# Patient Record
Sex: MALE | Race: WHITE | NOT HISPANIC OR LATINO | Employment: UNEMPLOYED | URBAN - METROPOLITAN AREA
[De-identification: names, ages, dates, MRNs, and addresses within clinical notes are randomized per-mention and may not be internally consistent; named-entity substitution may affect disease eponyms.]

---

## 2017-01-13 ENCOUNTER — RECORDS - HEALTHEAST (OUTPATIENT)
Dept: LAB | Facility: CLINIC | Age: 57
End: 2017-01-13

## 2017-01-13 LAB
CHOLEST SERPL-MCNC: 185 MG/DL
FASTING STATUS PATIENT QL REPORTED: NORMAL
HDLC SERPL-MCNC: 50 MG/DL
LDLC SERPL CALC-MCNC: 107 MG/DL
TRIGL SERPL-MCNC: 142 MG/DL

## 2018-01-09 ENCOUNTER — RECORDS - HEALTHEAST (OUTPATIENT)
Dept: ADMINISTRATIVE | Facility: OTHER | Age: 58
End: 2018-01-09

## 2018-01-11 ENCOUNTER — HOSPITAL ENCOUNTER (OUTPATIENT)
Dept: NUCLEAR MEDICINE | Facility: HOSPITAL | Age: 58
Discharge: HOME | End: 2018-01-11
Attending: INTERNAL MEDICINE

## 2018-01-11 DIAGNOSIS — R14.0 ABDOMINAL BLOATING: ICD-10-CM

## 2018-01-11 RX ORDER — LISINOPRIL 10 MG/1
10 TABLET ORAL DAILY
Status: SHIPPED | COMMUNITY
Start: 2018-01-11 | End: 2021-11-14

## 2018-01-11 RX ORDER — GLIMEPIRIDE 2 MG/1
2 TABLET ORAL
Status: SHIPPED | COMMUNITY
Start: 2018-01-11 | End: 2021-11-14

## 2018-01-11 RX ORDER — CHLORAL HYDRATE 500 MG
1 CAPSULE ORAL DAILY
Status: SHIPPED | COMMUNITY
Start: 2018-01-11

## 2018-01-11 RX ORDER — ASPIRIN 81 MG/1
81 TABLET, CHEWABLE ORAL DAILY
Status: SHIPPED | COMMUNITY
Start: 2018-01-11 | End: 2021-11-14

## 2018-01-11 RX ORDER — SIMVASTATIN 20 MG
20 TABLET ORAL AT BEDTIME
Status: SHIPPED | COMMUNITY
Start: 2018-01-11 | End: 2021-11-14

## 2018-01-11 ASSESSMENT — MIFFLIN-ST. JEOR: SCORE: 1940.7

## 2018-03-26 ENCOUNTER — RECORDS - HEALTHEAST (OUTPATIENT)
Dept: LAB | Facility: CLINIC | Age: 58
End: 2018-03-26

## 2018-03-26 LAB
ALBUMIN SERPL-MCNC: 3.4 G/DL (ref 3.5–5)
ALP SERPL-CCNC: 81 U/L (ref 45–120)
ALT SERPL W P-5'-P-CCNC: 19 U/L (ref 0–45)
ANION GAP SERPL CALCULATED.3IONS-SCNC: 8 MMOL/L (ref 5–18)
AST SERPL W P-5'-P-CCNC: 16 U/L (ref 0–40)
BILIRUB SERPL-MCNC: 0.5 MG/DL (ref 0–1)
BUN SERPL-MCNC: 22 MG/DL (ref 8–22)
CALCIUM SERPL-MCNC: 9.5 MG/DL (ref 8.5–10.5)
CHLORIDE BLD-SCNC: 102 MMOL/L (ref 98–107)
CHOLEST SERPL-MCNC: 162 MG/DL
CO2 SERPL-SCNC: 27 MMOL/L (ref 22–31)
CREAT SERPL-MCNC: 1.29 MG/DL (ref 0.7–1.3)
CREAT UR-MCNC: 20.8 MG/DL
FASTING STATUS PATIENT QL REPORTED: NORMAL
GFR SERPL CREATININE-BSD FRML MDRD: 57 ML/MIN/1.73M2
GLUCOSE BLD-MCNC: 210 MG/DL (ref 70–125)
HDLC SERPL-MCNC: 44 MG/DL
LDLC SERPL CALC-MCNC: 97 MG/DL
MICROALBUMIN UR-MCNC: 10.43 MG/DL (ref 0–1.99)
MICROALBUMIN/CREAT UR: 501.4 MG/G
POTASSIUM BLD-SCNC: 5.5 MMOL/L (ref 3.5–5)
PROT SERPL-MCNC: 6.8 G/DL (ref 6–8)
SODIUM SERPL-SCNC: 137 MMOL/L (ref 136–145)
TRIGL SERPL-MCNC: 107 MG/DL

## 2018-03-28 LAB — BACTERIA SPEC CULT: ABNORMAL

## 2018-10-29 ENCOUNTER — RECORDS - HEALTHEAST (OUTPATIENT)
Dept: LAB | Facility: CLINIC | Age: 58
End: 2018-10-29

## 2018-10-29 LAB
ALBUMIN SERPL-MCNC: 3.9 G/DL (ref 3.5–5)
ALP SERPL-CCNC: 74 U/L (ref 45–120)
ALT SERPL W P-5'-P-CCNC: 28 U/L (ref 0–45)
ANION GAP SERPL CALCULATED.3IONS-SCNC: 11 MMOL/L (ref 5–18)
AST SERPL W P-5'-P-CCNC: 20 U/L (ref 0–40)
BILIRUB SERPL-MCNC: 0.6 MG/DL (ref 0–1)
BUN SERPL-MCNC: 18 MG/DL (ref 8–22)
CALCIUM SERPL-MCNC: 10.7 MG/DL (ref 8.5–10.5)
CHLORIDE BLD-SCNC: 100 MMOL/L (ref 98–107)
CHOLEST SERPL-MCNC: 201 MG/DL
CO2 SERPL-SCNC: 27 MMOL/L (ref 22–31)
CREAT SERPL-MCNC: 1.4 MG/DL (ref 0.7–1.3)
FASTING STATUS PATIENT QL REPORTED: ABNORMAL
GFR SERPL CREATININE-BSD FRML MDRD: 52 ML/MIN/1.73M2
GLUCOSE BLD-MCNC: 112 MG/DL (ref 70–125)
HDLC SERPL-MCNC: 49 MG/DL
LDLC SERPL CALC-MCNC: 109 MG/DL
POTASSIUM BLD-SCNC: 4.2 MMOL/L (ref 3.5–5)
PROT SERPL-MCNC: 7.3 G/DL (ref 6–8)
SODIUM SERPL-SCNC: 138 MMOL/L (ref 136–145)
TRIGL SERPL-MCNC: 216 MG/DL

## 2019-02-28 ENCOUNTER — RECORDS - HEALTHEAST (OUTPATIENT)
Dept: LAB | Facility: CLINIC | Age: 59
End: 2019-02-28

## 2019-02-28 LAB
ALBUMIN SERPL-MCNC: 3.6 G/DL (ref 3.5–5)
ALP SERPL-CCNC: 81 U/L (ref 45–120)
ALT SERPL W P-5'-P-CCNC: 21 U/L (ref 0–45)
ANION GAP SERPL CALCULATED.3IONS-SCNC: 7 MMOL/L (ref 5–18)
AST SERPL W P-5'-P-CCNC: 18 U/L (ref 0–40)
BILIRUB SERPL-MCNC: 0.7 MG/DL (ref 0–1)
BUN SERPL-MCNC: 20 MG/DL (ref 8–22)
CALCIUM SERPL-MCNC: 9.8 MG/DL (ref 8.5–10.5)
CHLORIDE BLD-SCNC: 101 MMOL/L (ref 98–107)
CO2 SERPL-SCNC: 30 MMOL/L (ref 22–31)
CREAT SERPL-MCNC: 1.47 MG/DL (ref 0.7–1.3)
GFR SERPL CREATININE-BSD FRML MDRD: 49 ML/MIN/1.73M2
GLUCOSE BLD-MCNC: 192 MG/DL (ref 70–125)
POTASSIUM BLD-SCNC: 5.2 MMOL/L (ref 3.5–5)
PROT SERPL-MCNC: 6.9 G/DL (ref 6–8)
SODIUM SERPL-SCNC: 138 MMOL/L (ref 136–145)

## 2019-06-25 ENCOUNTER — RECORDS - HEALTHEAST (OUTPATIENT)
Dept: LAB | Facility: CLINIC | Age: 59
End: 2019-06-25

## 2019-06-25 LAB
ALBUMIN SERPL-MCNC: 4 G/DL (ref 3.5–5)
ALP SERPL-CCNC: 76 U/L (ref 45–120)
ALT SERPL W P-5'-P-CCNC: 24 U/L (ref 0–45)
ANION GAP SERPL CALCULATED.3IONS-SCNC: 12 MMOL/L (ref 5–18)
AST SERPL W P-5'-P-CCNC: 22 U/L (ref 0–40)
BILIRUB SERPL-MCNC: 0.6 MG/DL (ref 0–1)
BUN SERPL-MCNC: 22 MG/DL (ref 8–22)
CALCIUM SERPL-MCNC: 10.3 MG/DL (ref 8.5–10.5)
CHLORIDE BLD-SCNC: 101 MMOL/L (ref 98–107)
CO2 SERPL-SCNC: 24 MMOL/L (ref 22–31)
CREAT SERPL-MCNC: 1.4 MG/DL (ref 0.7–1.3)
GFR SERPL CREATININE-BSD FRML MDRD: 52 ML/MIN/1.73M2
GLUCOSE BLD-MCNC: 168 MG/DL (ref 70–125)
POTASSIUM BLD-SCNC: 4.9 MMOL/L (ref 3.5–5)
PROT SERPL-MCNC: 7 G/DL (ref 6–8)
SODIUM SERPL-SCNC: 137 MMOL/L (ref 136–145)

## 2019-10-31 ENCOUNTER — RECORDS - HEALTHEAST (OUTPATIENT)
Dept: LAB | Facility: CLINIC | Age: 59
End: 2019-10-31

## 2019-10-31 LAB
ALBUMIN SERPL-MCNC: 4.1 G/DL (ref 3.5–5)
ALP SERPL-CCNC: 87 U/L (ref 45–120)
ALT SERPL W P-5'-P-CCNC: 24 U/L (ref 0–45)
ANION GAP SERPL CALCULATED.3IONS-SCNC: 11 MMOL/L (ref 5–18)
AST SERPL W P-5'-P-CCNC: 20 U/L (ref 0–40)
BILIRUB SERPL-MCNC: 0.7 MG/DL (ref 0–1)
BUN SERPL-MCNC: 17 MG/DL (ref 8–22)
CALCIUM SERPL-MCNC: 10.7 MG/DL (ref 8.5–10.5)
CHLORIDE BLD-SCNC: 101 MMOL/L (ref 98–107)
CHOLEST SERPL-MCNC: 187 MG/DL
CO2 SERPL-SCNC: 29 MMOL/L (ref 22–31)
CREAT SERPL-MCNC: 1.45 MG/DL (ref 0.7–1.3)
FASTING STATUS PATIENT QL REPORTED: ABNORMAL
GFR SERPL CREATININE-BSD FRML MDRD: 50 ML/MIN/1.73M2
GLUCOSE BLD-MCNC: 113 MG/DL (ref 70–125)
HDLC SERPL-MCNC: 50 MG/DL
LDLC SERPL CALC-MCNC: 107 MG/DL
POTASSIUM BLD-SCNC: 4.5 MMOL/L (ref 3.5–5)
PROT SERPL-MCNC: 7.2 G/DL (ref 6–8)
SODIUM SERPL-SCNC: 141 MMOL/L (ref 136–145)
TRIGL SERPL-MCNC: 152 MG/DL

## 2020-03-19 ENCOUNTER — RECORDS - HEALTHEAST (OUTPATIENT)
Dept: LAB | Facility: CLINIC | Age: 60
End: 2020-03-19

## 2020-03-19 LAB
ALBUMIN SERPL-MCNC: 4.1 G/DL (ref 3.5–5)
ANION GAP SERPL CALCULATED.3IONS-SCNC: 10 MMOL/L (ref 5–18)
BUN SERPL-MCNC: 16 MG/DL (ref 8–22)
CALCIUM SERPL-MCNC: 10.7 MG/DL (ref 8.5–10.5)
CHLORIDE BLD-SCNC: 102 MMOL/L (ref 98–107)
CO2 SERPL-SCNC: 29 MMOL/L (ref 22–31)
CREAT SERPL-MCNC: 1.37 MG/DL (ref 0.7–1.3)
GFR SERPL CREATININE-BSD FRML MDRD: 53 ML/MIN/1.73M2
GLUCOSE BLD-MCNC: 107 MG/DL (ref 70–125)
PHOSPHATE SERPL-MCNC: 3.1 MG/DL (ref 2.5–4.5)
POTASSIUM BLD-SCNC: 4.6 MMOL/L (ref 3.5–5)
SODIUM SERPL-SCNC: 141 MMOL/L (ref 136–145)

## 2021-05-31 VITALS — WEIGHT: 235 LBS | BODY MASS INDEX: 30.16 KG/M2 | HEIGHT: 74 IN

## 2021-11-14 ENCOUNTER — HOSPITAL ENCOUNTER (INPATIENT)
Facility: HOSPITAL | Age: 61
LOS: 9 days | Discharge: HOME HEALTH | DRG: 616 | End: 2021-11-24
Attending: EMERGENCY MEDICINE | Admitting: STUDENT IN AN ORGANIZED HEALTH CARE EDUCATION/TRAINING PROGRAM
Payer: COMMERCIAL

## 2021-11-14 ENCOUNTER — APPOINTMENT (OUTPATIENT)
Dept: RADIOLOGY | Facility: HOSPITAL | Age: 61
DRG: 616 | End: 2021-11-14
Attending: EMERGENCY MEDICINE
Payer: COMMERCIAL

## 2021-11-14 DIAGNOSIS — E11.69 TYPE 2 DIABETES MELLITUS WITH OTHER SPECIFIED COMPLICATION, WITH LONG-TERM CURRENT USE OF INSULIN (H): ICD-10-CM

## 2021-11-14 DIAGNOSIS — L03.119 CELLULITIS AND ABSCESS OF FOOT, EXCEPT TOES: ICD-10-CM

## 2021-11-14 DIAGNOSIS — T14.8XXA WOUND INFECTION: ICD-10-CM

## 2021-11-14 DIAGNOSIS — I82.4Y1 ACUTE DEEP VEIN THROMBOSIS (DVT) OF PROXIMAL VEIN OF RIGHT LOWER EXTREMITY (H): Primary | ICD-10-CM

## 2021-11-14 DIAGNOSIS — L08.9 WOUND INFECTION: ICD-10-CM

## 2021-11-14 DIAGNOSIS — I21.4 NSTEMI (NON-ST ELEVATED MYOCARDIAL INFARCTION) (H): ICD-10-CM

## 2021-11-14 DIAGNOSIS — E83.51 HYPOCALCEMIA: ICD-10-CM

## 2021-11-14 DIAGNOSIS — N19 RENAL FAILURE, UNSPECIFIED CHRONICITY: ICD-10-CM

## 2021-11-14 DIAGNOSIS — Z79.4 TYPE 2 DIABETES MELLITUS WITH OTHER SPECIFIED COMPLICATION, WITH LONG-TERM CURRENT USE OF INSULIN (H): ICD-10-CM

## 2021-11-14 DIAGNOSIS — K59.00 CONSTIPATION, UNSPECIFIED CONSTIPATION TYPE: ICD-10-CM

## 2021-11-14 DIAGNOSIS — R73.9 HYPERGLYCEMIA: ICD-10-CM

## 2021-11-14 DIAGNOSIS — K21.00 GASTROESOPHAGEAL REFLUX DISEASE WITH ESOPHAGITIS WITHOUT HEMORRHAGE: ICD-10-CM

## 2021-11-14 DIAGNOSIS — L02.619 CELLULITIS AND ABSCESS OF FOOT, EXCEPT TOES: ICD-10-CM

## 2021-11-14 DIAGNOSIS — E11.10 DIABETIC KETOACIDOSIS WITHOUT COMA ASSOCIATED WITH TYPE 2 DIABETES MELLITUS (H): ICD-10-CM

## 2021-11-14 DIAGNOSIS — E83.42 HYPOMAGNESEMIA: ICD-10-CM

## 2021-11-14 DIAGNOSIS — I48.0 PAROXYSMAL ATRIAL FIBRILLATION (H): ICD-10-CM

## 2021-11-14 PROBLEM — R14.0 ABDOMINAL DISTENSION, GASEOUS: Status: ACTIVE | Noted: 2018-01-09

## 2021-11-14 PROBLEM — E11.39 DIABETIC OCULOPATHY ASSOCIATED WITH TYPE 2 DIABETES MELLITUS (H): Status: ACTIVE | Noted: 2021-11-14

## 2021-11-14 PROBLEM — E78.2 MIXED HYPERLIPIDEMIA: Status: ACTIVE | Noted: 2021-11-14

## 2021-11-14 PROBLEM — R80.9 MICROALBUMINURIA: Status: ACTIVE | Noted: 2021-11-14

## 2021-11-14 PROBLEM — D12.4 BENIGN NEOPLASM OF DESCENDING COLON: Status: ACTIVE | Noted: 2018-04-19

## 2021-11-14 PROBLEM — Z79.84 LONG TERM (CURRENT) USE OF ORAL HYPOGLYCEMIC DRUGS: Status: ACTIVE | Noted: 2021-11-14

## 2021-11-14 PROBLEM — K22.70 BARRETT'S ESOPHAGUS: Status: ACTIVE | Noted: 2018-04-19

## 2021-11-14 PROBLEM — R14.0 ABDOMINAL BLOATING: Status: ACTIVE | Noted: 2018-01-09

## 2021-11-14 PROBLEM — E83.52 HYPERCALCEMIA: Status: ACTIVE | Noted: 2021-11-14

## 2021-11-14 PROBLEM — K63.5 POLYP OF COLON: Status: ACTIVE | Noted: 2018-04-17

## 2021-11-14 LAB
ANION GAP SERPL CALCULATED.3IONS-SCNC: 24 MMOL/L (ref 5–18)
BASE EXCESS BLDV CALC-SCNC: -16.2 MMOL/L
BASOPHILS # BLD AUTO: 0 10E3/UL (ref 0–0.2)
BASOPHILS NFR BLD AUTO: 0 %
BUN SERPL-MCNC: 24 MG/DL (ref 8–22)
C REACTIVE PROTEIN LHE: 36.5 MG/DL (ref 0–0.8)
CALCIUM SERPL-MCNC: 10.5 MG/DL (ref 8.5–10.5)
CHLORIDE BLD-SCNC: 90 MMOL/L (ref 98–107)
CO2 SERPL-SCNC: 15 MMOL/L (ref 22–31)
CREAT SERPL-MCNC: 2.03 MG/DL (ref 0.7–1.3)
EOSINOPHIL # BLD AUTO: 0 10E3/UL (ref 0–0.7)
EOSINOPHIL NFR BLD AUTO: 0 %
ERYTHROCYTE [DISTWIDTH] IN BLOOD BY AUTOMATED COUNT: 14.1 % (ref 10–15)
ERYTHROCYTE [SEDIMENTATION RATE] IN BLOOD BY WESTERGREN METHOD: 90 MM/HR (ref 0–15)
GFR SERPL CREATININE-BSD FRML MDRD: 34 ML/MIN/1.73M2
GLUCOSE BLD-MCNC: 439 MG/DL (ref 70–125)
GLUCOSE BLDC GLUCOMTR-MCNC: 388 MG/DL (ref 70–99)
GLUCOSE BLDC GLUCOMTR-MCNC: 392 MG/DL (ref 70–99)
HCO3 BLDV-SCNC: 13 MMOL/L (ref 24–30)
HCT VFR BLD AUTO: 40.1 % (ref 40–53)
HGB BLD-MCNC: 12.9 G/DL (ref 13.3–17.7)
IMM GRANULOCYTES # BLD: 0.1 10E3/UL
IMM GRANULOCYTES NFR BLD: 1 %
KETONES BLD-SCNC: 9.3 MMOL/L
LACTATE SERPL-SCNC: 1.8 MMOL/L (ref 0.7–2)
LYMPHOCYTES # BLD AUTO: 0.8 10E3/UL (ref 0.8–5.3)
LYMPHOCYTES NFR BLD AUTO: 7 %
MAGNESIUM SERPL-MCNC: 1.6 MG/DL (ref 1.8–2.6)
MCH RBC QN AUTO: 28.8 PG (ref 26.5–33)
MCHC RBC AUTO-ENTMCNC: 32.2 G/DL (ref 31.5–36.5)
MCV RBC AUTO: 90 FL (ref 78–100)
MONOCYTES # BLD AUTO: 0.9 10E3/UL (ref 0–1.3)
MONOCYTES NFR BLD AUTO: 8 %
NEUTROPHILS # BLD AUTO: 9.5 10E3/UL (ref 1.6–8.3)
NEUTROPHILS NFR BLD AUTO: 84 %
NRBC # BLD AUTO: 0 10E3/UL
NRBC BLD AUTO-RTO: 0 /100
OXYHGB MFR BLDV: 64.4 % (ref 70–75)
PCO2 BLDV: 28 MM HG (ref 35–50)
PH BLDV: 7.22 [PH] (ref 7.35–7.45)
PLATELET # BLD AUTO: 262 10E3/UL (ref 150–450)
PO2 BLDV: 38 MM HG (ref 25–47)
POTASSIUM BLD-SCNC: 4.7 MMOL/L (ref 3.5–5)
RBC # BLD AUTO: 4.48 10E6/UL (ref 4.4–5.9)
SAO2 % BLDV: 65.5 % (ref 70–75)
SARS-COV-2 RNA RESP QL NAA+PROBE: NEGATIVE
SODIUM SERPL-SCNC: 129 MMOL/L (ref 136–145)
WBC # BLD AUTO: 11.2 10E3/UL (ref 4–11)

## 2021-11-14 PROCEDURE — 93005 ELECTROCARDIOGRAM TRACING: CPT | Performed by: EMERGENCY MEDICINE

## 2021-11-14 PROCEDURE — 86141 C-REACTIVE PROTEIN HS: CPT | Performed by: EMERGENCY MEDICINE

## 2021-11-14 PROCEDURE — 80048 BASIC METABOLIC PNL TOTAL CA: CPT | Performed by: EMERGENCY MEDICINE

## 2021-11-14 PROCEDURE — 250N000011 HC RX IP 250 OP 636: Performed by: EMERGENCY MEDICINE

## 2021-11-14 PROCEDURE — 87635 SARS-COV-2 COVID-19 AMP PRB: CPT | Performed by: EMERGENCY MEDICINE

## 2021-11-14 PROCEDURE — 82010 KETONE BODYS QUAN: CPT | Performed by: EMERGENCY MEDICINE

## 2021-11-14 PROCEDURE — 96372 THER/PROPH/DIAG INJ SC/IM: CPT | Performed by: EMERGENCY MEDICINE

## 2021-11-14 PROCEDURE — 84484 ASSAY OF TROPONIN QUANT: CPT | Performed by: EMERGENCY MEDICINE

## 2021-11-14 PROCEDURE — 258N000003 HC RX IP 258 OP 636: Performed by: EMERGENCY MEDICINE

## 2021-11-14 PROCEDURE — 250N000013 HC RX MED GY IP 250 OP 250 PS 637: Performed by: EMERGENCY MEDICINE

## 2021-11-14 PROCEDURE — 87149 DNA/RNA DIRECT PROBE: CPT | Performed by: EMERGENCY MEDICINE

## 2021-11-14 PROCEDURE — 82805 BLOOD GASES W/O2 SATURATION: CPT | Performed by: EMERGENCY MEDICINE

## 2021-11-14 PROCEDURE — 83735 ASSAY OF MAGNESIUM: CPT | Performed by: EMERGENCY MEDICINE

## 2021-11-14 PROCEDURE — 83605 ASSAY OF LACTIC ACID: CPT | Performed by: EMERGENCY MEDICINE

## 2021-11-14 PROCEDURE — 85652 RBC SED RATE AUTOMATED: CPT | Performed by: EMERGENCY MEDICINE

## 2021-11-14 PROCEDURE — 99285 EMERGENCY DEPT VISIT HI MDM: CPT | Mod: 25

## 2021-11-14 PROCEDURE — 87181 SC STD AGAR DILUTION PER AGT: CPT | Performed by: EMERGENCY MEDICINE

## 2021-11-14 PROCEDURE — 96368 THER/DIAG CONCURRENT INF: CPT

## 2021-11-14 PROCEDURE — 73630 X-RAY EXAM OF FOOT: CPT | Mod: LT

## 2021-11-14 PROCEDURE — 96366 THER/PROPH/DIAG IV INF ADDON: CPT

## 2021-11-14 PROCEDURE — C9803 HOPD COVID-19 SPEC COLLECT: HCPCS

## 2021-11-14 PROCEDURE — 85025 COMPLETE CBC W/AUTO DIFF WBC: CPT | Performed by: EMERGENCY MEDICINE

## 2021-11-14 PROCEDURE — 36415 COLL VENOUS BLD VENIPUNCTURE: CPT | Performed by: EMERGENCY MEDICINE

## 2021-11-14 PROCEDURE — 250N000012 HC RX MED GY IP 250 OP 636 PS 637: Performed by: EMERGENCY MEDICINE

## 2021-11-14 PROCEDURE — 96365 THER/PROPH/DIAG IV INF INIT: CPT | Mod: 59

## 2021-11-14 PROCEDURE — 96367 TX/PROPH/DG ADDL SEQ IV INF: CPT

## 2021-11-14 PROCEDURE — 83036 HEMOGLOBIN GLYCOSYLATED A1C: CPT | Performed by: EMERGENCY MEDICINE

## 2021-11-14 RX ORDER — CALCIUM GLUCONATE 94 MG/ML
1 INJECTION, SOLUTION INTRAVENOUS ONCE
Status: COMPLETED | OUTPATIENT
Start: 2021-11-14 | End: 2021-11-15

## 2021-11-14 RX ORDER — DILTIAZEM HYDROCHLORIDE 5 MG/ML
25 INJECTION INTRAVENOUS ONCE
Status: DISCONTINUED | OUTPATIENT
Start: 2021-11-14 | End: 2021-11-14

## 2021-11-14 RX ORDER — ONDANSETRON 4 MG/1
4 TABLET, FILM COATED ORAL ONCE
Status: COMPLETED | OUTPATIENT
Start: 2021-11-14 | End: 2021-11-14

## 2021-11-14 RX ORDER — PIPERACILLIN SODIUM, TAZOBACTAM SODIUM 3; .375 G/15ML; G/15ML
3.38 INJECTION, POWDER, LYOPHILIZED, FOR SOLUTION INTRAVENOUS ONCE
Status: COMPLETED | OUTPATIENT
Start: 2021-11-14 | End: 2021-11-14

## 2021-11-14 RX ORDER — CEFAZOLIN SODIUM 1 G/50ML
2000 SOLUTION INTRAVENOUS ONCE
Status: COMPLETED | OUTPATIENT
Start: 2021-11-14 | End: 2021-11-15

## 2021-11-14 RX ORDER — ACETAMINOPHEN 325 MG/1
650 TABLET ORAL ONCE
Status: COMPLETED | OUTPATIENT
Start: 2021-11-14 | End: 2021-11-14

## 2021-11-14 RX ADMIN — ACETAMINOPHEN 650 MG: 325 TABLET ORAL at 22:30

## 2021-11-14 RX ADMIN — ONDANSETRON HYDROCHLORIDE 4 MG: 4 TABLET, FILM COATED ORAL at 23:43

## 2021-11-14 RX ADMIN — HUMAN INSULIN 8 UNITS: 100 INJECTION, SOLUTION SUBCUTANEOUS at 22:30

## 2021-11-14 RX ADMIN — CALCIUM GLUCONATE 1 G: 98 INJECTION, SOLUTION INTRAVENOUS at 23:45

## 2021-11-14 RX ADMIN — VANCOMYCIN HYDROCHLORIDE 2000 MG: 5 INJECTION, POWDER, LYOPHILIZED, FOR SOLUTION INTRAVENOUS at 22:33

## 2021-11-14 RX ADMIN — SODIUM CHLORIDE 1000 ML: 9 INJECTION, SOLUTION INTRAVENOUS at 22:07

## 2021-11-14 RX ADMIN — SODIUM CHLORIDE 1000 ML: 9 INJECTION, SOLUTION INTRAVENOUS at 22:36

## 2021-11-14 RX ADMIN — PIPERACILLIN SODIUM AND TAZOBACTAM SODIUM 3.38 G: 3; .375 INJECTION, POWDER, LYOPHILIZED, FOR SOLUTION INTRAVENOUS at 22:07

## 2021-11-14 ASSESSMENT — MIFFLIN-ST. JEOR: SCORE: 1874.47

## 2021-11-15 ENCOUNTER — APPOINTMENT (OUTPATIENT)
Dept: MRI IMAGING | Facility: HOSPITAL | Age: 61
DRG: 616 | End: 2021-11-15
Attending: EMERGENCY MEDICINE
Payer: COMMERCIAL

## 2021-11-15 ENCOUNTER — APPOINTMENT (OUTPATIENT)
Dept: CARDIOLOGY | Facility: HOSPITAL | Age: 61
DRG: 616 | End: 2021-11-15
Attending: STUDENT IN AN ORGANIZED HEALTH CARE EDUCATION/TRAINING PROGRAM
Payer: COMMERCIAL

## 2021-11-15 PROBLEM — E11.10 DIABETIC KETOACIDOSIS WITHOUT COMA ASSOCIATED WITH TYPE 2 DIABETES MELLITUS (H): Status: ACTIVE | Noted: 2021-11-15

## 2021-11-15 PROBLEM — E83.51 HYPOCALCEMIA: Status: ACTIVE | Noted: 2021-11-15

## 2021-11-15 PROBLEM — R73.9 HYPERGLYCEMIA: Status: ACTIVE | Noted: 2021-11-15

## 2021-11-15 LAB
ANION GAP SERPL CALCULATED.3IONS-SCNC: 10 MMOL/L (ref 5–18)
ANION GAP SERPL CALCULATED.3IONS-SCNC: 15 MMOL/L (ref 5–18)
ANION GAP SERPL CALCULATED.3IONS-SCNC: 24 MMOL/L (ref 5–18)
ANION GAP SERPL CALCULATED.3IONS-SCNC: 5 MMOL/L (ref 5–18)
ANION GAP SERPL CALCULATED.3IONS-SCNC: 7 MMOL/L (ref 5–18)
ANION GAP SERPL CALCULATED.3IONS-SCNC: 7 MMOL/L (ref 5–18)
APTT PPP: 55 SECONDS (ref 22–38)
ATRIAL RATE - MUSE: 93 BPM
BASOPHILS # BLD AUTO: 0 10E3/UL (ref 0–0.2)
BASOPHILS NFR BLD AUTO: 0 %
BUN SERPL-MCNC: 21 MG/DL (ref 8–22)
BUN SERPL-MCNC: 23 MG/DL (ref 8–22)
BUN SERPL-MCNC: 24 MG/DL (ref 8–22)
BUN SERPL-MCNC: 24 MG/DL (ref 8–22)
CALCIUM SERPL-MCNC: 8.5 MG/DL (ref 8.5–10.5)
CALCIUM SERPL-MCNC: 9 MG/DL (ref 8.5–10.5)
CALCIUM SERPL-MCNC: 9.2 MG/DL (ref 8.5–10.5)
CALCIUM SERPL-MCNC: 9.4 MG/DL (ref 8.5–10.5)
CALCIUM SERPL-MCNC: 9.5 MG/DL (ref 8.5–10.5)
CALCIUM SERPL-MCNC: 9.9 MG/DL (ref 8.5–10.5)
CHLORIDE BLD-SCNC: 102 MMOL/L (ref 98–107)
CHLORIDE BLD-SCNC: 102 MMOL/L (ref 98–107)
CHLORIDE BLD-SCNC: 103 MMOL/L (ref 98–107)
CHLORIDE BLD-SCNC: 104 MMOL/L (ref 98–107)
CHLORIDE BLD-SCNC: 94 MMOL/L (ref 98–107)
CHLORIDE BLD-SCNC: 99 MMOL/L (ref 98–107)
CO2 SERPL-SCNC: 11 MMOL/L (ref 22–31)
CO2 SERPL-SCNC: 17 MMOL/L (ref 22–31)
CO2 SERPL-SCNC: 20 MMOL/L (ref 22–31)
CO2 SERPL-SCNC: 20 MMOL/L (ref 22–31)
CO2 SERPL-SCNC: 22 MMOL/L (ref 22–31)
CO2 SERPL-SCNC: 24 MMOL/L (ref 22–31)
CREAT SERPL-MCNC: 1.49 MG/DL (ref 0.7–1.3)
CREAT SERPL-MCNC: 1.49 MG/DL (ref 0.7–1.3)
CREAT SERPL-MCNC: 1.59 MG/DL (ref 0.7–1.3)
CREAT SERPL-MCNC: 1.65 MG/DL (ref 0.7–1.3)
CREAT SERPL-MCNC: 1.8 MG/DL (ref 0.7–1.3)
CREAT SERPL-MCNC: 1.94 MG/DL (ref 0.7–1.3)
DIASTOLIC BLOOD PRESSURE - MUSE: 66 MMHG
ENTEROCOCCUS FAECALIS: NOT DETECTED
ENTEROCOCCUS FAECIUM: NOT DETECTED
EOSINOPHIL # BLD AUTO: 0 10E3/UL (ref 0–0.7)
EOSINOPHIL NFR BLD AUTO: 0 %
ERYTHROCYTE [DISTWIDTH] IN BLOOD BY AUTOMATED COUNT: 14.1 % (ref 10–15)
GFR SERPL CREATININE-BSD FRML MDRD: 36 ML/MIN/1.73M2
GFR SERPL CREATININE-BSD FRML MDRD: 39 ML/MIN/1.73M2
GFR SERPL CREATININE-BSD FRML MDRD: 44 ML/MIN/1.73M2
GFR SERPL CREATININE-BSD FRML MDRD: 46 ML/MIN/1.73M2
GFR SERPL CREATININE-BSD FRML MDRD: 50 ML/MIN/1.73M2
GFR SERPL CREATININE-BSD FRML MDRD: 50 ML/MIN/1.73M2
GLUCOSE BLD-MCNC: 156 MG/DL (ref 70–125)
GLUCOSE BLD-MCNC: 172 MG/DL (ref 70–125)
GLUCOSE BLD-MCNC: 176 MG/DL (ref 70–125)
GLUCOSE BLD-MCNC: 196 MG/DL (ref 70–125)
GLUCOSE BLD-MCNC: 268 MG/DL (ref 70–125)
GLUCOSE BLD-MCNC: 396 MG/DL (ref 70–125)
GLUCOSE BLDC GLUCOMTR-MCNC: 121 MG/DL (ref 70–99)
GLUCOSE BLDC GLUCOMTR-MCNC: 145 MG/DL (ref 70–99)
GLUCOSE BLDC GLUCOMTR-MCNC: 146 MG/DL (ref 70–99)
GLUCOSE BLDC GLUCOMTR-MCNC: 149 MG/DL (ref 70–99)
GLUCOSE BLDC GLUCOMTR-MCNC: 152 MG/DL (ref 70–99)
GLUCOSE BLDC GLUCOMTR-MCNC: 152 MG/DL (ref 70–99)
GLUCOSE BLDC GLUCOMTR-MCNC: 170 MG/DL (ref 70–99)
GLUCOSE BLDC GLUCOMTR-MCNC: 180 MG/DL (ref 70–99)
GLUCOSE BLDC GLUCOMTR-MCNC: 184 MG/DL (ref 70–99)
GLUCOSE BLDC GLUCOMTR-MCNC: 186 MG/DL (ref 70–99)
GLUCOSE BLDC GLUCOMTR-MCNC: 188 MG/DL (ref 70–99)
GLUCOSE BLDC GLUCOMTR-MCNC: 210 MG/DL (ref 70–99)
GLUCOSE BLDC GLUCOMTR-MCNC: 251 MG/DL (ref 70–99)
GLUCOSE BLDC GLUCOMTR-MCNC: 287 MG/DL (ref 70–99)
GLUCOSE BLDC GLUCOMTR-MCNC: 341 MG/DL (ref 70–99)
GLUCOSE BLDC GLUCOMTR-MCNC: 364 MG/DL (ref 70–99)
HBA1C MFR BLD: >14 %
HCT VFR BLD AUTO: 34.9 % (ref 40–53)
HGB BLD-MCNC: 11.3 G/DL (ref 13.3–17.7)
HOLD SPECIMEN: NORMAL
IMM GRANULOCYTES # BLD: 0.1 10E3/UL
IMM GRANULOCYTES NFR BLD: 1 %
INR PPP: 1.35 (ref 0.85–1.15)
INTERPRETATION ECG - MUSE: NORMAL
KETONES BLD-SCNC: 1.02 MMOL/L
KETONES BLD-SCNC: 1.73 MMOL/L
KETONES BLD-SCNC: 2.37 MMOL/L
KETONES BLD-SCNC: 3.29 MMOL/L
KETONES BLD-SCNC: 5.41 MMOL/L
LISTERIA SPECIES (DETECTED/NOT DETECTED): NOT DETECTED
LVEF ECHO: NORMAL
LYMPHOCYTES # BLD AUTO: 0.9 10E3/UL (ref 0.8–5.3)
LYMPHOCYTES NFR BLD AUTO: 8 %
MAGNESIUM SERPL-MCNC: 1.7 MG/DL (ref 1.8–2.6)
MCH RBC QN AUTO: 28.9 PG (ref 26.5–33)
MCHC RBC AUTO-ENTMCNC: 32.4 G/DL (ref 31.5–36.5)
MCV RBC AUTO: 89 FL (ref 78–100)
MONOCYTES # BLD AUTO: 1 10E3/UL (ref 0–1.3)
MONOCYTES NFR BLD AUTO: 9 %
NEUTROPHILS # BLD AUTO: 9.8 10E3/UL (ref 1.6–8.3)
NEUTROPHILS NFR BLD AUTO: 82 %
NRBC # BLD AUTO: 0 10E3/UL
NRBC BLD AUTO-RTO: 0 /100
P AXIS - MUSE: 55 DEGREES
PHOSPHATE SERPL-MCNC: 3.1 MG/DL (ref 2.5–4.5)
PLATELET # BLD AUTO: 262 10E3/UL (ref 150–450)
POTASSIUM BLD-SCNC: 3.7 MMOL/L (ref 3.5–5)
POTASSIUM BLD-SCNC: 3.8 MMOL/L (ref 3.5–5)
POTASSIUM BLD-SCNC: 4 MMOL/L (ref 3.5–5)
POTASSIUM BLD-SCNC: 4.2 MMOL/L (ref 3.5–5)
POTASSIUM BLD-SCNC: 4.2 MMOL/L (ref 3.5–5)
POTASSIUM BLD-SCNC: 4.4 MMOL/L (ref 3.5–5)
PR INTERVAL - MUSE: 168 MS
QRS DURATION - MUSE: 90 MS
QT - MUSE: 344 MS
QTC - MUSE: 427 MS
R AXIS - MUSE: 34 DEGREES
RBC # BLD AUTO: 3.91 10E6/UL (ref 4.4–5.9)
SODIUM SERPL-SCNC: 129 MMOL/L (ref 136–145)
SODIUM SERPL-SCNC: 131 MMOL/L (ref 136–145)
SODIUM SERPL-SCNC: 132 MMOL/L (ref 136–145)
SODIUM SERPL-SCNC: 132 MMOL/L (ref 136–145)
STAPHYLOCOCCUS AUREUS: NOT DETECTED
STAPHYLOCOCCUS EPIDERMIDIS: NOT DETECTED
STAPHYLOCOCCUS LUGDUNENSIS: NOT DETECTED
STAPHYLOCOCCUS SPECIES: NOT DETECTED
STREPTOCOCCUS AGALACTIAE: DETECTED
STREPTOCOCCUS ANGINOSUS GROUP: NOT DETECTED
STREPTOCOCCUS PNEUMONIAE: NOT DETECTED
STREPTOCOCCUS PYOGENES: NOT DETECTED
SYSTOLIC BLOOD PRESSURE - MUSE: 122 MMHG
T AXIS - MUSE: 37 DEGREES
TROPONIN I SERPL-MCNC: 1.29 NG/ML (ref 0–0.29)
TROPONIN I SERPL-MCNC: 13.42 NG/ML (ref 0–0.29)
TROPONIN I SERPL-MCNC: 3.72 NG/ML (ref 0–0.29)
UFH PPP CHRO-ACNC: <0.1 IU/ML
UFH PPP CHRO-ACNC: <0.1 IU/ML
VENTRICULAR RATE- MUSE: 93 BPM
WBC # BLD AUTO: 11.8 10E3/UL (ref 4–11)

## 2021-11-15 PROCEDURE — 84100 ASSAY OF PHOSPHORUS: CPT | Performed by: EMERGENCY MEDICINE

## 2021-11-15 PROCEDURE — 93010 ELECTROCARDIOGRAM REPORT: CPT | Performed by: INTERNAL MEDICINE

## 2021-11-15 PROCEDURE — 82010 KETONE BODYS QUAN: CPT | Performed by: INTERNAL MEDICINE

## 2021-11-15 PROCEDURE — 99232 SBSQ HOSP IP/OBS MODERATE 35: CPT | Performed by: STUDENT IN AN ORGANIZED HEALTH CARE EDUCATION/TRAINING PROGRAM

## 2021-11-15 PROCEDURE — 85520 HEPARIN ASSAY: CPT | Performed by: STUDENT IN AN ORGANIZED HEALTH CARE EDUCATION/TRAINING PROGRAM

## 2021-11-15 PROCEDURE — 255N000002 HC RX 255 OP 636: Performed by: INTERNAL MEDICINE

## 2021-11-15 PROCEDURE — 85730 THROMBOPLASTIN TIME PARTIAL: CPT | Performed by: EMERGENCY MEDICINE

## 2021-11-15 PROCEDURE — 93005 ELECTROCARDIOGRAM TRACING: CPT | Performed by: INTERNAL MEDICINE

## 2021-11-15 PROCEDURE — 250N000013 HC RX MED GY IP 250 OP 250 PS 637: Performed by: EMERGENCY MEDICINE

## 2021-11-15 PROCEDURE — 96376 TX/PRO/DX INJ SAME DRUG ADON: CPT

## 2021-11-15 PROCEDURE — 93005 ELECTROCARDIOGRAM TRACING: CPT

## 2021-11-15 PROCEDURE — 99207 PR NO CHARGE LOS: CPT | Performed by: INTERNAL MEDICINE

## 2021-11-15 PROCEDURE — 82010 KETONE BODYS QUAN: CPT | Performed by: STUDENT IN AN ORGANIZED HEALTH CARE EDUCATION/TRAINING PROGRAM

## 2021-11-15 PROCEDURE — 80048 BASIC METABOLIC PNL TOTAL CA: CPT | Performed by: STUDENT IN AN ORGANIZED HEALTH CARE EDUCATION/TRAINING PROGRAM

## 2021-11-15 PROCEDURE — 85025 COMPLETE CBC W/AUTO DIFF WBC: CPT | Performed by: STUDENT IN AN ORGANIZED HEALTH CARE EDUCATION/TRAINING PROGRAM

## 2021-11-15 PROCEDURE — 96375 TX/PRO/DX INJ NEW DRUG ADDON: CPT

## 2021-11-15 PROCEDURE — 85610 PROTHROMBIN TIME: CPT | Performed by: EMERGENCY MEDICINE

## 2021-11-15 PROCEDURE — 80048 BASIC METABOLIC PNL TOTAL CA: CPT | Performed by: INTERNAL MEDICINE

## 2021-11-15 PROCEDURE — 84484 ASSAY OF TROPONIN QUANT: CPT | Performed by: STUDENT IN AN ORGANIZED HEALTH CARE EDUCATION/TRAINING PROGRAM

## 2021-11-15 PROCEDURE — 87186 SC STD MICRODIL/AGAR DIL: CPT | Performed by: STUDENT IN AN ORGANIZED HEALTH CARE EDUCATION/TRAINING PROGRAM

## 2021-11-15 PROCEDURE — 85520 HEPARIN ASSAY: CPT | Performed by: EMERGENCY MEDICINE

## 2021-11-15 PROCEDURE — A9585 GADOBUTROL INJECTION: HCPCS | Performed by: INTERNAL MEDICINE

## 2021-11-15 PROCEDURE — 96367 TX/PROPH/DG ADDL SEQ IV INF: CPT

## 2021-11-15 PROCEDURE — 93005 ELECTROCARDIOGRAM TRACING: CPT | Performed by: EMERGENCY MEDICINE

## 2021-11-15 PROCEDURE — 250N000013 HC RX MED GY IP 250 OP 250 PS 637: Performed by: STUDENT IN AN ORGANIZED HEALTH CARE EDUCATION/TRAINING PROGRAM

## 2021-11-15 PROCEDURE — 80048 BASIC METABOLIC PNL TOTAL CA: CPT | Performed by: EMERGENCY MEDICINE

## 2021-11-15 PROCEDURE — 36415 COLL VENOUS BLD VENIPUNCTURE: CPT | Performed by: STUDENT IN AN ORGANIZED HEALTH CARE EDUCATION/TRAINING PROGRAM

## 2021-11-15 PROCEDURE — 250N000011 HC RX IP 250 OP 636: Performed by: STUDENT IN AN ORGANIZED HEALTH CARE EDUCATION/TRAINING PROGRAM

## 2021-11-15 PROCEDURE — 93306 TTE W/DOPPLER COMPLETE: CPT | Mod: 26 | Performed by: INTERNAL MEDICINE

## 2021-11-15 PROCEDURE — 999N000208 ECHOCARDIOGRAM COMPLETE

## 2021-11-15 PROCEDURE — 250N000012 HC RX MED GY IP 250 OP 636 PS 637: Performed by: EMERGENCY MEDICINE

## 2021-11-15 PROCEDURE — 87205 SMEAR GRAM STAIN: CPT | Performed by: STUDENT IN AN ORGANIZED HEALTH CARE EDUCATION/TRAINING PROGRAM

## 2021-11-15 PROCEDURE — C9113 INJ PANTOPRAZOLE SODIUM, VIA: HCPCS | Performed by: STUDENT IN AN ORGANIZED HEALTH CARE EDUCATION/TRAINING PROGRAM

## 2021-11-15 PROCEDURE — 99222 1ST HOSP IP/OBS MODERATE 55: CPT | Mod: 25 | Performed by: INTERNAL MEDICINE

## 2021-11-15 PROCEDURE — 73720 MRI LWR EXTREMITY W/O&W/DYE: CPT | Mod: LT

## 2021-11-15 PROCEDURE — G0463 HOSPITAL OUTPT CLINIC VISIT: HCPCS

## 2021-11-15 PROCEDURE — 36415 COLL VENOUS BLD VENIPUNCTURE: CPT | Performed by: INTERNAL MEDICINE

## 2021-11-15 PROCEDURE — 250N000012 HC RX MED GY IP 250 OP 636 PS 637: Performed by: INTERNAL MEDICINE

## 2021-11-15 PROCEDURE — 258N000003 HC RX IP 258 OP 636: Performed by: INTERNAL MEDICINE

## 2021-11-15 PROCEDURE — 84484 ASSAY OF TROPONIN QUANT: CPT | Performed by: EMERGENCY MEDICINE

## 2021-11-15 PROCEDURE — 99233 SBSQ HOSP IP/OBS HIGH 50: CPT | Mod: 57 | Performed by: PODIATRIST

## 2021-11-15 PROCEDURE — 258N000003 HC RX IP 258 OP 636: Performed by: EMERGENCY MEDICINE

## 2021-11-15 PROCEDURE — 258N000003 HC RX IP 258 OP 636: Performed by: STUDENT IN AN ORGANIZED HEALTH CARE EDUCATION/TRAINING PROGRAM

## 2021-11-15 PROCEDURE — 250N000011 HC RX IP 250 OP 636: Performed by: EMERGENCY MEDICINE

## 2021-11-15 PROCEDURE — 36415 COLL VENOUS BLD VENIPUNCTURE: CPT | Performed by: EMERGENCY MEDICINE

## 2021-11-15 PROCEDURE — 83735 ASSAY OF MAGNESIUM: CPT | Performed by: INTERNAL MEDICINE

## 2021-11-15 PROCEDURE — 210N000001 HC R&B IMCU HEART CARE

## 2021-11-15 RX ORDER — PIPERACILLIN SODIUM, TAZOBACTAM SODIUM 3; .375 G/15ML; G/15ML
3.38 INJECTION, POWDER, LYOPHILIZED, FOR SOLUTION INTRAVENOUS EVERY 6 HOURS
Status: DISCONTINUED | OUTPATIENT
Start: 2021-11-15 | End: 2021-11-15 | Stop reason: DRUGHIGH

## 2021-11-15 RX ORDER — ATORVASTATIN CALCIUM 40 MG/1
40 TABLET, FILM COATED ORAL EVERY EVENING
Status: DISCONTINUED | OUTPATIENT
Start: 2021-11-15 | End: 2021-11-16

## 2021-11-15 RX ORDER — NICOTINE POLACRILEX 4 MG
15-30 LOZENGE BUCCAL
Status: DISCONTINUED | OUTPATIENT
Start: 2021-11-15 | End: 2021-11-16

## 2021-11-15 RX ORDER — ASPIRIN 81 MG/1
81 TABLET, CHEWABLE ORAL DAILY
Status: DISCONTINUED | OUTPATIENT
Start: 2021-11-15 | End: 2021-11-24 | Stop reason: HOSPADM

## 2021-11-15 RX ORDER — NICOTINE POLACRILEX 4 MG
15-30 LOZENGE BUCCAL
Status: DISCONTINUED | OUTPATIENT
Start: 2021-11-15 | End: 2021-11-24 | Stop reason: HOSPADM

## 2021-11-15 RX ORDER — LIDOCAINE 40 MG/G
CREAM TOPICAL
Status: DISCONTINUED | OUTPATIENT
Start: 2021-11-15 | End: 2021-11-16

## 2021-11-15 RX ORDER — DEXTROSE MONOHYDRATE 25 G/50ML
25-50 INJECTION, SOLUTION INTRAVENOUS
Status: DISCONTINUED | OUTPATIENT
Start: 2021-11-15 | End: 2021-11-16

## 2021-11-15 RX ORDER — SODIUM CHLORIDE 9 MG/ML
1000 INJECTION, SOLUTION INTRAVENOUS CONTINUOUS
Status: DISCONTINUED | OUTPATIENT
Start: 2021-11-15 | End: 2021-11-15

## 2021-11-15 RX ORDER — PIPERACILLIN SODIUM, TAZOBACTAM SODIUM 3; .375 G/15ML; G/15ML
3.38 INJECTION, POWDER, LYOPHILIZED, FOR SOLUTION INTRAVENOUS EVERY 6 HOURS
Status: DISCONTINUED | OUTPATIENT
Start: 2021-11-15 | End: 2021-11-15

## 2021-11-15 RX ORDER — SODIUM CHLORIDE AND POTASSIUM CHLORIDE 150; 900 MG/100ML; MG/100ML
INJECTION, SOLUTION INTRAVENOUS CONTINUOUS
Status: DISCONTINUED | OUTPATIENT
Start: 2021-11-15 | End: 2021-11-16

## 2021-11-15 RX ORDER — HEPARIN SODIUM 10000 [USP'U]/100ML
0-5000 INJECTION, SOLUTION INTRAVENOUS CONTINUOUS
Status: DISCONTINUED | OUTPATIENT
Start: 2021-11-15 | End: 2021-11-16

## 2021-11-15 RX ORDER — ACETAMINOPHEN 325 MG/1
650 TABLET ORAL EVERY 4 HOURS PRN
Status: DISCONTINUED | OUTPATIENT
Start: 2021-11-15 | End: 2021-11-16

## 2021-11-15 RX ORDER — ASPIRIN 81 MG/1
324 TABLET, CHEWABLE ORAL ONCE
Status: COMPLETED | OUTPATIENT
Start: 2021-11-15 | End: 2021-11-15

## 2021-11-15 RX ORDER — CEFAZOLIN SODIUM 1 G/50ML
1250 SOLUTION INTRAVENOUS EVERY 24 HOURS
Status: DISCONTINUED | OUTPATIENT
Start: 2021-11-15 | End: 2021-11-16

## 2021-11-15 RX ORDER — DEXTROSE MONOHYDRATE 25 G/50ML
25-50 INJECTION, SOLUTION INTRAVENOUS
Status: DISCONTINUED | OUTPATIENT
Start: 2021-11-15 | End: 2021-11-24 | Stop reason: HOSPADM

## 2021-11-15 RX ORDER — DEXTROSE MONOHYDRATE, SODIUM CHLORIDE, AND POTASSIUM CHLORIDE 50; 1.49; 4.5 G/1000ML; G/1000ML; G/1000ML
INJECTION, SOLUTION INTRAVENOUS CONTINUOUS
Status: DISCONTINUED | OUTPATIENT
Start: 2021-11-15 | End: 2021-11-16

## 2021-11-15 RX ORDER — ONDANSETRON 4 MG/1
4 TABLET, ORALLY DISINTEGRATING ORAL EVERY 6 HOURS PRN
Status: DISCONTINUED | OUTPATIENT
Start: 2021-11-15 | End: 2021-11-16

## 2021-11-15 RX ORDER — ONDANSETRON 2 MG/ML
4 INJECTION INTRAMUSCULAR; INTRAVENOUS EVERY 6 HOURS PRN
Status: DISCONTINUED | OUTPATIENT
Start: 2021-11-15 | End: 2021-11-16

## 2021-11-15 RX ORDER — GADOBUTROL 604.72 MG/ML
10 INJECTION INTRAVENOUS ONCE
Status: COMPLETED | OUTPATIENT
Start: 2021-11-15 | End: 2021-11-15

## 2021-11-15 RX ORDER — PIPERACILLIN SODIUM, TAZOBACTAM SODIUM 3; .375 G/15ML; G/15ML
3.38 INJECTION, POWDER, LYOPHILIZED, FOR SOLUTION INTRAVENOUS EVERY 8 HOURS
Status: DISCONTINUED | OUTPATIENT
Start: 2021-11-15 | End: 2021-11-23

## 2021-11-15 RX ORDER — MAGNESIUM SULFATE HEPTAHYDRATE 40 MG/ML
2 INJECTION, SOLUTION INTRAVENOUS ONCE
Status: COMPLETED | OUTPATIENT
Start: 2021-11-15 | End: 2021-11-15

## 2021-11-15 RX ADMIN — SODIUM CHLORIDE 1000 ML: 9 INJECTION, SOLUTION INTRAVENOUS at 01:09

## 2021-11-15 RX ADMIN — ASPIRIN 81 MG CHEWABLE TABLET 324 MG: 81 TABLET CHEWABLE at 00:12

## 2021-11-15 RX ADMIN — INSULIN GLARGINE 25 UNITS: 100 INJECTION, SOLUTION SUBCUTANEOUS at 18:37

## 2021-11-15 RX ADMIN — PERFLUTREN 2 ML: 6.52 INJECTION, SUSPENSION INTRAVENOUS at 09:00

## 2021-11-15 RX ADMIN — SODIUM CHLORIDE 4 UNITS/HR: 9 INJECTION, SOLUTION INTRAVENOUS at 09:32

## 2021-11-15 RX ADMIN — PANTOPRAZOLE SODIUM 40 MG: 40 INJECTION, POWDER, FOR SOLUTION INTRAVENOUS at 09:36

## 2021-11-15 RX ADMIN — MAGNESIUM SULFATE HEPTAHYDRATE 2 G: 40 INJECTION, SOLUTION INTRAVENOUS at 00:15

## 2021-11-15 RX ADMIN — ASPIRIN 81 MG CHEWABLE TABLET 81 MG: 81 TABLET CHEWABLE at 09:36

## 2021-11-15 RX ADMIN — SODIUM CHLORIDE 1000 ML: 9 INJECTION, SOLUTION INTRAVENOUS at 02:15

## 2021-11-15 RX ADMIN — PIPERACILLIN SODIUM AND TAZOBACTAM SODIUM 3.38 G: 3; .375 INJECTION, POWDER, LYOPHILIZED, FOR SOLUTION INTRAVENOUS at 04:32

## 2021-11-15 RX ADMIN — HEPARIN SODIUM 1200 UNITS/HR: 1000 INJECTION INTRAVENOUS; SUBCUTANEOUS at 00:56

## 2021-11-15 RX ADMIN — PIPERACILLIN SODIUM AND TAZOBACTAM SODIUM 3.38 G: 3; .375 INJECTION, POWDER, LYOPHILIZED, FOR SOLUTION INTRAVENOUS at 19:58

## 2021-11-15 RX ADMIN — GADOBUTROL 10 ML: 604.72 INJECTION INTRAVENOUS at 11:01

## 2021-11-15 RX ADMIN — HEPARIN SODIUM 1500 UNITS/HR: 1000 INJECTION INTRAVENOUS; SUBCUTANEOUS at 11:39

## 2021-11-15 RX ADMIN — SODIUM CHLORIDE 5 UNITS/HR: 9 INJECTION, SOLUTION INTRAVENOUS at 01:11

## 2021-11-15 RX ADMIN — POTASSIUM CHLORIDE, DEXTROSE MONOHYDRATE AND SODIUM CHLORIDE: 150; 5; 450 INJECTION, SOLUTION INTRAVENOUS at 06:21

## 2021-11-15 RX ADMIN — INSULIN ASPART 1 UNITS: 100 INJECTION, SOLUTION INTRAVENOUS; SUBCUTANEOUS at 18:44

## 2021-11-15 RX ADMIN — POTASSIUM CHLORIDE AND SODIUM CHLORIDE: 900; 150 INJECTION, SOLUTION INTRAVENOUS at 04:03

## 2021-11-15 RX ADMIN — ATORVASTATIN CALCIUM 40 MG: 40 TABLET, FILM COATED ORAL at 19:58

## 2021-11-15 RX ADMIN — PIPERACILLIN SODIUM AND TAZOBACTAM SODIUM 3.38 G: 3; .375 INJECTION, POWDER, LYOPHILIZED, FOR SOLUTION INTRAVENOUS at 12:25

## 2021-11-15 RX ADMIN — HEPARIN SODIUM 1500 UNITS/HR: 1000 INJECTION INTRAVENOUS; SUBCUTANEOUS at 12:23

## 2021-11-15 RX ADMIN — POTASSIUM CHLORIDE, DEXTROSE MONOHYDRATE AND SODIUM CHLORIDE: 150; 5; 450 INJECTION, SOLUTION INTRAVENOUS at 19:05

## 2021-11-15 ASSESSMENT — ACTIVITIES OF DAILY LIVING (ADL)
ADLS_ACUITY_SCORE: 8
TOILETING_ISSUES: NO
WALKING_OR_CLIMBING_STAIRS_DIFFICULTY: YES
ADLS_ACUITY_SCORE: 8
ADLS_ACUITY_SCORE: 8
WALKING_OR_CLIMBING_STAIRS: AMBULATION DIFFICULTY, ASSISTANCE 1 PERSON;STAIR CLIMBING DIFFICULTY, ASSISTANCE 1 PERSON;TRANSFERRING DIFFICULTY, ASSISTANCE 1 PERSON
ADLS_ACUITY_SCORE: 8
CONCENTRATING,_REMEMBERING_OR_MAKING_DECISIONS_DIFFICULTY: NO
ADLS_ACUITY_SCORE: 12
ADLS_ACUITY_SCORE: 8
DIFFICULTY_EATING/SWALLOWING: NO
ADLS_ACUITY_SCORE: 8
NUMBER_OF_TIMES_PATIENT_HAS_FALLEN_WITHIN_LAST_SIX_MONTHS: 1
DIFFICULTY_COMMUNICATING: NO
ADLS_ACUITY_SCORE: 9
ADLS_ACUITY_SCORE: 8
WEAR_GLASSES_OR_BLIND: OTHER (SEE COMMENTS)
DOING_ERRANDS_INDEPENDENTLY_DIFFICULTY: NO
ADLS_ACUITY_SCORE: 8
DRESSING/BATHING_DIFFICULTY: NO
ADLS_ACUITY_SCORE: 8
FALL_HISTORY_WITHIN_LAST_SIX_MONTHS: YES
ADLS_ACUITY_SCORE: 8
DEPENDENT_IADLS:: INDEPENDENT
WHICH_OF_THE_ABOVE_FUNCTIONAL_RISKS_HAD_A_RECENT_ONSET_OR_CHANGE?: AMBULATION;TRANSFERRING;BATHING
ADLS_ACUITY_SCORE: 8
ADLS_ACUITY_SCORE: 8
ADLS_ACUITY_SCORE: 10
ADLS_ACUITY_SCORE: 9
ADLS_ACUITY_SCORE: 8
ADLS_ACUITY_SCORE: 8
DRESSING/BATHING: BATHING DIFFICULTY, ASSISTANCE 1 PERSON

## 2021-11-15 ASSESSMENT — MIFFLIN-ST. JEOR: SCORE: 1840.88

## 2021-11-15 NOTE — PHARMACY-VANCOMYCIN DOSING SERVICE
Pharmacy Vancomycin Initial Note  Date of Service 2021  Patient's  1960  61 year old, male    Indication: Skin and Soft Tissue Infection    Current estimated CrCl = Estimated Creatinine Clearance: 47.4 mL/min (A) (based on SCr of 2.03 mg/dL (H)).    Creatinine for last 3 days  2021:  8:16 PM Creatinine 2.03 mg/dL    Recent Vancomycin Level(s) for last 3 days  No results found for requested labs within last 72 hours.      Vancomycin IV Administrations (past 72 hours)      No vancomycin orders with administrations in past 72 hours.                Nephrotoxins and other renal medications (From now, onward)    Start     Dose/Rate Route Frequency Ordered Stop    21 2230  vancomycin (VANCOCIN) 2,000 mg in sodium chloride 0.9 % 500 mL intermittent infusion         2,000 mg  over 2 Hours Intravenous ONCE 21 2130  piperacillin-tazobactam (ZOSYN) 3.375 g vial to attach to  mL bag         3.375 g  over 30 Minutes Intravenous ONCE 21 210            Contrast Orders - past 72 hours (72h ago, onward)            None              Plan:  1. Start vancomycin  2000 mg IV x 1 in ER.   2. One time consult in ER, please reconsult pharmacy if further vancomycin necessary    Josie Smith RPH

## 2021-11-15 NOTE — ED TRIAGE NOTES
Patient states he has had a wound on his foot since august he has noticed it is getting larger and not healing and now is much worse this last week.

## 2021-11-15 NOTE — CONSULTS
Consultation - INFECTIOUS DISEASE CONSULTATION  Norman Aguilar,  1960, MRN [id number]      Hypocalcemia [E83.51]    PCP: Jaquan Dickerson, [phone]   Code status:  Full Code               Chief Complaint:  Diabetic foot infection.     Assessment:  Norman Aguilar is a 61 year old old male with   1.  Uncontrolled DM II. Not on treatment since 2021. A1c > 14 on 21.   2.  NSTEMI  3.  JOHANA on CKD. Baseline creatinine 1.4. Admitted with creat at 2.1. Trending down.  4.  Left foot injury in 2021  5.  Left diabetic foot infection with clinical wet gangrene. Xray with no osteomyelitis. BC in process. On IV Vanco and Zosyn.  Going for MRI of the foot.  Necrotic tissue on the bottom of the foot swab for cultures.      Recommendations:   Continue IV vancomycin and Zosyn.  Wound was swabbed for cultures.  Agree with MRI.  NSTEMI management per cardiology.  Will need at least debridement.    Discussed with the patient, nursing staff.    Thank you for letting us be part of the patient care team. We will follow.    Hilaria Munoz MD,MD  [facility].   [facility]  Office Telephone [phone].  Fax [phone]  [facility] paging    HPI:    Norman Aguilar is a 61 year old old male. History is provided by the patient, chart review.    ID is asked to see this patient for left foot gangrene.  Patient has a history of type 2 diabetes mellitus, some GI issues status post colonoscopy and EGD in the past.  He reports having been told he has a redundant colon.  At some point he got very frustrated with medical care and stopped his medications including his antidiabetic treatment.  In 2021, he report having sustained an injury to his left foot.  He has been taking care of this by himself up until 2 to 3 days prior to admission when the wound extended with increased pain as well as swelling and redness.  He came to the ER with the symptoms and  is found to have gangrene.  Blood cultures in process.  CRP at 36.  He is also found to have NSTEMI being followed by cardiology.  PCI is being considered.        ===========================================    Medical History  Untreated DM II  CKD  Redundant colon   Surgical History  History reviewed. No pertinent surgical history.         Social History  Reviewed, and he          Family History  History reviewed. No pertinent family history.   Psychosocial Needs  Social History     Social History Narrative     Not on file     Additional psychosocial needs reviewed per nursing assessment.       No Known Allergies     (Not in a hospital admission)       Review of Systems:  Negative except for findings in the HPI Physical Exam:  Temp:  [98.1  F (36.7  C)-98.9  F (37.2  C)] 98.9  F (37.2  C)  Pulse:  [] 92  Resp:  [16-23] 17  BP: (108-166)/() 117/65  SpO2:  [93 %-99 %] 96 %    Gen: Pleasant in no acute distress.  HEENT: NCAT. EOMI. PERRL.  Neck: No bruit, JVD or thyromegaly.  Lungs: Clear to ascultation bilat with no crackles or wheezes.  Card: RRR. NSR. No RMG. Peripheral pulses present and symmetric. No edema.  Abd: Soft NT ND. No mass. Normal bowel sounds.  Skin: No rash.  Extr: Wet gangrene of the left foot with necrotic tissue on the bottom of the first MTP.  See picture below.  Neuro: Alert and oriented to place time and person. Cranial nerves II to XII intact. Motor and sensory intact. Normal gait.    Media Information               ============================    Pertinent Labs  personally reviewed.   Recent Labs   Lab 11/14/21 2016   WBC 11.2*   HGB 12.9*   HCT 40.1          Recent Labs   Lab 11/15/21  0817 11/15/21  0359 11/15/21  0012   * 131* 129*   CO2 20* 17* 11*   BUN 23* 23* 24*       MICROBIOLOGY DATA:  Personally reviewed  Blood cultures in process.     Pertinent Radiology  personally reviewed.      Study Result    Narrative & Impression   EXAM: XR FOOT LEFT G/E 3  VIEWS  LOCATION: [facility]  DATE/TIME: 11/14/2021 9:21 PM     INDICATION: diabetic wounds; rule out SQ gas, eval osteomyelitis  COMPARISON: None.                                                                      IMPRESSION: Subcutaneous emphysema in the base of the great toe and the first interspace to the level of the distal metatarsal metaphysis. No proximal subcutaneous emphysema. No cortical erosion to suggest osteomyelitis. No fractures are evident. Dorsal   soft tissue swelling. Atheromatous calcification. Hypertrophic changes in the ankle joint.

## 2021-11-15 NOTE — PHARMACY-ADMISSION MEDICATION HISTORY
Pharmacy Note - Admission Medication History    Pertinent Provider Information:     Patient stated that he quit taking any prescription medications about 6 months ago.     ______________________________________________________________________    Prior To Admission (PTA) med list completed and updated in EMR.       PTA Med List   Medication Sig Last Dose     magnesium oxide 250 mg Tab Take 250 mg by mouth daily before breakfast  Past Week at Unknown time     OMEGA-3/DHA/EPA/FISH OIL (FISH OIL-OMEGA-3 FATTY ACIDS) 300-1,000 mg capsule Take 1 g by mouth daily  Past Week at Unknown time       Information source(s): Patient, Family member and [facility]  Method of interview communication: in-person    Summary of Changes to PTA Med List  New: none  Discontinued: none  Changed: none    Patient was asked about OTC/herbal products specifically.  PTA med list reflects this.    In the past week, patient estimated taking medication this percent of the time:  less than 50% due to other.    Allergies were reviewed, assessed, and updated with the patient.      Patient does not use any multi-dose medications prior to admission.    The information provided in this note is only as accurate as the sources available at the time of the update(s).    Thank you for the opportunity to participate in the care of this patient.    Amilcar Pereyra [facility]  11/14/2021 9:57 PM

## 2021-11-15 NOTE — PROGRESS NOTES
Wound Ostomy  WOC Assessment       Allergies:  No Known Allergies    Diagnosis:   Patient Active Problem List    Diagnosis Date Noted     Hypocalcemia 11/15/2021     Priority: Medium     Paroxysmal atrial fibrillation (H) 11/15/2021     Priority: Medium     Hyperglycemia 11/15/2021     Priority: Medium     Wound infection 11/15/2021     Priority: Medium     Diabetic ketoacidosis without coma associated with type 2 diabetes mellitus (H) 11/15/2021     Priority: Medium     Benign essential hypertension 11/14/2021     Priority: Medium     Diabetic oculopathy associated with type 2 diabetes mellitus (H) 11/14/2021     Priority: Medium     Hypercalcemia 11/14/2021     Priority: Medium     Long term (current) use of oral hypoglycemic drugs 11/14/2021     Priority: Medium     Microalbuminuria 11/14/2021     Priority: Medium     Mixed hyperlipidemia 11/14/2021     Priority: Medium     Type 2 diabetes mellitus, with long-term current use of insulin (H) 11/17/2018     Priority: Medium     Benign neoplasm of descending colon 04/19/2018     Priority: Medium     Aguilar's esophagus 04/19/2018     Priority: Medium     Polyp of colon 04/17/2018     Priority: Medium     Gastro-esophageal reflux disease with esophagitis 02/13/2018     Priority: Medium     Abdominal distension, gaseous 01/09/2018     Priority: Medium     Abdominal bloating 01/09/2018     Priority: Medium       Height:  [unfilled]    Weight:  [unfilled]    Labs:  Recent Labs   Lab Test 11/14/21 2016 03/19/20  1448   CRP 36.5*  --    HGB 12.9*  --    ALBUMIN  --  4.1       Robin:       Specialty Bed:       Wound culture obtained: Yes, Site: L foot, Date:11/15/21, Results: in process    Edema:  Yes:  Localized    Anatomic Site/Laterality: L great toe    Reason for ongoing care:   Wound assessment and plan of care    Encounter Type:  Initial Wound Type:   Non-PU Ulcer: Diabetic    Tissue Damage:  Exposed fat layer    Associated conditions/Related trauma: Patient with  DM2, developed wound on great toe and has been treating it himself at home.     Assessment:    Open wound at plantar MTH measures 1.5x2cm- deeper than 0.5cm, did not fully assess as patient on his way to MRI. Wound is sloughy, foul smelling. Soft eschar noted wrapping to dorsal foot and to toe webbing between great and 2nd toes.     Tunneling/Undermining: No    Wound Bed: see above    Exudate: Yes Serosanguineous Small    Periwound Skin: Edema and Erythema, peeling epithelial    Treatment Plan: Gauze: Dry       Nursing care provided was coordinated care with RN.    Discussed plan of care with nurse, patient and ID.    Outcomes and treatment recommendations are to promote skin integrity and contain exudate.    Actions taken by WOC RN: 1 minutes of education.    Planned Follow Up: later this week or early next- will follow peripherally pending podiatry assessment.    Plan for next visit: Reassess wound(s) and Reassess skin integrity

## 2021-11-15 NOTE — ED PROVIDER NOTES
Emergency Department Encounter     Evaluation Date & Time:   2021  8:08 PM    CHIEF COMPLAINT:  Wound Check      Triage Note:Patient states he has had a wound on his foot since august he has noticed it is getting larger and not healing and now is much worse this last week.         Impression and Plan       FINAL IMPRESSION:    ICD-10-CM    1. Wound infection  T14.8XXA     L08.9     possible osteomyelitis   2. Hyperglycemia  R73.9    3. Paroxysmal atrial fibrillation (H)  I48.0    4. Diabetic ketoacidosis without coma associated with type 2 diabetes mellitus (H)  E11.10    5. Hypocalcemia  E83.51    6. Renal failure, unspecified chronicity  N19    7. Hypomagnesemia  E83.42    8. NSTEMI (non-ST elevated myocardial infarction) (H)  I21.4          ED COURSE & MEDICAL DECISION MAKIN:55 PM I met with the patient and performed my initial exam.  I discussed the plan for care and the patient is agreeable with this plan. PPE: Provider wore gloves, N95 mask, eye protection, gown, surgical cap, and paper mask.    9:33 PM I discussed the case with hospitalist, Dr Kelly, who will accept the patient after IVF repeat BMP.     61 year old male, history of DM2 (not on meds), HTN, HLD and GERD, who presents for evaluation of a wound to the left foot, which initially started in August (3 months ago) after he sustained an injury while digging a hole with a shovel.  Over the past 2 days, the wound has gotten much larger and deeper with erythema to his foot.  He reports only minimal pain, however does have neuropathy.  Has chills, but denies fevers. He also reports general fatigue.      On exam, there are wounds to the left foot around the MTP joint and between the great and 2nd toes with some areas of necrosis and surrounding blanching erythema (pictured below). No palpable crepitance. CMS intact.    Patient placed on cardiac monitor, IV access established and blood sent for labs.    Labs remarkable for minimal  leukocytosis (WBC 11.2) with normal lactate (1.8), however inflammatory markers are significantly elevated (CRP 36.5, ESR 90).  Blood cultures obtained and patient given broad-spectrum antibiotics including IV Zosyn and IV vancomycin.  Consider osteomyelitis.     He does have hyperglycemia (serum glucose 439) with labs suggesting DKA - elevated anion gap (24), acidosis (bicarb 15) and BHOB 9.3.  Will administer IV fluids (2L NS ordered) and subcutaneous insulin (8 units ordered) given that no ICU beds are available.  After IVF and insulin, will recheck BMP to assess if improving.  He does have renal insufficiency (creatinine 2.03 with GFR of 34 - one year ago creatinine was 1.37 with GFR 53) - suspect element of dehydration for IVF are being administered.     X-rays of the left foot obtained and demonstrate subcutaneous emphysema in the base of the great toe and the first interspace to the level of the distal metatarsal metaphysis. No proximal subcutaneous emphysema. No cortical erosion to suggest osteomyelitis. No fractures are evident. Dorsal soft tissue swelling. Given subcutaneous emphysema seen on x-rays, Podiatry consulted; they had not yet returned the page at time of shift change.  Consider further imaging (CT vs MRI).    Patient tachycardic on presentation (115s), however during ED course became more tachycardic (140s).  EKG had been ordered, however not performed by time of shift change.  Suspect possible fever as contributing factor of tachycardia for which Tylenol was ordered.      There is a Med / Surg bed available at [facility] and patient is willing to transfer.  I spoke with Dr. Kelly who will accept the patient if his acidosis is improving with IVF and subcutaneous insulin.    Patient signed out to Dr. Carter at time of shift change pendin. Podiatry consult  2. Repeat BMP / reassessment after IVF and insulin  3. EKG    Patient hemodynamically stable thus far in ED course.      At the  conclusion of the encounter I discussed the results of all the tests and the disposition. The questions were answered. The patient and family acknowledged understanding and were agreeable with the care plan.      MEDICATIONS GIVEN IN THE EMERGENCY DEPARTMENT:  Medications   dextrose 5% and 0.45% NaCl infusion (has no administration in time range)   dextrose 50 % injection 25-50 mL (has no administration in time range)   insulin 1 unit/1 mL in NS (NovoLIN, HumuLIN Regular) drip -ED DKA algorithm (6 Units/hr Intravenous Rate/Dose Verify 11/15/21 0816)   heparin infusion 25,000 units in D5W 250 mL ANTICOAGULANT (1,500 Units/hr Intravenous Rate/Dose Change 11/15/21 0813)   lidocaine 1 % 0.1-1 mL (has no administration in time range)   lidocaine (LMX4) cream (has no administration in time range)   sodium chloride (PF) 0.9% PF flush 3 mL (3 mLs Intracatheter Given 11/15/21 0404)   sodium chloride (PF) 0.9% PF flush 3 mL (has no administration in time range)   melatonin tablet 1 mg (has no administration in time range)   Patient is already receiving anticoagulation with heparin, enoxaparin (LOVENOX), warfarin (COUMADIN)  or other anticoagulant medication (has no administration in time range)   ondansetron (ZOFRAN-ODT) ODT tab 4 mg (has no administration in time range)     Or   ondansetron (ZOFRAN) injection 4 mg (has no administration in time range)   aspirin (ASA) chewable tablet 81 mg (has no administration in time range)   atorvastatin (LIPITOR) tablet 40 mg (has no administration in time range)   glucose gel 15-30 g (has no administration in time range)     Or   dextrose 50 % injection 25-50 mL (has no administration in time range)     Or   glucagon injection 1 mg (has no administration in time range)   Patient is already receiving anticoagulation with heparin, enoxaparin (LOVENOX), warfarin (COUMADIN)  or other anticoagulant medication (has no administration in time range)   0.9% sodium chloride + KCl 20 mEq/L  infusion ( Intravenous Stopped 11/15/21 0607)   dextrose 5% and 0.45% NaCl + KCl 20 mEq/L infusion ( Intravenous New Bag 11/15/21 0621)   pantoprazole (PROTONIX) IV push injection 40 mg (has no administration in time range)   piperacillin-tazobactam (ZOSYN) 3.375 g vial to attach to  mL bag (3.375 g Intravenous Given 11/15/21 0432)   vancomycin (VANCOCIN) 1,250 mg in sodium chloride 0.9 % 250 mL intermittent infusion (has no administration in time range)   0.9% sodium chloride BOLUS (0 mLs Intravenous Stopped 11/14/21 2237)   piperacillin-tazobactam (ZOSYN) 3.375 g vial to attach to  mL bag (0 g Intravenous Stopped 11/14/21 2237)   insulin regular injection 8 Units (8 Units Subcutaneous Given 11/14/21 2230)   0.9% sodium chloride BOLUS (0 mLs Intravenous Stopped 11/15/21 0059)   vancomycin (VANCOCIN) 2,000 mg in sodium chloride 0.9 % 500 mL intermittent infusion (0 mg Intravenous Stopped 11/15/21 0110)   acetaminophen (TYLENOL) tablet 650 mg (650 mg Oral Given 11/14/21 2230)   ondansetron (ZOFRAN) tablet 4 mg (4 mg Oral Given 11/14/21 2343)   calcium gluconate 10 % injection 1 g (0 g Intravenous Stopped 11/15/21 0102)   magnesium sulfate 2 g in water intermittent infusion (0 g Intravenous Stopped 11/15/21 0110)   aspirin (ASA) chewable tablet 324 mg (324 mg Oral Given 11/15/21 0012)   heparin ANTICOAGULANT loading dose for  LOW INTENSITY TREATMENT* Give BEFORE starting heparin infusion (6,000 Units Intravenous Given 11/15/21 0058)   0.9% sodium chloride BOLUS (0 mLs Intravenous Stopped 11/15/21 0308)   heparin - BOLUS DOSE from infusion (6,000 Units Intravenous Given 11/15/21 0815)       NEW PRESCRIPTIONS STARTED AT TODAY'S ED VISIT:  New Prescriptions    No medications on file       HPI     HPI     Norman Aguilar is a 61 year old male with a pertinent history of type II diabetes mellitus (not on meds), HTN, HLD and GERD, who presents to this ED by personal vehicle for a wound check.    The patient  presents to the ED for evaluation of wounds to the left foot.  He initially developed a wound between his left great toe and the left 2nd toe in August after he tamped onto a shovel while digging a hole.  He has been cleaning the wound daily and even doing some debridement.  Over the past 2 days, the wound has been getting deeper and spreading to involve the sole of his foot with erythema to the dorsal aspect of the foot. He reports only minimal pain, however does have some neuropathy related to his diabetes.  He reports chills, but denies fevers. He also reports general fatigue.      He has otherwise been in his usual state of health and denies chest pain, SOB, abdominal pain, N/V/D, cough or other concerns.    MHx: The patient has been off of his diabetes medications due to the side effects.     REVIEW OF SYSTEMS:  All other systems reviewed and are negative.      Medical History     History reviewed. No pertinent past medical history.    History reviewed. No pertinent surgical history.    History reviewed. No pertinent family history.    Social History     Tobacco Use     Smoking status: None     Smokeless tobacco: None   Substance Use Topics     Alcohol use: None     Drug use: None       magnesium oxide 250 mg Tab  OMEGA-3/DHA/EPA/FISH OIL (FISH OIL-OMEGA-3 FATTY ACIDS) 300-1,000 mg capsule        Physical Exam     First Vitals:  Patient Vitals for the past 24 hrs:   BP Temp Temp src Pulse Resp SpO2 Height Weight   11/15/21 0700 117/65 -- -- 92 17 96 % -- --   11/15/21 0645 124/63 -- -- 87 16 95 % -- --   11/15/21 0600 (!) 142/73 -- -- 97 -- 94 % -- --   11/15/21 0559 -- -- -- -- 16 -- -- --   11/15/21 0545 (!) 145/72 -- -- 105 16 99 % -- --   11/15/21 0431 108/62 -- -- 94 -- 98 % -- --   11/15/21 0415 123/70 -- -- 93 19 98 % -- --   11/15/21 0320 -- -- -- 96 17 96 % -- --   11/15/21 0316 -- -- -- 100 19 97 % -- --   11/15/21 0315 115/63 -- -- 100 23 97 % -- --   11/15/21 0149 -- -- -- 98 20 98 % -- --  "  11/15/21 0145 122/67 -- -- 100 21 93 % -- --   11/15/21 0119 -- -- -- 97 20 98 % -- --   11/15/21 0116 125/69 -- -- 101 17 97 % -- --   11/15/21 0020 (!) 153/108 -- -- 111 22 98 % -- --   11/14/21 2237 -- 98.9  F (37.2  C) Oral -- -- -- -- --   11/14/21 2143 -- -- -- -- -- -- 1.854 m (6' 1\") 102.1 kg (225 lb)   11/14/21 2100 (!) 158/78 -- -- 115 -- 97 % -- --   11/14/21 2001 -- 98.1  F (36.7  C) Oral -- -- -- -- --   11/1959 (!) 166/75 -- -- 115 22 99 % -- --   11/14/21 1953 -- -- -- -- 20 -- -- --       PHYSICAL EXAM:   Physical Exam    GENERAL: Awake, alert.  In no acute distress.   HEENT: Normocephalic, atraumatic. Pupils equal, round and reactive. Conjunctiva normal.   NECK: No stridor.  PULMONARY: Symmetrical breath sounds without distress.  Lungs clear to auscultation bilaterally without wheezes, rhonchi or rales.  CARDIO: Tachycardic rate with regular rhythm.  No significant murmur, rub or gallop.  Radial and pedal pulses strong and symmetrical.  ABDOMINAL: Abdomen soft, non-distended and non-tender to palpation.  EXTREMITIES: Trace bipedal edema.  Wounds to left foot around the MTP joint and between the great and 2nd toes with some areas of necrosis and surrounding blanching erythema (as pictured below). No palpable crepitance. The foot is warm and well perfused with strong pedal pulse.  Able to move toes.    NEURO: Alert and oriented to person, place and time.  Cranial nerves grossly intact.  No focal motor deficit.  PSYCH: Normal mood and affect.  SKIN: Left foot infection, pictured below.           Results     LAB:  All pertinent labs reviewed and interpreted  Labs Ordered and Resulted from Time of ED Arrival to Time of ED Departure   BASIC METABOLIC PANEL - Abnormal       Result Value    Sodium 129 (*)     Potassium 4.7      Chloride 90 (*)     Carbon Dioxide (CO2) 15 (*)     Anion Gap 24 (*)     Urea Nitrogen 24 (*)     Creatinine 2.03 (*)     Calcium 10.5      Glucose 439 (*)     GFR Estimate " 34 (*)    CRP INFLAMMATION - Abnormal    CRP 36.5 (*)    ERYTHROCYTE SEDIMENTATION RATE AUTO - Abnormal    Erythrocyte Sedimentation Rate 90 (*)    KETONE BETA-HYDROXYBUTYRATE QUANTITATIVE, RAPID - Abnormal    Ketone (Beta-Hydroxybutyrate) Quantitative 9.30 (*)    CBC WITH PLATELETS AND DIFFERENTIAL - Abnormal    WBC Count 11.2 (*)     RBC Count 4.48      Hemoglobin 12.9 (*)     Hematocrit 40.1      MCV 90      MCH 28.8      MCHC 32.2      RDW 14.1      Platelet Count 262      % Neutrophils 84      % Lymphocytes 7      % Monocytes 8      % Eosinophils 0      % Basophils 0      % Immature Granulocytes 1      NRBCs per 100 WBC 0      Absolute Neutrophils 9.5 (*)     Absolute Lymphocytes 0.8      Absolute Monocytes 0.9      Absolute Eosinophils 0.0      Absolute Basophils 0.0      Absolute Immature Granulocytes 0.1 (*)     Absolute NRBCs 0.0     GLUCOSE BY METER - Abnormal    GLUCOSE BY METER POCT 388 (*)    GLUCOSE BY METER - Abnormal    GLUCOSE BY METER POCT 392 (*)    BASIC METABOLIC PANEL - Abnormal    Sodium 129 (*)     Potassium 4.4      Chloride 94 (*)     Carbon Dioxide (CO2) 11 (*)     Anion Gap 24 (*)     Urea Nitrogen 24 (*)     Creatinine 1.94 (*)     Calcium 9.9      Glucose 396 (*)     GFR Estimate 36 (*)    MAGNESIUM - Abnormal    Magnesium 1.6 (*)    TROPONIN I - Abnormal    Troponin I 3.72 (*)    BLOOD GAS VENOUS - Abnormal    pH Venous 7.22 (*)     pCO2 Venous 28 (*)     pO2 Venous 38      Bicarbonate Venous 13 (*)     Base Excess/Deficit (+/-) -16.2      Oxyhemoglobin Venous 64.4 (*)     O2 Sat, Venous 65.5 (*)    TROPONIN I - Abnormal    Troponin I 1.29 (*)    HEMOGLOBIN A1C - Abnormal    Hemoglobin A1C >14.0 (*)    INR - Abnormal    INR 1.35 (*)    PARTIAL THROMBOPLASTIN TIME - Abnormal    aPTT 55 (*)    GLUCOSE BY METER - Abnormal    GLUCOSE BY METER POCT 364 (*)    GLUCOSE BY METER - Abnormal    GLUCOSE BY METER POCT 341 (*)    KETONE BETA-HYDROXYBUTYRATE QUANTITATIVE, RAPID - Abnormal    Ketone  (Beta-Hydroxybutyrate) Quantitative 5.41 (*)    KETONE BETA-HYDROXYBUTYRATE QUANTITATIVE, RAPID - Abnormal    Ketone (Beta-Hydroxybutyrate) Quantitative 3.29 (*)    BASIC METABOLIC PANEL - Abnormal    Sodium 131 (*)     Potassium 4.2      Chloride 99      Carbon Dioxide (CO2) 17 (*)     Anion Gap 15      Urea Nitrogen 23 (*)     Creatinine 1.80 (*)     Calcium 9.4      Glucose 268 (*)     GFR Estimate 39 (*)    TROPONIN I - Abnormal    Troponin I 13.42 (*)    GLUCOSE BY METER - Abnormal    GLUCOSE BY METER POCT 287 (*)    GLUCOSE BY METER - Abnormal    GLUCOSE BY METER POCT 251 (*)    GLUCOSE BY METER - Abnormal    GLUCOSE BY METER POCT 210 (*)    GLUCOSE BY METER - Abnormal    GLUCOSE BY METER POCT 170 (*)    GLUCOSE BY METER - Abnormal    GLUCOSE BY METER POCT 180 (*)    LACTIC ACID WHOLE BLOOD - Normal    Lactic Acid 1.8     COVID-19 VIRUS (CORONAVIRUS) BY PCR - Normal    SARS CoV2 PCR Negative     PHOSPHORUS - Normal    Phosphorus 3.1     HEPARIN UNFRACTIONATED ANTI XA LEVEL - Normal    Anti Xa Unfractionated Heparin <0.10     GLUCOSE MONITOR NURSING POCT   GLUCOSE MONITOR NURSING POCT   KETONE BETA-HYDROXYBUTYRATE QUANTITATIVE, RAPID   BASIC METABOLIC PANEL   BASIC METABOLIC PANEL   KETONE BETA-HYDROXYBUTYRATE QUANTITATIVE, RAPID   BASIC METABOLIC PANEL   CBC WITH PLATELETS AND DIFFERENTIAL   HEPARIN UNFRACTIONATED ANTI XA LEVEL   BLOOD CULTURE   BLOOD CULTURE       RADIOLOGY:  Foot XR, G/E 3 views, left   Final Result   IMPRESSION: Subcutaneous emphysema in the base of the great toe and the first interspace to the level of the distal metatarsal metaphysis. No proximal subcutaneous emphysema. No cortical erosion to suggest osteomyelitis. No fractures are evident. Dorsal    soft tissue swelling. Atheromatous calcification. Hypertrophic changes in the ankle joint.      MR Foot Right w/o & w Contrast    (Results Pending)   Echocardiogram Complete    (Results Pending)         Silvestre REDD, am serving as a  scribe to document services personally performed by Jennifer Wooten MD based on my observation and the provider's statements to me. I, Jennifer Wooten MD attest that Silvestre Sanchez is acting in a scribe capacity, has observed my performance of the services and has documented them in accordance with my direction.    Jennifer Wooten MD  Emergency Medicine  [facility] EMERGENCY DEPARTMENT         Jennifer Wooten MD  11/15/21 0843       Jennifer Wooten MD  11/15/21 0845

## 2021-11-15 NOTE — ED NOTES
Patient was transported to MRI at 0950. ECHO completed prior to going to MRI. Still waiting for patient to return. Unable to complete hourly blood sugars per DKA protocol since patient has been off of floor for 1.5hours. Lab was present at 10am to collect q2hr labs but patient was off the floor. Sister Cindy called and was updated to his condition.

## 2021-11-15 NOTE — CONSULTS
Care Management Initial Consult    General Information  Assessment completed with: Patient,    Type of CM/SW Visit: Initial Assessment    Primary Care Provider verified and updated as needed: Yes   Readmission within the last 30 days: no previous admission in last 30 days         Advance Care Planning: Advance Care Planning Reviewed: other (comment)  no healthcare directive       Communication Assessment  Patient's communication style: spoken language (English or Bilingual)    Hearing Difficulty or Deaf: no   Wear Glasses or Blind: no    Cognitive  Cognitive/Neuro/Behavioral: WDL                      Living Environment:   People in home: sibling(s)     Current living Arrangements: house      Able to return to prior arrangements: yes       Family/Social Support:  Care provided by: self  Provides care for: no one     Sibling(s)          Description of Support System: Supportive,Involved         Current Resources:   Patient receiving home care services: No     Community Resources: None  Equipment currently used at home: none  Supplies currently used at home: None    Employment/Financial:  Employment Status:          Financial Concerns:             Lifestyle & Psychosocial Needs:  Social Determinants of Health     Tobacco Use: Not on file   Alcohol Use: Not on file   Financial Resource Strain: Not on file   Food Insecurity: Not on file   Transportation Needs: Not on file   Physical Activity: Not on file   Stress: Not on file   Social Connections: Not on file   Intimate Partner Violence: Not on file   Depression: Not on file   Housing Stability: Not on file       Functional Status:  Prior to admission patient needed assistance:   Dependent ADLs:: Independent  Dependent IADLs:: Independent             Additional Information:  Assessment completed with patient.  Patient states he is independent with ADLs and IADLs. He lives in his sister's house. He ambulates without devices and drives. He is not employed. Family willing  to transport at discharge.    Final discharge plan pending progression and recommendations.     Lillian Montalvo RN

## 2021-11-15 NOTE — H&P
ADMISSION HISTORY & PHYSICAL        Patient YOB: 1960  Admit date: 11/14/2021  Date of Service: 11/15/2021    ASSESSMENT AND PLAN:  61 year old male presenting with:    Diabetic foot ulcer  -X-ray no cortical erosions to suggest osteomyelitis, podiatry consulted-recommended MRI.  -Vanco and Zosyn, follow blood culture, ID cosnult      DKA  -Not on medication at home, initial BMP bicarb 15, Anion gap 24, blood sugar 439.  Bolused with 3 L of normal saline, start on insulin per DKA protocol.  BMP q 2hrs and Accuchecks Q1 hrs .     NSTEMI  -Serial troponins 3.72--1.29, EKG with ST depressions on V4-V6,II, III, AVF  -Cardiology consulted from ED, recommendation noted for emergent cath  -Aspirin, statin  -Heparin drip, serial troponins every 6, cardiology consult, echocardiogram    Paroxysmal A. fib-back to sinus after fluid resuscitation, echo in a.m., on heparin drip    Hypomagnesemia-replace and monitor     GERD  -PPI    JOHANA  -Likely prerenal from severe hyperglycemia and hypovolemia. Contineu IV hydration , serial BMPs        CODE STATUS:Full code   DVT proph       Disposition:  -Anticipated Length of Stay in midnights and medical necessity (including a midnight in the Emergency Department after triage if applicable): > 2 days       CHIEF COMPLAINT:  Left foot non healing ulcer    HISTORY OF PRESENTING ILLNESS:  61-year-old male with type II DM, hypertension, hyperlipidemia and GERD presented initially for evaluation of wound on left foot.  Happened after he sustained injury to left great toe in August after he stepped onto a shovel while digging a hole.  He has been cleaning the wound at home but over the last week wound has been getting deeper and spreading to involve the sole of his foot and patient presented to the emergency.  And is diabetic with not on medication, has multiple GI complaints for which she was undergoing work-up but MNG.  Lab work-up at ED showed mild DKA with bicarb 15 and gap of  24 and patient was started on insulin drip and was to be transferred to [facility] otherwise no bed at [facility].  While at ED HR rate went to in the 140s to 150s and noted to be A. fib with RVR with troponin elevated at 3.7.  EKG with ST depressions on ii, III, Avf ,V4 through V6.  Went back to sinus after fluid resuscitation.  Cardiology was consulted from ED and patient was started on heparin drip , continued on insulin drip and to be admitted to ICU.    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 No pertinent past medical history.     History reviewed. No pertinent surgical history.       ALLERGIES:  No Known Allergies    MEDICATIONS:  Reviewed.  Current Facility-Administered Medications   Medication     dextrose 5% and 0.45% NaCl infusion     dextrose 50 % injection 25-50 mL     heparin infusion 25,000 units in D5W 250 mL ANTICOAGULANT     insulin 1 unit/1 mL in NS (NovoLIN, HumuLIN Regular) drip -ED DKA algorithm     sodium chloride 0.9% infusion     Current Outpatient Medications   Medication     magnesium oxide 250 mg Tab     OMEGA-3/DHA/EPA/FISH OIL (FISH OIL-OMEGA-3 FATTY ACIDS) 300-1,000 mg capsule       Current Facility-Administered Medications:      dextrose 5% and 0.45% NaCl infusion, 1,000 mL, Intravenous, Continuous  PRN, Dl Carter MD     dextrose 50 % injection 25-50 mL, 25-50 mL, Intravenous, Q15 Min PRNEmma Steven J, MD     heparin infusion 25,000 units in D5W 250 mL ANTICOAGULANT, 0-5,000 Units/hr, Intravenous, Continuous, Dl Carter MD, Last Rate: 12 mL/hr at 11/15/21 0056, 1,200 Units/hr at 11/15/21 0056     insulin 1 unit/1 mL in NS (NovoLIN, HumuLIN Regular) drip -ED DKA algorithm, , Intravenous, Continuous, Dl Carter MD, Last Rate: 10 mL/hr at 11/15/21 0215, 10 Units/hr at 11/15/21 0215     sodium chloride 0.9% infusion, 1,000 mL, Intravenous, Continuous, Dl Carter MD, Last Rate: 125 mL/hr at 11/15/21 0215, 1,000 mL at 11/15/21 0215    Current Outpatient Medications:      magnesium oxide 250 mg Tab, Take 250 mg by mouth daily before breakfast , Disp: , Rfl:      OMEGA-3/DHA/EPA/FISH OIL (FISH OIL-OMEGA-3 FATTY ACIDS) 300-1,000 mg capsule, Take 1 g by mouth daily , Disp: , Rfl:    Scheduled Meds:  Continuous Infusions:    dextrose 5% and 0.45% NaCl       heparin 1,200 Units/hr (11/15/21 0056)     insulin (regular) 10 Units/hr (11/15/21 0215)     sodium chloride 1,000 mL (11/15/21 0215)     PRN Meds:.dextrose 5% and 0.45% NaCl, dextrose    SOCIAL HISTORY:  Social History     Socioeconomic History     Marital status: Single     Spouse name: Not on file     Number of children: Not on file     Years of education: Not on file     Highest education level: Not on file   Occupational History     Not on file   Tobacco Use     Smoking status: Not on file     Smokeless tobacco: Not on file   Substance and Sexual Activity     Alcohol use: Not on file     Drug use: Not on file     Sexual activity: Not on file   Other Topics Concern     Not on file   Social History Narrative     Not on file     Social Determinants of Health     Financial Resource Strain: Not on file   Food Insecurity: Not on file   Transportation Needs: Not on file   Physical Activity: Not on file   Stress: Not on file   Social Connections:  "Not on file   Intimate Partner Violence: Not on file   Housing Stability: Not on file       FAMILY HISTORY:  History reviewed. No pertinent family history.     ROS:  12 point review of systems reviewed and is negative except for what has already been mentioned in HPI.       PHYSICAL EXAM:  GENRL:  Comfortable  /67   Pulse 98   Temp 98.9  F (37.2  C) (Oral)   Resp 20   Ht 1.854 m (6' 1\")   Wt 102.1 kg (225 lb)   SpO2 98%   BMI 29.69 kg/m    I/O last 3 completed shifts:  In: 1050 [IV Piggyback:1050]  Out: -   I/O this shift:  In: 1550 [IV Piggyback:1550]  Out: -   HEENT: NC/AT  CHEST: Clear to auscultation bilateral anteriorly, no ronchi or wheezing  HEART: S1S2 normal, regular.   ABDMN: slightly-distended.  No guarding or rigidity. Bowel sounds- active  EXTRM: No pedal edema   INTGM: No skin rash, no cyanosis or clubbing  MUSCULOSKELETAL: punched out left plantar wound with necrotic base t  NEURO: Alert and oriented. No focal neurological deficit.        DIAGNOSTIC DATA:    Recent Labs   Lab 11/14/21 2016   WBC 11.2*   HGB 12.9*   HCT 40.1          Recent Labs   Lab 11/15/21  0012 11/14/21 2016   * 129*   CO2 11* 15*   BUN 24* 24*   PHOS 3.1  --        Recent Labs   Lab 11/15/21  0052   INR 1.35*       Recent Labs   Lab 11/15/21  0012 11/14/21 2016   * 129*   CO2 11* 15*   BUN 24* 24*       [unfilled]    Foot XR, G/E 3 views, left    Result Date: 11/14/2021  EXAM: XR FOOT LEFT G/E 3 VIEWS LOCATION: [facility] DATE/TIME: 11/14/2021 9:21 PM INDICATION: diabetic wounds; rule out SQ gas, eval osteomyelitis COMPARISON: None.     IMPRESSION: Subcutaneous emphysema in the base of the great toe and the first interspace to the level of the distal metatarsal metaphysis. No proximal subcutaneous emphysema. No cortical erosion to suggest osteomyelitis. No fractures are evident. Dorsal soft tissue swelling. Atheromatous calcification. Hypertrophic changes in the " ankle joint.      Patient's new lab studies reviewed personally.  Patient's new radiology reports reviewed personally.  I personally viewed and personally interpreted patient's EKG:     Note created using dragon voice recognition software.  Errors in spelling or words which seems out of context are unintentional.  Sounds alike errors may have escaped editing.     11/15/2021      Sushant Hernandez  [facility]

## 2021-11-15 NOTE — PROGRESS NOTES
Patient seen by Dr. Hernandez this morning.  Troponins were elevated at 13.  Nurse recommended to call cardiology.  Cardiology ordered echocardiogram which showed LVEF of 30% with global hypokinesia.  Awaiting cardiology decisions regarding coronary angiogram.  Currently on heparin, statin and aspirin.  Had a run of atrial fibrillation for which patient was started on metoprolol by cardiology.  Blood sugars are controlled on insulin drip.  Awaiting cardiology decision regarding angiogram for which the patient is n.p.o.  If no angiogram is planned then would advance to diabetic diet and transition the patient to subcutaneous insulin basal bolus.  Nurse advised to turn down the rate to 1 unit/h if 3 or more blood sugars remain below 150.  Creatinine is trending down.  MRI of the foot is negative for osteomyelitis.  Currently on Zosyn Vanco.  Appreciate ID and cardiology input.  Ordered magnesium protocol for low magnesium.  Decrease IV fluids to 75 cc an hour due to increased respiratory rate and hypoxia.  Suspect fluid overload.  DC IV fluids once patient is able to eat    Update Delio Feldman MD, Hospitalist,11/15/21,4:37 PM  Anion gap is closed and bicarb is greater than 20.  Coronary angiogram planned for next a.m.  Order diabetic diet.  DC insulin drip 2 hours after first dose of Lantus.  Ordered Lantus 25 units to be given now.  Ordered mealtime coverage with NovoLog ratio 1 is to 10 and sliding scale insulin medium intensity.  N.p.o. after midnight for coronary angiogram next a.m.  Discussed with ICU nurse about the plan.    Addendum Delio Feldman MD, Hospitalist,11/15/21,5:18 PM  Discussed with Dr. Ram about postponing surgery until cardiac catheterization is completed.  Updated patient's significant other Henna.

## 2021-11-15 NOTE — PROGRESS NOTES
"Pharmacy Vancomycin Initial Note  Date of Service November 15, 2021  Patient's  1960  61 year old, male    Indication: Skin and Soft Tissue Infection (diabetic foot ulcer)    Current estimated CrCl = Estimated Creatinine Clearance: 53.4 mL/min (A) (based on SCr of 1.8 mg/dL (H)).    Creatinine for last 3 days  2021:  8:16 PM Creatinine 2.03 mg/dL  11/15/2021: 12:12 AM Creatinine 1.94 mg/dL;  3:59 AM Creatinine 1.80 mg/dL    Recent Vancomycin Level(s) for last 3 days  No results found for requested labs within last 72 hours.      Vancomycin IV Administrations (past 72 hours)                   vancomycin (VANCOCIN) 2,000 mg in sodium chloride 0.9 % 500 mL intermittent infusion (mg) 2,000 mg New Bag 213                Nephrotoxins and other renal medications (From now, onward)    Start     Dose/Rate Route Frequency Ordered Stop    11/15/21 2200  vancomycin (VANCOCIN) 1,250 mg in sodium chloride 0.9 % 250 mL intermittent infusion         1,250 mg  over 90 Minutes Intravenous EVERY 24 HOURS 11/15/21 0422      11/15/21 0400  piperacillin-tazobactam (ZOSYN) 3.375 g vial to attach to  mL bag        Note to Pharmacy: For [facility], [facility] and [facility]: For Zosyn-naive patients, use the \"Zosyn initial dose + extended infusion\" order panel.    3.375 g  over 240 Minutes Intravenous EVERY 8 HOURS 11/15/21 0330            Contrast Orders - past 72 hours (72h ago, onward)            None          Insight[employer] Prediction of Planned Initial Vancomycin Regimen  Loading dose: 2000 mg at 22:33 1on 2021.  Regimen: 1250 mg IV every 24 hours.  Start time: 22:33 on 2021  Exposure target: AUC24 (range)400-600 mg/L.hr   AUC24,ss: 478 mg/L.hr  Probability of AUC24 > 400: 70 %  Ctrough,ss: 15.3 mg/L  Probability of Ctrough,ss > 20: 24 %  Probability of nephrotoxicity (Lodise TANIA ): 11 %            Plan:  1. Start vancomycin  1250 mg IV q24h.  (loading dose of 2000mg given on 21)  2. Vancomycin monitoring " method: AUC  3. Vancomycin therapeutic monitoring goal: 400-600 mg*h/L  4. Pharmacy will check vancomycin levels as appropriate in 1-3 Days.    5. Serum creatinine levels will be ordered daily for the first week of therapy and at least twice weekly for subsequent weeks.      Carine Cerrato RP

## 2021-11-15 NOTE — ED PROVIDER NOTES
eMERGENCY dEPARTMENT Signout NOTE      Patient was signed out to me by Dr. Wooten at 10:26 PM.      HPI: Norman Aguilar is a 61 year old male with a history of type II diabetes mellitus (not on meds), HTN, HLD and GERD who presents for evaluation of wounds to the left foot.  He initially developed a wound between his left great toe and the left 2nd toe in August after he tamped onto a shovel while digging a hole.  He has been cleaning the wound daily and even doing some debridement.  Over the past 2 days, the wound has been getting deeper and spreading to involve the sole of his foot with erythema to the dorsal aspect of the foot. He reports only minimal pain, however does have some neuropathy related to his diabetes.  He reports chills, but denies fevers. He also reports general fatigue.        Follow up needed: Follow-up on BMP post fluids and insulin and potentially transfer to [facility] if downtrending.         LABS  Pertinent lab results reviewed in chart.  Results for orders placed or performed during the hospital encounter of 11/14/21   Foot XR, G/E 3 views, left    Impression    IMPRESSION: Subcutaneous emphysema in the base of the great toe and the first interspace to the level of the distal metatarsal metaphysis. No proximal subcutaneous emphysema. No cortical erosion to suggest osteomyelitis. No fractures are evident. Dorsal   soft tissue swelling. Atheromatous calcification. Hypertrophic changes in the ankle joint.   Basic metabolic panel   Result Value Ref Range    Sodium 129 (L) 136 - 145 mmol/L    Potassium 4.7 3.5 - 5.0 mmol/L    Chloride 90 (L) 98 - 107 mmol/L    Carbon Dioxide (CO2) 15 (L) 22 - 31 mmol/L    Anion Gap 24 (H) 5 - 18 mmol/L    Urea Nitrogen 24 (H) 8 - 22 mg/dL    Creatinine 2.03 (H) 0.70 - 1.30 mg/dL    Calcium 10.5 8.5 - 10.5 mg/dL    Glucose 439 (HH) 70 - 125 mg/dL    GFR Estimate 34 (L) >60 mL/min/1.73m2   CRP inflammation   Result Value Ref Range    CRP 36.5 (H) 0.0-<0.8  mg/dL   Erythrocyte sedimentation rate auto   Result Value Ref Range    Erythrocyte Sedimentation Rate 90 (H) 0 - 15 mm/hr   Ketone Beta-Hydroxybutyrate Quantitative   Result Value Ref Range    Ketone (Beta-Hydroxybutyrate) Quantitative 9.30 (H) <=0.3 mmol/L   CBC with platelets and differential   Result Value Ref Range    WBC Count 11.2 (H) 4.0 - 11.0 10e3/uL    RBC Count 4.48 4.40 - 5.90 10e6/uL    Hemoglobin 12.9 (L) 13.3 - 17.7 g/dL    Hematocrit 40.1 40.0 - 53.0 %    MCV 90 78 - 100 fL    MCH 28.8 26.5 - 33.0 pg    MCHC 32.2 31.5 - 36.5 g/dL    RDW 14.1 10.0 - 15.0 %    Platelet Count 262 150 - 450 10e3/uL    % Neutrophils 84 %    % Lymphocytes 7 %    % Monocytes 8 %    % Eosinophils 0 %    % Basophils 0 %    % Immature Granulocytes 1 %    NRBCs per 100 WBC 0 <1 /100    Absolute Neutrophils 9.5 (H) 1.6 - 8.3 10e3/uL    Absolute Lymphocytes 0.8 0.8 - 5.3 10e3/uL    Absolute Monocytes 0.9 0.0 - 1.3 10e3/uL    Absolute Eosinophils 0.0 0.0 - 0.7 10e3/uL    Absolute Basophils 0.0 0.0 - 0.2 10e3/uL    Absolute Immature Granulocytes 0.1 (H) <=0.0 10e3/uL    Absolute NRBCs 0.0 10e3/uL   Glucose by meter   Result Value Ref Range    GLUCOSE BY METER POCT 388 (H) 70 - 99 mg/dL   Lactic acid whole blood   Result Value Ref Range    Lactic Acid 1.8 0.7 - 2.0 mmol/L   Asymptomatic COVID-19 Virus (Coronavirus) by PCR Nasopharyngeal    Specimen: Nasopharyngeal; Swab   Result Value Ref Range    SARS CoV2 PCR Negative Negative   Glucose by meter   Result Value Ref Range    GLUCOSE BY METER POCT 392 (H) 70 - 99 mg/dL   Basic metabolic panel   Result Value Ref Range    Sodium 129 (L) 136 - 145 mmol/L    Potassium 4.4 3.5 - 5.0 mmol/L    Chloride 94 (L) 98 - 107 mmol/L    Carbon Dioxide (CO2) 11 (L) 22 - 31 mmol/L    Anion Gap 24 (H) 5 - 18 mmol/L    Urea Nitrogen 24 (H) 8 - 22 mg/dL    Creatinine 1.94 (H) 0.70 - 1.30 mg/dL    Calcium 9.9 8.5 - 10.5 mg/dL    Glucose 396 (H) 70 - 125 mg/dL    GFR Estimate 36 (L) >60 mL/min/1.73m2    Result Value Ref Range    Magnesium 1.6 (L) 1.8 - 2.6 mg/dL   Troponin I (now)   Result Value Ref Range    Troponin I 3.72 (HH) 0.00 - 0.29 ng/mL   Blood gas venous   Result Value Ref Range    pH Venous 7.22 (LL) 7.35 - 7.45    pCO2 Venous 28 (L) 35 - 50 mm Hg    pO2 Venous 38 25 - 47 mm Hg    Bicarbonate Venous 13 (L) 24 - 30 mmol/L    Base Excess/Deficit (+/-) -16.2   mmol/L    Oxyhemoglobin Venous 64.4 (L) 70.0 - 75.0 %    O2 Sat, Venous 65.5 (L) 70.0 - 75.0 %   Troponin I (now)   Result Value Ref Range    Troponin I 1.29 (HH) 0.00 - 0.29 ng/mL   Result Value Ref Range    Hemoglobin A1C >14.0 (H) <=5.6 %   Result Value Ref Range    Phosphorus 3.1 2.5 - 4.5 mg/dL   Result Value Ref Range    INR 1.35 (H) 0.85 - 1.15   Glucose by meter   Result Value Ref Range    GLUCOSE BY METER POCT 364 (H) 70 - 99 mg/dL       RADIOLOGY  Foot XR, G/E 3 views, left   Final Result   IMPRESSION: Subcutaneous emphysema in the base of the great toe and the first interspace to the level of the distal metatarsal metaphysis. No proximal subcutaneous emphysema. No cortical erosion to suggest osteomyelitis. No fractures are evident. Dorsal    soft tissue swelling. Atheromatous calcification. Hypertrophic changes in the ankle joint.      MR Foot Right w/o & w Contrast    (Results Pending)       EKG:    Performed at: 2303    Impression: Undetermined rhythm. Incomplete left bundle branch block. Nonspecific ST and T wave abnormality. When compared with EKG of 17-Nov-2018, wide complex tachycardia has replaced sinus rhythm.      Rate: 157  Rhythm: wide complex tachycardia  Axis: 8  AK Interval: *  QRS Interval: 110  QTc Interval: 420  ST Changes: nonspecific ST and T wave abnormality    EKG #2:    Performed at: 0008    Impression: Sinus tachycardia. Low voltage QRS. Marked ST abnormality, possible inferior subendocardial injury. When compared with previous EKG of 14-Nov-2021 2303, previous EKG has undetermined rhythm.    Rate: 111  Rhythm:  sinus  Axis: 44  WV Interval: 170  QRS Interval: 104  QTc Interval: 448  ST Changes: Marked ST abnormality, possible inferior subendocardial injury    I have independently reviewed and interpreted the EKG(s) documented above.       ED COURSE & MEDICAL DECISION MAKING    Pertinent Labs and Imaging reviewed (see chart for details)  10:26 PM Patient signed out to me by my colleague, Dr. Wooten.   11:09 PM I introduced myself to the patient.   11:43 PM I discussed the case with podiatry, Dr. Castaneda, who requests MRI of the foot. They will see the patient in the morning.     11:46 PM Updated the patient.   11:47 PM Lab called to report critical blood gas of 7.22  12:02 AM Lab called to report troponin of 3.72 (result from lab added on from blood collected at 8 PM).   12:10 AM I discussed the case with hospitalist, Dr. Hernandez.   12:28 AM I spoke with cardiology, Dr. Claire. Recommends heparin, aspirin. No need for cath lab activation.  12:51 AM Spoke with intensivist, Dr. Casey.         ED Course as of 11/15/21 0128   Mon Nov 15, 2021   0118 Shortly after signout patient's heart rate jumped to 150s which was wide complex and regular.  Likely a flutter.  No EKG from earlier in the shift and heart rate is 115 however he was likely sinus tachycardia earlier.   0119 Patient's calcium is low therefore IV calcium was ordered.   0119 Heart rate went to 180s which was atrial fibrillation the patient did not have any chest pain or shortness of breath.  While given IV calcium converted to a sinus tachycardia   0119 I added on a troponin from earlier in the magnesium for earlier.  Magnesium was low therefore IV magnesium was ordered.  Troponins were earlier was critically high at 3 therefore aspirin was ordered.   0119 Delta troponin downtrending.  No chest pain or shortness of breath.   0120 EKG after conversion shows nonspecific ST changes discussed with cardiology who agreed with heparin and aspirin and did not think patient  needed Cath Lab emergently   0120 Discussed with podiatry recommend MRI agreed with antibiotics and will see patient tomorrow for possible OR   0120 BNP shows worsening acidosis but improving renal function therefore DKA order set initiated   0120 Plan for admission to the ICU   0120 Hemoglobin A1C(!): >14.0   0120 Discussed with hospitalist and intensivist       The patient is critically ill and has required 40 minutes of critical care time exclusive of procedures. This includes time spent interviewing the patient, ordering tests and medications, monitoring vital signs, reviewing results, patient updates, discussing the case with family and consultants, and admission.      At the conclusion of the encounter I discussed  the results of all of the tests and the disposition.   The questions were answered.  The patient or family acknowledged understanding and was agreeable with the care plan.       FINAL IMPRESSION        Diagnoses       Codes Comments    Wound infection     T14.8XXA, L08.9 possible osteomyelitis    Hyperglycemia     R73.9     Paroxysmal atrial fibrillation (H)     I48.0     Diabetic ketoacidosis without coma associated with type 2 diabetes mellitus (H)     E11.10     Hypocalcemia     E83.51     Renal failure, unspecified chronicity     N19     Hypomagnesemia     E83.42     NSTEMI (non-ST elevated myocardial infarction) (H)     I21.4           I, Rosa Jones, am serving as a scribe to document services personally performed by Dr. Carter, based on my observations and the provider's statements to me. I, Dl Carter MD, attest that Rosa Jones is acting in a scribe capacity, has observed my performance of the services and has documented them in accordance with my direction.     Dl Carter MD  11/14/2021  10:26 PM           Dl Carter MD  11/15/21 0121       Dl Carter MD  11/15/21 0128

## 2021-11-15 NOTE — CONSULTS
Thank you, Dr. Hernandez, for asking the [facility] Heart Care team to see . Norman Aguilar for evaluation of elevated troponin.      Assessment/Recommendations   Assessment/Plan:  1.  Acute non-ST elevation MI: The patient has significantly elevated troponin after he had a short episode of atrial fibrillation.  He had no chest pain, shortness of breath.  Due to his DKA, elevated creatinine from a baseline, no chest pain, echocardiogram is requested for evaluation of heart function and structure, especially wall motion.  Based on echo findings, consider if he needs urgent coronary angiogram with possible PCI.  The patient agreed with the plan.  Meantime, continue aspirin, heparin infusion.    3.  Paroxysmal atrial fibrillation: The patient had very short episode of atrial fibrillation in the ER.  He has no symptoms of palpitations.  Is not quite sure if he has atrial fibrillation in the past.  Continue to monitor the patient.  Start beta-blocker metoprolol 25 mg twice a day.  He will need chronic anticoagulation after coronary angiogram with possible PCI.    4.  Primary hypertension: His blood pressure is in normal range.    5.  Dyslipidemia: Started Lipitor 40 mg at bedtime.  Lipid profile is pending.    6.  Acute DKA, acute renal injury on chronic kidney disease, diabetic foot wound with infection: Please see primary team of management for details.  Creatinine is mildly improved this morning from 2.0-1.8.    Addendum: His echocardiogram report is reviewed, left ventricle ejection fraction is significantly reduced to 30 to 35%.  His creatinine is improved to close baseline.  Creatinine level is significantly reduced.  The patient will be stable to do coronary angiogram with possible PCI tomorrow.  The order is placed.       History of Present Illness/Subjective    It is my pleasure to see Norman Aguilar at [facility] ED for evalution of elevated troponin.  Norman Aguilar is a 61 year old  "male with a medical history of type 2 diabetes for 30 years, primary hypertension, dyslipidemia, chronic GI issue but no history of GI bleeding.    The patient quit all his medications several months ago.  He had injury to left great toe and caused a wound which did not heal for a while.  He presents to emergency room because his wound getting worse.  In emergency room, he was found to have a DKA and also elevated troponin.  His initial troponin was 3.7, and then elevated to 13 this morning.  The patient had short episodes of atrial fibrillation in emergency room.  He denies any chest pain, palpitations, shortness of breath.  He feels comfortable in his bed.    His ECGs were reviewed.  When he had atrial fibrillation with RVR, ST segment depression in inferior leads and lateral leads.  Patient is started on heparin infusion.  The patient's blood sugar was dropped from 493-170 this morning.  His ketone level is also significantly reduced.  His creatinine baseline 1.3-1.4, elevated to 2.0 initially, 1.8 this morning.       Physical Examination Review of Systems   /65   Pulse 92   Temp 98.9  F (37.2  C) (Oral)   Resp 17   Ht 1.854 m (6' 1\")   Wt 102.1 kg (225 lb)   SpO2 96%   BMI 29.69 kg/m    Body mass index is 29.69 kg/m .  Wt Readings from Last 3 Encounters:   11/14/21 102.1 kg (225 lb)       Intake/Output Summary (Last 24 hours) at 11/15/2021 0844  Last data filed at 11/15/2021 0607  Gross per 24 hour   Intake 4195 ml   Output --   Net 4195 ml       General Appearance:   Awake, Alert, No acute distress.   HEENT:  No scleral icterus; the mucous membranes were moist.   Neck: No cervical bruits. No JVD. No thyromegaly.    Chest: The spine was straight. The chest was symmetric.   Lungs:   Respirations unlabored; Lungs are clear to auscultation. No crackles.  No wheezing.   Cardiovascular:   Regular rhythm and rate, normal first and second heart sounds with no murmurs. No rubs or gallops.    Abdomen:  Soft. " No tenderness. Bowels sounds are present   Extremities: Left great toe has wound, redness and swelling in left foot.   Skin: No rashes. Warm, Dry.   Musculoskeletal: No tenderness.   Neurologic: Oriented x 3. Mood and affect are appropriate.     A 12 point comprehensive review of systems was negative except as noted.        Medical History  Surgical History Family History Social History   Review:  1.  Type 2 diabetes, insulin-dependent  2.  Essential hypertension  3.  Dyslipidemia Reviewed:  1.  History of appendectomy  2.  History of hernia repair Reviewed: His father had cancer and coronary artery disease in late age. Social History     Socioeconomic History     Marital status: Single     Spouse name: Not on file     Number of children: Not on file     Years of education: Not on file     Highest education level: Not on file   Occupational History     Not on file   Tobacco Use     Smoking status: Not on file     Smokeless tobacco: Not on file   Substance and Sexual Activity     Alcohol use: Not on file     Drug use: Not on file     Sexual activity: Not on file   Other Topics Concern     Not on file   Social History Narrative     Not on file     Social Determinants of Health     Financial Resource Strain: Not on file   Food Insecurity: Not on file   Transportation Needs: Not on file   Physical Activity: Not on file   Stress: Not on file   Social Connections: Not on file   Intimate Partner Violence: Not on file   Housing Stability: Not on file          Medications  Allergies   Scheduled Meds:    aspirin  81 mg Oral Daily     atorvastatin  40 mg Oral QPM     pantoprazole (PROTONIX) IV  40 mg Intravenous Daily with breakfast     piperacillin-tazobactam  3.375 g Intravenous Q8H     sodium chloride (PF)  3 mL Intracatheter Q8H     vancomycin  1,250 mg Intravenous Q24H     Continuous Infusions:    0.9% sodium chloride + KCl 20 mEq/L Stopped (11/15/21 0607)     dextrose 5% and 0.45% NaCl + KCl 20 mEq/L 150 mL/hr at  11/15/21 0621     dextrose 5% and 0.45% NaCl       heparin 1,500 Units/hr (11/15/21 0813)     insulin (regular) 6 Units/hr (11/15/21 0816)     - MEDICATION INSTRUCTIONS -       - MEDICATION INSTRUCTIONS -       PRN Meds:.dextrose 5% and 0.45% NaCl, dextrose, glucose **OR** dextrose **OR** glucagon, lidocaine 4%, lidocaine (buffered or not buffered), melatonin, ondansetron **OR** ondansetron, - MEDICATION INSTRUCTIONS -, - MEDICATION INSTRUCTIONS -, sodium chloride (PF) No Known Allergies      Lab Results    Chemistry/lipid CBC Cardiac Enzymes/BNP/TSH/INR   Lab Results   Component Value Date    CHOL 187 10/31/2019    HDL 50 10/31/2019    TRIG 152 (H) 10/31/2019    BUN 23 (H) 11/15/2021     (L) 11/15/2021    CO2 17 (L) 11/15/2021    Lab Results   Component Value Date    WBC 11.2 (H) 11/14/2021    HGB 12.9 (L) 11/14/2021    HCT 40.1 11/14/2021    MCV 90 11/14/2021     11/14/2021    Lab Results   Component Value Date    TROPONINI 13.42 (HH) 11/15/2021    INR 1.35 (H) 11/15/2021

## 2021-11-15 NOTE — CONSULTS
Consultation - Foot and Ankle/Podiatry  Norman Aguilar,  1960, MRN [id number]    Admitting Dx: Hypocalcemia [E83.51]  Hypomagnesemia [E83.42]  Paroxysmal atrial fibrillation (H) [I48.0]  Hyperglycemia [R73.9]  Wound infection [T14.8XXA, L08.9]  NSTEMI (non-ST elevated myocardial infarction) (H) [I21.4]  Diabetic ketoacidosis without coma associated with type 2 diabetes mellitus (H) [E11.10]  Renal failure, unspecified chronicity [N19]    PCP: Jaquan Dickerson, [phone]   Code status:  Full Code       Extended Emergency Contact Information  Primary Emergency Contact: Henna Tellez   [facility]  Home Phone: [phone]  Mobile Phone: [phone]  Relation: Other       ASSESSMENT   Cellulitis left foot   Active Problems:    Hypocalcemia    Paroxysmal atrial fibrillation (H)    Hyperglycemia    Wound infection    Diabetic ketoacidosis without coma associated with type 2 diabetes mellitus (H)       PLAN   -Ulcerations both dorsal and medial 1st MPJ left foot with significant non-viable tissue, probes to deep tissues. Diffuse erythema left forefoot and midfoot. MRI left foot: 1.  No evidence for osteomyelitis or significant effusion to suggest septic arthropathy.  2.  However, there is a soft tissue ulceration along the medial aspect of the forefoot adjacent to the first MTP joint. Fluid and susceptibly artifact suggestive of gas bubbles extend nearly circumferentially about the first MTP joint and base of the   great toe into the webspace between the first and second toes consistent with abscess. Nonenhancing fluid also extends proximally deep to the first and second metatarsal shafts consistent with abscess although this may spontaneously communicate to the   ulceration.  3.  Fluid surrounding the flexor hallucis tendon sheath consistent with a toxic tenosynovitis.  4.  No evidence for fracture.  5.  No additional tendinous or ligamentous pathology identified.  6.  Edema or cellulitis along  the medial aspect of the foot.  WBC 11.8. Afebrile.     -I reviewed the above with the patient today and due to the amount of non-viable tissue and gas in the soft tissues, I recommend surgical I&D at this time. He will likely require more than one procedure, including possible amputation of the hallux and 1st metatarsal depending on the extent of infection and soft tissue loss. All questions invited and answered. He consents to surgery.     -I discussed with Dr. Feldman and recommend surgical I&D this evening due to concern for necrotizing fasciitis. However, due to concern for MI, foot surgery to be delayed until we have cardiac clearance.     -Medical management per hospitalist/cardiology. Antibiotics per ID. I will await cardiac clearance and request that we proceed with urgent foot surgery as soon as medically able.     Thank you for the consultation. I will follow.     Eddi Ram DPM  [facility] Podiatry/Foot & Ankle Surgery  On-Call: [phone]  ______________________________________________________________________        Reason For Consult: Cellulitis left foot        HPI    I have been requested by Dr. Hernandez to evaluate Norman Aguilar who is a 61 year old year old male for the above. The patient was admitted to [facility] for a left foot infection. Per the patient, he initially injured his left foot in August of this year while working with a shovel in his yard. He noticed increasing redness and swelling in the past few days. Denies N/V/F/C associated with his foot. There is a concern for possible MI, and was seen by cardiology today. Scheduled for coronary angiogram tomorrow. Admits to smoking cigars upon occasion. PMH significant for DM2. MRI completed today was negative for osteomyelitis.          Medical History  History reviewed. No pertinent past medical history.  Surgical History  He  has no past surgical history on file.   Social History  Reviewed, and he     Allergies  No Known  "Allergies Family History  family history is not on file.  family history not pertinent to presenting problem.    Psychosocial Needs  Social History     Social History Narrative     Not on file     Additional psychosocial needs reviewed per nursing assessment.       Prior to Admission Medications   Medications Prior to Admission   Medication Sig Dispense Refill Last Dose     magnesium oxide 250 mg Tab Take 250 mg by mouth daily before breakfast    Past Week at Unknown time     OMEGA-3/DHA/EPA/FISH OIL (FISH OIL-OMEGA-3 FATTY ACIDS) 300-1,000 mg capsule Take 1 g by mouth daily    Past Week at Unknown time          Imaging: reviewed images          Review of Systems:  All other systems negative in detail except what is noted in HPI.  Physical Exam:  Temp:  [98.1  F (36.7  C)-98.9  F (37.2  C)] 98.9  F (37.2  C)  Pulse:  [] 87  Resp:  [14-23] 16  BP: (108-166)/() 117/59  SpO2:  [90 %-99 %] 96 %    General appearance: Patient is alert and fully cooperative with history & exam.  No sign of distress is noted during the visit.  Psychiatric: Affect is pleasant & appropriate.  Patient appears motivated to improve health.  Respiratory: Breathing is regular & unlabored while sitting.  HEENT: Hearing is intact to spoken word.  Speech is clear.  No gross evidence of visual impairment that would impact ambulation.    Vascular: Dorsalis pedis palpable left. Diffuse edema left foot.   Dermatologic: Ulcerations both dorsal and medial 1st MPJ left foot with significant non-viable tissue, probes to deep tissues. Diffuse erythema left forefoot and midfoot.   Neurologic: Diminished to light touch left foot.   Musculoskeletal: Contracted digits left foot.      /59   Pulse 87   Temp 98.9  F (37.2  C) (Oral)   Resp 16   Ht 1.854 m (6' 1\")   Wt 102.1 kg (225 lb)   SpO2 96%   BMI 29.69 kg/m      BMI= Body mass index is 29.69 kg/m .    Pertinent Labs    [unfilled]       [facility] Labs   Lab 11/15/21  1447 11/15/21  1159 " 11/15/21  0817   * 131* 132*   CO2 22 20* 20*   BUN 23* 24* 23*       Lab Results   Component Value Date    ALT 24 10/31/2019    AST 20 10/31/2019    ALKPHOS 87 10/31/2019          Lab Results   Component Value Date    CRP 36.5 (H) 11/14/2021      @GEM@     Pertinent Radiology  Radiology Results: Reviewed  MR Foot Left w/o & w Contrast    Result Date: 11/15/2021  EXAM: MR FOOT LEFT W/O and W CONTRAST LOCATION: [facility] DATE/TIME: 11/15/2021 10:45 AM INDICATION: Foot swelling, diabetic, osteomyelitis suspected, no prior imaging. COMPARISON: None. TECHNIQUE: Routine. Additional postgadolinium T1 sequences were obtained. IV CONTRAST: 10 mL Gadavist. FINDINGS: JOINTS AND BONES: -No evidence for fracture. No marrow signal abnormality to suggest osteomyelitis. No significant effusion to suggest septic arthropathy. There is some reactive edema within the sesamoid bones adjacent to the first metatarsal head which could represent sesamoiditis. TENDONS: -Flexor hallucis tendon tenosynovitis. The remaining flexor tendons are negative. The extensor tendons are negative for tendinopathy, tenosynovitis, or tearing. LIGAMENTS: -The ligament of Lisfranc is intact. The collateral ligaments at the MTP joints are all intact. MUSCLES AND SOFT TISSUES: -There is an ulceration along the medial aspect of the forefoot with surrounding edema and/or cellulitis. Fluid and susceptibility artifact suggestive of bubbles of gas extend into the soft tissues surrounding the first MTP joint into the webspace between the first and second toes but all of this appears likely external to the joint capsule. There is nonenhancing fluid consistent with abscess surrounding the first MTP joint but this may spontaneously drain to the skin surface. It also extends proximally into the fatty replaced plantar musculature deep to the first and second metatarsal shafts.     IMPRESSION: 1.  No evidence for osteomyelitis or  significant effusion to suggest septic arthropathy. 2.  However, there is a soft tissue ulceration along the medial aspect of the forefoot adjacent to the first MTP joint. Fluid and susceptibly artifact suggestive of gas bubbles extend nearly circumferentially about the first MTP joint and base of the great toe into the webspace between the first and second toes consistent with abscess. Nonenhancing fluid also extends proximally deep to the first and second metatarsal shafts consistent with abscess although this may spontaneously communicate to the ulceration. 3.  Fluid surrounding the flexor hallucis tendon sheath consistent with a toxic tenosynovitis. 4.  No evidence for fracture. 5.  No additional tendinous or ligamentous pathology identified. 6.  Edema or cellulitis along the medial aspect of the foot.    Echocardiogram Complete    Result Date: 11/15/2021  [id number]^[address] [address]  Name: JOSE YO MRN: [id number] : 1960 Study Date: 11/15/2021 08:39 AM Age: 61 yrs Gender: Male Patient Location: [facility] Reason For Study: Chest Pain Ordering Physician: HANK LARIOS Referring Physician: HANK LARIOS Performed By:   BSA: 2.3 m2 Height: 73 in Weight: 225 lb HR: 89 ______________________________________________________________________________ Procedure Complete Echo Adult. Definity (NDC #11378-353) given intravenously. ______________________________________________________________________________ Interpretation Summary  The left ventricle is normal in size. Left ventricular function is decreased. The ejection fraction is 30-35% (moderately reduced). Left ventricular diastolic function is abnormal. Diastolic Doppler findings (E/E' ratio and/or other parameters) suggest left ventricular filling pressures are increased. The left atrium is mildly dilated. The mitral valve leaflets are mildly thickened. There is  mild (1+) mitral regurgitation. IVC diameter >2.1 cm collapsing <50% with sniff suggests a high RA pressure estimated at 15 mmHg or greater. ______________________________________________________________________________ Left Ventricle The left ventricle is normal in size. Left ventricular function is decreased. The ejection fraction is 30-35% (moderately reduced). There is normal left ventricular wall thickness. Left ventricular diastolic function is abnormal. Diastolic Doppler findings (E/E' ratio and/or other parameters) suggest left ventricular filling pressures are increased. There is moderate global hypokinesia of the left ventricle.  Right Ventricle Normal right ventricle size and systolic function.  Atria The left atrium is mildly dilated. Right atrial size is normal. There is no color Doppler evidence of an atrial shunt.  Mitral Valve The mitral valve leaflets are mildly thickened. There is mild (1+) mitral regurgitation.  Tricuspid Valve Tricuspid valve leaflets appear normal. Right ventricle systolic pressure estimate normal. There is trace tricuspid regurgitation.  Aortic Valve The aortic valve is not well visualized. No aortic regurgitation is present. No aortic stenosis is present.  Pulmonic Valve The pulmonic valve is not well visualized. There is no pulmonic valvular regurgitation.  Vessels The aorta root is normal. Normal size ascending aorta. IVC diameter >2.1 cm collapsing <50% with sniff suggests a high RA pressure estimated at 15 mmHg or greater.  Pericardium There is no pericardial effusion.  ______________________________________________________________________________ MMode/2D Measurements & Calculations IVSd: 0.90 cm LVIDd: 5.5 cm LVIDs: 4.7 cm LVPWd: 0.98 cm FS: 13.8 %  LV mass(C)d: 194.3 grams LV mass(C)dI: 85.9 grams/m2 Ao root diam: 3.6 cm LA dimension: 4.0 cm asc Aorta Diam: 3.2 cm LA/Ao: 1.1 LVOT diam: 2.2 cm LVOT area: 3.8 cm2 LA Volume Indexed (AL/bp): 42.4 ml/m2 RWT: 0.36  Time  Measurements MM HR: 90.0 BPM  Doppler Measurements & Calculations MV E max juan carlos: 100.0 cm/sec MV A max juan carlos: 66.5 cm/sec MV E/A: 1.5 MV dec slope: 712.9 cm/sec2 MV dec time: 0.14 sec Ao V2 max: 91.5 cm/sec Ao max PG: 3.0 mmHg Ao V2 mean: 73.8 cm/sec Ao mean P.3 mmHg Ao V2 VTI: 17.4 cm JAEL(I,D): 3.4 cm2 JAEL(V,D): 3.5 cm2 LV V1 max P.8 mmHg LV V1 max: 82.9 cm/sec LV V1 VTI: 15.6 cm SV(LVOT): 60.1 ml SI(LVOT): 26.6 ml/m2 AV Juan Carlos Ratio (DI): 0.91 JAEL Index (cm2/m2): 1.5 E/E' av.8 Lateral E/e': 20.9 Medial E/e': 18.8  ______________________________________________________________________________ Report approved by: Eveline Hunter 11/15/2021 10:40 AM       Foot XR, G/E 3 views, left    Result Date: 2021  EXAM: XR FOOT LEFT G/E 3 VIEWS LOCATION: [facility] DATE/TIME: 2021 9:21 PM INDICATION: diabetic wounds; rule out SQ gas, eval osteomyelitis COMPARISON: None.     IMPRESSION: Subcutaneous emphysema in the base of the great toe and the first interspace to the level of the distal metatarsal metaphysis. No proximal subcutaneous emphysema. No cortical erosion to suggest osteomyelitis. No fractures are evident. Dorsal soft tissue swelling. Atheromatous calcification. Hypertrophic changes in the ankle joint.

## 2021-11-15 NOTE — ED NOTES
Hospitalist, Gaston, returned call regarding elevated Troponin of 13.42.  Per Dr. Feldman please page out cardiology.  Cardiology paged.  STAT EKG order placed.  Report given to Nika ANNE RN.

## 2021-11-15 NOTE — ED NOTES
Dr. Davis returned page regarding troponin of 13.42.  Dr. Davis will be down to ED to see patient STAT.

## 2021-11-16 ENCOUNTER — ANESTHESIA EVENT (OUTPATIENT)
Dept: SURGERY | Facility: HOSPITAL | Age: 61
DRG: 616 | End: 2021-11-16
Payer: COMMERCIAL

## 2021-11-16 ENCOUNTER — ANCILLARY PROCEDURE (OUTPATIENT)
Dept: ULTRASOUND IMAGING | Facility: HOSPITAL | Age: 61
End: 2021-11-16
Attending: ANESTHESIOLOGY
Payer: COMMERCIAL

## 2021-11-16 ENCOUNTER — PREP FOR PROCEDURE (OUTPATIENT)
Dept: PODIATRY | Facility: CLINIC | Age: 61
End: 2021-11-16
Payer: COMMERCIAL

## 2021-11-16 ENCOUNTER — ANESTHESIA (OUTPATIENT)
Dept: SURGERY | Facility: HOSPITAL | Age: 61
DRG: 616 | End: 2021-11-16
Payer: COMMERCIAL

## 2021-11-16 DIAGNOSIS — L02.619 CELLULITIS AND ABSCESS OF FOOT, EXCEPT TOES: Primary | ICD-10-CM

## 2021-11-16 DIAGNOSIS — L03.119 CELLULITIS AND ABSCESS OF FOOT, EXCEPT TOES: Primary | ICD-10-CM

## 2021-11-16 LAB
ANION GAP SERPL CALCULATED.3IONS-SCNC: 7 MMOL/L (ref 5–18)
ANION GAP SERPL CALCULATED.3IONS-SCNC: 8 MMOL/L (ref 5–18)
ATRIAL RATE - MUSE: 111 BPM
ATRIAL RATE - MUSE: 163 BPM
ATRIAL RATE - MUSE: 88 BPM
BUN SERPL-MCNC: 15 MG/DL (ref 8–22)
BUN SERPL-MCNC: 18 MG/DL (ref 8–22)
CALCIUM SERPL-MCNC: 8.7 MG/DL (ref 8.5–10.5)
CALCIUM SERPL-MCNC: 8.8 MG/DL (ref 8.5–10.5)
CHLORIDE BLD-SCNC: 103 MMOL/L (ref 98–107)
CHLORIDE BLD-SCNC: 104 MMOL/L (ref 98–107)
CHOLEST SERPL-MCNC: 189 MG/DL
CO2 SERPL-SCNC: 21 MMOL/L (ref 22–31)
CO2 SERPL-SCNC: 22 MMOL/L (ref 22–31)
CREAT SERPL-MCNC: 1.28 MG/DL (ref 0.7–1.3)
CREAT SERPL-MCNC: 1.45 MG/DL (ref 0.7–1.3)
DIASTOLIC BLOOD PRESSURE - MUSE: 74 MMHG
DIASTOLIC BLOOD PRESSURE - MUSE: NORMAL MMHG
DIASTOLIC BLOOD PRESSURE - MUSE: NORMAL MMHG
ENTEROCOCCUS FAECALIS: NOT DETECTED
ENTEROCOCCUS FAECIUM: NOT DETECTED
GFR SERPL CREATININE-BSD FRML MDRD: 51 ML/MIN/1.73M2
GFR SERPL CREATININE-BSD FRML MDRD: 60 ML/MIN/1.73M2
GLUCOSE BLD-MCNC: 205 MG/DL (ref 70–125)
GLUCOSE BLD-MCNC: 289 MG/DL (ref 70–125)
GLUCOSE BLDC GLUCOMTR-MCNC: 158 MG/DL (ref 70–99)
GLUCOSE BLDC GLUCOMTR-MCNC: 169 MG/DL (ref 70–99)
GLUCOSE BLDC GLUCOMTR-MCNC: 203 MG/DL (ref 70–99)
GLUCOSE BLDC GLUCOMTR-MCNC: 223 MG/DL (ref 70–99)
GLUCOSE BLDC GLUCOMTR-MCNC: 237 MG/DL (ref 70–99)
HDLC SERPL-MCNC: 34 MG/DL
INTERPRETATION ECG - MUSE: NORMAL
KETONES BLD-SCNC: 2.57 MMOL/L
LDLC SERPL CALC-MCNC: 122 MG/DL
LISTERIA SPECIES (DETECTED/NOT DETECTED): NOT DETECTED
MAGNESIUM SERPL-MCNC: 1.6 MG/DL (ref 1.8–2.6)
P AXIS - MUSE: 59 DEGREES
P AXIS - MUSE: 62 DEGREES
P AXIS - MUSE: NORMAL DEGREES
POTASSIUM BLD-SCNC: 3.2 MMOL/L (ref 3.5–5)
POTASSIUM BLD-SCNC: 3.7 MMOL/L (ref 3.5–5)
PR INTERVAL - MUSE: 162 MS
PR INTERVAL - MUSE: 170 MS
PR INTERVAL - MUSE: NORMAL MS
QRS DURATION - MUSE: 104 MS
QRS DURATION - MUSE: 110 MS
QRS DURATION - MUSE: 94 MS
QT - MUSE: 260 MS
QT - MUSE: 330 MS
QT - MUSE: 354 MS
QTC - MUSE: 420 MS
QTC - MUSE: 428 MS
QTC - MUSE: 448 MS
R AXIS - MUSE: 3 DEGREES
R AXIS - MUSE: 44 DEGREES
R AXIS - MUSE: 8 DEGREES
SODIUM SERPL-SCNC: 132 MMOL/L (ref 136–145)
SODIUM SERPL-SCNC: 133 MMOL/L (ref 136–145)
STAPHYLOCOCCUS AUREUS: NOT DETECTED
STAPHYLOCOCCUS EPIDERMIDIS: NOT DETECTED
STAPHYLOCOCCUS LUGDUNENSIS: NOT DETECTED
STAPHYLOCOCCUS SPECIES: NOT DETECTED
STREPTOCOCCUS AGALACTIAE: NOT DETECTED
STREPTOCOCCUS ANGINOSUS GROUP: NOT DETECTED
STREPTOCOCCUS PNEUMONIAE: NOT DETECTED
STREPTOCOCCUS PYOGENES: NOT DETECTED
STREPTOCOCCUS SPECIES: NOT DETECTED
SYSTOLIC BLOOD PRESSURE - MUSE: 141 MMHG
SYSTOLIC BLOOD PRESSURE - MUSE: NORMAL MMHG
SYSTOLIC BLOOD PRESSURE - MUSE: NORMAL MMHG
T AXIS - MUSE: 106 DEGREES
T AXIS - MUSE: 114 DEGREES
T AXIS - MUSE: 19 DEGREES
TRIGL SERPL-MCNC: 163 MG/DL
UFH PPP CHRO-ACNC: 0.13 IU/ML
UFH PPP CHRO-ACNC: 0.15 IU/ML
UFH PPP CHRO-ACNC: <0.1 IU/ML
VENTRICULAR RATE- MUSE: 111 BPM
VENTRICULAR RATE- MUSE: 157 BPM
VENTRICULAR RATE- MUSE: 88 BPM

## 2021-11-16 PROCEDURE — 85520 HEPARIN ASSAY: CPT | Performed by: EMERGENCY MEDICINE

## 2021-11-16 PROCEDURE — 85520 HEPARIN ASSAY: CPT | Performed by: INTERNAL MEDICINE

## 2021-11-16 PROCEDURE — 96366 THER/PROPH/DIAG IV INF ADDON: CPT

## 2021-11-16 PROCEDURE — C1769 GUIDE WIRE: HCPCS | Performed by: INTERNAL MEDICINE

## 2021-11-16 PROCEDURE — 36415 COLL VENOUS BLD VENIPUNCTURE: CPT | Performed by: INTERNAL MEDICINE

## 2021-11-16 PROCEDURE — 4A023N7 MEASUREMENT OF CARDIAC SAMPLING AND PRESSURE, LEFT HEART, PERCUTANEOUS APPROACH: ICD-10-PCS | Performed by: INTERNAL MEDICINE

## 2021-11-16 PROCEDURE — 999N000127 HC STATISTIC PERIPHERAL IV START W US GUIDANCE

## 2021-11-16 PROCEDURE — C1894 INTRO/SHEATH, NON-LASER: HCPCS | Performed by: INTERNAL MEDICINE

## 2021-11-16 PROCEDURE — 258N000003 HC RX IP 258 OP 636: Performed by: STUDENT IN AN ORGANIZED HEALTH CARE EDUCATION/TRAINING PROGRAM

## 2021-11-16 PROCEDURE — 83735 ASSAY OF MAGNESIUM: CPT | Performed by: INTERNAL MEDICINE

## 2021-11-16 PROCEDURE — 250N000011 HC RX IP 250 OP 636: Performed by: GENERAL ACUTE CARE HOSPITAL

## 2021-11-16 PROCEDURE — 93458 L HRT ARTERY/VENTRICLE ANGIO: CPT | Performed by: INTERNAL MEDICINE

## 2021-11-16 PROCEDURE — 250N000011 HC RX IP 250 OP 636: Performed by: EMERGENCY MEDICINE

## 2021-11-16 PROCEDURE — 0Y6N0ZB DETACHMENT AT LEFT FOOT, PARTIAL 2ND RAY, OPEN APPROACH: ICD-10-PCS | Performed by: PODIATRIST

## 2021-11-16 PROCEDURE — 210N000001 HC R&B IMCU HEART CARE

## 2021-11-16 PROCEDURE — 250N000013 HC RX MED GY IP 250 OP 250 PS 637: Performed by: PODIATRIST

## 2021-11-16 PROCEDURE — 258N000003 HC RX IP 258 OP 636: Performed by: NURSE ANESTHETIST, CERTIFIED REGISTERED

## 2021-11-16 PROCEDURE — 250N000011 HC RX IP 250 OP 636: Performed by: STUDENT IN AN ORGANIZED HEALTH CARE EDUCATION/TRAINING PROGRAM

## 2021-11-16 PROCEDURE — 272N000001 HC OR GENERAL SUPPLY STERILE: Performed by: PODIATRIST

## 2021-11-16 PROCEDURE — 250N000009 HC RX 250: Performed by: INTERNAL MEDICINE

## 2021-11-16 PROCEDURE — 87205 SMEAR GRAM STAIN: CPT | Performed by: PODIATRIST

## 2021-11-16 PROCEDURE — 99233 SBSQ HOSP IP/OBS HIGH 50: CPT | Performed by: STUDENT IN AN ORGANIZED HEALTH CARE EDUCATION/TRAINING PROGRAM

## 2021-11-16 PROCEDURE — 258N000003 HC RX IP 258 OP 636: Performed by: PODIATRIST

## 2021-11-16 PROCEDURE — 99233 SBSQ HOSP IP/OBS HIGH 50: CPT | Performed by: THORACIC SURGERY (CARDIOTHORACIC VASCULAR SURGERY)

## 2021-11-16 PROCEDURE — 87075 CULTR BACTERIA EXCEPT BLOOD: CPT | Performed by: PODIATRIST

## 2021-11-16 PROCEDURE — 28122 PARTIAL REMOVAL OF FOOT BONE: CPT | Mod: LT | Performed by: PODIATRIST

## 2021-11-16 PROCEDURE — 255N000002 HC RX 255 OP 636: Performed by: INTERNAL MEDICINE

## 2021-11-16 PROCEDURE — 0QBP0ZZ EXCISION OF LEFT METATARSAL, OPEN APPROACH: ICD-10-PCS | Performed by: PODIATRIST

## 2021-11-16 PROCEDURE — C9113 INJ PANTOPRAZOLE SODIUM, VIA: HCPCS | Performed by: STUDENT IN AN ORGANIZED HEALTH CARE EDUCATION/TRAINING PROGRAM

## 2021-11-16 PROCEDURE — 272N000004 HC RX 272: Performed by: PODIATRIST

## 2021-11-16 PROCEDURE — 93458 L HRT ARTERY/VENTRICLE ANGIO: CPT | Mod: 26 | Performed by: INTERNAL MEDICINE

## 2021-11-16 PROCEDURE — 82010 KETONE BODYS QUAN: CPT | Performed by: INTERNAL MEDICINE

## 2021-11-16 PROCEDURE — 250N000009 HC RX 250: Performed by: PODIATRIST

## 2021-11-16 PROCEDURE — 99233 SBSQ HOSP IP/OBS HIGH 50: CPT | Performed by: INTERNAL MEDICINE

## 2021-11-16 PROCEDURE — 28315 REMOVAL OF SESAMOID BONE: CPT | Mod: 59 | Performed by: PODIATRIST

## 2021-11-16 PROCEDURE — 88304 TISSUE EXAM BY PATHOLOGIST: CPT | Mod: TC | Performed by: PODIATRIST

## 2021-11-16 PROCEDURE — 0Y6N0Z9 DETACHMENT AT LEFT FOOT, PARTIAL 1ST RAY, OPEN APPROACH: ICD-10-PCS | Performed by: PODIATRIST

## 2021-11-16 PROCEDURE — 250N000011 HC RX IP 250 OP 636: Performed by: PODIATRIST

## 2021-11-16 PROCEDURE — 258N000003 HC RX IP 258 OP 636: Performed by: INTERNAL MEDICINE

## 2021-11-16 PROCEDURE — 28820 AMPUTATION OF TOE: CPT | Mod: 59 | Performed by: PODIATRIST

## 2021-11-16 PROCEDURE — 99233 SBSQ HOSP IP/OBS HIGH 50: CPT | Mod: 25 | Performed by: INTERNAL MEDICINE

## 2021-11-16 PROCEDURE — 250N000011 HC RX IP 250 OP 636: Performed by: NURSE ANESTHETIST, CERTIFIED REGISTERED

## 2021-11-16 PROCEDURE — 370N000017 HC ANESTHESIA TECHNICAL FEE, PER MIN: Performed by: PODIATRIST

## 2021-11-16 PROCEDURE — 250N000013 HC RX MED GY IP 250 OP 250 PS 637: Performed by: NURSE PRACTITIONER

## 2021-11-16 PROCEDURE — 360N000075 HC SURGERY LEVEL 2, PER MIN: Performed by: PODIATRIST

## 2021-11-16 PROCEDURE — 250N000009 HC RX 250

## 2021-11-16 PROCEDURE — 87186 SC STD MICRODIL/AGAR DIL: CPT | Performed by: PODIATRIST

## 2021-11-16 PROCEDURE — 250N000011 HC RX IP 250 OP 636: Performed by: INTERNAL MEDICINE

## 2021-11-16 PROCEDURE — 87040 BLOOD CULTURE FOR BACTERIA: CPT | Performed by: INTERNAL MEDICINE

## 2021-11-16 PROCEDURE — 258N000003 HC RX IP 258 OP 636: Performed by: EMERGENCY MEDICINE

## 2021-11-16 PROCEDURE — 250N000013 HC RX MED GY IP 250 OP 250 PS 637: Performed by: INTERNAL MEDICINE

## 2021-11-16 PROCEDURE — 272N000001 HC OR GENERAL SUPPLY STERILE: Performed by: INTERNAL MEDICINE

## 2021-11-16 PROCEDURE — 80061 LIPID PANEL: CPT | Performed by: STUDENT IN AN ORGANIZED HEALTH CARE EDUCATION/TRAINING PROGRAM

## 2021-11-16 PROCEDURE — 999N000141 HC STATISTIC PRE-PROCEDURE NURSING ASSESSMENT: Performed by: PODIATRIST

## 2021-11-16 PROCEDURE — 28002 TREATMENT OF FOOT INFECTION: CPT | Mod: 59 | Performed by: PODIATRIST

## 2021-11-16 PROCEDURE — 250N000009 HC RX 250: Performed by: NURSE ANESTHETIST, CERTIFIED REGISTERED

## 2021-11-16 PROCEDURE — 80048 BASIC METABOLIC PNL TOTAL CA: CPT | Performed by: INTERNAL MEDICINE

## 2021-11-16 PROCEDURE — B2111ZZ FLUOROSCOPY OF MULTIPLE CORONARY ARTERIES USING LOW OSMOLAR CONTRAST: ICD-10-PCS | Performed by: INTERNAL MEDICINE

## 2021-11-16 RX ORDER — HYDRALAZINE HYDROCHLORIDE 20 MG/ML
10 INJECTION INTRAMUSCULAR; INTRAVENOUS
Status: DISCONTINUED | OUTPATIENT
Start: 2021-11-16 | End: 2021-11-24 | Stop reason: HOSPADM

## 2021-11-16 RX ORDER — NALOXONE HYDROCHLORIDE 0.4 MG/ML
0.2 INJECTION, SOLUTION INTRAMUSCULAR; INTRAVENOUS; SUBCUTANEOUS
Status: ACTIVE | OUTPATIENT
Start: 2021-11-16 | End: 2021-11-16

## 2021-11-16 RX ORDER — BISACODYL 10 MG
10 SUPPOSITORY, RECTAL RECTAL DAILY PRN
Status: DISCONTINUED | OUTPATIENT
Start: 2021-11-16 | End: 2021-11-24 | Stop reason: HOSPADM

## 2021-11-16 RX ORDER — IODIXANOL 320 MG/ML
INJECTION, SOLUTION INTRAVASCULAR
Status: DISCONTINUED | OUTPATIENT
Start: 2021-11-16 | End: 2021-11-16 | Stop reason: HOSPADM

## 2021-11-16 RX ORDER — ONDANSETRON 4 MG/1
4 TABLET, ORALLY DISINTEGRATING ORAL EVERY 6 HOURS PRN
Status: DISCONTINUED | OUTPATIENT
Start: 2021-11-16 | End: 2021-11-24 | Stop reason: HOSPADM

## 2021-11-16 RX ORDER — ONDANSETRON 2 MG/ML
4 INJECTION INTRAMUSCULAR; INTRAVENOUS EVERY 6 HOURS PRN
Status: DISCONTINUED | OUTPATIENT
Start: 2021-11-16 | End: 2021-11-24 | Stop reason: HOSPADM

## 2021-11-16 RX ORDER — OXYCODONE HYDROCHLORIDE 5 MG/1
10 TABLET ORAL EVERY 4 HOURS PRN
Status: DISCONTINUED | OUTPATIENT
Start: 2021-11-16 | End: 2021-11-18

## 2021-11-16 RX ORDER — BUPIVACAINE HYDROCHLORIDE 5 MG/ML
INJECTION, SOLUTION PERINEURAL PRN
Status: DISCONTINUED | OUTPATIENT
Start: 2021-11-16 | End: 2021-11-16 | Stop reason: HOSPADM

## 2021-11-16 RX ORDER — MAGNESIUM HYDROXIDE 1200 MG/15ML
LIQUID ORAL PRN
Status: DISCONTINUED | OUTPATIENT
Start: 2021-11-16 | End: 2021-11-16 | Stop reason: HOSPADM

## 2021-11-16 RX ORDER — METOPROLOL SUCCINATE 25 MG/1
25 TABLET, EXTENDED RELEASE ORAL DAILY
Status: DISCONTINUED | OUTPATIENT
Start: 2021-11-16 | End: 2021-11-19

## 2021-11-16 RX ORDER — SODIUM CHLORIDE 9 MG/ML
INJECTION, SOLUTION INTRAVENOUS CONTINUOUS
Status: DISCONTINUED | OUTPATIENT
Start: 2021-11-16 | End: 2021-11-16 | Stop reason: HOSPADM

## 2021-11-16 RX ORDER — ASPIRIN 81 MG/1
243 TABLET, CHEWABLE ORAL ONCE
Status: COMPLETED | OUTPATIENT
Start: 2021-11-16 | End: 2021-11-16

## 2021-11-16 RX ORDER — ASPIRIN 325 MG
325 TABLET ORAL ONCE
Status: COMPLETED | OUTPATIENT
Start: 2021-11-16 | End: 2021-11-16

## 2021-11-16 RX ORDER — FENTANYL CITRATE 50 UG/ML
50 INJECTION, SOLUTION INTRAMUSCULAR; INTRAVENOUS CONTINUOUS PRN
Status: DISCONTINUED | OUTPATIENT
Start: 2021-11-16 | End: 2021-11-18

## 2021-11-16 RX ORDER — FENTANYL CITRATE 50 UG/ML
INJECTION, SOLUTION INTRAMUSCULAR; INTRAVENOUS PRN
Status: DISCONTINUED | OUTPATIENT
Start: 2021-11-16 | End: 2021-11-16

## 2021-11-16 RX ORDER — POLYETHYLENE GLYCOL 3350 17 G/17G
17 POWDER, FOR SOLUTION ORAL DAILY
Status: DISCONTINUED | OUTPATIENT
Start: 2021-11-17 | End: 2021-11-24 | Stop reason: HOSPADM

## 2021-11-16 RX ORDER — PROCHLORPERAZINE MALEATE 10 MG
10 TABLET ORAL EVERY 6 HOURS PRN
Status: DISCONTINUED | OUTPATIENT
Start: 2021-11-16 | End: 2021-11-24 | Stop reason: HOSPADM

## 2021-11-16 RX ORDER — HEPARIN SODIUM 5000 [USP'U]/.5ML
5000 INJECTION, SOLUTION INTRAVENOUS; SUBCUTANEOUS EVERY 8 HOURS
Status: DISCONTINUED | OUTPATIENT
Start: 2021-11-16 | End: 2021-11-20

## 2021-11-16 RX ORDER — NITROGLYCERIN 0.4 MG/1
TABLET SUBLINGUAL
Status: DISCONTINUED | OUTPATIENT
Start: 2021-11-16 | End: 2021-11-16 | Stop reason: HOSPADM

## 2021-11-16 RX ORDER — LIDOCAINE HYDROCHLORIDE 20 MG/ML
INJECTION, SOLUTION INFILTRATION; PERINEURAL PRN
Status: DISCONTINUED | OUTPATIENT
Start: 2021-11-16 | End: 2021-11-16

## 2021-11-16 RX ORDER — ATORVASTATIN CALCIUM 40 MG/1
80 TABLET, FILM COATED ORAL EVERY EVENING
Status: DISCONTINUED | OUTPATIENT
Start: 2021-11-16 | End: 2021-11-24 | Stop reason: HOSPADM

## 2021-11-16 RX ORDER — HYDROMORPHONE HYDROCHLORIDE 1 MG/ML
0.5 INJECTION, SOLUTION INTRAMUSCULAR; INTRAVENOUS; SUBCUTANEOUS
Status: DISCONTINUED | OUTPATIENT
Start: 2021-11-16 | End: 2021-11-24 | Stop reason: HOSPADM

## 2021-11-16 RX ORDER — DEXAMETHASONE SODIUM PHOSPHATE 4 MG/ML
INJECTION, SOLUTION INTRA-ARTICULAR; INTRALESIONAL; INTRAMUSCULAR; INTRAVENOUS; SOFT TISSUE PRN
Status: DISCONTINUED | OUTPATIENT
Start: 2021-11-16 | End: 2021-11-16

## 2021-11-16 RX ORDER — FLUMAZENIL 0.1 MG/ML
0.2 INJECTION, SOLUTION INTRAVENOUS
Status: ACTIVE | OUTPATIENT
Start: 2021-11-16 | End: 2021-11-16

## 2021-11-16 RX ORDER — DIAZEPAM 5 MG
10 TABLET ORAL ONCE
Status: COMPLETED | OUTPATIENT
Start: 2021-11-16 | End: 2021-11-16

## 2021-11-16 RX ORDER — FENTANYL CITRATE 50 UG/ML
25 INJECTION, SOLUTION INTRAMUSCULAR; INTRAVENOUS
Status: DISCONTINUED | OUTPATIENT
Start: 2021-11-16 | End: 2021-11-16 | Stop reason: CLARIF

## 2021-11-16 RX ORDER — SODIUM CHLORIDE 9 MG/ML
75 INJECTION, SOLUTION INTRAVENOUS CONTINUOUS
Status: ACTIVE | OUTPATIENT
Start: 2021-11-16 | End: 2021-11-16

## 2021-11-16 RX ORDER — LIDOCAINE 40 MG/G
CREAM TOPICAL
Status: DISCONTINUED | OUTPATIENT
Start: 2021-11-16 | End: 2021-11-21

## 2021-11-16 RX ORDER — LIDOCAINE 40 MG/G
CREAM TOPICAL
Status: DISCONTINUED | OUTPATIENT
Start: 2021-11-16 | End: 2021-11-16

## 2021-11-16 RX ORDER — NALOXONE HYDROCHLORIDE 0.4 MG/ML
0.4 INJECTION, SOLUTION INTRAMUSCULAR; INTRAVENOUS; SUBCUTANEOUS
Status: ACTIVE | OUTPATIENT
Start: 2021-11-16 | End: 2021-11-16

## 2021-11-16 RX ORDER — OXYCODONE HYDROCHLORIDE 5 MG/1
5 TABLET ORAL EVERY 4 HOURS PRN
Status: DISCONTINUED | OUTPATIENT
Start: 2021-11-16 | End: 2021-11-24 | Stop reason: HOSPADM

## 2021-11-16 RX ORDER — LIDOCAINE 40 MG/G
CREAM TOPICAL
Status: DISCONTINUED | OUTPATIENT
Start: 2021-11-16 | End: 2021-11-16 | Stop reason: HOSPADM

## 2021-11-16 RX ORDER — ATROPINE SULFATE 0.1 MG/ML
0.5 INJECTION INTRAVENOUS
Status: ACTIVE | OUTPATIENT
Start: 2021-11-16 | End: 2021-11-16

## 2021-11-16 RX ORDER — MAGNESIUM SULFATE 4 G/50ML
4 INJECTION INTRAVENOUS ONCE
Status: COMPLETED | OUTPATIENT
Start: 2021-11-16 | End: 2021-11-17

## 2021-11-16 RX ORDER — OXYCODONE HYDROCHLORIDE 5 MG/1
10 TABLET ORAL EVERY 4 HOURS PRN
Status: DISCONTINUED | OUTPATIENT
Start: 2021-11-16 | End: 2021-11-24 | Stop reason: HOSPADM

## 2021-11-16 RX ORDER — OXYCODONE HYDROCHLORIDE 15 MG/1
15 TABLET ORAL EVERY 4 HOURS PRN
Status: DISCONTINUED | OUTPATIENT
Start: 2021-11-16 | End: 2021-11-18

## 2021-11-16 RX ORDER — ACETAMINOPHEN 325 MG/1
650 TABLET ORAL EVERY 4 HOURS PRN
Status: DISCONTINUED | OUTPATIENT
Start: 2021-11-16 | End: 2021-11-16

## 2021-11-16 RX ORDER — ONDANSETRON 2 MG/ML
INJECTION INTRAMUSCULAR; INTRAVENOUS PRN
Status: DISCONTINUED | OUTPATIENT
Start: 2021-11-16 | End: 2021-11-16

## 2021-11-16 RX ORDER — FENTANYL CITRATE 50 UG/ML
INJECTION, SOLUTION INTRAMUSCULAR; INTRAVENOUS
Status: DISCONTINUED | OUTPATIENT
Start: 2021-11-16 | End: 2021-11-16 | Stop reason: HOSPADM

## 2021-11-16 RX ORDER — SODIUM CHLORIDE, SODIUM LACTATE, POTASSIUM CHLORIDE, CALCIUM CHLORIDE 600; 310; 30; 20 MG/100ML; MG/100ML; MG/100ML; MG/100ML
INJECTION, SOLUTION INTRAVENOUS CONTINUOUS
Status: DISCONTINUED | OUTPATIENT
Start: 2021-11-16 | End: 2021-11-18

## 2021-11-16 RX ORDER — METOPROLOL TARTRATE 25 MG/1
25 TABLET, FILM COATED ORAL 2 TIMES DAILY
Status: DISCONTINUED | OUTPATIENT
Start: 2021-11-16 | End: 2021-11-16

## 2021-11-16 RX ORDER — ACETAMINOPHEN 325 MG/1
975 TABLET ORAL EVERY 8 HOURS
Status: DISPENSED | OUTPATIENT
Start: 2021-11-16 | End: 2021-11-19

## 2021-11-16 RX ORDER — PROPOFOL 10 MG/ML
INJECTION, EMULSION INTRAVENOUS CONTINUOUS PRN
Status: DISCONTINUED | OUTPATIENT
Start: 2021-11-16 | End: 2021-11-16

## 2021-11-16 RX ORDER — AMOXICILLIN 250 MG
1 CAPSULE ORAL 2 TIMES DAILY
Status: DISCONTINUED | OUTPATIENT
Start: 2021-11-16 | End: 2021-11-24 | Stop reason: HOSPADM

## 2021-11-16 RX ORDER — ACETAMINOPHEN 325 MG/1
650 TABLET ORAL EVERY 4 HOURS PRN
Status: DISCONTINUED | OUTPATIENT
Start: 2021-11-19 | End: 2021-11-18

## 2021-11-16 RX ADMIN — DEXAMETHASONE SODIUM PHOSPHATE 4 MG: 4 INJECTION, SOLUTION INTRA-ARTICULAR; INTRALESIONAL; INTRAMUSCULAR; INTRAVENOUS; SOFT TISSUE at 18:02

## 2021-11-16 RX ADMIN — ATORVASTATIN CALCIUM 80 MG: 40 TABLET, FILM COATED ORAL at 20:22

## 2021-11-16 RX ADMIN — ACETAMINOPHEN 975 MG: 325 TABLET ORAL at 20:37

## 2021-11-16 RX ADMIN — HEPARIN SODIUM 2100 UNITS/HR: 1000 INJECTION INTRAVENOUS; SUBCUTANEOUS at 01:50

## 2021-11-16 RX ADMIN — ONDANSETRON 4 MG: 2 INJECTION INTRAMUSCULAR; INTRAVENOUS at 18:02

## 2021-11-16 RX ADMIN — PANTOPRAZOLE SODIUM 40 MG: 40 INJECTION, POWDER, FOR SOLUTION INTRAVENOUS at 08:25

## 2021-11-16 RX ADMIN — LIDOCAINE HYDROCHLORIDE 40 MG: 20 INJECTION, SOLUTION INFILTRATION; PERINEURAL at 17:56

## 2021-11-16 RX ADMIN — PHENYLEPHRINE HYDROCHLORIDE 100 MCG: 10 INJECTION INTRAVENOUS at 18:23

## 2021-11-16 RX ADMIN — FENTANYL CITRATE 50 MCG: 50 INJECTION, SOLUTION INTRAMUSCULAR; INTRAVENOUS at 17:56

## 2021-11-16 RX ADMIN — VANCOMYCIN HYDROCHLORIDE 1250 MG: 5 INJECTION, POWDER, LYOPHILIZED, FOR SOLUTION INTRAVENOUS at 01:54

## 2021-11-16 RX ADMIN — PIPERACILLIN SODIUM AND TAZOBACTAM SODIUM 3.38 G: 3; .375 INJECTION, POWDER, LYOPHILIZED, FOR SOLUTION INTRAVENOUS at 20:21

## 2021-11-16 RX ADMIN — MAGNESIUM SULFATE HEPTAHYDRATE 4 G: 80 INJECTION, SOLUTION INTRAVENOUS at 20:21

## 2021-11-16 RX ADMIN — SODIUM CHLORIDE: 9 INJECTION, SOLUTION INTRAVENOUS at 09:13

## 2021-11-16 RX ADMIN — PROPOFOL 150 MCG/KG/MIN: 10 INJECTION, EMULSION INTRAVENOUS at 17:56

## 2021-11-16 RX ADMIN — SENNOSIDES, DOCUSATE SODIUM 1 TABLET: 8.6; 5 TABLET ORAL at 20:37

## 2021-11-16 RX ADMIN — PIPERACILLIN SODIUM AND TAZOBACTAM SODIUM 3.38 G: 3; .375 INJECTION, POWDER, LYOPHILIZED, FOR SOLUTION INTRAVENOUS at 14:08

## 2021-11-16 RX ADMIN — VANCOMYCIN HYDROCHLORIDE 1500 MG: 5 INJECTION, POWDER, LYOPHILIZED, FOR SOLUTION INTRAVENOUS at 23:07

## 2021-11-16 RX ADMIN — PIPERACILLIN SODIUM AND TAZOBACTAM SODIUM 3.38 G: 3; .375 INJECTION, POWDER, LYOPHILIZED, FOR SOLUTION INTRAVENOUS at 04:11

## 2021-11-16 RX ADMIN — INSULIN ASPART 2 UNITS: 100 INJECTION, SOLUTION INTRAVENOUS; SUBCUTANEOUS at 07:56

## 2021-11-16 RX ADMIN — ASPIRIN 325 MG: 325 TABLET ORAL at 08:25

## 2021-11-16 RX ADMIN — INSULIN GLARGINE 25 UNITS: 100 INJECTION, SOLUTION SUBCUTANEOUS at 08:26

## 2021-11-16 RX ADMIN — HEPARIN SODIUM 1800 UNITS/HR: 1000 INJECTION INTRAVENOUS; SUBCUTANEOUS at 01:44

## 2021-11-16 RX ADMIN — DIAZEPAM 10 MG: 5 TABLET ORAL at 12:48

## 2021-11-16 RX ADMIN — HEPARIN SODIUM 5000 UNITS: 10000 INJECTION, SOLUTION INTRAVENOUS; SUBCUTANEOUS at 20:48

## 2021-11-16 RX ADMIN — METOPROLOL SUCCINATE 25 MG: 25 TABLET, EXTENDED RELEASE ORAL at 14:11

## 2021-11-16 RX ADMIN — SODIUM CHLORIDE, POTASSIUM CHLORIDE, SODIUM LACTATE AND CALCIUM CHLORIDE: 600; 310; 30; 20 INJECTION, SOLUTION INTRAVENOUS at 23:06

## 2021-11-16 ASSESSMENT — ACTIVITIES OF DAILY LIVING (ADL)
ADLS_ACUITY_SCORE: 11
ADLS_ACUITY_SCORE: 9
ADLS_ACUITY_SCORE: 11
ADLS_ACUITY_SCORE: 9
ADLS_ACUITY_SCORE: 11

## 2021-11-16 ASSESSMENT — MIFFLIN-ST. JEOR: SCORE: 1881.28

## 2021-11-16 ASSESSMENT — ENCOUNTER SYMPTOMS: DYSRHYTHMIAS: 1

## 2021-11-16 NOTE — PRE-PROCEDURE
GENERAL PRE-PROCEDURE:   Procedure:  Coronary angiogram with possible PCI  Date/Time:  11/16/2021 12:32 PM    Written consent obtained?: Yes    Risks and benefits: Risks, benefits and alternatives were discussed    Consent given by:  Patient  Patient states understanding of procedure being performed: Yes    Patient's understanding of procedure matches consent: Yes    Procedure consent matches procedure scheduled: Yes    Expected level of sedation:  Moderate  Appropriately NPO:  Yes  ASA Class:  3 (NSTEMI, cardiomyopathy, PAF, HTN, dyslipidemia, CKD, gangrenous toe)  Mallampati  :  Grade 1- soft palate, uvula, tonsillar pillars, and posterior pharyngeal wall visible  Lungs:  Lungs clear with good breath sounds bilaterally  Heart:  Normal heart sounds and rate  History & Physical reviewed:  History and physical reviewed and no updates needed  Statement of review:  I have reviewed the lab findings, diagnostic data, medications, and the plan for sedation

## 2021-11-16 NOTE — PROGRESS NOTES
Patient has been cleared for foot surgery today by cardiology per Dr. Feldman. Will plan on left foot I&D with possible 1st ray amputation later this afternoon. He will continue with plavix per cardiology.

## 2021-11-16 NOTE — PLAN OF CARE
Report called to P3. Patient transferred via bed to Room 322 with Ye RN. Patient sister took all belongings except cell phone, , hat, and glasses.

## 2021-11-16 NOTE — PLAN OF CARE
Problem: Cardiac-Related Pain (Acute Coronary Syndrome)  Goal: Absence of Cardiac-Related Pain  Outcome: No Change  VSS.  Denies CP/SOB/NORTON.  NPO since MN for pending Angiogram.  Pre-procedure check-list initiated.  Wound site covered.  Unable to assess affected L foot.  Denies pain/discomfort at affected wound site.  Dressing appearing CDI.

## 2021-11-16 NOTE — PROGRESS NOTES
Assessment/Plan:  1.  Acute non-ST elevation MI: The patient is going to have a coronary angiogram with possible PCI today.  Discussed with Dr. Tabares.  Continue aspirin, heparin infusion now.  Start metoprolol succinate 25 mg daily.    2.  Cardiomyopathy, LVEF 30 to 35%: Most likely ischemic cardiomyopathy.  Reduced IV fluid to 100 cc/h.  Please stop IV fluid post coronary angiogram procedure to avoid fluid overload.  Start metoprolol succinate 25 mg daily.  If his blood pressure tolerable, consider to add a small amount of ACE inhibitor since his creatinine has back to his baseline.    3.  Paroxysmal atrial fibrillation: The patient had very short episode of atrial fibrillation in the ER.  He has no symptoms of palpitations.  Started metoprolol XL 25 mg daily.  Consider anticoagulation if he has recurrent episode of atrial fibrillation.     4.  Primary hypertension: His blood pressure is in normal range.     5.  Dyslipidemia:  His LDL is 122.  Increase Lipitor to 80 mg at bedtime.     6.  Acute DKA, acute renal injury on chronic kidney disease: Please see primary team of management for details.  Creatinine is improved to his baseline 1.4.    7.  Diabetic foot, left great toe gangrene: Please see Podiatric service note.  Plan to have I&D with possible left great toe amputation tonight.    Subjective:  Interval History:  Has no complaints of chest pain or shortness of breath.  No acute events over last night.    Current Medications:  Scheduled Meds:    aspirin  81 mg Oral Daily     atorvastatin  40 mg Oral QPM     insulin aspart  1-12 Units Subcutaneous Q4H     insulin aspart   Subcutaneous QAM AC     insulin aspart   Subcutaneous Daily with lunch     insulin aspart   Subcutaneous Daily with supper     [START ON 11/17/2021] insulin glargine  30 Units Subcutaneous Daily     pantoprazole (PROTONIX) IV  40 mg Intravenous Daily with breakfast     piperacillin-tazobactam  3.375 g Intravenous Q8H     sodium  chloride (PF)  3 mL Intracatheter Q8H     vancomycin  1,500 mg Intravenous Q24H     Continuous Infusions:    dextrose 5% and 0.45% NaCl       heparin 2,250 Units/hr (11/16/21 3695)     - MEDICATION INSTRUCTIONS -       - MEDICATION INSTRUCTIONS -       sodium chloride 150 mL/hr at 11/16/21 0913     PRN Meds:.acetaminophen, dextrose 5% and 0.45% NaCl, glucose **OR** dextrose **OR** glucagon, HOLD MEDICATION, lidocaine 4%, lidocaine (buffered or not buffered), melatonin, ondansetron **OR** ondansetron, - MEDICATION INSTRUCTIONS -, - MEDICATION INSTRUCTIONS -, sodium chloride (PF), sodium chloride (PF)    Objective:   Vital signs in last 24 hours:  Temp:  [97.6  F (36.4  C)-98.8  F (37.1  C)] 98.4  F (36.9  C)  Pulse:  [84-96] 92  Resp:  [12-33] 16  BP: (116-149)/(59-75) 122/60  SpO2:  [94 %-99 %] 96 %  Weight:   [unfilled]     Physical Exam:  General Appearance:   Awake, Alert, No acute distress.   HEENT:  No scleral icterus; the mucous membranes were moist.   Neck: No cervical bruits. No jugular venous distention   Lungs:   Lungs are clear to auscultation. No crackles. No wheezing.   Cardiovascular:   RRR, normal first and second heart sounds with no murmurs. No rubs or gallops.     Abdomen:  Soft. No tenderness. Bowels sounds are present   Extremities: No leg edema.  Left toe gangrene appearance   Skin: Warm, Dry. No rashes.   Neurologic: Mood and affect are appropriate. No focal deficits.         Cardiographics:   Report: personally reviewed. .      Tele monitoring -normal sinus rhythm    Echocardiogram on November 15, 2021:  The left ventricle is normal in size.  Left ventricular function is decreased. The ejection fraction is 30-35%  (moderately reduced).  Left ventricular diastolic function is abnormal.  Diastolic Doppler findings (E/E' ratio and/or other parameters) suggest left  ventricular filling pressures are increased.  The left atrium is mildly dilated.  The mitral valve leaflets are mildly  thickened.  There is mild (1+) mitral regurgitation.  IVC diameter >2.1 cm collapsing <50% with sniff suggests a high RA pressure  estimated at 15 mmHg or greater.    Lab Results:   personally reviewed.     Lab Results   Component Value Date     11/16/2021    CO2 22 11/16/2021    BUN 18 11/16/2021     Lab Results   Component Value Date    TROPONINI 13.42 11/15/2021     Lab Results   Component Value Date    WBC 11.8 11/15/2021    HGB 11.3 11/15/2021    HCT 34.9 11/15/2021    MCV 89 11/15/2021     11/15/2021     Lab Results   Component Value Date    CHOL 189 11/16/2021    TRIG 163 11/16/2021    HDL 34 11/16/2021     11/16/2021

## 2021-11-16 NOTE — PROGRESS NOTES
Progress Note    Assessment/Plan  61-year-old nonadherent diabetic who stopped his own diabetes medication and presented with DKA.  He was found to have non-STEMI, severe cellulitis with gangrene of the first ray.  He was started on DKA protocol and slowly weaned off IV insulin.  Is currently on IV antibiotics and IV heparin.  He will have coronary angiogram and I&D with amputation of the first ray on 11/16.  Continue with Plavix as per cardiology    DKA on presentation  --Improved with DKA protocol including insulin drip  --Transition to subcu insulin on 11/15.  Thereafter patient had suboptimal control  --Increase Lantus to 30 units from next a.m.  Blood sugars are still uncontrolled and therefore change medium intensity sliding scale to high intensity    Left diabetic foot infection with with possible gas gangrene  --MRI showed no osteomyelitis  Discussed with cardiology and--patient okay to undergo debridement and amputation after coronary angiogram today    Non-STEMI  --Started on IV heparin, aspirin and statin on 11/15 per cardiology  --Also started on beta-blocker  --Coronary angiogram planned on 11/16    Episode of atrial fibrillation on 11/15  --Started on beta-blockers by cardiology    Gram-positive bacteremia likely due to diabetic foot infection on the left  --Awaiting identification and sensitivity  --Currently on IV Zosyn Vanco as per ID  --Hold off on PICC line given bacteremia.  PICC line was requested by nursing for difficult blood draw  --Ordered repeat blood cultures on 11/16    Acute renal failure on presentation CKD stage III baseline creatinine of 1.4  --Improved with IV fluids    Hyponatremia on presentation  --Improved    Hypomagnesemia  --Continue to replace as per protocol    Ischemic cardiomyopathy with a EF of 30 to 35%  --DC IV fluids and patient is not n.p.o.    Essential hypertension controlled  --Defer restarting      Barriers to discharge: IV antibiotics, coronary  "catheterization,    Anticipated discharge date: 11/22        Subjective  Chart reviewed.  Blood cultures were positive for gram-positive cocci.  Patient currently on IV Zosyn and vancomycin.  Discussed with cardiology and podiatry about timing of I&D and possible toe amputation.  Patient informed about risk of bleeding due to cardiac medications.  He denies any chest pain or shortness of breath.  Significant other at bedside.  Patient denies any much pain on the left great toe.  Patient is currently on subacute insulin and off IV insulin    Review of other system is limited    Objective    /69 (BP Location: Left arm)   Pulse 92   Temp 98.3  F (36.8  C) (Oral)   Resp 18   Ht 1.854 m (6' 1\")   Wt 102.7 kg (226 lb 8 oz)   SpO2 96%   BMI 29.88 kg/m    Weight:   Wt Readings from Last 5 Encounters:   11/16/21 102.7 kg (226 lb 8 oz)       I/O last 3 completed shifts:  In: 382.45 [I.V.:382.45]  Out: 900 [Urine:900]  I/O this shift:  In: -   Out: 1000 [Urine:1000]          Physical Exam  Alert, oriented*3  No pallor, icterus, clubbing, cyanosis  Body mass index is 29.88 kg/m .  Left great toe is gangrenous  No sinus tenderness  Moist membranes  Neck supple  CVS: S1 S2-N, no murmurs, gallops, rubs  Resp: B/L vesicular breath sounds, no wheezing, crackles  Abd: soft, No t/g/r  Neuro: no involuntary movements such as tremors  Vasc: no leg edema     Pertinent Labs  ----------------------  Recent Labs   Lab 11/16/21  1146 11/16/21  0724 11/16/21  0639 11/15/21  1735 11/15/21  1707 11/15/21  1626 11/15/21  1447 11/15/21  1308 11/15/21  1159 11/14/21 2027 11/14/21 2016   NA  --   --  132*  --  132*  --  131*  --  131*   < > 129*   POTASSIUM  --   --  3.7  --  3.7  --  3.8  --  4.2   < > 4.7   CO2  --   --  22  --  24  --  22  --  20*   < > 15*   BUN  --   --  18  --  21  --  23*  --  24*   < > 24*   CR  --   --  1.45*  --  1.49*  --  1.59*  --  1.49*   < > 2.03*   MAG  --   --  1.6*  --   --   --   --   --  1.7*  --  " 1.6*   * 237* 289*   < > 172*   < > 196*   < > 156*   < > 439*    < > = values in this interval not displayed.     Recent Labs   Lab 11/15/21  0817 11/14/21 2016   WBC 11.8* 11.2*   HGB 11.3* 12.9*   HCT 34.9* 40.1    262     Recent Labs   Lab 11/15/21  0052   INR 1.35*     Glucose Values Latest Ref Rng & Units 11/15/2021 11/15/2021 11/15/2021 11/15/2021 11/15/2021 11/15/2021 11/16/2021   Bedside Glucose (mg/dl )  - -- -- -- -- -- -- --   GLUCOSE 70 - 125 mg/dL 396(H) 268(H) 176(H) 156(H) 196(H) 172(H) 289(H)   Some recent data might be hidden         Pertinent Radiology   Radiology Results: Personally reviewed impression/s  MR Foot Left w/o & w Contrast    Result Date: 11/15/2021  EXAM: MR FOOT LEFT W/O and W CONTRAST LOCATION: [facility] DATE/TIME: 11/15/2021 10:45 AM INDICATION: Foot swelling, diabetic, osteomyelitis suspected, no prior imaging. COMPARISON: None. TECHNIQUE: Routine. Additional postgadolinium T1 sequences were obtained. IV CONTRAST: 10 mL Gadavist. FINDINGS: JOINTS AND BONES: -No evidence for fracture. No marrow signal abnormality to suggest osteomyelitis. No significant effusion to suggest septic arthropathy. There is some reactive edema within the sesamoid bones adjacent to the first metatarsal head which could represent sesamoiditis. TENDONS: -Flexor hallucis tendon tenosynovitis. The remaining flexor tendons are negative. The extensor tendons are negative for tendinopathy, tenosynovitis, or tearing. LIGAMENTS: -The ligament of Lisfranc is intact. The collateral ligaments at the MTP joints are all intact. MUSCLES AND SOFT TISSUES: -There is an ulceration along the medial aspect of the forefoot with surrounding edema and/or cellulitis. Fluid and susceptibility artifact suggestive of bubbles of gas extend into the soft tissues surrounding the first MTP joint into the webspace between the first and second toes but all of this appears likely external to  the joint capsule. There is nonenhancing fluid consistent with abscess surrounding the first MTP joint but this may spontaneously drain to the skin surface. It also extends proximally into the fatty replaced plantar musculature deep to the first and second metatarsal shafts.     IMPRESSION: 1.  No evidence for osteomyelitis or significant effusion to suggest septic arthropathy. 2.  However, there is a soft tissue ulceration along the medial aspect of the forefoot adjacent to the first MTP joint. Fluid and susceptibly artifact suggestive of gas bubbles extend nearly circumferentially about the first MTP joint and base of the great toe into the webspace between the first and second toes consistent with abscess. Nonenhancing fluid also extends proximally deep to the first and second metatarsal shafts consistent with abscess although this may spontaneously communicate to the ulceration. 3.  Fluid surrounding the flexor hallucis tendon sheath consistent with a toxic tenosynovitis. 4.  No evidence for fracture. 5.  No additional tendinous or ligamentous pathology identified. 6.  Edema or cellulitis along the medial aspect of the foot.    Echocardiogram Complete    Result Date: 11/15/2021  [id number]^[place] [address]  Name: JOSE YO MRN: [id number] : 1960 Study Date: 11/15/2021 08:39 AM Age: 61 yrs Gender: Male Patient Location: [facility] Reason For Study: Chest Pain Ordering Physician: HANK LARIOS Referring Physician: HANK LARIOS Performed By:   BSA: 2.3 m2 Height: 73 in Weight: 225 lb HR: 89 ______________________________________________________________________________ Procedure Complete Echo Adult. Definity ([facility] #54487-876) given intravenously. ______________________________________________________________________________ Interpretation Summary  The left ventricle is normal in size. Left ventricular function is  decreased. The ejection fraction is 30-35% (moderately reduced). Left ventricular diastolic function is abnormal. Diastolic Doppler findings (E/E' ratio and/or other parameters) suggest left ventricular filling pressures are increased. The left atrium is mildly dilated. The mitral valve leaflets are mildly thickened. There is mild (1+) mitral regurgitation. IVC diameter >2.1 cm collapsing <50% with sniff suggests a high RA pressure estimated at 15 mmHg or greater. ______________________________________________________________________________ Left Ventricle The left ventricle is normal in size. Left ventricular function is decreased. The ejection fraction is 30-35% (moderately reduced). There is normal left ventricular wall thickness. Left ventricular diastolic function is abnormal. Diastolic Doppler findings (E/E' ratio and/or other parameters) suggest left ventricular filling pressures are increased. There is moderate global hypokinesia of the left ventricle.  Right Ventricle Normal right ventricle size and systolic function.  Atria The left atrium is mildly dilated. Right atrial size is normal. There is no color Doppler evidence of an atrial shunt.  Mitral Valve The mitral valve leaflets are mildly thickened. There is mild (1+) mitral regurgitation.  Tricuspid Valve Tricuspid valve leaflets appear normal. Right ventricle systolic pressure estimate normal. There is trace tricuspid regurgitation.  Aortic Valve The aortic valve is not well visualized. No aortic regurgitation is present. No aortic stenosis is present.  Pulmonic Valve The pulmonic valve is not well visualized. There is no pulmonic valvular regurgitation.  Vessels The aorta root is normal. Normal size ascending aorta. IVC diameter >2.1 cm collapsing <50% with sniff suggests a high RA pressure estimated at 15 mmHg or greater.  Pericardium There is no pericardial effusion.  ______________________________________________________________________________  MMode/2D Measurements & Calculations IVSd: 0.90 cm LVIDd: 5.5 cm LVIDs: 4.7 cm LVPWd: 0.98 cm FS: 13.8 %  LV mass(C)d: 194.3 grams LV mass(C)dI: 85.9 grams/m2 Ao root diam: 3.6 cm LA dimension: 4.0 cm asc Aorta Diam: 3.2 cm LA/Ao: 1.1 LVOT diam: 2.2 cm LVOT area: 3.8 cm2 LA Volume Indexed (AL/bp): 42.4 ml/m2 RWT: 0.36  Time Measurements MM HR: 90.0 BPM  Doppler Measurements & Calculations MV E max juan carlos: 100.0 cm/sec MV A max juan carlos: 66.5 cm/sec MV E/A: 1.5 MV dec slope: 712.9 cm/sec2 MV dec time: 0.14 sec Ao V2 max: 91.5 cm/sec Ao max PG: 3.0 mmHg Ao V2 mean: 73.8 cm/sec Ao mean P.3 mmHg Ao V2 VTI: 17.4 cm JAEL(I,D): 3.4 cm2 JAEL(V,D): 3.5 cm2 LV V1 max P.8 mmHg LV V1 max: 82.9 cm/sec LV V1 VTI: 15.6 cm SV(LVOT): 60.1 ml SI(LVOT): 26.6 ml/m2 AV Juan Carlos Ratio (DI): 0.91 JAEL Index (cm2/m2): 1.5 E/E' av.8 Lateral E/e': 20.9 Medial E/e': 18.8  ______________________________________________________________________________ Report approved by: Eveline Hunter 11/15/2021 10:40 AM       Foot XR, G/E 3 views, left    Result Date: 2021  EXAM: XR FOOT LEFT G/E 3 VIEWS LOCATION: [facility] DATE/TIME: 2021 9:21 PM INDICATION: diabetic wounds; rule out SQ gas, eval osteomyelitis COMPARISON: None.     IMPRESSION: Subcutaneous emphysema in the base of the great toe and the first interspace to the level of the distal metatarsal metaphysis. No proximal subcutaneous emphysema. No cortical erosion to suggest osteomyelitis. No fractures are evident. Dorsal soft tissue swelling. Atheromatous calcification. Hypertrophic changes in the ankle joint.    EKG Results: not reviewed.

## 2021-11-16 NOTE — PLAN OF CARE
Problem: Hyperglycemia  Goal: Blood Glucose Level Within Targeted Range  Outcome: Improving     Problem: Adult Inpatient Plan of Care  Goal: Absence of Hospital-Acquired Illness or Injury  Outcome: Improving  Intervention: Identify and Manage Fall Risk  Recent Flowsheet Documentation  Taken 11/15/2021 2149 by Aajy Vance, RN  Safety Promotion/Fall Prevention:   nonskid shoes/slippers when out of bed   room near nurse's station   activity supervised   Pt prepared for angiogram tomorrow. Pt showered with supervision. Pt had IV sites reinforced. BG was 188 mg/dL up from 145 around 2000 this PM shift. Pt denied pain. Pt concerned about diabetic ulcer on left foot stating that the sized has changed a lot recently. Pt is currently tolerating heparin drip infusing at 1,800 units/hr well. Vancomycin to be administered on NOC shift as medication has not arrived to the unit yet. No other concerns noted.   Ajay Vance, RACHAEL  11/15/2021  11:08 PM

## 2021-11-16 NOTE — PROGRESS NOTES
Care Management Follow Up    Length of Stay (days): 1    Expected Discharge Date:  Pending     Concerns to be Addressed:  Infection, planned procedure       Patient plan of care discussed at interdisciplinary rounds: Yes    Anticipated Discharge Disposition:  TBD     Anticipated Discharge Services:  TBD  Anticipated Discharge DME:  TBD      Additional Information:  Patient requiring complex medical care. Cardiology, Vascular Surgery, Podiatry and ID following for diabetic foot ulcer, DKA,  NSTEMI. Diagnostic testing in process, treatment plan to be determined.     Assessment History: Patient states he is independent with ADLs and IADLs. He lives in his sister's house. He ambulates without devices and drives. He is not employed. Family willing to transport at discharge.    Final discharge plan pending progression and recommendations.         Lillian Montalvo RN

## 2021-11-16 NOTE — PROGRESS NOTES
[facility] Inpatient follow up       Patient:  Norman Aguilar  Date of birth 1960, Medical record number [id number]  Date of Visit:  11/16/2021  Attending Physician: Delio Feldman MD         Assessment and Recommendations:   Assessment:  Norman Aguilar is a 61 year old male with   1.  Uncontrolled DM II. Not on treatment since 2/2021. A1c > 14 on 11/14/21.   2.  NSTEMI  3.  JOHANA on CKD. Baseline creatinine 1.4. Admitted with creat at 2.1. Trending down.  4.  Left foot injury in 8/2021  5.  Left diabetic foot infection with clinical wet gangrene.  MRI with no evidence of osteomyelitis for presence of abscess.  Superficial cultures polymicrobial with strep agalactiae, Staph aureus, Klebsiella oxytoca.  On IV vancomycin and Zosyn.  Going for I&D and possibly amputation later this afternoon.   6.  Bacteremia with Streptococcus agalactiae.  Positive blood cultures on 11/14.  Follow-up on 11/16 in process.  Source is left foot infection as growing Streptococcus agalactiae from the foot as well.       Recommendations:   Continue IV vancomycin and Zosyn.  Follow-up pending cultures.  Agree with surgery today.  NSTEMI management per cardiology.  Will need at least debridement.    Discussed with the patient, nursing staff.    ID will follow.    Hilaria Munoz MD.  [facility].   [facility]  Office Telephone [phone].  Fax [phone]  [facility] paging            Interval History:     HPI:  The interval history was reviewed.   Doing okay today. Chart reviewed. Bacteremia with Streptococcus agalactiae. Had angiogram and going to the OR this afternoon. Family at bedside.    Pertinent cultures include:  No results found for: CULT    Recent Inflammatory Biomarkers:   Recent Labs   Lab Test 11/15/21  0817 11/14/21 2016 11/17/18  1727   CRP  --  36.5*  --    WBC 11.8* 11.2* 5.9            Review of Systems:   CONSTITUTIONAL:     Temp Max: Temp (24hrs), Av.3  F (36.8  C), Min:97.6  F (36.4  C), Max:98.8  F (37.1  C)   .  Negative except for findings in the HPI.           Current Medications (antimicrobials listed in bold):       aspirin  81 mg Oral Daily     atorvastatin  80 mg Oral QPM     insulin aspart  1-12 Units Subcutaneous Q4H     insulin aspart   Subcutaneous QAM AC     insulin aspart   Subcutaneous Daily with lunch     insulin aspart   Subcutaneous Daily with supper     [START ON 2021] insulin glargine  30 Units Subcutaneous Daily     metoprolol succinate ER  25 mg Oral Daily     pantoprazole (PROTONIX) IV  40 mg Intravenous Daily with breakfast     piperacillin-tazobactam  3.375 g Intravenous Q8H     sodium chloride (PF)  3 mL Intracatheter Q8H     vancomycin  1,500 mg Intravenous Q24H              Allergies:   No Known Allergies         Physical Exam:   Vitals were reviewed  Patient Vitals for the past 24 hrs:   BP Temp Temp src Pulse Resp SpO2 Weight   21 1142 131/69 98.3  F (36.8  C) Oral 92 18 96 % --   21 0719 122/60 98.4  F (36.9  C) Oral 89 16 96 % --   21 0707 -- -- -- -- -- -- 102.7 kg (226 lb 8 oz)   21 0453 123/61 -- -- 95 -- 98 % --   21 0452 123/61 98.3  F (36.8  C) Oral 93 18 -- --   21 0012 116/59 97.6  F (36.4  C) Oral 92 16 97 % --   11/15/21 2114 (!) 149/70 98.2  F (36.8  C) Oral 93 18 98 % --   11/15/21 2100 129/62 -- -- 88 17 94 % --   11/15/21 2000 134/61 98.8  F (37.1  C) Oral 90 19 95 % --   11/15/21 1900 133/63 -- -- 87 17 98 % --   11/15/21 1800 128/61 -- -- 84 18 97 % --   11/15/21 1739 (!) 149/75 98.4  F (36.9  C) Oral 87 15 99 % 98.7 kg (217 lb 9.5 oz)   11/15/21 1730 -- -- -- 96 12 -- --   11/15/21 1700 -- -- -- 90 (!) 33 -- --   11/15/21 1645 117/59 -- -- -- -- -- --   11/15/21 1500 -- -- -- 87 16 -- --   11/15/21 1430 -- -- -- 85 14 -- --       Physical Examination:  Gen: Pleasant in no acute distress.  HEENT: NCAT. EOMI. PERRL.  Neck: No bruit, JVD or  thyromegaly.  Lungs: Clear to ascultation bilat with no crackles or wheezes.  Card: RRR. NSR. No RMG. Peripheral pulses present and symmetric. No edema.  Abd: Soft NT ND. No mass. Normal bowel sounds.  Skin: No rash.  Extr: Gangrene left foot  Neuro: Alert and oriented to place time and person. Cranial nerves II to XII intact. Motor and sensory intact. Normal gait.            Laboratory Data:   ID Labs:  Microbiology labs:    Wound Aerobic Bacterial Culture Routine with Gram Stain  Order: [id number]   Collected 11/15/2021 11:40 AM     Status: Preliminary result     Visible to patient: No (not released)    Specimen Information: Foot, Left; Wound         0 Result Notes    Culture Culture in progress       4+ Streptococcus agalactiae (Group B Streptococcus) Abnormal     This organism is susceptible to ampicillin, penicillin, vancomycin and the cephalosporins. If treatment is required and your patient is allergic to penicillin, contact the microbiology lab within 5 days to request susceptibility testing.   2+ Staphylococcus pseudintermedius/intermedius Abnormal        2+ Staphylococcus aureus Abnormal        2+ Klebsiella oxytoca Abnormal     Identification is preliminary, confirmation in progress            Gram Stain Result   Abnormal   4+ Gram positive cocci      3+ WBC seen   Predominantly PMNs           Resulting Agency: [facility]           Specimen Collected: 11/15/21 11:40 AM Last Resulted: 11/16/21 12:49 PM                      Contains critical data Blood Culture Line, venous  Order: [id number]   Collected 11/14/2021  8:16 PM       Status: Preliminary result       Visible to patient: No (not released)      Specimen Information: Line, venous; Blood         0 Result Notes      Culture Positive on the 1st day of incubation Abnormal        Streptococcus agalactiae (Group B Streptococcus) Panic     1 of 2 bottles        Resulting Agency: [facility]             Specimen Collected: 11/14/21  8:16 PM Last Resulted: 11/16/21 11:29  AM             Recent Labs   Lab Test 11/14/21 2016   CRP 36.5*     Recent Labs   Lab Test 11/15/21  0817 11/14/21 2016 11/17/18  1727   WBC 11.8* 11.2* 5.9     Recent Labs   Lab Test 11/16/21  0639 11/15/21  1707 11/15/21  1447 11/15/21  1159   CR 1.45* 1.49* 1.59* 1.49*   GFRESTIMATED 51* 50* 46* 50*       Hematology Studies  Recent Labs   Lab Test 11/15/21  0817 11/14/21 2016 11/17/18  1727   WBC 11.8* 11.2* 5.9   HCT 34.9* 40.1 40.1    262 185       Metabolic  Recent Labs   Lab Test 11/16/21  0639 11/15/21  1707 11/15/21  1447   * 132* 131*   BUN 18 21 23*   CO2 22 24 22   CR 1.45* 1.49* 1.59*   GFRESTIMATED 51* 50* 46*       Hepatic Studies  Recent Labs   Lab Test 03/19/20  1448 10/31/19  1625 06/25/19  1446 02/28/19  1422   BILITOTAL  --  0.7 0.6 0.7   ALKPHOS  --  87 76 81   ALBUMIN 4.1 4.1 4.0 3.6   AST  --  20 22 18   ALT  --  24 24 21       Immunologlobulins  Recent Labs   Lab Test 11/14/21 2016   SED 90*            Imaging Data:   Reviewed   Study Result    Narrative & Impression   EXAM: MR FOOT LEFT W/O and W CONTRAST  LOCATION: [facility] [place]  DATE/TIME: 11/15/2021 10:45 AM     INDICATION: Foot swelling, diabetic, osteomyelitis suspected, no prior imaging.  COMPARISON: None.  TECHNIQUE: Routine. Additional postgadolinium T1 sequences were obtained.  IV CONTRAST: 10 mL Gadavist.     FINDINGS:      JOINTS AND BONES:   -No evidence for fracture. No marrow signal abnormality to suggest osteomyelitis. No significant effusion to suggest septic arthropathy. There is some reactive edema within the sesamoid bones adjacent to the first metatarsal head which could represent   sesamoiditis.     TENDONS:   -Flexor hallucis tendon tenosynovitis. The remaining flexor tendons are negative. The extensor tendons are negative for tendinopathy, tenosynovitis, or tearing.      LIGAMENTS:   -The ligament of Lisfranc is intact. The collateral ligaments at the MTP joints are all  intact.     MUSCLES AND SOFT TISSUES:   -There is an ulceration along the medial aspect of the forefoot with surrounding edema and/or cellulitis. Fluid and susceptibility artifact suggestive of bubbles of gas extend into the soft tissues surrounding the first MTP joint into the webspace   between the first and second toes but all of this appears likely external to the joint capsule. There is nonenhancing fluid consistent with abscess surrounding the first MTP joint but this may spontaneously drain to the skin surface. It also extends   proximally into the fatty replaced plantar musculature deep to the first and second metatarsal shafts.                                                                      IMPRESSION:  1.  No evidence for osteomyelitis or significant effusion to suggest septic arthropathy.  2.  However, there is a soft tissue ulceration along the medial aspect of the forefoot adjacent to the first MTP joint. Fluid and susceptibly artifact suggestive of gas bubbles extend nearly circumferentially about the first MTP joint and base of the   great toe into the webspace between the first and second toes consistent with abscess. Nonenhancing fluid also extends proximally deep to the first and second metatarsal shafts consistent with abscess although this may spontaneously communicate to the   ulceration.  3.  Fluid surrounding the flexor hallucis tendon sheath consistent with a toxic tenosynovitis.  4.  No evidence for fracture.  5.  No additional tendinous or ligamentous pathology identified.  6.  Edema or cellulitis along the medial aspect of the foot.

## 2021-11-16 NOTE — PHARMACY-VANCOMYCIN DOSING SERVICE
Pharmacy Vancomycin Note  Date of Service 2021  Patient's  1960   61 year old, male    Indication: Bacteremia and Skin and Soft Tissue Infection  Day of Therapy: 3  Current vancomycin regimen:  1250 mg IV q24h  Current vancomycin monitoring method: AUC  Current vancomycin therapeutic monitoring goal: 400-600 mg*h/L    InsightRX Prediction of Current Vancomycin Regimen  Loading dose: N/A  Regimen: 1250 mg IV every 24 hours.  Start time: 01:54 on 2021  Exposure target: AUC24 (range)400-600 mg/L.hr   AUC24,ss: 397 mg/L.hr  Probability of AUC24 > 400: 49 %  Ctrough,ss: 11.8 mg/L  Probability of Ctrough,ss > 20: 13 %  Probability of nephrotoxicity (Lodise TANIA ): 7 %    Current estimated CrCl = Estimated Creatinine Clearance: 66.5 mL/min (A) (based on SCr of 1.45 mg/dL (H)).    Creatinine for last 3 days  2021:  8:16 PM Creatinine 2.03 mg/dL  11/15/2021: 12:12 AM Creatinine 1.94 mg/dL;  3:59 AM Creatinine 1.80 mg/dL;  8:17 AM Creatinine 1.65 mg/dL; 11:59 AM Creatinine 1.49 mg/dL;  2:47 PM Creatinine 1.59 mg/dL;  5:07 PM Creatinine 1.49 mg/dL  2021:  6:39 AM Creatinine 1.45 mg/dL    Recent Vancomycin Levels (past 3 days)  No results found for requested labs within last 72 hours.    Vancomycin IV Administrations (past 72 hours)                   vancomycin (VANCOCIN) 1,250 mg in sodium chloride 0.9 % 250 mL intermittent infusion (mg) 1,250 mg New Bag 21 0154    vancomycin (VANCOCIN) 2,000 mg in sodium chloride 0.9 % 500 mL intermittent infusion (mg) 2,000 mg New Bag 21 2233                Nephrotoxins and other renal medications (From now, onward)    Start     Dose/Rate Route Frequency Ordered Stop    21 2200  vancomycin (VANCOCIN) 1,500 mg in sodium chloride 0.9 % 250 mL intermittent infusion         1,500 mg  over 90 Minutes Intravenous EVERY 24 HOURS 21 1115      11/15/21 0400  piperacillin-tazobactam (ZOSYN) 3.375 g vial to attach to  mL bag       "  Note to Pharmacy: For [facility], [facility] and [facility]: For Zosyn-naive patients, use the \"Zosyn initial dose + extended infusion\" order panel.    3.375 g  over 240 Minutes Intravenous EVERY 8 HOURS 11/15/21 0330               Contrast Orders - past 72 hours (72h ago, onward)            Start     Dose/Rate Route Frequency Ordered Stop    11/15/21 1100  gadobutrol (GADAVIST) injection 10 mL         10 mL Intravenous ONCE 11/15/21 1047 11/15/21 1101    11/15/21 0900  perflutren lipid microsphere (DEFINITY) injection SUSP 2 mL         2 mL Intravenous ONCE 11/15/21 1022 11/15/21 0900          Interpretation of levels and current regimen:  InsightRx prediction now subtherapeutic at current dose.  Has serum creatinine changed greater than 50% in last 72 hours: No  Urine output:  diminished urine output  Renal Function: Improving    InsightRX Prediction of Planned New Vancomycin Regimen  Loading dose: N/A  Regimen: 1500 mg IV every 24 hours.  Start time: 01:54 on 11/17/2021  Exposure target: AUC24 (range)400-600 mg/L.hr   AUC24,ss: 473 mg/L.hr  Probability of AUC24 > 400: 68 %  Ctrough,ss: 14.1 mg/L  Probability of Ctrough,ss > 20: 23 %  Probability of nephrotoxicity (Lodise TANIA 2009): 9 %    Plan:  1. Increase Dose to 1500 mg IV q24h  2. Vancomycin monitoring method: AUC  3. Vancomycin therapeutic monitoring goal: 400-600 mg*h/L  4. Pharmacy will check vancomycin levels as appropriate in 1-3 Days.  5. Serum creatinine levels will be ordered daily for the first week of therapy and at least twice weekly for subsequent weeks.    Nelly Bauer, [facility]    "

## 2021-11-16 NOTE — CONSULTS
[facility] Cardiovascular Surgery Consult     Norman Aguilar MRN# [id number]   YOB: 1960 Age: 61 year old      Date of Admission: 11/14/2021    Primary care provider: Jaquan Dickerson    Referring Cardiologist(s): Luisito Davis MD and Pam Tabares MD    Date of Service: November 16, 2021    Reason for consult: NSTEMI, severe multi-vessel coronary artery disease           Assessment and Plan:   Norman Aguilar is a 61 year old male with a type 2 NSTEMI, severely poorly controlled DM leading to foot infection and bacteremia found to have severe three vessel coronary artery disease and new ischemic cardiomyopathy and systolic heart failure. His A1c is above the upper limit of 14 and undetectable. He requires amputation of his left foot infection.    [id number] score: 4, 4.8% annual risk of stroke    1) Given lack of symptoms other than type 2 MI on presentation would recommend optimization prior to coronary bypass. His current risk of complications and wound infection is exceedingly high. Short term this would include resolution of his foot infection and bacteremia. Rehabilitation will be important for balance/ambulation after a 1st ray amputation. Long term it is critical he establish with an endocrinologist and improve his glycemic control. I explained his diabetes is killing him and without improved control he will continue to suffer complications of microvascular and cardiovascular disease.   2) Continue dual antiplatelet therapy due to acute coronary syndrome presentation  3) Atherosclerotic medical management including high intensity statin therapy with goal LDL < 70.   4) Recommend f/u in 2 months with me in clinic to re-assess for coronary bypass and glycemic control.  5) Guideline directed heart failure management per cardiology, likely to benefit from CORE clinic follow up.  6) Complete PAD evaluation due to tissue loss with MCKAYLA and possible vascular surgery evaluation.  7) Likely to benefit  from pulmonary vein isolation and left atrial appendage ligation at time of eventual coronary bypass due to high [id number] score and paroxysmal atrial fibrillation.    Roby Winn MD  Cardiothoracic Surgery  [phone]             Chief Complaint:   Weakness, foot swelling         History of Present Illness:   Mr. Norman Aguilar is a 61 year old male with cardiac risk factors of DM, HLD, HTN, and family history who presented via the ED for left foot wound evaluation. His sister stated he was progressively fatigued as well. He was found to be in DKA and was admitted to the ICU for management. His left foot has a polymicrobial diabetic infection and is scheduled for 1st ray amputation today. This has also caused a bacteremia with S. agalactiae. On presentation to the ED he was noted to have a brief episode of atrial fibrillation with RVR that converted spontaneously but troponins were drawn and elevated. A coronary angiogram was obtained for his NSTEMI and newly diagnosed cardiomyopathy work up and was positive for severe, multi-vessel disease. He was active with work prior to admission and never experienced any angina type symptoms or equivalents. He still remains asymptomatic from an angina perspective.     He previously felt frustrated by his physicians and stopped seeing medical care and stopped medications in March. He states he began to feel better and his GERD improved with stopping his acei/arb. He states his health is directly related to his extra long colon and bowel health. When he is constipated his health declines and his sugars are elevated. When his bowels work normally he feels good and states his sugars are normal.               Past Medical History:   History reviewed. No pertinent past medical history.          Past Surgical History:   History reviewed. No pertinent surgical history.           Social History:     Social History     Socioeconomic History     Marital status: Single     Spouse  name: Not on file     Number of children: Not on file     Years of education: Not on file     Highest education level: Not on file   Occupational History     Not on file   Tobacco Use     Smoking status: Former Smoker     Types: Cigars     Smokeless tobacco: Not on file     Tobacco comment: Used to smoke a very occational cigar   Vaping Use     Vaping Use: Never used   Substance and Sexual Activity     Alcohol use: Not on file     Drug use: Not on file     Sexual activity: Not on file   Other Topics Concern     Not on file   Social History Narrative     Not on file     Social Determinants of Health     Financial Resource Strain: Not on file   Food Insecurity: Not on file   Transportation Needs: Not on file   Physical Activity: Not on file   Stress: Not on file   Social Connections: Not on file   Intimate Partner Violence: Not on file   Housing Stability: Not on file            Family History:   History reviewed. No pertinent family history.          Immunizations:     There is no immunization history on file for this patient.          Allergies:    No Known Allergies          Medications:     Current Facility-Administered Medications   Medication     0.9% sodium chloride BOLUS     acetaminophen (TYLENOL) tablet 650 mg     aspirin (ASA) chewable tablet 81 mg     atorvastatin (LIPITOR) tablet 80 mg     atropine injection 0.5 mg     dextrose 5% and 0.45% NaCl infusion     glucose gel 15-30 g    Or     dextrose 50 % injection 25-50 mL    Or     glucagon injection 1 mg     flumazenil (ROMAZICON) injection 0.2 mg     [Held by provider] heparin infusion 25,000 units in D5W 250 mL ANTICOAGULANT     HOLD:  Metformin and metformin containing medications if patient received IV contrast with acute kidney injury or severe chronic kidney disease (stage IV or stage V; i.e., eGFR less than 30)     hydrALAZINE (APRESOLINE) injection 10 mg     insulin aspart (NovoLOG) injection (RAPID ACTING)     insulin aspart (NovoLOG) injection  (RAPID ACTING)     insulin aspart (NovoLOG) injection (RAPID ACTING)     insulin aspart (NovoLOG) injection (RAPID ACTING)     [START ON 11/17/2021] insulin glargine (LANTUS PEN) injection 30 Units     melatonin tablet 1 mg     metoprolol succinate ER (TOPROL-XL) 24 hr tablet 25 mg     naloxone (NARCAN) injection 0.2 mg    Or     naloxone (NARCAN) injection 0.4 mg    Or     naloxone (NARCAN) injection 0.2 mg    Or     naloxone (NARCAN) injection 0.4 mg     ondansetron (ZOFRAN-ODT) ODT tab 4 mg    Or     ondansetron (ZOFRAN) injection 4 mg     oxyCODONE (ROXICODONE) tablet 5 mg    Or     oxyCODONE (ROXICODONE) tablet 10 mg     pantoprazole (PROTONIX) IV push injection 40 mg     Patient is already receiving anticoagulation with heparin, enoxaparin (LOVENOX), warfarin (COUMADIN)  or other anticoagulant medication     piperacillin-tazobactam (ZOSYN) 3.375 g vial to attach to  mL bag     sodium chloride 0.9% infusion     vancomycin (VANCOCIN) 1,500 mg in sodium chloride 0.9 % 250 mL intermittent infusion             Review of Systems:     A 10 point ROS was performed and is negative other than HPI.             Physical Exam:        Temp:  [97.6  F (36.4  C)-98.8  F (37.1  C)] 98.4  F (36.9  C)  Pulse:  [84-98] 96  Resp:  [12-33] 16  BP: (116-149)/(59-75) 137/71  SpO2:  [89 %-99 %] 99 %    Gen: NAD, resting comfortably in bed  Neck: No JVD, trachea midline  ENT: EOMI, anicteric  Lungs: CTAB, non-labored breathing  CV: regular rhythm, normal rate  Abd: soft, nt, nd  Ext: left foot in wrap, right leg hairless and skin thin, no open sores  Neuro: AOx3    Labs:  Lab Results   Component Value Date    WBC 11.8 (H) 11/15/2021    HGB 11.3 (L) 11/15/2021    HCT 34.9 (L) 11/15/2021     11/15/2021     (L) 11/16/2021    POTASSIUM 3.7 11/16/2021    CHLORIDE 103 11/16/2021    CO2 22 11/16/2021    BUN 18 11/16/2021    CR 1.45 (H) 11/16/2021     (H) 11/16/2021    SED 90 (H) 11/14/2021    AST 20 10/31/2019    ALT  24 10/31/2019    ALKPHOS 87 10/31/2019    BILITOTAL 0.7 10/31/2019    INR 1.35 (H) 11/15/2021       Imaging:  Transthoracic echocardiogram (11/15/2021): I have personally reviewed these images  LVEF 30-35%, LVIDd 5.5 cm, mild MR, moderate global hypokinesis    Coronary angiogram (11/16/2021):  I have personally reviewed these images  LM no disease  pLAD 70%, mLAD 70%, dLAD 99% near apex, severely calcified artery  D1 95% and    LCx , R to L collaterals  dRCA 80%, rPDA 50%

## 2021-11-16 NOTE — SIGNIFICANT EVENT
Significant Event Note    Time of event: 7:38 PM November 15, 2021    Description of event:  Bedside nurse paged about 1 of 2 blood cultures showing growth of gram + cocci in pairs and chains and aerobic bacterial culture + for gram positive cocci. Bedside nurse also wanted to know if patient could be transferred once he is off the insulin drip.     Plan:  1.) Patient is already on IV zosyn and IV vanco so no change to antibiotic regimen  2.) Anion gap is close, patient can transfer out. But will need to be transferred out to P3 (cardiac tele) because of recent acute non-ST elevation MI.    Discussed with: bedside nurse    Zohaib Cotto MD

## 2021-11-16 NOTE — ANESTHESIA PREPROCEDURE EVALUATION
Anesthesia Pre-Procedure Evaluation    Patient: Norman Aguilar   MRN: [id number] : 1960        Preoperative Diagnosis: Cellulitis and abscess of foot, except toes [L03.119, L02.619]    Procedure : Procedure(s):  INCISION AND DRAINAGE, Left foot with possible first ray amputation          History reviewed. No pertinent past medical history.   History reviewed. No pertinent surgical history.   No Known Allergies   Social History     Tobacco Use     Smoking status: Former Smoker     Types: Cigars     Smokeless tobacco: Not on file     Tobacco comment: Used to smoke a very occational cigar   Substance Use Topics     Alcohol use: Not on file      Wt Readings from Last 1 Encounters:   21 102.7 kg (226 lb 8 oz)        Anesthesia Evaluation   Pt has had prior anesthetic.         ROS/MED HX  ENT/Pulmonary:  - neg pulmonary ROS     Neurologic:     (+) peripheral neuropathy,     Cardiovascular:     (+) hypertension-Peripheral Vascular Disease-CAD ---dysrhythmias, a-fib,     METS/Exercise Tolerance:     Hematologic:       Musculoskeletal:   (+) arthritis,     GI/Hepatic:     (+) GERD,     Renal/Genitourinary:       Endo:     (+) type I DM, Using insulin, Previously admitted for DM/DKA. Diabetic complications: neuropathy cardiac problems.     Psychiatric/Substance Use:       Infectious Disease:     (+) Recent Fever,     Malignancy:       Other:            Physical Exam    Airway        Mallampati: II   TM distance: > 3 FB   Neck ROM: limited   Mouth opening: > 3 cm    Respiratory Devices and Support         Dental  no notable dental history         Cardiovascular   cardiovascular exam normal       Rhythm and rate: regular and normal     Pulmonary   pulmonary exam normal        breath sounds clear to auscultation           OUTSIDE LABS:  CBC:   Lab Results   Component Value Date    WBC 11.8 (H) 11/15/2021    WBC 11.2 (H) 2021    HGB 11.3 (L) 11/15/2021    HGB 12.9 (L) 2021    HCT 34.9 (L) 11/15/2021     HCT 40.1 11/14/2021     11/15/2021     11/14/2021     BMP:   Lab Results   Component Value Date     (L) 11/16/2021     (L) 11/15/2021    POTASSIUM 3.7 11/16/2021    POTASSIUM 3.7 11/15/2021    CHLORIDE 103 11/16/2021    CHLORIDE 103 11/15/2021    CO2 22 11/16/2021    CO2 24 11/15/2021    BUN 18 11/16/2021    BUN 21 11/15/2021    CR 1.45 (H) 11/16/2021    CR 1.49 (H) 11/15/2021     (H) 11/16/2021     (H) 11/16/2021     COAGS:   Lab Results   Component Value Date    PTT 55 (H) 11/15/2021    INR 1.35 (H) 11/15/2021     POC: No results found for: BGM, HCG, HCGS  HEPATIC:   Lab Results   Component Value Date    ALBUMIN 4.1 03/19/2020    PROTTOTAL 7.2 10/31/2019    ALT 24 10/31/2019    AST 20 10/31/2019    ALKPHOS 87 10/31/2019    BILITOTAL 0.7 10/31/2019     OTHER:   Lab Results   Component Value Date    LACT 1.8 11/14/2021    A1C >14.0 (H) 11/14/2021    YOSVANY 8.8 11/16/2021    PHOS 3.1 11/15/2021    MAG 1.6 (L) 11/16/2021    LIPASE 27 11/17/2018    CRP 36.5 (H) 11/14/2021    SED 90 (H) 11/14/2021       Anesthesia Plan    ASA Status:  4, emergent       Anesthesia Type: MAC.              Consents    Anesthesia Plan(s) and associated risks, benefits, and realistic alternatives discussed. Questions answered and patient/representative(s) expressed understanding.     - Discussed with:  Patient         Postoperative Care       PONV prophylaxis: Ondansetron (or other 5HT-3), Dexamethasone or Solumedrol     Comments:                Jackson Busby MD

## 2021-11-17 ENCOUNTER — APPOINTMENT (OUTPATIENT)
Dept: ULTRASOUND IMAGING | Facility: HOSPITAL | Age: 61
DRG: 616 | End: 2021-11-17
Attending: PODIATRIST
Payer: COMMERCIAL

## 2021-11-17 ENCOUNTER — APPOINTMENT (OUTPATIENT)
Dept: PHYSICAL THERAPY | Facility: HOSPITAL | Age: 61
DRG: 616 | End: 2021-11-17
Attending: PODIATRIST
Payer: COMMERCIAL

## 2021-11-17 ENCOUNTER — APPOINTMENT (OUTPATIENT)
Dept: OCCUPATIONAL THERAPY | Facility: HOSPITAL | Age: 61
DRG: 616 | End: 2021-11-17
Attending: PODIATRIST
Payer: COMMERCIAL

## 2021-11-17 ENCOUNTER — PREP FOR PROCEDURE (OUTPATIENT)
Dept: PODIATRY | Facility: CLINIC | Age: 61
End: 2021-11-17
Payer: COMMERCIAL

## 2021-11-17 DIAGNOSIS — L02.619 CELLULITIS AND ABSCESS OF FOOT, EXCEPT TOES: Primary | ICD-10-CM

## 2021-11-17 DIAGNOSIS — L03.119 CELLULITIS AND ABSCESS OF FOOT, EXCEPT TOES: Primary | ICD-10-CM

## 2021-11-17 LAB
ANION GAP SERPL CALCULATED.3IONS-SCNC: 8 MMOL/L (ref 5–18)
BUN SERPL-MCNC: 16 MG/DL (ref 8–22)
CALCIUM SERPL-MCNC: 8.8 MG/DL (ref 8.5–10.5)
CHLORIDE BLD-SCNC: 105 MMOL/L (ref 98–107)
CHOLEST SERPL-MCNC: 180 MG/DL
CO2 SERPL-SCNC: 21 MMOL/L (ref 22–31)
CREAT SERPL-MCNC: 1.23 MG/DL (ref 0.7–1.3)
ERYTHROCYTE [DISTWIDTH] IN BLOOD BY AUTOMATED COUNT: 14.6 % (ref 10–15)
GFR SERPL CREATININE-BSD FRML MDRD: 63 ML/MIN/1.73M2
GLUCOSE BLD-MCNC: 265 MG/DL (ref 70–125)
GLUCOSE BLDC GLUCOMTR-MCNC: 179 MG/DL (ref 70–99)
GLUCOSE BLDC GLUCOMTR-MCNC: 208 MG/DL (ref 70–99)
GLUCOSE BLDC GLUCOMTR-MCNC: 222 MG/DL (ref 70–99)
GLUCOSE BLDC GLUCOMTR-MCNC: 226 MG/DL (ref 70–99)
GLUCOSE BLDC GLUCOMTR-MCNC: 246 MG/DL (ref 70–99)
GRAM STAIN RESULT: ABNORMAL
GRAM STAIN RESULT: ABNORMAL
HCT VFR BLD AUTO: 33.6 % (ref 40–53)
HDLC SERPL-MCNC: 30 MG/DL
HGB BLD-MCNC: 10.7 G/DL (ref 13.3–17.7)
LDLC SERPL CALC-MCNC: 126 MG/DL
MAGNESIUM SERPL-MCNC: 2.3 MG/DL (ref 1.8–2.6)
MCH RBC QN AUTO: 28.6 PG (ref 26.5–33)
MCHC RBC AUTO-ENTMCNC: 31.8 G/DL (ref 31.5–36.5)
MCV RBC AUTO: 90 FL (ref 78–100)
PLATELET # BLD AUTO: 232 10E3/UL (ref 150–450)
POTASSIUM BLD-SCNC: 3.6 MMOL/L (ref 3.5–5)
RBC # BLD AUTO: 3.74 10E6/UL (ref 4.4–5.9)
SODIUM SERPL-SCNC: 134 MMOL/L (ref 136–145)
TRIGL SERPL-MCNC: 122 MG/DL
VANCOMYCIN SERPL-MCNC: 19.1 MG/L
WBC # BLD AUTO: 8.4 10E3/UL (ref 4–11)

## 2021-11-17 PROCEDURE — 250N000013 HC RX MED GY IP 250 OP 250 PS 637: Performed by: PODIATRIST

## 2021-11-17 PROCEDURE — 80202 ASSAY OF VANCOMYCIN: CPT | Performed by: PODIATRIST

## 2021-11-17 PROCEDURE — 97530 THERAPEUTIC ACTIVITIES: CPT | Mod: GP

## 2021-11-17 PROCEDURE — 250N000011 HC RX IP 250 OP 636: Performed by: STUDENT IN AN ORGANIZED HEALTH CARE EDUCATION/TRAINING PROGRAM

## 2021-11-17 PROCEDURE — 97166 OT EVAL MOD COMPLEX 45 MIN: CPT | Mod: GO

## 2021-11-17 PROCEDURE — 36415 COLL VENOUS BLD VENIPUNCTURE: CPT | Performed by: INTERNAL MEDICINE

## 2021-11-17 PROCEDURE — 250N000011 HC RX IP 250 OP 636: Performed by: PODIATRIST

## 2021-11-17 PROCEDURE — 93925 LOWER EXTREMITY STUDY: CPT

## 2021-11-17 PROCEDURE — 210N000001 HC R&B IMCU HEART CARE

## 2021-11-17 PROCEDURE — C9113 INJ PANTOPRAZOLE SODIUM, VIA: HCPCS | Performed by: PODIATRIST

## 2021-11-17 PROCEDURE — 250N000013 HC RX MED GY IP 250 OP 250 PS 637: Performed by: INTERNAL MEDICINE

## 2021-11-17 PROCEDURE — 99232 SBSQ HOSP IP/OBS MODERATE 35: CPT | Performed by: STUDENT IN AN ORGANIZED HEALTH CARE EDUCATION/TRAINING PROGRAM

## 2021-11-17 PROCEDURE — 97162 PT EVAL MOD COMPLEX 30 MIN: CPT | Mod: GP

## 2021-11-17 PROCEDURE — 99233 SBSQ HOSP IP/OBS HIGH 50: CPT | Mod: 25 | Performed by: INTERNAL MEDICINE

## 2021-11-17 PROCEDURE — 250N000011 HC RX IP 250 OP 636: Performed by: GENERAL ACUTE CARE HOSPITAL

## 2021-11-17 PROCEDURE — 83735 ASSAY OF MAGNESIUM: CPT | Performed by: PODIATRIST

## 2021-11-17 PROCEDURE — 93922 UPR/L XTREMITY ART 2 LEVELS: CPT

## 2021-11-17 PROCEDURE — 99233 SBSQ HOSP IP/OBS HIGH 50: CPT | Performed by: INTERNAL MEDICINE

## 2021-11-17 PROCEDURE — 258N000003 HC RX IP 258 OP 636: Performed by: PODIATRIST

## 2021-11-17 PROCEDURE — 80048 BASIC METABOLIC PNL TOTAL CA: CPT | Performed by: PODIATRIST

## 2021-11-17 PROCEDURE — 80061 LIPID PANEL: CPT | Performed by: INTERNAL MEDICINE

## 2021-11-17 PROCEDURE — 85027 COMPLETE CBC AUTOMATED: CPT | Performed by: INTERNAL MEDICINE

## 2021-11-17 RX ORDER — VANCOMYCIN HYDROCHLORIDE 1 G/200ML
1000 INJECTION, SOLUTION INTRAVENOUS EVERY 12 HOURS
Status: DISCONTINUED | OUTPATIENT
Start: 2021-11-17 | End: 2021-11-18

## 2021-11-17 RX ORDER — LISINOPRIL 5 MG/1
10 TABLET ORAL DAILY
Status: DISCONTINUED | OUTPATIENT
Start: 2021-11-17 | End: 2021-11-20

## 2021-11-17 RX ORDER — PANTOPRAZOLE SODIUM 40 MG/1
40 TABLET, DELAYED RELEASE ORAL
Status: DISCONTINUED | OUTPATIENT
Start: 2021-11-18 | End: 2021-11-24 | Stop reason: HOSPADM

## 2021-11-17 RX ADMIN — VANCOMYCIN HYDROCHLORIDE 1000 MG: 1 INJECTION, SOLUTION INTRAVENOUS at 11:47

## 2021-11-17 RX ADMIN — ACETAMINOPHEN 975 MG: 325 TABLET ORAL at 21:36

## 2021-11-17 RX ADMIN — METOPROLOL SUCCINATE 25 MG: 25 TABLET, EXTENDED RELEASE ORAL at 08:45

## 2021-11-17 RX ADMIN — HEPARIN SODIUM 5000 UNITS: 10000 INJECTION, SOLUTION INTRAVENOUS; SUBCUTANEOUS at 21:37

## 2021-11-17 RX ADMIN — SODIUM CHLORIDE, POTASSIUM CHLORIDE, SODIUM LACTATE AND CALCIUM CHLORIDE: 600; 310; 30; 20 INJECTION, SOLUTION INTRAVENOUS at 11:47

## 2021-11-17 RX ADMIN — ATORVASTATIN CALCIUM 80 MG: 40 TABLET, FILM COATED ORAL at 21:35

## 2021-11-17 RX ADMIN — PIPERACILLIN SODIUM AND TAZOBACTAM SODIUM 3.38 G: 3; .375 INJECTION, POWDER, LYOPHILIZED, FOR SOLUTION INTRAVENOUS at 04:31

## 2021-11-17 RX ADMIN — PANTOPRAZOLE SODIUM 40 MG: 40 INJECTION, POWDER, FOR SOLUTION INTRAVENOUS at 08:46

## 2021-11-17 RX ADMIN — HEPARIN SODIUM 5000 UNITS: 10000 INJECTION, SOLUTION INTRAVENOUS; SUBCUTANEOUS at 14:25

## 2021-11-17 RX ADMIN — PIPERACILLIN SODIUM AND TAZOBACTAM SODIUM 3.38 G: 3; .375 INJECTION, POWDER, LYOPHILIZED, FOR SOLUTION INTRAVENOUS at 11:46

## 2021-11-17 RX ADMIN — ACETAMINOPHEN 975 MG: 325 TABLET ORAL at 04:31

## 2021-11-17 RX ADMIN — PIPERACILLIN SODIUM AND TAZOBACTAM SODIUM 3.38 G: 3; .375 INJECTION, POWDER, LYOPHILIZED, FOR SOLUTION INTRAVENOUS at 20:28

## 2021-11-17 RX ADMIN — VANCOMYCIN HYDROCHLORIDE 1000 MG: 1 INJECTION, SOLUTION INTRAVENOUS at 22:47

## 2021-11-17 RX ADMIN — LISINOPRIL 10 MG: 5 TABLET ORAL at 11:47

## 2021-11-17 RX ADMIN — SODIUM CHLORIDE, POTASSIUM CHLORIDE, SODIUM LACTATE AND CALCIUM CHLORIDE: 600; 310; 30; 20 INJECTION, SOLUTION INTRAVENOUS at 21:54

## 2021-11-17 RX ADMIN — ASPIRIN 81 MG CHEWABLE TABLET 81 MG: 81 TABLET CHEWABLE at 08:45

## 2021-11-17 RX ADMIN — HEPARIN SODIUM 5000 UNITS: 10000 INJECTION, SOLUTION INTRAVENOUS; SUBCUTANEOUS at 04:31

## 2021-11-17 ASSESSMENT — ACTIVITIES OF DAILY LIVING (ADL)
ADLS_ACUITY_SCORE: 12
ADLS_ACUITY_SCORE: 12
ADLS_ACUITY_SCORE: 11
ADLS_ACUITY_SCORE: 12
ADLS_ACUITY_SCORE: 11
ADLS_ACUITY_SCORE: 11
ADLS_ACUITY_SCORE: 12
ADLS_ACUITY_SCORE: 11
ADLS_ACUITY_SCORE: 12
ADLS_ACUITY_SCORE: 11
ADLS_ACUITY_SCORE: 11
ADLS_ACUITY_SCORE: 12
ADLS_ACUITY_SCORE: 11
ADLS_ACUITY_SCORE: 11
ADLS_ACUITY_SCORE: 12
ADLS_ACUITY_SCORE: 11
ADLS_ACUITY_SCORE: 12
ADLS_ACUITY_SCORE: 12
ADLS_ACUITY_SCORE: 11
ADLS_ACUITY_SCORE: 12
ADLS_ACUITY_SCORE: 11

## 2021-11-17 ASSESSMENT — MIFFLIN-ST. JEOR: SCORE: 1872.66

## 2021-11-17 NOTE — PROGRESS NOTES
Assessment/Plan:  1.  Acute non-ST elevation MI, multiple vessel coronary artery disease: The patient has coronary angiogram yesterday which was reported multiple vessel coronary artery disease.    [facility] surgical service is consulted.  Plan to have CABG in 2 to 3 months.  Continue metoprolol succinate 25 mg daily, aspirin 81 mg daily, atorvastatin 80 mg daily.  Start lisinopril 10 mg daily.    2.  Cardiomyopathy, LVEF 30 to 35%: Most likely ischemic cardiomyopathy.  Discontinue IV fluid.    Continue metoprolol succinate 25 mg daily.    Start lisinopril 10 mg daily since his renal function has been significantly improved.  Start Lasix overall 20 mg daily.    3.  Paroxysmal atrial fibrillation:  He is in sinus rhythm.  Continue metoprolol XL 25 mg daily.  Consider anticoagulation Eliquis 5 mg twice a day when he is stable from left foot surgery.    4.  Primary hypertension: His blood pressure is in normal range.     5.  Dyslipidemia:  His LDL is 122.  Continue Lipitor to 80 mg at bedtime.  Repeat lipid profile and liver function in 2 months.     6.  Acute DKA, acute renal injury on chronic kidney disease: Please see primary team of management for details.  Creatinine is 1.22.    7.  Diabetic foot, left great toe gangrene: Please see Podiatric service note.  Consider MCKAYLA study for evaluation of peripheral vascular disease.    Subjective:  Interval History:  Has no complaints of chest pain or shortness of breath.  No acute events over last night.    Current Medications:  Scheduled Meds:    acetaminophen  975 mg Oral Q8H     aspirin  81 mg Oral Daily     atorvastatin  80 mg Oral QPM     heparin ANTICOAGULANT  5,000 Units Subcutaneous Q8H     insulin aspart  1-12 Units Subcutaneous Q4H     insulin aspart   Subcutaneous QAM AC     insulin aspart   Subcutaneous Daily with lunch     insulin aspart   Subcutaneous Daily with supper     insulin glargine  30 Units Subcutaneous Daily     lisinopril  10 mg Oral Daily      metoprolol succinate ER  25 mg Oral Daily     pantoprazole (PROTONIX) IV  40 mg Intravenous Daily with breakfast     piperacillin-tazobactam  3.375 g Intravenous Q8H     polyethylene glycol  17 g Oral Daily     senna-docusate  1 tablet Oral BID     sodium chloride (PF)  3 mL Intracatheter Q8H     sodium chloride (PF)  3 mL Intracatheter Q8H     vancomycin  1,000 mg Intravenous Q12H     Continuous Infusions:    dextrose 5% and 0.45% NaCl       fentaNYL       lactated ringers 100 mL/hr at 11/16/21 2306     midazolam       - MEDICATION INSTRUCTIONS -       Patient RECEIVING antibiotic to treat a different condition and it provides ADEQUATE COVERAGE for this surgical procedure.       Patient RECEIVING antibiotic to treat a different condition and it provides ADEQUATE COVERAGE for this surgical procedure.       PRN Meds:.[START ON 11/19/2021] acetaminophen, bisacodyl, dextrose 5% and 0.45% NaCl, glucose **OR** dextrose **OR** glucagon, fentaNYL, HOLD MEDICATION, hydrALAZINE, HYDROmorphone, lidocaine 4%, lidocaine (buffered or not buffered), magnesium hydroxide, melatonin, midazolam, ondansetron **OR** ondansetron, oxyCODONE **OR** oxyCODONE, oxyCODONE **OR** oxyCODONE, - MEDICATION INSTRUCTIONS -, prochlorperazine **OR** prochlorperazine, sodium chloride (PF), sodium chloride (PF)    Objective:   Vital signs in last 24 hours:  Temp:  [97.8  F (36.6  C)-98.4  F (36.9  C)] 97.8  F (36.6  C)  Pulse:  [71-98] 75  Resp:  [14-18] 16  BP: (113-147)/(57-82) 141/70  SpO2:  [89 %-100 %] 97 %  Weight:   [unfilled]     Physical Exam:  General Appearance:   Awake, Alert, No acute distress.   HEENT:  No scleral icterus; the mucous membranes were moist.   Neck: No cervical bruits. No jugular venous distention   Lungs:   Lungs are clear to auscultation. No crackles. No wheezing.   Cardiovascular:   RRR, normal first and second heart sounds with no murmurs. No rubs or gallops.     Abdomen:  Soft. No tenderness. Bowels sounds are present    Extremities: No leg edema.  Left toe gangrene appearance   Skin: Warm, Dry. No rashes.   Neurologic: Mood and affect are appropriate. No focal deficits.         Cardiographics:   Report: personally reviewed. .      Tele monitoring -normal sinus rhythm    Echocardiogram on November 15, 2021:  The left ventricle is normal in size.  Left ventricular function is decreased. The ejection fraction is 30-35%  (moderately reduced).  Left ventricular diastolic function is abnormal.  Diastolic Doppler findings (E/E' ratio and/or other parameters) suggest left  ventricular filling pressures are increased.  The left atrium is mildly dilated.  The mitral valve leaflets are mildly thickened.  There is mild (1+) mitral regurgitation.  IVC diameter >2.1 cm collapsing <50% with sniff suggests a high RA pressure  estimated at 15 mmHg or greater.    Coronary angiogram on 11-:    Severe, diffuse coronary calcification with diffusely small distal coronaries consistent with severe diabetic vasculopathy.    Severe proximal, mid and apical LAD disease. The first diagonal is occluded and is weakly collateralized.     of the proximal circumflex with right to left collateral filling of OM-2 and OM-3    Severe distal RCA disease before the bifurcation and moderate ostial PDA disease.    LV EDP moderately elevated at 16 mmHg.    Lab Results:   personally reviewed.     Lab Results   Component Value Date     11/16/2021    CO2 22 11/16/2021    BUN 18 11/16/2021     Lab Results   Component Value Date    TROPONINI 13.42 11/15/2021     Lab Results   Component Value Date    WBC 11.8 11/15/2021    HGB 11.3 11/15/2021    HCT 34.9 11/15/2021    MCV 89 11/15/2021     11/15/2021     Lab Results   Component Value Date    CHOL 189 11/16/2021    TRIG 163 11/16/2021    HDL 34 11/16/2021     11/16/2021

## 2021-11-17 NOTE — CONSULTS
CLINICAL NUTRITION SERVICES - ASSESSMENT NOTE     Nutrition Prescription    RECOMMENDATIONS FOR MDs/PROVIDERS TO ORDER:    Malnutrition :  % Intake: Decreased intake does not meet criteria  % Weight Loss: Unable to assess  Subcutaneous Fat Loss: None observed  Muscle Loss: None observed  Fluid Accumulation/Edema: None noted  Malnutrition Diagnosis: Patient does not meet two of the established criteria necessary for diagnosing malnutrition      Malnutrition Diagnosis: Patient does not meet two of the above criteria necessary for diagnosing malnutrition    Recommendations already ordered by Registered Dietitian (RD):  Pt declined supplementation at this time  Modified to moderate 60 g CHO/meal to appropriately meet estimated needs    Future/Additional Recommendations:       REASON FOR ASSESSMENT  Norman Aguilar is a/an 61 year old male assessed by the dietitian for Admission Nutrition Risk Screen for positive and Provider Order - Nutrition Education - diabetes  History of DM2, HTN, GERD , L foot wound s/p injury 8/2021  Admitted for DKA, Gangrene foot    NUTRITION HISTORY      Food allergies/intolerances: NKFA     Cultural needs or preferences none noted    Patient is on a diabetic diet at home.Pt reports watching his carbohydrates for years w/ Dx 1992. Pt reports he was doing well w/ his diet  until recently stopped his medications    Typical food/fluid intake:WNL until Thursday PTA .Pt does report h/o chronic N d/t Head injury years ago    Supplements at home:none per pt    Living situation:from home    Factors affecting nutrition intake include: nausea,altered GI function ( pt reports he has periodic GI blockages d/t longer GI tract and has been followed by [facility] )     CURRENT NUTRITION ORDERS  Diet:  Consistent Carbohydrate 45 g/meal w/insulin:CHO ratio 1:10    Intake/Tolerance:100 % x 1 meal so far per nursing.  Pt reports he just started an oral diet today s/p surgery , is tolerating regular foods and denies  need for supplementation  LABS: reviewed including:  Potassium (mmol/L)   Date Value   11/17/2021 3.6   11/16/2021 3.2 (L)   11/16/2021 3.7     Phosphorus (mg/dL)   Date Value   11/15/2021 3.1   03/19/2020 3.1    Blood Glucose  Glucose (mg/dL)   Date Value   11/17/2021 265 (H)   11/16/2021 205 (H)   11/16/2021 289 (H)   11/15/2021 172 (H)   11/15/2021 196 (H)     GLUCOSE BY METER POCT (mg/dL)   Date Value   11/17/2021 222 (H)   11/17/2021 226 (H)   11/17/2021 208 (H)   11/16/2021 158 (H)   11/16/2021 169 (H)     Hemoglobin A1C (%)   Date Value   11/14/2021 >14.0 (H)    Inflammatory Markers  CRP (mg/dL)   Date Value   11/14/2021 36.5 (H)     WBC Count (10e3/uL)   Date Value   11/17/2021 8.4   11/15/2021 11.8 (H)   11/14/2021 11.2 (H)     Albumin (g/dL)   Date Value   03/19/2020 4.1   10/31/2019 4.1   06/25/2019 4.0     Magnesium (mg/dL)   Date Value   11/17/2021 2.3   11/16/2021 1.6 (L)   11/15/2021 1.7 (L)     Sodium (mmol/L)   Date Value   11/17/2021 134 (L)   11/16/2021 133 (L)   11/16/2021 132 (L)    Renal  Urea Nitrogen (mg/dL)   Date Value   11/17/2021 16   11/16/2021 15   11/16/2021 18     Creatinine (mg/dL)   Date Value   11/17/2021 1.23   11/16/2021 1.28   11/16/2021 1.45 (H)     Additional  Triglycerides (mg/dL)   Date Value   11/17/2021 122   11/16/2021 163 (H)   10/31/2019 152 (H)       MEDICATIONS    Medications reviewed    acetaminophen  975 mg Oral Q8H     aspirin  81 mg Oral Daily     atorvastatin  80 mg Oral QPM     heparin ANTICOAGULANT  5,000 Units Subcutaneous Q8H     insulin aspart  1-12 Units Subcutaneous Q4H     insulin aspart   Subcutaneous QAM AC     insulin aspart   Subcutaneous Daily with lunch     insulin aspart   Subcutaneous Daily with supper     [START ON 11/18/2021] insulin glargine  34 Units Subcutaneous Daily     insulin glargine  4 Units Subcutaneous Once     lisinopril  10 mg Oral Daily     metoprolol succinate ER  25 mg Oral Daily     [START ON 11/18/2021] pantoprazole  40 mg Oral  "QAM AC     piperacillin-tazobactam  3.375 g Intravenous Q8H     polyethylene glycol  17 g Oral Daily     senna-docusate  1 tablet Oral BID     sodium chloride (PF)  3 mL Intracatheter Q8H     sodium chloride (PF)  3 mL Intracatheter Q8H     vancomycin  1,000 mg Intravenous Q12H          dextrose 5% and 0.45% NaCl       fentaNYL       lactated ringers 100 mL/hr at 11/17/21 1147     midazolam       - MEDICATION INSTRUCTIONS -       Patient RECEIVING antibiotic to treat a different condition and it provides ADEQUATE COVERAGE for this surgical procedure.       Patient RECEIVING antibiotic to treat a different condition and it provides ADEQUATE COVERAGE for this surgical procedure.                ANTHROPOMETRICS  Height: 6' 1\"  Weight: 101 kg   Body mass index is 29.63 kg/m .  Weight Status:  Overweight BMI 25-29.9  122 % IBW    Weight History:   Wt Readings from Last 10 Encounters:   11/17/21 101.9 kg (224 lb 9.6 oz)     Unable to assess weight changes in the last year d/t limited data    Dosing Weight: 88 kg  adjusted   ASSESSED NUTRITION NEEDS  Estimated Energy Needs: 2077-5155 kcals/day (25 - 30 kcals/kg)  Justification: Maintenance  Estimated Protein Needs: 105-130 grams protein/day (1.2 - 1.5 grams of pro/kg)  Justification: Post-op and Wound healing  Estimated Fluid Needs: (1 mL/kcal)   Justification: Per provider pending fluid status    PHYSICAL FINDINGS  See malnutrition section above.     Robin score :Nutrition 3 Total Score 18    GI Status/Output:  Last BM:WDL  per nursing   I/O last 3 completed shifts:  In: 1940 [P.O.:840; I.V.:1100]  Out: 1000 [Urine:1000]    NUTRITION DIAGNOSIS  Impaired nutrient utilization r/t DM in  The setting of inflammation w/ increased needs for wound healing s/p surgery  AEB labs, s/p Toe ampuation    INTERVENTIONS  Implementation  Nutrition Education: Patient declined      modifed CHO diet to 60 g CHO/Meal to meet estimated needs    Goals     BG   Maintain Intake > 75% of " meals  Wound healing    Monitoring/Evaluation  Progress toward goals will be monitored and evaluated per protocol.

## 2021-11-17 NOTE — PLAN OF CARE
Problem: Adult Inpatient Plan of Care  Goal: Absence of Hospital-Acquired Illness or Injury  Outcome: Improving  Intervention: Identify and Manage Fall Risk  Recent Flowsheet Documentation  Taken 11/17/2021 0030 by Mohamud Samaniego, RN  Safety Promotion/Fall Prevention:    activity supervised    fall prevention program maintained    nonskid shoes/slippers when out of bed    room near nurse's station  Taken 11/16/2021 2000 by Mohamud Samaniego, RN  Safety Promotion/Fall Prevention:    activity supervised    fall prevention program maintained    nonskid shoes/slippers when out of bed    room near nurse's station  Goal: Optimal Comfort and Wellbeing  Outcome: Improving     Problem: Adjustment to Illness (Acute Coronary Syndrome)  Goal: Optimal Adaptation to Illness  Outcome: Improving     Problem: Tissue Perfusion (Acute Coronary Syndrome)  Goal: Adequate Tissue Perfusion  Intervention: Optimize Cardiac Tissue Perfusion  Recent Flowsheet Documentation  Taken 11/17/2021 0030 by Mohamud Samaniego, RN  Activity Management:    activity minimized    bedrest with commode  Taken 11/16/2021 2000 by Mohamud Samaniego, RN  Activity Management:    activity minimized    bedrest with commode    Patient is alert and oriented x 4. Pleasant and cooperative. Patient denies pain at surgical site. Patient voided via urinal throughout the night and did not need to be bladder scanned. Patient tolerating lactated ringer's infusion. Left leg remains elevated. Patient non weight bearing on left leg but up to commode with assistance to have a BM. After this patient's surgical site had a moderate amount of oozing sersosanguinous drainage. Oozing stopped shortly after this. DPM updated without new orders.

## 2021-11-17 NOTE — PROGRESS NOTES
"Physical Therapy        11/17/21 0900   Quick Adds   Type of Visit Initial PT Evaluation   Living Environment   People in home sibling(s)   Current Living Arrangements house   Home Accessibility stairs to enter home;stairs within home   Number of Stairs, Main Entrance 4   Stair Railings, Main Entrance railing on right side (ascending)   Number of Stairs, Within Home, Primary greater than 10 stairs   Stair Railings, Within Home, Primary railings safe and in good condition   Living Environment Comments normally drives   Self-Care   Usual Activity Tolerance excellent   Activity/Exercise/Self-Care Comment independent at baseline (\"very active, I get up on ladders and lifts and everything\")    General Information   Onset of Illness/Injury or Date of Surgery 11/14/21   Patient/Family Therapy Goals Statement (PT) none stated   Weight-Bearing Status - RUE other (see comments)  (do not bear weight through wrist for 72 hr p. angio 11/16)   Weight-Bearing Status - LLE nonweight-bearing   Cognition   Follows Commands (Cognition) 50-74% accuracy   Safety Deficit (Cognition) impulsivity;moderate deficit;safety precautions follow-through/compliance;awareness of need for assistance   Pain Assessment   Patient Currently in Pain No   Integumentary/Edema   Integumentary/Edema Comments L foot wound oozing some blood   Posture    Posture Not impaired   Range of Motion (ROM)   ROM Quick Adds ROM WFL   Strength   Manual Muscle Testing Quick Adds Strength WFL   Strength Comments except R wrist and L foot with post-op limitations   Bed Mobility   Comment (Bed Mobility) SBA with rail   Transfers   Transfer Safety Comments Leo with platform R FWW   Clinical Impression   Criteria for Skilled Therapeutic Intervention yes, treatment indicated   PT Diagnosis (PT) difficulty walking   Influenced by the following impairments wb restrictions   Functional limitations due to impairments impaired household mobility   Clinical Presentation " Stable/Uncomplicated   Clinical Presentation Rationale presents as medically diagnosed   Clinical Decision Making (Complexity) moderate complexity   Therapy Frequency (PT) Daily   Predicted Duration of Therapy Intervention (days/wks) 1 week   Planned Therapy Interventions (PT) balance training;bed mobility training;gait training;home exercise program;orthotic fitting/training;neuromuscular re-education;patient/family education;prosthetic fitting/training;ROM (range of motion);stair training;strengthening;transfer training;wheelchair management/propulsion training   Anticipated Equipment Needs at Discharge (PT) walker, rolling   Risk & Benefits of therapy have been explained evaluation/treatment results reviewed;care plan/treatment goals reviewed;participants voiced agreement with care plan;participants included;patient   PT Discharge Planning    PT Discharge Recommendation (DC Rec) Transitional Care Facility;home with home care physical therapy;home with assist   PT Rationale for DC Rec difficulty maintaining NWB LLE   Total Evaluation Time   Total Evaluation Time (Minutes) 12       Carine Calix DPT 11/17/2021

## 2021-11-17 NOTE — PHARMACY-ADMISSION MEDICATION HISTORY
Pharmacy Vancomycin Note  Date of Service 2021  Patient's  1960   61 year old, male    Indication: Bacteremia and Skin and Soft Tissue Infection  Day of Therapy: 4  Current vancomycin regimen:  1500 mg IV q24h  Current vancomycin monitoring method: AUC  Current vancomycin therapeutic monitoring goal: 400-600 mg*h/L    InsightRX Prediction of Current Vancomycin Regimen  Loading dose: N/A  Regimen: 1500 mg IV every 24 hours.  Start time: 23:07 on 2021  Exposure target: AUC24 (range)400-600 mg/L.hr   AUC24,ss: 405 mg/L.hr  Probability of AUC24 > 400: 52 %  Ctrough,ss: 11.6 mg/L  Probability of Ctrough,ss > 20: 6 %  Probability of nephrotoxicity (Lodise TANIA ): 7 %    Current estimated CrCl = Estimated Creatinine Clearance: 78.1 mL/min (based on SCr of 1.23 mg/dL).    Creatinine for last 3 days  2021:  8:16 PM Creatinine 2.03 mg/dL  11/15/2021: 12:12 AM Creatinine 1.94 mg/dL;  3:59 AM Creatinine 1.80 mg/dL;  8:17 AM Creatinine 1.65 mg/dL; 11:59 AM Creatinine 1.49 mg/dL;  2:47 PM Creatinine 1.59 mg/dL;  5:07 PM Creatinine 1.49 mg/dL  2021:  6:39 AM Creatinine 1.45 mg/dL;  4:01 PM Creatinine 1.28 mg/dL  2021:  5:33 AM Creatinine 1.23 mg/dL    Recent Vancomycin Levels (past 3 days)  2021:  5:33 AM Vancomycin 19.1 mg/L    Vancomycin IV Administrations (past 72 hours)                   vancomycin (VANCOCIN) 1,500 mg in sodium chloride 0.9 % 250 mL intermittent infusion (mg) 1,500 mg New Bag 21 2307    vancomycin (VANCOCIN) 1,250 mg in sodium chloride 0.9 % 250 mL intermittent infusion (mg) 1,250 mg New Bag 21 0154    vancomycin (VANCOCIN) 2,000 mg in sodium chloride 0.9 % 500 mL intermittent infusion (mg) 2,000 mg New Bag 21 2233                Nephrotoxins and other renal medications (From now, onward)    Start     Dose/Rate Route Frequency Ordered Stop    21 1100  vancomycin (VANCOCIN) 1000 mg in dextrose 5% 200 mL PREMIX         1,000 mg  200  "mL/hr over 1 Hours Intravenous EVERY 12 HOURS 11/17/21 0732      11/15/21 0400  piperacillin-tazobactam (ZOSYN) 3.375 g vial to attach to  mL bag        Note to Pharmacy: For [facility], [facility] and [facility]: For Zosyn-naive patients, use the \"Zosyn initial dose + extended infusion\" order panel.    3.375 g  over 240 Minutes Intravenous EVERY 8 HOURS 11/15/21 0330               Contrast Orders - past 72 hours (72h ago, onward)            Start     Dose/Rate Route Frequency Ordered Stop    11/16/21 1339  iodixanol (VISIPAQUE 320) injection  Status:  Discontinued           ONCE PRN 11/16/21 1339 11/16/21 1403    11/15/21 1100  gadobutrol (GADAVIST) injection 10 mL         10 mL Intravenous ONCE 11/15/21 1047 11/15/21 1101    11/15/21 0900  perflutren lipid microsphere (DEFINITY) injection SUSP 2 mL         2 mL Intravenous ONCE 11/15/21 1022 11/15/21 0900          Interpretation of levels and current regimen:  Vancomycin level is reflective of -600, but just barely  Has serum creatinine changed greater than 50% in last 72 hours: No  Urine output:  good urine output  Renal Function: Improving    InsightRX Prediction of Planned New Vancomycin Regimen  Loading dose: N/A  Regimen: 1000 mg IV every 12 hours.  Start time: 23:07 on 11/17/2021  Exposure target: AUC24 (range)400-600 mg/L.hr   AUC24,ss: 526 mg/L.hr  Probability of AUC24 > 400: 91 %  Ctrough,ss: 18.1 mg/L  Probability of Ctrough,ss > 20: 36 %  Probability of nephrotoxicity (Lodise TANIA 2009): 14 %    Plan:  1. Increase Dose to 1000 mg IV q12h  2. Vancomycin monitoring method: AUC  3. Vancomycin therapeutic monitoring goal: 400-600 mg*h/L  4. Pharmacy will check vancomycin levels as appropriate in 1-3 Days.  5. Serum creatinine levels will be ordered daily for the first week of therapy and at least twice weekly for subsequent weeks.    Nelly Bauer, [facility]    "

## 2021-11-17 NOTE — PLAN OF CARE
Problem: Hyperglycemia  Goal: Blood Glucose Level Within Targeted Range  Outcome: No Change  Intervention: Optimize Glycemic Control  11/17/2021 1359 by David Brewster RN  Flowsheets (Taken 11/17/2021 1359)  Glycemic Management: blood glucose monitored  11/17/2021 1334 by David Brewster RN  Flowsheets (Taken 11/17/2021 1334)  Glycemic Management: blood glucose monitored  Patient remains hyperglycemic. Diabetic educator following.     Problem: Cardiac-Related Pain (Acute Coronary Syndrome)  Goal: Absence of Cardiac-Related Pain  Outcome: No Change   Patient remains pain free. No cardiac symptoms. Awaiting for future CABG once infection is in control.  Problem: Infection  Goal: Absence of Infection Signs and Symptoms  Outcome: Improving   POD1 toe amputation. Patient tolerating well. Blood culture is positive. Continue IV antibiotics.  Waiting for wound culture to come back.

## 2021-11-17 NOTE — CONSULTS
Diabetes Care:  Both CGMS devices (DexCom G6 and Freestyle Tray 2 are covered 100%. Will have patient look at both of them and determine which he is interested in and will check in tomorrow.     Lorie Haines RN, Certified Diabetes Care and     [address] [address]  [email or web address]   Office: [phone]  Pager: [phone]

## 2021-11-17 NOTE — PROGRESS NOTES
[facility] ID Inpatient follow up       Patient:  Norman Aguilar  Date of birth 1960, Medical record number [id number]  Date of Visit:  11/17/2021  Attending Physician: Delio Feldman MD         Assessment and Recommendations:   Assessment:  Norman Aguilar is a 61 year old male with   1.  Uncontrolled DM II. Not on treatment since 2/2021. A1c > 14 on 11/14/21.   2.  NSTEMI.  Multiple vessel disease.  Will need CABG at one point but high risk at this point with ongoing infection.  3.  JOHANA on CKD. Baseline creatinine 1.4. Admitted with creat at 2.1. Trending down.  4.  Left foot injury in 8/2021  5.  Left diabetic foot infection with clinical wet gangrene.  MRI with no evidence of osteomyelitis for presence of abscess.  Superficial cultures polymicrobial with strep agalactiae, Staph aureus, Klebsiella oxytoca.  On IV vancomycin and Zosyn.  S/P Amputation left hallux, Amputation 2nd digit left foot, Partial amputation 1st metatarsal left foot, Partial amputation 2nd metatarsal left foot, Excision sesamoids left foot, Incision and drainage left foot   6.  Bacteremia with Streptococcus agalactiae.  Positive blood cultures on 11/14.  Follow-up on 11/16 in process.  Source is left foot infection as growing Streptococcus agalactiae from the foot as well.       Recommendations:   Continue IV vancomycin and Zosyn.  Follow-up superficial cultures.  If has no MRSA, IV vancomycin will be discontinued.  Discussed with Pharm.D.  Follow pending surgical cultures.    Discussed with the patient, nursing staff.    ID will follow.    Hilaria Munoz MD.  [facility].   [facility]  Office Telephone [phone].  Fax [phone]  [facility] paging            Interval History:     HPI:  The interval history was reviewed.   Doing well today.  Pain under control.  Cultures reviewed.  Had amputation.  Surgical dressing in place.    Pertinent  cultures include:  No results found for: CULT    Recent Inflammatory Biomarkers:   Recent Labs   Lab Test 21  0533 11/15/21  0817 21  1727   CRP  --   --  36.5*  --    WBC 8.4 11.8* 11.2* 5.9            Review of Systems:   CONSTITUTIONAL:    Temp Max: Temp (24hrs), Av.3  F (36.8  C), Min:97.6  F (36.4  C), Max:98.8  F (37.1  C)   .  Negative except for findings in the HPI.           Current Medications (antimicrobials listed in bold):       acetaminophen  975 mg Oral Q8H     aspirin  81 mg Oral Daily     atorvastatin  80 mg Oral QPM     heparin ANTICOAGULANT  5,000 Units Subcutaneous Q8H     insulin aspart  1-12 Units Subcutaneous Q4H     insulin aspart   Subcutaneous QAM AC     insulin aspart   Subcutaneous Daily with lunch     insulin aspart   Subcutaneous Daily with supper     insulin glargine  30 Units Subcutaneous Daily     metoprolol succinate ER  25 mg Oral Daily     pantoprazole (PROTONIX) IV  40 mg Intravenous Daily with breakfast     piperacillin-tazobactam  3.375 g Intravenous Q8H     polyethylene glycol  17 g Oral Daily     senna-docusate  1 tablet Oral BID     sodium chloride (PF)  3 mL Intracatheter Q8H     sodium chloride (PF)  3 mL Intracatheter Q8H     vancomycin  1,000 mg Intravenous Q12H              Allergies:   No Known Allergies         Physical Exam:   Vitals were reviewed  Patient Vitals for the past 24 hrs:   BP Temp Temp src Pulse Resp SpO2 Weight   21 0805 (!) 141/70 -- -- 75 -- -- --   21 0734 125/71 97.8  F (36.6  C) Oral 72 16 97 % --   21 0700 -- -- -- -- -- -- 101.9 kg (224 lb 9.6 oz)   21 0502 123/61 98.1  F (36.7  C) Oral 71 18 94 % --   21 0056 (!) 140/71 98.1  F (36.7  C) Oral 93 18 98 % --   21 130/67 -- -- 83 -- 98 % --   21 125/59 -- -- 84 -- 91 % --   21 1858 113/57 98.3  F (36.8  C) Oral 82 16 97 % --   21 1726 131/63 -- -- 90 16 98 % --   21 1700 131/67 -- -- 87 16 99 % --    11/16/21 1645 138/67 -- -- 89 -- 99 % --   11/16/21 1630 130/61 -- -- 91 16 100 % --   11/16/21 1615 135/71 -- -- 89 16 98 % --   11/16/21 1600 132/67 98.4  F (36.9  C) Oral 89 16 98 % --   11/16/21 1545 127/65 -- -- 88 16 99 % --   11/16/21 1530 138/68 -- -- 87 16 99 % --   11/16/21 1515 (!) 147/82 -- -- 94 16 99 % --   11/16/21 1500 137/71 -- -- 96 16 99 % --   11/16/21 1445 132/67 -- -- 85 16 98 % --   11/16/21 1430 (!) 141/73 -- -- 89 14 (!) 89 % --   11/16/21 1415 137/67 -- -- 90 14 92 % --   11/16/21 1400 130/72 98.4  F (36.9  C) Oral 98 16 96 % --   11/16/21 1142 131/69 98.3  F (36.8  C) Oral 92 18 96 % --       Physical Examination:  Gen: Pleasant in no acute distress.  HEENT: NCAT. EOMI. PERRL.  Neck: No bruit, JVD or thyromegaly.  Lungs: Clear to ascultation bilat with no crackles or wheezes.  Card: RRR. NSR. No RMG. Peripheral pulses present and symmetric. No edema.  Abd: Soft NT ND. No mass. Normal bowel sounds.  Skin: No rash.  Extr: Surgical dressing in place.  Neuro: Alert and oriented to place time and person. Cranial nerves II to XII intact. Motor and sensory intact. Normal gait.            Laboratory Data:   ID Labs:  Microbiology labs:    Wound Aerobic Bacterial Culture Routine with Gram Stain  Order: [id number]   Collected 11/15/2021 11:40 AM     Status: Preliminary result     Visible to patient: No (not released)    Specimen Information: Foot, Left; Wound         0 Result Notes    Culture Culture in progress       4+ Streptococcus agalactiae (Group B Streptococcus) Abnormal     This organism is susceptible to ampicillin, penicillin, vancomycin and the cephalosporins. If treatment is required and your patient is allergic to penicillin, contact the microbiology lab within 5 days to request susceptibility testing.   2+ Staphylococcus pseudintermedius/intermedius Abnormal        2+ Staphylococcus aureus Abnormal        2+ Klebsiella oxytoca Abnormal     Identification is preliminary, confirmation  in progress            Gram Stain Result   Abnormal   4+ Gram positive cocci      3+ WBC seen   Predominantly PMNs           Resulting Agency: [facility]           Specimen Collected: 11/15/21 11:40 AM Last Resulted: 11/16/21 12:49 PM                      Contains critical data Blood Culture Line, venous  Order: [id number]   Collected 11/14/2021  8:16 PM       Status: Preliminary result       Visible to patient: No (not released)      Specimen Information: Line, venous; Blood         0 Result Notes      Culture Positive on the 1st day of incubation Abnormal        Streptococcus agalactiae (Group B Streptococcus) Panic     1 of 2 bottles        Resulting Agency: [facility]             Specimen Collected: 11/14/21  8:16 PM Last Resulted: 11/16/21 11:29 AM             Recent Labs   Lab Test 11/14/21 2016   CRP 36.5*     Recent Labs   Lab Test 11/17/21  0533 11/15/21  0817 11/14/21 2016 11/17/18  1727   WBC 8.4 11.8* 11.2* 5.9     Recent Labs   Lab Test 11/17/21  0533 11/16/21  1601 11/16/21  0639 11/15/21  1707   CR 1.23 1.28 1.45* 1.49*   GFRESTIMATED 63 60* 51* 50*       Hematology Studies  Recent Labs   Lab Test 11/17/21  0533 11/15/21  0817 11/14/21 2016 11/17/18  1727   WBC 8.4 11.8* 11.2* 5.9   HCT 33.6* 34.9* 40.1 40.1    262 262 185       Metabolic  Recent Labs   Lab Test 11/17/21  0533 11/16/21  1601 11/16/21  0639   * 133* 132*   BUN 16 15 18   CO2 21* 21* 22   CR 1.23 1.28 1.45*   GFRESTIMATED 63 60* 51*       Hepatic Studies  Recent Labs   Lab Test 03/19/20  1448 10/31/19  1625 06/25/19  1446 02/28/19  1422   BILITOTAL  --  0.7 0.6 0.7   ALKPHOS  --  87 76 81   ALBUMIN 4.1 4.1 4.0 3.6   AST  --  20 22 18   ALT  --  24 24 21       Immunologlobulins  Recent Labs   Lab Test 11/14/21  2016   SED 90*            Imaging Data:   Reviewed   Study Result    Narrative & Impression   EXAM: MR FOOT LEFT W/O and W CONTRAST  LOCATION: [facility]  DATE/TIME: 11/15/2021 10:45 AM     INDICATION:  Foot swelling, diabetic, osteomyelitis suspected, no prior imaging.  COMPARISON: None.  TECHNIQUE: Routine. Additional postgadolinium T1 sequences were obtained.  IV CONTRAST: 10 mL Gadavist.     FINDINGS:      JOINTS AND BONES:   -No evidence for fracture. No marrow signal abnormality to suggest osteomyelitis. No significant effusion to suggest septic arthropathy. There is some reactive edema within the sesamoid bones adjacent to the first metatarsal head which could represent   sesamoiditis.     TENDONS:   -Flexor hallucis tendon tenosynovitis. The remaining flexor tendons are negative. The extensor tendons are negative for tendinopathy, tenosynovitis, or tearing.      LIGAMENTS:   -The ligament of Lisfranc is intact. The collateral ligaments at the MTP joints are all intact.     MUSCLES AND SOFT TISSUES:   -There is an ulceration along the medial aspect of the forefoot with surrounding edema and/or cellulitis. Fluid and susceptibility artifact suggestive of bubbles of gas extend into the soft tissues surrounding the first MTP joint into the webspace   between the first and second toes but all of this appears likely external to the joint capsule. There is nonenhancing fluid consistent with abscess surrounding the first MTP joint but this may spontaneously drain to the skin surface. It also extends   proximally into the fatty replaced plantar musculature deep to the first and second metatarsal shafts.                                                                      IMPRESSION:  1.  No evidence for osteomyelitis or significant effusion to suggest septic arthropathy.  2.  However, there is a soft tissue ulceration along the medial aspect of the forefoot adjacent to the first MTP joint. Fluid and susceptibly artifact suggestive of gas bubbles extend nearly circumferentially about the first MTP joint and base of the   great toe into the webspace between the first and second toes consistent with abscess.  Nonenhancing fluid also extends proximally deep to the first and second metatarsal shafts consistent with abscess although this may spontaneously communicate to the   ulceration.  3.  Fluid surrounding the flexor hallucis tendon sheath consistent with a toxic tenosynovitis.  4.  No evidence for fracture.  5.  No additional tendinous or ligamentous pathology identified.  6.  Edema or cellulitis along the medial aspect of the foot.

## 2021-11-17 NOTE — OP NOTE
Date: 11/16/21    Surgeon: MARCO A Ram DPM    Preoperative diagnosis:   1. Osteomyelitis left foot  2. Abscess left foot  3. Septic arthritis left foot     Postoperative diagnosis: Same    Procedure:   1. Amputation left hallux  2. Amputation 2nd digit left foot   3. Partial amputation 1st metatarsal left foot  4. Partial amputation 2nd metatarsal left foot  5. Excision sesamoids left foot  6. Incision and drainage left foot     Anesthesia: Local with IV-sedation    Hemostasis: Pneumatic ankle tourniquet 250 mmHg    Pathology: Left hallux/2nd digit, 1st/2nd metatarsals, sesamoids, Aerobic/anaerobic culture     Injectables: None     Materials: None    Complications: None    Blood loss: 4 cc       Findings: Patient presents for operative intervention for osteomyelitis and abscess of the left foot. Recent MRI indicates abscess along the 1st MPJ. I discussed today's procedure with the patient in pre-op to include removal of all non-viable infected tissue which may include amputation of the 1st ray. He consents to surgery. Patient questions invited and answered, including appropriate risk, benefits and complications. No guarantees given or implied. Patient has been NPO.    Description: Patient was brought to the operating room and placed on the table in supine position. IV-sedation was administered by the anesthesia department.  Injection of 0.5% Marcaine plain was administered to surgical site left foot. The foot was then prepped and draped in usual aseptic manner. The extremity was elevated and exsanguinated. Well-padded ankle pneumatic tourniquet was inflated to 250mmHg and the following procedure was then performed: Attention was directed to the area of non-viable tissue along the dorsal, medial and lateral 1st MPJ left foot where a #10 blade was used to resect non-viable tissue. Significant amount of purulence tracked into the 1st and 2nd MPJ's along with non-viable tissue extending along the dorsal and plantar 1st  "and 2nd metatarsals. It was determined that removal of the 1st and 2nd rays was necessary. Attention was directed to the hallux and 2nd digit of the left foot where two semi-elliptical incisions were made at the base of the digits. The incisions were carried full thickness into the 1st and 2nd MPJ's where the digits were disarticulated. The digits were removed from the surgical site in toto and sent to pathology. Soft tissue sent for aerobic and anaerobic culture. Next, a sagittal saw was used to resect the 1st and 2nd metatarsals at a visually clean proximal margin and sent in toto to pathology. The sesamoids were also sharply excised and sent in toto to pathology. Additional purulence was noted to track along the FHL tendon into the midfoot. All non-viable tissue and purulence was sharply debrided with a #10 blade into bone. Next, a 1000cc pulse lavage was used to irrigate the surgical site. Following sharp debridement with irrigation, no remaining non-viable tissue was noted. The surgical site was packed with thrombin gel foam, 1/2\" gauze packing.      Dressings consisted of 4x4's, ABD, kerlix/tommy roll and an ace wrap. The pneumatic tourniquet was released and a hyperemic response was noted to the remaining digits on the left foot.     The patient appeared to tolerate all the procedures and anesthesia well without apparent complications. Patient was transported from the operating room to the recovery room with vital signs stable and neurovascular status as it was pre-operatively to the left foot. Patient to be returned to his in-house room for continued IV antibiotics per ID. Medical management per hospitalist. Non-weight bearing left foot. Surgical dressing to remain intact. Due to the significant infection present this evening, I recommend a second wash out procedure in two days, followed by use of a wound vac for the near future.     "

## 2021-11-17 NOTE — CONSULTS
DIABETES CARE  Situation:  Consulted by Provider for Diabetes Education  61-year-old nonadherent diabetic who stopped his own diabetes medication and presented with DKA.  He was found to have non-STEMI, severe cellulitis with gangrene of the first ray.  He was started on DKA protocol and slowly weaned off IV insulin.  Is currently on IV antibiotics and IV heparin.  He will have coronary angiogram and I&D with amputation of the first ray on 11/16.  Continue with Plavix as per cardiology     Background:  PCP: Jaquan Dickerson MD  Social: Lives in his sister's house, not currently employed    Diabetes History:   Per patient dx'd in 1992.  States his BG spike when his redundant colon backs up and improve when his colon is working.   He states he got frustrated with the health care providers this past February and stopped his medications. He was on 40 units of Lantus insulin daily.  Also was on Metformin 1000 mg twice daily but states that didn't do anything. Also in hospitals notes is 6 mg of Amaryl daily. However, last visit to hospitals provider was 3/19/20.    Meds for BG Management PTA: None  Current Inpatient Meds for BG Management:  30 units of Lantus in am  1:10 I:C ratio at meals, Novolog to carb grams  Novolog correction scale: 1/25 > 140    Labs:  Hemoglobin A1C: >14 (> 355 average BG)               GFR: 63   Blood Glucose POC: Results for JOSE YO (MRN [id number]) as of 11/17/2021 12:13   Ref. Range 11/16/2021 17:45 11/16/2021 20:36 11/17/2021 00:44 11/17/2021 04:34 11/17/2021 08:59   GLUCOSE BY METER POCT Latest Ref Range: 70 - 99 mg/dL 169 (H) 158 (H) 208 (H) 226 (H) 222 (H)       Diet Order:  45 carbohydrate grams   Intake: 100%   Weight: 101.9 kg    BMI: 29.63    DM EDUCATION/COUNSELING:  Barriers to Learning and/or DM Self-Management: frustration with health care providers  Previous DM Education: Has had and does a lot of research himself  Current Education   Educated/reviewed diabetes basics:  "pathophysiology, hyperglycemia, long term complications, treatment.  Educated/reviewed hypoglycemia: sx, causes, treatment  Explained normal/goals of blood sugar control, A1C, when to call provider.  Educated/reviewed use of home glucose meter, Accuchek Guide Me and patient able to state the steps to its use. Did also discuss the CGMS, will check its coverage as he is interested.  Educated on normal pancreas function,  need for basal insulin and mealtime insulin at this time for healing, heart and vessels, nerve function  Specific insulins were explained, including how they each acted on blood sugar, their timing and differences in dosing.     He states he knows how to use the insulin pen. Also discussed site rotation, proper storage  Carbohydrates were briefly discussed and their effect on BG. Reinforced healthy eating.  RD to see here     Written handouts given on all education provided.     Assessment:  Patient now aware that he needs to be on insulin again to fight the infection that he has    Recommendations:  1. Assess BG pattern daily and adjust doses as needed.  2. Hopefully he will Follow-up with provider and OP diabetes educator for fine tuning of his glucose control    Refer to \"Guidelines for Insulin Initiation and Care in Hospitalized Adults\"  link in Diabetes Management Order set for dosing guidelines    Hospital BG goals for blood glucose levels are < 180 for improved health outcomes.    DISCHARGE NEEDS:   RX needed at DC (preferred by patient's insurance):  1.  Lantus SoloStar pens, box of 5  2.  Novolog FlexPens, box of 5  3. BD Lizbeth needles, box of 100  4. Accuchek Guide strips, 2 boxes of 100  5. Softclix lancets, box of 100  6. CGMS (will have him look at info and see which one he is interested in)  Insulin requiring  E11.65  To test 4x daily.    Thank you,     Lorie Haines RN, Certified Diabetes Care and     [facility]  [address], [place], " [place] [address]  [email or web address]   Office: [phone]  Pager: [phone]

## 2021-11-17 NOTE — PROGRESS NOTES
[facility]  Hospitalist Progress Note    Admit Date:  11/14/2021  Date of Service (when I saw the patient): 11/17/2021   Provider:  Narda Egan, DO    Assessment & Plan   Norman Aguilar is a 61 year old diabetic male who was admitted on 11/14/2021.  He presented with concerns overnight non-healing ulcer on his left foot and found to be in acute DKA.  Further evaluation positive for a  Non-STEMI and  severe cellulitis with gangrene of the first ray.  He was started on DKA protocol and slowly weaned off IV insulin.  Is currently on IV antibiotics and IV heparin.      He underwent coronary angiogram and I&D with amputation of the first ray on 11/16.      Problem List:    1.  DKA on presentation      Diabetes, not on any treatment PTA  --Improved with DKA protocol including insulin drip  --Transition to subcu insulin on 11/15.  Thereafter patient had suboptimal control  --Increased Lantus to 30 units 11/16/21 and carb counting insulin per sliding scale insulin  Will give additional 4 units lantus x 1 this evening and increase scheduled am lantus to 34 units am 11/18/21  Blood sugars are still in the 200's in the last 24 hours  Likely will need increase in lantus dosing and/or bid lantus dosing  Ensure that he is on a diabetic diet      2.  Left diabetic foot infection with clinical wet gangrene  --MRI showed no osteomyelitis  POD 1 - Amputation left hallux, Amputation 2nd digit left foot, Partial amputation 1st metatarsal left foot, Partial amputation 2nd metatarsal left foot, Excision sesamoids left foot, Incision and drainage left foot by podiatry    Superficial cultures noted to be polymicrobial - agalactiae, Staph aureus, Klebsiella oxytoca    Leukocytosis has resolved    ID following - appreciated  Continue IV vanco and zoysn, for now  If staph NOT MRSA - will be able to discontinue IV vanco    MCKAYLA dopplers ordered by podiatry today and are pending    3.  Bacteremia with  "Streptococcus agalactiae  BC + 11/14/21  F/u repeat BC and ensure that they stay negative  Appreciate abx recs from ID    Source of bacteremia likely left foot infection    4. Non-STEMI  --Started on IV heparin, aspirin and statin on 11/15 per cardiology  --Also started on beta-blocker  --Coronary angiogram 11/16 - severe, diffuse coronary calcifications  Continue on high dose statin therapy  Started on lisinopril 10mg/day today  Continue on metoprolol 25mg/day    CV surgery consultation requested, per cards, for evaluation for CABG (sounds like would be planned for in the next 2-3 months)     5. Episode of atrial fibrillation on 11/15  --Started on beta-blockers by cardiology  Cards will consider anticoagulation if has recurrent episode  Was short a fib in ED     6. Acute renal failure on presentation   CKD stage III baseline creatinine of 1.4  --Improved with IV fluids - creat today 1.23     7. Hyponatremia   Improving   Sodium is 134 today     8. Hypomagnesemia  --Continue to replace as per protocol     9. Ischemic cardiomyopathy with a EF of 30 to 35%  --DC IV fluids and patient is not n.p.o.  On metoprolol  Lisinopril 10mg po started today        Barriers to discharge: IV antibiotics, CV surgery consultation       Diet: Consistent Carbohydrate Diet Low Consistent Carb (45 g CHO per Meal) Diet    DVT Prophylaxis: Heparin subcutaneous q 8hrs  Garcia Catheter: Not present  Code Status: Full Code      Disposition Plan   Expected discharge:  Several more days once IV abx plan/recs in place.  ? Needing TCU at time of discharge.    Entered: Narda Egan DO 11/17/2021, 3:41 PM       The patient's care was discussed with the Bedside Nurse and Patient.      Interval History   Having increased bleeding through his bandage.  Hard to not place weight on foot when up out of bed.  No CP, SOB, F/C, N/V.  \"hungry\" and hoping he can eat later today.  Not having a lot of post-surgical pain currently in his left foot.  " No other new concerns per nursing.    -Data reviewed today: I reviewed all new labs and imaging results over the last 24 hours. I personally reviewed no images or EKG's today.    Physical Exam   Temp: 98.4  F (36.9  C) Temp src: Oral BP: (!) 153/67 Pulse: 81   Resp: 18 SpO2: 100 % O2 Device: None (Room air) Oxygen Delivery: 2 LPM  Vitals:    11/15/21 1739 11/16/21 0707 11/17/21 0700   Weight: 98.7 kg (217 lb 9.5 oz) 102.7 kg (226 lb 8 oz) 101.9 kg (224 lb 9.6 oz)     Vital Signs with Ranges  Temp:  [97.8  F (36.6  C)-98.4  F (36.9  C)] 98.4  F (36.9  C)  Pulse:  [71-93] 81  Resp:  [16-18] 18  BP: (113-153)/(57-71) 153/67  SpO2:  [91 %-100 %] 100 %  I/O last 3 completed shifts:  In: 1940 [P.O.:840; I.V.:1100]  Out: 1000 [Urine:1000]    GEN:  Alert, oriented x 3, comfortable, no overt respiratory distress.  HEENT:  Normocephalic/atraumatic, no scleral icterus, no nasal discharge, mouth and membranes appear fairly moist  NECK:  No clear thyromegaly or clear JVD  CV:  Regular rate and rhythm, no loud murmur to ausc.  S1 + S2 noted, no S3 or S4.  LUNGS:  Clear to auscultation ant/lat bilaterally.  No rales/rhonchi/wheezing auscultated bilaterally.  No costal retractions bilaterally.  Symmetric chest rise on inhalation noted.  ABD:  Active bowel sounds, soft, non-tender/non-distended.  No clear rebound/guarding/rigidity.  No masses or obvious HSM to exam.  EXT:  Left foot with some bloody drainage on ACE wrap and visualized parts of gauze dressing - not clearly saturated on visualized dressing.  1+ pitting edema to pretibial areas bilaterally, no cyanosis bilaterally. No joint synovitis noted.  No calf-tenderness or asymmetry noted.  SKIN:  Dry to touch, no clear rashes or jaundice noted.  PSYCH:  Mood appropriate, Not tearful or depressed.  Maintains direct eye contact.  Seems in good spirits, overall  NEURO:  No tremors at rest, speech is clear and appropriate.  CN 2-12 intact to gross testing bilaterally    Data  "  Labs:  Recent Labs   Lab 11/17/21  0859 11/17/21  0533 11/17/21 0434 11/16/21  1745 11/16/21  1601 11/16/21  0724 11/16/21  0639   NA  --  134*  --   --  133*  --  132*   POTASSIUM  --  3.6  --   --  3.2*  --  3.7   CHLORIDE  --  105  --   --  104  --  103   CO2  --  21*  --   --  21*  --  22   ANIONGAP  --  8  --   --  8  --  7   * 265* 226*   < > 205*   < > 289*   BUN  --  16  --   --  15  --  18   CR  --  1.23  --   --  1.28  --  1.45*   GFRESTIMATED  --  63  --   --  60*  --  51*   YOSVANY  --  8.8  --   --  8.7  --  8.8    < > = values in this interval not displayed.     Recent Labs   Lab 11/17/21  0533 11/15/21  0817 11/14/21 2016   WBC 8.4 11.8* 11.2*   HGB 10.7* 11.3* 12.9*   HCT 33.6* 34.9* 40.1   MCV 90 89 90    262 262     Recent Labs   Lab 11/17/21  0859 11/17/21 0533 11/17/21 0434 11/16/21  1745 11/16/21  1601 11/16/21  0724 11/16/21  0639 11/15/21  1308 11/15/21  1159 11/15/21  0108 11/15/21  0012   NA  --  134*  --   --  133*  --  132*   < > 131*   < > 129*   POTASSIUM  --  3.6  --   --  3.2*  --  3.7   < > 4.2   < > 4.4   CHLORIDE  --  105  --   --  104  --  103   < > 104   < > 94*   CO2  --  21*  --   --  21*  --  22   < > 20*   < > 11*   ANIONGAP  --  8  --   --  8  --  7   < > 7   < > 24*   * 265* 226*   < > 205*   < > 289*   < > 156*   < > 396*   BUN  --  16  --   --  15  --  18   < > 24*   < > 24*   CR  --  1.23  --   --  1.28  --  1.45*   < > 1.49*   < > 1.94*   GFRESTIMATED  --  63  --   --  60*  --  51*   < > 50*   < > 36*   YOSVANY  --  8.8  --   --  8.7  --  8.8   < > 8.5   < > 9.9   MAG  --  2.3  --   --   --   --  1.6*  --  1.7*  --   --    PHOS  --   --   --   --   --   --   --   --   --   --  3.1    < > = values in this interval not displayed.      Recent Imaging:   Recent Results (from the past 24 hour(s))   POC US Guidance Needle Placement    Narrative    Ultrasound was performed as guidance to an anesthesia procedure.  Click   \"PACS images\" hyperlink below to view " any stored images.  For specific   procedure details, view procedure note authored by anesthesia.   US Lower Extremity Arterial Duplex Bilateral    Narrative    EXAM: RESTING ANKLE-BRACHIAL INDICES (ABIs)  DUPLEX ARTERIAL ULTRASOUND OF THE LOWER EXTREMITIES BILATERAL  LOCATION: [facility]  DATE/TIME: 11/17/2021 1:02 PM    INDICATION: PAD. Left first and second toe amputation.  TECHNIQUE: Duplex imaging is performed utilizing gray-scale, two-dimensional images and color-flow imaging. Doppler waveform analysis and spectral Doppler imaging is also performed.  COMPARISON: None    MCKAYLA FINDINGS:  RIGHT  Brachial: 141  Ankle (PT): 168 Index: 1.19  Ankle (DP): 146 Index: 1.04  Digit: 88 Index: 0.62    LEFT  Ankle (PT): 172 Index: 1.22  Ankle (DP): 121 Index: 0.86    The right MCKAYLA at rest is 1.2. Right digital index 0.62. The left MCKAYLA at rest is 1.2.      WAVEFORMS: The dorsalis pedis and posterior tibial arteries are multiphasic bilaterally.    DUPLEX ARTERIAL ULTRASOUND FINDINGS:   LOWER EXTREMITY ARTERIAL DUPLEX EXAM WITH WAVEFORMS  RIGHT (cm/s)  EIA: 100  CFA: 76  PFA: 76  SFA-Proximal: 109  SFA-Mid: 69  SFA-Distal: 65  Popliteal: 57  PTA: 90  JUAN: 67  DPA: 57  (M=monophasic, B=biphasic, T=triphasic)    LEFT (cm/s)  EIA: 88  CFA: 67  PFA: 55  SFA-Proximal: 97  SFA-Mid: 104  SFA-Distal: 66  Popliteal: 74  PTA: 97  JUAN: 144  DPA: 24  (M=monophasic, B=biphasic, T=triphasic)    RIGHT: Multiphasic waveforms throughout the right lower extremity. No significant arterial stenosis identified.    LEFT: Multiple lymph nodes identified left groin measuring 3.5 x 2.7 x 1.4 cm. They exhibit a fatty hilum and I suspect these are reactive in nature. Multiphasic waveforms throughout the left lower extremity with no significant stenosis identified.      Impression    IMPRESSION:  1.  RIGHT LOWER EXTREMITY: Right MCKAYLA at rest is normal. No significant arterial stenosis identified on duplex imaging.  2.  LEFT LOWER  EXTREMITY: Left MCKAYLA at rest is normal. No significant arterial stenosis identified on duplex imaging. Left groin lymphadenopathy which I suspect represent reactive lymph nodes related to left lower extremity infection/inflammation.       US MCKAYLA Doppler No Exercise 1-2 Levels Bilateral    Narrative    EXAM: RESTING ANKLE-BRACHIAL INDICES (ABIs)  DUPLEX ARTERIAL ULTRASOUND OF THE LOWER EXTREMITIES BILATERAL  LOCATION: [facility]  DATE/TIME: 11/17/2021 1:02 PM    INDICATION: PAD. Left first and second toe amputation.  TECHNIQUE: Duplex imaging is performed utilizing gray-scale, two-dimensional images and color-flow imaging. Doppler waveform analysis and spectral Doppler imaging is also performed.  COMPARISON: None    MCKAYLA FINDINGS:  RIGHT  Brachial: 141  Ankle (PT): 168 Index: 1.19  Ankle (DP): 146 Index: 1.04  Digit: 88 Index: 0.62    LEFT  Ankle (PT): 172 Index: 1.22  Ankle (DP): 121 Index: 0.86    The right MCKAYLA at rest is 1.2. Right digital index 0.62. The left MCKAYLA at rest is 1.2.      WAVEFORMS: The dorsalis pedis and posterior tibial arteries are multiphasic bilaterally.    DUPLEX ARTERIAL ULTRASOUND FINDINGS:   LOWER EXTREMITY ARTERIAL DUPLEX EXAM WITH WAVEFORMS  RIGHT (cm/s)  EIA: 100  CFA: 76  PFA: 76  SFA-Proximal: 109  SFA-Mid: 69  SFA-Distal: 65  Popliteal: 57  PTA: 90  JUAN: 67  DPA: 57  (M=monophasic, B=biphasic, T=triphasic)    LEFT (cm/s)  EIA: 88  CFA: 67  PFA: 55  SFA-Proximal: 97  SFA-Mid: 104  SFA-Distal: 66  Popliteal: 74  PTA: 97  JUAN: 144  DPA: 24  (M=monophasic, B=biphasic, T=triphasic)    RIGHT: Multiphasic waveforms throughout the right lower extremity. No significant arterial stenosis identified.    LEFT: Multiple lymph nodes identified left groin measuring 3.5 x 2.7 x 1.4 cm. They exhibit a fatty hilum and I suspect these are reactive in nature. Multiphasic waveforms throughout the left lower extremity with no significant stenosis identified.      Impression     IMPRESSION:  1.  RIGHT LOWER EXTREMITY: Right MCKAYLA at rest is normal. No significant arterial stenosis identified on duplex imaging.  2.  LEFT LOWER EXTREMITY: Left MCKAYLA at rest is normal. No significant arterial stenosis identified on duplex imaging. Left groin lymphadenopathy which I suspect represent reactive lymph nodes related to left lower extremity infection/inflammation.           Medications     dextrose 5% and 0.45% NaCl       fentaNYL       lactated ringers 100 mL/hr at 11/17/21 1147     midazolam       - MEDICATION INSTRUCTIONS -       Patient RECEIVING antibiotic to treat a different condition and it provides ADEQUATE COVERAGE for this surgical procedure.       Patient RECEIVING antibiotic to treat a different condition and it provides ADEQUATE COVERAGE for this surgical procedure.         acetaminophen  975 mg Oral Q8H     aspirin  81 mg Oral Daily     atorvastatin  80 mg Oral QPM     heparin ANTICOAGULANT  5,000 Units Subcutaneous Q8H     insulin aspart  1-12 Units Subcutaneous Q4H     insulin aspart   Subcutaneous QAM AC     insulin aspart   Subcutaneous Daily with lunch     insulin aspart   Subcutaneous Daily with supper     insulin glargine  30 Units Subcutaneous Daily     lisinopril  10 mg Oral Daily     metoprolol succinate ER  25 mg Oral Daily     [START ON 11/18/2021] pantoprazole  40 mg Oral QAM AC     piperacillin-tazobactam  3.375 g Intravenous Q8H     polyethylene glycol  17 g Oral Daily     senna-docusate  1 tablet Oral BID     sodium chloride (PF)  3 mL Intracatheter Q8H     sodium chloride (PF)  3 mL Intracatheter Q8H     vancomycin  1,000 mg Intravenous Q12H

## 2021-11-17 NOTE — ANESTHESIA CARE TRANSFER NOTE
Patient: Norman Aguilar    Procedure: Procedure(s):  INCISION AND DRAINAGE, Left foot with first and second ray amputation,       Diagnosis: Cellulitis and abscess of foot, except toes [L03.119, L02.619]  Diagnosis Additional Information: No value filed.    Anesthesia Type:   MAC     Note:    Oropharynx: oropharynx clear of all foreign objects  Level of Consciousness: awake  Oxygen Supplementation: room air    Independent Airway: airway patency satisfactory and stable    Vital Signs Stable: post-procedure vital signs reviewed and stable  Report to RN Given: handoff report given  Patient transferred to: PACU  Comments: Report to floor RN on phone. Aid to transport  Handoff Report: Identifed the Patient, Identified the Reponsible Provider, Reviewed the pertinent medical history, Discussed the surgical course, Reviewed Intra-OP anesthesia mangement and issues during anesthesia, Set expectations for post-procedure period and Allowed opportunity for questions and acknowledgement of understanding      Vitals:  Vitals Value Taken Time   /57    Temp 97.8    Pulse 87    Resp 16    SpO2 96        Electronically Signed By: JUNI Baum CRNA  November 16, 2021  6:47 PM

## 2021-11-18 ENCOUNTER — APPOINTMENT (OUTPATIENT)
Dept: RADIOLOGY | Facility: HOSPITAL | Age: 61
DRG: 616 | End: 2021-11-18
Attending: PODIATRIST
Payer: COMMERCIAL

## 2021-11-18 ENCOUNTER — DOCUMENTATION ONLY (OUTPATIENT)
Dept: PODIATRY | Facility: CLINIC | Age: 61
End: 2021-11-18
Payer: COMMERCIAL

## 2021-11-18 ENCOUNTER — APPOINTMENT (OUTPATIENT)
Dept: PHYSICAL THERAPY | Facility: HOSPITAL | Age: 61
DRG: 616 | End: 2021-11-18
Payer: COMMERCIAL

## 2021-11-18 LAB
ALBUMIN SERPL-MCNC: 1.8 G/DL (ref 3.5–5)
ALP SERPL-CCNC: 89 U/L (ref 45–120)
ALT SERPL W P-5'-P-CCNC: 13 U/L (ref 0–45)
ANION GAP SERPL CALCULATED.3IONS-SCNC: 9 MMOL/L (ref 5–18)
AST SERPL W P-5'-P-CCNC: 21 U/L (ref 0–40)
BACTERIA WND CULT: ABNORMAL
BASOPHILS # BLD MANUAL: 0 10E3/UL (ref 0–0.2)
BASOPHILS NFR BLD MANUAL: 0 %
BILIRUB SERPL-MCNC: 0.5 MG/DL (ref 0–1)
BUN SERPL-MCNC: 14 MG/DL (ref 8–22)
CALCIUM SERPL-MCNC: 8.9 MG/DL (ref 8.5–10.5)
CHLORIDE BLD-SCNC: 105 MMOL/L (ref 98–107)
CO2 SERPL-SCNC: 23 MMOL/L (ref 22–31)
CREAT SERPL-MCNC: 1.14 MG/DL (ref 0.7–1.3)
EOSINOPHIL # BLD MANUAL: 0.4 10E3/UL (ref 0–0.7)
EOSINOPHIL NFR BLD MANUAL: 6 %
ERYTHROCYTE [DISTWIDTH] IN BLOOD BY AUTOMATED COUNT: 14.4 % (ref 10–15)
GFR SERPL CREATININE-BSD FRML MDRD: 69 ML/MIN/1.73M2
GLUCOSE BLD-MCNC: 146 MG/DL (ref 70–125)
GLUCOSE BLDC GLUCOMTR-MCNC: 130 MG/DL (ref 70–99)
GLUCOSE BLDC GLUCOMTR-MCNC: 142 MG/DL (ref 70–99)
GLUCOSE BLDC GLUCOMTR-MCNC: 150 MG/DL (ref 70–99)
GLUCOSE BLDC GLUCOMTR-MCNC: 259 MG/DL (ref 70–99)
GLUCOSE BLDC GLUCOMTR-MCNC: 98 MG/DL (ref 70–99)
GRAM STAIN RESULT: ABNORMAL
GRAM STAIN RESULT: ABNORMAL
HCT VFR BLD AUTO: 37.3 % (ref 40–53)
HGB BLD-MCNC: 12.2 G/DL (ref 13.3–17.7)
HOLD SPECIMEN: NORMAL
LYMPHOCYTES # BLD MANUAL: 1.7 10E3/UL (ref 0.8–5.3)
LYMPHOCYTES NFR BLD MANUAL: 26 %
MAGNESIUM SERPL-MCNC: 1.7 MG/DL (ref 1.8–2.6)
MCH RBC QN AUTO: 28.6 PG (ref 26.5–33)
MCHC RBC AUTO-ENTMCNC: 32.7 G/DL (ref 31.5–36.5)
MCV RBC AUTO: 87 FL (ref 78–100)
METAMYELOCYTES # BLD MANUAL: 0.1 10E3/UL
METAMYELOCYTES NFR BLD MANUAL: 2 %
MONOCYTES # BLD MANUAL: 0.5 10E3/UL (ref 0–1.3)
MONOCYTES NFR BLD MANUAL: 8 %
MYELOCYTES # BLD MANUAL: 0.1 10E3/UL
MYELOCYTES NFR BLD MANUAL: 1 %
NEUTROPHILS # BLD MANUAL: 3.8 10E3/UL (ref 1.6–8.3)
NEUTROPHILS NFR BLD MANUAL: 57 %
PLAT MORPH BLD: ABNORMAL
PLATELET # BLD AUTO: 283 10E3/UL (ref 150–450)
POLYCHROMASIA BLD QL SMEAR: SLIGHT
POTASSIUM BLD-SCNC: 3.2 MMOL/L (ref 3.5–5)
PROT SERPL-MCNC: 6.3 G/DL (ref 6–8)
RBC # BLD AUTO: 4.27 10E6/UL (ref 4.4–5.9)
RBC MORPH BLD: ABNORMAL
SODIUM SERPL-SCNC: 137 MMOL/L (ref 136–145)
WBC # BLD AUTO: 6.7 10E3/UL (ref 4–11)

## 2021-11-18 PROCEDURE — 73630 X-RAY EXAM OF FOOT: CPT | Mod: LT

## 2021-11-18 PROCEDURE — 97116 GAIT TRAINING THERAPY: CPT | Mod: GP | Performed by: PHYSICAL THERAPIST

## 2021-11-18 PROCEDURE — 83735 ASSAY OF MAGNESIUM: CPT | Performed by: HOSPITALIST

## 2021-11-18 PROCEDURE — 250N000013 HC RX MED GY IP 250 OP 250 PS 637: Performed by: INTERNAL MEDICINE

## 2021-11-18 PROCEDURE — 99232 SBSQ HOSP IP/OBS MODERATE 35: CPT | Performed by: STUDENT IN AN ORGANIZED HEALTH CARE EDUCATION/TRAINING PROGRAM

## 2021-11-18 PROCEDURE — 97530 THERAPEUTIC ACTIVITIES: CPT | Mod: GP | Performed by: PHYSICAL THERAPIST

## 2021-11-18 PROCEDURE — 210N000001 HC R&B IMCU HEART CARE

## 2021-11-18 PROCEDURE — 250N000013 HC RX MED GY IP 250 OP 250 PS 637: Performed by: PODIATRIST

## 2021-11-18 PROCEDURE — 250N000011 HC RX IP 250 OP 636: Performed by: PODIATRIST

## 2021-11-18 PROCEDURE — 99232 SBSQ HOSP IP/OBS MODERATE 35: CPT | Performed by: INTERNAL MEDICINE

## 2021-11-18 PROCEDURE — 85027 COMPLETE CBC AUTOMATED: CPT | Performed by: INTERNAL MEDICINE

## 2021-11-18 PROCEDURE — 250N000011 HC RX IP 250 OP 636: Performed by: INTERNAL MEDICINE

## 2021-11-18 PROCEDURE — 250N000011 HC RX IP 250 OP 636: Performed by: GENERAL ACUTE CARE HOSPITAL

## 2021-11-18 PROCEDURE — 82040 ASSAY OF SERUM ALBUMIN: CPT | Performed by: INTERNAL MEDICINE

## 2021-11-18 PROCEDURE — 36415 COLL VENOUS BLD VENIPUNCTURE: CPT | Performed by: HOSPITALIST

## 2021-11-18 RX ORDER — MAGNESIUM SULFATE HEPTAHYDRATE 40 MG/ML
2 INJECTION, SOLUTION INTRAVENOUS ONCE
Status: COMPLETED | OUTPATIENT
Start: 2021-11-18 | End: 2021-11-18

## 2021-11-18 RX ADMIN — LISINOPRIL 10 MG: 5 TABLET ORAL at 09:09

## 2021-11-18 RX ADMIN — ATORVASTATIN CALCIUM 80 MG: 40 TABLET, FILM COATED ORAL at 20:30

## 2021-11-18 RX ADMIN — PIPERACILLIN SODIUM AND TAZOBACTAM SODIUM 3.38 G: 3; .375 INJECTION, POWDER, LYOPHILIZED, FOR SOLUTION INTRAVENOUS at 21:11

## 2021-11-18 RX ADMIN — ASPIRIN 81 MG CHEWABLE TABLET 81 MG: 81 TABLET CHEWABLE at 09:10

## 2021-11-18 RX ADMIN — HEPARIN SODIUM 5000 UNITS: 10000 INJECTION, SOLUTION INTRAVENOUS; SUBCUTANEOUS at 04:25

## 2021-11-18 RX ADMIN — METOPROLOL SUCCINATE 25 MG: 25 TABLET, EXTENDED RELEASE ORAL at 09:09

## 2021-11-18 RX ADMIN — PANTOPRAZOLE SODIUM 40 MG: 40 TABLET, DELAYED RELEASE ORAL at 07:00

## 2021-11-18 RX ADMIN — ACETAMINOPHEN 975 MG: 325 TABLET ORAL at 20:29

## 2021-11-18 RX ADMIN — PIPERACILLIN SODIUM AND TAZOBACTAM SODIUM 3.38 G: 3; .375 INJECTION, POWDER, LYOPHILIZED, FOR SOLUTION INTRAVENOUS at 04:24

## 2021-11-18 RX ADMIN — HEPARIN SODIUM 5000 UNITS: 10000 INJECTION, SOLUTION INTRAVENOUS; SUBCUTANEOUS at 12:27

## 2021-11-18 RX ADMIN — MAGNESIUM SULFATE HEPTAHYDRATE 2 G: 40 INJECTION, SOLUTION INTRAVENOUS at 12:26

## 2021-11-18 RX ADMIN — ACETAMINOPHEN 975 MG: 325 TABLET ORAL at 12:27

## 2021-11-18 RX ADMIN — PIPERACILLIN SODIUM AND TAZOBACTAM SODIUM 3.38 G: 3; .375 INJECTION, POWDER, LYOPHILIZED, FOR SOLUTION INTRAVENOUS at 12:27

## 2021-11-18 RX ADMIN — ACETAMINOPHEN 975 MG: 325 TABLET ORAL at 04:24

## 2021-11-18 ASSESSMENT — ACTIVITIES OF DAILY LIVING (ADL)
ADLS_ACUITY_SCORE: 12

## 2021-11-18 NOTE — PROGRESS NOTES
"Care Management Follow Up    Length of Stay (days): 3        Patient plan of care discussed at interdisciplinary rounds:     Expected Discharge Date:   11/22/21       Concerns to be Addressed / Barriers to Discharge: medical surgical management     Anticipated Discharge Disposition: pending        Additional Information:  11/18/2021 Per Podiatry progress note 11.17.21, surgeon \"reviewed the previous surgical procedure and extent of significant infection and recommend a second wash out procedure on 11/19. We discussed this procedure including use of a wound vac post-op. Patient consents to surgery. Antibiotics per ID. Medical management per hospitalist/cardiology. \"    Patient is non-weight bearing on left lower extremity due to amputation 11/16 and prior hospitalization, patient walked independently without devices. Discussed progression of care with patient and options due to non weight bearing status, potential wound vac, and long term antibiotic plan. Patient's goal is to stay with his sister in [place], [place] and receive home care services. No preference for home care agency.     Referral made to Keiko SIMS RN with [facility] [place]. Will continue to follow and do a benefit check.     CM following.     Assessment History: Patient was independent at baseline, ambulating without devices, able to drive and living with his sister.   "

## 2021-11-18 NOTE — PROGRESS NOTES
[facility] ID Inpatient follow up       Patient:  Norman Aguilar  Date of birth 1960, Medical record number [id number]  Date of Visit:  11/18/2021  Attending Physician: Delio Feldman MD         Assessment and Recommendations:   Assessment:  Norman Aguilar is a 61 year old male with   1.  Uncontrolled DM II. Not on treatment since 2/2021. A1c > 14 on 11/14/21.   2.  NSTEMI.  Multiple vessel disease.  Will need CABG at one point but high risk at this point with ongoing infection.  3.  JOHANA on CKD. Baseline creatinine 1.4. Admitted with creat at 2.1. Trending down.  4.  Left foot injury in 8/2021  5.  Left diabetic foot infection with clinical wet gangrene.  MRI with no evidence of osteomyelitis for presence of abscess.  Superficial cultures polymicrobial with strep agalactiae, MSSA, Klebsiella oxytoca.  Previously on IV vancomycin and Zosyn.  IV vancomycin discontinued.  S/P Amputation left hallux, Amputation 2nd digit left foot, Partial amputation 1st metatarsal left foot, Partial amputation 2nd metatarsal left foot, Excision sesamoids left foot, Incision and drainage left foot.  Intra-Op cultures growing only Streptococcus agalactiae.  6.  Bacteremia with Streptococcus agalactiae.  Positive blood cultures on 11/14.  Follow-up on 11/16 in process.  Source is left foot infection as growing Streptococcus agalactiae from the foot as well.       Recommendations:   Continue IV Zosyn.  Discontinued IV vancomycin.  Wound care per surgery.  Follow-up pending pathology.  Going back to the OR tomorrow.    Discussed with the patient, nursing staff.    ID will follow.    Hilaria Munoz MD.  [facility].   [facility]  Office Telephone [phone].  Fax [phone]  [facility] paging            Interval History:     HPI:  The interval history was reviewed.   Doing better.  Cultures reviewed.  Vancomycin discontinued.    Pertinent  cultures include:  No results found for: CULT    Recent Inflammatory Biomarkers:   Recent Labs   Lab Test 21  0533 11/15/21  0817 21  1727   CRP  --   --  36.5*  --    WBC 8.4 11.8* 11.2* 5.9            Review of Systems:   CONSTITUTIONAL:    Temp Max: Temp (24hrs), Av.3  F (36.8  C), Min:97.6  F (36.4  C), Max:98.8  F (37.1  C)   .  Negative except for findings in the HPI.           Current Medications (antimicrobials listed in bold):       acetaminophen  975 mg Oral Q8H     aspirin  81 mg Oral Daily     atorvastatin  80 mg Oral QPM     heparin ANTICOAGULANT  5,000 Units Subcutaneous Q8H     insulin aspart  1-12 Units Subcutaneous Q4H     insulin aspart   Subcutaneous QAM AC     insulin aspart   Subcutaneous Daily with lunch     insulin aspart   Subcutaneous Daily with supper     insulin glargine  34 Units Subcutaneous Daily     lisinopril  10 mg Oral Daily     metoprolol succinate ER  25 mg Oral Daily     pantoprazole  40 mg Oral QAM AC     piperacillin-tazobactam  3.375 g Intravenous Q8H     polyethylene glycol  17 g Oral Daily     senna-docusate  1 tablet Oral BID     sodium chloride (PF)  3 mL Intracatheter Q8H     sodium chloride (PF)  3 mL Intracatheter Q8H              Allergies:   No Known Allergies         Physical Exam:   Vitals were reviewed  Patient Vitals for the past 24 hrs:   BP Temp Temp src Pulse Resp SpO2   21 0746 107/56 97.9  F (36.6  C) Oral 68 16 99 %   21 0440 (!) 143/71 -- -- 87 18 100 %   21 0013 116/61 97.8  F (36.6  C) Oral 80 16 98 %   21 2110 139/65 98.1  F (36.7  C) Oral 85 18 99 %   21 1519 (!) 153/67 98.4  F (36.9  C) Oral 81 18 100 %   21 1156 134/69 -- -- 75 -- 100 %       Physical Examination:  Gen: Pleasant in no acute distress.  HEENT: NCAT. EOMI. PERRL.  Neck: No bruit, JVD or thyromegaly.  Lungs: Clear to ascultation bilat with no crackles or wheezes.  Card: RRR. NSR. No RMG. Peripheral pulses present and  symmetric. No edema.  Abd: Soft NT ND. No mass. Normal bowel sounds.  Skin: No rash.  Extr: Surgical dressing in place.  Neuro: Alert and oriented to place time and person. Cranial nerves II to XII intact. Motor and sensory intact. Normal gait.            Laboratory Data:   ID Labs:  Microbiology labs:    Wound Aerobic Bacterial Culture Routine with Gram Stain  Order: [id number]   Collected 11/15/2021 11:40 AM     Status: Preliminary result     Visible to patient: No (not released)    Specimen Information: Foot, Left; Wound         0 Result Notes    Culture Culture in progress       4+ Streptococcus agalactiae (Group B Streptococcus) Abnormal     This organism is susceptible to ampicillin, penicillin, vancomycin and the cephalosporins. If treatment is required and your patient is allergic to penicillin, contact the microbiology lab within 5 days to request susceptibility testing.   2+ Staphylococcus pseudintermedius/intermedius Abnormal        2+ Staphylococcus aureus Abnormal        2+ Klebsiella oxytoca Abnormal     Identification is preliminary, confirmation in progress            Gram Stain Result   Abnormal   4+ Gram positive cocci      3+ WBC seen   Predominantly PMNs           Resulting Agency: [facility]           Specimen Collected: 11/15/21 11:40 AM Last Resulted: 11/16/21 12:49 PM                      Contains critical data Blood Culture Line, venous  Order: [id number]   Collected 11/14/2021  8:16 PM       Status: Preliminary result       Visible to patient: No (not released)      Specimen Information: Line, venous; Blood         0 Result Notes      Culture Positive on the 1st day of incubation Abnormal        Streptococcus agalactiae (Group B Streptococcus) Panic     1 of 2 bottles        Resulting Agency: [facility]             Specimen Collected: 11/14/21  8:16 PM Last Resulted: 11/16/21 11:29 AM                    Contains abnormal data Wound Aerobic Bacterial Culture Routine with Gram Stain  Order:  [id number]   Collected 11/15/2021 11:40 AM       Status: Final result       Visible to patient: No (inaccessible in MyChart)      Specimen Information: Foot, Left; Wound         0 Result Notes      Culture 4+ Streptococcus agalactiae (Group B Streptococcus) Abnormal     This organism is susceptible to ampicillin, penicillin, vancomycin and the cephalosporins. If treatment is required and your patient is allergic to penicillin, contact the microbiology lab within 5 days to request susceptibility testing.   4+ Streptococcus mitis group Abnormal     Susceptibilities not routinely done   2+ Staphylococcus pseudintermedius/intermedius Abnormal        2+ Staphylococcus aureus Abnormal        2+ Klebsiella oxytoca Abnormal                 Gram Stain Result   Abnormal   4+ Gram positive cocci      3+ WBC seen   Predominantly PMNs           Resulting Agency: [facility]         Susceptibility     Staphylococcus pseudintermedius/intermedius Staphylococcus aureus Klebsiella oxytoca     DONNA DONNA DONNA     Ampicillin     >=32.0 ug/mL Resistant 1     Ampicillin/ Sulbactam     8.0 ug/mL Susceptible     Cefepime     <=1.0 ug/mL Susceptible     Ceftazidime     <=1.0 ug/mL Susceptible     Ceftriaxone     <=1.0 ug/mL Susceptible     Ciprofloxacin <=0.5 ug/mL Susceptible   <=0.25 ug/mL Susceptible     Clindamycin <=0.25 ug/mL Susceptible <=0.25 ug/mL Susceptible       Erythromycin 1.0 ug/mL Intermediate <=0.25 ug/mL Susceptible       Gentamicin <=0.5 ug/mL Susceptible <=0.5 ug/mL Susceptible <=1.0 ug/mL Susceptible     Levofloxacin <=0.12 ug/mL Susceptible   <=0.12 ug/mL Susceptible     Meropenem     <=0.25 ug/mL Susceptible     Oxacillin 0.5 ug/mL Susceptible <=0.25 ug/mL Susceptible       Piperacillin/Tazobactam     <=4.0 ug/mL Susceptible     Tetracycline >=16.0 ug/mL Resistant <=1.0 ug/mL Susceptible       Tobramycin     <=1.0 ug/mL Susceptible     Trimethoprim/Sulfamethoxazole <=0.5/9.5 u... Susceptible <=0.5/9.5 u... Susceptible <=1/19  "ug/mL Susceptible     Vancomycin <=0.5 ug/mL Susceptible 1.0 ug/mL Susceptible                    1 Intrinsically Resistant        Susceptibility Comments    Staphylococcus pseudintermedius/intermedius   Antibiotics listed as \"No Interpretation\" have no regulatory guidelines for susceptibility/resistance available.         Specimen Collected: 11/15/21 11:40 AM Last Resulted: 11/18/21  7:51 AM                  Recent Labs   Lab Test 11/14/21 2016   CRP 36.5*     Recent Labs   Lab Test 11/17/21  0533 11/15/21  0817 11/14/21 2016 11/17/18  1727   WBC 8.4 11.8* 11.2* 5.9     Recent Labs   Lab Test 11/17/21  0533 11/16/21  1601 11/16/21  0639 11/15/21  1707   CR 1.23 1.28 1.45* 1.49*   GFRESTIMATED 63 60* 51* 50*       Hematology Studies  Recent Labs   Lab Test 11/17/21  0533 11/15/21  0817 11/14/21 2016 11/17/18  1727   WBC 8.4 11.8* 11.2* 5.9   HCT 33.6* 34.9* 40.1 40.1    262 262 185       Metabolic  Recent Labs   Lab Test 11/17/21  0533 11/16/21  1601 11/16/21  0639   * 133* 132*   BUN 16 15 18   CO2 21* 21* 22   CR 1.23 1.28 1.45*   GFRESTIMATED 63 60* 51*       Hepatic Studies  Recent Labs   Lab Test 03/19/20  1448 10/31/19  1625 06/25/19  1446 02/28/19  1422   BILITOTAL  --  0.7 0.6 0.7   ALKPHOS  --  87 76 81   ALBUMIN 4.1 4.1 4.0 3.6   AST  --  20 22 18   ALT  --  24 24 21       Immunologlobulins  Recent Labs   Lab Test 11/14/21 2016   SED 90*            Imaging Data:   Reviewed   Study Result    Narrative & Impression   EXAM: MR FOOT LEFT W/O and W CONTRAST  LOCATION: [facility]  DATE/TIME: 11/15/2021 10:45 AM     INDICATION: Foot swelling, diabetic, osteomyelitis suspected, no prior imaging.  COMPARISON: None.  TECHNIQUE: Routine. Additional postgadolinium T1 sequences were obtained.  IV CONTRAST: 10 mL Gadavist.     FINDINGS:      JOINTS AND BONES:   -No evidence for fracture. No marrow signal abnormality to suggest osteomyelitis. No significant effusion to suggest " septic arthropathy. There is some reactive edema within the sesamoid bones adjacent to the first metatarsal head which could represent   sesamoiditis.     TENDONS:   -Flexor hallucis tendon tenosynovitis. The remaining flexor tendons are negative. The extensor tendons are negative for tendinopathy, tenosynovitis, or tearing.      LIGAMENTS:   -The ligament of Lisfranc is intact. The collateral ligaments at the MTP joints are all intact.     MUSCLES AND SOFT TISSUES:   -There is an ulceration along the medial aspect of the forefoot with surrounding edema and/or cellulitis. Fluid and susceptibility artifact suggestive of bubbles of gas extend into the soft tissues surrounding the first MTP joint into the webspace   between the first and second toes but all of this appears likely external to the joint capsule. There is nonenhancing fluid consistent with abscess surrounding the first MTP joint but this may spontaneously drain to the skin surface. It also extends   proximally into the fatty replaced plantar musculature deep to the first and second metatarsal shafts.                                                                      IMPRESSION:  1.  No evidence for osteomyelitis or significant effusion to suggest septic arthropathy.  2.  However, there is a soft tissue ulceration along the medial aspect of the forefoot adjacent to the first MTP joint. Fluid and susceptibly artifact suggestive of gas bubbles extend nearly circumferentially about the first MTP joint and base of the   great toe into the webspace between the first and second toes consistent with abscess. Nonenhancing fluid also extends proximally deep to the first and second metatarsal shafts consistent with abscess although this may spontaneously communicate to the   ulceration.  3.  Fluid surrounding the flexor hallucis tendon sheath consistent with a toxic tenosynovitis.  4.  No evidence for fracture.  5.  No additional tendinous or ligamentous pathology  identified.  6.  Edema or cellulitis along the medial aspect of the foot.

## 2021-11-18 NOTE — PLAN OF CARE
Problem: Adult Inpatient Plan of Care  Goal: Plan of Care Review  Outcome: Improving     Problem: Risk for Delirium  Goal: Optimal Coping  Outcome: Improving     Problem: Hyperglycemia  Goal: Blood Glucose Level Within Targeted Range  Outcome: Improving     Problem: Adjustment to Illness (Acute Coronary Syndrome)  Goal: Optimal Adaptation to Illness  Outcome: Improving     Problem: Adult Inpatient Plan of Care  Goal: Absence of Hospital-Acquired Illness or Injury  Intervention: Identify and Manage Fall Risk  Recent Flowsheet Documentation  Taken 11/17/2021 1555 by Nadine Douglass, RN  Safety Promotion/Fall Prevention:   chair alarm on   clutter free environment maintained  Intervention: Prevent Skin Injury  Recent Flowsheet Documentation  Taken 11/17/2021 1555 by Nadine Douglass, RN  Body Position: heels elevated     Problem: Tissue Perfusion (Acute Coronary Syndrome)  Goal: Adequate Tissue Perfusion  Intervention: Optimize Cardiac Tissue Perfusion  Recent Flowsheet Documentation  Taken 11/17/2021 1555 by Nadine Douglass, RN  Activity Management: up in chair   Zeke had a good shift. No complaints of pain at this time. Dressing had old dried drainage on it. He is aware of procedure tomorrow, no time yet. Continues on IV abx

## 2021-11-18 NOTE — PLAN OF CARE
Pt reported no paint this shift. VSS on room air. Right radial angio site WDL. IV fluids stopped. Blood glucose checks q4hr with insulin. NSR on tele. Non-weight bearing on the left leg. Slept in the chair overnight. Pt reports being told by MD, possibility for surgery today so NPO since midnight.     Problem: Adult Inpatient Plan of Care  Goal: Readiness for Transition of Care  Outcome: Improving     Problem: Risk for Delirium  Goal: Improved Attention and Thought Clarity  Outcome: Improving     Problem: Hyperglycemia  Goal: Blood Glucose Level Within Targeted Range  Outcome: Improving     Problem: Dysrhythmia (Acute Coronary Syndrome)  Goal: Normalized Cardiac Rhythm  Outcome: Improving     Problem: Cardiac-Related Pain (Acute Coronary Syndrome)  Goal: Absence of Cardiac-Related Pain  Outcome: Improving     Problem: Infection  Goal: Absence of Infection Signs and Symptoms  Outcome: Improving

## 2021-11-18 NOTE — PROGRESS NOTES
Surgery Scheduled      Surgery/Procedure: I&D left foot    Special Equipment: pulse lavage    Location: [facility]    Date: 11/19    Time: 4:30    Surgeon: Dr. Ram    OR Confirmed/ :  Yes with Liliane on 11/18    Orders In:  Yes    Entered on [employer] / [employer] Calendar:  Yes    Post Op: TBS    Covid Scheduled: inpt      Blood Thinners Addressed: N/A- inpt being managed

## 2021-11-18 NOTE — PROGRESS NOTES
Diabetes Care:    Stopped back to see patient to tell him that either CGMS is covered by his insurance.  He has the pamphlets so will do some research on both so he can decide, then get a rx from his doctor.    I also asked him if he wanted a set dose of Novolog at meals or use an insulin to carb ratio.  I reviewed the advantages of the carb ratio, eating less or more and matching insulin better to carbs consumed. He agreed with the variability of all that is going on, that would be the best. Reviewed the resources of where to find carb grams. He uses the computer a lot and states he also is very familiar with carbs and portions.      Lorie Haines RN, Certified Diabetes Care and     [address] [address]  [email or web address]   Office: [phone]  Pager: [phone]

## 2021-11-18 NOTE — PROGRESS NOTES
FOOT AND ANKLE SURGERY/PODIATRY Progress Note        ASSESSMENT:   S/p I&D with 4th/5th ray amputations left foot   DM2      TREATMENT:  -Doing well today. Denies foot pain. Pathology and cultures pending. WBC 8.4 on 11/17. Afebrile.    -We discussed second wash out surgery to be scheduled for tomorrow to remove any remaining non-viable tissue and bone.     -Recommend continued non-weight bearing on the left foot. Surgical dressing to remain intact.     -Medical management per hospitalist. Antibiotics per ID. Left foot surgery scheduled for tomorrow. NPO at midnight.     Eddi Ram DPM  [facility] Podiatry/Foot & Ankle Surgery      HPI: Norman Aguilar was seen again today s/p I&D with 4th/5th ray amputations left foot. Doing well today.     History reviewed. No pertinent past medical history.    Past Surgical History:   Procedure Laterality Date     AMPUTATE TOE(S) Left 11/16/2021    Procedure: first and second ray amputation;  Surgeon: Eddi Ram DPM;  Location: [facility] OR     CV CORONARY ANGIOGRAM N/A 11/16/2021    Procedure: CV CORONARY ANGIOGRAM;  Surgeon: Pam Tabares MD;  Location: [facility] CATH LAB CV     CV LEFT HEART CATH N/A 11/16/2021    Procedure: Left Heart Cath;  Surgeon: Pam Tabares MD;  Location: [facility] CATH LAB CV     INCISION AND DRAINAGE LOWER EXTREMITY, COMBINED Left 11/16/2021    Procedure: INCISION AND DRAINAGE, left foot;  Surgeon: Eddi Ram DPM;  Location: [facility] OR       No Known Allergies      Current Facility-Administered Medications:      [START ON 11/19/2021] acetaminophen (TYLENOL) tablet 650 mg, 650 mg, Oral, Q4H PRN, Eddi Ram DPM     acetaminophen (TYLENOL) tablet 975 mg, 975 mg, Oral, Q8H, Eddi Ram DPM, 975 mg at 11/18/21 0424     aspirin (ASA) chewable tablet 81 mg, 81 mg, Oral, Daily, Eddi Ram DPM, 81 mg at 11/18/21 0910     atorvastatin (LIPITOR) tablet 80 mg, 80 mg, Oral,  QPM, Eddi Ram DPM, 80 mg at 11/17/21 2135     bisacodyl (DULCOLAX) Suppository 10 mg, 10 mg, Rectal, Daily PRN, Eddi Ram DPM     dextrose 5% and 0.45% NaCl infusion, 1,000 mL, Intravenous, Continuous PRN, Eddi Ram DPM     glucose gel 15-30 g, 15-30 g, Oral, Q15 Min PRN **OR** dextrose 50 % injection 25-50 mL, 25-50 mL, Intravenous, Q15 Min PRN **OR** glucagon injection 1 mg, 1 mg, Subcutaneous, Q15 Min PRN, Eddi Ram DPM     fentaNYL (PF) (SUBLIMAZE) injection 50 mcg, 50 mcg, Intravenous, Continuous PRN, Eddi Ram DPM     heparin ANTICOAGULANT injection 5,000 Units, 5,000 Units, Subcutaneous, Q8H, Sean Cerda MD, 5,000 Units at 11/18/21 0425     HOLD:  Metformin and metformin containing medications if patient received IV contrast with acute kidney injury or severe chronic kidney disease (stage IV or stage V; i.e., eGFR less than 30), , Does not apply, HOLD, Pam Tabares MD     hydrALAZINE (APRESOLINE) injection 10 mg, 10 mg, Intravenous, Once PRN, Pam Tabares MD     HYDROmorphone (PF) (DILAUDID) injection 0.5 mg, 0.5 mg, Intravenous, Q2H PRN, Eddi Ram DPM     insulin aspart (NovoLOG) injection (RAPID ACTING), 1-12 Units, Subcutaneous, Q4H, Eddi Ram DPM, 1 Units at 11/18/21 0425     insulin aspart (NovoLOG) injection (RAPID ACTING), , Subcutaneous, QAM AC, Eddi Ram DPM, 7 Units at 11/17/21 0948     insulin aspart (NovoLOG) injection (RAPID ACTING), , Subcutaneous, Daily with lunch, Eddi Ram DPM     insulin aspart (NovoLOG) injection (RAPID ACTING), , Subcutaneous, Daily with supper, Eddi Ram DPM, 4 Units at 11/17/21 1731     insulin glargine (LANTUS PEN) injection 34 Units, 34 Units, Subcutaneous, Daily, Narda Egan DO, 34 Units at 11/18/21 0910     [Held by provider] lactated ringers infusion, , Intravenous, Continuous, Eddi Ram, DPM, Stopped  at 11/18/21 0430     lidocaine (LMX4) cream, , Topical, Q1H PRN, Eddi Ram DPM     lidocaine 1 % 0.1-1 mL, 0.1-1 mL, Other, Q1H PRN, Eddi Ram DPM     lisinopril (ZESTRIL) tablet 10 mg, 10 mg, Oral, Daily, Moshe Davis MD, 10 mg at 11/18/21 0909     magnesium hydroxide (MILK OF MAGNESIA) suspension 30 mL, 30 mL, Oral, Daily PRN, Eddi Ram DPM     melatonin tablet 1 mg, 1 mg, Oral, At Bedtime PRN, Eddi Ram DPM     metoprolol succinate ER (TOPROL-XL) 24 hr tablet 25 mg, 25 mg, Oral, Daily, Eddi Ram DPM, 25 mg at 11/18/21 0909     midazolam (VERSED) injection 1 mg, 1 mg, Intravenous, Continuous PRN, Eddi Ram DPM     ondansetron (ZOFRAN-ODT) ODT tab 4 mg, 4 mg, Oral, Q6H PRN **OR** ondansetron (ZOFRAN) injection 4 mg, 4 mg, Intravenous, Q6H PRN, Eddi aRm DPM     oxyCODONE (ROXICODONE) tablet 5 mg, 5 mg, Oral, Q4H PRN **OR** oxyCODONE (ROXICODONE) tablet 10 mg, 10 mg, Oral, Q4H PRN, Pam Tabares MD     oxyCODONE (ROXICODONE) tablet 10 mg, 10 mg, Oral, Q4H PRN **OR** oxyCODONE IR (ROXICODONE) tablet 15 mg, 15 mg, Oral, Q4H PRN, Eddi Ram DPM     pantoprazole (PROTONIX) EC tablet 40 mg, 40 mg, Oral, QAM Julisa VAZ Christa L, DO, 40 mg at 11/18/21 0700     Patient is already receiving anticoagulation with heparin, enoxaparin (LOVENOX), warfarin (COUMADIN)  or other anticoagulant medication, , Does not apply, Continuous PRN, Eddi Ram DPM     Patient RECEIVING antibiotic to treat a different condition and it provides ADEQUATE COVERAGE for this surgical procedure., 1 each, As instructed, Yo Hernandez Vincent Allen, DPM     Patient RECEIVING antibiotic to treat a different condition and it provides ADEQUATE COVERAGE for this surgical procedure., 1 each, As instructed, Yo Hernandez Vincent Allen, DPM     piperacillin-tazobactam (ZOSYN) 3.375 g vial to attach to  mL bag, 3.375 g,  Intravenous, Q8H, Eddi Ram DPM, 3.375 g at 11/18/21 0424     polyethylene glycol (MIRALAX) Packet 17 g, 17 g, Oral, Daily, Eddi Ram, MATT     prochlorperazine (COMPAZINE) injection 10 mg, 10 mg, Intravenous, Q6H PRN **OR** prochlorperazine (COMPAZINE) tablet 10 mg, 10 mg, Oral, Q6H PRN, Eddi Ram DPM     senna-docusate (SENOKOT-S/PERICOLACE) 8.6-50 MG per tablet 1 tablet, 1 tablet, Oral, BID, Eddi Ram DPM, 1 tablet at 11/16/21 2037     sodium chloride (PF) 0.9% PF flush 3 mL, 3 mL, Intracatheter, Q8H, Eddi Ram, DPM, 3 mL at 11/18/21 0910     sodium chloride (PF) 0.9% PF flush 3 mL, 3 mL, Intracatheter, q1 min prn, Eddi Ram, DPM     sodium chloride (PF) 0.9% PF flush 3 mL, 3 mL, Intracatheter, Q8H, Eddi Ram, DPM, 3 mL at 11/17/21 1902     sodium chloride (PF) 0.9% PF flush 3 mL, 3 mL, Intracatheter, q1 min prn, Eddi Ram, DPM, 3 mL at 11/17/21 0846    History reviewed. No pertinent family history.    Social History     Socioeconomic History     Marital status: Single     Spouse name: Not on file     Number of children: Not on file     Years of education: Not on file     Highest education level: Not on file   Occupational History     Not on file   Tobacco Use     Smoking status: Former Smoker     Types: Cigars     Smokeless tobacco: Not on file     Tobacco comment: Used to smoke a very occational cigar   Vaping Use     Vaping Use: Never used   Substance and Sexual Activity     Alcohol use: Not on file     Drug use: Not on file     Sexual activity: Not on file   Other Topics Concern     Not on file   Social History Narrative     Not on file     Social Determinants of Health     Financial Resource Strain: Not on file   Food Insecurity: Not on file   Transportation Needs: Not on file   Physical Activity: Not on file   Stress: Not on file   Social Connections: Not on file   Intimate Partner Violence: Not on file  "  Housing Stability: Not on file       10 point Review of Systems is negative except for left foot infection which is noted in HPI.     /56 (BP Location: Left arm)   Pulse 68   Temp 97.9  F (36.6  C) (Oral)   Resp 16   Ht 1.854 m (6' 1\")   Wt 101.9 kg (224 lb 9.6 oz)   SpO2 99%   BMI 29.63 kg/m      BMI= Body mass index is 29.63 kg/m .    OBJECTIVE:  General appearance: Patient is alert and fully cooperative with history & exam.  No sign of distress is noted during the visit.  Surgical dressing c/d/I left foot.      Imaging:     MR Foot Left w/o & w Contrast    Result Date: 11/15/2021  EXAM: MR FOOT LEFT W/O and W CONTRAST LOCATION: [facility] DATE/TIME: 11/15/2021 10:45 AM INDICATION: Foot swelling, diabetic, osteomyelitis suspected, no prior imaging. COMPARISON: None. TECHNIQUE: Routine. Additional postgadolinium T1 sequences were obtained. IV CONTRAST: 10 mL Gadavist. FINDINGS: JOINTS AND BONES: -No evidence for fracture. No marrow signal abnormality to suggest osteomyelitis. No significant effusion to suggest septic arthropathy. There is some reactive edema within the sesamoid bones adjacent to the first metatarsal head which could represent sesamoiditis. TENDONS: -Flexor hallucis tendon tenosynovitis. The remaining flexor tendons are negative. The extensor tendons are negative for tendinopathy, tenosynovitis, or tearing. LIGAMENTS: -The ligament of Lisfranc is intact. The collateral ligaments at the MTP joints are all intact. MUSCLES AND SOFT TISSUES: -There is an ulceration along the medial aspect of the forefoot with surrounding edema and/or cellulitis. Fluid and susceptibility artifact suggestive of bubbles of gas extend into the soft tissues surrounding the first MTP joint into the webspace between the first and second toes but all of this appears likely external to the joint capsule. There is nonenhancing fluid consistent with abscess surrounding the first MTP joint " but this may spontaneously drain to the skin surface. It also extends proximally into the fatty replaced plantar musculature deep to the first and second metatarsal shafts.     IMPRESSION: 1.  No evidence for osteomyelitis or significant effusion to suggest septic arthropathy. 2.  However, there is a soft tissue ulceration along the medial aspect of the forefoot adjacent to the first MTP joint. Fluid and susceptibly artifact suggestive of gas bubbles extend nearly circumferentially about the first MTP joint and base of the great toe into the webspace between the first and second toes consistent with abscess. Nonenhancing fluid also extends proximally deep to the first and second metatarsal shafts consistent with abscess although this may spontaneously communicate to the ulceration. 3.  Fluid surrounding the flexor hallucis tendon sheath consistent with a toxic tenosynovitis. 4.  No evidence for fracture. 5.  No additional tendinous or ligamentous pathology identified. 6.  Edema or cellulitis along the medial aspect of the foot.    Cardiac Catheterization    Result Date: 2021    NSTEMI in the setting of diabtic ketoacidosis and atrial fibrillation with RVR.   Severe, diffuse coronary calcification with diffusely small distal coronaries consistent with severe diabetic vasculopathy.   Severe proximal, mid and apical LAD disease. The first diagonal is occluded and is weakly collateralized.    of the proximal circumflex with right to left collateral filling of OM-2 and OM-3   Severe distal RCA disease before the bifurcation and moderate ostial PDA disease.   LV EDP moderately elevated at 16 mmHg.      Echocardiogram Complete    Result Date: 11/15/2021  [id number]^[place] [address]  Name: JOSE YO MRN: [id number] : 1960 Study Date: 11/15/2021 08:39 AM Age: 61 yrs Gender: Male Patient Location: [facility] Reason For  Study: Chest Pain Ordering Physician: HANK LARIOS Referring Physician: HANK LARIOS Performed By: LH  BSA: 2.3 m2 Height: 73 in Weight: 225 lb HR: 89 ______________________________________________________________________________ Procedure Complete Echo Adult. Definity (NDC #07736-149) given intravenously. ______________________________________________________________________________ Interpretation Summary  The left ventricle is normal in size. Left ventricular function is decreased. The ejection fraction is 30-35% (moderately reduced). Left ventricular diastolic function is abnormal. Diastolic Doppler findings (E/E' ratio and/or other parameters) suggest left ventricular filling pressures are increased. The left atrium is mildly dilated. The mitral valve leaflets are mildly thickened. There is mild (1+) mitral regurgitation. IVC diameter >2.1 cm collapsing <50% with sniff suggests a high RA pressure estimated at 15 mmHg or greater. ______________________________________________________________________________ Left Ventricle The left ventricle is normal in size. Left ventricular function is decreased. The ejection fraction is 30-35% (moderately reduced). There is normal left ventricular wall thickness. Left ventricular diastolic function is abnormal. Diastolic Doppler findings (E/E' ratio and/or other parameters) suggest left ventricular filling pressures are increased. There is moderate global hypokinesia of the left ventricle.  Right Ventricle Normal right ventricle size and systolic function.  Atria The left atrium is mildly dilated. Right atrial size is normal. There is no color Doppler evidence of an atrial shunt.  Mitral Valve The mitral valve leaflets are mildly thickened. There is mild (1+) mitral regurgitation.  Tricuspid Valve Tricuspid valve leaflets appear normal. Right ventricle systolic pressure estimate normal. There is trace tricuspid regurgitation.  Aortic Valve The aortic valve is not well  visualized. No aortic regurgitation is present. No aortic stenosis is present.  Pulmonic Valve The pulmonic valve is not well visualized. There is no pulmonic valvular regurgitation.  Vessels The aorta root is normal. Normal size ascending aorta. IVC diameter >2.1 cm collapsing <50% with sniff suggests a high RA pressure estimated at 15 mmHg or greater.  Pericardium There is no pericardial effusion.  ______________________________________________________________________________ MMode/2D Measurements & Calculations IVSd: 0.90 cm LVIDd: 5.5 cm LVIDs: 4.7 cm LVPWd: 0.98 cm FS: 13.8 %  LV mass(C)d: 194.3 grams LV mass(C)dI: 85.9 grams/m2 Ao root diam: 3.6 cm LA dimension: 4.0 cm asc Aorta Diam: 3.2 cm LA/Ao: 1.1 LVOT diam: 2.2 cm LVOT area: 3.8 cm2 LA Volume Indexed (AL/bp): 42.4 ml/m2 RWT: 0.36  Time Measurements MM HR: 90.0 BPM  Doppler Measurements & Calculations MV E max juan carlos: 100.0 cm/sec MV A max juan carlos: 66.5 cm/sec MV E/A: 1.5 MV dec slope: 712.9 cm/sec2 MV dec time: 0.14 sec Ao V2 max: 91.5 cm/sec Ao max PG: 3.0 mmHg Ao V2 mean: 73.8 cm/sec Ao mean P.3 mmHg Ao V2 VTI: 17.4 cm JAEL(I,D): 3.4 cm2 JAEL(V,D): 3.5 cm2 LV V1 max P.8 mmHg LV V1 max: 82.9 cm/sec LV V1 VTI: 15.6 cm SV(LVOT): 60.1 ml SI(LVOT): 26.6 ml/m2 AV Juan Carlos Ratio (DI): 0.91 JAEL Index (cm2/m2): 1.5 E/E' av.8 Lateral E/e': 20.9 Medial E/e': 18.8  ______________________________________________________________________________ Report approved by: Eveline Hunter 11/15/2021 10:40 AM       US Lower Extremity Arterial Duplex Bilateral    Result Date: 2021  EXAM: RESTING ANKLE-BRACHIAL INDICES (ABIs) DUPLEX ARTERIAL ULTRASOUND OF THE LOWER EXTREMITIES BILATERAL LOCATION: [facility] DATE/TIME: 2021 1:02 PM INDICATION: PAD. Left first and second toe amputation. TECHNIQUE: Duplex imaging is performed utilizing gray-scale, two-dimensional images and color-flow imaging. Doppler waveform analysis and spectral Doppler  imaging is also performed. COMPARISON: None MCKAYLA FINDINGS: RIGHT Brachial: 141 Ankle (PT): 168 Index: 1.19 Ankle (DP): 146 Index: 1.04 Digit: 88 Index: 0.62 LEFT Ankle (PT): 172 Index: 1.22 Ankle (DP): 121 Index: 0.86 The right MCKAYLA at rest is 1.2. Right digital index 0.62. The left MCKAYLA at rest is 1.2.  WAVEFORMS: The dorsalis pedis and posterior tibial arteries are multiphasic bilaterally. DUPLEX ARTERIAL ULTRASOUND FINDINGS: LOWER EXTREMITY ARTERIAL DUPLEX EXAM WITH WAVEFORMS RIGHT (cm/s) EIA: 100 CFA: 76 PFA: 76 SFA-Proximal: 109 SFA-Mid: 69 SFA-Distal: 65 Popliteal: 57 PTA: 90 JUAN: 67 DPA: 57 (M=monophasic, B=biphasic, T=triphasic) LEFT (cm/s) EIA: 88 CFA: 67 PFA: 55 SFA-Proximal: 97 SFA-Mid: 104 SFA-Distal: 66 Popliteal: 74 PTA: 97 JUAN: 144 DPA: 24 (M=monophasic, B=biphasic, T=triphasic) RIGHT: Multiphasic waveforms throughout the right lower extremity. No significant arterial stenosis identified. LEFT: Multiple lymph nodes identified left groin measuring 3.5 x 2.7 x 1.4 cm. They exhibit a fatty hilum and I suspect these are reactive in nature. Multiphasic waveforms throughout the left lower extremity with no significant stenosis identified.     IMPRESSION: 1.  RIGHT LOWER EXTREMITY: Right MCKAYLA at rest is normal. No significant arterial stenosis identified on duplex imaging. 2.  LEFT LOWER EXTREMITY: Left MCKAYLA at rest is normal. No significant arterial stenosis identified on duplex imaging. Left groin lymphadenopathy which I suspect represent reactive lymph nodes related to left lower extremity infection/inflammation.     US MCKAYLA Doppler No Exercise 1-2 Levels Bilateral    Result Date: 11/17/2021  EXAM: RESTING ANKLE-BRACHIAL INDICES (ABIs) DUPLEX ARTERIAL ULTRASOUND OF THE LOWER EXTREMITIES BILATERAL LOCATION: [facility] DATE/TIME: 11/17/2021 1:02 PM INDICATION: PAD. Left first and second toe amputation. TECHNIQUE: Duplex imaging is performed utilizing gray-scale, two-dimensional images and  color-flow imaging. Doppler waveform analysis and spectral Doppler imaging is also performed. COMPARISON: None MCKAYLA FINDINGS: RIGHT Brachial: 141 Ankle (PT): 168 Index: 1.19 Ankle (DP): 146 Index: 1.04 Digit: 88 Index: 0.62 LEFT Ankle (PT): 172 Index: 1.22 Ankle (DP): 121 Index: 0.86 The right MCKAYLA at rest is 1.2. Right digital index 0.62. The left MCKAYLA at rest is 1.2.  WAVEFORMS: The dorsalis pedis and posterior tibial arteries are multiphasic bilaterally. DUPLEX ARTERIAL ULTRASOUND FINDINGS: LOWER EXTREMITY ARTERIAL DUPLEX EXAM WITH WAVEFORMS RIGHT (cm/s) EIA: 100 CFA: 76 PFA: 76 SFA-Proximal: 109 SFA-Mid: 69 SFA-Distal: 65 Popliteal: 57 PTA: 90 JUAN: 67 DPA: 57 (M=monophasic, B=biphasic, T=triphasic) LEFT (cm/s) EIA: 88 CFA: 67 PFA: 55 SFA-Proximal: 97 SFA-Mid: 104 SFA-Distal: 66 Popliteal: 74 PTA: 97 JUAN: 144 DPA: 24 (M=monophasic, B=biphasic, T=triphasic) RIGHT: Multiphasic waveforms throughout the right lower extremity. No significant arterial stenosis identified. LEFT: Multiple lymph nodes identified left groin measuring 3.5 x 2.7 x 1.4 cm. They exhibit a fatty hilum and I suspect these are reactive in nature. Multiphasic waveforms throughout the left lower extremity with no significant stenosis identified.     IMPRESSION: 1.  RIGHT LOWER EXTREMITY: Right MCKAYLA at rest is normal. No significant arterial stenosis identified on duplex imaging. 2.  LEFT LOWER EXTREMITY: Left MCKAYLA at rest is normal. No significant arterial stenosis identified on duplex imaging. Left groin lymphadenopathy which I suspect represent reactive lymph nodes related to left lower extremity infection/inflammation.     Foot XR, G/E 3 views, left    Result Date: 11/14/2021  EXAM: XR FOOT LEFT G/E 3 VIEWS LOCATION: [facility] DATE/TIME: 11/14/2021 9:21 PM INDICATION: diabetic wounds; rule out SQ gas, eval osteomyelitis COMPARISON: None.     IMPRESSION: Subcutaneous emphysema in the base of the great toe and the first  "interspace to the level of the distal metatarsal metaphysis. No proximal subcutaneous emphysema. No cortical erosion to suggest osteomyelitis. No fractures are evident. Dorsal soft tissue swelling. Atheromatous calcification. Hypertrophic changes in the ankle joint.    POC US Guidance Needle Placement    Result Date: 11/16/2021  Ultrasound was performed as guidance to an anesthesia procedure.  Click \"PACS images\" hyperlink below to view any stored images.  For specific procedure details, view procedure note authored by anesthesia.       "

## 2021-11-18 NOTE — PROGRESS NOTES
A/P    61 year old diabetic male who was admitted on 11/14/2021.  He presented with concerns overnight non-healing ulcer on his left foot and found to be in acute DKA.  Further evaluation positive for a  Non-STEMI and  severe cellulitis with gangrene of the first ray.        DKA on presentation:  resolved  --Lantus 34U every day  -novolog 1U:10g CHO ac tid  -High resistant sliding scale insulin AC TID  -accuchecks ac/hs  -CDE consult appreciated     Left diabetic foot infection with wet gangrene  --MRI showed no osteomyelitis  -POD 2 - Amputation left hallux, Amputation 2nd digit left foot, Partial amputation 1st metatarsal left foot, Partial amputation 2nd metatarsal left foot, Excision sesamoids left foot, Incision and drainage left foot by podiatry.  Superficial cultures noted to be polymicrobial - agalactiae, Staph aureus, Klebsiella oxytoca.   Intra-Op cultures growing only Streptococcus agalactiae.  -surgical debridement/washout in a.m.  -Continue IV vanco and zoysn per ID     Sepsis 2/2 Bacteremia with Streptococcus agalactiae due to problem #2.    -Positive blood cultures on 11/14.  Follow-up on 11/16 in process.  Source is left foot infection as growing Streptococcus agalactiae from the foot as well.  -follow cultures  -IV Vanc/zosyn     Non-STEMI  --Coronary angiogram 11/16 - severe, diffuse coronary calcifications  --[facility] consulted and plan for CABG in 2-3 months  - lisinopril 10mg/day today  -metoprolol xl 25mg/day  -asa 81mg every day  -lipitor 80mg qd     PAF: in NSR currently.  -toprol XL  -consider eliquis when left foot surgical procedures are completed.     JOHANA on CKD stage III: improved.  -cmp a.m.     Acute systolic heart failure, ischemic origin, EF of 30 to 35%  --Toprol XL 25mg every day, Lisinopril 10mg every day  -Lasix 20mg every day    Hyponatremia   -resolved     Hypomagnesemia  --Continue to monitor and replace per protocol        Barriers to discharge: IV antibiotics,  podiatry        Diet: Consistent Carbohydrate Diet Low Consistent Carb (45 g CHO per Meal) Diet    DVT Prophylaxis: Heparin subcutaneous q 8hrs (hold for surg. In a.m.)  Garcia Catheter: Not present  Code Status: Full Code       covid negative 11/14        S:  Afebrile. Events overnight noted    O:  Temp: 97.9  F (36.6  C) Temp src: Oral BP: 139/66 Pulse: 81   Resp: 18 SpO2: 100 % O2 Device: None (Room air)    gen nad  cv rrr  Lungs cta  abd bs+, nttp  Neuro non focal    Lab/imaging reviewed

## 2021-11-19 ENCOUNTER — APPOINTMENT (OUTPATIENT)
Dept: OCCUPATIONAL THERAPY | Facility: HOSPITAL | Age: 61
DRG: 616 | End: 2021-11-19
Payer: COMMERCIAL

## 2021-11-19 ENCOUNTER — ANESTHESIA EVENT (OUTPATIENT)
Dept: SURGERY | Facility: HOSPITAL | Age: 61
DRG: 616 | End: 2021-11-19
Payer: COMMERCIAL

## 2021-11-19 ENCOUNTER — HOME INFUSION (PRE-WILLOW HOME INFUSION) (OUTPATIENT)
Dept: PHARMACY | Facility: CLINIC | Age: 61
End: 2021-11-19
Payer: COMMERCIAL

## 2021-11-19 ENCOUNTER — ANESTHESIA (OUTPATIENT)
Dept: SURGERY | Facility: HOSPITAL | Age: 61
DRG: 616 | End: 2021-11-19
Payer: COMMERCIAL

## 2021-11-19 LAB
ALBUMIN SERPL-MCNC: 1.8 G/DL (ref 3.5–5)
ALP SERPL-CCNC: 86 U/L (ref 45–120)
ALT SERPL W P-5'-P-CCNC: 13 U/L (ref 0–45)
ANION GAP SERPL CALCULATED.3IONS-SCNC: 8 MMOL/L (ref 5–18)
AST SERPL W P-5'-P-CCNC: 21 U/L (ref 0–40)
BASOPHILS # BLD MANUAL: 0.1 10E3/UL (ref 0–0.2)
BASOPHILS NFR BLD MANUAL: 2 %
BILIRUB SERPL-MCNC: 0.4 MG/DL (ref 0–1)
BUN SERPL-MCNC: 11 MG/DL (ref 8–22)
CALCIUM SERPL-MCNC: 8.5 MG/DL (ref 8.5–10.5)
CHLORIDE BLD-SCNC: 107 MMOL/L (ref 98–107)
CO2 SERPL-SCNC: 25 MMOL/L (ref 22–31)
CREAT SERPL-MCNC: 1.15 MG/DL (ref 0.7–1.3)
EOSINOPHIL # BLD MANUAL: 0.1 10E3/UL (ref 0–0.7)
EOSINOPHIL NFR BLD MANUAL: 1 %
ERYTHROCYTE [DISTWIDTH] IN BLOOD BY AUTOMATED COUNT: 14.3 % (ref 10–15)
GFR SERPL CREATININE-BSD FRML MDRD: 68 ML/MIN/1.73M2
GLUCOSE BLD-MCNC: 187 MG/DL (ref 70–125)
GLUCOSE BLDC GLUCOMTR-MCNC: 110 MG/DL (ref 70–99)
GLUCOSE BLDC GLUCOMTR-MCNC: 125 MG/DL (ref 70–99)
GLUCOSE BLDC GLUCOMTR-MCNC: 155 MG/DL (ref 70–99)
GLUCOSE BLDC GLUCOMTR-MCNC: 174 MG/DL (ref 70–99)
HCT VFR BLD AUTO: 35.3 % (ref 40–53)
HGB BLD-MCNC: 11.5 G/DL (ref 13.3–17.7)
LYMPHOCYTES # BLD MANUAL: 1.8 10E3/UL (ref 0.8–5.3)
LYMPHOCYTES NFR BLD MANUAL: 31 %
MAGNESIUM SERPL-MCNC: 1.7 MG/DL (ref 1.8–2.6)
MCH RBC QN AUTO: 28.5 PG (ref 26.5–33)
MCHC RBC AUTO-ENTMCNC: 32.6 G/DL (ref 31.5–36.5)
MCV RBC AUTO: 88 FL (ref 78–100)
METAMYELOCYTES # BLD MANUAL: 0.1 10E3/UL
METAMYELOCYTES NFR BLD MANUAL: 1 %
MONOCYTES # BLD MANUAL: 0.4 10E3/UL (ref 0–1.3)
MONOCYTES NFR BLD MANUAL: 7 %
NEUTROPHILS # BLD MANUAL: 3.3 10E3/UL (ref 1.6–8.3)
NEUTROPHILS NFR BLD MANUAL: 58 %
PATH REPORT.COMMENTS IMP SPEC: NORMAL
PATH REPORT.COMMENTS IMP SPEC: NORMAL
PATH REPORT.FINAL DX SPEC: NORMAL
PATH REPORT.GROSS SPEC: NORMAL
PATH REPORT.MICROSCOPIC SPEC OTHER STN: NORMAL
PATH REPORT.RELEVANT HX SPEC: NORMAL
PHOTO IMAGE: NORMAL
PLAT MORPH BLD: ABNORMAL
PLATELET # BLD AUTO: 258 10E3/UL (ref 150–450)
POLYCHROMASIA BLD QL SMEAR: SLIGHT
POTASSIUM BLD-SCNC: 2.9 MMOL/L (ref 3.5–5)
POTASSIUM BLD-SCNC: 3.4 MMOL/L (ref 3.5–5)
PROT SERPL-MCNC: 6 G/DL (ref 6–8)
RBC # BLD AUTO: 4.03 10E6/UL (ref 4.4–5.9)
RBC MORPH BLD: ABNORMAL
SODIUM SERPL-SCNC: 140 MMOL/L (ref 136–145)
WBC # BLD AUTO: 5.7 10E3/UL (ref 4–11)

## 2021-11-19 PROCEDURE — 360N000075 HC SURGERY LEVEL 2, PER MIN: Performed by: PODIATRIST

## 2021-11-19 PROCEDURE — 99232 SBSQ HOSP IP/OBS MODERATE 35: CPT | Performed by: STUDENT IN AN ORGANIZED HEALTH CARE EDUCATION/TRAINING PROGRAM

## 2021-11-19 PROCEDURE — 87076 CULTURE ANAEROBE IDENT EACH: CPT | Performed by: PODIATRIST

## 2021-11-19 PROCEDURE — 250N000009 HC RX 250: Performed by: NURSE ANESTHETIST, CERTIFIED REGISTERED

## 2021-11-19 PROCEDURE — 11044 DBRDMT BONE 1ST 20 SQ CM/<: CPT | Mod: 78 | Performed by: PODIATRIST

## 2021-11-19 PROCEDURE — 99233 SBSQ HOSP IP/OBS HIGH 50: CPT | Performed by: INTERNAL MEDICINE

## 2021-11-19 PROCEDURE — 0QBP0ZZ EXCISION OF LEFT METATARSAL, OPEN APPROACH: ICD-10-PCS | Performed by: PODIATRIST

## 2021-11-19 PROCEDURE — 250N000009 HC RX 250: Performed by: ANESTHESIOLOGY

## 2021-11-19 PROCEDURE — 250N000013 HC RX MED GY IP 250 OP 250 PS 637: Performed by: INTERNAL MEDICINE

## 2021-11-19 PROCEDURE — 84132 ASSAY OF SERUM POTASSIUM: CPT | Performed by: INTERNAL MEDICINE

## 2021-11-19 PROCEDURE — 36415 COLL VENOUS BLD VENIPUNCTURE: CPT | Performed by: INTERNAL MEDICINE

## 2021-11-19 PROCEDURE — 97110 THERAPEUTIC EXERCISES: CPT | Mod: GO

## 2021-11-19 PROCEDURE — 250N000013 HC RX MED GY IP 250 OP 250 PS 637: Performed by: PODIATRIST

## 2021-11-19 PROCEDURE — 250N000011 HC RX IP 250 OP 636

## 2021-11-19 PROCEDURE — 999N000141 HC STATISTIC PRE-PROCEDURE NURSING ASSESSMENT: Performed by: PODIATRIST

## 2021-11-19 PROCEDURE — 272N000001 HC OR GENERAL SUPPLY STERILE: Performed by: PODIATRIST

## 2021-11-19 PROCEDURE — 250N000009 HC RX 250

## 2021-11-19 PROCEDURE — 250N000011 HC RX IP 250 OP 636: Performed by: NURSE ANESTHETIST, CERTIFIED REGISTERED

## 2021-11-19 PROCEDURE — 99232 SBSQ HOSP IP/OBS MODERATE 35: CPT | Performed by: INTERNAL MEDICINE

## 2021-11-19 PROCEDURE — 250N000011 HC RX IP 250 OP 636: Performed by: INTERNAL MEDICINE

## 2021-11-19 PROCEDURE — 80053 COMPREHEN METABOLIC PANEL: CPT | Performed by: INTERNAL MEDICINE

## 2021-11-19 PROCEDURE — 370N000017 HC ANESTHESIA TECHNICAL FEE, PER MIN: Performed by: PODIATRIST

## 2021-11-19 PROCEDURE — 28002 TREATMENT OF FOOT INFECTION: CPT | Mod: 78 | Performed by: PODIATRIST

## 2021-11-19 PROCEDURE — 250N000011 HC RX IP 250 OP 636: Performed by: ANESTHESIOLOGY

## 2021-11-19 PROCEDURE — 88311 DECALCIFY TISSUE: CPT | Mod: 26 | Performed by: PATHOLOGY

## 2021-11-19 PROCEDURE — 87186 SC STD MICRODIL/AGAR DIL: CPT | Performed by: PODIATRIST

## 2021-11-19 PROCEDURE — 87205 SMEAR GRAM STAIN: CPT | Performed by: PODIATRIST

## 2021-11-19 PROCEDURE — 272N000004 HC RX 272: Performed by: PODIATRIST

## 2021-11-19 PROCEDURE — 11047 DBRDMT BONE EACH ADDL: CPT | Mod: 78 | Performed by: PODIATRIST

## 2021-11-19 PROCEDURE — 88305 TISSUE EXAM BY PATHOLOGIST: CPT | Mod: 26 | Performed by: PATHOLOGY

## 2021-11-19 PROCEDURE — 83735 ASSAY OF MAGNESIUM: CPT | Performed by: INTERNAL MEDICINE

## 2021-11-19 PROCEDURE — 258N000003 HC RX IP 258 OP 636: Performed by: NURSE ANESTHETIST, CERTIFIED REGISTERED

## 2021-11-19 PROCEDURE — 250N000011 HC RX IP 250 OP 636: Performed by: PODIATRIST

## 2021-11-19 PROCEDURE — 85027 COMPLETE CBC AUTOMATED: CPT | Performed by: INTERNAL MEDICINE

## 2021-11-19 PROCEDURE — 210N000001 HC R&B IMCU HEART CARE

## 2021-11-19 PROCEDURE — 87075 CULTR BACTERIA EXCEPT BLOOD: CPT | Performed by: PODIATRIST

## 2021-11-19 RX ORDER — FENTANYL CITRATE 50 UG/ML
25 INJECTION, SOLUTION INTRAMUSCULAR; INTRAVENOUS
Status: DISCONTINUED | OUTPATIENT
Start: 2021-11-19 | End: 2021-11-19

## 2021-11-19 RX ORDER — POTASSIUM CHLORIDE 1500 MG/1
40 TABLET, EXTENDED RELEASE ORAL ONCE
Status: COMPLETED | OUTPATIENT
Start: 2021-11-19 | End: 2021-11-19

## 2021-11-19 RX ORDER — PROCHLORPERAZINE MALEATE 10 MG
10 TABLET ORAL EVERY 6 HOURS PRN
Status: DISCONTINUED | OUTPATIENT
Start: 2021-11-19 | End: 2021-11-19

## 2021-11-19 RX ORDER — OXYCODONE HYDROCHLORIDE 5 MG/1
5 TABLET ORAL EVERY 4 HOURS PRN
Status: DISCONTINUED | OUTPATIENT
Start: 2021-11-19 | End: 2021-11-19 | Stop reason: HOSPADM

## 2021-11-19 RX ORDER — ONDANSETRON 4 MG/1
4 TABLET, ORALLY DISINTEGRATING ORAL EVERY 6 HOURS PRN
Status: DISCONTINUED | OUTPATIENT
Start: 2021-11-19 | End: 2021-11-19

## 2021-11-19 RX ORDER — KETAMINE HYDROCHLORIDE 50 MG/ML
INJECTION, SOLUTION INTRAMUSCULAR; INTRAVENOUS PRN
Status: DISCONTINUED | OUTPATIENT
Start: 2021-11-19 | End: 2021-11-19

## 2021-11-19 RX ORDER — MAGNESIUM SULFATE HEPTAHYDRATE 40 MG/ML
2 INJECTION, SOLUTION INTRAVENOUS ONCE
Status: COMPLETED | OUTPATIENT
Start: 2021-11-19 | End: 2021-11-19

## 2021-11-19 RX ORDER — FUROSEMIDE 20 MG
40 TABLET ORAL DAILY
Status: DISCONTINUED | OUTPATIENT
Start: 2021-11-19 | End: 2021-11-20

## 2021-11-19 RX ORDER — METOPROLOL SUCCINATE 25 MG/1
50 TABLET, EXTENDED RELEASE ORAL DAILY
Status: DISCONTINUED | OUTPATIENT
Start: 2021-11-20 | End: 2021-11-20

## 2021-11-19 RX ORDER — ONDANSETRON 4 MG/1
4 TABLET, ORALLY DISINTEGRATING ORAL EVERY 30 MIN PRN
Status: DISCONTINUED | OUTPATIENT
Start: 2021-11-19 | End: 2021-11-19 | Stop reason: HOSPADM

## 2021-11-19 RX ORDER — OXYCODONE HYDROCHLORIDE 5 MG/1
10 TABLET ORAL EVERY 4 HOURS PRN
Status: DISCONTINUED | OUTPATIENT
Start: 2021-11-19 | End: 2021-11-19

## 2021-11-19 RX ORDER — SODIUM CHLORIDE, SODIUM LACTATE, POTASSIUM CHLORIDE, CALCIUM CHLORIDE 600; 310; 30; 20 MG/100ML; MG/100ML; MG/100ML; MG/100ML
INJECTION, SOLUTION INTRAVENOUS CONTINUOUS
Status: DISCONTINUED | OUTPATIENT
Start: 2021-11-19 | End: 2021-11-19 | Stop reason: HOSPADM

## 2021-11-19 RX ORDER — ACETAMINOPHEN 325 MG/1
975 TABLET ORAL EVERY 8 HOURS
Status: DISCONTINUED | OUTPATIENT
Start: 2021-11-19 | End: 2021-11-19

## 2021-11-19 RX ORDER — LIDOCAINE 40 MG/G
CREAM TOPICAL
Status: DISCONTINUED | OUTPATIENT
Start: 2021-11-19 | End: 2021-11-19 | Stop reason: HOSPADM

## 2021-11-19 RX ORDER — GLYCOPYRROLATE 0.2 MG/ML
INJECTION, SOLUTION INTRAMUSCULAR; INTRAVENOUS PRN
Status: DISCONTINUED | OUTPATIENT
Start: 2021-11-19 | End: 2021-11-19

## 2021-11-19 RX ORDER — ONDANSETRON 2 MG/ML
4 INJECTION INTRAMUSCULAR; INTRAVENOUS EVERY 6 HOURS PRN
Status: DISCONTINUED | OUTPATIENT
Start: 2021-11-19 | End: 2021-11-19

## 2021-11-19 RX ORDER — AMOXICILLIN 250 MG
1 CAPSULE ORAL 2 TIMES DAILY
Status: DISCONTINUED | OUTPATIENT
Start: 2021-11-19 | End: 2021-11-19

## 2021-11-19 RX ORDER — LIDOCAINE HYDROCHLORIDE 20 MG/ML
INJECTION, SOLUTION INFILTRATION; PERINEURAL PRN
Status: DISCONTINUED | OUTPATIENT
Start: 2021-11-19 | End: 2021-11-19

## 2021-11-19 RX ORDER — NALOXONE HYDROCHLORIDE 0.4 MG/ML
0.2 INJECTION, SOLUTION INTRAMUSCULAR; INTRAVENOUS; SUBCUTANEOUS
Status: DISCONTINUED | OUTPATIENT
Start: 2021-11-19 | End: 2021-11-21

## 2021-11-19 RX ORDER — NALOXONE HYDROCHLORIDE 0.4 MG/ML
0.4 INJECTION, SOLUTION INTRAMUSCULAR; INTRAVENOUS; SUBCUTANEOUS
Status: DISCONTINUED | OUTPATIENT
Start: 2021-11-19 | End: 2021-11-21

## 2021-11-19 RX ORDER — POTASSIUM CHLORIDE 1500 MG/1
20 TABLET, EXTENDED RELEASE ORAL ONCE
Status: COMPLETED | OUTPATIENT
Start: 2021-11-19 | End: 2021-11-19

## 2021-11-19 RX ORDER — ONDANSETRON 2 MG/ML
4 INJECTION INTRAMUSCULAR; INTRAVENOUS EVERY 30 MIN PRN
Status: DISCONTINUED | OUTPATIENT
Start: 2021-11-19 | End: 2021-11-19 | Stop reason: HOSPADM

## 2021-11-19 RX ORDER — BUPIVACAINE HYDROCHLORIDE 5 MG/ML
INJECTION, SOLUTION EPIDURAL; INTRACAUDAL
Status: COMPLETED | OUTPATIENT
Start: 2021-11-19 | End: 2021-11-19

## 2021-11-19 RX ORDER — HYDROMORPHONE HYDROCHLORIDE 1 MG/ML
0.5 INJECTION, SOLUTION INTRAMUSCULAR; INTRAVENOUS; SUBCUTANEOUS
Status: DISCONTINUED | OUTPATIENT
Start: 2021-11-19 | End: 2021-11-19

## 2021-11-19 RX ORDER — FENTANYL CITRATE 50 UG/ML
50 INJECTION, SOLUTION INTRAMUSCULAR; INTRAVENOUS
Status: DISCONTINUED | OUTPATIENT
Start: 2021-11-19 | End: 2021-11-19 | Stop reason: HOSPADM

## 2021-11-19 RX ORDER — FENTANYL CITRATE 50 UG/ML
INJECTION, SOLUTION INTRAMUSCULAR; INTRAVENOUS
Status: DISCONTINUED
Start: 2021-11-19 | End: 2021-11-19 | Stop reason: HOSPADM

## 2021-11-19 RX ORDER — LIDOCAINE 40 MG/G
CREAM TOPICAL
Status: DISCONTINUED | OUTPATIENT
Start: 2021-11-19 | End: 2021-11-21

## 2021-11-19 RX ORDER — BISACODYL 10 MG
10 SUPPOSITORY, RECTAL RECTAL DAILY PRN
Status: DISCONTINUED | OUTPATIENT
Start: 2021-11-19 | End: 2021-11-19

## 2021-11-19 RX ORDER — PROPOFOL 10 MG/ML
INJECTION, EMULSION INTRAVENOUS CONTINUOUS PRN
Status: DISCONTINUED | OUTPATIENT
Start: 2021-11-19 | End: 2021-11-19

## 2021-11-19 RX ORDER — SODIUM CHLORIDE, SODIUM LACTATE, POTASSIUM CHLORIDE, CALCIUM CHLORIDE 600; 310; 30; 20 MG/100ML; MG/100ML; MG/100ML; MG/100ML
INJECTION, SOLUTION INTRAVENOUS CONTINUOUS PRN
Status: DISCONTINUED | OUTPATIENT
Start: 2021-11-19 | End: 2021-11-19

## 2021-11-19 RX ORDER — OXYCODONE HYDROCHLORIDE 15 MG/1
15 TABLET ORAL EVERY 4 HOURS PRN
Status: DISCONTINUED | OUTPATIENT
Start: 2021-11-19 | End: 2021-11-19

## 2021-11-19 RX ORDER — METOPROLOL SUCCINATE 25 MG/1
25 TABLET, EXTENDED RELEASE ORAL ONCE
Status: COMPLETED | OUTPATIENT
Start: 2021-11-19 | End: 2021-11-19

## 2021-11-19 RX ORDER — POLYETHYLENE GLYCOL 3350 17 G/17G
17 POWDER, FOR SOLUTION ORAL DAILY
Status: DISCONTINUED | OUTPATIENT
Start: 2021-11-20 | End: 2021-11-19

## 2021-11-19 RX ORDER — ACETAMINOPHEN 325 MG/1
650 TABLET ORAL EVERY 4 HOURS PRN
Status: DISCONTINUED | OUTPATIENT
Start: 2021-11-22 | End: 2021-11-24 | Stop reason: HOSPADM

## 2021-11-19 RX ORDER — PROPOFOL 10 MG/ML
INJECTION, EMULSION INTRAVENOUS PRN
Status: DISCONTINUED | OUTPATIENT
Start: 2021-11-19 | End: 2021-11-19

## 2021-11-19 RX ADMIN — PROPOFOL 20 MG: 10 INJECTION, EMULSION INTRAVENOUS at 18:16

## 2021-11-19 RX ADMIN — BUPIVACAINE HYDROCHLORIDE 20 ML: 5 INJECTION, SOLUTION EPIDURAL; INTRACAUDAL at 16:51

## 2021-11-19 RX ADMIN — PIPERACILLIN SODIUM AND TAZOBACTAM SODIUM 3.38 G: 3; .375 INJECTION, POWDER, LYOPHILIZED, FOR SOLUTION INTRAVENOUS at 04:39

## 2021-11-19 RX ADMIN — MIDAZOLAM HYDROCHLORIDE 1 MG: 1 INJECTION, SOLUTION INTRAMUSCULAR; INTRAVENOUS at 16:44

## 2021-11-19 RX ADMIN — BUPIVACAINE HYDROCHLORIDE 10 ML: 5 INJECTION, SOLUTION EPIDURAL; INTRACAUDAL; PERINEURAL at 16:53

## 2021-11-19 RX ADMIN — MIDAZOLAM HYDROCHLORIDE: 1 INJECTION, SOLUTION INTRAMUSCULAR; INTRAVENOUS at 16:42

## 2021-11-19 RX ADMIN — ATORVASTATIN CALCIUM 80 MG: 40 TABLET, FILM COATED ORAL at 20:50

## 2021-11-19 RX ADMIN — MAGNESIUM SULFATE HEPTAHYDRATE 2 G: 40 INJECTION, SOLUTION INTRAVENOUS at 12:09

## 2021-11-19 RX ADMIN — POTASSIUM CHLORIDE 20 MEQ: 1500 TABLET, EXTENDED RELEASE ORAL at 14:55

## 2021-11-19 RX ADMIN — MIDAZOLAM HYDROCHLORIDE 0.5 MG: 1 INJECTION, SOLUTION INTRAMUSCULAR; INTRAVENOUS at 18:14

## 2021-11-19 RX ADMIN — KETAMINE HYDROCHLORIDE 20 MG: 50 INJECTION, SOLUTION INTRAMUSCULAR; INTRAVENOUS at 18:14

## 2021-11-19 RX ADMIN — FUROSEMIDE 40 MG: 20 TABLET ORAL at 12:08

## 2021-11-19 RX ADMIN — PIPERACILLIN SODIUM AND TAZOBACTAM SODIUM 3.38 G: 3; .375 INJECTION, POWDER, LYOPHILIZED, FOR SOLUTION INTRAVENOUS at 20:50

## 2021-11-19 RX ADMIN — POTASSIUM CHLORIDE 40 MEQ: 1500 TABLET, EXTENDED RELEASE ORAL at 13:00

## 2021-11-19 RX ADMIN — ASPIRIN 81 MG CHEWABLE TABLET 81 MG: 81 TABLET CHEWABLE at 08:38

## 2021-11-19 RX ADMIN — LIDOCAINE HYDROCHLORIDE 60 MG: 20 INJECTION, SOLUTION INFILTRATION; PERINEURAL at 18:14

## 2021-11-19 RX ADMIN — PROPOFOL 75 MCG/KG/MIN: 10 INJECTION, EMULSION INTRAVENOUS at 18:12

## 2021-11-19 RX ADMIN — SODIUM CHLORIDE, POTASSIUM CHLORIDE, SODIUM LACTATE AND CALCIUM CHLORIDE: 600; 310; 30; 20 INJECTION, SOLUTION INTRAVENOUS at 18:11

## 2021-11-19 RX ADMIN — POTASSIUM CHLORIDE 40 MEQ: 1500 TABLET, EXTENDED RELEASE ORAL at 20:50

## 2021-11-19 RX ADMIN — LISINOPRIL 10 MG: 5 TABLET ORAL at 08:38

## 2021-11-19 RX ADMIN — METOPROLOL SUCCINATE 25 MG: 25 TABLET, EXTENDED RELEASE ORAL at 12:08

## 2021-11-19 RX ADMIN — PROPOFOL 30 MG: 10 INJECTION, EMULSION INTRAVENOUS at 18:12

## 2021-11-19 RX ADMIN — GLYCOPYRROLATE 0.2 MG: 0.2 INJECTION, SOLUTION INTRAMUSCULAR; INTRAVENOUS at 18:14

## 2021-11-19 RX ADMIN — METOPROLOL SUCCINATE 25 MG: 25 TABLET, EXTENDED RELEASE ORAL at 08:38

## 2021-11-19 RX ADMIN — PIPERACILLIN SODIUM AND TAZOBACTAM SODIUM 3.38 G: 3; .375 INJECTION, POWDER, LYOPHILIZED, FOR SOLUTION INTRAVENOUS at 12:08

## 2021-11-19 ASSESSMENT — ACTIVITIES OF DAILY LIVING (ADL)
ADLS_ACUITY_SCORE: 9
ADLS_ACUITY_SCORE: 11
ADLS_ACUITY_SCORE: 9
ADLS_ACUITY_SCORE: 11
ADLS_ACUITY_SCORE: 12
ADLS_ACUITY_SCORE: 9
ADLS_ACUITY_SCORE: 12
ADLS_ACUITY_SCORE: 9
ADLS_ACUITY_SCORE: 11
ADLS_ACUITY_SCORE: 12
ADLS_ACUITY_SCORE: 12
ADLS_ACUITY_SCORE: 9
ADLS_ACUITY_SCORE: 11
ADLS_ACUITY_SCORE: 9
ADLS_ACUITY_SCORE: 11
ADLS_ACUITY_SCORE: 12
ADLS_ACUITY_SCORE: 9
ADLS_ACUITY_SCORE: 11
ADLS_ACUITY_SCORE: 11
ADLS_ACUITY_SCORE: 9
ADLS_ACUITY_SCORE: 12
ADLS_ACUITY_SCORE: 12

## 2021-11-19 ASSESSMENT — MIFFLIN-ST. JEOR: SCORE: 1897.61

## 2021-11-19 NOTE — PROGRESS NOTES
Assessment/Plan:  1.  Acute non-ST elevation MI, multiple vessel coronary artery disease: The patient has coronary angiogram yesterday which was reported multiple vessel coronary artery disease.    CV surgical service is consulted.  Plan to have CABG in 2 to 3 months.  Increased metoprolol succinate to 50 mg daily, Continue aspirin 81 mg daily, atorvastatin 80 mg daily and lisinopril 10 mg daily.    2.  Ischemic cardiomyopathy, LVEF 30 to 35%, chronic systolic CHF: He has fluid retention now.   Increase metoprolol succinate to 50 mg daily.    Continue lisinopril 10 mg daily, titrate up lisinopril.  Start Lasix 40 mg bid, monitor weight, leg edema and creatinine.    3.  Paroxysmal atrial fibrillation:  He is in sinus rhythm.  Continue metoprolol XL.  Consider anticoagulation Eliquis 5 mg twice a day when he is stable from left foot surgery.    4.  Primary hypertension: His blood pressure is in normal range.     5.  Dyslipidemia:  His LDL is 122.  Continue Lipitor to 80 mg at bedtime.  Repeat lipid profile and liver function in 2 months.     6.  Acute DKA, acute renal injury on chronic kidney disease: Please see primary team of management for details.       7.  Diabetic foot, left great toe gangrene: Please see Podiatric service note.  Consider MCKAYLA study for evaluation of peripheral vascular disease.      Subjective:  Interval History:  Has no complaints of chest pain or shortness of breath.  No acute events over last night.    Current Medications:  Scheduled Meds:    acetaminophen  975 mg Oral Q8H     aspirin  81 mg Oral Daily     atorvastatin  80 mg Oral QPM     furosemide  40 mg Oral Daily     [Held by provider] heparin ANTICOAGULANT  5,000 Units Subcutaneous Q8H     insulin aspart  1-12 Units Subcutaneous TID w/meals     insulin aspart   Subcutaneous QAM AC     insulin aspart   Subcutaneous Daily with lunch     insulin aspart   Subcutaneous Daily with supper     insulin glargine  34 Units Subcutaneous Daily      lisinopril  10 mg Oral Daily     magnesium sulfate  2 g Intravenous Once     metoprolol succinate ER  25 mg Oral Once     [START ON 11/20/2021] metoprolol succinate ER  50 mg Oral Daily     pantoprazole  40 mg Oral QAM AC     piperacillin-tazobactam  3.375 g Intravenous Q8H     polyethylene glycol  17 g Oral Daily     senna-docusate  1 tablet Oral BID     sodium chloride (PF)  3 mL Intracatheter Q8H     sodium chloride (PF)  3 mL Intracatheter Q8H     Continuous Infusions:    - MEDICATION INSTRUCTIONS -       PRN Meds:.bisacodyl, glucose **OR** dextrose **OR** glucagon, hydrALAZINE, HYDROmorphone, lidocaine 4%, lidocaine (buffered or not buffered), magnesium hydroxide, melatonin, ondansetron **OR** ondansetron, oxyCODONE **OR** oxyCODONE, - MEDICATION INSTRUCTIONS -, prochlorperazine **OR** prochlorperazine, sodium chloride (PF), sodium chloride (PF)    Objective:   Vital signs in last 24 hours:  Temp:  [97.6  F (36.4  C)-98.4  F (36.9  C)] 98.1  F (36.7  C)  Pulse:  [79-98] 98  Resp:  [18-20] 20  BP: ()/() 142/103  SpO2:  [98 %-100 %] 98 %  Weight:   [unfilled]     Physical Exam:  General Appearance:   Awake, Alert, No acute distress.   HEENT:  No scleral icterus; the mucous membranes were moist.   Neck: No cervical bruits. No jugular venous distention   Lungs:   Lungs are clear to auscultation. No crackles. No wheezing.   Cardiovascular:   RRR, normal first and second heart sounds with no murmurs. No rubs or gallops.     Abdomen:  Soft. No tenderness. Bowels sounds are present   Extremities: Mild bilateral leg edema.  Left toe gangrene appearance   Skin: Warm, Dry. No rashes.   Neurologic: Mood and affect are appropriate. No focal deficits.         Cardiographics:   Report: personally reviewed. .      Tele monitoring -normal sinus rhythm    Echocardiogram on November 15, 2021:  The left ventricle is normal in size.  Left ventricular function is decreased. The ejection fraction is  30-35%  (moderately reduced).  Left ventricular diastolic function is abnormal.  Diastolic Doppler findings (E/E' ratio and/or other parameters) suggest left  ventricular filling pressures are increased.  The left atrium is mildly dilated.  The mitral valve leaflets are mildly thickened.  There is mild (1+) mitral regurgitation.  IVC diameter >2.1 cm collapsing <50% with sniff suggests a high RA pressure  estimated at 15 mmHg or greater.    Coronary angiogram on 11-:    Severe, diffuse coronary calcification with diffusely small distal coronaries consistent with severe diabetic vasculopathy.    Severe proximal, mid and apical LAD disease. The first diagonal is occluded and is weakly collateralized.     of the proximal circumflex with right to left collateral filling of OM-2 and OM-3    Severe distal RCA disease before the bifurcation and moderate ostial PDA disease.    LV EDP moderately elevated at 16 mmHg.    Lab Results:   personally reviewed.     Lab Results   Component Value Date     11/16/2021    CO2 22 11/16/2021    BUN 18 11/16/2021     Lab Results   Component Value Date    TROPONINI 13.42 11/15/2021     Lab Results   Component Value Date    WBC 11.8 11/15/2021    HGB 11.3 11/15/2021    HCT 34.9 11/15/2021    MCV 89 11/15/2021     11/15/2021     Lab Results   Component Value Date    CHOL 189 11/16/2021    TRIG 163 11/16/2021    HDL 34 11/16/2021     11/16/2021

## 2021-11-19 NOTE — ANESTHESIA PROCEDURE NOTES
Sciatic Procedure Note    Pre-Procedure   Staff -        Anesthesiologist:  Prabhjot Vincent MD       Performed By: anesthesiologist       Location: pre-op       Procedure Start/Stop Times: 11/19/2021 4:49 PM and 11/19/2021 4:51 PM       Pre-Anesthestic Checklist: patient identified, IV checked, site marked, risks and benefits discussed, informed consent, monitors and equipment checked, pre-op evaluation, at physician/surgeon's request and post-op pain management  Timeout:       Correct Patient: Yes        Correct Procedure: Yes        Correct Site: Yes        Correct Position: Yes        Correct Laterality: Yes        Site Marked: Yes  Procedure Documentation  Procedure: Sciatic       Laterality: left       Patient Position: supine       Patient Prep/Sterile Barriers: sterile gloves, mask       Skin prep: Chloraprep (popliteal approach).       Needle Type: short bevel       Needle Gauge: 21.        Needle Length (Inches): 4        Ultrasound guided       1. Ultrasound was used to identify targeted nerve, plexus, vascular marker, or fascial plane and place a needle adjacent to it in real-time.       2. Ultrasound was used to visualize the spread of anesthetic in close proximity to the above referenced structure.       3. A permanent image is entered into the patient's record.       4. The visualized anatomic structures appeared normal.       5. There were no apparent abnormal pathologic findings.    Assessment/Narrative         The placement was negative for: blood aspirated, painful injection and site bleeding       Paresthesias: No.     Bolus given via needle..        Secured via.        Insertion/Infusion Method: Single Shot       Complications: none       Injection made incrementally with aspirations every 5 mL.    Medication(s) Administered   Bupivacaine 0.5% PF (Infiltration), 20 mL  Medication Administration Time: 11/19/2021 4:51 PM

## 2021-11-19 NOTE — PROGRESS NOTES
Care Management Follow Up    Length of Stay (days): 4        Patient plan of care discussed at interdisciplinary rounds:     Expected Discharge Date:   pending       Concerns to be Addressed / Barriers to Discharge: surgery today     Anticipated Discharge Disposition: patient's goal sister's home in [facility]    Anticipated Discharge Services:  Home infusion- referral to [facility] 11/18    Anticipated Discharge DME:  Pending - possible PICC, possible wound vac    Education Provided on the Discharge Plan:   Previously done   Patient/Family in Agreement with the Plan:       Referrals Placed by CM/SW: yes      Additional Information:  11/19/2021  Received return call from Martínez with [facility]. Patient would have 100 % coverage for home infusion. Updated re: surgery planned for today so unknown discharge date.

## 2021-11-19 NOTE — PROGRESS NOTES
[facility]    Referral received  from Antonia Parker CM, for IV antibiotics.    Benefits verified.   Pt has 100% coverage for IV antibiotics under his [facility] plan.    Writer will speak with pt to review benefits, home infusion and to offer choice of agency/home infusion provider.    Thank you for the referral.    Keiko Barrera RN, BSN  [facility] Liaison  [phone] (Mon through Fri, 8:00 am-5:00 pm)  [phone] (Office)    ADDENDUM:    [facility] is unable to find an agency who will accept patient for home PT/OT.  [facility] has accepted pt for home nursing only.  Notified Antonia Parker CM, re: inability to find a home care who can accept pt for home PT/OT.

## 2021-11-19 NOTE — PLAN OF CARE
Problem: Adult Inpatient Plan of Care  Goal: Plan of Care Review  Outcome: No Change   Pt had no pain today.  Replacing Mag and Potassium per protocol. IV Zosyn. Npo after 9am for I&D of left foot.

## 2021-11-19 NOTE — PROGRESS NOTES
A/P    61 year old diabetic male who was admitted on 11/14/2021.  He presented with concerns overnight non-healing ulcer on his left foot and found to be in acute DKA.  Further evaluation positive for a  Non-STEMI and  severe cellulitis with gangrene of the first ray.        DKA on presentation:  resolved  --Lantus 34U every day  -novolog 1U:10g CHO ac tid  -High resistant sliding scale insulin AC TID  -accuchecks ac/hs  -CDE consult appreciated     Left diabetic foot infection with wet gangrene  --MRI showed no osteomyelitis  -POD 2 - Amputation left hallux, Amputation 2nd digit left foot, Partial amputation 1st metatarsal left foot, Partial amputation 2nd metatarsal left foot, Excision sesamoids left foot, Incision and drainage left foot by podiatry.  Superficial cultures noted to be polymicrobial - agalactiae, Staph aureus, Klebsiella oxytoca.   Intra-Op cultures growing only Streptococcus agalactiae.  -surgical debridement/washout today.  -Continue IV zoysn per ID     Sepsis 2/2 Bacteremia with Streptococcus agalactiae due to problem #2.    -Positive blood cultures on 11/14.  Follow-up on 11/16 in process.  Source is left foot infection as growing Streptococcus agalactiae from the foot as well.  -follow cultures  -IV zosyn     Non-STEMI  --Coronary angiogram 11/16 - severe, diffuse coronary calcifications  --[facility] and plan for CABG in 2-3 months  - lisinopril 10mg/day today  -metoprolol xl 50 mg/day  -asa 81mg every day  -lipitor 80mg qd     PAF: in NSR currently.  -toprol XL  -consider eliquis when left foot surgical procedures are completed.     JOHANA on CKD stage III: improved.  -cmp a.m.     Acute systolic heart failure, ischemic origin, EF of 30 to 35%  --Toprol XL 50mg every day, Lisinopril 10mg every day  -Lasix 40mg qd    Hyponatremia   -resolved     Hypomagnesemia/Hypokalemia  --Continue to monitor and replace K per protocol  -Mg IV 2g x1        Barriers to discharge: IV antibiotics,  podiatry/surgery        Diet: Consistent Carbohydrate Diet Low Consistent Carb (45 g CHO per Meal) Diet  (resume post surgery)  DVT Prophylaxis: Heparin subcutaneous q 8hrs (hold for surg.)  Garcia Catheter: Not present  Code Status: Full Code       covid negative 11/14        S:  Afebrile.  No acute events overnight noted    O:  Temp: 98.8  F (37.1  C) Temp src: Oral BP: (!) 156/72 Pulse: 94   Resp: 20 SpO2: 98 % O2 Device: None (Room air)    gen nad  cv rrr  Lungs cta  abd bs+, nttp  Neuro non focal    Lab/imaging reviewed

## 2021-11-19 NOTE — PROGRESS NOTES
Therapy: IV abx  Insurance: [facility]      Pt has 100% coverage for IV abx    In reference to admission on 11/14/21 to check IV abx coverage    Please contact Intake with any questions, [phone] or In Basket pool, [facility] ([id number]).

## 2021-11-19 NOTE — ANESTHESIA PREPROCEDURE EVALUATION
Anesthesia Pre-Procedure Evaluation    Patient: Norman Aguilar   MRN: [id number] : 1960        Preoperative Diagnosis: Cellulitis and abscess of foot, except toes [L03.119, L02.619]    Procedure : Procedure(s):  INCISION AND DRAINAGE, left foot          Past Medical History:   Diagnosis Date     Diabetes (H)      GERD (gastroesophageal reflux disease)      Hypertension       Past Surgical History:   Procedure Laterality Date     AMPUTATE TOE(S) Left 2021    Procedure: first and second ray amputation;  Surgeon: Eddi Ram DPM;  Location: [facility]     CV CORONARY ANGIOGRAM N/A 2021    Procedure: CV CORONARY ANGIOGRAM;  Surgeon: Pam Tabares MD;  Location: [facility] CATH LAB CV     CV LEFT HEART CATH N/A 2021    Procedure: Left Heart Cath;  Surgeon: Pam Tabares MD;  Location: [facility] CATH LAB CV     INCISION AND DRAINAGE LOWER EXTREMITY, COMBINED Left 2021    Procedure: INCISION AND DRAINAGE, left foot;  Surgeon: Eddi Ram DPM;  Location: [facility] OR      No Known Allergies   Social History     Tobacco Use     Smoking status: Former Smoker     Types: Cigars     Smokeless tobacco: Never Used     Tobacco comment: Used to smoke a very occational cigar   Substance Use Topics     Alcohol use: Not on file      Wt Readings from Last 1 Encounters:   21 104.4 kg (230 lb 1.6 oz)        Anesthesia Evaluation   Pt has had prior anesthetic. Type: MAC and General.    No history of anesthetic complications       ROS/MED HX  ENT/Pulmonary:  - neg pulmonary ROS     Neurologic:  - neg neurologic ROS     Cardiovascular: Comment: 18 EKG:        Normal sinus rhythm   T wave abnormality consider lateral ischemia   Abnormal ECG   No previous ECGs available   Confirmed by HANK GAYTAN MD LOC:[facility] ([id number]) on 2018 5:22:42 PM     Echo 21:  Interpretation Summary The left ventricle is normal in size. Left ventricular function is decreased.  The ejection fraction is 30-35% (moderately reduced). Left ventricular diastolic function is abnormal. Diastolic Doppler findings (E/E' ratio and/or other parameters) suggest left ventricular filling pressures are increased. The left atrium is mildly dilated. The mitral valve leaflets are mildly thickened. There is mild (1+) mitral regurgitation. IVC diameter >2.1 cm collapsing <50% with sniff suggests a high RA pressure estimated at 15 mmHg or greater.     (+) hypertension--CAD ---CHF  (-) murmur and wheezes   METS/Exercise Tolerance: >4 METS    Hematologic:  - neg hematologic  ROS     Musculoskeletal:  - neg musculoskeletal ROS     GI/Hepatic:     (+) GERD,     Renal/Genitourinary:  - neg Renal ROS     Endo:     (+) type II DM,     Psychiatric/Substance Use:  - neg psychiatric ROS     Infectious Disease:  - neg infectious disease ROS     Malignancy:  - neg malignancy ROS     Other:  - neg other ROS          Physical Exam    Airway  airway exam normal      Mallampati: III   TM distance: > 3 FB   Neck ROM: full   Mouth opening: > 3 cm    Respiratory Devices and Support         Dental  no notable dental history         Cardiovascular          Rhythm and rate: regular and normal (-) no murmur    Pulmonary           breath sounds clear to auscultation   (-) no wheezes        OUTSIDE LABS:  CBC:   Lab Results   Component Value Date    WBC 5.7 11/19/2021    WBC 6.7 11/18/2021    HGB 11.5 (L) 11/19/2021    HGB 12.2 (L) 11/18/2021    HCT 35.3 (L) 11/19/2021    HCT 37.3 (L) 11/18/2021     11/19/2021     11/18/2021     BMP:   Lab Results   Component Value Date     11/19/2021     11/18/2021    POTASSIUM 2.9 (L) 11/19/2021    POTASSIUM 3.2 (L) 11/18/2021    CHLORIDE 107 11/19/2021    CHLORIDE 105 11/18/2021    CO2 25 11/19/2021    CO2 23 11/18/2021    BUN 11 11/19/2021    BUN 14 11/18/2021    CR 1.15 11/19/2021    CR 1.14 11/18/2021     (H) 11/19/2021     (H) 11/19/2021     COAGS:   Lab  Results   Component Value Date    PTT 55 (H) 11/15/2021    INR 1.35 (H) 11/15/2021     POC: No results found for: BGM, HCG, HCGS  HEPATIC:   Lab Results   Component Value Date    ALBUMIN 1.8 (L) 11/19/2021    PROTTOTAL 6.0 11/19/2021    ALT 13 11/19/2021    AST 21 11/19/2021    ALKPHOS 86 11/19/2021    BILITOTAL 0.4 11/19/2021     OTHER:   Lab Results   Component Value Date    LACT 1.8 11/14/2021    A1C >14.0 (H) 11/14/2021    YOSVANY 8.5 11/19/2021    PHOS 3.1 11/15/2021    MAG 1.7 (L) 11/19/2021    LIPASE 27 11/17/2018    CRP 36.5 (H) 11/14/2021    SED 90 (H) 11/14/2021       Anesthesia Plan    ASA Status:  4   NPO Status:  NPO Appropriate    Anesthesia Type: General.     - Airway: Mask Only   Induction: Intravenous, Propofol.   Maintenance: TIVA.        Consents    Anesthesia Plan(s) and associated risks, benefits, and realistic alternatives discussed. Questions answered and patient/representative(s) expressed understanding.    - Discussed:     - Discussed with:  Patient      - Patient is DNR/DNI Status: No         Postoperative Care    Pain management: IV analgesics, Oral pain medications, Multi-modal analgesia.   PONV prophylaxis: Ondansetron (or other 5HT-3), Dexamethasone or Solumedrol     Comments:    Other Comments: Risks and benefits of Saphenous block and Sciatic popliteal block discussed with patient. All patient questions answered and patient agreeable to receiving blocks in pre op for perioperative analgesia.            Prabhjot Vincent MD

## 2021-11-19 NOTE — PLAN OF CARE
Problem: Adult Inpatient Plan of Care  Goal: Plan of Care Review  Outcome: Improving     Problem: Hyperglycemia  Goal: Blood Glucose Level Within Targeted Range  Outcome: Improving     Problem: Dysrhythmia (Acute Coronary Syndrome)  Goal: Normalized Cardiac Rhythm  Outcome: Improving     Problem: Tissue Perfusion (Acute Coronary Syndrome)  Goal: Adequate Tissue Perfusion  Outcome: Improving  Intervention: Optimize Cardiac Tissue Perfusion  Recent Flowsheet Documentation  Taken 11/18/2021 1408 by Nadine Douglass, RN  Activity Management: ambulated to bathroom     Problem: Adult Inpatient Plan of Care  Goal: Absence of Hospital-Acquired Illness or Injury  Intervention: Identify and Manage Fall Risk  Recent Flowsheet Documentation  Taken 11/18/2021 2248 by Nadine Douglass, RN  Safety Promotion/Fall Prevention:   activity supervised   mobility aid in reach   nonskid shoes/slippers when out of bed  Taken 11/18/2021 1408 by Nadine Douglass, RN  Safety Promotion/Fall Prevention:   activity supervised   mobility aid in reach   nonskid shoes/slippers when out of bed   patient and family education   Zeke had a good shift. No complaints of pain. Dressing is intact with only old drainage. Assist of 1 to transfer with walker. BG under better control. VSS.  Remains on IV abx. Tele in NSR. No questions about surgery tomorrow.

## 2021-11-19 NOTE — PLAN OF CARE
Problem: Adult Inpatient Plan of Care  Goal: Absence of Hospital-Acquired Illness or Injury  Intervention: Identify and Manage Fall Risk  Recent Flowsheet Documentation  Taken 11/19/2021 0441 by Zuleyka Cespedes RN  Safety Promotion/Fall Prevention: activity supervised  Taken 11/19/2021 0030 by Zuleyka Cespedes RN  Safety Promotion/Fall Prevention: activity supervised  Intervention: Prevent Skin Injury  Recent Flowsheet Documentation  Taken 11/19/2021 0441 by Zuleyka Cespedes RN  Body Position: position changed independently  Taken 11/19/2021 0030 by Zuleyka Cespedes RN  Body Position: position changed independently     Vitals:    11/18/21 1509 11/18/21 2005 11/18/21 2334 11/19/21 0342   BP:  (!) 141/68 (!) 149/81 (!) 167/79   BP Location:  Left arm Left arm Left arm   Patient Position:       Pulse: 85 79 80 84   Resp:  18 18 20   Temp:  98.3  F (36.8  C) 97.6  F (36.4  C) 98.1  F (36.7  C)   TempSrc:  Oral Oral Oral   SpO2:  100% 99% 99%   Weight:       Height:        Room air. IV antibiotics. Plan is for I and D today. NPO since midnight.  Lungs clear. SBA wt walker. Denies pain. Zuleyka Cespedes RN

## 2021-11-19 NOTE — PROGRESS NOTES
[facility] ID Inpatient follow up       Patient:  Norman Aguilar  Date of birth 1960, Medical record number [id number]  Date of Visit:  11/19/2021  Attending Physician: Delio Feldman MD         Assessment and Recommendations:   Assessment:  Norman Aguilar is a 61 year old male with   1.  Uncontrolled DM II. Not on treatment since 2/2021. A1c > 14 on 11/14/21.   2.  NSTEMI.  Multiple vessel disease.  Will need CABG at one point but high risk at this point with ongoing infection.  3.  JOHANA on CKD. Baseline creatinine 1.4. Admitted with creat at 2.1. Trending down.  4.  Left foot injury in 8/2021  5.  Left diabetic foot infection with clinical wet gangrene.  MRI with no evidence of osteomyelitis for presence of abscess.  Superficial cultures polymicrobial with strep agalactiae, MSSA, Klebsiella oxytoca.  Previously on IV vancomycin and Zosyn.  IV vancomycin discontinued.  S/P Amputation left hallux, Amputation 2nd digit left foot, Partial amputation 1st metatarsal left foot, Partial amputation 2nd metatarsal left foot, Excision sesamoids left foot, Incision and drainage left foot.  Intra-Op cultures in process.  Pathology with clean margins.  Going back to the OR today for more surgery.  6.  Polymicrobial bacteremia with Streptococcus agalactiae, Finegoldia magna, and Peptoniphilus asaccharolyticus. Source is the foot. Positive blood cultures on 11/14.  Follow-up on 11/16 in process.       Recommendations:   Continue IV Zosyn.  To OR today.  Planning to do oral antibiotic at discharge unless podiatry thinks otherwise.    Discussed with the patient, nursing staff.    ID will follow.    Hilaria Munoz MD.  [facility].   [facility]  Office Telephone [phone].  Fax [phone]  [facility] paging            Interval History:     HPI:  The interval history was reviewed.   Doing much better.  Cultures reviewed.  Going back to  the OR today.  Surgical dressing in place.    Pertinent cultures include:  No results found for: CULT    Recent Inflammatory Biomarkers:   Recent Labs   Lab Test 21  0458 21  0609 21  0533 11/15/21  0817 21  1727   CRP  --   --   --   --  36.5*  --    WBC 5.7 6.7 8.4 11.8* 11.2* 5.9            Review of Systems:   CONSTITUTIONAL:    Temp Max: Temp (24hrs), Av.3  F (36.8  C), Min:97.6  F (36.4  C), Max:98.8  F (37.1  C)   .  Negative except for findings in the HPI.           Current Medications (antimicrobials listed in bold):       acetaminophen  975 mg Oral Q8H     aspirin  81 mg Oral Daily     atorvastatin  80 mg Oral QPM     furosemide  40 mg Oral Daily     [Held by provider] heparin ANTICOAGULANT  5,000 Units Subcutaneous Q8H     insulin aspart  1-12 Units Subcutaneous TID w/meals     insulin aspart   Subcutaneous QAM AC     insulin aspart   Subcutaneous Daily with lunch     insulin aspart   Subcutaneous Daily with supper     insulin glargine  34 Units Subcutaneous Daily     lisinopril  10 mg Oral Daily     magnesium sulfate  2 g Intravenous Once     [START ON 2021] metoprolol succinate ER  50 mg Oral Daily     pantoprazole  40 mg Oral QAM AC     piperacillin-tazobactam  3.375 g Intravenous Q8H     polyethylene glycol  17 g Oral Daily     potassium chloride  20 mEq Oral Once     potassium chloride  40 mEq Oral Once     senna-docusate  1 tablet Oral BID     sodium chloride (PF)  3 mL Intracatheter Q8H     sodium chloride (PF)  3 mL Intracatheter Q8H              Allergies:   No Known Allergies         Physical Exam:   Vitals were reviewed  Patient Vitals for the past 24 hrs:   BP Temp Temp src Pulse Resp SpO2 Weight   21 1210 (!) 163/70 -- -- 97 -- -- --   21 1113 (!) 142/103 -- -- 98 -- -- --   21 0715 (!) 141/80 98.1  F (36.7  C) Oral 89 20 98 % --   21 0500 -- -- -- -- -- -- 104.4 kg (230 lb 1.6 oz)   21 0342 (!) 167/79 98.1  F  (36.7  C) Oral 84 20 99 % --   11/18/21 2334 (!) 149/81 97.6  F (36.4  C) Oral 80 18 99 % --   11/18/21 2005 (!) 141/68 98.3  F (36.8  C) Oral 79 18 100 % --   11/18/21 1509 -- -- -- 85 -- -- --   11/18/21 1500 96/52 98.4  F (36.9  C) Oral -- -- -- --       Physical Examination:  Gen: Pleasant in no acute distress.  HEENT: NCAT. EOMI. PERRL.  Neck: No bruit, JVD or thyromegaly.  Lungs: Clear to ascultation bilat with no crackles or wheezes.  Card: RRR. NSR. No RMG. Peripheral pulses present and symmetric. No edema.  Abd: Soft NT ND. No mass. Normal bowel sounds.  Skin: No rash.  Extr: Surgical dressing in place.  Neuro: Alert and oriented to place time and person. Cranial nerves II to XII intact. Motor and sensory intact. Normal gait.            Laboratory Data:   ID Labs:  Microbiology labs:    Wound Aerobic Bacterial Culture Routine with Gram Stain  Order: [id number]   Collected 11/15/2021 11:40 AM     Status: Preliminary result     Visible to patient: No (not released)    Specimen Information: Foot, Left; Wound         0 Result Notes    Culture Culture in progress       4+ Streptococcus agalactiae (Group B Streptococcus) Abnormal     This organism is susceptible to ampicillin, penicillin, vancomycin and the cephalosporins. If treatment is required and your patient is allergic to penicillin, contact the microbiology lab within 5 days to request susceptibility testing.   2+ Staphylococcus pseudintermedius/intermedius Abnormal        2+ Staphylococcus aureus Abnormal        2+ Klebsiella oxytoca Abnormal     Identification is preliminary, confirmation in progress            Gram Stain Result   Abnormal   4+ Gram positive cocci      3+ WBC seen   Predominantly PMNs           Resulting Agency: [facility]           Specimen Collected: 11/15/21 11:40 AM Last Resulted: 11/16/21 12:49 PM                      Contains critical data Blood Culture Line, venous  Order: [id number]   Collected 11/14/2021  8:16 PM       Status:  Preliminary result       Visible to patient: No (not released)      Specimen Information: Line, venous; Blood         0 Result Notes      Culture Positive on the 1st day of incubation Abnormal        Streptococcus agalactiae (Group B Streptococcus) Panic     1 of 2 bottles        Resulting Agency: [facility]             Specimen Collected: 11/14/21  8:16 PM Last Resulted: 11/16/21 11:29 AM                    Contains abnormal data Wound Aerobic Bacterial Culture Routine with Gram Stain  Order: [id number]   Collected 11/15/2021 11:40 AM       Status: Final result       Visible to patient: No (inaccessible in MyChart)      Specimen Information: Foot, Left; Wound         0 Result Notes      Culture 4+ Streptococcus agalactiae (Group B Streptococcus) Abnormal     This organism is susceptible to ampicillin, penicillin, vancomycin and the cephalosporins. If treatment is required and your patient is allergic to penicillin, contact the microbiology lab within 5 days to request susceptibility testing.   4+ Streptococcus mitis group Abnormal     Susceptibilities not routinely done   2+ Staphylococcus pseudintermedius/intermedius Abnormal        2+ Staphylococcus aureus Abnormal        2+ Klebsiella oxytoca Abnormal                 Gram Stain Result   Abnormal   4+ Gram positive cocci      3+ WBC seen   Predominantly PMNs           Resulting Agency: [facility]         Susceptibility     Staphylococcus pseudintermedius/intermedius Staphylococcus aureus Klebsiella oxytoca     DNONA DONNA DONNA     Ampicillin     >=32.0 ug/mL Resistant 1     Ampicillin/ Sulbactam     8.0 ug/mL Susceptible     Cefepime     <=1.0 ug/mL Susceptible     Ceftazidime     <=1.0 ug/mL Susceptible     Ceftriaxone     <=1.0 ug/mL Susceptible     Ciprofloxacin <=0.5 ug/mL Susceptible   <=0.25 ug/mL Susceptible     Clindamycin <=0.25 ug/mL Susceptible <=0.25 ug/mL Susceptible       Erythromycin 1.0 ug/mL Intermediate <=0.25 ug/mL Susceptible       Gentamicin <=0.5 ug/mL  "Susceptible <=0.5 ug/mL Susceptible <=1.0 ug/mL Susceptible     Levofloxacin <=0.12 ug/mL Susceptible   <=0.12 ug/mL Susceptible     Meropenem     <=0.25 ug/mL Susceptible     Oxacillin 0.5 ug/mL Susceptible <=0.25 ug/mL Susceptible       Piperacillin/Tazobactam     <=4.0 ug/mL Susceptible     Tetracycline >=16.0 ug/mL Resistant <=1.0 ug/mL Susceptible       Tobramycin     <=1.0 ug/mL Susceptible     Trimethoprim/Sulfamethoxazole <=0.5/9.5 u... Susceptible <=0.5/9.5 u... Susceptible <=1/19 ug/mL Susceptible     Vancomycin <=0.5 ug/mL Susceptible 1.0 ug/mL Susceptible                    1 Intrinsically Resistant        Susceptibility Comments    Staphylococcus pseudintermedius/intermedius   Antibiotics listed as \"No Interpretation\" have no regulatory guidelines for susceptibility/resistance available.         Specimen Collected: 11/15/21 11:40 AM Last Resulted: 11/18/21  7:51 AM            Contains abnormal data Tissue Aerobic Bacterial Culture Routine  Order: [id number]   Collected 11/16/2021  6:09 PM     Status: Preliminary result     Visible to patient: No (not released)    Specimen Information: Foot, Left; Tissue         0 Result Notes    Culture 4+ Streptococcus agalactiae (Group B Streptococcus) Abnormal     This organism is susceptible to ampicillin, penicillin, vancomycin and the cephalosporins. If treatment is required and your patient is allergic to penicillin, contact the microbiology lab within 5 days to request susceptibility testing.   3+ Staphylococcus pseudintermedius/intermedius Abnormal             Resulting Agency: [facility]           Specimen Collected: 11/16/21  6:09 PM Last Resulted: 11/19/21  9:48 AM                Blood Culture Line, venous  Order: [id number]   Collected 11/14/2021  8:16 PM     Status: Preliminary result     Visible to patient: No (not released)    Specimen Information: Line, venous; Blood         0 Result Notes    Culture Positive on the 1st day of incubation Abnormal      "   Streptococcus agalactiae (Group B Streptococcus) Panic     1 of 2 bottles   Han ayalaa Panic     1 of 2 bottles        Resulting Agency: [facility]       Susceptibility     Streptococcus agalactiae (Group B Streptococcus)     DONNA     Ampicillin 0.12 ug/mL Susceptible     Cefotaxime <=0.25 ug/mL Susceptible     Ceftriaxone <=0.25 ug/mL Susceptible     Clindamycin <=0.06 ug/mL Susceptible     Meropenem <=0.06 ug/mL Susceptible     Penicillin 0.06 ug/mL Susceptible     Vancomycin 0.5 ug/mL Susceptible                  Specimen Collected: 11/14/21  8:16 PM Last Resulted: 11/18/21 11:41 AM                  0 Result Notes    Culture Positive on the 2nd day of incubation Abnormal        Peptoniphilus asaccharolyticus Panic     1 of 2 bottles        Resulting Agency: [facility]           Specimen Collected: 11/14/21  9:49 PM Last Resulted: 11/18/21 12:46 PM               Recent Labs   Lab Test 11/14/21 2016   CRP 36.5*     Recent Labs   Lab Test 11/19/21 0458 11/18/21  0609 11/17/21  0533 11/15/21  0817 11/14/21 2016 11/17/18  1727   WBC 5.7 6.7 8.4 11.8* 11.2* 5.9     Recent Labs   Lab Test 11/19/21  0458 11/18/21  0609 11/17/21  0533 11/16/21  1601   CR 1.15 1.14 1.23 1.28   GFRESTIMATED 68 69 63 60*       Hematology Studies  Recent Labs   Lab Test 11/19/21 0458 11/18/21  0609 11/17/21  0533 11/15/21  0817 11/14/21 2016 11/17/18  1727   WBC 5.7 6.7 8.4 11.8* 11.2* 5.9   ANEU 3.3 3.8  --   --   --   --    AEOS 0.1 0.4  --   --   --   --    HCT 35.3* 37.3* 33.6* 34.9* 40.1 40.1    283 232 262 262 185       Metabolic  Recent Labs   Lab Test 11/19/21 0458 11/18/21  0609 11/17/21  0533    137 134*   BUN 11 14 16   CO2 25 23 21*   CR 1.15 1.14 1.23   GFRESTIMATED 68 69 63       Hepatic Studies  Recent Labs   Lab Test 11/19/21  0458 11/18/21  0609 03/19/20  1448 10/31/19  1625   BILITOTAL 0.4 0.5  --  0.7   ALKPHOS 86 89  --  87   ALBUMIN 1.8* 1.8* 4.1 4.1   AST 21 21  --  20   ALT 13 13  --  24        Immunologlobulins  Recent Labs   Lab Test 11/14/21  2016   SED 90*            Imaging Data:   Reviewed   Study Result    Narrative & Impression   EXAM: MR FOOT LEFT W/O and W CONTRAST  LOCATION: [facility]  DATE/TIME: 11/15/2021 10:45 AM     INDICATION: Foot swelling, diabetic, osteomyelitis suspected, no prior imaging.  COMPARISON: None.  TECHNIQUE: Routine. Additional postgadolinium T1 sequences were obtained.  IV CONTRAST: 10 mL Gadavist.     FINDINGS:      JOINTS AND BONES:   -No evidence for fracture. No marrow signal abnormality to suggest osteomyelitis. No significant effusion to suggest septic arthropathy. There is some reactive edema within the sesamoid bones adjacent to the first metatarsal head which could represent   sesamoiditis.     TENDONS:   -Flexor hallucis tendon tenosynovitis. The remaining flexor tendons are negative. The extensor tendons are negative for tendinopathy, tenosynovitis, or tearing.      LIGAMENTS:   -The ligament of Lisfranc is intact. The collateral ligaments at the MTP joints are all intact.     MUSCLES AND SOFT TISSUES:   -There is an ulceration along the medial aspect of the forefoot with surrounding edema and/or cellulitis. Fluid and susceptibility artifact suggestive of bubbles of gas extend into the soft tissues surrounding the first MTP joint into the webspace   between the first and second toes but all of this appears likely external to the joint capsule. There is nonenhancing fluid consistent with abscess surrounding the first MTP joint but this may spontaneously drain to the skin surface. It also extends   proximally into the fatty replaced plantar musculature deep to the first and second metatarsal shafts.                                                                      IMPRESSION:  1.  No evidence for osteomyelitis or significant effusion to suggest septic arthropathy.  2.  However, there is a soft tissue ulceration along the medial  aspect of the forefoot adjacent to the first MTP joint. Fluid and susceptibly artifact suggestive of gas bubbles extend nearly circumferentially about the first MTP joint and base of the   great toe into the webspace between the first and second toes consistent with abscess. Nonenhancing fluid also extends proximally deep to the first and second metatarsal shafts consistent with abscess although this may spontaneously communicate to the   ulceration.  3.  Fluid surrounding the flexor hallucis tendon sheath consistent with a toxic tenosynovitis.  4.  No evidence for fracture.  5.  No additional tendinous or ligamentous pathology identified.  6.  Edema or cellulitis along the medial aspect of the foot.

## 2021-11-19 NOTE — ANESTHESIA PROCEDURE NOTES
Adductor canal Procedure Note    Pre-Procedure   Staff -        Anesthesiologist:  Prabhjot Vincent MD       Performed By: anesthesiologist       Location: pre-op       Procedure Start/Stop Times: 11/19/2021 4:52 PM and 11/19/2021 4:53 PM       Pre-Anesthestic Checklist: patient identified, IV checked, site marked, risks and benefits discussed, informed consent, monitors and equipment checked, pre-op evaluation, at physician/surgeon's request and post-op pain management  Timeout:       Correct Patient: Yes        Correct Procedure: Yes        Correct Site: Yes        Correct Position: Yes        Correct Laterality: Yes        Site Marked: Yes  Procedure Documentation  Procedure: Adductor canal       Laterality: left       Patient Position: supine       Patient Prep/Sterile Barriers: sterile gloves, mask       Skin prep: Chloraprep       Needle Type: short bevel       Needle Gauge: 21.        Needle Length (Inches): 4        Ultrasound guided       1. Ultrasound was used to identify targeted nerve, plexus, vascular marker, or fascial plane and place a needle adjacent to it in real-time.       2. Ultrasound was used to visualize the spread of anesthetic in close proximity to the above referenced structure.       3. A permanent image is entered into the patient's record.       4. The visualized anatomic structures appeared normal.       5. There were no apparent abnormal pathologic findings.    Assessment/Narrative         The placement was negative for: blood aspirated, painful injection and site bleeding       Paresthesias: No.     Bolus given via needle..        Secured via.        Insertion/Infusion Method: Single Shot       Complications: none       Injection made incrementally with aspirations every 5 mL.    Medication(s) Administered   Bupivacaine 0.5% PF (Infiltration), 10 mL  Medication Administration Time: 11/19/2021 4:53 PM

## 2021-11-20 ENCOUNTER — APPOINTMENT (OUTPATIENT)
Dept: ULTRASOUND IMAGING | Facility: HOSPITAL | Age: 61
DRG: 616 | End: 2021-11-20
Attending: INTERNAL MEDICINE
Payer: COMMERCIAL

## 2021-11-20 LAB
ALBUMIN SERPL-MCNC: 1.9 G/DL (ref 3.5–5)
ALP SERPL-CCNC: 90 U/L (ref 45–120)
ALT SERPL W P-5'-P-CCNC: 17 U/L (ref 0–45)
ANION GAP SERPL CALCULATED.3IONS-SCNC: 6 MMOL/L (ref 5–18)
AST SERPL W P-5'-P-CCNC: 24 U/L (ref 0–40)
BILIRUB SERPL-MCNC: 0.5 MG/DL (ref 0–1)
BUN SERPL-MCNC: 8 MG/DL (ref 8–22)
CALCIUM SERPL-MCNC: 8.5 MG/DL (ref 8.5–10.5)
CHLORIDE BLD-SCNC: 104 MMOL/L (ref 98–107)
CO2 SERPL-SCNC: 27 MMOL/L (ref 22–31)
CREAT SERPL-MCNC: 1.26 MG/DL (ref 0.7–1.3)
ERYTHROCYTE [DISTWIDTH] IN BLOOD BY AUTOMATED COUNT: 14.5 % (ref 10–15)
GFR SERPL CREATININE-BSD FRML MDRD: 61 ML/MIN/1.73M2
GLUCOSE BLD-MCNC: 267 MG/DL (ref 70–125)
GLUCOSE BLDC GLUCOMTR-MCNC: 103 MG/DL (ref 70–99)
GLUCOSE BLDC GLUCOMTR-MCNC: 124 MG/DL (ref 70–99)
GLUCOSE BLDC GLUCOMTR-MCNC: 209 MG/DL (ref 70–99)
GLUCOSE BLDC GLUCOMTR-MCNC: 87 MG/DL (ref 70–99)
GLUCOSE BLDC GLUCOMTR-MCNC: 93 MG/DL (ref 70–99)
GRAM STAIN RESULT: NORMAL
GRAM STAIN RESULT: NORMAL
HCT VFR BLD AUTO: 34.3 % (ref 40–53)
HGB BLD-MCNC: 11.1 G/DL (ref 13.3–17.7)
MAGNESIUM SERPL-MCNC: 1.5 MG/DL (ref 1.8–2.6)
MCH RBC QN AUTO: 28.5 PG (ref 26.5–33)
MCHC RBC AUTO-ENTMCNC: 32.4 G/DL (ref 31.5–36.5)
MCV RBC AUTO: 88 FL (ref 78–100)
PLATELET # BLD AUTO: 247 10E3/UL (ref 150–450)
POTASSIUM BLD-SCNC: 3.6 MMOL/L (ref 3.5–5)
POTASSIUM BLD-SCNC: 3.6 MMOL/L (ref 3.5–5)
PROT SERPL-MCNC: 6.2 G/DL (ref 6–8)
RBC # BLD AUTO: 3.89 10E6/UL (ref 4.4–5.9)
SODIUM SERPL-SCNC: 137 MMOL/L (ref 136–145)
WBC # BLD AUTO: 7.3 10E3/UL (ref 4–11)

## 2021-11-20 PROCEDURE — 250N000011 HC RX IP 250 OP 636: Performed by: INTERNAL MEDICINE

## 2021-11-20 PROCEDURE — 93970 EXTREMITY STUDY: CPT

## 2021-11-20 PROCEDURE — 210N000001 HC R&B IMCU HEART CARE

## 2021-11-20 PROCEDURE — 250N000013 HC RX MED GY IP 250 OP 250 PS 637: Performed by: PODIATRIST

## 2021-11-20 PROCEDURE — 250N000013 HC RX MED GY IP 250 OP 250 PS 637: Performed by: INTERNAL MEDICINE

## 2021-11-20 PROCEDURE — 250N000011 HC RX IP 250 OP 636: Performed by: PODIATRIST

## 2021-11-20 PROCEDURE — 99232 SBSQ HOSP IP/OBS MODERATE 35: CPT | Performed by: INTERNAL MEDICINE

## 2021-11-20 PROCEDURE — 84132 ASSAY OF SERUM POTASSIUM: CPT | Performed by: INTERNAL MEDICINE

## 2021-11-20 PROCEDURE — 80053 COMPREHEN METABOLIC PANEL: CPT | Performed by: INTERNAL MEDICINE

## 2021-11-20 PROCEDURE — 83735 ASSAY OF MAGNESIUM: CPT | Performed by: PODIATRIST

## 2021-11-20 PROCEDURE — 82040 ASSAY OF SERUM ALBUMIN: CPT | Performed by: PODIATRIST

## 2021-11-20 PROCEDURE — 36415 COLL VENOUS BLD VENIPUNCTURE: CPT | Performed by: INTERNAL MEDICINE

## 2021-11-20 PROCEDURE — 250N000011 HC RX IP 250 OP 636: Performed by: GENERAL ACUTE CARE HOSPITAL

## 2021-11-20 PROCEDURE — 99207 PR NO CHARGE LOS: CPT | Performed by: INTERNAL MEDICINE

## 2021-11-20 PROCEDURE — 99233 SBSQ HOSP IP/OBS HIGH 50: CPT | Performed by: INTERNAL MEDICINE

## 2021-11-20 PROCEDURE — 85027 COMPLETE CBC AUTOMATED: CPT | Performed by: PODIATRIST

## 2021-11-20 PROCEDURE — 36415 COLL VENOUS BLD VENIPUNCTURE: CPT | Performed by: PODIATRIST

## 2021-11-20 RX ORDER — MAGNESIUM SULFATE 4 G/50ML
4 INJECTION INTRAVENOUS ONCE
Status: COMPLETED | OUTPATIENT
Start: 2021-11-20 | End: 2021-11-20

## 2021-11-20 RX ORDER — FUROSEMIDE 10 MG/ML
40 INJECTION INTRAMUSCULAR; INTRAVENOUS EVERY 12 HOURS
Status: DISCONTINUED | OUTPATIENT
Start: 2021-11-20 | End: 2021-11-21

## 2021-11-20 RX ORDER — LISINOPRIL 5 MG/1
10 TABLET ORAL ONCE
Status: COMPLETED | OUTPATIENT
Start: 2021-11-20 | End: 2021-11-20

## 2021-11-20 RX ORDER — METOPROLOL SUCCINATE 25 MG/1
25 TABLET, EXTENDED RELEASE ORAL ONCE
Status: COMPLETED | OUTPATIENT
Start: 2021-11-20 | End: 2021-11-20

## 2021-11-20 RX ORDER — METOPROLOL SUCCINATE 25 MG/1
75 TABLET, EXTENDED RELEASE ORAL DAILY
Status: DISCONTINUED | OUTPATIENT
Start: 2021-11-21 | End: 2021-11-23

## 2021-11-20 RX ORDER — LISINOPRIL 20 MG/1
20 TABLET ORAL DAILY
Status: DISCONTINUED | OUTPATIENT
Start: 2021-11-21 | End: 2021-11-24 | Stop reason: HOSPADM

## 2021-11-20 RX ADMIN — MAGNESIUM SULFATE HEPTAHYDRATE 4 G: 80 INJECTION, SOLUTION INTRAVENOUS at 09:15

## 2021-11-20 RX ADMIN — PIPERACILLIN SODIUM AND TAZOBACTAM SODIUM 3.38 G: 3; .375 INJECTION, POWDER, LYOPHILIZED, FOR SOLUTION INTRAVENOUS at 20:18

## 2021-11-20 RX ADMIN — FUROSEMIDE 40 MG: 10 INJECTION, SOLUTION INTRAVENOUS at 22:15

## 2021-11-20 RX ADMIN — FUROSEMIDE 40 MG: 20 TABLET ORAL at 09:15

## 2021-11-20 RX ADMIN — LISINOPRIL 10 MG: 5 TABLET ORAL at 12:22

## 2021-11-20 RX ADMIN — SENNOSIDES, DOCUSATE SODIUM 1 TABLET: 8.6; 5 TABLET ORAL at 20:18

## 2021-11-20 RX ADMIN — METOPROLOL SUCCINATE 50 MG: 25 TABLET, EXTENDED RELEASE ORAL at 09:15

## 2021-11-20 RX ADMIN — METOPROLOL SUCCINATE 25 MG: 25 TABLET, EXTENDED RELEASE ORAL at 12:22

## 2021-11-20 RX ADMIN — ASPIRIN 81 MG CHEWABLE TABLET 81 MG: 81 TABLET CHEWABLE at 09:16

## 2021-11-20 RX ADMIN — ATORVASTATIN CALCIUM 80 MG: 40 TABLET, FILM COATED ORAL at 20:17

## 2021-11-20 RX ADMIN — LISINOPRIL 10 MG: 5 TABLET ORAL at 09:22

## 2021-11-20 RX ADMIN — PIPERACILLIN SODIUM AND TAZOBACTAM SODIUM 3.38 G: 3; .375 INJECTION, POWDER, LYOPHILIZED, FOR SOLUTION INTRAVENOUS at 12:14

## 2021-11-20 RX ADMIN — PIPERACILLIN SODIUM AND TAZOBACTAM SODIUM 3.38 G: 3; .375 INJECTION, POWDER, LYOPHILIZED, FOR SOLUTION INTRAVENOUS at 03:52

## 2021-11-20 RX ADMIN — FUROSEMIDE 40 MG: 10 INJECTION, SOLUTION INTRAVENOUS at 12:21

## 2021-11-20 RX ADMIN — PANTOPRAZOLE SODIUM 40 MG: 40 TABLET, DELAYED RELEASE ORAL at 07:31

## 2021-11-20 RX ADMIN — HEPARIN SODIUM 5000 UNITS: 10000 INJECTION, SOLUTION INTRAVENOUS; SUBCUTANEOUS at 12:23

## 2021-11-20 RX ADMIN — APIXABAN 10 MG: 5 TABLET, FILM COATED ORAL at 20:18

## 2021-11-20 ASSESSMENT — ACTIVITIES OF DAILY LIVING (ADL)
ADLS_ACUITY_SCORE: 11
ADLS_ACUITY_SCORE: 9
ADLS_ACUITY_SCORE: 11
ADLS_ACUITY_SCORE: 9
ADLS_ACUITY_SCORE: 11
ADLS_ACUITY_SCORE: 9
ADLS_ACUITY_SCORE: 11
ADLS_ACUITY_SCORE: 9

## 2021-11-20 ASSESSMENT — MIFFLIN-ST. JEOR: SCORE: 1920.29

## 2021-11-20 NOTE — CONSULTS
"CLINICAL NUTRITION SERVICES - ASSESSMENT NOTE     Nutrition Prescription    RECOMMENDATIONS FOR MDs/PROVIDERS TO ORDER:  None at this time    Malnutrition Diagnosis: Patient does not meet two of the above criteria necessary for diagnosing malnutrition    Recommendations already ordered by Registered Dietitian (RD):  Pt declined supplementation at this time. He is eating well.  He also declined nutrition education and supplements 21 as well.    Future/Additional Recommendations:  Will f/u to see if pt becomes more receptive to nutrition education, if pt still here early next week     REASON FOR ASSESSMENT  Norman Aguilar is a/an 61 year old male assessed by the dietitian for  Provider Order - malnutrition, chf, dm  Admitted for DKA, Gangrene foot s/p amputation POD #3, non STEMI, brenda, acute heart failure      CURRENT NUTRITION ORDERS  Diet:  Consistent Carbohydrate 60 gm per meal    Intake/Tolerance: 100% of most meals  (RD observed 100% of lunch meal eaten)  Pt reports most of his health issues are a result of constipation from a colon that is 2 feet longer than normal. (not his DM) He would like excess resected      LABS:  FSB, 103, 87    MEDICATIONS  Medications reviewed  Lasix, lipitor, novolog, lantus, zosyn, miralax, pericolace, KCl    ANTHROPOMETRICS  Height: 6' 1\"  Weight: 106 kg    Admit weight: 225 lb (21)  Body mass index is 31.02 kg/m .  Weight Status:  Overweight BMI 25-29.9  122 % IBW  Weight History:   Wt Readings from Last 10 Encounters:   21 106.6 kg (235 lb 1.6 oz)       Dosing Weight: 88 kg  adjusted   ASSESSED NUTRITION NEEDS  Estimated Energy Needs: 3951-5342 kcals/day (25 - 30 kcals/kg)  Justification: Maintenance  Estimated Protein Needs: 105-130 grams protein/day (1.2 - 1.5 grams of pro/kg)  Justification: Post-op and Wound healing  Estimated Fluid Needs: (1 mL/kcal)   Justification: Per provider pending fluid status    PHYSICAL FINDINGS  See malnutrition section " above.  Per chart and observation: Edema =+2 -+3 left leg  Pt reports he will have wound vac on Monday and hopes to be discharged Tuesday    GI Status  Last BM: No BM recorded since admission    NUTRITION DIAGNOSIS  Impaired nutrient utilization r/t DM in  The setting of inflammation w/ increased needs for wound healing s/p surgery  AEB labs, s/p Toe ampuation    INTERVENTIONS  Implementation  Nutrition Education: Pt assures RD he has kept his BG in the acceptable range for many years by cooking his own meals.    Writer encouraged low sodium, and balanced meals with adequate protein for wound healing. He likes to stir bain and does use some higher sodium sauces with some of his own. He also likes high sodium foods like ham -  Pt blames high blood pressure and other issues on severe constipation    Pt declines protein supplements - he reports he is getting what he needs.     Goals    BG  - progressing  Maintain Intake > 75% of meals - progressing  Wound healing - progressing    Monitoring/Evaluation  Progress toward goals will be monitored and evaluated per protocol.

## 2021-11-20 NOTE — PLAN OF CARE
Pt reported no pain this shift. VSS on room air. Left leg elevated on 2 pillows per orders, pt is on bedrest. Leg is warm and with + pulses. No bleeding/ drainage through dressing. Tele is NSR. Diet advance as tolerated. Wound VAC to be placed Monday AM.     Problem: Adult Inpatient Plan of Care  Goal: Readiness for Transition of Care  Outcome: Improving     Problem: Risk for Delirium  Goal: Improved Sleep  Outcome: Improving     Problem: Hyperglycemia  Goal: Blood Glucose Level Within Targeted Range  Outcome: Improving     Problem: Dysrhythmia (Acute Coronary Syndrome)  Goal: Normalized Cardiac Rhythm  Outcome: Improving

## 2021-11-20 NOTE — ANESTHESIA POSTPROCEDURE EVALUATION
Patient: Norman Aguilar    Procedure: Procedure(s):  INCISION AND DRAINAGE, left foot       Diagnosis:Cellulitis and abscess of foot, except toes [L03.119, L02.619]  Diagnosis Additional Information: No value filed.    Anesthesia Type:  General    Note:  Disposition: Inpatient   Postop Pain Control: Uneventful            Sign Out: Well controlled pain   PONV: No   Neuro/Psych: Uneventful            Sign Out: Acceptable/Baseline neuro status   Airway/Respiratory: Uneventful            Sign Out: Acceptable/Baseline resp. status   CV/Hemodynamics: Uneventful            Sign Out: Acceptable CV status; No obvious hypovolemia; No obvious fluid overload   Other NRE: NONE   DID A NON-ROUTINE EVENT OCCUR? No           Last vitals:  Vitals Value Taken Time   /86 11/20/21 0351   Temp 36.9  C (98.5  F) 11/20/21 0350   Pulse 85 11/20/21 0635   Resp 16 11/20/21 0350   SpO2 94 % 11/20/21 0635   Vitals shown include unvalidated device data.    Electronically Signed By: Prabhjot Vincent MD  November 20, 2021  6:35 AM

## 2021-11-20 NOTE — PLAN OF CARE
Problem: Tissue Perfusion (Acute Coronary Syndrome)  Goal: Adequate Tissue Perfusion  Intervention: Optimize Cardiac Tissue Perfusion  Recent Flowsheet Documentation  Taken 11/20/2021 0646 by Debora Christina, RN  Activity Management: bedrest    Pt is not having pain.  Activity order changed to NWB on left foot, elevate in bed with 2 pillows.  US of LE done today. Nutrition consult.  Potassium and mag replaced per protocol.

## 2021-11-20 NOTE — PROGRESS NOTES
A/P    61 year old diabetic male who was admitted on 11/14/2021.  He presented with concerns overnight non-healing ulcer on his left foot and found to be in acute DKA.  Further evaluation positive for a  Non-STEMI and  severe cellulitis with gangrene of the first ray.        DKA (POA); IDDM:  Resolved DKA.  A1C >14%.  --Lantus 34U every day  -novolog 1U:10g CHO ac tid  -High resistant sliding scale insulin AC TID  -accuchecks ac/hs  -CDE consult appreciated     Left diabetic foot infection with wet gangrene  --MRI showed no osteomyelitis  -POD 3 - Amputation left hallux, Amputation 2nd digit left foot, Partial amputation 1st metatarsal left foot, Partial amputation 2nd metatarsal left foot, Excision sesamoids left foot, Incision and drainage left foot by podiatry.  Superficial cultures noted to be polymicrobial - agalactiae, Staph aureus, Klebsiella oxytoca.   Intra-Op cultures growing only Streptococcus agalactiae.    -POD1 2nd Incision and drainage left foot & excisional debridement of ulceration left foot into bone  -IV zoysn   -per Podiatry: leave dressing on until wound care nurse converts to wound VAC on Monday.  Continue nonweightbearing at all times on left foot.  Patient may be up in chair.      Sepsis 2/2 Bacteremia with Streptococcus agalactiae due to problem #2.    -Positive blood cultures on 11/14.  Follow-up on 11/16 in process.  Source is left foot infection as growing Streptococcus agalactiae from the foot as well.  -follow cultures  -IV zosyn  -ID following     Non-STEMI  --Coronary angiogram 11/16 - severe, diffuse coronary calcifications  --[facility] consulted and plan for CABG in 2-3 months  - lisinopril 20mg/day today  -metoprolol xl 75 mg/day  -asa 81mg every day  -lipitor 80mg qd     PAF: in NSR currently.  -toprol XL  -consider eliquis when left foot surgical procedures are completed.     JOHANA on CKD stage III: Cr 1.26.  Stable.  -bmp a.m.     Acute systolic heart failure, ischemic origin, EF of 30  to 35%  --Toprol XL 75mg every day, Lisinopril 20mg every day  -Lasix 40mg IV BID  -cardiology following    BLE Edema:  -venous duplex US    Hyponatremia   -resolved     Hypomagnesemia/Hypokalemia  --Continue to monitor and replace K per protocol  -Mg IV 4g x1        Barriers to discharge: IV antibiotics, podiatry/surgery        Diet: Consistent Carbohydrate Diet Low Consistent Carb (45 g CHO per Meal)   DVT Prophylaxis: Heparin subcutaneous q 8hrs  Garcia Catheter: Not present  Code Status: Full Code       covid negative 11/14        S:  Afebrile.  No acute events overnight noted    O:  Temp: 98.4  F (36.9  C) Temp src: Oral BP: 105/55 Pulse: 71   Resp: 20 SpO2: 97 % O2 Device: None (Room air)    gen nad  cv rrr, ble edema  Lungs cta  abd bs+, nttp  Neuro non focal    Lab/imaging reviewed

## 2021-11-20 NOTE — PROGRESS NOTES
[facility] Infectious Disease  Procedure:   1. Incision and drainage left foot  2. Excisional Debridement ulceration left foot into bone    Await cx, path  Continue abx  Po seems sufficient    Kendrick Durbin M.D.

## 2021-11-20 NOTE — OP NOTE
Date: 11/19/2021     Surgeon: MARCO A Ram DPM    Preoperative diagnosis:   1. Abscess left foot  2. Ulceration left foot    Postoperative diagnosis: Same    Procedure:   1. Incision and drainage left foot  2. Excisional Debridement ulceration left foot into bone    Anesthesia: Combined MAC with Popliteal Block     Hemostasis: Pneumatic ankle tourniquet 250 mmHg    Pathology:   ID Type Source Tests Collected by Time Destination   A : Left foot tissue Wound Foot, Left ANAEROBIC BACTERIAL CULTURE ROUTINE, GRAM STAIN, AEROBIC BACTERIAL CULTURE ROUTINE Eddi Ram DPM 11/19/2021  6:20 PM         Injectables: None    Materials: None    Complications: None    Blood loss: 2 ml     Findings: Patient presents for operative intervention for an infected wound on his left foot. He returns for a second wash out procedure today to remove any remaining non-viable tissue and bone. He consents to surgery today. Patient questions invited and answered, including appropriate risk, benefits and complications. No guarantees given or implied. Patient has been NPO.    Description: Patient was brought to the operating room and placed on the table in supine position. IV-sedation and popliteal block was administered by the anesthesia department. The foot was then prepped and draped in usual aseptic manner. The extremity was elevated and exsanguinated. Well-padded ankle pneumatic tourniquet was inflated to 250mmHg and the following procedure was then performed: Attention was directed to the open surgical site along the medial left foot where non-viable tissue was noted along the dorsal lateral and medial margins. The non-viable tissue was sharply debrided with a #10 blade and removed from the surgical site. Tracking to other foot compartments was not noted. Soft tissue sent for aerobic and anaerobic culture. The resected 1st and 2nd metatarsals appeared viable. The remaining surgical site was inspected and no remaining areas of  "purulent drainage or non-viable tissue noted. Next, a 1000cc pulse lavage was used to irrigate the surgical site. Following irrigation and debridement, no remaining non-viable tissue was noted.    The resulting ulceration measured [id number]. Sharp, excisional debridement was performed with a #15 blade into bone debriding 72 sq cm.     The wound was packed with thombin soaked gel foam, 1/2\" gauze, and dressings consistent of 4x4's, ABD, kerlix roll and an ace wrap. The pneumatic tourniquet was released and a hyperemic response was noted to the remaining digits on the left foot.     The patient appeared to tolerate all the procedures and anesthesia well without apparent complications. Patient was transported from the operating room to the recovery room with vital signs stable and neurovascular status as it was pre-operatively to the left foot. Patient to be returned to his in-house room for continued IV antibiotics per ID. Medical management per hospitalist. He will remain non-weight bearing on the left foot. Surgical dressing to remain intact until the wound vac is applied on Monday.     Eddi Ram DPM          "

## 2021-11-20 NOTE — PROGRESS NOTES
Subjective:    Pt is 1 day postop left foot I&D.  Patient denies any foot pain.  He denies any fever or chills.  Denies calf pain or shortness of breath.  He has no other new complaints today.    ROS: See above       No Known Allergies    No current outpatient medications on file.       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)     Gastro-esophageal reflux disease with esophagitis     Aguilar's esophagus     Hypercalcemia     Long term (current) use of oral hypoglycemic drugs     Microalbuminuria     Mixed hyperlipidemia     Polyp of colon     Hypocalcemia     Paroxysmal atrial fibrillation (H)     Hyperglycemia     Wound infection     Diabetic ketoacidosis without coma associated with type 2 diabetes mellitus (H)       Past Medical History:   Diagnosis Date     Diabetes (H)      GERD (gastroesophageal reflux disease)      Hypertension        Past Surgical History:   Procedure Laterality Date     AMPUTATE TOE(S) Left 11/16/2021    Procedure: first and second ray amputation;  Surgeon: Eddi Ram DPM;  [place] [facility] [place]     CV CORONARY ANGIOGRAM N/A 11/16/2021    Procedure: CV CORONARY ANGIOGRAM;  Surgeon: Pam Tabares MD;  Location: [facility] CATH LAB CV     CV LEFT HEART CATH N/A 11/16/2021    Procedure: Left Heart Cath;  Surgeon: Pam Tabares MD;  Location: [facility] CATH LAB CV     INCISION AND DRAINAGE LOWER EXTREMITY, COMBINED Left 11/16/2021    Procedure: INCISION AND DRAINAGE, left foot;  Surgeon: Eddi Ram DPM;  [place] [facility] [place]       History reviewed. No pertinent family history.    Social History     Tobacco Use     Smoking status: Former Smoker     Types: Cigars     Smokeless tobacco: Never  "Used     Tobacco comment: Used to smoke a very occational cigar   Substance Use Topics     Alcohol use: Not on file         Exam:    Vitals: BP (!) 164/75 (BP Location: Left arm)   Pulse 91   Temp 99.7  F (37.6  C) (Axillary)   Resp 18   Ht 1.854 m (6' 1\")   Wt 106.6 kg (235 lb 1.6 oz)   SpO2 99%   BMI 31.02 kg/m    BMI: Body mass index is 31.02 kg/m .  Height: 6' 1\"    Constitutional/ general:  Pt is in no apparent distress, appears well-nourished.  Cooperative with history and physical exam.  Resting in bed comfortably and in no apparent distress.    Psych:  The patient answered questions appropriately.  Normal affect.  Seems to have reasonable expectations, in terms of treatment.     Musculoskeletal:    Dressing clean dry and intact.  No calf pain.  Contralateral foot healthy.    A:   Postop day 1 left foot I&D    P: We will leave dressing on until wound care nurse converts to wound VAC on Monday.  Continue nonweightbearing at all times on left foot.  Patient may be up in chair.  Discussed with patient's importance of good blood sugar control and good diet and wound healing.  Will follow    Preet Phillips DPM, AMEYA            "

## 2021-11-20 NOTE — PROGRESS NOTES
Assessment/Plan:  1.  Acute non-ST elevation MI, multiple vessel coronary artery disease: The patient has coronary angiogram yesterday which was reported multiple vessel coronary artery disease.    CV surgical service is consulted.  Plan to have CABG in 2 to 3 months.  Increased metoprolol succinate to 50 mg daily, Continue aspirin 81 mg daily, atorvastatin 80 mg daily and lisinopril 10 mg daily.    2.  Ischemic cardiomyopathy, LVEF 30 to 35%, chronic systolic CHF: He has fluid retention now.   Increase metoprolol succinate to 75 mg daily.    Increase lisinopril to 20 mg daily today.  The patient continued to gain weight, worsening bilateral leg edema.  I asked him to stop drink Diet Coke.  Discontinue oral Lasix 40 mg daily, start IV Lasix 40 mg twice a day    3.  Paroxysmal atrial fibrillation:  He is in sinus rhythm.  Continue metoprolol XL.  Consider anticoagulation Eliquis 5 mg twice a day when he is stable from left foot surgery.    4.  Primary hypertension: His blood pressure is in normal range.     5.  Dyslipidemia:  His LDL is 122.  Continue Lipitor to 80 mg at bedtime.  Repeat lipid profile and liver function in 2 months.     6.  Acute DKA, acute renal injury on chronic kidney disease: Please see primary team of management for details.       7.  Diabetic foot, left great toe gangrene: Please see Podiatric service note.  Consider MCKAYLA study for evaluation of peripheral vascular disease.      Subjective:  Interval History:  Has no complaints of chest pain or shortness of breath.  No acute events over last night.    Current Medications:  Scheduled Meds:    aspirin  81 mg Oral Daily     atorvastatin  80 mg Oral QPM     furosemide  40 mg Intravenous Q12H     [Held by provider] heparin ANTICOAGULANT  5,000 Units Subcutaneous Q8H     insulin aspart  1-12 Units Subcutaneous TID w/meals     insulin aspart   Subcutaneous QAM AC     insulin aspart   Subcutaneous Daily with lunch     insulin aspart   Subcutaneous  Daily with supper     insulin glargine  34 Units Subcutaneous Daily     lisinopril  10 mg Oral Once     [START ON 11/21/2021] lisinopril  20 mg Oral Daily     magnesium sulfate  4 g Intravenous Once     metoprolol succinate ER  25 mg Oral Once     [START ON 11/21/2021] metoprolol succinate ER  75 mg Oral Daily     pantoprazole  40 mg Oral QAM AC     piperacillin-tazobactam  3.375 g Intravenous Q8H     polyethylene glycol  17 g Oral Daily     senna-docusate  1 tablet Oral BID     sodium chloride (PF)  3 mL Intracatheter Q8H     sodium chloride (PF)  3 mL Intracatheter Q8H     sodium chloride (PF)  3 mL Intracatheter Q8H     Continuous Infusions:    - MEDICATION INSTRUCTIONS -       PRN Meds:.[START ON 11/22/2021] acetaminophen, bisacodyl, glucose **OR** dextrose **OR** glucagon, hydrALAZINE, HYDROmorphone, lidocaine 4%, lidocaine 4%, lidocaine (buffered or not buffered), lidocaine (buffered or not buffered), magnesium hydroxide, melatonin, naloxone **OR** naloxone **OR** naloxone **OR** naloxone, ondansetron **OR** ondansetron, oxyCODONE **OR** oxyCODONE, - MEDICATION INSTRUCTIONS -, prochlorperazine **OR** prochlorperazine, sodium chloride (PF), sodium chloride (PF), sodium chloride (PF)    Objective:   Vital signs in last 24 hours:  Temp:  [98.2  F (36.8  C)-99.7  F (37.6  C)] 99.7  F (37.6  C)  Pulse:  [67-98] 91  Resp:  [13-33] 18  BP: (101-164)/() 164/75  SpO2:  [93 %-99 %] 99 %  Weight:   [unfilled]     Physical Exam:  General Appearance:   Awake, Alert, No acute distress.   HEENT:  No scleral icterus; the mucous membranes were moist.   Neck: No cervical bruits. No jugular venous distention   Lungs:   Lungs are clear to auscultation. No crackles. No wheezing.   Cardiovascular:   RRR, normal first and second heart sounds with no murmurs. No rubs or gallops.     Abdomen:  Soft. No tenderness. Bowels sounds are present   Extremities: Mild bilateral leg edema.  Left toe gangrene appearance   Skin: Warm, Dry.  No rashes.   Neurologic: Mood and affect are appropriate. No focal deficits.         Cardiographics:   Report: personally reviewed. .      Tele monitoring -normal sinus rhythm    Echocardiogram on November 15, 2021:  The left ventricle is normal in size.  Left ventricular function is decreased. The ejection fraction is 30-35%  (moderately reduced).  Left ventricular diastolic function is abnormal.  Diastolic Doppler findings (E/E' ratio and/or other parameters) suggest left  ventricular filling pressures are increased.  The left atrium is mildly dilated.  The mitral valve leaflets are mildly thickened.  There is mild (1+) mitral regurgitation.  IVC diameter >2.1 cm collapsing <50% with sniff suggests a high RA pressure  estimated at 15 mmHg or greater.    Coronary angiogram on 11-:    Severe, diffuse coronary calcification with diffusely small distal coronaries consistent with severe diabetic vasculopathy.    Severe proximal, mid and apical LAD disease. The first diagonal is occluded and is weakly collateralized.     of the proximal circumflex with right to left collateral filling of OM-2 and OM-3    Severe distal RCA disease before the bifurcation and moderate ostial PDA disease.    LV EDP moderately elevated at 16 mmHg.    Lab Results:   personally reviewed.     Lab Results   Component Value Date     11/16/2021    CO2 22 11/16/2021    BUN 18 11/16/2021     Lab Results   Component Value Date    TROPONINI 13.42 11/15/2021     Lab Results   Component Value Date    WBC 11.8 11/15/2021    HGB 11.3 11/15/2021    HCT 34.9 11/15/2021    MCV 89 11/15/2021     11/15/2021     Lab Results   Component Value Date    CHOL 189 11/16/2021    TRIG 163 11/16/2021    HDL 34 11/16/2021     11/16/2021

## 2021-11-20 NOTE — ANESTHESIA CARE TRANSFER NOTE
Patient: Norman Aguilar    Procedure: Procedure(s):  INCISION AND DRAINAGE, left foot       Diagnosis: Cellulitis and abscess of foot, except toes [L03.119, L02.619]  Diagnosis Additional Information: No value filed.    Anesthesia Type:   General     Note:    Oropharynx: spontaneously breathing  Level of Consciousness: awake  Oxygen Supplementation: room air    Independent Airway: airway patency satisfactory and stable  Dentition: dentition unchanged  Vital Signs Stable: post-procedure vital signs reviewed and stable  Report to RN Given: handoff report given  Patient transferred to: PACU  Comments: Pt. [place] and breathing spontaneously.  Vitals stable.  Report given to oncoming nurse.  Transfer of care occurred.   Handoff Report: Identifed the Patient, Identified the Reponsible Provider, Reviewed the pertinent medical history, Discussed the surgical course, Reviewed Intra-OP anesthesia mangement and issues during anesthesia, Set expectations for post-procedure period and Allowed opportunity for questions and acknowledgement of understanding      Vitals:  Vitals Value Taken Time   /65 11/19/21 1902   Temp     Pulse     Resp     SpO2         Electronically Signed By: JUNI Sommer CRNA  November 19, 2021  7:03 PM

## 2021-11-21 ENCOUNTER — APPOINTMENT (OUTPATIENT)
Dept: PHYSICAL THERAPY | Facility: HOSPITAL | Age: 61
DRG: 616 | End: 2021-11-21
Payer: COMMERCIAL

## 2021-11-21 ENCOUNTER — APPOINTMENT (OUTPATIENT)
Dept: OCCUPATIONAL THERAPY | Facility: HOSPITAL | Age: 61
DRG: 616 | End: 2021-11-21
Attending: PODIATRIST
Payer: COMMERCIAL

## 2021-11-21 LAB
ALBUMIN SERPL-MCNC: 2 G/DL (ref 3.5–5)
ALP SERPL-CCNC: 92 U/L (ref 45–120)
ALT SERPL W P-5'-P-CCNC: 18 U/L (ref 0–45)
ANION GAP SERPL CALCULATED.3IONS-SCNC: 11 MMOL/L (ref 5–18)
AST SERPL W P-5'-P-CCNC: 23 U/L (ref 0–40)
BACTERIA BLD CULT: ABNORMAL
BACTERIA BLD CULT: ABNORMAL
BACTERIA BLD CULT: NO GROWTH
BACTERIA TISS BX CULT: ABNORMAL
BACTERIA TISS BX CULT: ABNORMAL
BACTERIA TISS BX CULT: NORMAL
BILIRUB SERPL-MCNC: 0.5 MG/DL (ref 0–1)
BUN SERPL-MCNC: 8 MG/DL (ref 8–22)
CALCIUM SERPL-MCNC: 8.5 MG/DL (ref 8.5–10.5)
CHLORIDE BLD-SCNC: 100 MMOL/L (ref 98–107)
CO2 SERPL-SCNC: 28 MMOL/L (ref 22–31)
CREAT SERPL-MCNC: 1.37 MG/DL (ref 0.7–1.3)
ERYTHROCYTE [DISTWIDTH] IN BLOOD BY AUTOMATED COUNT: 14.4 % (ref 10–15)
GFR SERPL CREATININE-BSD FRML MDRD: 55 ML/MIN/1.73M2
GLUCOSE BLD-MCNC: 222 MG/DL (ref 70–125)
GLUCOSE BLDC GLUCOMTR-MCNC: 101 MG/DL (ref 70–99)
GLUCOSE BLDC GLUCOMTR-MCNC: 156 MG/DL (ref 70–99)
GLUCOSE BLDC GLUCOMTR-MCNC: 183 MG/DL (ref 70–99)
GLUCOSE BLDC GLUCOMTR-MCNC: 189 MG/DL (ref 70–99)
GLUCOSE BLDC GLUCOMTR-MCNC: 221 MG/DL (ref 70–99)
GLUCOSE BLDC GLUCOMTR-MCNC: 242 MG/DL (ref 70–99)
GLUCOSE BLDC GLUCOMTR-MCNC: 305 MG/DL (ref 70–99)
GLUCOSE BLDC GLUCOMTR-MCNC: 68 MG/DL (ref 70–99)
GLUCOSE BLDC GLUCOMTR-MCNC: 86 MG/DL (ref 70–99)
HCT VFR BLD AUTO: 33 % (ref 40–53)
HGB BLD-MCNC: 10.6 G/DL (ref 13.3–17.7)
MAGNESIUM SERPL-MCNC: 1.7 MG/DL (ref 1.8–2.6)
MCH RBC QN AUTO: 28.3 PG (ref 26.5–33)
MCHC RBC AUTO-ENTMCNC: 32.1 G/DL (ref 31.5–36.5)
MCV RBC AUTO: 88 FL (ref 78–100)
PLATELET # BLD AUTO: 220 10E3/UL (ref 150–450)
POTASSIUM BLD-SCNC: 3 MMOL/L (ref 3.5–5)
POTASSIUM BLD-SCNC: 3.5 MMOL/L (ref 3.5–5)
PROT SERPL-MCNC: 6.5 G/DL (ref 6–8)
RBC # BLD AUTO: 3.75 10E6/UL (ref 4.4–5.9)
SODIUM SERPL-SCNC: 139 MMOL/L (ref 136–145)
WBC # BLD AUTO: 7.2 10E3/UL (ref 4–11)

## 2021-11-21 PROCEDURE — 80053 COMPREHEN METABOLIC PANEL: CPT | Performed by: INTERNAL MEDICINE

## 2021-11-21 PROCEDURE — 99232 SBSQ HOSP IP/OBS MODERATE 35: CPT | Performed by: INTERNAL MEDICINE

## 2021-11-21 PROCEDURE — 250N000011 HC RX IP 250 OP 636: Performed by: PODIATRIST

## 2021-11-21 PROCEDURE — 210N000001 HC R&B IMCU HEART CARE

## 2021-11-21 PROCEDURE — 250N000013 HC RX MED GY IP 250 OP 250 PS 637: Performed by: INTERNAL MEDICINE

## 2021-11-21 PROCEDURE — 250N000011 HC RX IP 250 OP 636: Performed by: INTERNAL MEDICINE

## 2021-11-21 PROCEDURE — 99207 PR NO CHARGE LOS: CPT | Performed by: INTERNAL MEDICINE

## 2021-11-21 PROCEDURE — 85027 COMPLETE CBC AUTOMATED: CPT | Performed by: INTERNAL MEDICINE

## 2021-11-21 PROCEDURE — 250N000013 HC RX MED GY IP 250 OP 250 PS 637: Performed by: PODIATRIST

## 2021-11-21 PROCEDURE — 97110 THERAPEUTIC EXERCISES: CPT | Mod: GP

## 2021-11-21 PROCEDURE — 97535 SELF CARE MNGMENT TRAINING: CPT | Mod: GO

## 2021-11-21 PROCEDURE — 97116 GAIT TRAINING THERAPY: CPT | Mod: GP

## 2021-11-21 PROCEDURE — 84132 ASSAY OF SERUM POTASSIUM: CPT | Performed by: INTERNAL MEDICINE

## 2021-11-21 PROCEDURE — 36415 COLL VENOUS BLD VENIPUNCTURE: CPT | Performed by: INTERNAL MEDICINE

## 2021-11-21 PROCEDURE — 83735 ASSAY OF MAGNESIUM: CPT | Performed by: INTERNAL MEDICINE

## 2021-11-21 RX ORDER — FUROSEMIDE 20 MG
40 TABLET ORAL
Status: DISCONTINUED | OUTPATIENT
Start: 2021-11-21 | End: 2021-11-24 | Stop reason: HOSPADM

## 2021-11-21 RX ORDER — POTASSIUM CHLORIDE 1500 MG/1
40 TABLET, EXTENDED RELEASE ORAL ONCE
Status: COMPLETED | OUTPATIENT
Start: 2021-11-21 | End: 2021-11-21

## 2021-11-21 RX ORDER — POTASSIUM CHLORIDE 1500 MG/1
20 TABLET, EXTENDED RELEASE ORAL ONCE
Status: COMPLETED | OUTPATIENT
Start: 2021-11-21 | End: 2021-11-21

## 2021-11-21 RX ORDER — FENTANYL CITRATE 50 UG/ML
25 INJECTION, SOLUTION INTRAMUSCULAR; INTRAVENOUS EVERY 5 MIN PRN
Status: DISCONTINUED | OUTPATIENT
Start: 2021-11-21 | End: 2021-11-21

## 2021-11-21 RX ORDER — MAGNESIUM SULFATE 4 G/50ML
4 INJECTION INTRAVENOUS ONCE
Status: COMPLETED | OUTPATIENT
Start: 2021-11-21 | End: 2021-11-21

## 2021-11-21 RX ORDER — HYDROMORPHONE HCL IN WATER/PF 6 MG/30 ML
0.2 PATIENT CONTROLLED ANALGESIA SYRINGE INTRAVENOUS EVERY 5 MIN PRN
Status: DISCONTINUED | OUTPATIENT
Start: 2021-11-21 | End: 2021-11-21

## 2021-11-21 RX ADMIN — POTASSIUM CHLORIDE 20 MEQ: 1500 TABLET, EXTENDED RELEASE ORAL at 11:13

## 2021-11-21 RX ADMIN — PIPERACILLIN SODIUM AND TAZOBACTAM SODIUM 3.38 G: 3; .375 INJECTION, POWDER, LYOPHILIZED, FOR SOLUTION INTRAVENOUS at 04:18

## 2021-11-21 RX ADMIN — POTASSIUM CHLORIDE 40 MEQ: 1500 TABLET, EXTENDED RELEASE ORAL at 09:13

## 2021-11-21 RX ADMIN — ATORVASTATIN CALCIUM 80 MG: 40 TABLET, FILM COATED ORAL at 20:23

## 2021-11-21 RX ADMIN — MAGNESIUM SULFATE HEPTAHYDRATE 4 G: 80 INJECTION, SOLUTION INTRAVENOUS at 11:13

## 2021-11-21 RX ADMIN — FUROSEMIDE 40 MG: 20 TABLET ORAL at 20:22

## 2021-11-21 RX ADMIN — APIXABAN 10 MG: 5 TABLET, FILM COATED ORAL at 09:14

## 2021-11-21 RX ADMIN — PIPERACILLIN SODIUM AND TAZOBACTAM SODIUM 3.38 G: 3; .375 INJECTION, POWDER, LYOPHILIZED, FOR SOLUTION INTRAVENOUS at 11:20

## 2021-11-21 RX ADMIN — FUROSEMIDE 40 MG: 10 INJECTION, SOLUTION INTRAVENOUS at 09:14

## 2021-11-21 RX ADMIN — METOPROLOL SUCCINATE 75 MG: 25 TABLET, EXTENDED RELEASE ORAL at 09:13

## 2021-11-21 RX ADMIN — APIXABAN 10 MG: 5 TABLET, FILM COATED ORAL at 20:23

## 2021-11-21 RX ADMIN — PANTOPRAZOLE SODIUM 40 MG: 40 TABLET, DELAYED RELEASE ORAL at 09:14

## 2021-11-21 RX ADMIN — ASPIRIN 81 MG CHEWABLE TABLET 81 MG: 81 TABLET CHEWABLE at 09:14

## 2021-11-21 RX ADMIN — PIPERACILLIN SODIUM AND TAZOBACTAM SODIUM 3.38 G: 3; .375 INJECTION, POWDER, LYOPHILIZED, FOR SOLUTION INTRAVENOUS at 20:28

## 2021-11-21 RX ADMIN — LISINOPRIL 20 MG: 20 TABLET ORAL at 09:15

## 2021-11-21 ASSESSMENT — ACTIVITIES OF DAILY LIVING (ADL)
ADLS_ACUITY_SCORE: 11

## 2021-11-21 NOTE — PROGRESS NOTES
A/P    61 year old diabetic male who was admitted on 11/14/2021.  He presented with concerns overnight non-healing ulcer on his left foot and found to be in acute DKA.  Further evaluation positive for a  Non-STEMI and  severe cellulitis with gangrene of the first ray.        DKA (POA); IDDM:  Resolved DKA.  A1C >14%.  --increase Lantus to 38U every day  -novolog 1U:10g CHO ac tid  -High resistant sliding scale insulin AC TID  -accuchecks ac/hs  -CDE consult appreciated     Left diabetic foot infection with wet gangrene  --MRI showed no osteomyelitis  -POD 4 - Amputation left hallux, Amputation 2nd digit left foot, Partial amputation 1st metatarsal left foot, Partial amputation 2nd metatarsal left foot, Excision sesamoids left foot, Incision and drainage left foot by podiatry.  Superficial cultures noted to be polymicrobial - agalactiae, Staph aureus, Klebsiella oxytoca.   Intra-Op cultures growing only Streptococcus agalactiae.    -POD2 2nd Incision and drainage left foot & excisional debridement of ulceration left foot into bone  -IV zoysn   -per Podiatry: leave dressing on until wound care nurse converts to wound VAC on Monday.  Continue nonweightbearing at all times on left foot.  Patient may be up in chair.      Sepsis 2/2 Bacteremia with Streptococcus agalactiae due to problem #2.    -Positive blood cultures on 11/14.  Follow-up on 11/16 in process.  Source is left foot infection as growing Streptococcus agalactiae from the foot as well.  -follow cultures  -IV zosyn  -ID following     Non-STEMI  --Coronary angiogram 11/16 - severe, diffuse coronary calcifications  --[facility] consulted and plan for CABG in 2-3 months  - lisinopril 20mg/day today  -metoprolol xl 75 mg/day  -asa 81mg every day  -lipitor 80mg every day    Acute RLE posterior tibial vein DVT:  -Eliquis 10mg BID x7d-->5mg BID     PAF: in NSR currently.  -toprol XL  -on Eliquis     JOHANA on CKD stage III: Cr 1.26->1.37 (started IV Lasix 11/20)  -bmp  a.m.     Acute systolic heart failure, ischemic origin, EF of 30 to 35%  --Toprol XL 75mg every day, Lisinopril 20mg every day  -Lasix 40mg PO BID  -cardiology following    Hyponatremia   -resolved     Hypomagnesemia/Hypokalemia  --Continue to monitor and replace K per protocol  -Mg IV 4g x1. Mg a.m.        Barriers to discharge: IV antibiotics, podiatry/surgery        Diet: Consistent Carbohydrate Diet Low Consistent Carb (45 g CHO per Meal)   DVT Prophylaxis: Heparin subcutaneous q 8hrs  Garcia Catheter: Not present  Code Status: Full Code       covid negative 11/14        S:  Afebrile.  No acute events overnight noted    O:  Temp: 98.1  F (36.7  C) Temp src: Oral BP: (!) 142/78 Pulse: 88   Resp: 18 SpO2: 95 % O2 Device: None (Room air)    gen nad  cv rrr, ble edema  Lungs cta  abd bs+, nttp  Neuro non focal    Lab/imaging reviewed

## 2021-11-21 NOTE — PLAN OF CARE
Problem: Adult Inpatient Plan of Care  Goal: Plan of Care Review  Outcome: Improving     Pt denies pain, IV antibiotics given as schedule.  Left foot dressing C/D/I.   NSR on tele monitor.

## 2021-11-21 NOTE — PROGRESS NOTES
[facility] Infectious Disease  afebrile  Procedure:   1. Incision and drainage left foot  2. Excisional Debridement ulceration left foot into bone    Await cx, path  Continue abx  Po seems sufficient    Kendrick Durbin M.D.

## 2021-11-21 NOTE — PROGRESS NOTES
CM started patient wound vac order in [employer]. Will fax in documentation tomorrow after WO nurse places wound vac.     Home care referral sent to [facility].     2:14 PM  Met with patient. Discussed discharge plan. Says he is debating going to sisters home vs nieces home in [place] at discharge. Says sister is under a lot of stress with her job right now and doesn't want to be a burden. Will discuss further tomorrow

## 2021-11-21 NOTE — PROGRESS NOTES
Assessment/Plan:  1.  Acute non-ST elevation MI, multiple vessel coronary artery disease: The patient has coronary angiogram which was reported multiple vessel coronary artery disease.  No chest pain.  CV surgical service is consulted.  Plan to have CABG in 2 to 3 months.  Increased metoprolol succinate to 50 mg daily, Continue aspirin 81 mg daily, atorvastatin 80 mg daily and lisinopril 10 mg daily.    2.  Ischemic cardiomyopathy, LVEF 30 to 35%, chronic systolic CHF: He has bilateral leg edema.  Continue metoprolol succinate to 75 mg daily.    Continue lisinopril to 20 mg daily.  Change iv Lasix to oral Lasix 40 mg twice a day due to elevated creatinine and also found he has right DVT.    3.  Paroxysmal atrial fibrillation:  He is in sinus rhythm.  Continue metoprolol XL.  Consider anticoagulation Eliquis 5 mg twice a day when he is stable from left foot surgery.    4.  Primary hypertension: His blood pressure is controlled.     5.  Dyslipidemia:  His LDL is 122.  Continue Lipitor to 80 mg at bedtime.  Repeat lipid profile and liver function in 2 months.     6.  Acute right leg DVT: Increased eliquis to 10 mg bid for 7 days per primary team.       7.  Diabetic foot, left great toe gangrene: Please see podiatric team management.      Subjective:  Interval History:  Has no complaints of chest pain or shortness of breath.      Current Medications:  Scheduled Meds:    apixaban ANTICOAGULANT  10 mg Oral BID     [START ON 11/27/2021] apixaban ANTICOAGULANT  5 mg Oral BID     aspirin  81 mg Oral Daily     atorvastatin  80 mg Oral QPM     furosemide  40 mg Oral BID     insulin aspart  1-12 Units Subcutaneous TID w/meals     insulin aspart   Subcutaneous QAM AC     insulin aspart   Subcutaneous Daily with lunch     insulin aspart   Subcutaneous Daily with supper     insulin glargine  34 Units Subcutaneous Daily     lisinopril  20 mg Oral Daily     magnesium sulfate  4 g Intravenous Once     metoprolol succinate ER  75  mg Oral Daily     pantoprazole  40 mg Oral QAM AC     piperacillin-tazobactam  3.375 g Intravenous Q8H     polyethylene glycol  17 g Oral Daily     potassium chloride  20 mEq Oral Once     senna-docusate  1 tablet Oral BID     sodium chloride (PF)  3 mL Intracatheter Q8H     sodium chloride (PF)  3 mL Intracatheter Q8H     sodium chloride (PF)  3 mL Intracatheter Q8H     Continuous Infusions:    - MEDICATION INSTRUCTIONS -       PRN Meds:.[START ON 11/22/2021] acetaminophen, bisacodyl, glucose **OR** dextrose **OR** glucagon, hydrALAZINE, HYDROmorphone, lidocaine 4%, lidocaine 4%, lidocaine (buffered or not buffered), lidocaine (buffered or not buffered), magnesium hydroxide, melatonin, naloxone **OR** naloxone **OR** naloxone **OR** naloxone, ondansetron **OR** ondansetron, oxyCODONE **OR** oxyCODONE, - MEDICATION INSTRUCTIONS -, prochlorperazine **OR** prochlorperazine, sodium chloride (PF), sodium chloride (PF), sodium chloride (PF)    Objective:   Vital signs in last 24 hours:  Temp:  [98  F (36.7  C)-98.5  F (36.9  C)] 98.1  F (36.7  C)  Pulse:  [65-88] 88  Resp:  [18-20] 18  BP: (105-167)/(55-78) 142/78  SpO2:  [95 %-100 %] 95 %  Weight:   [unfilled]     Physical Exam:  General Appearance:   Awake, Alert, No acute distress.   HEENT:  No scleral icterus; the mucous membranes were moist.   Neck: No cervical bruits. No jugular venous distention   Lungs:   Lungs are clear to auscultation. No crackles. No wheezing.   Cardiovascular:   RRR, normal first and second heart sounds with no murmurs. No rubs or gallops.     Abdomen:  Soft. No tenderness. Bowels sounds are present   Extremities: Mild bilateral leg edema.  Left toe gangrene appearance   Skin: Warm, Dry. No rashes.   Neurologic: Mood and affect are appropriate. No focal deficits.         Cardiographics:   Report: personally reviewed. .      Tele monitoring -normal sinus rhythm    Echocardiogram on November 15, 2021:  The left ventricle is normal in  size.  Left ventricular function is decreased. The ejection fraction is 30-35%  (moderately reduced).  Left ventricular diastolic function is abnormal.  Diastolic Doppler findings (E/E' ratio and/or other parameters) suggest left  ventricular filling pressures are increased.  The left atrium is mildly dilated.  The mitral valve leaflets are mildly thickened.  There is mild (1+) mitral regurgitation.  IVC diameter >2.1 cm collapsing <50% with sniff suggests a high RA pressure  estimated at 15 mmHg or greater.    Coronary angiogram on 11-:    Severe, diffuse coronary calcification with diffusely small distal coronaries consistent with severe diabetic vasculopathy.    Severe proximal, mid and apical LAD disease. The first diagonal is occluded and is weakly collateralized.     of the proximal circumflex with right to left collateral filling of OM-2 and OM-3    Severe distal RCA disease before the bifurcation and moderate ostial PDA disease.    LV EDP moderately elevated at 16 mmHg.    Lab Results:   personally reviewed.     Lab Results   Component Value Date     11/16/2021    CO2 22 11/16/2021    BUN 18 11/16/2021     Lab Results   Component Value Date    TROPONINI 13.42 11/15/2021     Lab Results   Component Value Date    WBC 11.8 11/15/2021    HGB 11.3 11/15/2021    HCT 34.9 11/15/2021    MCV 89 11/15/2021     11/15/2021     Lab Results   Component Value Date    CHOL 189 11/16/2021    TRIG 163 11/16/2021    HDL 34 11/16/2021     11/16/2021

## 2021-11-22 LAB
ANION GAP SERPL CALCULATED.3IONS-SCNC: 9 MMOL/L (ref 5–18)
BACTERIA BLD CULT: ABNORMAL
BUN SERPL-MCNC: 12 MG/DL (ref 8–22)
CALCIUM SERPL-MCNC: 8.8 MG/DL (ref 8.5–10.5)
CHLORIDE BLD-SCNC: 97 MMOL/L (ref 98–107)
CO2 SERPL-SCNC: 29 MMOL/L (ref 22–31)
CREAT SERPL-MCNC: 1.35 MG/DL (ref 0.7–1.3)
ERYTHROCYTE [DISTWIDTH] IN BLOOD BY AUTOMATED COUNT: 14.1 % (ref 10–15)
GFR SERPL CREATININE-BSD FRML MDRD: 56 ML/MIN/1.73M2
GLUCOSE BLD-MCNC: 236 MG/DL (ref 70–125)
GLUCOSE BLDC GLUCOMTR-MCNC: 228 MG/DL (ref 70–99)
GLUCOSE BLDC GLUCOMTR-MCNC: 251 MG/DL (ref 70–99)
GLUCOSE BLDC GLUCOMTR-MCNC: 269 MG/DL (ref 70–99)
HCT VFR BLD AUTO: 31.9 % (ref 40–53)
HGB BLD-MCNC: 10.4 G/DL (ref 13.3–17.7)
MAGNESIUM SERPL-MCNC: 2 MG/DL (ref 1.8–2.6)
MCH RBC QN AUTO: 28.7 PG (ref 26.5–33)
MCHC RBC AUTO-ENTMCNC: 32.6 G/DL (ref 31.5–36.5)
MCV RBC AUTO: 88 FL (ref 78–100)
PLATELET # BLD AUTO: 218 10E3/UL (ref 150–450)
POTASSIUM BLD-SCNC: 3.7 MMOL/L (ref 3.5–5)
RBC # BLD AUTO: 3.62 10E6/UL (ref 4.4–5.9)
SODIUM SERPL-SCNC: 135 MMOL/L (ref 136–145)
WBC # BLD AUTO: 7.4 10E3/UL (ref 4–11)

## 2021-11-22 PROCEDURE — 99232 SBSQ HOSP IP/OBS MODERATE 35: CPT | Performed by: INTERNAL MEDICINE

## 2021-11-22 PROCEDURE — 210N000001 HC R&B IMCU HEART CARE

## 2021-11-22 PROCEDURE — 250N000013 HC RX MED GY IP 250 OP 250 PS 637: Performed by: PODIATRIST

## 2021-11-22 PROCEDURE — 85014 HEMATOCRIT: CPT | Performed by: INTERNAL MEDICINE

## 2021-11-22 PROCEDURE — 83735 ASSAY OF MAGNESIUM: CPT | Performed by: INTERNAL MEDICINE

## 2021-11-22 PROCEDURE — 250N000013 HC RX MED GY IP 250 OP 250 PS 637: Performed by: INTERNAL MEDICINE

## 2021-11-22 PROCEDURE — G0463 HOSPITAL OUTPT CLINIC VISIT: HCPCS | Mod: 25

## 2021-11-22 PROCEDURE — 250N000011 HC RX IP 250 OP 636: Performed by: PODIATRIST

## 2021-11-22 PROCEDURE — 36415 COLL VENOUS BLD VENIPUNCTURE: CPT | Performed by: INTERNAL MEDICINE

## 2021-11-22 PROCEDURE — 250N000011 HC RX IP 250 OP 636: Performed by: INTERNAL MEDICINE

## 2021-11-22 PROCEDURE — 97606 NEG PRS WND THER DME>50 SQCM: CPT

## 2021-11-22 PROCEDURE — 80048 BASIC METABOLIC PNL TOTAL CA: CPT | Performed by: INTERNAL MEDICINE

## 2021-11-22 PROCEDURE — 99207 PR CDG-MDM COMPONENT: MEETS LOW - DOWN CODED: CPT | Performed by: INTERNAL MEDICINE

## 2021-11-22 PROCEDURE — 99231 SBSQ HOSP IP/OBS SF/LOW 25: CPT | Performed by: INTERNAL MEDICINE

## 2021-11-22 RX ORDER — SPIRONOLACTONE 25 MG/1
25 TABLET ORAL DAILY
Status: DISCONTINUED | OUTPATIENT
Start: 2021-11-22 | End: 2021-11-24 | Stop reason: HOSPADM

## 2021-11-22 RX ORDER — BUMETANIDE 0.25 MG/ML
2 INJECTION INTRAMUSCULAR; INTRAVENOUS ONCE
Status: COMPLETED | OUTPATIENT
Start: 2021-11-22 | End: 2021-11-22

## 2021-11-22 RX ADMIN — ASPIRIN 81 MG CHEWABLE TABLET 81 MG: 81 TABLET CHEWABLE at 09:02

## 2021-11-22 RX ADMIN — PANTOPRAZOLE SODIUM 40 MG: 40 TABLET, DELAYED RELEASE ORAL at 09:03

## 2021-11-22 RX ADMIN — BUMETANIDE 2 MG: 0.25 INJECTION INTRAMUSCULAR; INTRAVENOUS at 12:59

## 2021-11-22 RX ADMIN — INSULIN GLARGINE 38 UNITS: 100 INJECTION, SOLUTION SUBCUTANEOUS at 09:04

## 2021-11-22 RX ADMIN — FUROSEMIDE 40 MG: 20 TABLET ORAL at 09:02

## 2021-11-22 RX ADMIN — PIPERACILLIN SODIUM AND TAZOBACTAM SODIUM 3.38 G: 3; .375 INJECTION, POWDER, LYOPHILIZED, FOR SOLUTION INTRAVENOUS at 05:16

## 2021-11-22 RX ADMIN — LISINOPRIL 20 MG: 20 TABLET ORAL at 09:03

## 2021-11-22 RX ADMIN — METOPROLOL SUCCINATE 75 MG: 25 TABLET, EXTENDED RELEASE ORAL at 09:02

## 2021-11-22 RX ADMIN — APIXABAN 10 MG: 5 TABLET, FILM COATED ORAL at 20:19

## 2021-11-22 RX ADMIN — APIXABAN 10 MG: 5 TABLET, FILM COATED ORAL at 09:02

## 2021-11-22 RX ADMIN — ATORVASTATIN CALCIUM 80 MG: 40 TABLET, FILM COATED ORAL at 20:19

## 2021-11-22 RX ADMIN — SPIRONOLACTONE 25 MG: 25 TABLET, FILM COATED ORAL at 09:12

## 2021-11-22 RX ADMIN — PIPERACILLIN SODIUM AND TAZOBACTAM SODIUM 3.38 G: 3; .375 INJECTION, POWDER, LYOPHILIZED, FOR SOLUTION INTRAVENOUS at 12:58

## 2021-11-22 RX ADMIN — PIPERACILLIN SODIUM AND TAZOBACTAM SODIUM 3.38 G: 3; .375 INJECTION, POWDER, LYOPHILIZED, FOR SOLUTION INTRAVENOUS at 20:20

## 2021-11-22 ASSESSMENT — ACTIVITIES OF DAILY LIVING (ADL)
ADLS_ACUITY_SCORE: 11
ADLS_ACUITY_SCORE: 12
ADLS_ACUITY_SCORE: 11
ADLS_ACUITY_SCORE: 11
ADLS_ACUITY_SCORE: 12
ADLS_ACUITY_SCORE: 11
ADLS_ACUITY_SCORE: 12
ADLS_ACUITY_SCORE: 11
ADLS_ACUITY_SCORE: 11
ADLS_ACUITY_SCORE: 12
ADLS_ACUITY_SCORE: 12
ADLS_ACUITY_SCORE: 11
ADLS_ACUITY_SCORE: 12
ADLS_ACUITY_SCORE: 11
ADLS_ACUITY_SCORE: 11
ADLS_ACUITY_SCORE: 12
ADLS_ACUITY_SCORE: 11

## 2021-11-22 ASSESSMENT — MIFFLIN-ST. JEOR: SCORE: 1892.16

## 2021-11-22 NOTE — PROGRESS NOTES
A/P    61 year old diabetic male who was admitted on 11/14/2021.  He presented with concerns overnight non-healing ulcer on his left foot and found to be in acute DKA.  Further evaluation positive for a  Non-STEMI and  severe cellulitis with gangrene of the first ray.        DKA (POA); IDDM:  Resolved DKA.  A1C >14%.  --Lantus to 38U every day  -novolog 1U:10g CHO ac tid  -High resistant sliding scale insulin AC TID  -accuchecks ac/hs  -CDE consult appreciated     Left diabetic foot infection with wet gangrene  --MRI showed no osteomyelitis  -POD 5 - Amputation left hallux, Amputation 2nd digit left foot, Partial amputation 1st metatarsal left foot, Partial amputation 2nd metatarsal left foot, Excision sesamoids left foot, Incision and drainage left foot by podiatry.  Superficial cultures noted to be polymicrobial - agalactiae, Staph aureus, Klebsiella oxytoca.   Intra-Op cultures group B strep, coag neg staph(methicillin-sensitive).  Pathology with clean margins.    -POD3 2nd Incision and drainage left foot & excisional debridement of ulceration left foot into bone  -IV zoysn->Augmentin 875mg BID on discharge until 11/30/21  -per Podiatry: leave dressing on until wound care nurse converts to wound VAC on Monday.  Continue nonweightbearing at all times on left foot.  Patient may be up in chair.      Sepsis 2/2 Polymicrobial Bacteremia with Streptococcus agalactiae, Finegoldia magna, and Peptoniphilus asaccharolyticus due to problem #2.    -Positive blood cultures on 11/14.  Follow-up on 11/16 NGTD.  Source is left foot infection.  -follow cultures  -Augmentin on discharge until 11/30/21  -ID following     Non-STEMI  --Coronary angiogram 11/16 - severe, diffuse coronary calcifications  --[facility] consulted and plan for CABG in 2-3 months  - lisinopril 20mg/day today  -metoprolol xl 75 mg/day  -asa 81mg every day  -lipitor 80mg every day    Acute RLE posterior tibial vein DVT:  -Eliquis 10mg BID x7d-->5mg BID     PAF: in  NSR currently.  -toprol XL  -on Eliquis     JOHANA on CKD stage III: Cr 1.26->1.37->1.35  -bmp a.m.     Acute systolic heart failure, ischemic origin, EF of 30 to 35%  --Toprol XL 75mg every day, Lisinopril 20mg every day  -Lasix 40mg PO BID  -cardiology following    Hyponatremia   -resolved     Hypomagnesemia/Hypokalemia  --Continue to monitor and replace K per protocol  -Mg a.m.           Diet: Consistent Carbohydrate Diet Low Consistent Carb (45 g CHO per Meal)   DVT Prophylaxis: Heparin subcutaneous q 8hrs  Garcia Catheter: Not present  Code Status: Full Code       covid negative 11/14        S:  Afebrile.  No acute events overnight noted    O:  Temp: 98.9  F (37.2  C) Temp src: Oral BP: 111/57 Pulse: 76   Resp: 18 SpO2: 99 % O2 Device: None (Room air)    gen nad  cv rrr, ble edema  Lungs cta  abd bs+, nttp  Neuro non focal    Lab/imaging reviewed

## 2021-11-22 NOTE — PROGRESS NOTES
HEART CARE NOTE          Assessment/Recommendations     1. HFrEF/ICM  Assessment / Plan    Mildly volume up on physical exam; will give IV diuretic x1 dose and continue PO diuretic regimen     GDMT as detailed below    Current Pharmacotherapy AHA Guideline-Directed Medical Therapy   Lisinopril 20 mg daily Lisinopril 20 mg twice daily   Metoprolol succinate 75 mg daily Carvedilol 25 mg twice daily   Spironolactone 25 mg Spironolactone 25 mg once daily   Hydralazine NA Hydralazine 100 mg three times daily   Isosorbide dinitrate NA Isosorbide dinitrate 40 mg three times daily   SGLT2 inhibitor: not started Dapagliflozin or Empagliflozin 10 mg daily       2. NSTEMI  Assessment / Plan    S/p coronary angiogram significant for severe multi-vessel CAD; ELY/Pa consulted with plans for CABG in 2-3 months    Currently hemodynamically stable and chest pain free    Continue ASA, high intensity atorvastatin, lisinopril, metoprolol succinate     3. DM2  Assessment / Plan    C/b DKA on admission    Management per primary team    4. HLP  Assessment / Plan    Continue high intensity atorvastatin    5. Paroxysmal afib  Assessment / Plan    Currently in NSR; continue metoprolol    Continue apixaban 5 mg BID     Will need afib referral at discharge    6. HTN  Assessment / Plan    Well controlled on current regimen    Cardiology team will sign-off for now. Please do not hesitate toconsult us again if new questions or concerns arise. Follow-up appointment will be arranged by [facility] clinic.       History of Present Illness/Subjective    Mr. Norman Aguilar is a 61 year old male with a PMHx significant for type II DM, hypertension, hyperlipidemia and GERD presented initially for evaluation of wound on left foot found to be in DKA. While at ED HR rate went to in the 140s to 150s and noted to be A. fib with RVR with troponin elevated at 3.7.  EKG with ST depressions on ii, III, Avf ,V4 through V6.     Today, Mr. Aguilar denies  "any acute events or complaints.     ECG: Personally reviewed. unchanged from previous tracings, normal sinus rhythm.    ECHO (personnaly Reviewed):    The left ventricle is normal in size.  Left ventricular function is decreased. The ejection fraction is 30-35%  (moderately reduced).  Left ventricular diastolic function is abnormal.  Diastolic Doppler findings (E/E' ratio and/or other parameters) suggest left  ventricular filling pressures are increased.  The left atrium is mildly dilated.  The mitral valve leaflets are mildly thickened.  There is mild (1+) mitral regurgitation.  IVC diameter >2.1 cm collapsing <50% with sniff suggests a high RA pressure  estimated at 15 mmHg or greater.          Physical Examination Review of Systems   BP (!) 147/76 (BP Location: Left arm)   Pulse 80   Temp 98.4  F (36.9  C) (Oral)   Resp 20   Ht 1.854 m (6' 1\")   Wt 106.6 kg (235 lb 1.6 oz)   SpO2 97%   BMI 31.02 kg/m    Body mass index is 31.02 kg/m .  Wt Readings from Last 3 Encounters:   11/20/21 106.6 kg (235 lb 1.6 oz)     General Appearance:   no distress, normal body habitus   ENT/Mouth: membranes moist, no oral lesions or bleeding gums.      EYES:  no scleral icterus, normal conjunctivae   Neck: no carotid bruits or thyromegaly   Chest/Lungs:   lungs are clear to auscultation, no rales or wheezing, equal chest wall expansion    Cardiovascular:   Regular. Normal first and second heart sounds with no murmurs, rubs, or gallops; the carotid, radial and posterior tibial pulses are intact, mild JVD and mild LE edema bilaterally    Abdomen:  no organomegaly, masses, bruits, or tenderness; bowel sounds are present   Extremities: no cyanosis or clubbing   Skin: no xanthelasma, warm.    Neurologic: alert and oriented x3, calm     Psychiatric: alert and oriented x3, calm     A complete 10 systems ROS was reviewed  And is negative except what is listed in the HPI.          Medical History  Surgical History Family History Social " History   Past Medical History:   Diagnosis Date     Diabetes (H)      GERD (gastroesophageal reflux disease)      Hypertension     Past Surgical History:   Procedure Laterality Date     AMPUTATE TOE(S) Left 11/16/2021    Procedure: first and second ray amputation;  Surgeon: Eddi Ram DPM;  Location: [facility] OR     CV CORONARY ANGIOGRAM N/A 11/16/2021    Procedure: CV CORONARY ANGIOGRAM;  Surgeon: Pam Tabares MD;  Location: [facility] CATH LAB CV     CV LEFT HEART CATH N/A 11/16/2021    Procedure: Left Heart Cath;  Surgeon: Pam Tabares MD;  Location: [facility] CATH LAB CV     INCISION AND DRAINAGE LOWER EXTREMITY, COMBINED Left 11/16/2021    Procedure: INCISION AND DRAINAGE, left foot;  Surgeon: Eddi Ram DPM;  Location: [facility] OR    no family history of premature coronary artery disease Social History     Socioeconomic History     Marital status: Single     Spouse name: Not on file     Number of children: Not on file     Years of education: Not on file     Highest education level: Not on file   Occupational History     Not on file   Tobacco Use     Smoking status: Former Smoker     Types: Cigars     Smokeless tobacco: Never Used     Tobacco comment: Used to smoke a very occational cigar   Vaping Use     Vaping Use: Never used   Substance and Sexual Activity     Alcohol use: Not on file     Drug use: Not on file     Sexual activity: Not on file   Other Topics Concern     Parent/sibling w/ CABG, MI or angioplasty before 65F 55M? Not Asked   Social History Narrative     Not on file     Social Determinants of Health     Financial Resource Strain: Not on file   Food Insecurity: Not on file   Transportation Needs: Not on file   Physical Activity: Not on file   Stress: Not on file   Social Connections: Not on file   Intimate Partner Violence: Not on file   Housing Stability: Not on file           Lab Results    Chemistry/lipid CBC Cardiac Enzymes/BNP/TSH/INR   Lab Results    Component Value Date    CHOL 180 11/17/2021    HDL 30 (L) 11/17/2021    TRIG 122 11/17/2021    BUN 8 11/21/2021     11/21/2021    CO2 28 11/21/2021    Lab Results   Component Value Date    WBC 7.2 11/21/2021    HGB 10.6 (L) 11/21/2021    HCT 33.0 (L) 11/21/2021    MCV 88 11/21/2021     11/21/2021    Lab Results   Component Value Date    TROPONINI 13.42 (HH) 11/15/2021    INR 1.35 (H) 11/15/2021     Lab Results   Component Value Date    TROPONINI 13.42 (HH) 11/15/2021          Weight:    Wt Readings from Last 3 Encounters:   11/20/21 106.6 kg (235 lb 1.6 oz)       Allergies  No Known Allergies      Surgical History  Past Surgical History:   Procedure Laterality Date     AMPUTATE TOE(S) Left 11/16/2021    Procedure: first and second ray amputation;  Surgeon: Eddi Ram DPM;  [place] [facility] [place]     CV CORONARY ANGIOGRAM N/A 11/16/2021    Procedure: CV CORONARY ANGIOGRAM;  Surgeon: Pam Tabares MD;  Location: [facility] CATH LAB CV     CV LEFT HEART CATH N/A 11/16/2021    Procedure: Left Heart Cath;  Surgeon: Pam Tabares MD;  Location: [facility] CATH LAB CV     INCISION AND DRAINAGE LOWER EXTREMITY, COMBINED Left 11/16/2021    Procedure: INCISION AND DRAINAGE, left foot;  Surgeon: Eddi Ram DPM;  [place] [facility] [place]       Social History  Tobacco:   History   Smoking Status     Former Smoker     Types: Cigars   Smokeless Tobacco     Never Used     Comment: Used to smoke a very occational cigar    Alcohol:   Social History    Substance and Sexual Activity      Alcohol use: Not on file   Illicit Drugs:   History   Drug Use Not on file       Family History  History reviewed. No pertinent family history.       Russel Arredondo MD on 11/22/2021      cc: Jaquan Dickerson

## 2021-11-22 NOTE — PLAN OF CARE
Problem: Infection  Goal: Absence of Infection Signs and Symptoms  Outcome: Improving     Problem: Tissue Perfusion (Acute Coronary Syndrome)  Goal: Adequate Tissue Perfusion  Outcome: Improving       Pt blood pressure was 73/47 while sitting in chair talking on phone.  MD was text paged with update.    Pt report little to no pain in LLE.  Pt has new wound vac running   Pt

## 2021-11-22 NOTE — CONSULTS
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) 11/14/2021     Priority: Medium     Hypercalcemia 11/14/2021     Priority: Medium     Long term (current) use of oral hypoglycemic drugs 11/14/2021     Priority: Medium     Microalbuminuria 11/14/2021     Priority: Medium     Mixed hyperlipidemia 11/14/2021     Priority: Medium     Type 2 diabetes mellitus, with long-term current use of insulin (H) 11/17/2018     Priority: Medium     Benign neoplasm of descending colon 04/19/2018     Priority: Medium     Aguilar's esophagus 04/19/2018     Priority: Medium     Polyp of colon 04/17/2018     Priority: Medium     Gastro-esophageal reflux disease with esophagitis 02/13/2018     Priority: Medium     Abdominal distension, gaseous 01/09/2018     Priority: Medium     Abdominal bloating 01/09/2018     Priority: Medium       Height:  [unfilled]    Weight:  [unfilled]    Labs:  Recent Labs   Lab Test 11/22/21  0442 11/21/21  0538 11/15/21  0817 11/14/21 2016   CRP  --   --   --  36.5*   HGB 10.4* 10.6*   < > 12.9*   ALBUMIN  --  2.0*   < >  --     < > = values in this interval not displayed.       Robin:  Robin Score: 19    Specialty Bed:       Wound culture obtained:       Edema:  Yes:  Localized    Anatomic Site/Laterality: L foot    Reason for ongoing care:   Wound assessment and plan of care    Encounter Type:  Initial Wound Type:   Non-PU Ulcer:  Diabetic    Tissue Damage:  Diabetic    Associated conditions/Related trauma: s/p I&D x2, amputation of great and 2nd toe by podiatry.    Assessment:    Length: 11cm    Width: 6cm    Depth: 4cm    TSA: 66sqcm    Tunneling/Undermining: No    Wound Bed: 30% bone, 70% agranular, gelfoam, adipose    Exudate: Yes Serosanguineous Moderate    Periwound Skin: Intact    Treatment Plan: NPWT: 0 # pieces of foam removed, 1 adaptic, 1 # pieces of foam inserted           Nursing care provided was coordinated care with RN.    Discussed plan of care with nurse, patient and care management.    Outcomes and treatment recommendations are to promote skin integrity, contain exudate and promote wound healing.    Actions taken by WOC RN: 10 minutes of education, WOC Discharge recommendations entered and Started NPWT insurance paperwork.    Planned Follow Up: Later this week.    Plan for next visit: Reassess wound(s) and Reassess skin integrity

## 2021-11-22 NOTE — PROGRESS NOTES
S: Pt. seen at the [facility]. Male. Dr. Ram. RX: PRAFO, Left. DX: Left foot surgery.  O:A: Pt. seen today for F/D of a left PRAFO for protection of surgical site.  Donning doffing wear and care instructions given.  P: Pt. to be seen as needed.    Raul MACHADO,LO

## 2021-11-22 NOTE — PLAN OF CARE
Patient denied pain throughout the night; was not able to sleep however he reports that is normal for him. Tele NSR. Blood pressure had improved after being low during the evening shift; 129/61 and 147/76. IV Zosyn infusing per schedule. Left foot remains wrapped after the I&D which was completed 11/19/21; remains in place until later today when Wound care team should see him and help with wound vac orders for that site; CMS intact to the left foot; patient reports the foot only throbs (slightly painful) when in the dependent position; not when elevated.     Problem: Risk for Delirium  Goal: Improved Sleep  Outcome: No Change     Problem: Adult Inpatient Plan of Care  Goal: Optimal Comfort and Wellbeing  Outcome: Improving

## 2021-11-22 NOTE — PROGRESS NOTES
[facility] was introduced to the patient today including our role in the home management of their heart failure.  Heart Failure Education Materials were shared today with the patient. Norman has a left foot infection, and will continue home antibiotics. He will f/u with Dr. Winn in 2-3 months following the clearing of the infection. 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 He reports eating a very heart healthy diet including fresh vegetables and lean cuts of meats. He enjoys cooking for himself and does a lot of stir bain. Discussed low sodium today including eliminating Powerade, ( due to sodium intake) and soy sauce as well. He is not extremely convinced that he has heart failure, and did not wish to entertain ideas on lowering sodium.  Introduction to the expectations including daily weight tracking using the Daily Weight Log  Home B/P monitoring as appropriate( to bring in home monitoring cuff to the clinic appointment for verification)  Low Sodium diet of 2000mg or less daily, review of label reading, aim for fresh and avoid processed items  Daily Fluid restriction of 2000ml  considerations  Exercise importance as able explained  Monitor and report s/s of heart failure. How to report these changes.  Follow up appointments and testing expectations.    Azra Giron RN BSN, CHFN        [facility]   [phone] Nurse [facility]   Heart [facility]   [address] [place] [address]

## 2021-11-22 NOTE — PROGRESS NOTES
[facility] ID Inpatient follow up       Patient:  Norman Aguilar  Date of birth 1960, Medical record number [id number]  Date of Visit:  11/22/2021         Assessment and Recommendations:   Assessment:  Norman Aguilar is a 61 year old male with   1.  Uncontrolled DM II. Not on treatment since 2/2021. A1c > 14 on 11/14/21.   2.  NSTEMI.  Multiple vessel disease.  Will need CABG at one point but high risk at this point with ongoing infection.  3.  JOHANA on CKD. Baseline creatinine 1.4. Admitted with creat at 2.1. Trending down.  4.  Left foot injury in 8/2021  5.  Left diabetic foot infection with clinical wet gangrene.  MRI with no evidence of osteomyelitis for presence of abscess.  Superficial cultures polymicrobial with strep agalactiae, MSSA, Klebsiella oxytoca.  Previously on IV vancomycin and Zosyn.  IV vancomycin discontinued.  S/P Amputation left hallux, Amputation 2nd digit left foot, Partial amputation 1st metatarsal left foot, Partial amputation 2nd metatarsal left foot, Excision sesamoids left foot, Incision and drainage left foot 11/16/21.  Intra-Op cultures group B strep, coag neg staph(methicillin-sensitive).  Pathology with clean margins.  Underwent another surgery 11/19/21 -- 1st and 2nd MTs removed.   6.  Polymicrobial bacteremia with Streptococcus agalactiae, Finegoldia magna, and Peptoniphilus asaccharolyticus. Source is the foot. Positive blood cultures on 11/14.  Follow-up on 11/16 negative.       Recommendations:   Augmentin 875mg po BID at discharge until 11/30/21.  OK with discharge from ID standpoint. Please call if questions.     Sushant Díaz MD   [facility].   [facility]  Office Telephone [phone].  Fax [phone]  [facility] paging            Interval History:     HPI:  Pain controlled. Tolerating antibiotics. Discussed results/plan.       Recent Inflammatory Biomarkers:   Recent Labs   Lab Test  21  0442 21  0538 21  0612 21  0458 21  0609 21  0533 11/15/21  0817 21   CRP  --   --   --   --   --   --   --  36.5*   WBC 7.4 7.2 7.3 5.7 6.7 8.4   < > 11.2*    < > = values in this interval not displayed.            Review of Systems:   CONSTITUTIONAL:    Temp Max: Temp (24hrs), Av.3  F (36.8  C), Min:97.6  F (36.4  C), Max:98.8  F (37.1  C)   .  Negative except for findings in the HPI.           Current Medications (antimicrobials listed in bold):       apixaban ANTICOAGULANT  10 mg Oral BID     [START ON 2021] apixaban ANTICOAGULANT  5 mg Oral BID     aspirin  81 mg Oral Daily     atorvastatin  80 mg Oral QPM     bumetanide  2 mg Intravenous Once     furosemide  40 mg Oral BID     insulin aspart  1-12 Units Subcutaneous TID w/meals     insulin aspart   Subcutaneous QAM AC     insulin aspart   Subcutaneous Daily with lunch     insulin aspart   Subcutaneous Daily with supper     insulin glargine  38 Units Subcutaneous Daily     lisinopril  20 mg Oral Daily     metoprolol succinate ER  75 mg Oral Daily     pantoprazole  40 mg Oral QAM AC     piperacillin-tazobactam  3.375 g Intravenous Q8H     polyethylene glycol  17 g Oral Daily     senna-docusate  1 tablet Oral BID     sodium chloride (PF)  3 mL Intracatheter Q8H     spironolactone  25 mg Oral Daily              Allergies:   No Known Allergies         Physical Exam:   Vitals were reviewed  Patient Vitals for the past 24 hrs:   BP Temp Temp src Pulse Resp SpO2 Weight   21 1133 111/57 98.9  F (37.2  C) Oral 76 18 99 % --   21 0730 133/68 98.2  F (36.8  C) Oral 85 20 96 % --   21 0613 -- -- -- -- -- -- 103.8 kg (228 lb 14.4 oz)   21 0428 (!) 147/76 98.4  F (36.9  C) Oral 80 20 97 % --   21 0000 129/61 98.7  F (37.1  C) Oral -- 20 98 % --   21 104/54 98.6  F (37  C) Oral 75 20 95 % --   21 1550 (!) 86/51 98.6  F (37  C) Oral 75 18 95 % --   21 1240 (!)  87/51 98.3  F (36.8  C) Oral 73 20 96 % --       Physical Examination:  Gen: Pleasant in no acute distress.  HEENT: NCAT. EOMI. PERRL.  Lungs: normal respiratory effort   Card: RRR. NSR. No RMG. Peripheral pulses present and symmetric. No edema.  Abd: Soft NT ND. No mass. Normal bowel sounds.  Skin: No rash.  Extr: Surgical dressing in place left foot.  Neuro: Alert and oriented to place time and person. Cranial nerves II to XII intact. Motor and sensory intact. Normal gait.            Laboratory Data:   ID Labs:  Microbiology labs:    Wound Aerobic Bacterial Culture Routine with Gram Stain  Order: [id number]   Collected 11/15/2021 11:40 AM     Status: Preliminary result     Visible to patient: No (not released)    Specimen Information: Foot, Left; Wound         0 Result Notes    Culture Culture in progress       4+ Streptococcus agalactiae (Group B Streptococcus) Abnormal     This organism is susceptible to ampicillin, penicillin, vancomycin and the cephalosporins. If treatment is required and your patient is allergic to penicillin, contact the microbiology lab within 5 days to request susceptibility testing.   2+ Staphylococcus pseudintermedius/intermedius Abnormal        2+ Staphylococcus aureus Abnormal        2+ Klebsiella oxytoca Abnormal     Identification is preliminary, confirmation in progress            Gram Stain Result   Abnormal   4+ Gram positive cocci      3+ WBC seen   Predominantly PMNs           Resulting Agency: ID           Specimen Collected: 11/15/21 11:40 AM Last Resulted: 11/16/21 12:49 PM                      Contains critical data Blood Culture Line, venous  Order: [id number]   Collected 11/14/2021  8:16 PM       Status: Preliminary result       Visible to patient: No (not released)      Specimen Information: Line, venous; Blood         0 Result Notes      Culture Positive on the 1st day of incubation Abnormal        Streptococcus agalactiae (Group B Streptococcus) Panic     1 of 2 bottles         Resulting Agency: [facility]             Specimen Collected: 11/14/21  8:16 PM Last Resulted: 11/16/21 11:29 AM                    Contains abnormal data Wound Aerobic Bacterial Culture Routine with Gram Stain  Order: [id number]   Collected 11/15/2021 11:40 AM       Status: Final result       Visible to patient: No (inaccessible in MyChart)      Specimen Information: Foot, Left; Wound         0 Result Notes      Culture 4+ Streptococcus agalactiae (Group B Streptococcus) Abnormal     This organism is susceptible to ampicillin, penicillin, vancomycin and the cephalosporins. If treatment is required and your patient is allergic to penicillin, contact the microbiology lab within 5 days to request susceptibility testing.   4+ Streptococcus mitis group Abnormal     Susceptibilities not routinely done   2+ Staphylococcus pseudintermedius/intermedius Abnormal        2+ Staphylococcus aureus Abnormal        2+ Klebsiella oxytoca Abnormal                 Gram Stain Result   Abnormal   4+ Gram positive cocci      3+ WBC seen   Predominantly PMNs           Resulting Agency: [facility]         Susceptibility     Staphylococcus pseudintermedius/intermedius Staphylococcus aureus Klebsiella oxytoca     DONNA DONNA DONNA     Ampicillin     >=32.0 ug/mL Resistant 1     Ampicillin/ Sulbactam     8.0 ug/mL Susceptible     Cefepime     <=1.0 ug/mL Susceptible     Ceftazidime     <=1.0 ug/mL Susceptible     Ceftriaxone     <=1.0 ug/mL Susceptible     Ciprofloxacin <=0.5 ug/mL Susceptible   <=0.25 ug/mL Susceptible     Clindamycin <=0.25 ug/mL Susceptible <=0.25 ug/mL Susceptible       Erythromycin 1.0 ug/mL Intermediate <=0.25 ug/mL Susceptible       Gentamicin <=0.5 ug/mL Susceptible <=0.5 ug/mL Susceptible <=1.0 ug/mL Susceptible     Levofloxacin <=0.12 ug/mL Susceptible   <=0.12 ug/mL Susceptible     Meropenem     <=0.25 ug/mL Susceptible     Oxacillin 0.5 ug/mL Susceptible <=0.25 ug/mL Susceptible       Piperacillin/Tazobactam     <=4.0 ug/mL  "Susceptible     Tetracycline >=16.0 ug/mL Resistant <=1.0 ug/mL Susceptible       Tobramycin     <=1.0 ug/mL Susceptible     Trimethoprim/Sulfamethoxazole <=0.5/9.5 u... Susceptible <=0.5/9.5 u... Susceptible <=1/19 ug/mL Susceptible     Vancomycin <=0.5 ug/mL Susceptible 1.0 ug/mL Susceptible                    1 Intrinsically Resistant        Susceptibility Comments    Staphylococcus pseudintermedius/intermedius   Antibiotics listed as \"No Interpretation\" have no regulatory guidelines for susceptibility/resistance available.         Specimen Collected: 11/15/21 11:40 AM Last Resulted: 11/18/21  7:51 AM            Contains abnormal data Tissue Aerobic Bacterial Culture Routine  Order: [id number]   Collected 11/16/2021  6:09 PM     Status: Preliminary result     Visible to patient: No (not released)    Specimen Information: Foot, Left; Tissue         0 Result Notes    Culture 4+ Streptococcus agalactiae (Group B Streptococcus) Abnormal     This organism is susceptible to ampicillin, penicillin, vancomycin and the cephalosporins. If treatment is required and your patient is allergic to penicillin, contact the microbiology lab within 5 days to request susceptibility testing.   3+ Staphylococcus pseudintermedius/intermedius Abnormal             Resulting Agency: [facility]           Specimen Collected: 11/16/21  6:09 PM Last Resulted: 11/19/21  9:48 AM                Blood Culture Line, venous  Order: [id number]   Collected 11/14/2021  8:16 PM     Status: Preliminary result     Visible to patient: No (not released)    Specimen Information: Line, venous; Blood         0 Result Notes    Culture Positive on the 1st day of incubation Abnormal        Streptococcus agalactiae (Group B Streptococcus) Panic     1 of 2 bottles   Finegoldia magna Panic     1 of 2 bottles        Resulting Agency: [facility]       Susceptibility     Streptococcus agalactiae (Group B Streptococcus)     DONNA     Ampicillin 0.12 ug/mL Susceptible     Cefotaxime " <=0.25 ug/mL Susceptible     Ceftriaxone <=0.25 ug/mL Susceptible     Clindamycin <=0.06 ug/mL Susceptible     Meropenem <=0.06 ug/mL Susceptible     Penicillin 0.06 ug/mL Susceptible     Vancomycin 0.5 ug/mL Susceptible                  Specimen Collected: 11/14/21  8:16 PM Last Resulted: 11/18/21 11:41 AM                  0 Result Notes    Culture Positive on the 2nd day of incubation Abnormal        Peptoniphilus asaccharolyticus Panic     1 of 2 bottles        Resulting Agency: [facility]           Specimen Collected: 11/14/21  9:49 PM Last Resulted: 11/18/21 12:46 PM               Recent Labs   Lab Test 11/14/21 2016   CRP 36.5*     Recent Labs   Lab Test 11/22/21 0442 11/21/21  0538 11/20/21  0612 11/19/21 0458 11/18/21  0609 11/17/21  0533   WBC 7.4 7.2 7.3 5.7 6.7 8.4     Recent Labs   Lab Test 11/22/21 0442 11/21/21  0538 11/20/21  0612 11/19/21 0458   CR 1.35* 1.37* 1.26 1.15   GFRESTIMATED 56* 55* 61 68       Hematology Studies  Recent Labs   Lab Test 11/22/21 0442 11/21/21  0538 11/20/21 0612 11/19/21 0458 11/18/21  0609 11/17/21  0533   WBC 7.4 7.2 7.3 5.7 6.7 8.4   ANEU  --   --   --  3.3 3.8  --    AEOS  --   --   --  0.1 0.4  --    HCT 31.9* 33.0* 34.3* 35.3* 37.3* 33.6*    220 247 258 283 232       Metabolic  Recent Labs   Lab Test 11/22/21 0442 11/21/21  0538 11/20/21  0612   * 139 137   BUN 12 8 8   CO2 29 28 27   CR 1.35* 1.37* 1.26   GFRESTIMATED 56* 55* 61       Hepatic Studies  Recent Labs   Lab Test 11/21/21 0538 11/20/21  0612 11/19/21 0458   BILITOTAL 0.5 0.5 0.4   ALKPHOS 92 90 86   ALBUMIN 2.0* 1.9* 1.8*   AST 23 24 21   ALT 18 17 13       Immunologlobulins  Recent Labs   Lab Test 11/14/21 2016   SED 90*            Imaging Data:   Reviewed   Study Result    Narrative & Impression   EXAM: MR FOOT LEFT W/O and W CONTRAST  LOCATION: [facility]  DATE/TIME: 11/15/2021 10:45 AM     INDICATION: Foot swelling, diabetic, osteomyelitis suspected, no prior  imaging.  COMPARISON: None.  TECHNIQUE: Routine. Additional postgadolinium T1 sequences were obtained.  IV CONTRAST: 10 mL Gadavist.     FINDINGS:      JOINTS AND BONES:   -No evidence for fracture. No marrow signal abnormality to suggest osteomyelitis. No significant effusion to suggest septic arthropathy. There is some reactive edema within the sesamoid bones adjacent to the first metatarsal head which could represent   sesamoiditis.     TENDONS:   -Flexor hallucis tendon tenosynovitis. The remaining flexor tendons are negative. The extensor tendons are negative for tendinopathy, tenosynovitis, or tearing.      LIGAMENTS:   -The ligament of Lisfranc is intact. The collateral ligaments at the MTP joints are all intact.     MUSCLES AND SOFT TISSUES:   -There is an ulceration along the medial aspect of the forefoot with surrounding edema and/or cellulitis. Fluid and susceptibility artifact suggestive of bubbles of gas extend into the soft tissues surrounding the first MTP joint into the webspace   between the first and second toes but all of this appears likely external to the joint capsule. There is nonenhancing fluid consistent with abscess surrounding the first MTP joint but this may spontaneously drain to the skin surface. It also extends   proximally into the fatty replaced plantar musculature deep to the first and second metatarsal shafts.                                                                      IMPRESSION:  1.  No evidence for osteomyelitis or significant effusion to suggest septic arthropathy.  2.  However, there is a soft tissue ulceration along the medial aspect of the forefoot adjacent to the first MTP joint. Fluid and susceptibly artifact suggestive of gas bubbles extend nearly circumferentially about the first MTP joint and base of the   great toe into the webspace between the first and second toes consistent with abscess. Nonenhancing fluid also extends proximally deep to the first and  second metatarsal shafts consistent with abscess although this may spontaneously communicate to the   ulceration.  3.  Fluid surrounding the flexor hallucis tendon sheath consistent with a toxic tenosynovitis.  4.  No evidence for fracture.  5.  No additional tendinous or ligamentous pathology identified.  6.  Edema or cellulitis along the medial aspect of the foot.

## 2021-11-22 NOTE — PROGRESS NOTES
Facesheet, H&P, op note faxed to [facility]. Awaiting [facility] nurse to complete the order form and need signature from MD.     12:50 PM  Pam from [facility] accepted patient for start of care Wednesday.     1:28 PM  Met with patient. Says he got a call from Charmaine Mixon stating they would be starting home care services on him , unsure which agency. [phone]. Called Charmaine. She is with [facility]. Says they received referral (presumably from [facility]) and accepted. They can start PT this week also and OT next week. RN can see patient on Wednesday.     Cancelled referral with [facility]     1:47 PM  Called podiatry clinic, spoke to ines. Says ok to fax wound vac paperwork to office and they will have dr ram sign and send back . Fax number [phone]. Waiting for [facility] nurse to complete and then will send.     2:45 PM  Wound vac paperwork received from [facility] nurse and faxed to Dr Ram office for signature     Patient says he will be going to his nieces home in [place] upon discharge.

## 2021-11-22 NOTE — PLAN OF CARE
Problem: Adult Inpatient Plan of Care  Goal: Absence of Hospital-Acquired Illness or Injury  Outcome: Improving  Intervention: Identify and Manage Fall Risk  Recent Flowsheet Documentation  Taken 11/22/2021 0915 by Nika Leyva RN  Safety Promotion/Fall Prevention:   assistive device/personal items within reach   nonskid shoes/slippers when out of bed   room near nurse's station   safety round/check completed  Intervention: Prevent Skin Injury  Recent Flowsheet Documentation  Taken 11/22/2021 1215 by Nika Leyva RN  Body Position:   supine   position changed independently  Taken 11/22/2021 0900 by Nika Leyva RN  Body Position: position changed independently  Goal: Optimal Comfort and Wellbeing  Outcome: Improving  Goal: Readiness for Transition of Care  Outcome: Improving     Problem: Risk for Delirium  Goal: Optimal Coping  Outcome: Improving     Problem: Hyperglycemia  Goal: Blood Glucose Level Within Targeted Range  Outcome: Improving     Problem: Adjustment to Illness (Acute Coronary Syndrome)  Goal: Optimal Adaptation to Illness  Outcome: Improving     Problem: Dysrhythmia (Acute Coronary Syndrome)  Goal: Normalized Cardiac Rhythm  Outcome: Improving     Problem: Tissue Perfusion (Acute Coronary Syndrome)  Goal: Adequate Tissue Perfusion  Outcome: Improving  Intervention: Optimize Cardiac Tissue Perfusion  Recent Flowsheet Documentation  Taken 11/22/2021 1215 by Nika Leyva RN  Activity Management: (non wt bearing left foot) bedrest with bathroom privileges  Taken 11/22/2021 0900 by Nika Leyva RN  Activity Management: (non wt bearin on left foot) ambulated to bathroom     Problem: Infection  Goal: Absence of Infection Signs and Symptoms  Outcome: Improving   Pt a/o x4, pleasant, cooperative. In am, up to bathroom w/ stand by assist w/ walker, non wt bearing on left foot. Bulky surgical ace dressing c/d/I to left foot. Pt ate all of breakfast and lunch. Pt receiving iv abx as ordered. Tele  tracing normal sinus rhythm w/ occ pac's. Pt reports no pain but slight burning in left foot 1/10. Declined prn med for this. [facility] nurse here this afternoon, applied wound vac to left foot. Pt tolerated well, declined pain meds. In bed most of shift. On phone w/ family often. Reminded pt to use call light for needs. Continue to monitor pt.

## 2021-11-22 NOTE — PLAN OF CARE
[id number]  Problem: Hemodynamic Instability (Acute Coronary Syndrome)  Goal: Effective Cardiac Pump Function  Outcome: No Change    Pt had b/p 87/51 at noon, asymptomatic than at 3pm b/p 86/52. Pt asymptomatic. Tried to get in contact with Drs but no return calls. B/p returned to 104/60's  gave po lasix at around pm    Pt has no pain in left foot. PT/OT working with pt.  No dressing change per surgery's order. WOC to see on Monday and place wound vac on pt.      BG 68 at supper time gave OJ than recheck 86. Ordered supper.  Gave 4 units of insulin per carb count.    Spot checked around 2245 bg 186

## 2021-11-23 ENCOUNTER — APPOINTMENT (OUTPATIENT)
Dept: PHYSICAL THERAPY | Facility: HOSPITAL | Age: 61
DRG: 616 | End: 2021-11-23
Payer: COMMERCIAL

## 2021-11-23 LAB
ANION GAP SERPL CALCULATED.3IONS-SCNC: 9 MMOL/L (ref 5–18)
BACTERIA WND CULT: ABNORMAL
BUN SERPL-MCNC: 15 MG/DL (ref 8–22)
CALCIUM SERPL-MCNC: 9 MG/DL (ref 8.5–10.5)
CHLORIDE BLD-SCNC: 96 MMOL/L (ref 98–107)
CK SERPL-CCNC: 34 U/L (ref 30–190)
CO2 SERPL-SCNC: 29 MMOL/L (ref 22–31)
CREAT SERPL-MCNC: 1.62 MG/DL (ref 0.7–1.3)
ERYTHROCYTE [DISTWIDTH] IN BLOOD BY AUTOMATED COUNT: 14.2 % (ref 10–15)
GFR SERPL CREATININE-BSD FRML MDRD: 45 ML/MIN/1.73M2
GLUCOSE BLD-MCNC: 270 MG/DL (ref 70–125)
GLUCOSE BLDC GLUCOMTR-MCNC: 121 MG/DL (ref 70–99)
GLUCOSE BLDC GLUCOMTR-MCNC: 182 MG/DL (ref 70–99)
GLUCOSE BLDC GLUCOMTR-MCNC: 267 MG/DL (ref 70–99)
HCT VFR BLD AUTO: 31.1 % (ref 40–53)
HGB BLD-MCNC: 9.9 G/DL (ref 13.3–17.7)
MAGNESIUM SERPL-MCNC: 1.5 MG/DL (ref 1.8–2.6)
MCH RBC QN AUTO: 28.5 PG (ref 26.5–33)
MCHC RBC AUTO-ENTMCNC: 31.8 G/DL (ref 31.5–36.5)
MCV RBC AUTO: 90 FL (ref 78–100)
PLATELET # BLD AUTO: 220 10E3/UL (ref 150–450)
POTASSIUM BLD-SCNC: 3.4 MMOL/L (ref 3.5–5)
POTASSIUM BLD-SCNC: 3.4 MMOL/L (ref 3.5–5)
POTASSIUM BLD-SCNC: 3.6 MMOL/L (ref 3.5–5)
RBC # BLD AUTO: 3.47 10E6/UL (ref 4.4–5.9)
SODIUM SERPL-SCNC: 134 MMOL/L (ref 136–145)
WBC # BLD AUTO: 6.7 10E3/UL (ref 4–11)

## 2021-11-23 PROCEDURE — 250N000013 HC RX MED GY IP 250 OP 250 PS 637: Performed by: PODIATRIST

## 2021-11-23 PROCEDURE — 99232 SBSQ HOSP IP/OBS MODERATE 35: CPT | Performed by: INTERNAL MEDICINE

## 2021-11-23 PROCEDURE — 999N000150 HC STATISTIC PT MED CONFERENCE < 30 MIN

## 2021-11-23 PROCEDURE — 250N000013 HC RX MED GY IP 250 OP 250 PS 637: Performed by: INTERNAL MEDICINE

## 2021-11-23 PROCEDURE — 82550 ASSAY OF CK (CPK): CPT | Performed by: INTERNAL MEDICINE

## 2021-11-23 PROCEDURE — 210N000001 HC R&B IMCU HEART CARE

## 2021-11-23 PROCEDURE — 36415 COLL VENOUS BLD VENIPUNCTURE: CPT | Performed by: INTERNAL MEDICINE

## 2021-11-23 PROCEDURE — 80048 BASIC METABOLIC PNL TOTAL CA: CPT | Performed by: INTERNAL MEDICINE

## 2021-11-23 PROCEDURE — 83735 ASSAY OF MAGNESIUM: CPT | Performed by: INTERNAL MEDICINE

## 2021-11-23 PROCEDURE — 85048 AUTOMATED LEUKOCYTE COUNT: CPT | Performed by: INTERNAL MEDICINE

## 2021-11-23 PROCEDURE — 250N000011 HC RX IP 250 OP 636: Performed by: INTERNAL MEDICINE

## 2021-11-23 PROCEDURE — 84132 ASSAY OF SERUM POTASSIUM: CPT | Performed by: INTERNAL MEDICINE

## 2021-11-23 PROCEDURE — 0001A HC ADMIN COVID VAC PFIZER, 1ST DOSE: CPT | Performed by: INTERNAL MEDICINE

## 2021-11-23 PROCEDURE — 250N000011 HC RX IP 250 OP 636: Performed by: PODIATRIST

## 2021-11-23 PROCEDURE — 91300 HC RX IP 250 OP 636: CPT | Performed by: INTERNAL MEDICINE

## 2021-11-23 RX ORDER — DIPHENHYDRAMINE HYDROCHLORIDE 50 MG/ML
50 INJECTION INTRAMUSCULAR; INTRAVENOUS
Status: DISCONTINUED | OUTPATIENT
Start: 2021-11-24 | End: 2021-11-23

## 2021-11-23 RX ORDER — CLINDAMYCIN HCL 150 MG
300 CAPSULE ORAL
Status: DISCONTINUED | OUTPATIENT
Start: 2021-11-23 | End: 2021-11-24 | Stop reason: HOSPADM

## 2021-11-23 RX ORDER — DIPHENHYDRAMINE HCL 50 MG
50 CAPSULE ORAL
Status: DISCONTINUED | OUTPATIENT
Start: 2021-11-23 | End: 2021-11-24 | Stop reason: HOSPADM

## 2021-11-23 RX ORDER — MAGNESIUM OXIDE 400 MG/1
400 TABLET ORAL 3 TIMES DAILY
Status: DISCONTINUED | OUTPATIENT
Start: 2021-11-23 | End: 2021-11-23

## 2021-11-23 RX ORDER — MAGNESIUM SULFATE 4 G/50ML
4 INJECTION INTRAVENOUS ONCE
Status: COMPLETED | OUTPATIENT
Start: 2021-11-23 | End: 2021-11-23

## 2021-11-23 RX ORDER — DIPHENHYDRAMINE HCL 50 MG
50 CAPSULE ORAL
Status: DISCONTINUED | OUTPATIENT
Start: 2021-11-24 | End: 2021-11-23

## 2021-11-23 RX ORDER — DIPHENHYDRAMINE HYDROCHLORIDE 50 MG/ML
50 INJECTION INTRAMUSCULAR; INTRAVENOUS
Status: DISCONTINUED | OUTPATIENT
Start: 2021-11-23 | End: 2021-11-24 | Stop reason: HOSPADM

## 2021-11-23 RX ORDER — METOPROLOL SUCCINATE 25 MG/1
50 TABLET, EXTENDED RELEASE ORAL DAILY
Status: DISCONTINUED | OUTPATIENT
Start: 2021-11-24 | End: 2021-11-24 | Stop reason: HOSPADM

## 2021-11-23 RX ORDER — POTASSIUM CHLORIDE 1500 MG/1
40 TABLET, EXTENDED RELEASE ORAL ONCE
Status: COMPLETED | OUTPATIENT
Start: 2021-11-23 | End: 2021-11-23

## 2021-11-23 RX ORDER — BUMETANIDE 0.25 MG/ML
2 INJECTION INTRAMUSCULAR; INTRAVENOUS ONCE
Status: COMPLETED | OUTPATIENT
Start: 2021-11-23 | End: 2021-11-23

## 2021-11-23 RX ADMIN — RNA INGREDIENT BNT-162B2 0.3 ML: 0.23 INJECTION, SUSPENSION INTRAMUSCULAR at 11:50

## 2021-11-23 RX ADMIN — MAGNESIUM OXIDE TAB 400 MG (241.3 MG ELEMENTAL MG) 200 MG: 400 (241.3 MG) TAB at 21:00

## 2021-11-23 RX ADMIN — POTASSIUM CHLORIDE 40 MEQ: 1500 TABLET, EXTENDED RELEASE ORAL at 12:45

## 2021-11-23 RX ADMIN — CLINDAMYCIN HYDROCHLORIDE 300 MG: 150 CAPSULE ORAL at 21:00

## 2021-11-23 RX ADMIN — APIXABAN 10 MG: 5 TABLET, FILM COATED ORAL at 08:30

## 2021-11-23 RX ADMIN — MAGNESIUM OXIDE TAB 400 MG (241.3 MG ELEMENTAL MG) 200 MG: 400 (241.3 MG) TAB at 12:45

## 2021-11-23 RX ADMIN — FUROSEMIDE 40 MG: 20 TABLET ORAL at 08:31

## 2021-11-23 RX ADMIN — AMOXICILLIN AND CLAVULANATE POTASSIUM 1 TABLET: 875; 125 TABLET, FILM COATED ORAL at 12:45

## 2021-11-23 RX ADMIN — AMOXICILLIN AND CLAVULANATE POTASSIUM 1 TABLET: 875; 125 TABLET, FILM COATED ORAL at 21:00

## 2021-11-23 RX ADMIN — POTASSIUM CHLORIDE 40 MEQ: 1500 TABLET, EXTENDED RELEASE ORAL at 06:47

## 2021-11-23 RX ADMIN — PIPERACILLIN SODIUM AND TAZOBACTAM SODIUM 3.38 G: 3; .375 INJECTION, POWDER, LYOPHILIZED, FOR SOLUTION INTRAVENOUS at 03:42

## 2021-11-23 RX ADMIN — ASPIRIN 81 MG CHEWABLE TABLET 81 MG: 81 TABLET CHEWABLE at 08:31

## 2021-11-23 RX ADMIN — Medication 400 MG: at 08:32

## 2021-11-23 RX ADMIN — APIXABAN 10 MG: 5 TABLET, FILM COATED ORAL at 21:00

## 2021-11-23 RX ADMIN — MAGNESIUM SULFATE HEPTAHYDRATE 4 G: 80 INJECTION, SOLUTION INTRAVENOUS at 10:12

## 2021-11-23 RX ADMIN — CLINDAMYCIN HYDROCHLORIDE 300 MG: 150 CAPSULE ORAL at 16:03

## 2021-11-23 RX ADMIN — BUMETANIDE 2 MG: 0.25 INJECTION, SOLUTION INTRAMUSCULAR; INTRAVENOUS at 06:52

## 2021-11-23 RX ADMIN — CLINDAMYCIN HYDROCHLORIDE 300 MG: 150 CAPSULE ORAL at 12:45

## 2021-11-23 RX ADMIN — INSULIN GLARGINE 38 UNITS: 100 INJECTION, SOLUTION SUBCUTANEOUS at 08:32

## 2021-11-23 RX ADMIN — ATORVASTATIN CALCIUM 80 MG: 40 TABLET, FILM COATED ORAL at 21:00

## 2021-11-23 RX ADMIN — SPIRONOLACTONE 25 MG: 25 TABLET, FILM COATED ORAL at 08:31

## 2021-11-23 RX ADMIN — PANTOPRAZOLE SODIUM 40 MG: 40 TABLET, DELAYED RELEASE ORAL at 06:46

## 2021-11-23 ASSESSMENT — ACTIVITIES OF DAILY LIVING (ADL)
ADLS_ACUITY_SCORE: 12
ADLS_ACUITY_SCORE: 12
ADLS_ACUITY_SCORE: 8
ADLS_ACUITY_SCORE: 12
ADLS_ACUITY_SCORE: 8
ADLS_ACUITY_SCORE: 8
ADLS_ACUITY_SCORE: 12
ADLS_ACUITY_SCORE: 8
ADLS_ACUITY_SCORE: 8
ADLS_ACUITY_SCORE: 12
ADLS_ACUITY_SCORE: 8
ADLS_ACUITY_SCORE: 10
ADLS_ACUITY_SCORE: 12
ADLS_ACUITY_SCORE: 12
ADLS_ACUITY_SCORE: 8
ADLS_ACUITY_SCORE: 12
ADLS_ACUITY_SCORE: 8
ADLS_ACUITY_SCORE: 8
ADLS_ACUITY_SCORE: 12

## 2021-11-23 ASSESSMENT — MIFFLIN-ST. JEOR: SCORE: 1871.07

## 2021-11-23 NOTE — PROGRESS NOTES
"CLINICAL NUTRITION SERVICES - EDUCATION NOTE  Received diet education consult for Diabetic Diet education      NUTRITION HISTORY:  Information obtained from patient:  \"I have been diabetic for 30 years. I know my diet.  My BG are elevated related to current infection\"    Goals:  Patient verbalizes understanding of diet by giving brief diet history. Consumes fresh foods, no fast food, sweet, desserts etc    Follow Up:  Patient to ask any further nutrition-related questions before discharge. In addition, pt may request outpatient RD appointment.         "

## 2021-11-23 NOTE — PLAN OF CARE
Problem: Hyperglycemia  Goal: Blood Glucose Level Within Targeted Range  Outcome: No Change     Problem: Infection  Goal: Absence of Infection Signs and Symptoms  Outcome: No Change     Problem: Adult Inpatient Plan of Care  Goal: Absence of Hospital-Acquired Illness or Injury  Intervention: Identify and Manage Fall Risk  Recent Flowsheet Documentation  Taken 11/23/2021 1600 by Hank Causey RN  Safety Promotion/Fall Prevention:   assistive device/personal items within reach   mobility aid in reach   room near nurse's station   patient and family education   nonskid shoes/slippers when out of bed  Intervention: Prevent and Manage VTE (Venous Thromboembolism) Risk  Recent Flowsheet Documentation  Taken 11/23/2021 1600 by Hank Causey RN  VTE Prevention/Management: ambulation promoted     Problem: Dysrhythmia (Acute Coronary Syndrome)  Goal: Normalized Cardiac Rhythm  Intervention: Monitor and Manage Cardiac Rhythm Effect  Recent Flowsheet Documentation  Taken 11/23/2021 1600 by Hank Causey RN  VTE Prevention/Management: ambulation promoted   Continues on the oral antibiotics. No concern

## 2021-11-23 NOTE — PLAN OF CARE
Problem: Adult Inpatient Plan of Care  Goal: Plan of Care Review  Outcome: Improving   Pt ambulating in room with walker and brace, knows not to bear weight on his left foot. No complaints of pain. Will stay for one more day on IV abx and switch to PO abx before discharge. COVID vaccine ordered and given, pt verbalized understanding.

## 2021-11-23 NOTE — PROGRESS NOTES
A/P    61 year old diabetic male who was admitted on 11/14/2021.  He presented with concerns overnight non-healing ulcer on his left foot and found to be in acute DKA.  Further evaluation positive for a  Non-STEMI and  severe cellulitis with gangrene of the first ray.        DKA (POA); IDDM:  Resolved DKA.  A1C >14%.  --Lantus to 38U every day  -novolog 1U:7g CHO ac tid  -High resistant sliding scale insulin AC TID  -accuchecks ac/hs  -CDE consult appreciated     Left diabetic foot infection with wet gangrene  --MRI showed no osteomyelitis  -POD 6 - Amputation left hallux, Amputation 2nd digit left foot, Partial amputation 1st metatarsal left foot, Partial amputation 2nd metatarsal left foot, Excision sesamoids left foot, Incision and drainage left foot by podiatry.  Superficial cultures noted to be polymicrobial - agalactiae, Staph aureus, Klebsiella oxytoca.   Intra-Op cultures group B strep, coag neg staph(methicillin-sensitive).  Pathology with clean margins.    -POD4 2nd Incision and drainage left foot & excisional debridement of ulceration left foot into bone  -Augmentin 875mg BID, clindamycin until 11/30/21  -per Podiatry: leave dressing on until wound care nurse converts to wound VAC on Monday.  Continue nonweightbearing at all times on left foot.  Patient may be up in chair.      Sepsis 2/2 Polymicrobial Bacteremia with Streptococcus agalactiae, Finegoldia magna, and Peptoniphilus asaccharolyticus due to problem #2.    -Positive blood cultures on 11/14.  Follow-up on 11/16 NGTD.  Source is left foot infection.  -follow cultures  -Augmentin/clindamycin until 11/30/21  -ID following     Non-STEMI  --Coronary angiogram 11/16 - severe, diffuse coronary calcifications  --[facility] consulted and plan for CABG in 2-3 months  - lisinopril 20mg/day today (hold)  -metoprolol xl 50 mg/day  -asa 81mg every day  -lipitor 80mg every day    Acute RLE posterior tibial vein DVT:  -Eliquis 10mg BID x7d-->5mg BID     PAF: in NSR  currently.  -toprol XL  -on Eliquis     JOHANA on CKD stage III: Cr 1.26->1.37->1.35->1.6  -bmp a.m.  -hold lisinopril/lasix  -check ck, UA     Acute systolic heart failure, ischemic origin, EF of 30 to 35%  --Toprol XL decrease to 50mg every day, Lisinopril 20mg every day (hold today)  -Lasix 40mg PO BID (hold today)  -d/w cardiology    Hyponatremia   -resolved     Hypomagnesemia/Hypokalemia  --Continue to monitor and replace K per protocol  -IV Mg 4mg x1, start mg oxide 200 BID  -Mg a.m.           Diet: Consistent Carbohydrate Diet Low Consistent Carb (45 g CHO per Meal)   DVT Prophylaxis: Heparin subcutaneous q 8hrs  Garcia Catheter: Not present  Code Status: Full Code       covid negative 11/14    discharge tomorrow        S:  Afebrile.  events overnight noted    O:  Temp: 98.4  F (36.9  C) Temp src: Oral BP: 98/50 Pulse: 73   Resp: 18 SpO2: 93 % O2 Device: None (Room air)    gen nad  cv rrr, ble edema  Lungs cta  abd bs+, nttp  Neuro non focal    Lab/imaging reviewed

## 2021-11-23 NOTE — PROGRESS NOTES
[facility] ID Inpatient follow up       Patient:  Norman Aguilar  Date of birth 1960, Medical record number [id number]  Date of Visit:  11/23/2021         Assessment and Recommendations:   Assessment:  Norman Aguilar is a 61 year old male with   1.  Uncontrolled DM II. Not on treatment since 2/2021. A1c > 14 on 11/14/21.   2.  NSTEMI.  Multiple vessel disease.  Will need CABG at one point but high risk at this point with ongoing infection.  3.  JOHANA on CKD. Baseline creatinine 1.4. Admitted with creat at 2.1. Trending down.  4.  Left foot injury in 8/2021  5.  Left diabetic foot infection with clinical wet gangrene.  MRI with no evidence of osteomyelitis for presence of abscess.  Superficial cultures polymicrobial with strep agalactiae, MSSA, Klebsiella oxytoca.  Previously on IV vancomycin and Zosyn.  IV vancomycin discontinued.  S/P Amputation left hallux, Amputation 2nd digit left foot, Partial amputation 1st metatarsal left foot, Partial amputation 2nd metatarsal left foot, Excision sesamoids left foot, Incision and drainage left foot 11/16/21.  Intra-Op cultures group B strep, coag neg staph(methicillin-sensitive).  Pathology with clean margins.  Underwent another surgery 11/19/21 -- 1st and 2nd MTs removed. Culture from this with a coag neg staph that is methicillin-resistant -- this is unlikely pathogen  6.  Polymicrobial bacteremia with Streptococcus agalactiae, Finegoldia magna, and Peptoniphilus asaccharolyticus. Source is the foot. Positive blood cultures on 11/14.  Follow-up on 11/16 negative.       Recommendations:   Augmentin 875mg po BID until 11/30/21. Add clinda PO 5 days.   1st Pfizer vaccine today, he'll need to arrange 2nd dose in 3 weeks.   OK with discharge from ID standpoint. Please call if questions.     Sushant Díaz MD   [facility].   [facility]  Office Telephone [phone].  Fax [phone]  [facility]  paging            Interval History:     HPI:  Doing well. Setting up home care, vac, asking about scooter. Discussed results.     Low BP due to diuresis, so here another day.       Recent Inflammatory Biomarkers:   Recent Labs   Lab Test 21  0514 21  0442 21  0538 21  0612 21  0458 21  0609 11/15/21  0817 21   CRP  --   --   --   --   --   --   --  36.5*   WBC 6.7 7.4 7.2 7.3 5.7 6.7   < > 11.2*    < > = values in this interval not displayed.            Review of Systems:   CONSTITUTIONAL:    Temp Max: Temp (24hrs), Av.3  F (36.8  C), Min:97.6  F (36.4  C), Max:98.8  F (37.1  C)   .  Negative except for findings in the HPI.           Current Medications (antimicrobials listed in bold):       apixaban ANTICOAGULANT  10 mg Oral BID     [START ON 2021] apixaban ANTICOAGULANT  5 mg Oral BID     aspirin  81 mg Oral Daily     atorvastatin  80 mg Oral QPM     COVID-19 mRNA vacc (PFIZER)  0.3 mL Intramuscular Q21 Days     [Held by provider] furosemide  40 mg Oral BID     insulin aspart  1-12 Units Subcutaneous TID w/meals     insulin aspart   Subcutaneous QAM AC     insulin aspart   Subcutaneous Daily with lunch     insulin aspart   Subcutaneous Daily with supper     insulin glargine  38 Units Subcutaneous Daily     [Held by provider] lisinopril  20 mg Oral Daily     magnesium oxide  200 mg Oral BID     magnesium sulfate  4 g Intravenous Once     [START ON 2021] metoprolol succinate ER  50 mg Oral Daily     pantoprazole  40 mg Oral QAM AC     piperacillin-tazobactam  3.375 g Intravenous Q8H     polyethylene glycol  17 g Oral Daily     senna-docusate  1 tablet Oral BID     sodium chloride (PF)  3 mL Intracatheter Q8H     spironolactone  25 mg Oral Daily              Allergies:   No Known Allergies         Physical Exam:   Vitals were reviewed  Patient Vitals for the past 24 hrs:   BP Temp Temp src Pulse Resp SpO2 Weight   21 1114 98/50 98.4  F (36.9  C)  Oral 73 18 93 % --   11/23/21 0835 94/52 98.2  F (36.8  C) Oral 78 -- -- --   11/23/21 0648 (!) 145/65 -- -- 79 -- -- --   11/23/21 0338 109/58 98.5  F (36.9  C) Oral 86 19 97 % --   11/23/21 0336 -- -- -- -- -- -- 101.7 kg (224 lb 4 oz)   11/22/21 2319 139/68 98.4  F (36.9  C) Oral 77 20 96 % --   11/22/21 1925 104/51 98.4  F (36.9  C) Oral 76 18 98 % --   11/22/21 1721 (!) 86/51 -- -- 70 -- -- --   11/22/21 1605 (!) 73/47 -- -- 77 -- -- --   11/22/21 1602 (!) 73/48 -- -- 77 -- -- --   11/22/21 1520 (!) 81/52 98.3  F (36.8  C) Oral 76 18 99 % --       Physical Examination:  Gen: Pleasant in no acute distress.  HEENT: NCAT. EOMI. PERRL.  Lungs: normal respiratory effort   Card: RRR.   Abd:  deferred.  Skin: No rash.  Extr: Surgical dressing and wound vac left foot.  Neuro: Alert and oriented to place time and person. Cranial nerves II to XII intact. Motor and sensory intact. Normal gait.            Laboratory Data:   ID Labs:  Microbiology labs:    11/19 OR culture -- strep pseudintermedius, methicillin-resistant (previous sensitive)    Wound Aerobic Bacterial Culture Routine with Gram Stain  Order: [id number]   Collected 11/15/2021 11:40 AM     Status: Preliminary result     Visible to patient: No (not released)    Specimen Information: Foot, Left; Wound         0 Result Notes    Culture Culture in progress       4+ Streptococcus agalactiae (Group B Streptococcus) Abnormal     This organism is susceptible to ampicillin, penicillin, vancomycin and the cephalosporins. If treatment is required and your patient is allergic to penicillin, contact the microbiology lab within 5 days to request susceptibility testing.   2+ Staphylococcus pseudintermedius/intermedius Abnormal        2+ Staphylococcus aureus Abnormal        2+ Klebsiella oxytoca Abnormal     Identification is preliminary, confirmation in progress            Gram Stain Result   Abnormal   4+ Gram positive cocci      3+ WBC seen   Predominantly PMNs            Resulting Agency: [facility]           Specimen Collected: 11/15/21 11:40 AM Last Resulted: 11/16/21 12:49 PM                      Contains critical data Blood Culture Line, venous  Order: [id number]   Collected 11/14/2021  8:16 PM       Status: Preliminary result       Visible to patient: No (not released)      Specimen Information: Line, venous; Blood         0 Result Notes      Culture Positive on the 1st day of incubation Abnormal        Streptococcus agalactiae (Group B Streptococcus) Panic     1 of 2 bottles        Resulting Agency: [facility]             Specimen Collected: 11/14/21  8:16 PM Last Resulted: 11/16/21 11:29 AM                    Contains abnormal data Wound Aerobic Bacterial Culture Routine with Gram Stain  Order: [id number]   Collected 11/15/2021 11:40 AM       Status: Final result       Visible to patient: No (inaccessible in MyChart)      Specimen Information: Foot, Left; Wound         0 Result Notes      Culture 4+ Streptococcus agalactiae (Group B Streptococcus) Abnormal     This organism is susceptible to ampicillin, penicillin, vancomycin and the cephalosporins. If treatment is required and your patient is allergic to penicillin, contact the microbiology lab within 5 days to request susceptibility testing.   4+ Streptococcus mitis group Abnormal     Susceptibilities not routinely done   2+ Staphylococcus pseudintermedius/intermedius Abnormal        2+ Staphylococcus aureus Abnormal        2+ Klebsiella oxytoca Abnormal                 Gram Stain Result   Abnormal   4+ Gram positive cocci      3+ WBC seen   Predominantly PMNs           Resulting Agency: [facility]         Susceptibility     Staphylococcus pseudintermedius/intermedius Staphylococcus aureus Klebsiella oxytoca     DONNA DONNA DONNA     Ampicillin     >=32.0 ug/mL Resistant 1     Ampicillin/ Sulbactam     8.0 ug/mL Susceptible     Cefepime     <=1.0 ug/mL Susceptible     Ceftazidime     <=1.0 ug/mL Susceptible     Ceftriaxone     <=1.0 ug/mL  "Susceptible     Ciprofloxacin <=0.5 ug/mL Susceptible   <=0.25 ug/mL Susceptible     Clindamycin <=0.25 ug/mL Susceptible <=0.25 ug/mL Susceptible       Erythromycin 1.0 ug/mL Intermediate <=0.25 ug/mL Susceptible       Gentamicin <=0.5 ug/mL Susceptible <=0.5 ug/mL Susceptible <=1.0 ug/mL Susceptible     Levofloxacin <=0.12 ug/mL Susceptible   <=0.12 ug/mL Susceptible     Meropenem     <=0.25 ug/mL Susceptible     Oxacillin 0.5 ug/mL Susceptible <=0.25 ug/mL Susceptible       Piperacillin/Tazobactam     <=4.0 ug/mL Susceptible     Tetracycline >=16.0 ug/mL Resistant <=1.0 ug/mL Susceptible       Tobramycin     <=1.0 ug/mL Susceptible     Trimethoprim/Sulfamethoxazole <=0.5/9.5 u... Susceptible <=0.5/9.5 u... Susceptible <=1/19 ug/mL Susceptible     Vancomycin <=0.5 ug/mL Susceptible 1.0 ug/mL Susceptible                    1 Intrinsically Resistant        Susceptibility Comments    Staphylococcus pseudintermedius/intermedius   Antibiotics listed as \"No Interpretation\" have no regulatory guidelines for susceptibility/resistance available.         Specimen Collected: 11/15/21 11:40 AM Last Resulted: 11/18/21  7:51 AM            Contains abnormal data Tissue Aerobic Bacterial Culture Routine  Order: [id number]   Collected 11/16/2021  6:09 PM     Status: Preliminary result     Visible to patient: No (not released)    Specimen Information: Foot, Left; Tissue         0 Result Notes    Culture 4+ Streptococcus agalactiae (Group B Streptococcus) Abnormal     This organism is susceptible to ampicillin, penicillin, vancomycin and the cephalosporins. If treatment is required and your patient is allergic to penicillin, contact the microbiology lab within 5 days to request susceptibility testing.   3+ Staphylococcus pseudintermedius/intermedius Abnormal             Resulting Agency: [facility]           Specimen Collected: 11/16/21  6:09 PM Last Resulted: 11/19/21  9:48 AM                Blood Culture Line, venous  Order: " [id number]   Collected 11/14/2021  8:16 PM     Status: Preliminary result     Visible to patient: No (not released)    Specimen Information: Line, venous; Blood         0 Result Notes    Culture Positive on the 1st day of incubation Abnormal        Streptococcus agalactiae (Group B Streptococcus) Panic     1 of 2 bottles   Han ayalaa Panic     1 of 2 bottles        Resulting Agency: [facility]       Susceptibility     Streptococcus agalactiae (Group B Streptococcus)     DONNA     Ampicillin 0.12 ug/mL Susceptible     Cefotaxime <=0.25 ug/mL Susceptible     Ceftriaxone <=0.25 ug/mL Susceptible     Clindamycin <=0.06 ug/mL Susceptible     Meropenem <=0.06 ug/mL Susceptible     Penicillin 0.06 ug/mL Susceptible     Vancomycin 0.5 ug/mL Susceptible                  Specimen Collected: 11/14/21  8:16 PM Last Resulted: 11/18/21 11:41 AM                  0 Result Notes    Culture Positive on the 2nd day of incubation Abnormal        Peptoniphilus asaccharolyticus Panic     1 of 2 bottles        Resulting Agency: [facility]           Specimen Collected: 11/14/21  9:49 PM Last Resulted: 11/18/21 12:46 PM               Recent Labs   Lab Test 11/14/21 2016   CRP 36.5*     Recent Labs   Lab Test 11/23/21  0514 11/22/21  0442 11/21/21  0538 11/20/21  0612 11/19/21  0458 11/18/21  0609   WBC 6.7 7.4 7.2 7.3 5.7 6.7     Recent Labs   Lab Test 11/23/21  0514 11/22/21  0442 11/21/21  0538 11/20/21  0612   CR 1.62* 1.35* 1.37* 1.26   GFRESTIMATED 45* 56* 55* 61       Hematology Studies  Recent Labs   Lab Test 11/23/21  0514 11/22/21  0442 11/21/21  0538 11/20/21  0612 11/19/21  0458 11/18/21  0609   WBC 6.7 7.4 7.2 7.3 5.7 6.7   ANEU  --   --   --   --  3.3 3.8   AEOS  --   --   --   --  0.1 0.4   HCT 31.1* 31.9* 33.0* 34.3* 35.3* 37.3*    218 220 247 258 283       Metabolic  Recent Labs   Lab Test 11/23/21  0514 11/22/21  0442 11/21/21  0538   * 135* 139   BUN 15 12 8   CO2 29 29 28   CR 1.62* 1.35* 1.37*   GFRESTIMATED 45* 56*  55*       Hepatic Studies  Recent Labs   Lab Test 11/21/21  0538 11/20/21  0612 11/19/21  0458   BILITOTAL 0.5 0.5 0.4   ALKPHOS 92 90 86   ALBUMIN 2.0* 1.9* 1.8*   AST 23 24 21   ALT 18 17 13       Immunologlobulins  Recent Labs   Lab Test 11/14/21  2016   SED 90*            Imaging Data:   Reviewed   Study Result    Narrative & Impression   EXAM: MR FOOT LEFT W/O and W CONTRAST  LOCATION: [facility]  DATE/TIME: 11/15/2021 10:45 AM     INDICATION: Foot swelling, diabetic, osteomyelitis suspected, no prior imaging.  COMPARISON: None.  TECHNIQUE: Routine. Additional postgadolinium T1 sequences were obtained.  IV CONTRAST: 10 mL Gadavist.     FINDINGS:      JOINTS AND BONES:   -No evidence for fracture. No marrow signal abnormality to suggest osteomyelitis. No significant effusion to suggest septic arthropathy. There is some reactive edema within the sesamoid bones adjacent to the first metatarsal head which could represent   sesamoiditis.     TENDONS:   -Flexor hallucis tendon tenosynovitis. The remaining flexor tendons are negative. The extensor tendons are negative for tendinopathy, tenosynovitis, or tearing.      LIGAMENTS:   -The ligament of Lisfranc is intact. The collateral ligaments at the MTP joints are all intact.     MUSCLES AND SOFT TISSUES:   -There is an ulceration along the medial aspect of the forefoot with surrounding edema and/or cellulitis. Fluid and susceptibility artifact suggestive of bubbles of gas extend into the soft tissues surrounding the first MTP joint into the webspace   between the first and second toes but all of this appears likely external to the joint capsule. There is nonenhancing fluid consistent with abscess surrounding the first MTP joint but this may spontaneously drain to the skin surface. It also extends   proximally into the fatty replaced plantar musculature deep to the first and second metatarsal shafts.                                                                       IMPRESSION:  1.  No evidence for osteomyelitis or significant effusion to suggest septic arthropathy.  2.  However, there is a soft tissue ulceration along the medial aspect of the forefoot adjacent to the first MTP joint. Fluid and susceptibly artifact suggestive of gas bubbles extend nearly circumferentially about the first MTP joint and base of the   great toe into the webspace between the first and second toes consistent with abscess. Nonenhancing fluid also extends proximally deep to the first and second metatarsal shafts consistent with abscess although this may spontaneously communicate to the   ulceration.  3.  Fluid surrounding the flexor hallucis tendon sheath consistent with a toxic tenosynovitis.  4.  No evidence for fracture.  5.  No additional tendinous or ligamentous pathology identified.  6.  Edema or cellulitis along the medial aspect of the foot.

## 2021-11-23 NOTE — PLAN OF CARE
Problem: Infection  Goal: Absence of Infection Signs and Symptoms  11/22/2021 2125 by Dl Coronado RN  Outcome: Improving  11/22/2021 1626 by Dl Coronado RN  Outcome: Improving     Problem: Tissue Perfusion (Acute Coronary Syndrome)  Goal: Adequate Tissue Perfusion  11/22/2021 2125 by Dl Coronado RN  Outcome: Improving  11/22/2021 1626 by Dl Coronado RN  Outcome: Improving       Pt reports occasional slight pain in LE when he sits in chair to long.  Pt declines pain medications.  Pt has wound vac on LLE.  Pt had bp 73/47  cardiology was paged and gave instructions to hold evening lasix and have patient eat dinner.   Recheck after diner was 104/51.    Pt is able to move around room with walker  Pt is NWB on LLE.  Pt is NSR with occasional PAC's/

## 2021-11-23 NOTE — PROGRESS NOTES
Care Management Follow Up    Length of Stay (days): 8    Expected Discharge Date: 11/24/2021     Concerns to be Addressed:       Patient plan of care discussed at interdisciplinary rounds: Yes    Anticipated Discharge Disposition:  Home with home care     Anticipated Discharge Services:    Anticipated Discharge DME:  Wound vac    Patient/family educated on Medicare website which has current facility and service quality ratings:    Education Provided on the Discharge Plan:    Patient/Family in Agreement with the Plan:      Referrals Placed by CM/SW:    Private pay costs discussed: Not applicable    Additional Information:  See below       Nika Grove RN          Called podiatry clinic, spoke to Joby. Says they received wound vac paperwork . Will send message to Dr. Cabrera nurse to signature asap.     10:52 AM  Wound vac paperwork with signature faxed to [facility] says patient not ready for discharge today d/t creatinine level. Updated Charmaine with family care services that discharge anticipated for tomorrow. She says that's fine and they can see him on Friday     1:59 PM  Spoke to Carine from [employer], says all documents have been received, just pending final approval.

## 2021-11-23 NOTE — PLAN OF CARE
Patient denies pain and was able to sleep for most of the night. Wound Vac intact with continuous suction at 125. Tele NSR. Gave potassium PO bump this morning; to be re-checked later today. IV Zosyn currently running. Patient is ready to discharge today; is hoping to discharge after noon due to his ride (family).     Problem: Adult Inpatient Plan of Care  Goal: Optimal Comfort and Wellbeing  Outcome: Improving  Goal: Readiness for Transition of Care  Outcome: Improving     Problem: Risk for Delirium  Goal: Improved Sleep  Outcome: Improving

## 2021-11-24 VITALS
OXYGEN SATURATION: 99 % | DIASTOLIC BLOOD PRESSURE: 55 MMHG | TEMPERATURE: 98.5 F | SYSTOLIC BLOOD PRESSURE: 92 MMHG | HEART RATE: 68 BPM | WEIGHT: 222 LBS | BODY MASS INDEX: 29.42 KG/M2 | HEIGHT: 73 IN | RESPIRATION RATE: 18 BRPM

## 2021-11-24 LAB
CREAT SERPL-MCNC: 1.62 MG/DL (ref 0.7–1.3)
GFR SERPL CREATININE-BSD FRML MDRD: 45 ML/MIN/1.73M2
GLUCOSE BLDC GLUCOMTR-MCNC: 277 MG/DL (ref 70–99)
GLUCOSE BLDC GLUCOMTR-MCNC: 292 MG/DL (ref 70–99)
MAGNESIUM SERPL-MCNC: 1.7 MG/DL (ref 1.8–2.6)
POTASSIUM BLD-SCNC: 3.8 MMOL/L (ref 3.5–5)

## 2021-11-24 PROCEDURE — 250N000013 HC RX MED GY IP 250 OP 250 PS 637: Performed by: PODIATRIST

## 2021-11-24 PROCEDURE — 36415 COLL VENOUS BLD VENIPUNCTURE: CPT | Performed by: INTERNAL MEDICINE

## 2021-11-24 PROCEDURE — 250N000013 HC RX MED GY IP 250 OP 250 PS 637: Performed by: INTERNAL MEDICINE

## 2021-11-24 PROCEDURE — 82565 ASSAY OF CREATININE: CPT | Performed by: INTERNAL MEDICINE

## 2021-11-24 PROCEDURE — 99239 HOSP IP/OBS DSCHRG MGMT >30: CPT | Performed by: INTERNAL MEDICINE

## 2021-11-24 PROCEDURE — 84132 ASSAY OF SERUM POTASSIUM: CPT | Performed by: INTERNAL MEDICINE

## 2021-11-24 PROCEDURE — 83735 ASSAY OF MAGNESIUM: CPT | Performed by: INTERNAL MEDICINE

## 2021-11-24 PROCEDURE — 250N000012 HC RX MED GY IP 250 OP 636 PS 637: Performed by: INTERNAL MEDICINE

## 2021-11-24 PROCEDURE — 97606 NEG PRS WND THER DME>50 SQCM: CPT

## 2021-11-24 RX ORDER — GLUCOSAMINE HCL/CHONDROITIN SU 500-400 MG
CAPSULE ORAL
Qty: 100 EACH | Refills: 6 | Status: SHIPPED | OUTPATIENT
Start: 2021-11-24 | End: 2022-01-06

## 2021-11-24 RX ORDER — METOPROLOL SUCCINATE 50 MG/1
50 TABLET, EXTENDED RELEASE ORAL DAILY
Qty: 30 TABLET | Refills: 0 | Status: SHIPPED | OUTPATIENT
Start: 2021-11-25 | End: 2021-11-29

## 2021-11-24 RX ORDER — LANCING DEVICE/LANCETS
KIT MISCELLANEOUS
Qty: 1 EACH | Refills: 0 | Status: SHIPPED | OUTPATIENT
Start: 2021-11-24 | End: 2021-12-14

## 2021-11-24 RX ORDER — INSULIN GLARGINE 100 [IU]/ML
38 INJECTION, SOLUTION SUBCUTANEOUS EVERY MORNING
Qty: 3 ML | Refills: 3 | Status: ON HOLD | OUTPATIENT
Start: 2021-11-24 | End: 2023-01-01

## 2021-11-24 RX ORDER — OXYCODONE HYDROCHLORIDE 5 MG/1
5 TABLET ORAL EVERY 6 HOURS PRN
Qty: 15 TABLET | Refills: 0 | Status: SHIPPED | OUTPATIENT
Start: 2021-11-24 | End: 2021-11-29

## 2021-11-24 RX ORDER — ASPIRIN 81 MG/1
81 TABLET, CHEWABLE ORAL DAILY
Qty: 30 TABLET | Refills: 0 | Status: ON HOLD | OUTPATIENT
Start: 2021-11-25 | End: 2022-01-01

## 2021-11-24 RX ORDER — INSULIN ASPART 100 [IU]/ML
INJECTION, SOLUTION INTRAVENOUS; SUBCUTANEOUS
Qty: 3 ML | Refills: 3 | Status: ON HOLD | OUTPATIENT
Start: 2021-11-24 | End: 2023-01-01

## 2021-11-24 RX ORDER — FUROSEMIDE 40 MG
40 TABLET ORAL DAILY
Qty: 30 TABLET | Refills: 0 | Status: SHIPPED | OUTPATIENT
Start: 2021-11-26 | End: 2021-11-29

## 2021-11-24 RX ORDER — MAGNESIUM OXIDE 400 MG/1
400 TABLET ORAL 2 TIMES DAILY
Qty: 60 TABLET | Refills: 0 | Status: SHIPPED | OUTPATIENT
Start: 2021-11-24 | End: 2021-11-29

## 2021-11-24 RX ORDER — GLUCOSAMINE HCL/CHONDROITIN SU 500-400 MG
CAPSULE ORAL
Qty: 100 EACH | Refills: 3 | Status: SHIPPED | OUTPATIENT
Start: 2021-11-24 | End: 2022-01-06

## 2021-11-24 RX ORDER — LANCETS
EACH MISCELLANEOUS
Qty: 100 EACH | Refills: 6 | Status: SHIPPED | OUTPATIENT
Start: 2021-11-24

## 2021-11-24 RX ORDER — PANTOPRAZOLE SODIUM 40 MG/1
40 TABLET, DELAYED RELEASE ORAL
Qty: 30 TABLET | Refills: 0 | Status: SHIPPED | OUTPATIENT
Start: 2021-11-25 | End: 2022-01-04

## 2021-11-24 RX ORDER — INSULIN ASPART 100 [IU]/ML
INJECTION, SOLUTION INTRAVENOUS; SUBCUTANEOUS
Qty: 3 ML | Refills: 3 | Status: SHIPPED | OUTPATIENT
Start: 2021-11-24 | End: 2021-11-29

## 2021-11-24 RX ORDER — ATORVASTATIN CALCIUM 80 MG/1
80 TABLET, FILM COATED ORAL EVERY EVENING
Qty: 30 TABLET | Refills: 0 | Status: SHIPPED | OUTPATIENT
Start: 2021-11-24 | End: 2021-11-29

## 2021-11-24 RX ORDER — AMOXICILLIN 250 MG
1 CAPSULE ORAL 2 TIMES DAILY
Start: 2021-11-24 | End: 2021-11-29

## 2021-11-24 RX ORDER — OXYCODONE HYDROCHLORIDE 5 MG/1
5-10 TABLET ORAL EVERY 6 HOURS PRN
Qty: 30 TABLET | Refills: 0 | Status: SHIPPED | OUTPATIENT
Start: 2021-11-24 | End: 2021-11-29

## 2021-11-24 RX ORDER — CLINDAMYCIN HCL 300 MG
300 CAPSULE ORAL
Qty: 16 CAPSULE | Refills: 0 | Status: SHIPPED | OUTPATIENT
Start: 2021-11-24 | End: 2021-11-28

## 2021-11-24 RX ORDER — ACETAMINOPHEN 325 MG/1
650 TABLET ORAL EVERY 4 HOURS PRN
Start: 2021-11-24 | End: 2021-11-29

## 2021-11-24 RX ORDER — MAGNESIUM OXIDE 400 MG/1
400 TABLET ORAL 2 TIMES DAILY
Status: DISCONTINUED | OUTPATIENT
Start: 2021-11-24 | End: 2021-11-24 | Stop reason: HOSPADM

## 2021-11-24 RX ORDER — NITROGLYCERIN 0.4 MG/1
TABLET SUBLINGUAL
Qty: 25 TABLET | Refills: 3 | Status: SHIPPED | OUTPATIENT
Start: 2021-11-24 | End: 2022-01-06

## 2021-11-24 RX ADMIN — METOPROLOL SUCCINATE 50 MG: 25 TABLET, EXTENDED RELEASE ORAL at 08:02

## 2021-11-24 RX ADMIN — CLINDAMYCIN HYDROCHLORIDE 300 MG: 150 CAPSULE ORAL at 06:49

## 2021-11-24 RX ADMIN — PANTOPRAZOLE SODIUM 40 MG: 40 TABLET, DELAYED RELEASE ORAL at 06:49

## 2021-11-24 RX ADMIN — AMOXICILLIN AND CLAVULANATE POTASSIUM 1 TABLET: 875; 125 TABLET, FILM COATED ORAL at 08:03

## 2021-11-24 RX ADMIN — CLINDAMYCIN HYDROCHLORIDE 300 MG: 150 CAPSULE ORAL at 11:56

## 2021-11-24 RX ADMIN — APIXABAN 10 MG: 5 TABLET, FILM COATED ORAL at 08:02

## 2021-11-24 RX ADMIN — ASPIRIN 81 MG CHEWABLE TABLET 81 MG: 81 TABLET CHEWABLE at 08:03

## 2021-11-24 RX ADMIN — MAGNESIUM OXIDE TAB 400 MG (241.3 MG ELEMENTAL MG) 200 MG: 400 (241.3 MG) TAB at 08:03

## 2021-11-24 RX ADMIN — INSULIN GLARGINE 38 UNITS: 100 INJECTION, SOLUTION SUBCUTANEOUS at 08:03

## 2021-11-24 ASSESSMENT — ACTIVITIES OF DAILY LIVING (ADL)
ADLS_ACUITY_SCORE: 8

## 2021-11-24 ASSESSMENT — MIFFLIN-ST. JEOR: SCORE: 1860.87

## 2021-11-24 NOTE — PROGRESS NOTES
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) 11/14/2021     Priority: Medium     Hypercalcemia 11/14/2021     Priority: Medium     Long term (current) use of oral hypoglycemic drugs 11/14/2021     Priority: Medium     Microalbuminuria 11/14/2021     Priority: Medium     Mixed hyperlipidemia 11/14/2021     Priority: Medium     Type 2 diabetes mellitus, with long-term current use of insulin (H) 11/17/2018     Priority: Medium     Benign neoplasm of descending colon 04/19/2018     Priority: Medium     Aguilar's esophagus 04/19/2018     Priority: Medium     Polyp of colon 04/17/2018     Priority: Medium     Gastro-esophageal reflux disease with esophagitis 02/13/2018     Priority: Medium     Abdominal distension, gaseous 01/09/2018     Priority: Medium     Abdominal bloating 01/09/2018     Priority: Medium       Height:  185.4 cm    Weight:  100.7 kg    Labs:  Recent Labs   Lab Test 11/22/21  0442 11/21/21  0538 11/15/21  0817 11/14/21 2016   CRP  --   --   --  36.5*   HGB 10.4* 10.6*   < > 12.9*   ALBUMIN  --  2.0*   < >  --     < > = values in this interval not displayed.       Robin:  Robin Score: 19    Specialty Bed:       Wound culture obtained:       Edema:  Yes:  Localized    Anatomic Site/Laterality: L foot    Reason for ongoing care:   Wound assessment and plan of care    Encounter Type:  Initial Wound Type:   Non-PU Ulcer: Diabetic    Tissue  Damage:  Diabetic    Associated conditions/Related trauma: s/p I&D x2, amputation of great and 2nd toe by podiatry.    Assessment: some tissue denudement noted around boarders and skin peeling started; picture framed wound with Duoderm to assist with this issue; 1/2 adapt ring around toe area    Length: 10cm    Width: 5cm    Depth: 42cm    TSA: 66sqcm    Tunneling/Undermining: No    Wound Bed: 30% bone, 70% agranular, gelfoam, adipose    Exudate: Yes Serosanguineous Moderate    Periwound Skin: Intact    Treatment Plan: NPWT: 1 # pieces of foam removed, 1 adaptic, 1 # pieces of foam inserted                   Nursing care provided was coordinated care with foot cleansed; educated on home machine and trouble shooting beeps.    Discussed plan of care with nurse and patient.    Outcomes and treatment recommendations are to promote skin integrity, contain exudate and promote wound healing.    Actions taken by WOC RN: VAC change, preparation for discharge and Started NPWT insurance paperwork.    Planned Follow Up: Friday if he does not discharge today as planned.    Plan for next visit: Reassess wound(s) and Reassess skin integrity

## 2021-11-24 NOTE — DISCHARGE SUMMARY
[facility] MEDICINE  DISCHARGE SUMMARY     Primary Care Physician: Jaquan Dickerson  Admission Date: 11/14/2021   Discharge Provider: Ronald Bui DO,  Discharge Date: 11/24/2021   Diet:   Active Diet and Nourishment Order   Procedures     Moderate Consistent Carb (60 g CHO per Meal) Diet     Diet       Code Status: Full Code   Activity: DCACTIVITY: Activity as tolerated, NWB LLE        Condition at Discharge: Stable     REASON FOR PRESENTATION(See Admission Note for Details)   Refer to h&p    PRINCIPAL & ACTIVE DISCHARGE DIAGNOSES     Active Problems:    Hypocalcemia    Paroxysmal atrial fibrillation (H)    Hyperglycemia    Wound infection    Diabetic ketoacidosis without coma associated with type 2 diabetes mellitus (H)      PENDING LABS     Unresulted Labs Ordered in the Past 30 Days of this Admission     Date and Time Order Name Status Description    11/19/2021  6:21 PM Anaerobic Bacterial Culture Routine Preliminary     11/16/2021  7:51 AM Blood Culture Peripheral Blood Preliminary             PROCEDURES ( this hospitalization only)      Procedure(s):  INCISION AND DRAINAGE, left foot    RECOMMENDATIONS TO OUTPATIENT PROVIDER FOR F/U VISIT     Follow-up Appointments     Follow-up and recommended labs and tests       Follow up with primary care provider, Jaquan Dickerson, within 3-5   days for hospital follow- up.  The following labs/tests are recommended:   BMP.                 DISPOSITION     Home with home care    SUMMARY OF HOSPITAL COURSE:    61 year old diabetic male who was admitted on 11/14/2021.  He presented with concerns overnight non-healing ulcer on his left foot and found to be in acute DKA.  Further evaluation positive for a  Non-STEMI and  severe cellulitis with gangrene of the first ray.        DKA (POA); IDDM:  Resolved DKA.  A1C >14%.  --Lantus to 38U every day  -novolog 1U:7g CHO ac tid  -High resistant sliding scale insulin AC  TID  -accuchecks ac/hs  -CDE consulted/educated patient     Left diabetic foot infection with wet gangrene  --MRI showed no osteomyelitis  -POD 6 - Amputation left hallux, Amputation 2nd digit left foot, Partial amputation 1st metatarsal left foot, Partial amputation 2nd metatarsal left foot, Excision sesamoids left foot, Incision and drainage left foot by podiatry.  Superficial cultures noted to be polymicrobial - agalactiae, Staph aureus, Klebsiella oxytoca.   Intra-Op cultures group B strep, coag neg staph(methicillin-sensitive).  Pathology with clean margins.    -POD4 2nd Incision and drainage left foot & excisional debridement of ulceration left foot into bone  -Augmentin 875mg BID on discharge until 11/30/21.  Clindamycin x 4d on discharge.  -per Podiatry: leave dressing on until wound care nurse converts to wound VAC on Monday.  Continue nonweightbearing at all times on left foot.  Patient may be up in chair.      Sepsis 2/2 Polymicrobial Bacteremia with Streptococcus agalactiae, Finegoldia magna, and Peptoniphilus asaccharolyticus due to problem #2.    -Positive blood cultures on 11/14.  Follow-up on 11/16 NGTD.  Source is left foot infection.  -Augmentin on discharge until 11/30/21. Clindamycin x 4d on discharge     Non-STEMI  --Coronary angiogram 11/16 - severe, diffuse coronary calcifications  --[facility] consulted and plan for CABG in 2-3 months  -metoprolol xl 50 mg/day  -asa 81mg every day  -lipitor 80mg every day     Acute RLE posterior tibial vein DVT:  -Eliquis 10mg BID x7d-->5mg BID     PAF: in NSR currently.  -toprol XL  -on Eliquis     JOHANA on CKD stage III: Cr 1.62 and seems to have peaked on discharge with the holding of lisinopril and lasix.  F/U PCP 3-5 days for repeat BMP.       Acute systolic heart failure, ischemic origin, EF of 30 to 35%  --Toprol XL 50mg every day, Lisinopril stopped due to JOHANA.  BMP in 3-5 days and if JOHANA has resolved and BP's permit, would restart Lisinopril at a lower dose  than 20mg.  -Lasix 40mg PO every day to be restarted on 11/26.  -cardiology follow-up outpt     Hyponatremia   -resolved     Hypomagnesemia/Hypokalemia  --replaced    Discharge Medications with Med changes:     Current Discharge Medication List      START taking these medications    Details   acetaminophen (TYLENOL) 325 MG tablet Take 2 tablets (650 mg) by mouth every 4 hours as needed for other (For optimal non-opioid multimodal pain management to improve pain control.)    Associated Diagnoses: Cellulitis and abscess of foot, except toes      !! alcohol swab prep pads Use to swab area of injection/matthew as directed.  Qty: 100 each, Refills: 3    Associated Diagnoses: Type 2 diabetes mellitus with other specified complication, with long-term current use of insulin (H)      !! alcohol swab prep pads Use to swab area of injection/matthew as directed.  Qty: 100 each, Refills: 6    Associated Diagnoses: Type 2 diabetes mellitus with other specified complication, with long-term current use of insulin (H)      amoxicillin-clavulanate (AUGMENTIN) 875-125 MG tablet Take 1 tablet by mouth every 12 hours for 6 days  Qty: 12 tablet, Refills: 0    Associated Diagnoses: Cellulitis and abscess of foot, except toes      apixaban ANTICOAGULANT (ELIQUIS) 5 MG tablet Take 2 tablets (10 mg) by mouth 2 times daily for 3 days, THEN 1 tablet (5 mg) 2 times daily.  Qty: 72 tablet, Refills: 0    Associated Diagnoses: Acute deep vein thrombosis (DVT) of proximal vein of right lower extremity (H)      aspirin (ASA) 81 MG chewable tablet Take 1 tablet (81 mg) by mouth daily  Qty: 30 tablet, Refills: 0    Associated Diagnoses: NSTEMI (non-ST elevated myocardial infarction) (H)      atorvastatin (LIPITOR) 80 MG tablet Take 1 tablet (80 mg) by mouth every evening  Qty: 30 tablet, Refills: 0    Associated Diagnoses: NSTEMI (non-ST elevated myocardial infarction) (H)      blood glucose (ACCU-CHEK SOFTCLIX) lancing device Lancing device to be used  with lancets.  Qty: 1 each, Refills: 0    Associated Diagnoses: Type 2 diabetes mellitus with other specified complication, with long-term current use of insulin (H)      blood glucose (NO BRAND SPECIFIED) test strip Use to test blood sugar 4 times daily or as directed.  Qty: 100 strip, Refills: 3    Comments: Accuchek Guide Strips  Associated Diagnoses: Type 2 diabetes mellitus with other specified complication, with long-term current use of insulin (H)      blood glucose monitoring (SOFTCLIX) lancets Use to test blood sugar 4 times daily.  Qty: 100 each, Refills: 6    Associated Diagnoses: Type 2 diabetes mellitus with other specified complication, with long-term current use of insulin (H)      clindamycin (CLEOCIN) 300 MG capsule Take 1 capsule (300 mg) by mouth 4 times daily (before meals and nightly) for 4 days  Qty: 16 capsule, Refills: 0    Associated Diagnoses: Cellulitis and abscess of foot, except toes      furosemide (LASIX) 40 MG tablet Take 1 tablet (40 mg) by mouth daily  Qty: 30 tablet, Refills: 0    Associated Diagnoses: NSTEMI (non-ST elevated myocardial infarction) (H)      !! insulin aspart (NOVOLOG FLEXPEN) 100 UNIT/ML pen 1 unit per 7 grams carbohydrates before breakfast, lunch, and dinner  Qty: 3 mL, Refills: 3    Associated Diagnoses: Type 2 diabetes mellitus with other specified complication, with long-term current use of insulin (H)      !! insulin aspart (NOVOLOG FLEXPEN) 100 UNIT/ML pen For  - 164 give 1 unit. For  - 189 give 2 units. For  - 214 give 3 units. For  - 239 give 4 units. For  - 264 give 5 units. For  - 289 give 6 units. For  - 314 give 7 units. For  - 339 give 8 units For  - 364 give 9 units For  - 389 give 10 units For  - 414 give 11 units For BG greater than or equal to 415 give 12 units  Qty: 3 mL, Refills: 3    Associated Diagnoses: Type 2 diabetes mellitus with other specified complication, with long-term  "current use of insulin (H)      insulin glargine (LANTUS SOLOSTAR) 100 UNIT/ML pen Inject 38 Units Subcutaneous every morning  Qty: 3 mL, Refills: 3    Comments: If Lantus is not covered by insurance, may substitute Basaglar at same dose and frequency.    Associated Diagnoses: Type 2 diabetes mellitus with other specified complication, with long-term current use of insulin (H)      insulin pen needle (32G X 4 MM) 32G X 4 MM miscellaneous Use BD Lizbeth pen needles daily or as directed.  Qty: 100 each, Refills: 3    Comments: BD Lizbeth pen needles  Associated Diagnoses: Type 2 diabetes mellitus with other specified complication, with long-term current use of insulin (H)      metoprolol succinate ER (TOPROL-XL) 50 MG 24 hr tablet Take 1 tablet (50 mg) by mouth daily  Qty: 30 tablet, Refills: 0    Associated Diagnoses: NSTEMI (non-ST elevated myocardial infarction) (H)      nitroGLYcerin (NITROSTAT) 0.4 MG sublingual tablet One tablet under the tongue every 5 minutes if needed for chest pain. May repeat every 5 minutes for a maximum of 3 doses in 15 minutes\"  Qty: 25 tablet, Refills: 3    Associated Diagnoses: NSTEMI (non-ST elevated myocardial infarction) (H)      oxyCODONE (ROXICODONE) 5 MG tablet Take 1 tablet (5 mg) by mouth every 6 hours as needed  Qty: 15 tablet, Refills: 0    Associated Diagnoses: Cellulitis and abscess of foot, except toes      pantoprazole (PROTONIX) 40 MG EC tablet Take 1 tablet (40 mg) by mouth every morning (before breakfast)  Qty: 30 tablet, Refills: 0    Associated Diagnoses: Gastroesophageal reflux disease with esophagitis without hemorrhage      senna-docusate (SENOKOT-S/PERICOLACE) 8.6-50 MG tablet Take 1 tablet by mouth 2 times daily    Associated Diagnoses: Constipation, unspecified constipation type       !! - Potential duplicate medications found. Please discuss with provider.      CONTINUE these medications which have CHANGED    Details   magnesium oxide (MAG-OX) 400 MG tablet Take 1 " tablet (400 mg) by mouth 2 times daily  Qty: 60 tablet, Refills: 0    Associated Diagnoses: Hypomagnesemia         CONTINUE these medications which have NOT CHANGED    Details   OMEGA-3/DHA/EPA/FISH OIL (FISH OIL-OMEGA-3 FATTY ACIDS) 300-1,000 mg capsule Take 1 g by mouth daily                    Rationale for medication changes:              Consults       PHARMACY TO DOSE VANCO  PHARMACY IP CONSULT  PHARMACY IP CONSULT  CARDIOLOGY IP CONSULT  PHARMACY TO DOSE VANCO  WOUND OSTOMY CONTINENCE NURSE  IP CONSULT  PODIATRY IP CONSULT  DIABETES EDUCATION IP CONSULT  INFECTIOUS DISEASES IP CONSULT  SOCIAL WORK IP CONSULT  DIABETES EDUCATION IP CONSULT  PHARMACY IP CONSULT  PHARMACY IP CONSULT  CARDIOTHORACIC SURGERY IP CONSULT  PHYSICAL THERAPY ADULT IP CONSULT  OCCUPATIONAL THERAPY ADULT IP CONSULT  NUTRITION SERVICES ADULT IP CONSULT  ORTHOSIS EXTREMITY LOWER REFERRAL IP CONSULT  WOUND OSTOMY CONTINENCE NURSE  IP CONSULT  PHYSICAL THERAPY ADULT IP CONSULT  OCCUPATIONAL THERAPY ADULT IP CONSULT  NUTRITION SERVICES ADULT IP CONSULT    Immunizations given this encounter     Most Recent Immunizations   Administered Date(s) Administered     COVID-19,PF,Pfizer (12+ Yrs) 11/23/2021   Pended Date(s) Pended     COVID-19,PF,Pfizer (12+ Yrs) 12/14/2021           Anticoagulation Information            SIGNIFICANT IMAGING FINDINGS     Results for orders placed or performed during the hospital encounter of 11/14/21   Foot XR, G/E 3 views, left    Impression    IMPRESSION: Subcutaneous emphysema in the base of the great toe and the first interspace to the level of the distal metatarsal metaphysis. No proximal subcutaneous emphysema. No cortical erosion to suggest osteomyelitis. No fractures are evident. Dorsal   soft tissue swelling. Atheromatous calcification. Hypertrophic changes in the ankle joint.   MR Foot Left w/o & w Contrast    Impression    IMPRESSION:  1.  No evidence for osteomyelitis or significant effusion to suggest  septic arthropathy.  2.  However, there is a soft tissue ulceration along the medial aspect of the forefoot adjacent to the first MTP joint. Fluid and susceptibly artifact suggestive of gas bubbles extend nearly circumferentially about the first MTP joint and base of the   great toe into the webspace between the first and second toes consistent with abscess. Nonenhancing fluid also extends proximally deep to the first and second metatarsal shafts consistent with abscess although this may spontaneously communicate to the   ulceration.  3.  Fluid surrounding the flexor hallucis tendon sheath consistent with a toxic tenosynovitis.  4.  No evidence for fracture.  5.  No additional tendinous or ligamentous pathology identified.  6.  Edema or cellulitis along the medial aspect of the foot.   US MCKAYLA Doppler No Exercise 1-2 Levels Bilateral    Impression    IMPRESSION:  1.  RIGHT LOWER EXTREMITY: Right MCKAYLA at rest is normal. No significant arterial stenosis identified on duplex imaging.  2.  LEFT LOWER EXTREMITY: Left MCKAYLA at rest is normal. No significant arterial stenosis identified on duplex imaging. Left groin lymphadenopathy which I suspect represent reactive lymph nodes related to left lower extremity infection/inflammation.       US Lower Extremity Arterial Duplex Bilateral    Impression    IMPRESSION:  1.  RIGHT LOWER EXTREMITY: Right MCKAYLA at rest is normal. No significant arterial stenosis identified on duplex imaging.  2.  LEFT LOWER EXTREMITY: Left MCKAYLA at rest is normal. No significant arterial stenosis identified on duplex imaging. Left groin lymphadenopathy which I suspect represent reactive lymph nodes related to left lower extremity infection/inflammation.       XR Foot Left G/E 3 Views    Impression    IMPRESSION: Interval first and second transmetatarsal amputations. There is overlapping bandaging and packing, and diffuse soft tissue swelling most marked in the forefoot. No new bony destruction.   US Lower  Extremity Venous Duplex Bilateral    Impression    IMPRESSION:  1.  Left leg negative for DVT.  2.  Short segment of right calf DVT involving posterior tibial vein distal calf.    Report called to the patient's nurse, Vernell, at 1505 hours   Echocardiogram Complete   Result Value Ref Range    LVEF  30-35% (moderately reduced)        SIGNIFICANT LABORATORY FINDINGS         Discharge Orders        PFIZER COVID-19 VACCINE 2ND DOSE APPT     Home care nursing referral      Home Care PT Referral for Hospital Discharge      Home Care OT Referral for Hospital Discharge      Reason for your hospital stay    Bone infection     Follow-up and recommended labs and tests     Follow up with primary care provider, Jaquan Dickerson, within 3-5 days for hospital follow- up.  The following labs/tests are recommended: BMP.     Activity    Your activity upon discharge: activity as tolerated.  Non weight bearing on left foot     MD face to face encounter    Documentation of Face to Face and Certification for Home Health Services    I certify that patient: Norman Aguilar is under my care and that I, or a nurse practitioner or physician's assistant working with me, had a face-to-face encounter that meets the physician face-to-face encounter requirements with this patient on: 11/24/2021.    This encounter with the patient was in whole, or in part, for the following medical condition, which is the primary reason for home health care: deconditioning, weakness, wound vac.    I certify that, based on my findings, the following services are medically necessary home health services: Nursing, Occupational Therapy, and Physical Therapy.    My clinical findings support the need for the above services because: Nurse is needed: For complex aftercare of surgical procedures because the patient needs instruction and cannot perform care on their own due to: complex wound requiring wound vac. and To assess clinical status after changes in  medications or other medical regimen. Occupational Therapy Services are needed to assess and treat cognitive ability and address ADL safety due to impairment in weakness, and Physical Therapy Services are needed to assess and treat the following functional impairments: weakness.    Further, I certify that my clinical findings support that this patient is homebound (i.e. absences from home require considerable and taxing effort and are for medical reasons or Judaism services or infrequently or of short duration when for other reasons) because: Leaving home is medically contraindicated for the following reason(s): complex clinical status    Based on the above findings. I certify that this patient is confined to the home and needs intermittent skilled nursing care, physical therapy and/or speech therapy.  The patient is under my care, and I have initiated the establishment of the plan of care.  This patient will be followed by a physician who will periodically review the plan of care.  Physician/Provider to provide follow up care: Jaquan Dickerson    Attending hospital physician (the Medicare certified [place] provider): Ronald Bui DO DO  Physician Signature: See electronic signature associated with these discharge orders.  Date: 11/24/2021     Monitor and record    blood glucose 4 times a day, before meals and at bedtime and notify md for blood glucose less than 70 or greater than 250  blood pressure daily and report levels above 180/100 to your primary care physician   weight every day and call weight to your primary care physician and/or cardiologist if you gain >2lbs in a 24hr period.  Weigh yourself on your home scale as soon as you arrive home following hospitalization.     Diet    Follow this diet upon discharge: Orders Placed This Encounter      Moderate Consistent Carb (60 g CHO per Meal) Diet       Examination   Physical Exam   Temp:  [98.3  F (36.8  C)-99  F (37.2  C)] 98.3  F (36.8  C)  Pulse:   [73-84] 84  Resp:  [18] 18  BP: ()/(50-81) 162/81  SpO2:  [90 %-99 %] 96 %  Wt Readings from Last 1 Encounters:   11/24/21 100.7 kg (222 lb)   gen nad  cv rrr, ble edema  Lungs cta  abd bs+, nttp  Neuro non focal    Total unit/floor time 31minutes.  Time consisted of examination of patient, reviewing the record, lab results, imaging results, completing documentation.  Coordination of care 20minutes discussing  with nursing, care management teams, and Physicians involved directly in the care of this patient.  Counseling time 11minutes consisted of discharge planning.      Ronald Bui DO, DO  [facility]    CC:Jaquan Dickerson

## 2021-11-24 NOTE — DISCHARGE INSTRUCTIONS
Weight bear as tolerated.  Follow up with Dr. Ram next week.    Diabetes Care:  1. Check blood sugar 4 x daily, goals before meals  (if check two hours after the start of meals goal is < 180).   2. Call doctor if 2 or more unexplained low blood sugars in a week or if blood sugar is over 400  3. Ask your primary care provider for a referral to see an outpatient diabetes educator to further diabetes management education.  4. Follow insulin regimen on discharge orders until able to see provider where doses may be adjusted based on blood sugar patterns.  5  With a 1 unit per 7 grams of carbohydrate at meals, you will add up carbohydrate grams you are consuming and divide by 7 for you mealtime Novolog dose.      Wound location: L foot  Change Days: [facility]  Supplies: adaptic, medium kit, barrier ring  Cleanse with: Saline, pat dry.  Suction: Continuous at -125 mmHg pressure.  Back up plan: If VAC malfunctions or unable to maintain seal: VAC dressing must be removed and reapplied within 2 hours of the incident. If floor staff is not able to reapply, place NS moistened gauze in wound bed and cover with appropriate dressing to keep wound bed moist.   Change wet-to-dry dressing BID and notify [facility] staff for reimplementation of VAC therapy when available.  Date canister. Chart canister output every shift.      Home care services have been arranged for you.  Agency: [facility]  Services: RN, PT, OT  Phone: [phone]  Instructions: Home Care will contact you within 24 hours to arrange the first visit.

## 2021-11-24 NOTE — PROGRESS NOTES
Diabetes Care:    I reviewed the patient's discharge insulin regimen with him giving him a pictured schedule, info on the 1:7 I:C ratio and the correction scale.    Reinforced consistent timing for am Lantus and when to call provider.  Patient to Follow-up with PCP.      Lorie Haines RN, Certified Diabetes Care and     [address] [address]  [email or web address]   Office: [phone]  Pager: [phone]

## 2021-11-24 NOTE — PLAN OF CARE
Patient received discharge orders.  Patient and Nephew present for discharge teaching and review of AVS.   Medications received from outpatient pharmacy except Novolog pen. Pharmacy to transfer that Rx to [facility] near nephews home for .  Patient received DME order for transport wheelchair. Patient states that he has list of DME providers to obtain WC from.  New wound vac was placed by WOC RN.  Patient to make F/U apt w/ surgeon, Dr. Ram in one week.

## 2021-11-24 NOTE — PLAN OF CARE
Problem: Dysrhythmia (Acute Coronary Syndrome)  Goal: Normalized Cardiac Rhythm  Outcome: Improving     Problem: Hemodynamic Instability (Acute Coronary Syndrome)  Goal: Effective Cardiac Pump Function  Outcome: Improving     Pt's vital signs were stable. He has been normal sinus rhythm on telemetry. He denied pain throughout the night. His wound vac to his left foot is clean, dry and intact and draining appropriately. He is on the potassium protocol and it will be redrawn this am. The plan is for him to discharge today at 1200. He is independent with ambulation. Call light is within reach.

## 2021-11-24 NOTE — PLAN OF CARE
Physical Therapy Discharge Summary    Reason for therapy discharge:    Discharged to home with home therapy.    Progress towards therapy goal(s). See goals on Care Plan in [facility] electronic health record for goal details.  Goals partially met.  Barriers to achieving goals:   discharge from facility.    Therapy recommendation(s):    Continued therapy is recommended.  Rationale/Recommendations:  PT goals not fully met.

## 2021-11-24 NOTE — PROGRESS NOTES
Care Management Discharge Note    Discharge Date: 11/24/2021       Discharge Disposition: Home Care,Home    Discharge Services:      Discharge DME: Wound Vac    Discharge Transportation: family or friend will provide    Private pay costs discussed: Not applicable    PAS Confirmation Code:    Patient/family educated on Medicare website which has current facility and service quality ratings: no    Education Provided on the Discharge Plan:    Persons Notified of Discharge Plans: patient   Patient/Family in Agreement with the Plan: yes    Handoff Referral Completed: Yes    Additional Information:  See below         Nika Grove RN          Would vac delivered to patient. Patient signed proof of delivery form and form was faxed to [facility]. Copy given to manager.     Discharge orders faxed to [facility] via [employer].     Patient is agreeable to plan     2:06 PM  Order for wheelchair faxed to [employer] per patient request

## 2021-11-24 NOTE — PLAN OF CARE
Occupational Therapy Discharge Summary    Reason for therapy discharge:    Discharged to home.    Progress towards therapy goal(s). See goals on Care Plan in [facility] electronic health record for goal details.  Goals partially met.  Barriers to achieving goals:   discharge from facility.    Therapy recommendation(s):    No further therapy is recommended.

## 2021-11-25 LAB — BACTERIA BLD CULT: NO GROWTH

## 2021-11-26 ENCOUNTER — TELEPHONE (OUTPATIENT)
Dept: PODIATRY | Facility: CLINIC | Age: 61
End: 2021-11-26
Payer: COMMERCIAL

## 2021-11-26 NOTE — TELEPHONE ENCOUNTER
"Pt called stating he was discharged from [facility] on 11/24/21 and on his paperwork pt is to \"follow up with Dr Ram w/i a week.\" Please advise staff which clinic pt should be scheduled with post hospital discharge.  "

## 2021-11-27 LAB — BACTERIA WND CULT: ABNORMAL

## 2021-11-29 ENCOUNTER — APPOINTMENT (OUTPATIENT)
Dept: CARDIOLOGY | Facility: CLINIC | Age: 61
End: 2021-11-29
Payer: COMMERCIAL

## 2021-11-29 ENCOUNTER — OFFICE VISIT (OUTPATIENT)
Dept: CARDIOLOGY | Facility: CLINIC | Age: 61
End: 2021-11-29
Payer: COMMERCIAL

## 2021-11-29 ENCOUNTER — TELEPHONE (OUTPATIENT)
Dept: CARDIOLOGY | Facility: CLINIC | Age: 61
End: 2021-11-29

## 2021-11-29 VITALS
DIASTOLIC BLOOD PRESSURE: 50 MMHG | OXYGEN SATURATION: 100 % | RESPIRATION RATE: 18 BRPM | SYSTOLIC BLOOD PRESSURE: 92 MMHG | HEART RATE: 94 BPM

## 2021-11-29 DIAGNOSIS — I50.20 HEART FAILURE WITH REDUCED EJECTION FRACTION (H): Primary | ICD-10-CM

## 2021-11-29 DIAGNOSIS — I25.5 ISCHEMIC CARDIOMYOPATHY: ICD-10-CM

## 2021-11-29 DIAGNOSIS — E83.42 HYPOMAGNESEMIA: ICD-10-CM

## 2021-11-29 DIAGNOSIS — I21.4 NSTEMI (NON-ST ELEVATED MYOCARDIAL INFARCTION) (H): ICD-10-CM

## 2021-11-29 DIAGNOSIS — I25.10 CORONARY ARTERY DISEASE INVOLVING NATIVE CORONARY ARTERY OF NATIVE HEART WITHOUT ANGINA PECTORIS: ICD-10-CM

## 2021-11-29 DIAGNOSIS — I50.20 HEART FAILURE WITH REDUCED EJECTION FRACTION (H): ICD-10-CM

## 2021-11-29 PROBLEM — N18.9 CHRONIC KIDNEY DISEASE: Status: ACTIVE | Noted: 2021-11-29

## 2021-11-29 PROBLEM — I82.409 DVT (DEEP VENOUS THROMBOSIS) (H): Status: ACTIVE | Noted: 2021-11-29

## 2021-11-29 LAB
ANION GAP SERPL CALCULATED.3IONS-SCNC: 11 MMOL/L (ref 5–18)
BUN SERPL-MCNC: 15 MG/DL (ref 8–22)
CALCIUM SERPL-MCNC: 9.2 MG/DL (ref 8.5–10.5)
CHLORIDE BLD-SCNC: 96 MMOL/L (ref 98–107)
CO2 SERPL-SCNC: 27 MMOL/L (ref 22–31)
CREAT SERPL-MCNC: 2.03 MG/DL (ref 0.7–1.3)
GFR SERPL CREATININE-BSD FRML MDRD: 34 ML/MIN/1.73M2
GLUCOSE BLD-MCNC: 285 MG/DL (ref 70–125)
MAGNESIUM SERPL-MCNC: 1.6 MG/DL (ref 1.8–2.6)
POTASSIUM BLD-SCNC: 4 MMOL/L (ref 3.5–5)
SODIUM SERPL-SCNC: 134 MMOL/L (ref 136–145)

## 2021-11-29 PROCEDURE — 80048 BASIC METABOLIC PNL TOTAL CA: CPT | Performed by: NURSE PRACTITIONER

## 2021-11-29 PROCEDURE — 99215 OFFICE O/P EST HI 40 MIN: CPT | Mod: 25 | Performed by: NURSE PRACTITIONER

## 2021-11-29 PROCEDURE — 36415 COLL VENOUS BLD VENIPUNCTURE: CPT | Performed by: NURSE PRACTITIONER

## 2021-11-29 PROCEDURE — 83735 ASSAY OF MAGNESIUM: CPT | Performed by: NURSE PRACTITIONER

## 2021-11-29 RX ORDER — METOPROLOL SUCCINATE 50 MG/1
50 TABLET, EXTENDED RELEASE ORAL DAILY
Qty: 30 TABLET | Refills: 1 | Status: SHIPPED | OUTPATIENT
Start: 2021-11-29 | End: 2022-02-21

## 2021-11-29 RX ORDER — LISINOPRIL 20 MG/1
10 TABLET ORAL DAILY
COMMUNITY
Start: 2021-03-02 | End: 2021-11-29

## 2021-11-29 RX ORDER — ATORVASTATIN CALCIUM 80 MG/1
80 TABLET, FILM COATED ORAL EVERY EVENING
Qty: 30 TABLET | Refills: 1 | Status: SHIPPED | OUTPATIENT
Start: 2021-11-29 | End: 2022-02-21

## 2021-11-29 RX ORDER — MULTIVITAMIN WITH IRON
400 TABLET ORAL DAILY
COMMUNITY
End: 2021-11-29

## 2021-11-29 RX ORDER — FUROSEMIDE 20 MG
20 TABLET ORAL DAILY
Qty: 30 TABLET | Refills: 1 | Status: SHIPPED | OUTPATIENT
Start: 2021-11-29 | End: 2022-02-21

## 2021-11-29 RX ORDER — MAGNESIUM OXIDE 400 MG/1
400 TABLET ORAL 2 TIMES DAILY
Qty: 60 TABLET | Refills: 1 | Status: SHIPPED | OUTPATIENT
Start: 2021-11-29 | End: 2022-01-07

## 2021-11-29 NOTE — H&P (VIEW-ONLY)
HEART CARE PROGRESS NOTE      [facility]  [phone]      Assessment/Recommendations   Assessment:    1. Ischemic cardiomyopathy, heart failure with reduced ejection fraction, ejection fraction 30-35%: He has no symptoms of acute heart failure. We reviewed heart failure diagnosis, medications, treatment plan, low sodium diet, weight monitoring, and symptom monitoring.  He met with heart failure RN during hospitalization.    2.  Coronary artery disease, non-STEMI: Coronary angiogram on November 16, 2021 showed severe, diffuse coronary calcifications.  Right radial site soft with no hematoma.  He met with CV surgery and planning for coronary artery bypass surgery in 2 to 3 months.  He denies any chest pain or shortness of breath.    3.  Paroxysmal atrial fibrillation: Heart rate regular today.  He continues to take Eliquis.    4.  Dyslipidemia: Continue atorvastatin.    Plan:  1.   Heart failure medications:  - Beta blocker therapy with metoprolol succinate 50 mg daily  - Lisinopril was discontinued during hospitalization due to hypotension and acute kidney injury  - Diuretic therapy with furosemide  40 mg daily  2.  BMP and magnesium pending  3.  Low-sodium diet and daily weights    Norman Aguilar will follow up in the heart failure clinic in 2 weeks and with Dr. Davis in 4 weeks.       History of Present Illness/Subjective    Mr. Norman Aguilar is a 61 year old male seen at [facility] today for hospital follow-up.  He has a history of diabetes, chronic kidney disease, GERD, dyslipidemia, and hypertension.  He was hospitalized November 14 to November 24, 2021 with DKA, cellulitis with gangrene of left foot, and non-STEMI.  He had amputations of toes on left foot and discharged with a wound VAC and antibiotics.  Coronary angiogram on November 16, 2021 showed severe, diffuse coronary calcifications.  He met with CV surgery and planning for coronary artery bypass  surgery in 2 to 3 months.  He developed paroxysmal atrial fibrillation but went back into atrial fibrillation.  He is on Eliquis for A. fib and DVT.  He was also diagnosed with ischemic cardiomyopathy.  Echocardiogram on November 15, 2021 showed ejection fraction of 30 to 35%, abnormal diastolic dysfunction, mild mitral regurgitation.    He denies any shortness of breath or chest pain.  Blood pressure remains low today at 92/50 but he is asymptomatic.  He denies fatigue and lightheadedness.  Lower extremity edema has improved since hospitalization.  He has a wound VAC on his left foot and is having trouble with bleeding.  Wound nurse is coming to his home today.    His weight has remained stable.  He is following a low-sodium diet.      ECHO 11/15/2021  Interpretation Summary     The left ventricle is normal in size.  Left ventricular function is decreased. The ejection fraction is 30-35%  (moderately reduced).  Left ventricular diastolic function is abnormal.  Diastolic Doppler findings (E/E' ratio and/or other parameters) suggest left  ventricular filling pressures are increased.  The left atrium is mildly dilated.  The mitral valve leaflets are mildly thickened.  There is mild (1+) mitral regurgitation.  IVC diameter >2.1 cm collapsing <50% with sniff suggests a high RA pressure  estimated at 15 mmHg or greater.        Physical Examination Review of Systems   Vitals: BP 92/50 (BP Location: Left arm, Patient Position: Sitting, Cuff Size: Adult Regular)   Pulse 94   Resp 18   SpO2 100%   BMI= There is no height or weight on file to calculate BMI.  Wt Readings from Last 3 Encounters:   11/24/21 100.7 kg (222 lb)       General Appearance:     Alert, cooperative and in no acute distress.   ENT/Mouth: membranes moist, no oral lesions or bleeding gums.      EYES:  no scleral icterus, normal conjunctivae   Chest/Lungs:   lungs are clear to auscultation, no rales or wheezing, respirations unlabored   Cardiovascular:    Regular. Normal first and second heart sounds, 1+ edema bilateral lower extremities   Abdomen:  Soft, nontender, nondistended, bowel sounds present   Extremities: no cyanosis or clubbing   Skin: warm, dry.    Neurologic: mood and affect are appropriate, alert and oriented x3         Please refer above for cardiac ROS details.      Medical History  Surgical History Family History Social History   Past Medical History:   Diagnosis Date     Diabetes (H)      GERD (gastroesophageal reflux disease)      Hypertension      Past Surgical History:   Procedure Laterality Date     AMPUTATE TOE(S) Left 11/16/2021    Procedure: first and second ray amputation;  Surgeon: Eddi Ram DPM;  Location: [facility] [place] OR     CV CORONARY ANGIOGRAM N/A 11/16/2021    Procedure: CV CORONARY ANGIOGRAM;  Surgeon: Pam Tabares MD;  Location: [facility] CATH LAB CV     CV LEFT HEART CATH N/A 11/16/2021    Procedure: Left Heart Cath;  Surgeon: Pam Tabares MD;  Location: [facility] CATH LAB CV     INCISION AND DRAINAGE LOWER EXTREMITY, COMBINED Left 11/16/2021    Procedure: INCISION AND DRAINAGE, left foot;  Surgeon: Eddi Ram DPM;  Location: [facility] [place] OR     INCISION AND DRAINAGE LOWER EXTREMITY, COMBINED Left 11/19/2021    Procedure: INCISION AND DRAINAGE, left foot;  Surgeon: Eddi Ram DPM;  Location: [facility] [place] OR     No family history on file. Social History     Socioeconomic History     Marital status: Single     Spouse name: Not on file     Number of children: Not on file     Years of education: Not on file     Highest education level: Not on file   Occupational History     Not on file   Tobacco Use     Smoking status: Former Smoker     Types: Cigars     Smokeless tobacco: Never Used     Tobacco comment: Used to smoke a very occational cigar   Vaping Use     Vaping Use: Never used   Substance and Sexual Activity     Alcohol use: Not on file     Drug use: Not on file     Sexual activity: Not  on file   Other Topics Concern     Parent/sibling w/ CABG, MI or angioplasty before 65F 55M? Not Asked   Social History Narrative     Not on file     Social Determinants of Health     Financial Resource Strain: Not on file   Food Insecurity: Not on file   Transportation Needs: Not on file   Physical Activity: Not on file   Stress: Not on file   Social Connections: Not on file   Intimate Partner Violence: Not on file   Housing Stability: Not on file          Medications  Allergies   Current Outpatient Medications   Medication Sig Dispense Refill     alcohol swab prep pads Use to swab area of injection/matthew as directed. 100 each 3     alcohol swab prep pads Use to swab area of injection/matthew as directed. 100 each 6     amoxicillin-clavulanate (AUGMENTIN) 875-125 MG tablet Take 1 tablet by mouth every 12 hours for 6 days 12 tablet 0     apixaban ANTICOAGULANT (ELIQUIS) 5 MG tablet Take 2 tablets (10 mg) by mouth 2 times daily for 3 days, THEN 1 tablet (5 mg) 2 times daily. 72 tablet 0     aspirin (ASA) 81 MG chewable tablet Take 1 tablet (81 mg) by mouth daily 30 tablet 0     atorvastatin (LIPITOR) 80 MG tablet Take 1 tablet (80 mg) by mouth every evening 30 tablet 0     blood glucose (ACCU-CHEK SOFTCLIX) lancing device Lancing device to be used with lancets. 1 each 0     blood glucose (NO BRAND SPECIFIED) test strip Use to test blood sugar 4 times daily or as directed. 100 strip 3     blood glucose monitoring (SOFTCLIX) lancets Use to test blood sugar 4 times daily. 100 each 6     furosemide (LASIX) 40 MG tablet Take 1 tablet (40 mg) by mouth daily 30 tablet 0     insulin aspart (NOVOLOG FLEXPEN) 100 UNIT/ML pen For  - 164 give 1 unit. For  - 189 give 2 units. For  - 214 give 3 units. For  - 239 give 4 units. For  - 264 give 5 units. For  - 289 give 6 units. For  - 314 give 7 units. For  - 339 give 8 units For  - 364 give 9 units For  - 389 give 10 units  "For  - 414 give 11 units For BG greater than or equal to 415 give 12 units 3 mL 3     insulin glargine (LANTUS SOLOSTAR) 100 UNIT/ML pen Inject 38 Units Subcutaneous every morning 3 mL 3     insulin pen needle (32G X 4 MM) 32G X 4 MM miscellaneous Use BD Lizbeth pen needles daily or as directed. 100 each 3     lisinopril (ZESTRIL) 20 MG tablet Take 10 mg by mouth daily       magnesium oxide (MAG-OX) 400 MG tablet Take 1 tablet (400 mg) by mouth 2 times daily 60 tablet 0     metoprolol succinate ER (TOPROL-XL) 50 MG 24 hr tablet Take 1 tablet (50 mg) by mouth daily 30 tablet 0     OMEGA-3/DHA/EPA/FISH OIL (FISH OIL-OMEGA-3 FATTY ACIDS) 300-1,000 mg capsule Take 1 g by mouth daily        pantoprazole (PROTONIX) 40 MG EC tablet Take 1 tablet (40 mg) by mouth every morning (before breakfast) 30 tablet 0     nitroGLYcerin (NITROSTAT) 0.4 MG sublingual tablet One tablet under the tongue every 5 minutes if needed for chest pain. May repeat every 5 minutes for a maximum of 3 doses in 15 minutes\" (Patient not taking: Reported on 11/29/2021) 25 tablet 3    No Known Allergies      Lab Results    Chemistry/lipid CBC Cardiac Enzymes/BNP/TSH/INR   Recent Labs   Lab Test 11/17/21  0533   CHOL 180   HDL 30*      TRIG 122     Recent Labs   Lab Test 11/17/21  0533 11/16/21  0639 10/31/19  1625    122 107     Recent Labs   Lab Test 11/24/21  1136 11/24/21  0820 11/24/21  0505 11/23/21  1130 11/23/21  0514   NA  --   --   --   --  134*   POTASSIUM  --   --  3.8   < > 3.4*   CHLORIDE  --   --   --   --  96*   CO2  --   --   --   --  29   *   < >  --    < > 270*   BUN  --   --   --   --  15   CR  --   --  1.62*  --  1.62*   GFRESTIMATED  --   --  45*  --  45*   YOSVANY  --   --   --   --  9.0    < > = values in this interval not displayed.     Recent Labs   Lab Test 11/24/21  0505 11/23/21  0514 11/22/21  0442   CR 1.62* 1.62* 1.35*     Recent Labs   Lab Test 11/14/21 2016   A1C >14.0*    Recent Labs   Lab Test " 11/23/21  0514   WBC 6.7   HGB 9.9*   HCT 31.1*   MCV 90        Recent Labs   Lab Test 11/23/21  0514 11/22/21  0442 11/21/21  0538   HGB 9.9* 10.4* 10.6*    Recent Labs   Lab Test 11/15/21  0619 11/15/21  0012 11/14/21 2016   TROPONINI 13.42* 1.29* 3.72*     No results for input(s): BNP, NTBNPI, NTBNP in the last 35836 hours.  No results for input(s): TSH in the last 41524 hours.  Recent Labs   Lab Test 11/15/21  0052   INR 1.35*          Hospital Follow-up Visit:    Hospital/Nursing Home/IP Rehab Facility: [facility]  Date of Admission: November 14, 2021  Date of Discharge: November 24, 2021  Reason(s) for Admission: DKA, cellulitis with gangrene, non-STEMI, ischemic cardiomyopathy, heart failure with reduced ejection fraction      Was your hospitalization related to COVID-19? No   Problems taking medications regularly:  None  Medication changes since discharge: None  Problems adhering to non-medication therapy:  None    Summary of hospitalization:  [facility] discharge summary reviewed  Diagnostic Tests/Treatments reviewed.  Follow up needed: Heart failure clinic in 2 weeks and cardiologist in 4 weeks  Other Healthcare Providers Involved in Patient s Care:         Homecare (wound nurse) and vascular clinic  Update since discharge: stable.       Post Discharge Medication Reconciliation: discharge medications reconciled, continue medications without change.  Plan of care communicated with patient              45 minutes spent on the date of encounter doing chart review, review of test results, interpretation with above tests, patient visit and documentation.

## 2021-11-29 NOTE — TELEPHONE ENCOUNTER
Called pt and reviewed lab results; pt will decrease Lasix to 20 mg per day. Reviewed s/s of HF and provided HF RN line to call. Pt verbalized understanding.     Pt confirmed that he is taking Magnesium 400 mg BID.     Pt will have repeat BMP and magnesium drawn on Monday 12-6-21 at  [facility] lab.     Dianne Christianson RN

## 2021-11-29 NOTE — TELEPHONE ENCOUNTER
----- Message from Piedad Reese NP sent at 11/29/2021  2:09 PM CST -----  Renal function has worsened since discharge. He had no symptoms of acute fluid retention today.  Please have him decrease lasix to 20 mg daily. He should call with any signs of worsening fluid retention. Please make sure he is taking magnesium 400 mg twice daily as prescribed.  His magnesium should improve with being on less lasix.  He needs BMP in 1 week.     ---- Message from Piedad Reese NP sent at 11/29/2021  2:11 PM CST -----  BMP and Mg in 1 week

## 2021-11-29 NOTE — PROGRESS NOTES
HEART CARE PROGRESS NOTE      [facility]  [phone]      Assessment/Recommendations   Assessment:    1. Ischemic cardiomyopathy, heart failure with reduced ejection fraction, ejection fraction 30-35%: He has no symptoms of acute heart failure. We reviewed heart failure diagnosis, medications, treatment plan, low sodium diet, weight monitoring, and symptom monitoring.  He met with heart failure RN during hospitalization.    2.  Coronary artery disease, non-STEMI: Coronary angiogram on November 16, 2021 showed severe, diffuse coronary calcifications.  Right radial site soft with no hematoma.  He met with CV surgery and planning for coronary artery bypass surgery in 2 to 3 months.  He denies any chest pain or shortness of breath.    3.  Paroxysmal atrial fibrillation: Heart rate regular today.  He continues to take Eliquis.    4.  Dyslipidemia: Continue atorvastatin.    Plan:  1.   Heart failure medications:  - Beta blocker therapy with metoprolol succinate 50 mg daily  - Lisinopril was discontinued during hospitalization due to hypotension and acute kidney injury  - Diuretic therapy with furosemide  40 mg daily  2.  BMP and magnesium pending  3.  Low-sodium diet and daily weights    Norman Aguilar will follow up in the heart failure clinic in 2 weeks and with Dr. Davis in 4 weeks.       History of Present Illness/Subjective    Mr. Norman Aguilar is a 61 year old male seen at [facility] today for hospital follow-up.  He has a history of diabetes, chronic kidney disease, GERD, dyslipidemia, and hypertension.  He was hospitalized November 14 to November 24, 2021 with DKA, cellulitis with gangrene of left foot, and non-STEMI.  He had amputations of toes on left foot and discharged with a wound VAC and antibiotics.  Coronary angiogram on November 16, 2021 showed severe, diffuse coronary calcifications.  He met with CV surgery and planning for coronary artery bypass  surgery in 2 to 3 months.  He developed paroxysmal atrial fibrillation but went back into atrial fibrillation.  He is on Eliquis for A. fib and DVT.  He was also diagnosed with ischemic cardiomyopathy.  Echocardiogram on November 15, 2021 showed ejection fraction of 30 to 35%, abnormal diastolic dysfunction, mild mitral regurgitation.    He denies any shortness of breath or chest pain.  Blood pressure remains low today at 92/50 but he is asymptomatic.  He denies fatigue and lightheadedness.  Lower extremity edema has improved since hospitalization.  He has a wound VAC on his left foot and is having trouble with bleeding.  Wound nurse is coming to his home today.    His weight has remained stable.  He is following a low-sodium diet.      ECHO 11/15/2021  Interpretation Summary     The left ventricle is normal in size.  Left ventricular function is decreased. The ejection fraction is 30-35%  (moderately reduced).  Left ventricular diastolic function is abnormal.  Diastolic Doppler findings (E/E' ratio and/or other parameters) suggest left  ventricular filling pressures are increased.  The left atrium is mildly dilated.  The mitral valve leaflets are mildly thickened.  There is mild (1+) mitral regurgitation.  IVC diameter >2.1 cm collapsing <50% with sniff suggests a high RA pressure  estimated at 15 mmHg or greater.        Physical Examination Review of Systems   Vitals: BP 92/50 (BP Location: Left arm, Patient Position: Sitting, Cuff Size: Adult Regular)   Pulse 94   Resp 18   SpO2 100%   BMI= There is no height or weight on file to calculate BMI.  Wt Readings from Last 3 Encounters:   11/24/21 100.7 kg (222 lb)       General Appearance:     Alert, cooperative and in no acute distress.   ENT/Mouth: membranes moist, no oral lesions or bleeding gums.      EYES:  no scleral icterus, normal conjunctivae   Chest/Lungs:   lungs are clear to auscultation, no rales or wheezing, respirations unlabored   Cardiovascular:    Regular. Normal first and second heart sounds, 1+ edema bilateral lower extremities   Abdomen:  Soft, nontender, nondistended, bowel sounds present   Extremities: no cyanosis or clubbing   Skin: warm, dry.    Neurologic: mood and affect are appropriate, alert and oriented x3         Please refer above for cardiac ROS details.      Medical History  Surgical History Family History Social History   Past Medical History:   Diagnosis Date     Diabetes (H)      GERD (gastroesophageal reflux disease)      Hypertension      Past Surgical History:   Procedure Laterality Date     AMPUTATE TOE(S) Left 11/16/2021    Procedure: first and second ray amputation;  Surgeon: Eddi Ram DPM;  Location: [facility] [place] OR     CV CORONARY ANGIOGRAM N/A 11/16/2021    Procedure: CV CORONARY ANGIOGRAM;  Surgeon: Pam Tabares MD;  Location: [facility] CATH LAB CV     CV LEFT HEART CATH N/A 11/16/2021    Procedure: Left Heart Cath;  Surgeon: Pam Tabares MD;  Location: [facility] CATH LAB CV     INCISION AND DRAINAGE LOWER EXTREMITY, COMBINED Left 11/16/2021    Procedure: INCISION AND DRAINAGE, left foot;  Surgeon: Eddi Ram DPM;  Location: [facility] [place] OR     INCISION AND DRAINAGE LOWER EXTREMITY, COMBINED Left 11/19/2021    Procedure: INCISION AND DRAINAGE, left foot;  Surgeon: Eddi Ram DPM;  Location: [facility] [place] OR     No family history on file. Social History     Socioeconomic History     Marital status: Single     Spouse name: Not on file     Number of children: Not on file     Years of education: Not on file     Highest education level: Not on file   Occupational History     Not on file   Tobacco Use     Smoking status: Former Smoker     Types: Cigars     Smokeless tobacco: Never Used     Tobacco comment: Used to smoke a very occational cigar   Vaping Use     Vaping Use: Never used   Substance and Sexual Activity     Alcohol use: Not on file     Drug use: Not on file     Sexual activity: Not  on file   Other Topics Concern     Parent/sibling w/ CABG, MI or angioplasty before 65F 55M? Not Asked   Social History Narrative     Not on file     Social Determinants of Health     Financial Resource Strain: Not on file   Food Insecurity: Not on file   Transportation Needs: Not on file   Physical Activity: Not on file   Stress: Not on file   Social Connections: Not on file   Intimate Partner Violence: Not on file   Housing Stability: Not on file          Medications  Allergies   Current Outpatient Medications   Medication Sig Dispense Refill     alcohol swab prep pads Use to swab area of injection/matthew as directed. 100 each 3     alcohol swab prep pads Use to swab area of injection/matthew as directed. 100 each 6     amoxicillin-clavulanate (AUGMENTIN) 875-125 MG tablet Take 1 tablet by mouth every 12 hours for 6 days 12 tablet 0     apixaban ANTICOAGULANT (ELIQUIS) 5 MG tablet Take 2 tablets (10 mg) by mouth 2 times daily for 3 days, THEN 1 tablet (5 mg) 2 times daily. 72 tablet 0     aspirin (ASA) 81 MG chewable tablet Take 1 tablet (81 mg) by mouth daily 30 tablet 0     atorvastatin (LIPITOR) 80 MG tablet Take 1 tablet (80 mg) by mouth every evening 30 tablet 0     blood glucose (ACCU-CHEK SOFTCLIX) lancing device Lancing device to be used with lancets. 1 each 0     blood glucose (NO BRAND SPECIFIED) test strip Use to test blood sugar 4 times daily or as directed. 100 strip 3     blood glucose monitoring (SOFTCLIX) lancets Use to test blood sugar 4 times daily. 100 each 6     furosemide (LASIX) 40 MG tablet Take 1 tablet (40 mg) by mouth daily 30 tablet 0     insulin aspart (NOVOLOG FLEXPEN) 100 UNIT/ML pen For  - 164 give 1 unit. For  - 189 give 2 units. For  - 214 give 3 units. For  - 239 give 4 units. For  - 264 give 5 units. For  - 289 give 6 units. For  - 314 give 7 units. For  - 339 give 8 units For  - 364 give 9 units For  - 389 give 10 units  "For  - 414 give 11 units For BG greater than or equal to 415 give 12 units 3 mL 3     insulin glargine (LANTUS SOLOSTAR) 100 UNIT/ML pen Inject 38 Units Subcutaneous every morning 3 mL 3     insulin pen needle (32G X 4 MM) 32G X 4 MM miscellaneous Use BD Lizbeth pen needles daily or as directed. 100 each 3     lisinopril (ZESTRIL) 20 MG tablet Take 10 mg by mouth daily       magnesium oxide (MAG-OX) 400 MG tablet Take 1 tablet (400 mg) by mouth 2 times daily 60 tablet 0     metoprolol succinate ER (TOPROL-XL) 50 MG 24 hr tablet Take 1 tablet (50 mg) by mouth daily 30 tablet 0     OMEGA-3/DHA/EPA/FISH OIL (FISH OIL-OMEGA-3 FATTY ACIDS) 300-1,000 mg capsule Take 1 g by mouth daily        pantoprazole (PROTONIX) 40 MG EC tablet Take 1 tablet (40 mg) by mouth every morning (before breakfast) 30 tablet 0     nitroGLYcerin (NITROSTAT) 0.4 MG sublingual tablet One tablet under the tongue every 5 minutes if needed for chest pain. May repeat every 5 minutes for a maximum of 3 doses in 15 minutes\" (Patient not taking: Reported on 11/29/2021) 25 tablet 3    No Known Allergies      Lab Results    Chemistry/lipid CBC Cardiac Enzymes/BNP/TSH/INR   Recent Labs   Lab Test 11/17/21  0533   CHOL 180   HDL 30*      TRIG 122     Recent Labs   Lab Test 11/17/21  0533 11/16/21  0639 10/31/19  1625    122 107     Recent Labs   Lab Test 11/24/21  1136 11/24/21  0820 11/24/21  0505 11/23/21  1130 11/23/21  0514   NA  --   --   --   --  134*   POTASSIUM  --   --  3.8   < > 3.4*   CHLORIDE  --   --   --   --  96*   CO2  --   --   --   --  29   *   < >  --    < > 270*   BUN  --   --   --   --  15   CR  --   --  1.62*  --  1.62*   GFRESTIMATED  --   --  45*  --  45*   YOSVANY  --   --   --   --  9.0    < > = values in this interval not displayed.     Recent Labs   Lab Test 11/24/21  0505 11/23/21  0514 11/22/21  0442   CR 1.62* 1.62* 1.35*     Recent Labs   Lab Test 11/14/21 2016   A1C >14.0*    Recent Labs   Lab Test " 11/23/21  0514   WBC 6.7   HGB 9.9*   HCT 31.1*   MCV 90        Recent Labs   Lab Test 11/23/21  0514 11/22/21  0442 11/21/21  0538   HGB 9.9* 10.4* 10.6*    Recent Labs   Lab Test 11/15/21  0619 11/15/21  0012 11/14/21 2016   TROPONINI 13.42* 1.29* 3.72*     No results for input(s): BNP, NTBNPI, NTBNP in the last 01875 hours.  No results for input(s): TSH in the last 98339 hours.  Recent Labs   Lab Test 11/15/21  0052   INR 1.35*          Hospital Follow-up Visit:    Hospital/Nursing Home/IP Rehab Facility: [facility]  Date of Admission: November 14, 2021  Date of Discharge: November 24, 2021  Reason(s) for Admission: DKA, cellulitis with gangrene, non-STEMI, ischemic cardiomyopathy, heart failure with reduced ejection fraction      Was your hospitalization related to COVID-19? No   Problems taking medications regularly:  None  Medication changes since discharge: None  Problems adhering to non-medication therapy:  None    Summary of hospitalization:  [facility] discharge summary reviewed  Diagnostic Tests/Treatments reviewed.  Follow up needed: Heart failure clinic in 2 weeks and cardiologist in 4 weeks  Other Healthcare Providers Involved in Patient s Care:         Homecare (wound nurse) and vascular clinic  Update since discharge: stable.       Post Discharge Medication Reconciliation: discharge medications reconciled, continue medications without change.  Plan of care communicated with patient              45 minutes spent on the date of encounter doing chart review, review of test results, interpretation with above tests, patient visit and documentation.

## 2021-11-29 NOTE — LETTER
11/29/2021    Jaquan Dickerson MD  [facility] [address]  [facility] [address]    RE: Norman Aguilar       Dear Colleague,    I had the pleasure of seeing Norman Aguilar in the [facility].  HEART CARE PROGRESS NOTE      [facility]  [phone]      Assessment/Recommendations   Assessment:    1. Ischemic cardiomyopathy, heart failure with reduced ejection fraction, ejection fraction 30-35%: He has no symptoms of acute heart failure. We reviewed heart failure diagnosis, medications, treatment plan, low sodium diet, weight monitoring, and symptom monitoring.  He met with heart failure RN during hospitalization.    2.  Coronary artery disease, non-STEMI: Coronary angiogram on November 16, 2021 showed severe, diffuse coronary calcifications.  Right radial site soft with no hematoma.  He met with CV surgery and planning for coronary artery bypass surgery in 2 to 3 months.  He denies any chest pain or shortness of breath.    3.  Paroxysmal atrial fibrillation: Heart rate regular today.  He continues to take Eliquis.    4.  Dyslipidemia: Continue atorvastatin.    Plan:  1.   Heart failure medications:  - Beta blocker therapy with metoprolol succinate 50 mg daily  - Lisinopril was discontinued during hospitalization due to hypotension and acute kidney injury  - Diuretic therapy with furosemide  40 mg daily  2.  BMP and magnesium pending  3.  Low-sodium diet and daily weights    Norman Aguilar will follow up in the heart failure clinic in 2 weeks and with Dr. Davis in 4 weeks.       History of Present Illness/Subjective    Mr. Norman Aguilar is a 61 year old male seen at [facility] today for hospital follow-up.  He has a history of diabetes, chronic kidney disease, GERD, dyslipidemia, and hypertension.  He was hospitalized November 14 to November 24, 2021 with DKA, cellulitis with  gangrene of left foot, and non-STEMI.  He had amputations of toes on left foot and discharged with a wound VAC and antibiotics.  Coronary angiogram on November 16, 2021 showed severe, diffuse coronary calcifications.  He met with CV surgery and planning for coronary artery bypass surgery in 2 to 3 months.  He developed paroxysmal atrial fibrillation but went back into atrial fibrillation.  He is on Eliquis for A. fib and DVT.  He was also diagnosed with ischemic cardiomyopathy.  Echocardiogram on November 15, 2021 showed ejection fraction of 30 to 35%, abnormal diastolic dysfunction, mild mitral regurgitation.    He denies any shortness of breath or chest pain.  Blood pressure remains low today at 92/50 but he is asymptomatic.  He denies fatigue and lightheadedness.  Lower extremity edema has improved since hospitalization.  He has a wound VAC on his left foot and is having trouble with bleeding.  Wound nurse is coming to his home today.    His weight has remained stable.  He is following a low-sodium diet.      ECHO 11/15/2021  Interpretation Summary     The left ventricle is normal in size.  Left ventricular function is decreased. The ejection fraction is 30-35%  (moderately reduced).  Left ventricular diastolic function is abnormal.  Diastolic Doppler findings (E/E' ratio and/or other parameters) suggest left  ventricular filling pressures are increased.  The left atrium is mildly dilated.  The mitral valve leaflets are mildly thickened.  There is mild (1+) mitral regurgitation.  IVC diameter >2.1 cm collapsing <50% with sniff suggests a high RA pressure  estimated at 15 mmHg or greater.        Physical Examination Review of Systems   Vitals: BP 92/50 (BP Location: Left arm, Patient Position: Sitting, Cuff Size: Adult Regular)   Pulse 94   Resp 18   SpO2 100%   BMI= There is no height or weight on file to calculate BMI.  Wt Readings from Last 3 Encounters:   11/24/21 100.7 kg (222 lb)       General Appearance:      Alert, cooperative and in no acute distress.   ENT/Mouth: membranes moist, no oral lesions or bleeding gums.      EYES:  no scleral icterus, normal conjunctivae   Chest/Lungs:   lungs are clear to auscultation, no rales or wheezing, respirations unlabored   Cardiovascular:   Regular. Normal first and second heart sounds, 1+ edema bilateral lower extremities   Abdomen:  Soft, nontender, nondistended, bowel sounds present   Extremities: no cyanosis or clubbing   Skin: warm, dry.    Neurologic: mood and affect are appropriate, alert and oriented x3         Please refer above for cardiac ROS details.      Medical History  Surgical History Family History Social History   Past Medical History:   Diagnosis Date     Diabetes (H)      GERD (gastroesophageal reflux disease)      Hypertension      Past Surgical History:   Procedure Laterality Date     AMPUTATE TOE(S) Left 11/16/2021    Procedure: first and second ray amputation;  Surgeon: Eddi Ram DPM;  Location: [facility] OR     CV CORONARY ANGIOGRAM N/A 11/16/2021    Procedure: CV CORONARY ANGIOGRAM;  Surgeon: Pam Tabares MD;  Location: [facility] CATH LAB CV     CV LEFT HEART CATH N/A 11/16/2021    Procedure: Left Heart Cath;  Surgeon: Pam Tabares MD;  Location: [facility] LAB CV     INCISION AND DRAINAGE LOWER EXTREMITY, COMBINED Left 11/16/2021    Procedure: INCISION AND DRAINAGE, left foot;  Surgeon: Eddi Ram DPM;  Location: [facility] OR     INCISION AND DRAINAGE LOWER EXTREMITY, COMBINED Left 11/19/2021    Procedure: INCISION AND DRAINAGE, left foot;  Surgeon: Eddi Ram DPM;  Location: [facility] OR     No family history on file. Social History     Socioeconomic History     Marital status: Single     Spouse name: Not on file     Number of children: Not on file     Years of education: Not on file     Highest education level: Not on file   Occupational History     Not on file   Tobacco Use     Smoking  status: Former Smoker     Types: Cigars     Smokeless tobacco: Never Used     Tobacco comment: Used to smoke a very occational cigar   Vaping Use     Vaping Use: Never used   Substance and Sexual Activity     Alcohol use: Not on file     Drug use: Not on file     Sexual activity: Not on file   Other Topics Concern     Parent/sibling w/ CABG, MI or angioplasty before 65F 55M? Not Asked   Social History Narrative     Not on file     Social Determinants of Health     Financial Resource Strain: Not on file   Food Insecurity: Not on file   Transportation Needs: Not on file   Physical Activity: Not on file   Stress: Not on file   Social Connections: Not on file   Intimate Partner Violence: Not on file   Housing Stability: Not on file          Medications  Allergies   Current Outpatient Medications   Medication Sig Dispense Refill     alcohol swab prep pads Use to swab area of injection/matthew as directed. 100 each 3     alcohol swab prep pads Use to swab area of injection/matthew as directed. 100 each 6     amoxicillin-clavulanate (AUGMENTIN) 875-125 MG tablet Take 1 tablet by mouth every 12 hours for 6 days 12 tablet 0     apixaban ANTICOAGULANT (ELIQUIS) 5 MG tablet Take 2 tablets (10 mg) by mouth 2 times daily for 3 days, THEN 1 tablet (5 mg) 2 times daily. 72 tablet 0     aspirin (ASA) 81 MG chewable tablet Take 1 tablet (81 mg) by mouth daily 30 tablet 0     atorvastatin (LIPITOR) 80 MG tablet Take 1 tablet (80 mg) by mouth every evening 30 tablet 0     blood glucose (ACCU-CHEK SOFTCLIX) lancing device Lancing device to be used with lancets. 1 each 0     blood glucose (NO BRAND SPECIFIED) test strip Use to test blood sugar 4 times daily or as directed. 100 strip 3     blood glucose monitoring (SOFTCLIX) lancets Use to test blood sugar 4 times daily. 100 each 6     furosemide (LASIX) 40 MG tablet Take 1 tablet (40 mg) by mouth daily 30 tablet 0     insulin aspart (NOVOLOG FLEXPEN) 100 UNIT/ML pen For  - 164 give 1  "unit. For  - 189 give 2 units. For  - 214 give 3 units. For  - 239 give 4 units. For  - 264 give 5 units. For  - 289 give 6 units. For  - 314 give 7 units. For  - 339 give 8 units For  - 364 give 9 units For  - 389 give 10 units For  - 414 give 11 units For BG greater than or equal to 415 give 12 units 3 mL 3     insulin glargine (LANTUS SOLOSTAR) 100 UNIT/ML pen Inject 38 Units Subcutaneous every morning 3 mL 3     insulin pen needle (32G X 4 MM) 32G X 4 MM miscellaneous Use BD Lizbeth pen needles daily or as directed. 100 each 3     lisinopril (ZESTRIL) 20 MG tablet Take 10 mg by mouth daily       magnesium oxide (MAG-OX) 400 MG tablet Take 1 tablet (400 mg) by mouth 2 times daily 60 tablet 0     metoprolol succinate ER (TOPROL-XL) 50 MG 24 hr tablet Take 1 tablet (50 mg) by mouth daily 30 tablet 0     OMEGA-3/DHA/EPA/FISH OIL (FISH OIL-OMEGA-3 FATTY ACIDS) 300-1,000 mg capsule Take 1 g by mouth daily        pantoprazole (PROTONIX) 40 MG EC tablet Take 1 tablet (40 mg) by mouth every morning (before breakfast) 30 tablet 0     nitroGLYcerin (NITROSTAT) 0.4 MG sublingual tablet One tablet under the tongue every 5 minutes if needed for chest pain. May repeat every 5 minutes for a maximum of 3 doses in 15 minutes\" (Patient not taking: Reported on 11/29/2021) 25 tablet 3    No Known Allergies      Lab Results    Chemistry/lipid CBC Cardiac Enzymes/BNP/TSH/INR   Recent Labs   Lab Test 11/17/21  0533   CHOL 180   HDL 30*      TRIG 122     Recent Labs   Lab Test 11/17/21  0533 11/16/21  0639 10/31/19  1625    122 107     Recent Labs   Lab Test 11/24/21  1136 11/24/21  0820 11/24/21  0505 11/23/21  1130 11/23/21  0514   NA  --   --   --   --  134*   POTASSIUM  --   --  3.8   < > 3.4*   CHLORIDE  --   --   --   --  96*   CO2  --   --   --   --  29   *   < >  --    < > 270*   BUN  --   --   --   --  15   CR  --   --  1.62*  --  1.62*   GFRESTIMATED "  --   --  45*  --  45*   YOSVANY  --   --   --   --  9.0    < > = values in this interval not displayed.     Recent Labs   Lab Test 11/24/21  0505 11/23/21  0514 11/22/21 0442   CR 1.62* 1.62* 1.35*     Recent Labs   Lab Test 11/14/21 2016   A1C >14.0*    Recent Labs   Lab Test 11/23/21  0514   WBC 6.7   HGB 9.9*   HCT 31.1*   MCV 90        Recent Labs   Lab Test 11/23/21  0514 11/22/21  0442 11/21/21  0538   HGB 9.9* 10.4* 10.6*    Recent Labs   Lab Test 11/15/21  0619 11/15/21  0012 11/14/21 2016   TROPONINI 13.42* 1.29* 3.72*     No results for input(s): BNP, NTBNPI, NTBNP in the last 70586 hours.  No results for input(s): TSH in the last 16477 hours.  Recent Labs   Lab Test 11/15/21  0052   INR 1.35*          Hospital Follow-up Visit:    Hospital/Nursing Home/IP Rehab Facility: [facility]  Date of Admission: November 14, 2021  Date of Discharge: November 24, 2021  Reason(s) for Admission: DKA, cellulitis with gangrene, non-STEMI, ischemic cardiomyopathy, heart failure with reduced ejection fraction      Was your hospitalization related to COVID-19? No   Problems taking medications regularly:  None  Medication changes since discharge: None  Problems adhering to non-medication therapy:  None    Summary of hospitalization:  [facility] discharge summary reviewed  Diagnostic Tests/Treatments reviewed.  Follow up needed: Heart failure clinic in 2 weeks and cardiologist in 4 weeks  Other Healthcare Providers Involved in Patient s Care:         Homecare (wound nurse) and vascular clinic  Update since discharge: stable.       Post Discharge Medication Reconciliation: discharge medications reconciled, continue medications without change.  Plan of care communicated with patient              45 minutes spent on the date of encounter doing chart review, review of test results, interpretation with above tests, patient visit and documentation.            cc:   Russel  MD Arnulfo  [address] [place] [id number]

## 2021-11-29 NOTE — PATIENT INSTRUCTIONS
Norman Aguilar,    It was a pleasure to see you today at the [facility].     My recommendations after this visit include:  - No changes to your medications.     - You will be called with the results of your labs later today.   - Limit salt in your diet.   - Continue to monitor for signs of retaining fluid (increasing weights, shortness of breath, swelling) and call with any concerns.   - See Polly Downing in 2 weeks and Dr. Davis in 4-6 weeks.   - If you have any questions or concerns, please call [phone] to talk with my nurse.    Piedad Brown, CNP

## 2021-12-01 ENCOUNTER — LAB REQUISITION (OUTPATIENT)
Dept: LAB | Facility: CLINIC | Age: 61
End: 2021-12-01

## 2021-12-01 DIAGNOSIS — N18.31 CHRONIC KIDNEY DISEASE, STAGE 3A (H): ICD-10-CM

## 2021-12-01 DIAGNOSIS — E11.39 TYPE 2 DIABETES MELLITUS WITH OTHER DIABETIC OPHTHALMIC COMPLICATION (H): ICD-10-CM

## 2021-12-01 LAB
ALBUMIN SERPL-MCNC: 2.7 G/DL (ref 3.5–5)
ANION GAP SERPL CALCULATED.3IONS-SCNC: 14 MMOL/L (ref 5–18)
BUN SERPL-MCNC: 20 MG/DL (ref 8–22)
CALCIUM SERPL-MCNC: 9.7 MG/DL (ref 8.5–10.5)
CHLORIDE BLD-SCNC: 99 MMOL/L (ref 98–107)
CO2 SERPL-SCNC: 23 MMOL/L (ref 22–31)
CREAT SERPL-MCNC: 1.88 MG/DL (ref 0.7–1.3)
GFR SERPL CREATININE-BSD FRML MDRD: 37 ML/MIN/1.73M2
GLUCOSE BLD-MCNC: 151 MG/DL (ref 70–125)
HBA1C MFR BLD: 13.4 %
PHOSPHATE SERPL-MCNC: 2.8 MG/DL (ref 2.5–4.5)
POTASSIUM BLD-SCNC: 4.4 MMOL/L (ref 3.5–5)
SODIUM SERPL-SCNC: 136 MMOL/L (ref 136–145)

## 2021-12-01 PROCEDURE — 83036 HEMOGLOBIN GLYCOSYLATED A1C: CPT | Performed by: FAMILY MEDICINE

## 2021-12-01 PROCEDURE — 82040 ASSAY OF SERUM ALBUMIN: CPT | Performed by: FAMILY MEDICINE

## 2021-12-01 NOTE — TELEPHONE ENCOUNTER
Updated Norman that he should have his home care come out after the appointment to re-apply the vac. He will call them to schedule.

## 2021-12-01 NOTE — TELEPHONE ENCOUNTER
Patient is calling wondering if HC should come see him after the apt on Friday with Dr. Ram?  He is assuming the wound vac will be removed at the apt, so will the wound vac be reapplied or should he have home care come out?

## 2021-12-03 ENCOUNTER — PREP FOR PROCEDURE (OUTPATIENT)
Dept: PODIATRY | Facility: CLINIC | Age: 61
End: 2021-12-03
Payer: COMMERCIAL

## 2021-12-03 ENCOUNTER — ANCILLARY PROCEDURE (OUTPATIENT)
Dept: VASCULAR ULTRASOUND | Facility: CLINIC | Age: 61
End: 2021-12-03
Attending: PODIATRIST
Payer: COMMERCIAL

## 2021-12-03 ENCOUNTER — OFFICE VISIT (OUTPATIENT)
Dept: VASCULAR SURGERY | Facility: CLINIC | Age: 61
End: 2021-12-03
Attending: PODIATRIST
Payer: COMMERCIAL

## 2021-12-03 ENCOUNTER — TELEPHONE (OUTPATIENT)
Dept: PODIATRY | Facility: CLINIC | Age: 61
End: 2021-12-03
Payer: COMMERCIAL

## 2021-12-03 ENCOUNTER — LAB (OUTPATIENT)
Dept: LAB | Facility: HOSPITAL | Age: 61
End: 2021-12-03
Attending: PODIATRIST
Payer: COMMERCIAL

## 2021-12-03 VITALS
TEMPERATURE: 98.3 F | SYSTOLIC BLOOD PRESSURE: 110 MMHG | RESPIRATION RATE: 16 BRPM | OXYGEN SATURATION: 96 % | HEART RATE: 80 BPM | DIASTOLIC BLOOD PRESSURE: 78 MMHG

## 2021-12-03 DIAGNOSIS — I50.20 HEART FAILURE WITH REDUCED EJECTION FRACTION (H): ICD-10-CM

## 2021-12-03 DIAGNOSIS — E11.621 DIABETIC ULCER OF LEFT MIDFOOT ASSOCIATED WITH TYPE 2 DIABETES MELLITUS, WITH NECROSIS OF BONE (H): Primary | ICD-10-CM

## 2021-12-03 DIAGNOSIS — L97.424 DIABETIC ULCER OF LEFT MIDFOOT ASSOCIATED WITH TYPE 2 DIABETES MELLITUS, WITH NECROSIS OF BONE (H): Primary | ICD-10-CM

## 2021-12-03 DIAGNOSIS — E11.621 DIABETIC ULCER OF LEFT MIDFOOT ASSOCIATED WITH TYPE 2 DIABETES MELLITUS, WITH NECROSIS OF BONE (H): ICD-10-CM

## 2021-12-03 DIAGNOSIS — L97.424 DIABETIC ULCER OF LEFT MIDFOOT ASSOCIATED WITH TYPE 2 DIABETES MELLITUS, WITH NECROSIS OF BONE (H): ICD-10-CM

## 2021-12-03 DIAGNOSIS — M86.9 OSTEOMYELITIS OF ANKLE AND FOOT (H): Primary | ICD-10-CM

## 2021-12-03 LAB
ANION GAP SERPL CALCULATED.3IONS-SCNC: 8 MMOL/L (ref 5–18)
BUN SERPL-MCNC: 20 MG/DL (ref 8–22)
CALCIUM SERPL-MCNC: 9.8 MG/DL (ref 8.5–10.5)
CHLORIDE BLD-SCNC: 97 MMOL/L (ref 98–107)
CO2 SERPL-SCNC: 27 MMOL/L (ref 22–31)
CREAT SERPL-MCNC: 2.07 MG/DL (ref 0.7–1.3)
GFR SERPL CREATININE-BSD FRML MDRD: 33 ML/MIN/1.73M2
GLUCOSE BLD-MCNC: 307 MG/DL (ref 70–125)
GLUCOSE BLDC GLUCOMTR-MCNC: 165 MG/DL (ref 70–99)
GLUCOSE BLDC GLUCOMTR-MCNC: 252 MG/DL (ref 70–99)
MAGNESIUM SERPL-MCNC: 1.6 MG/DL (ref 1.8–2.6)
POTASSIUM BLD-SCNC: 4.3 MMOL/L (ref 3.5–5)
SODIUM SERPL-SCNC: 132 MMOL/L (ref 136–145)

## 2021-12-03 PROCEDURE — 36415 COLL VENOUS BLD VENIPUNCTURE: CPT

## 2021-12-03 PROCEDURE — 11044 DBRDMT BONE 1ST 20 SQ CM/<: CPT | Performed by: PODIATRIST

## 2021-12-03 PROCEDURE — 93922 UPR/L XTREMITY ART 2 LEVELS: CPT

## 2021-12-03 PROCEDURE — 80048 BASIC METABOLIC PNL TOTAL CA: CPT

## 2021-12-03 PROCEDURE — 93922 UPR/L XTREMITY ART 2 LEVELS: CPT | Mod: 26 | Performed by: SURGERY

## 2021-12-03 PROCEDURE — 11047 DBRDMT BONE EACH ADDL: CPT | Performed by: PODIATRIST

## 2021-12-03 PROCEDURE — 83735 ASSAY OF MAGNESIUM: CPT

## 2021-12-03 RX ORDER — CLINDAMYCIN HCL 300 MG
CAPSULE ORAL
COMMUNITY
End: 2021-12-03

## 2021-12-03 RX ORDER — CALCIUM CARBONATE 300MG(750)
TABLET,CHEWABLE ORAL
COMMUNITY
End: 2021-12-08

## 2021-12-03 ASSESSMENT — PAIN SCALES - GENERAL: PAINLEVEL: NO PAIN (0)

## 2021-12-03 NOTE — TELEPHONE ENCOUNTER
Eddi Ram, Dorcas Spicer, RN; Carol Shirley RN; Elba Vincent 6 minutes ago (3:38 PM)     VS    Osmel. Karol, please look into stopping Eliquis 48 hours before surgery. Let's contact the provider that started this medication.       Called PCP office and spoke to RACHAEL Claros. Explained that patient is not on Plavix (error from earlier). Dr. Ram would like patient off Eliquis for 48hrs prior to surgery which is on 12/9/21. She will send the message to PCP.

## 2021-12-03 NOTE — PATIENT INSTRUCTIONS
[place]    Your visit to [facility] for your surgery or procedure is coming soon and we look forward to seeing you! This friendly reminder and pre-procedure checklist will help to ensure your procedure/surgery goes smoothly and meets your expectations. At [facility], our goal is to provide you with a great patient experience and to deliver genuine, professional care to every patient.     Please complete all the steps in advance of your visit. If you have any questions about the items listed below, please give our office a call. We can be reached at [phone] or visit our website at [email or web address]  for more information.       YOU NEED A PRE-OPERATIVE PHYSICAL PRIOR TO SURGERY.  PLEASE CALL YOUR PRIMARY CARE PROVIDER TODAY TO SCHEDULE.    Procedure Date :  12/9/2021  (time to be determined)    A nurse will call you the day before surgery to inform you of the time of surgery.    Covid Test: SEE APPOINTMENTS    Admission Type: Outpatient    Surgeon: Dr. Ram    Procedure: Incision and drainage of left foot with Theraskin applied    Procedure Location: [facility]:  [address] [place] [address] (phone: [phone], Fax: [phone])    If you take blood thinners: SEE SPECIFIC INSTRUCTIONS BELOW    PLEASE DO NOT STOP YOUR ASPIRIN OR PLAVIX UNLESS SPECIFICALLY DIRECTED BY THE VASCULAR SURGEON TO STOP!  - In most cases Vascular surgeons want you to continue these. This is different from most NON vascular surgeries and may not be well known by your Primary Care Provider      Plavix - We would want you to hold this but need to review with your PCP    IF YOU NEED TO RESCHEDULE OR CANCEL YOUR PROCEDURE FOR ANY REASON PLEASE CALL THE CLINIC AS SOON AS POSSIBLE -[phone].    Complete the following checklist before your procedure/surgery    [] Contact your insurance regarding coverage    If you would like a  "Good [facility]en[facility] Estimate for your upcoming service/procedure at [facility] contact Cost of Care Estimates at [phone], advocates are available Monday through Friday 8am - 5pm.   You may also submit a request online at [email or web address]   - complete the secure online form found under \"Services and Procedure Pricing\"     If your procedure is at [facility] please contact the numbers below for Cost of Care Estimates.   Facility Charge: [phone]    Anesthesiology charge:  [phone]        []  You will need a preop physical within 30 days before surgery with your primary care provider. This exam is required by the anesthesiologist to ensure a safe surgical experience.    Failure  to obtain your pre-op physical will lead to cancellation of your surgery  Please contact your primary to schedule. Please ensure your Preoperative Physical is faxed to the Hospital (numbers listed above)    [] Preoperative Medication Instructions    Stop Ibuprofen 1 week prior to surgery.      Contact your prescribing provider for instructions before discontinuing any medications including anticoagulants. (Examples: Coumadin, Heparin, Xarelto, Eliquis, Pradaxa, Effiient, Briliant) We would like you stop them five days before surgery HOWEVER, please follow the advice of your primary care provider. Most surgeons will have you remain on your aspirin and Plavix.    Hold Herbal Supplements and Vitamin E 7 days before    Stop Cialis, Levitra and Viagra 2-3 days before surgery    [] Fasting Requirements    Do not eat anything after 11:00 pm    You may have clear liquids up to two hours before your arrival time (coffee, tea, water, or Gatorade. No cream or milk)    If your surgery is scheduled later in the day, fasting instructions may be modified.    If your primary care provider has instructed you to continue oral medications, you may take them on the morning of surgery with a small sip of water.  "     No alcohol or smoking after 12:00am the day of surgery    Day Before Surgery    [] A surgery nurse will call you 2 -3 days prior to your surgery to review your health history and provide detailed instructions.     [] STOP Smoking and Drinking: It is important to stop smoking and drinking at least 24 hours before surgery. Smoking and drinking may cause complications in your recovery from anesthesia and may lengthen the healing process.    [] Pack for your hospital stay: If it will be required for you to stay at the hospital after surgery, bring personal items such as a robe, slippers, pajamas, additional clothing and toiletries. Don't forget:    List of medication    Eyeglass case or contact case with solution. You cannot wear contacts during surgery    Copies of your physical exam , lab results and EKG    Copy of Health Care Directives, Living Will or Power of     Insurance Cards    Photo ID    CPAP machine    [] NOTHING BY MOUTH 8 HOURS BEFORE. This includes gum, hard candy, water and mints. The only exception is if you have been instructed by your doctor to take your medications with sips of water. You may rinse your mouth or brush your teeth, but do not swallow water.        Day of Surgery    [] Medications- Take as indicated with sips of water     [] Wear comfortable loose fitting clothes. Wear your glasses-Not contacts. Do not wear jewelry and remove body piercing's. Surgery may be cancelled if they are not removed.     [] If same day surgery-Have a someone come with you to surgery that can help you understand the surgeon's instructions, drive you home and stay with you overnight the first night.   A nurse will call you at home within 72 hours following surgery to see how you are doing. Our nurses and doctors will discuss recovery with you.    [] DO NOT BRING FMLA WITH TO SURGERY.  These should be sent to the provider's office by fax to [phone]       After Surgery    [] You are to remain NON  WEIGHT BEARING on your left foot NON WEIGHT BEARING MEANS NO PRESSURE ON YOUR FOOT OR HEEL AT ANY TIME FOR ANY REASON!    [] Prior to surgery you should already have a PRAFO BOOT which you will need to WEAR THIS AT ALL TIMES EVEN WHILE IN BED Please bring this with you on the day of surgery.    [] You should already have a A WHEELCHAIR to help you ambulate after surgery. Please also bring this with you on the day of surgery.    [] During surgery Dr. Ram will apply a gauze dressing to your foot. This will need to be changed on 12/10/2021. The home care nurse should then apply the wound vac with ADAPTIC TOUCH underneath and vac set at 100 mmHg and left on until post op visit with Dr. Ram on 12/   [] It is NOT OKAY to get your surgical site wet while bathing, you will need to purchase a cast cover to protect your foot from getting wet. You can purchase this at your local pharmacy.    [] It is going to be very important that you elevate your affected foot after surgery. Elevating your leg means laying with your head elevated no more than 30 degrees and your foot         above your waist. Simply raising your leg up while you are in a sitting position is NOT proper elevation. The fluid in your lower extremities needs to get back to your heart so it can get pumped to your kidneys and expelled through urination. So if you notice you have to go to the bathroom more frequently when you are elevating your leg this is a good sign that it is working.     [] It is important that you increase your protein intake after surgery.This helps with wound healing. We recommend you take a protein supplement twice per day. This is in addition to your normal diet. Examples of protein supplements are Ensure, Boost, Glucerna (if you are diabetic), or protein powder. You can purchase these at your local retailer or grocery store.       Notify our office right away, if you have any changes in your health status, or if you develop a cold,  flu, diarrhea, infection, fever or sore throat before your scheduled surgery date. We can be reached at [phone] Monday-Friday 8 am-4:30 pm if you have any questions.   Thank you for choosing [facility]  If you have any questions or need to reschedule your appointment, please call [phone]          Before Your Procedure or Hospital Admission  Testing for COVID-19 (Coronavirus)    Thank you for choosing [facility] for your health care needs. The COVID-19 pandemic is a very challenging time for everyone. Our goal is to keep you and our team here at [facility] safe and healthy.       Before you come in, All patients must get tested for COVID-19. Your test needs to happen 2 to 4 days before you check in to the hospital or surgery site.    A clinic scheduler will call about a week in advance to set up a testing time at one of our labs. We ll take a swab of your nose or throat.    Note: If you go to a clinic or pharmacy like [facility] or [facility] for your test, make sure you get a test inside the nose. Tests inside the nose are:  - A naso-pharyngeal (NP) RT-PCR test  - An anterior nares RT-PCR test    Do NOT get a  rapid  test or a saliva (spit) test.    After the test, please stay at home and stay out of contact with other people. It will be harder for you to recover if you get COVID-19 before your treatment.    Please follow all current safety guidelines, including:    Limit trips outside your home.    Limit the number of people you see.    Always wear a mask outside your home.    Use social distancing. Stay 6 feet away from others whenever you can.    Wash your hands often.    If your test shows you have COVID-19  If your test is positive, we ll let you know. A positive test means that you have the virus.  We ll probably have to postpone your admission, surgery or procedure. Your doctor will discuss this with you. After that, we ll let you know what to do and when you can  re-schedule.  We may need to cancel your treatment on short notice for other reasons, too.    If your test shows you DON T have COVID-19  Even if your test is negative, you can still get COVID-19. It s rare but, sometimes, the test result is wrong. You could also catch the virus after taking the test.  There s a very small chance that you could catch COVID-19 in the hospital or surgery center.    Day of your surgery or procedure    Please come wearing a face covering that covers both your nose and mouth.    When you arrive, we ll ask you some questions to find out if you have any signs or symptoms of COVID-19.    Ask your care team if you can have visitors. All visitors must wear face coverings and will be screened for signs of COVID-19.    Even if no visitors are allowed, you can still have with you:  - Your legal guardian or legal decision maker  - A parent and one other visitor, if you are  younger than 18 years old  - A partner and a , if you are in labor    We might need to teach you about taking care of yourself after surgery. If so, a visitor can come into the hospital to learn about it, too.    The rules for visitors change often, depending on how much the virus is spreading. To learn more, see Visiting a Loved One in the Hospital during the COVID-19 Outbreak. Please call your care team, hospital or surgery center if you have any questions. We thank you for your understanding and for choosing [facility] for your care.    Questions and Answers  Does it matter where I get tested for COVID-19?  Yes. We urge you to get tested at one of our [facility] COVID-19 testing sites. We process these tests in our lab and can get the results quickly. Your [facility] care team needs to get your results before you check in.    What should I do if I can t get tested at [facility]?  You can get tested somewhere else, but you ll need to take these extra steps:  1. Contact your family doctor or  clinic to arrange your test.  2. Take the test within 4 days of your surgery or procedure. We can t accept tests older than 4 days.  3. Make sure you re getting a test inside the nose.  Tests inside the nose are:  - A naso-pharyngeal (NP) RT-PCR test  - An anterior nares RT-PCR test  -Many pharmacies use  rapid  tests or saliva (spit) tests. We do NOT accept those tests before surgery or procedures. Tests from inside the nose are the most accurate tests.  4. Make sure your doctor or clinic faxes your results to [facility] at [phone].    If we don t get your results in time, we may have to  delay or cancel your treatment.      For informational purposes only. Not to replace the advice of your health care provider. Copyright   2020 [facility]. All rights reserved. Clinically reviewed by Infection Prevention and the [facility] COVID-19 Clinical Team.  Interse 197476 - Rev 09/09/21.

## 2021-12-03 NOTE — TELEPHONE ENCOUNTER
Called Dr. Dickerson's office and spoke with Karli. She will send a message over to see if okay to hold Plavix for 5 days before surgery with Dr. Ram (DOS: 12/9/2021). She will get the message over and give us a call back.

## 2021-12-03 NOTE — TELEPHONE ENCOUNTER
Nurse returning call. Dr Dickerson does not believe patient is on Plavix but is on Eliquis which should not need to be stopped. If it does, he needs bridging and not enough time for that.   Nurse line is [phone] option 4

## 2021-12-03 NOTE — PROGRESS NOTES
FOOT AND ANKLE SURGERY/PODIATRY Progress Note      ASSESSMENT:   Diabetic Ulceration left foot       TREATMENT:  -Granular and non-viable tissue along the ulceration medial left foot, exposed non-viable 1st metatarsal.     -Based on the above, we discussed treatment options to include continued use of the wound vac, surgical I&D with removal of the non-viable 1st metatarsal with skin grafting or TMA. The patient does not want an amputation at this time and would like to continue to attempt salvage of the lateral left foot.     -He consents to surgical I&D with application of theraksin with removal of all non-viable tissue and bone.     -He has finished course of antibiotics per ID.     -Also discussed that wound healing will be difficult in the presence of HbA1c above 8, most recent >14.     -After discussion of risk factors and consent obtained 2% Lidocaine HCL jelly was applied, under clean conditions, the left and foot ulceration(s) were debrided using currette.  Devitalized and nonviable tissue, along with any fibrin and slough, was removed to improve granulation tissue formation, stimulate wound healing, decrease overall bacteria load, disrupt biofilm formation and decrease edge senescence. Wound drainage was scant No. Total excisional debridement was 55 sq cm into the bone with a depth of 2 cm.   Ulcers were improved afterwards and .  Measures were as noted on the flow sheet. A gauze dressing was applied. He will continue with the wound vac, non-weight bearing and protein supplements.     -I will ask my office to coordinate surgery next week at [facility]. He will follow-up with Dr. Dickerson's office for a pre-op physical.     Eddi Ram DPM  [facility] [place]      HPI: Norman BETHANY Aguilar was seen again today for a left foot ulceration. He has been using he wound vac as directed. Currently having problems keeping his blood sugars reduced.      Past Medical History:    Diagnosis Date     Diabetes (H)      GERD (gastroesophageal reflux disease)      Hypertension        Past Surgical History:   Procedure Laterality Date     AMPUTATE TOE(S) Left 11/16/2021    Procedure: first and second ray amputation;  Surgeon: Eddi Ram DPM;  Location: [facility] OR     CV CORONARY ANGIOGRAM N/A 11/16/2021    Procedure: CV CORONARY ANGIOGRAM;  Surgeon: Pam Tabares MD;  Location: [facility] CATH LAB CV     CV LEFT HEART CATH N/A 11/16/2021    Procedure: Left Heart Cath;  Surgeon: Pam Tabares MD;  Location: [facility] CATH LAB CV     INCISION AND DRAINAGE LOWER EXTREMITY, COMBINED Left 11/16/2021    Procedure: INCISION AND DRAINAGE, left foot;  Surgeon: Eddi Ram DPM;  Location: [facility] OR     INCISION AND DRAINAGE LOWER EXTREMITY, COMBINED Left 11/19/2021    Procedure: INCISION AND DRAINAGE, left foot;  Surgeon: Eddi Ram DPM;  Location: [facility] OR       No Known Allergies      Current Outpatient Medications:      alcohol swab prep pads, Use to swab area of injection/matthew as directed., Disp: 100 each, Rfl: 3     apixaban ANTICOAGULANT (ELIQUIS) 5 MG tablet, 2 tablets, Disp: , Rfl:      apixaban ANTICOAGULANT (ELIQUIS) 5 MG tablet, Take 2 tablets (10 mg) by mouth 2 times daily for 3 days, THEN 1 tablet (5 mg) 2 times daily., Disp: 72 tablet, Rfl: 0     aspirin (ASA) 81 MG chewable tablet, Take 1 tablet (81 mg) by mouth daily, Disp: 30 tablet, Rfl: 0     atorvastatin (LIPITOR) 80 MG tablet, Take 1 tablet (80 mg) by mouth every evening, Disp: 30 tablet, Rfl: 1     blood glucose (ACCU-CHEK SOFTCLIX) lancing device, Lancing device to be used with lancets., Disp: 1 each, Rfl: 0     blood glucose (NO BRAND SPECIFIED) test strip, Use to test blood sugar 4 times daily or as directed., Disp: 100 strip, Rfl: 3     blood glucose monitoring (SOFTCLIX) lancets, Use to test blood sugar 4 times daily., Disp: 100 each, Rfl: 6     furosemide (LASIX) 20 MG  "tablet, Take 1 tablet (20 mg) by mouth daily, Disp: 30 tablet, Rfl: 1     insulin aspart (NOVOLOG FLEXPEN) 100 UNIT/ML pen, For  - 164 give 1 unit. For  - 189 give 2 units. For  - 214 give 3 units. For  - 239 give 4 units. For  - 264 give 5 units. For  - 289 give 6 units. For  - 314 give 7 units. For  - 339 give 8 units For  - 364 give 9 units For  - 389 give 10 units For  - 414 give 11 units For BG greater than or equal to 415 give 12 units, Disp: 3 mL, Rfl: 3     insulin glargine (LANTUS SOLOSTAR) 100 UNIT/ML pen, Inject 38 Units Subcutaneous every morning, Disp: 3 mL, Rfl: 3     insulin pen needle (32G X 4 MM) 32G X 4 MM miscellaneous, Use BD Lizbeth pen needles daily or as directed., Disp: 100 each, Rfl: 3     Magnesium 400 MG TABS, 1 tab, Disp: , Rfl:      magnesium oxide (MAG-OX) 400 MG tablet, Take 1 tablet (400 mg) by mouth 2 times daily, Disp: 60 tablet, Rfl: 1     metoprolol succinate ER (TOPROL-XL) 50 MG 24 hr tablet, Take 1 tablet (50 mg) by mouth daily, Disp: 30 tablet, Rfl: 1     Multiple Vitamin (MULTI VITAMIN) TABS, 1 tab(s), Disp: , Rfl:      OMEGA-3/DHA/EPA/FISH OIL (FISH OIL-OMEGA-3 FATTY ACIDS) 300-1,000 mg capsule, Take 1 g by mouth daily , Disp: , Rfl:      pantoprazole (PROTONIX) 40 MG EC tablet, Take 1 tablet (40 mg) by mouth every morning (before breakfast), Disp: 30 tablet, Rfl: 0     alcohol swab prep pads, Use to swab area of injection/matthew as directed. (Patient not taking: Reported on 12/3/2021), Disp: 100 each, Rfl: 6     nitroGLYcerin (NITROSTAT) 0.4 MG sublingual tablet, One tablet under the tongue every 5 minutes if needed for chest pain. May repeat every 5 minutes for a maximum of 3 doses in 15 minutes\" (Patient not taking: Reported on 11/29/2021), Disp: 25 tablet, Rfl: 3    Review of Systems - 10 point Review of Systems is negative except for left foot infection which is noted in HPI.      OBJECTIVE:  /78   " Pulse 80   Temp 98.3  F (36.8  C)   Resp 16   SpO2 96%   General appearance: Patient is alert and fully cooperative with history & exam.  No sign of distress is noted during the visit.    Vascular: Dorsalis pedis non-palpableLeft.  Dermatologic:    VASC Wound left foot (Active)   Pre Size Length 10 12/03/21 1000   Pre Size Width 5.5 12/03/21 1000   Pre Size Depth 1.4 12/03/21 1000   Pre Total Sq cm 55 12/03/21 1000       Incision/Surgical Site 11/16/21 Left Foot (Active)   Incision Assessment UTV 11/24/21 0800   Closure ZOYA 11/24/21 0345   Incision Drainage Amount UTV 11/22/21 1721   Drainage Description Serosanguinous 11/24/21 0345   Incision Care Therapy - negative pressure 11/24/21 0345   Dressing Intervention Clean, dry, intact 11/23/21 0057   Granular and non-viable tissue along the ulceration medial left foot, exposed non-viable 1st metatarsal. No erythema left foot.  Neurologic: Diminished to light touch Left.  Musculoskeletal: Amputated 1st and 2nd rays left foot.     Imaging:     MR Foot Left w/o & w Contrast    Result Date: 11/15/2021  EXAM: MR FOOT LEFT W/O and W CONTRAST LOCATION: [facility] DATE/TIME: 11/15/2021 10:45 AM INDICATION: Foot swelling, diabetic, osteomyelitis suspected, no prior imaging. COMPARISON: None. TECHNIQUE: Routine. Additional postgadolinium T1 sequences were obtained. IV CONTRAST: 10 mL Gadavist. FINDINGS: JOINTS AND BONES: -No evidence for fracture. No marrow signal abnormality to suggest osteomyelitis. No significant effusion to suggest septic arthropathy. There is some reactive edema within the sesamoid bones adjacent to the first metatarsal head which could represent sesamoiditis. TENDONS: -Flexor hallucis tendon tenosynovitis. The remaining flexor tendons are negative. The extensor tendons are negative for tendinopathy, tenosynovitis, or tearing. LIGAMENTS: -The ligament of Lisfranc is intact. The collateral ligaments at the MTP joints are all  intact. MUSCLES AND SOFT TISSUES: -There is an ulceration along the medial aspect of the forefoot with surrounding edema and/or cellulitis. Fluid and susceptibility artifact suggestive of bubbles of gas extend into the soft tissues surrounding the first MTP joint into the webspace between the first and second toes but all of this appears likely external to the joint capsule. There is nonenhancing fluid consistent with abscess surrounding the first MTP joint but this may spontaneously drain to the skin surface. It also extends proximally into the fatty replaced plantar musculature deep to the first and second metatarsal shafts.     IMPRESSION: 1.  No evidence for osteomyelitis or significant effusion to suggest septic arthropathy. 2.  However, there is a soft tissue ulceration along the medial aspect of the forefoot adjacent to the first MTP joint. Fluid and susceptibly artifact suggestive of gas bubbles extend nearly circumferentially about the first MTP joint and base of the great toe into the webspace between the first and second toes consistent with abscess. Nonenhancing fluid also extends proximally deep to the first and second metatarsal shafts consistent with abscess although this may spontaneously communicate to the ulceration. 3.  Fluid surrounding the flexor hallucis tendon sheath consistent with a toxic tenosynovitis. 4.  No evidence for fracture. 5.  No additional tendinous or ligamentous pathology identified. 6.  Edema or cellulitis along the medial aspect of the foot.    Cardiac Catheterization    Result Date: 11/16/2021    NSTEMI in the setting of diabtic ketoacidosis and atrial fibrillation with RVR.   Severe, diffuse coronary calcification with diffusely small distal coronaries consistent with severe diabetic vasculopathy.   Severe proximal, mid and apical LAD disease. The first diagonal is occluded and is weakly collateralized.    of the proximal circumflex with right to left collateral filling  of OM-2 and OM-3   Severe distal RCA disease before the bifurcation and moderate ostial PDA disease.   LV EDP moderately elevated at 16 mmHg.      Echocardiogram Complete    Result Date: 11/15/2021  [id number]^HARRIET^[place] [address]  Name: JOSE YO MRN: [id number] : 1960 Study Date: 11/15/2021 08:39 AM Age: 61 yrs Gender: Male Patient Location: [facility] Reason For Study: Chest Pain Ordering Physician: HANK LARIOS Referring Physician: AHNK LARIOS Performed By:   BSA: 2.3 m2 Height: 73 in Weight: 225 lb HR: 89 ______________________________________________________________________________ Procedure Complete Echo Adult. Definity (NDC #55609-005) given intravenously. ______________________________________________________________________________ Interpretation Summary  The left ventricle is normal in size. Left ventricular function is decreased. The ejection fraction is 30-35% (moderately reduced). Left ventricular diastolic function is abnormal. Diastolic Doppler findings (E/E' ratio and/or other parameters) suggest left ventricular filling pressures are increased. The left atrium is mildly dilated. The mitral valve leaflets are mildly thickened. There is mild (1+) mitral regurgitation. IVC diameter >2.1 cm collapsing <50% with sniff suggests a high RA pressure estimated at 15 mmHg or greater. ______________________________________________________________________________ Left Ventricle The left ventricle is normal in size. Left ventricular function is decreased. The ejection fraction is 30-35% (moderately reduced). There is normal left ventricular wall thickness. Left ventricular diastolic function is abnormal. Diastolic Doppler findings (E/E' ratio and/or other parameters) suggest left ventricular filling pressures are increased. There is moderate global hypokinesia of the left ventricle.  Right Ventricle Normal right  ventricle size and systolic function.  Atria The left atrium is mildly dilated. Right atrial size is normal. There is no color Doppler evidence of an atrial shunt.  Mitral Valve The mitral valve leaflets are mildly thickened. There is mild (1+) mitral regurgitation.  Tricuspid Valve Tricuspid valve leaflets appear normal. Right ventricle systolic pressure estimate normal. There is trace tricuspid regurgitation.  Aortic Valve The aortic valve is not well visualized. No aortic regurgitation is present. No aortic stenosis is present.  Pulmonic Valve The pulmonic valve is not well visualized. There is no pulmonic valvular regurgitation.  Vessels The aorta root is normal. Normal size ascending aorta. IVC diameter >2.1 cm collapsing <50% with sniff suggests a high RA pressure estimated at 15 mmHg or greater.  Pericardium There is no pericardial effusion.  ______________________________________________________________________________ MMode/2D Measurements & Calculations IVSd: 0.90 cm LVIDd: 5.5 cm LVIDs: 4.7 cm LVPWd: 0.98 cm FS: 13.8 %  LV mass(C)d: 194.3 grams LV mass(C)dI: 85.9 grams/m2 Ao root diam: 3.6 cm LA dimension: 4.0 cm asc Aorta Diam: 3.2 cm LA/Ao: 1.1 LVOT diam: 2.2 cm LVOT area: 3.8 cm2 LA Volume Indexed (AL/bp): 42.4 ml/m2 RWT: 0.36  Time Measurements MM HR: 90.0 BPM  Doppler Measurements & Calculations MV E max juan carlos: 100.0 cm/sec MV A max juan carlos: 66.5 cm/sec MV E/A: 1.5 MV dec slope: 712.9 cm/sec2 MV dec time: 0.14 sec Ao V2 max: 91.5 cm/sec Ao max PG: 3.0 mmHg Ao V2 mean: 73.8 cm/sec Ao mean P.3 mmHg Ao V2 VTI: 17.4 cm JAEL(I,D): 3.4 cm2 JAEL(V,D): 3.5 cm2 LV V1 max P.8 mmHg LV V1 max: 82.9 cm/sec LV V1 VTI: 15.6 cm SV(LVOT): 60.1 ml SI(LVOT): 26.6 ml/m2 AV Juan Carlos Ratio (DI): 0.91 JAEL Index (cm2/m2): 1.5 E/E' av.8 Lateral E/e': 20.9 Medial E/e': 18.8  ______________________________________________________________________________ Report approved by: Eveline Hunter 11/15/2021 10:40 AM       US  Lower Extremity Venous Duplex Bilateral    Result Date: 11/20/2021  EXAM: US LOWER EXTREMITY VENOUS DUPLEX BILATERAL LOCATION: [facility] DATE/TIME: 11/20/2021 12:45 PM INDICATION: Lower extremity edema. COMPARISON: None. TECHNIQUE: Venous Duplex ultrasound of bilateral lower extremities with and without compression, augmentation and duplex. Color flow and spectral Doppler with waveform analysis performed. FINDINGS: Exam includes the common femoral, femoral, popliteal veins as well as segmentally visualized deep calf veins and greater saphenous vein. RIGHT: No femoropopliteal DVT. Short segment of right calf DVT involving posterior tibial vein in the distal calf. No superficial thrombophlebitis. No popliteal cyst. LEFT: No deep vein thrombosis. No superficial thrombophlebitis. No popliteal cyst.     IMPRESSION: 1.  Left leg negative for DVT. 2.  Short segment of right calf DVT involving posterior tibial vein distal calf. Report called to the patient's nurse, Vernell, at 1505 hours    US Lower Extremity Arterial Duplex Bilateral    Result Date: 11/17/2021  EXAM: RESTING ANKLE-BRACHIAL INDICES (ABIs) DUPLEX ARTERIAL ULTRASOUND OF THE LOWER EXTREMITIES BILATERAL LOCATION: [facility] DATE/TIME: 11/17/2021 1:02 PM INDICATION: PAD. Left first and second toe amputation. TECHNIQUE: Duplex imaging is performed utilizing gray-scale, two-dimensional images and color-flow imaging. Doppler waveform analysis and spectral Doppler imaging is also performed. COMPARISON: None MCKAYLA FINDINGS: RIGHT Brachial: 141 Ankle (PT): 168 Index: 1.19 Ankle (DP): 146 Index: 1.04 Digit: 88 Index: 0.62 LEFT Ankle (PT): 172 Index: 1.22 Ankle (DP): 121 Index: 0.86 The right MCKAYLA at rest is 1.2. Right digital index 0.62. The left MCKAYLA at rest is 1.2.  WAVEFORMS: The dorsalis pedis and posterior tibial arteries are multiphasic bilaterally. DUPLEX ARTERIAL ULTRASOUND FINDINGS: LOWER EXTREMITY ARTERIAL DUPLEX EXAM  WITH WAVEFORMS RIGHT (cm/s) EIA: 100 CFA: 76 PFA: 76 SFA-Proximal: 109 SFA-Mid: 69 SFA-Distal: 65 Popliteal: 57 PTA: 90 JUAN: 67 DPA: 57 (M=monophasic, B=biphasic, T=triphasic) LEFT (cm/s) EIA: 88 CFA: 67 PFA: 55 SFA-Proximal: 97 SFA-Mid: 104 SFA-Distal: 66 Popliteal: 74 PTA: 97 JUAN: 144 DPA: 24 (M=monophasic, B=biphasic, T=triphasic) RIGHT: Multiphasic waveforms throughout the right lower extremity. No significant arterial stenosis identified. LEFT: Multiple lymph nodes identified left groin measuring 3.5 x 2.7 x 1.4 cm. They exhibit a fatty hilum and I suspect these are reactive in nature. Multiphasic waveforms throughout the left lower extremity with no significant stenosis identified.     IMPRESSION: 1.  RIGHT LOWER EXTREMITY: Right MCKAYLA at rest is normal. No significant arterial stenosis identified on duplex imaging. 2.  LEFT LOWER EXTREMITY: Left MCKAYLA at rest is normal. No significant arterial stenosis identified on duplex imaging. Left groin lymphadenopathy which I suspect represent reactive lymph nodes related to left lower extremity infection/inflammation.     US MCKAYLA Doppler No Exercise 1-2 Levels Bilateral    Result Date: 11/17/2021  EXAM: RESTING ANKLE-BRACHIAL INDICES (ABIs) DUPLEX ARTERIAL ULTRASOUND OF THE LOWER EXTREMITIES BILATERAL LOCATION: [facility] DATE/TIME: 11/17/2021 1:02 PM INDICATION: PAD. Left first and second toe amputation. TECHNIQUE: Duplex imaging is performed utilizing gray-scale, two-dimensional images and color-flow imaging. Doppler waveform analysis and spectral Doppler imaging is also performed. COMPARISON: None MCKAYLA FINDINGS: RIGHT Brachial: 141 Ankle (PT): 168 Index: 1.19 Ankle (DP): 146 Index: 1.04 Digit: 88 Index: 0.62 LEFT Ankle (PT): 172 Index: 1.22 Ankle (DP): 121 Index: 0.86 The right MCKAYLA at rest is 1.2. Right digital index 0.62. The left MCKAYLA at rest is 1.2.  WAVEFORMS: The dorsalis pedis and posterior tibial arteries are multiphasic bilaterally. DUPLEX  ARTERIAL ULTRASOUND FINDINGS: LOWER EXTREMITY ARTERIAL DUPLEX EXAM WITH WAVEFORMS RIGHT (cm/s) EIA: 100 CFA: 76 PFA: 76 SFA-Proximal: 109 SFA-Mid: 69 SFA-Distal: 65 Popliteal: 57 PTA: 90 JUAN: 67 DPA: 57 (M=monophasic, B=biphasic, T=triphasic) LEFT (cm/s) EIA: 88 CFA: 67 PFA: 55 SFA-Proximal: 97 SFA-Mid: 104 SFA-Distal: 66 Popliteal: 74 PTA: 97 JUAN: 144 DPA: 24 (M=monophasic, B=biphasic, T=triphasic) RIGHT: Multiphasic waveforms throughout the right lower extremity. No significant arterial stenosis identified. LEFT: Multiple lymph nodes identified left groin measuring 3.5 x 2.7 x 1.4 cm. They exhibit a fatty hilum and I suspect these are reactive in nature. Multiphasic waveforms throughout the left lower extremity with no significant stenosis identified.     IMPRESSION: 1.  RIGHT LOWER EXTREMITY: Right MCKAYLA at rest is normal. No significant arterial stenosis identified on duplex imaging. 2.  LEFT LOWER EXTREMITY: Left MCKAYLA at rest is normal. No significant arterial stenosis identified on duplex imaging. Left groin lymphadenopathy which I suspect represent reactive lymph nodes related to left lower extremity infection/inflammation.     XR Foot Left G/E 3 Views    Result Date: 11/18/2021  EXAM: XR FOOT LEFT G/E 3 VIEWS LOCATION: [facility] DATE/TIME: 11/18/2021 3:54 PM INDICATION: post op COMPARISON: 11/14/2021     IMPRESSION: Interval first and second transmetatarsal amputations. There is overlapping bandaging and packing, and diffuse soft tissue swelling most marked in the forefoot. No new bony destruction.    Foot XR, G/E 3 views, left    Result Date: 11/14/2021  EXAM: XR FOOT LEFT G/E 3 VIEWS LOCATION: [facility] DATE/TIME: 11/14/2021 9:21 PM INDICATION: diabetic wounds; rule out SQ gas, eval osteomyelitis COMPARISON: None.     IMPRESSION: Subcutaneous emphysema in the base of the great toe and the first interspace to the level of the distal metatarsal metaphysis. No  "proximal subcutaneous emphysema. No cortical erosion to suggest osteomyelitis. No fractures are evident. Dorsal soft tissue swelling. Atheromatous calcification. Hypertrophic changes in the ankle joint.    POC US Guidance Needle Placement    Result Date: 11/19/2021  Ultrasound was performed as guidance to an anesthesia procedure.  Click \"PACS images\" hyperlink below to view any stored images.  For specific procedure details, view procedure note authored by anesthesia.    POC US Guidance Needle Placement    Result Date: 11/16/2021  Ultrasound was performed as guidance to an anesthesia procedure.  Click \"PACS images\" hyperlink below to view any stored images.  For specific procedure details, view procedure note authored by anesthesia.         Picture: None    "

## 2021-12-06 ENCOUNTER — TELEPHONE (OUTPATIENT)
Dept: CARDIOLOGY | Facility: CLINIC | Age: 61
End: 2021-12-06
Payer: COMMERCIAL

## 2021-12-06 NOTE — TELEPHONE ENCOUNTER
Norman is contacted to discuss his current labs. He was advised of Mg++ foods to increase in his diet.  He is drinking a lot of water. He generally drinks over the 60oz per day as his routine. Encouraged to not go overboard with fluid intake either.   He is currently dealing with a Left Foot wound, has a wound vac and Home Care Nurse visits. He is scheduled for surgery again on this Thursday to see if they can shave off a little bone and grafting to try to save the foot.  He may not be released by the upcoming appt 12/14/21 but will try to keep it if he is out of the hospital.  No additional swelling or HF symptoms at this time. He reports left LE edema and this he attributes to the wound drainage that they did last week.  Focused on the foot surgery for now. Will contact clinic with any additional concerns.   Weight is not accurate due to wound vac, he states, but not feeling any more edematous than the LLE. No bloating, no dizziness or lightheadedness noted.     FYI to Polly Downing, ARIN Giron RN BSN, CHFN

## 2021-12-06 NOTE — TELEPHONE ENCOUNTER
Called Dr. Dickerson's office and spoke with RACHAEL Taylor to see if okay to hold Eliquis 48 hours before surgery. She stated he has an appointment with Dr. Broderick to discuss bridging on 12/7/21.

## 2021-12-07 ENCOUNTER — TELEPHONE (OUTPATIENT)
Dept: VASCULAR SURGERY | Facility: CLINIC | Age: 61
End: 2021-12-07

## 2021-12-07 ENCOUNTER — LAB (OUTPATIENT)
Dept: LAB | Facility: CLINIC | Age: 61
End: 2021-12-07
Attending: PODIATRIST
Payer: COMMERCIAL

## 2021-12-07 DIAGNOSIS — L97.424 DIABETIC ULCER OF LEFT MIDFOOT ASSOCIATED WITH TYPE 2 DIABETES MELLITUS, WITH NECROSIS OF BONE (H): ICD-10-CM

## 2021-12-07 DIAGNOSIS — E11.621 DIABETIC ULCER OF LEFT MIDFOOT ASSOCIATED WITH TYPE 2 DIABETES MELLITUS, WITH NECROSIS OF BONE (H): ICD-10-CM

## 2021-12-07 LAB — SARS-COV-2 RNA RESP QL NAA+PROBE: NEGATIVE

## 2021-12-07 PROCEDURE — U0005 INFEC AGEN DETEC AMPLI PROBE: HCPCS

## 2021-12-07 PROCEDURE — U0003 INFECTIOUS AGENT DETECTION BY NUCLEIC ACID (DNA OR RNA); SEVERE ACUTE RESPIRATORY SYNDROME CORONAVIRUS 2 (SARS-COV-2) (CORONAVIRUS DISEASE [COVID-19]), AMPLIFIED PROBE TECHNIQUE, MAKING USE OF HIGH THROUGHPUT TECHNOLOGIES AS DESCRIBED BY CMS-2020-01-R: HCPCS

## 2021-12-07 NOTE — TELEPHONE ENCOUNTER
Called back. Notified that patient had H&P done on 11/15 during hospital stay. Dr. Ram had done I&D during that hospital stay as well. No need for new pre-op.

## 2021-12-07 NOTE — TELEPHONE ENCOUNTER
Caller: [facility] Pre-op nurse    Provider: MD Eddi Ram    Detailed reason for call: Surgery scheduled 12/09/21. She is not seeing pre-op physical. She spoke to PCP and they stated he spoke to Dr. Dickerson about bridging. She will call them back to see if has had a recent visit that could satisify H&P. She will call back.    Best phone number to contact: [phone]    Best time to contact: any    Ok to leave a detailed message: Yes

## 2021-12-09 ENCOUNTER — ANESTHESIA (OUTPATIENT)
Dept: SURGERY | Facility: HOSPITAL | Age: 61
End: 2021-12-09
Payer: COMMERCIAL

## 2021-12-09 ENCOUNTER — HOSPITAL ENCOUNTER (OUTPATIENT)
Facility: HOSPITAL | Age: 61
Discharge: HOME | End: 2021-12-09
Attending: PODIATRIST | Admitting: PODIATRIST
Payer: COMMERCIAL

## 2021-12-09 ENCOUNTER — ANESTHESIA EVENT (OUTPATIENT)
Dept: SURGERY | Facility: HOSPITAL | Age: 61
End: 2021-12-09
Payer: COMMERCIAL

## 2021-12-09 VITALS
RESPIRATION RATE: 16 BRPM | OXYGEN SATURATION: 100 % | SYSTOLIC BLOOD PRESSURE: 159 MMHG | DIASTOLIC BLOOD PRESSURE: 78 MMHG | TEMPERATURE: 99.2 F | HEART RATE: 90 BPM

## 2021-12-09 DIAGNOSIS — E11.621 DIABETIC ULCER OF LEFT MIDFOOT ASSOCIATED WITH TYPE 2 DIABETES MELLITUS, WITH NECROSIS OF BONE (H): Primary | ICD-10-CM

## 2021-12-09 DIAGNOSIS — L97.424 DIABETIC ULCER OF LEFT MIDFOOT ASSOCIATED WITH TYPE 2 DIABETES MELLITUS, WITH NECROSIS OF BONE (H): Primary | ICD-10-CM

## 2021-12-09 DIAGNOSIS — M86.9 OSTEOMYELITIS OF ANKLE AND FOOT (H): ICD-10-CM

## 2021-12-09 LAB
GLUCOSE BLDC GLUCOMTR-MCNC: 118 MG/DL (ref 70–99)
GLUCOSE BLDC GLUCOMTR-MCNC: 131 MG/DL (ref 70–99)

## 2021-12-09 PROCEDURE — 370N000017 HC ANESTHESIA TECHNICAL FEE, PER MIN: Performed by: PODIATRIST

## 2021-12-09 PROCEDURE — 250N000011 HC RX IP 250 OP 636: Performed by: NURSE ANESTHETIST, CERTIFIED REGISTERED

## 2021-12-09 PROCEDURE — 88305 TISSUE EXAM BY PATHOLOGIST: CPT | Mod: 26 | Performed by: PODIATRIST

## 2021-12-09 PROCEDURE — 15276 SKIN SUB GRAFT F/N/HF/G ADDL: CPT | Mod: 59 | Performed by: PODIATRIST

## 2021-12-09 PROCEDURE — 258N000001 HC RX 258: Performed by: PODIATRIST

## 2021-12-09 PROCEDURE — 258N000003 HC RX IP 258 OP 636: Performed by: ANESTHESIOLOGY

## 2021-12-09 PROCEDURE — 250N000009 HC RX 250: Performed by: ANESTHESIOLOGY

## 2021-12-09 PROCEDURE — 28140 REMOVAL OF METATARSAL: CPT | Mod: 59 | Performed by: PODIATRIST

## 2021-12-09 PROCEDURE — 272N000001 HC OR GENERAL SUPPLY STERILE: Performed by: PODIATRIST

## 2021-12-09 PROCEDURE — 250N000011 HC RX IP 250 OP 636: Performed by: ANESTHESIOLOGY

## 2021-12-09 PROCEDURE — 15275 SKIN SUB GRAFT FACE/NK/HF/G: CPT | Mod: 59 | Performed by: PODIATRIST

## 2021-12-09 PROCEDURE — 710N000012 HC RECOVERY PHASE 2, PER MINUTE: Performed by: PODIATRIST

## 2021-12-09 PROCEDURE — 15002 WOUND PREP TRK/ARM/LEG: CPT | Mod: 59 | Performed by: PODIATRIST

## 2021-12-09 PROCEDURE — 250N000009 HC RX 250: Performed by: PODIATRIST

## 2021-12-09 PROCEDURE — 28122 PARTIAL REMOVAL OF FOOT BONE: CPT | Mod: T1 | Performed by: PODIATRIST

## 2021-12-09 PROCEDURE — 258N000003 HC RX IP 258 OP 636: Performed by: NURSE ANESTHETIST, CERTIFIED REGISTERED

## 2021-12-09 PROCEDURE — 88311 DECALCIFY TISSUE: CPT | Mod: TC,59 | Performed by: PODIATRIST

## 2021-12-09 PROCEDURE — 82962 GLUCOSE BLOOD TEST: CPT

## 2021-12-09 PROCEDURE — 360N000075 HC SURGERY LEVEL 2, PER MIN: Performed by: PODIATRIST

## 2021-12-09 PROCEDURE — 999N000141 HC STATISTIC PRE-PROCEDURE NURSING ASSESSMENT: Performed by: PODIATRIST

## 2021-12-09 PROCEDURE — 250N000011 HC RX IP 250 OP 636: Performed by: PODIATRIST

## 2021-12-09 PROCEDURE — 28002 TREATMENT OF FOOT INFECTION: CPT | Mod: 59 | Performed by: PODIATRIST

## 2021-12-09 DEVICE — GRAFT EPIDERMIS MESHED THERASKIN CRYO 2X3" PER SQ CM- 39: Type: IMPLANTABLE DEVICE | Site: FOOT | Status: FUNCTIONAL

## 2021-12-09 RX ORDER — PROPOFOL 10 MG/ML
INJECTION, EMULSION INTRAVENOUS PRN
Status: DISCONTINUED | OUTPATIENT
Start: 2021-12-09 | End: 2021-12-09

## 2021-12-09 RX ORDER — NALOXONE HYDROCHLORIDE 0.4 MG/ML
0.2 INJECTION, SOLUTION INTRAMUSCULAR; INTRAVENOUS; SUBCUTANEOUS
Status: DISCONTINUED | OUTPATIENT
Start: 2021-12-09 | End: 2021-12-09 | Stop reason: HOSPADM

## 2021-12-09 RX ORDER — SODIUM CHLORIDE, SODIUM LACTATE, POTASSIUM CHLORIDE, CALCIUM CHLORIDE 600; 310; 30; 20 MG/100ML; MG/100ML; MG/100ML; MG/100ML
INJECTION, SOLUTION INTRAVENOUS CONTINUOUS
Status: DISCONTINUED | OUTPATIENT
Start: 2021-12-09 | End: 2021-12-09 | Stop reason: HOSPADM

## 2021-12-09 RX ORDER — BUPIVACAINE HYDROCHLORIDE 5 MG/ML
INJECTION, SOLUTION EPIDURAL; INTRACAUDAL
Status: COMPLETED | OUTPATIENT
Start: 2021-12-09 | End: 2021-12-09

## 2021-12-09 RX ORDER — NALOXONE HYDROCHLORIDE 0.4 MG/ML
0.4 INJECTION, SOLUTION INTRAMUSCULAR; INTRAVENOUS; SUBCUTANEOUS
Status: DISCONTINUED | OUTPATIENT
Start: 2021-12-09 | End: 2021-12-09 | Stop reason: HOSPADM

## 2021-12-09 RX ORDER — PROPOFOL 10 MG/ML
INJECTION, EMULSION INTRAVENOUS CONTINUOUS PRN
Status: DISCONTINUED | OUTPATIENT
Start: 2021-12-09 | End: 2021-12-09

## 2021-12-09 RX ORDER — HYDROMORPHONE HCL IN WATER/PF 6 MG/30 ML
0.2 PATIENT CONTROLLED ANALGESIA SYRINGE INTRAVENOUS EVERY 5 MIN PRN
Status: CANCELLED | OUTPATIENT
Start: 2021-12-09

## 2021-12-09 RX ORDER — ONDANSETRON 2 MG/ML
4 INJECTION INTRAMUSCULAR; INTRAVENOUS EVERY 30 MIN PRN
Status: CANCELLED | OUTPATIENT
Start: 2021-12-09

## 2021-12-09 RX ORDER — MAGNESIUM HYDROXIDE 1200 MG/15ML
LIQUID ORAL PRN
Status: DISCONTINUED | OUTPATIENT
Start: 2021-12-09 | End: 2021-12-09 | Stop reason: HOSPADM

## 2021-12-09 RX ORDER — FENTANYL CITRATE 50 UG/ML
50 INJECTION, SOLUTION INTRAMUSCULAR; INTRAVENOUS
Status: DISCONTINUED | OUTPATIENT
Start: 2021-12-09 | End: 2021-12-09 | Stop reason: HOSPADM

## 2021-12-09 RX ORDER — ONDANSETRON 4 MG/1
4 TABLET, ORALLY DISINTEGRATING ORAL EVERY 30 MIN PRN
Status: CANCELLED | OUTPATIENT
Start: 2021-12-09

## 2021-12-09 RX ORDER — CEFAZOLIN SODIUM 2 G/100ML
2 INJECTION, SOLUTION INTRAVENOUS
Status: COMPLETED | OUTPATIENT
Start: 2021-12-09 | End: 2021-12-09

## 2021-12-09 RX ORDER — LIDOCAINE 40 MG/G
CREAM TOPICAL
Status: DISCONTINUED | OUTPATIENT
Start: 2021-12-09 | End: 2021-12-09 | Stop reason: HOSPADM

## 2021-12-09 RX ORDER — OXYCODONE HYDROCHLORIDE 5 MG/1
5 TABLET ORAL EVERY 4 HOURS PRN
Status: CANCELLED | OUTPATIENT
Start: 2021-12-09

## 2021-12-09 RX ORDER — OXYCODONE HYDROCHLORIDE 5 MG/1
5-10 TABLET ORAL EVERY 6 HOURS PRN
Qty: 30 TABLET | Refills: 0 | Status: SHIPPED | OUTPATIENT
Start: 2021-12-09 | End: 2022-01-04

## 2021-12-09 RX ORDER — FENTANYL CITRATE 50 UG/ML
25 INJECTION, SOLUTION INTRAMUSCULAR; INTRAVENOUS
Status: CANCELLED | OUTPATIENT
Start: 2021-12-09

## 2021-12-09 RX ORDER — SODIUM CHLORIDE, SODIUM LACTATE, POTASSIUM CHLORIDE, CALCIUM CHLORIDE 600; 310; 30; 20 MG/100ML; MG/100ML; MG/100ML; MG/100ML
INJECTION, SOLUTION INTRAVENOUS CONTINUOUS PRN
Status: DISCONTINUED | OUTPATIENT
Start: 2021-12-09 | End: 2021-12-09

## 2021-12-09 RX ORDER — ONDANSETRON 2 MG/ML
INJECTION INTRAMUSCULAR; INTRAVENOUS PRN
Status: DISCONTINUED | OUTPATIENT
Start: 2021-12-09 | End: 2021-12-09

## 2021-12-09 RX ORDER — SODIUM CHLORIDE, SODIUM LACTATE, POTASSIUM CHLORIDE, CALCIUM CHLORIDE 600; 310; 30; 20 MG/100ML; MG/100ML; MG/100ML; MG/100ML
INJECTION, SOLUTION INTRAVENOUS CONTINUOUS
Status: CANCELLED | OUTPATIENT
Start: 2021-12-09

## 2021-12-09 RX ORDER — CEFAZOLIN SODIUM 2 G/100ML
2 INJECTION, SOLUTION INTRAVENOUS SEE ADMIN INSTRUCTIONS
Status: DISCONTINUED | OUTPATIENT
Start: 2021-12-09 | End: 2021-12-09 | Stop reason: HOSPADM

## 2021-12-09 RX ORDER — MEPERIDINE HYDROCHLORIDE 25 MG/ML
12.5 INJECTION INTRAMUSCULAR; INTRAVENOUS; SUBCUTANEOUS
Status: CANCELLED | OUTPATIENT
Start: 2021-12-09

## 2021-12-09 RX ORDER — FENTANYL CITRATE 50 UG/ML
25 INJECTION, SOLUTION INTRAMUSCULAR; INTRAVENOUS EVERY 5 MIN PRN
Status: CANCELLED | OUTPATIENT
Start: 2021-12-09

## 2021-12-09 RX ADMIN — BUPIVACAINE HYDROCHLORIDE 22 ML: 5 INJECTION, SOLUTION EPIDURAL; INTRACAUDAL at 15:10

## 2021-12-09 RX ADMIN — SODIUM CHLORIDE, POTASSIUM CHLORIDE, SODIUM LACTATE AND CALCIUM CHLORIDE 100 ML/HR: 600; 310; 30; 20 INJECTION, SOLUTION INTRAVENOUS at 15:28

## 2021-12-09 RX ADMIN — ONDANSETRON 4 MG: 2 INJECTION INTRAMUSCULAR; INTRAVENOUS at 16:52

## 2021-12-09 RX ADMIN — FENTANYL CITRATE 50 MCG: 50 INJECTION INTRAMUSCULAR; INTRAVENOUS at 16:03

## 2021-12-09 RX ADMIN — MIDAZOLAM HYDROCHLORIDE 1 MG: 1 INJECTION, SOLUTION INTRAMUSCULAR; INTRAVENOUS at 16:02

## 2021-12-09 RX ADMIN — PROPOFOL 20 MG: 10 INJECTION, EMULSION INTRAVENOUS at 16:34

## 2021-12-09 RX ADMIN — BUPIVACAINE HYDROCHLORIDE 10 ML: 5 INJECTION, SOLUTION EPIDURAL; INTRACAUDAL; PERINEURAL at 15:05

## 2021-12-09 RX ADMIN — SODIUM CHLORIDE, POTASSIUM CHLORIDE, SODIUM LACTATE AND CALCIUM CHLORIDE: 600; 310; 30; 20 INJECTION, SOLUTION INTRAVENOUS at 16:28

## 2021-12-09 RX ADMIN — CEFAZOLIN SODIUM 2 G: 2 INJECTION, SOLUTION INTRAVENOUS at 16:35

## 2021-12-09 RX ADMIN — PROPOFOL 100 MCG/KG/MIN: 10 INJECTION, EMULSION INTRAVENOUS at 16:31

## 2021-12-09 ASSESSMENT — ENCOUNTER SYMPTOMS
DYSRHYTHMIAS: 1
SEIZURES: 0

## 2021-12-09 ASSESSMENT — LIFESTYLE VARIABLES: TOBACCO_USE: 0

## 2021-12-09 NOTE — ANESTHESIA PREPROCEDURE EVALUATION
Anesthesia Pre-Procedure Evaluation    Patient: Norman Aguilar   MRN: [id number] : 1960        Preoperative Diagnosis: Cellulitis and abscess of foot, except toes [L03.119, L02.619]    Procedure : Procedure(s):  INCISION AND DRAINAGE, left foot          Past Medical History:   Diagnosis Date     Coronary artery disease      Diabetes (H)      GERD (gastroesophageal reflux disease)      Heart attack (H)      Hypertension      Renal disease       Past Surgical History:   Procedure Laterality Date     AMPUTATE TOE(S) Left 2021    Procedure: first and second ray amputation;  Surgeon: Eddi Ram DPM;  Location: [facility] [place] OR     CV CORONARY ANGIOGRAM N/A 2021    Procedure: CV CORONARY ANGIOGRAM;  Surgeon: Pam Tabares MD;  Location: [facility] CATH LAB CV     CV LEFT HEART CATH N/A 2021    Procedure: Left Heart Cath;  Surgeon: Pam Tabares MD;  Location: [facility] CATH LAB CV     INCISION AND DRAINAGE LOWER EXTREMITY, COMBINED Left 2021    Procedure: INCISION AND DRAINAGE, left foot;  Surgeon: Eddi Ram DPM;  Location: [facility] [place] OR     INCISION AND DRAINAGE LOWER EXTREMITY, COMBINED Left 2021    Procedure: INCISION AND DRAINAGE, left foot;  Surgeon: Eddi Ram DPM;  Location: [facility] [place] OR      No Known Allergies   Social History     Tobacco Use     Smoking status: Current Some Day Smoker     Types: Cigars     Smokeless tobacco: Never Used     Tobacco comment: Cigars   Substance Use Topics     Alcohol use: Yes      Wt Readings from Last 1 Encounters:   21 100.7 kg (222 lb)        Anesthesia Evaluation   Pt has had prior anesthetic. Type: MAC and General.    No history of anesthetic complications       ROS/MED HX  ENT/Pulmonary:    (-) tobacco use, asthma, sleep apnea and ALIX risk factors   Neurologic:    (-) no seizures, no CVA and no TIA   Cardiovascular: Comment: Echo 21:  The left ventricle is normal in size. Left  ventricular function is decreased. The ejection fraction is 30-35% (moderately reduced). Left ventricular diastolic function is abnormal. Diastolic Doppler findings (E/E' ratio and/or other parameters) suggest left ventricular filling pressures are increased. The left atrium is mildly dilated. The mitral valve leaflets are mildly thickened. There is mild (1+) mitral regurgitation. IVC diameter >2.1 cm collapsing <50% with sniff suggests a high RA pressure estimated at 15 mmHg or greater.     Recent hospitalization 11/15 -11/24    NSTEMI in the setting of diabtic ketoacidosis and atrial fibrillation with RVR.  Severe, diffuse coronary calcification with diffusely small distal coronaries consistent with severe diabetic vasculopathy.   Severe proximal, mid and apical LAD disease. The first diagonal is occluded and is weakly collateralized.    of the proximal circumflex with right to left collateral filling of OM-2 and OM-3   Severe distal RCA disease before the bifurcation and moderate ostial PDA disease.   LV EDP moderately elevated at 16 mmHg.          (+) hypertension--CAD ---CHF dysrhythmias (on Xarelto), a-fib,  (-) murmur and wheezes   METS/Exercise Tolerance: >4 METS    Hematologic:       Musculoskeletal:       GI/Hepatic:     (+) GERD, Asymptomatic on medication,     Renal/Genitourinary:     (+) renal disease, type: CRI,     Endo:     (+) type II DM, Using insulin, Diabetic complications: nephropathy neuropathy.     Psychiatric/Substance Use:       Infectious Disease:       Malignancy:       Other:            Physical Exam    Airway        Mallampati: III   TM distance: > 3 FB   Neck ROM: full   Mouth opening: > 3 cm    Respiratory Devices and Support         Dental     Comment: Fair dentition, no loose or removable teeth        Cardiovascular          Rhythm and rate: regular and normal (-) no murmur    Pulmonary           breath sounds clear to auscultation   (-) no wheezes    Other findings:   COVID  negative 12/7    OUTSIDE LABS:  CBC:   Lab Results   Component Value Date    WBC 6.7 11/23/2021    WBC 7.4 11/22/2021    HGB 9.9 (L) 11/23/2021    HGB 10.4 (L) 11/22/2021    HCT 31.1 (L) 11/23/2021    HCT 31.9 (L) 11/22/2021     11/23/2021     11/22/2021     BMP:   Lab Results   Component Value Date     (L) 12/03/2021     12/01/2021    POTASSIUM 4.3 12/03/2021    POTASSIUM 4.4 12/01/2021    CHLORIDE 97 (L) 12/03/2021    CHLORIDE 99 12/01/2021    CO2 27 12/03/2021    CO2 23 12/01/2021    BUN 20 12/03/2021    BUN 20 12/01/2021    CR 2.07 (H) 12/03/2021    CR 1.88 (H) 12/01/2021     (H) 12/03/2021     (H) 12/01/2021     COAGS:   Lab Results   Component Value Date    PTT 55 (H) 11/15/2021    INR 1.35 (H) 11/15/2021     POC: No results found for: BGM, HCG, HCGS  HEPATIC:   Lab Results   Component Value Date    ALBUMIN 2.7 (L) 12/01/2021    PROTTOTAL 6.5 11/21/2021    ALT 18 11/21/2021    AST 23 11/21/2021    ALKPHOS 92 11/21/2021    BILITOTAL 0.5 11/21/2021     OTHER:   Lab Results   Component Value Date    LACT 1.8 11/14/2021    A1C 13.4 (H) 12/01/2021    YOSVANY 9.8 12/03/2021    PHOS 2.8 12/01/2021    MAG 1.6 (L) 12/03/2021    LIPASE 27 11/17/2018    CRP 36.5 (H) 11/14/2021    SED 90 (H) 11/14/2021       Anesthesia Plan    ASA Status:  3   NPO Status:  NPO Appropriate    Anesthesia Type: General.     - Airway: Mask Only   Induction: Intravenous, Propofol.   Maintenance: TIVA.        Consents    Anesthesia Plan(s) and associated risks, benefits, and realistic alternatives discussed. Questions answered and patient/representative(s) expressed understanding.    - Discussed:     - Discussed with:  Patient      - Patient is DNR/DNI Status: No         Postoperative Care    Pain management: IV analgesics, Oral pain medications, Multi-modal analgesia, Peripheral nerve block (Single Shot) (r/b/a of ).   PONV prophylaxis: Ondansetron (or other 5HT-3), Dexamethasone or Solumedrol      Comments:    Other Comments: R/B/A discussed and patient in agreement to proceed with single shot popliteal and adductor canal nerve block.                 Lucy Kenney MD

## 2021-12-09 NOTE — OP NOTE
Date: 12/9/21    Surgeon: MARCO A Ram DPM    Preoperative diagnosis: Ulceration left foot    Postoperativediagnosis: Same    Procedure:   1. Incision and drainage left foot  2. Metatarsectomy 1st metatarsal left foot  3. Partial amputation 2nd metatarsal left foot  4. Partial amputation medial cuneiform left foot  5. Excisional debridement ulceration left foot with application of Theraskin (06466, 15276 x2)  6. Wound prep left foot     Anesthesia: Popliteal block with IV-sedation    Hemostasis: Pneumatic ankle tourniquet 250 mmHg    Pathology: Bone    Injectables: None    Materials: Theraskin, 3-0 vicryl, Staples     Complications: None    Blood loss: 2 ml       Findings: Patient presents for operative intervention for a chronic wound on the left foot. We discussed today's procedure to include sharp debridement of the wound and removal of all non-viable tissue with application of Theraskin. Conservative measures were attempted and exhausted. Patient understands that we are attempting salvage of the left foot and additional surgery including partial foot amputation of BKA is possible. He consents to surgery. Patient questionsinvited and answered, including appropriate risk, benefits and complications. No guarantees given or implied. Patient has been NPO.    Description: Patient was brought to the operating room and placed on the table insupine position. IV-sedation and popliteal block was administered by the anesthesia department. The foot was then prepped and draped in usual aseptic manner. Theextremity was elevated and exsanguinated. Well-padded ankle pneumatic tourniquet was inflated to 250mmHg and the following procedure was then performed: Attention was directed to the ulceration along the medial aspect of the left foot where non-viable tissue was noted including non-viable bone of the 1st and 2nd metatarsals. A #15 blade was used to sharply resect all non-viable tissue into bone. A #15 blade was used to  disarticulate the 1st metatarsal and sent to pathology. A sagittal saw was used to resect the 2nd metatarsal flush to the medial cuneiform and sent to pathology. A sagittal saw was used to resect the distal cartilage on the medial cuneiform and sent to pathology. The resulting wound was noted to be irregular shape and depth, probed to the level of bone, and measured [id number]. Next, a ViewRay ultrasonic debridement device was used to debride the remaining non-viable tissue.     Theraskin size 39 units x2 ( [id number], 2/27/2026) ([id number], 6/26/2025) was prepared per  specifications. The graft was placed on the wound bed per  instructions and secured with staples along the peripheral graft and center portion of the graft with 3-0 vicryl to the wound bed. Next, adaptic was placed over the graft and secured with staples.     Dressings consistent of moistened 4x4, 4x4's, ABD, kerlix/tommy roll and an ace wrap. The pneumatictourniquet was released and a hyperemic response was noted to the digits on the left foot.     The patient appeared to tolerate all the procedures and anesthesia well without apparent complications. Patient wastransported from the operating room to the recovery room with vital signs stable and neurovascular status as it was pre-operatively to the left foot. Patient to be discharged home per anesthesia. Written and verbal homecareinstructions given to remain non-weight bearing, keep the surgical dressing intact until the wound vac is applied either later today or tomorrow. Patient to follow up in office for post-operative management in one week.

## 2021-12-09 NOTE — ANESTHESIA PROCEDURE NOTES
Popliteal Procedure Note    Pre-Procedure   Staff -        Anesthesiologist:  Lucy Kenney MD       Performed By: anesthesiologist       Location: pre-op       Procedure Start/Stop Times: 12/9/2021 3:05 PM and 12/9/2021 3:10 PM       Pre-Anesthestic Checklist: patient identified, IV checked, site marked, risks and benefits discussed, informed consent, monitors and equipment checked, pre-op evaluation, at physician/surgeon's request and post-op pain management  Timeout:       Correct Patient: Yes        Correct Procedure: Yes        Correct Site: Yes        Correct Position: Yes        Correct Laterality: Yes        Site Marked: Yes  Procedure Documentation  Procedure: Popliteal       Laterality: left       Patient Position: supine (Leg elevated on pedestal)       Patient Prep/Sterile Barriers: sterile gloves, mask       Skin prep: Chloraprep       Needle Type: other (Stimuplex)       Needle Gauge: 20.        Needle Length (Inches): 4        Ultrasound guided       1. Ultrasound was used to identify targeted nerve, plexus, vascular marker, or fascial plane and place a needle adjacent to it in real-time.       2. Ultrasound was used to visualize the spread of anesthetic in close proximity to the above referenced structure.       3. A permanent image is entered into the patient's record.       4. The visualized anatomic structures appeared normal.       5. There were no apparent abnormal pathologic findings.    Assessment/Narrative         The placement was negative for: blood aspirated, painful injection and site bleeding       Paresthesias: No.     Bolus given via needle..        Secured via.        Insertion/Infusion Method: Single Shot       Complications: none       Injection made incrementally with aspirations every 5 mL.    Medication(s) Administered   Bupivacaine 0.5% PF (Infiltration), 22 mL  Medication Administration Time: 12/9/2021 3:10 PM

## 2021-12-09 NOTE — ANESTHESIA PROCEDURE NOTES
Adductor canal Procedure Note    Pre-Procedure   Staff -        Anesthesiologist:  Lucy Kenney MD       Performed By: anesthesiologist       Location: pre-op       Procedure Start/Stop Times: 12/9/2021 3:01 PM and 12/9/2021 3:05 PM       Pre-Anesthestic Checklist: patient identified, IV checked, site marked, risks and benefits discussed, informed consent, monitors and equipment checked, pre-op evaluation, at physician/surgeon's request and post-op pain management  Timeout:       Correct Patient: Yes        Correct Procedure: Yes        Correct Site: Yes        Correct Position: Yes        Correct Laterality: Yes        Site Marked: Yes  Procedure Documentation  Procedure: Adductor canal       Laterality: left       Patient Position: supine       Patient Prep/Sterile Barriers: sterile gloves, mask       Skin prep: Chloraprep       Needle Type: other (Stimuplex)       Needle Gauge: 20.        Needle Length (Inches): 4        Ultrasound guided       1. Ultrasound was used to identify targeted nerve, plexus, vascular marker, or fascial plane and place a needle adjacent to it in real-time.       2. Ultrasound was used to visualize the spread of anesthetic in close proximity to the above referenced structure.       3. A permanent image is entered into the patient's record.       4. The visualized anatomic structures appeared normal.       5. There were no apparent abnormal pathologic findings.    Assessment/Narrative         The placement was negative for: blood aspirated, painful injection and site bleeding       Paresthesias: No.      Bolus given via needle. No blood aspirated via catheter.        Secured via.        Insertion/Infusion Method: Single Shot       Complications: none    Medication(s) Administered   Bupivacaine 0.5% PF (Infiltration), 10 mL  Medication Administration Time: 12/9/2021 3:05 PM

## 2021-12-09 NOTE — ANESTHESIA CARE TRANSFER NOTE
Patient: Norman Aguilar    Procedure: Procedure(s):  INCISION AND DRAINAGE, Left foot       Diagnosis: Cellulitis and abscess of foot, except toes [L03.119, L02.619]  Diagnosis Additional Information: No value filed.    Anesthesia Type:   General     Note:/68   Pulse 91   Temp 36.9  C (98.4  F) (Temporal)   Resp 19   SpO2 96%       Oropharynx: oropharynx clear of all foreign objects  Level of Consciousness: awake  Oxygen Supplementation: face mask  Level of Supplemental Oxygen (L/min / FiO2): 6  Independent Airway: airway patency satisfactory and stable  Dentition: dentition unchanged  Vital Signs Stable: post-procedure vital signs reviewed and stable  Report to RN Given: handoff report given  Patient transferred to: PACU    Handoff Report: Identifed the Patient, Identified the Reponsible Provider, Reviewed the pertinent medical history, Discussed the surgical course, Reviewed Intra-OP anesthesia mangement and issues during anesthesia, Set expectations for post-procedure period and Allowed opportunity for questions and acknowledgement of understanding      Vitals:  Vitals Value Taken Time   /61 12/09/21 1720   Temp     Pulse 86 12/09/21 1723   Resp 17 12/09/21 1723   SpO2 100 % 12/09/21 1723   Vitals shown include unvalidated device data.    Electronically Signed By: Elo Baird CRNA, JUNI FOX  December 9, 2021  5:24 PM

## 2021-12-10 NOTE — ANESTHESIA POSTPROCEDURE EVALUATION
Patient: Norman Aguilar    Procedure: Procedure(s):  INCISION AND DRAINAGE, Left foot       Diagnosis:Cellulitis and abscess of foot, except toes [L03.119, L02.619]  Diagnosis Additional Information: No value filed.    Anesthesia Type:  General    Note:  Disposition: Outpatient   Postop Pain Control: Uneventful            Sign Out: Well controlled pain   PONV: No   Neuro/Psych: Uneventful            Sign Out: Acceptable/Baseline neuro status   Airway/Respiratory: Uneventful            Sign Out: Acceptable/Baseline resp. status   CV/Hemodynamics: Uneventful            Sign Out: Acceptable CV status; No obvious hypovolemia; No obvious fluid overload   Other NRE: NONE   DID A NON-ROUTINE EVENT OCCUR? No           Last vitals:  Vitals Value Taken Time   /73 12/09/21 1800   Temp 37.3  C (99.2  F) 12/09/21 1719   Pulse 82 12/09/21 1809   Resp 16 12/09/21 1740   SpO2 98 % 12/09/21 1809   Vitals shown include unvalidated device data.    Electronically Signed By: Lucy Kenney MD  December 9, 2021  6:10 PM

## 2021-12-13 LAB
PATH REPORT.COMMENTS IMP SPEC: NORMAL
PATH REPORT.COMMENTS IMP SPEC: NORMAL
PATH REPORT.FINAL DX SPEC: NORMAL
PATH REPORT.GROSS SPEC: NORMAL
PATH REPORT.MICROSCOPIC SPEC OTHER STN: NORMAL
PATH REPORT.RELEVANT HX SPEC: NORMAL
PHOTO IMAGE: NORMAL

## 2021-12-13 PROCEDURE — 88311 DECALCIFY TISSUE: CPT | Mod: 26 | Performed by: PATHOLOGY

## 2021-12-14 ENCOUNTER — OFFICE VISIT (OUTPATIENT)
Dept: CARDIOLOGY | Facility: CLINIC | Age: 61
End: 2021-12-14
Payer: COMMERCIAL

## 2021-12-14 ENCOUNTER — APPOINTMENT (OUTPATIENT)
Dept: CARDIOLOGY | Facility: CLINIC | Age: 61
End: 2021-12-14
Payer: COMMERCIAL

## 2021-12-14 VITALS
WEIGHT: 222 LBS | DIASTOLIC BLOOD PRESSURE: 58 MMHG | RESPIRATION RATE: 16 BRPM | HEART RATE: 84 BPM | SYSTOLIC BLOOD PRESSURE: 110 MMHG | BODY MASS INDEX: 29.29 KG/M2

## 2021-12-14 DIAGNOSIS — I25.5 ISCHEMIC CARDIOMYOPATHY: Primary | ICD-10-CM

## 2021-12-14 DIAGNOSIS — I10 BENIGN ESSENTIAL HYPERTENSION: ICD-10-CM

## 2021-12-14 DIAGNOSIS — N18.32 STAGE 3B CHRONIC KIDNEY DISEASE (H): ICD-10-CM

## 2021-12-14 DIAGNOSIS — I50.20 HEART FAILURE WITH REDUCED EJECTION FRACTION (H): ICD-10-CM

## 2021-12-14 DIAGNOSIS — I25.10 CORONARY ARTERY DISEASE INVOLVING NATIVE CORONARY ARTERY OF NATIVE HEART WITHOUT ANGINA PECTORIS: ICD-10-CM

## 2021-12-14 LAB
ANION GAP SERPL CALCULATED.3IONS-SCNC: 10 MMOL/L (ref 5–18)
BUN SERPL-MCNC: 19 MG/DL (ref 8–22)
CALCIUM SERPL-MCNC: 9 MG/DL (ref 8.5–10.5)
CHLORIDE BLD-SCNC: 97 MMOL/L (ref 98–107)
CO2 SERPL-SCNC: 26 MMOL/L (ref 22–31)
CREAT SERPL-MCNC: 1.68 MG/DL (ref 0.7–1.3)
GFR SERPL CREATININE-BSD FRML MDRD: 43 ML/MIN/1.73M2
GLUCOSE BLD-MCNC: 241 MG/DL (ref 70–125)
POTASSIUM BLD-SCNC: 4.4 MMOL/L (ref 3.5–5)
SODIUM SERPL-SCNC: 133 MMOL/L (ref 136–145)

## 2021-12-14 PROCEDURE — 99214 OFFICE O/P EST MOD 30 MIN: CPT | Performed by: NURSE PRACTITIONER

## 2021-12-14 PROCEDURE — 36415 COLL VENOUS BLD VENIPUNCTURE: CPT | Performed by: NURSE PRACTITIONER

## 2021-12-14 PROCEDURE — 80048 BASIC METABOLIC PNL TOTAL CA: CPT | Performed by: NURSE PRACTITIONER

## 2021-12-14 NOTE — PATIENT INSTRUCTIONS
Norman Aguilar,    It was a pleasure to see you today at [facility].     My recommendations after this visit include:  - Please follow up with Dr Davis January 6   - Please follow up with Dr Winn January 17  - Please follow up with Polly Downing in early February  - I have checked labs and will contact you with results  - Continue current medications    Polly Downing, CNP

## 2021-12-14 NOTE — LETTER
12/14/2021    Jaquan Dickerson MD  [facility] [address]  [facility] [address]    RE: Norman Aguilar       Dear Colleague,     I had the pleasure of seeing Norman Aguilar in the [facility].        Assessment/Recommendations   Assessment:    1.  Ischemic cardiomyopathy with systolic dysfunction, LVEF 30 to 35%, NYHA class II: Compensated.  He denies any acute heart failure symptoms.  His weight has been stable and he continues to try to follow a low-sodium diet.  2.  Hypertension: Controlled.  Blood pressure 110/58  3.  CAD: Denies chest pain. S/p non-STEMI. Coronary angiogram on November 16, 2021 showed severe, diffuse coronary calcifications. He met with CV surgery and planning for coronary artery bypass surgery in 2 to 3 months. He continues atorvastatin and aspirin  4.  Paroxysmal atrial fibrillation: Normal sinus rhythm. He continues Eliquis for anticoagulation  5.  Chronic kidney disease stage III: BMP pending.  His last creatinine was 2.07.    Plan:  1.   Heart failure medications:  - Beta blocker therapy with metoprolol succinate 50 mg daily  - Lisinopril was discontinued during hospitalization due to hypotension and acute kidney injury  - Diuretic therapy with Lasix 20 mg daily   2. BMP pending  3. Continue low-sodium diet and monitoring weights  4. Continue current medications    Norman Aguilar will follow up with Dr. Davis January 6, Dr. Winn January 17 and in the heart failure clinic in early February.     History of Present Illness/Subjective    Mr. Norman Aguilar is a 61 year old male seen at [facility] heart failure clinic today for continued follow-up.  He follows up for ischemic cardiomyopathy with systolic dysfunction.  His most recent echocardiogram showed an ejection fraction of 30 to 35%.  He was hospitalized November 14 to November 24, 2021 with DKA, cellulitis with gangrene of left foot, and non-STEMI.  He had amputations of  toes on left foot and discharged with a wound VAC and antibiotics.  Coronary angiogram on November 16, 2021 showed severe, diffuse coronary calcifications.  He met with CV surgery and planning for coronary artery bypass surgery in 2 to 3 months.  He developed paroxysmal atrial fibrillation but went back into atrial fibrillation.  He is on Eliquis for A. fib and DVT.  He was also diagnosed with ischemic cardiomyopathy.  He has a history of diabetes, chronic kidney disease, GERD, dyslipidemia, and hypertension.  He recently had an I&D of his left foot on December 9 and has a wound VAC.    Today, he denies any acute heart failure symptoms.  He denies lightheadedness, shortness of breath, dyspnea on exertion, orthopnea, PND, palpitations, chest pain, abdominal fullness/bloating and lower extremity edema.      He is monitoring home weights which are stable.  He is following a low sodium diet.      Physical Examination Review of Systems   /58 (BP Location: Left arm, Patient Position: Sitting, Cuff Size: Adult Large)   Pulse 84   Resp 16   Wt 100.7 kg (222 lb)   BMI 29.29 kg/m    Body mass index is 29.29 kg/m .  Wt Readings from Last 3 Encounters:   12/14/21 100.7 kg (222 lb)   11/24/21 100.7 kg (222 lb)       General Appearance:   no acute distress   ENT/Mouth: membranes moist, no oral lesions or bleeding gums.      EYES:  no scleral icterus, normal conjunctivae   Neck: no carotid bruits or thyromegaly   Chest/Lungs:   lungs are clear to auscultation, no rales or wheezing, equal chest wall expansion    Cardiovascular:   Regular. Normal first and second heart sounds with no murmurs, rubs, or gallops, trace left lower extremity edema   Abdomen:  no organomegaly, masses, bruits, or tenderness; bowel sounds are present   Extremities: no cyanosis or clubbing, wound VAC present for left foot   Skin: no xanthelasma, warm.    Neurologic: normal  bilateral, no tremors     Psychiatric: alert and oriented x3    Enc  Vitals  BP: 110/58  Pulse: 84  Resp: 16  Weight: 100.7 kg (222 lb)                                         Medical History  Surgical History Family History Social History   Past Medical History:   Diagnosis Date     Coronary artery disease      Diabetes (H)      GERD (gastroesophageal reflux disease)      Heart attack (H)      Hypertension      Renal disease     Past Surgical History:   Procedure Laterality Date     AMPUTATE TOE(S) Left 11/16/2021    Procedure: first and second ray amputation;  Surgeon: Eddi Ram DPM;  Location: [facility] OR     CV CORONARY ANGIOGRAM N/A 11/16/2021    Procedure: CV CORONARY ANGIOGRAM;  Surgeon: Pam Tabares MD;  Location: [facility] CATH LAB CV     CV LEFT HEART CATH N/A 11/16/2021    Procedure: Left Heart Cath;  Surgeon: Pam Tabares MD;  Location: [facility] CATH LAB CV     INCISION AND DRAINAGE LOWER EXTREMITY, COMBINED Left 11/16/2021    Procedure: INCISION AND DRAINAGE, left foot;  Surgeon: Eddi Ram DPM;  Location: [facility] OR     INCISION AND DRAINAGE LOWER EXTREMITY, COMBINED Left 11/19/2021    Procedure: INCISION AND DRAINAGE, left foot;  Surgeon: Eddi Ram DPM;  Location: [facility] OR     INCISION AND DRAINAGE LOWER EXTREMITY, COMBINED Left 12/9/2021    Procedure: INCISION AND DRAINAGE, Left foot;  Surgeon: Eddi Rma DPM;  Location: [facility] OR    No family history on file. Social History     Socioeconomic History     Marital status: Single     Spouse name: Not on file     Number of children: Not on file     Years of education: Not on file     Highest education level: Not on file   Occupational History     Not on file   Tobacco Use     Smoking status: Current Some Day Smoker     Types: Cigars     Smokeless tobacco: Never Used     Tobacco comment: Cigars   Vaping Use     Vaping Use: Never used   Substance and Sexual Activity     Alcohol use: Yes     Drug use: Never     Sexual activity: Not on file    Other Topics Concern     Parent/sibling w/ CABG, MI or angioplasty before 65F 55M? Not Asked   Social History Narrative     Not on file     Social Determinants of Health     Financial Resource Strain: Not on file   Food Insecurity: Not on file   Transportation Needs: Not on file   Physical Activity: Not on file   Stress: Not on file   Social Connections: Not on file   Intimate Partner Violence: Not on file   Housing Stability: Not on file          Medications  Allergies   Current Outpatient Medications   Medication Sig Dispense Refill     alcohol swab prep pads Use to swab area of injection/matthew as directed. 100 each 3     alcohol swab prep pads Use to swab area of injection/matthew as directed. 100 each 6     apixaban ANTICOAGULANT (ELIQUIS) 5 MG tablet 2 tablets       aspirin (ASA) 81 MG chewable tablet Take 1 tablet (81 mg) by mouth daily 30 tablet 0     atorvastatin (LIPITOR) 80 MG tablet Take 1 tablet (80 mg) by mouth every evening 30 tablet 1     blood glucose (NO BRAND SPECIFIED) test strip Use to test blood sugar 4 times daily or as directed. 100 strip 3     blood glucose monitoring (SOFTCLIX) lancets Use to test blood sugar 4 times daily. 100 each 6     furosemide (LASIX) 20 MG tablet Take 1 tablet (20 mg) by mouth daily 30 tablet 1     insulin aspart (NOVOLOG FLEXPEN) 100 UNIT/ML pen For  - 164 give 1 unit. For  - 189 give 2 units. For  - 214 give 3 units. For  - 239 give 4 units. For  - 264 give 5 units. For  - 289 give 6 units. For  - 314 give 7 units. For  - 339 give 8 units For  - 364 give 9 units For  - 389 give 10 units For  - 414 give 11 units For BG greater than or equal to 415 give 12 units 3 mL 3     insulin glargine (LANTUS SOLOSTAR) 100 UNIT/ML pen Inject 38 Units Subcutaneous every morning (Patient taking differently: Inject 38 Units Subcutaneous At Bedtime ) 3 mL 3     insulin pen needle (32G X 4 MM) 32G X 4 MM miscellaneous  "Use BD Lizbeth pen needles daily or as directed. 100 each 3     magnesium oxide (MAG-OX) 400 MG tablet Take 1 tablet (400 mg) by mouth 2 times daily 60 tablet 1     metoprolol succinate ER (TOPROL-XL) 50 MG 24 hr tablet Take 1 tablet (50 mg) by mouth daily 30 tablet 1     Multiple Vitamin (MULTI VITAMIN) TABS 1 tab(s)       nitroGLYcerin (NITROSTAT) 0.4 MG sublingual tablet One tablet under the tongue every 5 minutes if needed for chest pain. May repeat every 5 minutes for a maximum of 3 doses in 15 minutes\" 25 tablet 3     OMEGA-3/DHA/EPA/FISH OIL (FISH OIL-OMEGA-3 FATTY ACIDS) 300-1,000 mg capsule Take 1 g by mouth daily        pantoprazole (PROTONIX) 40 MG EC tablet Take 1 tablet (40 mg) by mouth every morning (before breakfast) 30 tablet 0     oxyCODONE (ROXICODONE) 5 MG tablet Take 1-2 tablets (5-10 mg) by mouth every 6 hours as needed for moderate to severe pain (Patient not taking: Reported on 12/14/2021) 30 tablet 0    No Known Allergies      Lab Results    Chemistry/lipid CBC Cardiac Enzymes/BNP/TSH/INR   Lab Results   Component Value Date    CHOL 180 11/17/2021    HDL 30 (L) 11/17/2021    TRIG 122 11/17/2021    BUN 20 12/03/2021     (L) 12/03/2021    CO2 27 12/03/2021    Lab Results   Component Value Date    WBC 6.7 11/23/2021    HGB 9.9 (L) 11/23/2021    HCT 31.1 (L) 11/23/2021    MCV 90 11/23/2021     11/23/2021    Lab Results   Component Value Date    TROPONINI 13.42 (HH) 11/15/2021    INR 1.35 (H) 11/15/2021             This note has been dictated using voice recognition software. Any grammatical, typographical, or context distortions are unintentional and inherent to the software    30 minutes spent on the date of encounter doing chart review, review of outside records, review of test results, patient visit and documentation.                  Thank you for allowing me to participate in the care of your patient.      Sincerely,     JUNI [facility] " [facility]  cc:   No referring provider defined for this encounter.

## 2021-12-14 NOTE — PROGRESS NOTES
Assessment/Recommendations   Assessment:    1.  Ischemic cardiomyopathy with systolic dysfunction, LVEF 30 to 35%, NYHA class II: Compensated.  He denies any acute heart failure symptoms.  His weight has been stable and he continues to try to follow a low-sodium diet.  2.  Hypertension: Controlled.  Blood pressure 110/58  3.  CAD: Denies chest pain. S/p non-STEMI. Coronary angiogram on November 16, 2021 showed severe, diffuse coronary calcifications. He met with CV surgery and planning for coronary artery bypass surgery in 2 to 3 months. He continues atorvastatin and aspirin  4.  Paroxysmal atrial fibrillation: Normal sinus rhythm. He continues Eliquis for anticoagulation  5.  Chronic kidney disease stage III: BMP pending.  His last creatinine was 2.07.    Plan:  1.   Heart failure medications:  - Beta blocker therapy with metoprolol succinate 50 mg daily  - Lisinopril was discontinued during hospitalization due to hypotension and acute kidney injury  - Diuretic therapy with Lasix 20 mg daily   2. BMP pending  3. Continue low-sodium diet and monitoring weights  4. Continue current medications    Norman Aguilar will follow up with Dr. Davis January 6, Dr. Winn January 17 and in the heart failure clinic in early February.     History of Present Illness/Subjective    Mr. Norman Aguilar is a 61 year old male seen at [facility] heart failure clinic today for continued follow-up.  He follows up for ischemic cardiomyopathy with systolic dysfunction.  His most recent echocardiogram showed an ejection fraction of 30 to 35%.  He was hospitalized November 14 to November 24, 2021 with DKA, cellulitis with gangrene of left foot, and non-STEMI.  He had amputations of toes on left foot and discharged with a wound VAC and antibiotics.  Coronary angiogram on November 16, 2021 showed severe, diffuse coronary calcifications.  He met with CV surgery and planning for coronary artery bypass surgery in 2 to 3 months.   He developed paroxysmal atrial fibrillation but went back into atrial fibrillation.  He is on Eliquis for A. fib and DVT.  He was also diagnosed with ischemic cardiomyopathy.  He has a history of diabetes, chronic kidney disease, GERD, dyslipidemia, and hypertension.  He recently had an I&D of his left foot on December 9 and has a wound VAC.    Today, he denies any acute heart failure symptoms.  He denies lightheadedness, shortness of breath, dyspnea on exertion, orthopnea, PND, palpitations, chest pain, abdominal fullness/bloating and lower extremity edema.      He is monitoring home weights which are stable.  He is following a low sodium diet.      Physical Examination Review of Systems   /58 (BP Location: Left arm, Patient Position: Sitting, Cuff Size: Adult Large)   Pulse 84   Resp 16   Wt 100.7 kg (222 lb)   BMI 29.29 kg/m    Body mass index is 29.29 kg/m .  Wt Readings from Last 3 Encounters:   12/14/21 100.7 kg (222 lb)   11/24/21 100.7 kg (222 lb)       General Appearance:   no acute distress   ENT/Mouth: membranes moist, no oral lesions or bleeding gums.      EYES:  no scleral icterus, normal conjunctivae   Neck: no carotid bruits or thyromegaly   Chest/Lungs:   lungs are clear to auscultation, no rales or wheezing, equal chest wall expansion    Cardiovascular:   Regular. Normal first and second heart sounds with no murmurs, rubs, or gallops, trace left lower extremity edema   Abdomen:  no organomegaly, masses, bruits, or tenderness; bowel sounds are present   Extremities: no cyanosis or clubbing, wound VAC present for left foot   Skin: no xanthelasma, warm.    Neurologic: normal  bilateral, no tremors     Psychiatric: alert and oriented x3    Enc Vitals  BP: 110/58  Pulse: 84  Resp: 16  Weight: 100.7 kg (222 lb)                                         Medical History  Surgical History Family History Social History   Past Medical History:   Diagnosis Date     Coronary artery disease       Diabetes (H)      GERD (gastroesophageal reflux disease)      Heart attack (H)      Hypertension      Renal disease     Past Surgical History:   Procedure Laterality Date     AMPUTATE TOE(S) Left 11/16/2021    Procedure: first and second ray amputation;  Surgeon: Eddi Ram DPM;  Location: [facility] OR     CV CORONARY ANGIOGRAM N/A 11/16/2021    Procedure: CV CORONARY ANGIOGRAM;  Surgeon: Pam Tabares MD;  Location: [facility] CATH LAB CV     CV LEFT HEART CATH N/A 11/16/2021    Procedure: Left Heart Cath;  Surgeon: Pam Tabares MD;  Location: [facility] CATH LAB CV     INCISION AND DRAINAGE LOWER EXTREMITY, COMBINED Left 11/16/2021    Procedure: INCISION AND DRAINAGE, left foot;  Surgeon: Eddi Ram DPM;  Location: [facility] OR     INCISION AND DRAINAGE LOWER EXTREMITY, COMBINED Left 11/19/2021    Procedure: INCISION AND DRAINAGE, left foot;  Surgeon: Eddi Ram DPM;  Location: [facility] [place] OR     INCISION AND DRAINAGE LOWER EXTREMITY, COMBINED Left 12/9/2021    Procedure: INCISION AND DRAINAGE, Left foot;  Surgeon: Eddi Ram DPM;  Location: [facility] [place] OR    No family history on file. Social History     Socioeconomic History     Marital status: Single     Spouse name: Not on file     Number of children: Not on file     Years of education: Not on file     Highest education level: Not on file   Occupational History     Not on file   Tobacco Use     Smoking status: Current Some Day Smoker     Types: Cigars     Smokeless tobacco: Never Used     Tobacco comment: Cigars   Vaping Use     Vaping Use: Never used   Substance and Sexual Activity     Alcohol use: Yes     Drug use: Never     Sexual activity: Not on file   Other Topics Concern     Parent/sibling w/ CABG, MI or angioplasty before 65F 55M? Not Asked   Social History Narrative     Not on file     Social Determinants of Health     Financial Resource Strain: Not on file   Food Insecurity: Not on file    Transportation Needs: Not on file   Physical Activity: Not on file   Stress: Not on file   Social Connections: Not on file   Intimate Partner Violence: Not on file   Housing Stability: Not on file          Medications  Allergies   Current Outpatient Medications   Medication Sig Dispense Refill     alcohol swab prep pads Use to swab area of injection/matthew as directed. 100 each 3     alcohol swab prep pads Use to swab area of injection/matthew as directed. 100 each 6     apixaban ANTICOAGULANT (ELIQUIS) 5 MG tablet 2 tablets       aspirin (ASA) 81 MG chewable tablet Take 1 tablet (81 mg) by mouth daily 30 tablet 0     atorvastatin (LIPITOR) 80 MG tablet Take 1 tablet (80 mg) by mouth every evening 30 tablet 1     blood glucose (NO BRAND SPECIFIED) test strip Use to test blood sugar 4 times daily or as directed. 100 strip 3     blood glucose monitoring (SOFTCLIX) lancets Use to test blood sugar 4 times daily. 100 each 6     furosemide (LASIX) 20 MG tablet Take 1 tablet (20 mg) by mouth daily 30 tablet 1     insulin aspart (NOVOLOG FLEXPEN) 100 UNIT/ML pen For  - 164 give 1 unit. For  - 189 give 2 units. For  - 214 give 3 units. For  - 239 give 4 units. For  - 264 give 5 units. For  - 289 give 6 units. For  - 314 give 7 units. For  - 339 give 8 units For  - 364 give 9 units For  - 389 give 10 units For  - 414 give 11 units For BG greater than or equal to 415 give 12 units 3 mL 3     insulin glargine (LANTUS SOLOSTAR) 100 UNIT/ML pen Inject 38 Units Subcutaneous every morning (Patient taking differently: Inject 38 Units Subcutaneous At Bedtime ) 3 mL 3     insulin pen needle (32G X 4 MM) 32G X 4 MM miscellaneous Use BD Lizbeth pen needles daily or as directed. 100 each 3     magnesium oxide (MAG-OX) 400 MG tablet Take 1 tablet (400 mg) by mouth 2 times daily 60 tablet 1     metoprolol succinate ER (TOPROL-XL) 50 MG 24 hr tablet Take 1 tablet (50 mg) by mouth  "daily 30 tablet 1     Multiple Vitamin (MULTI VITAMIN) TABS 1 tab(s)       nitroGLYcerin (NITROSTAT) 0.4 MG sublingual tablet One tablet under the tongue every 5 minutes if needed for chest pain. May repeat every 5 minutes for a maximum of 3 doses in 15 minutes\" 25 tablet 3     OMEGA-3/DHA/EPA/FISH OIL (FISH OIL-OMEGA-3 FATTY ACIDS) 300-1,000 mg capsule Take 1 g by mouth daily        pantoprazole (PROTONIX) 40 MG EC tablet Take 1 tablet (40 mg) by mouth every morning (before breakfast) 30 tablet 0     oxyCODONE (ROXICODONE) 5 MG tablet Take 1-2 tablets (5-10 mg) by mouth every 6 hours as needed for moderate to severe pain (Patient not taking: Reported on 12/14/2021) 30 tablet 0    No Known Allergies      Lab Results    Chemistry/lipid CBC Cardiac Enzymes/BNP/TSH/INR   Lab Results   Component Value Date    CHOL 180 11/17/2021    HDL 30 (L) 11/17/2021    TRIG 122 11/17/2021    BUN 20 12/03/2021     (L) 12/03/2021    CO2 27 12/03/2021    Lab Results   Component Value Date    WBC 6.7 11/23/2021    HGB 9.9 (L) 11/23/2021    HCT 31.1 (L) 11/23/2021    MCV 90 11/23/2021     11/23/2021    Lab Results   Component Value Date    TROPONINI 13.42 (HH) 11/15/2021    INR 1.35 (H) 11/15/2021             This note has been dictated using voice recognition software. Any grammatical, typographical, or context distortions are unintentional and inherent to the software    30 minutes spent on the date of encounter doing chart review, review of outside records, review of test results, patient visit and documentation.              "

## 2021-12-16 ENCOUNTER — OFFICE VISIT (OUTPATIENT)
Dept: VASCULAR SURGERY | Facility: CLINIC | Age: 61
End: 2021-12-16
Attending: PODIATRIST
Payer: COMMERCIAL

## 2021-12-16 VITALS — HEART RATE: 84 BPM | SYSTOLIC BLOOD PRESSURE: 122 MMHG | DIASTOLIC BLOOD PRESSURE: 64 MMHG | RESPIRATION RATE: 20 BRPM

## 2021-12-16 DIAGNOSIS — E11.621 DIABETIC ULCER OF LEFT MIDFOOT ASSOCIATED WITH TYPE 2 DIABETES MELLITUS, WITH NECROSIS OF BONE (H): Primary | ICD-10-CM

## 2021-12-16 DIAGNOSIS — L97.424 DIABETIC ULCER OF LEFT MIDFOOT ASSOCIATED WITH TYPE 2 DIABETES MELLITUS, WITH NECROSIS OF BONE (H): Primary | ICD-10-CM

## 2021-12-16 PROCEDURE — 99024 POSTOP FOLLOW-UP VISIT: CPT | Performed by: PODIATRIST

## 2021-12-16 PROCEDURE — G0463 HOSPITAL OUTPT CLINIC VISIT: HCPCS

## 2021-12-16 ASSESSMENT — PAIN SCALES - GENERAL: PAINLEVEL: NO PAIN (0)

## 2021-12-16 NOTE — PATIENT INSTRUCTIONS
Important lnstructions      1. WEIGHT BEARING STATUS: You are to remain NON WEIGHT BEARING on your left foot. NON WEIGHT BEARING MEANS NO PRESSURE ON YOUR FOOT OR HEEL AT ANY TIME FOR ANY REASON!    2. OFFLOADING DEVICE: Must use a A WHEELCHAIR at all times! (do not use affected foot to push wheelchair)    3. STABILIZATION DEVICE: Use a PRAFO BOOT . You will need to WEAR THIS AT ALL TIMES EVEN WHILE IN BED.     4. PROTEIN SUPPLEMENTS: Drink protein shakes 2x per day, morning and night (Ensure, Boost, Glucerna)     This is in addition to your normal diet    If you are on a renal diet, make sure to get a protein shake that is best suited for this diet. Please ask if you would like a referral to see a  Registered Dietician.     5. ELEVATE: Elevating your leg means laying with your head on a pillow and your foot ABOVE YOUR WAIST.     6. DO NOT MOVE YOUR FOOT.    There is a risk of worsening the wound or incision. To give yourself a higher chance of healing, please DO NOT swing foot back and forth and wiggle foot/toes especially when inside a stabilization device.        Dressing Change lnstructions          - Wound Vac Instructions     1. 2x weekly and as needed cleanse the wound with NS     2. Pat dry     3. Apply Cavilon no sting barrier wipe to the skin surrounding the wound to protect from drainage/maceration     4. Apply drape to leticia wound. Cut strip of drape and apply to skin if bridging is needed; plan this area in advance; should not be over bony prominence      5. Cut the foam to fit the size of the wound. APPLY ADAPTIC TOUCH (or other similar nonadherent) OVER WOUND/GRAFT SITE. Then     6. Apply foam to wound     7. Cut narrow strip of foam if bridging if needed     8. Cover foam with drape to obtain air tight seal     9. Cut opening the size of a quarter for where the suction pad will be applied     10. Apply Suction pad     11. 100mmHG suction continuous      [employer] Contact [facility] can be reached at  [phone], 24 hours a day 7 days a week      - If you do not have a back up plan in place:      If the negative pressure wound therapy malfunctions or unable to maintain seal: dressing must be removed and reapplied within 2 hours of the incident. If unable to reapply negative pressure wound dressing, place Normal Saline moistened gauze in wound bed and cover with appropriate dressing to keep wound bed moist.  Change wet-to-dry dressing two times a day until healthcare staff can re-implement negative pressure therapy. Change canister at least weekly.  [facility] Contact Center can be reached at [phone], 24 hours a day 7 days a week     - Information on Vacuum-Assisted Closure of a Wound  Vacuum-assisted closure (VAC) of a wound is a type of treatment to help wounds heal. It's also known as negative pressure wound therapy. During the treatment, a device lowers air pressure on the wound. This can help the wound heal more quickly.  - Understanding the wound VAC system  A wound VAC system has several parts. A foam or gauze dressing is put directly on the wound. The dressing is changed every 24 to 72 hours. An adhesive film covers and seals the dressing and wound. A drainage tube leads from under the adhesive film and connects to a portable vacuum pump. This pump removes air pressure over the wound. It may do this constantly. Or it may do it in cycles. During the treatment, you'll need to carry the portable pump everywhere you go.  - Why wound VAC is used  You might need this therapy for a recent traumatic wound. Or you may need it for a chronic wound. This is a wound that does not heal the way it should over time. This can happen with wounds in people who have diabetes. You may need a wound VAC if you've had a recent skin graft. And you may need a wound VAC for a large wound. Large wounds can take a longer time to heal.  A wound vacuum system may help your wound heal more quickly by:  Draining extra fluid from the  wound  Reducing swelling  Reducing bacteria in the wound  Keeping your wound moist and warm  Helping draw together wound edges  Increasing blood flow to your wound  Decreasing inflammation  Wound VAC offers some other advantages over other types of wound care. It may decrease your overall discomfort. The dressings usually need to be changed less often. And they may be easier to keep in position.  - Risks of wound VAC  Wound VAC has some rare risks, such as:  Bleeding (which may be severe)  Wound infection  An abnormal connection between the intestinal tract and the skin (enteric fistula)  Proper training in dressing changes can help reduce the risk for these complications. Also, your doctor will carefully evaluate you to make sure you are a good candidate for the therapy. Certain problems can increase your risk for complications. These include:  Exposed organs or blood vessels  High risk of bleeding from another medical problem  Wound infection  Nearby bone infection  Dead wound tissue  Cancer tissue  Fragile skin, such as from aging or longtime use of topical steroids  Allergy to adhesive  Very poor blood flow to your wound  Wounds close to joints that may reopen because of movement  Your doctor will discuss the risks that apply to you. Make sure to talk with him or her about all of your questions and concerns.  - Getting ready for wound VAC  You likely won't need to do much to get ready for wound VAC. In some cases, you may need to wait a while before having this therapy. For example, your doctor may first need to treat an infection in your wound. Dead or damaged tissue may also need to be removed from your wound.  You or a caregiver may need training on how to use the wound VAC device. This is done if you will be able to have your wound vacuum therapy at home. In other cases, you may need to have your wound vacuum therapy in a health care facility.  - On the day of your procedure  A health care provider will  cover your wound with foam or gauze wound dressing. An adhesive film will be put over the dressing and wound. This seals the wound. The foam connects to a drainage tube, which leads to a vacuum pump. This pump is portable. When the pump is turned on, it draws fluid through the foam and out the drainage tubing. The pump may run constantly, or it may cycle off and on. Your exact setup will depend on the specific type of wound vacuum system that you use.  - Managing your wound  You may need the dressing changed about once a day. You may need it changed more or less often, depending on your wound. You or your caregiver may be trained to do this at home. Or it may be done by a visiting health care provider. Your doctor may prescribe a pain medicine. This is to prevent or reduce pain during the dressing change.  You will likely need to use the wound VAC system for several weeks or months. During this time, you'll carry the portable pump everywhere you go.  - Nutrition for wound healing  During this time, make sure you follow a healthy diet. This is needed so the wound can heal and to prevent infection. Your doctor can tell you more about what to include in your diet during this time.  follow up with your doctor if you have a medical condition that led to your wound, such as diabetes. He or she can help you prevent future wounds.  - Follow-up care  Your doctor will carefully keep track of your healing. Make sure to keep all follow-up appointments.  - When to call your health care provider  Call your health care provider right away if you have any of these:  Fever of 100.4 F (38.0 C) or higher  Increased redness, swelling, or warmth around wound  Increased pain  Bright red blood or blood clots in tubing or the collection chamber of the vacuum      It IS NOT ok to get your wound wet in the bath or shower    SEEK MEDICAL CARE IF:    You have an increase in swelling, pain, or redness around the wound.    You have an increase  in the amount of pus coming from the wound.    There is a bad smell coming from the wound.    The wound appears to be worsening/enlarging    You have a fever greater than 101.5 F      It is ok to continue current wound care treatment/products for the next 2-3 days until new wound care supplies are ordered and arrive. If longer than this please contact our office at [phone].

## 2021-12-16 NOTE — PROGRESS NOTES
Podiatry Progress Note        ASSESSMENT:   Ulceration left foot  DM2      TREATMENT:  -Ulceration on the left foot is progressing well. Theraskin graft is intact.     -Recommend he continue with the wound vac at 100 mmHg, changed 2x per week with application of adaptic with each wound vac change. Continue non-weight bearing on the left foot.     -He will follow-up with me in 2 weeks.     Eddi Ram DPM  [facility] Podiat[facility]/Foot & Ankle Surgery      HPI: Norman Aguilar was seen again today for a left foot ulceration s/p application of Theraskin. He has remained non-weight bearing on the left foot.     Past Medical History:   Diagnosis Date     Coronary artery disease      Diabetes (H)      GERD (gastroesophageal reflux disease)      Heart attack (H)      Hypertension      Renal disease        No Known Allergies      Current Outpatient Medications:      alcohol swab prep pads, Use to swab area of injection/matthew as directed., Disp: 100 each, Rfl: 3     alcohol swab prep pads, Use to swab area of injection/matthew as directed., Disp: 100 each, Rfl: 6     apixaban ANTICOAGULANT (ELIQUIS) 5 MG tablet, 2 tablets, Disp: , Rfl:      aspirin (ASA) 81 MG chewable tablet, Take 1 tablet (81 mg) by mouth daily, Disp: 30 tablet, Rfl: 0     atorvastatin (LIPITOR) 80 MG tablet, Take 1 tablet (80 mg) by mouth every evening, Disp: 30 tablet, Rfl: 1     blood glucose (NO BRAND SPECIFIED) test strip, Use to test blood sugar 4 times daily or as directed., Disp: 100 strip, Rfl: 3     blood glucose monitoring (SOFTCLIX) lancets, Use to test blood sugar 4 times daily., Disp: 100 each, Rfl: 6     furosemide (LASIX) 20 MG tablet, Take 1 tablet (20 mg) by mouth daily, Disp: 30 tablet, Rfl: 1     insulin aspart (NOVOLOG FLEXPEN) 100 UNIT/ML pen, For  - 164 give 1 unit. For  - 189 give 2 units. For  - 214 give 3 units. For  - 239 give 4 units. For  - 264 give 5 units. For  - 289 give 6 units.  "For  - 314 give 7 units. For  - 339 give 8 units For  - 364 give 9 units For  - 389 give 10 units For  - 414 give 11 units For BG greater than or equal to 415 give 12 units, Disp: 3 mL, Rfl: 3     insulin glargine (LANTUS SOLOSTAR) 100 UNIT/ML pen, Inject 38 Units Subcutaneous every morning (Patient taking differently: Inject 38 Units Subcutaneous At Bedtime ), Disp: 3 mL, Rfl: 3     insulin pen needle (32G X 4 MM) 32G X 4 MM miscellaneous, Use BD Lizbeth pen needles daily or as directed., Disp: 100 each, Rfl: 3     magnesium oxide (MAG-OX) 400 MG tablet, Take 1 tablet (400 mg) by mouth 2 times daily, Disp: 60 tablet, Rfl: 1     metoprolol succinate ER (TOPROL-XL) 50 MG 24 hr tablet, Take 1 tablet (50 mg) by mouth daily, Disp: 30 tablet, Rfl: 1     Multiple Vitamin (MULTI VITAMIN) TABS, 1 tab(s), Disp: , Rfl:      nitroGLYcerin (NITROSTAT) 0.4 MG sublingual tablet, One tablet under the tongue every 5 minutes if needed for chest pain. May repeat every 5 minutes for a maximum of 3 doses in 15 minutes\", Disp: 25 tablet, Rfl: 3     OMEGA-3/DHA/EPA/FISH OIL (FISH OIL-OMEGA-3 FATTY ACIDS) 300-1,000 mg capsule, Take 1 g by mouth daily , Disp: , Rfl:      oxyCODONE (ROXICODONE) 5 MG tablet, Take 1-2 tablets (5-10 mg) by mouth every 6 hours as needed for moderate to severe pain (Patient not taking: Reported on 12/14/2021), Disp: 30 tablet, Rfl: 0     pantoprazole (PROTONIX) 40 MG EC tablet, Take 1 tablet (40 mg) by mouth every morning (before breakfast), Disp: 30 tablet, Rfl: 0    Review of Systems - Negative       OBJECTIVE:  Appearance: alert, well appearing, and in no distress.    /64   Pulse 84   Resp 20     @LDAVASC(10,16,17)@           Ulceration on the left foot has intact Theraskin graft. No erythema left foot. Neurovascular status unchanged left foot.     "

## 2021-12-21 DIAGNOSIS — I48.0 PAROXYSMAL A-FIB (H): Primary | ICD-10-CM

## 2021-12-22 ENCOUNTER — TELEPHONE (OUTPATIENT)
Dept: VASCULAR SURGERY | Facility: CLINIC | Age: 61
End: 2021-12-22
Payer: COMMERCIAL

## 2021-12-22 NOTE — TELEPHONE ENCOUNTER
Frances farmer [facility] RN is calling  that area around the skin graft is smelly and she is wondering if its infected. She can be reached at [phone]

## 2021-12-22 NOTE — TELEPHONE ENCOUNTER
Writer spoke with Frances, pt was concerned about the odor. Informed her this can be normal with the wound vac. No other signs of infection, no redness, or fever. Informed her to continue wound vac. Message will be sent to Dr. Ram if he wants to make any changes to care plan.

## 2022-01-01 ENCOUNTER — TELEPHONE (OUTPATIENT)
Dept: CARDIOLOGY | Facility: CLINIC | Age: 62
End: 2022-01-01

## 2022-01-01 ENCOUNTER — APPOINTMENT (OUTPATIENT)
Dept: RADIOLOGY | Facility: HOSPITAL | Age: 62
End: 2022-01-01
Attending: STUDENT IN AN ORGANIZED HEALTH CARE EDUCATION/TRAINING PROGRAM
Payer: COMMERCIAL

## 2022-01-01 ENCOUNTER — LAB REQUISITION (OUTPATIENT)
Dept: LAB | Facility: CLINIC | Age: 62
End: 2022-01-01

## 2022-01-01 ENCOUNTER — HOSPITAL ENCOUNTER (EMERGENCY)
Facility: HOSPITAL | Age: 62
Discharge: HOME | End: 2022-09-27
Attending: EMERGENCY MEDICINE | Admitting: EMERGENCY MEDICINE
Payer: COMMERCIAL

## 2022-01-01 ENCOUNTER — TRANSFERRED RECORDS (OUTPATIENT)
Dept: HEALTH INFORMATION MANAGEMENT | Facility: CLINIC | Age: 62
End: 2022-01-01

## 2022-01-01 ENCOUNTER — APPOINTMENT (OUTPATIENT)
Dept: CT IMAGING | Facility: HOSPITAL | Age: 62
End: 2022-01-01
Attending: EMERGENCY MEDICINE
Payer: COMMERCIAL

## 2022-01-01 ENCOUNTER — OFFICE VISIT (OUTPATIENT)
Dept: CARDIOLOGY | Facility: CLINIC | Age: 62
End: 2022-01-01
Payer: COMMERCIAL

## 2022-01-01 ENCOUNTER — OFFICE VISIT (OUTPATIENT)
Dept: CARDIOLOGY | Facility: CLINIC | Age: 62
End: 2022-01-01
Attending: INTERNAL MEDICINE
Payer: COMMERCIAL

## 2022-01-01 ENCOUNTER — HEALTH MAINTENANCE LETTER (OUTPATIENT)
Age: 62
End: 2022-01-01

## 2022-01-01 ENCOUNTER — PATIENT OUTREACH (OUTPATIENT)
Dept: CARE COORDINATION | Facility: CLINIC | Age: 62
End: 2022-01-01

## 2022-01-01 ENCOUNTER — HOSPITAL ENCOUNTER (EMERGENCY)
Facility: HOSPITAL | Age: 62
Discharge: HOME | End: 2022-09-28
Attending: STUDENT IN AN ORGANIZED HEALTH CARE EDUCATION/TRAINING PROGRAM | Admitting: STUDENT IN AN ORGANIZED HEALTH CARE EDUCATION/TRAINING PROGRAM
Payer: COMMERCIAL

## 2022-01-01 VITALS
TEMPERATURE: 98.2 F | DIASTOLIC BLOOD PRESSURE: 65 MMHG | WEIGHT: 230 LBS | SYSTOLIC BLOOD PRESSURE: 129 MMHG | HEART RATE: 93 BPM | OXYGEN SATURATION: 100 % | BODY MASS INDEX: 29.14 KG/M2 | RESPIRATION RATE: 18 BRPM

## 2022-01-01 VITALS
RESPIRATION RATE: 14 BRPM | BODY MASS INDEX: 27.48 KG/M2 | WEIGHT: 221 LBS | SYSTOLIC BLOOD PRESSURE: 143 MMHG | DIASTOLIC BLOOD PRESSURE: 66 MMHG | HEIGHT: 75 IN | HEART RATE: 100 BPM

## 2022-01-01 VITALS
SYSTOLIC BLOOD PRESSURE: 110 MMHG | RESPIRATION RATE: 16 BRPM | HEART RATE: 97 BPM | BODY MASS INDEX: 26.78 KG/M2 | WEIGHT: 215.4 LBS | HEIGHT: 75 IN | DIASTOLIC BLOOD PRESSURE: 60 MMHG

## 2022-01-01 VITALS
BODY MASS INDEX: 29.52 KG/M2 | TEMPERATURE: 98.3 F | SYSTOLIC BLOOD PRESSURE: 117 MMHG | OXYGEN SATURATION: 96 % | WEIGHT: 233 LBS | DIASTOLIC BLOOD PRESSURE: 58 MMHG | HEART RATE: 80 BPM | RESPIRATION RATE: 18 BRPM

## 2022-01-01 VITALS
SYSTOLIC BLOOD PRESSURE: 120 MMHG | BODY MASS INDEX: 29.14 KG/M2 | DIASTOLIC BLOOD PRESSURE: 61 MMHG | RESPIRATION RATE: 18 BRPM | TEMPERATURE: 98 F | WEIGHT: 230 LBS | OXYGEN SATURATION: 98 % | HEART RATE: 92 BPM

## 2022-01-01 DIAGNOSIS — I10 BENIGN ESSENTIAL HYPERTENSION: ICD-10-CM

## 2022-01-01 DIAGNOSIS — I50.9 ACUTE DECOMPENSATED HEART FAILURE (H): Primary | ICD-10-CM

## 2022-01-01 DIAGNOSIS — R10.9 UNSPECIFIED ABDOMINAL PAIN: ICD-10-CM

## 2022-01-01 DIAGNOSIS — I25.10 CORONARY ARTERY DISEASE INVOLVING NATIVE CORONARY ARTERY OF NATIVE HEART WITHOUT ANGINA PECTORIS: ICD-10-CM

## 2022-01-01 DIAGNOSIS — K59.09 CHRONIC CONSTIPATION: ICD-10-CM

## 2022-01-01 DIAGNOSIS — I50.22 CHRONIC SYSTOLIC CONGESTIVE HEART FAILURE (H): Primary | ICD-10-CM

## 2022-01-01 DIAGNOSIS — I25.10 CORONARY ARTERY DISEASE INVOLVING NATIVE CORONARY ARTERY OF NATIVE HEART WITHOUT ANGINA PECTORIS: Primary | ICD-10-CM

## 2022-01-01 DIAGNOSIS — E78.2 MIXED HYPERLIPIDEMIA: ICD-10-CM

## 2022-01-01 DIAGNOSIS — I50.20 HEART FAILURE WITH REDUCED EJECTION FRACTION (H): Primary | ICD-10-CM

## 2022-01-01 DIAGNOSIS — I25.5 ISCHEMIC CARDIOMYOPATHY: ICD-10-CM

## 2022-01-01 DIAGNOSIS — I21.4 NSTEMI (NON-ST ELEVATED MYOCARDIAL INFARCTION) (H): ICD-10-CM

## 2022-01-01 DIAGNOSIS — I50.22 CHRONIC SYSTOLIC CONGESTIVE HEART FAILURE (H): ICD-10-CM

## 2022-01-01 DIAGNOSIS — J90 BILATERAL PLEURAL EFFUSION: ICD-10-CM

## 2022-01-01 DIAGNOSIS — I48.0 PAF (PAROXYSMAL ATRIAL FIBRILLATION) (H): ICD-10-CM

## 2022-01-01 DIAGNOSIS — I25.10 ATHEROSCLEROTIC HEART DISEASE OF NATIVE CORONARY ARTERY WITHOUT ANGINA PECTORIS: ICD-10-CM

## 2022-01-01 LAB
ACT BLD: 241 SECONDS (ref 74–150)
ACT BLD: 258 SECONDS (ref 74–150)
ALBUMIN SERPL BCG-MCNC: 3.7 G/DL (ref 3.5–5.2)
ALBUMIN SERPL BCG-MCNC: 4 G/DL (ref 3.5–5.2)
ALP SERPL-CCNC: 107 U/L (ref 40–129)
ALP SERPL-CCNC: 124 U/L (ref 40–129)
ALT SERPL W P-5'-P-CCNC: 31 U/L (ref 10–50)
ALT SERPL W P-5'-P-CCNC: 32 U/L (ref 10–50)
ANION GAP SERPL CALCULATED.3IONS-SCNC: 10 MMOL/L (ref 5–18)
ANION GAP SERPL CALCULATED.3IONS-SCNC: 11 MMOL/L (ref 5–18)
ANION GAP SERPL CALCULATED.3IONS-SCNC: 11 MMOL/L (ref 5–18)
ANION GAP SERPL CALCULATED.3IONS-SCNC: 11 MMOL/L (ref 7–15)
ANION GAP SERPL CALCULATED.3IONS-SCNC: 13 MMOL/L (ref 7–15)
ANION GAP SERPL CALCULATED.3IONS-SCNC: 16 MMOL/L (ref 7–15)
ANION GAP SERPL CALCULATED.3IONS-SCNC: 9 MMOL/L (ref 5–18)
AST SERPL W P-5'-P-CCNC: 34 U/L (ref 10–50)
AST SERPL W P-5'-P-CCNC: 42 U/L (ref 10–50)
ATRIAL RATE - MUSE: 74 BPM
ATRIAL RATE - MUSE: 79 BPM
ATRIAL RATE - MUSE: 92 BPM
ATRIAL RATE - MUSE: 96 BPM
BASOPHILS # BLD AUTO: 0 10E3/UL (ref 0–0.2)
BASOPHILS NFR BLD AUTO: 0 %
BILIRUB DIRECT SERPL-MCNC: 0.22 MG/DL (ref 0–0.3)
BILIRUB DIRECT SERPL-MCNC: 0.32 MG/DL (ref 0–0.3)
BILIRUB SERPL-MCNC: 0.9 MG/DL
BILIRUB SERPL-MCNC: 0.9 MG/DL
BUN SERPL-MCNC: 16.2 MG/DL (ref 8–23)
BUN SERPL-MCNC: 23.6 MG/DL (ref 8–23)
BUN SERPL-MCNC: 26 MG/DL (ref 8–22)
BUN SERPL-MCNC: 27.1 MG/DL (ref 8–23)
BUN SERPL-MCNC: 32 MG/DL (ref 8–22)
BUN SERPL-MCNC: 34 MG/DL (ref 8–22)
BUN SERPL-MCNC: 37 MG/DL (ref 8–22)
CALCIUM SERPL-MCNC: 8.6 MG/DL (ref 8.5–10.5)
CALCIUM SERPL-MCNC: 8.8 MG/DL (ref 8.5–10.5)
CALCIUM SERPL-MCNC: 9.1 MG/DL (ref 8.5–10.5)
CALCIUM SERPL-MCNC: 9.2 MG/DL (ref 8.5–10.5)
CALCIUM SERPL-MCNC: 9.5 MG/DL (ref 8.8–10.2)
CALCIUM SERPL-MCNC: 9.6 MG/DL (ref 8.8–10.2)
CALCIUM SERPL-MCNC: 9.9 MG/DL (ref 8.8–10.2)
CHLORIDE BLD-SCNC: 100 MMOL/L (ref 98–107)
CHLORIDE BLD-SCNC: 100 MMOL/L (ref 98–107)
CHLORIDE BLD-SCNC: 102 MMOL/L (ref 98–107)
CHLORIDE BLD-SCNC: 99 MMOL/L (ref 98–107)
CHLORIDE SERPL-SCNC: 97 MMOL/L (ref 98–107)
CHLORIDE SERPL-SCNC: 98 MMOL/L (ref 98–107)
CHLORIDE SERPL-SCNC: 98 MMOL/L (ref 98–107)
CO2 SERPL-SCNC: 22 MMOL/L (ref 22–31)
CO2 SERPL-SCNC: 22 MMOL/L (ref 22–31)
CO2 SERPL-SCNC: 23 MMOL/L (ref 22–31)
CO2 SERPL-SCNC: 23 MMOL/L (ref 22–31)
CREAT SERPL-MCNC: 1.4 MG/DL (ref 0.67–1.17)
CREAT SERPL-MCNC: 1.4 MG/DL (ref 0.67–1.17)
CREAT SERPL-MCNC: 1.51 MG/DL (ref 0.67–1.17)
CREAT SERPL-MCNC: 1.55 MG/DL (ref 0.7–1.3)
CREAT SERPL-MCNC: 1.62 MG/DL (ref 0.7–1.3)
CREAT SERPL-MCNC: 1.67 MG/DL (ref 0.7–1.3)
CREAT SERPL-MCNC: 1.74 MG/DL (ref 0.7–1.3)
DEPRECATED HCO3 PLAS-SCNC: 24 MMOL/L (ref 22–29)
DEPRECATED HCO3 PLAS-SCNC: 27 MMOL/L (ref 22–29)
DEPRECATED HCO3 PLAS-SCNC: 29 MMOL/L (ref 22–29)
DIASTOLIC BLOOD PRESSURE - MUSE: NORMAL MMHG
EOSINOPHIL # BLD AUTO: 0.1 10E3/UL (ref 0–0.7)
EOSINOPHIL NFR BLD AUTO: 1 %
ERYTHROCYTE [DISTWIDTH] IN BLOOD BY AUTOMATED COUNT: 18.1 % (ref 10–15)
ERYTHROCYTE [DISTWIDTH] IN BLOOD BY AUTOMATED COUNT: 18.6 % (ref 10–15)
ERYTHROCYTE [DISTWIDTH] IN BLOOD BY AUTOMATED COUNT: 18.6 % (ref 10–15)
GFR SERPL CREATININE-BSD FRML MDRD: 44 ML/MIN/1.73M2
GFR SERPL CREATININE-BSD FRML MDRD: 46 ML/MIN/1.73M2
GFR SERPL CREATININE-BSD FRML MDRD: 47 ML/MIN/1.73M2
GFR SERPL CREATININE-BSD FRML MDRD: 50 ML/MIN/1.73M2
GFR SERPL CREATININE-BSD FRML MDRD: 52 ML/MIN/1.73M2
GFR SERPL CREATININE-BSD FRML MDRD: 56 ML/MIN/1.73M2
GFR SERPL CREATININE-BSD FRML MDRD: 56 ML/MIN/1.73M2
GLUCOSE BLD-MCNC: 143 MG/DL (ref 70–125)
GLUCOSE BLD-MCNC: 143 MG/DL (ref 70–125)
GLUCOSE BLD-MCNC: 150 MG/DL (ref 70–125)
GLUCOSE BLD-MCNC: 152 MG/DL (ref 70–125)
GLUCOSE BLDC GLUCOMTR-MCNC: 100 MG/DL (ref 70–99)
GLUCOSE BLDC GLUCOMTR-MCNC: 115 MG/DL (ref 70–99)
GLUCOSE BLDC GLUCOMTR-MCNC: 120 MG/DL (ref 70–99)
GLUCOSE BLDC GLUCOMTR-MCNC: 131 MG/DL (ref 70–99)
GLUCOSE BLDC GLUCOMTR-MCNC: 135 MG/DL (ref 70–99)
GLUCOSE BLDC GLUCOMTR-MCNC: 138 MG/DL (ref 70–99)
GLUCOSE BLDC GLUCOMTR-MCNC: 144 MG/DL (ref 70–99)
GLUCOSE BLDC GLUCOMTR-MCNC: 146 MG/DL (ref 70–99)
GLUCOSE BLDC GLUCOMTR-MCNC: 149 MG/DL (ref 70–99)
GLUCOSE BLDC GLUCOMTR-MCNC: 153 MG/DL (ref 70–99)
GLUCOSE BLDC GLUCOMTR-MCNC: 161 MG/DL (ref 70–99)
GLUCOSE BLDC GLUCOMTR-MCNC: 171 MG/DL (ref 70–99)
GLUCOSE BLDC GLUCOMTR-MCNC: 188 MG/DL (ref 70–99)
GLUCOSE BLDC GLUCOMTR-MCNC: 92 MG/DL (ref 70–99)
GLUCOSE SERPL-MCNC: 114 MG/DL (ref 70–99)
GLUCOSE SERPL-MCNC: 138 MG/DL (ref 70–99)
GLUCOSE SERPL-MCNC: 153 MG/DL (ref 70–99)
HCT VFR BLD AUTO: 31.6 % (ref 40–53)
HCT VFR BLD AUTO: 33.2 % (ref 40–53)
HCT VFR BLD AUTO: 35.3 % (ref 40–53)
HGB BLD-MCNC: 10 G/DL (ref 13.3–17.7)
HGB BLD-MCNC: 10.4 G/DL (ref 13.3–17.7)
HGB BLD-MCNC: 11.4 G/DL (ref 13.3–17.7)
HGB BLD-MCNC: 9.7 G/DL (ref 13.3–17.7)
HOLD SPECIMEN: NORMAL
HOLD SPECIMEN: NORMAL
IMM GRANULOCYTES # BLD: 0 10E3/UL
IMM GRANULOCYTES NFR BLD: 0 %
INTERPRETATION ECG - MUSE: NORMAL
LACTATE SERPL-SCNC: 1.1 MMOL/L (ref 0.7–2)
LIPASE SERPL-CCNC: 18 U/L (ref 13–60)
LYMPHOCYTES # BLD AUTO: 1.5 10E3/UL (ref 0.8–5.3)
LYMPHOCYTES NFR BLD AUTO: 22 %
MAGNESIUM SERPL-MCNC: 1.9 MG/DL (ref 1.7–2.3)
MCH RBC QN AUTO: 24.6 PG (ref 26.5–33)
MCH RBC QN AUTO: 24.9 PG (ref 26.5–33)
MCH RBC QN AUTO: 25.5 PG (ref 26.5–33)
MCHC RBC AUTO-ENTMCNC: 31.3 G/DL (ref 31.5–36.5)
MCHC RBC AUTO-ENTMCNC: 31.6 G/DL (ref 31.5–36.5)
MCHC RBC AUTO-ENTMCNC: 32.3 G/DL (ref 31.5–36.5)
MCV RBC AUTO: 79 FL (ref 78–100)
MONOCYTES # BLD AUTO: 0.5 10E3/UL (ref 0–1.3)
MONOCYTES NFR BLD AUTO: 7 %
NEUTROPHILS # BLD AUTO: 4.7 10E3/UL (ref 1.6–8.3)
NEUTROPHILS NFR BLD AUTO: 70 %
NRBC # BLD AUTO: 0 10E3/UL
NRBC BLD AUTO-RTO: 0 /100
NT-PROBNP SERPL-MCNC: 3295 PG/ML (ref 0–900)
P AXIS - MUSE: 51 DEGREES
P AXIS - MUSE: 61 DEGREES
P AXIS - MUSE: 65 DEGREES
P AXIS - MUSE: 65 DEGREES
PLATELET # BLD AUTO: 195 10E3/UL (ref 150–450)
PLATELET # BLD AUTO: 233 10E3/UL (ref 150–450)
PLATELET # BLD AUTO: 244 10E3/UL (ref 150–450)
POTASSIUM BLD-SCNC: 3.8 MMOL/L (ref 3.5–5)
POTASSIUM BLD-SCNC: 4.1 MMOL/L (ref 3.5–5)
POTASSIUM SERPL-SCNC: 4.1 MMOL/L (ref 3.4–5.3)
POTASSIUM SERPL-SCNC: 4.2 MMOL/L (ref 3.4–5.3)
POTASSIUM SERPL-SCNC: 4.3 MMOL/L (ref 3.4–5.3)
PR INTERVAL - MUSE: 174 MS
PR INTERVAL - MUSE: 188 MS
PR INTERVAL - MUSE: 190 MS
PR INTERVAL - MUSE: 190 MS
PROT SERPL-MCNC: 6.7 G/DL (ref 6.4–8.3)
PROT SERPL-MCNC: 7.3 G/DL (ref 6.4–8.3)
QRS DURATION - MUSE: 102 MS
QRS DURATION - MUSE: 104 MS
QRS DURATION - MUSE: 106 MS
QRS DURATION - MUSE: 108 MS
QT - MUSE: 380 MS
QT - MUSE: 384 MS
QT - MUSE: 404 MS
QT - MUSE: 406 MS
QTC - MUSE: 448 MS
QTC - MUSE: 465 MS
QTC - MUSE: 474 MS
QTC - MUSE: 480 MS
R AXIS - MUSE: 38 DEGREES
R AXIS - MUSE: 44 DEGREES
R AXIS - MUSE: 45 DEGREES
R AXIS - MUSE: 52 DEGREES
RBC # BLD AUTO: 4.02 10E6/UL (ref 4.4–5.9)
RBC # BLD AUTO: 4.23 10E6/UL (ref 4.4–5.9)
RBC # BLD AUTO: 4.47 10E6/UL (ref 4.4–5.9)
SODIUM SERPL-SCNC: 132 MMOL/L (ref 136–145)
SODIUM SERPL-SCNC: 133 MMOL/L (ref 136–145)
SODIUM SERPL-SCNC: 133 MMOL/L (ref 136–145)
SODIUM SERPL-SCNC: 134 MMOL/L (ref 136–145)
SODIUM SERPL-SCNC: 137 MMOL/L (ref 136–145)
SODIUM SERPL-SCNC: 138 MMOL/L (ref 136–145)
SODIUM SERPL-SCNC: 138 MMOL/L (ref 136–145)
SYSTOLIC BLOOD PRESSURE - MUSE: NORMAL MMHG
T AXIS - MUSE: 104 DEGREES
T AXIS - MUSE: 105 DEGREES
T AXIS - MUSE: 111 DEGREES
T AXIS - MUSE: 175 DEGREES
TROPONIN I SERPL-MCNC: 5.84 NG/ML (ref 0–0.29)
UFH PPP CHRO-ACNC: 0.27 IU/ML
UFH PPP CHRO-ACNC: 0.43 IU/ML
UFH PPP CHRO-ACNC: 1.07 IU/ML
UFH PPP CHRO-ACNC: <0.1 IU/ML
UFH PPP CHRO-ACNC: <0.1 IU/ML
VENTRICULAR RATE- MUSE: 74 BPM
VENTRICULAR RATE- MUSE: 79 BPM
VENTRICULAR RATE- MUSE: 92 BPM
VENTRICULAR RATE- MUSE: 96 BPM
WBC # BLD AUTO: 6.3 10E3/UL (ref 4–11)
WBC # BLD AUTO: 6.3 10E3/UL (ref 4–11)
WBC # BLD AUTO: 6.8 10E3/UL (ref 4–11)

## 2022-01-01 PROCEDURE — 99233 SBSQ HOSP IP/OBS HIGH 50: CPT | Performed by: INTERNAL MEDICINE

## 2022-01-01 PROCEDURE — C1887 CATHETER, GUIDING: HCPCS | Performed by: INTERNAL MEDICINE

## 2022-01-01 PROCEDURE — 99215 OFFICE O/P EST HI 40 MIN: CPT | Performed by: INTERNAL MEDICINE

## 2022-01-01 PROCEDURE — 250N000013 HC RX MED GY IP 250 OP 250 PS 637: Performed by: INTERNAL MEDICINE

## 2022-01-01 PROCEDURE — 92978 ENDOLUMINL IVUS OCT C 1ST: CPT | Performed by: INTERNAL MEDICINE

## 2022-01-01 PROCEDURE — 96360 HYDRATION IV INFUSION INIT: CPT | Mod: 59

## 2022-01-01 PROCEDURE — 93005 ELECTROCARDIOGRAM TRACING: CPT | Performed by: FAMILY MEDICINE

## 2022-01-01 PROCEDURE — 71046 X-RAY EXAM CHEST 2 VIEWS: CPT

## 2022-01-01 PROCEDURE — 99238 HOSP IP/OBS DSCHRG MGMT 30/<: CPT | Performed by: INTERNAL MEDICINE

## 2022-01-01 PROCEDURE — 93005 ELECTROCARDIOGRAM TRACING: CPT

## 2022-01-01 PROCEDURE — 85027 COMPLETE CBC AUTOMATED: CPT | Performed by: INTERNAL MEDICINE

## 2022-01-01 PROCEDURE — 82310 ASSAY OF CALCIUM: CPT | Performed by: INTERNAL MEDICINE

## 2022-01-01 PROCEDURE — 96361 HYDRATE IV INFUSION ADD-ON: CPT

## 2022-01-01 PROCEDURE — 99285 EMERGENCY DEPT VISIT HI MDM: CPT | Mod: 25

## 2022-01-01 PROCEDURE — 258N000003 HC RX IP 258 OP 636: Performed by: INTERNAL MEDICINE

## 2022-01-01 PROCEDURE — 210N000001 HC R&B IMCU HEART CARE

## 2022-01-01 PROCEDURE — 84484 ASSAY OF TROPONIN QUANT: CPT | Performed by: INTERNAL MEDICINE

## 2022-01-01 PROCEDURE — B2111ZZ FLUOROSCOPY OF MULTIPLE CORONARY ARTERIES USING LOW OSMOLAR CONTRAST: ICD-10-PCS | Performed by: INTERNAL MEDICINE

## 2022-01-01 PROCEDURE — B240ZZ3 ULTRASONOGRAPHY OF SINGLE CORONARY ARTERY, INTRAVASCULAR: ICD-10-PCS | Performed by: INTERNAL MEDICINE

## 2022-01-01 PROCEDURE — 85520 HEPARIN ASSAY: CPT | Performed by: INTERNAL MEDICINE

## 2022-01-01 PROCEDURE — C1753 CATH, INTRAVAS ULTRASOUND: HCPCS | Performed by: INTERNAL MEDICINE

## 2022-01-01 PROCEDURE — 80048 BASIC METABOLIC PNL TOTAL CA: CPT | Performed by: INTERNAL MEDICINE

## 2022-01-01 PROCEDURE — 36415 COLL VENOUS BLD VENIPUNCTURE: CPT | Performed by: FAMILY MEDICINE

## 2022-01-01 PROCEDURE — 83605 ASSAY OF LACTIC ACID: CPT | Performed by: EMERGENCY MEDICINE

## 2022-01-01 PROCEDURE — 82947 ASSAY GLUCOSE BLOOD QUANT: CPT | Performed by: INTERNAL MEDICINE

## 2022-01-01 PROCEDURE — C1725 CATH, TRANSLUMIN NON-LASER: HCPCS | Performed by: INTERNAL MEDICINE

## 2022-01-01 PROCEDURE — 85025 COMPLETE CBC W/AUTO DIFF WBC: CPT | Performed by: EMERGENCY MEDICINE

## 2022-01-01 PROCEDURE — 93005 ELECTROCARDIOGRAM TRACING: CPT | Performed by: INTERNAL MEDICINE

## 2022-01-01 PROCEDURE — 85014 HEMATOCRIT: CPT | Performed by: INTERNAL MEDICINE

## 2022-01-01 PROCEDURE — 36415 COLL VENOUS BLD VENIPUNCTURE: CPT | Performed by: INTERNAL MEDICINE

## 2022-01-01 PROCEDURE — C9600 PERC DRUG-EL COR STENT SING: HCPCS | Performed by: INTERNAL MEDICINE

## 2022-01-01 PROCEDURE — 82248 BILIRUBIN DIRECT: CPT | Performed by: FAMILY MEDICINE

## 2022-01-01 PROCEDURE — 83880 ASSAY OF NATRIURETIC PEPTIDE: CPT | Performed by: FAMILY MEDICINE

## 2022-01-01 PROCEDURE — 99232 SBSQ HOSP IP/OBS MODERATE 35: CPT | Performed by: INTERNAL MEDICINE

## 2022-01-01 PROCEDURE — 92978 ENDOLUMINL IVUS OCT C 1ST: CPT | Mod: 26 | Performed by: INTERNAL MEDICINE

## 2022-01-01 PROCEDURE — 74177 CT ABD & PELVIS W/CONTRAST: CPT

## 2022-01-01 PROCEDURE — 82248 BILIRUBIN DIRECT: CPT | Performed by: EMERGENCY MEDICINE

## 2022-01-01 PROCEDURE — C1874 STENT, COATED/COV W/DEL SYS: HCPCS | Performed by: INTERNAL MEDICINE

## 2022-01-01 PROCEDURE — 255N000002 HC RX 255 OP 636: Performed by: INTERNAL MEDICINE

## 2022-01-01 PROCEDURE — 93010 ELECTROCARDIOGRAM REPORT: CPT | Performed by: INTERNAL MEDICINE

## 2022-01-01 PROCEDURE — C1769 GUIDE WIRE: HCPCS | Performed by: INTERNAL MEDICINE

## 2022-01-01 PROCEDURE — 250N000011 HC RX IP 250 OP 636: Performed by: INTERNAL MEDICINE

## 2022-01-01 PROCEDURE — 93005 ELECTROCARDIOGRAM TRACING: CPT | Performed by: EMERGENCY MEDICINE

## 2022-01-01 PROCEDURE — 82310 ASSAY OF CALCIUM: CPT | Performed by: FAMILY MEDICINE

## 2022-01-01 PROCEDURE — 36415 COLL VENOUS BLD VENIPUNCTURE: CPT | Performed by: EMERGENCY MEDICINE

## 2022-01-01 PROCEDURE — 99152 MOD SED SAME PHYS/QHP 5/>YRS: CPT | Performed by: INTERNAL MEDICINE

## 2022-01-01 PROCEDURE — 85347 COAGULATION TIME ACTIVATED: CPT

## 2022-01-01 PROCEDURE — C1760 CLOSURE DEV, VASC: HCPCS | Performed by: INTERNAL MEDICINE

## 2022-01-01 PROCEDURE — 83690 ASSAY OF LIPASE: CPT | Performed by: EMERGENCY MEDICINE

## 2022-01-01 PROCEDURE — 80048 BASIC METABOLIC PNL TOTAL CA: CPT | Performed by: FAMILY MEDICINE

## 2022-01-01 PROCEDURE — 99153 MOD SED SAME PHYS/QHP EA: CPT | Performed by: INTERNAL MEDICINE

## 2022-01-01 PROCEDURE — 250N000009 HC RX 250: Performed by: INTERNAL MEDICINE

## 2022-01-01 PROCEDURE — 85018 HEMOGLOBIN: CPT | Performed by: INTERNAL MEDICINE

## 2022-01-01 PROCEDURE — 999N000127 HC STATISTIC PERIPHERAL IV START W US GUIDANCE

## 2022-01-01 PROCEDURE — 258N000003 HC RX IP 258 OP 636: Performed by: EMERGENCY MEDICINE

## 2022-01-01 PROCEDURE — 99214 OFFICE O/P EST MOD 30 MIN: CPT | Performed by: NURSE PRACTITIONER

## 2022-01-01 PROCEDURE — 250N000013 HC RX MED GY IP 250 OP 250 PS 637: Performed by: NURSE PRACTITIONER

## 2022-01-01 PROCEDURE — 92928 PRQ TCAT PLMT NTRAC ST 1 LES: CPT | Mod: RC | Performed by: INTERNAL MEDICINE

## 2022-01-01 PROCEDURE — 83735 ASSAY OF MAGNESIUM: CPT | Performed by: FAMILY MEDICINE

## 2022-01-01 PROCEDURE — 250N000011 HC RX IP 250 OP 636: Performed by: EMERGENCY MEDICINE

## 2022-01-01 PROCEDURE — 272N000001 HC OR GENERAL SUPPLY STERILE: Performed by: INTERNAL MEDICINE

## 2022-01-01 DEVICE — STENT CORONARY DES SYNERGY XD MR US 3.50X16MM H7493941816350: Type: IMPLANTABLE DEVICE | Site: CORONARY | Status: FUNCTIONAL

## 2022-01-01 DEVICE — CLOSURE ANGIOSEAL 6FR 610130: Type: IMPLANTABLE DEVICE | Status: FUNCTIONAL

## 2022-01-01 DEVICE — STENT CORONARY DES SYNERGY XD MR US 4.00X16MM H7493941816400: Type: IMPLANTABLE DEVICE | Site: CORONARY | Status: FUNCTIONAL

## 2022-01-01 RX ORDER — ONDANSETRON 2 MG/ML
4 INJECTION INTRAMUSCULAR; INTRAVENOUS EVERY 6 HOURS PRN
Status: DISCONTINUED | OUTPATIENT
Start: 2022-01-01 | End: 2022-01-01 | Stop reason: HOSPADM

## 2022-01-01 RX ORDER — FUROSEMIDE 20 MG
20 TABLET ORAL DAILY
Qty: 30 TABLET | Refills: 0 | Status: SHIPPED | OUTPATIENT
Start: 2022-01-01 | End: 2022-01-01

## 2022-01-01 RX ORDER — FUROSEMIDE 20 MG
20 TABLET ORAL DAILY
Status: DISCONTINUED | OUTPATIENT
Start: 2022-01-01 | End: 2022-01-01 | Stop reason: HOSPADM

## 2022-01-01 RX ORDER — HYDROXYZINE HYDROCHLORIDE 50 MG/1
TABLET, FILM COATED ORAL
COMMUNITY
Start: 2022-01-01 | End: 2022-01-01

## 2022-01-01 RX ORDER — IOPAMIDOL 755 MG/ML
75 INJECTION, SOLUTION INTRAVASCULAR ONCE
Status: COMPLETED | OUTPATIENT
Start: 2022-01-01 | End: 2022-01-01

## 2022-01-01 RX ORDER — ONDANSETRON 4 MG/1
4 TABLET, ORALLY DISINTEGRATING ORAL EVERY 6 HOURS PRN
Status: DISCONTINUED | OUTPATIENT
Start: 2022-01-01 | End: 2022-01-01 | Stop reason: HOSPADM

## 2022-01-01 RX ORDER — LIDOCAINE 40 MG/G
CREAM TOPICAL
Status: DISCONTINUED | OUTPATIENT
Start: 2022-01-01 | End: 2022-01-01 | Stop reason: HOSPADM

## 2022-01-01 RX ORDER — LACTULOSE 10 G/15ML
20 SOLUTION ORAL 2 TIMES DAILY
Status: COMPLETED | OUTPATIENT
Start: 2022-01-01 | End: 2022-01-01

## 2022-01-01 RX ORDER — METOPROLOL TARTRATE 1 MG/ML
5 INJECTION, SOLUTION INTRAVENOUS
Status: DISCONTINUED | OUTPATIENT
Start: 2022-01-01 | End: 2022-01-01 | Stop reason: HOSPADM

## 2022-01-01 RX ORDER — SODIUM CHLORIDE 9 MG/ML
INJECTION, SOLUTION INTRAVENOUS CONTINUOUS
Status: DISCONTINUED | OUTPATIENT
Start: 2022-01-01 | End: 2022-01-01

## 2022-01-01 RX ORDER — ASPIRIN 81 MG/1
81 TABLET ORAL DAILY
Status: DISCONTINUED | OUTPATIENT
Start: 2022-01-01 | End: 2022-01-01 | Stop reason: HOSPADM

## 2022-01-01 RX ORDER — NITROGLYCERIN 0.4 MG/1
0.4 TABLET SUBLINGUAL EVERY 5 MIN PRN
Status: DISCONTINUED | OUTPATIENT
Start: 2022-01-01 | End: 2022-01-01 | Stop reason: HOSPADM

## 2022-01-01 RX ORDER — DIAZEPAM 10 MG
10 TABLET ORAL ONCE
Status: COMPLETED | OUTPATIENT
Start: 2022-01-01 | End: 2022-01-01

## 2022-01-01 RX ORDER — ASPIRIN 325 MG
325 TABLET ORAL ONCE
Status: COMPLETED | OUTPATIENT
Start: 2022-01-01 | End: 2022-01-01

## 2022-01-01 RX ORDER — OXYCODONE HYDROCHLORIDE 5 MG/1
10 TABLET ORAL EVERY 4 HOURS PRN
Status: DISCONTINUED | OUTPATIENT
Start: 2022-01-01 | End: 2022-01-01 | Stop reason: HOSPADM

## 2022-01-01 RX ORDER — FENTANYL CITRATE 50 UG/ML
INJECTION, SOLUTION INTRAMUSCULAR; INTRAVENOUS
Status: DISCONTINUED | OUTPATIENT
Start: 2022-01-01 | End: 2022-01-01 | Stop reason: HOSPADM

## 2022-01-01 RX ORDER — FLUMAZENIL 0.1 MG/ML
0.2 INJECTION, SOLUTION INTRAVENOUS
Status: ACTIVE | OUTPATIENT
Start: 2022-01-01 | End: 2022-01-01

## 2022-01-01 RX ORDER — SODIUM CHLORIDE 9 MG/ML
INJECTION, SOLUTION INTRAVENOUS CONTINUOUS
Status: ACTIVE | OUTPATIENT
Start: 2022-01-01 | End: 2022-01-01

## 2022-01-01 RX ORDER — ASPIRIN 81 MG/1
243 TABLET, CHEWABLE ORAL ONCE
Status: COMPLETED | OUTPATIENT
Start: 2022-01-01 | End: 2022-01-01

## 2022-01-01 RX ORDER — HYDRALAZINE HYDROCHLORIDE 20 MG/ML
10 INJECTION INTRAMUSCULAR; INTRAVENOUS EVERY 4 HOURS PRN
Status: DISCONTINUED | OUTPATIENT
Start: 2022-01-01 | End: 2022-01-01 | Stop reason: HOSPADM

## 2022-01-01 RX ORDER — DIAZEPAM 10 MG
10 TABLET ORAL ONCE
Status: DISCONTINUED | OUTPATIENT
Start: 2022-01-01 | End: 2022-01-01

## 2022-01-01 RX ORDER — HEPARIN SODIUM 1000 [USP'U]/ML
INJECTION, SOLUTION INTRAVENOUS; SUBCUTANEOUS
Status: DISCONTINUED | OUTPATIENT
Start: 2022-01-01 | End: 2022-01-01 | Stop reason: HOSPADM

## 2022-01-01 RX ORDER — NALOXONE HYDROCHLORIDE 0.4 MG/ML
0.2 INJECTION, SOLUTION INTRAMUSCULAR; INTRAVENOUS; SUBCUTANEOUS
Status: ACTIVE | OUTPATIENT
Start: 2022-01-01 | End: 2022-01-01

## 2022-01-01 RX ORDER — SODIUM CHLORIDE 9 MG/ML
INJECTION, SOLUTION INTRAVENOUS CONTINUOUS
Status: DISCONTINUED | OUTPATIENT
Start: 2022-01-01 | End: 2022-01-01 | Stop reason: HOSPADM

## 2022-01-01 RX ORDER — FUROSEMIDE 10 MG/ML
20 INJECTION INTRAMUSCULAR; INTRAVENOUS ONCE
Status: COMPLETED | OUTPATIENT
Start: 2022-01-01 | End: 2022-01-01

## 2022-01-01 RX ORDER — NALOXONE HYDROCHLORIDE 0.4 MG/ML
0.4 INJECTION, SOLUTION INTRAMUSCULAR; INTRAVENOUS; SUBCUTANEOUS
Status: ACTIVE | OUTPATIENT
Start: 2022-01-01 | End: 2022-01-01

## 2022-01-01 RX ORDER — ACETAMINOPHEN 325 MG/1
650 TABLET ORAL EVERY 4 HOURS PRN
Status: DISCONTINUED | OUTPATIENT
Start: 2022-01-01 | End: 2022-01-01 | Stop reason: HOSPADM

## 2022-01-01 RX ORDER — ATROPINE SULFATE 0.1 MG/ML
0.5 INJECTION INTRAVENOUS
Status: ACTIVE | OUTPATIENT
Start: 2022-01-01 | End: 2022-01-01

## 2022-01-01 RX ORDER — IODIXANOL 320 MG/ML
INJECTION, SOLUTION INTRAVASCULAR
Status: DISCONTINUED | OUTPATIENT
Start: 2022-01-01 | End: 2022-01-01 | Stop reason: HOSPADM

## 2022-01-01 RX ORDER — FUROSEMIDE 20 MG
20 TABLET ORAL DAILY
Qty: 30 TABLET | Refills: 0 | Status: SHIPPED | OUTPATIENT
Start: 2022-01-01 | End: 2023-01-01

## 2022-01-01 RX ORDER — OXYCODONE HYDROCHLORIDE 5 MG/1
5 TABLET ORAL EVERY 4 HOURS PRN
Status: DISCONTINUED | OUTPATIENT
Start: 2022-01-01 | End: 2022-01-01 | Stop reason: HOSPADM

## 2022-01-01 RX ADMIN — ASPIRIN 81 MG: 81 TABLET, CHEWABLE ORAL at 07:57

## 2022-01-01 RX ADMIN — ATORVASTATIN CALCIUM 80 MG: 40 TABLET, FILM COATED ORAL at 20:16

## 2022-01-01 RX ADMIN — FUROSEMIDE 20 MG: 20 TABLET ORAL at 10:07

## 2022-01-01 RX ADMIN — HEPARIN SODIUM 1600 UNITS/HR: 10000 INJECTION, SOLUTION INTRAVENOUS at 00:10

## 2022-01-01 RX ADMIN — INSULIN ASPART 1 UNITS: 100 INJECTION, SOLUTION INTRAVENOUS; SUBCUTANEOUS at 09:00

## 2022-01-01 RX ADMIN — SODIUM CHLORIDE: 9 INJECTION, SOLUTION INTRAVENOUS at 12:37

## 2022-01-01 RX ADMIN — HEPARIN SODIUM 1300 UNITS/HR: 10000 INJECTION, SOLUTION INTRAVENOUS at 20:21

## 2022-01-01 RX ADMIN — HEPARIN SODIUM 1300 UNITS/HR: 10000 INJECTION, SOLUTION INTRAVENOUS at 00:22

## 2022-01-01 RX ADMIN — Medication 400 MG: at 09:07

## 2022-01-01 RX ADMIN — METOPROLOL SUCCINATE 50 MG: 25 TABLET, EXTENDED RELEASE ORAL at 10:07

## 2022-01-01 RX ADMIN — NITROGLYCERIN 15 MG: 20 OINTMENT TOPICAL at 00:11

## 2022-01-01 RX ADMIN — TICAGRELOR 90 MG: 90 TABLET ORAL at 22:10

## 2022-01-01 RX ADMIN — SODIUM CHLORIDE: 9 INJECTION, SOLUTION INTRAVENOUS at 14:47

## 2022-01-01 RX ADMIN — INSULIN GLARGINE 38 UNITS: 100 INJECTION, SOLUTION SUBCUTANEOUS at 22:14

## 2022-01-01 RX ADMIN — Medication 81 MG: at 10:07

## 2022-01-01 RX ADMIN — Medication 400 MG: at 10:07

## 2022-01-01 RX ADMIN — ACETYLCYSTEINE 1200 MG: 200 SOLUTION ORAL; RESPIRATORY (INHALATION) at 10:28

## 2022-01-01 RX ADMIN — METOPROLOL SUCCINATE 50 MG: 25 TABLET, EXTENDED RELEASE ORAL at 07:57

## 2022-01-01 RX ADMIN — ACETYLCYSTEINE 1200 MG: 200 SOLUTION ORAL; RESPIRATORY (INHALATION) at 14:22

## 2022-01-01 RX ADMIN — ACETAMINOPHEN 650 MG: 325 TABLET ORAL at 13:20

## 2022-01-01 RX ADMIN — ASPIRIN 325 MG: 325 TABLET ORAL at 09:47

## 2022-01-01 RX ADMIN — LACTULOSE 20 G: 10 SOLUTION ORAL at 20:26

## 2022-01-01 RX ADMIN — Medication 400 MG: at 09:36

## 2022-01-01 RX ADMIN — NITROGLYCERIN 15 MG: 20 OINTMENT TOPICAL at 13:05

## 2022-01-01 RX ADMIN — ATORVASTATIN CALCIUM 80 MG: 40 TABLET, FILM COATED ORAL at 21:39

## 2022-01-01 RX ADMIN — HEPARIN SODIUM 1900 UNITS/HR: 10000 INJECTION, SOLUTION INTRAVENOUS at 12:52

## 2022-01-01 RX ADMIN — INSULIN GLARGINE 38 UNITS: 100 INJECTION, SOLUTION SUBCUTANEOUS at 22:07

## 2022-01-01 RX ADMIN — DIAZEPAM 10 MG: 10 TABLET ORAL at 11:05

## 2022-01-01 RX ADMIN — Medication 400 MG: at 07:57

## 2022-01-01 RX ADMIN — SODIUM CHLORIDE 500 ML: 9 INJECTION, SOLUTION INTRAVENOUS at 14:19

## 2022-01-01 RX ADMIN — INSULIN ASPART 1 UNITS: 100 INJECTION, SOLUTION INTRAVENOUS; SUBCUTANEOUS at 13:02

## 2022-01-01 RX ADMIN — ACETYLCYSTEINE 1200 MG: 200 SOLUTION ORAL; RESPIRATORY (INHALATION) at 21:58

## 2022-01-01 RX ADMIN — METOPROLOL SUCCINATE 50 MG: 25 TABLET, EXTENDED RELEASE ORAL at 09:07

## 2022-01-01 RX ADMIN — LACTULOSE 20 G: 10 SOLUTION ORAL at 14:22

## 2022-01-01 RX ADMIN — FUROSEMIDE 20 MG: 10 INJECTION, SOLUTION INTRAMUSCULAR; INTRAVENOUS at 13:26

## 2022-01-01 RX ADMIN — INSULIN GLARGINE 38 UNITS: 100 INJECTION, SOLUTION SUBCUTANEOUS at 22:28

## 2022-01-01 RX ADMIN — METOPROLOL SUCCINATE 50 MG: 25 TABLET, EXTENDED RELEASE ORAL at 09:36

## 2022-01-01 RX ADMIN — INSULIN ASPART 1 UNITS: 100 INJECTION, SOLUTION INTRAVENOUS; SUBCUTANEOUS at 18:44

## 2022-01-01 RX ADMIN — ASPIRIN 81 MG: 81 TABLET, CHEWABLE ORAL at 09:07

## 2022-01-01 RX ADMIN — ATORVASTATIN CALCIUM 80 MG: 40 TABLET, FILM COATED ORAL at 20:26

## 2022-01-01 RX ADMIN — NITROGLYCERIN 15 MG: 20 OINTMENT TOPICAL at 05:32

## 2022-01-01 RX ADMIN — NITROGLYCERIN 15 MG: 20 OINTMENT TOPICAL at 14:55

## 2022-01-01 RX ADMIN — ACETYLCYSTEINE 1200 MG: 200 SOLUTION ORAL; RESPIRATORY (INHALATION) at 20:27

## 2022-01-01 RX ADMIN — INSULIN ASPART 1 UNITS: 100 INJECTION, SOLUTION INTRAVENOUS; SUBCUTANEOUS at 18:03

## 2022-01-01 RX ADMIN — NITROGLYCERIN 15 MG: 20 OINTMENT TOPICAL at 00:22

## 2022-01-01 RX ADMIN — TICAGRELOR 90 MG: 90 TABLET ORAL at 10:07

## 2022-01-01 RX ADMIN — FUROSEMIDE 20 MG: 20 TABLET ORAL at 09:07

## 2022-01-01 RX ADMIN — IOPAMIDOL 75 ML: 755 INJECTION, SOLUTION INTRAVENOUS at 16:10

## 2022-01-01 RX ADMIN — NITROGLYCERIN 15 MG: 20 OINTMENT TOPICAL at 13:00

## 2022-01-01 ASSESSMENT — ACTIVITIES OF DAILY LIVING (ADL)
ADLS_ACUITY_SCORE: 23
ADLS_ACUITY_SCORE: 22
ADLS_ACUITY_SCORE: 23
ADLS_ACUITY_SCORE: 22
ADLS_ACUITY_SCORE: 23
ADLS_ACUITY_SCORE: 22
DEPENDENT_IADLS:: INDEPENDENT
ADLS_ACUITY_SCORE: 22

## 2022-01-01 ASSESSMENT — ENCOUNTER SYMPTOMS
SHORTNESS OF BREATH: 1
CONSTIPATION: 1

## 2022-01-04 ENCOUNTER — OFFICE VISIT (OUTPATIENT)
Dept: VASCULAR SURGERY | Facility: CLINIC | Age: 62
End: 2022-01-04
Attending: PODIATRIST
Payer: COMMERCIAL

## 2022-01-04 ENCOUNTER — DOCUMENTATION ONLY (OUTPATIENT)
Dept: VASCULAR SURGERY | Facility: CLINIC | Age: 62
End: 2022-01-04
Payer: COMMERCIAL

## 2022-01-04 ENCOUNTER — PREP FOR PROCEDURE (OUTPATIENT)
Dept: PODIATRY | Facility: CLINIC | Age: 62
End: 2022-01-04
Payer: COMMERCIAL

## 2022-01-04 VITALS
TEMPERATURE: 97.9 F | HEART RATE: 84 BPM | SYSTOLIC BLOOD PRESSURE: 140 MMHG | DIASTOLIC BLOOD PRESSURE: 72 MMHG | RESPIRATION RATE: 18 BRPM

## 2022-01-04 DIAGNOSIS — Z20.822 COVID-19 RULED OUT: ICD-10-CM

## 2022-01-04 DIAGNOSIS — M86.9 OSTEOMYELITIS OF ANKLE AND FOOT (H): Primary | ICD-10-CM

## 2022-01-04 DIAGNOSIS — E11.621 DIABETIC ULCER OF LEFT MIDFOOT ASSOCIATED WITH TYPE 2 DIABETES MELLITUS, WITH NECROSIS OF BONE (H): Primary | ICD-10-CM

## 2022-01-04 DIAGNOSIS — L97.424 DIABETIC ULCER OF LEFT MIDFOOT ASSOCIATED WITH TYPE 2 DIABETES MELLITUS, WITH NECROSIS OF BONE (H): Primary | ICD-10-CM

## 2022-01-04 PROBLEM — N18.31 CHRONIC KIDNEY DISEASE (CKD) STAGE G3A/A1, MODERATELY DECREASED GLOMERULAR FILTRATION RATE (GFR) BETWEEN 45-59 ML/MIN/1.73 SQUARE METER AND ALBUMINURIA CREATININE RATIO LESS THAN 30 MG/G (H): Status: ACTIVE | Noted: 2021-11-29

## 2022-01-04 PROBLEM — I48.91 ATRIAL FIBRILLATION (H): Status: ACTIVE | Noted: 2021-11-15

## 2022-01-04 PROBLEM — I25.10 ARTERIOSCLEROTIC HEART DISEASE: Status: ACTIVE | Noted: 2021-11-29

## 2022-01-04 PROBLEM — I21.4 ACUTE NON-ST SEGMENT ELEVATION MYOCARDIAL INFARCTION (H): Status: ACTIVE | Noted: 2022-01-04

## 2022-01-04 PROBLEM — S98.139A: Status: ACTIVE | Noted: 2022-01-04

## 2022-01-04 PROBLEM — K21.9 GASTROESOPHAGEAL REFLUX DISEASE WITHOUT ESOPHAGITIS: Status: ACTIVE | Noted: 2018-04-17

## 2022-01-04 PROBLEM — K59.00 CONSTIPATION: Status: ACTIVE | Noted: 2018-04-24

## 2022-01-04 PROCEDURE — 11044 DBRDMT BONE 1ST 20 SQ CM/<: CPT | Performed by: PODIATRIST

## 2022-01-04 PROCEDURE — 11047 DBRDMT BONE EACH ADDL: CPT | Performed by: PODIATRIST

## 2022-01-04 RX ORDER — CEFAZOLIN SODIUM 2 G/100ML
2 INJECTION, SOLUTION INTRAVENOUS
Status: CANCELLED | OUTPATIENT
Start: 2022-01-10

## 2022-01-04 RX ORDER — CEFAZOLIN SODIUM 2 G/100ML
2 INJECTION, SOLUTION INTRAVENOUS SEE ADMIN INSTRUCTIONS
Status: CANCELLED | OUTPATIENT
Start: 2022-01-10

## 2022-01-04 RX ORDER — CLINDAMYCIN HCL 300 MG
CAPSULE ORAL
COMMUNITY
End: 2022-01-06

## 2022-01-04 RX ORDER — CALCIUM CARBONATE 300MG(750)
TABLET,CHEWABLE ORAL
COMMUNITY
End: 2022-01-06

## 2022-01-04 ASSESSMENT — PAIN SCALES - GENERAL: PAINLEVEL: NO PAIN (0)

## 2022-01-04 NOTE — PROGRESS NOTES
FOOT AND ANKLE SURGERY/PODIATRY Progress Note      ASSESSMENT:   Ulceration left foot  DM2      TREATMENT:  -The distal aspect of the left foot ulceration has granular tissue, with significant non-viable tissue along the proximal wound with exposed non-viable bone.    -Based on the above, I recommend another surgical I&D to remove non-viable tissue and bone. This will likely remove more proximal bone with use of a wound vac post-op, possible additional surgery for skin grafting if pathology indicates a clean proximal margin.     -Discussed importance of good glycemic control.     -After discussion of risk factors and consent obtained 2% Lidocaine HCL jelly was applied, under clean conditions, the left and foot ulceration(s) were debrided using currette.  Devitalized and nonviable tissue, along with any fibrin and slough, was removed to improve granulation tissue formation, stimulate wound healing, decrease overall bacteria load, disrupt biofilm formation and decrease edge senescence. Wound drainage was scant No. Total excisional debridement was 54 sq cm into the bone with a depth of 2 cm.   Ulcers were improved afterwards and .  Measures were as noted on the flow sheet. A gauze dressing was applied. He will continue to apply a gauze dressing qoday. Hold wound vac. Continue with non-weight bearing.     -I will ask my office to coordinate surgery for either later this week or early next week.     Eddi Ram DPM  [facility] [place]      HPI: Norman SIMS Lauren was seen again today for a left foot ulceration. Since his last visit he continues to use the wound vac and believes his blood sugars are improving.       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     Procedure: first and second ray amputation;  Surgeon: Eddi Ram DPM;  Location: [facility] OR     CV CORONARY ANGIOGRAM N/A 11/16/2021    Procedure: CV CORONARY ANGIOGRAM;  Surgeon: Pam Tabares MD;  Location: [facility] CATH LAB CV     CV LEFT HEART CATH N/A 11/16/2021    Procedure: Left Heart Cath;  Surgeon: Pam Tabares MD;  Location: [facility] CATH LAB CV     INCISION AND DRAINAGE LOWER EXTREMITY, COMBINED Left 11/16/2021    Procedure: INCISION AND DRAINAGE, left foot;  Surgeon: Eddi Ram DPM;  Location: [facility] OR     INCISION AND DRAINAGE LOWER EXTREMITY, COMBINED Left 11/19/2021    Procedure: INCISION AND DRAINAGE, left foot;  Surgeon: Eddi Ram DPM;  Location: [facility] OR     INCISION AND DRAINAGE LOWER EXTREMITY, COMBINED Left 12/9/2021    Procedure: INCISION AND DRAINAGE, Left foot;  Surgeon: Eddi Ram DPM;  Location: [facility] OR       No Known Allergies      Current Outpatient Medications:      alcohol swab prep pads, Use to swab area of injection/matthew as directed., Disp: 100 each, Rfl: 3     alcohol swab prep pads, Use to swab area of injection/matthew as directed., Disp: 100 each, Rfl: 6     apixaban ANTICOAGULANT (ELIQUIS) 5 MG tablet, Take 1 tablet (5 mg) by mouth 2 times daily, Disp: 180 tablet, Rfl: 0     aspirin (ASA) 81 MG chewable tablet, Take 1 tablet (81 mg) by mouth daily, Disp: 30 tablet, Rfl: 0     atorvastatin (LIPITOR) 80 MG tablet, Take 1 tablet (80 mg) by mouth every evening, Disp: 30 tablet, Rfl: 1     blood glucose (NO BRAND SPECIFIED) test strip, Use to test blood sugar 4 times daily or as directed., Disp: 100 strip, Rfl: 3     blood glucose monitoring (SOFTCLIX) lancets, Use to test blood sugar 4 times daily., Disp: 100 each, Rfl: 6     clindamycin (CLEOCIN) 300 MG capsule, 2 capsules, Disp: , Rfl:      enoxaparin ANTICOAGULANT (LOVENOX) 40 MG/0.4ML syringe, 0.4 ml, Disp: , Rfl:      furosemide (LASIX) 20 MG  "tablet, Take 1 tablet (20 mg) by mouth daily, Disp: 30 tablet, Rfl: 1     insulin aspart (NOVOLOG FLEXPEN) 100 UNIT/ML pen, For  - 164 give 1 unit. For  - 189 give 2 units. For  - 214 give 3 units. For  - 239 give 4 units. For  - 264 give 5 units. For  - 289 give 6 units. For  - 314 give 7 units. For  - 339 give 8 units For  - 364 give 9 units For  - 389 give 10 units For  - 414 give 11 units For BG greater than or equal to 415 give 12 units, Disp: 3 mL, Rfl: 3     insulin glargine (LANTUS SOLOSTAR) 100 UNIT/ML pen, Inject 38 Units Subcutaneous every morning (Patient taking differently: Inject 38 Units Subcutaneous At Bedtime ), Disp: 3 mL, Rfl: 3     insulin pen needle (32G X 4 MM) 32G X 4 MM miscellaneous, Use BD Lizbeth pen needles daily or as directed., Disp: 100 each, Rfl: 3     Magnesium 400 MG TABS, 1 tab, Disp: , Rfl:      magnesium oxide (MAG-OX) 400 MG tablet, Take 1 tablet (400 mg) by mouth 2 times daily, Disp: 60 tablet, Rfl: 1     metoprolol succinate ER (TOPROL-XL) 50 MG 24 hr tablet, Take 1 tablet (50 mg) by mouth daily, Disp: 30 tablet, Rfl: 1     Multiple Vitamin (MULTI VITAMIN) TABS, 1 tab(s), Disp: , Rfl:      nitroGLYcerin (NITROSTAT) 0.4 MG sublingual tablet, One tablet under the tongue every 5 minutes if needed for chest pain. May repeat every 5 minutes for a maximum of 3 doses in 15 minutes\", Disp: 25 tablet, Rfl: 3     OMEGA-3/DHA/EPA/FISH OIL (FISH OIL-OMEGA-3 FATTY ACIDS) 300-1,000 mg capsule, Take 1 g by mouth daily , Disp: , Rfl:     Review of Systems - 10 point Review of Systems is negative except for left foot ulcer which is noted in HPI.      OBJECTIVE:  BP (!) 140/72   Pulse 84   Temp 97.9  F (36.6  C)   Resp 18   General appearance: Patient is alert and fully cooperative with history & exam.  No sign of distress is noted during the visit.    Vascular: Dorsalis pedis non-palpableLeft.  Dermatologic:    VASC Wound left " "foot (Active)       Incision/Surgical Site 11/16/21 Left Foot (Active)   Incision Assessment UTV 12/09/21 1730   Closure ZOYA 12/09/21 1621   Incision Drainage Amount UTV 11/22/21 1721   Drainage Description Serosanguinous 11/24/21 0345   Incision Care Therapy - negative pressure 12/09/21 1621   Dressing Intervention Clean, dry, intact 12/09/21 1844   Distal aspect of the left foot ulceration [id number], has granular tissue, with significant non-viable tissue along the proximal wound with exposed non-viable bone. No erythema left foot.   Neurologic: Diminished to light touch Left.  Musculoskeletal: Contracted digits noted Left.    Imaging:     POC US Guidance Needle Placement    Result Date: 12/9/2021  Ultrasound was performed as guidance to an anesthesia procedure.  Click \"PACS images\" hyperlink below to view any stored images.  For specific procedure details, view procedure note authored by anesthesia.         Picture:         "

## 2022-01-04 NOTE — PATIENT INSTRUCTIONS
"Important lnstructions    Stop downstairs & have an xray taken.        1. WEIGHT BEARING STATUS: You are to remain NON WEIGHT BEARING on your left foot. Non-weight bearing means NO PRESSURE on your foot or heel at any time for any reason.    2. OFFLOADING DEVICE: Must use a A WHEELCHAIR at all times! (do not use affected foot to push wheelchair)    3. STABILIZATION DEVICE: Use a Prafo boot . You will need to WEAR THIS AT ALL TIMES EVEN WHILE IN BED.     4. PROTEIN SUPPLEMENTS: Drink protein shakes 2x per day, morning and night (Ensure, Boost, Glucerna)     This is in addition to your normal diet    If you are on a renal diet, make sure to get a protein shake that is best suited for this diet. Please ask if you would like a referral to see a  Registered Dietician.     5. ELEVATE: Elevating your leg means laying with your head on a pillow and your foot ABOVE YOUR WAIST.     6. DO NOT MOVE YOUR FOOT.    There is a risk of worsening the wound or incision. To give yourself a higher chance of healing, please DO NOT swing foot back and forth and wiggle foot/toes especially when inside a stabilization device.        Dressing Change lnstructions             Hold wound vac until after surgery.     2 TIMES PER WEEK and as needed, Cleanse your left foot wound(s) with Normal saline.    Pat Dry with non-sterile gauze    Primary Dressing: Apply dry gauze into/onto the wounds    Secondary dressing: Cover with dry gauze    Secure with non-sterile roll gauze (4\" x 75\" roll) and tape (1\" roll tape) as needed; avoid adhesive directly on the skin    It IS NOT ok to get your wound wet in the bath or shower    SEEK MEDICAL CARE IF:    You have an increase in swelling, pain, or redness around the wound.    You have an increase in the amount of pus coming from the wound.    There is a bad smell coming from the wound.    The wound appears to be worsening/enlarging    You have a fever greater than 101.5 F      It is ok to continue current " wound care treatment/products for the next 2-3 days until new wound care supplies are ordered and arrive. If longer than this please contact our office at [phone].            Norman,    Your surgery with [facility] has been scheduled. Please read thoroughly and follow instructions.     Procedure:   Incision and drainage left foot  Procedure Date :   Monday, 1/10/21 in the afternoon  *A surgery nurse will call you 1-2 days before surgery to inform you of the time of arrival for surgery.  Surgeon:   Dr. Eddi Ram  Admission Type:   Outpatient  Procedure Location:   [facility]:  [address] [place] [id number] (phone: [phone], Fax: [phone])    Covid Test: SEE APPOINTMENTS  Post Operative Appointment: SEE APPOINTMENTS       Preparation Instructions to complete:    []  You will need a Pre-op Physical within 30 days before surgery with your primary care provider. This exam is required by the anesthesiologist to ensure a safe surgical experience.      Failure  to obtain your pre-op physical will lead to cancellation of your surgery    Call them right away to schedule this. Please ensure your Preoperative Physical is faxed to the Hospital (numbers listed above)    [] Preoperative Medication Instructions    We would like you to stop your anticoagulation medications 3-5 days before surgery HOWEVER contact your prescribing provider for instructions before discontinuing any medications. (Examples: Coumadin, Plavix, Xarelto, Eliquis, Pradaxa, Effient, Brilinta) If you are on Coumadin, we would like the goal INR ? 1.2.    IT IS OK TO STAY ON ASPIRIN PRIOR TO SURGERY.     Hold Ibuprofen, Herbal Supplements and Vitamin E 7 days before    Stop Cialis, Levitra and Viagra 2-3 days before surgery    [] Fasting Requirements    Nothing to eat for 8 hours before surgery unless instructed differently by the surgery nurse.    You may have clear liquids up to two  "hours before your arrival time (coffee, tea, water, or Gatorade. No cream or milk)    If your primary care provider has instructed you to continue oral medications, you may take them on the morning of surgery with a small sip of water.      No alcohol or smoking after 12:00am the day of surgery    [] PCR COVID-19 test is required within 4 days of surgery    If your test is positive or you fail to get tested, your surgery will be postponed.     [] Contact your insurance regarding coverage    If you would like a Good Meenakshi Estimate for your upcoming procedure at [facility] Location, contact Cost of Care Estimates     Advocates are available Monday through Friday 8am - 5pm   [phone]    You may also submit a request online at [email or web address]  - Complete the secure online form found under \"Services and Procedure Pricing\"     If your procedure is at [facility] please contact the numbers below for Cost of Care Estimates.   - Facility Charge: [phone]    Anesthesiology charge:  [phone]      [] DO NOT BRING FMLA WITH TO SURGERY.  These should be sent to the provider's office by fax to [phone].     [] Day of Surgery    Medications - Take as indicated with sips of water.     Wear comfortable loose fitting clothes. Wear your glasses-Not contacts. Do not wear jewelry and remove body piercing's. Surgery may be cancelled if they are not removed.     You may have 1 family member wait in the lobby during your surgery. Visitor restrictions are subject to change. Please verify with the surgery nurse when they call.     If same day surgery-Have a someone come with you to surgery that can help you understand the surgeon's instructions, drive you home and stay with you overnight the first night.    [] If the community sees a new COVID-19 surge, your procedure may need to be postponed. We will contact you if this happens.    [] You will receive a call from a surgery nurse 1-3 days " prior to surgery. They will go over more details with you.                                ** AFTER SURGERY INSTRUCTIONS **                          [] You are to remain non-weight bearing on your left foot Non-weight bearing means NO PRESSURE on your foot or heel at any time for any reason.    [] Prior to surgery you should already have a PRAFO BOOT which you will need to WEAR THIS AT ALL TIMES EVEN WHILE IN BED Please bring this with you on the day of surgery.    [] You should already have a A WHEELCHAIR to help you ambulate after surgery. Please also bring this with you on the day of surgery.    [] During surgery Dr. Ram will apply a gauze dressing to your foot. Follow the after surgery instructions regarding wound care.  More than likely you will need your wound vac re-applied by home care after surgery.    [] It is NOT OKAY to get your surgical site wet while bathing, you will need to purchase a cast cover to protect your foot from getting wet. You can purchase this at [facility] or your local pharmacy.    [] It is important that you elevate your affected foot after surgery. Proper elevation is raising your foot above your waist. The fluid in your lower extremities needs to get back to your heart so it can get pumped to your kidneys and expelled through urination. So if you notice you have to go to the bathroom more frequently when you are elevating your leg this is a good sign that it is working.     [] It is important that you increase your protein intake after surgery. Protein is essential for wound healing. We recommend you take a protein supplement twice per day. This is in addition to your normal diet. Examples of protein supplements are Ensure, Boost, Glucerna (if you are diabetic), or protein powder. You can purchase these at your local retailer or grocery store.       Notify our office right away, if you have any changes in your health status, or if you develop a cold, flu, diarrhea,  infection, fever or sore throat before your scheduled surgery date. We can be reached at [phone] Monday-Friday 8 am-4:30 pm if you have any questions.     Thank you for trusting us with your care!        Before Your Procedure or Hospital Admission  Testing for COVID-19 (Coronavirus)    Thank you for choosing [facility] for your health care needs. The COVID-19 pandemic is a very challenging time for everyone. Our goal is to keep you and our team here at [facility] safe and healthy.       Before you come in, All patients must get tested for COVID-19. Your test needs to happen 2 to 4 days before you check in to the hospital or surgery site.    A clinic scheduler will call about a week in advance to set up a testing time at one of our labs. We ll take a swab of your nose or throat.    Note: If you go to a clinic or pharmacy like [facility] or [facility] for your test, make sure you get a test inside the nose. Tests inside the nose are:  - A naso-pharyngeal (NP) RT-PCR test  - An anterior nares RT-PCR test    Do NOT get a  rapid  test or a saliva (spit) test.    After the test, please stay at home and stay out of contact with other people. It will be harder for you to recover if you get COVID-19 before your treatment.    Please follow all current safety guidelines, including:    Limit trips outside your home.    Limit the number of people you see.    Always wear a mask outside your home.    Use social distancing. Stay 6 feet away from others whenever you can.    Wash your hands often.    If your test shows you have COVID-19  If your test is positive, we ll let you know. A positive test means that you have the virus.  We ll probably have to postpone your admission, surgery or procedure. Your doctor will discuss this with you. After that, we ll let you know what to do and when you can re-schedule.  We may need to cancel your treatment on short notice for other reasons, too.    If your test shows you DON T have  COVID-19  Even if your test is negative, you can still get COVID-19. It s rare but, sometimes, the test result is wrong. You could also catch the virus after taking the test.  There s a very small chance that you could catch COVID-19 in the hospital or surgery center.    Day of your surgery or procedure    Please come wearing a face covering that covers both your nose and mouth.    When you arrive, we ll ask you some questions to find out if you have any signs or symptoms of COVID-19.    Ask your care team if you can have visitors. All visitors must wear face coverings and will be screened for signs of COVID-19.    Even if no visitors are allowed, you can still have with you:  - Your legal guardian or legal decision maker  - A parent and one other visitor, if you are  younger than 18 years old  - A partner and a , if you are in labor    We might need to teach you about taking care of yourself after surgery. If so, a visitor can come into the hospital to learn about it, too.    The rules for visitors change often, depending on how much the virus is spreading. To learn more, see Visiting a Loved One in the Hospital during the COVID-19 Outbreak. Please call your care team, hospital or surgery center if you have any questions. We thank you for your understanding and for choosing [facility] for your care.    Questions and Answers  Does it matter where I get tested for COVID-19?  Yes. We urge you to get tested at one of our [facility] COVID-19 testing sites. We process these tests in our lab and can get the results quickly. Your [facility] care team needs to get your results before you check in.    What should I do if I can t get tested at [facility]?  You can get tested somewhere else, but you ll need to take these extra steps:  1. Contact your family doctor or clinic to arrange your test.  2. Take the test within 4 days of your surgery or procedure. We can t accept tests older than 4  days.  3. Make sure you re getting a test inside the nose.  Tests inside the nose are:  - A naso-pharyngeal (NP) RT-PCR test  - An anterior nares RT-PCR test  -Many pharmacies use  rapid  tests or saliva (spit) tests. We do NOT accept those tests before surgery or procedures. Tests from inside the nose are the most accurate tests.  4. Make sure your doctor or clinic faxes your results to [facility] at [phone].    If we don t get your results in time, we may have to delay or cancel your treatment.      For informational purposes only. Not to replace the advice of your health care provider. Copyright   2020 [facility]. All rights reserved. Clinically reviewed by Infection Prevention and the [facility] COVID-19 Clinical Team.  Plivo [id number] - Rev 09/09/21.       Apply wound vac to left foot within 24 hours post surgery scheduled for 1/10/22.  - Wound Vac Instructions    1. 3x weekly and as needed cleanse the area with NS    2. Pat dry    3. Apply Cavilon no sting barrier wipe to the skin surrounding the wound to protect from drainage/maceration    4. Apply drape around incision. Cut strip of drape and apply to skin if bridging is needed; plan this area in advance; should not be over bony prominence     5. Cut the foam to fit the area of the incision    6. Cut narrow strip of foam if bridging    7. Cover foam with drape to obtain air tight seal    8. Cut opening the size of a quarter for where the suction pad will be applied    9. Apply Suction pad    10. 125mmHG suction continuous    [facility] Contact [facility] can be reached at [phone], 24 hours a day 7 days a week     - Back up plan in place:     If the negative pressure wound therapy malfunctions or unable to maintain seal: dressing must be removed and reapplied within 2 hours of the incident. If unable to reapply negative pressure wound dressing, place Normal Saline moistened gauze in wound bed and cover with appropriate dressing  to keep wound bed moist.  Change wet-to-dry dressing two times a day until healthcare staff can re-implement negative pressure therapy. Change canister at least weekly.  [facility] Contact Center can be reached at [phone], 24 hours a day 7 days a week    Information on Vacuum-Assisted Closure of a Wound  Vacuum-assisted closure (VAC) of a wound is a type of treatment to help wounds heal. It s also known as negative pressure wound therapy. During the treatment, a device lowers air pressure on the wound. This can help the wound heal more quickly.  Understanding the wound VAC system  A wound VAC system has several parts. A foam or gauze dressing is put directly on the wound. The dressing is changed every 24 to 72 hours. An adhesive film covers and seals the dressing and wound. A drainage tube leads from under the adhesive film and connects to a portable vacuum pump. This pump removes air pressure over the wound. It may do this constantly. Or it may do it in cycles. During the treatment, you ll need to carry the portable pump everywhere you go.  Why wound VAC is used  You might need this therapy for a recent traumatic wound. Or you may need it for a chronic wound. This is a wound that does not heal the way it should over time. This can happen with wounds in people who have diabetes. You may need a wound VAC if you ve had a recent skin graft. And you may need a wound VAC for a large wound. Large wounds can take a longer time to heal.  A wound vacuum system may help your wound heal more quickly by:    Draining extra fluid from the wound    Reducing swelling    Reducing bacteria in the wound    Keeping your wound moist and warm    Helping draw together wound edges    Increasing blood flow to your wound    Decreasing inflammation  Wound VAC offers some other advantages over other types of wound care. It may decrease your overall discomfort. The dressings usually need to be changed less often. And they may be easier to keep  in position.  Risks of wound VAC  Wound VAC has some rare risks, such as:    Bleeding (which may be severe)    Wound infection    An abnormal connection between the intestinal tract and the skin (enteric fistula)  Proper training in dressing changes can help reduce the risk for these complications. Also, your doctor will carefully evaluate you to make sure you are a good candidate for the therapy. Certain problems can increase your risk for complications. These include:    Exposed organs or blood vessels    High risk of bleeding from another medical problem    Wound infection    Nearby bone infection    Dead wound tissue    Cancer tissue    Fragile skin, such as from aging or longtime use of topical steroids    Allergy to adhesive    Very poor blood flow to your wound    Wounds close to joints that may reopen because of movement  Your doctor will discuss the risks that apply to you. Make sure to talk with him or her about all of your questions and concerns.  Getting ready for wound VAC  You likely won t need to do much to get ready for wound VAC. In some cases, you may need to wait a while before having this therapy. For example, your doctor may first need to treat an infection in your wound. Dead or damaged tissue may also need to be removed from your wound.  You or a caregiver may need training on how to use the wound VAC device. This is done if you will be able to have your wound vacuum therapy at home. In other cases, you may need to have your wound vacuum therapy in a health care facility.  On the day of your procedure  A health care provider will cover your wound with foam or gauze wound dressing. An adhesive film will be put over the dressing and wound. This seals the wound. The foam connects to a drainage tube, which leads to a vacuum pump. This pump is portable. When the pump is turned on, it draws fluid through the foam and out the drainage tubing. The pump may run constantly, or it may cycle off and on.  Your exact setup will depend on the specific type of wound vacuum system that you use.  Managing your wound  You may need the dressing changed about once a day. You may need it changed more or less often, depending on your wound. You or your caregiver may be trained to do this at home. Or it may be done by a visiting health care provider. Your doctor may prescribe a pain medicine. This is to prevent or reduce pain during the dressing change.  You will likely need to use the wound VAC system for several weeks or months. During this time, you ll carry the portable pump everywhere you go.  Nutrition for wound healing  During this time, make sure you follow a healthy diet. This is needed so the wound can heal and to prevent infection. Your doctor can tell you more about what to include in your diet during this time.  follow up with your doctor if you have a medical condition that led to your wound, such as diabetes. He or she can help you prevent future wounds.  Follow-up care  Your doctor will carefully keep track of your healing. Make sure to keep all follow-up appointments.  When to call your health care provider  Call your health care provider right away if you have any of these:    Fever of 100.4 F (38.0 C) or higher    Increased redness, swelling, or warmth around wound    Increased pain    Bright red blood or blood clots in tubing or the collection chamber of the vacuum

## 2022-01-04 NOTE — PROGRESS NOTES
Surgery instructions given to patient in clinic 1/4/22.    Surgery Scheduled      Surgery/Procedure: Incision and drainage left foot    Special Equipment: pulse lavage    Location: [facility]    Date: 1/10/22    Time: 2:00 PM     Outpatient    Surgeon: Dr. Ram    OR Confirmed/ :  Yes with Liliane on 1/4/22    Orders In:  Yes      Post Op: 1/19/22    Covid Scheduled: 1/6/22    Wound Vac Needed: patient already has    Home Care Needed: [facility]       Blood Thinners Addressed: message sent to PCP requesting ok to hold Eliquis 3 days prior and was bridged with lovenox for last surgery, asked PCP to manage

## 2022-01-06 ENCOUNTER — LAB (OUTPATIENT)
Dept: LAB | Facility: CLINIC | Age: 62
End: 2022-01-06
Attending: PODIATRIST

## 2022-01-06 ENCOUNTER — ANCILLARY PROCEDURE (OUTPATIENT)
Dept: RADIOLOGY | Facility: CLINIC | Age: 62
End: 2022-01-06
Attending: PODIATRIST
Payer: COMMERCIAL

## 2022-01-06 ENCOUNTER — TELEPHONE (OUTPATIENT)
Dept: CARDIOLOGY | Facility: CLINIC | Age: 62
End: 2022-01-06

## 2022-01-06 ENCOUNTER — OFFICE VISIT (OUTPATIENT)
Dept: CARDIOLOGY | Facility: CLINIC | Age: 62
End: 2022-01-06
Payer: COMMERCIAL

## 2022-01-06 VITALS
RESPIRATION RATE: 16 BRPM | BODY MASS INDEX: 27.97 KG/M2 | DIASTOLIC BLOOD PRESSURE: 72 MMHG | HEART RATE: 96 BPM | SYSTOLIC BLOOD PRESSURE: 136 MMHG | WEIGHT: 212 LBS

## 2022-01-06 DIAGNOSIS — I25.5 ISCHEMIC CARDIOMYOPATHY: ICD-10-CM

## 2022-01-06 DIAGNOSIS — I50.20 HEART FAILURE WITH REDUCED EJECTION FRACTION (H): ICD-10-CM

## 2022-01-06 DIAGNOSIS — I48.0 PAROXYSMAL ATRIAL FIBRILLATION (H): ICD-10-CM

## 2022-01-06 DIAGNOSIS — L97.424 DIABETIC ULCER OF LEFT MIDFOOT ASSOCIATED WITH TYPE 2 DIABETES MELLITUS, WITH NECROSIS OF BONE (H): ICD-10-CM

## 2022-01-06 DIAGNOSIS — I21.4 NSTEMI (NON-ST ELEVATED MYOCARDIAL INFARCTION) (H): ICD-10-CM

## 2022-01-06 DIAGNOSIS — Z20.822 COVID-19 RULED OUT: ICD-10-CM

## 2022-01-06 DIAGNOSIS — I25.10 CORONARY ARTERY DISEASE INVOLVING NATIVE CORONARY ARTERY OF NATIVE HEART WITHOUT ANGINA PECTORIS: Primary | ICD-10-CM

## 2022-01-06 DIAGNOSIS — I10 BENIGN ESSENTIAL HYPERTENSION: ICD-10-CM

## 2022-01-06 DIAGNOSIS — E78.2 MIXED HYPERLIPIDEMIA: ICD-10-CM

## 2022-01-06 DIAGNOSIS — E11.621 DIABETIC ULCER OF LEFT MIDFOOT ASSOCIATED WITH TYPE 2 DIABETES MELLITUS, WITH NECROSIS OF BONE (H): ICD-10-CM

## 2022-01-06 LAB
ANION GAP SERPL CALCULATED.3IONS-SCNC: 9 MMOL/L (ref 5–18)
BUN SERPL-MCNC: 22 MG/DL (ref 8–22)
CALCIUM SERPL-MCNC: 10 MG/DL (ref 8.5–10.5)
CHLORIDE BLD-SCNC: 99 MMOL/L (ref 98–107)
CO2 SERPL-SCNC: 30 MMOL/L (ref 22–31)
CREAT SERPL-MCNC: 1.64 MG/DL (ref 0.7–1.3)
GFR SERPL CREATININE-BSD FRML MDRD: 47 ML/MIN/1.73M2
GLUCOSE BLD-MCNC: 204 MG/DL (ref 70–125)
MAGNESIUM SERPL-MCNC: 2 MG/DL (ref 1.8–2.6)
POTASSIUM BLD-SCNC: 4.5 MMOL/L (ref 3.5–5)
SARS-COV-2 RNA RESP QL NAA+PROBE: NEGATIVE
SODIUM SERPL-SCNC: 138 MMOL/L (ref 136–145)

## 2022-01-06 PROCEDURE — 80048 BASIC METABOLIC PNL TOTAL CA: CPT | Performed by: INTERNAL MEDICINE

## 2022-01-06 PROCEDURE — U0003 INFECTIOUS AGENT DETECTION BY NUCLEIC ACID (DNA OR RNA); SEVERE ACUTE RESPIRATORY SYNDROME CORONAVIRUS 2 (SARS-COV-2) (CORONAVIRUS DISEASE [COVID-19]), AMPLIFIED PROBE TECHNIQUE, MAKING USE OF HIGH THROUGHPUT TECHNOLOGIES AS DESCRIBED BY CMS-2020-01-R: HCPCS

## 2022-01-06 PROCEDURE — U0005 INFEC AGEN DETEC AMPLI PROBE: HCPCS

## 2022-01-06 PROCEDURE — 83735 ASSAY OF MAGNESIUM: CPT | Performed by: INTERNAL MEDICINE

## 2022-01-06 PROCEDURE — 99215 OFFICE O/P EST HI 40 MIN: CPT | Performed by: INTERNAL MEDICINE

## 2022-01-06 PROCEDURE — 36415 COLL VENOUS BLD VENIPUNCTURE: CPT | Performed by: INTERNAL MEDICINE

## 2022-01-06 NOTE — PROGRESS NOTES
Assessment/Plan:   1.  Multiple vessel coronary artery disease s/p NSTEMI: The patient has coronary angiogram which was reported multiple vessel coronary artery disease.  No chest pain.  CV surgical service is consulted.  Plan to have CABG in 2 to 3 months.  Continue metoprolol succinate to 50 mg daily, Continue aspirin 81 mg daily, atorvastatin 80 mg daily.     2.  Ischemic cardiomyopathy, LVEF 30 to 35%, chronic systolic CHF: The patient has no shortness of breath, no leg edema. His weight has been stable.   Continue metoprolol succinate 50 mg daily.    The patient is off lisinopril due to GI side effect. We discussed the low-dose of ACE inhibitor or ARB's. The patient has no interested.  Continue Lasix 20 mg daily.  Routine labs BMP and magnesium level. He has been taking magnesium due to hypomagnesemia.     3.  Paroxysmal atrial fibrillation:  He is in sinus rhythm.  Continue metoprolol XL 50 mg daily.  His [id number] score is 4 due to HTN, CAD, DM II and CHF.   Continue Eliquis 5 mg twice a day.     4.  Primary hypertension: His blood pressure is controlled with metoprolol XL 50 mg daily.     5.  Dyslipidemia: Continue Lipitor to 80 mg at bedtime.  Repeat lipid profile and liver function in 2 months since November 15.     6.  Right leg DVT: He is on Eliquis 5 mg twice a day.     7.  Diabetic left foot, left great toe gangrene: Follow-up with podiatric team management.    Thank you for the opportunity to be involved in the care of Norman Aguilar. If you have any questions, please feel free to contact me.  I will see the patient again in 3 months and as needed.    Much or all of the text in this note was generated through the use of Dragon Dictate voice-to-text software. Errors in spelling or words which seem out of context are unintentional. Sound alike errors, in particular, may have escaped editing.       History of Present Illness:   It is my pleasure to see Norman Aguilar at the  [facility] for routine cardiology follow up post hospital discharge.  Norman Aguilar is a 61 year old male with a medical history of severe multiple vessel coronary artery disease s/p non-STEMI, ischemic cardiomyopathy, chronic systolic congestive heart failure LVEF 30 to 35%, benign essential hypertension, dyslipidemia, stage III CKD, insulin-dependent diabetes, paroxysmal atrial fibrillation.    The patient had left foot wound surgery in November. Post surgery, he developed non-ST elevation MI. Subsequently, he had a coronary angiogram which demonstrated multiple vessel disease. The patient was referred to CABG. However, the patient had wound infection, and also developed acute DVT in right leg post wound surgery. His CABG was postponed.    Since hospital discharge, he had no chest pain, shortness of breath, palpitations, dizziness, orthopnea, PND or leg edema. He said he monitor his weight at home, stable. His blood pressure is controlled. The patient did not take lisinopril since hospital discharge. He states that he has GI side effect from lisinopril.    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)     Gastro-esophageal reflux disease with esophagitis     Gastroesophageal reflux disease without esophagitis     Hypercalcemia     Long term (current) use of oral hypoglycemic drugs     Microalbuminuria     Mixed hyperlipidemia     Polyp of colon     Hypocalcemia     Atrial fibrillation (H)     Hyperglycemia     Wound infection     Diabetic ketoacidosis without coma associated with type 2 diabetes mellitus (H)     Ischemic cardiomyopathy     Heart failure with reduced ejection fraction (H)     Arteriosclerotic heart disease     DVT (deep venous thrombosis)  (H)     Chronic kidney disease (CKD) stage G3a/A1, moderately decreased glomerular filtration rate (GFR) between 45-59 mL/min/1.73 square meter and albuminuria creatinine ratio less than 30 mg/g (H)     Diabetic ulcer of left midfoot associated with type 2 diabetes mellitus, with necrosis of bone (H)     Constipation     Acute non-ST segment elevation myocardial infarction (H)     Traumatic amputation of toe or toes without complication (H)       Past Surgical History:     Past Surgical History:   Procedure Laterality Date     AMPUTATE TOE(S) Left 11/16/2021    Procedure: first and second ray amputation;  Surgeon: Eddi Ram DPM;  Location: [facility] [place] OR     APPENDECTOMY       CV CORONARY ANGIOGRAM N/A 11/16/2021    Procedure: CV CORONARY ANGIOGRAM;  Surgeon: Pam Tabares MD;  Location: [facility] CATH LAB CV     CV LEFT HEART CATH N/A 11/16/2021    Procedure: Left Heart Cath;  Surgeon: Pam Tabares MD;  Location: [facility] CATH LAB CV     INCISION AND DRAINAGE LOWER EXTREMITY, COMBINED Left 11/16/2021    Procedure: INCISION AND DRAINAGE, left foot;  Surgeon: Eddi Ram DPM;  Location: [facility] [place] OR     INCISION AND DRAINAGE LOWER EXTREMITY, COMBINED Left 11/19/2021    Procedure: INCISION AND DRAINAGE, left foot;  Surgeon: Eddi Ram DPM;  Location: [facility] [place] OR     INCISION AND DRAINAGE LOWER EXTREMITY, COMBINED Left 12/9/2021    Procedure: INCISION AND DRAINAGE, Left foot;  Surgeon: Eddi Ram DPM;  Location: [facility] [place] OR       Family History:   No family history on file.     Social History:    reports that he has been smoking cigars. He has never used smokeless tobacco. He reports current alcohol use. He reports that he does not use drugs.    Review of Systems:   12 systems are reviewed negative except for in HPI.    Meds:     Current Outpatient Medications:      apixaban ANTICOAGULANT (ELIQUIS) 5 MG tablet, Take 1 tablet (5 mg) by mouth 2 times  daily, Disp: 180 tablet, Rfl: 0     aspirin (ASA) 81 MG chewable tablet, Take 1 tablet (81 mg) by mouth daily, Disp: 30 tablet, Rfl: 0     atorvastatin (LIPITOR) 80 MG tablet, Take 1 tablet (80 mg) by mouth every evening, Disp: 30 tablet, Rfl: 1     blood glucose (NO BRAND SPECIFIED) test strip, Use to test blood sugar 4 times daily or as directed., Disp: 100 strip, Rfl: 3     blood glucose monitoring (SOFTCLIX) lancets, Use to test blood sugar 4 times daily., Disp: 100 each, Rfl: 6     furosemide (LASIX) 20 MG tablet, Take 1 tablet (20 mg) by mouth daily, Disp: 30 tablet, Rfl: 1     insulin aspart (NOVOLOG FLEXPEN) 100 UNIT/ML pen, For  - 164 give 1 unit. For  - 189 give 2 units. For  - 214 give 3 units. For  - 239 give 4 units. For  - 264 give 5 units. For  - 289 give 6 units. For  - 314 give 7 units. For  - 339 give 8 units For  - 364 give 9 units For  - 389 give 10 units For  - 414 give 11 units For BG greater than or equal to 415 give 12 units, Disp: 3 mL, Rfl: 3     insulin glargine (LANTUS SOLOSTAR) 100 UNIT/ML pen, Inject 38 Units Subcutaneous every morning (Patient taking differently: Inject 38 Units Subcutaneous At Bedtime ), Disp: 3 mL, Rfl: 3     magnesium oxide (MAG-OX) 400 MG tablet, Take 1 tablet (400 mg) by mouth 2 times daily, Disp: 60 tablet, Rfl: 1     metoprolol succinate ER (TOPROL-XL) 50 MG 24 hr tablet, Take 1 tablet (50 mg) by mouth daily, Disp: 30 tablet, Rfl: 1     Multiple Vitamin (MULTI VITAMIN) TABS, 1 tab(s), Disp: , Rfl:      OMEGA-3/DHA/EPA/FISH OIL (FISH OIL-OMEGA-3 FATTY ACIDS) 300-1,000 mg capsule, Take 1 g by mouth daily , Disp: , Rfl:     Allergies:   Patient has no known allergies.      Objective:      Physical Exam  96.2 kg (212 lb)     Body mass index is 27.97 kg/m .  /72   Pulse 96   Resp 16   Wt 96.2 kg (212 lb)   BMI 27.97 kg/m      General Appearance:   Awake, Alert, No acute distress.   HEENT:   Pupil equal and reactive to light. No scleral icterus; the mucous membranes were moist.   Neck: No cervical bruits. No JVD. No thyromegaly.     Chest: The spine was straight. The chest was symmetric.   Lungs:   Respirations unlabored; Lungs are clear to auscultation. No crackles. No wheezing.   Cardiovascular:   Regular rhythm and rate, normal first and second heart sounds with no murmurs. No rubs or gallops.    Abdomen:  Soft. No tenderness. Non-distended. Bowels sounds are present   Extremities: Equal tibial pulses. No leg edema. Left ankle edema.   Skin: No rashes or ulcers. Warm, Dry.   Musculoskeletal: No tenderness. No deformity.   Neurologic: Mood and affect are appropriate. No focal deficits.         EKG: Personally reviewed  Sinus rhythm   Low voltage QRS   Cannot rule out Anterior infarct (cited on or before 15-NOV-2021)   Abnormal ECG   When compared with ECG of 15-NOV-2021 07:26,   No significant change was found     Cardiac Imaging Studies  ECHO on 11-:  The left ventricle is normal in size.  Left ventricular function is decreased. The ejection fraction is 30-35%  (moderately reduced).  Left ventricular diastolic function is abnormal.  Diastolic Doppler findings (E/E' ratio and/or other parameters) suggest left  ventricular filling pressures are increased.  The left atrium is mildly dilated.  The mitral valve leaflets are mildly thickened.  There is mild (1+) mitral regurgitation.  IVC diameter >2.1 cm collapsing <50% with sniff suggests a high RA pressure  estimated at 15 mmHg or greater.    Coronary angiogram on 11-:    Severe, diffuse coronary calcification with diffusely small distal coronaries consistent with severe diabetic vasculopathy.    Severe proximal, mid and apical LAD disease. The first diagonal is occluded and is weakly collateralized.     of the proximal circumflex with right to left collateral filling of OM-2 and OM-3    Severe distal RCA disease before the bifurcation  and moderate ostial PDA disease.    LV EDP moderately elevated at 16 mmHg.    Lab Review   Lab Results   Component Value Date     12/14/2021    CO2 26 12/14/2021    BUN 19 12/14/2021     Lab Results   Component Value Date    WBC 6.7 11/23/2021    HGB 9.9 11/23/2021    HCT 31.1 11/23/2021    MCV 90 11/23/2021     11/23/2021     Lab Results   Component Value Date    CHOL 180 11/17/2021    CHOL 189 11/16/2021    CHOL 187 10/31/2019     Lab Results   Component Value Date    HDL 30 (L) 11/17/2021    HDL 34 (L) 11/16/2021    HDL 50 10/31/2019     No components found for: LDLCALC  Lab Results   Component Value Date    TRIG 122 11/17/2021    TRIG 163 (H) 11/16/2021    TRIG 152 (H) 10/31/2019     Lab Results   Component Value Date    TROPONINI 13.42 11/15/2021

## 2022-01-06 NOTE — H&P (VIEW-ONLY)
Assessment/Plan:   1.  Multiple vessel coronary artery disease s/p NSTEMI: The patient has coronary angiogram which was reported multiple vessel coronary artery disease.  No chest pain.  CV surgical service is consulted.  Plan to have CABG in 2 to 3 months.  Continue metoprolol succinate to 50 mg daily, Continue aspirin 81 mg daily, atorvastatin 80 mg daily.     2.  Ischemic cardiomyopathy, LVEF 30 to 35%, chronic systolic CHF: The patient has no shortness of breath, no leg edema. His weight has been stable.   Continue metoprolol succinate 50 mg daily.    The patient is off lisinopril due to GI side effect. We discussed the low-dose of ACE inhibitor or ARB's. The patient has no interested.  Continue Lasix 20 mg daily.  Routine labs BMP and magnesium level. He has been taking magnesium due to hypomagnesemia.     3.  Paroxysmal atrial fibrillation:  He is in sinus rhythm.  Continue metoprolol XL 50 mg daily.  His [id number] score is 4 due to HTN, CAD, DM II and CHF.   Continue Eliquis 5 mg twice a day.     4.  Primary hypertension: His blood pressure is controlled with metoprolol XL 50 mg daily.     5.  Dyslipidemia: Continue Lipitor to 80 mg at bedtime.  Repeat lipid profile and liver function in 2 months since November 15.     6.  Right leg DVT: He is on Eliquis 5 mg twice a day.     7.  Diabetic left foot, left great toe gangrene: Follow-up with podiatric team management.    Thank you for the opportunity to be involved in the care of Norman Aguilar. If you have any questions, please feel free to contact me.  I will see the patient again in 3 months and as needed.    Much or all of the text in this note was generated through the use of Dragon Dictate voice-to-text software. Errors in spelling or words which seem out of context are unintentional. Sound alike errors, in particular, may have escaped editing.       History of Present Illness:   It is my pleasure to see Norman Aguilar at the  [facility] for routine cardiology follow up post hospital discharge.  Norman Aguilar is a 61 year old male with a medical history of severe multiple vessel coronary artery disease s/p non-STEMI, ischemic cardiomyopathy, chronic systolic congestive heart failure LVEF 30 to 35%, benign essential hypertension, dyslipidemia, stage III CKD, insulin-dependent diabetes, paroxysmal atrial fibrillation.    The patient had left foot wound surgery in November. Post surgery, he developed non-ST elevation MI. Subsequently, he had a coronary angiogram which demonstrated multiple vessel disease. The patient was referred to CABG. However, the patient had wound infection, and also developed acute DVT in right leg post wound surgery. His CABG was postponed.    Since hospital discharge, he had no chest pain, shortness of breath, palpitations, dizziness, orthopnea, PND or leg edema. He said he monitor his weight at home, stable. His blood pressure is controlled. The patient did not take lisinopril since hospital discharge. He states that he has GI side effect from lisinopril.    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)     Gastro-esophageal reflux disease with esophagitis     Gastroesophageal reflux disease without esophagitis     Hypercalcemia     Long term (current) use of oral hypoglycemic drugs     Microalbuminuria     Mixed hyperlipidemia     Polyp of colon     Hypocalcemia     Atrial fibrillation (H)     Hyperglycemia     Wound infection     Diabetic ketoacidosis without coma associated with type 2 diabetes mellitus (H)     Ischemic cardiomyopathy     Heart failure with reduced ejection fraction (H)     Arteriosclerotic heart disease     DVT (deep venous thrombosis)  (H)     Chronic kidney disease (CKD) stage G3a/A1, moderately decreased glomerular filtration rate (GFR) between 45-59 mL/min/1.73 square meter and albuminuria creatinine ratio less than 30 mg/g (H)     Diabetic ulcer of left midfoot associated with type 2 diabetes mellitus, with necrosis of bone (H)     Constipation     Acute non-ST segment elevation myocardial infarction (H)     Traumatic amputation of toe or toes without complication (H)       Past Surgical History:     Past Surgical History:   Procedure Laterality Date     AMPUTATE TOE(S) Left 11/16/2021    Procedure: first and second ray amputation;  Surgeon: Eddi Ram DPM;  Location: [facility] OR     APPENDECTOMY       CV CORONARY ANGIOGRAM N/A 11/16/2021    Procedure: CV CORONARY ANGIOGRAM;  Surgeon: Pam Tabares MD;  Location: [facility] CATH LAB CV     CV LEFT HEART CATH N/A 11/16/2021    Procedure: Left Heart Cath;  Surgeon: Pam Tabares MD;  Location: [facility] CATH LAB CV     INCISION AND DRAINAGE LOWER EXTREMITY, COMBINED Left 11/16/2021    Procedure: INCISION AND DRAINAGE, left foot;  Surgeon: Eddi Ram DPM;  Location: [facility] OR     INCISION AND DRAINAGE LOWER EXTREMITY, COMBINED Left 11/19/2021    Procedure: INCISION AND DRAINAGE, left foot;  Surgeon: Eddi Ram DPM;  Location: [facility] OR     INCISION AND DRAINAGE LOWER EXTREMITY, COMBINED Left 12/9/2021    Procedure: INCISION AND DRAINAGE, Left foot;  Surgeon: Eddi Ram DPM;  Location: [facility] OR       Family History:   No family history on file.     Social History:    reports that he has been smoking cigars. He has never used smokeless tobacco. He reports current alcohol use. He reports that he does not use drugs.    Review of Systems:   12 systems are reviewed negative except for in HPI.    Meds:     Current Outpatient Medications:      apixaban ANTICOAGULANT (ELIQUIS) 5 MG tablet, Take 1 tablet (5 mg) by mouth 2 times  daily, Disp: 180 tablet, Rfl: 0     aspirin (ASA) 81 MG chewable tablet, Take 1 tablet (81 mg) by mouth daily, Disp: 30 tablet, Rfl: 0     atorvastatin (LIPITOR) 80 MG tablet, Take 1 tablet (80 mg) by mouth every evening, Disp: 30 tablet, Rfl: 1     blood glucose (NO BRAND SPECIFIED) test strip, Use to test blood sugar 4 times daily or as directed., Disp: 100 strip, Rfl: 3     blood glucose monitoring (SOFTCLIX) lancets, Use to test blood sugar 4 times daily., Disp: 100 each, Rfl: 6     furosemide (LASIX) 20 MG tablet, Take 1 tablet (20 mg) by mouth daily, Disp: 30 tablet, Rfl: 1     insulin aspart (NOVOLOG FLEXPEN) 100 UNIT/ML pen, For  - 164 give 1 unit. For  - 189 give 2 units. For  - 214 give 3 units. For  - 239 give 4 units. For  - 264 give 5 units. For  - 289 give 6 units. For  - 314 give 7 units. For  - 339 give 8 units For  - 364 give 9 units For  - 389 give 10 units For  - 414 give 11 units For BG greater than or equal to 415 give 12 units, Disp: 3 mL, Rfl: 3     insulin glargine (LANTUS SOLOSTAR) 100 UNIT/ML pen, Inject 38 Units Subcutaneous every morning (Patient taking differently: Inject 38 Units Subcutaneous At Bedtime ), Disp: 3 mL, Rfl: 3     magnesium oxide (MAG-OX) 400 MG tablet, Take 1 tablet (400 mg) by mouth 2 times daily, Disp: 60 tablet, Rfl: 1     metoprolol succinate ER (TOPROL-XL) 50 MG 24 hr tablet, Take 1 tablet (50 mg) by mouth daily, Disp: 30 tablet, Rfl: 1     Multiple Vitamin (MULTI VITAMIN) TABS, 1 tab(s), Disp: , Rfl:      OMEGA-3/DHA/EPA/FISH OIL (FISH OIL-OMEGA-3 FATTY ACIDS) 300-1,000 mg capsule, Take 1 g by mouth daily , Disp: , Rfl:     Allergies:   Patient has no known allergies.      Objective:      Physical Exam  96.2 kg (212 lb)     Body mass index is 27.97 kg/m .  /72   Pulse 96   Resp 16   Wt 96.2 kg (212 lb)   BMI 27.97 kg/m      General Appearance:   Awake, Alert, No acute distress.   HEENT:   Pupil equal and reactive to light. No scleral icterus; the mucous membranes were moist.   Neck: No cervical bruits. No JVD. No thyromegaly.     Chest: The spine was straight. The chest was symmetric.   Lungs:   Respirations unlabored; Lungs are clear to auscultation. No crackles. No wheezing.   Cardiovascular:   Regular rhythm and rate, normal first and second heart sounds with no murmurs. No rubs or gallops.    Abdomen:  Soft. No tenderness. Non-distended. Bowels sounds are present   Extremities: Equal tibial pulses. No leg edema. Left ankle edema.   Skin: No rashes or ulcers. Warm, Dry.   Musculoskeletal: No tenderness. No deformity.   Neurologic: Mood and affect are appropriate. No focal deficits.         EKG: Personally reviewed  Sinus rhythm   Low voltage QRS   Cannot rule out Anterior infarct (cited on or before 15-NOV-2021)   Abnormal ECG   When compared with ECG of 15-NOV-2021 07:26,   No significant change was found     Cardiac Imaging Studies  ECHO on 11-:  The left ventricle is normal in size.  Left ventricular function is decreased. The ejection fraction is 30-35%  (moderately reduced).  Left ventricular diastolic function is abnormal.  Diastolic Doppler findings (E/E' ratio and/or other parameters) suggest left  ventricular filling pressures are increased.  The left atrium is mildly dilated.  The mitral valve leaflets are mildly thickened.  There is mild (1+) mitral regurgitation.  IVC diameter >2.1 cm collapsing <50% with sniff suggests a high RA pressure  estimated at 15 mmHg or greater.    Coronary angiogram on 11-:    Severe, diffuse coronary calcification with diffusely small distal coronaries consistent with severe diabetic vasculopathy.    Severe proximal, mid and apical LAD disease. The first diagonal is occluded and is weakly collateralized.     of the proximal circumflex with right to left collateral filling of OM-2 and OM-3    Severe distal RCA disease before the bifurcation  and moderate ostial PDA disease.    LV EDP moderately elevated at 16 mmHg.    Lab Review   Lab Results   Component Value Date     12/14/2021    CO2 26 12/14/2021    BUN 19 12/14/2021     Lab Results   Component Value Date    WBC 6.7 11/23/2021    HGB 9.9 11/23/2021    HCT 31.1 11/23/2021    MCV 90 11/23/2021     11/23/2021     Lab Results   Component Value Date    CHOL 180 11/17/2021    CHOL 189 11/16/2021    CHOL 187 10/31/2019     Lab Results   Component Value Date    HDL 30 (L) 11/17/2021    HDL 34 (L) 11/16/2021    HDL 50 10/31/2019     No components found for: LDLCALC  Lab Results   Component Value Date    TRIG 122 11/17/2021    TRIG 163 (H) 11/16/2021    TRIG 152 (H) 10/31/2019     Lab Results   Component Value Date    TROPONINI 13.42 11/15/2021

## 2022-01-06 NOTE — LETTER
1/6/2022    Jaquan Dickerson MD  [facility] [address]  [facility] [address]    RE: Norman Aguilar       Dear Colleague,     I had the pleasure of seeing Norman Aguilar in the [facility].            Assessment/Plan:   1.  Multiple vessel coronary artery disease s/p NSTEMI: The patient has coronary angiogram which was reported multiple vessel coronary artery disease.  No chest pain.  CV surgical service is consulted.  Plan to have CABG in 2 to 3 months.  Continue metoprolol succinate to 50 mg daily, Continue aspirin 81 mg daily, atorvastatin 80 mg daily.     2.  Ischemic cardiomyopathy, LVEF 30 to 35%, chronic systolic CHF: The patient has no shortness of breath, no leg edema. His weight has been stable.   Continue metoprolol succinate 50 mg daily.    The patient is off lisinopril due to GI side effect. We discussed the low-dose of ACE inhibitor or ARB's. The patient has no interested.  Continue Lasix 20 mg daily.  Routine labs BMP and magnesium level. He has been taking magnesium due to hypomagnesemia.     3.  Paroxysmal atrial fibrillation:  He is in sinus rhythm.  Continue metoprolol XL 50 mg daily.  His [id number] score is 4 due to HTN, CAD, DM II and CHF.   Continue Eliquis 5 mg twice a day.     4.  Primary hypertension: His blood pressure is controlled with metoprolol XL 50 mg daily.     5.  Dyslipidemia: Continue Lipitor to 80 mg at bedtime.  Repeat lipid profile and liver function in 2 months since November 15.     6.  Right leg DVT: He is on Eliquis 5 mg twice a day.     7.  Diabetic left foot, left great toe gangrene: Follow-up with podiatric team management.    Thank you for the opportunity to be involved in the care of Norman Aguilar. If you have any questions, please feel free to contact me.  I will see the patient again in 3 months and as needed.    Much or all of the text in this note was generated through the use of Dragon Dictate  voice-to-text software. Errors in spelling or words which seem out of context are unintentional. Sound alike errors, in particular, may have escaped editing.       History of Present Illness:   It is my pleasure to see Norman Aguilar at the [facility] clinic for routine cardiology follow up post hospital discharge.  Norman Aguilar is a 61 year old male with a medical history of severe multiple vessel coronary artery disease s/p non-STEMI, ischemic cardiomyopathy, chronic systolic congestive heart failure LVEF 30 to 35%, benign essential hypertension, dyslipidemia, stage III CKD, insulin-dependent diabetes, paroxysmal atrial fibrillation.    The patient had left foot wound surgery in November. Post surgery, he developed non-ST elevation MI. Subsequently, he had a coronary angiogram which demonstrated multiple vessel disease. The patient was referred to CABG. However, the patient had wound infection, and also developed acute DVT in right leg post wound surgery. His CABG was postponed.    Since hospital discharge, he had no chest pain, shortness of breath, palpitations, dizziness, orthopnea, PND or leg edema. He said he monitor his weight at home, stable. His blood pressure is controlled. The patient did not take lisinopril since hospital discharge. He states that he has GI side effect from lisinopril.    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)     Gastro-esophageal reflux disease with esophagitis     Gastroesophageal reflux disease without esophagitis     Hypercalcemia     Long term (current) use of oral hypoglycemic drugs     Microalbuminuria     Mixed hyperlipidemia     Polyp of colon     Hypocalcemia     Atrial fibrillation (H)     Hyperglycemia      Wound infection     Diabetic ketoacidosis without coma associated with type 2 diabetes mellitus (H)     Ischemic cardiomyopathy     Heart failure with reduced ejection fraction (H)     Arteriosclerotic heart disease     DVT (deep venous thrombosis) (H)     Chronic kidney disease (CKD) stage G3a/A1, moderately decreased glomerular filtration rate (GFR) between 45-59 mL/min/1.73 square meter and albuminuria creatinine ratio less than 30 mg/g (H)     Diabetic ulcer of left midfoot associated with type 2 diabetes mellitus, with necrosis of bone (H)     Constipation     Acute non-ST segment elevation myocardial infarction (H)     Traumatic amputation of toe or toes without complication (H)       Past Surgical History:     Past Surgical History:   Procedure Laterality Date     AMPUTATE TOE(S) Left 11/16/2021    Procedure: first and second ray amputation;  Surgeon: Eddi Ram DPM;  Location: [facility] OR     APPENDECTOMY       CV CORONARY ANGIOGRAM N/A 11/16/2021    Procedure: CV CORONARY ANGIOGRAM;  Surgeon: Pam Tabares MD;  Location: [facility] CATH LAB CV     CV LEFT HEART CATH N/A 11/16/2021    Procedure: Left Heart Cath;  Surgeon: Pam Tabares MD;  Location: [facility] CATH LAB CV     INCISION AND DRAINAGE LOWER EXTREMITY, COMBINED Left 11/16/2021    Procedure: INCISION AND DRAINAGE, left foot;  Surgeon: Eddi Ram DPM;  Location: [facility] OR     INCISION AND DRAINAGE LOWER EXTREMITY, COMBINED Left 11/19/2021    Procedure: INCISION AND DRAINAGE, left foot;  Surgeon: Eddi Ram DPM;  Location: [facility] OR     INCISION AND DRAINAGE LOWER EXTREMITY, COMBINED Left 12/9/2021    Procedure: INCISION AND DRAINAGE, Left foot;  Surgeon: Eddi Ram DPM;  Location: [facility] OR       Family History:   No family history on file.     Social History:    reports that he has been smoking cigars. He has never used smokeless tobacco. He reports current alcohol use.  He reports that he does not use drugs.    Review of Systems:   12 systems are reviewed negative except for in HPI.    Meds:     Current Outpatient Medications:      apixaban ANTICOAGULANT (ELIQUIS) 5 MG tablet, Take 1 tablet (5 mg) by mouth 2 times daily, Disp: 180 tablet, Rfl: 0     aspirin (ASA) 81 MG chewable tablet, Take 1 tablet (81 mg) by mouth daily, Disp: 30 tablet, Rfl: 0     atorvastatin (LIPITOR) 80 MG tablet, Take 1 tablet (80 mg) by mouth every evening, Disp: 30 tablet, Rfl: 1     blood glucose (NO BRAND SPECIFIED) test strip, Use to test blood sugar 4 times daily or as directed., Disp: 100 strip, Rfl: 3     blood glucose monitoring (SOFTCLIX) lancets, Use to test blood sugar 4 times daily., Disp: 100 each, Rfl: 6     furosemide (LASIX) 20 MG tablet, Take 1 tablet (20 mg) by mouth daily, Disp: 30 tablet, Rfl: 1     insulin aspart (NOVOLOG FLEXPEN) 100 UNIT/ML pen, For  - 164 give 1 unit. For  - 189 give 2 units. For  - 214 give 3 units. For  - 239 give 4 units. For  - 264 give 5 units. For  - 289 give 6 units. For  - 314 give 7 units. For  - 339 give 8 units For  - 364 give 9 units For  - 389 give 10 units For  - 414 give 11 units For BG greater than or equal to 415 give 12 units, Disp: 3 mL, Rfl: 3     insulin glargine (LANTUS SOLOSTAR) 100 UNIT/ML pen, Inject 38 Units Subcutaneous every morning (Patient taking differently: Inject 38 Units Subcutaneous At Bedtime ), Disp: 3 mL, Rfl: 3     magnesium oxide (MAG-OX) 400 MG tablet, Take 1 tablet (400 mg) by mouth 2 times daily, Disp: 60 tablet, Rfl: 1     metoprolol succinate ER (TOPROL-XL) 50 MG 24 hr tablet, Take 1 tablet (50 mg) by mouth daily, Disp: 30 tablet, Rfl: 1     Multiple Vitamin (MULTI VITAMIN) TABS, 1 tab(s), Disp: , Rfl:      OMEGA-3/DHA/EPA/FISH OIL (FISH OIL-OMEGA-3 FATTY ACIDS) 300-1,000 mg capsule, Take 1 g by mouth daily , Disp: , Rfl:     Allergies:   Patient has no  known allergies.      Objective:      Physical Exam  96.2 kg (212 lb)     Body mass index is 27.97 kg/m .  /72   Pulse 96   Resp 16   Wt 96.2 kg (212 lb)   BMI 27.97 kg/m      General Appearance:   Awake, Alert, No acute distress.   HEENT:  Pupil equal and reactive to light. No scleral icterus; the mucous membranes were moist.   Neck: No cervical bruits. No JVD. No thyromegaly.     Chest: The spine was straight. The chest was symmetric.   Lungs:   Respirations unlabored; Lungs are clear to auscultation. No crackles. No wheezing.   Cardiovascular:   Regular rhythm and rate, normal first and second heart sounds with no murmurs. No rubs or gallops.    Abdomen:  Soft. No tenderness. Non-distended. Bowels sounds are present   Extremities: Equal tibial pulses. No leg edema. Left ankle edema.   Skin: No rashes or ulcers. Warm, Dry.   Musculoskeletal: No tenderness. No deformity.   Neurologic: Mood and affect are appropriate. No focal deficits.         EKG: Personally reviewed  Sinus rhythm   Low voltage QRS   Cannot rule out Anterior infarct (cited on or before 15-NOV-2021)   Abnormal ECG   When compared with ECG of 15-NOV-2021 07:26,   No significant change was found     Cardiac Imaging Studies  ECHO on 11-:  The left ventricle is normal in size.  Left ventricular function is decreased. The ejection fraction is 30-35%  (moderately reduced).  Left ventricular diastolic function is abnormal.  Diastolic Doppler findings (E/E' ratio and/or other parameters) suggest left  ventricular filling pressures are increased.  The left atrium is mildly dilated.  The mitral valve leaflets are mildly thickened.  There is mild (1+) mitral regurgitation.  IVC diameter >2.1 cm collapsing <50% with sniff suggests a high RA pressure  estimated at 15 mmHg or greater.    Coronary angiogram on 11-:    Severe, diffuse coronary calcification with diffusely small distal coronaries consistent with severe diabetic  vasculopathy.    Severe proximal, mid and apical LAD disease. The first diagonal is occluded and is weakly collateralized.     of the proximal circumflex with right to left collateral filling of OM-2 and OM-3    Severe distal RCA disease before the bifurcation and moderate ostial PDA disease.    LV EDP moderately elevated at 16 mmHg.    Lab Review   Lab Results   Component Value Date     12/14/2021    CO2 26 12/14/2021    BUN 19 12/14/2021     Lab Results   Component Value Date    WBC 6.7 11/23/2021    HGB 9.9 11/23/2021    HCT 31.1 11/23/2021    MCV 90 11/23/2021     11/23/2021     Lab Results   Component Value Date    CHOL 180 11/17/2021    CHOL 189 11/16/2021    CHOL 187 10/31/2019     Lab Results   Component Value Date    HDL 30 (L) 11/17/2021    HDL 34 (L) 11/16/2021    HDL 50 10/31/2019     No components found for: LDLCALC  Lab Results   Component Value Date    TRIG 122 11/17/2021    TRIG 163 (H) 11/16/2021    TRIG 152 (H) 10/31/2019     Lab Results   Component Value Date    TROPONINI 13.42 11/15/2021                   Thank you for allowing me to participate in the care of your patient.      Sincerely,     Moshe Davis MD     [facility]  cc:   No referring provider defined for this encounter.

## 2022-01-07 ENCOUNTER — TELEPHONE (OUTPATIENT)
Dept: VASCULAR SURGERY | Facility: CLINIC | Age: 62
End: 2022-01-07
Payer: COMMERCIAL

## 2022-01-07 ENCOUNTER — TELEPHONE (OUTPATIENT)
Dept: PODIATRY | Facility: CLINIC | Age: 62
End: 2022-01-07
Payer: COMMERCIAL

## 2022-01-07 DIAGNOSIS — E83.42 HYPOMAGNESEMIA: ICD-10-CM

## 2022-01-07 RX ORDER — MAGNESIUM OXIDE 400 MG/1
400 TABLET ORAL DAILY
Qty: 90 TABLET | Refills: 1 | Status: ON HOLD | OUTPATIENT
Start: 2022-01-07 | End: 2023-01-01

## 2022-01-07 NOTE — PROGRESS NOTES
Moshe Davis MD   1/7/2022  7:51 AM CST Back to Top        Please let the patient know that I have reviewed his lab report.  His magnesium level is 2.  He can take magnesium oxide 400 mg once a day. His renal function is stable.  Thanks  Luisito     === Mag oxide dose updated.  -[facility]

## 2022-01-07 NOTE — TELEPHONE ENCOUNTER
Appts cancelled.  Pt updated.  -ravikram          ===View-only below this line===  ----- Message -----  From: Moshe Daivs MD  Sent: 1/7/2022   7:41 AM CST  To: Henny Stern RN    It is fine.  Thanks  Luisito    ----- Message -----  From: Henny Stern RN  Sent: 1/6/2022  12:38 PM CST  To: Moshe Davis MD    Pt requested 1/17 appt with Dr Winn, and 2/3 appt with Polly Downing be cancelled because of planned foot surgery.  Are you aware?  -elva

## 2022-01-10 ENCOUNTER — HOSPITAL ENCOUNTER (OUTPATIENT)
Facility: HOSPITAL | Age: 62
Discharge: HOME | End: 2022-01-10
Attending: PODIATRIST | Admitting: PODIATRIST
Payer: COMMERCIAL

## 2022-01-10 ENCOUNTER — ANESTHESIA EVENT (OUTPATIENT)
Dept: SURGERY | Facility: HOSPITAL | Age: 62
End: 2022-01-10
Payer: COMMERCIAL

## 2022-01-10 ENCOUNTER — ANESTHESIA (OUTPATIENT)
Dept: SURGERY | Facility: HOSPITAL | Age: 62
End: 2022-01-10
Payer: COMMERCIAL

## 2022-01-10 VITALS
HEART RATE: 91 BPM | DIASTOLIC BLOOD PRESSURE: 67 MMHG | TEMPERATURE: 97.5 F | SYSTOLIC BLOOD PRESSURE: 133 MMHG | RESPIRATION RATE: 14 BRPM | OXYGEN SATURATION: 93 % | HEIGHT: 75 IN | BODY MASS INDEX: 25.49 KG/M2 | WEIGHT: 205 LBS

## 2022-01-10 DIAGNOSIS — M86.9 OSTEOMYELITIS OF ANKLE AND FOOT (H): ICD-10-CM

## 2022-01-10 LAB
GLUCOSE BLDC GLUCOMTR-MCNC: 108 MG/DL (ref 70–99)
GLUCOSE BLDC GLUCOMTR-MCNC: 152 MG/DL (ref 70–99)
GRAM STAIN RESULT: ABNORMAL

## 2022-01-10 PROCEDURE — 250N000011 HC RX IP 250 OP 636: Performed by: NURSE ANESTHETIST, CERTIFIED REGISTERED

## 2022-01-10 PROCEDURE — 250N000013 HC RX MED GY IP 250 OP 250 PS 637: Performed by: ANESTHESIOLOGY

## 2022-01-10 PROCEDURE — 272N000004 HC RX 272: Performed by: PODIATRIST

## 2022-01-10 PROCEDURE — 87205 SMEAR GRAM STAIN: CPT | Performed by: PODIATRIST

## 2022-01-10 PROCEDURE — 258N000003 HC RX IP 258 OP 636: Performed by: ANESTHESIOLOGY

## 2022-01-10 PROCEDURE — 999N000141 HC STATISTIC PRE-PROCEDURE NURSING ASSESSMENT: Performed by: PODIATRIST

## 2022-01-10 PROCEDURE — 11044 DBRDMT BONE 1ST 20 SQ CM/<: CPT | Mod: 59 | Performed by: PODIATRIST

## 2022-01-10 PROCEDURE — 82962 GLUCOSE BLOOD TEST: CPT

## 2022-01-10 PROCEDURE — 11047 DBRDMT BONE EACH ADDL: CPT | Mod: 59 | Performed by: PODIATRIST

## 2022-01-10 PROCEDURE — 250N000011 HC RX IP 250 OP 636: Performed by: PODIATRIST

## 2022-01-10 PROCEDURE — 250N000011 HC RX IP 250 OP 636: Performed by: ANESTHESIOLOGY

## 2022-01-10 PROCEDURE — 370N000017 HC ANESTHESIA TECHNICAL FEE, PER MIN: Performed by: PODIATRIST

## 2022-01-10 PROCEDURE — 258N000003 HC RX IP 258 OP 636: Performed by: NURSE ANESTHETIST, CERTIFIED REGISTERED

## 2022-01-10 PROCEDURE — 28002 TREATMENT OF FOOT INFECTION: CPT | Mod: 59 | Performed by: PODIATRIST

## 2022-01-10 PROCEDURE — 250N000009 HC RX 250

## 2022-01-10 PROCEDURE — 360N000075 HC SURGERY LEVEL 2, PER MIN: Performed by: PODIATRIST

## 2022-01-10 PROCEDURE — 710N000012 HC RECOVERY PHASE 2, PER MINUTE: Performed by: PODIATRIST

## 2022-01-10 PROCEDURE — 87075 CULTR BACTERIA EXCEPT BLOOD: CPT | Performed by: PODIATRIST

## 2022-01-10 PROCEDURE — 272N000001 HC OR GENERAL SUPPLY STERILE: Performed by: PODIATRIST

## 2022-01-10 PROCEDURE — 88311 DECALCIFY TISSUE: CPT | Mod: TC,59 | Performed by: PODIATRIST

## 2022-01-10 PROCEDURE — 87077 CULTURE AEROBIC IDENTIFY: CPT | Performed by: PODIATRIST

## 2022-01-10 PROCEDURE — 28120 PART REMOVAL OF ANKLE/HEEL: CPT | Mod: LT | Performed by: PODIATRIST

## 2022-01-10 PROCEDURE — 250N000009 HC RX 250: Performed by: ANESTHESIOLOGY

## 2022-01-10 RX ORDER — SODIUM CHLORIDE, SODIUM LACTATE, POTASSIUM CHLORIDE, CALCIUM CHLORIDE 600; 310; 30; 20 MG/100ML; MG/100ML; MG/100ML; MG/100ML
INJECTION, SOLUTION INTRAVENOUS CONTINUOUS
Status: DISCONTINUED | OUTPATIENT
Start: 2022-01-10 | End: 2022-01-10 | Stop reason: HOSPADM

## 2022-01-10 RX ORDER — NALOXONE HYDROCHLORIDE 0.4 MG/ML
0.4 INJECTION, SOLUTION INTRAMUSCULAR; INTRAVENOUS; SUBCUTANEOUS
Status: DISCONTINUED | OUTPATIENT
Start: 2022-01-10 | End: 2022-01-10 | Stop reason: HOSPADM

## 2022-01-10 RX ORDER — HYDROMORPHONE HCL IN WATER/PF 6 MG/30 ML
0.2 PATIENT CONTROLLED ANALGESIA SYRINGE INTRAVENOUS EVERY 5 MIN PRN
Status: CANCELLED | OUTPATIENT
Start: 2022-01-10

## 2022-01-10 RX ORDER — CEFAZOLIN SODIUM 2 G/100ML
2 INJECTION, SOLUTION INTRAVENOUS SEE ADMIN INSTRUCTIONS
Status: DISCONTINUED | OUTPATIENT
Start: 2022-01-10 | End: 2022-01-10 | Stop reason: HOSPADM

## 2022-01-10 RX ORDER — BUPIVACAINE HYDROCHLORIDE 5 MG/ML
INJECTION, SOLUTION EPIDURAL; INTRACAUDAL
Status: COMPLETED | OUTPATIENT
Start: 2022-01-10 | End: 2022-01-10

## 2022-01-10 RX ORDER — HALOPERIDOL 5 MG/ML
1 INJECTION INTRAMUSCULAR
Status: DISCONTINUED | OUTPATIENT
Start: 2022-01-10 | End: 2022-01-10 | Stop reason: HOSPADM

## 2022-01-10 RX ORDER — LIDOCAINE 40 MG/G
CREAM TOPICAL
Status: DISCONTINUED | OUTPATIENT
Start: 2022-01-10 | End: 2022-01-10 | Stop reason: HOSPADM

## 2022-01-10 RX ORDER — PROPOFOL 10 MG/ML
INJECTION, EMULSION INTRAVENOUS CONTINUOUS PRN
Status: DISCONTINUED | OUTPATIENT
Start: 2022-01-10 | End: 2022-01-10

## 2022-01-10 RX ORDER — NALOXONE HYDROCHLORIDE 0.4 MG/ML
0.2 INJECTION, SOLUTION INTRAMUSCULAR; INTRAVENOUS; SUBCUTANEOUS
Status: DISCONTINUED | OUTPATIENT
Start: 2022-01-10 | End: 2022-01-10 | Stop reason: HOSPADM

## 2022-01-10 RX ORDER — ONDANSETRON 2 MG/ML
4 INJECTION INTRAMUSCULAR; INTRAVENOUS EVERY 30 MIN PRN
Status: DISCONTINUED | OUTPATIENT
Start: 2022-01-10 | End: 2022-01-10 | Stop reason: HOSPADM

## 2022-01-10 RX ORDER — FENTANYL CITRATE 50 UG/ML
25 INJECTION, SOLUTION INTRAMUSCULAR; INTRAVENOUS EVERY 5 MIN PRN
Status: CANCELLED | OUTPATIENT
Start: 2022-01-10

## 2022-01-10 RX ORDER — ONDANSETRON 4 MG/1
4 TABLET, ORALLY DISINTEGRATING ORAL EVERY 30 MIN PRN
Status: DISCONTINUED | OUTPATIENT
Start: 2022-01-10 | End: 2022-01-10 | Stop reason: HOSPADM

## 2022-01-10 RX ORDER — MAGNESIUM SULFATE 4 G/50ML
4 INJECTION INTRAVENOUS ONCE
Status: COMPLETED | OUTPATIENT
Start: 2022-01-10 | End: 2022-01-10

## 2022-01-10 RX ORDER — CEFAZOLIN SODIUM 2 G/100ML
2 INJECTION, SOLUTION INTRAVENOUS
Status: DISCONTINUED | OUTPATIENT
Start: 2022-01-10 | End: 2022-01-10 | Stop reason: HOSPADM

## 2022-01-10 RX ORDER — PHENYLEPHRINE HYDROCHLORIDE 10 MG/ML
INJECTION INTRAVENOUS PRN
Status: DISCONTINUED | OUTPATIENT
Start: 2022-01-10 | End: 2022-01-10

## 2022-01-10 RX ORDER — FENTANYL CITRATE 50 UG/ML
25 INJECTION, SOLUTION INTRAMUSCULAR; INTRAVENOUS
Status: CANCELLED | OUTPATIENT
Start: 2022-01-10

## 2022-01-10 RX ORDER — OXYCODONE HYDROCHLORIDE 5 MG/1
5-10 TABLET ORAL EVERY 6 HOURS PRN
Qty: 30 TABLET | Refills: 0 | Status: SHIPPED | OUTPATIENT
Start: 2022-01-10 | End: 2022-03-16

## 2022-01-10 RX ORDER — ACETAMINOPHEN 325 MG/1
975 TABLET ORAL ONCE
Status: COMPLETED | OUTPATIENT
Start: 2022-01-10 | End: 2022-01-10

## 2022-01-10 RX ORDER — METOPROLOL SUCCINATE 25 MG/1
50 TABLET, EXTENDED RELEASE ORAL ONCE
Status: COMPLETED | OUTPATIENT
Start: 2022-01-10 | End: 2022-01-10

## 2022-01-10 RX ORDER — FENTANYL CITRATE 50 UG/ML
50 INJECTION, SOLUTION INTRAMUSCULAR; INTRAVENOUS
Status: DISCONTINUED | OUTPATIENT
Start: 2022-01-10 | End: 2022-01-10 | Stop reason: HOSPADM

## 2022-01-10 RX ORDER — OXYCODONE HYDROCHLORIDE 5 MG/1
5 TABLET ORAL EVERY 4 HOURS PRN
Status: DISCONTINUED | OUTPATIENT
Start: 2022-01-10 | End: 2022-01-10 | Stop reason: HOSPADM

## 2022-01-10 RX ORDER — SODIUM CHLORIDE, SODIUM LACTATE, POTASSIUM CHLORIDE, CALCIUM CHLORIDE 600; 310; 30; 20 MG/100ML; MG/100ML; MG/100ML; MG/100ML
INJECTION, SOLUTION INTRAVENOUS CONTINUOUS PRN
Status: DISCONTINUED | OUTPATIENT
Start: 2022-01-10 | End: 2022-01-10

## 2022-01-10 RX ORDER — MEPERIDINE HYDROCHLORIDE 25 MG/ML
12.5 INJECTION INTRAMUSCULAR; INTRAVENOUS; SUBCUTANEOUS
Status: DISCONTINUED | OUTPATIENT
Start: 2022-01-10 | End: 2022-01-10 | Stop reason: HOSPADM

## 2022-01-10 RX ORDER — PROPOFOL 10 MG/ML
INJECTION, EMULSION INTRAVENOUS PRN
Status: DISCONTINUED | OUTPATIENT
Start: 2022-01-10 | End: 2022-01-10

## 2022-01-10 RX ADMIN — CEFAZOLIN SODIUM 2 G: 2 INJECTION, SOLUTION INTRAVENOUS at 14:21

## 2022-01-10 RX ADMIN — PROPOFOL 30 MG: 10 INJECTION, EMULSION INTRAVENOUS at 14:10

## 2022-01-10 RX ADMIN — BUPIVACAINE HYDROCHLORIDE 20 ML: 5 INJECTION, SOLUTION EPIDURAL; INTRACAUDAL; PERINEURAL at 13:38

## 2022-01-10 RX ADMIN — SODIUM CHLORIDE, POTASSIUM CHLORIDE, SODIUM LACTATE AND CALCIUM CHLORIDE: 600; 310; 30; 20 INJECTION, SOLUTION INTRAVENOUS at 14:00

## 2022-01-10 RX ADMIN — PHENYLEPHRINE HYDROCHLORIDE 100 MCG: 10 INJECTION INTRAVENOUS at 14:25

## 2022-01-10 RX ADMIN — ACETAMINOPHEN 975 MG: 325 TABLET ORAL at 12:32

## 2022-01-10 RX ADMIN — BUPIVACAINE HYDROCHLORIDE 10 ML: 5 INJECTION, SOLUTION EPIDURAL; INTRACAUDAL at 13:44

## 2022-01-10 RX ADMIN — FENTANYL CITRATE 50 MCG: 50 INJECTION INTRAMUSCULAR; INTRAVENOUS at 13:34

## 2022-01-10 RX ADMIN — SODIUM CHLORIDE, POTASSIUM CHLORIDE, SODIUM LACTATE AND CALCIUM CHLORIDE: 600; 310; 30; 20 INJECTION, SOLUTION INTRAVENOUS at 12:32

## 2022-01-10 RX ADMIN — MAGNESIUM SULFATE HEPTAHYDRATE 4 G: 80 INJECTION, SOLUTION INTRAVENOUS at 12:32

## 2022-01-10 RX ADMIN — PROPOFOL 150 MCG/KG/MIN: 10 INJECTION, EMULSION INTRAVENOUS at 14:09

## 2022-01-10 RX ADMIN — MIDAZOLAM HYDROCHLORIDE 1 MG: 1 INJECTION, SOLUTION INTRAMUSCULAR; INTRAVENOUS at 13:34

## 2022-01-10 RX ADMIN — METOPROLOL SUCCINATE 50 MG: 25 TABLET, EXTENDED RELEASE ORAL at 12:32

## 2022-01-10 ASSESSMENT — LIFESTYLE VARIABLES: TOBACCO_USE: 0

## 2022-01-10 ASSESSMENT — ENCOUNTER SYMPTOMS
DYSRHYTHMIAS: 1
SEIZURES: 0

## 2022-01-10 NOTE — ANESTHESIA PROCEDURE NOTES
Sciatic Procedure Note    Pre-Procedure   Staff -        Anesthesiologist:  Claude Sorto MD       Performed By: anesthesiologist       Location: pre-op       Procedure Start/Stop Times: 1/10/2022 1:36 PM and 1/10/2022 1:35 PM       Pre-Anesthestic Checklist: patient identified, IV checked, site marked, risks and benefits discussed, informed consent, monitors and equipment checked, pre-op evaluation, at physician/surgeon's request and post-op pain management  Timeout:       Correct Patient: Yes        Correct Procedure: Yes        Correct Site: Yes        Correct Position: Yes        Correct Laterality: Yes        Site Marked: Yes  Procedure Documentation  Procedure: Sciatic       Diagnosis: POSTOP PAIN CONTROL PER SURGEON REQUEST       Laterality: left       Patient Position: supine       Patient Prep/Sterile Barriers: sterile gloves, mask       Skin prep: Chloraprep (popliteal approach).       Needle Type: insulated and short bevel       Needle Gauge: 21.        Needle Length (Inches): 4        Ultrasound guided       1. Ultrasound was used to identify targeted nerve, plexus, vascular marker, or fascial plane and place a needle adjacent to it in real-time.       2. Ultrasound was used to visualize the spread of anesthetic in close proximity to the above referenced structure.       3. A permanent image is entered into the patient's record.       4. The visualized anatomic structures appeared normal.       5. There were no apparent abnormal pathologic findings.    Assessment/Narrative         The placement was negative for: blood aspirated and painful injection       Paresthesias: No.     Bolus given via needle..        Secured via.        Insertion/Infusion Method: Single Shot       Complications: none       Injection made incrementally with aspirations every 2 mL.    Medication(s) Administered   Bupivacaine 0.5% PF (Infiltration), 20 mL  Medication Administration Time: 1/10/2022 1:38 PM

## 2022-01-10 NOTE — ANESTHESIA POSTPROCEDURE EVALUATION
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    Anesthesia Type:  General    Note:  Disposition: Outpatient   Postop Pain Control: Uneventful            Sign Out: Well controlled pain   PONV: No   Neuro/Psych: Uneventful            Sign Out: Acceptable/Baseline neuro status   Airway/Respiratory: Uneventful            Sign Out: Acceptable/Baseline resp. status   CV/Hemodynamics: Uneventful            Sign Out: Acceptable CV status; No obvious hypovolemia; No obvious fluid overload   Other NRE: NONE   DID A NON-ROUTINE EVENT OCCUR? No    Event details/Postop Comments:  Denies pain. Block working well. Anticipate block duration of approximately 24 hours.            Last vitals:  Vitals Value Taken Time   /60 01/10/22 1500   Temp 36.4  C (97.5  F) 01/10/22 1453   Pulse 81 01/10/22 1508   Resp     SpO2 99 % 01/10/22 1508   Vitals shown include unvalidated device data.    Electronically Signed By: Claude Sorto MD  January 10, 2022  3:10 PM

## 2022-01-10 NOTE — OP NOTE
Date: 1/10/2022     Surgeon: MARCO A Ram DPM    Preoperative diagnosis:   1. Abscess left foot  2. Ulceration left foot  3. Osteomyelitis left foot    Postoperative diagnosis: Same    Procedure:   1. Incision and drainage left foot  2. Excision navicular left foot  3. Partial excision talus left foot   4. Excisional Debridement ulceration left foot into bone     Anesthesia: Combined MAC with Popliteal Block     Hemostasis: Pneumatic ankle tourniquet 250 mmHg    Pathology:   ID Type Source Tests Collected by Time Destination   1 : left foot navicular bone Tissue Foot, Left SURGICAL PATHOLOGY EXAM Eddi Ram DPM 1/10/2022  2:43 PM    2 : left foot talus Tissue Foot, Left SURGICAL PATHOLOGY EXAM Eddi Ram DPM 1/10/2022  2:44 PM    A : left foot culture Tissue Foot, Left ANAEROBIC BACTERIAL CULTURE ROUTINE, GRAM STAIN, AEROBIC BACTERIAL CULTURE ROUTINE Eddi Ram DPM 1/10/2022  2:42 PM         Injectables: None    Materials: 3-0 vicryl     Complications: None    Blood loss: 4 ml     Findings: Patient presents for operative intervention for wound on the left foot. We discussed today's procedure to include removal of all non-viable tissue and bone in an attempt to salvage the left foot. Risks include need for additional surgery including skin grafting and BKA. Conservative measures were attempted and exhausted. Patient questions invited and answered, including appropriate risk, benefits and complications. No guarantees given or implied. Patient has been NPO.    Description: Patient was brought to the operating room and placed on the table in supine position. IV-sedation and popliteal block was administered by the anesthesia department. The foot was then prepped and draped in usual aseptic manner. The extremity was elevated and exsanguinated. Well-padded ankle pneumatic tourniquet was inflated to 250mmHg and the following procedure was then performed: Attention was directed to the  "ulceration along the medial aspect of the left foot where a #15 blade was used to excisionally debride non-viable tissue to bone of the navicular and talus. Tracking to other foot compartments was not noted. Soft tissue was sent for aerobic and anaerobic culture. At the time the navicular and distal talus were noted to be non-viable. The navicular was excised from the surrounding soft tissue and sent to pathology. An osteotome and mallet was used to resect the talar head which was sent to pathology. Next, a 1000cc pulse lavage was used to irrigate the surgical site. Following irrigation and debridement, no remaining non-viable tissue was noted.    The resulting ulceration measured [id number]. Sharp, excisional debridement was performed with a #15 blade into bone debriding 40 sq cm.     The wound was packed with thrombin impregnated gauze, 1/2\" gauze, and dressings consistent of 4x4's, ABD, kerlix/tommy roll and an ace wrap. The pneumatic tourniquet was released and a hyperemic response was noted to the remaining digits on the left foot.     The patient appeared to tolerate all the procedures and anesthesia well without apparent complications. Patient was transported from the operating room to the recovery room with vital signs stable and neurovascular status as it was pre-operatively to the left foot. Patient to be discharged per anesthesia protocol. He will remain non-weight bearing on the left foot. Surgical dressing to remain intact until the wound vac is applied tomorrow. Follow-up with me in 1-2 weeks. Discussed importance of blood glucose control.     Eddi Ram DPM          "

## 2022-01-10 NOTE — ANESTHESIA PREPROCEDURE EVALUATION
Anesthesia Pre-Procedure Evaluation    Patient: Norman Aguilar   MRN: [id number] : 1960        Preoperative Diagnosis: Cellulitis and abscess of foot, except toes [L03.119, L02.619]    Procedure : Procedure(s):  INCISION AND DRAINAGE, left foot          Past Medical History:   Diagnosis Date     Coronary artery disease      Diabetes (H)      GERD (gastroesophageal reflux disease)      Heart attack (H)      Hyperlipidemia      Hypertension      Renal disease      Retinopathy      Sleep disturbance       Past Surgical History:   Procedure Laterality Date     AMPUTATE TOE(S) Left 2021    Procedure: first and second ray amputation;  Surgeon: Eddi Ram DPM;  Location: [facility] OR     APPENDECTOMY       CV CORONARY ANGIOGRAM N/A 2021    Procedure: CV CORONARY ANGIOGRAM;  Surgeon: Pam Tabares MD;  Location: [facility] CATH LAB CV     CV LEFT HEART CATH N/A 2021    Procedure: Left Heart Cath;  Surgeon: Pam Tabares MD;  Location: [facility] CATH LAB CV     HERNIA REPAIR       INCISION AND DRAINAGE LOWER EXTREMITY, COMBINED Left 2021    Procedure: INCISION AND DRAINAGE, left foot;  Surgeon: Eddi Ram DPM;  Location: [facility] OR     INCISION AND DRAINAGE LOWER EXTREMITY, COMBINED Left 2021    Procedure: INCISION AND DRAINAGE, left foot;  Surgeon: Eddi Ram DPM;  Location: [facility] OR     INCISION AND DRAINAGE LOWER EXTREMITY, COMBINED Left 2021    Procedure: INCISION AND DRAINAGE, Left foot;  Surgeon: Eddi Ram DPM;  Location: [facility] OR      No Known Allergies   Social History     Tobacco Use     Smoking status: Current Some Day Smoker     Types: Cigars     Smokeless tobacco: Never Used     Tobacco comment: Cigars   Substance Use Topics     Alcohol use: Yes     Comment: occas.      Wt Readings from Last 1 Encounters:   01/10/22 93 kg (205 lb)        Anesthesia Evaluation   Pt has had prior anesthetic.  Type: MAC and General.    No history of anesthetic complications       ROS/MED HX  ENT/Pulmonary:    (-) tobacco use, asthma, sleep apnea and ALIX risk factors   Neurologic:    (-) no seizures, no CVA and no TIA   Cardiovascular: Comment: Echo 11/18/21:  The left ventricle is normal in size. Left ventricular function is decreased. The ejection fraction is 30-35% (moderately reduced). Left ventricular diastolic function is abnormal. Diastolic Doppler findings (E/E' ratio and/or other parameters) suggest left ventricular filling pressures are increased. The left atrium is mildly dilated. The mitral valve leaflets are mildly thickened. There is mild (1+) mitral regurgitation. IVC diameter >2.1 cm collapsing <50% with sniff suggests a high RA pressure estimated at 15 mmHg or greater.     Recent hospitalization 11/15 -11/24    NSTEMI in the setting of diabtic ketoacidosis and atrial fibrillation with RVR.  Severe, diffuse coronary calcification with diffusely small distal coronaries consistent with severe diabetic vasculopathy.   Severe proximal, mid and apical LAD disease. The first diagonal is occluded and is weakly collateralized.    of the proximal circumflex with right to left collateral filling of OM-2 and OM-3   Severe distal RCA disease before the bifurcation and moderate ostial PDA disease.   LV EDP moderately elevated at 16 mmHg.      Plan for CABG after foot issues resolved    (+) hypertension--CAD ---Taking blood thinners Pt has received instructions: Instructions Given to patient: off xarelto x 2 days. CHF dysrhythmias (on Xarelto), a-fib,  (-) murmur and wheezes   METS/Exercise Tolerance: >4 METS    Hematologic:       Musculoskeletal:       GI/Hepatic:     (+) GERD, Asymptomatic on medication,     Renal/Genitourinary:     (+) renal disease, type: CRI,     Endo:     (+) type II DM, Using insulin, Diabetic complications: nephropathy neuropathy.     Psychiatric/Substance Use:       Infectious Disease:        Malignancy:       Other:            Physical Exam    Airway        Mallampati: III   TM distance: > 3 FB   Neck ROM: full   Mouth opening: > 3 cm    Respiratory Devices and Support         Dental     Comment: Fair dentition, no loose or removable teeth    (+) chipped      Cardiovascular          Rhythm and rate: regular and normal (-) no murmur    Pulmonary           breath sounds clear to auscultation   (-) no wheezes    Other findings:   COVID negative 12/7    OUTSIDE LABS:  CBC:   Lab Results   Component Value Date    WBC 6.7 11/23/2021    WBC 7.4 11/22/2021    HGB 9.9 (L) 11/23/2021    HGB 10.4 (L) 11/22/2021    HCT 31.1 (L) 11/23/2021    HCT 31.9 (L) 11/22/2021     11/23/2021     11/22/2021     BMP:   Lab Results   Component Value Date     01/06/2022     (L) 12/14/2021    POTASSIUM 4.5 01/06/2022    POTASSIUM 4.4 12/14/2021    CHLORIDE 99 01/06/2022    CHLORIDE 97 (L) 12/14/2021    CO2 30 01/06/2022    CO2 26 12/14/2021    BUN 22 01/06/2022    BUN 19 12/14/2021    CR 1.64 (H) 01/06/2022    CR 1.68 (H) 12/14/2021     (H) 01/10/2022     (H) 01/06/2022     COAGS:   Lab Results   Component Value Date    PTT 55 (H) 11/15/2021    INR 1.35 (H) 11/15/2021     POC: No results found for: BGM, HCG, HCGS  HEPATIC:   Lab Results   Component Value Date    ALBUMIN 2.7 (L) 12/01/2021    PROTTOTAL 6.5 11/21/2021    ALT 18 11/21/2021    AST 23 11/21/2021    ALKPHOS 92 11/21/2021    BILITOTAL 0.5 11/21/2021     OTHER:   Lab Results   Component Value Date    LACT 1.8 11/14/2021    A1C 13.4 (H) 12/01/2021    YOSVANY 10.0 01/06/2022    PHOS 2.8 12/01/2021    MAG 2.0 01/06/2022    LIPASE 27 11/17/2018    CRP 36.5 (H) 11/14/2021    SED 90 (H) 11/14/2021       Anesthesia Plan    ASA Status:  3   NPO Status:  NPO Appropriate    Anesthesia Type: General.     - Airway: Mask Only   Induction: Intravenous, Propofol.   Maintenance: TIVA.        Consents    Anesthesia Plan(s) and associated risks,  benefits, and realistic alternatives discussed. Questions answered and patient/representative(s) expressed understanding.    - Discussed:     - Discussed with:  Patient      - Patient is DNR/DNI Status: No         Postoperative Care    Pain management: IV analgesics, Oral pain medications, Multi-modal analgesia, Peripheral nerve block (Single Shot) (r/b/a of ).   PONV prophylaxis: Ondansetron (or other 5HT-3), Dexamethasone or Solumedrol     Comments:    Other Comments: R/B/A discussed and patient in agreement to proceed with single shot popliteal and adductor canal nerve block. He wishes to speak with surgeon prior to any sedation, so will wait on proceeding with block until he has that opportunity.     Recent Results (from the past 120 hour(s))  -Asymptomatic COVID-19 Virus (Coronavirus) by PCR Nose:   Collection Time: 01/06/22  9:49 AM  Specimen: Nose; Swab       Result                                            Value                         Ref Range                       SARS CoV2 PCR                                     Negative                      Negative, Testing sent t*  -Basic metabolic panel:   Collection Time: 01/06/22 11:50 AM       Result                                            Value                         Ref Range                       Sodium                                            138                           136 - 145 mmol/L                Potassium                                         4.5                           3.5 - 5.0 mmol/L                Chloride                                          99                            98 - 107 mmol/L                 Carbon Dioxide (CO2)                              30                            22 - 31 mmol/L                  Anion Gap                                         9                             5 - 18 mmol/L                   Urea Nitrogen                                     22                            8 - 22 mg/dL                     Creatinine                                        1.64 (H)                      0.70 - 1.30 mg/dL               Calcium                                           10.0                          8.5 - 10.5 mg/dL                Glucose                                           204 (H)                       70 - 125 mg/dL                  GFR Estimate                                      47 (L)                        >60 mL/min/1.73m2          -Magnesium:   Collection Time: 01/06/22 11:50 AM       Result                                            Value                         Ref Range                       Magnesium                                         2.0                           1.8 - 2.6 mg/dL            -Glucose by meter:   Collection Time: 01/10/22 12:07 PM       Result                                            Value                         Ref Range                       GLUCOSE BY METER POCT                             152 (H)                       70 - 99 mg/dL                            Claude Sorto MD

## 2022-01-10 NOTE — ANESTHESIA PROCEDURE NOTES
Adductor canal Procedure Note    Pre-Procedure   Staff -        Anesthesiologist:  Claude Sorto MD       Performed By: anesthesiologist       Location: pre-op       Procedure Start/Stop Times: 1/10/2022 1:33 PM and 1/10/2022 1:35 PM       Pre-Anesthestic Checklist: patient identified, IV checked, site marked, risks and benefits discussed, informed consent, monitors and equipment checked, pre-op evaluation, at physician/surgeon's request and post-op pain management  Timeout:       Correct Patient: Yes        Correct Procedure: Yes        Correct Site: Yes        Correct Position: Yes        Correct Laterality: Yes        Site Marked: Yes  Procedure Documentation  Procedure: Adductor canal       Diagnosis: POSTOP PAIN CONTROL PER SURGEON REQUEST       Laterality: left       Patient Position: supine       Patient Prep/Sterile Barriers: sterile gloves, mask       Skin prep: Chloraprep       Needle Type: insulated and short bevel       Needle Gauge: 21.        Needle Length (Inches): 4        Ultrasound guided       1. Ultrasound was used to identify targeted nerve, plexus, vascular marker, or fascial plane and place a needle adjacent to it in real-time.       2. Ultrasound was used to visualize the spread of anesthetic in close proximity to the above referenced structure.       3. A permanent image is entered into the patient's record.       4. The visualized anatomic structures appeared normal.       5. There were no apparent abnormal pathologic findings.    Assessment/Narrative         The placement was negative for: blood aspirated and painful injection       Paresthesias: No.     Bolus given via needle..        Secured via.        Insertion/Infusion Method: Single Shot       Complications: none       Injection made incrementally with aspirations every 2 mL.    Medication(s) Administered   Bupivacaine 0.5% PF (Infiltration), 10 mL  Medication Administration Time: 1/10/2022 1:44 PM

## 2022-01-10 NOTE — ANESTHESIA CARE TRANSFER NOTE
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    Anesthesia Type:   General     Note:    Oropharynx: oropharynx clear of all foreign objects and spontaneously breathing  Level of Consciousness: awake  Oxygen Supplementation: face mask and room air    Independent Airway: airway patency satisfactory and stable  Dentition: dentition unchanged  Vital Signs Stable: post-procedure vital signs reviewed and stable  Report to RN Given: handoff report given  Patient transferred to: Phase II    Handoff Report: Identifed the Patient, Identified the Reponsible Provider, Reviewed the pertinent medical history, Discussed the surgical course, Reviewed Intra-OP anesthesia mangement and issues during anesthesia, Set expectations for post-procedure period and Allowed opportunity for questions and acknowledgement of understanding      Vitals:  Vitals Value Taken Time   BP 86/51 01/10/22 1451   Temp     Pulse     Resp     SpO2     Vitals shown include unvalidated device data.    Electronically Signed By: JUNI Henao CRNA  January 10, 2022  2:52 PM

## 2022-01-11 ENCOUNTER — TELEPHONE (OUTPATIENT)
Dept: VASCULAR SURGERY | Facility: CLINIC | Age: 62
End: 2022-01-11
Payer: COMMERCIAL

## 2022-01-11 NOTE — TELEPHONE ENCOUNTER
"Home care nurse Dona called to discuss wound vac and bleeding.  She states when she applied wound vac and turned it on, the canister filled up with bloody drainage.  She replaced canister and now has a blockage error.  Writer instructed Dona to replace the disc as there is more than likely a blockage in that part of the tubing.   She stated then she would have to replace the whole dressing.  Writer suggested she cut the disc out, cover with draping and then cut new hole for disc to sit over.      Dona inquired about when patient should seek care for increased drainage.  Writer told Dona if patient fills another canister, remove wound vac dressing and apply dry gauze dressing to wound.  If patient saturates the dry gauze dressing, then he should go to urgent care or emergency.  Dona expressed she didn't agree with plan, but \"ok\".      Dr. Ram aware of plan and is in agreement.  "

## 2022-01-13 ENCOUNTER — LAB REQUISITION (OUTPATIENT)
Dept: LAB | Facility: CLINIC | Age: 62
End: 2022-01-13

## 2022-01-13 ENCOUNTER — TELEPHONE (OUTPATIENT)
Dept: PODIATRY | Facility: CLINIC | Age: 62
End: 2022-01-13
Payer: COMMERCIAL

## 2022-01-13 DIAGNOSIS — S98.132A: ICD-10-CM

## 2022-01-13 DIAGNOSIS — M86.9 OSTEOMYELITIS OF ANKLE AND FOOT (H): Primary | ICD-10-CM

## 2022-01-13 DIAGNOSIS — I10 ESSENTIAL (PRIMARY) HYPERTENSION: ICD-10-CM

## 2022-01-13 LAB
ALBUMIN SERPL-MCNC: 2.6 G/DL (ref 3.5–5)
ALP SERPL-CCNC: 79 U/L (ref 45–120)
ALT SERPL W P-5'-P-CCNC: 17 U/L (ref 0–45)
ANION GAP SERPL CALCULATED.3IONS-SCNC: 12 MMOL/L (ref 5–18)
AST SERPL W P-5'-P-CCNC: 30 U/L (ref 0–40)
BACTERIA TISS BX CULT: ABNORMAL
BACTERIA TISS BX CULT: NORMAL
BASOPHILS # BLD AUTO: 0 10E3/UL (ref 0–0.2)
BASOPHILS NFR BLD AUTO: 0 %
BILIRUB SERPL-MCNC: 0.5 MG/DL (ref 0–1)
BUN SERPL-MCNC: 30 MG/DL (ref 8–22)
CALCIUM SERPL-MCNC: 8.3 MG/DL (ref 8.5–10.5)
CHLORIDE BLD-SCNC: 96 MMOL/L (ref 98–107)
CO2 SERPL-SCNC: 23 MMOL/L (ref 22–31)
CREAT SERPL-MCNC: 1.86 MG/DL (ref 0.7–1.3)
EOSINOPHIL # BLD AUTO: 0 10E3/UL (ref 0–0.7)
EOSINOPHIL NFR BLD AUTO: 0 %
ERYTHROCYTE [DISTWIDTH] IN BLOOD BY AUTOMATED COUNT: 14.6 % (ref 10–15)
GFR SERPL CREATININE-BSD FRML MDRD: 40 ML/MIN/1.73M2
GLUCOSE BLD-MCNC: 250 MG/DL (ref 70–125)
HCT VFR BLD AUTO: 23.6 % (ref 40–53)
HGB BLD-MCNC: 7.8 G/DL (ref 13.3–17.7)
IMM GRANULOCYTES # BLD: 0 10E3/UL
IMM GRANULOCYTES NFR BLD: 1 %
LYMPHOCYTES # BLD AUTO: 2.3 10E3/UL (ref 0.8–5.3)
LYMPHOCYTES NFR BLD AUTO: 35 %
MCH RBC QN AUTO: 27.7 PG (ref 26.5–33)
MCHC RBC AUTO-ENTMCNC: 33.1 G/DL (ref 31.5–36.5)
MCV RBC AUTO: 84 FL (ref 78–100)
MONOCYTES # BLD AUTO: 0.5 10E3/UL (ref 0–1.3)
MONOCYTES NFR BLD AUTO: 7 %
NEUTROPHILS # BLD AUTO: 3.8 10E3/UL (ref 1.6–8.3)
NEUTROPHILS NFR BLD AUTO: 57 %
NRBC # BLD AUTO: 0 10E3/UL
NRBC BLD AUTO-RTO: 0 /100
PATH REPORT.COMMENTS IMP SPEC: NORMAL
PATH REPORT.COMMENTS IMP SPEC: NORMAL
PATH REPORT.FINAL DX SPEC: NORMAL
PATH REPORT.GROSS SPEC: NORMAL
PATH REPORT.MICROSCOPIC SPEC OTHER STN: NORMAL
PATH REPORT.RELEVANT HX SPEC: NORMAL
PHOTO IMAGE: NORMAL
PLATELET # BLD AUTO: 212 10E3/UL (ref 150–450)
POTASSIUM BLD-SCNC: 4.2 MMOL/L (ref 3.5–5)
PROT SERPL-MCNC: 6.7 G/DL (ref 6–8)
RBC # BLD AUTO: 2.82 10E6/UL (ref 4.4–5.9)
SODIUM SERPL-SCNC: 131 MMOL/L (ref 136–145)
WBC # BLD AUTO: 6.7 10E3/UL (ref 4–11)

## 2022-01-13 PROCEDURE — 82040 ASSAY OF SERUM ALBUMIN: CPT | Performed by: FAMILY MEDICINE

## 2022-01-13 PROCEDURE — 88311 DECALCIFY TISSUE: CPT | Mod: 26 | Performed by: PATHOLOGY

## 2022-01-13 PROCEDURE — 80053 COMPREHEN METABOLIC PANEL: CPT | Performed by: FAMILY MEDICINE

## 2022-01-13 PROCEDURE — 88307 TISSUE EXAM BY PATHOLOGIST: CPT | Mod: 26 | Performed by: PATHOLOGY

## 2022-01-13 PROCEDURE — 85025 COMPLETE CBC W/AUTO DIFF WBC: CPT | Performed by: FAMILY MEDICINE

## 2022-01-13 RX ORDER — SULFAMETHOXAZOLE/TRIMETHOPRIM 800-160 MG
1 TABLET ORAL 2 TIMES DAILY
Qty: 20 TABLET | Refills: 0 | Status: ON HOLD | OUTPATIENT
Start: 2022-01-13 | End: 2022-01-27

## 2022-01-13 NOTE — TELEPHONE ENCOUNTER
----- Message from Alba Caceres sent at 1/13/2022  9:28 AM CST -----  [facility]  Please ask patient to begin bactrim per recent culture report.

## 2022-01-13 NOTE — TELEPHONE ENCOUNTER
Spoke with patient to begin bactrim per recent culture report Dr Ram reviewed.  Aware to call with any questions.

## 2022-01-13 NOTE — TELEPHONE ENCOUNTER
Talked with Laura, she states yesterday 1/12 patient was tachycardic and hypotensive.  She was in contact with the PCP and they recommended patient go to the emergency room.  Patient refused to go.  Laura stated the vital signs were a little improved today.  She also stated the wound vac has been on hold and she will be re-applying it tomorrow.  She spoke with [employer] and they recommended wound vac to be set to 75 mmHg intermittent.  Verbal ok given per Dr. Ram.  Laura will call clinic with any further issues.

## 2022-01-13 NOTE — TELEPHONE ENCOUNTER
Laura is calling to follow up on the bleeding issue.  She saw him yesterday, she was trying to get him hospitalized for anemia.  The wound vac was held for two days to get the bleeding stopped.  She states that today Norman is running a low grade temp of 99.6..  Laura is wondering what labs and culture have been added?  She is also asking if we can lower the pressure on the wound vac, to prevent another clot from forming.  She can be reached at [phone]

## 2022-01-19 ENCOUNTER — OFFICE VISIT (OUTPATIENT)
Dept: VASCULAR SURGERY | Facility: CLINIC | Age: 62
End: 2022-01-19
Attending: PODIATRIST
Payer: COMMERCIAL

## 2022-01-19 ENCOUNTER — LAB (OUTPATIENT)
Dept: LAB | Facility: CLINIC | Age: 62
End: 2022-01-19
Payer: COMMERCIAL

## 2022-01-19 VITALS
HEART RATE: 88 BPM | SYSTOLIC BLOOD PRESSURE: 118 MMHG | DIASTOLIC BLOOD PRESSURE: 66 MMHG | TEMPERATURE: 98.5 F | RESPIRATION RATE: 16 BRPM

## 2022-01-19 DIAGNOSIS — E11.621 DIABETIC ULCER OF LEFT MIDFOOT ASSOCIATED WITH TYPE 2 DIABETES MELLITUS, WITH NECROSIS OF BONE (H): ICD-10-CM

## 2022-01-19 DIAGNOSIS — E11.621 DIABETIC ULCER OF LEFT MIDFOOT ASSOCIATED WITH TYPE 2 DIABETES MELLITUS, WITH NECROSIS OF BONE (H): Primary | ICD-10-CM

## 2022-01-19 DIAGNOSIS — L97.424 DIABETIC ULCER OF LEFT MIDFOOT ASSOCIATED WITH TYPE 2 DIABETES MELLITUS, WITH NECROSIS OF BONE (H): ICD-10-CM

## 2022-01-19 DIAGNOSIS — L97.424 DIABETIC ULCER OF LEFT MIDFOOT ASSOCIATED WITH TYPE 2 DIABETES MELLITUS, WITH NECROSIS OF BONE (H): Primary | ICD-10-CM

## 2022-01-19 LAB
BASOPHILS # BLD AUTO: 0 10E3/UL (ref 0–0.2)
BASOPHILS NFR BLD AUTO: 0 %
EOSINOPHIL # BLD AUTO: 0.1 10E3/UL (ref 0–0.7)
EOSINOPHIL NFR BLD AUTO: 2 %
ERYTHROCYTE [DISTWIDTH] IN BLOOD BY AUTOMATED COUNT: 14.7 % (ref 10–15)
HBA1C MFR BLD: 7.6 % (ref 0–5.6)
HCT VFR BLD AUTO: 25.8 % (ref 40–53)
HGB BLD-MCNC: 8.1 G/DL (ref 13.3–17.7)
IMM GRANULOCYTES # BLD: 0.1 10E3/UL
IMM GRANULOCYTES NFR BLD: 1 %
LYMPHOCYTES # BLD AUTO: 1.7 10E3/UL (ref 0.8–5.3)
LYMPHOCYTES NFR BLD AUTO: 25 %
MCH RBC QN AUTO: 26.7 PG (ref 26.5–33)
MCHC RBC AUTO-ENTMCNC: 31.4 G/DL (ref 31.5–36.5)
MCV RBC AUTO: 85 FL (ref 78–100)
MONOCYTES # BLD AUTO: 0.5 10E3/UL (ref 0–1.3)
MONOCYTES NFR BLD AUTO: 8 %
NEUTROPHILS # BLD AUTO: 4.3 10E3/UL (ref 1.6–8.3)
NEUTROPHILS NFR BLD AUTO: 64 %
PLATELET # BLD AUTO: 402 10E3/UL (ref 150–450)
RBC # BLD AUTO: 3.03 10E6/UL (ref 4.4–5.9)
WBC # BLD AUTO: 6.8 10E3/UL (ref 4–11)

## 2022-01-19 PROCEDURE — 11044 DBRDMT BONE 1ST 20 SQ CM/<: CPT | Performed by: PODIATRIST

## 2022-01-19 PROCEDURE — 36415 COLL VENOUS BLD VENIPUNCTURE: CPT

## 2022-01-19 PROCEDURE — 11047 DBRDMT BONE EACH ADDL: CPT | Performed by: PODIATRIST

## 2022-01-19 PROCEDURE — 85025 COMPLETE CBC W/AUTO DIFF WBC: CPT

## 2022-01-19 PROCEDURE — 83036 HEMOGLOBIN GLYCOSYLATED A1C: CPT

## 2022-01-19 RX ORDER — FERROUS SULFATE 325(65) MG
1 TABLET ORAL 2 TIMES DAILY
Status: ON HOLD | COMMUNITY
Start: 2022-01-13 | End: 2022-04-04

## 2022-01-19 ASSESSMENT — PAIN SCALES - GENERAL: PAINLEVEL: MILD PAIN (2)

## 2022-01-19 NOTE — PROGRESS NOTES
FOOT AND ANKLE SURGERY/PODIATRY Progress Note      ASSESSMENT:   Ulceration left foot  DM2      TREATMENT:  -I discussed with the patient that the distal left foot ulceration has a granular base, proximal wound has non-viable tissue and probes to bone. No erythema left foot. Pathology report indicates a clean proximal margin.     -Based on the above, we discussed treatment options to include continued attempted salvage of the left foot which would require additional surgical debridement vs BKA. He would like to consider these options.     -We also discussed the importance of good blood sugar control. Referred for repeat HbA1c and CBC.     -Finish course of Bactrim.     -After discussion of risk factors and consent obtained 2% Lidocaine HCL jelly was applied, under clean conditions, the left and foot ulceration(s) were debrided using currette.  Devitalized and nonviable tissue, along with any fibrin and slough, was removed to improve granulation tissue formation, stimulate wound healing, decrease overall bacteria load, disrupt biofilm formation and decrease edge senescence. Wound drainage was scant No. Total excisional debridement was 37.29 sq cm into the bone with a depth of 2.3 cm.   Ulcers were improved afterwards and .  Measures were as noted on the flow sheet. A gauze dressing was applied. He will continue to apply a gauze dressing qday.    -I have asked the patient to contact my office this week with his decision on future treatment plan. I will reschedule him for follow-up in 2 weeks.     Eddi Ram DPM  [facility]      HPI: Norman Aguilar was seen again today for a left foot ulceration. He has tried to remain non-weight bearing on the left foot but has been walking with a walker. Patient walked into clinic today with winter boots. He reports that his blood sugars have been low, going well. He requests new labs today in preparation for upcoming visit with his primary  provider.       Past Medical History:   Diagnosis Date     Coronary artery disease      Diabetes (H)      GERD (gastroesophageal reflux disease)      Heart attack (H)      Hyperlipidemia      Hypertension      Renal disease      Retinopathy      Sleep disturbance        Past Surgical History:   Procedure Laterality Date     AMPUTATE TOE(S) Left 11/16/2021    Procedure: first and second ray amputation;  Surgeon: Eddi Ram DPM;  Location: [facility] OR     APPENDECTOMY       CV CORONARY ANGIOGRAM N/A 11/16/2021    Procedure: CV CORONARY ANGIOGRAM;  Surgeon: Pam Tabares MD;  Location: [facility] CATH LAB CV     CV LEFT HEART CATH N/A 11/16/2021    Procedure: Left Heart Cath;  Surgeon: Pam Tabares MD;  Location: [facility] CATH LAB CV     HERNIA REPAIR       INCISION AND DRAINAGE LOWER EXTREMITY, COMBINED Left 11/16/2021    Procedure: INCISION AND DRAINAGE, left foot;  Surgeon: Eddi Ram DPM;  Location: [facility] OR     INCISION AND DRAINAGE LOWER EXTREMITY, COMBINED Left 11/19/2021    Procedure: INCISION AND DRAINAGE, left foot;  Surgeon: Eddi Ram DPM;  Location: [facility] OR     INCISION AND DRAINAGE LOWER EXTREMITY, COMBINED Left 12/9/2021    Procedure: INCISION AND DRAINAGE, Left foot;  Surgeon: Eddi Ram DPM;  Location: [facility] OR     INCISION AND DRAINAGE LOWER EXTREMITY, COMBINED Left 1/10/2022    Procedure: INCISION AND DRAINAGE, left foot;  Surgeon: Eddi Ram DPM;  Location: [facility] OR       No Known Allergies      Current Outpatient Medications:      apixaban ANTICOAGULANT (ELIQUIS) 5 MG tablet, Take 1 tablet (5 mg) by mouth 2 times daily, Disp: 180 tablet, Rfl: 0     aspirin (ASA) 81 MG chewable tablet, Take 1 tablet (81 mg) by mouth daily, Disp: 30 tablet, Rfl: 0     atorvastatin (LIPITOR) 80 MG tablet, Take 1 tablet (80 mg) by mouth every evening, Disp: 30 tablet, Rfl: 1     blood glucose (NO BRAND SPECIFIED) test  strip, Use to test blood sugar 4 times daily or as directed., Disp: 100 strip, Rfl: 3     blood glucose monitoring (SOFTCLIX) lancets, Use to test blood sugar 4 times daily., Disp: 100 each, Rfl: 6     ferrous sulfate (FEROSUL) 325 (65 Fe) MG tablet, Take 1 tablet by mouth 2 times daily, Disp: , Rfl:      furosemide (LASIX) 20 MG tablet, Take 1 tablet (20 mg) by mouth daily, Disp: 30 tablet, Rfl: 1     insulin aspart (NOVOLOG FLEXPEN) 100 UNIT/ML pen, For  - 164 give 1 unit. For  - 189 give 2 units. For  - 214 give 3 units. For  - 239 give 4 units. For  - 264 give 5 units. For  - 289 give 6 units. For  - 314 give 7 units. For  - 339 give 8 units For  - 364 give 9 units For  - 389 give 10 units For  - 414 give 11 units For BG greater than or equal to 415 give 12 units, Disp: 3 mL, Rfl: 3     insulin glargine (LANTUS SOLOSTAR) 100 UNIT/ML pen, Inject 38 Units Subcutaneous every morning (Patient taking differently: Inject 38 Units Subcutaneous At Bedtime ), Disp: 3 mL, Rfl: 3     magnesium oxide (MAG-OX) 400 MG tablet, Take 1 tablet (400 mg) by mouth daily, Disp: 90 tablet, Rfl: 1     metoprolol succinate ER (TOPROL-XL) 50 MG 24 hr tablet, Take 1 tablet (50 mg) by mouth daily, Disp: 30 tablet, Rfl: 1     Multiple Vitamin (MULTI VITAMIN) TABS, 1 tab(s), Disp: , Rfl:      OMEGA-3/DHA/EPA/FISH OIL (FISH OIL-OMEGA-3 FATTY ACIDS) 300-1,000 mg capsule, Take 1 g by mouth daily , Disp: , Rfl:      oxyCODONE (ROXICODONE) 5 MG tablet, Take 1-2 tablets (5-10 mg) by mouth every 6 hours as needed for moderate to severe pain, Disp: 30 tablet, Rfl: 0     sulfamethoxazole-trimethoprim (BACTRIM DS) 800-160 MG tablet, Take 1 tablet by mouth 2 times daily, Disp: 20 tablet, Rfl: 0    Review of Systems - 10 point Review of Systems is negative except for left foot ulcer which is noted in HPI.      OBJECTIVE:  /66   Pulse 88   Temp 98.5  F (36.9  C)   Resp 16  "  General appearance: Patient is alert and fully cooperative with history & exam.  No sign of distress is noted during the visit.    Vascular: Dorsalis pedis non-palpableLeft.  Dermatologic:    VASC Wound left foot (Active)   Pre Size Length 3.3 01/19/22 1400   Pre Size Width 11.3 01/19/22 1400   Pre Size Depth 2.3 01/19/22 1400   Pre Total Sq cm 37.29 01/19/22 1400   Tunneling 1.9 @ 11 oclock 01/19/22 1400       Incision/Surgical Site 11/16/21 Left Foot (Active)   Distal left foot ulceration has a granular base, proximal wound has non-viable tissue and probes to bone. No erythema left foot.   Neurologic: Diminished to light touch Left.  Musculoskeletal: Contracted digits noted Left.    Imaging:     XR Foot 3 Views Standing Left    Result Date: 1/6/2022  Amputation of 1st and 2nd rays. Staple present in medial soft tissues.     POC US Guidance Needle Placement    Result Date: 1/10/2022  Ultrasound was performed as guidance to an anesthesia procedure.  Click \"PACS images\" hyperlink below to view any stored images.  For specific procedure details, view procedure note authored by anesthesia.         Picture:             "

## 2022-01-19 NOTE — PATIENT INSTRUCTIONS
Please call us by the end of the week and let us know what you decide what you would like to do going forward.    HOLD THE WOUND VAC    Change the gauze dressing daily.

## 2022-01-21 ENCOUNTER — DOCUMENTATION ONLY (OUTPATIENT)
Dept: PODIATRY | Facility: CLINIC | Age: 62
End: 2022-01-21
Payer: COMMERCIAL

## 2022-01-21 ENCOUNTER — TELEPHONE (OUTPATIENT)
Dept: VASCULAR SURGERY | Facility: CLINIC | Age: 62
End: 2022-01-21
Payer: COMMERCIAL

## 2022-01-21 ENCOUNTER — TELEPHONE (OUTPATIENT)
Dept: PODIATRY | Facility: CLINIC | Age: 62
End: 2022-01-21
Payer: COMMERCIAL

## 2022-01-21 ENCOUNTER — PREP FOR PROCEDURE (OUTPATIENT)
Dept: PODIATRY | Facility: CLINIC | Age: 62
End: 2022-01-21
Payer: COMMERCIAL

## 2022-01-21 DIAGNOSIS — M86.9 OSTEOMYELITIS OF ANKLE AND FOOT (H): Primary | ICD-10-CM

## 2022-01-21 DIAGNOSIS — Z11.59 ENCOUNTER FOR SCREENING FOR OTHER VIRAL DISEASES: ICD-10-CM

## 2022-01-21 RX ORDER — CEFAZOLIN SODIUM 2 G/100ML
2 INJECTION, SOLUTION INTRAVENOUS SEE ADMIN INSTRUCTIONS
Status: CANCELLED | OUTPATIENT
Start: 2022-01-27

## 2022-01-21 RX ORDER — CEFAZOLIN SODIUM 2 G/100ML
2 INJECTION, SOLUTION INTRAVENOUS
Status: CANCELLED | OUTPATIENT
Start: 2022-01-27

## 2022-01-21 NOTE — TELEPHONE ENCOUNTER
Patient is calling to schedule the surgery that was discussed at the apt on 1/19, he will be available for a call back today.

## 2022-01-21 NOTE — TELEPHONE ENCOUNTER
I talked with the patient today via phone. We again discussed options to include I&D with additional attempted salvage of the foot vs BKA. After an in-depth discussion, he would like to continue to try and salvage the left foot. He understands that this procedure may not be curative and BKA may be necessary at a later date. I will ask my office to coordinate surgery at [facility].

## 2022-01-21 NOTE — TELEPHONE ENCOUNTER
Reviewed plan of care with home care nurse. She will do alginate and dry gauze due to drainage daily- hold wound vac-   Let her know surgery most likely will be Thursday and to advise pt to get preop and covid Monday/Tuesday.

## 2022-01-21 NOTE — TELEPHONE ENCOUNTER
Laura with [facility] requesting a call back to discuss new wound care orders. Patient told her not to replace the wound vac so she is looking for alternate dressing orders.   PH: [phone]

## 2022-01-21 NOTE — PROGRESS NOTES
Surgery Scheduled      Surgery/Procedure: Incision and drainage left foot    Special Equipment: pulse lavage    Location: [facility]:  [facility] [id number] (phone: [phone], Fax: [phone])    Date: 1/27    Time:930 a    Admission Type: Outpatient    Surgeon: Dr. Ram    OR Confirmed/ :  Yes with Heidi on 1/21    Orders In:  Yes    Entered on [employer] Calendar:  Yes    Post Op: 2/3    Covid Scheduled: Monday- preop still good  Wound Vac Needed:  Yes - pt has  Home Care Needed:  Yes- pt has    Blood Thinners Addressed:Yes: will follow previous plan from cardiology- hold eliquis 2 days prior.

## 2022-01-24 ENCOUNTER — LAB (OUTPATIENT)
Dept: LAB | Facility: CLINIC | Age: 62
End: 2022-01-24
Payer: COMMERCIAL

## 2022-01-24 DIAGNOSIS — Z11.59 ENCOUNTER FOR SCREENING FOR OTHER VIRAL DISEASES: ICD-10-CM

## 2022-01-24 PROCEDURE — U0003 INFECTIOUS AGENT DETECTION BY NUCLEIC ACID (DNA OR RNA); SEVERE ACUTE RESPIRATORY SYNDROME CORONAVIRUS 2 (SARS-COV-2) (CORONAVIRUS DISEASE [COVID-19]), AMPLIFIED PROBE TECHNIQUE, MAKING USE OF HIGH THROUGHPUT TECHNOLOGIES AS DESCRIBED BY CMS-2020-01-R: HCPCS

## 2022-01-24 PROCEDURE — U0005 INFEC AGEN DETEC AMPLI PROBE: HCPCS

## 2022-01-25 LAB — SARS-COV-2 RNA RESP QL NAA+PROBE: NEGATIVE

## 2022-01-26 ENCOUNTER — ANESTHESIA EVENT (OUTPATIENT)
Dept: SURGERY | Facility: HOSPITAL | Age: 62
End: 2022-01-26
Payer: COMMERCIAL

## 2022-01-27 ENCOUNTER — TELEPHONE (OUTPATIENT)
Dept: INFECTIOUS DISEASES | Facility: CLINIC | Age: 62
End: 2022-01-27

## 2022-01-27 ENCOUNTER — SURGERY (OUTPATIENT)
Age: 62
End: 2022-01-27
Payer: COMMERCIAL

## 2022-01-27 ENCOUNTER — ANCILLARY PROCEDURE (OUTPATIENT)
Dept: ULTRASOUND IMAGING | Facility: HOSPITAL | Age: 62
End: 2022-01-27
Attending: ANESTHESIOLOGY
Payer: COMMERCIAL

## 2022-01-27 ENCOUNTER — ANESTHESIA (OUTPATIENT)
Dept: SURGERY | Facility: HOSPITAL | Age: 62
End: 2022-01-27
Payer: COMMERCIAL

## 2022-01-27 ENCOUNTER — HOSPITAL ENCOUNTER (OUTPATIENT)
Facility: HOSPITAL | Age: 62
Discharge: HOME | End: 2022-01-27
Attending: PODIATRIST | Admitting: PODIATRIST
Payer: COMMERCIAL

## 2022-01-27 VITALS
DIASTOLIC BLOOD PRESSURE: 72 MMHG | TEMPERATURE: 97.9 F | SYSTOLIC BLOOD PRESSURE: 149 MMHG | HEART RATE: 76 BPM | BODY MASS INDEX: 25.21 KG/M2 | OXYGEN SATURATION: 100 % | RESPIRATION RATE: 16 BRPM | WEIGHT: 199 LBS

## 2022-01-27 DIAGNOSIS — E11.621 DIABETIC ULCER OF LEFT MIDFOOT ASSOCIATED WITH TYPE 2 DIABETES MELLITUS, WITH NECROSIS OF BONE (H): Primary | ICD-10-CM

## 2022-01-27 DIAGNOSIS — L97.509 FOOT ULCER (H): Primary | ICD-10-CM

## 2022-01-27 DIAGNOSIS — L97.424 DIABETIC ULCER OF LEFT MIDFOOT ASSOCIATED WITH TYPE 2 DIABETES MELLITUS, WITH NECROSIS OF BONE (H): Primary | ICD-10-CM

## 2022-01-27 LAB
GLUCOSE BLDC GLUCOMTR-MCNC: 92 MG/DL (ref 70–99)
GRAM STAIN RESULT: ABNORMAL
HGB BLD-MCNC: 8.3 G/DL (ref 13.3–17.7)
HOLD SPECIMEN: NORMAL

## 2022-01-27 PROCEDURE — 272N000001 HC OR GENERAL SUPPLY STERILE: Performed by: PODIATRIST

## 2022-01-27 PROCEDURE — 360N000075 HC SURGERY LEVEL 2, PER MIN: Performed by: PODIATRIST

## 2022-01-27 PROCEDURE — 250N000009 HC RX 250

## 2022-01-27 PROCEDURE — 87205 SMEAR GRAM STAIN: CPT | Performed by: PODIATRIST

## 2022-01-27 PROCEDURE — 258N000003 HC RX IP 258 OP 636: Performed by: ANESTHESIOLOGY

## 2022-01-27 PROCEDURE — 272N000004 HC RX 272

## 2022-01-27 PROCEDURE — 710N000012 HC RECOVERY PHASE 2, PER MINUTE: Performed by: PODIATRIST

## 2022-01-27 PROCEDURE — 370N000017 HC ANESTHESIA TECHNICAL FEE, PER MIN: Performed by: PODIATRIST

## 2022-01-27 PROCEDURE — 999N000141 HC STATISTIC PRE-PROCEDURE NURSING ASSESSMENT: Performed by: PODIATRIST

## 2022-01-27 PROCEDURE — 28120 PART REMOVAL OF ANKLE/HEEL: CPT | Mod: LT | Performed by: PODIATRIST

## 2022-01-27 PROCEDURE — 85018 HEMOGLOBIN: CPT | Performed by: ANESTHESIOLOGY

## 2022-01-27 PROCEDURE — 28122 PARTIAL REMOVAL OF FOOT BONE: CPT | Mod: 59 | Performed by: PODIATRIST

## 2022-01-27 PROCEDURE — 88311 DECALCIFY TISSUE: CPT | Mod: TC | Performed by: PODIATRIST

## 2022-01-27 PROCEDURE — 36415 COLL VENOUS BLD VENIPUNCTURE: CPT | Performed by: ANESTHESIOLOGY

## 2022-01-27 PROCEDURE — 11044 DBRDMT BONE 1ST 20 SQ CM/<: CPT | Mod: 59 | Performed by: PODIATRIST

## 2022-01-27 PROCEDURE — 258N000003 HC RX IP 258 OP 636: Performed by: NURSE ANESTHETIST, CERTIFIED REGISTERED

## 2022-01-27 PROCEDURE — 87075 CULTR BACTERIA EXCEPT BLOOD: CPT | Performed by: PODIATRIST

## 2022-01-27 PROCEDURE — 11047 DBRDMT BONE EACH ADDL: CPT | Mod: 59 | Performed by: PODIATRIST

## 2022-01-27 PROCEDURE — 250N000011 HC RX IP 250 OP 636: Performed by: ANESTHESIOLOGY

## 2022-01-27 PROCEDURE — 82962 GLUCOSE BLOOD TEST: CPT

## 2022-01-27 PROCEDURE — 272N000004 HC RX 272: Performed by: PODIATRIST

## 2022-01-27 PROCEDURE — 87070 CULTURE OTHR SPECIMN AEROBIC: CPT | Performed by: PODIATRIST

## 2022-01-27 PROCEDURE — 250N000009 HC RX 250: Performed by: ANESTHESIOLOGY

## 2022-01-27 PROCEDURE — 250N000011 HC RX IP 250 OP 636: Performed by: NURSE ANESTHETIST, CERTIFIED REGISTERED

## 2022-01-27 PROCEDURE — 250N000011 HC RX IP 250 OP 636: Performed by: PODIATRIST

## 2022-01-27 PROCEDURE — 87077 CULTURE AEROBIC IDENTIFY: CPT | Performed by: PODIATRIST

## 2022-01-27 PROCEDURE — 250N000009 HC RX 250: Performed by: NURSE ANESTHETIST, CERTIFIED REGISTERED

## 2022-01-27 RX ORDER — FENTANYL CITRATE 50 UG/ML
50 INJECTION, SOLUTION INTRAMUSCULAR; INTRAVENOUS
Status: COMPLETED | OUTPATIENT
Start: 2022-01-27 | End: 2022-01-27

## 2022-01-27 RX ORDER — FENTANYL CITRATE 50 UG/ML
25 INJECTION, SOLUTION INTRAMUSCULAR; INTRAVENOUS
Status: DISCONTINUED | OUTPATIENT
Start: 2022-01-27 | End: 2022-01-27 | Stop reason: HOSPADM

## 2022-01-27 RX ORDER — HYDROMORPHONE HCL IN WATER/PF 6 MG/30 ML
0.2 PATIENT CONTROLLED ANALGESIA SYRINGE INTRAVENOUS EVERY 5 MIN PRN
Status: DISCONTINUED | OUTPATIENT
Start: 2022-01-27 | End: 2022-01-27 | Stop reason: HOSPADM

## 2022-01-27 RX ORDER — OXYCODONE HYDROCHLORIDE 5 MG/1
5 TABLET ORAL EVERY 4 HOURS PRN
Status: DISCONTINUED | OUTPATIENT
Start: 2022-01-27 | End: 2022-01-27 | Stop reason: HOSPADM

## 2022-01-27 RX ORDER — FENTANYL CITRATE 50 UG/ML
25 INJECTION, SOLUTION INTRAMUSCULAR; INTRAVENOUS EVERY 5 MIN PRN
Status: DISCONTINUED | OUTPATIENT
Start: 2022-01-27 | End: 2022-01-27 | Stop reason: HOSPADM

## 2022-01-27 RX ORDER — ONDANSETRON 2 MG/ML
4 INJECTION INTRAMUSCULAR; INTRAVENOUS EVERY 30 MIN PRN
Status: DISCONTINUED | OUTPATIENT
Start: 2022-01-27 | End: 2022-01-27 | Stop reason: HOSPADM

## 2022-01-27 RX ORDER — ONDANSETRON 2 MG/ML
INJECTION INTRAMUSCULAR; INTRAVENOUS PRN
Status: DISCONTINUED | OUTPATIENT
Start: 2022-01-27 | End: 2022-01-27

## 2022-01-27 RX ORDER — SODIUM CHLORIDE, SODIUM LACTATE, POTASSIUM CHLORIDE, CALCIUM CHLORIDE 600; 310; 30; 20 MG/100ML; MG/100ML; MG/100ML; MG/100ML
INJECTION, SOLUTION INTRAVENOUS CONTINUOUS
Status: DISCONTINUED | OUTPATIENT
Start: 2022-01-27 | End: 2022-01-27 | Stop reason: HOSPADM

## 2022-01-27 RX ORDER — MEPERIDINE HYDROCHLORIDE 25 MG/ML
12.5 INJECTION INTRAMUSCULAR; INTRAVENOUS; SUBCUTANEOUS
Status: DISCONTINUED | OUTPATIENT
Start: 2022-01-27 | End: 2022-01-27 | Stop reason: HOSPADM

## 2022-01-27 RX ORDER — BUPIVACAINE HYDROCHLORIDE 5 MG/ML
INJECTION, SOLUTION EPIDURAL; INTRACAUDAL
Status: COMPLETED | OUTPATIENT
Start: 2022-01-27 | End: 2022-01-27

## 2022-01-27 RX ORDER — PROPOFOL 10 MG/ML
INJECTION, EMULSION INTRAVENOUS CONTINUOUS PRN
Status: DISCONTINUED | OUTPATIENT
Start: 2022-01-27 | End: 2022-01-27

## 2022-01-27 RX ORDER — CEFAZOLIN SODIUM 2 G/100ML
2 INJECTION, SOLUTION INTRAVENOUS
Status: COMPLETED | OUTPATIENT
Start: 2022-01-27 | End: 2022-01-27

## 2022-01-27 RX ORDER — ONDANSETRON 4 MG/1
4 TABLET, ORALLY DISINTEGRATING ORAL EVERY 30 MIN PRN
Status: DISCONTINUED | OUTPATIENT
Start: 2022-01-27 | End: 2022-01-27 | Stop reason: HOSPADM

## 2022-01-27 RX ORDER — PROPOFOL 10 MG/ML
INJECTION, EMULSION INTRAVENOUS PRN
Status: DISCONTINUED | OUTPATIENT
Start: 2022-01-27 | End: 2022-01-27

## 2022-01-27 RX ORDER — OXYCODONE HYDROCHLORIDE 5 MG/1
5-10 TABLET ORAL EVERY 6 HOURS PRN
Qty: 30 TABLET | Refills: 0 | Status: SHIPPED | OUTPATIENT
Start: 2022-01-27 | End: 2022-03-18

## 2022-01-27 RX ORDER — SULFAMETHOXAZOLE/TRIMETHOPRIM 800-160 MG
1 TABLET ORAL 2 TIMES DAILY
Qty: 20 TABLET | Refills: 0 | Status: SHIPPED | OUTPATIENT
Start: 2022-01-27 | End: 2022-02-09

## 2022-01-27 RX ORDER — LIDOCAINE HYDROCHLORIDE 20 MG/ML
INJECTION, SOLUTION INFILTRATION; PERINEURAL PRN
Status: DISCONTINUED | OUTPATIENT
Start: 2022-01-27 | End: 2022-01-27

## 2022-01-27 RX ORDER — LIDOCAINE 40 MG/G
CREAM TOPICAL
Status: DISCONTINUED | OUTPATIENT
Start: 2022-01-27 | End: 2022-01-27 | Stop reason: HOSPADM

## 2022-01-27 RX ORDER — CEFAZOLIN SODIUM 2 G/100ML
2 INJECTION, SOLUTION INTRAVENOUS SEE ADMIN INSTRUCTIONS
Status: DISCONTINUED | OUTPATIENT
Start: 2022-01-27 | End: 2022-01-27 | Stop reason: HOSPADM

## 2022-01-27 RX ADMIN — CEFAZOLIN SODIUM 2 G: 2 INJECTION, SOLUTION INTRAVENOUS at 09:27

## 2022-01-27 RX ADMIN — GELATIN ABSORBABLE SPONGE SIZE 100 1 EACH: MISC at 09:51

## 2022-01-27 RX ADMIN — MIDAZOLAM 1 MG: 1 INJECTION INTRAMUSCULAR; INTRAVENOUS at 09:01

## 2022-01-27 RX ADMIN — FENTANYL CITRATE 50 MCG: 50 INJECTION INTRAMUSCULAR; INTRAVENOUS at 09:02

## 2022-01-27 RX ADMIN — PHENYLEPHRINE HYDROCHLORIDE 100 MCG: 10 INJECTION INTRAVENOUS at 09:52

## 2022-01-27 RX ADMIN — PROPOFOL 20 MG: 10 INJECTION, EMULSION INTRAVENOUS at 09:28

## 2022-01-27 RX ADMIN — BUPIVACAINE HYDROCHLORIDE 15 ML: 5 INJECTION, SOLUTION EPIDURAL; INTRACAUDAL at 09:01

## 2022-01-27 RX ADMIN — FENTANYL CITRATE 50 MCG: 50 INJECTION INTRAMUSCULAR; INTRAVENOUS at 09:01

## 2022-01-27 RX ADMIN — ONDANSETRON 4 MG: 2 INJECTION INTRAMUSCULAR; INTRAVENOUS at 09:28

## 2022-01-27 RX ADMIN — PROPOFOL 150 MCG/KG/MIN: 10 INJECTION, EMULSION INTRAVENOUS at 09:28

## 2022-01-27 RX ADMIN — BUPIVACAINE HYDROCHLORIDE 25 ML: 5 INJECTION, SOLUTION EPIDURAL; INTRACAUDAL; PERINEURAL at 09:01

## 2022-01-27 RX ADMIN — MIDAZOLAM 1 MG: 1 INJECTION INTRAMUSCULAR; INTRAVENOUS at 09:02

## 2022-01-27 RX ADMIN — SODIUM CHLORIDE, POTASSIUM CHLORIDE, SODIUM LACTATE AND CALCIUM CHLORIDE: 600; 310; 30; 20 INJECTION, SOLUTION INTRAVENOUS at 08:51

## 2022-01-27 RX ADMIN — LIDOCAINE HYDROCHLORIDE 60 MG: 20 INJECTION, SOLUTION INFILTRATION; PERINEURAL at 09:28

## 2022-01-27 ASSESSMENT — ENCOUNTER SYMPTOMS
SEIZURES: 0
DYSRHYTHMIAS: 1

## 2022-01-27 ASSESSMENT — LIFESTYLE VARIABLES: TOBACCO_USE: 0

## 2022-01-27 NOTE — OP NOTE
Date: 1/27/2022     Surgeon: MARCO A Ram DPM    Preoperative diagnosis:   1. Abscess left foot  2. Ulceration left foot    Postoperative diagnosis: Same    Procedure:   1. Incision and drainage left foot  2. Partial amputation talus left   3. Partial amputation lateral cuneiform left  4. Partial amputation 3rd metatarsal left   5. Excisional Debridement ulceration left foot into bone    Anesthesia: Combined MAC with Popliteal Block     Hemostasis: Pneumatic ankle tourniquet 250 mmHg    Pathology:   ID Type Source Tests Collected by Time Destination   1 : ANTERIOR TIBIALIS LEFT FOOT Tissue Foot, Left SURGICAL PATHOLOGY EXAM Eddi Ram DPM 1/27/2022  9:36 AM    2 : LEFT FOOT CUNEIFORM Tissue Foot, Left SURGICAL PATHOLOGY EXAM Eddi Ram DPM 1/27/2022  9:40 AM    3 : LEFT FOOT TALLUS Tissue Foot, Left SURGICAL PATHOLOGY EXAM Eddi Ram DPM 1/27/2022  9:45 AM    4 : THIRD METARSAL LEFT FOOT Tissue Foot, Left SURGICAL PATHOLOGY EXAM Eddi Ram DPM 1/27/2022  9:48 AM    A : left foot culture Tissue Foot, Left ANAEROBIC BACTERIAL CULTURE ROUTINE, GRAM STAIN, AEROBIC BACTERIAL CULTURE ROUTINE Eddi Ram DPM 1/27/2022  9:32 AM         Injectables: None    Materials: 2-0 Vicryl     Complications: None    Blood loss: 2 ml     Findings: Patient presents for operative intervention for an infected wound on the left foot. Patient has had recurrent infections in the left foot along with previous surgical debridements. We discussed BKA, but patient would like to proceed with additional surgical I&D today in an attempt to salvage the left foot. All questions invited and answered. He consents to surgery. Patient questions invited and answered, including appropriate risk, benefits and complications. No guarantees given or implied. Patient has been NPO.    Description: Patient was brought to the operating room and placed on the table in supine position. IV-sedation and popliteal  block was administered by the anesthesia department. The foot was then prepped and draped in usual aseptic manner. The extremity was elevated and exsanguinated. Well-padded ankle pneumatic tourniquet was inflated to 250mmHg and the following procedure was then performed: Attention was directed to the ulceration along the medial aspect of the left foot where a #10 blade was used to make an incision was made into subcutaneus tissue along the proximal incision. Non-viable tissue with purulent drainage was noted along the course of the tibialis anterior tendon. The non-viable tissue was sharply debrided with a #10 blade and removed from the surgical site and sent for aerobic/anaerobic culture. The tibialis anterior tendon was noted to be non-viable and a more proximal incision was made along the course of this tendon. At this time, the tibialis anterior tendon was resected at the level of the ankle joint and sent to pathology. At this time the remaining lateral cuneiform, distal talus and base of the 3rd metatarsal was noted to be non-viable. An osteotome and mallet was used to resected the previously mentioned bone and sent to pathology. Next, a 1000cc pulse lavage was used to irrigate the surgical site. All bleeding vessels were cauterized. Following irrigation and debridement, no remaining non-viable tissue was noted. 2-0 vicryl was used to re-approximate the proximal incision.     The resulting ulceration measured 99w4v6cm. Sharp, excisional debridement was performed with a #15 blade into bone debriding 56 sq cm.     The wound was packed with thrombin impregnated gel foam, gauze, and dressings consistent of 4x4's, ABD, kerlix/tommy roll and an ace wrap. The pneumatic tourniquet was released and a hyperemic response was noted to the remaining digits on the left foot.     The patient appeared to tolerate all the procedures and anesthesia well without apparent complications. Patient was transported from the operating  room to the recovery room with vital signs stable and neurovascular status as it was pre-operatively to the left foot. Patient to be discharged per anesthesia. Referral given to ID. He will continue with Bactrim. He will remain non-weight bearing on the left foot. Surgical dressing to remain intact until the wound vac is applied. Follow-up with me in 1 week.    Eddi Ram DPM

## 2022-01-27 NOTE — ANESTHESIA PROCEDURE NOTES
Adductor canal Procedure Note    Pre-Procedure   Staff -        Anesthesiologist:  Fransisco Thompson MD       Performed By: anesthesiologist       Location: pre-op       Procedure Start/Stop Times: 1/27/2022 9:01 AM and 1/27/2022 9:01 AM       Pre-Anesthestic Checklist: patient identified, IV checked, site marked, risks and benefits discussed, informed consent, monitors and equipment checked, pre-op evaluation, at physician/surgeon's request and post-op pain management  Timeout:       Correct Patient: Yes        Correct Procedure: Yes        Correct Site: Yes        Correct Position: Yes        Correct Laterality: Yes        Site Marked: Yes  Procedure Documentation  Procedure: Adductor canal       Laterality: left       Patient Position: supine       Skin prep: Chloraprep      Local skin infiltrated with mL of 1% lidocaine.        Needle Type: short bevel       Needle Gauge: 21.        Needle Length (Inches): 4        Ultrasound guided       1. Ultrasound was used to identify targeted nerve, plexus, vascular marker, or fascial plane and place a needle adjacent to it in real-time.       2. Ultrasound was used to visualize the spread of anesthetic in close proximity to the above referenced structure.       3. A permanent image is entered into the patient's record.    Assessment/Narrative         The placement was negative for: blood aspirated, painful injection and site bleeding       Paresthesias: No.     Bolus given via needle..        Secured via.        Insertion/Infusion Method: Single Shot       Complications: none    Medication(s) Administered   Bupivacaine 0.5% PF (Infiltration), 15 mL  Medication Administration Time: 1/27/2022 9:01 AM

## 2022-01-27 NOTE — ANESTHESIA CARE TRANSFER NOTE
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    Anesthesia Type:   General     Note:    Oropharynx: oropharynx clear of all foreign objects  Level of Consciousness: awake  Oxygen Supplementation: room air    Independent Airway: airway patency satisfactory and stable  Dentition: dentition unchanged  Vital Signs Stable: post-procedure vital signs reviewed and stable  Report to RN Given: handoff report given  Patient transferred to: Phase II    Handoff Report: Identifed the Patient, Identified the Reponsible Provider, Reviewed the pertinent medical history, Discussed the surgical course, Reviewed Intra-OP anesthesia mangement and issues during anesthesia, Set expectations for post-procedure period and Allowed opportunity for questions and acknowledgement of understanding      Vitals:  Vitals Value Taken Time   /64 01/27/22 1010   Temp 36.6  C (97.9  F) 01/27/22 1010   Pulse 77 01/27/22 1015   Resp 16 01/27/22 1010   SpO2 100 % 01/27/22 1015   Vitals shown include unvalidated device data.    Electronically Signed By: JUNI MCELROY CRNA  January 27, 2022  10:16 AM

## 2022-01-27 NOTE — ANESTHESIA PROCEDURE NOTES
Popliteal Procedure Note    Pre-Procedure   Staff -        Anesthesiologist:  Fransisco Thompson MD       Performed By: anesthesiologist       Location: pre-op       Procedure Start/Stop Times: 1/27/2022 9:00 AM and 1/27/2022 9:01 AM       Pre-Anesthestic Checklist: patient identified, IV checked, site marked, risks and benefits discussed, informed consent, monitors and equipment checked, pre-op evaluation, at physician/surgeon's request and post-op pain management  Timeout:       Correct Patient: Yes        Correct Procedure: Yes        Correct Site: Yes        Correct Position: Yes        Correct Laterality: Yes        Site Marked: Yes  Procedure Documentation  Procedure: Popliteal       Laterality: left       Patient Position: supine       Skin prep: Chloraprep      Local skin infiltrated with mL of 1% lidocaine.        Needle Type: short bevel       Needle Gauge: 21.        Needle Length (Inches): 4        Ultrasound guided       1. Ultrasound was used to identify targeted nerve, plexus, vascular marker, or fascial plane and place a needle adjacent to it in real-time.       2. Ultrasound was used to visualize the spread of anesthetic in close proximity to the above referenced structure.       3. A permanent image is entered into the patient's record.    Assessment/Narrative         The placement was negative for: blood aspirated, painful injection and site bleeding       Paresthesias: No.     Bolus given via needle..        Secured via.        Insertion/Infusion Method: Single Shot       Complications: none    Medication(s) Administered   Bupivacaine 0.5% PF (Infiltration), 25 mL  Medication Administration Time: 1/27/2022 9:01 AM

## 2022-01-27 NOTE — ANESTHESIA POSTPROCEDURE EVALUATION
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    Anesthesia Type:  General    Note:  Disposition: Outpatient   Postop Pain Control: Uneventful            Sign Out: Well controlled pain   PONV: No   Neuro/Psych: Uneventful            Sign Out: Acceptable/Baseline neuro status   Airway/Respiratory: Uneventful            Sign Out: Acceptable/Baseline resp. status   CV/Hemodynamics: Uneventful            Sign Out: Acceptable CV status; No obvious hypovolemia; No obvious fluid overload   Other NRE: NONE   DID A NON-ROUTINE EVENT OCCUR? No           Last vitals:  Vitals Value Taken Time   /65 01/27/22 1030   Temp 36.6  C (97.9  F) 01/27/22 1010   Pulse 74 01/27/22 1035   Resp 16 01/27/22 1010   SpO2 99 % 01/27/22 1035   Vitals shown include unvalidated device data.    Electronically Signed By: Fransisco Thompsno MD  January 27, 2022  10:36 AM

## 2022-01-27 NOTE — TELEPHONE ENCOUNTER
Authorizing: Eddi Ram DPM     Referral: [id number] (Pending Review)       Expires: 1/27/2023 Priority: Routine: Next available opening   Diagnosis: Diabetic ulcer of left midfoot associated with type 2 diabetes mellitus,     Please advise on scheduling

## 2022-01-30 LAB
BACTERIA TISS BX CULT: ABNORMAL

## 2022-01-31 LAB — BACTERIA TISS BX CULT: NORMAL

## 2022-02-01 NOTE — TELEPHONE ENCOUNTER
Called patient and discussed Dr Ram will call Dr Pyle after visit for plan of treatment. Patient understands but is not optimistic. Rescheduled appt with dr pyle on 2/9/22 in case he can wait until then for assessment.

## 2022-02-01 NOTE — TELEPHONE ENCOUNTER
"appt scheduled for 2/2 with Dr Durbin cancelled as provider OOO.    Pt is meeting with Dr Ram 2/2 @ 3pm. He would like to talk to an ID staff afterwards about scheduling.    He declined to reschedule now, as Dr Ram has discuss with patient about possible surgery, \"cutting [his] leg off\". He wanted to wait until after the appointment to see if ID is still needed.  "

## 2022-02-02 ENCOUNTER — OFFICE VISIT (OUTPATIENT)
Dept: VASCULAR SURGERY | Facility: CLINIC | Age: 62
End: 2022-02-02
Attending: PODIATRIST
Payer: COMMERCIAL

## 2022-02-02 VITALS — DIASTOLIC BLOOD PRESSURE: 54 MMHG | HEART RATE: 87 BPM | SYSTOLIC BLOOD PRESSURE: 122 MMHG | TEMPERATURE: 98.2 F

## 2022-02-02 DIAGNOSIS — L97.424 DIABETIC ULCER OF LEFT MIDFOOT ASSOCIATED WITH TYPE 2 DIABETES MELLITUS, WITH NECROSIS OF BONE (H): Primary | ICD-10-CM

## 2022-02-02 DIAGNOSIS — E11.621 DIABETIC ULCER OF LEFT MIDFOOT ASSOCIATED WITH TYPE 2 DIABETES MELLITUS, WITH NECROSIS OF BONE (H): Primary | ICD-10-CM

## 2022-02-02 PROCEDURE — 11047 DBRDMT BONE EACH ADDL: CPT | Performed by: PODIATRIST

## 2022-02-02 PROCEDURE — 11044 DBRDMT BONE 1ST 20 SQ CM/<: CPT | Performed by: PODIATRIST

## 2022-02-02 ASSESSMENT — PAIN SCALES - GENERAL: PAINLEVEL: NO PAIN (0)

## 2022-02-02 NOTE — PATIENT INSTRUCTIONS
Please follow up with Infectious Disease as scheduled.        Important lnstructions      1. WEIGHT BEARING STATUS: You are to remain non-weight bearing on your left foot. Non-weight bearing means NO PRESSURE on your foot or heel at any time for any reason.    2. OFFLOADING DEVICE: Must use a A WHEELCHAIR at all times! (do not use affected foot to push wheelchair)    3. STABILIZATION DEVICE: Use a PRAFO BOOT . You will need to WEAR THIS AT ALL TIMES EVEN WHILE IN BED.     4. PROTEIN SUPPLEMENTS: Drink protein shakes 2x per day, morning and night (Ensure, Boost, Glucerna)     This is in addition to your normal diet    If you are on a renal diet, make sure to get a protein shake that is best suited for this diet. Please ask if you would like a referral to see a  Registered Dietician.     5. ELEVATE: Elevating your leg means laying with your head on a pillow and your foot ABOVE YOUR WAIST.     6. DO NOT MOVE YOUR FOOT.    There is a risk of worsening the wound or incision. To give yourself a higher chance of healing, please DO NOT swing foot back and forth and wiggle foot/toes especially when inside a stabilization device.        Dressing Change lnstructions      Apply wound vac to left foot per instructions below:  - Wound Vac Instructions    1. 3x weekly and as needed cleanse the area with NS    2. Pat dry    3. Apply Cavilon no sting barrier wipe to the skin surrounding the wound to protect from drainage/maceration    4. Apply drape around incision. Cut strip of drape and apply to skin if bridging is needed; plan this area in advance; should not be over bony prominence     5. Cut the foam to fit the area of the incision    6. Cut narrow strip of foam if bridging    7. Cover foam with drape to obtain air tight seal    8. Cut opening the size of a quarter for where the suction pad will be applied    9. Apply Suction pad    10. 125mmHG suction continuous    [facility] Contact Center can be reached at [phone], 24 hours  a day 7 days a week     - Back up plan in place:     If the negative pressure wound therapy malfunctions or unable to maintain seal: dressing must be removed and reapplied within 2 hours of the incident. If unable to reapply negative pressure wound dressing, place Normal Saline moistened gauze in wound bed and cover with appropriate dressing to keep wound bed moist.  Change wet-to-dry dressing two times a day until healthcare staff can re-implement negative pressure therapy. Change canister at least weekly.  [facility] Contact Center can be reached at [phone], 24 hours a day 7 days a week    Information on Vacuum-Assisted Closure of a Wound  Vacuum-assisted closure (VAC) of a wound is a type of treatment to help wounds heal. It s also known as negative pressure wound therapy. During the treatment, a device lowers air pressure on the wound. This can help the wound heal more quickly.  Understanding the wound VAC system  A wound VAC system has several parts. A foam or gauze dressing is put directly on the wound. The dressing is changed every 24 to 72 hours. An adhesive film covers and seals the dressing and wound. A drainage tube leads from under the adhesive film and connects to a portable vacuum pump. This pump removes air pressure over the wound. It may do this constantly. Or it may do it in cycles. During the treatment, you ll need to carry the portable pump everywhere you go.  Why wound VAC is used  You might need this therapy for a recent traumatic wound. Or you may need it for a chronic wound. This is a wound that does not heal the way it should over time. This can happen with wounds in people who have diabetes. You may need a wound VAC if you ve had a recent skin graft. And you may need a wound VAC for a large wound. Large wounds can take a longer time to heal.  A wound vacuum system may help your wound heal more quickly by:    Draining extra fluid from the wound    Reducing swelling    Reducing bacteria in the  wound    Keeping your wound moist and warm    Helping draw together wound edges    Increasing blood flow to your wound    Decreasing inflammation  Wound VAC offers some other advantages over other types of wound care. It may decrease your overall discomfort. The dressings usually need to be changed less often. And they may be easier to keep in position.  Risks of wound VAC  Wound VAC has some rare risks, such as:    Bleeding (which may be severe)    Wound infection    An abnormal connection between the intestinal tract and the skin (enteric fistula)  Proper training in dressing changes can help reduce the risk for these complications. Also, your doctor will carefully evaluate you to make sure you are a good candidate for the therapy. Certain problems can increase your risk for complications. These include:    Exposed organs or blood vessels    High risk of bleeding from another medical problem    Wound infection    Nearby bone infection    Dead wound tissue    Cancer tissue    Fragile skin, such as from aging or longtime use of topical steroids    Allergy to adhesive    Very poor blood flow to your wound    Wounds close to joints that may reopen because of movement  Your doctor will discuss the risks that apply to you. Make sure to talk with him or her about all of your questions and concerns.  Getting ready for wound VAC  You likely won t need to do much to get ready for wound VAC. In some cases, you may need to wait a while before having this therapy. For example, your doctor may first need to treat an infection in your wound. Dead or damaged tissue may also need to be removed from your wound.  You or a caregiver may need training on how to use the wound VAC device. This is done if you will be able to have your wound vacuum therapy at home. In other cases, you may need to have your wound vacuum therapy in a health care facility.  On the day of your procedure  A health care provider will cover your wound with foam  or gauze wound dressing. An adhesive film will be put over the dressing and wound. This seals the wound. The foam connects to a drainage tube, which leads to a vacuum pump. This pump is portable. When the pump is turned on, it draws fluid through the foam and out the drainage tubing. The pump may run constantly, or it may cycle off and on. Your exact setup will depend on the specific type of wound vacuum system that you use.  Managing your wound  You may need the dressing changed about once a day. You may need it changed more or less often, depending on your wound. You or your caregiver may be trained to do this at home. Or it may be done by a visiting health care provider. Your doctor may prescribe a pain medicine. This is to prevent or reduce pain during the dressing change.  You will likely need to use the wound VAC system for several weeks or months. During this time, you ll carry the portable pump everywhere you go.  Nutrition for wound healing  During this time, make sure you follow a healthy diet. This is needed so the wound can heal and to prevent infection. Your doctor can tell you more about what to include in your diet during this time.  follow up with your doctor if you have a medical condition that led to your wound, such as diabetes. He or she can help you prevent future wounds.  Follow-up care  Your doctor will carefully keep track of your healing. Make sure to keep all follow-up appointments.  When to call your health care provider  Call your health care provider right away if you have any of these:    Fever of 100.4 F (38.0 C) or higher    Increased redness, swelling, or warmth around wound    Increased pain    Bright red blood or blood clots in tubing or the collection chamber of the vacuum

## 2022-02-03 PROCEDURE — 88309 TISSUE EXAM BY PATHOLOGIST: CPT | Mod: 26 | Performed by: PATHOLOGY

## 2022-02-03 PROCEDURE — 88311 DECALCIFY TISSUE: CPT | Mod: 26 | Performed by: PATHOLOGY

## 2022-02-03 RX ORDER — AMOXICILLIN AND CLAVULANATE POTASSIUM 500; 125 MG/1; MG/1
1 TABLET, FILM COATED ORAL 2 TIMES DAILY
Qty: 28 TABLET | Refills: 0 | Status: SHIPPED | OUTPATIENT
Start: 2022-02-03 | End: 2022-02-17

## 2022-02-03 NOTE — TELEPHONE ENCOUNTER
Per Dr Durbin, there is no bone infection, pt should stay on bactrim until complete and add Augmentin 500 bid x 2 weeks . RX sent. Pt informed and will keep his 2/9 appt.

## 2022-02-05 ENCOUNTER — HEALTH MAINTENANCE LETTER (OUTPATIENT)
Age: 62
End: 2022-02-05

## 2022-02-09 ENCOUNTER — OFFICE VISIT (OUTPATIENT)
Dept: INFECTIOUS DISEASES | Facility: CLINIC | Age: 62
End: 2022-02-09
Payer: COMMERCIAL

## 2022-02-09 VITALS — DIASTOLIC BLOOD PRESSURE: 70 MMHG | HEART RATE: 92 BPM | SYSTOLIC BLOOD PRESSURE: 138 MMHG | TEMPERATURE: 98.1 F

## 2022-02-09 DIAGNOSIS — L97.424 DIABETIC ULCER OF LEFT MIDFOOT ASSOCIATED WITH TYPE 2 DIABETES MELLITUS, WITH NECROSIS OF BONE (H): Primary | ICD-10-CM

## 2022-02-09 DIAGNOSIS — E11.621 DIABETIC ULCER OF LEFT MIDFOOT ASSOCIATED WITH TYPE 2 DIABETES MELLITUS, WITH NECROSIS OF BONE (H): Primary | ICD-10-CM

## 2022-02-09 PROCEDURE — 87186 SC STD MICRODIL/AGAR DIL: CPT | Performed by: INTERNAL MEDICINE

## 2022-02-09 PROCEDURE — 87070 CULTURE OTHR SPECIMN AEROBIC: CPT | Performed by: INTERNAL MEDICINE

## 2022-02-09 PROCEDURE — 99215 OFFICE O/P EST HI 40 MIN: CPT | Performed by: INTERNAL MEDICINE

## 2022-02-09 PROCEDURE — 87077 CULTURE AEROBIC IDENTIFY: CPT | Performed by: INTERNAL MEDICINE

## 2022-02-09 RX ORDER — MEPERIDINE HYDROCHLORIDE 25 MG/ML
25 INJECTION INTRAMUSCULAR; INTRAVENOUS; SUBCUTANEOUS EVERY 30 MIN PRN
Status: CANCELLED | OUTPATIENT
Start: 2022-02-11

## 2022-02-09 RX ORDER — HEPARIN SODIUM,PORCINE 10 UNIT/ML
5 VIAL (ML) INTRAVENOUS
Status: CANCELLED | OUTPATIENT
Start: 2022-02-11

## 2022-02-09 RX ORDER — DIPHENHYDRAMINE HYDROCHLORIDE 50 MG/ML
50 INJECTION INTRAMUSCULAR; INTRAVENOUS
Status: CANCELLED
Start: 2022-02-11

## 2022-02-09 RX ORDER — METHYLPREDNISOLONE SODIUM SUCCINATE 125 MG/2ML
125 INJECTION, POWDER, LYOPHILIZED, FOR SOLUTION INTRAMUSCULAR; INTRAVENOUS
Status: CANCELLED
Start: 2022-02-11

## 2022-02-09 RX ORDER — ALBUTEROL SULFATE 90 UG/1
1-2 AEROSOL, METERED RESPIRATORY (INHALATION)
Status: CANCELLED
Start: 2022-02-11

## 2022-02-09 RX ORDER — HEPARIN SODIUM (PORCINE) LOCK FLUSH IV SOLN 100 UNIT/ML 100 UNIT/ML
5 SOLUTION INTRAVENOUS
Status: CANCELLED | OUTPATIENT
Start: 2022-02-11

## 2022-02-09 RX ORDER — NALOXONE HYDROCHLORIDE 0.4 MG/ML
0.2 INJECTION, SOLUTION INTRAMUSCULAR; INTRAVENOUS; SUBCUTANEOUS
Status: CANCELLED | OUTPATIENT
Start: 2022-02-11

## 2022-02-09 RX ORDER — EPINEPHRINE 1 MG/ML
0.3 INJECTION, SOLUTION, CONCENTRATE INTRAVENOUS EVERY 5 MIN PRN
Status: CANCELLED | OUTPATIENT
Start: 2022-02-11

## 2022-02-09 RX ORDER — ALBUTEROL SULFATE 0.83 MG/ML
2.5 SOLUTION RESPIRATORY (INHALATION)
Status: CANCELLED | OUTPATIENT
Start: 2022-02-11

## 2022-02-09 NOTE — PATIENT INSTRUCTIONS
Continue Augmentin till IV antibiotic started  Will arrange for you to start IV antibiotics  And have a PICC line  For foot infection  Will send wound swab for culture  Watch for fevers diarrhea rash

## 2022-02-09 NOTE — PROGRESS NOTES
General ID Service Consult      Patient: Norman Aguilar  YOB: 1960, MRN: [id number]  Date of Admission:  (Not on file)  Date of Consult: 02/09/2022  Consult Requested by: No att. providers found  Admission Diagnosis: Diabetic ulcer of left midfoot associated with type 2 diabetes mellitus, with necrosis of bone (H) [E11.621, L97.424]      ID Assessment & Plan   History of ulceration left foot with requiring repeated I&D's  Presenting to the clinic today with an open wound left foot.  Most of it is granulating well however there is an area with palpable bone (so concerning for new osteomyelitis)  with gangrenous tissue  DM2, nephropathy  Ischemic cardiomyopathy  Hx of abscess and ulceration left foot, s/p (1/27)  Procedure:   1. Incision and drainage left foot  2. Partial amputation talus left   3. Partial amputation lateral cuneiform left  4. Partial amputation 3rd metatarsal left   5. Excisional Debridement ulceration left foot into bone     Path negative osteomyelitis  Cx enterobacter, e faecalis, C striatum, Trueperella, Pasteurella canis on 1/10    PLAN  We will arrange for home IV antibiotics: Zosyn, will does at 4.5 g IV every 12 hours for now pending new chemistry  I obtained a new culture today  Arrange for PICC line discussed side effects   All Side effects and potential toxicities of antibiotic, including but not limited to nephrotoxicity, hepatotoxicity, ototoxicity, seizures, diarrhea and colitis , allergic reactions and rash, line related infection and clots ,etc. have been explained to the patient and she agrees to proceed.  Continue Augmentin until IV antibiotic started    Kendrick Durbin M.D.  ______________________________________________________________________        History of Present Illness   61 years old male diabetic, nephropathy, with an ulcer to the left foot that has required multiple incision and drainage since November  Most recently he had multiple bone biopsy  which came back negative for osteomyelitis.  His cultures have been polymicrobial, as above  He has had ABIs testing done which was within normal  More recently he had been applying vinegar soaks to his foot instead of wound VAC but he has resumed wound VAC now after visit with Dr. Rocha  He has no fevers  He has an appointment tomorrow with Dr. Franco    Review of Systems   The 10 point Review of Systems is negative other than noted in the HPI or here.     Past Medical History    Past Medical History:   Diagnosis Date     Coronary artery disease      Diabetes (H)      Diabetic neuropathy (H)      GERD (gastroesophageal reflux disease)      Hyperlipidemia      Hypertension      Intermittent atrial fibrillation (H)      NSTEMI (non-ST elevated myocardial infarction) (H)      Renal disease      Retinopathy      Sleep disturbance        Past Surgical History   Past Surgical History:   Procedure Laterality Date     AMPUTATE TOE(S) Left 11/16/2021    Procedure: first and second ray amputation;  Surgeon: Eddi Ram DPM;  [place] [facility] [place]     APPENDECTOMY       CV CORONARY ANGIOGRAM N/A 11/16/2021    Procedure: CV CORONARY ANGIOGRAM;  Surgeon: Pam Tabares MD;  Location: [facility] CATH LAB CV     CV LEFT HEART CATH N/A 11/16/2021    Procedure: Left Heart Cath;  Surgeon: Pam Tabares MD;  Location: [facility] CATH LAB CV     FOOT SURGERY Left      HERNIA REPAIR       INCISION AND DRAINAGE LOWER EXTREMITY, COMBINED Left 11/16/2021    Procedure: INCISION AND DRAINAGE, left foot;  Surgeon: Eddi Ram DPM;  [place] [facility] [place]     INCISION AND DRAINAGE LOWER EXTREMITY, COMBINED Left 11/19/2021    Procedure: INCISION AND DRAINAGE, left foot;  Surgeon: Eddi Ram DPM;  [place] [facility] [place]     INCISION AND DRAINAGE LOWER EXTREMITY, COMBINED Left 12/9/2021    Procedure: INCISION AND DRAINAGE, Left foot;  Surgeon: Eddi Ram DPM;  [place] [facility] [place]     INCISION AND DRAINAGE LOWER EXTREMITY, COMBINED Left 1/10/2022    Procedure: INCISION AND DRAINAGE, left foot;  Surgeon: Eddi Ram DPM;  Location: [facility] [place]     INCISION AND DRAINAGE LOWER EXTREMITY, COMBINED Left 1/27/2022    Procedure: INCISION AND DRAINAGE, Left foot;  Surgeon: Eddi Ram DPM;  Location: [facility] [place]       Social History   Social History     Tobacco Use     Smoking status: Current Some Day Smoker     Types: Cigars     Smokeless tobacco: Never Used     Tobacco comment: Cigars   Vaping Use     Vaping Use: Never used   Substance Use Topics     Alcohol use: Yes     Comment: occas.     Drug use: Never       Family History     Reviewed noncontributory to the current problem    Medications   I have reviewed this patient's current medications    Allergies   No Known Allergies    Physical Exam   Vital Signs:                    Weight: 0 lbs 0 oz  Gen. appearance nontoxic  Eyes no conjunctivitis or icterus  Neck no stiffness  Heart  No edema  Lungs breathing comfortably  Abdomen not distended  Extremities  open wound left foot.  Most of it is granulating well however there is an area 1x1 cm with palpable bone with gangrenous tissue,ankle  No rednes   Skin  no rash or emboli  Neurologic alert oriented no focal deficits        Data   Inflammatory Markers   Recent Labs   Lab Test 11/14/21  2016   SED 90*   CRP 36.5*        Hematology Studies   Recent Labs   Lab Test 01/27/22  0832 01/19/22  1540 01/13/22  1707 11/23/21  0514 11/22/21  0442 11/21/21  0538 11/20/21  0612 11/19/21  0458 11/18/21  0609   WBC  --  6.8 6.7 6.7 7.4 7.2 7.3 5.7 6.7   ANEU  --   --   --   --   --   --   --  3.3 3.8   AEOS  --   --   --   --   --   --   --  0.1 0.4   HGB 8.3* 8.1* 7.8* 9.9* 10.4* 10.6* 11.1* 11.5* 12.2*   MCV  --  85 84 90 88 88 88 88 87   PLT  --  402 212 220 218 220 247 258 283       Metabolic Studies   Recent Labs   Lab Test 01/13/22  1707 01/06/22  1150 12/14/21  1032  12/03/21  1238 12/01/21  1633   * 138 133* 132* 136   POTASSIUM 4.2 4.5 4.4 4.3 4.4   CHLORIDE 96* 99 97* 97* 99   CO2 23 30 26 27 23   BUN 30* 22 19 20 20   CR 1.86* 1.64* 1.68* 2.07* 1.88*   GFRESTIMATED 40* 47* 43* 33* 37*       Hepatic Studies    Recent Labs   Lab Test 01/13/22  1707 12/01/21  1633 11/21/21  0538 11/20/21  0612 11/19/21  0458 11/18/21  0609 03/19/20  1448 10/31/19  1625   BILITOTAL 0.5  --  0.5 0.5 0.4 0.5  --  0.7   ALKPHOS 79  --  92 90 86 89  --  87   ALBUMIN 2.6* 2.7* 2.0* 1.9* 1.8* 1.8*   < > 4.1   AST 30  --  23 24 21 21  --  20   ALT 17  --  18 17 13 13  --  24    < > = values in this interval not displayed.       Most Recent 6 Bacteria Isolates From Any Culture (See EPIC Reports for Culture Details):No lab results found.    Urine Studies  No lab results found.    Vancomycin Levels    Recent Labs   Lab Test 11/17/21  0533   VANCOMYCIN 19.1       Hepatitis B Testing No lab results found.  Hepatitis C Testing   No results found for: HCVAB, HQTG, HCGENO, HCPCR, HQTRNA, HEPRNA  HIVTesting No lab results found.    Respiratory Virus Testing    No results found for: RS, FLUAG  COVID-19 Antibody Results, Testing for Immunity    COVID-19 Antibody Results, Testing for Immunity   No data to display.         COVID-19 PCR Results    COVID-19 PCR Results 11/14/21 12/7/21 1/6/22 1/24/22   SARS CoV2 PCR Negative Negative Negative Negative      Comments are available for some flowsheets but are not being displayed.           Final Diagnosis   A. SOFT TISSUE FROM ANTERIOR TIBIALIS, LEFT FOOT, EXCISION:     WET GANGRENE    CHRONIC AND ACUTE SYNOVITIS    NECROTIC TISSUE IS PRESENT BIOPSY EDGES    NO TUMOR SEEN     B. BONE, CUNEIFORM, LEFT FOOT, RESECTION:     BONE WITH REACTIVE OSTEOSCLEROSIS    WET GANGRENE EXTENDING TO PERIPHERAL MARGIN    NO DEFINITIVE EVIDENCE OF OSTEOMYELITIS IS IDENTIFIED    NO TUMOR SEEN     C. BONE, TALLUS, LEFT FOOT, RESECTION:     BONE WITH REACTIVE  OSTEOSCLEROSIS    ARTICULAR CARTILAGE WITH MILD DEGENERATIVE CHANGES    NO EVIDENCE OF OSTEOMYELITIS    NO TUMOR SEEN     D. BONE, THIRD METATARSAL, LEFT FOOT, RESECTION:     BONE WITH REACTIVE OSTEOSCLEROSIS    WET GANGRENE EXTENDING TO PERIPHERAL MARGIN    NO EVIDENCE OF OSTEOMYELITIS IS IDENTIFIED    NO TUMOR SEEN      Culture 4+ Enterococcus faecalis Abnormal        2+ Enterobacter cloacae complex Abnormal        4+ Corynebacterium striatum Abnormal     Identification obtained by MALDI-TOF mass spectrometry research use only database. Test characteristics determined and verified by the [facility].   Susceptibilities not routinely done   4+ Trueperella bernardiae Abnormal     Identification obtained by MALDI-TOF mass spectrometry research use only database. Test characteristics determined and verified by the [facility].   Susceptibilities not routinely done        Resulting Agency: [facility]

## 2022-02-10 ENCOUNTER — TELEPHONE (OUTPATIENT)
Dept: INFECTIOUS DISEASES | Facility: CLINIC | Age: 62
End: 2022-02-10
Payer: COMMERCIAL

## 2022-02-10 ENCOUNTER — HOME INFUSION (PRE-WILLOW HOME INFUSION) (OUTPATIENT)
Dept: PHARMACY | Facility: CLINIC | Age: 62
End: 2022-02-10

## 2022-02-10 DIAGNOSIS — E11.621 DIABETIC ULCER OF LEFT MIDFOOT ASSOCIATED WITH TYPE 2 DIABETES MELLITUS, WITH NECROSIS OF BONE (H): Primary | ICD-10-CM

## 2022-02-10 DIAGNOSIS — L97.424 DIABETIC ULCER OF LEFT MIDFOOT ASSOCIATED WITH TYPE 2 DIABETES MELLITUS, WITH NECROSIS OF BONE (H): Primary | ICD-10-CM

## 2022-02-10 RX ORDER — LIDOCAINE 40 MG/G
CREAM TOPICAL
Status: CANCELLED | OUTPATIENT
Start: 2022-02-10 | End: 2022-02-13

## 2022-02-10 NOTE — TELEPHONE ENCOUNTER
Patient is covered for home infusion at 100% through [facility] [employer] PMAP plan.    Patient is scheduled for first dose at [place] [facility] on Friday. Patient and [facility] informed    Per Dr Durbin, ordered CMP to be drawn tomorrow prior to infusions. [place] RN informed.

## 2022-02-11 ENCOUNTER — HOME INFUSION (PRE-WILLOW HOME INFUSION) (OUTPATIENT)
Dept: PHARMACY | Facility: CLINIC | Age: 62
End: 2022-02-11

## 2022-02-11 ENCOUNTER — TELEPHONE (OUTPATIENT)
Dept: INFECTIOUS DISEASES | Facility: CLINIC | Age: 62
End: 2022-02-11

## 2022-02-11 ENCOUNTER — HOSPITAL ENCOUNTER (OUTPATIENT)
Dept: GENERAL RADIOLOGY | Facility: CLINIC | Age: 62
Discharge: HOME | End: 2022-02-11
Attending: INTERNAL MEDICINE | Admitting: INTERNAL MEDICINE
Payer: COMMERCIAL

## 2022-02-11 ENCOUNTER — INFUSION THERAPY VISIT (OUTPATIENT)
Dept: INFUSION THERAPY | Facility: CLINIC | Age: 62
End: 2022-02-11
Attending: INTERNAL MEDICINE
Payer: COMMERCIAL

## 2022-02-11 DIAGNOSIS — L97.424 DIABETIC ULCER OF LEFT MIDFOOT ASSOCIATED WITH TYPE 2 DIABETES MELLITUS, WITH NECROSIS OF BONE (H): Primary | ICD-10-CM

## 2022-02-11 DIAGNOSIS — E11.621 DIABETIC ULCER OF LEFT MIDFOOT ASSOCIATED WITH TYPE 2 DIABETES MELLITUS, WITH NECROSIS OF BONE (H): Primary | ICD-10-CM

## 2022-02-11 LAB
ALBUMIN SERPL-MCNC: 2.5 G/DL (ref 3.4–5)
ALP SERPL-CCNC: 96 U/L (ref 40–150)
ALT SERPL W P-5'-P-CCNC: 19 U/L (ref 0–70)
ANION GAP SERPL CALCULATED.3IONS-SCNC: <1 MMOL/L (ref 3–14)
AST SERPL W P-5'-P-CCNC: 15 U/L (ref 0–45)
BILIRUB SERPL-MCNC: 0.3 MG/DL (ref 0.2–1.3)
BUN SERPL-MCNC: 23 MG/DL (ref 7–30)
CALCIUM SERPL-MCNC: 9.3 MG/DL (ref 8.5–10.1)
CHLORIDE BLD-SCNC: 106 MMOL/L (ref 94–109)
CO2 SERPL-SCNC: 29 MMOL/L (ref 20–32)
CREAT SERPL-MCNC: 1.07 MG/DL (ref 0.66–1.25)
CRP SERPL-MCNC: 36.2 MG/L (ref 0–8)
ERYTHROCYTE [DISTWIDTH] IN BLOOD BY AUTOMATED COUNT: 15.5 % (ref 10–15)
GFR SERPL CREATININE-BSD FRML MDRD: 78 ML/MIN/1.73M2
GLUCOSE BLD-MCNC: 180 MG/DL (ref 70–99)
HCT VFR BLD AUTO: 26.6 % (ref 40–53)
HGB BLD-MCNC: 8.1 G/DL (ref 13.3–17.7)
MCH RBC QN AUTO: 24.8 PG (ref 26.5–33)
MCHC RBC AUTO-ENTMCNC: 30.5 G/DL (ref 31.5–36.5)
MCV RBC AUTO: 81 FL (ref 78–100)
PLATELET # BLD AUTO: 403 10E3/UL (ref 150–450)
POTASSIUM BLD-SCNC: 4.1 MMOL/L (ref 3.4–5.3)
PROT SERPL-MCNC: 7.6 G/DL (ref 6.8–8.8)
RBC # BLD AUTO: 3.27 10E6/UL (ref 4.4–5.9)
SODIUM SERPL-SCNC: 135 MMOL/L (ref 133–144)
WBC # BLD AUTO: 6.6 10E3/UL (ref 4–11)

## 2022-02-11 PROCEDURE — 250N000009 HC RX 250: Performed by: INTERNAL MEDICINE

## 2022-02-11 PROCEDURE — 272N000579 HC TRAY POWER PICC SOLO 4FR SINGLE LUMEN

## 2022-02-11 PROCEDURE — 86140 C-REACTIVE PROTEIN: CPT | Performed by: INTERNAL MEDICINE

## 2022-02-11 PROCEDURE — 250N000011 HC RX IP 250 OP 636: Performed by: INTERNAL MEDICINE

## 2022-02-11 PROCEDURE — 96365 THER/PROPH/DIAG IV INF INIT: CPT

## 2022-02-11 PROCEDURE — 272N000022 HC KIT PICC W/CATH & MICROINTRODUCER

## 2022-02-11 PROCEDURE — 36569 INSJ PICC 5 YR+ W/O IMAGING: CPT

## 2022-02-11 PROCEDURE — 999N000248 HC STATISTIC IV INSERT WITH US BY RN

## 2022-02-11 PROCEDURE — 85027 COMPLETE CBC AUTOMATED: CPT | Performed by: INTERNAL MEDICINE

## 2022-02-11 PROCEDURE — 999N000065 XR CHEST PORT 1 VIEW

## 2022-02-11 PROCEDURE — 80053 COMPREHEN METABOLIC PANEL: CPT | Performed by: INTERNAL MEDICINE

## 2022-02-11 RX ORDER — HEPARIN SODIUM (PORCINE) LOCK FLUSH IV SOLN 100 UNIT/ML 100 UNIT/ML
5 SOLUTION INTRAVENOUS
Status: CANCELLED | OUTPATIENT
Start: 2022-02-18

## 2022-02-11 RX ORDER — HEPARIN SODIUM,PORCINE 10 UNIT/ML
5 VIAL (ML) INTRAVENOUS
Status: CANCELLED | OUTPATIENT
Start: 2022-02-18

## 2022-02-11 RX ORDER — METHYLPREDNISOLONE SODIUM SUCCINATE 125 MG/2ML
125 INJECTION, POWDER, LYOPHILIZED, FOR SOLUTION INTRAMUSCULAR; INTRAVENOUS
Status: CANCELLED
Start: 2022-02-18

## 2022-02-11 RX ORDER — NALOXONE HYDROCHLORIDE 0.4 MG/ML
0.2 INJECTION, SOLUTION INTRAMUSCULAR; INTRAVENOUS; SUBCUTANEOUS
Status: CANCELLED | OUTPATIENT
Start: 2022-02-18

## 2022-02-11 RX ORDER — MEPERIDINE HYDROCHLORIDE 25 MG/ML
25 INJECTION INTRAMUSCULAR; INTRAVENOUS; SUBCUTANEOUS EVERY 30 MIN PRN
Status: CANCELLED | OUTPATIENT
Start: 2022-02-18

## 2022-02-11 RX ORDER — DIPHENHYDRAMINE HYDROCHLORIDE 50 MG/ML
50 INJECTION INTRAMUSCULAR; INTRAVENOUS
Status: CANCELLED
Start: 2022-02-18

## 2022-02-11 RX ORDER — ALBUTEROL SULFATE 90 UG/1
1-2 AEROSOL, METERED RESPIRATORY (INHALATION)
Status: CANCELLED
Start: 2022-02-18

## 2022-02-11 RX ORDER — EPINEPHRINE 1 MG/ML
0.3 INJECTION, SOLUTION, CONCENTRATE INTRAVENOUS EVERY 5 MIN PRN
Status: CANCELLED | OUTPATIENT
Start: 2022-02-18

## 2022-02-11 RX ORDER — ALBUTEROL SULFATE 0.83 MG/ML
2.5 SOLUTION RESPIRATORY (INHALATION)
Status: CANCELLED | OUTPATIENT
Start: 2022-02-18

## 2022-02-11 RX ADMIN — TAZOBACTAM SODIUM AND PIPERACILLIN SODIUM 4.5 G: 500; 4 INJECTION, SOLUTION INTRAVENOUS at 11:38

## 2022-02-11 RX ADMIN — LIDOCAINE HYDROCHLORIDE ANHYDROUS 2 ML: 10 INJECTION, SOLUTION INFILTRATION at 09:45

## 2022-02-11 NOTE — TELEPHONE ENCOUNTER
[facility] calling for home care orders. they only have med orders but no orders for a nurse to come out to patient.    pts first dose is suppose to be today, 2/11.    phone # [phone], please ask for Ross  if calling with verbal order, please call pharmacist.

## 2022-02-11 NOTE — PROGRESS NOTES
[facility]  Procedure Note         PICC      Norman Aguilar  MRN# [id number]   February 11, 2022, 10:35 AM Indication: Medication administration           Pause for the cause: Consent for catheter placement procedure signed  Time out completed  Patient ID's verified using two distinct indicators  All necessary equipment is present  Site marked if extremity to be used has been predetermined   Type of line to be used: PICC   Full barrier precautions used: Yes   Skin preparation: Chlorehexidine Gluconate 25% with isopropyl alcohol 70%   Date of insertion: February 11, 2022, 10:05 AM   Device type: Single lumen, valved, 4.0   Catheter brand: Bard Solo Power   Lot number: [id number]   Insertion location: Right basilic vein - vein diameter 0.51 cm   Method of placement: Venipuncture  MST  Ultrasound   Number of attempts: With ultrasound: 1   Without ultrasound: 0   Difficulty threading: No   Midline IV device: Transparent semmipermeable dressing applied  Chlorhexidine patch  Catheter securement device   Arm circumference: Adults 10 cm above R AC   Midline extremity circumference: 26.5 cm   Internal length: 43 cm   Midline visible catheter length: 4 cm   Total catheter length: 47 cm   Tip termination: SVC/RA   Method of verification: Chest x-ray   Midline patency post placement: Positive blood return  Flushes without difficulty  Saline locked   Line flush: Line flush documented on the eMAR yes   Placement verified by: Radiologist   Catheter placed by: Risa Del Cid RN [facility]   Discontinuation form initiated: No   Patient tolerance: Tolerated well   PICC Insertion Education Complete: Yes      Summary:  This procedure was performed without difficulty and he tolerated the procedure well with no noted immediate complications.  Blood oozing out of dressing from insertion site.  Sterile dressing change done and external pressure dressing placed.  OK to use for meds, labs and power injection.      Recorded by  Risa Del Cid RN [facility]    Attestation:  Amount of time performed on this procedure: 15 minutes.     Time out preformed with PICC STAT nurse, Labs drawn and Zosyn infused as ordered.  Pt states home care and home infusion will follow.  Informed him if wkly dsg changes are not included in his home care he will have to make appt in infusion clinic. Nimo Nair RN

## 2022-02-12 LAB
BACTERIA SPEC CULT: ABNORMAL
BACTERIA SPEC CULT: ABNORMAL

## 2022-02-14 ENCOUNTER — HOME INFUSION (PRE-WILLOW HOME INFUSION) (OUTPATIENT)
Dept: PHARMACY | Facility: CLINIC | Age: 62
End: 2022-02-14

## 2022-02-14 ENCOUNTER — LAB REQUISITION (OUTPATIENT)
Dept: LAB | Facility: CLINIC | Age: 62
End: 2022-02-14
Payer: COMMERCIAL

## 2022-02-14 DIAGNOSIS — L97.424: ICD-10-CM

## 2022-02-14 LAB
BASOPHILS # BLD AUTO: 0 10E3/UL (ref 0–0.2)
BASOPHILS NFR BLD AUTO: 0 %
BUN SERPL-MCNC: 36 MG/DL (ref 7–30)
CREAT SERPL-MCNC: 1.46 MG/DL (ref 0.66–1.25)
CRP SERPL-MCNC: 15 MG/L (ref 0–8)
EOSINOPHIL # BLD AUTO: 0.2 10E3/UL (ref 0–0.7)
EOSINOPHIL NFR BLD AUTO: 3 %
ERYTHROCYTE [DISTWIDTH] IN BLOOD BY AUTOMATED COUNT: 15.9 % (ref 10–15)
ERYTHROCYTE [SEDIMENTATION RATE] IN BLOOD BY WESTERGREN METHOD: 112 MM/HR (ref 0–20)
GFR SERPL CREATININE-BSD FRML MDRD: 54 ML/MIN/1.73M2
HCT VFR BLD AUTO: 26.7 % (ref 40–53)
HGB BLD-MCNC: 8 G/DL (ref 13.3–17.7)
HOLD SPECIMEN: NORMAL
IMM GRANULOCYTES # BLD: 0 10E3/UL
IMM GRANULOCYTES NFR BLD: 0 %
LYMPHOCYTES # BLD AUTO: 2.6 10E3/UL (ref 0.8–5.3)
LYMPHOCYTES NFR BLD AUTO: 39 %
MCH RBC QN AUTO: 24 PG (ref 26.5–33)
MCHC RBC AUTO-ENTMCNC: 30 G/DL (ref 31.5–36.5)
MCV RBC AUTO: 80 FL (ref 78–100)
MONOCYTES # BLD AUTO: 0.5 10E3/UL (ref 0–1.3)
MONOCYTES NFR BLD AUTO: 7 %
NEUTROPHILS # BLD AUTO: 3.5 10E3/UL (ref 1.6–8.3)
NEUTROPHILS NFR BLD AUTO: 51 %
NRBC # BLD AUTO: 0 10E3/UL
NRBC BLD AUTO-RTO: 0 /100
PLATELET # BLD AUTO: 395 10E3/UL (ref 150–450)
RBC # BLD AUTO: 3.33 10E6/UL (ref 4.4–5.9)
WBC # BLD AUTO: 6.8 10E3/UL (ref 4–11)

## 2022-02-14 PROCEDURE — 82565 ASSAY OF CREATININE: CPT | Performed by: INTERNAL MEDICINE

## 2022-02-14 PROCEDURE — 85652 RBC SED RATE AUTOMATED: CPT | Performed by: INTERNAL MEDICINE

## 2022-02-14 PROCEDURE — 84520 ASSAY OF UREA NITROGEN: CPT | Performed by: INTERNAL MEDICINE

## 2022-02-14 PROCEDURE — 86140 C-REACTIVE PROTEIN: CPT | Performed by: INTERNAL MEDICINE

## 2022-02-14 PROCEDURE — 85025 COMPLETE CBC W/AUTO DIFF WBC: CPT | Performed by: INTERNAL MEDICINE

## 2022-02-15 ENCOUNTER — HOME INFUSION (PRE-WILLOW HOME INFUSION) (OUTPATIENT)
Dept: PHARMACY | Facility: CLINIC | Age: 62
End: 2022-02-15

## 2022-02-16 ENCOUNTER — HOME INFUSION (PRE-WILLOW HOME INFUSION) (OUTPATIENT)
Dept: PHARMACY | Facility: CLINIC | Age: 62
End: 2022-02-16

## 2022-02-21 ENCOUNTER — LAB REQUISITION (OUTPATIENT)
Dept: LAB | Facility: CLINIC | Age: 62
End: 2022-02-21
Payer: COMMERCIAL

## 2022-02-21 DIAGNOSIS — L97.424: ICD-10-CM

## 2022-02-21 LAB
BASOPHILS # BLD AUTO: 0 10E3/UL (ref 0–0.2)
BASOPHILS NFR BLD AUTO: 1 %
BUN SERPL-MCNC: 25 MG/DL (ref 7–30)
CREAT SERPL-MCNC: 1.34 MG/DL (ref 0.66–1.25)
CRP SERPL-MCNC: 4.6 MG/L (ref 0–8)
EOSINOPHIL # BLD AUTO: 0.4 10E3/UL (ref 0–0.7)
EOSINOPHIL NFR BLD AUTO: 6 %
ERYTHROCYTE [DISTWIDTH] IN BLOOD BY AUTOMATED COUNT: 17.1 % (ref 10–15)
ERYTHROCYTE [SEDIMENTATION RATE] IN BLOOD BY WESTERGREN METHOD: 116 MM/HR (ref 0–20)
GFR SERPL CREATININE-BSD FRML MDRD: 60 ML/MIN/1.73M2
HCT VFR BLD AUTO: 27.2 % (ref 40–53)
HGB BLD-MCNC: 8.4 G/DL (ref 13.3–17.7)
IMM GRANULOCYTES # BLD: 0 10E3/UL
IMM GRANULOCYTES NFR BLD: 0 %
LYMPHOCYTES # BLD AUTO: 2.5 10E3/UL (ref 0.8–5.3)
LYMPHOCYTES NFR BLD AUTO: 41 %
MCH RBC QN AUTO: 25 PG (ref 26.5–33)
MCHC RBC AUTO-ENTMCNC: 30.9 G/DL (ref 31.5–36.5)
MCV RBC AUTO: 81 FL (ref 78–100)
MONOCYTES # BLD AUTO: 0.5 10E3/UL (ref 0–1.3)
MONOCYTES NFR BLD AUTO: 8 %
NEUTROPHILS # BLD AUTO: 2.8 10E3/UL (ref 1.6–8.3)
NEUTROPHILS NFR BLD AUTO: 44 %
NRBC # BLD AUTO: 0 10E3/UL
NRBC BLD AUTO-RTO: 0 /100
PLATELET # BLD AUTO: 300 10E3/UL (ref 150–450)
RBC # BLD AUTO: 3.36 10E6/UL (ref 4.4–5.9)
WBC # BLD AUTO: 6.2 10E3/UL (ref 4–11)

## 2022-02-21 PROCEDURE — 85025 COMPLETE CBC W/AUTO DIFF WBC: CPT | Performed by: INTERNAL MEDICINE

## 2022-02-21 PROCEDURE — 82565 ASSAY OF CREATININE: CPT | Performed by: INTERNAL MEDICINE

## 2022-02-21 PROCEDURE — 84520 ASSAY OF UREA NITROGEN: CPT | Performed by: INTERNAL MEDICINE

## 2022-02-21 PROCEDURE — 86140 C-REACTIVE PROTEIN: CPT | Performed by: INTERNAL MEDICINE

## 2022-02-21 PROCEDURE — 85652 RBC SED RATE AUTOMATED: CPT | Performed by: INTERNAL MEDICINE

## 2022-02-23 ENCOUNTER — HOME INFUSION (PRE-WILLOW HOME INFUSION) (OUTPATIENT)
Dept: PHARMACY | Facility: CLINIC | Age: 62
End: 2022-02-23

## 2022-02-24 ENCOUNTER — HOME INFUSION (PRE-WILLOW HOME INFUSION) (OUTPATIENT)
Dept: PHARMACY | Facility: CLINIC | Age: 62
End: 2022-02-24

## 2022-02-28 ENCOUNTER — LAB REQUISITION (OUTPATIENT)
Dept: LAB | Facility: CLINIC | Age: 62
End: 2022-02-28
Payer: COMMERCIAL

## 2022-02-28 DIAGNOSIS — L97.424: ICD-10-CM

## 2022-02-28 LAB
BASOPHILS # BLD AUTO: 0.1 10E3/UL (ref 0–0.2)
BASOPHILS NFR BLD AUTO: 1 %
BUN SERPL-MCNC: 32 MG/DL (ref 7–30)
CREAT SERPL-MCNC: 1.89 MG/DL (ref 0.66–1.25)
CRP SERPL-MCNC: 7.1 MG/L (ref 0–8)
EOSINOPHIL # BLD AUTO: 0.4 10E3/UL (ref 0–0.7)
EOSINOPHIL NFR BLD AUTO: 5 %
ERYTHROCYTE [DISTWIDTH] IN BLOOD BY AUTOMATED COUNT: 19.3 % (ref 10–15)
ERYTHROCYTE [SEDIMENTATION RATE] IN BLOOD BY WESTERGREN METHOD: 103 MM/HR (ref 0–20)
GFR SERPL CREATININE-BSD FRML MDRD: 40 ML/MIN/1.73M2
HCT VFR BLD AUTO: 30.3 % (ref 40–53)
HGB BLD-MCNC: 9.4 G/DL (ref 13.3–17.7)
IMM GRANULOCYTES # BLD: 0 10E3/UL
IMM GRANULOCYTES NFR BLD: 0 %
LYMPHOCYTES # BLD AUTO: 2.9 10E3/UL (ref 0.8–5.3)
LYMPHOCYTES NFR BLD AUTO: 39 %
MCH RBC QN AUTO: 25.1 PG (ref 26.5–33)
MCHC RBC AUTO-ENTMCNC: 31 G/DL (ref 31.5–36.5)
MCV RBC AUTO: 81 FL (ref 78–100)
MONOCYTES # BLD AUTO: 0.6 10E3/UL (ref 0–1.3)
MONOCYTES NFR BLD AUTO: 8 %
NEUTROPHILS # BLD AUTO: 3.5 10E3/UL (ref 1.6–8.3)
NEUTROPHILS NFR BLD AUTO: 47 %
NRBC # BLD AUTO: 0 10E3/UL
NRBC BLD AUTO-RTO: 0 /100
PLAT MORPH BLD: NORMAL
PLATELET # BLD AUTO: 283 10E3/UL (ref 150–450)
RBC # BLD AUTO: 3.74 10E6/UL (ref 4.4–5.9)
RBC MORPH BLD: NORMAL
WBC # BLD AUTO: 7.4 10E3/UL (ref 4–11)

## 2022-02-28 PROCEDURE — 85652 RBC SED RATE AUTOMATED: CPT | Performed by: INTERNAL MEDICINE

## 2022-02-28 PROCEDURE — 86140 C-REACTIVE PROTEIN: CPT | Performed by: INTERNAL MEDICINE

## 2022-02-28 PROCEDURE — 84520 ASSAY OF UREA NITROGEN: CPT | Performed by: INTERNAL MEDICINE

## 2022-02-28 PROCEDURE — 85025 COMPLETE CBC W/AUTO DIFF WBC: CPT | Performed by: INTERNAL MEDICINE

## 2022-02-28 PROCEDURE — 82565 ASSAY OF CREATININE: CPT | Performed by: INTERNAL MEDICINE

## 2022-03-02 ENCOUNTER — HOME INFUSION (PRE-WILLOW HOME INFUSION) (OUTPATIENT)
Dept: PHARMACY | Facility: CLINIC | Age: 62
End: 2022-03-02

## 2022-03-07 ENCOUNTER — LAB REQUISITION (OUTPATIENT)
Dept: LAB | Facility: CLINIC | Age: 62
End: 2022-03-07
Payer: COMMERCIAL

## 2022-03-07 DIAGNOSIS — L97.424: ICD-10-CM

## 2022-03-07 LAB
BASOPHILS # BLD AUTO: 0 10E3/UL (ref 0–0.2)
BASOPHILS NFR BLD AUTO: 1 %
BUN SERPL-MCNC: 26 MG/DL (ref 7–30)
CREAT SERPL-MCNC: 1.52 MG/DL (ref 0.66–1.25)
CRP SERPL-MCNC: 38 MG/L (ref 0–8)
EOSINOPHIL # BLD AUTO: 0.3 10E3/UL (ref 0–0.7)
EOSINOPHIL NFR BLD AUTO: 4 %
ERYTHROCYTE [DISTWIDTH] IN BLOOD BY AUTOMATED COUNT: 18.7 % (ref 10–15)
ERYTHROCYTE [SEDIMENTATION RATE] IN BLOOD BY WESTERGREN METHOD: 90 MM/HR (ref 0–20)
GFR SERPL CREATININE-BSD FRML MDRD: 51 ML/MIN/1.73M2
HCT VFR BLD AUTO: 32.3 % (ref 40–53)
HGB BLD-MCNC: 10 G/DL (ref 13.3–17.7)
IMM GRANULOCYTES # BLD: 0 10E3/UL
IMM GRANULOCYTES NFR BLD: 0 %
LYMPHOCYTES # BLD AUTO: 2.7 10E3/UL (ref 0.8–5.3)
LYMPHOCYTES NFR BLD AUTO: 41 %
MCH RBC QN AUTO: 24.6 PG (ref 26.5–33)
MCHC RBC AUTO-ENTMCNC: 31 G/DL (ref 31.5–36.5)
MCV RBC AUTO: 80 FL (ref 78–100)
MONOCYTES # BLD AUTO: 0.6 10E3/UL (ref 0–1.3)
MONOCYTES NFR BLD AUTO: 8 %
NEUTROPHILS # BLD AUTO: 3.1 10E3/UL (ref 1.6–8.3)
NEUTROPHILS NFR BLD AUTO: 46 %
NRBC # BLD AUTO: 0 10E3/UL
NRBC BLD AUTO-RTO: 0 /100
PLATELET # BLD AUTO: 274 10E3/UL (ref 150–450)
RBC # BLD AUTO: 4.06 10E6/UL (ref 4.4–5.9)
WBC # BLD AUTO: 6.6 10E3/UL (ref 4–11)

## 2022-03-07 PROCEDURE — 82565 ASSAY OF CREATININE: CPT | Performed by: INTERNAL MEDICINE

## 2022-03-07 PROCEDURE — 84520 ASSAY OF UREA NITROGEN: CPT | Performed by: INTERNAL MEDICINE

## 2022-03-07 PROCEDURE — 86140 C-REACTIVE PROTEIN: CPT | Performed by: INTERNAL MEDICINE

## 2022-03-07 PROCEDURE — 85652 RBC SED RATE AUTOMATED: CPT | Performed by: INTERNAL MEDICINE

## 2022-03-07 PROCEDURE — 84450 TRANSFERASE (AST) (SGOT): CPT | Performed by: INTERNAL MEDICINE

## 2022-03-07 PROCEDURE — 85025 COMPLETE CBC W/AUTO DIFF WBC: CPT | Performed by: INTERNAL MEDICINE

## 2022-03-09 ENCOUNTER — TELEPHONE (OUTPATIENT)
Dept: INFECTIOUS DISEASES | Facility: CLINIC | Age: 62
End: 2022-03-09
Payer: COMMERCIAL

## 2022-03-09 ENCOUNTER — HOME INFUSION (PRE-WILLOW HOME INFUSION) (OUTPATIENT)
Dept: PHARMACY | Facility: CLINIC | Age: 62
End: 2022-03-09

## 2022-03-09 LAB — AST SERPL W P-5'-P-CCNC: 29 U/L (ref 0–45)

## 2022-03-09 NOTE — TELEPHONE ENCOUNTER
I informed orders for creatinine clearance to be >60 per Dr Durbin orders. Reporting recent labs have been low. pharmacist Gorge will adjust Zosyn to q8hr.    Dr Durbin is [facility] this week, informed covering provider Dr Mata of update.

## 2022-03-14 ENCOUNTER — LAB REQUISITION (OUTPATIENT)
Dept: LAB | Facility: CLINIC | Age: 62
End: 2022-03-14
Payer: COMMERCIAL

## 2022-03-14 DIAGNOSIS — L97.424: ICD-10-CM

## 2022-03-14 LAB
BASOPHILS # BLD AUTO: 0 10E3/UL (ref 0–0.2)
BASOPHILS NFR BLD AUTO: 1 %
BUN SERPL-MCNC: 34 MG/DL (ref 7–30)
CREAT SERPL-MCNC: 1.79 MG/DL (ref 0.66–1.25)
CRP SERPL-MCNC: 110 MG/L (ref 0–8)
EOSINOPHIL # BLD AUTO: 0 10E3/UL (ref 0–0.7)
EOSINOPHIL NFR BLD AUTO: 1 %
ERYTHROCYTE [DISTWIDTH] IN BLOOD BY AUTOMATED COUNT: 18.4 % (ref 10–15)
ERYTHROCYTE [SEDIMENTATION RATE] IN BLOOD BY WESTERGREN METHOD: 121 MM/HR (ref 0–20)
GFR SERPL CREATININE-BSD FRML MDRD: 42 ML/MIN/1.73M2
HCT VFR BLD AUTO: 27.8 % (ref 40–53)
HGB BLD-MCNC: 8.9 G/DL (ref 13.3–17.7)
IMM GRANULOCYTES # BLD: 0 10E3/UL
IMM GRANULOCYTES NFR BLD: 1 %
LYMPHOCYTES # BLD AUTO: 0.8 10E3/UL (ref 0.8–5.3)
LYMPHOCYTES NFR BLD AUTO: 41 %
MCH RBC QN AUTO: 24.5 PG (ref 26.5–33)
MCHC RBC AUTO-ENTMCNC: 32 G/DL (ref 31.5–36.5)
MCV RBC AUTO: 76 FL (ref 78–100)
MONOCYTES # BLD AUTO: 0.2 10E3/UL (ref 0–1.3)
MONOCYTES NFR BLD AUTO: 8 %
NEUTROPHILS # BLD AUTO: 0.9 10E3/UL (ref 1.6–8.3)
NEUTROPHILS NFR BLD AUTO: 48 %
NRBC # BLD AUTO: 0 10E3/UL
NRBC BLD AUTO-RTO: 0 /100
PLATELET # BLD AUTO: 221 10E3/UL (ref 150–450)
RBC # BLD AUTO: 3.64 10E6/UL (ref 4.4–5.9)
WBC # BLD AUTO: 1.8 10E3/UL (ref 4–11)

## 2022-03-14 PROCEDURE — 86140 C-REACTIVE PROTEIN: CPT | Performed by: INTERNAL MEDICINE

## 2022-03-14 PROCEDURE — 84520 ASSAY OF UREA NITROGEN: CPT | Performed by: INTERNAL MEDICINE

## 2022-03-14 PROCEDURE — 82565 ASSAY OF CREATININE: CPT | Performed by: INTERNAL MEDICINE

## 2022-03-14 PROCEDURE — 85652 RBC SED RATE AUTOMATED: CPT | Performed by: INTERNAL MEDICINE

## 2022-03-14 PROCEDURE — 85025 COMPLETE CBC W/AUTO DIFF WBC: CPT | Performed by: INTERNAL MEDICINE

## 2022-03-14 PROCEDURE — 36592 COLLECT BLOOD FROM PICC: CPT | Performed by: INTERNAL MEDICINE

## 2022-03-16 ENCOUNTER — HOME INFUSION (PRE-WILLOW HOME INFUSION) (OUTPATIENT)
Dept: PHARMACY | Facility: CLINIC | Age: 62
End: 2022-03-16

## 2022-03-16 ENCOUNTER — LAB REQUISITION (OUTPATIENT)
Dept: LAB | Facility: CLINIC | Age: 62
End: 2022-03-16
Payer: COMMERCIAL

## 2022-03-16 ENCOUNTER — OFFICE VISIT (OUTPATIENT)
Dept: INFECTIOUS DISEASES | Facility: CLINIC | Age: 62
End: 2022-03-16
Payer: COMMERCIAL

## 2022-03-16 ENCOUNTER — TELEPHONE (OUTPATIENT)
Dept: INFECTIOUS DISEASES | Facility: CLINIC | Age: 62
End: 2022-03-16

## 2022-03-16 VITALS — HEART RATE: 100 BPM | DIASTOLIC BLOOD PRESSURE: 62 MMHG | TEMPERATURE: 100.2 F | SYSTOLIC BLOOD PRESSURE: 120 MMHG

## 2022-03-16 DIAGNOSIS — M86.272 SUBACUTE OSTEOMYELITIS OF LEFT FOOT (H): ICD-10-CM

## 2022-03-16 DIAGNOSIS — D75.9 CYTOPENIA: ICD-10-CM

## 2022-03-16 DIAGNOSIS — R50.9 FEVER, UNSPECIFIED FEVER CAUSE: Primary | ICD-10-CM

## 2022-03-16 DIAGNOSIS — L97.424: ICD-10-CM

## 2022-03-16 LAB
BASOPHILS # BLD MANUAL: 0 10E3/UL (ref 0–0.2)
BASOPHILS NFR BLD MANUAL: 2 %
CRP SERPL-MCNC: 150 MG/L (ref 0–8)
EOSINOPHIL # BLD MANUAL: 0.1 10E3/UL (ref 0–0.7)
EOSINOPHIL NFR BLD MANUAL: 7 %
ERYTHROCYTE [DISTWIDTH] IN BLOOD BY AUTOMATED COUNT: 18.6 % (ref 10–15)
HCT VFR BLD AUTO: 28.2 % (ref 40–53)
HGB BLD-MCNC: 9.2 G/DL (ref 13.3–17.7)
LYMPHOCYTES # BLD MANUAL: 0.7 10E3/UL (ref 0.8–5.3)
LYMPHOCYTES NFR BLD MANUAL: 60 %
MCH RBC QN AUTO: 24.7 PG (ref 26.5–33)
MCHC RBC AUTO-ENTMCNC: 32.6 G/DL (ref 31.5–36.5)
MCV RBC AUTO: 76 FL (ref 78–100)
MONOCYTES # BLD MANUAL: 0.1 10E3/UL (ref 0–1.3)
MONOCYTES NFR BLD MANUAL: 8 %
NEUTROPHILS # BLD MANUAL: 0.3 10E3/UL (ref 1.6–8.3)
NEUTROPHILS NFR BLD MANUAL: 23 %
NRBC # BLD AUTO: 0 10E3/UL
NRBC BLD MANUAL-RTO: 1 %
PLAT MORPH BLD: ABNORMAL
PLATELET # BLD AUTO: 184 10E3/UL (ref 150–450)
RBC # BLD AUTO: 3.73 10E6/UL (ref 4.4–5.9)
RBC MORPH BLD: ABNORMAL
WBC # BLD AUTO: 1.1 10E3/UL (ref 4–11)

## 2022-03-16 PROCEDURE — 86140 C-REACTIVE PROTEIN: CPT | Performed by: INTERNAL MEDICINE

## 2022-03-16 PROCEDURE — 99215 OFFICE O/P EST HI 40 MIN: CPT | Performed by: INTERNAL MEDICINE

## 2022-03-16 PROCEDURE — 85027 COMPLETE CBC AUTOMATED: CPT | Performed by: INTERNAL MEDICINE

## 2022-03-16 NOTE — TELEPHONE ENCOUNTER
Patient informed no direct admit beds available. Recommend to go to ER to start treatment. Patient is hesitant but will go in after he finds a ride and makes arrangements.

## 2022-03-16 NOTE — PROGRESS NOTES
[facility] Infectious Disease  Followup Visit        Assessment:   History of ulceration left foot with requiring repeated I&D's  Presenting to the clinic today with an open wound left foot.  Most of it is granulating well however there is an area with palpable bone (so concerning for new osteomyelitis)  with gangrenous tissue  DM2, nephropathy  Ischemic cardiomyopathy  Hx of abscess and ulceration left foot, s/p (1/27)  Procedure:   1. Incision and drainage left foot  2. Partial amputation talus left   3. Partial amputation lateral cuneiform left  4. Partial amputation 3rd metatarsal left   5. Excisional Debridement ulceration left foot into bone     Path negative osteomyelitis  Cx enterobacter, e faecalis, C striatum, Trueperella, Pasteurella canis on 1/10     Fever, pancytopenia  PICC line related bacteremia?  Viral infection?  Foot abscess?  Covid vaccinated x2     Plan:   Admit to hospital for fever workup  Collect blood cultures  Start vancomycin and meropenem  COVID PCR , LFT, and chest x-ray  etc    Pt agreeable    Kendrick Durbin M.D.            Present Illness:   61 years old male diabetic, nephropathy, with an ulcer to the left foot that has required multiple incision and drainage since November  Most recently he had multiple bone biopsy which came back negative for osteomyelitis.  His cultures have been polymicrobial, as above  He has had ABIs testing done which was within normal  More recently he had been applying vinegar soaks to his foot instead of wound VAC but he has resumed wound VAC now after visit with Dr. Rocha    Interim history  We started him on IV Zosyn the last visit  He did get a second opinion from   X-rays were done  Over the weekend he developed fever up to 101 he did not inform us and apparently he asked the nurse not to let us know  He denies shortness of breath coughing runny nose sore throat  Denies abdominal pain nausea vomiting or diarrhea      Review of Systems  Performed  and all negative except as mentioned above.    Past medical, family, and social history reviewed, unchanged from before.        Physical Exam:     Gen. appearance nontoxic  Eyes no conjunctivitis or icterus  Neck no stiffness  Heart  No edema  Lungs breathing comfortably  Abdomen not distended  Extremities wound VAC is removed  The wound is actually granulating no purulence noted.  No surrounding erythema.  Bone not visualized.  Skin  no rash or emboli  Neurologic alert oriented no focal deficits    DATA     Results for orders placed or performed in visit on 03/16/22   CRP inflammation     Status: Abnormal   Result Value Ref Range    CRP Inflammation 150.0 (H) 0.0 - 8.0 mg/L   CBC with platelets and differential     Status: Abnormal   Result Value Ref Range    WBC Count 1.1 (L) 4.0 - 11.0 10e3/uL    RBC Count 3.73 (L) 4.40 - 5.90 10e6/uL    Hemoglobin 9.2 (L) 13.3 - 17.7 g/dL    Hematocrit 28.2 (L) 40.0 - 53.0 %    MCV 76 (L) 78 - 100 fL    MCH 24.7 (L) 26.5 - 33.0 pg    MCHC 32.6 31.5 - 36.5 g/dL    RDW 18.6 (H) 10.0 - 15.0 %    Platelet Count 184 150 - 450 10e3/uL   Manual Differential     Status: Abnormal   Result Value Ref Range    % Neutrophils 23 %    % Lymphocytes 60 %    % Monocytes 8 %    % Eosinophils 7 %    % Basophils 2 %    NRBCs per 100 WBC 1 (H) <=0 %    Absolute Neutrophils 0.3 (LL) 1.6 - 8.3 10e3/uL    Absolute Lymphocytes 0.7 (L) 0.8 - 5.3 10e3/uL    Absolute Monocytes 0.1 0.0 - 1.3 10e3/uL    Absolute Eosinophils 0.1 0.0 - 0.7 10e3/uL    Absolute Basophils 0.0 0.0 - 0.2 10e3/uL    Absolute NRBCs 0.0 <=0.0 10e3/uL    RBC Morphology Confirmed RBC Indices     Platelet Assessment  Automated Count Confirmed. Platelet morphology is normal.     Automated Count Confirmed. Platelet morphology is normal.   CBC with Platelets & Differential     Status: Abnormal    Narrative    The following orders were created for panel order CBC with Platelets & Differential.  Procedure                                Abnormality         Status                     ---------                               -----------         ------                     CBC with platelets and d...[[id number]]  Abnormal            Final result               Manual Differential[[id number]]          Abnormal            Final result                 Please view results for these tests on the individual orders.         Kendrick Durbin MD

## 2022-03-18 ENCOUNTER — APPOINTMENT (OUTPATIENT)
Dept: MRI IMAGING | Facility: HOSPITAL | Age: 62
DRG: 617 | End: 2022-03-18
Payer: COMMERCIAL

## 2022-03-18 ENCOUNTER — HOSPITAL ENCOUNTER (INPATIENT)
Facility: HOSPITAL | Age: 62
LOS: 6 days | Discharge: HOME HEALTH | DRG: 617 | End: 2022-03-24
Attending: EMERGENCY MEDICINE | Admitting: INTERNAL MEDICINE
Payer: COMMERCIAL

## 2022-03-18 DIAGNOSIS — E11.621 DIABETIC ULCER OF LEFT MIDFOOT ASSOCIATED WITH TYPE 2 DIABETES MELLITUS, WITH NECROSIS OF BONE (H): ICD-10-CM

## 2022-03-18 DIAGNOSIS — M86.9 OSTEOMYELITIS OF LEFT FOOT (H): ICD-10-CM

## 2022-03-18 DIAGNOSIS — F41.9 ANXIETY: ICD-10-CM

## 2022-03-18 DIAGNOSIS — T14.8XXA WOUND INFECTION: Primary | ICD-10-CM

## 2022-03-18 DIAGNOSIS — L97.424 DIABETIC ULCER OF LEFT MIDFOOT ASSOCIATED WITH TYPE 2 DIABETES MELLITUS, WITH NECROSIS OF BONE (H): ICD-10-CM

## 2022-03-18 DIAGNOSIS — L08.9 WOUND INFECTION: Primary | ICD-10-CM

## 2022-03-18 LAB
ABO/RH(D): NORMAL
ALBUMIN SERPL-MCNC: 2.2 G/DL (ref 3.5–5)
ALP SERPL-CCNC: 171 U/L (ref 45–120)
ALT SERPL W P-5'-P-CCNC: 103 U/L (ref 0–45)
ANION GAP SERPL CALCULATED.3IONS-SCNC: 13 MMOL/L (ref 5–18)
ANTIBODY SCREEN: NEGATIVE
AST SERPL W P-5'-P-CCNC: 211 U/L (ref 0–40)
BASOPHILS # BLD AUTO: 0 10E3/UL (ref 0–0.2)
BASOPHILS NFR BLD AUTO: 0 %
BILIRUB SERPL-MCNC: 0.4 MG/DL (ref 0–1)
BUN SERPL-MCNC: 29 MG/DL (ref 8–22)
C REACTIVE PROTEIN LHE: 18.5 MG/DL (ref 0–0.8)
CALCIUM SERPL-MCNC: 8.8 MG/DL (ref 8.5–10.5)
CHLORIDE BLD-SCNC: 98 MMOL/L (ref 98–107)
CO2 SERPL-SCNC: 24 MMOL/L (ref 22–31)
CREAT SERPL-MCNC: 1.57 MG/DL (ref 0.7–1.3)
EOSINOPHIL # BLD AUTO: 0.2 10E3/UL (ref 0–0.7)
EOSINOPHIL NFR BLD AUTO: 4 %
ERYTHROCYTE [DISTWIDTH] IN BLOOD BY AUTOMATED COUNT: 18.8 % (ref 10–15)
ERYTHROCYTE [SEDIMENTATION RATE] IN BLOOD BY WESTERGREN METHOD: 100 MM/HR (ref 0–15)
GFR SERPL CREATININE-BSD FRML MDRD: 50 ML/MIN/1.73M2
GLUCOSE BLD-MCNC: 127 MG/DL (ref 70–125)
GLUCOSE BLDC GLUCOMTR-MCNC: 152 MG/DL (ref 70–99)
HCT VFR BLD AUTO: 20.8 % (ref 40–53)
HGB BLD-MCNC: 6.6 G/DL (ref 13.3–17.7)
HGB BLD-MCNC: 9.7 G/DL (ref 13.3–17.7)
IMM GRANULOCYTES # BLD: 0 10E3/UL
IMM GRANULOCYTES NFR BLD: 0 %
INR PPP: 1.47 (ref 0.9–1.15)
INTERNAL QC CHECK POCT: NORMAL
LACTATE SERPL-SCNC: 0.9 MMOL/L (ref 0.7–2)
LYMPHOCYTES # BLD AUTO: 1.6 10E3/UL (ref 0.8–5.3)
LYMPHOCYTES NFR BLD AUTO: 31 %
MCH RBC QN AUTO: 24.5 PG (ref 26.5–33)
MCHC RBC AUTO-ENTMCNC: 31.7 G/DL (ref 31.5–36.5)
MCV RBC AUTO: 77 FL (ref 78–100)
MONOCYTES # BLD AUTO: 0.3 10E3/UL (ref 0–1.3)
MONOCYTES NFR BLD AUTO: 6 %
NEUTROPHILS # BLD AUTO: 3.1 10E3/UL (ref 1.6–8.3)
NEUTROPHILS NFR BLD AUTO: 59 %
NRBC # BLD AUTO: 0 10E3/UL
NRBC BLD AUTO-RTO: 0 /100
OCCULT BLOOD POCT: NEGATIVE
PLATELET # BLD AUTO: 285 10E3/UL (ref 150–450)
POTASSIUM BLD-SCNC: 3.4 MMOL/L (ref 3.5–5)
PROT SERPL-MCNC: 7.1 G/DL (ref 6–8)
RBC # BLD AUTO: 2.69 10E6/UL (ref 4.4–5.9)
SARS-COV-2 RNA RESP QL NAA+PROBE: NEGATIVE
SODIUM SERPL-SCNC: 135 MMOL/L (ref 136–145)
SPECIMEN EXPIRATION DATE: NORMAL
TEST CARD EXPIRATION DATE POC: NORMAL
TEST CARD LOT NUMBER: NORMAL
WBC # BLD AUTO: 5.1 10E3/UL (ref 4–11)

## 2022-03-18 PROCEDURE — 36415 COLL VENOUS BLD VENIPUNCTURE: CPT | Performed by: PHYSICIAN ASSISTANT

## 2022-03-18 PROCEDURE — 86850 RBC ANTIBODY SCREEN: CPT | Performed by: PHYSICIAN ASSISTANT

## 2022-03-18 PROCEDURE — 99285 EMERGENCY DEPT VISIT HI MDM: CPT | Mod: 25

## 2022-03-18 PROCEDURE — 85610 PROTHROMBIN TIME: CPT | Performed by: PHYSICIAN ASSISTANT

## 2022-03-18 PROCEDURE — 258N000003 HC RX IP 258 OP 636: Performed by: INTERNAL MEDICINE

## 2022-03-18 PROCEDURE — 96366 THER/PROPH/DIAG IV INF ADDON: CPT

## 2022-03-18 PROCEDURE — 250N000012 HC RX MED GY IP 250 OP 636 PS 637: Performed by: INTERNAL MEDICINE

## 2022-03-18 PROCEDURE — 99223 1ST HOSP IP/OBS HIGH 75: CPT | Performed by: INTERNAL MEDICINE

## 2022-03-18 PROCEDURE — 86140 C-REACTIVE PROTEIN: CPT | Performed by: PHYSICIAN ASSISTANT

## 2022-03-18 PROCEDURE — 96365 THER/PROPH/DIAG IV INF INIT: CPT

## 2022-03-18 PROCEDURE — C9803 HOPD COVID-19 SPEC COLLECT: HCPCS

## 2022-03-18 PROCEDURE — 87040 BLOOD CULTURE FOR BACTERIA: CPT | Performed by: PHYSICIAN ASSISTANT

## 2022-03-18 PROCEDURE — 80053 COMPREHEN METABOLIC PANEL: CPT | Performed by: PHYSICIAN ASSISTANT

## 2022-03-18 PROCEDURE — 250N000011 HC RX IP 250 OP 636: Performed by: INTERNAL MEDICINE

## 2022-03-18 PROCEDURE — 255N000002 HC RX 255 OP 636: Performed by: EMERGENCY MEDICINE

## 2022-03-18 PROCEDURE — 83605 ASSAY OF LACTIC ACID: CPT | Performed by: PHYSICIAN ASSISTANT

## 2022-03-18 PROCEDURE — A9585 GADOBUTROL INJECTION: HCPCS | Performed by: EMERGENCY MEDICINE

## 2022-03-18 PROCEDURE — 86901 BLOOD TYPING SEROLOGIC RH(D): CPT | Performed by: PHYSICIAN ASSISTANT

## 2022-03-18 PROCEDURE — 73720 MRI LWR EXTREMITY W/O&W/DYE: CPT | Mod: LT

## 2022-03-18 PROCEDURE — 85025 COMPLETE CBC W/AUTO DIFF WBC: CPT | Performed by: PHYSICIAN ASSISTANT

## 2022-03-18 PROCEDURE — 82272 OCCULT BLD FECES 1-3 TESTS: CPT | Performed by: PHYSICIAN ASSISTANT

## 2022-03-18 PROCEDURE — 96368 THER/DIAG CONCURRENT INF: CPT

## 2022-03-18 PROCEDURE — 87635 SARS-COV-2 COVID-19 AMP PRB: CPT | Performed by: PHYSICIAN ASSISTANT

## 2022-03-18 PROCEDURE — 210N000001 HC R&B IMCU HEART CARE

## 2022-03-18 PROCEDURE — 99221 1ST HOSP IP/OBS SF/LOW 40: CPT | Mod: AI | Performed by: INTERNAL MEDICINE

## 2022-03-18 PROCEDURE — 85018 HEMOGLOBIN: CPT | Performed by: PHYSICIAN ASSISTANT

## 2022-03-18 PROCEDURE — 85652 RBC SED RATE AUTOMATED: CPT | Performed by: PHYSICIAN ASSISTANT

## 2022-03-18 PROCEDURE — 36592 COLLECT BLOOD FROM PICC: CPT | Performed by: PHYSICIAN ASSISTANT

## 2022-03-18 RX ORDER — GADOBUTROL 604.72 MG/ML
10 INJECTION INTRAVENOUS ONCE
Status: COMPLETED | OUTPATIENT
Start: 2022-03-18 | End: 2022-03-18

## 2022-03-18 RX ORDER — CEFAZOLIN SODIUM 1 G/50ML
1750 SOLUTION INTRAVENOUS ONCE
Status: COMPLETED | OUTPATIENT
Start: 2022-03-18 | End: 2022-03-18

## 2022-03-18 RX ORDER — DEXTROSE MONOHYDRATE 25 G/50ML
25-50 INJECTION, SOLUTION INTRAVENOUS
Status: DISCONTINUED | OUTPATIENT
Start: 2022-03-18 | End: 2022-03-24 | Stop reason: HOSPADM

## 2022-03-18 RX ORDER — ONDANSETRON 2 MG/ML
4 INJECTION INTRAMUSCULAR; INTRAVENOUS EVERY 6 HOURS PRN
Status: DISCONTINUED | OUTPATIENT
Start: 2022-03-18 | End: 2022-03-21

## 2022-03-18 RX ORDER — CEFAZOLIN SODIUM 1 G/50ML
1250 SOLUTION INTRAVENOUS EVERY 24 HOURS
Status: DISCONTINUED | OUTPATIENT
Start: 2022-03-19 | End: 2022-03-19

## 2022-03-18 RX ORDER — ASCORBIC ACID 500 MG
500 TABLET ORAL DAILY
Status: ON HOLD | COMMUNITY
End: 2023-01-01

## 2022-03-18 RX ORDER — LIDOCAINE 40 MG/G
CREAM TOPICAL
Status: DISCONTINUED | OUTPATIENT
Start: 2022-03-18 | End: 2022-03-24 | Stop reason: HOSPADM

## 2022-03-18 RX ORDER — ACETAMINOPHEN 325 MG/1
650 TABLET ORAL EVERY 6 HOURS PRN
Status: DISCONTINUED | OUTPATIENT
Start: 2022-03-18 | End: 2022-03-21

## 2022-03-18 RX ORDER — ASPIRIN 81 MG/1
81 TABLET, CHEWABLE ORAL DAILY
Status: DISCONTINUED | OUTPATIENT
Start: 2022-03-19 | End: 2022-03-24 | Stop reason: HOSPADM

## 2022-03-18 RX ORDER — ATORVASTATIN CALCIUM 40 MG/1
80 TABLET, FILM COATED ORAL DAILY
Status: DISCONTINUED | OUTPATIENT
Start: 2022-03-19 | End: 2022-03-24 | Stop reason: HOSPADM

## 2022-03-18 RX ORDER — ONDANSETRON 4 MG/1
4 TABLET, ORALLY DISINTEGRATING ORAL EVERY 6 HOURS PRN
Status: DISCONTINUED | OUTPATIENT
Start: 2022-03-18 | End: 2022-03-21

## 2022-03-18 RX ORDER — AMOXICILLIN 250 MG
1 CAPSULE ORAL 2 TIMES DAILY
Status: DISCONTINUED | OUTPATIENT
Start: 2022-03-18 | End: 2022-03-19

## 2022-03-18 RX ORDER — FERROUS SULFATE 325(65) MG
325 TABLET ORAL 2 TIMES DAILY
Status: DISCONTINUED | OUTPATIENT
Start: 2022-03-18 | End: 2022-03-19

## 2022-03-18 RX ORDER — NICOTINE POLACRILEX 4 MG
15-30 LOZENGE BUCCAL
Status: DISCONTINUED | OUTPATIENT
Start: 2022-03-18 | End: 2022-03-24 | Stop reason: HOSPADM

## 2022-03-18 RX ORDER — METOPROLOL SUCCINATE 50 MG/1
50 TABLET, EXTENDED RELEASE ORAL DAILY
Status: DISCONTINUED | OUTPATIENT
Start: 2022-03-19 | End: 2022-03-24 | Stop reason: HOSPADM

## 2022-03-18 RX ORDER — NAPROXEN SODIUM 220 MG
440 TABLET ORAL 2 TIMES DAILY PRN
Status: ON HOLD | COMMUNITY
End: 2022-04-07

## 2022-03-18 RX ORDER — AMOXICILLIN 250 MG
2 CAPSULE ORAL 2 TIMES DAILY
Status: DISCONTINUED | OUTPATIENT
Start: 2022-03-18 | End: 2022-03-19

## 2022-03-18 RX ORDER — ACETAMINOPHEN 650 MG/1
650 SUPPOSITORY RECTAL EVERY 6 HOURS PRN
Status: DISCONTINUED | OUTPATIENT
Start: 2022-03-18 | End: 2022-03-21

## 2022-03-18 RX ADMIN — MEROPENEM 2 G: 1 INJECTION, POWDER, FOR SOLUTION INTRAVENOUS at 21:32

## 2022-03-18 RX ADMIN — INSULIN GLARGINE 38 UNITS: 100 INJECTION, SOLUTION SUBCUTANEOUS at 23:54

## 2022-03-18 RX ADMIN — VANCOMYCIN HYDROCHLORIDE 1750 MG: 5 INJECTION, POWDER, LYOPHILIZED, FOR SOLUTION INTRAVENOUS at 19:20

## 2022-03-18 RX ADMIN — GADOBUTROL 10 ML: 604.72 INJECTION INTRAVENOUS at 13:19

## 2022-03-18 ASSESSMENT — ENCOUNTER SYMPTOMS
CHILLS: 0
VOMITING: 0
COUGH: 0
ABDOMINAL PAIN: 0
FEVER: 0
HEMATURIA: 0
DYSURIA: 0
FREQUENCY: 0
NAUSEA: 0
DIARRHEA: 0
BRUISES/BLEEDS EASILY: 1
SHORTNESS OF BREATH: 0
WOUND: 1

## 2022-03-18 ASSESSMENT — ACTIVITIES OF DAILY LIVING (ADL)
DIFFICULTY_COMMUNICATING: NO
CHANGE_IN_FUNCTIONAL_STATUS_SINCE_ONSET_OF_CURRENT_ILLNESS/INJURY: NO
ADLS_ACUITY_SCORE: 12
WEAR_GLASSES_OR_BLIND: YES
DOING_ERRANDS_INDEPENDENTLY_DIFFICULTY: NO
ADLS_ACUITY_SCORE: 12
VISION_MANAGEMENT: GLASSES
TOILETING_ISSUES: NO
ADLS_ACUITY_SCORE: 12
HEARING_DIFFICULTY_OR_DEAF: NO
FALL_HISTORY_WITHIN_LAST_SIX_MONTHS: NO
CONCENTRATING,_REMEMBERING_OR_MAKING_DECISIONS_DIFFICULTY: NO
DIFFICULTY_EATING/SWALLOWING: NO
DRESSING/BATHING_DIFFICULTY: NO
WALKING_OR_CLIMBING_STAIRS_DIFFICULTY: NO
ADLS_ACUITY_SCORE: 12
ADLS_ACUITY_SCORE: 4

## 2022-03-18 NOTE — PHARMACY-VANCOMYCIN DOSING SERVICE
"Pharmacy Vancomycin Initial Note  Date of Service 2022  Patient's  1960  61 year old, male    Indication: Bone and Joint Infection    Current estimated CrCl = Estimated Creatinine Clearance: 62.6 mL/min (A) (based on SCr of 1.57 mg/dL (H)).    Creatinine for last 3 days  3/18/2022: 11:26 AM Creatinine 1.57 mg/dL    Recent Vancomycin Level(s) for last 3 days  No results found for requested labs within last 72 hours.      Vancomycin IV Administrations (past 72 hours)      No vancomycin orders with administrations in past 72 hours.                Nephrotoxins and other renal medications (From now, onward)    Start     Dose/Rate Route Frequency Ordered Stop    22 1800  vancomycin (VANCOCIN) 1,250 mg in sodium chloride 0.9 % 250 mL intermittent infusion        \"Followed by\" Linked Group Details    1,250 mg  over 90 Minutes Intravenous EVERY 24 HOURS 22 1719      22 1800  vancomycin (VANCOCIN) 1,750 mg in sodium chloride 0.9 % 500 mL intermittent infusion        \"Followed by\" Linked Group Details    1,750 mg  over 2 Hours Intravenous ONCE 22 1719            Contrast Orders - past 72 hours (72h ago, onward)            Start     Dose/Rate Route Frequency Ordered Stop    22 1330  gadobutrol (GADAVIST) injection 10 mL         10 mL Intravenous ONCE 22 1319 22 1319          [employer] Prediction of Planned Initial Vancomycin Regimen    Loading dose: 1750 mg at 18:00 2022.  Regimen: 1250 mg IV every 24 hours.  Start time: 17:17 on 2022  Exposure target: AUC24 (range)400-600 mg/L.hr   AUC24,ss: 544 mg/L.hr  Probability of AUC24 > 400: 81 %  Ctrough,ss: 19.1 mg/L  Probability of Ctrough,ss > 20: 45 %  Probability of nephrotoxicity (Lodise TANIA ): 16 %          Plan:  1. Start vancomycin  1750 mg IV once then 1250 mg q24.   2. Vancomycin monitoring method: AUC  3. Vancomycin therapeutic monitoring goal: 400-600 mg*h/L  4. Pharmacy will check vancomycin " levels as appropriate in 1-3 Days.    5. Serum creatinine levels will be ordered daily for the first week of therapy and at least twice weekly for subsequent weeks.      Kiersten Chris, SHERRY

## 2022-03-18 NOTE — ED TRIAGE NOTES
Patient presents here for evaluation of a fever that was found on 3/16 while at his physician's office. He has a history of partial amputation of his left foot. He currently has a PICC line in his right arm of which he receives IV antibiotics.  He has a history of reoccurring osteomyelitis and sepsis. He is a diabetic. His blood sugars have been in the 80's-110.

## 2022-03-18 NOTE — CONSULTS
GENERAL SURGERY CONSULTATION    Norman Aguilar  [facility]  Medical Record #:  [id number]  YOB: 1960  Age:  61 year old     Date of Consultation: 3/18/2022    Reason for Consultation: Chronic osteomyelitis left foot status post multiple surgical invention    Norman Aguilar is a 61 year old male who presents with a multiple month history of surgical interventions in his left lower extremity with initial debridement and subsequent multiple debridements of his left foot.  Initial Doppler studies revealed adequate perfusion of his left lower extremity and foot.  He underwent surgical interventions and he has had additional surgeries after his first debridement.  He is diabetic and he has history of atrial fibrillation.  Chronic Eliquis anticoagulation is noted.  He does not have normal ambulatory status secondary to the severe derangement of the midfoot on his left foot.  He has not had similar problems in his right lower extremity or foot.  He lives independently.  He has been followed in outpatient basis by infectious disease specialist with outpatient antibiotic therapy and daily wound or multiple day per week wound care with intermittent VAC dressing changes or wet-to-dry dressing changes.  He is on infectious disease 2 days ago and he had a fever in the office and was instructed to go to the emergency room but with the long wait present he went back home.  He presented himself to the emergency room today.  He is aware that he has mild swelling in his left lower extremity below the knee.  I have seen him on an outpatient basis and recent MRIs had been reviewed and I also had him see [facility] orthotics for evaluation and education regarding of below-knee amputation and rehabilitation regarding that surgical intervention.  Patient has had a very difficult time making decision regarding his intervention.  Lengthy discussion was undertaken with the patient again.  He is in agreement with staying  in the hospital and having a guillotine amputation of his badly infected foot.  This will be done at the ankle level.  After 7 to 10 days of resolution of edema and infection then the formal below-knee amputation will be completed.  He is ultrasound has been normal but if his creatinine allows would obtain a CT angiogram to verify absence of other specific abnormality which would prevent a below-knee amputation.    PHH:    Past Medical History:   Diagnosis Date     Coronary artery disease      Diabetes (H)      Diabetic neuropathy (H)      GERD (gastroesophageal reflux disease)      Hyperlipidemia      Hypertension      Intermittent atrial fibrillation (H)      NSTEMI (non-ST elevated myocardial infarction) (H)      Renal disease      Retinopathy      Sleep disturbance         Past Surgical History:   Procedure Laterality Date     AMPUTATE TOE(S) Left 11/16/2021    Procedure: first and second ray amputation;  Surgeon: Eddi Ram DPM;  Location: [facility] OR     APPENDECTOMY       CV CORONARY ANGIOGRAM N/A 11/16/2021    Procedure: CV CORONARY ANGIOGRAM;  Surgeon: Pam Tabares MD;  Location: [facility] CATH LAB CV     CV LEFT HEART CATH N/A 11/16/2021    Procedure: Left Heart Cath;  Surgeon: Pam Tabares MD;  Location: [facility] CATH LAB CV     FOOT SURGERY Left      HERNIA REPAIR       INCISION AND DRAINAGE LOWER EXTREMITY, COMBINED Left 11/16/2021    Procedure: INCISION AND DRAINAGE, left foot;  Surgeon: Eddi Ram DPM;  Location: [facility] OR     INCISION AND DRAINAGE LOWER EXTREMITY, COMBINED Left 11/19/2021    Procedure: INCISION AND DRAINAGE, left foot;  Surgeon: Eddi Ram DPM;  Location: [facility] OR     INCISION AND DRAINAGE LOWER EXTREMITY, COMBINED Left 12/9/2021    Procedure: INCISION AND DRAINAGE, Left foot;  Surgeon: Eddi Ram DPM;  Location: [facility] OR     INCISION AND DRAINAGE LOWER EXTREMITY, COMBINED Left 1/10/2022    Procedure:  INCISION AND DRAINAGE, left foot;  Surgeon: Eddi Ram DPM;  Location: [facility] OR     INCISION AND DRAINAGE LOWER EXTREMITY, COMBINED Left 1/27/2022    Procedure: INCISION AND DRAINAGE, Left foot;  Surgeon: Eddi Ram DPM;  Location: [facility] OR       ALLERGIES:  Patient has no known allergies.    MEDS:  No current facility-administered medications for this encounter.    Current Outpatient Medications:      apixaban ANTICOAGULANT (ELIQUIS) 5 MG tablet, Take 1 tablet (5 mg) by mouth 2 times daily, Disp: 180 tablet, Rfl: 0     aspirin (ASA) 81 MG chewable tablet, Take 1 tablet (81 mg) by mouth daily, Disp: 30 tablet, Rfl: 0     atorvastatin (LIPITOR) 80 MG tablet, TAKE 1 TABLET(80 MG) BY MOUTH EVERY EVENING, Disp: 90 tablet, Rfl: 1     ferrous sulfate (FEROSUL) 325 (65 Fe) MG tablet, Take 1 tablet by mouth 2 times daily, Disp: , Rfl:      furosemide (LASIX) 20 MG tablet, TAKE 1 TABLET(20 MG) BY MOUTH DAILY, Disp: 90 tablet, Rfl: 1     insulin aspart (NOVOLOG FLEXPEN) 100 UNIT/ML pen, For  - 164 give 1 unit. For  - 189 give 2 units. For  - 214 give 3 units. For  - 239 give 4 units. For  - 264 give 5 units. For  - 289 give 6 units. For  - 314 give 7 units. For  - 339 give 8 units For  - 364 give 9 units For  - 389 give 10 units For  - 414 give 11 units For BG greater than or equal to 415 give 12 units, Disp: 3 mL, Rfl: 3     insulin glargine (LANTUS SOLOSTAR) 100 UNIT/ML pen, Inject 38 Units Subcutaneous every morning (Patient taking differently: Inject 38 Units Subcutaneous At Bedtime ), Disp: 3 mL, Rfl: 3     magnesium oxide (MAG-OX) 400 MG tablet, Take 1 tablet (400 mg) by mouth daily, Disp: 90 tablet, Rfl: 1     metoprolol succinate ER (TOPROL-XL) 50 MG 24 hr tablet, TAKE 1 TABLET(50 MG) BY MOUTH DAILY, Disp: 90 tablet, Rfl: 1     Multiple Vitamin (MULTI VITAMIN) TABS, 1 tab(s), Disp: , Rfl:      naproxen sodium  (ANAPROX) 220 MG tablet, Take 440 mg by mouth 2 times daily as needed for other (inflammation), Disp: , Rfl:      OMEGA-3/DHA/EPA/FISH OIL (FISH OIL-OMEGA-3 FATTY ACIDS) 300-1,000 mg capsule, Take 1 g by mouth daily , Disp: , Rfl:      vitamin C (ASCORBIC ACID) 250 MG tablet, Take 500 mg by mouth daily, Disp: , Rfl:      blood glucose (NO BRAND SPECIFIED) test strip, Use to test blood sugar 4 times daily or as directed., Disp: 100 strip, Rfl: 3     blood glucose monitoring (SOFTCLIX) lancets, Use to test blood sugar 4 times daily., Disp: 100 each, Rfl: 6    SOCIAL HABITS:    History   Smoking Status     Current Some Day Smoker     Types: Cigars   Smokeless Tobacco     Never Used     Comment: Cigars     Social History    Substance and Sexual Activity      Alcohol use: Yes        Comment: occas.      History   Drug Use Unknown       FAMILY HISTORY:  History reviewed. No pertinent family history.    REVIEW OF SYSTEMS: Noted and reviewed.    PE:    /64   Pulse 96   Temp 98.4  F (36.9  C) (Oral)   Resp 16   Wt 90.7 kg (200 lb)   SpO2 98%   BMI 25.33 kg/m      HEENT: Normal  Chest: Normal  Lungs: Clear  Heart: Irregular rhythm rate 80/min  Abd: Benign  Ext: Right lower extremity grossly normal.  One small area of superficial skin wound to mid tibia but no evidence of infection cellulitis or edema.  Left lower extremity with foot wrapped.  This is observed with removal of the Curlex and the large infected swollen foot is similar to what I have witnessed in the last couple of months.  Vascular: Normal carotids without bruits.  Abdomen is without bruits.  Femoral popliteal easily palpable and Doppler present posterior tibial dorsalis pedis bilaterally.    LABS:    Recent Labs   Lab 03/18/22  1126   *   CO2 24   BUN 29*      Recent Labs   Lab 03/18/22  1344 03/18/22  1126   WBC  --  5.1   HGB 9.7* 6.6*   HCT  --  20.8*   PLT  --  285      Recent Labs   Lab 03/18/22  1126   ALKPHOS 171*   *   *      No results for input(s): AMYLASE in the last 168 hours.    Recent Labs   Lab 03/18/22  1344   INR 1.47*       XRAYS: Noted and reviewed up-to-date    ASSESSMENT: Marked cellulitis infection to midfoot left foot.    PLAN: In addition to prosthetic evaluation I have had patient see an additional specialist at [facility] orthopedics to help him make a decision regarding the potential of the salvage of his left foot.  He saw Dr. Rodriguez at that location.  Same opinion was made at that location that day from a functional standpoint that with the current status of the midfoot and MRI results and chronic osteomyelitis that salvage of the foot would not be reasonable from a functional standpoint.  As noted after lengthy discussion patient has agreed to stay in the hospital and he will be seen by infectious disease and the guillotine amputation will be completed.  Blood thinner will be held today.    Ronald mireles md  [facility]

## 2022-03-18 NOTE — ED NOTES
The pt was sent to the ER for fever at primary care earlier in the week. The pt has had a partial amputation of the left foot. Wound is pink with small amount of drainage. Pt does not have a fever today and denies new complaints at this time.

## 2022-03-18 NOTE — CONSULTS
Consultation - Infectious Disease  [facility]  Norman Aguilar,  1960, MRN [id number]    Admitting Dx: No admission diagnoses are documented for this encounter.    PCP: Jaquan Dickerson, [phone]       ASSESSMENT   61-year-old man with a history of poorly controlled diabetes, coronary artery disease, ischemic cardiomyopathy, A. fib who is admitted with persistent left foot infection.    1. History of left foot infection with repeated I&D's.  Followed in the ID clinic by Dr. Durbin.  Recently had wound VAC placement and was started on IV Zosyn.  Repeat MRI today showing signs of septic arthritis/osteomyelitis at and around the calcaneocuboid joint and subtalar joint.  Previous cultures have been polymicrobial.  Most recent cultures from January with Enterobacter, Enterococcus faecalis, corynebacterium striatum, Trueperella species and Pasteurella canis.  2. Leukopenia and neutropenia.  Likely secondary to Zosyn.  Total white cells and neutrophils have improved since stopping the Zosyn on 3/16.  3. Fever.  Low-grade fevers at home.  Possibly secondary to antibiotic reaction.  Blood cultures have been collected and are pending.  Patient also mentions increased GI disturbances, that have improved.  No fevers over the past 2 days.  4. Transaminitis.  Possibly related to antibiotic.  Continue to monitor.    Active Problems:    * No active hospital problems. *       PLAN   -Agree with holding Zosyn  -Would start meropenem and vancomycin for coverage until surgery  -Appreciate Dr. Bhatt's involvement, plan for guillotine amputation on Monday.  -follow cbc and lft     Thank you for this consult. Will follow.    Ryan Rendon MD  [facility]  Direct messaging: [employer] Paging  On-Call ID provider: [phone], option: [phone]      ===========================================      Chief Complaint   <principal problem not specified>       HPI     We have been requested by  Ruiz Gale MD to evaluate Norman Aguilar for the above.    History obtained by patient    Norman Aguilar is a 61 year old man with a history of A. fib, diabetes, coronary artery disease, ischemic cardiomyopathy, who is known to the ID clinic for persistent left foot infection.  He has had multiple debridements of his left foot with amputations of the first and second toe down to the metatarsal.  He is also had debridements of the talus and third metatarsal.  He was seen in ID clinic in February and a wound VAC was placed and the patient was started on IV Zosyn due to signs of persistent infection.  He returned this week and was noted to have leukopenia and neutropenia.  He also had a fever earlier that day and it was recommended that he go to the ER.  Since the ER was full he went back home to monitor his symptoms.  He denies any more fevers of the past few days.  He was instructed to stop the Zosyn after his appointment and has not taken any doses since then.  He was seen by Dr. Bhatt in the ER and it was decided that he would undergo a guillotine amputation on Monday due to persistent infection.  The patient is very frustrated over the overall course of his left foot infection, but understands that the amputation needs to be done.          Review of Systems   Ten systems reviewed and negative except for what is noted in the HPI      Medical History  Past Medical History:   Diagnosis Date     Coronary artery disease      Diabetes (H)      Diabetic neuropathy (H)      GERD (gastroesophageal reflux disease)      Hyperlipidemia      Hypertension      Intermittent atrial fibrillation (H)      NSTEMI (non-ST elevated myocardial infarction) (H)      Renal disease      Retinopathy      Sleep disturbance     Surgical History  He  has a past surgical history that includes Coronary Angiogram (N/A, 11/16/2021); Left Heart Catheterization (N/A, 11/16/2021); Incision and drainage lower extremity, combined (Left,  11/16/2021); Amputate toe(s) (Left, 11/16/2021); Incision and drainage lower extremity, combined (Left, 11/19/2021); Incision and drainage lower extremity, combined (Left, 12/9/2021); appendectomy; hernia repair; Incision and drainage lower extremity, combined (Left, 1/10/2022); Foot surgery (Left); and Incision and drainage lower extremity, combined (Left, 1/27/2022).     Social History  Reviewed, and he  reports that he has been smoking cigars. He has never used smokeless tobacco. He reports current alcohol use. He reports that he does not use drugs.  Social History     Social History Narrative     Not on file     Family History  No recent family infections          Allergies   No Known Allergies      Antibiotics   None    Previous:  Zosyn stopped on 3/16      Physical Exam     Temp:  [98.2  F (36.8  C)-98.4  F (36.9  C)] 98.2  F (36.8  C)  Pulse:  [77-96] 77  Resp:  [16] 16  BP: (123-133)/(64-68) 133/68  SpO2:  [98 %-100 %] 100 %    /68   Pulse 77   Temp 98.2  F (36.8  C)   Resp 16   Wt 90.7 kg (200 lb)   SpO2 100%   BMI 25.33 kg/m      GENERAL:  well-developed, well-nourished, sitting in bed in no acute distress.   HENT:  Head is normocephalic, atraumatic. Oropharynx is moist without exudates or ulcers.  EYES:  Eyes have anicteric sclerae without conjunctival injection or stigmata of endocarditis.   NECK:  Supple.  LUNGS:  Clear to auscultation.  CARDIOVASCULAR:  Regular rate and rhythm with no murmurs, gallops or rubs.  ABDOMEN:  Normal bowel sounds, soft, nontender. No appreciable hepatosplenomegaly  EXT: Left leg edema with faint erythema around the foot and lower leg.  SKIN:  No acute rashes.  Left foot is wrapped open wound noted on the dorsal aspect.  NEUROLOGIC:  Grossly nonfocal.      Cultures   3/18 blood cultures x2: Pending    Previous cultures reviewed      Laboratory results     Recent Labs   Lab 03/18/22  1344 03/18/22  1126 03/16/22  0730 03/14/22  1430   WBC  --  5.1 1.1* 1.8*   HGB  9.7* 6.6* 9.2* 8.9*   PLT  --  285 184 221       Recent Labs   Lab 03/18/22  1126 03/14/22  1430   *  --    CO2 24  --    BUN 29* 34*   ALBUMIN 2.2*  --    ALKPHOS 171*  --    *  --    *  --        Recent Labs   Lab 03/18/22  1126 03/16/22  0730 03/14/22  1430   CRP 18.5* 150.0* 110.0*           Imaging   Radiology results reviewed    MR Foot Left w/o & w Contrast    Result Date: 3/18/2022  EXAM: MR FOOT LEFT W/O and W CONTRAST LOCATION: [facility] DATE/TIME: 3/18/2022 12:13 PM INDICATION: Foot pain. Ulceration. Recent incision and drainage and partial amputation. COMPARISON: 02/17/2022 MRI of the ankle. TECHNIQUE: Routine. Additional postgadolinium T1 sequences were obtained. IV CONTRAST: 10mL Gadavist FINDINGS: JOINTS AND BONES: -Extensive partial amputation of the left foot, involving the medial and middle cuneiforms, navicular, head and neck of the talus, and great toe. Chronic nondisplaced fracture of the proximal portion of the second metatarsal shaft. No acute fracture. Large effusion within the calcaneocuboid joint. Extensive adjacent enhancing marrow edema most consistent with septic arthritis/osteomyelitis. Moderate degenerative changes at the fourth TMT joint. There are likely changes of septic arthritis/osteomyelitis at the level of the middle subtalar joint. TENDONS: -No tendon tear, tendinopathy, or tenosynovitis. MUSCLES AND SOFT TISSUES: -Prominent soft tissue defect along the medial aspect of the hind, mid and proximal forefoot with deep extension to the superficial margin of the middle subtalar joints. There is extensive surrounding cellulitis. No evidence of an abscess.     IMPRESSION: 1.  Extensive partial amputation of the foot as described, with a large soft tissue defect at the resection site. 2.  There is a large effusion in the calcaneocuboid joint with extensive adjacent enhancing marrow edema compatible with septic arthritis/osteomyelitis not  significantly changed since the prior study. 3.  There are likely changes of septic arthritis/osteomyelitis at the level of the middle subtalar joint. 4.  No definite evidence of an abscess.      Data reviewed today: I reviewed all medications, new labs and imaging results over the last 24 hours. I personally reviewed the foot mri image(s) showing extensive areas of osteomyelitis and septic arthitis.  The patient's care was discussed with the Patient and ER provider and Dr. Bhatt.

## 2022-03-18 NOTE — TELEPHONE ENCOUNTER
I called LM for the pt to c/b to inform Dr Durbin is recommending that he go to the ER to assess why he is febrile.

## 2022-03-18 NOTE — ED PROVIDER NOTES
EMERGENCY DEPARTMENT ENCOUNTER      NAME: Norman Aguilar  AGE: 61 year old male  YOB: 1960  MRN: [id number]  EVALUATION DATE & TIME: No admission date for patient encounter.    PCP: Jaquan Dickerson    ED PROVIDER: Mabel Lilly PA-C      Chief Complaint   Patient presents with     Foot Pain     Fever         FINAL IMPRESSION:  1. Wound infection    2. Osteomyelitis of left foot (H)          MEDICAL DECISION MAKING:    Pertinent Labs & Imaging studies reviewed. (See chart for details)  61 year old male with a pertinent history of T2DM, HTN, GERD, HLD, DVT, NSTEMI, CKD stage 3, HF with reduced EF, ischemic cardiomyopathy, abscess and ulceration of left foot (1/27), chronic osteomyelitis left foot s/p multiple surgeries presents to the Emergency Department for evaluation of fever. Noted at ID appointment 2 days ago. Denies fevers since. Was not able to be directly admitted so he went home as he did not want to wait in ED lobby to be seen. Presents today as this was recommended by ID. Has a PICC line in place and had been doing IV abx for 4-5 weeks (zosyn) however stopped antibiotics 3 days ago which he reports being told to do by ID. He denies any other infectious symptoms. Reports chronic wound that is doing well. Denies any new redness or drainage. Using wound vac and RN out to his home doing dressing changes.     Vitals reviewed and unremarkable.  Patient afebrile.  On physical exam he has a chronic appearing wound to his left foot with granulation and necrotic tissue.  This does probe to the bone.  A small amount of purulent drainage along the most proximal area of the wound.  He has associated edema throughout the foot with mild erythema.  His lower leg is normal temperature.  He has 2+ and symmetric DP pulses.  Normal sensation and good range of motion throughout all major joints.  His lungs are clear to auscultation bilaterally.  Abdomen soft and nontender.  Differential diagnosis includes  but not limited to progressing osteomyelitis, abscess, cellulitis, necrotizing fasciitis, bacteremia from PICC line, sepsis.    No leukocytosis. Lactic acid WNL. Blood cultures (peripheral and PICC line) obtained. CRP 18.5, sed rate 100, previously elevated as well.  Hemoglobin came back at 6.6.  There was some concern that the lab was drawn off of his PICC line.  Repeat hemoglobin returned at 9.7 which is his baseline.  He does have newly elevated LFTs compared to 2/2022.  Questionable alcohol use given the 2:1 AST to ALT ratio.  Alk phos slightly elevated at 171, normal bilirubin.  He has no focal abdominal tenderness.  Low suspicion for acute hepatobiliary etiology. Cr 1.57, right around his baseline. Spoke with Dr. Bhatt with surgery who came down to evaluate the patient.  Patient is agreeable to amputation at this point.  Dr. Bhatt would like him admitted in preparation for his amputation on Monday with bedrest to help decrease inflammation to the leg. I did consult with infectious disease who will review his chart and order appropriate antibiotics. Inpatient consult ordered. At this point he does not appear septic and he is vitally stable.  Patient was admitted to the hospitalist service.    0 minutes of critical care time     ED COURSE:  10:12 AM I met with the patient, obtained history, performed an initial exam, and discussed options and plan for diagnostics and treatment here in the ED.  12:25 PM Labs show patient has a hemoglobin of 6.6. Patient had a recorded hemoglobin of 9.2, 2 days ago. Consented the patient for blood transfusion.   12:36 PM I staffed the patient with Dr. Hooks  1:35 PM Rectal exam performed. Fecal occult negative.  2:45 PM Dr. Bhatt, surgeon, at bedside.   3:45 PM Spoke with ID. Consult placed and he will take care of antibiotic orders.   3:58 PM Spoke with Dr. Olvera, hospitalist who accepts patient for admission.     At the conclusion of the encounter I discussed the  results of all of the tests and the indication for admission. The questions were answered. The patient acknowledged understanding and was agreeable with the care plan.     MEDICATIONS GIVEN IN THE EMERGENCY:  Medications   gadobutrol (GADAVIST) injection 10 mL (10 mLs Intravenous Given 3/18/22 1319)       NEW PRESCRIPTIONS STARTED AT TODAY'S ER VISIT  New Prescriptions    No medications on file            =================================================================    HPI:    Patient information was obtained from: Patient     Use of Interpretor: N/A      Norman Aguilar is a 61 year old male with a pertinent history of T2DM, HTN, a fib on eliquis, GERD, HLD, DVT, NSTEMI, CKD stage 3, HF with reduced EF, ischemic cardiomyopathy, abscess and ulceration of left foot (1/27), osteomyelitis left foot, partial amputation of left talus, cuneiform, and 3rd metatarsal who presents to this ED via private vehicle for evaluation of a fever.    Per chart review,   ~ Patient had left foot incision, drainage and partial amputation on 1/27/22 at .   ~Patient followed by Infectious Diseases, Dr. Durbin. Last saw him on 3/16/22. Febrile in clinic. Provider concerned about potential PICC line related bacteremia, viral infection, foot abscess.    Patient reports a fever after going to see infectious disease Dr. Durbin, for a follow up on 3/16/22 (~2 days ago) for osteomyelitis of left foot. States he had a fever of about 101 F, but has been monitoring his temperature regularly at home and it has been normal. Dr. Durbin recommended admission however unable to direct admit and patient came to ED however left due to long wait time. States he currently has a PICC line in his right arm of which he has been receiving IV antibiotics for the past 4-5 weeks but stopped antibiotics 3 days ago which he reports being told to do by ID. Was previously on Zosyn. He denies nausea, vomiting, diarrhea, abdominal pain, chest pain,  shortness of breath, cough, body aches. Reports his wound actually looks quite good. Has not noticed any increased redness or drainage. He is currently following with Dr. Bhatt with surgery. No surgery scheduled at this time as patient states he is still contemplating amputation. Of note, patient is anticoagulated on Eliquis.       REVIEW OF SYSTEMS:  Review of Systems   Constitutional: Negative for chills and fever (resolved).   Respiratory: Negative for cough and shortness of breath.    Cardiovascular: Negative for chest pain.   Gastrointestinal: Negative for abdominal pain, diarrhea, nausea and vomiting.   Genitourinary: Negative for dysuria, frequency and hematuria.   Skin: Positive for wound (left foot wound).   Allergic/Immunologic: Positive for immunocompromised state (T2DM).   Hematological: Bruises/bleeds easily (eliquis).   All other systems reviewed and are negative.      PAST MEDICAL HISTORY:  Past Medical History:   Diagnosis Date     Coronary artery disease      Diabetes (H)      Diabetic neuropathy (H)      GERD (gastroesophageal reflux disease)      Hyperlipidemia      Hypertension      Intermittent atrial fibrillation (H)      NSTEMI (non-ST elevated myocardial infarction) (H)      Renal disease      Retinopathy      Sleep disturbance        PAST SURGICAL HISTORY:  Past Surgical History:   Procedure Laterality Date     AMPUTATE TOE(S) Left 11/16/2021    Procedure: first and second ray amputation;  Surgeon: Eddi Ram DPM;  Location: [facility] Main OR     APPENDECTOMY       CV CORONARY ANGIOGRAM N/A 11/16/2021    Procedure: CV CORONARY ANGIOGRAM;  Surgeon: Pam Tabares MD;  Location: [facility] CATH LAB CV     CV LEFT HEART CATH N/A 11/16/2021    Procedure: Left Heart Cath;  Surgeon: Pam Tabares MD;  Location: [facility] CATH LAB CV     FOOT SURGERY Left      HERNIA REPAIR       INCISION AND DRAINAGE LOWER EXTREMITY, COMBINED Left 11/16/2021    Procedure: INCISION AND DRAINAGE, left  foot;  Surgeon: Eddi Ram DPM;  Location: [facility] OR     INCISION AND DRAINAGE LOWER EXTREMITY, COMBINED Left 11/19/2021    Procedure: INCISION AND DRAINAGE, left foot;  Surgeon: Eddi Ram DPM;  Location: [facility] OR     INCISION AND DRAINAGE LOWER EXTREMITY, COMBINED Left 12/9/2021    Procedure: INCISION AND DRAINAGE, Left foot;  Surgeon: Eddi Ram DPM;  Location: [facility] OR     INCISION AND DRAINAGE LOWER EXTREMITY, COMBINED Left 1/10/2022    Procedure: INCISION AND DRAINAGE, left foot;  Surgeon: Eddi Ram DPM;  Location: [facility] OR     INCISION AND DRAINAGE LOWER EXTREMITY, COMBINED Left 1/27/2022    Procedure: INCISION AND DRAINAGE, Left foot;  Surgeon: Eddi Ram DPM;  Location: [facility]           CURRENT MEDICATIONS:    No current facility-administered medications for this encounter.    Current Outpatient Medications:      apixaban ANTICOAGULANT (ELIQUIS) 5 MG tablet, Take 1 tablet (5 mg) by mouth 2 times daily, Disp: 180 tablet, Rfl: 0     aspirin (ASA) 81 MG chewable tablet, Take 1 tablet (81 mg) by mouth daily, Disp: 30 tablet, Rfl: 0     atorvastatin (LIPITOR) 80 MG tablet, TAKE 1 TABLET(80 MG) BY MOUTH EVERY EVENING, Disp: 90 tablet, Rfl: 1     ferrous sulfate (FEROSUL) 325 (65 Fe) MG tablet, Take 1 tablet by mouth 2 times daily, Disp: , Rfl:      furosemide (LASIX) 20 MG tablet, TAKE 1 TABLET(20 MG) BY MOUTH DAILY, Disp: 90 tablet, Rfl: 1     insulin aspart (NOVOLOG FLEXPEN) 100 UNIT/ML pen, For  - 164 give 1 unit. For  - 189 give 2 units. For  - 214 give 3 units. For  - 239 give 4 units. For  - 264 give 5 units. For  - 289 give 6 units. For  - 314 give 7 units. For  - 339 give 8 units For  - 364 give 9 units For  - 389 give 10 units For  - 414 give 11 units For BG greater than or equal to 415 give 12 units, Disp: 3 mL, Rfl: 3     insulin  glargine (LANTUS SOLOSTAR) 100 UNIT/ML pen, Inject 38 Units Subcutaneous every morning (Patient taking differently: Inject 38 Units Subcutaneous At Bedtime ), Disp: 3 mL, Rfl: 3     magnesium oxide (MAG-OX) 400 MG tablet, Take 1 tablet (400 mg) by mouth daily, Disp: 90 tablet, Rfl: 1     metoprolol succinate ER (TOPROL-XL) 50 MG 24 hr tablet, TAKE 1 TABLET(50 MG) BY MOUTH DAILY, Disp: 90 tablet, Rfl: 1     Multiple Vitamin (MULTI VITAMIN) TABS, 1 tab(s), Disp: , Rfl:      naproxen sodium (ANAPROX) 220 MG tablet, Take 440 mg by mouth 2 times daily as needed for other (inflammation), Disp: , Rfl:      OMEGA-3/DHA/EPA/FISH OIL (FISH OIL-OMEGA-3 FATTY ACIDS) 300-1,000 mg capsule, Take 1 g by mouth daily , Disp: , Rfl:      vitamin C (ASCORBIC ACID) 250 MG tablet, Take 500 mg by mouth daily, Disp: , Rfl:      blood glucose (NO BRAND SPECIFIED) test strip, Use to test blood sugar 4 times daily or as directed., Disp: 100 strip, Rfl: 3     blood glucose monitoring (SOFTCLIX) lancets, Use to test blood sugar 4 times daily., Disp: 100 each, Rfl: 6      ALLERGIES:  No Known Allergies    FAMILY HISTORY:  History reviewed. No pertinent family history.    SOCIAL HISTORY:   Social History     Socioeconomic History     Marital status: Single     Spouse name: Not on file     Number of children: Not on file     Years of education: Not on file     Highest education level: Not on file   Occupational History     Not on file   Tobacco Use     Smoking status: Current Some Day Smoker     Types: Cigars     Smokeless tobacco: Never Used     Tobacco comment: Cigars   Vaping Use     Vaping Use: Never used   Substance and Sexual Activity     Alcohol use: Yes     Comment: occas.     Drug use: Never     Sexual activity: Not on file   Other Topics Concern     Parent/sibling w/ CABG, MI or angioplasty before 65F 55M? Not Asked   Social History Narrative     Not on file     Social Determinants of Health     Financial Resource Strain: Not on file    Food Insecurity: Not on file   Transportation Needs: Not on file   Physical Activity: Not on file   Stress: Not on file   Social Connections: Not on file   Intimate Partner Violence: Not on file   Housing Stability: Not on file       VITALS:  Patient Vitals for the past 24 hrs:   BP Temp Temp src Pulse Resp SpO2 Weight   03/18/22 0958 123/64 98.4  F (36.9  C) Oral 96 16 98 % 90.7 kg (200 lb)       PHYSICAL EXAM   Constitutional: Well developed, Well nourished, NAD  HENT: Normocephalic, Atraumatic, Bilateral external ears normal, Oropharynx normal, mucous membranes moist, Nose normal.   Neck: Supple, No stridor.  Eyes: Conjunctiva normal, No discharge.   Respiratory: Normal breath sounds, No respiratory distress, No wheezing, Speaks full sentences easily. No cough.  Cardiovascular: Normal heart rate, Regular rhythm, No murmurs, No rubs, No gallops. Chest wall nontender.  GI: Soft, No tenderness, No masses, No flank tenderness. No rebound or guarding.  Musculoskeletal: 2+ DP pulses. Left foot edematous with open wound - see photo. Granulation and gangrenous tissue. Small amount of purulence at most proximal aspect of wound. No cyanosis, No clubbing. Good range of motion in all major joints. No tenderness to palpation or major deformities noted.   Integument: Warm, Dry, No rash. No petechiae.    Neurologic: Alert & oriented x 3, Normal motor function, Normal sensory function, No focal deficits noted. Normal gait.  Psychiatric: Affect normal, Judgment normal, Mood normal. Cooperative.    LAB:  All pertinent labs reviewed and interpreted.  Recent Results (from the past 24 hour(s))   Comprehensive metabolic panel    Collection Time: 03/18/22 11:26 AM   Result Value Ref Range    Sodium 135 (L) 136 - 145 mmol/L    Potassium 3.4 (L) 3.5 - 5.0 mmol/L    Chloride 98 98 - 107 mmol/L    Carbon Dioxide (CO2) 24 22 - 31 mmol/L    Anion Gap 13 5 - 18 mmol/L    Urea Nitrogen 29 (H) 8 - 22 mg/dL    Creatinine 1.57 (H) 0.70 - 1.30  mg/dL    Calcium 8.8 8.5 - 10.5 mg/dL    Glucose 127 (H) 70 - 125 mg/dL    Alkaline Phosphatase 171 (H) 45 - 120 U/L     (H) 0 - 40 U/L     (H) 0 - 45 U/L    Protein Total 7.1 6.0 - 8.0 g/dL    Albumin 2.2 (L) 3.5 - 5.0 g/dL    Bilirubin Total 0.4 0.0 - 1.0 mg/dL    GFR Estimate 50 (L) >60 mL/min/1.73m2   CRP inflammation    Collection Time: 03/18/22 11:26 AM   Result Value Ref Range    CRP 18.5 (H) 0.0-<0.8 mg/dL   Erythrocyte sedimentation rate auto    Collection Time: 03/18/22 11:26 AM   Result Value Ref Range    Erythrocyte Sedimentation Rate 100 (H) 0 - 15 mm/hr   Lactic acid whole blood    Collection Time: 03/18/22 11:26 AM   Result Value Ref Range    Lactic Acid 0.9 0.7 - 2.0 mmol/L   CBC with platelets and differential    Collection Time: 03/18/22 11:26 AM   Result Value Ref Range    WBC Count 5.1 4.0 - 11.0 10e3/uL    RBC Count 2.69 (L) 4.40 - 5.90 10e6/uL    Hemoglobin 6.6 (LL) 13.3 - 17.7 g/dL    Hematocrit 20.8 (L) 40.0 - 53.0 %    MCV 77 (L) 78 - 100 fL    MCH 24.5 (L) 26.5 - 33.0 pg    MCHC 31.7 31.5 - 36.5 g/dL    RDW 18.8 (H) 10.0 - 15.0 %    Platelet Count 285 150 - 450 10e3/uL    % Neutrophils 59 %    % Lymphocytes 31 %    % Monocytes 6 %    % Eosinophils 4 %    % Basophils 0 %    % Immature Granulocytes 0 %    NRBCs per 100 WBC 0 <1 /100    Absolute Neutrophils 3.1 1.6 - 8.3 10e3/uL    Absolute Lymphocytes 1.6 0.8 - 5.3 10e3/uL    Absolute Monocytes 0.3 0.0 - 1.3 10e3/uL    Absolute Eosinophils 0.2 0.0 - 0.7 10e3/uL    Absolute Basophils 0.0 0.0 - 0.2 10e3/uL    Absolute Immature Granulocytes 0.0 <=0.4 10e3/uL    Absolute NRBCs 0.0 10e3/uL   Adult Type and Screen    Collection Time: 03/18/22 11:26 AM   Result Value Ref Range    ABO/RH(D) A POS     Antibody Screen Negative Negative    SPECIMEN EXPIRATION DATE [id number]    Asymptomatic COVID-19 Virus (Coronavirus) by PCR Nasopharyngeal    Collection Time: 03/18/22 11:31 AM    Specimen: Nasopharyngeal; Swab   Result Value Ref Range     SARS CoV2 PCR Negative Negative   Hemoglobin    Collection Time: 03/18/22  1:44 PM   Result Value Ref Range    Hemoglobin 9.7 (L) 13.3 - 17.7 g/dL   INR    Collection Time: 03/18/22  1:44 PM   Result Value Ref Range    INR 1.47 (H) 0.90 - 1.15   Occult blood stool POCT    Collection Time: 03/18/22  3:00 PM   Result Value Ref Range    Occult Blood POCT Negative Negative    Internal QC Check POCT Valid Valid    Test Card Lot Number [id number]     Test Card Expiration Date 2024-6          RADIOLOGY:  Reviewed all pertinent imaging. Please see official radiology report.  MR Foot Left w/o & w Contrast   Final Result   IMPRESSION:   1.  Extensive partial amputation of the foot as described, with a large soft tissue defect at the resection site.      2.  There is a large effusion in the calcaneocuboid joint with extensive adjacent enhancing marrow edema compatible with septic arthritis/osteomyelitis not significantly changed since the prior study.      3.  There are likely changes of septic arthritis/osteomyelitis at the level of the middle subtalar joint.      4.  No definite evidence of an abscess.          I, Arturo Felder, am serving as a scribe to document services personally performed by Mabel Lilly PA-C based on my observation and the provider's statements to me. I, Mabel Lilly PA-C attest that Arturo Felder is acting in a scribe capacity, has observed my performance of the services and has documented them in accordance with my direction.    Mabel Lilly PA-C  Emergency Medicine  [facility]  3/18/2022       Mabel Lilly PA-C  03/18/22 0419

## 2022-03-18 NOTE — PHARMACY-ADMISSION MEDICATION HISTORY
Pharmacy Note - Admission Medication History    Pertinent Provider Information: Pt was getting Zosyn at home until 3/16 when this was stopped by Dr Durbin.   ______________________________________________________________________    Prior To Admission (PTA) med list completed and updated in EMR.       PTA Med List   Medication Sig Note Last Dose     apixaban ANTICOAGULANT (ELIQUIS) 5 MG tablet Take 1 tablet (5 mg) by mouth 2 times daily  3/18/2022 at am     aspirin (ASA) 81 MG chewable tablet Take 1 tablet (81 mg) by mouth daily  3/17/2022 at PM     atorvastatin (LIPITOR) 80 MG tablet TAKE 1 TABLET(80 MG) BY MOUTH EVERY EVENING  3/17/2022 at PM     ferrous sulfate (FEROSUL) 325 (65 Fe) MG tablet Take 1 tablet by mouth 2 times daily 3/18/2022: Stopped taking when hgb came back at 10 Past Week at Unknown time     furosemide (LASIX) 20 MG tablet TAKE 1 TABLET(20 MG) BY MOUTH DAILY  3/18/2022 at am     insulin aspart (NOVOLOG FLEXPEN) 100 UNIT/ML pen For  - 164 give 1 unit. For  - 189 give 2 units. For  - 214 give 3 units. For  - 239 give 4 units. For  - 264 give 5 units. For  - 289 give 6 units. For  - 314 give 7 units. For  - 339 give 8 units For  - 364 give 9 units For  - 389 give 10 units For  - 414 give 11 units For BG greater than or equal to 415 give 12 units 3/18/2022: Has not needed  Past Week at Unknown time     insulin glargine (LANTUS SOLOSTAR) 100 UNIT/ML pen Inject 38 Units Subcutaneous every morning (Patient taking differently: Inject 38 Units Subcutaneous At Bedtime )  3/17/2022 at HS     magnesium oxide (MAG-OX) 400 MG tablet Take 1 tablet (400 mg) by mouth daily  3/17/2022 at PM     metoprolol succinate ER (TOPROL-XL) 50 MG 24 hr tablet TAKE 1 TABLET(50 MG) BY MOUTH DAILY  3/18/2022 at am     Multiple Vitamin (MULTI VITAMIN) TABS 1 tab(s)  3/18/2022 at am     naproxen sodium (ANAPROX) 220 MG tablet Take 440 mg by mouth 2 times daily as  needed for other (inflammation) 3/18/2022: Usually takes in qPM, with additional dose during the day PRN 3/17/2022 at pm     OMEGA-3/DHA/EPA/FISH OIL (FISH OIL-OMEGA-3 FATTY ACIDS) 300-1,000 mg capsule Take 1 g by mouth daily   3/18/2022 at am     vitamin C (ASCORBIC ACID) 250 MG tablet Take 500 mg by mouth daily  3/18/2022 at am       Information source(s): Patient and CareEverywhere/SureScripts  Method of interview communication: in-person    Summary of Changes to PTA Med List  New: Aleve, vitamin C  Discontinued: oxycodone  Changed: none    Patient was asked about OTC/herbal products specifically.  PTA med list reflects this.    In the past week, patient estimated taking medication this percent of the time:  greater than 90%.    Allergies were reviewed, assessed, and updated with the patient.      Patient does not use any multi-dose medications prior to admission.    The information provided in this note is only as accurate as the sources available at the time of the update(s).    Thank you,  Nelly Bauer, [facility]  3/18/2022 2:42 PM

## 2022-03-18 NOTE — ED PROVIDER NOTES
Emergency Department Midlevel Supervisory Note     I personally saw the patient and performed a substantive portion of the visit including all aspects of the medical decision making.    ED Course:  12:36 PM Mabel Lilly PA-C staffed patient with me. I agree with their assessment and plan of management, and I will see the patient.         I met with the patient to introduce myself, gather additional history, perform my initial exam, and discuss the plan.   Initial work-up showed a 3 g and hemoglobin drop since yesterday.  I did discuss that this was drawn off of his PICC line and may have been falsely diluted with some saline.  The hemoglobin is rechecked and found to be at his baseline.  Other than this, he is in here for osteomyelitis of his foot which is known...    MRI is repeated which is basically unchanged.    We were able to contact Dr. Gonzalez, his surgeon.  He will evaluate the patient at the bedside and I will decide if he is coming in for further amputation or if he would like to go home and try to continue PICC line antibiotics          Brief HPI:     Norman Aguilar is a 61 year old male who presents for evaluation of foot pain.    In brief, the patient presents with foot pain following a left foot incision, drainage, and partial amputation on 1/27. He saw Infectious Disease on 3/16 for evaluation of an open wound. He was recommended for hospital admission at this time. Patient currently has a PICC line in right arm through which he has been receiving antibiotics for the past 4-5 weeks.     I, Kiara Byrd, am serving as a scribe to document services personally performed by Dilip Hooks MD, based on my observation and the provider's statements to me. I, Dilip Hooks MD, attest that Kiara Byrd is acting in a scribe capacity, has observed my performance of the services and has documented them in accordance with my direction.    Brief Physical Exam:  Constitutional:  Alert, in no acute  distress  EYES: Conjunctivae clear  HENT:  Atraumatic, normocephalic  Respiratory:  Respirations even, unlabored, in no acute respiratory distress  Cardiovascular:  Regular rate and rhythm, good peripheral perfusion  GI: Soft, nondistended, nontender, no palpable masses, no rebound, no guarding   Musculoskeletal:  No edema. No cyanosis. Range of motion major extremities intact.    Integument: Warm, Dry, No erythema, No rash.   Neurologic:  Alert & oriented, no focal deficits noted  Psych: Normal mood and affect            No diagnosis found.    Labs and Imaging:  Results for orders placed or performed during the hospital encounter of 03/18/22   Comprehensive metabolic panel   Result Value Ref Range    Sodium 135 (L) 136 - 145 mmol/L    Potassium 3.4 (L) 3.5 - 5.0 mmol/L    Chloride 98 98 - 107 mmol/L    Carbon Dioxide (CO2) 24 22 - 31 mmol/L    Anion Gap 13 5 - 18 mmol/L    Urea Nitrogen 29 (H) 8 - 22 mg/dL    Creatinine 1.57 (H) 0.70 - 1.30 mg/dL    Calcium 8.8 8.5 - 10.5 mg/dL    Glucose 127 (H) 70 - 125 mg/dL    Alkaline Phosphatase 171 (H) 45 - 120 U/L     (H) 0 - 40 U/L     (H) 0 - 45 U/L    Protein Total 7.1 6.0 - 8.0 g/dL    Albumin 2.2 (L) 3.5 - 5.0 g/dL    Bilirubin Total 0.4 0.0 - 1.0 mg/dL    GFR Estimate 50 (L) >60 mL/min/1.73m2   CRP inflammation   Result Value Ref Range    CRP 18.5 (H) 0.0-<0.8 mg/dL   Erythrocyte sedimentation rate auto   Result Value Ref Range    Erythrocyte Sedimentation Rate 100 (H) 0 - 15 mm/hr   Lactic acid whole blood   Result Value Ref Range    Lactic Acid 0.9 0.7 - 2.0 mmol/L     I have reviewed the relevant laboratory and radiology studies      Dilip Hooks MD  [facility] EMERGENCY DEPARTMENT  [address] [place] [id number] [phone]       Dilip Hooks MD  03/18/22 4431

## 2022-03-18 NOTE — TELEPHONE ENCOUNTER
Spoke with patient and he said he went the other night and there was no parking and room in the ER. Pt stayed for a little bit and then left.     Pt states he is waiting for his ride right now and should be over there by 10am today to [facility]

## 2022-03-18 NOTE — ED TRIAGE NOTES
Patient has been dealing with back pain for years.  She was advised to have surgery years ago but did not want to at that time.  She is being seen by her doctor regarding this pain, is to have a MRI in April.  Is here today because the pain has increased this past week.  She does not feel she can wait for surgery.  Pain radiates down her legs bilaterally. No difficulties with bowel or bladder.

## 2022-03-19 LAB
ALBUMIN SERPL-MCNC: 2 G/DL (ref 3.5–5)
ALP SERPL-CCNC: 124 U/L (ref 45–120)
ALT SERPL W P-5'-P-CCNC: 69 U/L (ref 0–45)
ANION GAP SERPL CALCULATED.3IONS-SCNC: 9 MMOL/L (ref 5–18)
AST SERPL W P-5'-P-CCNC: 103 U/L (ref 0–40)
BASOPHILS # BLD AUTO: 0 10E3/UL (ref 0–0.2)
BASOPHILS NFR BLD AUTO: 0 %
BILIRUB DIRECT SERPL-MCNC: 0.2 MG/DL
BILIRUB SERPL-MCNC: 0.4 MG/DL (ref 0–1)
BUN SERPL-MCNC: 20 MG/DL (ref 8–22)
BURR CELLS BLD QL SMEAR: SLIGHT
CALCIUM SERPL-MCNC: 8.4 MG/DL (ref 8.5–10.5)
CHLORIDE BLD-SCNC: 101 MMOL/L (ref 98–107)
CO2 SERPL-SCNC: 25 MMOL/L (ref 22–31)
CREAT SERPL-MCNC: 1.3 MG/DL (ref 0.7–1.3)
ELLIPTOCYTES BLD QL SMEAR: SLIGHT
EOSINOPHIL # BLD AUTO: 0.2 10E3/UL (ref 0–0.7)
EOSINOPHIL NFR BLD AUTO: 4 %
ERYTHROCYTE [DISTWIDTH] IN BLOOD BY AUTOMATED COUNT: 19 % (ref 10–15)
GFR SERPL CREATININE-BSD FRML MDRD: 62 ML/MIN/1.73M2
GLUCOSE BLD-MCNC: 167 MG/DL (ref 70–125)
GLUCOSE BLDC GLUCOMTR-MCNC: 121 MG/DL (ref 70–99)
GLUCOSE BLDC GLUCOMTR-MCNC: 131 MG/DL (ref 70–99)
GLUCOSE BLDC GLUCOMTR-MCNC: 157 MG/DL (ref 70–99)
GLUCOSE BLDC GLUCOMTR-MCNC: 159 MG/DL (ref 70–99)
GLUCOSE BLDC GLUCOMTR-MCNC: 174 MG/DL (ref 70–99)
HCT VFR BLD AUTO: 25.3 % (ref 40–53)
HGB BLD-MCNC: 8 G/DL (ref 13.3–17.7)
IMM GRANULOCYTES # BLD: 0 10E3/UL
IMM GRANULOCYTES NFR BLD: 0 %
LYMPHOCYTES # BLD AUTO: 1.3 10E3/UL (ref 0.8–5.3)
LYMPHOCYTES NFR BLD AUTO: 33 %
MCH RBC QN AUTO: 24 PG (ref 26.5–33)
MCHC RBC AUTO-ENTMCNC: 31.6 G/DL (ref 31.5–36.5)
MCV RBC AUTO: 76 FL (ref 78–100)
MONOCYTES # BLD AUTO: 0.3 10E3/UL (ref 0–1.3)
MONOCYTES NFR BLD AUTO: 8 %
NEUTROPHILS # BLD AUTO: 2.2 10E3/UL (ref 1.6–8.3)
NEUTROPHILS NFR BLD AUTO: 55 %
NRBC # BLD AUTO: 0 10E3/UL
NRBC BLD AUTO-RTO: 0 /100
PLAT MORPH BLD: ABNORMAL
PLATELET # BLD AUTO: 212 10E3/UL (ref 150–450)
POTASSIUM BLD-SCNC: 3.3 MMOL/L (ref 3.5–5)
POTASSIUM BLD-SCNC: 3.8 MMOL/L (ref 3.5–5)
PROT SERPL-MCNC: 6.3 G/DL (ref 6–8)
RBC # BLD AUTO: 3.33 10E6/UL (ref 4.4–5.9)
RBC MORPH BLD: ABNORMAL
SODIUM SERPL-SCNC: 135 MMOL/L (ref 136–145)
WBC # BLD AUTO: 4 10E3/UL (ref 4–11)

## 2022-03-19 PROCEDURE — 99207 PR NO CHARGE LOS: CPT | Performed by: INTERNAL MEDICINE

## 2022-03-19 PROCEDURE — 85004 AUTOMATED DIFF WBC COUNT: CPT | Performed by: INTERNAL MEDICINE

## 2022-03-19 PROCEDURE — 258N000003 HC RX IP 258 OP 636: Performed by: INTERNAL MEDICINE

## 2022-03-19 PROCEDURE — 82248 BILIRUBIN DIRECT: CPT | Performed by: INTERNAL MEDICINE

## 2022-03-19 PROCEDURE — 120N000004 HC R&B MS OVERFLOW

## 2022-03-19 PROCEDURE — 250N000011 HC RX IP 250 OP 636: Performed by: INTERNAL MEDICINE

## 2022-03-19 PROCEDURE — 84132 ASSAY OF SERUM POTASSIUM: CPT | Performed by: INTERNAL MEDICINE

## 2022-03-19 PROCEDURE — 250N000012 HC RX MED GY IP 250 OP 636 PS 637: Performed by: INTERNAL MEDICINE

## 2022-03-19 PROCEDURE — 250N000013 HC RX MED GY IP 250 OP 250 PS 637: Performed by: INTERNAL MEDICINE

## 2022-03-19 PROCEDURE — 99233 SBSQ HOSP IP/OBS HIGH 50: CPT | Performed by: INTERNAL MEDICINE

## 2022-03-19 PROCEDURE — 99207 PR CDG-CUT & PASTE-POTENTIAL IMPACT ON LEVEL: CPT | Performed by: INTERNAL MEDICINE

## 2022-03-19 RX ORDER — AMOXICILLIN 250 MG
1 CAPSULE ORAL 2 TIMES DAILY PRN
Status: DISCONTINUED | OUTPATIENT
Start: 2022-03-19 | End: 2022-03-24 | Stop reason: HOSPADM

## 2022-03-19 RX ORDER — POTASSIUM CHLORIDE 1500 MG/1
40 TABLET, EXTENDED RELEASE ORAL ONCE
Status: COMPLETED | OUTPATIENT
Start: 2022-03-19 | End: 2022-03-19

## 2022-03-19 RX ORDER — LIDOCAINE 40 MG/G
CREAM TOPICAL
Status: DISCONTINUED | OUTPATIENT
Start: 2022-03-19 | End: 2022-03-21

## 2022-03-19 RX ADMIN — INSULIN ASPART 1 UNITS: 100 INJECTION, SOLUTION INTRAVENOUS; SUBCUTANEOUS at 11:57

## 2022-03-19 RX ADMIN — METOPROLOL SUCCINATE 50 MG: 50 TABLET, EXTENDED RELEASE ORAL at 08:21

## 2022-03-19 RX ADMIN — MEROPENEM 2 G: 1 INJECTION, POWDER, FOR SOLUTION INTRAVENOUS at 02:05

## 2022-03-19 RX ADMIN — ATORVASTATIN CALCIUM 80 MG: 40 TABLET, FILM COATED ORAL at 20:44

## 2022-03-19 RX ADMIN — VANCOMYCIN HYDROCHLORIDE 750 MG: 1 INJECTION, POWDER, LYOPHILIZED, FOR SOLUTION INTRAVENOUS at 09:54

## 2022-03-19 RX ADMIN — MEROPENEM 2 G: 1 INJECTION, POWDER, FOR SOLUTION INTRAVENOUS at 11:07

## 2022-03-19 RX ADMIN — POTASSIUM CHLORIDE 40 MEQ: 1500 TABLET, EXTENDED RELEASE ORAL at 08:33

## 2022-03-19 RX ADMIN — ASPIRIN 81 MG CHEWABLE TABLET 81 MG: 81 TABLET CHEWABLE at 20:44

## 2022-03-19 RX ADMIN — VANCOMYCIN HYDROCHLORIDE 750 MG: 1 INJECTION, POWDER, LYOPHILIZED, FOR SOLUTION INTRAVENOUS at 20:39

## 2022-03-19 RX ADMIN — INSULIN ASPART 1 UNITS: 100 INJECTION, SOLUTION INTRAVENOUS; SUBCUTANEOUS at 18:37

## 2022-03-19 RX ADMIN — MEROPENEM 2 G: 1 INJECTION, POWDER, FOR SOLUTION INTRAVENOUS at 17:50

## 2022-03-19 RX ADMIN — INSULIN GLARGINE 38 UNITS: 100 INJECTION, SOLUTION SUBCUTANEOUS at 21:53

## 2022-03-19 ASSESSMENT — ACTIVITIES OF DAILY LIVING (ADL)
ADLS_ACUITY_SCORE: 4
DEPENDENT_IADLS:: TRANSPORTATION
ADLS_ACUITY_SCORE: 4

## 2022-03-19 NOTE — H&P
Assessment      61-year-old man with a history of poorly controlled diabetes, coronary artery disease, ischemic cardiomyopathy, A. fib who is admitted with persistent left foot infection.       1. History of left foot infection with repeated I&D's.  Followed in the ID clinic by Dr. Durbin.  Recently had wound VAC placement and was started on IV Zosyn.  Repeat MRI today showing signs of septic arthritis/osteomyelitis at and around the calcaneocuboid joint and subtalar joint.  Previous cultures have been polymicrobial.  Most recent cultures from January with Enterobacter, Enterococcus faecalis, corynebacterium striatum, Trueperella species and Pasteurella canis.  2. Leukopenia and neutropenia.  Likely secondary to Zosyn.  Total white cells and neutrophils have improved since stopping the Zosyn on 3/16.  3. Fever.  Low-grade fevers at home.  Possibly secondary to antibiotic reaction.  Blood cultures have been collected and are pending.  Patient also mentions increased GI disturbances, that have improved.  No fevers over the past 2 days.  4. Transaminitis.  Possibly related to antibiotic.  Continue to monitor.  5. A fib, h/o : on eliquis, bb  6. Chronic systolic CHF : on lasix  7. DM2 : on aspart sliding scale insulin, lantus 38 units in am  8. HLD : on statin, aspirin  9. Anemia, chronic : on iron tab  10. HTN, Essential : on bb            Physical Exam:          GENERAL:  well-developed, well-nourished, sitting in bed in no acute distress.   HENT:  Head is normocephalic, atraumatic. Oropharynx is moist without exudates or ulcers.  EYES:  Eyes have anicteric sclerae without conjunctival injection or stigmata of endocarditis.   NECK:  Supple.  LUNGS:  Clear to auscultation.  CARDIOVASCULAR:  Regular rate and rhythm with no murmurs, gallops or rubs.  ABDOMEN:  Normal bowel sounds, soft, nontender. No appreciable hepatosplenomegaly  EXT: Left leg edema with faint erythema around the foot and lower leg.  SKIN:  No acute  rashes.  Left foot is wrapped open wound noted on the dorsal aspect.  NEUROLOGIC:  Grossly nonfocal.          DETAILED H & P is Pending at this time      DR. EDER SPIVEY  HOSPITALIST  [facility]

## 2022-03-19 NOTE — CONSULTS
Care Management Initial Consult       Concerns to be Addressed:   Surgery pending (Guillotine amputation LLE 3/21/22) for chronic osteomyelitis left foot status post multiple surgical invention. IV antibiotics.   Patient plan of care discussed at interdisciplinary rounds: Yes    Anticipated Discharge Disposition:  Home.     Anticipated Discharge Services:  To be determined by destination, patient/family preferences, medical needs and mobility status closer to the time of discharge.  Anticipated Discharge DME:  Per therapy (if indicated).    Patient/family educated on Medicare website which has current facility and service quality ratings:  NA  Education Provided on the Discharge Plan:  Per team  Patient/Family in Agreement with the Plan:  Yes    Referrals Placed by CM/SW:  None ([facility] notified of hospitalization).  Private pay costs discussed: Not applicable     Additional Information:  See below.     General Information  Assessment completed with: Norman Calhoun  Type of CM/SW Visit: Initial Assessment    Primary Care Provider verified and updated as needed: Yes   Readmission within the last 30 days:        Reason for Consult: discharge planning  Advance Care Planning:       General Information Comments: Patient unhappy, will call patient advocate to follow up.    Communication Assessment  Patient's communication style: spoken language (English or Bilingual)    Hearing Difficulty or Deaf: no   Wear Glasses or Blind: yes    Cognitive  Cognitive/Neuro/Behavioral: WDL                      Living Environment:   People in home: sibling(s)  Living with niece currently  Current living Arrangements: house      Able to return to prior arrangements:         Family/Social Support:  Care provided by: self, homecare agency  Provides care for: no one  Marital Status: Single  Sibling(s)          Description of Support System: Supportive         Current Resources:   Patient receiving home care services:  Yes  Skilled Home Care Services: Skilled Nursing  Community Resources: Home Care, Home Infusion  Equipment currently used at home: other (see comments)  Supplies currently used at home: Wound Care Supplies    Employment/Financial:  Employment Status:          Financial Concerns: No concerns identified   Referral to Financial Worker: No       Lifestyle & Psychosocial Needs:  Social Determinants of Health     Tobacco Use: High Risk     Smoking Tobacco Use: Current Some Day Smoker     Smokeless Tobacco Use: Never Used   Alcohol Use: Not on file   Financial Resource Strain: Not on file   Food Insecurity: Not on file   Transportation Needs: Not on file   Physical Activity: Not on file   Stress: Not on file   Social Connections: Not on file   Intimate Partner Violence: Not on file   Depression: Not on file   Housing Stability: Not on file       Functional Status:  Prior to admission patient needed assistance:   Dependent ADLs:: Independent  Dependent IADLs:: Transportation       Mental Health Status:  Mental Health Status: No Current Concerns       Chemical Dependency Status:  Chemical Dependency Status: No Current Concerns             Values/Beliefs:  Spiritual, Cultural Beliefs, Orthodoxy Practices, Values that affect care:                 Additional Information:  Writer met with patient to review role of care management services, discuss goals of care and assess need for any possible services at discharge. Patient alert, oriented and engaged in the conversation.  Much time spent actively listening as patient is upset learning he will likely need a below the knee amputation on his left lower extremity. He states that he has a long history of health issues and expressed frustration with the care team changing his diet and medications. Will leave a  Message for patient advocate to follow up with patient, patient agrees with the plan.  Patient usually lives in a mobile home/ but is staying with his niece since this foot  "infection started. He has a wound V.A.C. for his left foot and is open to [facility] for home IV antibiotics. He does not know the name of his current home care agency as, he states, the podiatrist requested a change. He states that they are planning a (guillotine) amputation on Monday with a completion \"once the swelling goes down\". His goal is a home discharge with resumed home care but CM will follow up with patient after surgery.   11:33 AM:  Received a call from on-call [facility] nurse stating patient is either open to [facility] ([phone]) or [facility].      Nika Ochoa RN      "

## 2022-03-19 NOTE — PLAN OF CARE
"Goal Outcome Evaluation:    Plan of Care Reviewed With: patient     Overall Patient Progress: improving     Pt denies pain.  Pt verbalizes understanding of plan for surgery.  Pt up independently.      Pt refusing to let RN change dressing on foot this morning or afternoon.  Pt states, \"It was changed twice yesterday.\"  RN explained that it needed to be changed again today.  Pt continues to refuse.      "

## 2022-03-19 NOTE — PROGRESS NOTES
[facility]    Medicine Progress Note - Hospitalist Service    Date of Admission:  3/18/2022    Assessment & Plan              Norman Aguilar is a 61 year old male who presents with a multiple month history of surgical interventions in his left lower extremity with initial debridement and subsequent multiple debridements of his left foot.  Initial Doppler studies revealed adequate perfusion of his left lower extremity and foot.  He underwent surgical interventions and he has had additional surgeries after his first debridement.  He is diabetic and he has history of atrial fibrillation.  Chronic Eliquis anticoagulation is noted.  He does not have normal ambulatory status secondary to the severe derangement of the midfoot on his left foot.  He has not had similar problems in his right lower extremity or foot.  He lives independently.  He has been followed in outpatient basis by infectious disease specialist with outpatient antibiotic therapy and daily wound or multiple day per week wound care with intermittent VAC dressing changes or wet-to-dry dressing changes.  He is on infectious disease 2 days ago and he had a fever in the office and was instructed to go to the emergency room but with the long wait present he went back home.  He presented himself to the emergency room today.  He is aware that he has mild swelling in his left lower extremity below the knee.  I have seen him on an outpatient basis and recent MRIs had been reviewed and I also had him see [facility] orthotics for evaluation and education regarding of below-knee amputation and rehabilitation regarding that surgical intervention.  Patient has had a very difficult time making decision regarding his intervention.  Lengthy discussion was undertaken with the patient again.  He is in agreement with staying in the hospital and having a guillotine amputation of his badly infected foot.  This will be done at the ankle level.  After 7 to  10 days of resolution of edema and infection then the formal below-knee amputation will be completed.  He is ultrasound has been normal but if his creatinine allows would obtain a CT angiogram to verify absence of other specific abnormality which would prevent a below-knee amputation.           61-year-old man with a history of poorly controlled diabetes, coronary artery disease, ischemic cardiomyopathy, A. fib who is admitted with persistent left foot infection.       3/19 :     No new issues noted today  Continue iv antibiotics  Plan for surgery on Monday, holding eliquis  On sliding scale insulin, insulin per carb counting, lantus  Patient not wanting to take iron, wants a regular diet      Not medically ready for discharge at this time.  Plan for surgery on monday       A/p :            1. History of left foot infection with repeated I&D's.  Followed in the ID clinic by Dr. Durbin.  Recently had wound VAC placement and was started on IV Zosyn.  Repeat MRI today showing signs of septic arthritis/osteomyelitis at and around the calcaneocuboid joint and subtalar joint.  Previous cultures have been polymicrobial.  Most recent cultures from January with Enterobacter, Enterococcus faecalis, corynebacterium striatum, Trueperella species and Pasteurella canis.     2. Leukopenia and neutropenia.  Likely secondary to Zosyn.  Total white cells and neutrophils have improved since stopping the Zosyn on 3/16.     3. Fever.  Low-grade fevers at home.  Possibly secondary to antibiotic reaction.  Blood cultures have been collected and are pending.  Patient also mentions increased GI disturbances, that have improved.  No fevers over the past 2 days.     4. Transaminitis.  Possibly related to antibiotic.  Continue to monitor.     5. A fib, h/o : on eliquis, bb     6. Chronic systolic CHF : on lasix     7. DM2 : on aspart sliding scale insulin, lantus 38 units in am     8. HLD : on statin, aspirin     9. Anemia, chronic : on iron  "tab      10. HTN, Essential : on bb                   Diet: Low Saturated Fat Na <2400 mg    DVT Prophylaxis: DOAC  Garcia Catheter: Not present  Central Lines: PRESENT     Cardiac Monitoring: None  Code Status:   full         Clinically Significant Risk Factors Present on Admission                 # Coagulation Defect: home medication list includes an anticoagulant medication  # Platelet Defect: home medication list includes an antiplatelet medication             Disposition Plan     Expected Discharge: 4-5 days?   Anticipated discharge location:  tbd  Delays: Plan for amputation on Monday            Diet: Low Saturated Fat Na <2400 mg  Room Service    DVT Prophylaxis: Pneumatic Compression Devices  Garcia Catheter: Not present  Central Lines: PRESENT  PICC Single Lumen 02/11/22 Right Basilic-Site Assessment: WDL  Cardiac Monitoring: None  Code Status: Full Code      Disposition Plan        Expected Discharge: 4-5 days?   Anticipated discharge location:  tbd  Delays: Plan for amputation on Monday     The patient's care was discussed with the Bedside Nurse and Patient.    Ruiz Gale MD  Hospitalist Service  [facility]  Securely message with the Vocera Web Console (learn more here)  Text page via [employer] Paging/Directory         Clinically Significant Risk Factors Present on Admission              # Coagulation Defect: home medication list includes an anticoagulant medication  # Platelet Defect: home medication list includes an antiplatelet medication   # Overweight: Estimated body mass index is 27.73 kg/m  as calculated from the following:    Height as of this encounter: 1.88 m (6' 2\").    Weight as of this encounter: 98 kg (216 lb).      ______________________________________________________________________         Data reviewed today: I reviewed all medications, new labs and imaging results over the last 24 hours. I personally reviewed no images or EKG's today.    Physical Exam   Vital " Signs: Temp: 99.6  F (37.6  C) Temp src: Oral BP: 135/87 Pulse: 99   Resp: 20 SpO2: 97 % O2 Device: None (Room air)    Weight: 216 lbs 0 oz    GENERAL:  well-developed, well-nourished, sitting in bed in no acute distress.   HENT:  Head is normocephalic, atraumatic. Oropharynx is moist without exudates or ulcers.  EYES:  Eyes have anicteric sclerae without conjunctival injection or stigmata of endocarditis.   NECK:  Supple.  LUNGS:  Clear to auscultation.  CARDIOVASCULAR:  Regular rate and rhythm with no murmurs, gallops or rubs.  ABDOMEN:  Normal bowel sounds, soft, nontender. No appreciable hepatosplenomegaly  EXT: Left leg edema with faint erythema around the foot and lower leg.  SKIN:  No acute rashes.  Left foot is wrapped open wound noted on the dorsal aspect.  NEUROLOGIC:  Grossly nonfocal    Data   Recent Labs   Lab 03/19/22  1337 03/19/22  1149 03/19/22  0819 03/19/22  0500 03/18/22  2231 03/18/22  1344 03/18/22  1126 03/16/22  0730 03/16/22  0730 03/14/22  1430   WBC  --   --   --  4.0  --   --  5.1  --  1.1* 1.8*   HGB  --   --   --  8.0*  --  9.7* 6.6*  --  9.2* 8.9*   MCV  --   --   --  76*  --   --  77*  --  76* 76*   PLT  --   --   --  212  --   --  285  --  184 221   INR  --   --   --   --   --  1.47*  --   --   --   --    NA  --   --   --  135*  --   --  135*  --   --   --    POTASSIUM 3.8  --   --  3.3*  --   --  3.4*  --   --   --    CHLORIDE  --   --   --  101  --   --  98  --   --   --    CO2  --   --   --  25  --   --  24  --   --   --    BUN  --   --   --  20  --   --  29*  --   --  34*   CR  --   --   --  1.30  --   --  1.57*  --   --  1.79*   ANIONGAP  --   --   --  9  --   --  13  --   --   --    YOSVANY  --   --   --  8.4*  --   --  8.8  --   --   --    GLC  --  159* 131* 167*   < >  --  127*   < >  --   --    ALBUMIN  --   --   --  2.0*  --   --  2.2*  --   --   --    PROTTOTAL  --   --   --  6.3  --   --  7.1  --   --   --    BILITOTAL  --   --   --  0.4  --   --  0.4  --   --   --    ALKPHOS   --   --   --  124*  --   --  171*  --   --   --    ALT  --   --   --  69*  --   --  103*  --   --   --    AST  --   --   --  103*  --   --  211*  --   --   --     < > = values in this interval not displayed.

## 2022-03-19 NOTE — PHARMACY-VANCOMYCIN DOSING SERVICE
"Pharmacy Vancomycin Note  Date of Service 2022  Patient's  1960   61 year old, male    Indication: Bone and Joint Infection  Day of Therapy: 2  Current vancomycin regimen:  1250 mg IV q24h  Current vancomycin monitoring method: AUC  Current vancomycin therapeutic monitoring goal: 400-600 mg*h/L    Insight[employer] Prediction of Current Vancomycin Regimen  Regimen: 1250 mg IV every 24 hours.  Start time: 18:00 on 2022  Exposure target: AUC24 (range)400-600 mg/L.hr   AUC24,ss: 475 mg/L.hr  Probability of AUC24 > 400: 68 %  Ctrough,ss: 16.3 mg/L  Probability of Ctrough,ss > 20: 31 %  Probability of nephrotoxicity (Lodise TANIA ): 12 %    Current estimated CrCl = Estimated Creatinine Clearance: 81.7 mL/min (based on SCr of 1.3 mg/dL).    Creatinine for last 3 days  3/18/2022: 11:26 AM Creatinine 1.57 mg/dL  3/19/2022:  5:00 AM Creatinine 1.30 mg/dL    Recent Vancomycin Levels (past 3 days)  No results found for requested labs within last 72 hours.    Vancomycin IV Administrations (past 72 hours)                   vancomycin (VANCOCIN) 1,750 mg in sodium chloride 0.9 % 500 mL intermittent infusion (mg) 1,750 mg Given 22 1920                Nephrotoxins and other renal medications (From now, onward)    Start     Dose/Rate Route Frequency Ordered Stop    22 1800  vancomycin (VANCOCIN) 1,250 mg in sodium chloride 0.9 % 250 mL intermittent infusion        \"Followed by\" Linked Group Details    1,250 mg  over 90 Minutes Intravenous EVERY 24 HOURS 22 1719               Contrast Orders - past 72 hours (72h ago, onward)            Start     Dose/Rate Route Frequency Ordered Stop    22 1330  gadobutrol (GADAVIST) injection 10 mL         10 mL Intravenous ONCE 22 1319 22 1319          Interpretation of levels and current regimen:  Has serum creatinine changed greater than 50% in last 72 hours: No    Renal Function: Improving    InsightRX Prediction of Planned New Vancomycin " Regimen  Regimen: 750 mg IV every 12 hours.  Start time: 18:00 on 03/19/2022  Exposure target: AUC24 (range)400-600 mg/L.hr   AUC24,ss: 475 mg/L.hr  Probability of AUC24 > 400: 68 %  Ctrough,ss: 16.3 mg/L  Probability of Ctrough,ss > 20: 31 %  Probability of nephrotoxicity (Lodise TANIA 2009): 12 %    Plan:  1. Increase Dose to 750 mg q12h  -- empirically adjust based on improvement in renal function, prior regimen was predicted to reach low end of goal range at day 6  2. Vancomycin monitoring method: AUC  3. Vancomycin therapeutic monitoring goal: 400-600 mg*h/L  4. Pharmacy will check vancomycin levels as appropriate in 1-3 Days.  5. Serum creatinine levels will be ordered a minimum of twice weekly.    Cynthia Retana [facility]

## 2022-03-19 NOTE — PROGRESS NOTES
"Pt very argumentative and rambling about medications this am.  Pt states last time he was here \"potassium messed everything up.\"  RN asked pt to clarify before administering potassium and pt stated, \"Potassium caused all sorts of problems and dried me up like a raisen.\"  RN asked if pt was on a diuretic.  Pt stated he was and RN explained that potassium doesn't dehydrate people, but diuretics can.  RN reassured pt that his potassium was low and needed to be replace.  Pt finally agreed after arguing that last time it was checked the potassium was normal at home.  RN explained that the latest potassium was low this am.  Pt frustrated agreed to take potassium.    Pt refused his iron stating, \"I took myself off of them because I put myself on them.  I don't understand why you people don't understand that!.\"  Pt also upset senna was ordered and told RN to take him off of that.  RN left note and later discussed with md.    Pt also very upset that he is on a low fat/low sodium diet.  RN left note for MD to talk to pt and re- look at diet.     Pt very upset and frustrated with hospitalization.  RN offered presence and listening.      "

## 2022-03-19 NOTE — H&P
[facility]    History and Physical - Hospitalist Service       Date of Admission:  3/18/2022    Assessment & Plan        Norman Aguilar is a 61 year old male who presents with a multiple month history of surgical interventions in his left lower extremity with initial debridement and subsequent multiple debridements of his left foot.  Initial Doppler studies revealed adequate perfusion of his left lower extremity and foot.  He underwent surgical interventions and he has had additional surgeries after his first debridement.  He is diabetic and he has history of atrial fibrillation.  Chronic Eliquis anticoagulation is noted.  He does not have normal ambulatory status secondary to the severe derangement of the midfoot on his left foot.  He has not had similar problems in his right lower extremity or foot.  He lives independently.  He has been followed in outpatient basis by infectious disease specialist with outpatient antibiotic therapy and daily wound or multiple day per week wound care with intermittent VAC dressing changes or wet-to-dry dressing changes.  He is on infectious disease 2 days ago and he had a fever in the office and was instructed to go to the emergency room but with the long wait present he went back home.  He presented himself to the emergency room today.  He is aware that he has mild swelling in his left lower extremity below the knee.  I have seen him on an outpatient basis and recent MRIs had been reviewed and I also had him see [facility] orthotics for evaluation and education regarding of below-knee amputation and rehabilitation regarding that surgical intervention.  Patient has had a very difficult time making decision regarding his intervention.  Lengthy discussion was undertaken with the patient again.  He is in agreement with staying in the hospital and having a guillotine amputation of his badly infected foot.  This will be done at the ankle level.  After 7 to 10  days of resolution of edema and infection then the formal below-knee amputation will be completed.  He is ultrasound has been normal but if his creatinine allows would obtain a CT angiogram to verify absence of other specific abnormality which would prevent a below-knee amputation.        61-year-old man with a history of poorly controlled diabetes, coronary artery disease, ischemic cardiomyopathy, A. fib who is admitted with persistent left foot infection.       A/p :          1. History of left foot infection with repeated I&D's.  Followed in the ID clinic by Dr. Durbin.  Recently had wound VAC placement and was started on IV Zosyn.  Repeat MRI today showing signs of septic arthritis/osteomyelitis at and around the calcaneocuboid joint and subtalar joint.  Previous cultures have been polymicrobial.  Most recent cultures from January with Enterobacter, Enterococcus faecalis, corynebacterium striatum, Trueperella species and Pasteurella canis.    2. Leukopenia and neutropenia.  Likely secondary to Zosyn.  Total white cells and neutrophils have improved since stopping the Zosyn on 3/16.    3. Fever.  Low-grade fevers at home.  Possibly secondary to antibiotic reaction.  Blood cultures have been collected and are pending.  Patient also mentions increased GI disturbances, that have improved.  No fevers over the past 2 days.    4. Transaminitis.  Possibly related to antibiotic.  Continue to monitor.    5. A fib, h/o : on eliquis, bb    6. Chronic systolic CHF : on lasix    7. DM2 : on aspart sliding scale insulin, lantus 38 units in am    8. HLD : on statin, aspirin    9. Anemia, chronic : on iron tab    10. HTN, Essential : on bb                      Diet: Low Saturated Fat Na <2400 mg    DVT Prophylaxis: DOAC  Garcia Catheter: Not present  Central Lines: PRESENT     Cardiac Monitoring: None  Code Status:   full     Clinically Significant Risk Factors Present on Admission              # Coagulation Defect: home  medication list includes an anticoagulant medication  # Platelet Defect: home medication list includes an antiplatelet medication       Disposition Plan   Expected Discharge: 4-5 days?   Anticipated discharge location:  tbd  Delays: Plan for amputation on Monday       The patient's care was discussed with the Bedside Nurse and Patient.    Ruiz Gale MD  Hospitalist Service  [facility]  Securely message with the Vocera Web Console (learn more here)  Text page via [employer] Paging/Directory         ______________________________________________________________________    Chief Complaint   Left foot pain    History is obtained from the patient    History of Present Illness       Norman Aguilar is a 61 year old male who presents with a multiple month history of surgical interventions in his left lower extremity with initial debridement and subsequent multiple debridements of his left foot.  Initial Doppler studies revealed adequate perfusion of his left lower extremity and foot.  He underwent surgical interventions and he has had additional surgeries after his first debridement.  He is diabetic and he has history of atrial fibrillation.  Chronic Eliquis anticoagulation is noted.  He does not have normal ambulatory status secondary to the severe derangement of the midfoot on his left foot.  He has not had similar problems in his right lower extremity or foot.  He lives independently.  He has been followed in outpatient basis by infectious disease specialist with outpatient antibiotic therapy and daily wound or multiple day per week wound care with intermittent VAC dressing changes or wet-to-dry dressing changes.  He is on infectious disease 2 days ago and he had a fever in the office and was instructed to go to the emergency room but with the long wait present he went back home.  He presented himself to the emergency room today.  He is aware that he has mild swelling in his left lower  extremity below the knee.  I have seen him on an outpatient basis and recent MRIs had been reviewed and I also had him see [facility] orthotics for evaluation and education regarding of below-knee amputation and rehabilitation regarding that surgical intervention.  Patient has had a very difficult time making decision regarding his intervention.  Lengthy discussion was undertaken with the patient again.  He is in agreement with staying in the hospital and having a guillotine amputation of his badly infected foot.  This will be done at the ankle level.  After 7 to 10 days of resolution of edema and infection then the formal below-knee amputation will be completed.  He is ultrasound has been normal but if his creatinine allows would obtain a CT angiogram to verify absence of other specific abnormality which would prevent a below-knee amputation.          Review of Systems        No cp, sob, cough or phlegm  No abdominal symptoms  No urinary symptoms  No neuro symptoms      Past Medical History    I have reviewed this patient's medical history and updated it with pertinent information if needed.   Past Medical History:   Diagnosis Date     Coronary artery disease      Diabetes (H)      Diabetic neuropathy (H)      GERD (gastroesophageal reflux disease)      Hyperlipidemia      Hypertension      Intermittent atrial fibrillation (H)      NSTEMI (non-ST elevated myocardial infarction) (H)      Renal disease      Retinopathy      Sleep disturbance        Past Surgical History   I have reviewed this patient's surgical history and updated it with pertinent information if needed.  Past Surgical History:   Procedure Laterality Date     AMPUTATE TOE(S) Left 11/16/2021    Procedure: first and second ray amputation;  Surgeon: Eddi Ram DPM;  Location: [facility] Main OR     APPENDECTOMY       CV CORONARY ANGIOGRAM N/A 11/16/2021    Procedure: CV CORONARY ANGIOGRAM;  Surgeon: Pam Tabares MD;  Location: [facility]  LAB CV     CV LEFT HEART CATH N/A 2021    Procedure: Left Heart Cath;  Surgeon: Pam Tabares MD;  Location: [facility] LAB CV     FOOT SURGERY Left      HERNIA REPAIR       INCISION AND DRAINAGE LOWER EXTREMITY, COMBINED Left 2021    Procedure: INCISION AND DRAINAGE, left foot;  Surgeon: Eddi Ram DPM;  Location: [facility] OR     INCISION AND DRAINAGE LOWER EXTREMITY, COMBINED Left 2021    Procedure: INCISION AND DRAINAGE, left foot;  Surgeon: Eddi Ram DPM;  Location: [facility] OR     INCISION AND DRAINAGE LOWER EXTREMITY, COMBINED Left 2021    Procedure: INCISION AND DRAINAGE, Left foot;  Surgeon: Eddi Ram DPM;  Location: [facility] OR     INCISION AND DRAINAGE LOWER EXTREMITY, COMBINED Left 1/10/2022    Procedure: INCISION AND DRAINAGE, left foot;  Surgeon: Eddi Ram DPM;  Location: [facility] OR     INCISION AND DRAINAGE LOWER EXTREMITY, COMBINED Left 2022    Procedure: INCISION AND DRAINAGE, Left foot;  Surgeon: Eddi Ram DPM;  Location: [facility]       Social History   I have reviewed this patient's social history and updated it with pertinent information if needed.  Social History     Tobacco Use     Smoking status: Current Some Day Smoker     Types: Cigars     Smokeless tobacco: Never Used     Tobacco comment: Cigars   Vaping Use     Vaping Use: Never used   Substance Use Topics     Alcohol use: Yes     Comment: occas.     Drug use: Never     Lives in [place]  Single  No children  Smokes cigars, occasional alcohol, drugs  Not working  Uses a walker prn    Family History     Non contributory    Prior to Admission Medications   Prior to Admission Medications   Prescriptions Last Dose Informant Patient Reported? Taking?   Multiple Vitamin (MULTI VITAMIN) TABS 3/18/2022 at am  Yes Yes   Si tab(s)   OMEGA-3/DHA/EPA/FISH OIL (FISH OIL-OMEGA-3 FATTY ACIDS) 300-1,000 mg capsule 3/18/2022  at am  Yes Yes   Sig: Take 1 g by mouth daily    apixaban ANTICOAGULANT (ELIQUIS) 5 MG tablet 3/18/2022 at am  No Yes   Sig: Take 1 tablet (5 mg) by mouth 2 times daily   aspirin (ASA) 81 MG chewable tablet 3/17/2022 at PM  No Yes   Sig: Take 1 tablet (81 mg) by mouth daily   atorvastatin (LIPITOR) 80 MG tablet 3/17/2022 at PM  No Yes   Sig: TAKE 1 TABLET(80 MG) BY MOUTH EVERY EVENING   blood glucose (NO BRAND SPECIFIED) test strip   No No   Sig: Use to test blood sugar 4 times daily or as directed.   blood glucose monitoring (SOFTCLIX) lancets   No No   Sig: Use to test blood sugar 4 times daily.   ferrous sulfate (FEROSUL) 325 (65 Fe) MG tablet Past Week at Unknown time  Yes Yes   Sig: Take 1 tablet by mouth 2 times daily   furosemide (LASIX) 20 MG tablet 3/18/2022 at am  No Yes   Sig: TAKE 1 TABLET(20 MG) BY MOUTH DAILY   insulin aspart (NOVOLOG FLEXPEN) 100 UNIT/ML pen Past Week at Unknown time  No Yes   Sig: For  - 164 give 1 unit. For  - 189 give 2 units. For  - 214 give 3 units. For  - 239 give 4 units. For  - 264 give 5 units. For  - 289 give 6 units. For  - 314 give 7 units. For  - 339 give 8 units For  - 364 give 9 units For  - 389 give 10 units For  - 414 give 11 units For BG greater than or equal to 415 give 12 units   insulin glargine (LANTUS SOLOSTAR) 100 UNIT/ML pen 3/17/2022 at HS  No Yes   Sig: Inject 38 Units Subcutaneous every morning   Patient taking differently: Inject 38 Units Subcutaneous At Bedtime    magnesium oxide (MAG-OX) 400 MG tablet 3/17/2022 at PM  No Yes   Sig: Take 1 tablet (400 mg) by mouth daily   metoprolol succinate ER (TOPROL-XL) 50 MG 24 hr tablet 3/18/2022 at am  No Yes   Sig: TAKE 1 TABLET(50 MG) BY MOUTH DAILY   naproxen sodium (ANAPROX) 220 MG tablet 3/17/2022 at pm  Yes Yes   Sig: Take 440 mg by mouth 2 times daily as needed for other (inflammation)   vitamin C (ASCORBIC ACID) 250 MG tablet 3/18/2022 at am   Yes Yes   Sig: Take 500 mg by mouth daily      Facility-Administered Medications: None     Allergies   No Known Allergies    Physical Exam   Vital Signs: Temp: 98.7  F (37.1  C) Temp src: Oral BP: 127/68 Pulse: 81   Resp: 16 SpO2: 99 % O2 Device: None (Room air)    Weight: 200 lbs 0 oz    GENERAL:  well-developed, well-nourished, sitting in bed in no acute distress.   HENT:  Head is normocephalic, atraumatic. Oropharynx is moist without exudates or ulcers.  EYES:  Eyes have anicteric sclerae without conjunctival injection or stigmata of endocarditis.   NECK:  Supple.  LUNGS:  Clear to auscultation.  CARDIOVASCULAR:  Regular rate and rhythm with no murmurs, gallops or rubs.  ABDOMEN:  Normal bowel sounds, soft, nontender. No appreciable hepatosplenomegaly  EXT: Left leg edema with faint erythema around the foot and lower leg.  SKIN:  No acute rashes.  Left foot is wrapped open wound noted on the dorsal aspect.  NEUROLOGIC:  Grossly nonfocal.    Data   Data reviewed today: I reviewed all medications, new labs and imaging results over the last 24 hours. I personally reviewed no images or EKG's today.    Recent Labs   Lab 03/19/22  0500 03/18/22  2353 03/18/22  2231 03/18/22  1344 03/18/22  1126 03/16/22  0730 03/14/22  1430   WBC 4.0  --   --   --  5.1 1.1* 1.8*   HGB 8.0*  --   --  9.7* 6.6* 9.2* 8.9*   MCV 76*  --   --   --  77* 76* 76*     --   --   --  285 184 221   INR  --   --   --  1.47*  --   --   --    *  --   --   --  135*  --   --    POTASSIUM 3.3*  --   --   --  3.4*  --   --    CHLORIDE 101  --   --   --  98  --   --    CO2 25  --   --   --  24  --   --    BUN 20  --   --   --  29*  --  34*   CR 1.30  --   --   --  1.57*  --  1.79*   ANIONGAP 9  --   --   --  13  --   --    YOSVANY 8.4*  --   --   --  8.8  --   --    * 157* 152*  --  127*  --   --    ALBUMIN 2.0*  --   --   --  2.2*  --   --    PROTTOTAL 6.3  --   --   --  7.1  --   --    BILITOTAL 0.4  --   --   --  0.4  --   --    ALKPHOS  124*  --   --   --  171*  --   --    ALT 69*  --   --   --  103*  --   --    *  --   --   --  211*  --   --

## 2022-03-19 NOTE — PROGRESS NOTES
Brief ID Follow-up Note    Chart reviewed.   LFTs better. hgb stable    Continue vancomycin and meropenem    ID will f/up after surgery. Call with questions.    Ryan Rendon MD  [facility]  Direct messaging: [facility] Paging  On-Call ID provider: [phone], option: 9

## 2022-03-19 NOTE — PLAN OF CARE
Problem: Plan of Care - These are the overarching goals to be used throughout the patient stay.    Goal: Plan of Care Review/Shift Note  Description: The Plan of Care Review/Shift note should be completed every shift.  The Outcome Evaluation is a brief statement about your assessment that the patient is improving, declining, or no change.  This information will be displayed automatically on your shift note.  Outcome: Ongoing, Progressing  Flowsheets (Taken 3/18/2022 2315)  Plan of Care Reviewed With: patient  Outcome Evaluation: Admitted from ED, discussed plan of care  Goal: Absence of Hospital-Acquired Illness or Injury  Outcome: Ongoing, Progressing  Intervention: Identify and Manage Fall Risk  Recent Flowsheet Documentation  Taken 3/18/2022 2202 by Nadine Douglass RN  Safety Promotion/Fall Prevention:   assistive device/personal items within reach   activity supervised   patient and family education   nonskid shoes/slippers when out of bed  Intervention: Prevent Skin Injury  Recent Flowsheet Documentation  Taken 3/18/2022 2202 by Nadine Douglass RN  Body Position: position changed independently  Intervention: Prevent and Manage VTE (Venous Thromboembolism) Risk  Recent Flowsheet Documentation  Taken 3/18/2022 2202 by Nadine Douglass, RN  Activity Management:   activity adjusted per tolerance   activity encouraged   ambulated in room  Goal: Optimal Comfort and Wellbeing  Outcome: Ongoing, Progressing  Goal: Readiness for Transition of Care  Outcome: Ongoing, Progressing  Intervention: Mutually Develop Transition Plan  Recent Flowsheet Documentation  Taken 3/18/2022 2203 by Nadine Douglass, RN  Equipment Currently Used at Home: none     Problem: Impaired Wound Healing  Goal: Optimal Wound Healing  Outcome: Ongoing, Progressing  Intervention: Promote Wound Healing  Recent Flowsheet Documentation  Taken 3/18/2022 2202 by Nadine Douglass, RN  Activity Management:   activity adjusted per tolerance   activity encouraged    ambulated in room     Problem: Hyperglycemia  Goal: Blood Glucose Level Within Targeted Range  Outcome: Ongoing, Progressing   Goal Outcome Evaluation:    Plan of Care Reviewed With: patient      Admitted this shift from ED due to wound infection. Oriented to the unit and plan for surgery on Mon. Blood glucose checked and Given supper. Pic present with blood return. Noted that he has orders to be non-weight bearing on LLE. Educated on this, he is resistant to following this instruction due to wanting to walk to the bathroom. Not on tele. Wound dressing changed as it was falling off. Wound is red with some granulation tissue and slough. Dressed with wet to moist gauze and kerlix.

## 2022-03-19 NOTE — PLAN OF CARE
Problem: Plan of Care - These are the overarching goals to be used throughout the patient stay.    Goal: Absence of Hospital-Acquired Illness or Injury  Outcome: Ongoing, Progressing  Intervention: Identify and Manage Fall Risk  Recent Flowsheet Documentation  Taken 3/18/2022 2320 by Mohamud Samaniego, RN  Safety Promotion/Fall Prevention:    room near nurse's station    room door open    nonskid shoes/slippers when out of bed    fall prevention program maintained  Goal: Optimal Comfort and Wellbeing  Outcome: Ongoing, Progressing     Problem: Hyperglycemia  Goal: Blood Glucose Level Within Targeted Range  Outcome: Ongoing, Progressing   Goal Outcome Evaluation:    Patient is alert and oriented x 4. Pleasant and cooperative. Denies pain upon assessment. Wound dressing is clean, dry and intact on left foot. Patient ambulatory to bathroom. Right arm PICC flushable with blood return. Patient is aware of the plan moving forward and asks appropriate questions.

## 2022-03-19 NOTE — PROGRESS NOTES
"Pt very upset that he is on a 2 g Na diet.  Pt states, \"I want to punch something. Just because I follow a low sodium diet at home does not mean that I need it ordered here.  I am not here for my diet. I am here for antibiotics to get changed and for surgery.  I should be able to figure out what to order myself. \"  Pt went on like this for about 15 minutes.  RN listened and offered presence.    RN explained that she would call MD and see if it could be changed.  Pt states he appreciates it.  "

## 2022-03-20 LAB
CREAT SERPL-MCNC: 1.2 MG/DL (ref 0.7–1.3)
GFR SERPL CREATININE-BSD FRML MDRD: 68 ML/MIN/1.73M2
GLUCOSE BLDC GLUCOMTR-MCNC: 127 MG/DL (ref 70–99)
GLUCOSE BLDC GLUCOMTR-MCNC: 178 MG/DL (ref 70–99)
GLUCOSE BLDC GLUCOMTR-MCNC: 199 MG/DL (ref 70–99)
GLUCOSE BLDC GLUCOMTR-MCNC: 62 MG/DL (ref 70–99)
GLUCOSE BLDC GLUCOMTR-MCNC: 85 MG/DL (ref 70–99)
POTASSIUM BLD-SCNC: 3.6 MMOL/L (ref 3.5–5)
VANCOMYCIN SERPL-MCNC: 13 MG/L

## 2022-03-20 PROCEDURE — 120N000004 HC R&B MS OVERFLOW

## 2022-03-20 PROCEDURE — 80202 ASSAY OF VANCOMYCIN: CPT | Performed by: INTERNAL MEDICINE

## 2022-03-20 PROCEDURE — 82565 ASSAY OF CREATININE: CPT | Performed by: INTERNAL MEDICINE

## 2022-03-20 PROCEDURE — 99233 SBSQ HOSP IP/OBS HIGH 50: CPT | Performed by: INTERNAL MEDICINE

## 2022-03-20 PROCEDURE — 250N000013 HC RX MED GY IP 250 OP 250 PS 637: Performed by: INTERNAL MEDICINE

## 2022-03-20 PROCEDURE — 258N000003 HC RX IP 258 OP 636: Performed by: INTERNAL MEDICINE

## 2022-03-20 PROCEDURE — 250N000011 HC RX IP 250 OP 636: Performed by: INTERNAL MEDICINE

## 2022-03-20 PROCEDURE — 84132 ASSAY OF SERUM POTASSIUM: CPT | Performed by: INTERNAL MEDICINE

## 2022-03-20 PROCEDURE — 36415 COLL VENOUS BLD VENIPUNCTURE: CPT | Performed by: INTERNAL MEDICINE

## 2022-03-20 PROCEDURE — 99207 PR CDG-CUT & PASTE-POTENTIAL IMPACT ON LEVEL: CPT | Performed by: INTERNAL MEDICINE

## 2022-03-20 RX ORDER — VANCOMYCIN HYDROCHLORIDE 1 G/200ML
1000 INJECTION, SOLUTION INTRAVENOUS EVERY 12 HOURS
Status: COMPLETED | OUTPATIENT
Start: 2022-03-20 | End: 2022-03-23

## 2022-03-20 RX ADMIN — ATORVASTATIN CALCIUM 80 MG: 40 TABLET, FILM COATED ORAL at 20:31

## 2022-03-20 RX ADMIN — MEROPENEM 2 G: 1 INJECTION, POWDER, FOR SOLUTION INTRAVENOUS at 01:58

## 2022-03-20 RX ADMIN — INSULIN GLARGINE 38 UNITS: 100 INJECTION, SOLUTION SUBCUTANEOUS at 20:31

## 2022-03-20 RX ADMIN — MEROPENEM 2 G: 1 INJECTION, POWDER, FOR SOLUTION INTRAVENOUS at 17:15

## 2022-03-20 RX ADMIN — ASPIRIN 81 MG CHEWABLE TABLET 81 MG: 81 TABLET CHEWABLE at 20:31

## 2022-03-20 RX ADMIN — MEROPENEM 2 G: 1 INJECTION, POWDER, FOR SOLUTION INTRAVENOUS at 09:56

## 2022-03-20 RX ADMIN — INSULIN ASPART 1 UNITS: 100 INJECTION, SOLUTION INTRAVENOUS; SUBCUTANEOUS at 18:50

## 2022-03-20 RX ADMIN — VANCOMYCIN HYDROCHLORIDE 750 MG: 1 INJECTION, POWDER, LYOPHILIZED, FOR SOLUTION INTRAVENOUS at 08:30

## 2022-03-20 RX ADMIN — VANCOMYCIN HYDROCHLORIDE 1000 MG: 1 INJECTION, SOLUTION INTRAVENOUS at 20:22

## 2022-03-20 RX ADMIN — METOPROLOL SUCCINATE 50 MG: 50 TABLET, EXTENDED RELEASE ORAL at 08:26

## 2022-03-20 ASSESSMENT — ACTIVITIES OF DAILY LIVING (ADL)
ADLS_ACUITY_SCORE: 4

## 2022-03-20 NOTE — PLAN OF CARE
Problem: Impaired Wound Healing  Goal: Optimal Wound Healing  Outcome: Ongoing, Progressing     Problem: Hyperglycemia  Goal: Blood Glucose Level Within Targeted Range  Outcome: Ongoing, Progressing    Calm and cooperative, independent in room. PICC patent, IV Antibiotics given. Ate well. BG- 178 Sliding scale insulin and Insulin per Carb counting with meals were given. BG at HS- 199. VSS. Room Air. Declined to have left foot dressing change stating it will be cut off tomorrow. Aware of NPO status for plan surgery in AM.       Goal Outcome Evaluation: IV Antibiotics, Manage BG

## 2022-03-20 NOTE — PLAN OF CARE
Problem: Plan of Care - These are the overarching goals to be used throughout the patient stay.    Goal: Optimal Comfort and Wellbeing  Outcome: Ongoing, Progressing    Problem: Impaired Wound Healing  Goal: Optimal Wound Healing  Outcome: Ongoing, Progressing  Intervention: Promote Wound Healing     Problem: Hyperglycemia  Goal: Blood Glucose Level Within Targeted Range  Outcome: Ongoing, Progressing    Denied pain. VSS. Room Air. Independently ambulating in room. BG- 174 and 121. Sliding scale insulin and Insulin per carbohydrate with meals given. Ate well. PICC patent, on IV antibiotics. Declined to have left foot seen.

## 2022-03-20 NOTE — PROGRESS NOTES
[facility]    Medicine Progress Note - Hospitalist Service    Date of Admission:  3/18/2022    Assessment & Plan              Norman Aguilar is a 61 year old male who presents with a multiple month history of surgical interventions in his left lower extremity with initial debridement and subsequent multiple debridements of his left foot.  Initial Doppler studies revealed adequate perfusion of his left lower extremity and foot.  He underwent surgical interventions and he has had additional surgeries after his first debridement.  He is diabetic and he has history of atrial fibrillation.  Chronic Eliquis anticoagulation is noted.  He does not have normal ambulatory status secondary to the severe derangement of the midfoot on his left foot.  He has not had similar problems in his right lower extremity or foot.  He lives independently.  He has been followed in outpatient basis by infectious disease specialist with outpatient antibiotic therapy and daily wound or multiple day per week wound care with intermittent VAC dressing changes or wet-to-dry dressing changes.  He is on infectious disease 2 days ago and he had a fever in the office and was instructed to go to the emergency room but with the long wait present he went back home.  He presented himself to the emergency room today.  He is aware that he has mild swelling in his left lower extremity below the knee.  I have seen him on an outpatient basis and recent MRIs had been reviewed and I also had him see [facility] orthotics for evaluation and education regarding of below-knee amputation and rehabilitation regarding that surgical intervention.  Patient has had a very difficult time making decision regarding his intervention.  Lengthy discussion was undertaken with the patient again.  He is in agreement with staying in the hospital and having a guillotine amputation of his badly infected foot.  This will be done at the ankle level.  After 7 to  10 days of resolution of edema and infection then the formal below-knee amputation will be completed.  He is ultrasound has been normal but if his creatinine allows would obtain a CT angiogram to verify absence of other specific abnormality which would prevent a below-knee amputation.           61-year-old man with a history of poorly controlled diabetes, coronary artery disease, ischemic cardiomyopathy, A. fib who is admitted with persistent left foot infection.       3/20 :     No new issues noted today  Blood cultures : NGTD  Continue iv antibiotics - meropenem, vancomycin  Plan for surgery on Monday, holding eliquis    Blood sugar low this am  But patient feels strongly to continue without any changes- On sliding scale insulin, insulin per carb counting, lantus  Patient  wants a regular diet      Not medically ready for discharge at this time.  Plan for surgery on monday       A/p :            1. History of left foot infection with repeated I&D's.  Followed in the ID clinic by Dr. Durbin.  Recently had wound VAC placement and was started on IV Zosyn.  Repeat MRI today showing signs of septic arthritis/osteomyelitis at and around the calcaneocuboid joint and subtalar joint.  Previous cultures have been polymicrobial.  Most recent cultures from January with Enterobacter, Enterococcus faecalis, corynebacterium striatum, Trueperella species and Pasteurella canis blood cultures 3/18 : NGTD. ID following, on iv meropenem, vancomycin, plan for surgery/amputation on Monday.     2. Leukopenia and neutropenia.  Likely secondary to Zosyn.  Total white cells and neutrophils have improved since stopping the Zosyn on 3/16.     3. Fever.  Low-grade fevers at home.  Possibly secondary to antibiotic reaction.  Blood cultures 3/18 : NGTD.  Patient also mentions increased GI disturbances, that have improved.  No fevers over the past 2 days.     4. Transaminitis.  Possibly related to antibiotic.  Continue to monitor.     5. A fib,  "h/o : on eliquis, bb     6. Chronic systolic CHF : on lasix     7. DM2 : on aspart sliding scale insulin, lantus 38 units in am     8. HLD : on statin, aspirin     9. Anemia, chronic : on iron tab      10. HTN, Essential : on bb                   Diet: Low Saturated Fat Na <2400 mg    DVT Prophylaxis: DOAC  Garcia Catheter: Not present  Central Lines: PRESENT     Cardiac Monitoring: None  Code Status:   full         Clinically Significant Risk Factors Present on Admission                 # Coagulation Defect: home medication list includes an anticoagulant medication  # Platelet Defect: home medication list includes an antiplatelet medication             Disposition Plan     Expected Discharge: 4-5 days?   Anticipated discharge location:  tbd  Delays: Plan for amputation on Monday            Diet: Room Service  Regular Diet Adult    DVT Prophylaxis: Pneumatic Compression Devices  Garcia Catheter: Not present  Central Lines: PRESENT  PICC Single Lumen 02/11/22 Right Basilic-Site Assessment: WDL  Cardiac Monitoring: None  Code Status: Full Code      Disposition Plan        Expected Discharge: 4-5 days?   Anticipated discharge location:  tbd  Delays: Plan for amputation on Monday     The patient's care was discussed with the Bedside Nurse and Patient.    Ruiz Gale MD  Hospitalist Service  [facility]  Securely message with the Vocera Web Console (learn more here)  Text page via [employer] Paging/Directory         Clinically Significant Risk Factors Present on Admission              # Overweight: Estimated body mass index is 27.73 kg/m  as calculated from the following:    Height as of this encounter: 1.88 m (6' 2\").    Weight as of this encounter: 98 kg (216 lb).      ______________________________________________________________________         Data reviewed today: I reviewed all medications, new labs and imaging results over the last 24 hours. I personally reviewed no images or EKG's " today.    Physical Exam   Vital Signs: Temp: 98.3  F (36.8  C) Temp src: Oral BP: 113/56 Pulse: 88   Resp: 20 SpO2: 96 % O2 Device: None (Room air)    Weight: 216 lbs 0 oz    GENERAL:  well-developed, well-nourished, sitting in bed in no acute distress.   HENT:  Head is normocephalic, atraumatic. Oropharynx is moist without exudates or ulcers.  EYES:  Eyes have anicteric sclerae without conjunctival injection or stigmata of endocarditis.   NECK:  Supple.  LUNGS:  Clear to auscultation.  CARDIOVASCULAR:  Regular rate and rhythm with no murmurs, gallops or rubs.  ABDOMEN:  Normal bowel sounds, soft, nontender. No appreciable hepatosplenomegaly  EXT: Left leg edema with faint erythema around the foot and lower leg.  SKIN:  No acute rashes.  Left foot is wrapped open wound noted on the dorsal aspect.  NEUROLOGIC:  Grossly nonfocal    Data   Recent Labs   Lab 03/20/22  1129 03/20/22  0813 03/20/22  0739 03/19/22  1831 03/19/22  1337 03/19/22  0819 03/19/22  0500 03/18/22  2231 03/18/22  1344 03/18/22  1126 03/16/22  0730 03/14/22  1430 03/14/22  1430   WBC  --   --   --   --   --   --  4.0  --   --  5.1 1.1*  --  1.8*   HGB  --   --   --   --   --   --  8.0*  --  9.7* 6.6* 9.2*  --  8.9*   MCV  --   --   --   --   --   --  76*  --   --  77* 76*  --  76*   PLT  --   --   --   --   --   --  212  --   --  285 184  --  221   INR  --   --   --   --   --   --   --   --  1.47*  --   --   --   --    NA  --   --   --   --   --   --  135*  --   --  135*  --   --   --    POTASSIUM  --  3.6  --   --  3.8  --  3.3*  --   --  3.4*  --    < >  --    CHLORIDE  --   --   --   --   --   --  101  --   --  98  --   --   --    CO2  --   --   --   --   --   --  25  --   --  24  --   --   --    BUN  --   --   --   --   --   --  20  --   --  29*  --   --  34*   CR  --  1.20  --   --   --   --  1.30  --   --  1.57*  --   --  1.79*   ANIONGAP  --   --   --   --   --   --  9  --   --  13  --   --   --    YOSVANY  --   --   --   --   --   --  8.4*  --    --  8.8  --   --   --    * 85 62*   < >  --    < > 167*   < >  --  127*  --    < >  --    ALBUMIN  --   --   --   --   --   --  2.0*  --   --  2.2*  --   --   --    PROTTOTAL  --   --   --   --   --   --  6.3  --   --  7.1  --   --   --    BILITOTAL  --   --   --   --   --   --  0.4  --   --  0.4  --   --   --    ALKPHOS  --   --   --   --   --   --  124*  --   --  171*  --   --   --    ALT  --   --   --   --   --   --  69*  --   --  103*  --   --   --    AST  --   --   --   --   --   --  103*  --   --  211*  --   --   --     < > = values in this interval not displayed.

## 2022-03-20 NOTE — PLAN OF CARE
Problem: Plan of Care - These are the overarching goals to be used throughout the patient stay.    Goal: Absence of Hospital-Acquired Illness or Injury  Outcome: Ongoing, Progressing  Intervention: Identify and Manage Fall Risk  Recent Flowsheet Documentation  Taken 3/19/2022 5610 by Mohamud Samaniego RN  Safety Promotion/Fall Prevention: nonskid shoes/slippers when out of bed  Goal: Optimal Comfort and Wellbeing  Outcome: Ongoing, Progressing     Problem: Impaired Wound Healing  Goal: Optimal Wound Healing  Outcome: Ongoing, Progressing     Problem: Hyperglycemia  Goal: Blood Glucose Level Within Targeted Range  Outcome: Ongoing, Progressing   Goal Outcome Evaluation:    Patient is alert and oriented x 4. Pleasant and cooperative but patient does voice some frustration with the situation. He denied pain. He remains on room air. Independent in the room. Patient allowed writer to take left sock off. Wound dressing remains intact without notable drainage. PICC remains patent in right upper extremity.

## 2022-03-20 NOTE — PHARMACY-VANCOMYCIN DOSING SERVICE
"Pharmacy Vancomycin Note  Date of Service 2022  Patient's  1960   61 year old, male    Indication: Bone and Joint Infection  Day of Therapy: 3  Current vancomycin regimen:  750 mg IV q12h  Current vancomycin monitoring method: AUC  Current vancomycin therapeutic monitoring goal: 400-600 mg*h/L    Insight[employer] Prediction of Current Vancomycin Regimen  Regimen: 750 mg IV every 12 hours.  Start time: 21:00 on 2022  Exposure target: AUC24 (range)400-600 mg/L.hr   AUC24,ss: 383 mg/L.hr  Probability of AUC24 > 400: 42 %  Ctrough,ss: 12.8 mg/L  Probability of Ctrough,ss > 20: 8 %  Probability of nephrotoxicity (Lodise TANIA ): 8 %      Current estimated CrCl = Estimated Creatinine Clearance: 88.5 mL/min (based on SCr of 1.2 mg/dL).    Creatinine for last 3 days  3/18/2022: 11:26 AM Creatinine 1.57 mg/dL  3/19/2022:  5:00 AM Creatinine 1.30 mg/dL  3/20/2022:  8:13 AM Creatinine 1.20 mg/dL    Recent Vancomycin Levels (past 3 days)  3/20/2022:  8:13 AM Vancomycin 13.0 mg/L    Vancomycin IV Administrations (past 72 hours)                   vancomycin (VANCOCIN) 750 mg in sodium chloride 0.9 % 250 mL intermittent infusion (mg) 750 mg New Bag 22 0830     750 mg New Bag 22 2039     750 mg New Bag  0954    vancomycin (VANCOCIN) 1,750 mg in sodium chloride 0.9 % 500 mL intermittent infusion (mg) 1,750 mg Given 22 1920                Nephrotoxins and other renal medications (From now, onward)    Start     Dose/Rate Route Frequency Ordered Stop    22 0800  vancomycin (VANCOCIN) 750 mg in sodium chloride 0.9 % 250 mL intermittent infusion        \"Followed by\" Linked Group Details    750 mg  over 60-90 Minutes Intravenous EVERY 12 HOURS 22 0740               Contrast Orders - past 72 hours (72h ago, onward)            Start     Dose/Rate Route Frequency Ordered Stop    22 1330  gadobutrol (GADAVIST) injection 10 mL         10 mL Intravenous ONCE 22 1319 22 1319    "       Interpretation of levels and current regimen:  Vancomycin level is reflective of AUC less than 400    Has serum creatinine changed greater than 50% in last 72 hours: No    Renal Function: Improving    [employer] Prediction of Planned New Vancomycin Regimen  Regimen: 1000 mg IV every 12 hours.  Start time: 21:00 on 03/20/2022  Exposure target: AUC24 (range)400-600 mg/L.hr   AUC24,ss: 505 mg/L.hr  Probability of AUC24 > 400: 87 %  Ctrough,ss: 16.9 mg/L  Probability of Ctrough,ss > 20: 30 %  Probability of nephrotoxicity (Lodise TANIA 2009): 13 %      Plan:  1. Increase Dose to 1000 mg q12h  2. Vancomycin monitoring method: AUC  3. Vancomycin therapeutic monitoring goal: 400-600 mg*h/L  4. Pharmacy will check vancomycin levels as appropriate in 1-3 Days.  5. Serum creatinine levels will be ordered a minimum of twice weekly.    Cynthia Retana [facility]

## 2022-03-20 NOTE — PLAN OF CARE
Problem: Plan of Care - These are the overarching goals to be used throughout the patient stay.    Goal: Optimal Comfort and Wellbeing  Outcome: Ongoing, Progressing   Goal Outcome Evaluation:    Patient scheduled for amputation surgery procedure tomorrow. He has been calm and pleasant. Denies pain. Alert and oriented x4. IV vanco and meropenem given. Eating and drinking well. Up to chair at times.

## 2022-03-21 ENCOUNTER — ANCILLARY PROCEDURE (OUTPATIENT)
Dept: ULTRASOUND IMAGING | Facility: HOSPITAL | Age: 62
End: 2022-03-21
Attending: ANESTHESIOLOGY
Payer: COMMERCIAL

## 2022-03-21 ENCOUNTER — ANESTHESIA (OUTPATIENT)
Dept: SURGERY | Facility: HOSPITAL | Age: 62
DRG: 617 | End: 2022-03-21
Payer: COMMERCIAL

## 2022-03-21 ENCOUNTER — ANESTHESIA EVENT (OUTPATIENT)
Dept: SURGERY | Facility: HOSPITAL | Age: 62
DRG: 617 | End: 2022-03-21
Payer: COMMERCIAL

## 2022-03-21 LAB
ANION GAP SERPL CALCULATED.3IONS-SCNC: 10 MMOL/L (ref 5–18)
BASOPHILS # BLD AUTO: 0 10E3/UL (ref 0–0.2)
BASOPHILS NFR BLD AUTO: 1 %
BUN SERPL-MCNC: 18 MG/DL (ref 8–22)
CALCIUM SERPL-MCNC: 9 MG/DL (ref 8.5–10.5)
CHLORIDE BLD-SCNC: 102 MMOL/L (ref 98–107)
CO2 SERPL-SCNC: 25 MMOL/L (ref 22–31)
CREAT SERPL-MCNC: 1.13 MG/DL (ref 0.7–1.3)
CREAT SERPL-MCNC: 1.17 MG/DL (ref 0.7–1.3)
EOSINOPHIL # BLD AUTO: 0.2 10E3/UL (ref 0–0.7)
EOSINOPHIL NFR BLD AUTO: 4 %
ERYTHROCYTE [DISTWIDTH] IN BLOOD BY AUTOMATED COUNT: 19.2 % (ref 10–15)
GFR SERPL CREATININE-BSD FRML MDRD: 70 ML/MIN/1.73M2
GFR SERPL CREATININE-BSD FRML MDRD: 73 ML/MIN/1.73M2
GLUCOSE BLD-MCNC: 81 MG/DL (ref 70–125)
GLUCOSE BLDC GLUCOMTR-MCNC: 106 MG/DL (ref 70–99)
GLUCOSE BLDC GLUCOMTR-MCNC: 158 MG/DL (ref 70–99)
GLUCOSE BLDC GLUCOMTR-MCNC: 159 MG/DL (ref 70–99)
GLUCOSE BLDC GLUCOMTR-MCNC: 165 MG/DL (ref 70–99)
GLUCOSE BLDC GLUCOMTR-MCNC: 78 MG/DL (ref 70–99)
GLUCOSE BLDC GLUCOMTR-MCNC: 97 MG/DL (ref 70–99)
GLUCOSE BLDC GLUCOMTR-MCNC: 98 MG/DL (ref 70–99)
HCT VFR BLD AUTO: 29.6 % (ref 40–53)
HGB BLD-MCNC: 9.1 G/DL (ref 13.3–17.7)
IMM GRANULOCYTES # BLD: 0.1 10E3/UL
IMM GRANULOCYTES NFR BLD: 1 %
LYMPHOCYTES # BLD AUTO: 2.1 10E3/UL (ref 0.8–5.3)
LYMPHOCYTES NFR BLD AUTO: 32 %
MCH RBC QN AUTO: 23.7 PG (ref 26.5–33)
MCHC RBC AUTO-ENTMCNC: 30.7 G/DL (ref 31.5–36.5)
MCV RBC AUTO: 77 FL (ref 78–100)
MONOCYTES # BLD AUTO: 0.8 10E3/UL (ref 0–1.3)
MONOCYTES NFR BLD AUTO: 13 %
NEUTROPHILS # BLD AUTO: 3.4 10E3/UL (ref 1.6–8.3)
NEUTROPHILS NFR BLD AUTO: 49 %
NRBC # BLD AUTO: 0 10E3/UL
NRBC BLD AUTO-RTO: 0 /100
PLATELET # BLD AUTO: 340 10E3/UL (ref 150–450)
POTASSIUM BLD-SCNC: 4 MMOL/L (ref 3.5–5)
RBC # BLD AUTO: 3.84 10E6/UL (ref 4.4–5.9)
SODIUM SERPL-SCNC: 137 MMOL/L (ref 136–145)
WBC # BLD AUTO: 6.6 10E3/UL (ref 4–11)

## 2022-03-21 PROCEDURE — 88307 TISSUE EXAM BY PATHOLOGIST: CPT | Mod: TC | Performed by: SURGERY

## 2022-03-21 PROCEDURE — 99233 SBSQ HOSP IP/OBS HIGH 50: CPT | Performed by: INTERNAL MEDICINE

## 2022-03-21 PROCEDURE — 36415 COLL VENOUS BLD VENIPUNCTURE: CPT | Performed by: INTERNAL MEDICINE

## 2022-03-21 PROCEDURE — 250N000011 HC RX IP 250 OP 636: Performed by: INTERNAL MEDICINE

## 2022-03-21 PROCEDURE — 250N000011 HC RX IP 250 OP 636: Performed by: NURSE ANESTHETIST, CERTIFIED REGISTERED

## 2022-03-21 PROCEDURE — 370N000017 HC ANESTHESIA TECHNICAL FEE, PER MIN: Performed by: SURGERY

## 2022-03-21 PROCEDURE — 250N000011 HC RX IP 250 OP 636: Performed by: ANESTHESIOLOGY

## 2022-03-21 PROCEDURE — 250N000013 HC RX MED GY IP 250 OP 250 PS 637: Performed by: SURGERY

## 2022-03-21 PROCEDURE — 82310 ASSAY OF CALCIUM: CPT | Performed by: INTERNAL MEDICINE

## 2022-03-21 PROCEDURE — 88307 TISSUE EXAM BY PATHOLOGIST: CPT | Mod: 26 | Performed by: PATHOLOGY

## 2022-03-21 PROCEDURE — 250N000013 HC RX MED GY IP 250 OP 250 PS 637: Performed by: INTERNAL MEDICINE

## 2022-03-21 PROCEDURE — 710N000009 HC RECOVERY PHASE 1, LEVEL 1, PER MIN: Performed by: SURGERY

## 2022-03-21 PROCEDURE — 250N000011 HC RX IP 250 OP 636: Performed by: SURGERY

## 2022-03-21 PROCEDURE — 99232 SBSQ HOSP IP/OBS MODERATE 35: CPT | Performed by: INTERNAL MEDICINE

## 2022-03-21 PROCEDURE — 360N000076 HC SURGERY LEVEL 3, PER MIN: Performed by: SURGERY

## 2022-03-21 PROCEDURE — 258N000003 HC RX IP 258 OP 636: Performed by: ANESTHESIOLOGY

## 2022-03-21 PROCEDURE — 36415 COLL VENOUS BLD VENIPUNCTURE: CPT | Performed by: SURGERY

## 2022-03-21 PROCEDURE — 250N000009 HC RX 250: Performed by: SURGERY

## 2022-03-21 PROCEDURE — 999N000141 HC STATISTIC PRE-PROCEDURE NURSING ASSESSMENT: Performed by: SURGERY

## 2022-03-21 PROCEDURE — 0Y6J0Z3 DETACHMENT AT LEFT LOWER LEG, LOW, OPEN APPROACH: ICD-10-PCS | Performed by: SURGERY

## 2022-03-21 PROCEDURE — 258N000003 HC RX IP 258 OP 636: Performed by: SURGERY

## 2022-03-21 PROCEDURE — 88311 DECALCIFY TISSUE: CPT | Mod: 26 | Performed by: PATHOLOGY

## 2022-03-21 PROCEDURE — 85025 COMPLETE CBC W/AUTO DIFF WBC: CPT | Performed by: INTERNAL MEDICINE

## 2022-03-21 PROCEDURE — 258N000003 HC RX IP 258 OP 636: Performed by: INTERNAL MEDICINE

## 2022-03-21 PROCEDURE — 120N000004 HC R&B MS OVERFLOW

## 2022-03-21 PROCEDURE — 82565 ASSAY OF CREATININE: CPT | Performed by: SURGERY

## 2022-03-21 RX ORDER — HYDROMORPHONE HYDROCHLORIDE 2 MG/1
2 TABLET ORAL EVERY 4 HOURS PRN
Status: DISCONTINUED | OUTPATIENT
Start: 2022-03-21 | End: 2022-03-24 | Stop reason: HOSPADM

## 2022-03-21 RX ORDER — ACETAMINOPHEN 325 MG/1
975 TABLET ORAL EVERY 8 HOURS
Status: DISCONTINUED | OUTPATIENT
Start: 2022-03-21 | End: 2022-03-24 | Stop reason: HOSPADM

## 2022-03-21 RX ORDER — HYDROMORPHONE HYDROCHLORIDE 4 MG/1
4 TABLET ORAL EVERY 4 HOURS PRN
Status: DISCONTINUED | OUTPATIENT
Start: 2022-03-21 | End: 2022-03-24 | Stop reason: HOSPADM

## 2022-03-21 RX ORDER — HYDROXYZINE HYDROCHLORIDE 25 MG/1
25 TABLET, FILM COATED ORAL EVERY 6 HOURS PRN
Status: DISCONTINUED | OUTPATIENT
Start: 2022-03-21 | End: 2022-03-24 | Stop reason: HOSPADM

## 2022-03-21 RX ORDER — MAGNESIUM HYDROXIDE 1200 MG/15ML
LIQUID ORAL PRN
Status: DISCONTINUED | OUTPATIENT
Start: 2022-03-21 | End: 2022-03-21 | Stop reason: HOSPADM

## 2022-03-21 RX ORDER — POLYETHYLENE GLYCOL 3350 17 G/17G
17 POWDER, FOR SOLUTION ORAL DAILY
Status: DISCONTINUED | OUTPATIENT
Start: 2022-03-22 | End: 2022-03-24 | Stop reason: HOSPADM

## 2022-03-21 RX ORDER — HYDROMORPHONE HYDROCHLORIDE 1 MG/ML
0.2 INJECTION, SOLUTION INTRAMUSCULAR; INTRAVENOUS; SUBCUTANEOUS EVERY 5 MIN PRN
Status: DISCONTINUED | OUTPATIENT
Start: 2022-03-21 | End: 2022-03-21 | Stop reason: HOSPADM

## 2022-03-21 RX ORDER — FENTANYL CITRATE 50 UG/ML
50 INJECTION, SOLUTION INTRAMUSCULAR; INTRAVENOUS
Status: DISCONTINUED | OUTPATIENT
Start: 2022-03-21 | End: 2022-03-21 | Stop reason: CLARIF

## 2022-03-21 RX ORDER — CEFAZOLIN SODIUM/WATER 2 G/20 ML
2 SYRINGE (ML) INTRAVENOUS SEE ADMIN INSTRUCTIONS
Status: DISCONTINUED | OUTPATIENT
Start: 2022-03-21 | End: 2022-03-24 | Stop reason: HOSPADM

## 2022-03-21 RX ORDER — FENTANYL CITRATE 50 UG/ML
50 INJECTION, SOLUTION INTRAMUSCULAR; INTRAVENOUS
Status: COMPLETED | OUTPATIENT
Start: 2022-03-21 | End: 2022-03-21

## 2022-03-21 RX ORDER — ONDANSETRON 2 MG/ML
4 INJECTION INTRAMUSCULAR; INTRAVENOUS EVERY 6 HOURS PRN
Status: DISCONTINUED | OUTPATIENT
Start: 2022-03-21 | End: 2022-03-24 | Stop reason: HOSPADM

## 2022-03-21 RX ORDER — LIDOCAINE 40 MG/G
CREAM TOPICAL
Status: DISCONTINUED | OUTPATIENT
Start: 2022-03-21 | End: 2022-03-21

## 2022-03-21 RX ORDER — ROPIVACAINE HYDROCHLORIDE 5 MG/ML
INJECTION, SOLUTION EPIDURAL; INFILTRATION; PERINEURAL
Status: COMPLETED | OUTPATIENT
Start: 2022-03-21 | End: 2022-03-21

## 2022-03-21 RX ORDER — ONDANSETRON 4 MG/1
4 TABLET, ORALLY DISINTEGRATING ORAL EVERY 6 HOURS PRN
Status: DISCONTINUED | OUTPATIENT
Start: 2022-03-21 | End: 2022-03-24 | Stop reason: HOSPADM

## 2022-03-21 RX ORDER — LIDOCAINE 40 MG/G
CREAM TOPICAL
Status: DISCONTINUED | OUTPATIENT
Start: 2022-03-21 | End: 2022-03-24 | Stop reason: HOSPADM

## 2022-03-21 RX ORDER — NALOXONE HYDROCHLORIDE 0.4 MG/ML
0.4 INJECTION, SOLUTION INTRAMUSCULAR; INTRAVENOUS; SUBCUTANEOUS
Status: DISCONTINUED | OUTPATIENT
Start: 2022-03-21 | End: 2022-03-24 | Stop reason: HOSPADM

## 2022-03-21 RX ORDER — OXYCODONE HYDROCHLORIDE 5 MG/1
5 TABLET ORAL EVERY 4 HOURS PRN
Status: DISCONTINUED | OUTPATIENT
Start: 2022-03-21 | End: 2022-03-21 | Stop reason: HOSPADM

## 2022-03-21 RX ORDER — NALOXONE HYDROCHLORIDE 0.4 MG/ML
0.2 INJECTION, SOLUTION INTRAMUSCULAR; INTRAVENOUS; SUBCUTANEOUS
Status: DISCONTINUED | OUTPATIENT
Start: 2022-03-21 | End: 2022-03-24 | Stop reason: HOSPADM

## 2022-03-21 RX ORDER — HYDROMORPHONE HCL IN WATER/PF 6 MG/30 ML
0.2 PATIENT CONTROLLED ANALGESIA SYRINGE INTRAVENOUS
Status: DISCONTINUED | OUTPATIENT
Start: 2022-03-21 | End: 2022-03-24 | Stop reason: HOSPADM

## 2022-03-21 RX ORDER — ONDANSETRON 2 MG/ML
4 INJECTION INTRAMUSCULAR; INTRAVENOUS EVERY 30 MIN PRN
Status: DISCONTINUED | OUTPATIENT
Start: 2022-03-21 | End: 2022-03-21 | Stop reason: HOSPADM

## 2022-03-21 RX ORDER — FENTANYL CITRATE 50 UG/ML
50 INJECTION, SOLUTION INTRAMUSCULAR; INTRAVENOUS EVERY 5 MIN PRN
Status: DISCONTINUED | OUTPATIENT
Start: 2022-03-21 | End: 2022-03-21 | Stop reason: HOSPADM

## 2022-03-21 RX ORDER — AMOXICILLIN 250 MG
1 CAPSULE ORAL 2 TIMES DAILY
Status: DISCONTINUED | OUTPATIENT
Start: 2022-03-21 | End: 2022-03-24 | Stop reason: HOSPADM

## 2022-03-21 RX ORDER — SODIUM CHLORIDE, SODIUM LACTATE, POTASSIUM CHLORIDE, CALCIUM CHLORIDE 600; 310; 30; 20 MG/100ML; MG/100ML; MG/100ML; MG/100ML
INJECTION, SOLUTION INTRAVENOUS CONTINUOUS
Status: DISCONTINUED | OUTPATIENT
Start: 2022-03-21 | End: 2022-03-21 | Stop reason: CLARIF

## 2022-03-21 RX ORDER — PROCHLORPERAZINE MALEATE 10 MG
10 TABLET ORAL EVERY 6 HOURS PRN
Status: DISCONTINUED | OUTPATIENT
Start: 2022-03-21 | End: 2022-03-24 | Stop reason: HOSPADM

## 2022-03-21 RX ORDER — ONDANSETRON 4 MG/1
4 TABLET, ORALLY DISINTEGRATING ORAL EVERY 30 MIN PRN
Status: DISCONTINUED | OUTPATIENT
Start: 2022-03-21 | End: 2022-03-21 | Stop reason: HOSPADM

## 2022-03-21 RX ORDER — CEFAZOLIN SODIUM/WATER 2 G/20 ML
2 SYRINGE (ML) INTRAVENOUS
Status: DISCONTINUED | OUTPATIENT
Start: 2022-03-21 | End: 2022-03-24 | Stop reason: HOSPADM

## 2022-03-21 RX ORDER — FENTANYL CITRATE 50 UG/ML
INJECTION, SOLUTION INTRAMUSCULAR; INTRAVENOUS PRN
Status: DISCONTINUED | OUTPATIENT
Start: 2022-03-21 | End: 2022-03-21

## 2022-03-21 RX ORDER — ACETAMINOPHEN 325 MG/1
650 TABLET ORAL EVERY 4 HOURS PRN
Status: DISCONTINUED | OUTPATIENT
Start: 2022-03-24 | End: 2022-03-24 | Stop reason: HOSPADM

## 2022-03-21 RX ORDER — SODIUM CHLORIDE, SODIUM LACTATE, POTASSIUM CHLORIDE, CALCIUM CHLORIDE 600; 310; 30; 20 MG/100ML; MG/100ML; MG/100ML; MG/100ML
INJECTION, SOLUTION INTRAVENOUS CONTINUOUS
Status: DISCONTINUED | OUTPATIENT
Start: 2022-03-21 | End: 2022-03-21 | Stop reason: HOSPADM

## 2022-03-21 RX ORDER — BISACODYL 10 MG
10 SUPPOSITORY, RECTAL RECTAL DAILY PRN
Status: DISCONTINUED | OUTPATIENT
Start: 2022-03-21 | End: 2022-03-24 | Stop reason: HOSPADM

## 2022-03-21 RX ORDER — HYDROMORPHONE HCL IN WATER/PF 6 MG/30 ML
0.4 PATIENT CONTROLLED ANALGESIA SYRINGE INTRAVENOUS
Status: DISCONTINUED | OUTPATIENT
Start: 2022-03-21 | End: 2022-03-24 | Stop reason: HOSPADM

## 2022-03-21 RX ORDER — PROPOFOL 10 MG/ML
INJECTION, EMULSION INTRAVENOUS CONTINUOUS PRN
Status: DISCONTINUED | OUTPATIENT
Start: 2022-03-21 | End: 2022-03-21

## 2022-03-21 RX ADMIN — FENTANYL CITRATE 50 MCG: 50 INJECTION, SOLUTION INTRAMUSCULAR; INTRAVENOUS at 11:32

## 2022-03-21 RX ADMIN — HYDROXYZINE HYDROCHLORIDE 25 MG: 25 TABLET ORAL at 22:24

## 2022-03-21 RX ADMIN — ATORVASTATIN CALCIUM 80 MG: 40 TABLET, FILM COATED ORAL at 20:51

## 2022-03-21 RX ADMIN — MEROPENEM 2 G: 1 INJECTION, POWDER, FOR SOLUTION INTRAVENOUS at 17:37

## 2022-03-21 RX ADMIN — MIDAZOLAM HYDROCHLORIDE 2 MG: 1 INJECTION, SOLUTION INTRAMUSCULAR; INTRAVENOUS at 09:45

## 2022-03-21 RX ADMIN — PROPOFOL 50 MCG/KG/MIN: 10 INJECTION, EMULSION INTRAVENOUS at 11:32

## 2022-03-21 RX ADMIN — FENTANYL CITRATE 100 MCG: 50 INJECTION INTRAMUSCULAR; INTRAVENOUS at 09:45

## 2022-03-21 RX ADMIN — INSULIN GLARGINE 38 UNITS: 100 INJECTION, SOLUTION SUBCUTANEOUS at 20:51

## 2022-03-21 RX ADMIN — ROPIVACAINE HYDROCHLORIDE 10 ML: 5 INJECTION, SOLUTION EPIDURAL; INFILTRATION; PERINEURAL at 09:52

## 2022-03-21 RX ADMIN — VANCOMYCIN HYDROCHLORIDE 1000 MG: 1 INJECTION, SOLUTION INTRAVENOUS at 20:47

## 2022-03-21 RX ADMIN — ASPIRIN 81 MG CHEWABLE TABLET 81 MG: 81 TABLET CHEWABLE at 20:51

## 2022-03-21 RX ADMIN — INSULIN ASPART 1 UNITS: 100 INJECTION, SOLUTION INTRAVENOUS; SUBCUTANEOUS at 17:42

## 2022-03-21 RX ADMIN — MEROPENEM 2 G: 1 INJECTION, POWDER, FOR SOLUTION INTRAVENOUS at 02:14

## 2022-03-21 RX ADMIN — SODIUM CHLORIDE, POTASSIUM CHLORIDE, SODIUM LACTATE AND CALCIUM CHLORIDE: 600; 310; 30; 20 INJECTION, SOLUTION INTRAVENOUS at 09:56

## 2022-03-21 RX ADMIN — MEROPENEM 2 G: 1 INJECTION, POWDER, FOR SOLUTION INTRAVENOUS at 10:28

## 2022-03-21 RX ADMIN — ROPIVACAINE HYDROCHLORIDE 20 ML: 5 INJECTION, SOLUTION EPIDURAL; INFILTRATION; PERINEURAL at 09:53

## 2022-03-21 RX ADMIN — ACETAMINOPHEN 975 MG: 325 TABLET ORAL at 13:52

## 2022-03-21 RX ADMIN — VANCOMYCIN HYDROCHLORIDE 1000 MG: 1 INJECTION, SOLUTION INTRAVENOUS at 08:26

## 2022-03-21 RX ADMIN — METOPROLOL SUCCINATE 50 MG: 50 TABLET, EXTENDED RELEASE ORAL at 08:34

## 2022-03-21 ASSESSMENT — ACTIVITIES OF DAILY LIVING (ADL)
ADLS_ACUITY_SCORE: 4
ADLS_ACUITY_SCORE: 4
ADLS_ACUITY_SCORE: 6
ADLS_ACUITY_SCORE: 6
ADLS_ACUITY_SCORE: 4
ADLS_ACUITY_SCORE: 6
ADLS_ACUITY_SCORE: 4
ADLS_ACUITY_SCORE: 6
ADLS_ACUITY_SCORE: 4
ADLS_ACUITY_SCORE: 6
ADLS_ACUITY_SCORE: 6
ADLS_ACUITY_SCORE: 4

## 2022-03-21 NOTE — ANESTHESIA PROCEDURE NOTES
Adductor canal Procedure Note    Pre-Procedure   Staff -        Anesthesiologist:  Tanya Mejias MD       Performed By: anesthesiologist       Location: pre-op       Procedure Start/Stop Times: 3/21/2022 9:48 AM and 3/21/2022 9:50 AM       Pre-Anesthestic Checklist: patient identified, IV checked, site marked, risks and benefits discussed, informed consent, monitors and equipment checked, pre-op evaluation, at physician/surgeon's request and post-op pain management  Timeout:       Correct Patient: Yes        Correct Procedure: Yes        Correct Site: Yes        Correct Position: Yes        Correct Laterality: Yes        Site Marked: Yes  Procedure Documentation  Procedure: Adductor canal       Laterality: left       Patient Position: supine       Skin prep: Chloraprep       Ultrasound guided       1. Ultrasound was used to identify targeted nerve, plexus, vascular marker, or fascial plane and place a needle adjacent to it in real-time.       2. Ultrasound was used to visualize the spread of anesthetic in close proximity to the above referenced structure.       3. A permanent image is entered into the patient's record.    Assessment/Narrative         The placement was negative for: blood aspirated, painful injection and site bleeding       Paresthesias: No.     Bolus given via needle..        Secured via.        Insertion/Infusion Method: Single Shot       Complications: none    Medication(s) Administered   Ropivacaine 0.5% PF (Infiltration), 10 mL

## 2022-03-21 NOTE — PROGRESS NOTES
Daily Progress Note        CODE STATUS:  Full Code    03/21/22  Assessment/Plan:  Norman Aguilar is a 61 year old male who presented with several months history of surgical interventions in his left lower extremity with initial debridement and subsequent multiple debridements of his left foot.  Initial Doppler studies revealed adequate perfusion of his left lower extremity and foot.  He underwent surgical interventions and he has had additional surgeries after his first debridement.  He is diabetic and he has history of atrial fibrillation. He has been followed in outpatient basis by infectious disease specialist with outpatient antibiotic therapy and wound care with intermittent VAC dressing changes or wet-to-dry dressing changes.  He was at infectious disease office 2 days ago and he had a fever in the office and was instructed to go to the emergency room but with the long wait, he went back home.  He presented himself to the emergency room 3 days ago and got admitted.     Left foot infection/osteomyelitisseptic arthritis  -- With repeated I&D's.  Followed in the ID clinic by Dr. Durbin.  Recently had wound VAC placement and was started on IV Zosyn.  Repeat MRI on 3/18 showed signs of septic arthritis/osteomyelitis at and around the calcaneocuboid joint and subtalar joint.    -- Previous cultures have been polymicrobial.  Most recent cultures from January with Enterobacter, Enterococcus faecalis, corynebacterium striatum, Trueperella species and Pasteurella canis  -- ID following, on iv meropenem, vancomycin  -- Plan for left foot guillotine amputation today followed by definitive BKA in next several days     Leukopenia and neutropenia.    -- Likely secondary to Zosyn.  Total white cells and neutrophils have improved since stopping the Zosyn on 3/16.     Fever.    -- Low-grade fevers at home.  -- Blood cultures 3/18 : NGTD.  Patient also mentions increased GI disturbances, that have improved.    -- No fevers  here.     Transaminitis.    -- Possibly related to antibiotic.  Continue to monitor.     A fib, h/o :   -- Cont BB  -- Eliquis on hold for surgery. Will resume it when ok with surgeon     Chronic systolic CHF :   -- Euvolumic  -- Cont with home lasix     DM2 :  -- On aspart sliding scale insulin, lantus 38 units in am     HLD :   -- On statin, aspirin     Anemia, chronic :   -- On iron tab      HTN, Essential :  -- Cont home meds      Disposition; few days  Barrier to discharge; Surgery today     LOS: 3 days     Subjective:  Interval History: Patient seen and examined. Notes, labs, imaging reports personally reviewed. Patient is new to me. No new issues overnight.     Review of Systems:   As mentioned in subjective.    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)     Gastro-esophageal reflux disease with esophagitis     Gastroesophageal reflux disease without esophagitis     Hypercalcemia     Long term (current) use of oral hypoglycemic drugs     Microalbuminuria     Mixed hyperlipidemia     Polyp of colon     Hypocalcemia     Atrial fibrillation (H)     Hyperglycemia     Wound infection     Diabetic ketoacidosis without coma associated with type 2 diabetes mellitus (H)     Ischemic cardiomyopathy     Heart failure with reduced ejection fraction (H)     Arteriosclerotic heart disease     DVT (deep venous thrombosis) (H)     Chronic kidney disease (CKD) stage G3a/A1, moderately decreased glomerular filtration rate (GFR) between 45-59 mL/min/1.73 square meter and albuminuria creatinine ratio less than 30 mg/g (H)     Diabetic ulcer of left midfoot associated with type 2 diabetes mellitus, with necrosis of bone (H)     Constipation     Acute non-ST segment elevation myocardial infarction (H)     Traumatic  amputation of toe or toes without complication (H)     Osteomyelitis of left foot (H)       Scheduled Meds:    aspirin  81 mg Oral Daily     atorvastatin  80 mg Oral Daily     ceFAZolin  2 g Intravenous Pre-Op/Pre-procedure x 1 dose     ceFAZolin  2 g Intravenous See Admin Instructions     insulin aspart   Subcutaneous Daily with breakfast     insulin aspart   Subcutaneous Daily with lunch     insulin aspart   Subcutaneous Daily with supper     insulin aspart  1-7 Units Subcutaneous TID AC     insulin aspart  1-5 Units Subcutaneous At Bedtime     insulin glargine  38 Units Subcutaneous At Bedtime     lactated ringers  250 mL Intravenous Once     meropenem  2 g Intravenous Q8H     metoprolol succinate ER  50 mg Oral Daily     sodium chloride (PF)  10-40 mL Intracatheter Q8H     sodium chloride (PF)  3 mL Intracatheter Q8H     sodium chloride (PF)  3 mL Intracatheter Q8H     vancomycin  1,000 mg Intravenous Q12H     Continuous Infusions:    lactated ringers 100 mL/hr at 03/21/22 0956     PRN Meds:.acetaminophen **OR** acetaminophen, glucose **OR** dextrose **OR** glucagon, fentaNYL, lidocaine 4%, lidocaine 4%, lidocaine 4%, lidocaine (buffered or not buffered), lidocaine (buffered or not buffered), melatonin, midazolam, naloxone **OR** naloxone **OR** naloxone **OR** naloxone, ondansetron **OR** ondansetron, senna-docusate **OR** [DISCONTINUED] senna-docusate, sodium chloride (PF), sodium chloride (PF), sodium chloride (PF)    Objective:  Vital signs in last 24 hours:  Temp:  [97.3  F (36.3  C)-98.8  F (37.1  C)] 97.8  F (36.6  C)  Pulse:  [79-93] 83  Resp:  [12-21] 20  BP: (114-134)/(57-76) 127/70  SpO2:  [94 %-100 %] 98 %        Intake/Output Summary (Last 24 hours) at 3/21/2022 1310  Last data filed at 3/21/2022 1244  Gross per 24 hour   Intake 1234 ml   Output 610 ml   Net 624 ml       Physical Exam:    General: Not in obvious distress.  HEENT: NC, AT   Chest: Clear to auscultation bilaterally  Heart: S1S2 normal,  regular. No M/R/G  Abdomen: Soft. NT, ND. Bowel sounds- active.  Neuro: Alert and awake, grossly non-focal      Lab Results:(I have personally reviewed the results)    Recent Results (from the past 24 hour(s))   Glucose by meter    Collection Time: 03/20/22  6:40 PM   Result Value Ref Range    GLUCOSE BY METER POCT 178 (H) 70 - 99 mg/dL   Glucose by meter    Collection Time: 03/20/22  9:57 PM   Result Value Ref Range    GLUCOSE BY METER POCT 199 (H) 70 - 99 mg/dL   Glucose by meter    Collection Time: 03/21/22 12:14 AM   Result Value Ref Range    GLUCOSE BY METER POCT 158 (H) 70 - 99 mg/dL   Glucose by meter    Collection Time: 03/21/22  4:09 AM   Result Value Ref Range    GLUCOSE BY METER POCT 97 70 - 99 mg/dL   Basic metabolic panel    Collection Time: 03/21/22  5:16 AM   Result Value Ref Range    Sodium 137 136 - 145 mmol/L    Potassium 4.0 3.5 - 5.0 mmol/L    Chloride 102 98 - 107 mmol/L    Carbon Dioxide (CO2) 25 22 - 31 mmol/L    Anion Gap 10 5 - 18 mmol/L    Urea Nitrogen 18 8 - 22 mg/dL    Creatinine 1.17 0.70 - 1.30 mg/dL    Calcium 9.0 8.5 - 10.5 mg/dL    Glucose 81 70 - 125 mg/dL    GFR Estimate 70 >60 mL/min/1.73m2   CBC with platelets and differential    Collection Time: 03/21/22  5:16 AM   Result Value Ref Range    WBC Count 6.6 4.0 - 11.0 10e3/uL    RBC Count 3.84 (L) 4.40 - 5.90 10e6/uL    Hemoglobin 9.1 (L) 13.3 - 17.7 g/dL    Hematocrit 29.6 (L) 40.0 - 53.0 %    MCV 77 (L) 78 - 100 fL    MCH 23.7 (L) 26.5 - 33.0 pg    MCHC 30.7 (L) 31.5 - 36.5 g/dL    RDW 19.2 (H) 10.0 - 15.0 %    Platelet Count 340 150 - 450 10e3/uL    % Neutrophils 49 %    % Lymphocytes 32 %    % Monocytes 13 %    % Eosinophils 4 %    % Basophils 1 %    % Immature Granulocytes 1 %    NRBCs per 100 WBC 0 <1 /100    Absolute Neutrophils 3.4 1.6 - 8.3 10e3/uL    Absolute Lymphocytes 2.1 0.8 - 5.3 10e3/uL    Absolute Monocytes 0.8 0.0 - 1.3 10e3/uL    Absolute Eosinophils 0.2 0.0 - 0.7 10e3/uL    Absolute Basophils 0.0 0.0 - 0.2  10e3/uL    Absolute Immature Granulocytes 0.1 <=0.4 10e3/uL    Absolute NRBCs 0.0 10e3/uL   Glucose by meter    Collection Time: 03/21/22  8:23 AM   Result Value Ref Range    GLUCOSE BY METER POCT 78 70 - 99 mg/dL   Glucose by meter    Collection Time: 03/21/22  9:53 AM   Result Value Ref Range    GLUCOSE BY METER POCT 98 70 - 99 mg/dL       All laboratory and imaging data in the past 24 hours reviewed  Serum Glucose range:   Recent Labs   Lab 03/21/22  0953 03/21/22  0823 03/21/22  0516 03/21/22  0409   GLC 98 78 81 97     ABG: No lab results found in last 7 days.  CBC:   Recent Labs   Lab 03/21/22  0516 03/19/22  0500 03/18/22  1344 03/18/22  1126   WBC 6.6 4.0  --  5.1   HGB 9.1* 8.0* 9.7* 6.6*   HCT 29.6* 25.3*  --  20.8*   MCV 77* 76*  --  77*    212  --  285   NEUTROPHIL 49 55  --  59   LYMPH 32 33  --  31   MONOCYTE 13 8  --  6   EOSINOPHIL 4 4  --  4     Chemistry:   Recent Labs   Lab 03/21/22  0516 03/20/22  0813 03/19/22  1337 03/19/22  0500 03/18/22  1126     --   --  135* 135*   POTASSIUM 4.0 3.6 3.8 3.3* 3.4*   CHLORIDE 102  --   --  101 98   CO2 25  --   --  25 24   BUN 18  --   --  20 29*   CR 1.17 1.20  --  1.30 1.57*   GFRESTIMATED 70 68  --  62 50*   YOSVANY 9.0  --   --  8.4* 8.8   PROTTOTAL  --   --   --  6.3 7.1   ALBUMIN  --   --   --  2.0* 2.2*   AST  --   --   --  103* 211*   ALT  --   --   --  69* 103*   ALKPHOS  --   --   --  124* 171*   BILITOTAL  --   --   --  0.4 0.4     Coags:  Recent Labs   Lab 03/18/22  1344   INR 1.47*     Cardiac Markers:  No results for input(s): CKTOTAL, TROPONINI in the last 168 hours.       MR Foot Left w/o & w Contrast    Result Date: 3/18/2022  EXAM: MR FOOT LEFT W/O and W CONTRAST LOCATION: [facility] DATE/TIME: 3/18/2022 12:13 PM INDICATION: Foot pain. Ulceration. Recent incision and drainage and partial amputation. COMPARISON: 02/17/2022 MRI of the ankle. TECHNIQUE: Routine. Additional postgadolinium T1 sequences were  "obtained. IV CONTRAST: 10mL Gadavist FINDINGS: JOINTS AND BONES: -Extensive partial amputation of the left foot, involving the medial and middle cuneiforms, navicular, head and neck of the talus, and great toe. Chronic nondisplaced fracture of the proximal portion of the second metatarsal shaft. No acute fracture. Large effusion within the calcaneocuboid joint. Extensive adjacent enhancing marrow edema most consistent with septic arthritis/osteomyelitis. Moderate degenerative changes at the fourth TMT joint. There are likely changes of septic arthritis/osteomyelitis at the level of the middle subtalar joint. TENDONS: -No tendon tear, tendinopathy, or tenosynovitis. MUSCLES AND SOFT TISSUES: -Prominent soft tissue defect along the medial aspect of the hind, mid and proximal forefoot with deep extension to the superficial margin of the middle subtalar joints. There is extensive surrounding cellulitis. No evidence of an abscess.     IMPRESSION: 1.  Extensive partial amputation of the foot as described, with a large soft tissue defect at the resection site. 2.  There is a large effusion in the calcaneocuboid joint with extensive adjacent enhancing marrow edema compatible with septic arthritis/osteomyelitis not significantly changed since the prior study. 3.  There are likely changes of septic arthritis/osteomyelitis at the level of the middle subtalar joint. 4.  No definite evidence of an abscess.    POC US Guidance Needle Placement    Result Date: 3/21/2022  Ultrasound was performed as guidance to an anesthesia procedure.  Click \"PACS images\" hyperlink below to view any stored images.  For specific procedure details, view procedure note authored by anesthesia.    POC US Guidance Needle Placement    Result Date: 3/21/2022  Ultrasound was performed as guidance to an anesthesia procedure.  Click \"PACS images\" hyperlink below to view any stored images.  For specific procedure details, view procedure note authored by " "anesthesia.    POC US Guided Vascular Access    Result Date: 3/21/2022  Ultrasound was performed as guidance to an anesthesia procedure.  Click \"PACS images\" hyperlink below to view any stored images.  For specific procedure details, view procedure note authored by anesthesia.      Latest radiology report personally reviewed.    Note created using dragon voice recognition software so sounds alike errors may have escaped editing.      03/21/2022   Anival Bucio MD  Hospitalist, [facility]  Pager: [phone]                "

## 2022-03-21 NOTE — PROGRESS NOTES
Infectious Diseases Progress Note  [facility]    Date of visit: 03/21/2022       ASSESSMENT   61-year-old man with a history of poorly controlled diabetes, coronary artery disease, ischemic cardiomyopathy, A. fib who is admitted with persistent left foot infection.    1. History of left foot infection with repeated I&D's.  Followed in the ID clinic by Dr. Durbin.  Recently had wound VAC placement and was started on IV Zosyn.  Repeat MRI today showing signs of septic arthritis/osteomyelitis at and around the calcaneocuboid joint and subtalar joint.  Previous cultures have been polymicrobial.  Most recent cultures from January with Enterobacter, Enterococcus faecalis, corynebacterium striatum, Trueperella species and Pasteurella canis. Guillotine amputation performed today.  2. Leukopenia and neutropenia.  Likely secondary to Zosyn.  Total white cells and neutrophils have improved since stopping the Zosyn on 3/16.  3. Fever.  Low-grade fevers at home.  Possibly secondary to antibiotic reaction.  Blood cultures have been collected and are pending.  Patient also mentions increased GI disturbances, that have improved.  No fevers over the past 2 days.  4. Transaminitis.  Possibly related to antibiotic.  improving    Active Problems:    Wound infection    Osteomyelitis of left foot (H)       PLAN   -continue meropenem and vancomycin - I don't anticipate he will discharge with this  -repeat LFTs    Ryan Rendon MD  [facility]  Direct messaging: [email or web address] Paging  On-Call ID provider: [phone], option: 9      ===========================================    SUBJECTIVE / INTERVAL HISTORY:     No events. Feels well. Surgery this morning without complications. Tolerating antibiotics. Hoping to proceed with BKA and move onto prosthetic soon.      Review of Systems     No fevers, no rashes    Antibiotics   Vancomycin 3/18-  Meropenem 3/18-    Previous:  Zosyn stopped on 3/16      Physical  "Exam     Temp:  [97.3  F (36.3  C)-98.8  F (37.1  C)] 97.8  F (36.6  C)  Pulse:  [79-93] 83  Resp:  [12-21] 20  BP: (114-134)/(57-76) 127/70  SpO2:  [94 %-100 %] 98 %    /70   Pulse 83   Temp 97.8  F (36.6  C) (Temporal)   Resp 20   Ht 1.88 m (6' 2\")   Wt 98.2 kg (216 lb 6.4 oz)   SpO2 98%   BMI 27.78 kg/m      GENERAL:  well-developed, well-nourished, lying in bed in no acute distress.   HENT:  Head is normocephalic, atraumatic.   EYES:  Eyes have anicteric sclerae without conjunctival injection   LUNGS:  Normal resp pattern  EXT: left leg wrapped, s/p foot amputation  SKIN:  No acute rashes.  Right sided PICC line in place  NEUROLOGIC:  Grossly nonfocal.      Cultures   3/18 blood cultures x2: no growth to date     Previous cultures reviewed      Pertinent Labs:     Recent Labs   Lab 03/21/22  0516 03/19/22  0500 03/18/22  1344 03/18/22  1126   WBC 6.6 4.0  --  5.1   HGB 9.1* 8.0* 9.7* 6.6*    212  --  285       Recent Labs   Lab 03/21/22  0516 03/19/22  0500 03/18/22  1126    135* 135*   CO2 25 25 24   BUN 18 20 29*   ALBUMIN  --  2.0* 2.2*   ALKPHOS  --  124* 171*   ALT  --  69* 103*   AST  --  103* 211*       Recent Labs   Lab 03/18/22  1126 03/16/22  0730   CRP 18.5* 150.0*           Imaging:     POC US Guidance Needle Placement    Result Date: 3/21/2022  Ultrasound was performed as guidance to an anesthesia procedure.  Click \"PACS images\" hyperlink below to view any stored images.  For specific procedure details, view procedure note authored by anesthesia.    POC US Guidance Needle Placement    Result Date: 3/21/2022  Ultrasound was performed as guidance to an anesthesia procedure.  Click \"PACS images\" hyperlink below to view any stored images.  For specific procedure details, view procedure note authored by anesthesia.    POC US Guided Vascular Access    Result Date: 3/21/2022  Ultrasound was performed as guidance to an anesthesia procedure.  Click \"PACS images\" hyperlink below to " view any stored images.  For specific procedure details, view procedure note authored by anesthesia.        Data reviewed today: I reviewed all medications, new labs and imaging results over the last 24 hours. I personally reviewed no images or EKG's today.  The patient's care was discussed with the Patient.

## 2022-03-21 NOTE — OP NOTE
OPERATIVE REPORT    Norman Aguilar   [facility]  Medical Record #:  [id number]  YOB: 1960  Age:  61 year old    PROCEDURE DATE:  3/18/2022 - 3/21/2022    PREOPERATIVE DIAGNOSIS: Infection with osteomyelitis left foot    POSTOPERATIVE DIAGNOSIS: Same    PROCEDURE: Left foot guillotine amputation    OPERATING SURGEON:  Ronald Bhatt MD    ASSISTANT: Technician    ANESTHESIA: Anesthesia regional block    DESCRIPTION OF PROCEDURE: With the patient in supine position under adequate regional block anesthesia the left lower extremity prepped and draped in usual sterile fashion.  Lengthy discussion and consultation was undertaken with the patient preoperatively on multiple occasions and in addition he was referred to orthotic specialist as well as for additional opinion at [facility] with Dr. Giacomo Rodriguez.  Patient has marked infection and osteomyelitis in the midfoot bony structure.  After all of the above-noted discussions and consultations were obtained discussion was undertaken with the patient regarding the need for a guillotine amputation and allowing for 7-10 or 14 days for resolution of the swelling and infection in his left lower extremity before a formal below-knee amputation can be completed.  Patient has had Doppler ultrasound evaluation from an arterial standpoint previously.  The potential of CT angiogram was also reviewed with the patient.  The left lower leg and foot are prepped and draped in usual sterile fashion.  Electrocautery dissection is used for division of all soft tissues and posterior tibial and dorsalis pedis arteries are ligated with 0 Vicryl suture.  Tibia and fibula are divided just above the ankle mortise with the power saw.  Bone wax was applied wound irrigated with antibiotic solution and hemostasis is obtained and the wound packed open with Hibiclens saline solution and case wrap dressing.  Estimated blood loss 20 cc.  There were no  complications the patient tolerated procedure well.  Sponge and needle counts are correct x2.  He is discharged the PAR stable condition.    EBL:  [unfilled]    SPECIMENS:    ID Type Source Tests Collected by Time Destination   1 : Left foot Amputation, Non-Traumatic Foot, Left SURGICAL PATHOLOGY EXAM Ronald Mireles MD 3/21/2022 11:52 AM        Ronald mireles md  [facility]

## 2022-03-21 NOTE — ANESTHESIA PREPROCEDURE EVALUATION
Anesthesia Pre-Procedure Evaluation    Patient: Norman Aguilar   MRN: [id number] : 1960        Procedure : Procedure(s):  AMPUTATION, FOOT          Past Medical History:   Diagnosis Date     Coronary artery disease      Diabetes (H)      Diabetic neuropathy (H)      GERD (gastroesophageal reflux disease)      Hyperlipidemia      Hypertension      Intermittent atrial fibrillation (H)      NSTEMI (non-ST elevated myocardial infarction) (H)      Renal disease      Retinopathy      Sleep disturbance       Past Surgical History:   Procedure Laterality Date     AMPUTATE TOE(S) Left 2021    Procedure: first and second ray amputation;  Surgeon: Eddi Ram DPM;  Location: [facility] OR     APPENDECTOMY       CV CORONARY ANGIOGRAM N/A 2021    Procedure: CV CORONARY ANGIOGRAM;  Surgeon: Pam Tabares MD;  Location: [facility] CATH LAB CV     CV LEFT HEART CATH N/A 2021    Procedure: Left Heart Cath;  Surgeon: Pam Tabares MD;  Location: [facility] CATH LAB CV     FOOT SURGERY Left      HERNIA REPAIR       INCISION AND DRAINAGE LOWER EXTREMITY, COMBINED Left 2021    Procedure: INCISION AND DRAINAGE, left foot;  Surgeon: Eddi Ram DPM;  Location: [facility] OR     INCISION AND DRAINAGE LOWER EXTREMITY, COMBINED Left 2021    Procedure: INCISION AND DRAINAGE, left foot;  Surgeon: Eddi Ram DPM;  Location: [facility] OR     INCISION AND DRAINAGE LOWER EXTREMITY, COMBINED Left 2021    Procedure: INCISION AND DRAINAGE, Left foot;  Surgeon: Eddi Ram DPM;  Location: [facility] OR     INCISION AND DRAINAGE LOWER EXTREMITY, COMBINED Left 1/10/2022    Procedure: INCISION AND DRAINAGE, left foot;  Surgeon: dEdi Ram DPM;  Location: [facility] OR     INCISION AND DRAINAGE LOWER EXTREMITY, COMBINED Left 2022    Procedure: INCISION AND DRAINAGE, Left foot;  Surgeon: Eddi Ram DPM;  Location:   [facility] OR      No Known Allergies   Social History     Tobacco Use     Smoking status: Current Some Day Smoker     Types: Cigars     Smokeless tobacco: Never Used     Tobacco comment: Cigars   Substance Use Topics     Alcohol use: Yes     Comment: occas.      Wt Readings from Last 1 Encounters:   03/21/22 98.2 kg (216 lb 6.4 oz)        Anesthesia Evaluation   Pt has had prior anesthetic.     No history of anesthetic complications       ROS/MED HX  ENT/Pulmonary:       Neurologic:       Cardiovascular:     (+) hypertension--CAD ---    METS/Exercise Tolerance:     Hematologic:       Musculoskeletal:       GI/Hepatic:     (+) GERD,     Renal/Genitourinary:     (+) renal disease,     Endo:     (+) type I DM,     Psychiatric/Substance Use:       Infectious Disease:       Malignancy:       Other:            Physical Exam    Airway        Mallampati: II    Neck ROM: full     Respiratory Devices and Support         Dental  no notable dental history         Cardiovascular   cardiovascular exam normal          Pulmonary   pulmonary exam normal                OUTSIDE LABS:  CBC:   Lab Results   Component Value Date    WBC 6.6 03/21/2022    WBC 4.0 03/19/2022    HGB 9.1 (L) 03/21/2022    HGB 8.0 (L) 03/19/2022    HCT 29.6 (L) 03/21/2022    HCT 25.3 (L) 03/19/2022     03/21/2022     03/19/2022     BMP:   Lab Results   Component Value Date     03/21/2022     (L) 03/19/2022    POTASSIUM 4.0 03/21/2022    POTASSIUM 3.6 03/20/2022    CHLORIDE 102 03/21/2022    CHLORIDE 101 03/19/2022    CO2 25 03/21/2022    CO2 25 03/19/2022    BUN 18 03/21/2022    BUN 20 03/19/2022    CR 1.17 03/21/2022    CR 1.20 03/20/2022    GLC 78 03/21/2022    GLC 81 03/21/2022     COAGS:   Lab Results   Component Value Date    PTT 55 (H) 11/15/2021    INR 1.47 (H) 03/18/2022     POC: No results found for: BGM, HCG, HCGS  HEPATIC:   Lab Results   Component Value Date    ALBUMIN 2.0 (L) 03/19/2022    PROTTOTAL 6.3 03/19/2022    ALT  69 (H) 03/19/2022     (H) 03/19/2022    ALKPHOS 124 (H) 03/19/2022    BILITOTAL 0.4 03/19/2022     OTHER:   Lab Results   Component Value Date    LACT 0.9 03/18/2022    A1C 7.6 (H) 01/19/2022    YOSVANY 9.0 03/21/2022    PHOS 2.8 12/01/2021    MAG 2.0 01/06/2022    LIPASE 27 11/17/2018    CRP 18.5 (H) 03/18/2022     (H) 03/18/2022       Anesthesia Plan    ASA Status:  3      Anesthesia Type: MAC.              Consents    Anesthesia Plan(s) and associated risks, benefits, and realistic alternatives discussed. Questions answered and patient/representative(s) expressed understanding.     - Discussed: Risks, Benefits and Alternatives for the PROCEDURE were discussed     - Discussed with:  Patient         Postoperative Care    Pain management: Peripheral nerve block (Single Shot).        Comments:                Tanya Mejias MD

## 2022-03-21 NOTE — ANESTHESIA PROCEDURE NOTES
Sciatic Procedure Note    Pre-Procedure   Staff -        Anesthesiologist:  Tanya Mejias MD       Performed By: anesthesiologist       Location: pre-op       Procedure Start/Stop Times: 3/21/2022 9:45 AM and 3/21/2022 9:47 AM       Pre-Anesthestic Checklist: patient identified, IV checked, site marked, risks and benefits discussed, informed consent, monitors and equipment checked, pre-op evaluation, at physician/surgeon's request and post-op pain management  Timeout:       Correct Patient: Yes        Correct Procedure: Yes        Correct Site: Yes        Correct Position: Yes        Correct Laterality: Yes        Site Marked: Yes  Procedure Documentation  Procedure: Sciatic       Laterality: left       Patient Position: lateral       Skin prep: Chloraprep (popliteal approach).       Ultrasound guided       1. Ultrasound was used to identify targeted nerve, plexus, vascular marker, or fascial plane and place a needle adjacent to it in real-time.       2. Ultrasound was used to visualize the spread of anesthetic in close proximity to the above referenced structure.       3. A permanent image is entered into the patient's record.    Assessment/Narrative         The placement was negative for: blood aspirated, painful injection and site bleeding       Paresthesias: No.     Bolus given via needle..        Secured via.        Insertion/Infusion Method: Single Shot       Complications: none    Medication(s) Administered   Ropivacaine 0.5% PF (Infiltration), 20 mL

## 2022-03-21 NOTE — OR NURSING
Updated on Pt condition by Parul BAILEY RN.  At this time vitals continue to be stable.  Pt sleeping.  No diaphoresis noted.   Will cont.t o monitor.

## 2022-03-21 NOTE — ANESTHESIA POSTPROCEDURE EVALUATION
Patient: Norman Aguilar    Procedure: Procedure(s):  Guillotine AMPUTATION, FOOT       Anesthesia Type:  MAC    Note:  Disposition: Inpatient   Postop Pain Control: Uneventful            Sign Out: Well controlled pain   PONV: No   Neuro/Psych: Uneventful            Sign Out: Acceptable/Baseline neuro status   Airway/Respiratory: Uneventful            Sign Out: Acceptable/Baseline resp. status   CV/Hemodynamics: Uneventful            Sign Out: Acceptable CV status; No obvious hypovolemia; No obvious fluid overload   Other NRE: NONE   DID A NON-ROUTINE EVENT OCCUR? No           Last vitals:  Vitals Value Taken Time   /70 03/21/22 1245   Temp 36.6  C (97.8  F) 03/21/22 1230   Pulse 84 03/21/22 1255   Resp 30 03/21/22 1255   SpO2 97 % 03/21/22 1255   Vitals shown include unvalidated device data.    Electronically Signed By: Tanya Mejias MD  March 21, 2022  3:05 PM

## 2022-03-21 NOTE — PLAN OF CARE
"  Problem: Impaired Wound Healing  Goal: Optimal Wound Healing  Outcome: Ongoing, Progressing     Problem: Infection  Goal: Absence of Infection Signs and Symptoms  Outcome: Ongoing, Progressing   Patient taken to surgery @ 0830, returned to unit @ 1330.   Surgical dressing became saturated with blood while patient was sitting on the edge of the bed before elevating leg. Additional gauze pack, ABD and ace wrap applied over surgical dressing (did not remove).    Patient denied pain but did take the APAP, didn't really want to.    Patient was offered Full Liquid diet upon return to unit. Ordered and was able to tolerate without problem. Advanced diet to \"Regular Adult Diet\".  "

## 2022-03-21 NOTE — PROGRESS NOTES
Care Management Follow Up    Length of Stay (days): 3    Expected Discharge Date: 03/24/2022     Concerns to be Addressed:  discharge planning, post-op progression     Patient plan of care discussed at interdisciplinary rounds: Yes    Anticipated Discharge Disposition: Home     Anticipated Discharge Services:  Home Care pending progression  Anticipated Discharge DME:  None, pending progression    Patient/family educated on Medicare website which has current facility and service quality ratings:    Education Provided on the Discharge Plan:    Patient/Family in Agreement with the Plan:      Referrals Placed by CM/SW:    Private pay costs discussed: Not applicable    Additional Information:  Chart reviewed and plan of care discussed with hospitalist.  Pt is having surgery today for left foot guillotine amputation.  Needs are pending post-op care progression. Anticipate PT & OT evals when appropriate.      Call placed to [facility]. They confirmed pt completed IV abx on 2/17/2022 and they currently are only providing supplies for flushes for PICC line.   They also provided update that pt had Home PT and OT visits from [facility] until 3/17/2022 and then switched to [facility] on 3/18/2022.    Call placed to [facility], (phone [phone]/fax [phone]), and spoke with Yana. She clarified they have been providing RN visits for wound care (wound VAC). They were to start PT and OT on 3/18/2022 but didn't receive orders so those services were never started as pt was admitted to hospital on 3/18/2022.  She states they are not able to accept any referrals for PT and OT at this time due to staffing.  If pt needs RN visits at time of discharge they can provide those services, but not therapy.    Care Management will contniue to follow for post-op care progression and discharge planning.    Wendy Vargas RN

## 2022-03-22 LAB
ALBUMIN SERPL-MCNC: 1.9 G/DL (ref 3.5–5)
ALP SERPL-CCNC: 113 U/L (ref 45–120)
ALT SERPL W P-5'-P-CCNC: 41 U/L (ref 0–45)
ANION GAP SERPL CALCULATED.3IONS-SCNC: 9 MMOL/L (ref 5–18)
AST SERPL W P-5'-P-CCNC: 38 U/L (ref 0–40)
BILIRUB DIRECT SERPL-MCNC: 0.2 MG/DL
BILIRUB SERPL-MCNC: 0.4 MG/DL (ref 0–1)
BUN SERPL-MCNC: 16 MG/DL (ref 8–22)
CALCIUM SERPL-MCNC: 8.6 MG/DL (ref 8.5–10.5)
CHLORIDE BLD-SCNC: 103 MMOL/L (ref 98–107)
CO2 SERPL-SCNC: 27 MMOL/L (ref 22–31)
CREAT SERPL-MCNC: 1.14 MG/DL (ref 0.7–1.3)
ERYTHROCYTE [DISTWIDTH] IN BLOOD BY AUTOMATED COUNT: 19.4 % (ref 10–15)
GFR SERPL CREATININE-BSD FRML MDRD: 73 ML/MIN/1.73M2
GLUCOSE BLD-MCNC: 67 MG/DL (ref 70–125)
GLUCOSE BLDC GLUCOMTR-MCNC: 134 MG/DL (ref 70–99)
GLUCOSE BLDC GLUCOMTR-MCNC: 262 MG/DL (ref 70–99)
GLUCOSE BLDC GLUCOMTR-MCNC: 59 MG/DL (ref 70–99)
GLUCOSE BLDC GLUCOMTR-MCNC: 76 MG/DL (ref 70–99)
GLUCOSE BLDC GLUCOMTR-MCNC: 92 MG/DL (ref 70–99)
GLUCOSE BLDC GLUCOMTR-MCNC: 97 MG/DL (ref 70–99)
HCT VFR BLD AUTO: 27.1 % (ref 40–53)
HGB BLD-MCNC: 8.4 G/DL (ref 13.3–17.7)
MCH RBC QN AUTO: 24.3 PG (ref 26.5–33)
MCHC RBC AUTO-ENTMCNC: 31 G/DL (ref 31.5–36.5)
MCV RBC AUTO: 79 FL (ref 78–100)
PLATELET # BLD AUTO: 385 10E3/UL (ref 150–450)
POTASSIUM BLD-SCNC: 4 MMOL/L (ref 3.5–5)
PROT SERPL-MCNC: 6.3 G/DL (ref 6–8)
RBC # BLD AUTO: 3.45 10E6/UL (ref 4.4–5.9)
SODIUM SERPL-SCNC: 139 MMOL/L (ref 136–145)
WBC # BLD AUTO: 6.5 10E3/UL (ref 4–11)

## 2022-03-22 PROCEDURE — 250N000013 HC RX MED GY IP 250 OP 250 PS 637: Performed by: INTERNAL MEDICINE

## 2022-03-22 PROCEDURE — 250N000013 HC RX MED GY IP 250 OP 250 PS 637: Performed by: SURGERY

## 2022-03-22 PROCEDURE — 99232 SBSQ HOSP IP/OBS MODERATE 35: CPT | Performed by: INTERNAL MEDICINE

## 2022-03-22 PROCEDURE — 250N000011 HC RX IP 250 OP 636: Performed by: INTERNAL MEDICINE

## 2022-03-22 PROCEDURE — 36415 COLL VENOUS BLD VENIPUNCTURE: CPT | Performed by: INTERNAL MEDICINE

## 2022-03-22 PROCEDURE — 85027 COMPLETE CBC AUTOMATED: CPT | Performed by: INTERNAL MEDICINE

## 2022-03-22 PROCEDURE — 82248 BILIRUBIN DIRECT: CPT | Performed by: INTERNAL MEDICINE

## 2022-03-22 PROCEDURE — 258N000003 HC RX IP 258 OP 636: Performed by: INTERNAL MEDICINE

## 2022-03-22 PROCEDURE — 120N000001 HC R&B MED SURG/OB

## 2022-03-22 PROCEDURE — 250N000011 HC RX IP 250 OP 636: Performed by: SURGERY

## 2022-03-22 PROCEDURE — 258N000003 HC RX IP 258 OP 636: Performed by: SURGERY

## 2022-03-22 RX ADMIN — ATORVASTATIN CALCIUM 80 MG: 40 TABLET, FILM COATED ORAL at 20:15

## 2022-03-22 RX ADMIN — VANCOMYCIN HYDROCHLORIDE 1000 MG: 1 INJECTION, SOLUTION INTRAVENOUS at 20:20

## 2022-03-22 RX ADMIN — HYDROXYZINE HYDROCHLORIDE 25 MG: 25 TABLET ORAL at 12:41

## 2022-03-22 RX ADMIN — METOPROLOL SUCCINATE 50 MG: 50 TABLET, EXTENDED RELEASE ORAL at 08:51

## 2022-03-22 RX ADMIN — DOCUSATE SODIUM 50 MG AND SENNOSIDES 8.6 MG 1 TABLET: 8.6; 5 TABLET, FILM COATED ORAL at 08:52

## 2022-03-22 RX ADMIN — DOCUSATE SODIUM 50 MG AND SENNOSIDES 8.6 MG 1 TABLET: 8.6; 5 TABLET, FILM COATED ORAL at 20:17

## 2022-03-22 RX ADMIN — MEROPENEM 2 G: 1 INJECTION, POWDER, FOR SOLUTION INTRAVENOUS at 19:25

## 2022-03-22 RX ADMIN — MEROPENEM 2 G: 1 INJECTION, POWDER, FOR SOLUTION INTRAVENOUS at 02:15

## 2022-03-22 RX ADMIN — ASPIRIN 81 MG CHEWABLE TABLET 81 MG: 81 TABLET CHEWABLE at 20:15

## 2022-03-22 RX ADMIN — MEROPENEM 2 G: 1 INJECTION, POWDER, FOR SOLUTION INTRAVENOUS at 09:58

## 2022-03-22 RX ADMIN — HYDROXYZINE HYDROCHLORIDE 25 MG: 25 TABLET ORAL at 05:52

## 2022-03-22 RX ADMIN — HYDROXYZINE HYDROCHLORIDE 25 MG: 25 TABLET ORAL at 20:19

## 2022-03-22 RX ADMIN — VANCOMYCIN HYDROCHLORIDE 1000 MG: 1 INJECTION, SOLUTION INTRAVENOUS at 08:46

## 2022-03-22 RX ADMIN — ENOXAPARIN SODIUM 40 MG: 40 INJECTION SUBCUTANEOUS at 08:51

## 2022-03-22 ASSESSMENT — ACTIVITIES OF DAILY LIVING (ADL)
ADLS_ACUITY_SCORE: 6

## 2022-03-22 NOTE — PROGRESS NOTES
Daily Progress Note        CODE STATUS:  Full Code    03/21/22  Assessment/Plan:  Norman Aguilar is a 61 year old male who presented with several months history of surgical interventions in his left lower extremity with initial debridement and subsequent multiple debridements of his left foot.  Initial Doppler studies revealed adequate perfusion of his left lower extremity and foot.  He underwent surgical interventions and he has had additional surgeries after his first debridement.  He is diabetic and he has history of atrial fibrillation. He has been followed in outpatient basis by infectious disease specialist with outpatient antibiotic therapy and wound care with intermittent VAC dressing changes or wet-to-dry dressing changes.  He was at infectious disease office 2 days ago and he had a fever in the office and was instructed to go to the emergency room but with the long wait, he went back home.  He presented himself to the emergency room, and got admitted.     Left foot infection/osteomyelitisseptic arthritis  -- With repeated I&D's.  Followed in the ID clinic by Dr. Durbin.  Recently had wound VAC placement and was started on IV Zosyn.  Repeat MRI on 3/18 showed signs of septic arthritis/osteomyelitis at and around the calcaneocuboid joint and subtalar joint.    -- Previous cultures have been polymicrobial.  Most recent cultures from January with Enterobacter, Enterococcus faecalis, corynebacterium striatum, Trueperella species and Pasteurella canis  -- ID following, on iv meropenem, vancomycin. ID will likely stop these antibiotics today in favor of PO antibiotics  -- Plan for left foot guillotine amputation 3/21/22 followed by definitive BKA in next several days per surgery     Leukopenia and neutropenia.    -- Likely secondary to Zosyn.  Total white cells and neutrophils have improved since stopping the Zosyn on 3/16.     Fever.    -- Low-grade fevers at home.  -- Blood cultures 3/18 : NGTD.  Patient  also mentions increased GI disturbances, that have improved.    -- No fevers here.     Transaminitis.    -- Possibly related to antibiotic.  Continue to monitor.     A fib, h/o :   -- Cont BB  -- Eliquis on hold for surgery. Will resume it when ok with surgeon     Chronic systolic CHF :   -- Euvolumic  -- Cont with home lasix     DM2 :  Hypoglycemia:  -- Patient reports he never had hypoglycemia at home. This is due to change in his routine etc.   -- Decrease the novolog ratio to 1:10. Decrease lantus to 30 units from 38 units.     HLD :   -- On statin, aspirin     Anemia, chronic :   -- On iron tab      HTN, Essential :  -- Cont home meds      Disposition; Likely home tomorrow  Barrier to discharge; IV antibiotics. Awaiting consultants ok for discharge    Subjective:  Interval History: Patient seen and examined. Reports doing well. Denies pain.     Review of Systems:   As mentioned in subjective.    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)     Gastro-esophageal reflux disease with esophagitis     Gastroesophageal reflux disease without esophagitis     Hypercalcemia     Long term (current) use of oral hypoglycemic drugs     Microalbuminuria     Mixed hyperlipidemia     Polyp of colon     Hypocalcemia     Atrial fibrillation (H)     Hyperglycemia     Wound infection     Diabetic ketoacidosis without coma associated with type 2 diabetes mellitus (H)     Ischemic cardiomyopathy     Heart failure with reduced ejection fraction (H)     Arteriosclerotic heart disease     DVT (deep venous thrombosis) (H)     Chronic kidney disease (CKD) stage G3a/A1, moderately decreased glomerular filtration rate (GFR) between 45-59 mL/min/1.73 square meter and albuminuria creatinine ratio less than 30 mg/g (H)     Diabetic  ulcer of left midfoot associated with type 2 diabetes mellitus, with necrosis of bone (H)     Constipation     Acute non-ST segment elevation myocardial infarction (H)     Traumatic amputation of toe or toes without complication (H)     Osteomyelitis of left foot (H)       Scheduled Meds:    acetaminophen  975 mg Oral Q8H     aspirin  81 mg Oral Daily     atorvastatin  80 mg Oral Daily     ceFAZolin  2 g Intravenous Pre-Op/Pre-procedure x 1 dose     ceFAZolin  2 g Intravenous See Admin Instructions     enoxaparin ANTICOAGULANT  40 mg Subcutaneous Q24H     insulin aspart   Subcutaneous Daily with breakfast     insulin aspart   Subcutaneous Daily with lunch     insulin aspart   Subcutaneous Daily with supper     insulin aspart  1-7 Units Subcutaneous TID AC     insulin aspart  1-5 Units Subcutaneous At Bedtime     insulin glargine  30 Units Subcutaneous At Bedtime     lactated ringers  250 mL Intravenous Once     meropenem  2 g Intravenous Q8H     metoprolol succinate ER  50 mg Oral Daily     polyethylene glycol  17 g Oral Daily     senna-docusate  1 tablet Oral BID     sodium chloride (PF)  10-40 mL Intracatheter Q8H     vancomycin  1,000 mg Intravenous Q12H     Continuous Infusions:    PRN Meds:.[START ON 3/24/2022] acetaminophen, bisacodyl, glucose **OR** dextrose **OR** glucagon, HYDROmorphone **OR** HYDROmorphone, HYDROmorphone **OR** HYDROmorphone, hydrOXYzine, lidocaine 4%, lidocaine 4%, lidocaine (buffered or not buffered), magnesium hydroxide, melatonin, naloxone **OR** naloxone **OR** naloxone **OR** naloxone, ondansetron **OR** ondansetron, prochlorperazine **OR** prochlorperazine, senna-docusate **OR** [DISCONTINUED] senna-docusate, sodium chloride (PF), sodium chloride (PF), sodium chloride (PF), sodium chloride (PF)    Objective:  Vital signs in last 24 hours:  Temp:  [97.3  F (36.3  C)-98.2  F (36.8  C)] 98.1  F (36.7  C)  Pulse:  [76-88] 88  Resp:  [15-20] 20  BP: (100-132)/(56-70) 108/64  SpO2:  [94  %-98 %] 94 %        Intake/Output Summary (Last 24 hours) at 3/21/2022 1310  Last data filed at 3/21/2022 1244  Gross per 24 hour   Intake 1234 ml   Output 610 ml   Net 624 ml       Physical Exam:    General: Not in obvious distress.  HEENT: NC, AT   Chest: Clear to auscultation bilaterally  Heart: S1S2 normal, regular. No M/R/G  Abdomen: Soft. NT, ND. Bowel sounds- active.  Extremity: Stump under dressing.   Neuro: Alert and awake, grossly non-focal      Lab Results:(I have personally reviewed the results)    Recent Results (from the past 24 hour(s))   Glucose by meter    Collection Time: 03/21/22 12:30 PM   Result Value Ref Range    GLUCOSE BY METER POCT 106 (H) 70 - 99 mg/dL   Creatinine    Collection Time: 03/21/22  1:34 PM   Result Value Ref Range    Creatinine 1.13 0.70 - 1.30 mg/dL    GFR Estimate 73 >60 mL/min/1.73m2   Glucose by meter    Collection Time: 03/21/22  5:36 PM   Result Value Ref Range    GLUCOSE BY METER POCT 159 (H) 70 - 99 mg/dL   Glucose by meter    Collection Time: 03/21/22  8:11 PM   Result Value Ref Range    GLUCOSE BY METER POCT 165 (H) 70 - 99 mg/dL   Hepatic function panel    Collection Time: 03/22/22  5:09 AM   Result Value Ref Range    Bilirubin Total 0.4 0.0 - 1.0 mg/dL    Bilirubin Direct 0.2 <=0.5 mg/dL    Protein Total 6.3 6.0 - 8.0 g/dL    Albumin 1.9 (L) 3.5 - 5.0 g/dL    Alkaline Phosphatase 113 45 - 120 U/L    AST 38 0 - 40 U/L    ALT 41 0 - 45 U/L   CBC with platelets    Collection Time: 03/22/22  5:09 AM   Result Value Ref Range    WBC Count 6.5 4.0 - 11.0 10e3/uL    RBC Count 3.45 (L) 4.40 - 5.90 10e6/uL    Hemoglobin 8.4 (L) 13.3 - 17.7 g/dL    Hematocrit 27.1 (L) 40.0 - 53.0 %    MCV 79 78 - 100 fL    MCH 24.3 (L) 26.5 - 33.0 pg    MCHC 31.0 (L) 31.5 - 36.5 g/dL    RDW 19.4 (H) 10.0 - 15.0 %    Platelet Count 385 150 - 450 10e3/uL   Basic metabolic panel    Collection Time: 03/22/22  5:09 AM   Result Value Ref Range    Sodium 139 136 - 145 mmol/L    Potassium 4.0 3.5 -  5.0 mmol/L    Chloride 103 98 - 107 mmol/L    Carbon Dioxide (CO2) 27 22 - 31 mmol/L    Anion Gap 9 5 - 18 mmol/L    Urea Nitrogen 16 8 - 22 mg/dL    Creatinine 1.14 0.70 - 1.30 mg/dL    Calcium 8.6 8.5 - 10.5 mg/dL    Glucose 67 (L) 70 - 125 mg/dL    GFR Estimate 73 >60 mL/min/1.73m2   Glucose by meter    Collection Time: 03/22/22  5:57 AM   Result Value Ref Range    GLUCOSE BY METER POCT 59 (L) 70 - 99 mg/dL   Glucose by meter    Collection Time: 03/22/22  6:30 AM   Result Value Ref Range    GLUCOSE BY METER POCT 76 70 - 99 mg/dL   Glucose by meter    Collection Time: 03/22/22  8:43 AM   Result Value Ref Range    GLUCOSE BY METER POCT 92 70 - 99 mg/dL       All laboratory and imaging data in the past 24 hours reviewed  Serum Glucose range:   Recent Labs   Lab 03/22/22  0843 03/22/22  0630 03/22/22  0557 03/22/22  0509   GLC 92 76 59* 67*     ABG: No lab results found in last 7 days.  CBC:   Recent Labs   Lab 03/22/22  0509 03/21/22  0516 03/19/22  0500 03/18/22  1344 03/18/22  1126   WBC 6.5 6.6 4.0  --  5.1   HGB 8.4* 9.1* 8.0*   < > 6.6*   HCT 27.1* 29.6* 25.3*  --  20.8*   MCV 79 77* 76*  --  77*    340 212  --  285   NEUTROPHIL  --  49 55  --  59   LYMPH  --  32 33  --  31   MONOCYTE  --  13 8  --  6   EOSINOPHIL  --  4 4  --  4    < > = values in this interval not displayed.     Chemistry:   Recent Labs   Lab 03/22/22  0509 03/21/22  1334 03/21/22  0516 03/20/22  0813 03/19/22  1337 03/19/22  0500 03/18/22  1126     --  137  --   --  135* 135*   POTASSIUM 4.0  --  4.0 3.6   < > 3.3* 3.4*   CHLORIDE 103  --  102  --   --  101 98   CO2 27  --  25  --   --  25 24   BUN 16  --  18  --   --  20 29*   CR 1.14 1.13 1.17 1.20  --  1.30 1.57*   GFRESTIMATED 73 73 70 68  --  62 50*   YOSVANY 8.6  --  9.0  --   --  8.4* 8.8   PROTTOTAL 6.3  --   --   --   --  6.3 7.1   ALBUMIN 1.9*  --   --   --   --  2.0* 2.2*   AST 38  --   --   --   --  103* 211*   ALT 41  --   --   --   --  69* 103*   ALKPHOS 113  --   --    --   --  124* 171*   BILITOTAL 0.4  --   --   --   --  0.4 0.4    < > = values in this interval not displayed.     Coags:  Recent Labs   Lab 03/18/22  1344   INR 1.47*     Cardiac Markers:  No results for input(s): CKTOTAL, TROPONINI in the last 168 hours.       MR Foot Left w/o & w Contrast    Result Date: 3/18/2022  EXAM: MR FOOT LEFT W/O and W CONTRAST LOCATION: [facility] DATE/TIME: 3/18/2022 12:13 PM INDICATION: Foot pain. Ulceration. Recent incision and drainage and partial amputation. COMPARISON: 02/17/2022 MRI of the ankle. TECHNIQUE: Routine. Additional postgadolinium T1 sequences were obtained. IV CONTRAST: 10mL Gadavist FINDINGS: JOINTS AND BONES: -Extensive partial amputation of the left foot, involving the medial and middle cuneiforms, navicular, head and neck of the talus, and great toe. Chronic nondisplaced fracture of the proximal portion of the second metatarsal shaft. No acute fracture. Large effusion within the calcaneocuboid joint. Extensive adjacent enhancing marrow edema most consistent with septic arthritis/osteomyelitis. Moderate degenerative changes at the fourth TMT joint. There are likely changes of septic arthritis/osteomyelitis at the level of the middle subtalar joint. TENDONS: -No tendon tear, tendinopathy, or tenosynovitis. MUSCLES AND SOFT TISSUES: -Prominent soft tissue defect along the medial aspect of the hind, mid and proximal forefoot with deep extension to the superficial margin of the middle subtalar joints. There is extensive surrounding cellulitis. No evidence of an abscess.     IMPRESSION: 1.  Extensive partial amputation of the foot as described, with a large soft tissue defect at the resection site. 2.  There is a large effusion in the calcaneocuboid joint with extensive adjacent enhancing marrow edema compatible with septic arthritis/osteomyelitis not significantly changed since the prior study. 3.  There are likely changes of septic  "arthritis/osteomyelitis at the level of the middle subtalar joint. 4.  No definite evidence of an abscess.    POC US Guidance Needle Placement    Result Date: 3/21/2022  Ultrasound was performed as guidance to an anesthesia procedure.  Click \"PACS images\" hyperlink below to view any stored images.  For specific procedure details, view procedure note authored by anesthesia.    POC US Guidance Needle Placement    Result Date: 3/21/2022  Ultrasound was performed as guidance to an anesthesia procedure.  Click \"PACS images\" hyperlink below to view any stored images.  For specific procedure details, view procedure note authored by anesthesia.    POC US Guided Vascular Access    Result Date: 3/21/2022  Ultrasound was performed as guidance to an anesthesia procedure.  Click \"PACS images\" hyperlink below to view any stored images.  For specific procedure details, view procedure note authored by anesthesia.      Latest radiology report personally reviewed.    Note created using dragon voice recognition software so sounds alike errors may have escaped editing.      03/22/2022   Anival Bucio MD  Hospitalist, [facility]  Pager: [phone]                "

## 2022-03-22 NOTE — PROGRESS NOTES
Infectious Diseases Progress Note  [facility]    Date of visit: 03/22/2022       ASSESSMENT   61-year-old man with a history of poorly controlled diabetes, coronary artery disease, ischemic cardiomyopathy, A. fib who is admitted with persistent left foot infection.    1. History of left foot infection with repeated I&D's.  Followed in the ID clinic by Dr. Durbin.  Recently had wound VAC placement and was started on IV Zosyn.  Repeat MRI today showing signs of septic arthritis/osteomyelitis at and around the calcaneocuboid joint and subtalar joint.  Previous cultures have been polymicrobial.  Most recent cultures from January with Enterobacter, Enterococcus faecalis, corynebacterium striatum, Trueperella species and Pasteurella canis. Guillotine amputation performed today.  2. Leukopenia and neutropenia.  Likely secondary to Zosyn.  Total white cells and neutrophils have improved since stopping the Zosyn on 3/16.  3. Fever.  Low-grade fevers at home.  Possibly secondary to antibiotic reaction.  Blood cultures have been collected and are pending.  Patient also mentions increased GI disturbances, that have improved.  No fevers over the past 2 days.  4. Transaminitis.  Possibly related to antibiotic.  Resolved     Active Problems:    Wound infection    Osteomyelitis of left foot (H)       PLAN   -continue meropenem and vancomycin, will stop tomorrow  - no further antibiotics before surgery   - remove PICC line on discharge    I will sign-off at this time. Please do not hesitate to call if there are any further questions or if there is a change in the patient's clinical status.       Ryan Rendon MD  [facility]  Direct messaging: [employer] Paging  On-Call ID provider: [phone], option: [phone]      ===========================================    SUBJECTIVE / INTERVAL HISTORY:     No events. Uncomfortable in bed. Needs to keep leg elevated. Wants to discharge soon.     Review of Systems  "    No fevers, no rashes    Antibiotics   Vancomycin 3/18-  Meropenem 3/18-    Previous:  Zosyn stopped on 3/16      Physical Exam     Temp:  [97.3  F (36.3  C)-99  F (37.2  C)] 99  F (37.2  C)  Pulse:  [76-88] 86  Resp:  [15-20] 20  BP: (100-132)/(56-70) 112/57  SpO2:  [94 %-98 %] 94 %    /57 (BP Location: Left arm)   Pulse 86   Temp 99  F (37.2  C) (Oral)   Resp 20   Ht 1.88 m (6' 2\")   Wt 95.8 kg (211 lb 4.8 oz)   SpO2 94%   BMI 27.13 kg/m      GENERAL:  well-developed, well-nourished, lying in bed in no acute distress.   HENT:  Head is normocephalic, atraumatic.   EYES:  Eyes have anicteric sclerae without conjunctival injection   LUNGS:  Normal resp pattern  EXT: left leg wrapped, s/p foot amputation  SKIN:  No acute rashes.  Right sided PICC line in place  NEUROLOGIC:  Grossly nonfocal.      Cultures   3/18 blood cultures x2: no growth to date     Previous cultures reviewed      Pertinent Labs:     Recent Labs   Lab 03/22/22  0509 03/21/22  0516 03/19/22  0500   WBC 6.5 6.6 4.0   HGB 8.4* 9.1* 8.0*    340 212       Recent Labs   Lab 03/22/22  0509 03/21/22  0516 03/19/22  0500 03/18/22  1126    137 135* 135*   CO2 27 25 25 24   BUN 16 18 20 29*   ALBUMIN 1.9*  --  2.0* 2.2*   ALKPHOS 113  --  124* 171*   ALT 41  --  69* 103*   AST 38  --  103* 211*       Recent Labs   Lab 03/18/22  1126 03/16/22  0730   CRP 18.5* 150.0*           Imaging:     POC US Guidance Needle Placement    Result Date: 3/21/2022  Ultrasound was performed as guidance to an anesthesia procedure.  Click \"PACS images\" hyperlink below to view any stored images.  For specific procedure details, view procedure note authored by anesthesia.    POC US Guidance Needle Placement    Result Date: 3/21/2022  Ultrasound was performed as guidance to an anesthesia procedure.  Click \"PACS images\" hyperlink below to view any stored images.  For specific procedure details, view procedure note authored by anesthesia.    POC US Guided " "Vascular Access    Result Date: 3/21/2022  Ultrasound was performed as guidance to an anesthesia procedure.  Click \"PACS images\" hyperlink below to view any stored images.  For specific procedure details, view procedure note authored by anesthesia.        Data reviewed today: I reviewed all medications, new labs and imaging results over the last 24 hours. I personally reviewed no images or EKG's today.  The patient's care was discussed with the Patient and Dr. Bhatt.  "

## 2022-03-22 NOTE — PLAN OF CARE
Problem: Impaired Wound Healing  Goal: Optimal Wound Healing  Outcome: Ongoing, Progressing  Intervention: Promote Wound Healing  Recent Flowsheet Documentation  Taken 3/22/2022 0006 by Mulu Wheeler RN  Activity Management: activity adjusted per tolerance     Problem: Infection  Goal: Absence of Infection Signs and Symptoms  Outcome: Ongoing, Progressing     Problem: Adjustment to Surgery (Extremity Amputation)  Goal: Optimal Coping with Amputation  Outcome: Ongoing, Progressing     Problem: Bleeding (Extremity Amputation)  Goal: Absence of Bleeding  Outcome: Ongoing, Progressing     Problem: Pain (Extremity Amputation)  Goal: Acceptable Pain Control  Outcome: Ongoing, Progressing     Pt's vital signs were stable. He denied pain throughout the night and refused his scheduled tylenol. He did request his PRN atarax once for anxiety. His PICC line flushed without difficulty. He continues to get IV antibiotics. The dressing to his LLE is clean, dry and intact. At 0600 there was a small amount of drainage on his ACE wrap. He has had his LLE elevated on a pillow when in bed. Pt refuses to have staff help with ambulation so he is independent in his room with his walker and he knows he is non-weight bearing to his LLE. He is on the potassium protocol which will be redrawn this am. His blood sugar at 0600 was 59. He was asymptomatic and only wanted to have 8 ounces of apple juice. The house officer was text paged an FYI about this. His blood sugar recheck was 76 and he stated that he didn't want anything else to eat or drink because he was going to order breakfast soon. He is able to make needs known. Call light is within reach.

## 2022-03-22 NOTE — PLAN OF CARE
Problem: Hyperglycemia  Goal: Blood Glucose Level Within Targeted Range  Outcome: Ongoing, Progressing   Patient's BS this shift was 92 & 97 no sliding scale insulin given. Patient did not eat breakfast until 1000 and 7 units for CHO of 70 given at that time. Lunch delivered @ 1440. Awaiting CHO count to cover.   Problem: Pain (Extremity Amputation)  Goal: Acceptable Pain Control  Outcome: Ongoing, Progressing   Patient denied pain throughout the shift declined intervention. Patient also declined bowel meds, but did end up taking the senna which he said he doesn't need. Wanted to refuse the Enoxaparin but was amiable to taking. He discussed these medications with surgeon.  Problem: Residual Limb Care (Extremity Amputation)  Goal: Optimal Residual Limb Healing  Outcome: Ongoing, Progressing   Per surgery do not open wound, they will do the daily dressing changes for now. Surgeon also states that we should not ADD any dressing over their wrap. (Some oozing is expected and OK to have on the dressing.) This can cause tightness which can increase the swelling of the residual limb.

## 2022-03-22 NOTE — PLAN OF CARE
Problem: Pain (Extremity Amputation)  Goal: Acceptable Pain Control  Outcome: Ongoing, Progressing     Problem: Functional Ability Impaired (Extremity Amputation)  Goal: Optimal Functional Ability  Outcome: Ongoing, Progressing     Problem: Hyperglycemia  Goal: Blood Glucose Level Within Targeted Range  3/21/2022 1945 by Dima Lynch RN  Outcome: Ongoing, Progressing     Goal Outcome Evaluation:    Denied pain. Declined to take scheduled Tylenol.  Pt continued to ambulate independently in room refusing staff assistance, a walker was provided. Amputated site is wrapped and no bleeding noted this shift. Elevated when in bed, dangled at bedside. BG- 159, sliding scale insulin and Insulin per carbohydrate counting given with meals. BG at HS-165. Ate well. Voiding. IVF stopped. PICC dressing was changed. IV Antibiotics given. C/o Anxiety at HS, Cross Cover MD updated, PRN Atarax given.

## 2022-03-22 NOTE — PROGRESS NOTES
Progress Note    Assessment/Plan  Patient clinically stable.  Wound rewrapped today.  Potential discharge in 48 hours to home as long as he can lay supine to keep the swelling out of his leg until revision amputation next week.    Active Problems:    Wound infection    Osteomyelitis of left foot (H)      Subjective  No pain  Objective    Vital signs in last 24 hours  Temp:  [97.3  F (36.3  C)-98.2  F (36.8  C)] 98.1  F (36.7  C)  Pulse:  [76-88] 88  Resp:  [15-20] 20  BP: (100-132)/(56-70) 108/64  SpO2:  [94 %-98 %] 94 %  Weight:   [unfilled]    Intake/Output last 3 shifts  I/O last 3 completed shifts:  In: 1985 [P.O.:940; I.V.:1045]  Out: 2010 [Urine:2000; Blood:10]  Intake/Output this shift:  I/O this shift:  In: 250 [P.O.:240; I.V.:10]  Out: 600 [Urine:600]      Physical Exam  Wound is stable still with significant edema below the level of the knee.    Pertinent Labs   Lab Results   Component Value Date    WBC 6.5 03/22/2022    HGB 8.4 (L) 03/22/2022    HCT 27.1 (L) 03/22/2022    MCV 79 03/22/2022     03/22/2022             Pertinent Radiology     [unfilled]        Ronald Bhatt MD

## 2022-03-23 LAB
ANION GAP SERPL CALCULATED.3IONS-SCNC: 8 MMOL/L (ref 5–18)
BACTERIA BLD CULT: NO GROWTH
BACTERIA BLD CULT: NO GROWTH
BUN SERPL-MCNC: 17 MG/DL (ref 8–22)
CALCIUM SERPL-MCNC: 9 MG/DL (ref 8.5–10.5)
CHLORIDE BLD-SCNC: 102 MMOL/L (ref 98–107)
CO2 SERPL-SCNC: 28 MMOL/L (ref 22–31)
CREAT SERPL-MCNC: 1.36 MG/DL (ref 0.7–1.3)
ERYTHROCYTE [DISTWIDTH] IN BLOOD BY AUTOMATED COUNT: 19.5 % (ref 10–15)
GFR SERPL CREATININE-BSD FRML MDRD: 59 ML/MIN/1.73M2
GLUCOSE BLD-MCNC: 186 MG/DL (ref 70–125)
GLUCOSE BLDC GLUCOMTR-MCNC: 169 MG/DL (ref 70–99)
GLUCOSE BLDC GLUCOMTR-MCNC: 188 MG/DL (ref 70–99)
GLUCOSE BLDC GLUCOMTR-MCNC: 201 MG/DL (ref 70–99)
GLUCOSE BLDC GLUCOMTR-MCNC: 228 MG/DL (ref 70–99)
HCT VFR BLD AUTO: 27.8 % (ref 40–53)
HGB BLD-MCNC: 8.3 G/DL (ref 13.3–17.7)
MCH RBC QN AUTO: 23.6 PG (ref 26.5–33)
MCHC RBC AUTO-ENTMCNC: 29.9 G/DL (ref 31.5–36.5)
MCV RBC AUTO: 79 FL (ref 78–100)
PATH REPORT.COMMENTS IMP SPEC: NORMAL
PATH REPORT.COMMENTS IMP SPEC: NORMAL
PATH REPORT.FINAL DX SPEC: NORMAL
PATH REPORT.GROSS SPEC: NORMAL
PATH REPORT.MICROSCOPIC SPEC OTHER STN: NORMAL
PATH REPORT.RELEVANT HX SPEC: NORMAL
PHOTO IMAGE: NORMAL
PLATELET # BLD AUTO: 431 10E3/UL (ref 150–450)
POTASSIUM BLD-SCNC: 4.5 MMOL/L (ref 3.5–5)
RBC # BLD AUTO: 3.52 10E6/UL (ref 4.4–5.9)
SODIUM SERPL-SCNC: 138 MMOL/L (ref 136–145)
WBC # BLD AUTO: 6.6 10E3/UL (ref 4–11)

## 2022-03-23 PROCEDURE — 36415 COLL VENOUS BLD VENIPUNCTURE: CPT | Performed by: INTERNAL MEDICINE

## 2022-03-23 PROCEDURE — 85027 COMPLETE CBC AUTOMATED: CPT | Performed by: INTERNAL MEDICINE

## 2022-03-23 PROCEDURE — 250N000011 HC RX IP 250 OP 636: Performed by: INTERNAL MEDICINE

## 2022-03-23 PROCEDURE — 258N000003 HC RX IP 258 OP 636: Performed by: INTERNAL MEDICINE

## 2022-03-23 PROCEDURE — 250N000013 HC RX MED GY IP 250 OP 250 PS 637: Performed by: SURGERY

## 2022-03-23 PROCEDURE — 250N000011 HC RX IP 250 OP 636: Performed by: SURGERY

## 2022-03-23 PROCEDURE — 120N000001 HC R&B MED SURG/OB

## 2022-03-23 PROCEDURE — 82310 ASSAY OF CALCIUM: CPT | Performed by: INTERNAL MEDICINE

## 2022-03-23 PROCEDURE — 250N000013 HC RX MED GY IP 250 OP 250 PS 637: Performed by: INTERNAL MEDICINE

## 2022-03-23 PROCEDURE — 99233 SBSQ HOSP IP/OBS HIGH 50: CPT | Performed by: INTERNAL MEDICINE

## 2022-03-23 RX ADMIN — METOPROLOL SUCCINATE 50 MG: 50 TABLET, EXTENDED RELEASE ORAL at 09:01

## 2022-03-23 RX ADMIN — ASPIRIN 81 MG CHEWABLE TABLET 81 MG: 81 TABLET CHEWABLE at 20:14

## 2022-03-23 RX ADMIN — INSULIN ASPART 1 UNITS: 100 INJECTION, SOLUTION INTRAVENOUS; SUBCUTANEOUS at 09:03

## 2022-03-23 RX ADMIN — ENOXAPARIN SODIUM 40 MG: 40 INJECTION SUBCUTANEOUS at 09:09

## 2022-03-23 RX ADMIN — HYDROXYZINE HYDROCHLORIDE 25 MG: 25 TABLET ORAL at 21:48

## 2022-03-23 RX ADMIN — INSULIN ASPART 2 UNITS: 100 INJECTION, SOLUTION INTRAVENOUS; SUBCUTANEOUS at 18:38

## 2022-03-23 RX ADMIN — MEROPENEM 2 G: 1 INJECTION, POWDER, FOR SOLUTION INTRAVENOUS at 09:13

## 2022-03-23 RX ADMIN — ATORVASTATIN CALCIUM 80 MG: 40 TABLET, FILM COATED ORAL at 20:14

## 2022-03-23 RX ADMIN — HYDROXYZINE HYDROCHLORIDE 25 MG: 25 TABLET ORAL at 12:59

## 2022-03-23 RX ADMIN — MEROPENEM 2 G: 1 INJECTION, POWDER, FOR SOLUTION INTRAVENOUS at 01:30

## 2022-03-23 RX ADMIN — VANCOMYCIN HYDROCHLORIDE 1000 MG: 1 INJECTION, SOLUTION INTRAVENOUS at 09:58

## 2022-03-23 ASSESSMENT — ACTIVITIES OF DAILY LIVING (ADL)
ADLS_ACUITY_SCORE: 6

## 2022-03-23 NOTE — PROGRESS NOTES
Progress Note    Assessment/Plan  Potential discharge tomorrow or Friday patient desires to be at home until he gets his formal below-knee amputation.  Infectious disease note reviewed    Active Problems:    Wound infection    Osteomyelitis of left foot (H)      Subjective  Comfortable  Objective    Vital signs in last 24 hours  Temp:  [97.9  F (36.6  C)-99.1  F (37.3  C)] 98.5  F (36.9  C)  Pulse:  [81-91] 87  Resp:  [18] 18  BP: (101-130)/(59-71) 104/59  SpO2:  [91 %-97 %] 93 %  Weight:   [unfilled]    Intake/Output last 3 shifts  I/O last 3 completed shifts:  In: 600 [P.O.:600]  Out: -   Intake/Output this shift:  I/O this shift:  In: 400 [P.O.:400]  Out: -       Physical Exam  Leg wrapped today.  Will on wrap reevaluation tomorrow.  Possible discharge tomorrow or 48 hours.    Pertinent Labs   Lab Results   Component Value Date    WBC 6.6 03/23/2022    HGB 8.3 (L) 03/23/2022    HCT 27.8 (L) 03/23/2022    MCV 79 03/23/2022     03/23/2022             Pertinent Radiology     [unfilled]        Ronald Bhatt MD

## 2022-03-23 NOTE — PROGRESS NOTES
Daily Progress Note        CODE STATUS:  Full Code    03/21/22  Assessment/Plan:  Norman Aguilar is a 61 year old male who presented with several months history of surgical interventions in his left lower extremity with initial debridement and subsequent multiple debridements of his left foot.  Initial Doppler studies revealed adequate perfusion of his left lower extremity and foot.  He underwent surgical interventions and he has had additional surgeries after his first debridement.  He is diabetic and he has history of atrial fibrillation. He has been followed in outpatient basis by infectious disease specialist with outpatient antibiotic therapy and wound care with intermittent VAC dressing changes or wet-to-dry dressing changes.  He was at infectious disease office 2 days ago and he had a fever in the office and was instructed to go to the emergency room but with the long wait, he went back home.  He presented himself to the emergency room, and got admitted.     Left foot infection/osteomyelitisseptic arthritis  -- With repeated I&D's.  Followed in the ID clinic by Dr. Durbin.  Recently had wound VAC placement and was started on IV Zosyn.  Repeat MRI on 3/18 showed signs of septic arthritis/osteomyelitis at and around the calcaneocuboid joint and subtalar joint.    -- Previous cultures have been polymicrobial.  Most recent cultures from January with Enterobacter, Enterococcus faecalis, corynebacterium striatum, Trueperella species and Pasteurella canis  -- ID following, on iv meropenem, vancomycin. 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    -- Likely secondary to Zosyn.  Total white cells and neutrophils have improved since stopping the Zosyn on 3/16.     Fever.    -- Low-grade fevers at home.  -- Blood cultures 3/18 : NGTD.  Patient also mentions increased GI  disturbances, that have improved.    -- No fevers here.     Transaminitis.    -- Possibly related to antibiotic.  Continue to monitor.     H/O atrial fibrillation:  -- Patient reports he has had afib only in the hospital when he was admitted for sepsis. He reports his PCP didn't think he needs AC. Patient is aware of high CHADS score and that he is at high risk for stroke if he has paroxysmal afib. Also he had a short segment of right calf DVT involving posterior tibial vein. Patient is not in favor of restarting the eliquis. Patient's nurse was in the room during this conversation  -- Cont BB    Chronic systolic CHF :   -- Euvolumic  -- Cont with home lasix     H/O Multivessel CAD, s/p NSTEMI  -- Seen by CV surgical service in the past. Plan to have CABG in next few months per cardiology note dated 1/6/22  -- Cont with cardiac meds- aspirin, lipitor and BB    DM2 :  Hypoglycemia:  -- Patient reports he never had hypoglycemia at home. This is due to change in his routine etc.   -- Decreased lantus to 30 units from 38 units. Cont with sliding scale insulin and mealtime novolog.     HLD :   -- On statin, aspirin     Anemia, chronic :   -- On iron tab      HTN, Essential :  -- Cont home meds      Disposition; Likely home tomorrow  Barrier to discharge; IV antibiotics. Awaiting consultants ok for discharge    Subjective:  Interval History: Patient seen and examined. Reports doing well. Denies pain.     Review of Systems:   As mentioned in subjective.    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)     Gastro-esophageal reflux disease with esophagitis     Gastroesophageal reflux disease without esophagitis     Hypercalcemia     Long term (current) use of oral hypoglycemic drugs     Microalbuminuria     Mixed  hyperlipidemia     Polyp of colon     Hypocalcemia     Atrial fibrillation (H)     Hyperglycemia     Wound infection     Diabetic ketoacidosis without coma associated with type 2 diabetes mellitus (H)     Ischemic cardiomyopathy     Heart failure with reduced ejection fraction (H)     Arteriosclerotic heart disease     DVT (deep venous thrombosis) (H)     Chronic kidney disease (CKD) stage G3a/A1, moderately decreased glomerular filtration rate (GFR) between 45-59 mL/min/1.73 square meter and albuminuria creatinine ratio less than 30 mg/g (H)     Diabetic ulcer of left midfoot associated with type 2 diabetes mellitus, with necrosis of bone (H)     Constipation     Acute non-ST segment elevation myocardial infarction (H)     Traumatic amputation of toe or toes without complication (H)     Osteomyelitis of left foot (H)       Scheduled Meds:    acetaminophen  975 mg Oral Q8H     aspirin  81 mg Oral Daily     atorvastatin  80 mg Oral Daily     ceFAZolin  2 g Intravenous Pre-Op/Pre-procedure x 1 dose     ceFAZolin  2 g Intravenous See Admin Instructions     enoxaparin ANTICOAGULANT  40 mg Subcutaneous Q24H     insulin aspart   Subcutaneous Daily with breakfast     insulin aspart   Subcutaneous Daily with lunch     insulin aspart   Subcutaneous Daily with supper     insulin aspart  1-7 Units Subcutaneous TID AC     insulin aspart  1-5 Units Subcutaneous At Bedtime     insulin glargine  30 Units Subcutaneous At Bedtime     lactated ringers  250 mL Intravenous Once     metoprolol succinate ER  50 mg Oral Daily     polyethylene glycol  17 g Oral Daily     senna-docusate  1 tablet Oral BID     sodium chloride (PF)  10-40 mL Intracatheter Q8H     vancomycin  1,000 mg Intravenous Q12H     Continuous Infusions:    PRN Meds:.[START ON 3/24/2022] acetaminophen, bisacodyl, glucose **OR** dextrose **OR** glucagon, HYDROmorphone **OR** HYDROmorphone, HYDROmorphone **OR** HYDROmorphone, hydrOXYzine, lidocaine 4%, lidocaine 4%,  lidocaine (buffered or not buffered), magnesium hydroxide, melatonin, naloxone **OR** naloxone **OR** naloxone **OR** naloxone, ondansetron **OR** ondansetron, prochlorperazine **OR** prochlorperazine, senna-docusate **OR** [DISCONTINUED] senna-docusate, sodium chloride (PF), sodium chloride (PF), sodium chloride (PF), sodium chloride (PF)    Objective:  Vital signs in last 24 hours:  Temp:  [97.9  F (36.6  C)-99.1  F (37.3  C)] 99.1  F (37.3  C)  Pulse:  [81-91] 91  Resp:  [18-20] 18  BP: (101-121)/(56-69) 121/69  SpO2:  [91 %-97 %] 95 %        Intake/Output Summary (Last 24 hours) at 3/21/2022 1310  Last data filed at 3/21/2022 1244  Gross per 24 hour   Intake 1234 ml   Output 610 ml   Net 624 ml       Physical Exam:    General: Not in obvious distress.  HEENT: NC, AT   Chest: Clear to auscultation bilaterally  Heart: S1S2 normal, regular. No M/R/G  Abdomen: Soft. NT, ND. Bowel sounds- active.  Extremity: Stump under dressing.   Neuro: Alert and awake, grossly non-focal      Lab Results:(I have personally reviewed the results)    Recent Results (from the past 24 hour(s))   Glucose by meter    Collection Time: 03/22/22  1:40 PM   Result Value Ref Range    GLUCOSE BY METER POCT 97 70 - 99 mg/dL   Glucose by meter    Collection Time: 03/22/22  6:14 PM   Result Value Ref Range    GLUCOSE BY METER POCT 134 (H) 70 - 99 mg/dL   Glucose by meter    Collection Time: 03/22/22 10:03 PM   Result Value Ref Range    GLUCOSE BY METER POCT 262 (H) 70 - 99 mg/dL   CBC with platelets    Collection Time: 03/23/22  7:24 AM   Result Value Ref Range    WBC Count 6.6 4.0 - 11.0 10e3/uL    RBC Count 3.52 (L) 4.40 - 5.90 10e6/uL    Hemoglobin 8.3 (L) 13.3 - 17.7 g/dL    Hematocrit 27.8 (L) 40.0 - 53.0 %    MCV 79 78 - 100 fL    MCH 23.6 (L) 26.5 - 33.0 pg    MCHC 29.9 (L) 31.5 - 36.5 g/dL    RDW 19.5 (H) 10.0 - 15.0 %    Platelet Count 431 150 - 450 10e3/uL   Basic metabolic panel    Collection Time: 03/23/22  7:24 AM   Result Value Ref  Range    Sodium 138 136 - 145 mmol/L    Potassium 4.5 3.5 - 5.0 mmol/L    Chloride 102 98 - 107 mmol/L    Carbon Dioxide (CO2) 28 22 - 31 mmol/L    Anion Gap 8 5 - 18 mmol/L    Urea Nitrogen 17 8 - 22 mg/dL    Creatinine 1.36 (H) 0.70 - 1.30 mg/dL    Calcium 9.0 8.5 - 10.5 mg/dL    Glucose 186 (H) 70 - 125 mg/dL    GFR Estimate 59 (L) >60 mL/min/1.73m2   Glucose by meter    Collection Time: 03/23/22  9:00 AM   Result Value Ref Range    GLUCOSE BY METER POCT 169 (H) 70 - 99 mg/dL       All laboratory and imaging data in the past 24 hours reviewed  Serum Glucose range:   Recent Labs   Lab 03/23/22  0900 03/23/22  0724 03/22/22  2203 03/22/22  1814   * 186* 262* 134*     ABG: No lab results found in last 7 days.  CBC:   Recent Labs   Lab 03/23/22  0724 03/22/22  0509 03/21/22  0516 03/19/22  0500 03/18/22  1344 03/18/22  1126   WBC 6.6 6.5 6.6 4.0  --  5.1   HGB 8.3* 8.4* 9.1* 8.0*   < > 6.6*   HCT 27.8* 27.1* 29.6* 25.3*  --  20.8*   MCV 79 79 77* 76*  --  77*    385 340 212  --  285   NEUTROPHIL  --   --  49 55  --  59   LYMPH  --   --  32 33  --  31   MONOCYTE  --   --  13 8  --  6   EOSINOPHIL  --   --  4 4  --  4    < > = values in this interval not displayed.     Chemistry:   Recent Labs   Lab 03/23/22  0724 03/22/22  0509 03/21/22  1334 03/21/22  0516 03/19/22  1337 03/19/22  0500 03/18/22  1126    139  --  137  --  135* 135*   POTASSIUM 4.5 4.0  --  4.0   < > 3.3* 3.4*   CHLORIDE 102 103  --  102  --  101 98   CO2 28 27  --  25  --  25 24   BUN 17 16  --  18  --  20 29*   CR 1.36* 1.14 1.13 1.17   < > 1.30 1.57*   GFRESTIMATED 59* 73 73 70   < > 62 50*   YOSVANY 9.0 8.6  --  9.0  --  8.4* 8.8   PROTTOTAL  --  6.3  --   --   --  6.3 7.1   ALBUMIN  --  1.9*  --   --   --  2.0* 2.2*   AST  --  38  --   --   --  103* 211*   ALT  --  41  --   --   --  69* 103*   ALKPHOS  --  113  --   --   --  124* 171*   BILITOTAL  --  0.4  --   --   --  0.4 0.4    < > = values in this interval not displayed.      Coags:  Recent Labs   Lab 03/18/22  1344   INR 1.47*     Cardiac Markers:  No results for input(s): CKTOTAL, TROPONINI in the last 168 hours.       MR Foot Left w/o & w Contrast    Result Date: 3/18/2022  EXAM: MR FOOT LEFT W/O and W CONTRAST LOCATION: [facility] DATE/TIME: 3/18/2022 12:13 PM INDICATION: Foot pain. Ulceration. Recent incision and drainage and partial amputation. COMPARISON: 02/17/2022 MRI of the ankle. TECHNIQUE: Routine. Additional postgadolinium T1 sequences were obtained. IV CONTRAST: 10mL Gadavist FINDINGS: JOINTS AND BONES: -Extensive partial amputation of the left foot, involving the medial and middle cuneiforms, navicular, head and neck of the talus, and great toe. Chronic nondisplaced fracture of the proximal portion of the second metatarsal shaft. No acute fracture. Large effusion within the calcaneocuboid joint. Extensive adjacent enhancing marrow edema most consistent with septic arthritis/osteomyelitis. Moderate degenerative changes at the fourth TMT joint. There are likely changes of septic arthritis/osteomyelitis at the level of the middle subtalar joint. TENDONS: -No tendon tear, tendinopathy, or tenosynovitis. MUSCLES AND SOFT TISSUES: -Prominent soft tissue defect along the medial aspect of the hind, mid and proximal forefoot with deep extension to the superficial margin of the middle subtalar joints. There is extensive surrounding cellulitis. No evidence of an abscess.     IMPRESSION: 1.  Extensive partial amputation of the foot as described, with a large soft tissue defect at the resection site. 2.  There is a large effusion in the calcaneocuboid joint with extensive adjacent enhancing marrow edema compatible with septic arthritis/osteomyelitis not significantly changed since the prior study. 3.  There are likely changes of septic arthritis/osteomyelitis at the level of the middle subtalar joint. 4.  No definite evidence of an abscess.    POC US  "Guidance Needle Placement    Result Date: 3/21/2022  Ultrasound was performed as guidance to an anesthesia procedure.  Click \"PACS images\" hyperlink below to view any stored images.  For specific procedure details, view procedure note authored by anesthesia.    POC US Guidance Needle Placement    Result Date: 3/21/2022  Ultrasound was performed as guidance to an anesthesia procedure.  Click \"PACS images\" hyperlink below to view any stored images.  For specific procedure details, view procedure note authored by anesthesia.    POC US Guided Vascular Access    Result Date: 3/21/2022  Ultrasound was performed as guidance to an anesthesia procedure.  Click \"PACS images\" hyperlink below to view any stored images.  For specific procedure details, view procedure note authored by anesthesia.      Latest radiology report personally reviewed.    Note created using dragon voice recognition software so sounds alike errors may have escaped editing.      03/23/2022   Anival Bucio MD  Hospitalist, [facility]  Pager: [phone]                "

## 2022-03-23 NOTE — PROGRESS NOTES
Care Management Follow Up    Length of Stay (days): 5    Expected Discharge Date: 03/24/2022     Concerns to be Addressed:       Patient plan of care discussed at interdisciplinary rounds: No    Anticipated Discharge Disposition: Home     Anticipated Discharge Services:  Non3 at this time  Anticipated Discharge DME:      Patient/family educated on Medicare website which has current facility and service quality ratings:    Education Provided on the Discharge Plan:    Patient/Family in Agreement with the Plan:      Referrals Placed by CM/SW:  None at time time  Private pay costs discussed: Not applicable    Additional Information:  [facility] ([phone]) to set up home care for possible RN visits, did not answer left a message. Unsure at this time if pt will need IV antibiotics. CM to follow for medical progression, recommendations, and final discharge plan.    Mehreen Sanchez RN

## 2022-03-23 NOTE — PLAN OF CARE
Problem: Fluid and Electrolyte Imbalance (Extremity Amputation)  Goal: Fluid and Electrolyte Balance  Outcome: Ongoing, Progressing   Goal Outcome Evaluation:      K 4.5 re check in am, hemoglobin 8.0   Wbc 6.6 denies pain moving well refuses need for PT   Problem: Impaired Wound Healing  Goal: Optimal Wound Healing  Outcome: Ongoing, Progressing  Intervention: Promote Wound Healing  Recent Flowsheet Documentation  Taken 3/23/2022 1315 by Eunice Sprague, RN  Activity Management: activity adjusted per tolerance  Taken 3/23/2022 0900 by Eunice Sprague, RN  Activity Management: activity adjusted per tolerance     Left leg elevated on pillows ace wrap in place small amount  Of bleeding on wrap patient unable to lay flat leg elevated on pillows  Dr offered PT to see patient declined feels he is walking around without  Trouble. Denies pain or need for pain medications. Lungs clear.  Blood sugars 169 201 takes a long time to eat hard to cover carbs he takes  Hours to eat.

## 2022-03-23 NOTE — PLAN OF CARE
Problem: Ongoing Anesthesia Effects (Extremity Amputation)  Goal: Anesthesia/Sedation Recovery  Intervention: Optimize Anesthesia Recovery  Recent Flowsheet Documentation  Taken 3/22/2022 2318 by Gume Huynh, RN  Safety Promotion/Fall Prevention: assistive device/personal items within reach  Taken 3/22/2022 2026 by Gume Huynh, RN  Safety Promotion/Fall Prevention: assistive device/personal items within reach   Patient denies pain for the shift and refused scheduled tylenol stating that he has a high pain tolerance. Wound has minor bleeding through the dressing patient has been elevating the leg on a pillow.    Patient set to finish antibiotics in the AM shift.    Glucose was 262 for HS. Redraw for K in the AM.

## 2022-03-24 VITALS
RESPIRATION RATE: 18 BRPM | DIASTOLIC BLOOD PRESSURE: 69 MMHG | HEART RATE: 86 BPM | WEIGHT: 211 LBS | BODY MASS INDEX: 27.08 KG/M2 | SYSTOLIC BLOOD PRESSURE: 146 MMHG | OXYGEN SATURATION: 94 % | HEIGHT: 74 IN | TEMPERATURE: 98.7 F

## 2022-03-24 LAB
ANION GAP SERPL CALCULATED.3IONS-SCNC: 7 MMOL/L (ref 5–18)
BUN SERPL-MCNC: 16 MG/DL (ref 8–22)
CALCIUM SERPL-MCNC: 9 MG/DL (ref 8.5–10.5)
CHLORIDE BLD-SCNC: 102 MMOL/L (ref 98–107)
CO2 SERPL-SCNC: 30 MMOL/L (ref 22–31)
CREAT SERPL-MCNC: 1.2 MG/DL (ref 0.7–1.3)
ERYTHROCYTE [DISTWIDTH] IN BLOOD BY AUTOMATED COUNT: 19.2 % (ref 10–15)
GFR SERPL CREATININE-BSD FRML MDRD: 68 ML/MIN/1.73M2
GLUCOSE BLD-MCNC: 108 MG/DL (ref 70–125)
GLUCOSE BLDC GLUCOMTR-MCNC: 114 MG/DL (ref 70–99)
GLUCOSE BLDC GLUCOMTR-MCNC: 117 MG/DL (ref 70–99)
HCT VFR BLD AUTO: 28.3 % (ref 40–53)
HGB BLD-MCNC: 8.5 G/DL (ref 13.3–17.7)
MCH RBC QN AUTO: 23.6 PG (ref 26.5–33)
MCHC RBC AUTO-ENTMCNC: 30 G/DL (ref 31.5–36.5)
MCV RBC AUTO: 79 FL (ref 78–100)
PLATELET # BLD AUTO: 478 10E3/UL (ref 150–450)
POTASSIUM BLD-SCNC: 4.2 MMOL/L (ref 3.5–5)
RBC # BLD AUTO: 3.6 10E6/UL (ref 4.4–5.9)
SODIUM SERPL-SCNC: 139 MMOL/L (ref 136–145)
WBC # BLD AUTO: 6.9 10E3/UL (ref 4–11)

## 2022-03-24 PROCEDURE — 250N000011 HC RX IP 250 OP 636: Performed by: SURGERY

## 2022-03-24 PROCEDURE — 250N000013 HC RX MED GY IP 250 OP 250 PS 637: Performed by: SURGERY

## 2022-03-24 PROCEDURE — 36415 COLL VENOUS BLD VENIPUNCTURE: CPT | Performed by: INTERNAL MEDICINE

## 2022-03-24 PROCEDURE — 85027 COMPLETE CBC AUTOMATED: CPT | Performed by: INTERNAL MEDICINE

## 2022-03-24 PROCEDURE — 82310 ASSAY OF CALCIUM: CPT | Performed by: INTERNAL MEDICINE

## 2022-03-24 PROCEDURE — 99239 HOSP IP/OBS DSCHRG MGMT >30: CPT | Performed by: INTERNAL MEDICINE

## 2022-03-24 RX ORDER — HYDROXYZINE HYDROCHLORIDE 25 MG/1
25 TABLET, FILM COATED ORAL EVERY 6 HOURS PRN
Qty: 30 TABLET | Refills: 0 | Status: SHIPPED | OUTPATIENT
Start: 2022-03-24 | End: 2022-08-23

## 2022-03-24 RX ADMIN — ENOXAPARIN SODIUM 40 MG: 40 INJECTION SUBCUTANEOUS at 09:32

## 2022-03-24 RX ADMIN — METOPROLOL SUCCINATE 50 MG: 50 TABLET, EXTENDED RELEASE ORAL at 09:33

## 2022-03-24 ASSESSMENT — ACTIVITIES OF DAILY LIVING (ADL)
ADLS_ACUITY_SCORE: 6

## 2022-03-24 NOTE — PROGRESS NOTES
Progress Note    Assessment/Plan  Patient desires discharge today.  We will bring him back in 7 to 10 days for formal left below-knee amputation.  Patient has been educated multiple times regarding need for supine position elevation of the extremity and Ace wrap to minimize swelling left lower extremity.  He has home health care nurse already in attendance.  At home daily dressing change and reapplication of Kerlix and Ace wrap is all that is necessary.    Active Problems:    Wound infection    Osteomyelitis of left foot (H)      Subjective  Patient without pain  Objective    Vital signs in last 24 hours  Temp:  [98.2  F (36.8  C)-99.1  F (37.3  C)] 98.7  F (37.1  C)  Pulse:  [86-91] 91  Resp:  [18] 18  BP: (104-130)/(59-71) 128/63  SpO2:  [93 %-95 %] 93 %  Weight:   [unfilled]    Intake/Output last 3 shifts  I/O last 3 completed shifts:  In: 400 [P.O.:400]  Out: -   Intake/Output this shift:  No intake/output data recorded.      Physical Exam  Wound is currently wrapped.  Edema has decreased.  Wrap in place with a Kerlix and Ace wrap.    Pertinent Labs   Lab Results   Component Value Date    WBC 6.6 03/23/2022    HGB 8.3 (L) 03/23/2022    HCT 27.8 (L) 03/23/2022    MCV 79 03/23/2022     03/23/2022             Pertinent Radiology     [unfilled]        Ronald Bhatt MD

## 2022-03-24 NOTE — PLAN OF CARE
"Goal Outcome Evaluation:    Plan of Care Reviewed With: patient     Overall Patient Progress: improving    Outcome Evaluation: Pt discharging today home.        Problem: Plan of Care - These are the overarching goals to be used throughout the patient stay.    Goal: Absence of Hospital-Acquired Illness or Injury  Outcome: Met  Intervention: Identify and Manage Fall Risk  Recent Flowsheet Documentation  Taken 3/24/2022 0900 by Ila Yarbrough, RN  Safety Promotion/Fall Prevention:    clutter free environment maintained    fall prevention program maintained    nonskid shoes/slippers when out of bed  Intervention: Prevent Skin Injury  Recent Flowsheet Documentation  Taken 3/24/2022 0900 by Ila Yarbrough, RN  Body Position: position changed independently  Intervention: Prevent and Manage VTE (Venous Thromboembolism) Risk  Recent Flowsheet Documentation  Taken 3/24/2022 0900 by Ila Yarbrough, RN  Activity Management: activity adjusted per tolerance     Pt denies pain    BP (!) 146/69 (BP Location: Left arm)   Pulse 86   Temp 98.7  F (37.1  C) (Oral)   Resp 18   Ht 1.88 m (6' 2\")   Wt 95.7 kg (211 lb)   SpO2 94%   BMI 27.09 kg/m       Pt discharge home today. All belongings taken with, discharge paperwork completed.  "

## 2022-03-24 NOTE — DISCHARGE SUMMARY
[facility] MEDICINE  DISCHARGE SUMMARY     Primary Care Physician: Jaquan Dickerson  Admission Date: 3/18/2022   Discharge Provider: AVA Manuel Discharge Date: 3/24/2022   Diet: diabetic diet   Code Status: Full Code   Activity: DCACTIVITY: Activity as tolerated        Condition at Discharge: Stable     REASON FOR PRESENTATION(See Admission Note for Details)   Left foot infection    PRINCIPAL & ACTIVE DISCHARGE DIAGNOSES     Active Problems:    Wound infection    Osteomyelitis of left foot (H)      PENDING LABS     Unresulted Labs Ordered in the Past 30 Days of this Admission     No orders found from 2/16/2022 to 3/19/2022.            PROCEDURES ( this hospitalization only)      Procedure(s):  Guillotine AMPUTATION, FOOT    RECOMMENDATIONS TO OUTPATIENT PROVIDER FOR F/U VISIT     Follow-up Appointments     Follow-up and recommended labs and tests       Follow up with primary care provider, Jaquan Dickerson, within 7 days  Follow up with Surgeon as planned for formal left BKA                 DISPOSITION     Home with home care    SUMMARY OF HOSPITAL COURSE:      Norman Aguilar is a 61 year old male who presented with several months history of surgical interventions in his left lower extremity with initial debridement and subsequent multiple debridements of his left foot.  Initial Doppler studies revealed adequate perfusion of his left lower extremity and foot.  He underwent surgical interventions and he has had additional surgeries after his first debridement.  He is diabetic and he has history of atrial fibrillation. He has been followed in outpatient basis by infectious disease specialist with outpatient antibiotic therapy and wound care with intermittent VAC dressing changes or wet-to-dry dressing changes.  He was at infectious disease office 2 days PTA and he had a fever in the office and was instructed to go to the emergency room but with the long  wait, he went back home.  He presented himself to the emergency room, and got admitted.      Left foot infection/osteomyelitisseptic arthritis  -- With repeated I&D's.  Followed in the ID clinic by Dr. Durbin.  Recently had wound VAC placement and was started on IV Zosyn.  Repeat MRI on 3/18 showed signs of septic arthritis/osteomyelitis at and around the calcaneocuboid joint and subtalar joint.    -- Previous cultures have been polymicrobial.  Most recent cultures from January with Enterobacter, Enterococcus faecalis, corynebacterium striatum, Trueperella species and Pasteurella canis  -- ID consulted. Treated empirically with n iv meropenem, vancomycin. Stopped all antibiotics after the amputation per ID  -- S/P left foot guillotine amputation 3/21/22. Patient to have definitive BKA in next several days per surgery     Leukopenia and neutropenia.    -- Likely secondary to Zosyn.  Total white cells and neutrophils have improved since stopping the Zosyn on 3/16.     Fever.    -- Low-grade fevers at home.  -- Blood cultures 3/18 : NGTD.  Patient also mentions increased GI disturbances, that have improved.    -- No fevers here.     Transaminitis.    -- Possibly related to antibiotic.  Continue to monitor.     H/O atrial fibrillation:  -- Patient reports he has had afib only in the hospital when he was admitted for sepsis. He reports his PCP didn't think he needs AC. Patient is aware of high CHADS score and that he is at high risk for stroke if he has paroxysmal afib. Also he had a short segment of right calf DVT involving posterior tibial vein. Patient is not in agreement of restarting the eliquis. Patient's nurse was in the room during this conversation  -- Cont BB     Chronic systolic CHF :   -- Euvolumic  -- Cont with home lasix     H/O Multivessel CAD, s/p NSTEMI  -- Seen by CV surgical service in the past. Plan to have CABG in next few months per cardiology note dated 1/6/22  -- Cont with cardiac meds- aspirin,  lipitor and BB     DM2 :  Hypoglycemia:  -- Patient reports he never had hypoglycemia at home. This is due to change in his routine etc.   -- Decreased lantus to 30 units from 38 units (while here). Patient reports his blood sugars would be ok when is at home with his usual routine.     HLD :   -- On statin, aspirin     Anemia, chronic :   -- On iron tab      HTN, Essential :   -- Cont home meds    Discharge Medications with Med changes:     Discharge Medication List as of 3/24/2022 12:41 PM      START taking these medications    Details   hydrOXYzine (ATARAX) 25 MG tablet Take 1 tablet (25 mg) by mouth every 6 hours as needed for anxiety or other (adjuvant pain), Disp-30 tablet, R-0, E-Prescribe         CONTINUE these medications which have NOT CHANGED    Details   aspirin (ASA) 81 MG chewable tablet Take 1 tablet (81 mg) by mouth daily, Disp-30 tablet, R-0, E-Prescribe      atorvastatin (LIPITOR) 80 MG tablet TAKE 1 TABLET(80 MG) BY MOUTH EVERY EVENING, Disp-90 tablet, R-1, E-Prescribe      ferrous sulfate (FEROSUL) 325 (65 Fe) MG tablet Take 1 tablet by mouth 2 times daily, Historical      furosemide (LASIX) 20 MG tablet TAKE 1 TABLET(20 MG) BY MOUTH DAILY, Disp-90 tablet, R-1, E-Prescribe      insulin aspart (NOVOLOG FLEXPEN) 100 UNIT/ML pen For  - 164 give 1 unit. For  - 189 give 2 units. For  - 214 give 3 units. For  - 239 give 4 units. For  - 264 give 5 units. For  - 289 give 6 units. For  - 314 give 7 units. For  - 339 give 8 units For BG 3 40 - 364 give 9 units For  - 389 give 10 units For  - 414 give 11 units For BG greater than or equal to 415 give 12 units, Disp-3 mL, R-3, E-Prescribe      insulin glargine (LANTUS SOLOSTAR) 100 UNIT/ML pen Inject 38 Units Subcutaneous every morning, Disp-3 mL, R-3, E-PrescribeIf Lantus is not covered by insurance, may substitute Basaglar at same dose and frequency.        magnesium oxide (MAG-OX) 400 MG tablet  Take 1 tablet (400 mg) by mouth daily, Disp-90 tablet, R-1, E-Prescribe      metoprolol succinate ER (TOPROL-XL) 50 MG 24 hr tablet TAKE 1 TABLET(50 MG) BY MOUTH DAILY, Disp-90 tablet, R-1, E-Prescribe      Multiple Vitamin (MULTI VITAMIN) TABS 1 tab(s), Historical      naproxen sodium (ANAPROX) 220 MG tablet Take 440 mg by mouth 2 times daily as needed for other (inflammation), Historical      OMEGA-3/DHA/EPA/FISH OIL (FISH OIL-OMEGA-3 FATTY ACIDS) 300-1,000 mg capsule Take 1 g by mouth daily , Historical      vitamin C (ASCORBIC ACID) 250 MG tablet Take 500 mg by mouth daily, Historical      blood glucose (NO BRAND SPECIFIED) test strip Use to test blood sugar 4 times daily or as directed., Disp-100 strip, R-3, E-PrescribeAccuchek Guide Strips      blood glucose monitoring (SOFTCLIX) lancets Use to test blood sugar 4 times daily., Disp-100 each, R-6, E-Prescribe         STOP taking these medications       apixaban ANTICOAGULANT (ELIQUIS) 5 MG tablet Comments:   Reason for Stopping:                     Rationale for medication changes:      Please see the summary above        Consults   Surgical service      Immunizations given this encounter     Most Recent Immunizations   Administered Date(s) Administered     COVID-CHAITANYA Coronel,[employer] (12+ Yrs) 01/06/2022           Anticoagulation Information      Recent INR results:   Recent Labs   Lab 03/18/22  1344   INR 1.47*     Warfarin doses (if applicable) or name of other anticoagulant: NA      SIGNIFICANT IMAGING FINDINGS     Results for orders placed or performed during the hospital encounter of 03/18/22   MR Foot Left w/o & w Contrast    Impression    IMPRESSION:  1.  Extensive partial amputation of the foot as described, with a large soft tissue defect at the resection site.    2.  There is a large effusion in the calcaneocuboid joint with extensive adjacent enhancing marrow edema compatible with septic arthritis/osteomyelitis not significantly changed since the prior  "study.    3.  There are likely changes of septic arthritis/osteomyelitis at the level of the middle subtalar joint.    4.  No definite evidence of an abscess.       SIGNIFICANT LABORATORY FINDINGS     Most Recent 3 CBC's:Recent Labs   Lab Test 03/24/22  0723 03/23/22  0724 03/22/22  0509   WBC 6.9 6.6 6.5   HGB 8.5* 8.3* 8.4*   MCV 79 79 79   * 431 385     Most Recent 3 BMP's:Recent Labs   Lab Test 03/24/22  1210 03/24/22  0756 03/24/22  0723 03/23/22  0900 03/23/22  0724 03/22/22  0557 03/22/22  0509   NA  --   --  139  --  138  --  139   POTASSIUM  --   --  4.2  --  4.5  --  4.0   CHLORIDE  --   --  102  --  102  --  103   CO2  --   --  30  --  28  --  27   BUN  --   --  16  --  17  --  16   CR  --   --  1.20  --  1.36*  --  1.14   ANIONGAP  --   --  7  --  8  --  9   YOSVANY  --   --  9.0  --  9.0  --  8.6   * 114* 108   < > 186*   < > 67*    < > = values in this interval not displayed.         Discharge Orders        Home Care Referral      Primary Care - Care Coordination Referral      Reason for your hospital stay    Foot osteomyelitis     Follow-up and recommended labs and tests     Follow up with primary care provider, Jaquan Dickerson, within 7 days  Follow up with Surgeon as planned for formal left BKA     Activity    Your activity upon discharge: activity as tolerated     Diet    Follow this diet upon discharge:       Regular Diet Adult       Examination   BP (!) 146/69 (BP Location: Left arm)   Pulse 86   Temp 98.7  F (37.1  C) (Oral)   Resp 18   Ht 1.88 m (6' 2\")   Wt 95.7 kg (211 lb)   SpO2 94%   BMI 27.09 kg/m       General: Not in obvious distress.  HEENT: NC, AT   Chest: Clear to auscultation bilaterally  Heart: S1S2 normal, regular. No M/R/G  Abdomen: Soft. NT, ND. Bowel sounds- active.  Extremity: Stump under dressing.   Neuro: Alert and awake, grossly non-focal    Please see EMR for more detailed significant labs, imaging, consultant notes etc.    I, AVA Manuel, " personally saw the patient today and spent greater than 30 minutes discharging this patient.    AVA Manuel  [facility]    CC:Jaquan Dickerson

## 2022-03-24 NOTE — PLAN OF CARE
Problem: Impaired Wound Healing  Goal: Optimal Wound Healing  Intervention: Promote Wound Healing  Recent Flowsheet Documentation  Taken 3/23/2022 1600 by Danny Meyers RN  Activity Management: activity adjusted per tolerance   Dressing C/D/I. Afebrile. Up independently with walker. Hops on right leg.   Denies pain.     Problem: Hyperglycemia  Goal: Blood Glucose Level Within Targeted Range  Outcome: Ongoing, Progressing   Dinner glucose was 228, had a later dinner.  HS glucose was 188.    Pt hoping to discharge home tomorrow.

## 2022-03-24 NOTE — PLAN OF CARE
Problem: Functional Ability Impaired (Extremity Amputation)  Goal: Optimal Functional Ability  Outcome: Ongoing, Progressing  Intervention: Optimize Functional Ability  Recent Flowsheet Documentation  Taken 3/24/2022 0038 by Candie Walker RN  Assistive Device Utilized: walker  Activity Management:   activity adjusted per tolerance   up ad jose  Activity Assistance Provided: independent  Taken 3/24/2022 0000 by Candie Walker, RN  Assistive Device Utilized: walker  Activity Management: activity adjusted per tolerance  Activity Assistance Provided: independent     Problem: Infection (Extremity Amputation)  Goal: Absence of Infection Signs and Symptoms  Outcome: Ongoing, Progressing     Problem: Pain (Extremity Amputation)  Goal: Acceptable Pain Control  Outcome: Ongoing, Progressing   Goal Outcome Evaluation:      Pt has denied pain this shift, refused scheduled dose of tylenol at 0600. Up independently in his room using a walker, and hopping on his right foot.

## 2022-03-24 NOTE — PROGRESS NOTES
Care Management Discharge Note    Discharge Date: 03/24/2022       Discharge Disposition: Home with resumption of [facility] services skilled  nursing    Discharge Services:  [facility] skilled nursing visits    Discharge DME: not at this time    Discharge Transportation:  Family, ride time 1300    Private pay costs discussed: Not applicable    PAS Confirmation Code: N/A   Patient/family educated on Medicare website which has current facility and service quality ratings:  N/A    Education Provided on the Discharge Plan:  Per team  Persons Notified of Discharge Plans: Yana, Director of Nursing at [facility]   Patient/Family in Agreement with the Plan: yes    Handoff Referral Completed: Yes    Additional Information:  Met with pt to confirm discharge plan; in agreement with discharging to home with [facility] skilled RN visits. Discharge orders to faxed to [phone]. Family transport.        Jaci Feldman RN

## 2022-03-24 NOTE — PLAN OF CARE
Goal Outcome Evaluation:               Outcome Evaluation: Home with [facility] skilled nursing visits, family transport.

## 2022-03-25 DIAGNOSIS — Z11.59 ENCOUNTER FOR SCREENING FOR OTHER VIRAL DISEASES: Primary | ICD-10-CM

## 2022-03-29 ENCOUNTER — TELEPHONE (OUTPATIENT)
Dept: ADMINISTRATIVE | Facility: CLINIC | Age: 62
End: 2022-03-29
Payer: COMMERCIAL

## 2022-03-29 NOTE — TELEPHONE ENCOUNTER
Mr Lauren let me know that he'll need about 2 months to recover from his above knee amputation surgery on 4/4/22 and heal sufficiently to be fitted with a prosthesis.  He encouraged me to call him then to arrange a clinic appointment to see Dr Winn regarding cardiac bypass surgery.

## 2022-04-01 ENCOUNTER — LAB (OUTPATIENT)
Dept: FAMILY MEDICINE | Facility: CLINIC | Age: 62
End: 2022-04-01
Attending: SURGERY
Payer: COMMERCIAL

## 2022-04-01 DIAGNOSIS — Z11.59 ENCOUNTER FOR SCREENING FOR OTHER VIRAL DISEASES: ICD-10-CM

## 2022-04-01 PROCEDURE — U0003 INFECTIOUS AGENT DETECTION BY NUCLEIC ACID (DNA OR RNA); SEVERE ACUTE RESPIRATORY SYNDROME CORONAVIRUS 2 (SARS-COV-2) (CORONAVIRUS DISEASE [COVID-19]), AMPLIFIED PROBE TECHNIQUE, MAKING USE OF HIGH THROUGHPUT TECHNOLOGIES AS DESCRIBED BY CMS-2020-01-R: HCPCS

## 2022-04-01 PROCEDURE — U0005 INFEC AGEN DETEC AMPLI PROBE: HCPCS

## 2022-04-02 LAB — SARS-COV-2 RNA RESP QL NAA+PROBE: NEGATIVE

## 2022-04-04 ENCOUNTER — HOSPITAL ENCOUNTER (INPATIENT)
Facility: HOSPITAL | Age: 62
LOS: 3 days | Discharge: HOME HEALTH | DRG: 617 | End: 2022-04-07
Attending: SURGERY | Admitting: SURGERY
Payer: COMMERCIAL

## 2022-04-04 ENCOUNTER — ANESTHESIA EVENT (OUTPATIENT)
Dept: SURGERY | Facility: HOSPITAL | Age: 62
DRG: 617 | End: 2022-04-04
Payer: COMMERCIAL

## 2022-04-04 ENCOUNTER — ANESTHESIA (OUTPATIENT)
Dept: SURGERY | Facility: HOSPITAL | Age: 62
DRG: 617 | End: 2022-04-04
Payer: COMMERCIAL

## 2022-04-04 DIAGNOSIS — K59.00 CONSTIPATION, UNSPECIFIED CONSTIPATION TYPE: ICD-10-CM

## 2022-04-04 DIAGNOSIS — M86.372 CHRONIC MULTIFOCAL OSTEOMYELITIS OF LEFT FOOT (H): Primary | ICD-10-CM

## 2022-04-04 PROBLEM — S98.912A: Status: ACTIVE | Noted: 2022-04-04

## 2022-04-04 PROBLEM — K63.5 POLYP OF COLON: Status: RESOLVED | Noted: 2018-04-17 | Resolved: 2022-04-04

## 2022-04-04 PROBLEM — Z79.4 TYPE 2 DIABETES MELLITUS, WITH LONG-TERM CURRENT USE OF INSULIN (H): Status: ACTIVE | Noted: 2018-11-17

## 2022-04-04 PROBLEM — N18.31 CHRONIC KIDNEY DISEASE (CKD) STAGE G3A/A1, MODERATELY DECREASED GLOMERULAR FILTRATION RATE (GFR) BETWEEN 45-59 ML/MIN/1.73 SQUARE METER AND ALBUMINURIA CREATININE RATIO LESS THAN 30 MG/G (H): Status: RESOLVED | Noted: 2021-11-29 | Resolved: 2022-04-04

## 2022-04-04 PROBLEM — I50.20 HEART FAILURE WITH REDUCED EJECTION FRACTION (H): Status: RESOLVED | Noted: 2021-11-29 | Resolved: 2022-04-04

## 2022-04-04 PROBLEM — E83.51 HYPOCALCEMIA: Status: RESOLVED | Noted: 2021-11-15 | Resolved: 2022-04-04

## 2022-04-04 PROBLEM — E11.9 TYPE 2 DIABETES MELLITUS, WITH LONG-TERM CURRENT USE OF INSULIN (H): Status: ACTIVE | Noted: 2018-11-17

## 2022-04-04 PROBLEM — R73.9 HYPERGLYCEMIA: Status: RESOLVED | Noted: 2021-11-15 | Resolved: 2022-04-04

## 2022-04-04 PROBLEM — F17.200 SMOKER: Status: ACTIVE | Noted: 2022-04-04

## 2022-04-04 PROBLEM — I50.22 CHRONIC SYSTOLIC CONGESTIVE HEART FAILURE (H): Status: ACTIVE | Noted: 2022-04-04

## 2022-04-04 PROBLEM — E11.39 DIABETIC OCULOPATHY ASSOCIATED WITH TYPE 2 DIABETES MELLITUS (H): Status: RESOLVED | Noted: 2021-11-14 | Resolved: 2022-04-04

## 2022-04-04 PROBLEM — L08.9 WOUND INFECTION: Status: RESOLVED | Noted: 2021-11-15 | Resolved: 2022-04-04

## 2022-04-04 PROBLEM — T14.8XXA WOUND INFECTION: Status: RESOLVED | Noted: 2021-11-15 | Resolved: 2022-04-04

## 2022-04-04 PROBLEM — K21.00 GASTRO-ESOPHAGEAL REFLUX DISEASE WITH ESOPHAGITIS: Status: ACTIVE | Noted: 2018-02-13

## 2022-04-04 PROBLEM — K21.9 GASTROESOPHAGEAL REFLUX DISEASE WITHOUT ESOPHAGITIS: Status: RESOLVED | Noted: 2018-04-17 | Resolved: 2022-04-04

## 2022-04-04 PROBLEM — E83.52 HYPERCALCEMIA: Status: RESOLVED | Noted: 2021-11-14 | Resolved: 2022-04-04

## 2022-04-04 PROBLEM — Z79.84 LONG TERM (CURRENT) USE OF ORAL HYPOGLYCEMIC DRUGS: Status: RESOLVED | Noted: 2021-11-14 | Resolved: 2022-04-04

## 2022-04-04 PROBLEM — I10 BENIGN ESSENTIAL HYPERTENSION: Status: ACTIVE | Noted: 2021-11-14

## 2022-04-04 PROBLEM — E78.5 HYPERLIPIDEMIA: Status: ACTIVE | Noted: 2021-11-14

## 2022-04-04 PROBLEM — E11.10 DIABETIC KETOACIDOSIS WITHOUT COMA ASSOCIATED WITH TYPE 2 DIABETES MELLITUS (H): Status: RESOLVED | Noted: 2021-11-15 | Resolved: 2022-04-04

## 2022-04-04 PROBLEM — I48.0 PAF (PAROXYSMAL ATRIAL FIBRILLATION) (H): Status: ACTIVE | Noted: 2021-11-15

## 2022-04-04 LAB
ABO/RH(D): NORMAL
ANTIBODY SCREEN: NEGATIVE
GLUCOSE BLDC GLUCOMTR-MCNC: 125 MG/DL (ref 70–99)
GLUCOSE BLDC GLUCOMTR-MCNC: 275 MG/DL (ref 70–99)
GLUCOSE BLDC GLUCOMTR-MCNC: 326 MG/DL (ref 70–99)
GLUCOSE BLDC GLUCOMTR-MCNC: 86 MG/DL (ref 70–99)
HOLD SPECIMEN: NORMAL
SPECIMEN EXPIRATION DATE: NORMAL

## 2022-04-04 PROCEDURE — 250N000013 HC RX MED GY IP 250 OP 250 PS 637: Performed by: SURGERY

## 2022-04-04 PROCEDURE — 258N000003 HC RX IP 258 OP 636: Performed by: SURGERY

## 2022-04-04 PROCEDURE — 0Y6J0Z1 DETACHMENT AT LEFT LOWER LEG, HIGH, OPEN APPROACH: ICD-10-PCS | Performed by: SURGERY

## 2022-04-04 PROCEDURE — 258N000003 HC RX IP 258 OP 636

## 2022-04-04 PROCEDURE — 86901 BLOOD TYPING SEROLOGIC RH(D): CPT

## 2022-04-04 PROCEDURE — 36415 COLL VENOUS BLD VENIPUNCTURE: CPT

## 2022-04-04 PROCEDURE — 120N000001 HC R&B MED SURG/OB

## 2022-04-04 PROCEDURE — P9041 ALBUMIN (HUMAN),5%, 50ML: HCPCS

## 2022-04-04 PROCEDURE — 99221 1ST HOSP IP/OBS SF/LOW 40: CPT | Performed by: INTERNAL MEDICINE

## 2022-04-04 PROCEDURE — 999N000141 HC STATISTIC PRE-PROCEDURE NURSING ASSESSMENT: Performed by: SURGERY

## 2022-04-04 PROCEDURE — 250N000025 HC SEVOFLURANE, PER MIN: Performed by: SURGERY

## 2022-04-04 PROCEDURE — 88307 TISSUE EXAM BY PATHOLOGIST: CPT | Mod: 26 | Performed by: PATHOLOGY

## 2022-04-04 PROCEDURE — 86923 COMPATIBILITY TEST ELECTRIC: CPT | Performed by: INTERNAL MEDICINE

## 2022-04-04 PROCEDURE — 272N000001 HC OR GENERAL SUPPLY STERILE: Performed by: SURGERY

## 2022-04-04 PROCEDURE — 88307 TISSUE EXAM BY PATHOLOGIST: CPT | Mod: TC | Performed by: SURGERY

## 2022-04-04 PROCEDURE — 250N000013 HC RX MED GY IP 250 OP 250 PS 637: Performed by: ANESTHESIOLOGY

## 2022-04-04 PROCEDURE — 250N000011 HC RX IP 250 OP 636

## 2022-04-04 PROCEDURE — 250N000011 HC RX IP 250 OP 636: Performed by: SURGERY

## 2022-04-04 PROCEDURE — 250N000009 HC RX 250

## 2022-04-04 PROCEDURE — 272N000004 HC RX 272: Performed by: SURGERY

## 2022-04-04 PROCEDURE — 250N000009 HC RX 250: Performed by: SURGERY

## 2022-04-04 PROCEDURE — 370N000017 HC ANESTHESIA TECHNICAL FEE, PER MIN: Performed by: SURGERY

## 2022-04-04 PROCEDURE — 710N000009 HC RECOVERY PHASE 1, LEVEL 1, PER MIN: Performed by: SURGERY

## 2022-04-04 PROCEDURE — 360N000076 HC SURGERY LEVEL 3, PER MIN: Performed by: SURGERY

## 2022-04-04 PROCEDURE — 88311 DECALCIFY TISSUE: CPT | Mod: 26 | Performed by: PATHOLOGY

## 2022-04-04 PROCEDURE — 250N000012 HC RX MED GY IP 250 OP 636 PS 637: Performed by: INTERNAL MEDICINE

## 2022-04-04 RX ORDER — ACETAMINOPHEN 325 MG/1
975 TABLET ORAL EVERY 8 HOURS
Status: DISCONTINUED | OUTPATIENT
Start: 2022-04-04 | End: 2022-04-07 | Stop reason: HOSPADM

## 2022-04-04 RX ORDER — SODIUM CHLORIDE, SODIUM LACTATE, POTASSIUM CHLORIDE, CALCIUM CHLORIDE 600; 310; 30; 20 MG/100ML; MG/100ML; MG/100ML; MG/100ML
INJECTION, SOLUTION INTRAVENOUS CONTINUOUS
Status: DISCONTINUED | OUTPATIENT
Start: 2022-04-04 | End: 2022-04-05

## 2022-04-04 RX ORDER — NALOXONE HYDROCHLORIDE 1 MG/ML
0.2 INJECTION INTRAMUSCULAR; INTRAVENOUS; SUBCUTANEOUS
Status: DISCONTINUED | OUTPATIENT
Start: 2022-04-04 | End: 2022-04-07 | Stop reason: HOSPADM

## 2022-04-04 RX ORDER — DEXTROSE MONOHYDRATE 25 G/50ML
25-50 INJECTION, SOLUTION INTRAVENOUS
Status: DISCONTINUED | OUTPATIENT
Start: 2022-04-04 | End: 2022-04-07 | Stop reason: HOSPADM

## 2022-04-04 RX ORDER — ONDANSETRON 4 MG/1
4 TABLET, ORALLY DISINTEGRATING ORAL EVERY 30 MIN PRN
Status: DISCONTINUED | OUTPATIENT
Start: 2022-04-04 | End: 2022-04-04 | Stop reason: HOSPADM

## 2022-04-04 RX ORDER — LIDOCAINE 40 MG/G
CREAM TOPICAL
Status: DISCONTINUED | OUTPATIENT
Start: 2022-04-04 | End: 2022-04-07 | Stop reason: HOSPADM

## 2022-04-04 RX ORDER — BACITRACIN ZINC 500 [USP'U]/G
OINTMENT TOPICAL PRN
Status: DISCONTINUED | OUTPATIENT
Start: 2022-04-04 | End: 2022-04-04

## 2022-04-04 RX ORDER — AMOXICILLIN 250 MG
1 CAPSULE ORAL 2 TIMES DAILY
Status: DISCONTINUED | OUTPATIENT
Start: 2022-04-04 | End: 2022-04-07 | Stop reason: HOSPADM

## 2022-04-04 RX ORDER — NALOXONE HYDROCHLORIDE 1 MG/ML
0.4 INJECTION INTRAMUSCULAR; INTRAVENOUS; SUBCUTANEOUS
Status: DISCONTINUED | OUTPATIENT
Start: 2022-04-04 | End: 2022-04-07 | Stop reason: HOSPADM

## 2022-04-04 RX ORDER — BISACODYL 10 MG
10 SUPPOSITORY, RECTAL RECTAL DAILY PRN
Status: DISCONTINUED | OUTPATIENT
Start: 2022-04-04 | End: 2022-04-07 | Stop reason: HOSPADM

## 2022-04-04 RX ORDER — ONDANSETRON 2 MG/ML
INJECTION INTRAMUSCULAR; INTRAVENOUS PRN
Status: DISCONTINUED | OUTPATIENT
Start: 2022-04-04 | End: 2022-04-04

## 2022-04-04 RX ORDER — SODIUM CHLORIDE 9 MG/ML
INJECTION, SOLUTION INTRAVENOUS CONTINUOUS
Status: DISCONTINUED | OUTPATIENT
Start: 2022-04-04 | End: 2022-04-05

## 2022-04-04 RX ORDER — SODIUM CHLORIDE, SODIUM LACTATE, POTASSIUM CHLORIDE, CALCIUM CHLORIDE 600; 310; 30; 20 MG/100ML; MG/100ML; MG/100ML; MG/100ML
INJECTION, SOLUTION INTRAVENOUS CONTINUOUS
Status: DISCONTINUED | OUTPATIENT
Start: 2022-04-04 | End: 2022-04-04 | Stop reason: HOSPADM

## 2022-04-04 RX ORDER — PROPOFOL 10 MG/ML
INJECTION, EMULSION INTRAVENOUS PRN
Status: DISCONTINUED | OUTPATIENT
Start: 2022-04-04 | End: 2022-04-04

## 2022-04-04 RX ORDER — HYDROMORPHONE HYDROCHLORIDE 4 MG/1
4 TABLET ORAL EVERY 4 HOURS PRN
Status: DISCONTINUED | OUTPATIENT
Start: 2022-04-04 | End: 2022-04-07 | Stop reason: HOSPADM

## 2022-04-04 RX ORDER — ACETAMINOPHEN 325 MG/1
650 TABLET ORAL EVERY 4 HOURS PRN
Status: DISCONTINUED | OUTPATIENT
Start: 2022-04-07 | End: 2022-04-07 | Stop reason: HOSPADM

## 2022-04-04 RX ORDER — MAGNESIUM HYDROXIDE 1200 MG/15ML
LIQUID ORAL PRN
Status: DISCONTINUED | OUTPATIENT
Start: 2022-04-04 | End: 2022-04-04

## 2022-04-04 RX ORDER — ONDANSETRON 2 MG/ML
4 INJECTION INTRAMUSCULAR; INTRAVENOUS EVERY 30 MIN PRN
Status: DISCONTINUED | OUTPATIENT
Start: 2022-04-04 | End: 2022-04-04 | Stop reason: HOSPADM

## 2022-04-04 RX ORDER — ONDANSETRON 4 MG/1
4 TABLET, ORALLY DISINTEGRATING ORAL EVERY 6 HOURS PRN
Status: DISCONTINUED | OUTPATIENT
Start: 2022-04-04 | End: 2022-04-07 | Stop reason: HOSPADM

## 2022-04-04 RX ORDER — POLYETHYLENE GLYCOL 3350 17 G/17G
17 POWDER, FOR SOLUTION ORAL DAILY
Status: DISCONTINUED | OUTPATIENT
Start: 2022-04-05 | End: 2022-04-07 | Stop reason: HOSPADM

## 2022-04-04 RX ORDER — PROCHLORPERAZINE MALEATE 10 MG
10 TABLET ORAL EVERY 6 HOURS PRN
Status: DISCONTINUED | OUTPATIENT
Start: 2022-04-04 | End: 2022-04-07 | Stop reason: HOSPADM

## 2022-04-04 RX ORDER — FENTANYL CITRATE 50 UG/ML
25 INJECTION, SOLUTION INTRAMUSCULAR; INTRAVENOUS EVERY 5 MIN PRN
Status: DISCONTINUED | OUTPATIENT
Start: 2022-04-04 | End: 2022-04-04 | Stop reason: HOSPADM

## 2022-04-04 RX ORDER — NICOTINE POLACRILEX 4 MG
15-30 LOZENGE BUCCAL
Status: DISCONTINUED | OUTPATIENT
Start: 2022-04-04 | End: 2022-04-07 | Stop reason: HOSPADM

## 2022-04-04 RX ORDER — HYDROMORPHONE HYDROCHLORIDE 2 MG/1
2 TABLET ORAL EVERY 4 HOURS PRN
Status: DISCONTINUED | OUTPATIENT
Start: 2022-04-04 | End: 2022-04-07 | Stop reason: HOSPADM

## 2022-04-04 RX ORDER — GLYCOPYRROLATE 0.2 MG/ML
INJECTION, SOLUTION INTRAMUSCULAR; INTRAVENOUS PRN
Status: DISCONTINUED | OUTPATIENT
Start: 2022-04-04 | End: 2022-04-04

## 2022-04-04 RX ORDER — CEFAZOLIN SODIUM 2 G/100ML
2 INJECTION, SOLUTION INTRAVENOUS EVERY 8 HOURS
Status: DISCONTINUED | OUTPATIENT
Start: 2022-04-04 | End: 2022-04-07 | Stop reason: HOSPADM

## 2022-04-04 RX ORDER — LIDOCAINE HYDROCHLORIDE 20 MG/ML
INJECTION, SOLUTION INFILTRATION; PERINEURAL PRN
Status: DISCONTINUED | OUTPATIENT
Start: 2022-04-04 | End: 2022-04-04

## 2022-04-04 RX ORDER — CEFAZOLIN SODIUM/WATER 2 G/20 ML
2 SYRINGE (ML) INTRAVENOUS SEE ADMIN INSTRUCTIONS
Status: DISCONTINUED | OUTPATIENT
Start: 2022-04-04 | End: 2022-04-04 | Stop reason: CLARIF

## 2022-04-04 RX ORDER — ALBUMIN, HUMAN INJ 5% 5 %
SOLUTION INTRAVENOUS CONTINUOUS PRN
Status: DISCONTINUED | OUTPATIENT
Start: 2022-04-04 | End: 2022-04-04

## 2022-04-04 RX ORDER — SODIUM CHLORIDE, SODIUM LACTATE, POTASSIUM CHLORIDE, CALCIUM CHLORIDE 600; 310; 30; 20 MG/100ML; MG/100ML; MG/100ML; MG/100ML
INJECTION, SOLUTION INTRAVENOUS CONTINUOUS PRN
Status: DISCONTINUED | OUTPATIENT
Start: 2022-04-04 | End: 2022-04-04

## 2022-04-04 RX ORDER — OXYCODONE HYDROCHLORIDE 5 MG/1
5 TABLET ORAL EVERY 4 HOURS PRN
Status: DISCONTINUED | OUTPATIENT
Start: 2022-04-04 | End: 2022-04-04

## 2022-04-04 RX ORDER — CEFAZOLIN SODIUM/WATER 2 G/20 ML
2 SYRINGE (ML) INTRAVENOUS
Status: COMPLETED | OUTPATIENT
Start: 2022-04-04 | End: 2022-04-04

## 2022-04-04 RX ORDER — FENTANYL CITRATE 50 UG/ML
INJECTION, SOLUTION INTRAMUSCULAR; INTRAVENOUS PRN
Status: DISCONTINUED | OUTPATIENT
Start: 2022-04-04 | End: 2022-04-04

## 2022-04-04 RX ORDER — ONDANSETRON 2 MG/ML
4 INJECTION INTRAMUSCULAR; INTRAVENOUS EVERY 6 HOURS PRN
Status: DISCONTINUED | OUTPATIENT
Start: 2022-04-04 | End: 2022-04-07 | Stop reason: HOSPADM

## 2022-04-04 RX ORDER — DEXAMETHASONE SODIUM PHOSPHATE 10 MG/ML
INJECTION, SOLUTION INTRAMUSCULAR; INTRAVENOUS PRN
Status: DISCONTINUED | OUTPATIENT
Start: 2022-04-04 | End: 2022-04-04

## 2022-04-04 RX ORDER — EPHEDRINE SULFATE 50 MG/ML
INJECTION, SOLUTION INTRAMUSCULAR; INTRAVENOUS; SUBCUTANEOUS PRN
Status: DISCONTINUED | OUTPATIENT
Start: 2022-04-04 | End: 2022-04-04

## 2022-04-04 RX ADMIN — PHENYLEPHRINE HYDROCHLORIDE 100 MCG: 10 INJECTION INTRAVENOUS at 12:33

## 2022-04-04 RX ADMIN — PHENYLEPHRINE HYDROCHLORIDE 100 MCG: 10 INJECTION INTRAVENOUS at 12:40

## 2022-04-04 RX ADMIN — INSULIN ASPART 6 UNITS: 100 INJECTION, SOLUTION INTRAVENOUS; SUBCUTANEOUS at 18:52

## 2022-04-04 RX ADMIN — PHENYLEPHRINE HYDROCHLORIDE 100 MCG: 10 INJECTION INTRAVENOUS at 11:58

## 2022-04-04 RX ADMIN — Medication 5 MG: at 11:52

## 2022-04-04 RX ADMIN — HYDROMORPHONE HYDROCHLORIDE 0.2 MG: 1 INJECTION, SOLUTION INTRAMUSCULAR; INTRAVENOUS; SUBCUTANEOUS at 21:24

## 2022-04-04 RX ADMIN — CEFAZOLIN SODIUM 2 G: 2 INJECTION, SOLUTION INTRAVENOUS at 18:55

## 2022-04-04 RX ADMIN — SODIUM CHLORIDE, POTASSIUM CHLORIDE, SODIUM LACTATE AND CALCIUM CHLORIDE: 600; 310; 30; 20 INJECTION, SOLUTION INTRAVENOUS at 12:15

## 2022-04-04 RX ADMIN — ROCURONIUM BROMIDE 50 MG: 50 INJECTION, SOLUTION INTRAVENOUS at 11:16

## 2022-04-04 RX ADMIN — PHENYLEPHRINE HYDROCHLORIDE 200 MCG: 10 INJECTION INTRAVENOUS at 11:30

## 2022-04-04 RX ADMIN — ACETAMINOPHEN 975 MG: 325 TABLET ORAL at 16:38

## 2022-04-04 RX ADMIN — PHENYLEPHRINE HYDROCHLORIDE 100 MCG: 10 INJECTION INTRAVENOUS at 12:49

## 2022-04-04 RX ADMIN — PROPOFOL 180 MG: 10 INJECTION, EMULSION INTRAVENOUS at 11:16

## 2022-04-04 RX ADMIN — INSULIN GLARGINE 38 UNITS: 100 INJECTION, SOLUTION SUBCUTANEOUS at 21:21

## 2022-04-04 RX ADMIN — ACETAMINOPHEN 975 MG: 325 TABLET ORAL at 22:39

## 2022-04-04 RX ADMIN — PHENYLEPHRINE HYDROCHLORIDE 100 MCG: 10 INJECTION INTRAVENOUS at 12:30

## 2022-04-04 RX ADMIN — GLYCOPYRROLATE 0.2 MG: 0.2 INJECTION, SOLUTION INTRAMUSCULAR; INTRAVENOUS at 11:16

## 2022-04-04 RX ADMIN — DEXAMETHASONE SODIUM PHOSPHATE 10 MG: 10 INJECTION, SOLUTION INTRAMUSCULAR; INTRAVENOUS at 11:16

## 2022-04-04 RX ADMIN — PHENYLEPHRINE HYDROCHLORIDE 100 MCG: 10 INJECTION INTRAVENOUS at 12:52

## 2022-04-04 RX ADMIN — SODIUM CHLORIDE: 9 INJECTION, SOLUTION INTRAVENOUS at 14:07

## 2022-04-04 RX ADMIN — SODIUM CHLORIDE, POTASSIUM CHLORIDE, SODIUM LACTATE AND CALCIUM CHLORIDE: 600; 310; 30; 20 INJECTION, SOLUTION INTRAVENOUS at 11:08

## 2022-04-04 RX ADMIN — Medication 2 G: at 11:26

## 2022-04-04 RX ADMIN — PROPOFOL 20 MG: 10 INJECTION, EMULSION INTRAVENOUS at 12:38

## 2022-04-04 RX ADMIN — ONDANSETRON 4 MG: 2 INJECTION INTRAMUSCULAR; INTRAVENOUS at 12:09

## 2022-04-04 RX ADMIN — PHENYLEPHRINE HYDROCHLORIDE 100 MCG: 10 INJECTION INTRAVENOUS at 12:18

## 2022-04-04 RX ADMIN — FENTANYL CITRATE 100 MCG: 50 INJECTION, SOLUTION INTRAMUSCULAR; INTRAVENOUS at 11:16

## 2022-04-04 RX ADMIN — ALBUMIN HUMAN: 0.05 INJECTION, SOLUTION INTRAVENOUS at 12:14

## 2022-04-04 RX ADMIN — LIDOCAINE HYDROCHLORIDE 60 MG: 20 INJECTION, SOLUTION INFILTRATION; PERINEURAL at 11:16

## 2022-04-04 RX ADMIN — PHENYLEPHRINE HYDROCHLORIDE 200 MCG: 10 INJECTION INTRAVENOUS at 12:09

## 2022-04-04 RX ADMIN — PHENYLEPHRINE HYDROCHLORIDE 100 MCG: 10 INJECTION INTRAVENOUS at 11:40

## 2022-04-04 RX ADMIN — PHENYLEPHRINE HYDROCHLORIDE 100 MCG: 10 INJECTION INTRAVENOUS at 11:52

## 2022-04-04 RX ADMIN — PHENYLEPHRINE HYDROCHLORIDE 100 MCG: 10 INJECTION INTRAVENOUS at 12:22

## 2022-04-04 RX ADMIN — Medication 5 MG: at 12:04

## 2022-04-04 RX ADMIN — SODIUM CHLORIDE: 9 INJECTION, SOLUTION INTRAVENOUS at 22:36

## 2022-04-04 RX ADMIN — OXYCODONE HYDROCHLORIDE 5 MG: 5 TABLET ORAL at 14:40

## 2022-04-04 RX ADMIN — PHENYLEPHRINE HYDROCHLORIDE 100 MCG: 10 INJECTION INTRAVENOUS at 12:26

## 2022-04-04 RX ADMIN — SUGAMMADEX 200 MG: 100 INJECTION, SOLUTION INTRAVENOUS at 12:59

## 2022-04-04 RX ADMIN — ALBUMIN HUMAN: 0.05 INJECTION, SOLUTION INTRAVENOUS at 11:30

## 2022-04-04 RX ADMIN — MIDAZOLAM 1 MG: 1 INJECTION INTRAMUSCULAR; INTRAVENOUS at 12:19

## 2022-04-04 RX ADMIN — Medication 5 MG: at 11:30

## 2022-04-04 RX ADMIN — Medication 5 MG: at 11:58

## 2022-04-04 RX ADMIN — ALBUMIN HUMAN: 0.05 INJECTION, SOLUTION INTRAVENOUS at 12:02

## 2022-04-04 RX ADMIN — PHENYLEPHRINE HYDROCHLORIDE 100 MCG: 10 INJECTION INTRAVENOUS at 12:04

## 2022-04-04 ASSESSMENT — ACTIVITIES OF DAILY LIVING (ADL)
ADLS_ACUITY_SCORE: 3
ADLS_ACUITY_SCORE: 3
ADLS_ACUITY_SCORE: 7
ADLS_ACUITY_SCORE: 3
ADLS_ACUITY_SCORE: 3
ADLS_ACUITY_SCORE: 12
ADLS_ACUITY_SCORE: 3
ADLS_ACUITY_SCORE: 3
ADLS_ACUITY_SCORE: 7
ADLS_ACUITY_SCORE: 3
ADLS_ACUITY_SCORE: 7
ADLS_ACUITY_SCORE: 3
ADLS_ACUITY_SCORE: 7

## 2022-04-04 NOTE — ANESTHESIA PROCEDURE NOTES
Airway       Patient location during procedure: OR       Procedure Start/Stop Times: 4/4/2022 11:17 AM  Staff -        Other Anesthesia Staff: Carine Love       Performed By: SRNA  Consent for Airway        Urgency: elective  Indications and Patient Condition       Indications for airway management: leticia-procedural       Induction type:intravenous       Mask difficulty assessment: 2 - vent by mask + OA or adjuvant +/- NMBA    Final Airway Details       Final airway type: endotracheal airway       Successful airway: ETT - single  Endotracheal Airway Details        ETT size (mm): 8.0       Cuffed: yes       Successful intubation technique: direct laryngoscopy and video laryngoscopy       VL Blade Size: Glidescope 4       Grade View of Cords: 1       Adjucts: stylet       Position: Right       Measured from: gums/teeth       Secured at (cm): 22       Bite block used: None    Post intubation assessment        Placement verified by: capnometry, equal breath sounds and chest rise        Number of attempts at approach: 1       Number of other approaches attempted: 0       Secured with: silk tape       Ease of procedure: easy       Dentition: Intact and Unchanged

## 2022-04-04 NOTE — ANESTHESIA POSTPROCEDURE EVALUATION
Patient: Norman Aguilar    Procedure: Procedure(s):  LEFT BELOW  KNEE AMPUTATION       Anesthesia Type:  General    Note:  Disposition: Admission   Postop Pain Control: Uneventful            Sign Out: Well controlled pain   PONV: No   Neuro/Psych: Uneventful            Sign Out: Acceptable/Baseline neuro status   Airway/Respiratory: Uneventful            Sign Out: Acceptable/Baseline resp. status   CV/Hemodynamics: Uneventful            Sign Out: Acceptable CV status; No obvious hypovolemia; No obvious fluid overload   Other NRE: NONE   DID A NON-ROUTINE EVENT OCCUR? No           Last vitals:  Vitals Value Taken Time   /59 04/04/22 1345   Temp 36.9  C (98.5  F) 04/04/22 1309   Pulse 105 04/04/22 1350   Resp 17 04/04/22 1350   SpO2 96 % 04/04/22 1350   Vitals shown include unvalidated device data.    Electronically Signed By: Haja Jerome MD  April 4, 2022  1:51 PM

## 2022-04-04 NOTE — CONSULTS
[facility]    History and Physical  Hospitalist       Date of Admission:  4/4/2022    Assessment & Plan   61 year old male with past medical hx of DM type 2, CAD, HTN and Osteomyelitis left foot, who I am asked to see by Dr. Bhatt to help manage medical problems associated with left BKA today:    Summary:    Principal Problem:    Osteomyelitis of left foot -- S/P Left BKA 4/4/22      PAF on eliquis   -- Eliquis currently held       DM type 2 on insulin, w Neuro -- Hgb A1C 7.6 on 1/19/22    Active Problems:    Benign essential hypertension      Gastro-esophageal reflux disease with esophagitis      Hyperlipidemia        CAD -- diffuse calcified vessels 11/16/21 (no stents placed)       Chronic systolic CHF -- EF 30-35% on Echo 11/15/21      Smoker (Cigars)       Plan: discussed with patient and nurse, continue postop care    DVT Prophylaxis: Pneumatic Compression Devices  Code Status: Full Code    Disposition: Expected discharge in 3-4 days    [place]  Pager: [phone]  Cell Phone:  [phone]     Primary Care Physician   Jaquan Dickerson    Chief Complaint   Left BKA today    History is obtained from Patient    History of Present Illness   61 year old male who I am asked to see to manage medical problems after left BKA today for left foot osteomyelitis.  He has type 2 DM x 30 yrs, on insulin, and CAD with NSTEMI recently when went into afib with RVR, angiogram with severe CAD Nov 2021 but no stents placed, and EF of 30-35%, and he continues to occasionally smoke cigars.      PAST MEDICAL HISTORY    Past Medical History:   Diagnosis Date     Anemia      Chronic systolic CHF (congestive heart failure) (H)      Coronary artery disease      Diabetic neuropathy (H)      DM type 2 w Neuropathy      GERD (gastroesophageal reflux disease)      Hyperlipidemia      Hypertension      Intermittent atrial fibrillation (H)     on Eliquis     Ischemic cardiomyopathy 11/16/2021     Echo with EF of 30-35%     NSTEMI (non-ST elevated myocardial infarction) (H) 11/15/2021     Osteomyelitis of left foot (H) 04/04/2022     Renal disease      Retinopathy      Sleep disturbance         PAST SURGICAL HISTORY    Past Surgical History:   Procedure Laterality Date     AMPUTATE FOOT Left 3/21/2022    Procedure: Guillotine AMPUTATION, FOOT;  Surgeon: Ronald Bhatt MD;  Location: [facility] OR     AMPUTATE TOE(S) Left 11/16/2021    Procedure: first and second ray amputation;  Surgeon: Eddi Ram DPM;  Location: [facility] OR     APPENDECTOMY       CV CORONARY ANGIOGRAM N/A 11/16/2021    Procedure: CV CORONARY ANGIOGRAM;  Surgeon: Pam Tabares MD;  Location: [facility] CATH LAB CV     CV LEFT HEART CATH N/A 11/16/2021    Procedure: Left Heart Cath;  Surgeon: Pam Tabares MD;  Location: [facility] CATH LAB CV     FOOT SURGERY Left      HERNIA REPAIR       INCISION AND DRAINAGE LOWER EXTREMITY, COMBINED Left 11/16/2021    Procedure: INCISION AND DRAINAGE, left foot;  Surgeon: Eddi Ram DPM;  Location: [facility] OR     INCISION AND DRAINAGE LOWER EXTREMITY, COMBINED Left 11/19/2021    Procedure: INCISION AND DRAINAGE, left foot;  Surgeon: Eddi Ram DPM;  Location: [facility] OR     INCISION AND DRAINAGE LOWER EXTREMITY, COMBINED Left 12/9/2021    Procedure: INCISION AND DRAINAGE, Left foot;  Surgeon: Eddi Ram DPM;  Location: [facility] OR     INCISION AND DRAINAGE LOWER EXTREMITY, COMBINED Left 1/10/2022    Procedure: INCISION AND DRAINAGE, left foot;  Surgeon: Eddi Ram DPM;  Location: [facility] OR     INCISION AND DRAINAGE LOWER EXTREMITY, COMBINED Left 1/27/2022    Procedure: INCISION AND DRAINAGE, Left foot;  Surgeon: Eddi Ram DPM;  Location: [facility] OR        Prior to Admission Medications   Prior to Admission Medications   Prescriptions Last Dose Informant Patient Reported? Taking?   Multiple  Vitamin (MULTI VITAMIN) TABS 4/3/2022 at am  Yes Yes   Sig: Take 1 tablet by mouth daily    OMEGA-3/DHA/EPA/FISH OIL (FISH OIL-OMEGA-3 FATTY ACIDS) 300-1,000 mg capsule 4/3/2022 at am  Yes Yes   Sig: Take 1 g by mouth daily    aspirin (ASA) 81 MG chewable tablet 4/3/2022 at pm  No Yes   Sig: Take 1 tablet (81 mg) by mouth daily   atorvastatin (LIPITOR) 80 MG tablet 4/3/2022 at pm  No Yes   Sig: TAKE 1 TABLET(80 MG) BY MOUTH EVERY EVENING   blood glucose (NO BRAND SPECIFIED) test strip   No No   Sig: Use to test blood sugar 4 times daily or as directed.   blood glucose monitoring (SOFTCLIX) lancets   No No   Sig: Use to test blood sugar 4 times daily.   furosemide (LASIX) 20 MG tablet 4/3/2022 at am  No Yes   Sig: TAKE 1 TABLET(20 MG) BY MOUTH DAILY   hydrOXYzine (ATARAX) 25 MG tablet not recently  No Yes   Sig: Take 1 tablet (25 mg) by mouth every 6 hours as needed for anxiety or other (adjuvant pain)   insulin aspart (NOVOLOG FLEXPEN) 100 UNIT/ML pen 4/3/2022  No Yes   Sig: For  - 164 give 1 unit. For  - 189 give 2 units. For  - 214 give 3 units. For  - 239 give 4 units. For  - 264 give 5 units. For  - 289 give 6 units. For  - 314 give 7 units. For  - 339 give 8 units For  - 364 give 9 units For  - 389 give 10 units For  - 414 give 11 units For BG greater than or equal to 415 give 12 units   insulin glargine (LANTUS SOLOSTAR) 100 UNIT/ML pen 4/3/2022 at pm(38 units)  No Yes   Sig: Inject 38 Units Subcutaneous every morning   Patient taking differently: Inject 38 Units Subcutaneous At Bedtime    magnesium oxide (MAG-OX) 400 MG tablet 4/3/2022 at pm  No Yes   Sig: Take 1 tablet (400 mg) by mouth daily   metoprolol succinate ER (TOPROL-XL) 50 MG 24 hr tablet 4/3/2022 at 0900  No Yes   Sig: TAKE 1 TABLET(50 MG) BY MOUTH DAILY   naproxen sodium (ANAPROX) 220 MG tablet >week ago  Yes Yes   Sig: Take 440 mg by mouth 2 times daily as needed for other  (inflammation)   vitamin C (ASCORBIC ACID) 250 MG tablet 4/3/2022 at am  Yes Yes   Sig: Take 500 mg by mouth daily      Facility-Administered Medications: None     Allergies   No Known Allergies    SOCIAL HISTORY    Social History     Social History Narrative    Single, currently not working, has done numerous jobs, lives alone.  Full code. (last updated 4/4/2022)       Social History     Tobacco Use     Smoking status: Current Some Day Smoker     Types: Cigars     Smokeless tobacco: Never Used     Tobacco comment: Cigars   Vaping Use     Vaping Use: Never used   Substance Use Topics     Alcohol use: Yes     Comment: < 1 drink a month (only holidays)     Drug use: Never        FAMILY HISTORY    History reviewed. No pertinent family history.     Review of Systems   The 10 point Review of Systems is negative other than noted in the HPI or here.       PHYSICAL EXAM     Temp: 98.6  F (37  C) Temp src: Oral BP: 116/57 Pulse: 107   Resp: 14 SpO2: 100 % O2 Device: None (Room air)    Vital Signs with Ranges  Temp:  [97.3  F (36.3  C)-98.7  F (37.1  C)] 98.6  F (37  C)  Pulse:  [101-115] 107  Resp:  [13-27] 14  BP: (109-154)/(57-79) 116/57  SpO2:  [95 %-100 %] 100 %  205 lbs 0 oz    Constitutional: Awake, alert, cooperative, no apparent distress.  Eyes: Conjunctiva and pupils examined and normal.  HEENT: Moist mucous membranes, normal dentition.  Respiratory: Clear to auscultation bilaterally, no crackles or wheezing.  Cardiovascular: Regular rate and rhythm, normal S1 and S2, and no murmur noted, no carotid bruits.  No right ankle edema.   GI: Soft, non-distended, non-tender, normal bowel sounds.  Lymph/Hematologic: No anterior cervical, supraclavicular or axillary adenopathy.  Skin: No rashes, no cyanosis.   Musculoskeletal: No joint swelling, erythema or tenderness.  Left lower leg amputated and wrapped.   Neurologic: Alert, Ox3, Cranial nerves 2-12 intact, no focal weakness, decreased light touch in right  foot  Psychiatric:  No obvious anxiety or depression.    Data   Data reviewed today:  I personally reviewed no EKG today.   Recent Labs   Lab 04/04/22  1250 04/04/22  0904   * 86       Imaging:  No results found for this or any previous visit (from the past 24 hour(s)).

## 2022-04-04 NOTE — OP NOTE
OPERATIVE REPORT    Norman Aguilar   [facility]  Medical Record #:  [id number]  YOB: 1960  Age:  61 year old    PROCEDURE DATE:  4/4/2022    PREOPERATIVE DIAGNOSIS: Patient status post left leg guillotine amputation at the ankle for gangrenous left foot    POSTOPERATIVE DIAGNOSIS: Same    PROCEDURE: Left below-knee amputation with immediate casting    OPERATING SURGEON:  Ronald Mireles MD    ASSISTANT: Technician    ANESTHESIA: General    DESCRIPTION OF PROCEDURE: With the patient in supine position and general anesthesia having reviewed the risk benefits complications of surgical invention with him the left lower extremity is prepped in usual sterile fashion.  Appropriate markings are made 7 cm below the tibial plateau and posterior flap constructed and sharp dissection used to divide the skin subcutaneous tissue electrocautery and suture ligatures used for hemostasis.  Soft tissue flap is completely divided and the tibia and fibula divided with the power saw.  The tibia was smoothed down the anterior surface and bone wax applied to tibia and fibula.  The soft tissue flap was then fashioned and brought and anterior and secured with good coverage and support of the tibia and fibula.  Time was taken to assure hemostasis is obtained.  The skin is closed with surgical steel staples.  Immediate casting is applied the leg in a straight position without plaster casting be applied with Ace wrap.  The estimated blood loss 75 cc.  Patient tolerated the procedure well discharged PAR stable condition.  Sponge and counts correct x2.    EBL:  [unfilled]    SPECIMENS:    ID Type Source Tests Collected by Time Destination   1 : LEFT LEG BELOW THE KNEE AMPUTATION Tissue Leg, Below Knee, Left SURGICAL PATHOLOGY EXAM Ronald Mireles MD 4/4/2022 12:06 PM        Ronald mireles md  [facility] [place]

## 2022-04-04 NOTE — ANESTHESIA CARE TRANSFER NOTE
Patient: Norman Aguilar    Procedure: Procedure(s):  LEFT BELOW  KNEE AMPUTATION       Diagnosis: Gangrene of left foot (H) [I96]  Diagnosis Additional Information: No value filed.    Anesthesia Type:   General     Note:      Level of Consciousness: awake  Oxygen Supplementation: face mask    Independent Airway: airway patency satisfactory and stable  Dentition: dentition unchanged  Vital Signs Stable: post-procedure vital signs reviewed and stable  Report to RN Given: handoff report given  Patient transferred to: PACU    Handoff Report: Identifed the Patient, Identified the Reponsible Provider, Reviewed the pertinent medical history, Discussed the surgical course, Reviewed Intra-OP anesthesia mangement and issues during anesthesia, Set expectations for post-procedure period and Allowed opportunity for questions and acknowledgement of understanding      Vitals:  Vitals Value Taken Time   /79 04/04/22 1307   Temp 98.5    Pulse 109 04/04/22 1307   Resp 17 04/04/22 1307   SpO2 100 % 04/04/22 1307   Vitals shown include unvalidated device data.    Electronically Signed By: JUNI Alejandro CRNA  April 4, 2022  1:08 PM

## 2022-04-04 NOTE — PROGRESS NOTES
Progress Note    Assessment/Plan  We will proceed with left below-knee amputation today.  No interval change cardiac or respiratory from time of discharge from hospital last week after patient underwent guillotine amputation.    Active Problems:    * No active hospital problems. *      Subjective  Comfortable  Objective    Vital signs in last 24 hours  Temp:  [98.7  F (37.1  C)] 98.7  F (37.1  C)  Pulse:  [107] 107  Resp:  [20] 20  BP: (154)/(72) 154/72  SpO2:  [99 %] 99 %  Weight:   [unfilled]    Intake/Output last 3 shifts  No intake/output data recorded.  Intake/Output this shift:  No intake/output data recorded.      Physical Exam  Ace wrap in place left below-knee amputation Tatian site at the ankle mortise.  Minimal swelling of the leg.  Lungs without abnormality heart rate 80/min regular    Pertinent Labs   Lab Results   Component Value Date    WBC 6.9 03/24/2022    HGB 8.5 (L) 03/24/2022    HCT 28.3 (L) 03/24/2022    MCV 79 03/24/2022     (H) 03/24/2022             Pertinent Radiology     [unfilled]        Ronald Bhatt MD

## 2022-04-04 NOTE — ANESTHESIA PREPROCEDURE EVALUATION
Anesthesia Pre-Procedure Evaluation    Patient: Norman Aguilar   MRN: [id number] : 1960        Procedure : Procedure(s):  AMPUTATION, FOOT          Past Medical History:   Diagnosis Date     Anemia      Atrial fibrillation (H)      Chronic systolic CHF (congestive heart failure) (H)      Coronary artery disease      Diabetes (H)      Diabetic neuropathy (H)      Foot infection      GERD (gastroesophageal reflux disease)      Hyperlipidemia      Hypertension      Intermittent atrial fibrillation (H)      Ischemic cardiomyopathy      NSTEMI (non-ST elevated myocardial infarction) (H)      Osteomyelitis (H)      Renal disease      Retinopathy      Sleep disturbance       Past Surgical History:   Procedure Laterality Date     AMPUTATE FOOT Left 3/21/2022    Procedure: Guillotine AMPUTATION, FOOT;  Surgeon: Ronald Bhatt MD;  Location: [facility] OR     AMPUTATE TOE(S) Left 2021    Procedure: first and second ray amputation;  Surgeon: Eddi Ram DPM;  Location: [facility] OR     APPENDECTOMY       CV CORONARY ANGIOGRAM N/A 2021    Procedure: CV CORONARY ANGIOGRAM;  Surgeon: Pam Tabares MD;  Location: [facility] CATH LAB CV     CV LEFT HEART CATH N/A 2021    Procedure: Left Heart Cath;  Surgeon: Pam Tabares MD;  Location: [facility] CATH LAB CV     FOOT SURGERY Left      HERNIA REPAIR       INCISION AND DRAINAGE LOWER EXTREMITY, COMBINED Left 2021    Procedure: INCISION AND DRAINAGE, left foot;  Surgeon: Eddi Ram DPM;  Location: [facility] OR     INCISION AND DRAINAGE LOWER EXTREMITY, COMBINED Left 2021    Procedure: INCISION AND DRAINAGE, left foot;  Surgeon: Eddi Ram DPM;  Location: [facility] OR     INCISION AND DRAINAGE LOWER EXTREMITY, COMBINED Left 2021    Procedure: INCISION AND DRAINAGE, Left foot;  Surgeon: Eddi Ram DPM;  Location: [facility] OR     INCISION AND DRAINAGE LOWER EXTREMITY,  COMBINED Left 1/10/2022    Procedure: INCISION AND DRAINAGE, left foot;  Surgeon: Eddi Ram DPM;  Location: [facility] OR     INCISION AND DRAINAGE LOWER EXTREMITY, COMBINED Left 1/27/2022    Procedure: INCISION AND DRAINAGE, Left foot;  Surgeon: Eddi Ram DPM;  Location: [facility] OR      No Known Allergies   Social History     Tobacco Use     Smoking status: Current Some Day Smoker     Types: Cigars     Smokeless tobacco: Never Used     Tobacco comment: Cigars   Substance Use Topics     Alcohol use: Yes     Comment: occas.      Wt Readings from Last 1 Encounters:   04/04/22 93 kg (205 lb)        Anesthesia Evaluation   Pt has had prior anesthetic.     No history of anesthetic complications       ROS/MED HX  ENT/Pulmonary:       Neurologic:       Cardiovascular:     (+) hypertension--CAD ---    METS/Exercise Tolerance:     Hematologic:       Musculoskeletal:       GI/Hepatic:     (+) GERD,     Renal/Genitourinary:     (+) renal disease,     Endo:     (+) type I DM,     Psychiatric/Substance Use:       Infectious Disease:       Malignancy:       Other:            Physical Exam    Airway        Mallampati: II    Neck ROM: full     Respiratory Devices and Support         Dental  no notable dental history         Cardiovascular   cardiovascular exam normal          Pulmonary   pulmonary exam normal                OUTSIDE LABS:  CBC:   Lab Results   Component Value Date    WBC 6.9 03/24/2022    WBC 6.6 03/23/2022    HGB 8.5 (L) 03/24/2022    HGB 8.3 (L) 03/23/2022    HCT 28.3 (L) 03/24/2022    HCT 27.8 (L) 03/23/2022     (H) 03/24/2022     03/23/2022     BMP:   Lab Results   Component Value Date     03/24/2022     03/23/2022    POTASSIUM 4.2 03/24/2022    POTASSIUM 4.5 03/23/2022    CHLORIDE 102 03/24/2022    CHLORIDE 102 03/23/2022    CO2 30 03/24/2022    CO2 28 03/23/2022    BUN 16 03/24/2022    BUN 17 03/23/2022    CR 1.20 03/24/2022    CR 1.36 (H) 03/23/2022     GLC 86 04/04/2022     (H) 03/24/2022     COAGS:   Lab Results   Component Value Date    PTT 55 (H) 11/15/2021    INR 1.47 (H) 03/18/2022     POC: No results found for: BGM, HCG, HCGS  HEPATIC:   Lab Results   Component Value Date    ALBUMIN 1.9 (L) 03/22/2022    PROTTOTAL 6.3 03/22/2022    ALT 41 03/22/2022    AST 38 03/22/2022    ALKPHOS 113 03/22/2022    BILITOTAL 0.4 03/22/2022     OTHER:   Lab Results   Component Value Date    LACT 0.9 03/18/2022    A1C 7.6 (H) 01/19/2022    YOSVANY 9.0 03/24/2022    PHOS 2.8 12/01/2021    MAG 2.0 01/06/2022    LIPASE 27 11/17/2018    CRP 18.5 (H) 03/18/2022     (H) 03/18/2022       Anesthesia Plan    ASA Status:  3      Anesthesia Type: General.     - Airway: ETT   Induction: Intravenous.           Consents    Anesthesia Plan(s) and associated risks, benefits, and realistic alternatives discussed. Questions answered and patient/representative(s) expressed understanding.    - Discussed:     - Discussed with:  Patient         Postoperative Care            Comments:                    Haja Jerome MD

## 2022-04-04 NOTE — PHARMACY-ADMISSION MEDICATION HISTORY
Pharmacy Note - Admission Medication History    Pertinent Provider Information: none   ______________________________________________________________________    Prior To Admission (PTA) med list completed and updated in EMR.       PTA Med List   Medication Sig Last Dose     aspirin (ASA) 81 MG chewable tablet Take 1 tablet (81 mg) by mouth daily 4/3/2022 at pm     atorvastatin (LIPITOR) 80 MG tablet TAKE 1 TABLET(80 MG) BY MOUTH EVERY EVENING 4/3/2022 at pm     furosemide (LASIX) 20 MG tablet TAKE 1 TABLET(20 MG) BY MOUTH DAILY 4/3/2022 at am     hydrOXYzine (ATARAX) 25 MG tablet Take 1 tablet (25 mg) by mouth every 6 hours as needed for anxiety or other (adjuvant pain) not recently     insulin aspart (NOVOLOG FLEXPEN) 100 UNIT/ML pen For  - 164 give 1 unit. For  - 189 give 2 units. For  - 214 give 3 units. For  - 239 give 4 units. For  - 264 give 5 units. For  - 289 give 6 units. For  - 314 give 7 units. For  - 339 give 8 units For  - 364 give 9 units For  - 389 give 10 units For  - 414 give 11 units For BG greater than or equal to 415 give 12 units 4/3/2022     insulin glargine (LANTUS SOLOSTAR) 100 UNIT/ML pen Inject 38 Units Subcutaneous every morning (Patient taking differently: Inject 38 Units Subcutaneous At Bedtime ) 4/3/2022 at pm(38 units)     magnesium oxide (MAG-OX) 400 MG tablet Take 1 tablet (400 mg) by mouth daily 4/3/2022 at pm     metoprolol succinate ER (TOPROL-XL) 50 MG 24 hr tablet TAKE 1 TABLET(50 MG) BY MOUTH DAILY 4/3/2022 at 0900     Multiple Vitamin (MULTI VITAMIN) TABS Take 1 tablet by mouth daily  4/3/2022 at am     naproxen sodium (ANAPROX) 220 MG tablet Take 440 mg by mouth 2 times daily as needed for other (inflammation) >week ago     OMEGA-3/DHA/EPA/FISH OIL (FISH OIL-OMEGA-3 FATTY ACIDS) 300-1,000 mg capsule Take 1 g by mouth daily  4/3/2022 at am     vitamin C (ASCORBIC ACID) 250 MG tablet Take 500 mg by mouth daily  4/3/2022 at am       Information source(s): Patient, Clinic records and [facility]    Method of interview communication: in-person    Patient was asked about OTC/herbal products specifically.  PTA med list reflects this.    Based on the pharmacist's assessment, the PTA med list information appears reliable    Allergies were reviewed, assessed, and updated with the patient.      Patient does not use any multi-dose medications prior to admission.     Thank you for the opportunity to participate in the care of this patient.      Ajay Enriquez [facility]     4/4/2022     9:42 AM

## 2022-04-05 LAB
ANION GAP SERPL CALCULATED.3IONS-SCNC: 12 MMOL/L (ref 5–18)
BUN SERPL-MCNC: 22 MG/DL (ref 8–22)
CALCIUM SERPL-MCNC: 8.7 MG/DL (ref 8.5–10.5)
CHLORIDE BLD-SCNC: 103 MMOL/L (ref 98–107)
CO2 SERPL-SCNC: 23 MMOL/L (ref 22–31)
CREAT SERPL-MCNC: 1.19 MG/DL (ref 0.7–1.3)
ERYTHROCYTE [DISTWIDTH] IN BLOOD BY AUTOMATED COUNT: 18.6 % (ref 10–15)
GFR SERPL CREATININE-BSD FRML MDRD: 69 ML/MIN/1.73M2
GLUCOSE BLD-MCNC: 215 MG/DL (ref 70–125)
GLUCOSE BLDC GLUCOMTR-MCNC: 172 MG/DL (ref 70–99)
GLUCOSE BLDC GLUCOMTR-MCNC: 187 MG/DL (ref 70–99)
GLUCOSE BLDC GLUCOMTR-MCNC: 211 MG/DL (ref 70–99)
GLUCOSE BLDC GLUCOMTR-MCNC: 240 MG/DL (ref 70–99)
HCT VFR BLD AUTO: 23 % (ref 40–53)
HGB BLD-MCNC: 7.1 G/DL (ref 13.3–17.7)
MCH RBC QN AUTO: 23.5 PG (ref 26.5–33)
MCHC RBC AUTO-ENTMCNC: 30.9 G/DL (ref 31.5–36.5)
MCV RBC AUTO: 76 FL (ref 78–100)
PLATELET # BLD AUTO: 317 10E3/UL (ref 150–450)
POTASSIUM BLD-SCNC: 4.4 MMOL/L (ref 3.5–5)
RBC # BLD AUTO: 3.02 10E6/UL (ref 4.4–5.9)
SODIUM SERPL-SCNC: 138 MMOL/L (ref 136–145)
WBC # BLD AUTO: 9.3 10E3/UL (ref 4–11)

## 2022-04-05 PROCEDURE — 250N000011 HC RX IP 250 OP 636: Performed by: SURGERY

## 2022-04-05 PROCEDURE — 250N000013 HC RX MED GY IP 250 OP 250 PS 637: Performed by: INTERNAL MEDICINE

## 2022-04-05 PROCEDURE — 99232 SBSQ HOSP IP/OBS MODERATE 35: CPT | Performed by: INTERNAL MEDICINE

## 2022-04-05 PROCEDURE — 120N000001 HC R&B MED SURG/OB

## 2022-04-05 PROCEDURE — 36415 COLL VENOUS BLD VENIPUNCTURE: CPT | Performed by: INTERNAL MEDICINE

## 2022-04-05 PROCEDURE — 250N000013 HC RX MED GY IP 250 OP 250 PS 637: Performed by: SURGERY

## 2022-04-05 PROCEDURE — 258N000003 HC RX IP 258 OP 636: Performed by: SURGERY

## 2022-04-05 PROCEDURE — 85027 COMPLETE CBC AUTOMATED: CPT | Performed by: INTERNAL MEDICINE

## 2022-04-05 PROCEDURE — 80048 BASIC METABOLIC PNL TOTAL CA: CPT | Performed by: INTERNAL MEDICINE

## 2022-04-05 RX ORDER — ATORVASTATIN CALCIUM 40 MG/1
80 TABLET, FILM COATED ORAL EVERY EVENING
Status: DISCONTINUED | OUTPATIENT
Start: 2022-04-05 | End: 2022-04-07 | Stop reason: HOSPADM

## 2022-04-05 RX ORDER — MULTIVITAMIN,THERAPEUTIC
1 TABLET ORAL DAILY
Status: DISCONTINUED | OUTPATIENT
Start: 2022-04-05 | End: 2022-04-07 | Stop reason: HOSPADM

## 2022-04-05 RX ORDER — ASPIRIN 81 MG/1
81 TABLET, CHEWABLE ORAL DAILY
Status: DISCONTINUED | OUTPATIENT
Start: 2022-04-05 | End: 2022-04-07 | Stop reason: HOSPADM

## 2022-04-05 RX ORDER — NAPROXEN SODIUM 220 MG
220 TABLET ORAL 2 TIMES DAILY PRN
Status: DISCONTINUED | OUTPATIENT
Start: 2022-04-05 | End: 2022-04-07 | Stop reason: HOSPADM

## 2022-04-05 RX ORDER — HYDROXYZINE HYDROCHLORIDE 25 MG/1
25 TABLET, FILM COATED ORAL EVERY 6 HOURS PRN
Status: DISCONTINUED | OUTPATIENT
Start: 2022-04-05 | End: 2022-04-07 | Stop reason: HOSPADM

## 2022-04-05 RX ORDER — METOPROLOL SUCCINATE 25 MG/1
50 TABLET, EXTENDED RELEASE ORAL DAILY
Status: DISCONTINUED | OUTPATIENT
Start: 2022-04-05 | End: 2022-04-05

## 2022-04-05 RX ADMIN — CEFAZOLIN SODIUM 2 G: 2 INJECTION, SOLUTION INTRAVENOUS at 10:36

## 2022-04-05 RX ADMIN — CEFAZOLIN SODIUM 2 G: 2 INJECTION, SOLUTION INTRAVENOUS at 19:16

## 2022-04-05 RX ADMIN — SODIUM CHLORIDE: 9 INJECTION, SOLUTION INTRAVENOUS at 06:34

## 2022-04-05 RX ADMIN — HYDROMORPHONE HYDROCHLORIDE 2 MG: 2 TABLET ORAL at 00:19

## 2022-04-05 RX ADMIN — ASPIRIN 81 MG CHEWABLE TABLET 81 MG: 81 TABLET CHEWABLE at 21:43

## 2022-04-05 RX ADMIN — ACETAMINOPHEN 975 MG: 325 TABLET ORAL at 06:30

## 2022-04-05 RX ADMIN — INSULIN ASPART 3 UNITS: 100 INJECTION, SOLUTION INTRAVENOUS; SUBCUTANEOUS at 09:19

## 2022-04-05 RX ADMIN — INSULIN ASPART 2 UNITS: 100 INJECTION, SOLUTION INTRAVENOUS; SUBCUTANEOUS at 13:54

## 2022-04-05 RX ADMIN — HYDROMORPHONE HYDROCHLORIDE 4 MG: 4 TABLET ORAL at 19:16

## 2022-04-05 RX ADMIN — INSULIN GLARGINE 38 UNITS: 100 INJECTION, SOLUTION SUBCUTANEOUS at 21:46

## 2022-04-05 RX ADMIN — HYDROXYZINE HYDROCHLORIDE 25 MG: 25 TABLET, FILM COATED ORAL at 15:00

## 2022-04-05 RX ADMIN — ACETAMINOPHEN 975 MG: 325 TABLET ORAL at 14:53

## 2022-04-05 RX ADMIN — CEFAZOLIN SODIUM 2 G: 2 INJECTION, SOLUTION INTRAVENOUS at 02:55

## 2022-04-05 RX ADMIN — THERA TABS 1 TABLET: TAB at 13:59

## 2022-04-05 RX ADMIN — ATORVASTATIN CALCIUM 80 MG: 40 TABLET, FILM COATED ORAL at 21:43

## 2022-04-05 RX ADMIN — ACETAMINOPHEN 975 MG: 325 TABLET ORAL at 23:59

## 2022-04-05 RX ADMIN — DOCUSATE SODIUM 50 MG AND SENNOSIDES 8.6 MG 1 TABLET: 8.6; 5 TABLET, FILM COATED ORAL at 19:16

## 2022-04-05 ASSESSMENT — ACTIVITIES OF DAILY LIVING (ADL)
ADLS_ACUITY_SCORE: 5
ADLS_ACUITY_SCORE: 7
ADLS_ACUITY_SCORE: 5
ADLS_ACUITY_SCORE: 7
ADLS_ACUITY_SCORE: 5
ADLS_ACUITY_SCORE: 7
ADLS_ACUITY_SCORE: 5
ADLS_ACUITY_SCORE: 7
ADLS_ACUITY_SCORE: 5
ADLS_ACUITY_SCORE: 7
ADLS_ACUITY_SCORE: 5
ADLS_ACUITY_SCORE: 7
ADLS_ACUITY_SCORE: 5

## 2022-04-05 NOTE — PROGRESS NOTES
[facility]    Hospitalist Progress Note    Assessment & Plan   61 year old male who was admitted on 4/4/2022 for elective left BKA related to extensive osteomyelitis involving left foot:     Impression:   Principal Problem:    Osteomyelitis of left foot -- S/P Left BKA 4/4/22   -- POD #1, doing well       DM type 2 on insulin, w Neuro -- Hgb A1C 7.6 on 1/19/22   -- mild hyperglycemia (suspect 2nd to Decadron 10 mg IV with prior to surgery)   -- will add Novolog 1 unit for 7 gms of carbs (as he does at home)    Active Problems:    Benign essential hypertension   -- hold Metoprolol for now given low normal BP      Gastro-esophageal reflux disease with esophagitis      Hyperlipidemia      PAF (paroxysmal atrial fibrillation) -- on Eliquis    -- will restart tomorrow if OK with Dr. Bhatt      CAD -- diffuse calcified vessels 11/16/21 (no stents placed)        Chronic systolic CHF -- EF 30-35% on Echo 11/15/21   -- Lasix 20 mg daily, holding for now      Smoker (Cigars) -- encouraged smoking cessation after discharge      Plan:  As above    DVT Prophylaxis: Pneumatic Compression Devices  Code Status: Full Code    Disposition: Expected discharge in ?2 days (will discuss with Dr. Bhatt)    [facility]  Pager [phone]  Cell Phone [phone]  Text Page (7am to 6pm)    Interval History   Doing well, rates his left BKA leg pain at 1 out of 10.      Physical Exam   Temp: 98.4  F (36.9  C) Temp src: Oral BP: 119/64 Pulse: 110   Resp: 19 SpO2: 95 % O2 Device: None (Room air)    Vitals:    04/04/22 0948   Weight: 93 kg (205 lb)     Vital Signs with Ranges  Temp:  [97.3  F (36.3  C)-98.7  F (37.1  C)] 98.4  F (36.9  C)  Pulse:  [101-115] 110  Resp:  [13-19] 19  BP: (113-144)/(57-71) 119/64  SpO2:  [95 %-100 %] 95 %  I/O last 3 completed shifts:  In: 3635 [I.V.:3135]  Out: 2800 [Urine:2725; Blood:75]    # Pain Assessment:  Current Pain Score 4/5/2022   Patient currently in pain? yes    Pain score (0-10) -   - Norman is experiencing pain due to BKA. Pain management was discussed and the plan was created in a collaborative fashion.  Norman's response to the current recommendations: engaged  - Please see the plan for pain management as documented above    Constitutional: Awake, alert, cooperative, no apparent distress  Respiratory: Clear to auscultation bilaterally, no crackles or wheezing  Cardiovascular: Regular rate and rhythm, normal S1 and S2, and no murmur noted  GI: Normal bowel sounds, soft, non-distended, non-tender  Extrem: No calf tenderness, no right ankle edema. Left BKA wrapped with splint   Neuro: Ox3, no focal motor deficits, slight decreased light touch in left foot     Medications       acetaminophen  975 mg Oral Q8H     aspirin  81 mg Oral Daily     atorvastatin  80 mg Oral QPM     ceFAZolin  2 g Intravenous Q8H     insulin aspart   Subcutaneous TID AC     insulin aspart  1-10 Units Subcutaneous TID AC     insulin aspart  1-7 Units Subcutaneous At Bedtime     insulin glargine  38 Units Subcutaneous At Bedtime     multivitamin, therapeutic  1 tablet Oral Daily     polyethylene glycol  17 g Oral Daily     senna-docusate  1 tablet Oral BID     sodium chloride (PF)  3 mL Intracatheter Q8H     sodium chloride (PF)  3 mL Intracatheter Q8H       Data   Recent Labs   Lab 04/05/22  1318 04/05/22  1119 04/05/22  0911   WBC  --  9.3  --    HGB  --  7.1*  --    MCV  --  76*  --    PLT  --  317  --    NA  --  138  --    POTASSIUM  --  4.4  --    CHLORIDE  --  103  --    CO2  --  23  --    BUN  --  22  --    CR  --  1.19  --    ANIONGAP  --  12  --    YOSVANY  --  8.7  --    * 215* 211*       Imaging:   No results found for this or any previous visit (from the past 24 hour(s)).

## 2022-04-05 NOTE — PROGRESS NOTES
Progress Note    Assessment/Plan  Dressing will be changed tomorrow.  Fitting for plastic support in anticipation of discharge will be done tomorrow by prosthetic specialist.  Cautioned patient that he needs to remain supine with his leg elevated above his heart without a Beasley's pelvis in order to help minimize swelling in his amputation site.  Principal Problem:    Osteomyelitis of left foot -- S/P Left BKA 4/4/22  Active Problems:    DM type 2 on insulin, w Neuro -- Hgb A1C 7.6 on 1/19/22    Benign essential hypertension    Gastro-esophageal reflux disease with esophagitis    Hyperlipidemia    PAF (paroxysmal atrial fibrillation) -- on Eliquis     CAD -- diffuse calcified vessels 11/16/21 (no stents placed)    Amputation of left foot (H)    Chronic systolic CHF -- EF 30-35% on Echo 11/15/21    Smoker (Cigars)      Subjective  Reasonably comfortable  Objective    Vital signs in last 24 hours  Temp:  [97.3  F (36.3  C)-98.7  F (37.1  C)] 98.4  F (36.9  C)  Pulse:  [101-115] 110  Resp:  [13-27] 19  BP: (109-144)/(57-79) 119/64  SpO2:  [95 %-100 %] 95 %  Weight:   [unfilled]    Intake/Output last 3 shifts  I/O last 3 completed shifts:  In: 3635 [I.V.:3135]  Out: 2800 [Urine:2725; Blood:75]  Intake/Output this shift:  I/O this shift:  In: 516 [I.V.:516]  Out: 1475 [Urine:1475]      Physical Exam  Leg wrapped    Pertinent Labs   Lab Results   Component Value Date    WBC 9.3 04/05/2022    HGB 7.1 (L) 04/05/2022    HCT 23.0 (L) 04/05/2022    MCV 76 (L) 04/05/2022     04/05/2022             Pertinent Radiology     [unfilled]        Ronald Bhatt MD

## 2022-04-05 NOTE — PLAN OF CARE
Problem: Mobility Impairment  Goal: Optimal Mobility  Outcome: Ongoing, Not Progressing   Goal Outcome Evaluation:        Bedrest at present. POD 1 left BKA.      Problem: Pain Acute  Goal: Acceptable Pain Control and Functional Ability  Outcome: Ongoing, Progressing  Intervention: Develop Pain Management Plan  Recent Flowsheet Documentation  Taken 4/5/2022 0019 by Laura Levy, RN  Pain Management Interventions: medication (see MAR)  Intervention: Prevent or Manage Pain  Recent Flowsheet Documentation  Taken 4/5/2022 0019 by Laura Levy RN  Medication Review/Management: medications reviewed    Moderate pain managed with oral dilaudid. Pt declined ice pack when offered.    Problem: Urinary Retention  Goal: Effective Urinary Elimination  Outcome: Ongoing, Progressing    Has voided x 2 into bedside urinal. Urinary output = 1950 ml.

## 2022-04-05 NOTE — PLAN OF CARE
Problem: Mobility Impairment  Goal: Optimal Mobility  Outcome: Ongoing, Progressing     Problem: Pain Acute  Goal: Acceptable Pain Control and Functional Ability  Intervention: Prevent or Manage Pain  Recent Flowsheet Documentation  Taken 4/4/2022 1600 by River Woody RN  Medication Review/Management: medications reviewed     Problem: Urinary Retention  Goal: Effective Urinary Elimination  Outcome: Ongoing, Progressing   Goal Outcome Evaluation:        Patient A and O times 4. Can verbalize needs and desires. Patient on 125 ml/hr NS. Took meds oral, no issue. Patient blood sugars 275 and dinner and 326 at HS. Sliding scale insulin applied in both cases.   Patient Stated no pain through out evening till he stated that pain increased to 6/7. Dilaudid IV given with some effect. Patient on bladder protocol. Patient had no urine output after 4 hours of being on unit. Scanned, showing 880. Bladder protocol, again, explained to patient with emphasis on necessity of straight cathing when bladder over 300. Patient verbalized  a refusal to straight  cath and said that he would like to try to urinate later and then staff can scan him again. Will pass on to overnight crew to monitor.

## 2022-04-05 NOTE — PLAN OF CARE
Problem: Pain Acute  Goal: Acceptable Pain Control and Functional Ability  Outcome: Ongoing, Progressing  Intervention: Develop Pain Management Plan  Recent Flowsheet Documentation  Taken 4/5/2022 0915 by Sulema Yanez, RN  Pain Management Interventions: declines  Intervention: Prevent or Manage Pain  Recent Flowsheet Documentation  Taken 4/5/2022 0915 by Suelma Yanez, RN  Medication Review/Management: medications reviewed   Goal Outcome Evaluation:  Patient complains of only minor LLE discomfort 1/10. Keeping LLE up on 2 pillows, occasionally dangles it at the bedside. In good spirits.   Atarax given x 1 at 1500 for some increased anxiety when trying to lay flatter in bed with LLE elevated

## 2022-04-06 LAB
BLD PROD TYP BPU: NORMAL
BLOOD COMPONENT TYPE: NORMAL
CODING SYSTEM: NORMAL
CROSSMATCH: NORMAL
GLUCOSE BLDC GLUCOMTR-MCNC: 170 MG/DL (ref 70–99)
GLUCOSE BLDC GLUCOMTR-MCNC: 174 MG/DL (ref 70–99)
GLUCOSE BLDC GLUCOMTR-MCNC: 192 MG/DL (ref 70–99)
GLUCOSE BLDC GLUCOMTR-MCNC: 202 MG/DL (ref 70–99)
HGB BLD-MCNC: 6.6 G/DL (ref 13.3–17.7)
HGB BLD-MCNC: 7.8 G/DL (ref 13.3–17.7)
ISSUE DATE AND TIME: NORMAL
UNIT ABO/RH: NORMAL
UNIT NUMBER: NORMAL
UNIT STATUS: NORMAL
UNIT TYPE ISBT: 6200

## 2022-04-06 PROCEDURE — 250N000013 HC RX MED GY IP 250 OP 250 PS 637: Performed by: SURGERY

## 2022-04-06 PROCEDURE — 99232 SBSQ HOSP IP/OBS MODERATE 35: CPT | Performed by: INTERNAL MEDICINE

## 2022-04-06 PROCEDURE — 36415 COLL VENOUS BLD VENIPUNCTURE: CPT | Performed by: FAMILY MEDICINE

## 2022-04-06 PROCEDURE — 85018 HEMOGLOBIN: CPT | Performed by: FAMILY MEDICINE

## 2022-04-06 PROCEDURE — 120N000001 HC R&B MED SURG/OB

## 2022-04-06 PROCEDURE — P9016 RBC LEUKOCYTES REDUCED: HCPCS | Performed by: INTERNAL MEDICINE

## 2022-04-06 PROCEDURE — 85018 HEMOGLOBIN: CPT | Performed by: INTERNAL MEDICINE

## 2022-04-06 PROCEDURE — 36415 COLL VENOUS BLD VENIPUNCTURE: CPT | Performed by: INTERNAL MEDICINE

## 2022-04-06 PROCEDURE — 250N000011 HC RX IP 250 OP 636: Performed by: SURGERY

## 2022-04-06 PROCEDURE — 250N000013 HC RX MED GY IP 250 OP 250 PS 637: Performed by: INTERNAL MEDICINE

## 2022-04-06 RX ADMIN — INSULIN GLARGINE 38 UNITS: 100 INJECTION, SOLUTION SUBCUTANEOUS at 20:40

## 2022-04-06 RX ADMIN — CEFAZOLIN SODIUM 2 G: 2 INJECTION, SOLUTION INTRAVENOUS at 03:49

## 2022-04-06 RX ADMIN — INSULIN ASPART 2 UNITS: 100 INJECTION, SOLUTION INTRAVENOUS; SUBCUTANEOUS at 09:40

## 2022-04-06 RX ADMIN — CEFAZOLIN SODIUM 2 G: 2 INJECTION, SOLUTION INTRAVENOUS at 11:57

## 2022-04-06 RX ADMIN — INSULIN ASPART 2 UNITS: 100 INJECTION, SOLUTION INTRAVENOUS; SUBCUTANEOUS at 18:22

## 2022-04-06 RX ADMIN — DOCUSATE SODIUM 50 MG AND SENNOSIDES 8.6 MG 1 TABLET: 8.6; 5 TABLET, FILM COATED ORAL at 09:42

## 2022-04-06 RX ADMIN — THERA TABS 1 TABLET: TAB at 09:42

## 2022-04-06 RX ADMIN — CEFAZOLIN SODIUM 2 G: 2 INJECTION, SOLUTION INTRAVENOUS at 19:13

## 2022-04-06 RX ADMIN — ATORVASTATIN CALCIUM 80 MG: 40 TABLET, FILM COATED ORAL at 20:40

## 2022-04-06 RX ADMIN — INSULIN ASPART 3 UNITS: 100 INJECTION, SOLUTION INTRAVENOUS; SUBCUTANEOUS at 12:25

## 2022-04-06 RX ADMIN — HYDROMORPHONE HYDROCHLORIDE 4 MG: 4 TABLET ORAL at 16:53

## 2022-04-06 RX ADMIN — ASPIRIN 81 MG CHEWABLE TABLET 81 MG: 81 TABLET CHEWABLE at 20:40

## 2022-04-06 RX ADMIN — HYDROMORPHONE HYDROCHLORIDE 4 MG: 4 TABLET ORAL at 00:04

## 2022-04-06 RX ADMIN — ACETAMINOPHEN 975 MG: 325 TABLET ORAL at 07:02

## 2022-04-06 ASSESSMENT — ACTIVITIES OF DAILY LIVING (ADL)
ADLS_ACUITY_SCORE: 4
ADLS_ACUITY_SCORE: 4
ADLS_ACUITY_SCORE: 5
ADLS_ACUITY_SCORE: 7
ADLS_ACUITY_SCORE: 4
ADLS_ACUITY_SCORE: 7
ADLS_ACUITY_SCORE: 5
ADLS_ACUITY_SCORE: 5
ADLS_ACUITY_SCORE: 4
ADLS_ACUITY_SCORE: 5
ADLS_ACUITY_SCORE: 5
ADLS_ACUITY_SCORE: 4
ADLS_ACUITY_SCORE: 5
ADLS_ACUITY_SCORE: 5
ADLS_ACUITY_SCORE: 7
ADLS_ACUITY_SCORE: 5
DEPENDENT_IADLS:: TRANSPORTATION
ADLS_ACUITY_SCORE: 5
ADLS_ACUITY_SCORE: 5
ADLS_ACUITY_SCORE: 4
ADLS_ACUITY_SCORE: 5
ADLS_ACUITY_SCORE: 4
ADLS_ACUITY_SCORE: 5
ADLS_ACUITY_SCORE: 7
ADLS_ACUITY_SCORE: 5

## 2022-04-06 NOTE — PROGRESS NOTES
Progress Note    Assessment/Plan  We will evaluate wound in a.m.  He had a splint made by prosthetic specialist today.  Hopefully discharge tomorrow.    Principal Problem:    Osteomyelitis of left foot -- S/P Left BKA 4/4/22  Active Problems:    DM type 2 on insulin, w Neuro -- Hgb A1C 7.6 on 1/19/22    Benign essential hypertension    Gastro-esophageal reflux disease with esophagitis    Hyperlipidemia    PAF (paroxysmal atrial fibrillation) -- on Eliquis     CAD -- diffuse calcified vessels 11/16/21 (no stents placed)    Amputation of left foot (H)    Chronic systolic CHF -- EF 30-35% on Echo 11/15/21    Smoker (Cigars)      Subjective  Comfortable  Objective    Vital signs in last 24 hours  Temp:  [97.9  F (36.6  C)-98.9  F (37.2  C)] 98.4  F (36.9  C)  Pulse:  [] 103  Resp:  [16-18] 17  BP: (106-120)/(58-63) 120/63  SpO2:  [93 %-96 %] 94 %  Weight:   [unfilled]    Intake/Output last 3 shifts  I/O last 3 completed shifts:  In: 720 [P.O.:720]  Out: 600 [Urine:600]  Intake/Output this shift:  No intake/output data recorded.      Physical Exam  Wound wrapped    Pertinent Labs   Lab Results   Component Value Date    WBC 9.3 04/05/2022    HGB 6.6 (LL) 04/06/2022    HCT 23.0 (L) 04/05/2022    MCV 76 (L) 04/05/2022     04/05/2022             Pertinent Radiology     [unfilled]        Ronald Bhatt MD

## 2022-04-06 NOTE — PROGRESS NOTES
[facility]    Hospitalist Progress Note    Assessment & Plan   61 year old male who was admitted on 4/4/2022 for elective left BKA related to extensive osteomyelitis involving left foot:     Impression:   Principal Problem:    Osteomyelitis of left foot -- S/P Left BKA 4/4/22   -- POD #2, doing well       DM type 2 on insulin, w Neuro -- Hgb A1C 7.6 on 1/19/22   -- mild hyperglycemia (suspect 2nd to Decadron 10 mg IV with prior to surgery)   -- will add Novolog 1 unit for 7 gms of carbs (as he does at home)       Anemia of Chronic Disease     Acute blood loss anemia -- 2nd to surgery   -- transfuse 1 unit PRBC's today    Active Problems:    Benign essential hypertension   -- hold Metoprolol for now given low normal BP      Gastro-esophageal reflux disease with esophagitis      Hyperlipidemia      PAF (paroxysmal atrial fibrillation) -- on Eliquis    -- will restart tomorrow if OK with Dr. Bhatt      CAD -- diffuse calcified vessels 11/16/21 (no stents placed)        Chronic systolic CHF -- EF 30-35% on Echo 11/15/21   -- Lasix 20 mg daily, holding for now      Smoker (Cigars) -- encouraged smoking cessation after discharge      Plan:  As above    DVT Prophylaxis: Pneumatic Compression Devices  Code Status: Full Code    Disposition: Expected discharge in ?2 days (will discuss with Dr. Bhatt)    [place]  Pager [phone]  Cell Phone [phone]  Text Page (7am to 6pm)    Interval History   Doing well, rates his left BKA leg pain at 1 out of 10.      Physical Exam   Temp: 97.9  F (36.6  C) Temp src: Oral BP: 106/58 Pulse: 99   Resp: 18 SpO2: 96 % O2 Device: None (Room air)    Vitals:    04/04/22 0948   Weight: 93 kg (205 lb)     Vital Signs with Ranges  Temp:  [97.9  F (36.6  C)-98.5  F (36.9  C)] 97.9  F (36.6  C)  Pulse:  [] 99  Resp:  [18] 18  BP: (106-109)/(56-62) 106/58  SpO2:  [95 %-96 %] 96 %  I/O last 3 completed shifts:  In: 516 [I.V.:516]  Out: 147  [Urine:4900]    # Pain Assessment:  Current Pain Score 4/6/2022   Patient currently in pain? denies   Pain score (0-10) -   - Norman is experiencing pain due to BKA. Pain management was discussed and the plan was created in a collaborative fashion.  Norman's response to the current recommendations: engaged  - Please see the plan for pain management as documented above    Constitutional: Awake, alert, cooperative, no apparent distress  Respiratory: Clear to auscultation bilaterally, no crackles or wheezing  Cardiovascular: Regular rate and rhythm, normal S1 and S2, and no murmur noted  GI: Normal bowel sounds, soft, non-distended, non-tender  Extrem: No calf tenderness, no right ankle edema. Left BKA wrapped with splint   Neuro: Ox3, no focal motor deficits, slight decreased light touch in left foot     Medications       acetaminophen  975 mg Oral Q8H     aspirin  81 mg Oral Daily     atorvastatin  80 mg Oral QPM     ceFAZolin  2 g Intravenous Q8H     insulin aspart   Subcutaneous TID AC     insulin aspart  1-10 Units Subcutaneous TID AC     insulin aspart  1-7 Units Subcutaneous At Bedtime     insulin glargine  38 Units Subcutaneous At Bedtime     multivitamin, therapeutic  1 tablet Oral Daily     polyethylene glycol  17 g Oral Daily     senna-docusate  1 tablet Oral BID     sodium chloride (PF)  3 mL Intracatheter Q8H       Data   Recent Labs   Lab 04/06/22  1206 04/06/22  0908 04/06/22  0900 04/05/22  2112 04/05/22  1318 04/05/22  1119   WBC  --   --   --   --   --  9.3   HGB  --   --  6.6*  --   --  7.1*   MCV  --   --   --   --   --  76*   PLT  --   --   --   --   --  317   NA  --   --   --   --   --  138   POTASSIUM  --   --   --   --   --  4.4   CHLORIDE  --   --   --   --   --  103   CO2  --   --   --   --   --  23   BUN  --   --   --   --   --  22   CR  --   --   --   --   --  1.19   ANIONGAP  --   --   --   --   --  12   YOSVANY  --   --   --   --   --  8.7   * 170*  --  240*   < > 215*    < > = values  in this interval not displayed.       Imaging:   No results found for this or any previous visit (from the past 24 hour(s)).

## 2022-04-06 NOTE — CONSULTS
Care Management Initial Consult    General Information  Assessment completed with: Norman Calhoun  Type of CM/SW Visit: Initial Assessment    Primary Care Provider verified and updated as needed: Yes   Readmission within the last 30 days: planned readmission   Return Category: Planned Surgery  Reason for Consult: discharge planning  Advance Care Planning: Advance Care Planning Reviewed: no concerns identified          Communication Assessment  Patient's communication style: spoken language (English or Bilingual)    Hearing Difficulty or Deaf: no   Wear Glasses or Blind: no    Cognitive  Cognitive/Neuro/Behavioral: WDL           Mood/Behavior: cooperative  Best Language: 0 - No aphasia  Speech: clear    Living Environment:   People in home: other (see comments) (Niece)     Current living Arrangements: house      Able to return to prior arrangements: yes       Family/Social Support:  Care provided by: self, homecare agency  Provides care for: no one, unable/limited ability to care for self  Marital Status: Single  Other (specify) (Family, home care RN)          Description of Support System: Supportive, Involved    Support Assessment: Adequate family and caregiver support    Current Resources:   Patient receiving home care services: Yes  Skilled Home Care Services: Skilled Nursing  Community Resources: None  Equipment currently used at home: walker, standard, wheelchair, manual  Supplies currently used at home:      Employment/Financial:  Employment Status:          Financial Concerns: No concerns identified   Referral to Financial Worker: No       Lifestyle & Psychosocial Needs:  Social Determinants of Health     Tobacco Use: High Risk     Smoking Tobacco Use: Current Some Day Smoker     Smokeless Tobacco Use: Never Used   Alcohol Use: Not on file   Financial Resource Strain: Not on file   Food Insecurity: Not on file   Transportation Needs: Not on file   Physical Activity: Not on file   Stress: Not on file   Social  Connections: Not on file   Intimate Partner Violence: Not on file   Depression: Not on file   Housing Stability: Not on file       Functional Status:  Prior to admission patient needed assistance:      Dependent IADLs:: Transportation       Mental Health Status:  Mental Health Status: No Current Concerns       Chemical Dependency Status:  Chemical Dependency Status: No Current Concerns             Values/Beliefs:  Spiritual, Cultural Beliefs, Catholic Practices, Values that affect care: no               Additional Information:  Met patient to introduce care manager role and discuss discharge planning. Patient currently staying with his niece. Mostly independent at baseline. Open to RN home care services through [facility]; would like to resume at discharge. Patient hoping to be able to discharge tomorrow 4/7. Spoke with Yana, [phone] cell [phone], and provided updated on discharge plan; will keep updated. Yana would like to be contacted via cell number. Discharge orders to be faxed to [phone]. Family to likely transport. Care manager to follow.    Cynthia Chauhan RN

## 2022-04-06 NOTE — PLAN OF CARE
Problem: Mobility Impairment  Goal: Optimal Mobility  Outcome: Ongoing, Progressing     Problem: Pain Acute  Goal: Acceptable Pain Control and Functional Ability  Outcome: Ongoing, Progressing  Intervention: Develop Pain Management Plan  Recent Flowsheet Documentation  Taken 4/5/2022 2359 by Laura Levy RN  Pain Management Interventions: medication (see MAR)  Intervention: Prevent or Manage Pain  Recent Flowsheet Documentation  Taken 4/6/2022 0000 by Laura Levy RN  Medication Review/Management: medications reviewed   Goal Outcome Evaluation:        POD 2: Left BKA. Elevated on pillows while in bed. Surgical site wrapped with dressing and ace bandage. Will have fitting for plastic support today. Moving in bed and dangling lower extremities at bedside independently. Stated he ambulated to bathroom on evening shift with walker and did well. PRN oral dilaudid given for phantom pain in addition to scheduled Tylenol. Continues on IV Cefazolin to treat osteomyelitis.

## 2022-04-06 NOTE — PLAN OF CARE
Problem: Pain Acute  Goal: Acceptable Pain Control and Functional Ability  Intervention: Prevent or Manage Pain  Recent Flowsheet Documentation  Taken 4/5/2022 1700 by River Woody RN  Medication Review/Management: medications reviewed     Problem: Mobility Impairment  Goal: Optimal Mobility  Outcome: Ongoing, Progressing     Problem: Surgical Site Infection  Goal: Absence of Infection Signs and Symptoms  Outcome: Ongoing, Progressing   Goal Outcome Evaluation:      Patient A and O times 4. Vitals WNL this shift. Blood sugars 187 and 240. Insulin given on sliding scale for both numbers. Surgical dressing  CDI. Patient verbalized pain of 6/10 with an inability to get comfortable. Dilaudid given. Proved partially effective.

## 2022-04-06 NOTE — PLAN OF CARE
Problem: Mobility Impairment  Goal: Optimal Mobility  Outcome: Ongoing, Progressing     Problem: Pain Acute  Goal: Acceptable Pain Control and Functional Ability  Outcome: Ongoing, Progressing  Intervention: Prevent or Manage Pain  Recent Flowsheet Documentation  Taken 4/6/2022 0945 by Tonya Mast, RN  Medication Review/Management: medications reviewed     Problem: Surgical Site Infection  Goal: Absence of Infection Signs and Symptoms  Outcome: Ongoing, Progressing     Problem: Anemia  Goal: Anemia Symptom Improvement  Outcome: Ongoing, Progressing  Intervention: Monitor and Manage Anemia  Recent Flowsheet Documentation  Taken 4/6/2022 0945 by Tonya Mast, RN  Safety Promotion/Fall Prevention:   assistive device/personal items within reach   bed alarm on   lighting adjusted   mobility aid in reach   nonskid shoes/slippers when out of bed   room near nurse's station     Problem: Adjustment to Amputation (Lower Extremity Amputation)  Goal: Optimal Adjustment to Amputation  Outcome: Ongoing, Progressing  Pt states having minimal pain from BKA. Refusing scheduled tylenol and states will ask for dilaudid if needed. Pt states he does not have a big appetite and is watching his blood sugars, refused lunch. Pt receiving 1unit RBC's currently and tolerating well. Explained hemoglobin level and need for blood. Pt keeps left leg elevated on one pillow. States if too high it hurts his back. Leg ACE wrap C/D/I.     Goal Outcome Evaluation:

## 2022-04-07 VITALS
HEART RATE: 100 BPM | OXYGEN SATURATION: 94 % | DIASTOLIC BLOOD PRESSURE: 66 MMHG | HEIGHT: 75 IN | WEIGHT: 205 LBS | SYSTOLIC BLOOD PRESSURE: 128 MMHG | BODY MASS INDEX: 25.49 KG/M2 | RESPIRATION RATE: 18 BRPM | TEMPERATURE: 98 F

## 2022-04-07 LAB
GLUCOSE BLDC GLUCOMTR-MCNC: 102 MG/DL (ref 70–99)
GLUCOSE BLDC GLUCOMTR-MCNC: 116 MG/DL (ref 70–99)
PATH REPORT.COMMENTS IMP SPEC: NORMAL
PATH REPORT.COMMENTS IMP SPEC: NORMAL
PATH REPORT.FINAL DX SPEC: NORMAL
PATH REPORT.GROSS SPEC: NORMAL
PATH REPORT.MICROSCOPIC SPEC OTHER STN: NORMAL
PATH REPORT.RELEVANT HX SPEC: NORMAL
PHOTO IMAGE: NORMAL

## 2022-04-07 PROCEDURE — 99239 HOSP IP/OBS DSCHRG MGMT >30: CPT | Performed by: INTERNAL MEDICINE

## 2022-04-07 PROCEDURE — 250N000013 HC RX MED GY IP 250 OP 250 PS 637: Performed by: INTERNAL MEDICINE

## 2022-04-07 PROCEDURE — 250N000013 HC RX MED GY IP 250 OP 250 PS 637: Performed by: SURGERY

## 2022-04-07 PROCEDURE — 250N000011 HC RX IP 250 OP 636: Performed by: SURGERY

## 2022-04-07 RX ORDER — METOPROLOL SUCCINATE 25 MG/1
25 TABLET, EXTENDED RELEASE ORAL DAILY
Status: DISCONTINUED | OUTPATIENT
Start: 2022-04-07 | End: 2022-04-07 | Stop reason: HOSPADM

## 2022-04-07 RX ORDER — ACETAMINOPHEN 325 MG/1
650 TABLET ORAL EVERY 4 HOURS PRN
Refills: 0
Start: 2022-04-07 | End: 2022-08-23

## 2022-04-07 RX ORDER — HYDROMORPHONE HYDROCHLORIDE 2 MG/1
2-4 TABLET ORAL EVERY 4 HOURS PRN
Qty: 30 TABLET | Refills: 0 | Status: SHIPPED | OUTPATIENT
Start: 2022-04-07 | End: 2022-08-23

## 2022-04-07 RX ORDER — AMOXICILLIN 250 MG
1 CAPSULE ORAL 2 TIMES DAILY
Qty: 60 TABLET | Refills: 0 | Status: SHIPPED | OUTPATIENT
Start: 2022-04-07 | End: 2022-08-23

## 2022-04-07 RX ORDER — NAPROXEN SODIUM 220 MG
220 TABLET ORAL 2 TIMES DAILY PRN
Refills: 0
Start: 2022-04-07 | End: 2022-08-23

## 2022-04-07 RX ADMIN — DOCUSATE SODIUM 50 MG AND SENNOSIDES 8.6 MG 1 TABLET: 8.6; 5 TABLET, FILM COATED ORAL at 09:23

## 2022-04-07 RX ADMIN — CEFAZOLIN SODIUM 2 G: 2 INJECTION, SOLUTION INTRAVENOUS at 03:37

## 2022-04-07 RX ADMIN — POLYETHYLENE GLYCOL 3350 17 G: 17 POWDER, FOR SOLUTION ORAL at 09:29

## 2022-04-07 RX ADMIN — HYDROMORPHONE HYDROCHLORIDE 4 MG: 4 TABLET ORAL at 02:24

## 2022-04-07 RX ADMIN — METOPROLOL SUCCINATE 25 MG: 25 TABLET, EXTENDED RELEASE ORAL at 09:29

## 2022-04-07 ASSESSMENT — ACTIVITIES OF DAILY LIVING (ADL)
ADLS_ACUITY_SCORE: 7
ADLS_ACUITY_SCORE: 6
ADLS_ACUITY_SCORE: 7
ADLS_ACUITY_SCORE: 6

## 2022-04-07 NOTE — PLAN OF CARE
Pt slept on and off for short periods overnight. Noted some fairly low level pain and surgical site and did request 4 mg dose of PRN po dilaudid, this was given shortly after 0200 and pt reports this was helpful. Pt hopeful he may be discharged today and expresses a lot of motivation to be able to get back to being more active when he is able.

## 2022-04-07 NOTE — DISCHARGE INSTRUCTIONS
Dilaudid Oral Tablet 2 mg  Uses  For pain.  Instructions  This medicine may be taken with or without food.  Store at room temperature in a dry place. Do not keep in the bathroom.  Keep the medicine away from heat and light.  To reduce constipation, eat high fiber foods, drink plenty of water and exercise.  Please tell your doctor and pharmacist about all the medicines you take. Include both prescription and over-the-counter medicines. Also tell them about any vitamins, herbal medicines, or anything else you take for your health.  If your symptoms do not improve or they worsen while on this medicine, contact your doctor.  If you have been on this medicine for more than a week, do not suddenly stop taking it without your doctor's approval. It could worsen your symptoms.  Cautions  This medicine contains an opioid. Though it helps many people, this medicine may sometimes cause addiction, especially if it is used for a long time. This risk for addiction may be higher if you have a substance use disorder - such as overuse of or addiction to drugs or alcohol. Speak with your doctor about the benefits and risks of using this medicine.  Ask your doctor or pharmacist if you should have naloxone on hand to treat opioid overdose. Teach your family or household members about the signs of an opioid overdose and how to treat it.  This medicine can be habit-forming. If you use this medicine regularly for a long time, it can lead to withdrawal symptoms when you stop. Please use this medicine only as directed.  Tell your doctor and pharmacist if you ever had an allergic reaction to a medicine. Symptoms of an allergic reaction can include trouble breathing, skin rash, itching, swelling, or severe dizziness.  Some patients taking this medicine have experienced serious side effects. Please speak with your doctor to understand the risks and benefits associated with this medicine.  Do not use the medication any more than  instructed.  This medicine may cause dizziness or fainting, especially after exercising or in hot weather. Be very careful when standing or sitting up quickly.  If possible, avoid using with marijuana or other medicines that can cause dizziness or drowsiness. These include allergy/cold products, muscle relaxers, sleep aids, and pain relievers.  Your ability to stay alert or to react quickly may be impaired by this medicine. Do not drive or operate machinery until you know how this medicine will affect you.  Do not drink beverages with alcohol while on this medicine.  Tell the doctor or pharmacist if you are pregnant, planning to be pregnant, or breastfeeding.  Ask your pharmacist if this medicine can interact with any of your other medicines. Be sure to tell them about all the medicines you take.  Please tell all your doctors and dentists that you are on this medicine before they provide care.  Do not start or stop any other medicines without first speaking to your doctor or pharmacist.  Call your doctor right away if you notice slow or shallow breathing.  Do not share this medicine with anyone who has not been prescribed this medicine.  Side Effects  The following is a list of some common side effects from this medicine. Please speak with your doctor about what you should do if you experience these or other side effects.    agitated feeling or trouble sleeping    decreased appetite    bloating    constipation    depression or feeling sad    dizziness    bad dreams    drowsiness or sedation    dry mouth    lack of energy and tiredness    feeling of heat or flushing    nausea    skin irritation such as redness, itching, rash, or burning    stomach upset or abdominal pain    vomiting  If you have any of the following side effects, you may be getting too much medicine. Please contact your doctor to let them know about these side effects.    changes in memory, mood, or thinking    difficulty  concentrating    confusion    fainting    slow heartbeat    low blood pressure    muscle weakness    cold, moist skin    sweating    unsteadiness while walking    blurring or changes of vision  Call your doctor or get medical help right away if you notice any of these more serious side effects:    decreased awareness or responsiveness    breathing interruption during sleep    shallow, irregular breathing    fast or irregular heart beats    limp muscles    pale or blue skin, lips or fingernails    seizures    shortness of breath    red, peeling or blistering skin    suicidal thoughts    unusual or unexplained tiredness or weakness  A few people may have an allergic reactions to this medicine. Symptoms can include difficulty breathing, skin rash, itching, swelling, or severe dizziness. If you notice any of these symptoms, seek medical help quickly.  Extra  Please speak with your doctor, nurse, or pharmacist if you have any questions about this medicine.  [email or web address]/[email or web address]/fdbpem/850  IMPORTANT NOTE: This document tells you briefly how to take your medicine, but it does not tell you all there is to know about it.Your doctor or pharmacist may give you other documents about your medicine. Please talk to them if you have any questions.Always follow their advice. There is a more complete description of this medicine available in English.Scan this code on your smartphone or tablet or use the web address below. You can also ask your pharmacist for a printout. If you have any questions, please ask your pharmacist.     2021 [employer].

## 2022-04-07 NOTE — DISCHARGE SUMMARY
[facility]    Discharge Summary  Hospitalist    Date of Admission:  4/4/2022  Date of Discharge:  4/7/2022  Discharging Provider: River Lake MD    Discharge Diagnoses   Principal Problem:    Osteomyelitis of left foot -- S/P Left BKA 4/4/22      Acute Blood Loss Anemia       Underlying Iron Def Anemia    Active Problems:    DM type 2 on insulin, w Neuro -- Hgb A1C 7.6 on 1/19/22      Benign essential hypertension      Gastro-esophageal reflux disease with esophagitis      Hyperlipidemia      PAF (paroxysmal atrial fibrillation) -- on Eliquis       CAD -- diffuse calcified vessels 11/16/21 (no stents placed)      Chronic systolic CHF -- EF 30-35% on Echo 11/15/21      Smoker (Cigars)      History of Present Illness   61 year old male with osteomyelitis of left foot for several months, failing IV antibiotics, and now admitted for left BKA. His last hgb was 8.0 on 3/19/22, and has iron deficiency and is taking iron.     Hospital Course   Patient underwent left BKA by Dr. Ronald Bhatt, and hgb did drop to 6.9 was transfused 1 unit packed RBC's and hgb at discharge was 7.8.    Was seen by Delia and stump protector molded to his stump, and he will follow-up with Dr. Bhatt in 3-4 weeks to have staples removed, and continue with stump shaping to prepare for a BKA prosthesis.  He did have crutches provided, and will continue to use his walker.      He does smoke an occasional cigar -- discussed smoking cessation.     River Lake MD  Pager: [phone]  Cell Phone:  [phone]       Significant Results and Procedures   As above    Pending Results   These results will be followed up by Dr. Lake  Unresulted Labs Ordered in the Past 30 Days of this Admission     Date and Time Order Name Status Description    4/4/2022 12:07 PM Surgical Pathology Exam In process           Code Status   Full Code       Primary Care Physician   Jaquan Dickerson    Physical Exam   Temp:  98  F (36.7  C) Temp src: Oral BP: 128/66 Pulse: 100   Resp: 18 SpO2: 94 % O2 Device: None (Room air)    Vitals:    04/04/22 0948   Weight: 93 kg (205 lb)     Vital Signs with Ranges  Temp:  [98  F (36.7  C)-98.9  F (37.2  C)] 98  F (36.7  C)  Pulse:  [100-106] 100  Resp:  [14-20] 18  BP: (109-128)/(57-66) 128/66  SpO2:  [93 %-96 %] 94 %  I/O last 3 completed shifts:  In: 1696 [P.O.:1140; I.V.:200]  Out: 1300 [Urine:1300]    Exam on discharge:   Left BKA stump healing well, with clean dry incision with staples in place.     Discharge Disposition   Discharged to home  Condition at discharge: Good    Consultations This Hospital Stay   HOSPITALIST IP CONSULT  SOCIAL WORK IP CONSULT    Time Spent on this Encounter   I spent a total of 35 minutes discharging this patient.     Discharge Orders      Home Care Referral      Reason for your hospital stay    Infection in left foot, required left below knee amputation.     Activity    Your activity upon discharge: activity as tolerated, use walker to ambulate.     Discharge Instructions    Call Dr. Em if any medical questions at Cell Phone [phone].     Wound care and dressings    Use Stump protector as instructed.     Reason for your hospital stay    Left leg amputation     Follow-up and recommended labs and tests     Follow up with me,  Ronald Bhatt MD, 4 weeks from date of surgery. to evaluate after surgery.  No follow up labs or test are needed.     Activity    Your activity upon discharge: activity as tolerated     Miscellaneous DME Order    DME Documentation:   Describe the reason for need to support medical necessity: Left BKA .     I, the undersigned, certify that the above prescribed supplies are medically necessary for this patient and is both reasonable and necessary in reference to accepted standards of medical and necessary in reference to accepted standards of medical practice in the treatment of this patient's condition and is not prescribed as a  convenience.     Diet    Follow this diet upon discharge: Orders Placed This Encounter      Regular Diet Adult     Discharge Medications   Current Discharge Medication List      START taking these medications    Details   acetaminophen (TYLENOL) 325 MG tablet Take 2 tablets (650 mg) by mouth every 4 hours as needed for other (For optimal non-opioid multimodal pain management to improve pain control.)  Refills: 0    Associated Diagnoses: Chronic multifocal osteomyelitis of left foot (H)      HYDROmorphone (DILAUDID) 2 MG tablet Take 1-2 tablets (2-4 mg) by mouth every 4 hours as needed  Qty: 30 tablet, Refills: 0    Associated Diagnoses: Chronic multifocal osteomyelitis of left foot (H)      magnesium hydroxide (MILK OF MAGNESIA) 400 MG/5ML suspension Take 30 mLs by mouth daily as needed for constipation (Use if preventive measures (senna-docusate, docusate, and polyethylene glycol) are not effective.)  Refills: 0    Associated Diagnoses: Constipation, unspecified constipation type      senna-docusate (SENOKOT-S/PERICOLACE) 8.6-50 MG tablet Take 1 tablet by mouth 2 times daily  Qty: 60 tablet, Refills: 0    Associated Diagnoses: Constipation, unspecified constipation type         CONTINUE these medications which have CHANGED    Details   naproxen sodium (ANAPROX) 220 MG tablet Take 1 tablet (220 mg) by mouth 2 times daily as needed for moderate pain or other (inflammation)  Refills: 0    Associated Diagnoses: Chronic multifocal osteomyelitis of left foot (H)         CONTINUE these medications which have NOT CHANGED    Details   aspirin (ASA) 81 MG chewable tablet Take 1 tablet (81 mg) by mouth daily  Qty: 30 tablet, Refills: 0    Associated Diagnoses: NSTEMI (non-ST elevated myocardial infarction) (H)      atorvastatin (LIPITOR) 80 MG tablet TAKE 1 TABLET(80 MG) BY MOUTH EVERY EVENING  Qty: 90 tablet, Refills: 1    Associated Diagnoses: NSTEMI (non-ST elevated myocardial infarction) (H)      furosemide (LASIX) 20 MG  tablet TAKE 1 TABLET(20 MG) BY MOUTH DAILY  Qty: 90 tablet, Refills: 1    Associated Diagnoses: NSTEMI (non-ST elevated myocardial infarction) (H); Heart failure with reduced ejection fraction (H)      hydrOXYzine (ATARAX) 25 MG tablet Take 1 tablet (25 mg) by mouth every 6 hours as needed for anxiety or other (adjuvant pain)  Qty: 30 tablet, Refills: 0    Associated Diagnoses: Diabetic ulcer of left midfoot associated with type 2 diabetes mellitus, with necrosis of bone (H); Anxiety      insulin aspart (NOVOLOG FLEXPEN) 100 UNIT/ML pen For  - 164 give 1 unit. For  - 189 give 2 units. For  - 214 give 3 units. For  - 239 give 4 units. For  - 264 give 5 units. For  - 289 give 6 units. For  - 314 give 7 units. For  - 339 give 8 units For  - 364 give 9 units For  - 389 give 10 units For  - 414 give 11 units For BG greater than or equal to 415 give 12 units  Qty: 3 mL, Refills: 3    Associated Diagnoses: Type 2 diabetes mellitus with other specified complication, with long-term current use of insulin (H)      insulin glargine (LANTUS SOLOSTAR) 100 UNIT/ML pen Inject 38 Units Subcutaneous every morning  Qty: 3 mL, Refills: 3    Comments: If Lantus is not covered by insurance, may substitute Basaglar at same dose and frequency.    Associated Diagnoses: Type 2 diabetes mellitus with other specified complication, with long-term current use of insulin (H)      magnesium oxide (MAG-OX) 400 MG tablet Take 1 tablet (400 mg) by mouth daily  Qty: 90 tablet, Refills: 1    Associated Diagnoses: Hypomagnesemia      metoprolol succinate ER (TOPROL-XL) 50 MG 24 hr tablet TAKE 1 TABLET(50 MG) BY MOUTH DAILY  Qty: 90 tablet, Refills: 1    Associated Diagnoses: NSTEMI (non-ST elevated myocardial infarction) (H); Heart failure with reduced ejection fraction (H)      Multiple Vitamin (MULTI VITAMIN) TABS Take 1 tablet by mouth daily       OMEGA-3/DHA/EPA/FISH OIL (FISH  OIL-OMEGA-3 FATTY ACIDS) 300-1,000 mg capsule Take 1 g by mouth daily       vitamin C (ASCORBIC ACID) 250 MG tablet Take 500 mg by mouth daily      blood glucose (NO BRAND SPECIFIED) test strip Use to test blood sugar 4 times daily or as directed.  Qty: 100 strip, Refills: 3    Comments: Accuchek Guide Strips  Associated Diagnoses: Type 2 diabetes mellitus with other specified complication, with long-term current use of insulin (H)      blood glucose monitoring (SOFTCLIX) lancets Use to test blood sugar 4 times daily.  Qty: 100 each, Refills: 6    Associated Diagnoses: Type 2 diabetes mellitus with other specified complication, with long-term current use of insulin (H)           Allergies   No Known Allergies  Data   Most Recent 3 CBC's:Recent Labs   Lab Test 04/06/22  1901 04/06/22  0900 04/05/22  1119 03/24/22  0723 03/23/22  0724   WBC  --   --  9.3 6.9 6.6   HGB 7.8* 6.6* 7.1* 8.5* 8.3*   MCV  --   --  76* 79 79   PLT  --   --  317 478* 431      Most Recent 3 BMP's:  Recent Labs   Lab Test 04/07/22  1239 04/07/22  0900 04/06/22  2039 04/05/22  1318 04/05/22  1119 03/24/22  0756 03/24/22  0723 03/23/22  0900 03/23/22  0724   NA  --   --   --   --  138  --  139  --  138   POTASSIUM  --   --   --   --  4.4  --  4.2  --  4.5   CHLORIDE  --   --   --   --  103  --  102  --  102   CO2  --   --   --   --  23  --  30  --  28   BUN  --   --   --   --  22  --  16  --  17   CR  --   --   --   --  1.19  --  1.20  --  1.36*   ANIONGAP  --   --   --   --  12  --  7  --  8   YOSVANY  --   --   --   --  8.7  --  9.0  --  9.0   * 116* 202*   < > 215*   < > 108   < > 186*    < > = values in this interval not displayed.     Most Recent 2 LFT's:  Recent Labs   Lab Test 03/22/22  0509 03/19/22  0500   AST 38 103*   ALT 41 69*   ALKPHOS 113 124*   BILITOTAL 0.4 0.4     Most Recent INR's and Anticoagulation Dosing History:  Anticoagulation Dose History     Recent Dosing and Labs Latest Ref Rng & Units 11/15/2021 3/18/2022    INR  0.90 - 1.15 1.35(H) 1.47(H)        Most Recent 3 Troponin's:No lab results found.  Most Recent Cholesterol Panel:  Recent Labs   Lab Test 11/17/21  0533   CHOL 180      HDL 30*   TRIG 122     Most Recent 6 Bacteria Isolates From Any Culture (See EPIC Reports for Culture Details):No lab results found.  Most Recent TSH, T4 and A1c Labs:  Recent Labs   Lab Test 01/19/22  1548   A1C 7.6*

## 2022-04-07 NOTE — PLAN OF CARE
A/O x4 and cooperative with cares. Received 1 unit prbc, vitals stable and no reaction. Hgb 7.8 after transfusion. Before meal , given novolog 2 units. Carb count 24g, given novolog 3 units. Bedtime , given novolog 1 unit. Given lantus 38 units. Received abx. Pain on LLE , given PRN Dilauded, effective per pt. Extremity elevated on pillow. Dressing is C/D/I. Voided 700mL.    Problem: Pain Acute  Goal: Acceptable Pain Control and Functional Ability  Outcome: Ongoing, Progressing  Intervention: Develop Pain Management Plan  Recent Flowsheet Documentation  Taken 4/6/2022 1750 by Jerilyn Gr RN  Pain Management Interventions:    pillow support provided    emotional support  Taken 4/6/2022 1653 by Jerilyn Gr RN  Pain Management Interventions: medication (see MAR)  Intervention: Prevent or Manage Pain  Recent Flowsheet Documentation  Taken 4/6/2022 1648 by Jerilyn Gr RN  Medication Review/Management: medications reviewed     Problem: Anemia  Goal: Anemia Symptom Improvement  Outcome: Ongoing, Progressing  Intervention: Monitor and Manage Anemia  Recent Flowsheet Documentation  Taken 4/6/2022 1648 by Jerilyn Gr RN  Safety Promotion/Fall Prevention:    activity supervised    bed alarm on    assistive device/personal items within reach    room near nurse's station    safety round/check completed     Jerilyn Gr RN on 4/6/2022 at 11:05 PM

## 2022-04-07 NOTE — PLAN OF CARE
"  Problem: Mobility Impairment  Goal: Optimal Mobility  Outcome: Met  Intervention: Optimize Mobility  Recent Flowsheet Documentation  Taken 4/7/2022 0930 by Tonya Mast, RN  Assistive Device Utilized: walker  Activity Management:   activity adjusted per tolerance   activity encouraged  Positioning/Transfer Devices:   pillows   in use     Problem: Prosthetic Use and Management (Lower Extremity Amputation)  Goal: Effective Prosthesis Use and Management  Outcome: Met     Problem: Plan of Care - These are the overarching goals to be used throughout the patient stay.    Goal: Plan of Care Review/Shift Note  Description: The Plan of Care Review/Shift note should be completed every shift.  The Outcome Evaluation is a brief statement about your assessment that the patient is improving, declining, or no change.  This information will be displayed automatically on your shift note.  Outcome: Met  Goal: Patient-Specific Goal (Individualized)  Description: You can add care plan individualizations to a care plan. Examples of Individualization might be:  \"Parent requests to be called daily at 9am for status\", \"I have a hard time hearing out of my right ear\", or \"Do not touch me to wake me up as it startles me\".  Outcome: Met  Goal: Absence of Hospital-Acquired Illness or Injury  Outcome: Met  Intervention: Identify and Manage Fall Risk  Recent Flowsheet Documentation  Taken 4/7/2022 0930 by Tonya Mast, RN  Safety Promotion/Fall Prevention:   activity supervised   nonskid shoes/slippers when out of bed   mobility aid in reach   room near nurse's station   bed alarm on  Intervention: Prevent Skin Injury  Recent Flowsheet Documentation  Taken 4/7/2022 0930 by Tonya Mast, RN  Body Position: position changed independently  Intervention: Prevent and Manage VTE (Venous Thromboembolism) Risk  Recent Flowsheet Documentation  Taken 4/7/2022 0930 by Tonya Mast, RN  Activity Management:   activity adjusted per " tolerance   activity encouraged  Goal: Optimal Comfort and Wellbeing  Outcome: Met  Goal: Readiness for Transition of Care  Outcome: Met  Pt d/c'ing home paperwork given and explained. Pt has new shield protector for left BKA. Pain prescription sent with pt.     Goal Outcome Evaluation:

## 2022-04-07 NOTE — PROGRESS NOTES
Progress Note    Assessment/Plan  Patient quite comfortable.  His left below-knee amputation site healing without problems.  He has a plastic splint.  Discharged home okay with surgery today we will see him 4 weeks from the date of surgery for suture removal.  He does not need any specific home care dressing change etc.  No restrictions in his activity except that when he is active at home he needs to have his splint in place and have been over this issue with him multiple times.  To remain supine with the leg elevated above his heart is much as possible.  He is fully independent at home and does not desire any other situation at discharge except discharged home without significant restriction    Principal Problem:    Osteomyelitis of left foot -- S/P Left BKA 4/4/22  Active Problems:    DM type 2 on insulin, w Neuro -- Hgb A1C 7.6 on 1/19/22    Benign essential hypertension    Gastro-esophageal reflux disease with esophagitis    Hyperlipidemia    PAF (paroxysmal atrial fibrillation) -- on Eliquis     CAD -- diffuse calcified vessels 11/16/21 (no stents placed)    Amputation of left foot (H)    Chronic systolic CHF -- EF 30-35% on Echo 11/15/21    Smoker (Cigars)      Subjective  Comfortable  Objective    Vital signs in last 24 hours  Temp:  [98  F (36.7  C)-98.9  F (37.2  C)] 98  F (36.7  C)  Pulse:  [100-106] 100  Resp:  [14-20] 18  BP: (109-128)/(57-66) 128/66  SpO2:  [93 %-96 %] 94 %  Weight:   [unfilled]    Intake/Output last 3 shifts  I/O last 3 completed shifts:  In: 1696 [P.O.:1140; I.V.:200]  Out: 1300 [Urine:1300]  Intake/Output this shift:  No intake/output data recorded.      Physical Exam  And incision healing without problems good range of motion of the knee.    Pertinent Labs   Lab Results   Component Value Date    WBC 9.3 04/05/2022    HGB 7.8 (L) 04/06/2022    HCT 23.0 (L) 04/05/2022    MCV 76 (L) 04/05/2022     04/05/2022             Pertinent Radiology     [unfilled]        Ronald HANKINS  MD [place]

## 2022-04-08 ENCOUNTER — PATIENT OUTREACH (OUTPATIENT)
Dept: CARE COORDINATION | Facility: CLINIC | Age: 62
End: 2022-04-08
Payer: COMMERCIAL

## 2022-04-08 DIAGNOSIS — Z71.89 OTHER SPECIFIED COUNSELING: ICD-10-CM

## 2022-04-08 NOTE — PROGRESS NOTES
Clinic Care Coordination Contact  Care Team Conversations    Patient identified for care management outreach, however [facility] RN staff has already followed up with patient to ensure they are following up with PCP and have needs and resources met. Clinic RN will refer back to JEFFREY FLYNN if needed/appropriate.    GREGG Whelan  Social Work Care Coordinator - [facility]  Care Coordination  [email or web address]  Cell Phone: [phone]  Gender pronouns: she/her  Employed by [facility] [employer]

## 2022-04-28 ENCOUNTER — TELEPHONE (OUTPATIENT)
Dept: ADMINISTRATIVE | Facility: CLINIC | Age: 62
End: 2022-04-28
Payer: COMMERCIAL

## 2022-04-28 NOTE — TELEPHONE ENCOUNTER
Mr Aguilar wants to postpone scheduling a consult with Dr Winn beyond June. Is still healing, hasn't been fitted yet with prosthesis and told me he's got too many things going on right now to take on more.     I encouraged him to call us if things improve and he wants to schedule consult. Otherwise, I'll plan on calling him in June to see if he's ready to schedule.

## 2022-05-12 NOTE — ANESTHESIA CARE TRANSFER NOTE
Patient: Norman Aguilar    Procedure: Procedure(s):  Guillotine AMPUTATION, FOOT       Diagnosis: Wound infection [T14.8XXA, L08.9]  Diagnosis Additional Information: No value filed.    Anesthesia Type:   MAC     Note:    Oropharynx: oropharynx clear of all foreign objects and spontaneously breathing  Level of Consciousness: awake  Oxygen Supplementation: room air    Independent Airway: airway patency satisfactory and stable  Dentition: dentition unchanged  Vital Signs Stable: post-procedure vital signs reviewed and stable  Report to RN Given: handoff report given  Patient transferred to: PACU    Handoff Report: Identifed the Patient, Identified the Reponsible Provider, Reviewed the pertinent medical history, Discussed the surgical course, Reviewed Intra-OP anesthesia mangement and issues during anesthesia, Set expectations for post-procedure period and Allowed opportunity for questions and acknowledgement of understanding      Vitals:  Vitals Value Taken Time   /70 03/21/22 1230   Temp 36.6  C (97.8  F) 03/21/22 1230   Pulse 84 03/21/22 1242   Resp 25 03/21/22 1242   SpO2 96 % 03/21/22 1242   Vitals shown include unvalidated device data.    Electronically Signed By: JUNI Acosta CRNA  March 21, 2022  12:44 PM   59 year old M with PMH of HTN, DM, Wilms tumor s/p L nephrectomy presenting with OP hemoglobin 6.5, sent in for transfusion. VSS, afebrile, pale but non toxic appearing. No evidence of active bleeding on exam or ROS. Renal ultrasound with cysts but no hydro. Patient severely hypomagnesemic without EKG changes. Will transfuse if Hgb <7. Patient is symptomatic. Concern for LE edema in setting of worsening renal function vs medications vs acute CHF. Labs, troponin, type and screen, coags, admit.

## 2022-05-17 ENCOUNTER — LAB REQUISITION (OUTPATIENT)
Dept: LAB | Facility: CLINIC | Age: 62
End: 2022-05-17

## 2022-05-17 DIAGNOSIS — R80.9 PROTEINURIA, UNSPECIFIED: ICD-10-CM

## 2022-05-17 DIAGNOSIS — E78.2 MIXED HYPERLIPIDEMIA: ICD-10-CM

## 2022-05-17 LAB
CHOLEST SERPL-MCNC: 147 MG/DL
CREAT UR-MCNC: 58 MG/DL
HDLC SERPL-MCNC: 53 MG/DL
LDLC SERPL CALC-MCNC: 71 MG/DL
MICROALBUMIN UR-MCNC: 40.09 MG/DL (ref 0–1.99)
MICROALBUMIN/CREAT UR: 691.2 MG/G CR
PHQ9 SCORE: 4
TRIGL SERPL-MCNC: 117 MG/DL

## 2022-05-17 PROCEDURE — 80061 LIPID PANEL: CPT | Performed by: FAMILY MEDICINE

## 2022-05-17 PROCEDURE — 82043 UR ALBUMIN QUANTITATIVE: CPT | Performed by: FAMILY MEDICINE

## 2022-06-06 ENCOUNTER — TELEPHONE (OUTPATIENT)
Dept: ADMINISTRATIVE | Facility: CLINIC | Age: 62
End: 2022-06-06
Payer: COMMERCIAL

## 2022-06-06 NOTE — TELEPHONE ENCOUNTER
Mr Lauren's situation hasn't changed since I spoke with him in April. He may be losing his home, has exhausted his savngs and as a result may need to relocate, and is still in the process of getting a prosthesis. He doesn't want to take on more right now. I gave him a 1-800 number for [facility]  if he should need to reach out for their services. I asked him to call us when he'd like to set up a clinic appointment with Dr Winn to reconsult for heart surgery and he agreed.

## 2022-06-23 NOTE — DISCHARGE INSTRUCTIONS
Discharge Instructions: After Your Surgery  You ve just had surgery. During surgery, you were given medicine called anesthesia to keep you relaxed and free of pain. After surgery, you may have some pain or nausea. This is common. Here are some tips for feeling better and getting well after surgery.     Stay on schedule with your medicine.   Going home  Your healthcare provider will show you how to take care of yourself when you go home. He or she will also answer your questions. Have an adult family member or friend drive you home. For the first 24 hours after your surgery:    Don't drive or use heavy equipment.    Don't make important decisions or sign legal papers.    Don't drink alcohol.    Have someone stay with you, if needed. He or she can watch for problems and help keep you safe.  Be sure to go to all follow-up visits with your healthcare provider. And rest after your surgery for as long as your healthcare provider tells you to.  Coping with pain  If you have pain after surgery, pain medicine will help you feel better. Take it as told, before pain becomes severe. Also, ask your healthcare provider or pharmacist about other ways to control pain. This might be with heat, ice, or relaxation. And follow any other instructions your surgeon or nurse gives you.  Tips for taking pain medicine  To get the best relief possible, remember these points:    Pain medicines can upset your stomach. Taking them with a little food may help.    Most pain relievers taken by mouth need at least 20 to 30 minutes to start to work.    Don't wait till your pain becomes severe before you take your medicine. Try to time your medicine so that you can take it before starting an activity. This might be before you get dressed, go for a walk, or sit down for dinner.    Constipation is a common side effect of pain medicines. Call your healthcare provider before taking any medicines such as laxatives or stool softeners to help ease  constipation. Also ask if you should skip any foods. Drinking lots of fluids and eating foods such as fruits and vegetables that are high in fiber can also help. Remember, don't take laxatives unless your surgeon has prescribed them.    Drinking alcohol and taking pain medicine can cause dizziness and slow your breathing. It can even be deadly. Don't drink alcohol while taking pain medicine.    Pain medicine can make you react more slowly to things. Don't drive or run machinery while taking pain medicine.  Your healthcare provider may tell you to take acetaminophen to help ease your pain. Ask him or her how much you are supposed to take each day. Acetaminophen or other pain relievers may interact with your prescription medicines or other over-the-counter (OTC) medicines. Some prescription medicines have acetaminophen and other ingredients. Using both prescription and OTC acetaminophen for pain can cause you to overdose. Read the labels on your OTC medicines with care. This will help you to clearly know the list of ingredients, how much to take, and any warnings. It may also help you not take too much acetaminophen. If you have questions or don't understand the information, ask your pharmacist or healthcare provider to explain it to you before you take the OTC medicine.  Managing nausea  Some people have an upset stomach after surgery. This is often because of anesthesia, pain, or pain medicine, or the stress of surgery. These tips will help you handle nausea and eat healthy foods as you get better. If you were on a special food plan before surgery, ask your healthcare provider if you should follow it while you get better. These tips may help:    Don't push yourself to eat. Your body will tell you when to eat and how much.    Start off with clear liquids and soup. They are easier to digest.    Next try semi-solid foods, such as mashed potatoes, applesauce, and gelatin, as you feel ready.    Slowly move to solid  foods. Don t eat fatty, rich, or spicy foods at first.    Don't force yourself to have 3 large meals a day. Instead eat smaller amounts more often.    Take pain medicines with a small amount of solid food, such as crackers or toast, to prevent nausea.  When to call your healthcare provider  Call your healthcare provider if:    You still have intolerable pain an hour after taking medicine. The medicine may not be strong enough.    You feel too sleepy, dizzy, or groggy. The medicine may be too strong.    You have side effects such as nausea or vomiting, or skin changes such as rash, itching, or hives. Your healthcare provider may suggest other medicines to control side effects.  Rash, itching, or hives may mean you have an allergic reaction. Report this right away. If you have trouble breathing or facial swelling, call 911 right away.  If you have obstructive sleep apnea  You were given anesthesia medicine during surgery to keep you comfortable and free of pain. After surgery, you may have more apnea spells because of this medicine and other medicines you were given. The spells may last longer than usual.   At home:    Keep using the continuous positive airway pressure (CPAP) device when you sleep. Unless your healthcare provider tells you not to, use it when you sleep, day or night. CPAP is a common device used to treat obstructive sleep apnea.    Talk with your provider before taking any pain medicine, muscle relaxants, or sedatives. Your provider will tell you about the possible dangers of taking these medicines.  [employer] last reviewed this educational content on 3/1/2019    1155-3009 [employer]. All rights reserved. This information is not intended as a substitute for professional medical care. Always follow your healthcare professional's instructions.         23-Jun-2022 05:32

## 2022-07-23 ENCOUNTER — HEALTH MAINTENANCE LETTER (OUTPATIENT)
Age: 62
End: 2022-07-23

## 2022-07-27 NOTE — PROGRESS NOTES
This is a recent snapshot of the patient's [facility] medical record.  For current drug dose and complete information and questions, call [phone] or In Basket pool, fv home infusion ([id number])  CSN Number:  [id number]

## 2022-07-27 NOTE — PROGRESS NOTES
This is a recent snapshot of the patient's [facility] medical record.  For current drug dose and complete information and questions, call [phone] or In Basket pool, fv home infusion ([id number])  CSN Number:  [id number]

## 2022-07-28 NOTE — PROGRESS NOTES
This is a recent snapshot of the patient's [facility] medical record.  For current drug dose and complete information and questions, call [phone] or In Basket pool, fv home infusion ([id number])  CSN Number:  [id number]

## 2022-07-28 NOTE — PROGRESS NOTES
This is a recent snapshot of the patient's [facility] medical record.  For current drug dose and complete information and questions, call [phone] or In Basket pool, fv home infusion ([id number])  CSN Number:  [id number]

## 2022-07-29 NOTE — PROGRESS NOTES
This is a recent snapshot of the patient's [facility] medical record.  For current drug dose and complete information and questions, call [phone] or In Basket pool, fv home infusion ([id number])  CSN Number:  [id number]

## 2022-08-23 ENCOUNTER — APPOINTMENT (OUTPATIENT)
Dept: CT IMAGING | Facility: HOSPITAL | Age: 62
DRG: 247 | End: 2022-08-23
Attending: STUDENT IN AN ORGANIZED HEALTH CARE EDUCATION/TRAINING PROGRAM
Payer: COMMERCIAL

## 2022-08-23 ENCOUNTER — HOSPITAL ENCOUNTER (INPATIENT)
Facility: HOSPITAL | Age: 62
LOS: 7 days | Discharge: HOME | DRG: 247 | End: 2022-08-30
Attending: STUDENT IN AN ORGANIZED HEALTH CARE EDUCATION/TRAINING PROGRAM | Admitting: INTERNAL MEDICINE
Payer: COMMERCIAL

## 2022-08-23 DIAGNOSIS — I21.4 ACUTE NON-ST SEGMENT ELEVATION MYOCARDIAL INFARCTION (H): ICD-10-CM

## 2022-08-23 DIAGNOSIS — I50.22 CHRONIC SYSTOLIC CONGESTIVE HEART FAILURE (H): ICD-10-CM

## 2022-08-23 DIAGNOSIS — I25.10 CORONARY ARTERY DISEASE INVOLVING NATIVE CORONARY ARTERY OF NATIVE HEART WITHOUT ANGINA PECTORIS: ICD-10-CM

## 2022-08-23 DIAGNOSIS — I21.4 NSTEMI (NON-ST ELEVATED MYOCARDIAL INFARCTION) (H): Primary | ICD-10-CM

## 2022-08-23 LAB
ANION GAP SERPL CALCULATED.3IONS-SCNC: 16 MMOL/L (ref 5–18)
APTT PPP: 33 SECONDS (ref 22–38)
BUN SERPL-MCNC: 41 MG/DL (ref 8–22)
CALCIUM SERPL-MCNC: 8.9 MG/DL (ref 8.5–10.5)
CHLORIDE BLD-SCNC: 94 MMOL/L (ref 98–107)
CO2 SERPL-SCNC: 23 MMOL/L (ref 22–31)
CREAT SERPL-MCNC: 1.89 MG/DL (ref 0.7–1.3)
ERYTHROCYTE [DISTWIDTH] IN BLOOD BY AUTOMATED COUNT: 18.5 % (ref 10–15)
GFR SERPL CREATININE-BSD FRML MDRD: 39 ML/MIN/1.73M2
GLUCOSE BLD-MCNC: 148 MG/DL (ref 70–125)
GLUCOSE BLDC GLUCOMTR-MCNC: 142 MG/DL (ref 70–99)
HCT VFR BLD AUTO: 33.2 % (ref 40–53)
HGB BLD-MCNC: 10.7 G/DL (ref 13.3–17.7)
HOLD SPECIMEN: NORMAL
INR PPP: 1.25 (ref 0.85–1.15)
MCH RBC QN AUTO: 25.4 PG (ref 26.5–33)
MCHC RBC AUTO-ENTMCNC: 32.2 G/DL (ref 31.5–36.5)
MCV RBC AUTO: 79 FL (ref 78–100)
PLATELET # BLD AUTO: 206 10E3/UL (ref 150–450)
POTASSIUM BLD-SCNC: 3.7 MMOL/L (ref 3.5–5)
RBC # BLD AUTO: 4.22 10E6/UL (ref 4.4–5.9)
SARS-COV-2 RNA RESP QL NAA+PROBE: NEGATIVE
SODIUM SERPL-SCNC: 133 MMOL/L (ref 136–145)
TROPONIN I SERPL-MCNC: 14.95 NG/ML (ref 0–0.29)
WBC # BLD AUTO: 7.5 10E3/UL (ref 4–11)

## 2022-08-23 PROCEDURE — 70496 CT ANGIOGRAPHY HEAD: CPT

## 2022-08-23 PROCEDURE — U0005 INFEC AGEN DETEC AMPLI PROBE: HCPCS | Performed by: STUDENT IN AN ORGANIZED HEALTH CARE EDUCATION/TRAINING PROGRAM

## 2022-08-23 PROCEDURE — 93005 ELECTROCARDIOGRAM TRACING: CPT | Performed by: STUDENT IN AN ORGANIZED HEALTH CARE EDUCATION/TRAINING PROGRAM

## 2022-08-23 PROCEDURE — 80048 BASIC METABOLIC PNL TOTAL CA: CPT | Performed by: STUDENT IN AN ORGANIZED HEALTH CARE EDUCATION/TRAINING PROGRAM

## 2022-08-23 PROCEDURE — 96376 TX/PRO/DX INJ SAME DRUG ADON: CPT

## 2022-08-23 PROCEDURE — 85610 PROTHROMBIN TIME: CPT | Performed by: STUDENT IN AN ORGANIZED HEALTH CARE EDUCATION/TRAINING PROGRAM

## 2022-08-23 PROCEDURE — 85520 HEPARIN ASSAY: CPT | Performed by: STUDENT IN AN ORGANIZED HEALTH CARE EDUCATION/TRAINING PROGRAM

## 2022-08-23 PROCEDURE — 36415 COLL VENOUS BLD VENIPUNCTURE: CPT | Performed by: STUDENT IN AN ORGANIZED HEALTH CARE EDUCATION/TRAINING PROGRAM

## 2022-08-23 PROCEDURE — 250N000011 HC RX IP 250 OP 636: Performed by: STUDENT IN AN ORGANIZED HEALTH CARE EDUCATION/TRAINING PROGRAM

## 2022-08-23 PROCEDURE — 999N000127 HC STATISTIC PERIPHERAL IV START W US GUIDANCE

## 2022-08-23 PROCEDURE — G0426 INPT/ED TELECONSULT50: HCPCS | Mod: G0 | Performed by: PSYCHIATRY & NEUROLOGY

## 2022-08-23 PROCEDURE — 70498 CT ANGIOGRAPHY NECK: CPT

## 2022-08-23 PROCEDURE — 85730 THROMBOPLASTIN TIME PARTIAL: CPT | Performed by: STUDENT IN AN ORGANIZED HEALTH CARE EDUCATION/TRAINING PROGRAM

## 2022-08-23 PROCEDURE — 99223 1ST HOSP IP/OBS HIGH 75: CPT | Mod: GC | Performed by: INTERNAL MEDICINE

## 2022-08-23 PROCEDURE — 85027 COMPLETE CBC AUTOMATED: CPT | Performed by: STUDENT IN AN ORGANIZED HEALTH CARE EDUCATION/TRAINING PROGRAM

## 2022-08-23 PROCEDURE — 83036 HEMOGLOBIN GLYCOSYLATED A1C: CPT | Performed by: INTERNAL MEDICINE

## 2022-08-23 PROCEDURE — C9803 HOPD COVID-19 SPEC COLLECT: HCPCS

## 2022-08-23 PROCEDURE — 85014 HEMATOCRIT: CPT | Performed by: STUDENT IN AN ORGANIZED HEALTH CARE EDUCATION/TRAINING PROGRAM

## 2022-08-23 PROCEDURE — 84484 ASSAY OF TROPONIN QUANT: CPT | Performed by: STUDENT IN AN ORGANIZED HEALTH CARE EDUCATION/TRAINING PROGRAM

## 2022-08-23 PROCEDURE — 210N000001 HC R&B IMCU HEART CARE

## 2022-08-23 PROCEDURE — 96365 THER/PROPH/DIAG IV INF INIT: CPT | Mod: 59

## 2022-08-23 PROCEDURE — 96366 THER/PROPH/DIAG IV INF ADDON: CPT

## 2022-08-23 PROCEDURE — 99285 EMERGENCY DEPT VISIT HI MDM: CPT | Mod: 25

## 2022-08-23 RX ORDER — HEPARIN SODIUM 10000 [USP'U]/100ML
0-5000 INJECTION, SOLUTION INTRAVENOUS CONTINUOUS
Status: DISCONTINUED | OUTPATIENT
Start: 2022-08-23 | End: 2022-08-24

## 2022-08-23 RX ORDER — IOPAMIDOL 755 MG/ML
75 INJECTION, SOLUTION INTRAVASCULAR ONCE
Status: COMPLETED | OUTPATIENT
Start: 2022-08-23 | End: 2022-08-23

## 2022-08-23 RX ADMIN — IOPAMIDOL 75 ML: 755 INJECTION, SOLUTION INTRAVENOUS at 16:23

## 2022-08-23 RX ADMIN — HEPARIN SODIUM 1200 UNITS/HR: 10000 INJECTION, SOLUTION INTRAVENOUS at 18:08

## 2022-08-23 ASSESSMENT — ENCOUNTER SYMPTOMS
WEAKNESS: 1
FACIAL ASYMMETRY: 1
CONSTIPATION: 1
COUGH: 0
ABDOMINAL DISTENTION: 1
SPEECH DIFFICULTY: 1
SHORTNESS OF BREATH: 0
FEVER: 0

## 2022-08-23 ASSESSMENT — ACTIVITIES OF DAILY LIVING (ADL)
ADLS_ACUITY_SCORE: 35

## 2022-08-23 NOTE — Clinical Note
The first balloon was inserted into the right coronary artery and ostium right coronary artery.Max pressure = 14 anna. Total duration = 14 seconds.     Max pressure = 14 anna. Total duration = 7 seconds.    Balloon reinflated a second time: Max pressure = 14 anna. Total duration = 7 seconds.  Balloon reinflated a third time: Max pressure = 14 anna. Total duration = 8 seconds.

## 2022-08-23 NOTE — ED TRIAGE NOTES
The pt presents via  EMS for evaluation of L sided weakness including L hand and L facial droop. Symptoms began at 1445 and resolved in the ambulance en route. Pt evaluated by ED MD upon arrival and it was determined he meets criteria for Tier 1 stroke code and brought to CT.

## 2022-08-23 NOTE — ED NOTES
PT reported burning at IV site after heparin bolus was started, site checked and found to be cold and hard. IV stopped and pharmacist and MD updated. New IV started and drip restarted.

## 2022-08-23 NOTE — Clinical Note
Stent deployed in the distal right coronary artery. Max pressure = 12 anna. Total duration = 34 seconds. Balloon reinflated a second time: Max pressure = 14 anna. Total duration = 20 seconds.

## 2022-08-23 NOTE — Clinical Note
The first balloon was inserted into the right coronary artery and distal right coronary artery.Max pressure = 14 anna. Total duration = 14 seconds.

## 2022-08-23 NOTE — Clinical Note
Stent deployed in the ostium right coronary artery. Max pressure = 14 anna. Total duration = 35 seconds. Balloon reinflated a second time: Max pressure = 16 anna. Total duration = 20 seconds. Balloon reinflated a third time: Max pressure = 16 anna. Total duration = 16 seconds.

## 2022-08-23 NOTE — CONSULTS
"      [facility]    Stroke Telephone Note    I was called by Jason Castro on 08/23/22 regarding patient Norman Aguilar. The patient is a 62 year old male with PMH of DM, HLD, atrial fibrillation (previously on Eliquis, does not appear to be on at this time), tobacco use and self reported carotid disease (no vascular imaging/history available to confirm). He presents to the ED for evaluation of weakness. LKW was 1445 at which time he acutely developed left face and hand weakness. No additional deficits were identified (ie no numbness, speech changes, visual change etc). Per ED provider, symptoms improved and NIH currently 0.     In follow up discussion with the ED provider around 1627, patient symptoms have subsequently returned and worsened. Now has left face/hand weakness and dysarthria. Significant delay in determining if patient is taking Eliquis; ED was ultimately able to contact patient's sister who confirmed patient is no longer on Eliquis.     The patient had some fluctuation of symptoms so we ended up seeing him on camera.       Stroke Code Data (for stroke code without tele)  Stroke code activated 08/23/22   1558   Stroke provider first response  08/23/22   1600            Last known normal 08/23/22   1445        Time of discovery   (or onset of symptoms) 08/23/22   1445   Head CT read by Stroke Neuro Dr/Provider 08/23/22   1615   Was stroke code de-escalated? Yes 08/23/22 1721          Imaging Findings   Head CT: no acute pathology   CTA: no LVO       Intravenous Thrombolysis  Not given due to:   - minor/isolated/quickly resolving symptoms    Endovascular Treatment  Not initiated due to absence of proximal vessel occlusion    Impression  Transient ischemic attack vs minor stroke. Etiology is unknown yet but given the history of A FIB, patient should ideally be on long term anticoagulation.       Recommendations   - Use orderset: \"Ischemic Stroke Routine Admission\" or " "\"Ischemic Stroke No Thrombolytics/No Thrombectomy ICU Admission\"  - Neurochecks and Vital Signs every 4 hours   - Permissive HTN; goal SBP < 220 mmHg  - Daily aspirin 81 mg for now. After MRI, we will likely start anticoagulation  - Statin: atorvastatin 80  - MRI Brain with and without contrast  - TTE withlut Bubble Study  - Telemetry, EKG  - Bedside Glucose Monitoring  - A1c, Lipid Panel, Troponin x 3  - PT/OT/SLP  - Stroke Education  - Euthermia, Euglycemia    My recommendations are based on the information provided over the phone by Norman Aguilar's in-person providers. They are not intended to replace the clinical judgment of his in-person providers. I was not requested to personally see or examine the patient at this time.        I saw and evaluated Norman Aguilar Aug 23, 2022.  I reviewed all laboratory data and neuroimaging studies.  Norman Aguilar is in critical condition due to stroke like symptoms and consideration for high risk IV thrombolysis which was eventually deferred; he is at high risk of further neurologic deterioration.   I personally spent 60 minutes of critical care decision making time managing the above conditions.  I agree with the remainder of the assessment and plan of care documented below.      Anastacia Zacarias MD, Msc, FAHA, FAAN   of Neurology  [facility]     08/23/2022 5:32 PM  To page me or covering stroke neurology team member, click here: [email or web address]  Choose \"On Call\" tab at top, then search dropdown box for \"Neurology Adult\" & press Enter, look for Neuro ICU/Stroke    "

## 2022-08-23 NOTE — ED NOTES
Bed: JNED-01  Expected date: 8/23/22  Expected time: 3:49 PM  Means of arrival:   Comments:  EMS - TIA symptoms.

## 2022-08-23 NOTE — ED PROVIDER NOTES
EMERGENCY DEPARTMENT ENCOUNTER      NAME: Norman Aguilar  AGE: 62 year old male  YOB: 1960  MRN: [id number]  EVALUATION DATE & TIME: 8/23/2022  3:55 PM    PCP: Jaquan Dickerson    ED PROVIDER: Jason Castro M.D.      Chief Complaint   Patient presents with     Stroke Symptoms         FINAL IMPRESSION:  1. NSTEMI (non-ST elevated myocardial infarction) (H)    2. Acute non-ST segment elevation myocardial infarction (H)    3. Chronic systolic CHF -- EF 30-35% on Echo 11/15/21    4. Coronary artery disease involving native coronary artery of native heart without angina pectoris    5. Chronic systolic congestive heart failure (H)          ED COURSE & MEDICAL DECISION MAKING:    Pertinent Labs & Imaging studies reviewed. (See chart for details)  62 year old male presents to the Emergency Department for evaluation of stroke symptoms.    3:53 PM Initial history and physical performed. Plan of care discussed.  4:00 PM I spoke with Stroke Neurology.  4:24 PM Patient reported return of left sided facial and arm weakness upon returning from CT. Neuro repaged.  Patient is unsure whether or not he takes Eliquis.  He knows that he at one point did but cannot tell me at this time whether he currently takes it.  There are no notes which can help identify whether or not he is currently taking it.  4:28 PM I spoke with Neurology.   4:47 PM nurse was able to speak to the patient's sister who confirmed he is not currently taking the Eliquis. Patient's symptoms are resolving and his left arm is now moving he does have some remaining dysarthria. Patient is still within the window of TN K and has fluctuating symptoms. We are awaiting a callback from the neurologist to evaluate the patient and help determine whether to pursue thrombolytics or not.  4:59 PM I spoke with Stroke Neurology.  5:48 PM I spoke with Dr. Claire, Cardiology, who agrees with heparin and admission.   6:36 PM I spoke with Dr. Mcintyre,  Hospitalist.     At the conclusion of the encounter I discussed the results of all of the tests and the disposition. The questions were answered. The patient or family acknowledged understanding and was agreeable with the care plan.       30 minutes of critical care time     MEDICATIONS GIVEN IN THE EMERGENCY:  Medications   atorvastatin (LIPITOR) tablet 80 mg (80 mg Oral Given 8/29/22 2016)   insulin glargine (LANTUS PEN) injection 38 Units (38 Units Subcutaneous Given 8/29/22 2207)   magnesium oxide (MAG-OX) tablet 400 mg (400 mg Oral Given 8/29/22 0936)   metoprolol succinate ER (TOPROL XL) 24 hr tablet 50 mg (50 mg Oral Given 8/29/22 0936)   insulin aspart (NovoLOG) injection (RAPID ACTING) (1 Units Subcutaneous Not Given 8/29/22 1844)   insulin aspart (NovoLOG) injection (RAPID ACTING) (1 Units Subcutaneous Not Given 8/29/22 2207)   lidocaine 1 % 0.1-1 mL (has no administration in time range)   lidocaine (LMX4) cream (has no administration in time range)   sodium chloride (PF) 0.9% PF flush 3 mL (3 mLs Intracatheter Given 8/29/22 1729)   sodium chloride (PF) 0.9% PF flush 3 mL (has no administration in time range)   Patient is already receiving anticoagulation with heparin, enoxaparin (LOVENOX), warfarin (COUMADIN)  or other anticoagulant medication (has no administration in time range)   glucose gel 15-30 g (has no administration in time range)     Or   dextrose 50 % injection 25-50 mL (has no administration in time range)     Or   glucagon injection 1 mg (has no administration in time range)   HYDROmorphone (DILAUDID) injection 0.3 mg (has no administration in time range)   sodium chloride 0.9% infusion (0 mLs Intravenous Stopped 8/24/22 1945)   0.9% sodium chloride BOLUS (has no administration in time range)   atropine injection 0.5 mg (has no administration in time range)   naloxone (NARCAN) injection 0.2 mg (has no administration in time range)     Or   naloxone (NARCAN) injection 0.4 mg (has no  administration in time range)     Or   naloxone (NARCAN) injection 0.2 mg (has no administration in time range)     Or   naloxone (NARCAN) injection 0.4 mg (has no administration in time range)   flumazenil (ROMAZICON) injection 0.2 mg (has no administration in time range)   HOLD:  Metformin and metformin containing medications if patient received IV contrast with acute kidney injury or severe chronic kidney disease (stage IV or stage V; i.e., eGFR less than 30) (has no administration in time range)   sodium chloride 0.9% infusion (75 mL/hr Intravenous Not Given 8/24/22 2251)   hydrALAZINE (APRESOLINE) injection 10 mg (has no administration in time range)   nitroGLYcerin (NITRO-BID) 2 % ointment 15 mg (15 mg Transdermal Patch/Med Applied 8/29/22 1455)   0.9% sodium chloride + KCl 20 mEq/L infusion (0 mLs Intravenous Stopped 8/25/22 2203)   sodium chloride 0.9% infusion (75 mL/hr Intravenous Rate/Dose Verify 8/29/22 0937)   0.9% sodium chloride BOLUS (has no administration in time range)   atropine injection 0.5 mg (has no administration in time range)   naloxone (NARCAN) injection 0.2 mg (has no administration in time range)     Or   naloxone (NARCAN) injection 0.4 mg (has no administration in time range)     Or   naloxone (NARCAN) injection 0.2 mg (has no administration in time range)     Or   naloxone (NARCAN) injection 0.4 mg (has no administration in time range)   flumazenil (ROMAZICON) injection 0.2 mg (has no administration in time range)   sodium chloride 0.9% infusion (0 mLs Intravenous Stopped 8/29/22 1626)   HOLD: Metformin and metformin containing medications on day of procedure and 48 hours after IV contrast given for patients with acute kidney injury or severe chronic kidney disease (stage IV or stage V; i.e., eGFR less than 30) (has no administration in time range)   hydrALAZINE (APRESOLINE) injection 10 mg (has no administration in time range)   metoprolol (LOPRESSOR) injection 5 mg (has no  administration in time range)   nitroGLYcerin (NITROSTAT) sublingual tablet 0.4 mg (has no administration in time range)   aspirin EC tablet 81 mg (has no administration in time range)   acetaminophen (TYLENOL) tablet 650 mg (650 mg Oral Given 8/29/22 1320)   oxyCODONE (ROXICODONE) tablet 5 mg (has no administration in time range)     Or   oxyCODONE (ROXICODONE) tablet 10 mg (has no administration in time range)   ondansetron (ZOFRAN ODT) ODT tab 4 mg (has no administration in time range)     Or   ondansetron (ZOFRAN) injection 4 mg (has no administration in time range)   Percutaneous Coronary Intervention orders placed (this is information for BPA alerting) (has no administration in time range)   ticagrelor (BRILINTA) tablet 90 mg (90 mg Oral Given 8/29/22 2210)   Continuing beta blocker from home medication list OR beta blocker order already placed during this visit (has no administration in time range)   Continuing statin from home medication list OR statin order already placed during this visit (has no administration in time range)   iopamidol (ISOVUE-370) solution 75 mL (75 mLs Intravenous Given 8/23/22 1623)   heparin loading dose for LOW INTENSITY TREATMENT * Give BEFORE starting heparin infusion (6,000 Units Intravenous Given 8/23/22 1809)   aspirin (ASA) tablet 325 mg (325 mg Oral Given 8/24/22 1029)   heparin - BOLUS DOSE from infusion (3,000 Units Intravenous Given 8/24/22 2152)   perflutren lipid microsphere (DEFINITY) injection SUSP 3 mL (3 mLs Intravenous Given 8/25/22 0850)   heparin - BOLUS DOSE from infusion (3,000 Units Intravenous Given 8/26/22 0709)   lactulose (CHRONULAC) solution 20 g (20 g Oral Given 8/27/22 2026)   heparin - BOLUS DOSE from infusion (6,000 Units Intravenous Given 8/28/22 0558)   aspirin (ASA) tablet 325 mg (325 mg Oral Given 8/29/22 0947)     Or   aspirin (ASA) chewable tablet 243 mg ( Oral See Alternative 8/29/22 0947)   heparin - BOLUS DOSE from infusion (6,000 Units  Intravenous Given 8/28/22 1252)   diazepam (VALIUM) tablet 10 mg (10 mg Oral Given 8/29/22 1105)   furosemide (LASIX) injection 20 mg (20 mg Intravenous Given 8/29/22 1326)       NEW PRESCRIPTIONS STARTED AT TODAY'S ER VISIT  Current Discharge Medication List      START taking these medications    Details   aspirin (ASA) 81 MG EC tablet Take 1 tablet (81 mg) by mouth daily Start tomorrow.  Qty: 30 tablet, Refills: 3    Associated Diagnoses: Coronary artery disease involving native coronary artery of native heart without angina pectoris      ticagrelor (BRILINTA) 90 MG tablet Take 1 tablet (90 mg) by mouth 2 times daily Dose to start tomorrow morning.  Qty: 180 tablet, Refills: 3    Associated Diagnoses: Coronary artery disease involving native coronary artery of native heart without angina pectoris                =================================================================    HPI    Patient information was obtained from: Patient    Use of : None      Norman Aguilar is a 62 year old male with a pertinent history of DM Type II, benign essential hypertension, GERD, paroxysmal atrial fibrillation, ischemic cardiomyopathy, CAD, DVT, constipation, acute non-ST segment elevation myocardial infarction, and chronic systolic CHF, who presents for evaluation of stroke symptoms.     Patient reports that around 2:45 PM he noticed weakness to his left upper extremity, left-sided facial droop, and slurred speech. Patient notes that he was not doing anything when his symptoms began. Patient reports that his symptoms mostly resolved en route to the ED. Patient notes that he has a known blockage in his carotid artery as well. Patient reports that for the past week he has also been constipated and experiencing some abdominal distention. Patient denies cough, shortness of breath, fever, or any other concerns at this time.    REVIEW OF SYSTEMS   Review of Systems   Constitutional: Negative for fever.   Respiratory:  Negative for cough and shortness of breath.    Gastrointestinal: Positive for abdominal distention and constipation.   Neurological: Positive for facial asymmetry, speech difficulty (Slurred speech) and weakness (in left upper extremity).   All other systems reviewed and are negative.       PAST MEDICAL HISTORY:  Past Medical History:   Diagnosis Date     Anemia      Chronic systolic CHF (congestive heart failure) (H)      Coronary artery disease      Diabetic neuropathy (H)      DM type 2 w Neuropathy      GERD (gastroesophageal reflux disease)      Hyperlipidemia      Hypertension      Intermittent atrial fibrillation (H)     on Eliquis     Ischemic cardiomyopathy 11/16/2021    Echo with EF of 30-35%     NSTEMI (non-ST elevated myocardial infarction) (H) 11/15/2021     Osteomyelitis of left foot (H) 04/04/2022     Renal disease      Retinopathy      Sleep disturbance        PAST SURGICAL HISTORY:  Past Surgical History:   Procedure Laterality Date     AMPUTATE FOOT Left 3/21/2022    Procedure: Guillotine AMPUTATION, FOOT;  Surgeon: Ronald Bhatt MD;  Location: [facility] OR     AMPUTATE LEG BELOW KNEE Left 4/4/2022    Procedure: LEFT BELOW  KNEE AMPUTATION;  Surgeon: Ronald Bhatt MD;  Location: [facility] OR     AMPUTATE TOE(S) Left 11/16/2021    Procedure: first and second ray amputation;  Surgeon: Eddi Ram DPM;  Location: [facility] OR     APPENDECTOMY       CV CORONARY ANGIOGRAM N/A 11/16/2021    Procedure: CV CORONARY ANGIOGRAM;  Surgeon: Pam Tabares MD;  Location: [facility] CATH LAB CV     CV CORONARY ANGIOGRAM N/A 8/24/2022    Procedure: CV CORONARY ANGIOGRAM;  Surgeon: Cipriano Lucas MD;  Location: [facility] CATH LAB CV     CV LEFT HEART CATH N/A 11/16/2021    Procedure: Left Heart Cath;  Surgeon: Pam Tabares MD;  Location: [facility] CATH LAB CV     FOOT SURGERY Left      HERNIA REPAIR       INCISION AND DRAINAGE LOWER EXTREMITY, COMBINED Left 11/16/2021     Procedure: INCISION AND DRAINAGE, left foot;  Surgeon: Eddi Ram DPM;  Location: [facility] OR     INCISION AND DRAINAGE LOWER EXTREMITY, COMBINED Left 11/19/2021    Procedure: INCISION AND DRAINAGE, left foot;  Surgeon: Eddi Rma DPM;  Location: [facility] OR     INCISION AND DRAINAGE LOWER EXTREMITY, COMBINED Left 12/9/2021    Procedure: INCISION AND DRAINAGE, Left foot;  Surgeon: Eddi Ram DPM;  Location: [facility] OR     INCISION AND DRAINAGE LOWER EXTREMITY, COMBINED Left 1/10/2022    Procedure: INCISION AND DRAINAGE, left foot;  Surgeon: Eddi Ram DPM;  Location: [facility] OR     INCISION AND DRAINAGE LOWER EXTREMITY, COMBINED Left 1/27/2022    Procedure: INCISION AND DRAINAGE, Left foot;  Surgeon: Eddi Ram DPM;  Location: [facility] OR           CURRENT MEDICATIONS:    [START ON 8/30/2022] aspirin (ASA) 81 MG EC tablet  [START ON 8/30/2022] ticagrelor (BRILINTA) 90 MG tablet        ALLERGIES:  No Known Allergies    FAMILY HISTORY:  No family history on file.    SOCIAL HISTORY:   Social History     Socioeconomic History     Marital status: Single   Tobacco Use     Smoking status: Former Smoker     Types: Cigars     Smokeless tobacco: Never Used     Tobacco comment: Cigars   Vaping Use     Vaping Use: Never used   Substance and Sexual Activity     Alcohol use: Yes     Comment: < 1 drink a month (only holidays)     Drug use: Never   Social History Narrative    Single, currently not working, has done numerous jobs, lives alone.  Full code. (last updated 4/4/2022)        VITALS:  /68 (BP Location: Right arm, Patient Position: Semi-Escamilla's, Cuff Size: Adult Regular)   Pulse 73   Temp 97.6  F (36.4  C) (Oral)   Resp 20   Wt 105.5 kg (232 lb 9.6 oz)   SpO2 97%   BMI 29.46 kg/m        PHYSICAL EXAM    Constitutional: Well developed, Well nourished, NAD, GCS 15  HENT: Normocephalic, Atraumatic, Bilateral external ears  normal, Oropharynx normal, mucous membranes moist, Nose normal. Neck-  Normal range of motion, No tenderness, Supple, No stridor.  Eyes: PERRL, EOMI, Conjunctiva normal, No discharge.   Respiratory: Normal breath sounds, No respiratory distress, No wheezing, Speaks full sentences easily. No cough.  Cardiovascular: Normal heart rate, Regular rhythm, No murmurs, No rubs, No gallops. Chest wall nontender.  GI:Soft, No tenderness, No masses, No flank tenderness. No rebound or guarding.     Musculoskeletal: 2+ DP pulses. No edema.No cyanosis, No clubbing. Good range of motion in all major joints. No tenderness to palpation or major deformities noted.   Integument: Warm, Dry, No erythema, No rash. No petechiae.   Neurologic: Alert & oriented x 3,  CN 3-12 intact Normal motor function, Normal sensory function, Normal gait. Normal finger to nose bilaterally. Very subtle left-sided facial droop.  Psychiatric: Affect normal, Judgment normal, Mood normal. Cooperative.     LAB:  All pertinent labs reviewed and interpreted.  Labs Ordered and Resulted from Time of ED Arrival to Time of ED Departure   CBC WITH PLATELETS - Abnormal       Result Value    WBC Count 7.5      RBC Count 4.22 (*)     Hemoglobin 10.7 (*)     Hematocrit 33.2 (*)     MCV 79      MCH 25.4 (*)     MCHC 32.2      RDW 18.5 (*)     Platelet Count 206     INR - Abnormal    INR 1.25 (*)    BASIC METABOLIC PANEL - Abnormal    Sodium 133 (*)     Potassium 3.7      Chloride 94 (*)     Carbon Dioxide (CO2) 23      Anion Gap 16      Urea Nitrogen 41 (*)     Creatinine 1.89 (*)     Calcium 8.9      Glucose 148 (*)     GFR Estimate 39 (*)    TROPONIN I - Abnormal    Troponin I 14.95 (*)    CBC WITH PLATELETS - Abnormal    WBC Count 7.2      RBC Count 4.45      Hemoglobin 11.3 (*)     Hematocrit 34.8 (*)     MCV 78      MCH 25.4 (*)     MCHC 32.5      RDW 18.6 (*)     Platelet Count 194     GLUCOSE BY METER - Abnormal    GLUCOSE BY METER POCT 142 (*)    HEMOGLOBIN A1C -  Abnormal    Hemoglobin A1C 8.0 (*)    GLUCOSE BY METER - Abnormal    GLUCOSE BY METER POCT 165 (*)    BASIC METABOLIC PANEL - Abnormal    Sodium 134 (*)     Potassium 3.9      Chloride 99      Carbon Dioxide (CO2) 25      Anion Gap 10      Urea Nitrogen 44 (*)     Creatinine 1.75 (*)     Calcium 9.2      Glucose 107      GFR Estimate 43 (*)    TROPONIN I - Abnormal    Troponin I 15.76 (*)    B-TYPE NATRIURETIC PEPTIDE ( EAST ONLY) - Abnormal     (*)    INR - Abnormal    INR 1.19 (*)    GLUCOSE BY METER - Abnormal    GLUCOSE BY METER POCT 110 (*)    PARTIAL THROMBOPLASTIN TIME - Normal    aPTT 33     COVID-19 VIRUS (CORONAVIRUS) BY PCR - Normal    SARS CoV2 PCR Negative     HEPARIN UNFRACTIONATED ANTI XA LEVEL - Normal    Anti Xa Unfractionated Heparin 0.29     HEPARIN UNFRACTIONATED ANTI XA LEVEL - Normal    Anti Xa Unfractionated Heparin 0.25     MAGNESIUM - Normal    Magnesium 2.2     GLUCOSE MONITOR NURSING POCT   GLUCOSE MONITOR NURSING POCT   GLUCOSE MONITOR NURSING POCT   GLUCOSE MONITOR NURSING POCT   GLUCOSE MONITOR NURSING POCT   GLUCOSE MONITOR NURSING POCT   TYPE AND SCREEN, ADULT    ABO/RH(D) A POS      Antibody Screen Negative      SPECIMEN EXPIRATION DATE [id number]         RADIOLOGY:  Reviewed all pertinent imaging. Please see official radiology report.  Cardiac Catheterization   Final Result      Echocardiogram Complete   Final Result      Cardiac Catheterization   Final Result      CTA Head Neck with Contrast   Final Result   CONCLUSION:    HEAD CT:   1.  No evidence of acute hemorrhage, mass effect, or acute vascular territorial infarction. Consider MRI for further evaluation if clinically appropriate.   2.  Mild age-related changes.      HEAD CTA:    1.  No evidence of proximal large vessel occlusion.   2.  Multiple mild and moderate stenoses of the distal right MCA branches.      NECK CTA:   1.  No hemodynamically significant stenosis based on NASCET criteria.   2.  No evidence for  dissection.      Head CT results called to Dr. Castro at 4:15 PM, and CTA results called to Dr. Castro at 4:34 PM on 08/23/2022.      Echocardiogram Complete    (Results Pending)       EKG:    Performed at: 23-Aug-2022 16:27:20    Impression:  Incomplete left bundle branch block  ST & T wave abnormality, consider lateral ischemia    Rate: 72 bpm  Rhythm: Atrial fibrillation  Axis: 65  MT Interval: * ms  QRS Interval: 108 ms  QTc Interval: 446 ms  Comparison: When compared with ECG of 15-Nov-2021 18:17, ST now depressed in Anterolateral leads    I have independently reviewed and interpreted the EKG(s) documented above.    PROCEDURES:         I, Sanket Levy, am serving as a scribe to document services personally performed by Dr. Jason Castro based on my observation and the provider's statements to me. I, Jason Castro MD attest that Sanket Levy is acting in a scribe capacity, has observed my performance of the services and has documented them in accordance with my direction.    Jason Castro M.D.  Emergency Medicine  [facility] EMERGENCY DEPARTMENT  [address] [facility] [address]  [phone]  Dept: [phone]       Jason Castro MD  08/29/22 9909

## 2022-08-23 NOTE — PHARMACY-ADMISSION MEDICATION HISTORY
Pharmacy Note - Admission Medication History    Pertinent Provider Information: none     ______________________________________________________________________    Prior To Admission (PTA) med list completed and updated in EMR.       PTA Med List   Medication Sig Last Dose     aspirin (ASA) 81 MG chewable tablet Take 1 tablet (81 mg) by mouth daily 8/22/2022 at Unknown time     atorvastatin (LIPITOR) 80 MG tablet TAKE 1 TABLET(80 MG) BY MOUTH EVERY EVENING 8/22/2022 at pm     furosemide (LASIX) 20 MG tablet TAKE 1 TABLET(20 MG) BY MOUTH DAILY 8/23/2022 at am     insulin aspart (NOVOLOG FLEXPEN) 100 UNIT/ML pen For  - 164 give 1 unit. For  - 189 give 2 units. For  - 214 give 3 units. For  - 239 give 4 units. For  - 264 give 5 units. For  - 289 give 6 units. For  - 314 give 7 units. For  - 339 give 8 units For  - 364 give 9 units For  - 389 give 10 units For  - 414 give 11 units For BG greater than or equal to 415 give 12 units Past Week at Unknown time     insulin glargine (LANTUS SOLOSTAR) 100 UNIT/ML pen Inject 38 Units Subcutaneous every morning (Patient taking differently: Inject 38 Units Subcutaneous At Bedtime) 8/22/2022 at pm     metoprolol succinate ER (TOPROL-XL) 50 MG 24 hr tablet TAKE 1 TABLET(50 MG) BY MOUTH DAILY 8/22/2022 at Unknown time     Multiple Vitamin (MULTI VITAMIN) TABS Take 1 tablet by mouth daily  8/22/2022 at Unknown time     OMEGA-3/DHA/EPA/FISH OIL (FISH OIL-OMEGA-3 FATTY ACIDS) 300-1,000 mg capsule Take 1 g by mouth daily  8/22/2022 at Unknown time     vitamin C (ASCORBIC ACID) 250 MG tablet Take 500 mg by mouth daily 8/22/2022 at Unknown time       Information source(s): Patient and CareEverywhere/SureScripts  Method of interview communication: in-person    Summary of Changes to PTA Med List  New: none  Discontinued: Tylenol, Dilaudid, hydroxyzine, milk of mag, naproxen, senna-doc  Changed: none    Patient was asked about  OTC/herbal products specifically.  PTA med list reflects this.    In the past week, patient estimated taking medication this percent of the time:  greater than 90%.    Allergies were reviewed, assessed, and updated with the patient.      Patient does not use any multi-dose medications prior to admission.    The information provided in this note is only as accurate as the sources available at the time of the update(s).    Thank you for the opportunity to participate in the care of this patient.    Elis Whiteside RPH  8/23/2022 6:30 PM

## 2022-08-24 PROBLEM — N17.9 ARF (ACUTE RENAL FAILURE) (H): Status: ACTIVE | Noted: 2022-08-24

## 2022-08-24 LAB
ABO/RH(D): NORMAL
ALBUMIN UR-MCNC: 70 MG/DL
ANION GAP SERPL CALCULATED.3IONS-SCNC: 10 MMOL/L (ref 5–18)
ANTIBODY SCREEN: NEGATIVE
APPEARANCE UR: CLEAR
ATRIAL RATE - MUSE: 227 BPM
ATRIAL RATE - MUSE: 277 BPM
BACTERIA #/AREA URNS HPF: ABNORMAL /HPF
BILIRUB UR QL STRIP: NEGATIVE
BNP SERPL-MCNC: 681 PG/ML (ref 0–56)
BUN SERPL-MCNC: 44 MG/DL (ref 8–22)
CALCIUM SERPL-MCNC: 9.2 MG/DL (ref 8.5–10.5)
CHLORIDE BLD-SCNC: 99 MMOL/L (ref 98–107)
CO2 SERPL-SCNC: 25 MMOL/L (ref 22–31)
COLOR UR AUTO: ABNORMAL
CREAT SERPL-MCNC: 1.75 MG/DL (ref 0.7–1.3)
DIASTOLIC BLOOD PRESSURE - MUSE: 55 MMHG
DIASTOLIC BLOOD PRESSURE - MUSE: 75 MMHG
ERYTHROCYTE [DISTWIDTH] IN BLOOD BY AUTOMATED COUNT: 18.6 % (ref 10–15)
ERYTHROCYTE [DISTWIDTH] IN BLOOD BY AUTOMATED COUNT: 18.7 % (ref 10–15)
GFR SERPL CREATININE-BSD FRML MDRD: 43 ML/MIN/1.73M2
GLUCOSE BLD-MCNC: 107 MG/DL (ref 70–125)
GLUCOSE BLDC GLUCOMTR-MCNC: 107 MG/DL (ref 70–99)
GLUCOSE BLDC GLUCOMTR-MCNC: 110 MG/DL (ref 70–99)
GLUCOSE BLDC GLUCOMTR-MCNC: 165 MG/DL (ref 70–99)
GLUCOSE BLDC GLUCOMTR-MCNC: 195 MG/DL (ref 70–99)
GLUCOSE BLDC GLUCOMTR-MCNC: 95 MG/DL (ref 70–99)
GLUCOSE UR STRIP-MCNC: NEGATIVE MG/DL
HBA1C MFR BLD: 8 %
HCT VFR BLD AUTO: 33.6 % (ref 40–53)
HCT VFR BLD AUTO: 34.8 % (ref 40–53)
HGB BLD-MCNC: 10.6 G/DL (ref 13.3–17.7)
HGB BLD-MCNC: 11.3 G/DL (ref 13.3–17.7)
HGB UR QL STRIP: NEGATIVE
HOLD SPECIMEN: NORMAL
HYALINE CASTS: 4 /LPF
INR PPP: 1.19 (ref 0.85–1.15)
INTERPRETATION ECG - MUSE: NORMAL
INTERPRETATION ECG - MUSE: NORMAL
KETONES UR STRIP-MCNC: NEGATIVE MG/DL
LEUKOCYTE ESTERASE UR QL STRIP: NEGATIVE
MAGNESIUM SERPL-MCNC: 2.2 MG/DL (ref 1.8–2.6)
MCH RBC QN AUTO: 24.9 PG (ref 26.5–33)
MCH RBC QN AUTO: 25.4 PG (ref 26.5–33)
MCHC RBC AUTO-ENTMCNC: 31.5 G/DL (ref 31.5–36.5)
MCHC RBC AUTO-ENTMCNC: 32.5 G/DL (ref 31.5–36.5)
MCV RBC AUTO: 78 FL (ref 78–100)
MCV RBC AUTO: 79 FL (ref 78–100)
NITRATE UR QL: NEGATIVE
P AXIS - MUSE: -75 DEGREES
P AXIS - MUSE: NORMAL DEGREES
PH UR STRIP: 5.5 [PH] (ref 5–7)
PLATELET # BLD AUTO: 194 10E3/UL (ref 150–450)
PLATELET # BLD AUTO: 202 10E3/UL (ref 150–450)
POTASSIUM BLD-SCNC: 3.9 MMOL/L (ref 3.5–5)
PR INTERVAL - MUSE: NORMAL MS
PR INTERVAL - MUSE: NORMAL MS
PSA SERPL-MCNC: 1.69 NG/ML (ref 0–4.5)
QRS DURATION - MUSE: 108 MS
QRS DURATION - MUSE: 98 MS
QT - MUSE: 392 MS
QT - MUSE: 408 MS
QTC - MUSE: 410 MS
QTC - MUSE: 446 MS
R AXIS - MUSE: 55 DEGREES
R AXIS - MUSE: 65 DEGREES
RBC # BLD AUTO: 4.25 10E6/UL (ref 4.4–5.9)
RBC # BLD AUTO: 4.45 10E6/UL (ref 4.4–5.9)
RBC URINE: 1 /HPF
SODIUM SERPL-SCNC: 134 MMOL/L (ref 136–145)
SP GR UR STRIP: 1.03 (ref 1–1.03)
SPECIMEN EXPIRATION DATE: NORMAL
SQUAMOUS EPITHELIAL: 1 /HPF
SYSTOLIC BLOOD PRESSURE - MUSE: 119 MMHG
SYSTOLIC BLOOD PRESSURE - MUSE: 131 MMHG
T AXIS - MUSE: 125 DEGREES
T AXIS - MUSE: 168 DEGREES
TROPONIN I SERPL-MCNC: 15.76 NG/ML (ref 0–0.29)
UFH PPP CHRO-ACNC: 0.2 IU/ML
UFH PPP CHRO-ACNC: 0.25 IU/ML
UFH PPP CHRO-ACNC: 0.29 IU/ML
UROBILINOGEN UR STRIP-MCNC: <2 MG/DL
VENTRICULAR RATE- MUSE: 66 BPM
VENTRICULAR RATE- MUSE: 72 BPM
WBC # BLD AUTO: 6.5 10E3/UL (ref 4–11)
WBC # BLD AUTO: 7.2 10E3/UL (ref 4–11)
WBC URINE: 4 /HPF

## 2022-08-24 PROCEDURE — 36415 COLL VENOUS BLD VENIPUNCTURE: CPT | Performed by: INTERNAL MEDICINE

## 2022-08-24 PROCEDURE — 210N000001 HC R&B IMCU HEART CARE

## 2022-08-24 PROCEDURE — 250N000013 HC RX MED GY IP 250 OP 250 PS 637: Performed by: INTERNAL MEDICINE

## 2022-08-24 PROCEDURE — 83880 ASSAY OF NATRIURETIC PEPTIDE: CPT | Performed by: INTERNAL MEDICINE

## 2022-08-24 PROCEDURE — 250N000009 HC RX 250: Performed by: INTERNAL MEDICINE

## 2022-08-24 PROCEDURE — 250N000012 HC RX MED GY IP 250 OP 636 PS 637: Performed by: INTERNAL MEDICINE

## 2022-08-24 PROCEDURE — 85520 HEPARIN ASSAY: CPT | Performed by: INTERNAL MEDICINE

## 2022-08-24 PROCEDURE — 027135Z DILATION OF CORONARY ARTERY, TWO ARTERIES WITH TWO DRUG-ELUTING INTRALUMINAL DEVICES, PERCUTANEOUS APPROACH: ICD-10-PCS | Performed by: INTERNAL MEDICINE

## 2022-08-24 PROCEDURE — 84153 ASSAY OF PSA TOTAL: CPT | Performed by: INTERNAL MEDICINE

## 2022-08-24 PROCEDURE — 86901 BLOOD TYPING SEROLOGIC RH(D): CPT | Performed by: NURSE PRACTITIONER

## 2022-08-24 PROCEDURE — 83735 ASSAY OF MAGNESIUM: CPT | Performed by: INTERNAL MEDICINE

## 2022-08-24 PROCEDURE — 80048 BASIC METABOLIC PNL TOTAL CA: CPT | Performed by: INTERNAL MEDICINE

## 2022-08-24 PROCEDURE — 99233 SBSQ HOSP IP/OBS HIGH 50: CPT | Performed by: INTERNAL MEDICINE

## 2022-08-24 PROCEDURE — C1887 CATHETER, GUIDING: HCPCS | Performed by: INTERNAL MEDICINE

## 2022-08-24 PROCEDURE — 84484 ASSAY OF TROPONIN QUANT: CPT | Performed by: INTERNAL MEDICINE

## 2022-08-24 PROCEDURE — 99223 1ST HOSP IP/OBS HIGH 75: CPT | Performed by: INTERNAL MEDICINE

## 2022-08-24 PROCEDURE — 99152 MOD SED SAME PHYS/QHP 5/>YRS: CPT | Performed by: INTERNAL MEDICINE

## 2022-08-24 PROCEDURE — 86850 RBC ANTIBODY SCREEN: CPT | Performed by: NURSE PRACTITIONER

## 2022-08-24 PROCEDURE — B2111ZZ FLUOROSCOPY OF MULTIPLE CORONARY ARTERIES USING LOW OSMOLAR CONTRAST: ICD-10-PCS | Performed by: INTERNAL MEDICINE

## 2022-08-24 PROCEDURE — 85610 PROTHROMBIN TIME: CPT | Performed by: INTERNAL MEDICINE

## 2022-08-24 PROCEDURE — C1769 GUIDE WIRE: HCPCS | Performed by: INTERNAL MEDICINE

## 2022-08-24 PROCEDURE — 81001 URINALYSIS AUTO W/SCOPE: CPT | Performed by: INTERNAL MEDICINE

## 2022-08-24 PROCEDURE — 93458 L HRT ARTERY/VENTRICLE ANGIO: CPT | Performed by: INTERNAL MEDICINE

## 2022-08-24 PROCEDURE — 255N000002 HC RX 255 OP 636: Performed by: INTERNAL MEDICINE

## 2022-08-24 PROCEDURE — 93454 CORONARY ARTERY ANGIO S&I: CPT | Mod: 26 | Performed by: INTERNAL MEDICINE

## 2022-08-24 PROCEDURE — 85027 COMPLETE CBC AUTOMATED: CPT | Performed by: INTERNAL MEDICINE

## 2022-08-24 PROCEDURE — 272N000001 HC OR GENERAL SUPPLY STERILE: Performed by: INTERNAL MEDICINE

## 2022-08-24 PROCEDURE — 93454 CORONARY ARTERY ANGIO S&I: CPT | Performed by: INTERNAL MEDICINE

## 2022-08-24 PROCEDURE — 250N000011 HC RX IP 250 OP 636: Performed by: INTERNAL MEDICINE

## 2022-08-24 PROCEDURE — 258N000003 HC RX IP 258 OP 636: Performed by: INTERNAL MEDICINE

## 2022-08-24 PROCEDURE — C1894 INTRO/SHEATH, NON-LASER: HCPCS | Performed by: INTERNAL MEDICINE

## 2022-08-24 RX ORDER — ACETAMINOPHEN 325 MG/1
650 TABLET ORAL EVERY 4 HOURS PRN
Status: DISCONTINUED | OUTPATIENT
Start: 2022-08-24 | End: 2022-01-01

## 2022-08-24 RX ORDER — ASPIRIN 325 MG
325 TABLET ORAL ONCE
Status: DISCONTINUED | OUTPATIENT
Start: 2022-08-24 | End: 2022-08-24 | Stop reason: HOSPADM

## 2022-08-24 RX ORDER — ATORVASTATIN CALCIUM 40 MG/1
80 TABLET, FILM COATED ORAL EVERY EVENING
Status: DISCONTINUED | OUTPATIENT
Start: 2022-08-24 | End: 2022-01-01 | Stop reason: HOSPADM

## 2022-08-24 RX ORDER — LIDOCAINE 40 MG/G
CREAM TOPICAL
Status: DISCONTINUED | OUTPATIENT
Start: 2022-08-24 | End: 2022-01-01 | Stop reason: HOSPADM

## 2022-08-24 RX ORDER — IODIXANOL 320 MG/ML
INJECTION, SOLUTION INTRAVASCULAR
Status: DISCONTINUED | OUTPATIENT
Start: 2022-08-24 | End: 2022-08-24 | Stop reason: HOSPADM

## 2022-08-24 RX ORDER — METOPROLOL SUCCINATE 25 MG/1
50 TABLET, EXTENDED RELEASE ORAL DAILY
Status: DISCONTINUED | OUTPATIENT
Start: 2022-08-24 | End: 2022-01-01 | Stop reason: HOSPADM

## 2022-08-24 RX ORDER — NALOXONE HYDROCHLORIDE 0.4 MG/ML
0.4 INJECTION, SOLUTION INTRAMUSCULAR; INTRAVENOUS; SUBCUTANEOUS
Status: ACTIVE | OUTPATIENT
Start: 2022-08-24 | End: 2022-08-24

## 2022-08-24 RX ORDER — SODIUM CHLORIDE 9 MG/ML
INJECTION, SOLUTION INTRAVENOUS CONTINUOUS
Status: ACTIVE | OUTPATIENT
Start: 2022-08-24 | End: 2022-08-24

## 2022-08-24 RX ORDER — NALOXONE HYDROCHLORIDE 0.4 MG/ML
0.2 INJECTION, SOLUTION INTRAMUSCULAR; INTRAVENOUS; SUBCUTANEOUS
Status: ACTIVE | OUTPATIENT
Start: 2022-08-24 | End: 2022-08-24

## 2022-08-24 RX ORDER — DEXTROSE MONOHYDRATE 25 G/50ML
25-50 INJECTION, SOLUTION INTRAVENOUS
Status: DISCONTINUED | OUTPATIENT
Start: 2022-08-24 | End: 2022-01-01 | Stop reason: HOSPADM

## 2022-08-24 RX ORDER — MAGNESIUM OXIDE 400 MG/1
400 TABLET ORAL DAILY
Status: DISCONTINUED | OUTPATIENT
Start: 2022-08-24 | End: 2022-01-01 | Stop reason: HOSPADM

## 2022-08-24 RX ORDER — SODIUM CHLORIDE 9 MG/ML
75 INJECTION, SOLUTION INTRAVENOUS CONTINUOUS
Status: ACTIVE | OUTPATIENT
Start: 2022-08-24 | End: 2022-08-24

## 2022-08-24 RX ORDER — ASPIRIN 81 MG/1
81 TABLET, CHEWABLE ORAL DAILY
Status: DISCONTINUED | OUTPATIENT
Start: 2022-08-24 | End: 2022-01-01

## 2022-08-24 RX ORDER — FLUMAZENIL 0.1 MG/ML
0.2 INJECTION, SOLUTION INTRAVENOUS
Status: ACTIVE | OUTPATIENT
Start: 2022-08-24 | End: 2022-08-24

## 2022-08-24 RX ORDER — ONDANSETRON 4 MG/1
4 TABLET, ORALLY DISINTEGRATING ORAL EVERY 6 HOURS PRN
Status: DISCONTINUED | OUTPATIENT
Start: 2022-08-24 | End: 2022-01-01

## 2022-08-24 RX ORDER — NICOTINE POLACRILEX 4 MG
15-30 LOZENGE BUCCAL
Status: DISCONTINUED | OUTPATIENT
Start: 2022-08-24 | End: 2022-01-01 | Stop reason: HOSPADM

## 2022-08-24 RX ORDER — HEPARIN SODIUM 10000 [USP'U]/100ML
0-5000 INJECTION, SOLUTION INTRAVENOUS CONTINUOUS
Status: DISCONTINUED | OUTPATIENT
Start: 2022-08-24 | End: 2022-01-01

## 2022-08-24 RX ORDER — NITROGLYCERIN 0.4 MG/1
0.4 TABLET SUBLINGUAL EVERY 5 MIN PRN
Status: DISCONTINUED | OUTPATIENT
Start: 2022-08-24 | End: 2022-01-01

## 2022-08-24 RX ORDER — ATROPINE SULFATE 0.1 MG/ML
0.5 INJECTION INTRAVENOUS
Status: ACTIVE | OUTPATIENT
Start: 2022-08-24 | End: 2022-08-24

## 2022-08-24 RX ORDER — ONDANSETRON 2 MG/ML
4 INJECTION INTRAMUSCULAR; INTRAVENOUS EVERY 6 HOURS PRN
Status: DISCONTINUED | OUTPATIENT
Start: 2022-08-24 | End: 2022-01-01

## 2022-08-24 RX ORDER — NICOTINE POLACRILEX 4 MG
15-30 LOZENGE BUCCAL
Status: DISCONTINUED | OUTPATIENT
Start: 2022-08-24 | End: 2022-08-26

## 2022-08-24 RX ORDER — HYDRALAZINE HYDROCHLORIDE 20 MG/ML
10 INJECTION INTRAMUSCULAR; INTRAVENOUS
Status: DISCONTINUED | OUTPATIENT
Start: 2022-08-24 | End: 2022-01-01 | Stop reason: HOSPADM

## 2022-08-24 RX ORDER — FENTANYL CITRATE 50 UG/ML
INJECTION, SOLUTION INTRAMUSCULAR; INTRAVENOUS
Status: DISCONTINUED | OUTPATIENT
Start: 2022-08-24 | End: 2022-08-24 | Stop reason: HOSPADM

## 2022-08-24 RX ORDER — ASPIRIN 325 MG
325 TABLET ORAL ONCE
Status: COMPLETED | OUTPATIENT
Start: 2022-08-24 | End: 2022-08-24

## 2022-08-24 RX ORDER — FUROSEMIDE 20 MG
20 TABLET ORAL DAILY
Status: DISCONTINUED | OUTPATIENT
Start: 2022-08-24 | End: 2022-01-01

## 2022-08-24 RX ORDER — DEXTROSE MONOHYDRATE 25 G/50ML
25-50 INJECTION, SOLUTION INTRAVENOUS
Status: DISCONTINUED | OUTPATIENT
Start: 2022-08-24 | End: 2022-08-26

## 2022-08-24 RX ADMIN — HEPARIN SODIUM 1200 UNITS/HR: 10000 INJECTION, SOLUTION INTRAVENOUS at 15:00

## 2022-08-24 RX ADMIN — NITROGLYCERIN 15 MG: 20 OINTMENT TOPICAL at 13:49

## 2022-08-24 RX ADMIN — SODIUM CHLORIDE: 9 INJECTION, SOLUTION INTRAVENOUS at 09:38

## 2022-08-24 RX ADMIN — ATORVASTATIN CALCIUM 80 MG: 40 TABLET, FILM COATED ORAL at 01:45

## 2022-08-24 RX ADMIN — Medication 400 MG: at 09:38

## 2022-08-24 RX ADMIN — INSULIN GLARGINE 38 UNITS: 100 INJECTION, SOLUTION SUBCUTANEOUS at 01:45

## 2022-08-24 RX ADMIN — INSULIN GLARGINE 38 UNITS: 100 INJECTION, SOLUTION SUBCUTANEOUS at 21:59

## 2022-08-24 RX ADMIN — ATORVASTATIN CALCIUM 80 MG: 40 TABLET, FILM COATED ORAL at 21:55

## 2022-08-24 RX ADMIN — METOPROLOL SUCCINATE 50 MG: 25 TABLET, EXTENDED RELEASE ORAL at 09:38

## 2022-08-24 RX ADMIN — ASPIRIN 325 MG: 325 TABLET ORAL at 10:29

## 2022-08-24 ASSESSMENT — ACTIVITIES OF DAILY LIVING (ADL)
CHANGE_IN_FUNCTIONAL_STATUS_SINCE_ONSET_OF_CURRENT_ILLNESS/INJURY: NO
WALKING_OR_CLIMBING_STAIRS_DIFFICULTY: NO
DRESSING/BATHING_DIFFICULTY: NO
ADLS_ACUITY_SCORE: 23
ADLS_ACUITY_SCORE: 36
ADLS_ACUITY_SCORE: 35
ADLS_ACUITY_SCORE: 23
DOING_ERRANDS_INDEPENDENTLY_DIFFICULTY: NO
WEAR_GLASSES_OR_BLIND: YES
EQUIPMENT_CURRENTLY_USED_AT_HOME: WALKER, STANDARD
CONCENTRATING,_REMEMBERING_OR_MAKING_DECISIONS_DIFFICULTY: NO
TOILETING_ISSUES: NO
DIFFICULTY_EATING/SWALLOWING: NO
ADLS_ACUITY_SCORE: 36
FALL_HISTORY_WITHIN_LAST_SIX_MONTHS: NO
ADLS_ACUITY_SCORE: 36
ADLS_ACUITY_SCORE: 35
ADLS_ACUITY_SCORE: 36
ADLS_ACUITY_SCORE: 35
ADLS_ACUITY_SCORE: 22
ADLS_ACUITY_SCORE: 23
ADLS_ACUITY_SCORE: 22

## 2022-08-24 NOTE — PLAN OF CARE
"  Problem: Plan of Care - These are the overarching goals to be used throughout the patient stay.    Goal: Plan of Care Review/Shift Note  Description: The Plan of Care Review/Shift note should be completed every shift.  The Outcome Evaluation is a brief statement about your assessment that the patient is improving, declining, or no change.  This information will be displayed automatically on your shift note.  Outcome: Ongoing, Progressing  Goal: Patient-Specific Goal (Individualized)  Description: You can add care plan individualizations to a care plan. Examples of Individualization might be:  \"Parent requests to be called daily at 9am for status\", \"I have a hard time hearing out of my right ear\", or \"Do not touch me to wake me up as it startles me\".  Outcome: Ongoing, Progressing  Goal: Absence of Hospital-Acquired Illness or Injury  Outcome: Ongoing, Progressing  Goal: Optimal Comfort and Wellbeing  Outcome: Ongoing, Progressing  Goal: Readiness for Transition of Care  Outcome: Ongoing, Progressing  Intervention: Mutually Develop Transition Plan  Recent Flowsheet Documentation  Taken 8/24/2022 1315 by Dl Coronado RN  Equipment Currently Used at Home: walker, standard   Goal Outcome Evaluation:      Pt came to floor with R radial TR band w/ 10cc.    Pt denies pain,sob, nausea, etc.  Pt is axox4 calm and cooperative .  Pt is on tele monitoring NSR.   Band was removed with no complications.                  "

## 2022-08-24 NOTE — H&P
[facility]    History and Physical - Hospitalist Service       Date of Admission:  8/23/2022    Assessment & Plan      Norman Aguilar is a 62 year old male PMH of CAD, CHF, HTN, IDDM, paroxysmal atrial fibrillation, left foot osteomyelitis failure of antibiotics s/p left BKA, admitted on 8/23/2022.    NSTEMI  Paroxysmal atrial fibrillation.  In the past was on Eliquis, and it does not appear he is taking it now.  On heparin drip now.  Rate control with metoprolol.  Telemetry, echo, a.m. lipids  Cardiology consulted.    Multivessel coronary artery disease.  Coronary angiogram on 11/16/2021 showed diffuse calcified vessels.  No stents placed.    Chronic systolic CHF, with EF 30-35 on echo 11/15/2021.  Repeating echo in AM.    Continue home dose of Lasix.    JOHANA, with prerenal indicis.  No urinary symptoms to suspect UTI, though patient is diabetic.  No history of BPH.  Was not hypotensive.  Holding Lasix, IVF  Obtain UA, PSA  AM labs    Insulin-dependent diabetes mellitus, recent A1c 7.6 in January 2022.  -Continue home dose of Lantus 38 units nightly.  SSI NovoLog.  Check A1c.    Essential hypertension.  On metoprolol.    Tobacco use disorder.  Smokes cigars.  -Smoking cessation, offered nicotine replacement.    Strokelike symptoms, left-sided weakness, with left-sided facial droop with onset of symptoms at 1445 and resolved in the ambulance on route.  Not candidate for tPA, given quickly resolved symptoms.  Patient was seen by stroke neurology.  Recommendations reviewed and implemented.  CT head and neck showed multiple mild to moderate stenosis on the right MCA branches, otherwise no acute findings.  Telemetry showed A. fib.  TTE in a.m.  Check A1c, lipid panel, serial troponin-revealed NSTEMI.       Diet: Combination Diet Regular Diet Adult; Moderate Consistent Carb (60 g CHO per Meal) Diet    DVT Prophylaxis: On heparin drip  Garcia Catheter: Not present  Central Lines: None  Cardiac  Monitoring: ACTIVE order. Indication: AMI (NSTEMI/ STEMI) (48 hours)  Code Status: Full Code      Clinically Significant Risk Factors Present on Admission               # Coagulation Defect: INR = 1.25 (Ref range: 0.85 - 1.15) and/or PTT = 33 Seconds (Ref range: 22 - 38 Seconds) on admission, will monitor for bleeding    # Chronic systolic heart failure: echo within the past year with EF <40%          Disposition Plan         The patient's care was discussed with the Bedside Nurse and Patient.    Keri Mcintyre MD  Hospitalist Service  [facility]  Securely message with the Vocera Web Console (learn more here)  Text page via [facility] Paging/Directory   ______________________________________________________________________    Chief Complaint   Strokelike symptoms    History is obtained from the patient, electronic health record and emergency department physician    History of Present Illness   PMH of CAD, CHF, HTN, IDDM, paroxysmal atrial fibrillation, left foot osteomyelitis failure of antibiotics s/p left BKA, presents to ED via EMS with strokelike symptoms, left-sided weakness and facial droop. Symptoms resolved in route.  Patient seen by stroke neurology, and in the process of stroke work-up, troponin was checked is highly elevated at 14.  Patient without chest pain, cardiac palpitations, dyspnea.  He has been diagnosed with multivessel coronary disease.  He never had chest pain.  On-call cardiology recommended heparinization and indications for emergent Cath Lab activation.    Review of Systems    The 10 point Review of Systems is negative other than noted in the HPI or here.     Past Medical History    I have reviewed this patient's medical history and updated it with pertinent information if needed.   Past Medical History:   Diagnosis Date     Anemia      Chronic systolic CHF (congestive heart failure) (H)      Coronary artery disease      Diabetic neuropathy (H)      DM type 2 w  Neuropathy      GERD (gastroesophageal reflux disease)      Hyperlipidemia      Hypertension      Intermittent atrial fibrillation (H)     on Eliquis     Ischemic cardiomyopathy 11/16/2021    Echo with EF of 30-35%     NSTEMI (non-ST elevated myocardial infarction) (H) 11/15/2021     Osteomyelitis of left foot (H) 04/04/2022     Renal disease      Retinopathy      Sleep disturbance      Past Surgical History   I have reviewed this patient's surgical history and updated it with pertinent information if needed.  Past Surgical History:   Procedure Laterality Date     AMPUTATE FOOT Left 3/21/2022    Procedure: Guillotine AMPUTATION, FOOT;  Surgeon: Ronald Bhatt MD;  Location: [facility] OR     AMPUTATE LEG BELOW KNEE Left 4/4/2022    Procedure: LEFT BELOW  KNEE AMPUTATION;  Surgeon: Ronald Bhatt MD;  Location: [facility] OR     AMPUTATE TOE(S) Left 11/16/2021    Procedure: first and second ray amputation;  Surgeon: Eddi Ram DPM;  Location: [facility] OR     APPENDECTOMY       CV CORONARY ANGIOGRAM N/A 11/16/2021    Procedure: CV CORONARY ANGIOGRAM;  Surgeon: Pam Tabares MD;  Location: [facility] CATH LAB CV     CV LEFT HEART CATH N/A 11/16/2021    Procedure: Left Heart Cath;  Surgeon: Pam Tabares MD;  Location: [facility] CATH LAB CV     FOOT SURGERY Left      HERNIA REPAIR       INCISION AND DRAINAGE LOWER EXTREMITY, COMBINED Left 11/16/2021    Procedure: INCISION AND DRAINAGE, left foot;  Surgeon: Eddi Ram DPM;  Location: [facility] OR     INCISION AND DRAINAGE LOWER EXTREMITY, COMBINED Left 11/19/2021    Procedure: INCISION AND DRAINAGE, left foot;  Surgeon: Eddi Ram DPM;  Location: [facility] OR     INCISION AND DRAINAGE LOWER EXTREMITY, COMBINED Left 12/9/2021    Procedure: INCISION AND DRAINAGE, Left foot;  Surgeon: Eddi Ram DPM;  Location: [facility] OR     INCISION AND DRAINAGE LOWER EXTREMITY, COMBINED Left 1/10/2022     Procedure: INCISION AND DRAINAGE, left foot;  Surgeon: Eddi Ram DPM;  Location: [facility] OR     INCISION AND DRAINAGE LOWER EXTREMITY, COMBINED Left 1/27/2022    Procedure: INCISION AND DRAINAGE, Left foot;  Surgeon: Edid Ram DPM;  Location: [facility]     Social History   I have reviewed this patient's social history and updated it with pertinent information if needed.  Social History     Tobacco Use     Smoking status: Former Smoker     Types: Cigars     Smokeless tobacco: Never Used     Tobacco comment: Cigars   Vaping Use     Vaping Use: Never used   Substance Use Topics     Alcohol use: Yes     Comment: < 1 drink a month (only holidays)     Drug use: Never     Family History       Prior to Admission Medications   Prior to Admission Medications   Prescriptions Last Dose Informant Patient Reported? Taking?   Multiple Vitamin (MULTI VITAMIN) TABS 8/22/2022 at Unknown time  Yes Yes   Sig: Take 1 tablet by mouth daily    OMEGA-3/DHA/EPA/FISH OIL (FISH OIL-OMEGA-3 FATTY ACIDS) 300-1,000 mg capsule 8/22/2022 at Unknown time  Yes Yes   Sig: Take 1 g by mouth daily    aspirin (ASA) 81 MG chewable tablet 8/22/2022 at Unknown time  No Yes   Sig: Take 1 tablet (81 mg) by mouth daily   atorvastatin (LIPITOR) 80 MG tablet 8/22/2022 at pm  No Yes   Sig: TAKE 1 TABLET(80 MG) BY MOUTH EVERY EVENING   blood glucose (NO BRAND SPECIFIED) test strip   No No   Sig: Use to test blood sugar 4 times daily or as directed.   blood glucose monitoring (SOFTCLIX) lancets   No No   Sig: Use to test blood sugar 4 times daily.   furosemide (LASIX) 20 MG tablet 8/23/2022 at am  No Yes   Sig: TAKE 1 TABLET(20 MG) BY MOUTH DAILY   insulin aspart (NOVOLOG FLEXPEN) 100 UNIT/ML pen Past Week at Unknown time  No Yes   Sig: For  - 164 give 1 unit. For  - 189 give 2 units. For  - 214 give 3 units. For  - 239 give 4 units. For  - 264 give 5 units. For  - 289 give 6 units. For BG  290 - 314 give 7 units. For  - 339 give 8 units For  - 364 give 9 units For  - 389 give 10 units For  - 414 give 11 units For BG greater than or equal to 415 give 12 units   insulin glargine (LANTUS SOLOSTAR) 100 UNIT/ML pen 8/22/2022 at pm  No Yes   Sig: Inject 38 Units Subcutaneous every morning   Patient taking differently: Inject 38 Units Subcutaneous At Bedtime   magnesium oxide (MAG-OX) 400 MG tablet   No No   Sig: Take 1 tablet (400 mg) by mouth daily   metoprolol succinate ER (TOPROL-XL) 50 MG 24 hr tablet 8/22/2022 at Unknown time  No Yes   Sig: TAKE 1 TABLET(50 MG) BY MOUTH DAILY   vitamin C (ASCORBIC ACID) 250 MG tablet 8/22/2022 at Unknown time  Yes Yes   Sig: Take 500 mg by mouth daily      Facility-Administered Medications: None     Allergies   No Known Allergies    Physical Exam   Vital Signs: Temp: 98.5  F (36.9  C) Temp src: Oral BP: (!) 141/72 Pulse: 85   Resp: 8 SpO2: 99 %      Weight: 220 lbs 0 oz    General: Alert and oriented x 3. Not in obvious distress.  HEENT: NC, AT. Neck- supple, No JVP elevation, lymphadenopathy or thyromegaly. Trachea-central.  Chest: Clear to auscultation bilaterally.  Heart: S1S2 regular. No M/R/G.  Abdomen: Soft. NT, ND. No organomegaly. Bowel sounds- active.  Back: No spine tenderness. No CVA tenderness.  Extremities: Left leg BKA.  Neuro: Cranial nerves 1-12 grossly normal. No focal neurological deficit    Data   Data reviewed today: I reviewed all medications, new labs and imaging results over the last 24 hours. I personally reviewed    Recent Labs   Lab 08/23/22  2358 08/23/22  2309 08/23/22  1628   WBC 7.2  --  7.5   HGB 11.3*  --  10.7*   MCV 78  --  79     --  206   INR  --   --  1.25*   NA  --   --  133*   POTASSIUM  --   --  3.7   CHLORIDE  --   --  94*   CO2  --   --  23   BUN  --   --  41*   CR  --   --  1.89*   ANIONGAP  --   --  16   YOSVANY  --   --  8.9   GLC  --  142* 148*     7.2    \    11.3 (L)    /    194   N N/A    L  N/A    133 (L)    94 (L)    41 (H) /   ------------------------------------ 142 (H)   ALT N/A   AST N/A   AP N/A   ALB N/A   Ca 8.9  3.7    23    1.89 (H) \    % RETIC N/A    LDH N/A  Troponin N/A    BNP N/A    CK N/A  INR 1.25 (H)   PTT 33    D-dimer N/A    Fibrinogen N/A    Antithrombin N/A  Ferritin N/A  CRP N/A    IL-6 N/A  Recent Results (from the past 24 hour(s))   CTA Head Neck with Contrast    Narrative    TEMPORARY  8/23/2022 4:04 PM     INDICATION: Left-sided weakness.  TECHNIQUE: Head and neck CT angiogram with IV contrast. Noncontrast head CT followed by axial helical CT images of the head and neck vessels obtained during the arterial phase of intravenous contrast administration. Axial helical 2D reconstructed images   and multiplanar 3D MIP reconstructed images of the head and neck vessels were performed by the technologist. Dose reduction techniques were used.  CONTRAST:  COMPARISON: Carotid ultrasound 02/17/2022.    FINDINGS:   NONCONTRAST HEAD CT:   INTRACRANIAL CONTENTS: No intracranial hemorrhage, extraaxial collection, or mass effect.  No CT evidence of acute infarct. Minimal presumed chronic small vessel ischemic changes. Mild generalized volume loss. No hydrocephalus. The sella is unremarkable   for technique. The cerebellar tonsils are appropriately positioned.     VISUALIZED ORBITS/SINUSES/MASTOIDS: No intraorbital abnormality. No paranasal sinus mucosal disease. No middle ear or mastoid effusion. Soft tissue density in the left external auditory canal likely represents cerumen.    BONES/SOFT TISSUES: No scalp hematoma. No skull fracture.    HEAD CTA:  ANTERIOR CIRCULATION: There is moderate stenosis of the right supraclinoid ICA and mild stenosis of the left supra clinoid ICA. The anterior cerebral arteries appear patent. The right and left middle cerebral arteries demonstrate no proximal large vessel   occlusion. There are multiple mild and moderate stenoses of the distal right MCA  branches. No aneurysm or high flow vascular malformation is demonstrated.    POSTERIOR CIRCULATION: The intradural vertebral, basilar and visualized proximal cerebellar arteries appear patent the right and left posterior cerebral arteries also appear patent. No aneurysm or high flow vascular malformation is demonstrated.    DURAL VENOUS SINUSES: The major venous structures demonstrate grossly unremarkable enhancement, within the limitations of arterial phase technique.    NECK CTA:  RIGHT CAROTID: No measurable stenosis in the right ICA based on NASCET criteria. Mild plaque at the bifurcation.    LEFT CAROTID: No measurable stenosis in the left ICA based on NASCET criteria. Mild plaque at the bifurcation.    VERTEBRAL ARTERIES: Balanced vertebral arteries are patent in the neck and into the head.     AORTIC ARCH: There is a left-sided aortic arch. No flow-limiting stenosis is apparent at the origins of the brachiocephalic, common carotid, subclavian or right vertebral arteries. The left vertebral artery origin is obscured by venous contrast.    MISCELLANEOUS: The visualized lung apices are well aerated. The cervical spine appears intact with mild spondylosis. Mild C5-C6 and C6-C7 anterolisthesis. The soft tissues of the neck, including the thyroid and parotid glands, are grossly unremarkable   for technique.       Impression    CONCLUSION:   HEAD CT:  1.  No evidence of acute hemorrhage, mass effect, or acute vascular territorial infarction. Consider MRI for further evaluation if clinically appropriate.  2.  Mild age-related changes.    HEAD CTA:   1.  No evidence of proximal large vessel occlusion.  2.  Multiple mild and moderate stenoses of the distal right MCA branches.    NECK CTA:  1.  No hemodynamically significant stenosis based on NASCET criteria.  2.  No evidence for dissection.    Head CT results called to Dr. Castro at 4:15 PM, and CTA results called to Dr. Castro at 4:34 PM on 08/23/2022.

## 2022-08-24 NOTE — CONSULTS
HEART CARE NOTE        Thank you, Dr. Mcintyre, for asking the [facility] Heart Care team to see Norman Aguilar to evaluate NSTEM.      Assessment/Recommendations     1. HFrEF/ICM  Assessment / Plan    Near euvolemia on physical exam; denies HF symptoms; no changes to regimen at this time    GDMT as detailed below     Current Pharmacotherapy AHA Guideline-Directed Medical Therapy   Lisinopril 20 mg daily - on hold given JOHANA Lisinopril 20 mg twice daily   Metoprolol succinate 50 mg daily Carvedilol 25 mg twice daily   Spironolactone 25 mg - on hold Spironolactone 25 mg once daily   Hydralazine NA Hydralazine 100 mg three times daily   Isosorbide dinitrate NA Isosorbide dinitrate 40 mg three times daily   SGLT2 inhibitor: not started Dapagliflozin or Empagliflozin 10 mg daily         2. NSTEMI  Assessment / Plan    S/p coronary angiogram significant for severe multi-vessel CAD during admission in 11/21 ; ELY/Pa consulted with plans for CABG in 2-3 months at that time - will proceed with coronary angiogram once renal function stable to improved. I discussed this with him including potential risk of stroke, myocardial infarction, or death.  We also discussed the possibility of vascular injury, bleeding requiring blood transfusion, or reaction to x-ray     Continue heparin gtt    Currently hemodynamically stable and chest pain free    Continue ASA, high intensity atorvastatin, metoprolol succinate      3. DM2  Assessment / Plan    Management per primary team     4. HLP  Assessment / Plan    Continue high intensity atorvastatin     5. Paroxysmal afib  Assessment / Plan    Rate controlled; continue metoprolol    Continue heparin gtt     Will need afib referral at discharge     6. HTN  Assessment / Plan    Well controlled on current regimen     7. JOHANA on CKD  Assessment / Plan    Baseline appears to be around1.2    Continue to monitor closely given contrast dye load      Clinically Significant Risk Factors Present  on Admission               # Coagulation Defect: INR = 1.25 (Ref range: 0.85 - 1.15) and/or PTT = 33 Seconds (Ref range: 22 - 38 Seconds) on admission, will monitor for bleeding           History of Present Illness/Subjective    Mr. Norman Aguilar is a 62 year old male with a PMHx significant for CAD, CHF, HTN, IDDM, paroxysmal atrial fibrillation, left foot osteomyelitis failure of antibiotics s/p left BKA, admitted on 8/23/2022 with symptoms concerninf for stroke. Troponin was noted to be elevated.    Today, Mr. Thomas denies any acute cardiac events or complaints; He denies chest pain, palpitations, orthopnea, PND, LE edema; we discussed his current renal function and how that may be affected by contrast dye used for a coronary angiogram extensively. Patient was clear that he wanted to proceed with coronary angiogram knowing the risk of progressive decline in renal function.     ECG: Personally reviewed. atrial fibrillation, rate conttolled.    ECHO (personnaly Reviewed):   The left ventricle is normal in size.  Left ventricular function is decreased. The ejection fraction is 30-35%  (moderately reduced).  Left ventricular diastolic function is abnormal.  Diastolic Doppler findings (E/E' ratio and/or other parameters) suggest left  ventricular filling pressures are increased.  The left atrium is mildly dilated.  The mitral valve leaflets are mildly thickened.  There is mild (1+) mitral regurgitation.  IVC diameter >2.1 cm collapsing <50% with sniff suggests a high RA pressure  estimated at 15 mmHg or greater.        Physical Examination Review of Systems   /67 (BP Location: Right arm)   Pulse 64   Temp 98  F (36.7  C) (Oral)   Resp 16   Wt 99.8 kg (220 lb)   SpO2 97%   BMI 27.87 kg/m    Body mass index is 27.87 kg/m .  Wt Readings from Last 3 Encounters:   08/23/22 99.8 kg (220 lb)   04/04/22 93 kg (205 lb)   03/23/22 95.7 kg (211 lb)     General Appearance:   no distress, normal body habitus    ENT/Mouth: membranes moist, no oral lesions or bleeding gums.      EYES:  no scleral icterus, normal conjunctivae   Neck: no carotid bruits or thyromegaly   Chest/Lungs:   lungs are clear to auscultation, no rales or wheezing, equal chest wall expansion    Cardiovascular:   Regular. Normal first and second heart sounds with no murmurs, rubs, or gallops; the carotid, radial and posterior tibial pulses are intact, no JVD and trace LE edema   Abdomen:  no organomegaly, masses, bruits, or tenderness; bowel sounds are present   Extremities: no cyanosis or clubbing   Skin: no xanthelasma, warm.    Neurologic: alert and oriented x3, calm     Psychiatric: alert and oriented x3, calm     A complete 10 systems ROS was reviewed  And is negative except what is listed in the HPI.          Medical History  Surgical History Family History Social History   Past Medical History:   Diagnosis Date     Anemia      Chronic systolic CHF (congestive heart failure) (H)      Coronary artery disease      Diabetic neuropathy (H)      DM type 2 w Neuropathy      GERD (gastroesophageal reflux disease)      Hyperlipidemia      Hypertension      Intermittent atrial fibrillation (H)     on Eliquis     Ischemic cardiomyopathy 11/16/2021    Echo with EF of 30-35%     NSTEMI (non-ST elevated myocardial infarction) (H) 11/15/2021     Osteomyelitis of left foot (H) 04/04/2022     Renal disease      Retinopathy      Sleep disturbance     Past Surgical History:   Procedure Laterality Date     AMPUTATE FOOT Left 3/21/2022    Procedure: Guillotine AMPUTATION, FOOT;  Surgeon: Ronald Bhatt MD;  Location: [facility] OR     AMPUTATE LEG BELOW KNEE Left 4/4/2022    Procedure: LEFT BELOW  KNEE AMPUTATION;  Surgeon: Ronald Bhatt MD;  Location: [facility] OR     AMPUTATE TOE(S) Left 11/16/2021    Procedure: first and second ray amputation;  Surgeon: Eddi Ram DPM;  Location: [facility] OR     APPENDECTOMY       CV CORONARY ANGIOGRAM  N/A 11/16/2021    Procedure: CV CORONARY ANGIOGRAM;  Surgeon: Pam Tabares MD;  Location: [facility] CATH LAB CV     CV LEFT HEART CATH N/A 11/16/2021    Procedure: Left Heart Cath;  Surgeon: Pam Tabares MD;  Location: [facility] CATH LAB CV     FOOT SURGERY Left      HERNIA REPAIR       INCISION AND DRAINAGE LOWER EXTREMITY, COMBINED Left 11/16/2021    Procedure: INCISION AND DRAINAGE, left foot;  Surgeon: Eddi Ram DPM;  Location: [facility] OR     INCISION AND DRAINAGE LOWER EXTREMITY, COMBINED Left 11/19/2021    Procedure: INCISION AND DRAINAGE, left foot;  Surgeon: Eddi Ram DPM;  Location: [facility] OR     INCISION AND DRAINAGE LOWER EXTREMITY, COMBINED Left 12/9/2021    Procedure: INCISION AND DRAINAGE, Left foot;  Surgeon: Eddi Ram DPM;  Location: [facility] OR     INCISION AND DRAINAGE LOWER EXTREMITY, COMBINED Left 1/10/2022    Procedure: INCISION AND DRAINAGE, left foot;  Surgeon: Eddi Ram DPM;  Location: [facility] OR     INCISION AND DRAINAGE LOWER EXTREMITY, COMBINED Left 1/27/2022    Procedure: INCISION AND DRAINAGE, Left foot;  Surgeon: Eddi Ram DPM;  Location: [facility] OR    no family history of premature coronary artery disease Social History     Socioeconomic History     Marital status: Single     Spouse name: Not on file     Number of children: Not on file     Years of education: Not on file     Highest education level: Not on file   Occupational History     Not on file   Tobacco Use     Smoking status: Former Smoker     Types: Cigars     Smokeless tobacco: Never Used     Tobacco comment: Cigars   Vaping Use     Vaping Use: Never used   Substance and Sexual Activity     Alcohol use: Yes     Comment: < 1 drink a month (only holidays)     Drug use: Never     Sexual activity: Not on file   Other Topics Concern     Parent/sibling w/ CABG, MI or angioplasty before 65F 55M? Not Asked   Social History Narrative     Single, currently not working, has done numerous jobs, lives alone.  Full code. (last updated 4/4/2022)      Social Determinants of Health     Financial Resource Strain: Not on file   Food Insecurity: Not on file   Transportation Needs: Not on file   Physical Activity: Not on file   Stress: Not on file   Social Connections: Not on file   Intimate Partner Violence: Not on file   Housing Stability: Not on file           Lab Results    Chemistry/lipid CBC Cardiac Enzymes/BNP/TSH/INR   Lab Results   Component Value Date    CHOL 147 05/17/2022    HDL 53 05/17/2022    TRIG 117 05/17/2022    BUN 41 (H) 08/23/2022     (L) 08/23/2022    CO2 23 08/23/2022    Lab Results   Component Value Date    WBC 7.2 08/23/2022    HGB 11.3 (L) 08/23/2022    HCT 34.8 (L) 08/23/2022    MCV 78 08/23/2022     08/23/2022    Lab Results   Component Value Date    TROPONINI 14.95 (HH) 08/23/2022    INR 1.25 (H) 08/23/2022     Lab Results   Component Value Date    TROPONINI 14.95 (HH) 08/23/2022          Weight:    Wt Readings from Last 3 Encounters:   08/23/22 99.8 kg (220 lb)   04/04/22 93 kg (205 lb)   03/23/22 95.7 kg (211 lb)       Allergies  No Known Allergies      Surgical History  Past Surgical History:   Procedure Laterality Date     AMPUTATE FOOT Left 3/21/2022    Procedure: Guillotine AMPUTATION, FOOT;  Surgeon: Ronald Bhatt MD;  Location: [facility] [place] OR     AMPUTATE LEG BELOW KNEE Left 4/4/2022    Procedure: LEFT BELOW  KNEE AMPUTATION;  Surgeon: Ronald Bhatt MD;  Location: [facility] OR     AMPUTATE TOE(S) Left 11/16/2021    Procedure: first and second ray amputation;  Surgeon: Eddi Ram DPM;  Location: [facility] OR     APPENDECTOMY       CV CORONARY ANGIOGRAM N/A 11/16/2021    Procedure: CV CORONARY ANGIOGRAM;  Surgeon: Pam Tabares MD;  Location: [facility] CATH LAB CV     CV LEFT HEART CATH N/A 11/16/2021    Procedure: Left Heart Cath;  Surgeon: Pam Tabares MD;  Location: [facility]  CATH LAB CV     FOOT SURGERY Left      HERNIA REPAIR       INCISION AND DRAINAGE LOWER EXTREMITY, COMBINED Left 11/16/2021    Procedure: INCISION AND DRAINAGE, left foot;  Surgeon: Eddi Ram DPM;  Location: [facility] [place]     INCISION AND DRAINAGE LOWER EXTREMITY, COMBINED Left 11/19/2021    Procedure: INCISION AND DRAINAGE, left foot;  Surgeon: Eddi Ram DPM;  Location: [facility] OR     INCISION AND DRAINAGE LOWER EXTREMITY, COMBINED Left 12/9/2021    Procedure: INCISION AND DRAINAGE, Left foot;  Surgeon: Eddi Ram DPM;  Location: [facility] OR     INCISION AND DRAINAGE LOWER EXTREMITY, COMBINED Left 1/10/2022    Procedure: INCISION AND DRAINAGE, left foot;  Surgeon: Eddi Ram DPM;  Location: [facility] OR     INCISION AND DRAINAGE LOWER EXTREMITY, COMBINED Left 1/27/2022    Procedure: INCISION AND DRAINAGE, Left foot;  Surgeon: Eddi Ram DPM;  Location: [facility] OR       Social History  Tobacco:   History   Smoking Status     Former Smoker     Types: Cigars   Smokeless Tobacco     Never Used     Comment: Cigars    Alcohol:   Social History    Substance and Sexual Activity      Alcohol use: Yes        Comment: < 1 drink a month (only holidays)   Illicit Drugs:   History   Drug Use Unknown       Family History  No family history on file.       Russel Arredondo MD on 8/24/2022      cc: Jaquan Dickerson,

## 2022-08-24 NOTE — ED NOTES
Pt's sister updated per pt request.  Pt's sister states she will bring his belongings in the morning.

## 2022-08-24 NOTE — PRE-PROCEDURE
GENERAL PRE-PROCEDURE:   Procedure:  Coronary angiogram, possible percutaneous coronary intervention  Date/Time:  8/24/2022 10:43 AM    Written consent obtained?: Yes    Risks and benefits: Risks, benefits and alternatives were discussed    Consent given by:  Patient  Patient states understanding of procedure being performed: Yes    Patient's understanding of procedure matches consent: Yes    Procedure consent matches procedure scheduled: Yes    Expected level of sedation:  Moderate  Appropriately NPO:  Yes  ASA Class:  4 (HFrEF, ischemic cardiomyopathy, severe multivessel coronary artery disease, NSTEMI, HTN, HLD, paroxysmal atrial fibrillation, DM type II, CKD stage III)  Mallampati  :  Grade 2- soft palate, base of uvula, tonsillar pillars, and portion of posterior pharyngeal wall visible  Lungs:  Crackles left base and crackles right base  Heart:  Normal heart sounds and rate  History & Physical reviewed:  History and physical reviewed and no updates needed  Statement of review:  I have reviewed the lab findings, diagnostic data, medications, and the plan for sedation

## 2022-08-24 NOTE — PROGRESS NOTES
[facility]    Medicine Progress Note - Hospitalist Service    Date of Admission:  8/23/2022    Assessment & Plan          Norman Aguilar is a 62 year old male PMH of CAD, CHF, HTN, IDDM, paroxysmal atrial fibrillation, left foot osteomyelitis failure of antibiotics s/p left BKA, admitted on 8/23/2022.    NSTEMI  Multivessel coronary artery disease.  Coronary angiogram on 11/16/2021 showed diffuse calcified vessels.  No stents placed.  Paroxysmal atrial fibrillation.  In the past was on Eliquis, and it does not appear he is taking it now.  On heparin drip, aspirin, metoprolol, high-dose statin.  Telemetry, echo, a.m. lipids  Cardiology consulted-plan for coronary angiogram today.    Chronic systolic CHF, with EF 30-35 on echo 11/15/2021.    Repeating echo.  CAD Lasix due to JOHANA.    Insulin-dependent diabetes mellitus, recent A1c 8, previously 7.6 in January 2022.  -Continue home dose of Lantus 38 units nightly.  SSI NovoLog.      Essential hypertension.  On metoprolol.    Tobacco use disorder.  Smokes cigars.  -Smoking cessation, offered nicotine replacement.    Strokelike symptoms, left-sided weakness, with left-sided facial droop with onset of symptoms at 1445 and resolved in the ambulance on route.  Not candidate for tPA, given quickly resolved symptoms.  Patient was seen by stroke neurology.  Recommendations reviewed and implemented.  CT head and neck showed multiple mild to moderate stenosis on the right MCA branches, otherwise no acute findings.  Telemetry showed A. fib.  TTE in a.m.    JOHANA, with prerenal indicis.  Creatinine 1.89.  Previously 1.19-1.2 earlier this year. o urinary symptoms to suspect UTI, though patient is diabetic.  No history of BPH.  Was not hypotensive.  Holding Lasix, gentle IVF given elevated BNP in 600s.  Patient is aware of nephrotoxic side effect of IV dye use during coronary angiogram, asking to proceed.  Obtain UA, PSA  Also monitor renal function, avoid  hypotension, nephrotoxins.     Diet: Combination Diet Regular Diet Adult; Moderate Consistent Carb (60 g CHO per Meal) Diet    DVT Prophylaxis: heparin gtt  Garcia Catheter: Not present  Central Lines: None  Cardiac Monitoring: None  Code Status: Full Code      Disposition Plan         The patient's care was discussed with the Bedside Nurse, Patient and Patient's Family.  Patient sister at the bedside in ED    Keri Mcintyre MD  Hospitalist Service  [facility]  Securely message with the Vocera Web Console (learn more here)  Text page via [employer] Paging/Directory     Clinically Significant Risk Factors Present on Admission               # Coagulation Defect: INR = 1.25 (Ref range: 0.85 - 1.15) and/or PTT = 33 Seconds (Ref range: 22 - 38 Seconds) on admission, will monitor for bleeding    # Chronic systolic heart failure: echo within the past year with EF <40%      ______________________________________________________________________    Interval History   Day #1 of hospitalization for NSTEMI.  Remains chest pain-free.  Very slight improvement in creatinine.    Data reviewed today: I reviewed all medications, new labs and imaging results over the last 24 hours. I personally reviewed    Physical Exam   Vital Signs: Temp: 98  F (36.7  C) Temp src: Oral BP: 119/67 Pulse: 64   Resp: 16 SpO2: 97 % O2 Device: None (Room air)    Weight: 220 lbs 0 oz  General: Alert and oriented x 3. Not in obvious distress.  HEENT: NC, AT. Neck- supple, No JVP elevation, lymphadenopathy or thyromegaly. Trachea-central.  Chest: Clear to auscultation bilaterally.  Heart: S1S2 regular. No M/R/G.  Abdomen: Soft. NT, ND. No organomegaly. Bowel sounds- active.  Back: No spine tenderness. No CVA tenderness.  Extremities: Left leg BKA.  Neuro: Cranial nerves 1-12 grossly normal. No focal neurological deficit    Data   Recent Labs   Lab 08/24/22  1347 08/24/22  1153 08/24/22  0934 08/24/22  0824 08/24/22  0720 08/24/22  0129  08/23/22  2358 08/23/22  2309 08/23/22  1628   WBC 6.5  --   --   --   --   --  7.2  --  7.5   HGB 10.6*  --   --   --   --   --  11.3*  --  10.7*   MCV 79  --   --   --   --   --  78  --  79     --   --   --   --   --  194  --  206   INR  --   --   --   --  1.19*  --   --   --  1.25*   NA  --   --   --  134*  --   --   --   --  133*   POTASSIUM  --   --   --  3.9  --   --   --   --  3.7   CHLORIDE  --   --   --  99  --   --   --   --  94*   CO2  --   --   --  25  --   --   --   --  23   BUN  --   --   --  44*  --   --   --   --  41*   CR  --   --   --  1.75*  --   --   --   --  1.89*   ANIONGAP  --   --   --  10  --   --   --   --  16   YOSVANY  --   --   --  9.2  --   --   --   --  8.9   GLC  --  95 110* 107  --    < >  --    < > 148*    < > = values in this interval not displayed.     Recent Results (from the past 24 hour(s))   CTA Head Neck with Contrast    Narrative    TEMPORARY  8/23/2022 4:04 PM     INDICATION: Left-sided weakness.  TECHNIQUE: Head and neck CT angiogram with IV contrast. Noncontrast head CT followed by axial helical CT images of the head and neck vessels obtained during the arterial phase of intravenous contrast administration. Axial helical 2D reconstructed images   and multiplanar 3D MIP reconstructed images of the head and neck vessels were performed by the technologist. Dose reduction techniques were used.  CONTRAST:  COMPARISON: Carotid ultrasound 02/17/2022.    FINDINGS:   NONCONTRAST HEAD CT:   INTRACRANIAL CONTENTS: No intracranial hemorrhage, extraaxial collection, or mass effect.  No CT evidence of acute infarct. Minimal presumed chronic small vessel ischemic changes. Mild generalized volume loss. No hydrocephalus. The sella is unremarkable   for technique. The cerebellar tonsils are appropriately positioned.     VISUALIZED ORBITS/SINUSES/MASTOIDS: No intraorbital abnormality. No paranasal sinus mucosal disease. No middle ear or mastoid effusion. Soft tissue density in the left  external auditory canal likely represents cerumen.    BONES/SOFT TISSUES: No scalp hematoma. No skull fracture.    HEAD CTA:  ANTERIOR CIRCULATION: There is moderate stenosis of the right supraclinoid ICA and mild stenosis of the left supra clinoid ICA. The anterior cerebral arteries appear patent. The right and left middle cerebral arteries demonstrate no proximal large vessel   occlusion. There are multiple mild and moderate stenoses of the distal right MCA branches. No aneurysm or high flow vascular malformation is demonstrated.    POSTERIOR CIRCULATION: The intradural vertebral, basilar and visualized proximal cerebellar arteries appear patent the right and left posterior cerebral arteries also appear patent. No aneurysm or high flow vascular malformation is demonstrated.    DURAL VENOUS SINUSES: The major venous structures demonstrate grossly unremarkable enhancement, within the limitations of arterial phase technique.    NECK CTA:  RIGHT CAROTID: No measurable stenosis in the right ICA based on NASCET criteria. Mild plaque at the bifurcation.    LEFT CAROTID: No measurable stenosis in the left ICA based on NASCET criteria. Mild plaque at the bifurcation.    VERTEBRAL ARTERIES: Balanced vertebral arteries are patent in the neck and into the head.     AORTIC ARCH: There is a left-sided aortic arch. No flow-limiting stenosis is apparent at the origins of the brachiocephalic, common carotid, subclavian or right vertebral arteries. The left vertebral artery origin is obscured by venous contrast.    MISCELLANEOUS: The visualized lung apices are well aerated. The cervical spine appears intact with mild spondylosis. Mild C5-C6 and C6-C7 anterolisthesis. The soft tissues of the neck, including the thyroid and parotid glands, are grossly unremarkable   for technique.       Impression    CONCLUSION:   HEAD CT:  1.  No evidence of acute hemorrhage, mass effect, or acute vascular territorial infarction. Consider MRI for  further evaluation if clinically appropriate.  2.  Mild age-related changes.    HEAD CTA:   1.  No evidence of proximal large vessel occlusion.  2.  Multiple mild and moderate stenoses of the distal right MCA branches.    NECK CTA:  1.  No hemodynamically significant stenosis based on NASCET criteria.  2.  No evidence for dissection.    Head CT results called to Dr. Castro at 4:15 PM, and CTA results called to Dr. Castro at 4:34 PM on 08/23/2022.   Cardiac Catheterization    Narrative      Dist LAD lesion is 99% stenosed.    Mid LAD-1 lesion is 70% stenosed.    1st Diag-1 lesion is 95% stenosed.    1st Diag-2 lesion is 100% stenosed.    Mid LAD-2 lesion is 70% stenosed.    Prox LAD lesion is 70% stenosed.    Prox Cx to Mid Cx lesion is 100% stenosed.    Ost RCA to Prox RCA lesion is 70% stenosed. This lesion appears to have   progressed since last study    Dist RCA lesion is 80% stenosed.    RPDA-1 lesion is 50% stenosed.    RPDA-2 lesion is 100% stenosed.        Medications     heparin       - MEDICATION INSTRUCTIONS -       sodium chloride 100 mL/hr at 08/24/22 0938     sodium chloride         aspirin  81 mg Oral Daily     atorvastatin  80 mg Oral QPM     [Held by provider] furosemide  20 mg Oral Daily     insulin aspart  1-7 Units Subcutaneous TID AC     insulin aspart  1-5 Units Subcutaneous At Bedtime     insulin glargine  38 Units Subcutaneous At Bedtime     magnesium oxide  400 mg Oral Daily     metoprolol succinate ER  50 mg Oral Daily     nitroGLYcerin  1 inch Transdermal Q12H     sodium chloride (PF)  3 mL Intracatheter Q8H

## 2022-08-25 ENCOUNTER — APPOINTMENT (OUTPATIENT)
Dept: CARDIOLOGY | Facility: HOSPITAL | Age: 62
DRG: 247 | End: 2022-08-25
Attending: INTERNAL MEDICINE
Payer: COMMERCIAL

## 2022-08-25 LAB
ALBUMIN SERPL-MCNC: 2.8 G/DL (ref 3.5–5)
ALP SERPL-CCNC: 122 U/L (ref 45–120)
ALT SERPL W P-5'-P-CCNC: 97 U/L (ref 0–45)
ANION GAP SERPL CALCULATED.3IONS-SCNC: 6 MMOL/L (ref 5–18)
AST SERPL W P-5'-P-CCNC: 77 U/L (ref 0–40)
BILIRUB SERPL-MCNC: 1 MG/DL (ref 0–1)
BUN SERPL-MCNC: 44 MG/DL (ref 8–22)
CALCIUM SERPL-MCNC: 8.9 MG/DL (ref 8.5–10.5)
CHLORIDE BLD-SCNC: 99 MMOL/L (ref 98–107)
CHOLEST SERPL-MCNC: 107 MG/DL
CO2 SERPL-SCNC: 26 MMOL/L (ref 22–31)
CREAT SERPL-MCNC: 1.77 MG/DL (ref 0.7–1.3)
ERYTHROCYTE [DISTWIDTH] IN BLOOD BY AUTOMATED COUNT: 18.6 % (ref 10–15)
GFR SERPL CREATININE-BSD FRML MDRD: 43 ML/MIN/1.73M2
GLUCOSE BLD-MCNC: 102 MG/DL (ref 70–125)
GLUCOSE BLDC GLUCOMTR-MCNC: 126 MG/DL (ref 70–99)
GLUCOSE BLDC GLUCOMTR-MCNC: 132 MG/DL (ref 70–99)
GLUCOSE BLDC GLUCOMTR-MCNC: 207 MG/DL (ref 70–99)
GLUCOSE BLDC GLUCOMTR-MCNC: 68 MG/DL (ref 70–99)
GLUCOSE BLDC GLUCOMTR-MCNC: 94 MG/DL (ref 70–99)
HCT VFR BLD AUTO: 31.4 % (ref 40–53)
HDLC SERPL-MCNC: 35 MG/DL
HGB BLD-MCNC: 10.2 G/DL (ref 13.3–17.7)
HOLD SPECIMEN: NORMAL
LDLC SERPL CALC-MCNC: 60 MG/DL
LVEF ECHO: NORMAL
MCH RBC QN AUTO: 25.4 PG (ref 26.5–33)
MCHC RBC AUTO-ENTMCNC: 32.5 G/DL (ref 31.5–36.5)
MCV RBC AUTO: 78 FL (ref 78–100)
PLATELET # BLD AUTO: 187 10E3/UL (ref 150–450)
POTASSIUM BLD-SCNC: 3.6 MMOL/L (ref 3.5–5)
PROT SERPL-MCNC: 6.7 G/DL (ref 6–8)
RBC # BLD AUTO: 4.02 10E6/UL (ref 4.4–5.9)
SODIUM SERPL-SCNC: 131 MMOL/L (ref 136–145)
TRIGL SERPL-MCNC: 58 MG/DL
UFH PPP CHRO-ACNC: 0.25 IU/ML
UFH PPP CHRO-ACNC: 0.26 IU/ML
UFH PPP CHRO-ACNC: 0.52 IU/ML
WBC # BLD AUTO: 6.2 10E3/UL (ref 4–11)

## 2022-08-25 PROCEDURE — 99232 SBSQ HOSP IP/OBS MODERATE 35: CPT | Performed by: INTERNAL MEDICINE

## 2022-08-25 PROCEDURE — 85520 HEPARIN ASSAY: CPT | Performed by: INTERNAL MEDICINE

## 2022-08-25 PROCEDURE — 250N000013 HC RX MED GY IP 250 OP 250 PS 637: Performed by: INTERNAL MEDICINE

## 2022-08-25 PROCEDURE — 85027 COMPLETE CBC AUTOMATED: CPT | Performed by: INTERNAL MEDICINE

## 2022-08-25 PROCEDURE — 999N000208 ECHOCARDIOGRAM COMPLETE

## 2022-08-25 PROCEDURE — 93306 TTE W/DOPPLER COMPLETE: CPT | Mod: 26 | Performed by: INTERNAL MEDICINE

## 2022-08-25 PROCEDURE — 250N000011 HC RX IP 250 OP 636: Performed by: INTERNAL MEDICINE

## 2022-08-25 PROCEDURE — 250N000012 HC RX MED GY IP 250 OP 636 PS 637: Performed by: INTERNAL MEDICINE

## 2022-08-25 PROCEDURE — 80053 COMPREHEN METABOLIC PANEL: CPT | Performed by: INTERNAL MEDICINE

## 2022-08-25 PROCEDURE — 255N000002 HC RX 255 OP 636: Performed by: INTERNAL MEDICINE

## 2022-08-25 PROCEDURE — 80061 LIPID PANEL: CPT | Performed by: INTERNAL MEDICINE

## 2022-08-25 PROCEDURE — 36415 COLL VENOUS BLD VENIPUNCTURE: CPT | Performed by: INTERNAL MEDICINE

## 2022-08-25 PROCEDURE — 210N000001 HC R&B IMCU HEART CARE

## 2022-08-25 PROCEDURE — 99233 SBSQ HOSP IP/OBS HIGH 50: CPT | Performed by: INTERNAL MEDICINE

## 2022-08-25 PROCEDURE — 99221 1ST HOSP IP/OBS SF/LOW 40: CPT | Performed by: THORACIC SURGERY (CARDIOTHORACIC VASCULAR SURGERY)

## 2022-08-25 RX ORDER — SODIUM CHLORIDE AND POTASSIUM CHLORIDE 150; 900 MG/100ML; MG/100ML
INJECTION, SOLUTION INTRAVENOUS CONTINUOUS
Status: DISPENSED | OUTPATIENT
Start: 2022-08-25 | End: 2022-08-25

## 2022-08-25 RX ADMIN — METOPROLOL SUCCINATE 50 MG: 25 TABLET, EXTENDED RELEASE ORAL at 09:07

## 2022-08-25 RX ADMIN — Medication 400 MG: at 09:07

## 2022-08-25 RX ADMIN — POTASSIUM CHLORIDE AND SODIUM CHLORIDE: 900; 150 INJECTION, SOLUTION INTRAVENOUS at 12:12

## 2022-08-25 RX ADMIN — PERFLUTREN 3 ML: 6.52 INJECTION, SUSPENSION INTRAVENOUS at 08:50

## 2022-08-25 RX ADMIN — NITROGLYCERIN 15 MG: 20 OINTMENT TOPICAL at 00:22

## 2022-08-25 RX ADMIN — ATORVASTATIN CALCIUM 80 MG: 40 TABLET, FILM COATED ORAL at 20:41

## 2022-08-25 RX ADMIN — INSULIN ASPART 1 UNITS: 100 INJECTION, SOLUTION INTRAVENOUS; SUBCUTANEOUS at 14:12

## 2022-08-25 RX ADMIN — HEPARIN SODIUM 1150 UNITS/HR: 10000 INJECTION, SOLUTION INTRAVENOUS at 09:08

## 2022-08-25 RX ADMIN — INSULIN GLARGINE 38 UNITS: 100 INJECTION, SOLUTION SUBCUTANEOUS at 21:58

## 2022-08-25 RX ADMIN — NITROGLYCERIN 15 MG: 20 OINTMENT TOPICAL at 12:22

## 2022-08-25 RX ADMIN — ASPIRIN 81 MG: 81 TABLET, CHEWABLE ORAL at 09:07

## 2022-08-25 ASSESSMENT — ACTIVITIES OF DAILY LIVING (ADL)
ADLS_ACUITY_SCORE: 23
DEPENDENT_IADLS:: INDEPENDENT
ADLS_ACUITY_SCORE: 23

## 2022-08-25 NOTE — PROVIDER NOTIFICATION
MD Arnulfo Goode paged regarding one time episode of sinus bradycardia with HR 36. Vs stable. He denies any chest pain and Sob.

## 2022-08-25 NOTE — CONSULTS
Care Management Initial Consult    General Information  Assessment completed with: Patient,    Type of CM/SW Visit: Initial Assessment    Primary Care Provider verified and updated as needed: Yes   Readmission within the last 30 days:        Reason for Consult: discharge planning  Advance Care Planning:            Communication Assessment  Patient's communication style: spoken language (English or Bilingual)    Hearing Difficulty or Deaf: no   Wear Glasses or Blind: yes    Cognitive  Cognitive/Neuro/Behavioral: WDL  Level of Consciousness: alert  Arousal Level: opens eyes spontaneously  Orientation: oriented x 4     Best Language: 0 - No aphasia  Speech: clear    Living Environment:   People in home: sibling(s)     Current living Arrangements: house      Able to return to prior arrangements: yes       Family/Social Support:  Care provided by: self  Provides care for: no one  Marital Status: Single  Sibling(s)          Description of Support System: Supportive, Involved    Support Assessment: Adequate family and caregiver support    Current Resources:   Patient receiving home care services: No     Community Resources: County Worker  Equipment currently used at home: walker, standard  Supplies currently used at home: Wound Care Supplies    Employment/Financial:  Employment Status: disabled        Financial Concerns:     Referral to Financial Worker: No       Lifestyle & Psychosocial Needs:  Social Determinants of Health     Tobacco Use: Medium Risk     Smoking Tobacco Use: Former Smoker     Smokeless Tobacco Use: Never Used   Alcohol Use: Not on file   Financial Resource Strain: Not on file   Food Insecurity: Not on file   Transportation Needs: Not on file   Physical Activity: Not on file   Stress: Not on file   Social Connections: Not on file   Intimate Partner Violence: Not on file   Depression: Not on file   Housing Stability: Not on file       Functional Status:  Prior to admission patient needed assistance:    Dependent ADLs:: Independent  Dependent IADLs:: Independent       Mental Health Status:  Mental Health Status: No Current Concerns       Chemical Dependency Status:  Chemical Dependency Status: No Current Concerns             Values/Beliefs:  Spiritual, Cultural Beliefs, Roman Catholic Practices, Values that affect care: no               Additional Information:  JEFFREY met and introduced self to Pt and CM services. Pt lives with his sister and her family. Pt states he has been very stressed recentlly with recently having his leg amputated. Pt states that he currently has MA but cost of medical expenses could be extensive if he were unable to qualify due to finding a job. Pt states he has a [facility] but has not been in contact with them. JEFFREY encouraged Pt to reach out to [facility] as Pt has been wanting to look into disability and other resources for him to transition into his own home. JEFFREY offered to bring Pt information about options for disability as well as resources for counseling to help cope with recent stressors. Pt agreeable to receiving resources on disability but declines information for counseling services. Pt states he has been through a lot in his life and has been able to manage it himself. Pt is independent and does not use any home services. Family to transport.     JEFFREY colleague printed and provided Pt with resources. CM to follow for discharge needs.    GREGG [place]

## 2022-08-25 NOTE — PLAN OF CARE
Problem: Plan of Care - These are the overarching goals to be used throughout the patient stay.    Goal: Plan of Care Review/Shift Note  Description: The Plan of Care Review/Shift note should be completed every shift.  The Outcome Evaluation is a brief statement about your assessment that the patient is improving, declining, or no change.  This information will be displayed automatically on your shift note.  Outcome: Ongoing, Progressing     Problem: Dysrhythmia  Goal: Normalized Cardiac Rhythm  Outcome: Ongoing, Progressing   Goal Outcome Evaluation:             Patient had a Coronary Angiogram today. Right radial site. TR band was removed around 1500 by AM nurse. Post TR band removal assessment, CMS intact, no complications noted.     Alert. Oriented.    Denied pain. Denied shortness of breath.    Has mild left facial droop. Patient reported some left facial numbness. Swallowing is okay.    Sinus dysrhythmia with L BBB, occasionally in Afib HR controlled.    On heparin gtt.     L BKA, with orthotic, stump intact.    Independent. Standby assist as needed for safety. Falls precaution and interventions maintained.

## 2022-08-25 NOTE — PROGRESS NOTES
[facility]    Medicine Progress Note - Hospitalist Service    Date of Admission:  8/23/2022    Assessment & Plan          Norman Aguilar is a 62 year old male PMH of CAD, CHF, HTN, IDDM, paroxysmal atrial fibrillation, left foot osteomyelitis failure of antibiotics s/p left BKA, admitted on 8/23/2022.    NSTEMI  Multivessel coronary artery disease.  Coronary angiogram on 11/16/2021 showed diffuse calcified vessels.  No stents placed.  History of known multi-vessel CAD during admission in 11/21 ; CTS/Pa consulted with plans for CABG in 2-3 months at that time, patient requested to postpone procedure.  8/24 repeat cath demonstrating progression of RCA lesion - CTS consulted once again regarding CABG this admission.  LDL 60, HDL 35, TG 58  A1c 8.0  -Continue telemetry, heparin drip, aspirin, metoprolol, high-dose statin.  -Awaiting CTS recommendations    Chronic systolic CHF, with EF 30-35 on echo 11/15/2021.    Holding Lasix due to JOHANA, s/p IV contrast on 8/24.    Insulin-dependent diabetes mellitus, recent A1c 8, previously 7.6 in January 2022.  -Continue home dose of Lantus 38 units nightly.  SSI NovoLog.      Essential hypertension.  On metoprolol.    Tobacco use disorder.  Smokes cigars.  -Smoking cessation, offered nicotine replacement.    Strokelike symptoms, left-sided weakness, with left-sided facial droop with onset of symptoms at 1445 and resolved in the ambulance on route.  Not candidate for tPA, given quickly resolved symptoms.  Patient was seen by stroke neurology.  Recommendations reviewed and implemented.  CT head and neck showed multiple mild to moderate stenosis on the right MCA branches, otherwise no acute findings.  Telemetry showed A. fib.  TTE in a.m.    JOHANA, with prerenal indicis.  Creatinine 1.89.  Previously 1.19-1.2 earlier this year. o urinary symptoms to suspect UTI, though patient is diabetic.  No history of BPH.  Was not hypotensive.  Holding Lasix, gentle  IVF given elevated BNP in 600s.  S/p IV contrast with coronary angiogram on 8/24.  UA not infected  Normal PSA at 1.69  Hold Lasix, metformin, monitor renal function, avoid hypotension, nephrotoxins.     Diet: Consistent Carbohydrate Diet Moderate Consistent Carb (60 g CHO per Meal) Diet    DVT Prophylaxis: heparin gtt  Garcia Catheter: Not present  Central Lines: None  Cardiac Monitoring: ACTIVE order. Indication: Post- PCI/Angiogram (24 hours)  Code Status: Full Code      Disposition Plan         The patient's care was discussed with the Bedside Nurse, Patient and Patient's Family.  Patient sister at the bedside in ED    Keri Mcintyre MD  Hospitalist Service  [facility]  Securely message with the Vocera Web Console (learn more here)  Text page via [employer] Paging/Directory     Clinically Significant Risk Factors Present on Admission                  ______________________________________________________________________    Interval History   Day #2 of hospitalization for NSTEMI.  Remains chest pain-free.  Renal function remained stable, received IVF contrast yesterday.  Coronary angiogram yesterday showed progression of RCA lesion.    Data reviewed today: I reviewed all medications, new labs and imaging results over the last 24 hours. I personally reviewed    Physical Exam   Vital Signs: Temp: 98  F (36.7  C) Temp src: Oral BP: 125/67 Pulse: 88   Resp: 18 SpO2: 93 % O2 Device: None (Room air) Oxygen Delivery: 2 LPM  Weight: 225 lbs 11.2 oz  General: Alert and oriented x 3. Not in obvious distress.  HEENT: NC, AT. Neck- supple, No JVP elevation, lymphadenopathy or thyromegaly. Trachea-central.  Chest: Clear to auscultation bilaterally.  Heart: S1S2 regular. No M/R/G.  Abdomen: Soft. NT, ND. No organomegaly. Bowel sounds- active.  Back: No spine tenderness. No CVA tenderness.  Extremities: Left leg BKA.  Neuro: Cranial nerves 1-12 grossly normal. No focal neurological deficit    Data   Recent  Labs   Lab 08/25/22  0816 08/25/22  0751 08/25/22  0357 08/24/22  1659 08/24/22  1347 08/24/22  0934 08/24/22  0824 08/24/22  0720 08/24/22  0129 08/23/22  2358 08/23/22  2309 08/23/22  1628   WBC  --   --  6.2  --  6.5  --   --   --   --  7.2  --  7.5   HGB  --   --  10.2*  --  10.6*  --   --   --   --  11.3*  --  10.7*   MCV  --   --  78  --  79  --   --   --   --  78  --  79   PLT  --   --  187  --  202  --   --   --   --  194  --  206   INR  --   --   --   --   --   --   --  1.19*  --   --   --  1.25*   NA  --   --  131*  --   --   --  134*  --   --   --   --  133*   POTASSIUM  --   --  3.6  --   --   --  3.9  --   --   --   --  3.7   CHLORIDE  --   --  99  --   --   --  99  --   --   --   --  94*   CO2  --   --  26  --   --   --  25  --   --   --   --  23   BUN  --   --  44*  --   --   --  44*  --   --   --   --  41*   CR  --   --  1.77*  --   --   --  1.75*  --   --   --   --  1.89*   ANIONGAP  --   --  6  --   --   --  10  --   --   --   --  16   YOSVANY  --   --  8.9  --   --   --  9.2  --   --   --   --  8.9   GLC 94 68* 102   < >  --    < > 107  --    < >  --    < > 148*   ALBUMIN  --   --  2.8*  --   --   --   --   --   --   --   --   --    PROTTOTAL  --   --  6.7  --   --   --   --   --   --   --   --   --    BILITOTAL  --   --  1.0  --   --   --   --   --   --   --   --   --    ALKPHOS  --   --  122*  --   --   --   --   --   --   --   --   --    ALT  --   --  97*  --   --   --   --   --   --   --   --   --    AST  --   --  77*  --   --   --   --   --   --   --   --   --     < > = values in this interval not displayed.     Recent Results (from the past 24 hour(s))   Cardiac Catheterization    Narrative      Dist LAD lesion is 99% stenosed.    Mid LAD-1 lesion is 70% stenosed.    1st Diag-1 lesion is 95% stenosed.    1st Diag-2 lesion is 100% stenosed.    Mid LAD-2 lesion is 70% stenosed.    Prox LAD lesion is 70% stenosed.    Prox Cx to Mid Cx lesion is 100% stenosed.    Ost RCA to Prox RCA lesion is 70%  stenosed. This lesion appears to have   progressed since last study    Dist RCA lesion is 80% stenosed.    RPDA-1 lesion is 50% stenosed.    RPDA-2 lesion is 100% stenosed.        Medications     heparin 1,150 Units/hr (08/25/22 0908)     - MEDICATION INSTRUCTIONS -         aspirin  81 mg Oral Daily     atorvastatin  80 mg Oral QPM     [Held by provider] furosemide  20 mg Oral Daily     insulin aspart  1-7 Units Subcutaneous TID AC     insulin aspart  1-5 Units Subcutaneous At Bedtime     insulin glargine  38 Units Subcutaneous At Bedtime     magnesium oxide  400 mg Oral Daily     metoprolol succinate ER  50 mg Oral Daily     nitroGLYcerin  1 inch Transdermal Q12H     sodium chloride (PF)  3 mL Intracatheter Q8H

## 2022-08-25 NOTE — PROGRESS NOTES
HEART CARE NOTE          Assessment/Recommendations     1. HFrEF/ICM  Assessment / Plan    Near euvolemia on physical exam; denies HF symptoms; no changes to regimen at this time    GDMT as detailed below     Current Pharmacotherapy AHA Guideline-Directed Medical Therapy   Lisinopril 20 mg daily - on hold given JOHANA Lisinopril 20 mg twice daily   Metoprolol succinate 50 mg daily Carvedilol 25 mg twice daily   Spironolactone 25 mg - on hold Spironolactone 25 mg once daily   Hydralazine NA Hydralazine 100 mg three times daily   Isosorbide dinitrate NA Isosorbide dinitrate 40 mg three times daily   SGLT2 inhibitor: not started Dapagliflozin or Empagliflozin 10 mg daily         2. NSTEMI  Assessment / Plan    Hx of coronary angiogram significant for severe multi-vessel CAD during admission in 11/21 ; CTS/Pa consulted with plans for CABG in 2-3 months at that time; now with repeat cath demonstrating progression of RCA lesion --> CTS consulted once again regarding CABG this admisson - awaiting final recommendations    Continue heparin gtt    Currently hemodynamically stable and chest pain free    Continue ASA, high intensity atorvastatin, metoprolol succinate      3. DM2  Assessment / Plan    Management per primary team     4. HLP  Assessment / Plan    Continue high intensity atorvastatin     5. Paroxysmal afib  Assessment / Plan    Rate controlled; continue metoprolol    Continue heparin gtt     Will need afib referral at discharge     6. HTN  Assessment / Plan    Well controlled on current regimen     7. JOHANA on CKD  Assessment / Plan    Baseline appears to be around1.2    Continue to monitor closely given contrast dye load      History of Present Illness/Subjective      Mr. Norman Aguilar is a 62 year old male with a PMHx significant for CAD, CHF, HTN, IDDM, paroxysmal atrial fibrillation, left foot osteomyelitis failure of antibiotics s/p left BKA, admitted on 8/23/2022 with symptoms concerninf for stroke.  Troponin was noted to be elevated.     Today, Mr. Thomas denies any acute cardiac events or complaints; Management plan as detailed above.      ECG: Personally reviewed. atrial fibrillation, rate conttolled.     ECHO (personnaly Reviewed):   The left ventricle is normal in size.  Left ventricular function is decreased. The ejection fraction is 30-35%  (moderately reduced).  Left ventricular diastolic function is abnormal.  Diastolic Doppler findings (E/E' ratio and/or other parameters) suggest left  ventricular filling pressures are increased.  The left atrium is mildly dilated.  The mitral valve leaflets are mildly thickened.  There is mild (1+) mitral regurgitation.  IVC diameter >2.1 cm collapsing <50% with sniff suggests a high RA pressure  estimated at 15 mmHg or greater.          Physical Examination Review of Systems   /62 (BP Location: Left arm)   Pulse 77   Temp 97.7  F (36.5  C) (Oral)   Resp 16   Wt 99.8 kg (220 lb)   SpO2 95%   BMI 27.87 kg/m    Body mass index is 27.87 kg/m .  Wt Readings from Last 3 Encounters:   08/23/22 99.8 kg (220 lb)   04/04/22 93 kg (205 lb)   03/23/22 95.7 kg (211 lb)     General Appearance:   no distress, normal body habitus   ENT/Mouth: membranes moist, no oral lesions or bleeding gums.      EYES:  no scleral icterus, normal conjunctivae   Neck: no carotid bruits or thyromegaly   Chest/Lungs:   lungs are clear to auscultation, no rales or wheezing, equal chest wall expansion    Cardiovascular:   Regular. Normal first and second heart sounds with no murmurs, rubs, or gallops; the carotid, radial and posterior tibial pulses are intact, no JVD and trace LE edema    Abdomen:  no organomegaly, masses, bruits, or tenderness; bowel sounds are present   Extremities: no cyanosis or clubbing   Skin: no xanthelasma, warm.    Neurologic: alert and oriented x3, calm     Psychiatric: alert and oriented x3, calm     A complete 10 systems ROS was reviewed  And is negative except  what is listed in the HPI.          Medical History  Surgical History Family History Social History   Past Medical History:   Diagnosis Date     Anemia      Chronic systolic CHF (congestive heart failure) (H)      Coronary artery disease      Diabetic neuropathy (H)      DM type 2 w Neuropathy      GERD (gastroesophageal reflux disease)      Hyperlipidemia      Hypertension      Intermittent atrial fibrillation (H)     on Eliquis     Ischemic cardiomyopathy 11/16/2021    Echo with EF of 30-35%     NSTEMI (non-ST elevated myocardial infarction) (H) 11/15/2021     Osteomyelitis of left foot (H) 04/04/2022     Renal disease      Retinopathy      Sleep disturbance     Past Surgical History:   Procedure Laterality Date     AMPUTATE FOOT Left 3/21/2022    Procedure: Guillotine AMPUTATION, FOOT;  Surgeon: Ronald Bhatt MD;  Location: [facility] [place]     AMPUTATE LEG BELOW KNEE Left 4/4/2022    Procedure: LEFT BELOW  KNEE AMPUTATION;  Surgeon: Ronald Bhatt MD;  Location: [facility] [place]     AMPUTATE TOE(S) Left 11/16/2021    Procedure: first and second ray amputation;  Surgeon: Eddi Ram DPM;  Location: [facility] [place]     APPENDECTOMY       CV CORONARY ANGIOGRAM N/A 11/16/2021    Procedure: CV CORONARY ANGIOGRAM;  Surgeon: Pam Tabares MD;  Location: [facility] CATH LAB CV     CV LEFT HEART CATH N/A 11/16/2021    Procedure: Left Heart Cath;  Surgeon: Pam Tabares MD;  Location: [facility] CATH LAB CV     FOOT SURGERY Left      HERNIA REPAIR       INCISION AND DRAINAGE LOWER EXTREMITY, COMBINED Left 11/16/2021    Procedure: INCISION AND DRAINAGE, left foot;  Surgeon: Eddi Ram DPM;  Location: [facility] [place]     INCISION AND DRAINAGE LOWER EXTREMITY, COMBINED Left 11/19/2021    Procedure: INCISION AND DRAINAGE, left foot;  Surgeon: Eddi Ram DPM;  Location: [facility] [place]     INCISION AND DRAINAGE LOWER EXTREMITY, COMBINED Left 12/9/2021    Procedure: INCISION AND  DRAINAGE, Left foot;  Surgeon: Eddi Ram DPM;  Location: [facility] [place]     INCISION AND DRAINAGE LOWER EXTREMITY, COMBINED Left 1/10/2022    Procedure: INCISION AND DRAINAGE, left foot;  Surgeon: Eddi Ram DPM;  Location: [facility] [place]     INCISION AND DRAINAGE LOWER EXTREMITY, COMBINED Left 1/27/2022    Procedure: INCISION AND DRAINAGE, Left foot;  Surgeon: Eddi Ram DPM;  Location: [facility] [place]    no family history of premature coronary artery disease Social History     Socioeconomic History     Marital status: Single     Spouse name: Not on file     Number of children: Not on file     Years of education: Not on file     Highest education level: Not on file   Occupational History     Not on file   Tobacco Use     Smoking status: Former Smoker     Types: Cigars     Smokeless tobacco: Never Used     Tobacco comment: Cigars   Vaping Use     Vaping Use: Never used   Substance and Sexual Activity     Alcohol use: Yes     Comment: < 1 drink a month (only holidays)     Drug use: Never     Sexual activity: Not on file   Other Topics Concern     Parent/sibling w/ CABG, MI or angioplasty before 65F 55M? Not Asked   Social History Narrative    Single, currently not working, has done numerous jobs, lives alone.  Full code. (last updated 4/4/2022)      Social Determinants of Health     Financial Resource Strain: Not on file   Food Insecurity: Not on file   Transportation Needs: Not on file   Physical Activity: Not on file   Stress: Not on file   Social Connections: Not on file   Intimate Partner Violence: Not on file   Housing Stability: Not on file           Lab Results    Chemistry/lipid CBC Cardiac Enzymes/BNP/TSH/INR   Lab Results   Component Value Date    CHOL 107 08/25/2022    HDL 35 (L) 08/25/2022    TRIG 58 08/25/2022    BUN 44 (H) 08/25/2022     (L) 08/25/2022    CO2 26 08/25/2022    Lab Results   Component Value Date    WBC 6.2 08/25/2022    HGB 10.2 (L)  08/25/2022    HCT 31.4 (L) 08/25/2022    MCV 78 08/25/2022     08/25/2022    Lab Results   Component Value Date    TROPONINI 15.76 (HH) 08/24/2022     (H) 08/24/2022    INR 1.19 (H) 08/24/2022     Lab Results   Component Value Date    TROPONINI 15.76 (HH) 08/24/2022          Weight:    Wt Readings from Last 3 Encounters:   08/23/22 99.8 kg (220 lb)   04/04/22 93 kg (205 lb)   03/23/22 95.7 kg (211 lb)       Allergies  No Known Allergies      Surgical History  Past Surgical History:   Procedure Laterality Date     AMPUTATE FOOT Left 3/21/2022    Procedure: Guillotine AMPUTATION, FOOT;  Surgeon: Ronald Bhatt MD;  [place] [facility] [place]     AMPUTATE LEG BELOW KNEE Left 4/4/2022    Procedure: LEFT BELOW  KNEE AMPUTATION;  Surgeon: Ronald Bhatt MD;  [place] [facility] [place]     AMPUTATE TOE(S) Left 11/16/2021    Procedure: first and second ray amputation;  Surgeon: Eddi Ram DPM;  [place]: [facility] [place]     APPENDECTOMY       CV CORONARY ANGIOGRAM N/A 11/16/2021    Procedure: CV CORONARY ANGIOGRAM;  Surgeon: Pam Tabares MD;  Location: [facility] CATH LAB CV     CV LEFT HEART CATH N/A 11/16/2021    Procedure: Left Heart Cath;  Surgeon: Pam Tabares MD;  Location: [facility] CATH LAB CV     FOOT SURGERY Left      HERNIA REPAIR       INCISION AND DRAINAGE LOWER EXTREMITY, COMBINED Left 11/16/2021    Procedure: INCISION AND DRAINAGE, left foot;  Surgeon: Eddi Ram DPM;  [place]: [facility] [place]     INCISION AND DRAINAGE LOWER EXTREMITY, COMBINED Left 11/19/2021    Procedure: INCISION AND DRAINAGE, left foot;  Surgeon: Eddi Ram DPM;  [place] [facility] [place]     INCISION AND DRAINAGE LOWER EXTREMITY, COMBINED Left 12/9/2021    Procedure: INCISION AND DRAINAGE, Left foot;  Surgeon: Eddi Ram DPM;  [place] [facility] [place]     INCISION AND DRAINAGE LOWER EXTREMITY, COMBINED Left 1/10/2022    Procedure: INCISION AND DRAINAGE, left  foot;  Surgeon: Eddi Ram DPM;  Location: [facility] OR     INCISION AND DRAINAGE LOWER EXTREMITY, COMBINED Left 1/27/2022    Procedure: INCISION AND DRAINAGE, Left foot;  Surgeon: Eddi Ram DPM;  Location: [facility] OR       Social History  Tobacco:   History   Smoking Status     Former Smoker     Types: Cigars   Smokeless Tobacco     Never Used     Comment: Cigars    Alcohol:   Social History    Substance and Sexual Activity      Alcohol use: Yes        Comment: < 1 drink a month (only holidays)   Illicit Drugs:   History   Drug Use Unknown       Family History  No family history on file.       Russel Arredondo MD on 8/25/2022      cc: Jaquan Dickerson

## 2022-08-25 NOTE — CONSULTS
Cardiothoracic Surgery Consult    Date of Service: 8/25/2022    REFERRING CARDIOLOGIST: Dr. Lucas    REASON FOR CONSULTATION: Consideration for surgical revascularization     HISTORY OF PRESENT ILLNESS: Norman Aguilar is a 62 year old year-old male who has a PMH of CAD, HTN, CKD, DMII with complications, left BKA, PAF, and chronic constipation who presented to [facility] two day ago for stroke-like symptoms. These symptoms resolved, but patient was found to have NSTEMI. Subsequent coronary angiogram performed yesterday demonstrates severe multi-vessel coronary artery disease as below. CV Surgery is consulted for possible coronary artery bypass surgery.    Of note, our team was consulted during another admission (11/2021) at which point patient was dealing with gangrene of his left foot, sepsis, and the first of many subsequent left lower extremity amputations. The plan was to address these acute issues and then have outpatient cardiac surgery consultation. Our office has reached out to him as recent as June of this year, however, patient has not been ready to schedule clinic appointment to discuss CABG.    Patient reports that he presented to the ED with left-sided weakness, which has since improved. His biggest complaint is chronic constipation which is very bothersome to him and causes shortness of breath and makes him overall uncomfortable. He details looking for a surgeon who would agree to do a colon resection for this problem. Otherwise he denies chest pain at any point, palpitations, SOB outside of that related to constipation, lightheadedness, syncope, edema. He reports being quite active (s/p left lower leg prosthesis) and that he would like to maintain this lifestyle. He is overall wary of surgeries and procedures given the unexpected complications he has had in the last year related to his left leg. Of note, his A1C 10 months ago was undetectable (<14) but now is around 8.    The patient's  coronary artery disease risk factors include HTN, DMII, overweight. Patient denies history of bleeding/clotting issues and issues with anesthesia in the past.       PAST MEDICAL HISTORY:   Past Medical History:   Diagnosis Date     Anemia      Chronic systolic CHF (congestive heart failure) (H)      Coronary artery disease      Diabetic neuropathy (H)      DM type 2 w Neuropathy      GERD (gastroesophageal reflux disease)      Hyperlipidemia      Hypertension      Intermittent atrial fibrillation (H)     on Eliquis     Ischemic cardiomyopathy 11/16/2021    Echo with EF of 30-35%     NSTEMI (non-ST elevated myocardial infarction) (H) 11/15/2021     Osteomyelitis of left foot (H) 04/04/2022     Renal disease      Retinopathy      Sleep disturbance        PAST SURGICAL HISTORY:   Past Surgical History:   Procedure Laterality Date     AMPUTATE FOOT Left 3/21/2022    Procedure: Guillotine AMPUTATION, FOOT;  Surgeon: Ronald Bhatt MD;  Location: [facility] OR     AMPUTATE LEG BELOW KNEE Left 4/4/2022    Procedure: LEFT BELOW  KNEE AMPUTATION;  Surgeon: Ronald Bhatt MD;  Location: [facility] OR     AMPUTATE TOE(S) Left 11/16/2021    Procedure: first and second ray amputation;  Surgeon: Eddi Ram DPM;  Location: [facility] OR     APPENDECTOMY       CV CORONARY ANGIOGRAM N/A 11/16/2021    Procedure: CV CORONARY ANGIOGRAM;  Surgeon: Pam Tabares MD;  Location: [facility] CATH LAB CV     CV LEFT HEART CATH N/A 11/16/2021    Procedure: Left Heart Cath;  Surgeon: Pam Tabares MD;  Location: [facility] CATH LAB CV     FOOT SURGERY Left      HERNIA REPAIR       INCISION AND DRAINAGE LOWER EXTREMITY, COMBINED Left 11/16/2021    Procedure: INCISION AND DRAINAGE, left foot;  Surgeon: Eddi Ram DPM;  Location: [facility] OR     INCISION AND DRAINAGE LOWER EXTREMITY, COMBINED Left 11/19/2021    Procedure: INCISION AND DRAINAGE, left foot;  Surgeon: Eddi Ram DPM;  Location:  [facility] OR     INCISION AND DRAINAGE LOWER EXTREMITY, COMBINED Left 12/9/2021    Procedure: INCISION AND DRAINAGE, Left foot;  Surgeon: Eddi Ram DPM;  Location: [facility] OR     INCISION AND DRAINAGE LOWER EXTREMITY, COMBINED Left 1/10/2022    Procedure: INCISION AND DRAINAGE, left foot;  Surgeon: Eddi Ram DPM;  Location: [facility] OR     INCISION AND DRAINAGE LOWER EXTREMITY, COMBINED Left 1/27/2022    Procedure: INCISION AND DRAINAGE, Left foot;  Surgeon: Eddi Ram DPM;  Location: [facility]       ALLERGIES:   No Known Allergies       CURRENT MEDICATIONS:  Current Facility-Administered Medications   Medication     acetaminophen (TYLENOL) tablet 650 mg     aspirin (ASA) chewable tablet 81 mg     atorvastatin (LIPITOR) tablet 80 mg     glucose gel 15-30 g    Or     dextrose 50 % injection 25-50 mL    Or     glucagon injection 1 mg     glucose gel 15-30 g    Or     dextrose 50 % injection 25-50 mL    Or     glucagon injection 1 mg     [Held by provider] furosemide (LASIX) tablet 20 mg     heparin infusion 25,000 units in D5W 250 mL ANTICOAGULANT     HOLD:  Metformin and metformin containing medications if patient received IV contrast with acute kidney injury or severe chronic kidney disease (stage IV or stage V; i.e., eGFR less than 30)     hydrALAZINE (APRESOLINE) injection 10 mg     HYDROmorphone (DILAUDID) injection 0.3 mg     insulin aspart (NovoLOG) injection (RAPID ACTING)     insulin aspart (NovoLOG) injection (RAPID ACTING)     insulin glargine (LANTUS PEN) injection 38 Units     lidocaine (LMX4) cream     lidocaine 1 % 0.1-1 mL     magnesium oxide (MAG-OX) tablet 400 mg     metoprolol succinate ER (TOPROL XL) 24 hr tablet 50 mg     nitroGLYcerin (NITRO-BID) 2 % ointment 15 mg     nitroGLYcerin (NITROSTAT) sublingual tablet 0.4 mg     ondansetron (ZOFRAN ODT) ODT tab 4 mg    Or     ondansetron (ZOFRAN) injection 4 mg     Patient is already receiving  anticoagulation with heparin, enoxaparin (LOVENOX), warfarin (COUMADIN)  or other anticoagulant medication     sodium chloride (PF) 0.9% PF flush 3 mL     sodium chloride (PF) 0.9% PF flush 3 mL         FAMILY HISTORY:   No family history on file.      SOCIAL HISTORY:   Social History     Socioeconomic History     Marital status: Single     Spouse name: Not on file     Number of children: Not on file     Years of education: Not on file     Highest education level: Not on file   Occupational History     Not on file   Tobacco Use     Smoking status: Former Smoker     Types: Cigars     Smokeless tobacco: Never Used     Tobacco comment: Cigars   Vaping Use     Vaping Use: Never used   Substance and Sexual Activity     Alcohol use: Yes     Comment: < 1 drink a month (only holidays)     Drug use: Never     Sexual activity: Not on file   Other Topics Concern     Parent/sibling w/ CABG, MI or angioplasty before 65F 55M? Not Asked   Social History Narrative    Single, currently not working, has done numerous jobs, lives alone.  Full code. (last updated 4/4/2022)      Social Determinants of Health     Financial Resource Strain: Not on file   Food Insecurity: Not on file   Transportation Needs: Not on file   Physical Activity: Not on file   Stress: Not on file   Social Connections: Not on file   Intimate Partner Violence: Not on file   Housing Stability: Not on file       REVIEW OF SYSTEMS:  CONSTITUTIONAL: No weight loss, fever   SKIN: No rash  CARDIOVASCULAR: No current chest pain or chest discomfort. No palpitations or edema.   RESPIRATORY: No shortness of breath or cough.  GASTROINTESTINAL: No nausea, vomiting or diarrhea. No abdominal pain. +constipation  NEUROLOGICAL: No headache, dizziness, syncope  HEMATOLOGIC: No anemia, bleeding or bruising.     PHYSICAL EXAMINATION:   Vitals: /67 (BP Location: Left arm)   Pulse 88   Temp 98  F (36.7  C) (Oral)   Resp 18   Wt 102.4 kg (225 lb 11.2 oz)   SpO2 93%   BMI  28.59 kg/m    GENERAL:  Well developed and well nourished   CARDIOVASCULAR: RRR on monitor; no appreciable edema.  RESPIRATIONS: Non-labored breathing on room air.  ABDOMEN: Soft, non-distended, non-tender  EXTREMITIES: Left lower extremity with prosthesis; otherwise no deformities, no edema  NEUROLOGIC: Intact and symmetric with no focal deficits.   PSYCHIATRIC: Alert and oriented x3, pleasant     LABORATORY STUDIES:   Lab Results   Component Value Date    WBC 6.2 08/25/2022    HGB 10.2 (L) 08/25/2022    HCT 31.4 (L) 08/25/2022    MCV 78 08/25/2022     08/25/2022      Lab Results   Component Value Date    BUN 44 (H) 08/25/2022     (L) 08/25/2022    CO2 26 08/25/2022     No results found for: HGBA1C    EKG 8/23/2022:   Atrial flutter with variable A-V block   Cannot rule out Anterior infarct , age undetermined   ST & T wave abnormality, consider lateral ischemia     CARDIAC CATHETERIZATION  8/24/2022:   Left Main   The vessel is large. There was severely calcified vessel disease.   Left Anterior Descending   The vessel is large.   Prox LAD lesion is 70% stenosed. The lesion is severely calcified.   Mid LAD-1 lesion is 70% stenosed. The lesion is severely calcified.   Mid LAD-2 lesion is 70% stenosed.   Dist LAD lesion is 99% stenosed.   First Diagonal Branch   Collaterals   1st Diag filled by collaterals from 2nd Diag.      1st Diag-1 lesion is 95% stenosed.   1st Diag-2 lesion is 100% stenosed.   Left Circumflex   The vessel is moderate in size.   Collaterals   Dist Cx filled by collaterals from 3rd RPL.      Prox Cx to Mid Cx lesion is 100% stenosed.   Right Coronary Artery   Ost RCA to Prox RCA lesion is 70% stenosed. The lesion is severely calcified.   Dist RCA lesion is 80% stenosed.   Right Posterior Descending Artery   RPDA-1 lesion is 50% stenosed.   RPDA-2 lesion is 100% stenosed.       TRANSTHORACIC ECHOCARDIOGRAM:   Pending    NECK CTA:  RIGHT CAROTID: No measurable stenosis in the right ICA  based on NASCET criteria. Mild plaque at the bifurcation.     LEFT CAROTID: No measurable stenosis in the left ICA based on NASCET criteria. Mild plaque at the bifurcation.    CXR:  Will obtain if needed    IMPRESSION AND PLAN: Norman Aguilar is a 62 year old with severe multi-vessel coronary artery disease and recent NSTEMI. Current echocardiography is pending, although the study from 11/2021 demonstrates left ventricular systolic dysfunction with an LVEF of 30-35%. Our team discussed the technical details of coronary artery bypass grafting with the patient, as well as the expected postoperative course and recovery. The risks include but are not limited to bleeding, infection, stroke, heart or graft failure, dysrhythmia, respiratory failure, kidney or liver injury, bowel or limb ischemia, and death. His calculated STS risk for mortality is ~3%. The calculated risk of major morbidity or mortality is 20% with risk of permanent stroke being 2%. These numbers are for isolated CABG and would need to be re-worked if any valvular intervention needed.     - Plan to further discuss patient and feasibility of PCI at cath conference today given patient comorbidities and his hesitancy for surgery.    - Remainder of cares per primary team.       Thank you very much for this referral.       Patient and plan discussed with attending.      STS RISK:  Risk of Mortality: 2.854%  Renal Failure: 8.342%  Permanent Stroke: 2.025%  Prolonged Ventilation: 10.157%  DSW Infection: 0.367%  Reoperation: 2.086%  Morbidity or Mortality: 19.844%  Short Length of Stay: 22.792%  Long Length of Stay: 9.711%    _______  Torie Oscar PA-C  Cardiothoracic Surgery  [phone]

## 2022-08-25 NOTE — PLAN OF CARE
Problem: Plan of Care - These are the overarching goals to be used throughout the patient stay.    Goal: Optimal Comfort and Wellbeing  Outcome: Ongoing, Progressing     Problem: Dysrhythmia  Goal: Normalized Cardiac Rhythm  Outcome: Ongoing, Progressing    Patient is A&Ox 4. He has c/o numbness in RLE and has LBKA. Pupils are 2, brisk, and round bilaterally. Neuro checks WDL, mouth has slight left droop. He is an assist of 1/SBA for all cares and transfers. He is continent of bowel and bladder. LBM 8/24/22. VSS. BG check at 0300 was 126. Patient denied all c/o pain this shift. He remains in NSR throughout the shift. LS are clear bilaterally. Pt. denies all shortness of breath, chest pain, dizziness, and nausea. BS active in A4Q, abdomen rounded. Skin intact except for eight angio site which is WDL. Positive pedal/radial pulses bilaterally. LFA PIV has Heparin running at 1150 unit(s)/ Hr. Patient is lying in bed with call light in reach. Slept well this shift. Staff will continue to monitor.

## 2022-08-26 LAB
ANION GAP SERPL CALCULATED.3IONS-SCNC: 11 MMOL/L (ref 5–18)
BUN SERPL-MCNC: 38 MG/DL (ref 8–22)
CALCIUM SERPL-MCNC: 8.9 MG/DL (ref 8.5–10.5)
CHLORIDE BLD-SCNC: 100 MMOL/L (ref 98–107)
CO2 SERPL-SCNC: 21 MMOL/L (ref 22–31)
CREAT SERPL-MCNC: 1.66 MG/DL (ref 0.7–1.3)
ERYTHROCYTE [DISTWIDTH] IN BLOOD BY AUTOMATED COUNT: 18.6 % (ref 10–15)
GFR SERPL CREATININE-BSD FRML MDRD: 46 ML/MIN/1.73M2
GLUCOSE BLD-MCNC: 149 MG/DL (ref 70–125)
GLUCOSE BLDC GLUCOMTR-MCNC: 123 MG/DL (ref 70–99)
GLUCOSE BLDC GLUCOMTR-MCNC: 151 MG/DL (ref 70–99)
GLUCOSE BLDC GLUCOMTR-MCNC: 177 MG/DL (ref 70–99)
GLUCOSE BLDC GLUCOMTR-MCNC: 206 MG/DL (ref 70–99)
GLUCOSE BLDC GLUCOMTR-MCNC: 219 MG/DL (ref 70–99)
HCT VFR BLD AUTO: 31.3 % (ref 40–53)
HGB BLD-MCNC: 10.1 G/DL (ref 13.3–17.7)
MCH RBC QN AUTO: 25.3 PG (ref 26.5–33)
MCHC RBC AUTO-ENTMCNC: 32.3 G/DL (ref 31.5–36.5)
MCV RBC AUTO: 78 FL (ref 78–100)
PLATELET # BLD AUTO: 205 10E3/UL (ref 150–450)
POTASSIUM BLD-SCNC: 4 MMOL/L (ref 3.5–5)
RBC # BLD AUTO: 3.99 10E6/UL (ref 4.4–5.9)
SODIUM SERPL-SCNC: 132 MMOL/L (ref 136–145)
UFH PPP CHRO-ACNC: 0.18 IU/ML
UFH PPP CHRO-ACNC: 0.31 IU/ML
UFH PPP CHRO-ACNC: 0.36 IU/ML
WBC # BLD AUTO: 6.1 10E3/UL (ref 4–11)

## 2022-08-26 PROCEDURE — 85520 HEPARIN ASSAY: CPT | Performed by: INTERNAL MEDICINE

## 2022-08-26 PROCEDURE — 36415 COLL VENOUS BLD VENIPUNCTURE: CPT | Performed by: INTERNAL MEDICINE

## 2022-08-26 PROCEDURE — 250N000011 HC RX IP 250 OP 636: Performed by: INTERNAL MEDICINE

## 2022-08-26 PROCEDURE — 80048 BASIC METABOLIC PNL TOTAL CA: CPT | Performed by: INTERNAL MEDICINE

## 2022-08-26 PROCEDURE — 99233 SBSQ HOSP IP/OBS HIGH 50: CPT | Performed by: INTERNAL MEDICINE

## 2022-08-26 PROCEDURE — 210N000001 HC R&B IMCU HEART CARE

## 2022-08-26 PROCEDURE — 250N000013 HC RX MED GY IP 250 OP 250 PS 637: Performed by: INTERNAL MEDICINE

## 2022-08-26 PROCEDURE — 85027 COMPLETE CBC AUTOMATED: CPT | Performed by: INTERNAL MEDICINE

## 2022-08-26 PROCEDURE — 99232 SBSQ HOSP IP/OBS MODERATE 35: CPT | Performed by: INTERNAL MEDICINE

## 2022-08-26 RX ORDER — ASPIRIN 81 MG/1
243 TABLET, CHEWABLE ORAL ONCE
Status: DISCONTINUED | OUTPATIENT
Start: 2022-08-26 | End: 2022-01-01 | Stop reason: HOSPADM

## 2022-08-26 RX ADMIN — FUROSEMIDE 20 MG: 20 TABLET ORAL at 09:15

## 2022-08-26 RX ADMIN — INSULIN ASPART 2 UNITS: 100 INJECTION, SOLUTION INTRAVENOUS; SUBCUTANEOUS at 17:32

## 2022-08-26 RX ADMIN — Medication 400 MG: at 09:15

## 2022-08-26 RX ADMIN — METOPROLOL SUCCINATE 50 MG: 25 TABLET, EXTENDED RELEASE ORAL at 10:49

## 2022-08-26 RX ADMIN — NITROGLYCERIN 15 MG: 20 OINTMENT TOPICAL at 00:24

## 2022-08-26 RX ADMIN — INSULIN GLARGINE 38 UNITS: 100 INJECTION, SOLUTION SUBCUTANEOUS at 21:41

## 2022-08-26 RX ADMIN — ASPIRIN 81 MG: 81 TABLET, CHEWABLE ORAL at 09:15

## 2022-08-26 RX ADMIN — HEPARIN SODIUM 1300 UNITS/HR: 10000 INJECTION, SOLUTION INTRAVENOUS at 07:07

## 2022-08-26 RX ADMIN — ATORVASTATIN CALCIUM 80 MG: 40 TABLET, FILM COATED ORAL at 21:41

## 2022-08-26 RX ADMIN — INSULIN ASPART 1 UNITS: 100 INJECTION, SOLUTION INTRAVENOUS; SUBCUTANEOUS at 14:26

## 2022-08-26 ASSESSMENT — ACTIVITIES OF DAILY LIVING (ADL)
ADLS_ACUITY_SCORE: 23

## 2022-08-26 NOTE — PLAN OF CARE
Problem: Dysrhythmia  Goal: Normalized Cardiac Rhythm  Outcome: Ongoing, Progressing   Goal Outcome Evaluation:    VS stable. He denies any Sob and chest pain. Rhythm has been Afib/NSR and Bradycardia.  He still on heprin drip and running 1300 unit/hr and recheck at 1915. coronary intervention rescheduled  On  8/29. MD Miguelina Saavedra  updated that  today procedure reshcdhuled .

## 2022-08-26 NOTE — PROGRESS NOTES
[facility]    Medicine Progress Note - Hospitalist Service    Date of Admission:  8/23/2022    Assessment & Plan          Norman Aguilar is a 62 year old male PMH of CAD, CHF, HTN, IDDM, paroxysmal atrial fibrillation, left foot osteomyelitis failure of antibiotics s/p left BKA, admitted on 8/23/2022.    NSTEMI  Multivessel coronary artery disease.  Coronary angiogram on 11/16/2021 showed diffuse calcified vessels.  No stents placed.  History of known multi-vessel CAD during admission in 11/21 ; CTS/[facility] consulted with plans for CABG in 2-3 months at that time, patient requested to postpone procedure.  8/24 repeat cath demonstrating progression of RCA lesion - CTS consulted  regarding CABG this admission.  Patient does not candidate for surgical escalation, given poor targets.  Awaiting interventional radiology treatment plan.  LDL 60, HDL 35, TG 58  A1c 8.0  -Continue telemetry, heparin drip, aspirin, metoprolol, high-dose statin, per cardiology recommendations.    Chronic systolic CHF, with EF 30-35 on echo 11/15/2021.    Holding Lasix due to JOHANA, s/p IV contrast on 8/24.    Insulin-dependent diabetes mellitus, recent A1c 8, previously 7.6 in January 2022.  -Continue home dose of Lantus 38 units nightly.  SSI NovoLog.      Essential hypertension.  On metoprolol.    Tobacco use disorder.  Smokes cigars.  -Smoking cessation, offered nicotine replacement.    Strokelike symptoms, left-sided weakness, with left-sided facial droop with onset of symptoms at 1445 and resolved in the ambulance on route.  Not candidate for tPA, given quickly resolved symptoms.  Patient was seen by stroke neurology.  Recommendations reviewed and implemented.  CT head and neck showed multiple mild to moderate stenosis on the right MCA branches, otherwise no acute findings.  Telemetry showed A. fib.  TTE in a.m.    JOHANA, with prerenal indicis.  Creatinine 1.89.  Previously 1.19-1.2 earlier this year. o urinary  symptoms to suspect UTI, though patient is diabetic.  No history of BPH.  Was not hypotensive.  Holding Lasix, gentle IVF given elevated BNP in 600s.  S/p IV contrast with coronary angiogram on 8/24.  UA not infected  Normal PSA at 1.69  Hold Lasix, metformin, monitor renal function, avoid hypotension, nephrotoxins.     Diet: Combination Diet Moderate Consistent Carb (60 g CHO per Meal) Diet    DVT Prophylaxis: heparin gtt  Garcia Catheter: Not present  Central Lines: None  Cardiac Monitoring: ACTIVE order. Indication: Post- PCI/Angiogram (24 hours)  Code Status: Full Code      Disposition Plan      Expected Discharge Date: 08/29/2022      Destination: home with help/services;family members' home  Discharge Comments: angio 8/29      The patient's care was discussed with the Bedside Nurse, Patient and Patient's Family.  Patient sister at the bedside in ED    Keri Mcintyre MD  Hospitalist Service  [facility]  Securely message with the Vocera Web Console (learn more here)  Text page via [facility] Paging/Directory     Clinically Significant Risk Factors Present on Admission                  ______________________________________________________________________    Interval History   Day #3 of hospitalization for NSTEMI.  Remains chest pain-free.  Renal function slightly improved.  Awaiting recommendations on further treatment plan from interventional cardiology.  Coronary angiogram 8/24 showed progression of RCA lesion and per CTS patient is noted candidate for surgical revascularization.  Is complaining of abdominal bloating, no nausea, vomiting, diarrhea, constipation.  He has history of constipation.  Last small bowel movement today.    Data reviewed today: I reviewed all medications, new labs and imaging results over the last 24 hours. I personally reviewed    Physical Exam   Vital Signs: Temp: 98.4  F (36.9  C) Temp src: Oral BP: 116/68 Pulse: 77   Resp: 20 SpO2: 96 % O2 Device: None (Room  air)    Weight: 228 lbs 11.2 oz  General: Alert and oriented x 3. Not in obvious distress.  HEENT: NC, AT. Neck- supple, No JVP elevation, lymphadenopathy or thyromegaly. Trachea-central.  Chest: Clear to auscultation bilaterally.  Heart: S1S2 regular. No M/R/G.  Abdomen: Soft. NT, ND. No organomegaly. Bowel sounds- active.  Back: No spine tenderness. No CVA tenderness.  Extremities: Left leg BKA.  Neuro: Cranial nerves 1-12 grossly normal. No focal neurological deficit    Data   Recent Labs   Lab 08/26/22  1231 08/26/22  0752 08/26/22  0447 08/25/22  0751 08/25/22  0357 08/24/22  1659 08/24/22  1347 08/24/22  0934 08/24/22  0824 08/24/22  0720 08/23/22  2309 08/23/22  1628   WBC  --   --  6.1  --  6.2  --  6.5  --   --   --    < > 7.5   HGB  --   --  10.1*  --  10.2*  --  10.6*  --   --   --    < > 10.7*   MCV  --   --  78  --  78  --  79  --   --   --    < > 79   PLT  --   --  205  --  187  --  202  --   --   --    < > 206   INR  --   --   --   --   --   --   --   --   --  1.19*  --  1.25*   NA  --   --  132*  --  131*  --   --   --  134*  --   --  133*   POTASSIUM  --   --  4.0  --  3.6  --   --   --  3.9  --   --  3.7   CHLORIDE  --   --  100  --  99  --   --   --  99  --   --  94*   CO2  --   --  21*  --  26  --   --   --  25  --   --  23   BUN  --   --  38*  --  44*  --   --   --  44*  --   --  41*   CR  --   --  1.66*  --  1.77*  --   --   --  1.75*  --   --  1.89*   ANIONGAP  --   --  11  --  6  --   --   --  10  --   --  16   YOSVANY  --   --  8.9  --  8.9  --   --   --  9.2  --   --  8.9   * 123* 149*   < > 102   < >  --    < > 107  --    < > 148*   ALBUMIN  --   --   --   --  2.8*  --   --   --   --   --   --   --    PROTTOTAL  --   --   --   --  6.7  --   --   --   --   --   --   --    BILITOTAL  --   --   --   --  1.0  --   --   --   --   --   --   --    ALKPHOS  --   --   --   --  122*  --   --   --   --   --   --   --    ALT  --   --   --   --  97*  --   --   --   --   --   --   --    AST  --   --    --   --  77*  --   --   --   --   --   --   --     < > = values in this interval not displayed.     No results found for this or any previous visit (from the past 24 hour(s)).  Medications     heparin 1,300 Units/hr (08/26/22 0707)     - MEDICATION INSTRUCTIONS -         aspirin  243 mg Oral Once     aspirin  81 mg Oral Daily     atorvastatin  80 mg Oral QPM     furosemide  20 mg Oral Daily     insulin aspart  1-7 Units Subcutaneous TID AC     insulin aspart  1-5 Units Subcutaneous At Bedtime     insulin glargine  38 Units Subcutaneous At Bedtime     magnesium oxide  400 mg Oral Daily     metoprolol succinate ER  50 mg Oral Daily     nitroGLYcerin  1 inch Transdermal Q12H     sodium chloride (PF)  3 mL Intracatheter Q8H

## 2022-08-26 NOTE — PLAN OF CARE
Problem: Plan of Care - These are the overarching goals to be used throughout the patient stay.    Goal: Absence of Hospital-Acquired Illness or Injury  Intervention: Prevent and Manage VTE (Venous Thromboembolism) Risk  Recent Flowsheet Documentation  Taken 8/25/2022 2030 by Lakshmi Hernandez RN  Activity Management: activity adjusted per tolerance  Taken 8/25/2022 1638 by Lakshmi Hernandez RN  Activity Management: activity adjusted per tolerance     Problem: Dysrhythmia  Goal: Normalized Cardiac Rhythm  Outcome: Ongoing, Progressing   Goal Outcome Evaluation:      VS stable. He denies any chest pain rhythm has been NSR. He continue on  heparin drip running 1150 unit/hr  and recheck tomorrow morning. He had hypoglycemia BS with 68 this morning improved with orange juice. At bed time BS was 207 and he had Lantus and sliding scalel as order.

## 2022-08-26 NOTE — PLAN OF CARE
Goal Outcome Evaluation:          Problem: Dysrhythmia  Goal: Normalized Cardiac Rhythm  Outcome: Ongoing, Progressing     Heart rhythm was in and out of a fib over night, rate controlled in the 60s.     Patient is alert and oriented.  He denies pain. Lung sounds are clear.  Heparin infusing at 1150 U/hr over night.  Per heparin protocol, 3000 unit bolus given and rate was increased by 150 U/hr to 1300 U/hr.  AntiXa recheck scheduled.  Patient appears to be tolerating heparin drip.    Patient sat in bed watching television, over night.  Spoke of some anxiety regarding plan on what they will do for his heart.

## 2022-08-26 NOTE — PROGRESS NOTES
HEART CARE NOTE          Assessment/Recommendations     1. HFrEF/ICM  Assessment / Plan    Near euvolemia on physical exam; denies HF symptoms; no changes to regimen at this time    GDMT as detailed below     Current Pharmacotherapy AHA Guideline-Directed Medical Therapy   Lisinopril 20 mg daily - on hold given JOHANA Lisinopril 20 mg twice daily   Metoprolol succinate 50 mg daily Carvedilol 25 mg twice daily   Spironolactone 25 mg - on hold Spironolactone 25 mg once daily   Hydralazine NA Hydralazine 100 mg three times daily   Isosorbide dinitrate NA Isosorbide dinitrate 40 mg three times daily   SGLT2 inhibitor: not started Dapagliflozin or Empagliflozin 10 mg daily       2. NSTEMI  Assessment / Plan    Hx of coronary angiogram significant for severe multi-vessel CAD during admission in 11/21 ; CTS/Jacquelynoper consulted with plans for CABG in 2-3 months at that time; now with repeat cath demonstrating progression of RCA lesion --> CTS consulted; not deemed a surgical candidate; recommendations for PCI vs medical management - will discuss further strategy with Interventional cardiology     Continue heparin gtt    Currently hemodynamically stable and chest pain free    Continue ASA, high intensity atorvastatin, metoprolol succinate      3. DM2  Assessment / Plan    Management per primary team     4. HLP  Assessment / Plan    Continue high intensity atorvastatin     5. Paroxysmal afib  Assessment / Plan    Rate controlled; continue metoprolol    Continue heparin gtt     Will need afib referral at discharge     6. HTN  Assessment / Plan    Well controlled on current regimen     7. JOHANA on CKD  Assessment / Plan    Baseline appears to be around1.2    Continue to monitor closely given contrast dye load    History of Present Illness/Subjective      Mr. Norman SIMS Gladyssafia is a 62 year old male with a PMHx significant for CAD, CHF, HTN, IDDM, paroxysmal atrial fibrillation, left foot osteomyelitis failure of antibiotics s/p left  JEWEL, admitted on 8/23/2022 with symptoms concerninf for stroke. Troponin was noted to be elevated.     Today, Mr. Thomas denies any acute cardiac events or complaints; Management plan as detailed above.      ECG: Personally reviewed. atrial fibrillation, rate conttolled.     ECHO (personnaly Reviewed):   The left ventricle is normal in size.  Left ventricular function is decreased. The ejection fraction is 30-35%  (moderately reduced).  Left ventricular diastolic function is abnormal.  Diastolic Doppler findings (E/E' ratio and/or other parameters) suggest left  ventricular filling pressures are increased.  The left atrium is mildly dilated.  The mitral valve leaflets are mildly thickened.  There is mild (1+) mitral regurgitation.  IVC diameter >2.1 cm collapsing <50% with sniff suggests a high RA pressure  estimated at 15 mmHg or greater.          Physical Examination Review of Systems   /68 (BP Location: Left arm)   Pulse 77   Temp 98.3  F (36.8  C) (Oral)   Resp 18   Wt 103.7 kg (228 lb 11.2 oz)   SpO2 95%   BMI 28.97 kg/m    Body mass index is 28.97 kg/m .  Wt Readings from Last 3 Encounters:   08/26/22 103.7 kg (228 lb 11.2 oz)   04/04/22 93 kg (205 lb)   03/23/22 95.7 kg (211 lb)     General Appearance:   no distress, normal body habitus   ENT/Mouth: membranes moist, no oral lesions or bleeding gums.      EYES:  no scleral icterus, normal conjunctivae   Neck: no carotid bruits or thyromegaly   Chest/Lungs:   lungs are clear to auscultation, no rales or wheezing, equal chest wall expansion    Cardiovascular:   Regular. Normal first and second heart sounds with no murmurs, rubs, or gallops; the carotid, radial and posterior tibial pulses are intact, no JVD or LE edema   Abdomen:  no organomegaly, masses, bruits, or tenderness; bowel sounds are present   Extremities: no cyanosis or clubbing   Skin: no xanthelasma, warm.    Neurologic: alert and oriented x3, calm     Psychiatric: alert and oriented  x3, calm     A complete 10 systems ROS was reviewed  And is negative except what is listed in the HPI.          Medical History  Surgical History Family History Social History   Past Medical History:   Diagnosis Date     Anemia      Chronic systolic CHF (congestive heart failure) (H)      Coronary artery disease      Diabetic neuropathy (H)      DM type 2 w Neuropathy      GERD (gastroesophageal reflux disease)      Hyperlipidemia      Hypertension      Intermittent atrial fibrillation (H)     on Eliquis     Ischemic cardiomyopathy 11/16/2021    Echo with EF of 30-35%     NSTEMI (non-ST elevated myocardial infarction) (H) 11/15/2021     Osteomyelitis of left foot (H) 04/04/2022     Renal disease      Retinopathy      Sleep disturbance     Past Surgical History:   Procedure Laterality Date     AMPUTATE FOOT Left 3/21/2022    Procedure: Guillotine AMPUTATION, FOOT;  Surgeon: Ronald Bhatt MD;  Location: [facility] OR     AMPUTATE LEG BELOW KNEE Left 4/4/2022    Procedure: LEFT BELOW  KNEE AMPUTATION;  Surgeon: Ronald Bhatt MD;  Location: [facility] OR     AMPUTATE TOE(S) Left 11/16/2021    Procedure: first and second ray amputation;  Surgeon: Eddi Ram DPM;  Location: [facility] OR     APPENDECTOMY       CV CORONARY ANGIOGRAM N/A 11/16/2021    Procedure: CV CORONARY ANGIOGRAM;  Surgeon: Pam Tabares MD;  Location: [facility] CATH LAB CV     CV CORONARY ANGIOGRAM N/A 8/24/2022    Procedure: CV CORONARY ANGIOGRAM;  Surgeon: Cipriano Lucas MD;  Location: [facility] CATH LAB CV     CV LEFT HEART CATH N/A 11/16/2021    Procedure: Left Heart Cath;  Surgeon: Pam Tabares MD;  Location: [facility] CATH LAB CV     FOOT SURGERY Left      HERNIA REPAIR       INCISION AND DRAINAGE LOWER EXTREMITY, COMBINED Left 11/16/2021    Procedure: INCISION AND DRAINAGE, left foot;  Surgeon: Eddi Ram DPM;  Location: [facility] OR     INCISION AND DRAINAGE LOWER EXTREMITY, COMBINED Left  11/19/2021    Procedure: INCISION AND DRAINAGE, left foot;  Surgeon: Eddi Ram DPM;  Location: [facility] OR     INCISION AND DRAINAGE LOWER EXTREMITY, COMBINED Left 12/9/2021    Procedure: INCISION AND DRAINAGE, Left foot;  Surgeon: Eddi Ram DPM;  Location: [facility] OR     INCISION AND DRAINAGE LOWER EXTREMITY, COMBINED Left 1/10/2022    Procedure: INCISION AND DRAINAGE, left foot;  Surgeon: Eddi Ram DPM;  Location: [facility] OR     INCISION AND DRAINAGE LOWER EXTREMITY, COMBINED Left 1/27/2022    Procedure: INCISION AND DRAINAGE, Left foot;  Surgeon: Eddi Ram DPM;  Location: [facility] OR    no family history of premature coronary artery disease Social History     Socioeconomic History     Marital status: Single     Spouse name: Not on file     Number of children: Not on file     Years of education: Not on file     Highest education level: Not on file   Occupational History     Not on file   Tobacco Use     Smoking status: Former Smoker     Types: Cigars     Smokeless tobacco: Never Used     Tobacco comment: Cigars   Vaping Use     Vaping Use: Never used   Substance and Sexual Activity     Alcohol use: Yes     Comment: < 1 drink a month (only holidays)     Drug use: Never     Sexual activity: Not on file   Other Topics Concern     Parent/sibling w/ CABG, MI or angioplasty before 65F 55M? Not Asked   Social History Narrative    Single, currently not working, has done numerous jobs, lives alone.  Full code. (last updated 4/4/2022)      Social Determinants of Health     Financial Resource Strain: Not on file   Food Insecurity: Not on file   Transportation Needs: Not on file   Physical Activity: Not on file   Stress: Not on file   Social Connections: Not on file   Intimate Partner Violence: Not on file   Housing Stability: Not on file           Lab Results    Chemistry/lipid CBC Cardiac Enzymes/BNP/TSH/INR   Lab Results   Component Value Date     CHOL 107 08/25/2022    HDL 35 (L) 08/25/2022    TRIG 58 08/25/2022    BUN 44 (H) 08/25/2022     (L) 08/25/2022    CO2 26 08/25/2022    Lab Results   Component Value Date    WBC 6.1 08/26/2022    HGB 10.1 (L) 08/26/2022    HCT 31.3 (L) 08/26/2022    MCV 78 08/26/2022     08/26/2022    Lab Results   Component Value Date    TROPONINI 15.76 (HH) 08/24/2022     (H) 08/24/2022    INR 1.19 (H) 08/24/2022     Lab Results   Component Value Date    TROPONINI 15.76 (HH) 08/24/2022          Weight:    Wt Readings from Last 3 Encounters:   08/26/22 103.7 kg (228 lb 11.2 oz)   04/04/22 93 kg (205 lb)   03/23/22 95.7 kg (211 lb)       Allergies  No Known Allergies      Surgical History  Past Surgical History:   Procedure Laterality Date     AMPUTATE FOOT Left 3/21/2022    Procedure: Guillotine AMPUTATION, FOOT;  Surgeon: Ronald Bhatt MD;  Location: [facility] [place] OR     AMPUTATE LEG BELOW KNEE Left 4/4/2022    Procedure: LEFT BELOW  KNEE AMPUTATION;  Surgeon: Ronald Bhatt MD;  Location: [facility] [place] OR     AMPUTATE TOE(S) Left 11/16/2021    Procedure: first and second ray amputation;  Surgeon: Eddi Ram DPM;  Location: [facility] [place] OR     APPENDECTOMY       CV CORONARY ANGIOGRAM N/A 11/16/2021    Procedure: CV CORONARY ANGIOGRAM;  Surgeon: Pam Tabares MD;  Location: [facility] CATH LAB CV     CV CORONARY ANGIOGRAM N/A 8/24/2022    Procedure: CV CORONARY ANGIOGRAM;  Surgeon: Cipriano Lucas MD;  Location: [facility] CATH LAB CV     CV LEFT HEART CATH N/A 11/16/2021    Procedure: Left Heart Cath;  Surgeon: Pam Tabares MD;  Location: [facility] CATH LAB CV     FOOT SURGERY Left      HERNIA REPAIR       INCISION AND DRAINAGE LOWER EXTREMITY, COMBINED Left 11/16/2021    Procedure: INCISION AND DRAINAGE, left foot;  Surgeon: Eddi Ram DPM;  Location: [facility] [place] OR     INCISION AND DRAINAGE LOWER EXTREMITY, COMBINED Left 11/19/2021    Procedure: INCISION AND DRAINAGE, left  foot;  Surgeon: Eddi Ram DPM;  Location: [facility] [place] OR     INCISION AND DRAINAGE LOWER EXTREMITY, COMBINED Left 12/9/2021    Procedure: INCISION AND DRAINAGE, Left foot;  Surgeon: Eddi Ram DPM;  Location: [facility] [place] OR     INCISION AND DRAINAGE LOWER EXTREMITY, COMBINED Left 1/10/2022    Procedure: INCISION AND DRAINAGE, left foot;  Surgeon: Eddi Ram DPM;  Location: [facility] [place] OR     INCISION AND DRAINAGE LOWER EXTREMITY, COMBINED Left 1/27/2022    Procedure: INCISION AND DRAINAGE, Left foot;  Surgeon: Eddi Ram DPM;  Location: [facility]       Social History  Tobacco:   History   Smoking Status     Former Smoker     Types: Cigars   Smokeless Tobacco     Never Used     Comment: Cigars    Alcohol:   Social History    Substance and Sexual Activity      Alcohol use: Yes        Comment: < 1 drink a month (only holidays)   Illicit Drugs:   History   Drug Use Unknown       Family History  No family history on file.       Russel Arredondo MD on 8/26/2022      cc: Jaquan Dickerson

## 2022-08-27 NOTE — PROGRESS NOTES
[facility]    Medicine Progress Note - Hospitalist Service    Date of Admission:  8/23/2022    Assessment & Plan          Norman Aguilar is a 62 year old male PMH of CAD, CHF, HTN, IDDM, paroxysmal atrial fibrillation, left foot osteomyelitis failure of antibiotics s/p left BKA, admitted on 8/23/2022.    Acute NSTEMI  Multivessel coronary artery disease.  Coronary angiogram on 11/16/2021 showed diffuse calcified vessels.  No stents placed.  History of known multi-vessel CAD during admission in 11/21 ; CTS/Knoper consulted with plans for CABG in 2-3 months at that time, patient requested to postpone procedure.  8/24 repeat cath demonstrating progression of RCA lesion - CTS consulted  regarding CABG this admission.  Patient does not candidate for surgical escalation, given poor targets.    Plan fall multivessel complicated stenting on Monday.  LDL 60, HDL 35, TG 58  A1c 8.0  -Continue telemetry, heparin drip, aspirin, metoprolol, high-dose statin, per cardiology recommendations.    Chronic systolic CHF, with EF 30-35 on echo 11/15/2021.    Holding Lasix due to JOHANA, s/p IV contrast on 8/24.    Insulin-dependent diabetes mellitus, recent A1c 8, previously 7.6 in January 2022.  -Continue home dose of Lantus 38 units nightly.  SSI NovoLog.      Essential hypertension.  On metoprolol.    Tobacco use disorder.  Smokes cigars.  -Smoking cessation, offered nicotine replacement.    Strokelike symptoms, left-sided weakness, with left-sided facial droop with onset of symptoms at 1445 and resolved in the ambulance on route.  Not candidate for tPA, given quickly resolved symptoms.  Patient was seen by stroke neurology.  Recommendations reviewed and implemented.  CT head and neck showed multiple mild to moderate stenosis on the right MCA branches, otherwise no acute findings.  Telemetry showed A. fib.  TTE 8/25 EF 30 to 35%    JOHANA, with prerenal indicis.  Creatinine 1.89.  Previously 1.19-1.2 earlier  this year. o urinary symptoms to suspect UTI, though patient is diabetic.  No history of BPH.  Was not hypotensive.  Holding Lasix, gentle IVF given elevated BNP in 600s.  S/p IV contrast with coronary angiogram on 8/24.  UA not infected  Normal PSA at 1.69  Hold Lasix, metformin, monitor renal function, avoid hypotension, nephrotoxins.  8/27 added po Mucomyst.     Diet: Combination Diet Moderate Consistent Carb (60 g CHO per Meal) Diet    DVT Prophylaxis: heparin gtt  Garcia Catheter: Not present  Central Lines: None  Cardiac Monitoring: ACTIVE order. Indication: Post- PCI/Angiogram (24 hours)  Code Status: Full Code      Disposition Plan      Expected Discharge Date: 08/29/2022      Destination: home with help/services;family members' home  Discharge Comments: angio 8/29?      The patient's care was discussed with the Bedside Nurse, Patient and Patient's Family.  Patient sister at the bedside in ED    Keri Mcintyre MD  Hospitalist Service  [facility]  Securely message with the Vocera Web Console (learn more here)  Text page via [employer] Paging/Directory     Clinically Significant Risk Factors Present on Admission                  ______________________________________________________________________    Interval History   Nothing acute overnight.  Remains hemodynamically stable.  On heparin drip.  Noted plans for multivessel complicated stenting on Monday.  Renal function slightly improved.  Hold Lasix.  Added acetylcysteine p.o.  Patient sister and brother-in-law updated at the bedside today.    Data reviewed today: I reviewed all medications, new labs and imaging results over the last 24 hours. I personally reviewed    Physical Exam   Vital Signs: Temp: 97.9  F (36.6  C) Temp src: Oral BP: 136/61 Pulse: 102   Resp: 18 SpO2: 98 % O2 Device: None (Room air)    Weight: 228 lbs 11.2 oz  General: Alert and oriented x 3. Not in obvious distress.  HEENT: NC, AT. Neck- supple, No JVP elevation,  lymphadenopathy or thyromegaly. Trachea-central.  Chest: Clear to auscultation bilaterally.  Heart: S1S2 regular. No M/R/G.  Abdomen: Soft. NT, ND. No organomegaly. Bowel sounds- active.  Back: No spine tenderness. No CVA tenderness.  Extremities: Left leg BKA.  Neuro: Cranial nerves 1-12 grossly normal. No focal neurological deficit    Data   Recent Labs   Lab 08/27/22  0800 08/27/22  0512 08/27/22  0510 08/26/22  2121 08/26/22  0752 08/26/22  0447 08/25/22  0751 08/25/22  0357 08/24/22  0824 08/24/22  0720 08/23/22  2309 08/23/22  1628   WBC  --  6.3  --   --   --  6.1  --  6.2   < >  --    < > 7.5   HGB  --  10.4*  --   --   --  10.1*  --  10.2*   < >  --    < > 10.7*   MCV  --  79  --   --   --  78  --  78   < >  --    < > 79   PLT  --  233  --   --   --  205  --  187   < >  --    < > 206   INR  --   --   --   --   --   --   --   --   --  1.19*  --  1.25*   NA  --   --  133*  --   --  132*  --  131*   < >  --   --  133*   POTASSIUM  --   --  4.1  --   --  4.0  --  3.6   < >  --   --  3.7   CHLORIDE  --   --  100  --   --  100  --  99   < >  --   --  94*   CO2  --   --  23  --   --  21*  --  26   < >  --   --  23   BUN  --   --  37*  --   --  38*  --  44*   < >  --   --  41*   CR  --   --  1.55*  --   --  1.66*  --  1.77*   < >  --   --  1.89*   ANIONGAP  --   --  10  --   --  11  --  6   < >  --   --  16   YOSVANY  --   --  8.8  --   --  8.9  --  8.9   < >  --   --  8.9   *  --  152* 219*   < > 149*   < > 102   < >  --    < > 148*   ALBUMIN  --   --   --   --   --   --   --  2.8*  --   --   --   --    PROTTOTAL  --   --   --   --   --   --   --  6.7  --   --   --   --    BILITOTAL  --   --   --   --   --   --   --  1.0  --   --   --   --    ALKPHOS  --   --   --   --   --   --   --  122*  --   --   --   --    ALT  --   --   --   --   --   --   --  97*  --   --   --   --    AST  --   --   --   --   --   --   --  77*  --   --   --   --     < > = values in this interval not displayed.     No results found for  this or any previous visit (from the past 24 hour(s)).  Medications     heparin 1,300 Units/hr (08/27/22 0022)     - MEDICATION INSTRUCTIONS -         acetylcysteine 20 %  1,200 mg Oral BID     aspirin  243 mg Oral Once     aspirin  81 mg Oral Daily     atorvastatin  80 mg Oral QPM     [Held by provider] furosemide  20 mg Oral Daily     insulin aspart  1-7 Units Subcutaneous TID AC     insulin aspart  1-5 Units Subcutaneous At Bedtime     insulin glargine  38 Units Subcutaneous At Bedtime     lactulose  20 g Oral BID     magnesium oxide  400 mg Oral Daily     metoprolol succinate ER  50 mg Oral Daily     nitroGLYcerin  1 inch Transdermal Q12H     sodium chloride (PF)  3 mL Intracatheter Q8H     Keri Mcintyre MD

## 2022-08-27 NOTE — PLAN OF CARE
Problem: Arrhythmia/Dysrhythmia (Cardiac Catheterization)  Goal: Stable Heart Rate and Rhythm  Outcome: Ongoing, Progressing     Problem: Embolism (Cardiac Catheterization)  Goal: Absence of Embolism Signs and Symptoms  Outcome: Ongoing, Progressing  Intervention: Prevent or Manage Embolism  Recent Flowsheet Documentation  Taken 8/27/2022 0800 by Irma Ndiaye RN  VTE Prevention/Management: SCDs (sequential compression devices) off     Problem: Pain (Cardiac Catheterization)  Goal: Acceptable Pain Control  Outcome: Ongoing, Progressing     Problem: Vascular Access Protection (Cardiac Catheterization)  Goal: Absence of Vascular Access Complication  Outcome: Ongoing, Progressing  Intervention: Prevent Access Site Complications  Recent Flowsheet Documentation  Taken 8/27/2022 0800 by Irma Ndiaye RN  Activity Management:   activity adjusted per tolerance   activity encouraged   up ad jose  Head of Bed (HOB) Positioning: HOB at 30-45 degrees     Problem: Pain Acute  Goal: Acceptable Pain Control and Functional Ability  Outcome: Ongoing, Progressing    Pt alertx4, lung sounds clear. Pt calm ad cooperative and compliant with cares., Expressed anxieties and frustration with non-communication between doctors. Encouraged pt to advocate for himself, discussed with him 40 minutes. Angio scheduled for Monday 8/29.    Heparin running continuously at 1300 units/hr.     Protocol  AntiXa: 0.26, am recheck, 8/28.    Telemetry reads Normal Sinus Rhythm.

## 2022-08-27 NOTE — PLAN OF CARE
Assumed care 1900 to 0730. A&O x 4. Stand by assist. Tele is NSR. Denies pain. Up to toilet. Heparin gtt @ 1300 units/hr. Call light within reach, able to make needs known. Bed alarm on for safety.    Problem: Dysrhythmia  Goal: Normalized Cardiac Rhythm  Outcome: Ongoing, Progressing     Problem: Plan of Care - These are the overarching goals to be used throughout the patient stay.    Goal: Absence of Hospital-Acquired Illness or Injury  Intervention: Prevent Skin Injury  Recent Flowsheet Documentation  Taken 8/27/2022 0400 by DINA CARLSON  Body Position: position changed independently

## 2022-08-27 NOTE — PROGRESS NOTES
Care Management Follow Up    Length of Stay (days): 4    Expected Discharge Date: 08/29/2022     Concerns to be Addressed : Day #4 Hosp for NSTEMI. Renal function improving slightly,  Cards still following  Patient plan of care discussed at interdisciplinary rounds: Yes    Anticipated Discharge Disposition: Home       Anticipated Discharge Services:  Home   Anticipated Discharge DME:  TBD    Education Provided on the Discharge Plan:  Per Care Team  Patient/Family in Agreement with the Plan:  Yes    Referrals Placed by CM/SW:    Private pay costs discussed: Not applicable    Additional Information:    Pt had recent BKA and now has L leg prosthesis. Pt lives with his sister and her family. Pt is independent and does not use any home services. SW provided the patient with disability resources, along with important phone numbers. Family to transport.       CM will continue to monitor plan of care, review recommendations, and assist with any discharge needs anticipated.          Sulema Manuel RN

## 2022-08-27 NOTE — PROGRESS NOTES
HEART CARE CONSULTATON NOTE        Assessment/Recommendations   Assessment:   1.  Acute non-ST elevated myocardial infarction  2.  Chronic congestive heart failure with reduced ejection fraction.  Left ventricular ejection fraction 35% global hypokinesis secondary ischemic cardiomyopathy  3.  Diffuse multivessel coronary disease.  Not good surgical candidate for open heart bypass surgery  4.  Hyperlipidemia  5.  Diabetes mellitus  6.  Above-the-knee amputation of the left  7.  Atrial fibrillation    Plan:   1.  Plan to undergo multivessel complicated stenting on Monday  2.  Continue continuous heparin drip  3.  Continue metoprolol  4.  Follow renal function closely.  Given JOHANA and chronic kidney disease stage III  5.  Continue aspirin  6.  Continue Lasix.  Hold tomorrow    Discussion with the patient guarding his current status.    Outpatient cardiologist Dr. Davis     History of Present Illness/Subjective    S: No acute chest pain overnight.  He remains on heparin drip for acute non-ST elevated myocardial infarction.  No active dyspnea.  Long conversation regarding plan.  He is not a surgical candidate due to his above-the-knee amputation and difficulty with recovery along with reduced ejection fraction which surgery felt he would be better off with multivessel stenting.      Patient agrees with the plan.  Renal function is slightly improved over the past 4 days.  Good telemetry demonstrates no stain ventricular arrhythmias.  No significant lower extremity edema today.    Reviewed position films and report in detail.  Reviewed cath with patient.  Reviewed echocardiogram report.  New notes by Dr. Garrido       Physical Examination  Review of Systems   VITALS: /61   Pulse 102   Temp 97.9  F (36.6  C) (Oral)   Resp 18   Wt 103.7 kg (228 lb 11.2 oz)   SpO2 98%   BMI 28.97 kg/m    BMI: Body mass index is 28.97 kg/m .  Wt Readings from Last 3 Encounters:   08/26/22 103.7 kg (228 lb 11.2 oz)   04/04/22 93 kg  (205 lb)   03/23/22 95.7 kg (211 lb)       Intake/Output Summary (Last 24 hours) at 8/27/2022 1217  Last data filed at 8/27/2022 0800  Gross per 24 hour   Intake 900 ml   Output --   Net 900 ml     General Appearance:   no distress, normal body habitus Pleasant   ENT/Mouth: membranes moist, no oral lesions or bleeding gums.      EYES:  no scleral icterus, normal conjunctivae       Chest/Lungs:   lungs are clear to auscultation, no cough   Cardiovascular:   No murmurs, rubs, or gallops; the carotid, radial and posterior tibial pulses are intact, Jugular venous pressure 8 cm, no pitting edema bilaterally        Extremities: no cyanosis or clubbing   Skin: no xanthelasma, warm.    Neurologic: normal  bilateral, no tremors     Psychiatric: alert and oriented x3, calm     Review Of Systems  Skin: negative  Eyes: negative  Ears/Nose/Throat: negative  Respiratory: Negative  Cardiovascular: Negative  Gastrointestinal: Chronic abdominal distention  Genitourinary: negative  Musculoskeletal: negative  Neurologic: negative  Psychiatric: negative  Hematologic/Lymphatic/Immunologic: negative  Endocrine: negative          Lab Results    Chemistry/lipid CBC Cardiac Enzymes/BNP/TSH/INR   Recent Labs   Lab Test 08/25/22  0357   CHOL 107   HDL 35*   LDL 60   TRIG 58     Recent Labs   Lab Test 08/25/22  0357 05/17/22  1005 11/17/21  0533   LDL 60 71 126     Recent Labs   Lab Test 08/27/22  0800 08/26/22  0752 08/26/22  0447   NA  --   --  132*   POTASSIUM  --   --  4.0   CHLORIDE  --   --  100   CO2  --   --  21*   *   < > 149*   BUN  --   --  38*   CR  --   --  1.66*   GFRESTIMATED  --   --  46*   YOSVANY  --   --  8.9    < > = values in this interval not displayed.     Recent Labs   Lab Test 08/26/22  0447 08/25/22  0357 08/24/22  0824   CR 1.66* 1.77* 1.75*     Recent Labs   Lab Test 08/23/22  2358 01/19/22  1548 12/01/21  1633   A1C 8.0* 7.6* 13.4*          Recent Labs   Lab Test 08/27/22  0512   WBC 6.3   HGB 10.4*   HCT  33.2*   MCV 79        Recent Labs   Lab Test 08/27/22  0512 08/26/22  0447 08/25/22  0357   HGB 10.4* 10.1* 10.2*    Recent Labs   Lab Test 08/24/22  0824 08/23/22  1628 11/15/21  0619   TROPONINI 15.76* 14.95* 13.42*     Recent Labs   Lab Test 08/24/22  0824   *     No results for input(s): TSH in the last 29018 hours.  Recent Labs   Lab Test 08/24/22  0720 08/23/22  1628 03/18/22  1344   INR 1.19* 1.25* 1.47*        Medical History  Surgical History Family History Social History   Past Medical History:   Diagnosis Date     Anemia      Chronic systolic CHF (congestive heart failure) (H)      Coronary artery disease      Diabetic neuropathy (H)      DM type 2 w Neuropathy      GERD (gastroesophageal reflux disease)      Hyperlipidemia      Hypertension      Intermittent atrial fibrillation (H)     on Eliquis     Ischemic cardiomyopathy 11/16/2021    Echo with EF of 30-35%     NSTEMI (non-ST elevated myocardial infarction) (H) 11/15/2021     Osteomyelitis of left foot (H) 04/04/2022     Renal disease      Retinopathy      Sleep disturbance      Past Surgical History:   Procedure Laterality Date     AMPUTATE FOOT Left 3/21/2022    Procedure: Guillotine AMPUTATION, FOOT;  Surgeon: Ronald Bhatt MD;  Location: [facility] [place]     AMPUTATE LEG BELOW KNEE Left 4/4/2022    Procedure: LEFT BELOW  KNEE AMPUTATION;  Surgeon: Ronald Bhatt MD;  Location: [facility] OR     AMPUTATE TOE(S) Left 11/16/2021    Procedure: first and second ray amputation;  Surgeon: Eddi Ram DPM;  Location: [facility] OR     APPENDECTOMY       CV CORONARY ANGIOGRAM N/A 11/16/2021    Procedure: CV CORONARY ANGIOGRAM;  Surgeon: Pam Tabares MD;  Location: [facility] CATH LAB CV     CV CORONARY ANGIOGRAM N/A 8/24/2022    Procedure: CV CORONARY ANGIOGRAM;  Surgeon: Cipriano Lucas MD;  Location: [facility] CATH LAB CV     CV LEFT HEART CATH N/A 11/16/2021    Procedure: Left Heart Cath;  Surgeon: Raysa  MD Pam;  Location: [facility] CATH LAB CV     FOOT SURGERY Left      HERNIA REPAIR       INCISION AND DRAINAGE LOWER EXTREMITY, COMBINED Left 11/16/2021    Procedure: INCISION AND DRAINAGE, left foot;  Surgeon: Eddi Ram DPM;  [place] [facility] [place]     INCISION AND DRAINAGE LOWER EXTREMITY, COMBINED Left 11/19/2021    Procedure: INCISION AND DRAINAGE, left foot;  Surgeon: Eddi Ram DPM;  [place] [facility] [place] [place]     INCISION AND DRAINAGE LOWER EXTREMITY, COMBINED Left 12/9/2021    Procedure: INCISION AND DRAINAGE, Left foot;  Surgeon: Eddi Ram DPM;  [place] [facility] [place]     INCISION AND DRAINAGE LOWER EXTREMITY, COMBINED Left 1/10/2022    Procedure: INCISION AND DRAINAGE, left foot;  Surgeon: Eddi Ram DPM;  [place] [facility] [place] [place]     INCISION AND DRAINAGE LOWER EXTREMITY, COMBINED Left 1/27/2022    Procedure: INCISION AND DRAINAGE, Left foot;  Surgeon: Eddi Ram DPM;  [place] [facility] [place]     No family history on file.     Social History     Socioeconomic History     Marital status: Single     Spouse name: Not on file     Number of children: Not on file     Years of education: Not on file     Highest education level: Not on file   Occupational History     Not on file   Tobacco Use     Smoking status: Former Smoker     Types: Cigars     Smokeless tobacco: Never Used     Tobacco comment: Cigars   Vaping Use     Vaping Use: Never used   Substance and Sexual Activity     Alcohol use: Yes     Comment: < 1 drink a month (only holidays)     Drug use: Never     Sexual activity: Not on file   Other Topics Concern     Parent/sibling w/ CABG, MI or angioplasty before 65F 55M? Not Asked   Social History Narrative    Single, currently not working, has done numerous jobs, lives alone.  Full code. (last updated 4/4/2022)      Social Determinants of Health     Financial Resource Strain: Not on file   Food Insecurity: Not on file    Transportation Needs: Not on file   Physical Activity: Not on file   Stress: Not on file   Social Connections: Not on file   Intimate Partner Violence: Not on file   Housing Stability: Not on file         Medications  Allergies   No current outpatient medications on file.      No Known Allergies      Carlton Burdick DO

## 2022-08-28 NOTE — PROGRESS NOTES
[facility]    Medicine Progress Note - Hospitalist Service    Date of Admission:  8/23/2022    Assessment & Plan          Norman Aguilar is a 62 year old male PMH of CAD, CHF, HTN, IDDM, paroxysmal atrial fibrillation, left foot osteomyelitis failure of antibiotics s/p left BKA, admitted on 8/23/2022.    Acute NSTEMI  Multivessel coronary artery disease.  Coronary angiogram on 11/16/2021 showed diffuse calcified vessels.  No stents placed.  History of known multi-vessel CAD during admission in 11/21 ; CTS/[facility] consulted with plans for CABG in 2-3 months at that time, patient requested to postpone procedure.  8/24 repeat cath demonstrating progression of RCA lesion - CTS consulted  regarding CABG this admission.  Patient does not candidate for surgical intervention, given poor targets.    Plan fall multivessel complicated stenting on Monday, if renal function improved.  LDL 60, HDL 35, TG 58  A1c 8.0  -Continue telemetry, heparin drip, aspirin, metoprolol, high-dose statin, per cardiology recommendations.    Chronic systolic CHF, with EF 30-35 on echo 11/15/2021.    Holding Lasix due to JOHANA, s/p IV contrast on 8/24.    Insulin-dependent diabetes mellitus, recent A1c 8, previously 7.6 in January 2022.  -Continue home dose of Lantus 38 units nightly.  SSI NovoLog.      Essential hypertension.  On metoprolol.    Tobacco use disorder.  Smokes cigars.  -Smoking cessation, offered nicotine replacement.    Strokelike symptoms, left-sided weakness, with left-sided facial droop with onset of symptoms at 1445 and resolved in the ambulance on route.  Not candidate for tPA, given quickly resolved symptoms.  Patient was seen by stroke neurology.  Recommendations reviewed and implemented.  CT head and neck showed multiple mild to moderate stenosis on the right MCA branches, otherwise no acute findings.  Telemetry showed A. fib.  TTE 8/25 EF 30 to 35%    JOHANA, with prerenal indicis.  Creatinine 1.89.   Previously 1.19-1.2 earlier this year. o urinary symptoms to suspect UTI, though patient is diabetic.  No history of BPH.  Was not hypotensive.  Holding Lasix, gentle IVF given elevated BNP in 600s.  S/p IV contrast with coronary angiogram on 8/24.  UA not infected  Normal PSA at 1.69  Hold Lasix, metformin, monitor renal function, avoid hypotension, nephrotoxins.  8/27 added po Mucomyst.  8/28 gentle IVF.     Diet: Combination Diet Moderate Consistent Carb (60 g CHO per Meal) Diet  NPO for Medical/Clinical Reasons Except for: Meds    DVT Prophylaxis: heparin gtt  Garcia Catheter: Not present  Central Lines: None  Cardiac Monitoring: ACTIVE order. Indication: Post- PCI/Angiogram (24 hours)  Code Status: Full Code      Disposition Plan     Expected Discharge Date: 08/30/2022      Destination: home with help/services;family members' home  Discharge Comments: multivessel complicated stenting 8/29      The patient's care was discussed with the Bedside Nurse, Patient and Patient's Family.  Patient sister at the bedside in ED    Keri Mcintyre MD  Hospitalist Service  [facility]  Securely message with the Vocera Web Console (learn more here)  Text page via [facility] Paging/Directory     Clinically Significant Risk Factors Present on Admission   ____________________________________________________________________    Interval History   Patient sister sitting today.  Updates provided at bedside.  Creatinine rising, 1.74 today, from 1.55 yesterday.  Nothing acute overnight.  Remains hemodynamically stable.  On heparin drip.  No bowel movement after dose of lactulose.  Last bowel movement 3-4 days ago.  Noted plans for multivessel complicated stenting on Monday.  Renal function slightly improved.  Holding Lasix.  Added acetylcysteine p.o. 8/27, gentle IVF.  Patient expressing overall frustration with healthcare system and management of his numerous medical problems.  He is focused on issues with colon,  stating he has to x-ray feet of colon, thus he is prone to constipation, and nothing worked in the past for his constipation.  Relates to me that he was looking for the surgeon who agreed to perform partial colon resection, but patient was not able to identify such surgeon.  Also states that he is not able to sleep at all.  Per sister, patient intermittent dosing off.  Due to neck injury, he is not able to put his head on the pillow, elevated, has been sleeping in semisitting position, keeping head of the pillow/headrest.    Data reviewed today: I reviewed all medications, new labs and imaging results over the last 24 hours. I personally reviewed    Physical Exam   Vital Signs: Temp: 97.8  F (36.6  C) Temp src: Oral BP: 103/58 Pulse: 61   Resp: 20 SpO2: 95 % O2 Device: None (Room air)    Weight: 230 lbs 0 oz  General: Alert and oriented x 3. Not in obvious distress.  HEENT: NC, AT. Neck- supple, No JVP elevation, lymphadenopathy or thyromegaly. Trachea-central.  Chest: Clear to auscultation bilaterally.  Heart: S1S2 regular. No M/R/G.  Abdomen: Soft. NT, ND. No organomegaly. Bowel sounds- active.  Back: No spine tenderness. No CVA tenderness.  Extremities: Left leg BKA.  Neuro: Cranial nerves 1-12 grossly normal. No focal neurological deficit    Data   Recent Labs   Lab 08/27/22  0800 08/27/22  0512 08/27/22  0510 08/26/22  2121 08/26/22  0752 08/26/22  0447 08/25/22  0751 08/25/22  0357 08/24/22  0824 08/24/22  0720 08/23/22  2309 08/23/22  1628   WBC  --  6.3  --   --   --  6.1  --  6.2   < >  --    < > 7.5   HGB  --  10.4*  --   --   --  10.1*  --  10.2*   < >  --    < > 10.7*   MCV  --  79  --   --   --  78  --  78   < >  --    < > 79   PLT  --  233  --   --   --  205  --  187   < >  --    < > 206   INR  --   --   --   --   --   --   --   --   --  1.19*  --  1.25*   NA  --   --  133*  --   --  132*  --  131*   < >  --   --  133*   POTASSIUM  --   --  4.1  --   --  4.0  --  3.6   < >  --   --  3.7   CHLORIDE  --    --  100  --   --  100  --  99   < >  --   --  94*   CO2  --   --  23  --   --  21*  --  26   < >  --   --  23   BUN  --   --  37*  --   --  38*  --  44*   < >  --   --  41*   CR  --   --  1.55*  --   --  1.66*  --  1.77*   < >  --   --  1.89*   ANIONGAP  --   --  10  --   --  11  --  6   < >  --   --  16   YOSVANY  --   --  8.8  --   --  8.9  --  8.9   < >  --   --  8.9   *  --  152* 219*   < > 149*   < > 102   < >  --    < > 148*   ALBUMIN  --   --   --   --   --   --   --  2.8*  --   --   --   --    PROTTOTAL  --   --   --   --   --   --   --  6.7  --   --   --   --    BILITOTAL  --   --   --   --   --   --   --  1.0  --   --   --   --    ALKPHOS  --   --   --   --   --   --   --  122*  --   --   --   --    ALT  --   --   --   --   --   --   --  97*  --   --   --   --    AST  --   --   --   --   --   --   --  77*  --   --   --   --     < > = values in this interval not displayed.     No results found for this or any previous visit (from the past 24 hour(s)).  Medications     heparin 1,900 Units/hr (08/28/22 5962)     - MEDICATION INSTRUCTIONS -       sodium chloride         acetylcysteine 20 %  1,200 mg Oral BID     aspirin  243 mg Oral Once     aspirin  81 mg Oral Daily     atorvastatin  80 mg Oral QPM     [Held by provider] furosemide  20 mg Oral Daily     insulin aspart  1-7 Units Subcutaneous TID AC     insulin aspart  1-5 Units Subcutaneous At Bedtime     insulin glargine  38 Units Subcutaneous At Bedtime     lactulose  20 g Oral BID     magnesium oxide  400 mg Oral Daily     metoprolol succinate ER  50 mg Oral Daily     nitroGLYcerin  1 inch Transdermal Q12H     sodium chloride (PF)  3 mL Intracatheter Q8H     Keri Mcinytre MD

## 2022-08-28 NOTE — PLAN OF CARE
Problem: Plan of Care - These are the overarching goals to be used throughout the patient stay.    Goal: Plan of Care Review/Shift Note  Description: The Plan of Care Review/Shift note should be completed every shift.  The Outcome Evaluation is a brief statement about your assessment that the patient is improving, declining, or no change.  This information will be displayed automatically on your shift note.  Outcome: Ongoing, Progressing     Problem: Plan of Care - These are the overarching goals to be used throughout the patient stay.    Goal: Absence of Hospital-Acquired Illness or Injury  Intervention: Identify and Manage Fall Risk  Recent Flowsheet Documentation  Taken 8/28/2022 0014 by Sybil Echevarria, RN  Safety Promotion/Fall Prevention: fall prevention program maintained     Problem: Plan of Care - These are the overarching goals to be used throughout the patient stay.    Goal: Absence of Hospital-Acquired Illness or Injury  Intervention: Prevent Skin Injury  Recent Flowsheet Documentation  Taken 8/28/2022 0014 by Sybil Echevarria, RN  Body Position: position changed independently     Problem: Pain (Cardiac Catheterization)  Goal: Acceptable Pain Control  Outcome: Ongoing, Progressing   Goal Outcome Evaluation:      Patient denies pain, rhythm= NSR, lungs clear with 02 sat's 96% on room air. New iv placed by swat nurse on right forearm, heparin gtt infusing at 1300unit's/hr. Plan is angiogram Monday 8/29/2022.

## 2022-08-28 NOTE — PROVIDER NOTIFICATION
IV fluids started per order.  Cr 1.74 today.  Planning angios tomorrow.  Pt denies SOB and no edema.  Weight up 1.9 kg over past 3 days, pt has fine crackles in bases. PO intake 2000cc so far today.  Pt feels abdominal fullness, tap water enema ordered.  Patient wants to try to use bathroom first.  Will pass enema to next shift.  AntiXA has been subtherapeutic two times in a row. FYI txt sent to cardiology.  IV patent and pump settings checked with staff x2.  Pharmacy also questioning subtherapeutic x2.

## 2022-08-28 NOTE — PROGRESS NOTES
HEART CARE CONSULTATON NOTE        Assessment/Recommendations   Assessment:   1.  Acute non-ST elevated myocardial infarction  2.  Chronic congestive heart failure with reduced ejection fraction.  Left ventricular ejection fraction 35% global hypokinesis secondary ischemic cardiomyopathy  3.  Diffuse multivessel coronary disease.  Not good surgical candidate for open heart bypass surgery given cardiomyopathy.   4.  Hyperlipidemia  5.  Diabetes mellitus  6.  Above-the-knee amputation of the left  7.  Atrial fibrillation    Plan:   1.  Plan to undergo multivessel complicated stenting on Monday..  Duration was ordered.  2.  Continue continuous heparin drip  3.  Continue metoprolol  4.  Follow renal function closely may need to delay if not improving.  Given JOHANA and chronic kidney disease stage III  5.  Continue aspirin  6.  Holding Lasix    Discussion with the patient guarding his current status plan for procedure tomorrow    Outpatient cardiologist Dr. Davis     History of Present Illness/Subjective    S: No acute chest pain overnight.  Continues to rest comfortably in bed.  His main concern regarding his abdominal fullness which he has had chronic issues with abdominal fullness related to GI issues.  Denies any active shortness of breath.  No lower extremity edema.  No lightheadedness.  Vitals are stable.  No significant events on review of telemetry.  Working towards coronary angiogram and percutaneous coronary mention tomorrow.  Renal function remained elevated above baseline.  Discussed with patient we may need to delay if this is not improving.       Physical Examination  Review of Systems   VITALS: /74   Pulse 67   Temp 97.7  F (36.5  C) (Oral)   Resp 18   Wt 104.3 kg (230 lb)   SpO2 99%   BMI 29.14 kg/m    BMI: Body mass index is 29.14 kg/m .  Wt Readings from Last 3 Encounters:   08/28/22 104.3 kg (230 lb)   04/04/22 93 kg (205 lb)   03/23/22 95.7 kg (211 lb)       Intake/Output Summary (Last 24  hours) at 8/27/2022 1217  Last data filed at 8/27/2022 0800  Gross per 24 hour   Intake 900 ml   Output --   Net 900 ml     General Appearance:   no distress, normal body habitus, for in bed   ENT/Mouth: membranes moist, no oral lesions or bleeding gums.      EYES:  no scleral icterus, normal conjunctivae       Chest/Lungs:   lungs are clear to auscultation, no cough   Cardiovascular:   No murmurs, rubs, or gallops; the carotid, radial and posterior tibial pulses are intact, Jugular venous pressure 8 cm, no pitting edema bilaterally    Abdomen  soft.  Minimal distention.  Bowel sounds are present. no pain   Extremities: no cyanosis or clubbing   Skin: no xanthelasma, warm.    Neurologic: normal  bilateral, no tremors     Psychiatric: alert and oriented x3, calm     Review Of Systems  Skin: negative  Eyes: negative  Ears/Nose/Throat: negative  Respiratory: Negative  Cardiovascular: Negative  Gastrointestinal: Chronic abdominal distention subjectively patient feels this is worse  Genitourinary: negative  Musculoskeletal: negative  Neurologic: negative  Psychiatric: negative  Hematologic/Lymphatic/Immunologic: negative  Endocrine: negative          Lab Results    Chemistry/lipid CBC Cardiac Enzymes/BNP/TSH/INR   Recent Labs   Lab Test 08/25/22  0357   CHOL 107   HDL 35*   LDL 60   TRIG 58     Recent Labs   Lab Test 08/25/22  0357 05/17/22  1005 11/17/21  0533   LDL 60 71 126     Recent Labs   Lab Test 08/27/22  0800 08/26/22  0752 08/26/22  0447   NA  --   --  132*   POTASSIUM  --   --  4.0   CHLORIDE  --   --  100   CO2  --   --  21*   *   < > 149*   BUN  --   --  38*   CR  --   --  1.66*   GFRESTIMATED  --   --  46*   YOSVANY  --   --  8.9    < > = values in this interval not displayed.     Recent Labs   Lab Test 08/26/22  0447 08/25/22  0357 08/24/22  0824   CR 1.66* 1.77* 1.75*     Recent Labs   Lab Test 08/23/22  2358 01/19/22  1548 12/01/21  1633   A1C 8.0* 7.6* 13.4*          Recent Labs   Lab Test  08/27/22  0512   WBC 6.3   HGB 10.4*   HCT 33.2*   MCV 79        Recent Labs   Lab Test 08/27/22  0512 08/26/22  0447 08/25/22  0357   HGB 10.4* 10.1* 10.2*    Recent Labs   Lab Test 08/24/22  0824 08/23/22  1628 11/15/21  0619   TROPONINI 15.76* 14.95* 13.42*     Recent Labs   Lab Test 08/24/22  0824   *     No results for input(s): TSH in the last 32635 hours.  Recent Labs   Lab Test 08/24/22  0720 08/23/22  1628 03/18/22  1344   INR 1.19* 1.25* 1.47*        Medical History  Surgical History Family History Social History   Past Medical History:   Diagnosis Date     Anemia      Chronic systolic CHF (congestive heart failure) (H)      Coronary artery disease      Diabetic neuropathy (H)      DM type 2 w Neuropathy      GERD (gastroesophageal reflux disease)      Hyperlipidemia      Hypertension      Intermittent atrial fibrillation (H)     on Eliquis     Ischemic cardiomyopathy 11/16/2021    Echo with EF of 30-35%     NSTEMI (non-ST elevated myocardial infarction) (H) 11/15/2021     Osteomyelitis of left foot (H) 04/04/2022     Renal disease      Retinopathy      Sleep disturbance      Past Surgical History:   Procedure Laterality Date     AMPUTATE FOOT Left 3/21/2022    Procedure: Guillotine AMPUTATION, FOOT;  Surgeon: Ronald Bhatt MD;  Location: [facility] OR     AMPUTATE LEG BELOW KNEE Left 4/4/2022    Procedure: LEFT BELOW  KNEE AMPUTATION;  Surgeon: Ronald Bhatt MD;  Location: [facility] OR     AMPUTATE TOE(S) Left 11/16/2021    Procedure: first and second ray amputation;  Surgeon: Eddi Ram DPM;  Location: [facility] Main OR     APPENDECTOMY       CV CORONARY ANGIOGRAM N/A 11/16/2021    Procedure: CV CORONARY ANGIOGRAM;  Surgeon: Pam Tabares MD;  Location: [facility] CATH LAB CV     CV CORONARY ANGIOGRAM N/A 8/24/2022    Procedure: CV CORONARY ANGIOGRAM;  Surgeon: Cipriano Lucas MD;  Location: [facility] CATH LAB CV     CV LEFT HEART CATH N/A 11/16/2021    Procedure:  Left Heart Cath;  Surgeon: Pam Tabares MD;  Location: [facility] CATH LAB CV     FOOT SURGERY Left      HERNIA REPAIR       INCISION AND DRAINAGE LOWER EXTREMITY, COMBINED Left 11/16/2021    Procedure: INCISION AND DRAINAGE, left foot;  Surgeon: Eddi Ram DPM;  [place] [facility] [place]     INCISION AND DRAINAGE LOWER EXTREMITY, COMBINED Left 11/19/2021    Procedure: INCISION AND DRAINAGE, left foot;  Surgeon: Eddi Ram DPM;  [place] [facility] [place]     INCISION AND DRAINAGE LOWER EXTREMITY, COMBINED Left 12/9/2021    Procedure: INCISION AND DRAINAGE, Left foot;  Surgeon: Eddi Ram DPM;  Location: [facility] [place]     INCISION AND DRAINAGE LOWER EXTREMITY, COMBINED Left 1/10/2022    Procedure: INCISION AND DRAINAGE, left foot;  Surgeon: Eddi Ram DPM;  Location: [facility] [place]     INCISION AND DRAINAGE LOWER EXTREMITY, COMBINED Left 1/27/2022    Procedure: INCISION AND DRAINAGE, Left foot;  Surgeon: Eddi Ram DPM;  Location: [facility] [place]     No family history on file.     Social History     Socioeconomic History     Marital status: Single     Spouse name: Not on file     Number of children: Not on file     Years of education: Not on file     Highest education level: Not on file   Occupational History     Not on file   Tobacco Use     Smoking status: Former Smoker     Types: Cigars     Smokeless tobacco: Never Used     Tobacco comment: Cigars   Vaping Use     Vaping Use: Never used   Substance and Sexual Activity     Alcohol use: Yes     Comment: < 1 drink a month (only holidays)     Drug use: Never     Sexual activity: Not on file   Other Topics Concern     Parent/sibling w/ CABG, MI or angioplasty before 65F 55M? Not Asked   Social History Narrative    Single, currently not working, has done numerous jobs, lives alone.  Full code. (last updated 4/4/2022)      Social Determinants of Health     Financial Resource Strain: Not on file   Food  Insecurity: Not on file   Transportation Needs: Not on file   Physical Activity: Not on file   Stress: Not on file   Social Connections: Not on file   Intimate Partner Violence: Not on file   Housing Stability: Not on file         Medications  Allergies   No current outpatient medications on file.      No Known Allergies      Carlton Burdick DO

## 2022-08-28 NOTE — PLAN OF CARE
Problem: Plan of Care - These are the overarching goals to be used throughout the patient stay.    Goal: Plan of Care Review/Shift Note  Description: The Plan of Care Review/Shift note should be completed every shift.  The Outcome Evaluation is a brief statement about your assessment that the patient is improving, declining, or no change.  This information will be displayed automatically on your shift note.  Outcome: Ongoing, Progressing     Problem: Heart Failure Comorbidity  Goal: Maintenance of Heart Failure Symptom Control  Outcome: Ongoing, Progressing  Intervention: Maintain Heart Failure-Management  Recent Flowsheet Documentation  Taken 8/28/2022 0800 by Angely Elder RN  Medication Review/Management: medications reviewed     Problem: Pain (Cardiac Catheterization)  Goal: Acceptable Pain Control  Outcome: Ongoing, Progressing   Goal Outcome Evaluation:             Pt denies chest pain.  Nitroglycerin patch on left chest.  Heparin drip infusing at 1600ml/hour. Planning for angios tomorrow.  Lasix is currently on hold and watching Cr (1.74 today).  Mucomyst started yesterday.  Faint crackles in bases.  Pt denies SOB.  SpO2 99% on RA.  Weight up over past three day from 102.4 lb to 104.3 lb today.  Pt drinking fluids to help with full abdominal feeling.  He is hoping this will help him have a BM.  He declined to eat breakfast stating he is worried to put too much in his stomach prior to angios tomorrow.

## 2022-08-28 NOTE — PLAN OF CARE
Problem: Dysrhythmia  Goal: Normalized Cardiac Rhythm  Outcome: Ongoing, Not Progressing  Intervention: Monitor and Manage Cardiac Rhythm Effect  Recent Flowsheet Documentation  Taken 8/27/2022 1900 by Linda Harrell, RN  VTE Prevention/Management: (patient up with steady gait to bathroom) --  Taken 8/27/2022 1528 by Linda Harrell, RN  VTE Prevention/Management: (patient up with steady gait to bathroom) --     Problem: Diabetes Comorbidity  Goal: Blood Glucose Level Within Targeted Range  Outcome: Ongoing, Not Progressing     Problem: Pain Acute  Goal: Acceptable Pain Control and Functional Ability  Outcome: Ongoing, Progressing    Goal Outcome Evaluation: Telemetry rhythm SB with BBB. BG values 153 and 172 this evening shift. Patient on correction insulin and Lantus insulin.  Denies acute pain except to say that he has discomfort related to distended abdomen and need to have a bowel movement. Patient tells nurse that he has 2 ft extra colon and because of this he takes various ways to ensure stool passes.  Such ways include changing his diet to less solids, taking in more fluids and repositioning self to alleviate discomfort. On Lactulose at present. Patient tells nurse much about his medical history and related experiences. Expresses frustration about general long standing management of conditions and says he feels he is not heard.  These comments are not directed at the nurse nor nursing staff.  Patient speaks in general terms about care he's received in the past.

## 2022-08-29 NOTE — PROVIDER NOTIFICATION
Pt reports feeling Short of breath while laying flat post femoral approach angio.  SpO2 89-95% on RA.  Positioned patient in reverse trendelenburg with little improvement.  Pt did receive Brilinta in cath lab.  FYI txt page sent to Cardiology

## 2022-08-29 NOTE — PROGRESS NOTES
HEART CARE NOTE          Assessment/Recommendations     1. HFrEF/ICM  Assessment / Plan    Near euvolemia on physical exam; denies HF symptoms; duretics on hold in preparation for complex PCI    GDMT as detailed below     Current Pharmacotherapy AHA Guideline-Directed Medical Therapy   Lisinopril 20 mg daily - on hold given JOHANA Lisinopril 20 mg twice daily   Metoprolol succinate 50 mg daily Carvedilol 25 mg twice daily   Spironolactone 25 mg - on hold Spironolactone 25 mg once daily   Hydralazine NA Hydralazine 100 mg three times daily   Isosorbide dinitrate NA Isosorbide dinitrate 40 mg three times daily   SGLT2 inhibitor: not started Dapagliflozin or Empagliflozin 10 mg daily       2. NSTEMI  Assessment / Plan    Hx of coronary angiogram significant for severe multi-vessel CAD during admission in 11/21 ; CTS/Pa consulted with plans for CABG in 2-3 months at that time; now with repeat cath demonstrating progression of RCA lesion --> CTS consulted; not deemed a surgical candidate; recommendations for PCI vs medical management - plan for complex PCI today    Continue heparin gtt    Currently hemodynamically stable and chest pain free    Continue ASA, high intensity atorvastatin, metoprolol succinate      3. DM2  Assessment / Plan    Management per primary team     4. HLP  Assessment / Plan    Continue high intensity atorvastatin     5. Paroxysmal afib  Assessment / Plan    Rate controlled; continue metoprolol    Continue heparin gtt     Will need afib referral at discharge     6. HTN  Assessment / Plan    Well controlled on current regimen     7. JOHANA on CKD  Assessment / Plan    Baseline appears to be around 1.2    Continue to monitor closely given contrast dye load      History of Present Illness/Subjective      Mr. Norman SIMS Gladyssafia is a 62 year old male with a PMHx significant for CAD, CHF, HTN, IDDM, paroxysmal atrial fibrillation, left foot osteomyelitis failure of antibiotics s/p left BKA, admitted on  8/23/2022 with symptoms concerninf for stroke. Troponin was noted to be elevated.     Today, Mr. Thomas denies any acute cardiac events or complaints; Management plan as detailed above.      ECG: Personally reviewed. atrial fibrillation, rate conttolled.     ECHO (personnaly Reviewed):   The left ventricle is normal in size.  Left ventricular function is decreased. The ejection fraction is 30-35%  (moderately reduced).  Left ventricular diastolic function is abnormal.  Diastolic Doppler findings (E/E' ratio and/or other parameters) suggest left  ventricular filling pressures are increased.  The left atrium is mildly dilated.  The mitral valve leaflets are mildly thickened.  There is mild (1+) mitral regurgitation.  IVC diameter >2.1 cm collapsing <50% with sniff suggests a high RA pressure  estimated at 15 mmHg or greater.          Physical Examination Review of Systems   /60 (BP Location: Left arm, Cuff Size: Adult Regular)   Pulse 70   Temp 98.1  F (36.7  C) (Oral)   Resp 16   Wt 100.9 kg (222 lb 8 oz)   SpO2 94%   BMI 28.19 kg/m    Body mass index is 28.19 kg/m .  Wt Readings from Last 3 Encounters:   08/29/22 100.9 kg (222 lb 8 oz)   04/04/22 93 kg (205 lb)   03/23/22 95.7 kg (211 lb)     General Appearance:   no distress, normal body habitus   ENT/Mouth: membranes moist, no oral lesions or bleeding gums.      EYES:  no scleral icterus, normal conjunctivae   Neck: no carotid bruits or thyromegaly   Chest/Lungs:   lungs are clear to auscultation, no rales or wheezing, equal chest wall expansion    Cardiovascular:   Regular. Normal first and second heart sounds with no murmurs, rubs, or gallops; the carotid, radial and posterior tibial pulses are intact, no JVD and trace LE edema     Abdomen:  no organomegaly, masses, bruits, or tenderness; bowel sounds are present   Extremities: no cyanosis or clubbing   Skin: no xanthelasma, warm.    Neurologic: alert and oriented x3, calm     Psychiatric: alert  and oriented x3, calm     A complete 10 systems ROS was reviewed  And is negative except what is listed in the HPI.          Medical History  Surgical History Family History Social History   Past Medical History:   Diagnosis Date     Anemia      Chronic systolic CHF (congestive heart failure) (H)      Coronary artery disease      Diabetic neuropathy (H)      DM type 2 w Neuropathy      GERD (gastroesophageal reflux disease)      Hyperlipidemia      Hypertension      Intermittent atrial fibrillation (H)     on Eliquis     Ischemic cardiomyopathy 11/16/2021    Echo with EF of 30-35%     NSTEMI (non-ST elevated myocardial infarction) (H) 11/15/2021     Osteomyelitis of left foot (H) 04/04/2022     Renal disease      Retinopathy      Sleep disturbance     Past Surgical History:   Procedure Laterality Date     AMPUTATE FOOT Left 3/21/2022    Procedure: Guillotine AMPUTATION, FOOT;  Surgeon: Ronald Bhatt MD;  Location: [facility] OR     AMPUTATE LEG BELOW KNEE Left 4/4/2022    Procedure: LEFT BELOW  KNEE AMPUTATION;  Surgeon: Ronald Bhatt MD;  Location: [facility] OR     AMPUTATE TOE(S) Left 11/16/2021    Procedure: first and second ray amputation;  Surgeon: Eddi Ram DPM;  Location: [facility] OR     APPENDECTOMY       CV CORONARY ANGIOGRAM N/A 11/16/2021    Procedure: CV CORONARY ANGIOGRAM;  Surgeon: Pam Tabares MD;  Location: [facility] CATH LAB CV     CV CORONARY ANGIOGRAM N/A 8/24/2022    Procedure: CV CORONARY ANGIOGRAM;  Surgeon: Cipriano Lucas MD;  Location: [facility] CATH LAB CV     CV LEFT HEART CATH N/A 11/16/2021    Procedure: Left Heart Cath;  Surgeon: Pam Tabares MD;  Location: [facility] CATH LAB CV     FOOT SURGERY Left      HERNIA REPAIR       INCISION AND DRAINAGE LOWER EXTREMITY, COMBINED Left 11/16/2021    Procedure: INCISION AND DRAINAGE, left foot;  Surgeon: Eddi Ram DPM;  Location: [facility] OR     INCISION AND DRAINAGE LOWER EXTREMITY,  COMBINED Left 11/19/2021    Procedure: INCISION AND DRAINAGE, left foot;  Surgeon: Eddi Ram DPM;  Location: [facility] OR     INCISION AND DRAINAGE LOWER EXTREMITY, COMBINED Left 12/9/2021    Procedure: INCISION AND DRAINAGE, Left foot;  Surgeon: Eddi Ram DPM;  Location: [facility] Main OR     INCISION AND DRAINAGE LOWER EXTREMITY, COMBINED Left 1/10/2022    Procedure: INCISION AND DRAINAGE, left foot;  Surgeon: Eddi Ram DPM;  Location: [facility] OR     INCISION AND DRAINAGE LOWER EXTREMITY, COMBINED Left 1/27/2022    Procedure: INCISION AND DRAINAGE, Left foot;  Surgeon: Eddi Ram DPM;  Location: [facility] Main OR    no family history of premature coronary artery disease Social History     Socioeconomic History     Marital status: Single     Spouse name: Not on file     Number of children: Not on file     Years of education: Not on file     Highest education level: Not on file   Occupational History     Not on file   Tobacco Use     Smoking status: Former Smoker     Types: Cigars     Smokeless tobacco: Never Used     Tobacco comment: Cigars   Vaping Use     Vaping Use: Never used   Substance and Sexual Activity     Alcohol use: Yes     Comment: < 1 drink a month (only holidays)     Drug use: Never     Sexual activity: Not on file   Other Topics Concern     Parent/sibling w/ CABG, MI or angioplasty before 65F 55M? Not Asked   Social History Narrative    Single, currently not working, has done numerous jobs, lives alone.  Full code. (last updated 4/4/2022)      Social Determinants of Health     Financial Resource Strain: Not on file   Food Insecurity: Not on file   Transportation Needs: Not on file   Physical Activity: Not on file   Stress: Not on file   Social Connections: Not on file   Intimate Partner Violence: Not on file   Housing Stability: Not on file           Lab Results    Chemistry/lipid CBC Cardiac Enzymes/BNP/TSH/INR   Lab Results   Component  Value Date    CHOL 107 08/25/2022    HDL 35 (L) 08/25/2022    TRIG 58 08/25/2022    BUN 34 (H) 08/28/2022     (L) 08/28/2022    CO2 22 08/28/2022    Lab Results   Component Value Date    WBC 6.3 08/27/2022    HGB 10.4 (L) 08/27/2022    HCT 33.2 (L) 08/27/2022    MCV 79 08/27/2022     08/27/2022    Lab Results   Component Value Date    TROPONINI 15.76 (HH) 08/24/2022     (H) 08/24/2022    INR 1.19 (H) 08/24/2022     Lab Results   Component Value Date    TROPONINI 15.76 (HH) 08/24/2022          Weight:    Wt Readings from Last 3 Encounters:   08/29/22 100.9 kg (222 lb 8 oz)   04/04/22 93 kg (205 lb)   03/23/22 95.7 kg (211 lb)       Allergies  No Known Allergies      Surgical History  Past Surgical History:   Procedure Laterality Date     AMPUTATE FOOT Left 3/21/2022    Procedure: Guillotine AMPUTATION, FOOT;  Surgeon: Ronald Bhatt MD;  Location: [facility] OR     AMPUTATE LEG BELOW KNEE Left 4/4/2022    Procedure: LEFT BELOW  KNEE AMPUTATION;  Surgeon: Ronald Bhatt MD;  Location: [facility] OR     AMPUTATE TOE(S) Left 11/16/2021    Procedure: first and second ray amputation;  Surgeon: Eddi Ram DPM;  Location: [facility] OR     APPENDECTOMY       CV CORONARY ANGIOGRAM N/A 11/16/2021    Procedure: CV CORONARY ANGIOGRAM;  Surgeon: Pam Tabares MD;  Location: [facility] CATH LAB CV     CV CORONARY ANGIOGRAM N/A 8/24/2022    Procedure: CV CORONARY ANGIOGRAM;  Surgeon: Cipriano Lucas MD;  Location: [facility] CATH LAB CV     CV LEFT HEART CATH N/A 11/16/2021    Procedure: Left Heart Cath;  Surgeon: Pam Tabares MD;  Location: [facility] CATH LAB CV     FOOT SURGERY Left      HERNIA REPAIR       INCISION AND DRAINAGE LOWER EXTREMITY, COMBINED Left 11/16/2021    Procedure: INCISION AND DRAINAGE, left foot;  Surgeon: Eddi Ram DPM;  Location: [facility] OR     INCISION AND DRAINAGE LOWER EXTREMITY, COMBINED Left 11/19/2021    Procedure: INCISION AND  DRAINAGE, left foot;  Surgeon: Eddi Ram DPM;  Location: [facility] OR     INCISION AND DRAINAGE LOWER EXTREMITY, COMBINED Left 12/9/2021    Procedure: INCISION AND DRAINAGE, Left foot;  Surgeon: Eddi Ram DPM;  Location: [facility] OR     INCISION AND DRAINAGE LOWER EXTREMITY, COMBINED Left 1/10/2022    Procedure: INCISION AND DRAINAGE, left foot;  Surgeon: Eddi Ram DPM;  Location: [facility] OR     INCISION AND DRAINAGE LOWER EXTREMITY, COMBINED Left 1/27/2022    Procedure: INCISION AND DRAINAGE, Left foot;  Surgeon: Eddi Ram DPM;  Location: [facility] [place]       Social History  Tobacco:   History   Smoking Status     Former Smoker     Types: Cigars   Smokeless Tobacco     Never Used     Comment: Cigars    Alcohol:   Social History    Substance and Sexual Activity      Alcohol use: Yes        Comment: < 1 drink a month (only holidays)   Illicit Drugs:   History   Drug Use Unknown       Family History  No family history on file.       Russel Arredondo MD on 8/29/2022      cc: Jaquan Dickerson

## 2022-08-29 NOTE — PROGRESS NOTES
Hospitalist looking for ordered BMP result. Call placed to lab and they said it will be done in about 20 minutes.  Weight this AM of 100.9kg  was done without prothesis.  Pt reports the 100.9kg weight is the only weight taken without prothesis this hospital stay.  Weight was 105.5kg with prosthetic (increase of 0.7kg from yesterday and 3.1kg since 8/26).  Pt denies shortness of breath or swelling.  Pt transferring to CSC/cath lab.

## 2022-08-29 NOTE — PROGRESS NOTES
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    Acute NSTEMI  Multivessel coronary artery disease.  Coronary angiogram on 11/16/2021 showed diffuse calcified vessels.  No stents placed.  History of known multi-vessel CAD during admission in 11/21 ; CTS/Jacquelynoper consulted with plans for CABG in 2-3 months at that time, patient requested to postpone procedure.  8/24 repeat cath demonstrating progression of RCA lesion - CTS consulted  regarding CABG this admission.  Patient does not candidate for surgical intervention, given poor targets.    Plan fall multivessel complicated stenting on Monday, if renal function improved.  LDL 60, HDL 35, TG 58  A1c 8.0  S/p heparin drip from admission till 8/29, was discontinued for PCI  -Continue telemetry, aspirin, metoprolol, high-dose statin, per cardiology recommendations.    Dist RCA lesion is 80% stenosed.    RPDA-1 lesion is 50% stenosed.    RPDA-2 lesion is 100% stenosed.    Ost RCA to Prox RCA lesion is 70% stenosed.    IVUS was performed on the rca lesions.    4.0x16 mm JESSICA Synergy into proxinal RCA extends into aorta    3.5x16 mm JESSICA Synergy into distal RCA    Chronic systolic CHF, with EF 30-35 on echo 11/15/2021.    Holding Lasix due to JOHANA, s/p IV contrast on 8/24 and 8/29 with angiograms.    Insulin-dependent diabetes mellitus, recent A1c 8, previously 7.6 in January 2022.  -Continue home dose of Lantus 38 units nightly.  SSI NovoLog.      Essential hypertension.  On metoprolol.    Tobacco use disorder.  Smokes cigars.  -Smoking cessation, offered nicotine replacement.    Strokelike symptoms, left-sided weakness, with left-sided facial droop with onset of symptoms at 1445 and resolved in  the ambulance on route.  Not candidate for tPA, given quickly resolved symptoms.  Patient was seen by stroke neurology.  Recommendations reviewed and implemented.  CT head and neck showed multiple mild to moderate stenosis on the right MCA branches, otherwise no acute findings.  Telemetry showed A. fib.  TTE 8/25 EF 30 to 35%    JOHANA, with prerenal indicis.  Creatinine 1.89.  Previously 1.19-1.2 earlier this year. o urinary symptoms to suspect UTI, though patient is diabetic.  No history of BPH.  Was not hypotensive.  Holding Lasix, gentle IVF given elevated BNP in 600s.  S/p IV contrast with coronary angiogram on 8/24.  UA not infected  Normal PSA at 1.69  Hold Lasix, metformin, monitor renal function, avoid hypotension, nephrotoxins.  8/27 added po Mucomyst.  8/28 gentle IVF, renal function improving.     Diet: Fluid restriction 1500 ML FLUID  Low Saturated Fat Na <2400 mg    DVT Prophylaxis: heparin gtt  Garcia Catheter: Not present  Central Lines: None  Cardiac Monitoring: ACTIVE order. Indication: Post- PCI/Angiogram (24 hours)  Code Status: Full Code      Disposition Plan     Expected Discharge Date: 08/30/2022      Destination: home with help/services;family members' home  Discharge Comments: multivessel complicated stenting 8/29      The patient's care was discussed with the Bedside Nurse, Patient and Patient's Family.  Patient sister at the bedside in ED    Keri Mcintyre MD  Hospitalist Service  [facility]  Securely message with the Vocera Web Console (learn more here)  Text page via "[employer] Paging/Directory     Clinically Significant Risk Factors Present on Admission   ____________________________________________________________________    Interval History      Nursing acute overnight.  S/p coronary stenting today.  Creatinine slightly improved with overnight IVF and Mucomyst.    Data reviewed today: I reviewed all medications, new labs and imaging results over the last 24 hours. I  personally reviewed    Physical Exam   Vital Signs: Temp: 97.3  F (36.3  C) Temp src: Oral BP: 120/64 Pulse: 77   Resp: 18 SpO2: 99 % O2 Device: Nasal cannula Oxygen Delivery: 1/2 LPM  Weight: 232 lbs 9.6 oz  General: Alert and oriented x 3. Not in obvious distress.  HEENT: NC, AT. Neck- supple, No JVP elevation, lymphadenopathy or thyromegaly. Trachea-central.  Chest: Clear to auscultation bilaterally.  Heart: S1S2 regular. No M/R/G.  Abdomen: Soft. NT, ND. No organomegaly. Bowel sounds- active.  Back: No spine tenderness. No CVA tenderness.  Extremities: Left leg BKA.  Neuro: Cranial nerves 1-12 grossly normal. No focal neurological deficit    Data   Recent Labs   Lab 08/27/22  0800 08/27/22  0512 08/27/22  0510 08/26/22  2121 08/26/22  0752 08/26/22  0447 08/25/22  0751 08/25/22  0357 08/24/22  0824 08/24/22  0720 08/23/22  2309 08/23/22  1628   WBC  --  6.3  --   --   --  6.1  --  6.2   < >  --    < > 7.5   HGB  --  10.4*  --   --   --  10.1*  --  10.2*   < >  --    < > 10.7*   MCV  --  79  --   --   --  78  --  78   < >  --    < > 79   PLT  --  233  --   --   --  205  --  187   < >  --    < > 206   INR  --   --   --   --   --   --   --   --   --  1.19*  --  1.25*   NA  --   --  133*  --   --  132*  --  131*   < >  --   --  133*   POTASSIUM  --   --  4.1  --   --  4.0  --  3.6   < >  --   --  3.7   CHLORIDE  --   --  100  --   --  100  --  99   < >  --   --  94*   CO2  --   --  23  --   --  21*  --  26   < >  --   --  23   BUN  --   --  37*  --   --  38*  --  44*   < >  --   --  41*   CR  --   --  1.55*  --   --  1.66*  --  1.77*   < >  --   --  1.89*   ANIONGAP  --   --  10  --   --  11  --  6   < >  --   --  16   YOSVANY  --   --  8.8  --   --  8.9  --  8.9   < >  --   --  8.9   *  --  152* 219*   < > 149*   < > 102   < >  --    < > 148*   ALBUMIN  --   --   --   --   --   --   --  2.8*  --   --   --   --    PROTTOTAL  --   --   --   --   --   --   --  6.7  --   --   --   --    BILITOTAL  --   --   --   --    --   --   --  1.0  --   --   --   --    ALKPHOS  --   --   --   --   --   --   --  122*  --   --   --   --    ALT  --   --   --   --   --   --   --  97*  --   --   --   --    AST  --   --   --   --   --   --   --  77*  --   --   --   --     < > = values in this interval not displayed.     Recent Results (from the past 24 hour(s))   Cardiac Catheterization    Narrative      Dist RCA lesion is 80% stenosed.    RPDA-1 lesion is 50% stenosed.    RPDA-2 lesion is 100% stenosed.    Ost RCA to Prox RCA lesion is 70% stenosed.    IVUS was performed on the rca lesions.    4.0x16 mm JESSICA Synergy into proxinal RCA extends into aorta    3.5x16 mm JESSICA Synergy into distal RCA        Medications     - MEDICATION INSTRUCTIONS -       - MEDICATION INSTRUCTIONS -       - MEDICATION INSTRUCTIONS -       Percutaneous Coronary Intervention orders placed (this is information for BPA alerting)       sodium chloride 75 mL/hr at 08/29/22 1326       acetylcysteine 20 %  1,200 mg Oral BID     aspirin  243 mg Oral Once     aspirin  81 mg Oral Daily     atorvastatin  80 mg Oral QPM     [Held by provider] furosemide  20 mg Oral Daily     insulin aspart  1-7 Units Subcutaneous TID AC     insulin aspart  1-5 Units Subcutaneous At Bedtime     insulin glargine  38 Units Subcutaneous At Bedtime     lactulose  20 g Oral BID     magnesium oxide  400 mg Oral Daily     metoprolol succinate ER  50 mg Oral Daily     nitroGLYcerin  1 inch Transdermal Q12H     sodium chloride (PF)  3 mL Intracatheter Q8H     Keri Mcintyre MD

## 2022-08-29 NOTE — PRE-PROCEDURE
GENERAL PRE-PROCEDURE:   Procedure:  Planned RCA PCI  Date/Time:  8/29/2022 11:02 AM    Written consent obtained?: Yes    Risks and benefits: Risks, benefits and alternatives were discussed    Consent given by:  Patient  Patient states understanding of procedure being performed: Yes    Patient's understanding of procedure matches consent: Yes    Procedure consent matches procedure scheduled: Yes    Expected level of sedation:  Moderate  Appropriately NPO:  Yes  ASA Class:  4 (HFrEF, ischemic cardiomyopathy, severe multivessel coronary artery disease, NSTEMI, HTN, HLD, paroxysmal atrial fibrillation, DM type II, CKD stage III)  Mallampati  :  Grade 2- soft palate, base of uvula, tonsillar pillars, and portion of posterior pharyngeal wall visible  Lungs:  Lungs clear with good breath sounds bilaterally  Heart:  Normal heart sounds and rate  History & Physical reviewed:  History and physical reviewed and no updates needed  Statement of review:  I have reviewed the lab findings, diagnostic data, medications, and the plan for sedation

## 2022-08-29 NOTE — PLAN OF CARE
Problem: Diabetes Comorbidity  Goal: Blood Glucose Level Within Targeted Range  Outcome: Ongoing, Not Progressing    Problem: Arrhythmia/Dysrhythmia (Cardiac Catheterization)  Goal: Stable Heart Rate and Rhythm  Outcome: Ongoing, Progressing     Problem: Pain (Cardiac Catheterization)  Goal: Acceptable Pain Control  Outcome: Ongoing, Progressing      Goal Outcome Evaluation:  BG values on evening shift are in the 140's and 188 respectively.  Rhythm stable, SR with 1 degree AVB. Denies pain. Did have a bowel movement of a few piece hard brown stool out. NPO after midnight for PCI tomorrow. Has been prepped/ showered and had clean linens and gown put on.

## 2022-08-29 NOTE — PROVIDER NOTIFICATION
Cardiology and FYI note sent to Hospitalist about patient declining to take mucomyst this AM.  He states he needs to take with 1/2  can of pop and glass of water due to taste.  He is NPO for angio.  Cardiology NP also notified.  IV fluids infusing at 75cc/hr since yesterday.

## 2022-08-30 NOTE — DISCHARGE SUMMARY
[facility]  Hospitalist Discharge Summary      Date of Admission:  8/23/2022  Date of Discharge:  8/30/2022  Discharging Provider: Ronna Pina MD  Discharge Service: Hospitalist Service    Discharge Diagnoses   Active Problems:    Benign essential hypertension    Chronic kidney disease (CKD) stage G3a/A1, moderately decreased glomerular filtration rate (GFR) between 45-59 mL/min/1.73 square meter and albuminuria creatinine ratio less than 30 mg/g (H)    Osteomyelitis of left foot -- S/P Left BKA 4/4/22    NSTEMI (non-ST elevated myocardial infarction) (H)    ARF (acute renal failure) (H)      Follow-ups Needed After Discharge   Follow-up Appointments     Follow-up and recommended labs and tests       Follow up with primary care provider, Jaquan Dickerson, within 7   days, to evaluate medication change and for hospital follow- up. The   following labs/tests are recommended: BMP.             Unresulted Labs Ordered in the Past 30 Days of this Admission     No orders found from 7/24/2022 to 8/24/2022.          Discharge Disposition   Discharged to home  Condition at discharge: Stable      Hospital Course   Norman Aguilar is a 62 year old male PMH of CAD, CHF, HTN, IDDM, paroxysmal atrial fibrillation, left foot osteomyelitis failure of antibiotics s/p left BKA, admitted on 8/23/2022.     Acute NSTEMI  - Coronary angiogram on 11/16/2021 showed diffuse calcified vessels.  No stents placed.  - CTS/Jacquelynoper consulted with plans for CABG in 2-3 months at that time, patient requested to postpone procedure.  - 8/24 repeat cath demonstrating progression of RCA lesion   - CTS consulted  regarding CABG this admission.  Patient does not candidate for surgical intervention, given poor targets.    - Plan fall multivessel complicated stenting  - was previously heparin drip from admission till 8/29, was discontinued for PCI    -Dist RCA lesion is 80% stenosed.    RPDA-1 lesion is 50%  stenosed.    RPDA-2 lesion is 100% stenosed.    Ost RCA to Prox RCA lesion is 70% stenosed.    IVUS was performed on the rca lesions.    4.0x16 mm JESSICA Synergy into proxinal RCA extends into aorta    3.5x16 mm JESSICA Synergy into distal RCA  -  Continue dual antiplatelet therapy for 6 month(s). On Statin    Chronic systolic CHF, with EF 30-35 on echo 11/15/2021.    - Lasix held due to JOHANA, s/p IV contrast on 8/24 and 8/29 with angiograms.  - ok to resume at discharge per cardiology who is arrange CHF clinic follow-up      Insulin-dependent diabetes mellitus, recent A1c 8, previously 7.6 in January 2022.  -Continue home dose of Lantus 38 units nightly.      Essential hypertension.  On metoprolol.     Tobacco use disorder.  Smokes cigars.  -Smoking cessation     Strokelike symptoms, left-sided weakness, with left-sided facial droop with onset of symptoms at 1445 and resolved in the ambulance on route.  Not candidate for tPA, given quickly resolved symptoms.  Patient was seen by stroke neurology.  Recommendations reviewed and implemented.  CT head and neck showed multiple mild to moderate stenosis on the right MCA branches, otherwise no acute findings.  Telemetry showed A. fib.  TTE 8/25 EF 30 to 35%   - patient on dual antiplatelet therapy for post PCI and continue ASA  - symptoms resolved     JOHANA, with prerenal indicis.  Creatinine 1.89.  Previously 1.19-1.2 earlier this year. o urinary symptoms to suspect UTI, though patient is diabetic.  No history of BPH.  Was not hypotensive.  Holding Lasix, gentle IVF given elevated BNP in 600s.  S/p IV contrast with coronary angiogram on 8/24.  UA not infected  Normal PSA at 1.69  - improved  - resume diuretic at discharge per cards     Consultations This Hospital Stay   PHARMACY IP CONSULT  PHARMACY IP CONSULT  CARDIOLOGY IP CONSULT  PHARMACY IP CONSULT  PHARMACY IP CONSULT  CARDIOTHORACIC SURGERY IP CONSULT  PHARMACY IP CONSULT  PHARMACY IP CONSULT  CARE MANAGEMENT / SOCIAL WORK  IP CONSULT  PHARMACY IP CONSULT    Code Status   Full Code    Time Spent on this Encounter   I, Ronna Pina MD, personally saw the patient today and spent less than or equal to 30 minutes discharging this patient.       Ronna Pina MD  [facility]  [address] [place] [id number]  Phone: [phone]  Fax: [phone]  ______________________________________________________________________    Physical Exam   Vital Signs: Temp: 98.3  F (36.8  C) Temp src: Oral BP: 117/58 Pulse: 80   Resp: 18 SpO2: 96 % O2 Device: None (Room air) Oxygen Delivery: 1/2 LPM  Weight: 233 lbs 0 oz  Physical Exam  Constitutional:       Appearance: Normal appearance.   HENT:      Head: Normocephalic and atraumatic.   Cardiovascular:      Rate and Rhythm: Normal rate and regular rhythm.      Pulses: Normal pulses.   Pulmonary:      Effort: Pulmonary effort is normal.      Breath sounds: Normal breath sounds.   Abdominal:      General: Bowel sounds are normal.      Palpations: Abdomen is soft.   Musculoskeletal:         General: Normal range of motion.   Skin:     General: Skin is warm and dry.      Capillary Refill: Capillary refill takes less than 2 seconds.   Neurological:      General: No focal deficit present.      Mental Status: He is alert and oriented to person, place, and time. Mental status is at baseline.      Cranial Nerves: No cranial nerve deficit.      Motor: No weakness.      Coordination: Coordination normal.      Gait: Gait normal.            Primary Care Physician   Jaquan Dickerson    Discharge Orders      Reason Lipid Lowering Medications not prescribed from this order set     Reason for your hospital stay    Active Problems:    Benign essential hypertension    Chronic kidney disease (CKD) stage G3a/A1, moderately decreased glomerular filtration rate (GFR) between 45-59 mL/min/1.73 square meter and albuminuria creatinine ratio less than 30 mg/g (H)     Osteomyelitis of left foot -- S/P Left BKA 4/4/22    NSTEMI (non-ST elevated myocardial infarction) (H)    ARF (acute renal failure) (H)     Follow-up and recommended labs and tests     Follow up with primary care provider, Jaquan Dickerson, within 7 days, to evaluate medication change and for hospital follow- up. The following labs/tests are recommended: BMP.     Activity    Your activity upon discharge: activity as tolerated     Diet    Follow this diet upon discharge: Orders Placed This Encounter      Fluid restriction 1500 ML FLUID      Low Saturated Fat Na <2400 mg       Significant Results and Procedures   Most Recent 3 CBC's:Recent Labs   Lab Test 08/30/22  0442 08/29/22  0517 08/27/22  0512 08/26/22  0447   WBC  --  6.3 6.3 6.1   HGB 9.7* 10.0* 10.4* 10.1*   MCV  --  79 79 78   PLT  --  244 233 205     Most Recent 3 BMP's:Recent Labs   Lab Test 08/30/22  1256 08/30/22  0742 08/30/22  0442 08/29/22  0825 08/29/22  0516 08/28/22  0817 08/28/22  0438   NA  --   --  134*  --  132*  --  133*   POTASSIUM  --   --  3.8  --  4.1  --  4.1   CHLORIDE  --   --  102  --  99  --  100   CO2  --   --  23  --  22  --  22   BUN  --   --  26*  --  32*  --  34*   CR  --   --  1.67*  --  1.62*  --  1.74*   ANIONGAP  --   --  9  --  11  --  11   YOSVANY  --   --  9.1  --  8.6  --  9.2   * 131* 143*   < > 150*   < > 143*    < > = values in this interval not displayed.   ,   Results for orders placed or performed during the hospital encounter of 08/23/22   CTA Head Neck with Contrast    Narrative    TEMPORARY  8/23/2022 4:04 PM     INDICATION: Left-sided weakness.  TECHNIQUE: Head and neck CT angiogram with IV contrast. Noncontrast head CT followed by axial helical CT images of the head and neck vessels obtained during the arterial phase of intravenous contrast administration. Axial helical 2D reconstructed images   and multiplanar 3D MIP reconstructed images of the head and neck vessels were performed by the technologist.  Dose reduction techniques were used.  CONTRAST:  COMPARISON: Carotid ultrasound 02/17/2022.    FINDINGS:   NONCONTRAST HEAD CT:   INTRACRANIAL CONTENTS: No intracranial hemorrhage, extraaxial collection, or mass effect.  No CT evidence of acute infarct. Minimal presumed chronic small vessel ischemic changes. Mild generalized volume loss. No hydrocephalus. The sella is unremarkable   for technique. The cerebellar tonsils are appropriately positioned.     VISUALIZED ORBITS/SINUSES/MASTOIDS: No intraorbital abnormality. No paranasal sinus mucosal disease. No middle ear or mastoid effusion. Soft tissue density in the left external auditory canal likely represents cerumen.    BONES/SOFT TISSUES: No scalp hematoma. No skull fracture.    HEAD CTA:  ANTERIOR CIRCULATION: There is moderate stenosis of the right supraclinoid ICA and mild stenosis of the left supra clinoid ICA. The anterior cerebral arteries appear patent. The right and left middle cerebral arteries demonstrate no proximal large vessel   occlusion. There are multiple mild and moderate stenoses of the distal right MCA branches. No aneurysm or high flow vascular malformation is demonstrated.    POSTERIOR CIRCULATION: The intradural vertebral, basilar and visualized proximal cerebellar arteries appear patent the right and left posterior cerebral arteries also appear patent. No aneurysm or high flow vascular malformation is demonstrated.    DURAL VENOUS SINUSES: The major venous structures demonstrate grossly unremarkable enhancement, within the limitations of arterial phase technique.    NECK CTA:  RIGHT CAROTID: No measurable stenosis in the right ICA based on NASCET criteria. Mild plaque at the bifurcation.    LEFT CAROTID: No measurable stenosis in the left ICA based on NASCET criteria. Mild plaque at the bifurcation.    VERTEBRAL ARTERIES: Balanced vertebral arteries are patent in the neck and into the head.     AORTIC ARCH: There is a left-sided aortic arch.  No flow-limiting stenosis is apparent at the origins of the brachiocephalic, common carotid, subclavian or right vertebral arteries. The left vertebral artery origin is obscured by venous contrast.    MISCELLANEOUS: The visualized lung apices are well aerated. The cervical spine appears intact with mild spondylosis. Mild C5-C6 and C6-C7 anterolisthesis. The soft tissues of the neck, including the thyroid and parotid glands, are grossly unremarkable   for technique.       Impression    CONCLUSION:   HEAD CT:  1.  No evidence of acute hemorrhage, mass effect, or acute vascular territorial infarction. Consider MRI for further evaluation if clinically appropriate.  2.  Mild age-related changes.    HEAD CTA:   1.  No evidence of proximal large vessel occlusion.  2.  Multiple mild and moderate stenoses of the distal right MCA branches.    NECK CTA:  1.  No hemodynamically significant stenosis based on NASCET criteria.  2.  No evidence for dissection.    Head CT results called to Dr. Castro at 4:15 PM, and CTA results called to Dr. Castro at 4:34 PM on 2022.   Echocardiogram Complete     Value    LVEF  30-35% (moderately reduced)    [facility]    [id number]  [id number]  [id number]  [id number]^[id number]^[place] [address]     Name: JOSE YO  MRN: [id number]  : 1960  Study Date: 2022 08:21 AM  Age: 62 yrs  Gender: Male  Patient Location: [facility]  Reason For Study: CAD  Ordering Physician: JACKSON MONTERO  Referring Physician: JACKSON MONTERO  Performed By: JOEL     BSA: 2.3 m2  Height: 75 in  Weight: 225 lb  HR: 77  ______________________________________________________________________________  Procedure  Complete Portable Echo Adult. Definity (NDC #94112-047) given intravenously.  Technically difficult study. Compared to prior study, there is no  significant  change.  ______________________________________________________________________________  Interpretation Summary     The left ventricle is normal in size with normal left ventricular wall  thickness.  Left ventricular function is decreased. The ejection fraction is 30-35%  (moderately reduced).  Mildly decreased right ventricular systolic function  Moderate biatrial enlargement  No hemodynamically significant valve disease  IVC diameter >2.1 cm collapsing <50% with sniff suggests a high RA pressure  estimated at 15 mmHg or greater.  Compared to prior study, there is no significant change.  ______________________________________________________________________________  Left Ventricle  The left ventricle is normal in size. Left ventricular function is decreased.  The ejection fraction is 30-35% (moderately reduced). There is normal left  ventricular wall thickness. Left ventricular diastolic function is abnormal.  There is moderate global hypokinesia of the left ventricle.     Right Ventricle  The right ventricle is normal size. Mildly decreased right ventricular  systolic function.     Atria  The left atrium is moderately dilated. The right atrium is moderately dilated.  There is no color Doppler evidence of an atrial shunt.     Mitral Valve  There is nodular thickening of the posterior leaflet of the mitral valve.  There is mild (1+) mitral regurgitation. There is no mitral valve stenosis.     Tricuspid Valve  Tricuspid valve leaflets appear normal. There is no evidence of tricuspid  stenosis or clinically significant tricuspid regurgitation. Right ventricular  systolic pressure could not be approximated due to inadequate tricuspid  regurgitation.     Aortic Valve  The aortic valve is trileaflet. Aortic valve leaflets appear normal. There is  no evidence of aortic stenosis or clinically significant aortic regurgitation.     Pulmonic Valve  The pulmonic valve is not well seen, but is grossly normal. This  degree of  valvular regurgitation is within normal limits. There is trace pulmonic  valvular regurgitation.     Vessels  The aorta root is normal. Normal size ascending aorta. IVC diameter >2.1 cm  collapsing <50% with sniff suggests a high RA pressure estimated at 15 mmHg or  greater.     Pericardium  There is no pericardial effusion.     Rhythm  Sinus rhythm was noted.  ______________________________________________________________________________  MMode/2D Measurements & Calculations     IVSd: 0.85 cm  LVIDd: 5.6 cm  LVPWd: 0.95 cm  LV mass(C)d: 194.7 grams  LV mass(C)dI: 84.4 grams/m2  Ao root diam: 3.6 cm  asc Aorta Diam: 3.4 cm  LVOT diam: 2.2 cm  LVOT area: 3.8 cm2  LA Volume Indexed (AL/bp): 41.6 ml/m2  RWT: 0.34     Time Measurements  MM HR: 72.0 BPM     Doppler Measurements & Calculations  MV E max juan carlos: 132.0 cm/sec  MV A max juan carlos: 30.6 cm/sec  MV E/A: 4.3  MV max P.6 mmHg  MV mean P.5 mmHg  MV V2 VTI: 30.2 cm  MVA(VTI): 1.7 cm2  MV dec slope: 732.8 cm/sec2  MV dec time: 0.18 sec  Ao V2 max: 88.4 cm/sec  Ao max PG: 3.0 mmHg  Ao V2 mean: 66.9 cm/sec  Ao mean P.9 mmHg  Ao V2 VTI: 16.6 cm  JAEL(I,D): 3.0 cm2  JAEL(V,D): 3.4 cm2  LV V1 max P.5 mmHg  LV V1 max: 79.6 cm/sec  LV V1 VTI: 13.1 cm  MR ERO: 0.05 cm2  MR volume: 5.7 ml  SV(LVOT): 50.1 ml  SI(LVOT): 21.7 ml/m2  PA acc time: 0.09 sec  AV Juan Carlos Ratio (DI): 0.90  JAEL Index (cm2/m2): 1.3  E/E': 26.9  E/E' av.8  Lateral E/e': 16.6     Medial E/e': 27.0  Peak E' Juan Carlos: 4.9 cm/sec     ______________________________________________________________________________  Report approved by: Eveline Garcia 2022 10:46 AM         Cardiac Catheterization    Narrative      Dist LAD lesion is 99% stenosed.    Mid LAD-1 lesion is 70% stenosed.    1st Diag-1 lesion is 95% stenosed.    1st Diag-2 lesion is 100% stenosed.    Mid LAD-2 lesion is 70% stenosed.    Prox LAD lesion is 70% stenosed.    Prox Cx to Mid Cx lesion is 100% stenosed.    Ost RCA  to Prox RCA lesion is 70% stenosed. This lesion appears to have   progressed since last study    Dist RCA lesion is 80% stenosed.    RPDA-1 lesion is 50% stenosed.    RPDA-2 lesion is 100% stenosed.      Cardiac Catheterization    Narrative      Dist RCA lesion is 80% stenosed.    RPDA-1 lesion is 50% stenosed.    RPDA-2 lesion is 100% stenosed.    Ost RCA to Prox RCA lesion is 70% stenosed.    IVUS was performed on the rca lesions.    4.0x16 mm JESSICA Synergy into proxinal RCA extends into aorta    3.5x16 mm JESSICA Synergy into distal RCA            Discharge Medications   Current Discharge Medication List      START taking these medications    Details   aspirin (ASA) 81 MG EC tablet Take 1 tablet (81 mg) by mouth daily Start tomorrow.  Qty: 30 tablet, Refills: 3    Associated Diagnoses: Coronary artery disease involving native coronary artery of native heart without angina pectoris      ticagrelor (BRILINTA) 90 MG tablet Take 1 tablet (90 mg) by mouth 2 times daily Dose to start tomorrow morning.  Qty: 180 tablet, Refills: 3    Associated Diagnoses: Coronary artery disease involving native coronary artery of native heart without angina pectoris         CONTINUE these medications which have NOT CHANGED    Details   atorvastatin (LIPITOR) 80 MG tablet TAKE 1 TABLET(80 MG) BY MOUTH EVERY EVENING  Qty: 90 tablet, Refills: 1    Associated Diagnoses: NSTEMI (non-ST elevated myocardial infarction) (H)      furosemide (LASIX) 20 MG tablet TAKE 1 TABLET(20 MG) BY MOUTH DAILY  Qty: 90 tablet, Refills: 1    Associated Diagnoses: NSTEMI (non-ST elevated myocardial infarction) (H); Heart failure with reduced ejection fraction (H)      insulin aspart (NOVOLOG FLEXPEN) 100 UNIT/ML pen For  - 164 give 1 unit. For  - 189 give 2 units. For  - 214 give 3 units. For  - 239 give 4 units. For  - 264 give 5 units. For  - 289 give 6 units. For  - 314 give 7 units. For  - 339 give 8 units For BG  340 - 364 give 9 units For  - 389 give 10 units For  - 414 give 11 units For BG greater than or equal to 415 give 12 units  Qty: 3 mL, Refills: 3    Associated Diagnoses: Type 2 diabetes mellitus with other specified complication, with long-term current use of insulin (H)      insulin glargine (LANTUS SOLOSTAR) 100 UNIT/ML pen Inject 38 Units Subcutaneous every morning  Qty: 3 mL, Refills: 3    Comments: If Lantus is not covered by insurance, may substitute Basaglar at same dose and frequency.    Associated Diagnoses: Type 2 diabetes mellitus with other specified complication, with long-term current use of insulin (H)      metoprolol succinate ER (TOPROL-XL) 50 MG 24 hr tablet TAKE 1 TABLET(50 MG) BY MOUTH DAILY  Qty: 90 tablet, Refills: 1    Associated Diagnoses: NSTEMI (non-ST elevated myocardial infarction) (H); Heart failure with reduced ejection fraction (H)      Multiple Vitamin (MULTI VITAMIN) TABS Take 1 tablet by mouth daily       OMEGA-3/DHA/EPA/FISH OIL (FISH OIL-OMEGA-3 FATTY ACIDS) 300-1,000 mg capsule Take 1 g by mouth daily       vitamin C (ASCORBIC ACID) 250 MG tablet Take 500 mg by mouth daily      blood glucose (NO BRAND SPECIFIED) test strip Use to test blood sugar 4 times daily or as directed.  Qty: 100 strip, Refills: 3    Comments: Accuchek Guide Strips  Associated Diagnoses: Type 2 diabetes mellitus with other specified complication, with long-term current use of insulin (H)      blood glucose monitoring (SOFTCLIX) lancets Use to test blood sugar 4 times daily.  Qty: 100 each, Refills: 6    Associated Diagnoses: Type 2 diabetes mellitus with other specified complication, with long-term current use of insulin (H)      magnesium oxide (MAG-OX) 400 MG tablet Take 1 tablet (400 mg) by mouth daily  Qty: 90 tablet, Refills: 1    Associated Diagnoses: Hypomagnesemia         STOP taking these medications       aspirin (ASA) 81 MG chewable tablet Comments:   Reason for Stopping:              Allergies   No Known Allergies

## 2022-08-30 NOTE — PROGRESS NOTES
"[facility] was introduced to the patient today including our role in the home management of their heart failure.  Heart Failure Education Materials were shared today with the patient.  Introduction to the expectations including daily weight tracking using the Daily Weight Log  Low Sodium diet of 2000mg or less daily, review of label reading, aim for fresh and avoid processed items  Daily Fluid restriction of 2000ml  considerations  Exercise importance as able explained  Monitor and report s/s of heart failure. How to report these changes.  Follow up appointments and testing expectations.    [facility]   [phone] Nurse Line  [facility]   [address] [place] [address]    Educated Pt on dietary/fluid restriction recommendations and daily weight log. States they \"drink a lot of water\" due to their other health problems. Reinforced that for their HF, it is only recommended 50-60 oz and that this amount is enough for the body to be adequately hydrated. Highlighted nurse phone lines to call with worsening signs and symptoms of HF. Pt states, \"I don't want to deal with this 24/7. I don't want to live my life going through this 24hrs. I'm not gonna do it. I've reached my limit.\"  Explained to Pt we provide the tools to help Pt have the best chance at success, but ultimately it is up to the Pt to decide what they do. Mentioned [facility] but Pt is not interested. \"I've always been in control.\" Encouraged to keep follow up appointment but if rescheduling needs to happen, call.     Jennifer CARTY RN  BSN    "

## 2022-08-30 NOTE — PLAN OF CARE
Goal Outcome Evaluation:    Plan of Care Reviewed With: patient     Overall Patient Progress: improving     Doing well, no complaints. Denied pain. Groin site WNL. Continue to monitor.

## 2022-08-30 NOTE — PROGRESS NOTES
HEART CARE NOTE          Assessment/Recommendations     1. HFrEF/ICM  Assessment / Plan    Near euvolemia on physical exam; denies HF symptoms; will resume oral diuretics    GDMT as detailed below     Current Pharmacotherapy AHA Guideline-Directed Medical Therapy   Lisinopril 20 mg daily - on hold given JOHANA Lisinopril 20 mg twice daily   Metoprolol succinate 50 mg daily Carvedilol 25 mg twice daily   Spironolactone 25 mg - on hold Spironolactone 25 mg once daily   Hydralazine NA Hydralazine 100 mg three times daily   Isosorbide dinitrate NA Isosorbide dinitrate 40 mg three times daily   SGLT2 inhibitor: not started Dapagliflozin or Empagliflozin 10 mg daily       2. NSTEMI  Assessment / Plan    Hx of coronary angiogram significant for severe multi-vessel CAD during admission in 11/21 ; CTS/aP consulted with plans for CABG in 2-3 months at that time; now with repeat cath demonstrating progression of RCA lesion --> CTS consulted; not deemed a surgical candidate; recommendations for PCI vs medical management - s/p complex PCI with JESSICA x 2    Currently hemodynamically stable and chest pain free    Continue ASA, high intensity atorvastatin, metoprolol succinate      3. DM2  Assessment / Plan    Management per primary team     4. HLP  Assessment / Plan    Continue high intensity atorvastatin     5. Paroxysmal afib  Assessment / Plan    Rate controlled; continue metoprolol    Continue heparin gtt     Will need afib referral at discharge     6. HTN  Assessment / Plan    Well controlled on current regimen     7. JOHANA on CKD  Assessment / Plan    Baseline appears to be around 1.2    Continue to monitor closely given contrast dye load    Ok for discharge from a cardiology standpoint. Cardiology team will sign-off for now. Please do not hesitate to consult us again if new questions or concerns arise. Follow-up appointment will be arranged by CORE/HF clinic.       History of Present Illness/Subjective       Norman BETHANY  Lauren is a 62 year old male with a PMHx significant for CAD, CHF, HTN, IDDM, paroxysmal atrial fibrillation, left foot osteomyelitis failure of antibiotics s/p left BKA, admitted on 8/23/2022 with symptoms concerninf for stroke. Troponin was noted to be elevated.     Today, Mr. Thomas denies any acute cardiac events or complaints; Management plan as detailed above.      ECG: Personally reviewed. atrial fibrillation, rate conttolled.     ECHO (personnaly Reviewed):   The left ventricle is normal in size.  Left ventricular function is decreased. The ejection fraction is 30-35%  (moderately reduced).  Left ventricular diastolic function is abnormal.  Diastolic Doppler findings (E/E' ratio and/or other parameters) suggest left  ventricular filling pressures are increased.  The left atrium is mildly dilated.  The mitral valve leaflets are mildly thickened.  There is mild (1+) mitral regurgitation.  IVC diameter >2.1 cm collapsing <50% with sniff suggests a high RA pressure  estimated at 15 mmHg or greater.          Physical Examination Review of Systems   /60 (BP Location: Right arm)   Pulse 75   Temp 97.5  F (36.4  C) (Oral)   Resp 22   Wt 105.5 kg (232 lb 9.6 oz)   SpO2 98%   BMI 29.46 kg/m    Body mass index is 29.46 kg/m .  Wt Readings from Last 3 Encounters:   08/29/22 105.5 kg (232 lb 9.6 oz)   04/04/22 93 kg (205 lb)   03/23/22 95.7 kg (211 lb)     General Appearance:   no distress, normal body habitus   ENT/Mouth: membranes moist, no oral lesions or bleeding gums.      EYES:  no scleral icterus, normal conjunctivae   Neck: no carotid bruits or thyromegaly   Chest/Lungs:   lungs are clear to auscultation, no rales or wheezing, equal chest wall expansion    Cardiovascular:   Regular. Normal first and second heart sounds with no murmurs, rubs, or gallops; the carotid, radial and posterior tibial pulses are intact, no JVD or LE edema     Abdomen:  no organomegaly, masses, bruits, or tenderness;  bowel sounds are present   Extremities: no cyanosis or clubbing   Skin: no xanthelasma, warm.    Neurologic: alert and oriented x3, calm     Psychiatric: alert and oriented x3, calm     A complete 10 systems ROS was reviewed  And is negative except what is listed in the HPI.          Medical History  Surgical History Family History Social History   Past Medical History:   Diagnosis Date     Anemia      Chronic systolic CHF (congestive heart failure) (H)      Coronary artery disease      Diabetic neuropathy (H)      DM type 2 w Neuropathy      GERD (gastroesophageal reflux disease)      Hyperlipidemia      Hypertension      Intermittent atrial fibrillation (H)     on Eliquis     Ischemic cardiomyopathy 11/16/2021    Echo with EF of 30-35%     NSTEMI (non-ST elevated myocardial infarction) (H) 11/15/2021     Osteomyelitis of left foot (H) 04/04/2022     Renal disease      Retinopathy      Sleep disturbance     Past Surgical History:   Procedure Laterality Date     AMPUTATE FOOT Left 3/21/2022    Procedure: Guillotine AMPUTATION, FOOT;  Surgeon: Ronald Bhatt MD;  Location: [facility] OR     AMPUTATE LEG BELOW KNEE Left 4/4/2022    Procedure: LEFT BELOW  KNEE AMPUTATION;  Surgeon: Ronald Bhatt MD;  Location: [facility] OR     AMPUTATE TOE(S) Left 11/16/2021    Procedure: first and second ray amputation;  Surgeon: Eddi Ram DPM;  Location: [facility] OR     APPENDECTOMY       CV CORONARY ANGIOGRAM N/A 11/16/2021    Procedure: CV CORONARY ANGIOGRAM;  Surgeon: Pam Tabares MD;  Location: [facility] CATH LAB CV     CV CORONARY ANGIOGRAM N/A 8/24/2022    Procedure: CV CORONARY ANGIOGRAM;  Surgeon: Cipriano Lucas MD;  Location: [facility] CATH LAB CV     CV LEFT HEART CATH N/A 11/16/2021    Procedure: Left Heart Cath;  Surgeon: Pam Tabares MD;  Location: [facility] CATH LAB CV     FOOT SURGERY Left      HERNIA REPAIR       INCISION AND DRAINAGE LOWER EXTREMITY, COMBINED Left 11/16/2021     Procedure: INCISION AND DRAINAGE, left foot;  Surgeon: Eddi Ram DPM;  Location: [facility] OR     INCISION AND DRAINAGE LOWER EXTREMITY, COMBINED Left 11/19/2021    Procedure: INCISION AND DRAINAGE, left foot;  Surgeon: Eddi Ram DPM;  Location: [facility] OR     INCISION AND DRAINAGE LOWER EXTREMITY, COMBINED Left 12/9/2021    Procedure: INCISION AND DRAINAGE, Left foot;  Surgeon: Eddi Ram DPM;  Location: [facility] OR     INCISION AND DRAINAGE LOWER EXTREMITY, COMBINED Left 1/10/2022    Procedure: INCISION AND DRAINAGE, left foot;  Surgeon: Eddi Ram DPM;  Location: [facility] OR     INCISION AND DRAINAGE LOWER EXTREMITY, COMBINED Left 1/27/2022    Procedure: INCISION AND DRAINAGE, Left foot;  Surgeon: Eddi Ram DPM;  Location: [facility] OR    no family history of premature coronary artery disease Social History     Socioeconomic History     Marital status: Single     Spouse name: Not on file     Number of children: Not on file     Years of education: Not on file     Highest education level: Not on file   Occupational History     Not on file   Tobacco Use     Smoking status: Former Smoker     Types: Cigars     Smokeless tobacco: Never Used     Tobacco comment: Cigars   Vaping Use     Vaping Use: Never used   Substance and Sexual Activity     Alcohol use: Yes     Comment: < 1 drink a month (only holidays)     Drug use: Never     Sexual activity: Not on file   Other Topics Concern     Parent/sibling w/ CABG, MI or angioplasty before 65F 55M? Not Asked   Social History Narrative    Single, currently not working, has done numerous jobs, lives alone.  Full code. (last updated 4/4/2022)      Social Determinants of Health     Financial Resource Strain: Not on file   Food Insecurity: Not on file   Transportation Needs: Not on file   Physical Activity: Not on file   Stress: Not on file   Social Connections: Not on file   Intimate Partner  Violence: Not on file   Housing Stability: Not on file           Lab Results    Chemistry/lipid CBC Cardiac Enzymes/BNP/TSH/INR   Lab Results   Component Value Date    CHOL 107 08/25/2022    HDL 35 (L) 08/25/2022    TRIG 58 08/25/2022    BUN 32 (H) 08/29/2022     (L) 08/29/2022    CO2 22 08/29/2022    Lab Results   Component Value Date    WBC 6.3 08/29/2022    HGB 10.0 (L) 08/29/2022    HCT 31.6 (L) 08/29/2022    MCV 79 08/29/2022     08/29/2022    Lab Results   Component Value Date    TROPONINI 15.76 (HH) 08/24/2022     (H) 08/24/2022    INR 1.19 (H) 08/24/2022     Lab Results   Component Value Date    TROPONINI 15.76 (HH) 08/24/2022          Weight:    Wt Readings from Last 3 Encounters:   08/29/22 105.5 kg (232 lb 9.6 oz)   04/04/22 93 kg (205 lb)   03/23/22 95.7 kg (211 lb)       Allergies  No Known Allergies      Surgical History  Past Surgical History:   Procedure Laterality Date     AMPUTATE FOOT Left 3/21/2022    Procedure: Guillotine AMPUTATION, FOOT;  Surgeon: Ronald Bhatt MD;  Location: [facility] OR     AMPUTATE LEG BELOW KNEE Left 4/4/2022    Procedure: LEFT BELOW  KNEE AMPUTATION;  Surgeon: Ronald Bhatt MD;  Location: [facility] OR     AMPUTATE TOE(S) Left 11/16/2021    Procedure: first and second ray amputation;  Surgeon: Eddi Ram DPM;  Location: [facility] OR     APPENDECTOMY       CV CORONARY ANGIOGRAM N/A 11/16/2021    Procedure: CV CORONARY ANGIOGRAM;  Surgeon: Pam Tabares MD;  Location: [facility] CATH LAB CV     CV CORONARY ANGIOGRAM N/A 8/24/2022    Procedure: CV CORONARY ANGIOGRAM;  Surgeon: Cipriano Lucas MD;  Location: [facility] CATH LAB CV     CV LEFT HEART CATH N/A 11/16/2021    Procedure: Left Heart Cath;  Surgeon: Pam Tabares MD;  Location: [facility] CATH LAB CV     FOOT SURGERY Left      HERNIA REPAIR       INCISION AND DRAINAGE LOWER EXTREMITY, COMBINED Left 11/16/2021    Procedure: INCISION AND DRAINAGE, left foot;  Surgeon:  Eddi Ram DPM;  Location: [facility] OR     INCISION AND DRAINAGE LOWER EXTREMITY, COMBINED Left 11/19/2021    Procedure: INCISION AND DRAINAGE, left foot;  Surgeon: Eddi Ram DPM;  Location: [facility] OR     INCISION AND DRAINAGE LOWER EXTREMITY, COMBINED Left 12/9/2021    Procedure: INCISION AND DRAINAGE, Left foot;  Surgeon: Eddi Ram DPM;  Location: [facility] OR     INCISION AND DRAINAGE LOWER EXTREMITY, COMBINED Left 1/10/2022    Procedure: INCISION AND DRAINAGE, left foot;  Surgeon: Eddi Ram DPM;  Location: [facility] OR     INCISION AND DRAINAGE LOWER EXTREMITY, COMBINED Left 1/27/2022    Procedure: INCISION AND DRAINAGE, Left foot;  Surgeon: Eddi Ram DPM;  Location: [facility] [place]       Social History  Tobacco:   History   Smoking Status     Former Smoker     Types: Cigars   Smokeless Tobacco     Never Used     Comment: Cigars    Alcohol:   Social History    Substance and Sexual Activity      Alcohol use: Yes        Comment: < 1 drink a month (only holidays)   Illicit Drugs:   History   Drug Use Unknown       Family History  No family history on file.       Russel Arredondo MD on 8/30/2022      cc: Jaquan Dickerson

## 2022-08-30 NOTE — PROGRESS NOTES
CLINICAL NUTRITION SERVICES     Reviewed nutrition risk factors due to LOS. Pt is tolerating diet, eating well per nursing documentation. No nutrition issues identified at this time. Patient reported good intake and has no questions or concerns. RD will follow peripherally at this time, unless consulted.    Thea Randhawa RDN, LD

## 2022-08-30 NOTE — PROGRESS NOTES
Care Management Discharge Note    Discharge Date: 08/30/2022       Discharge Disposition: Home    Discharge Services:      Discharge DME:      Discharge Transportation: family or friend will provide    Private pay costs discussed: Not applicable      Education Provided on the Discharge Plan:    Persons Notified of Discharge Plans: MD placed orders  Patient/Family in Agreement with the Plan:      Handoff Referral Completed: Yes    Additional Information:      Pt returning home where he lives with sister and her family. Family transport anticipated. No CM needs identified.         YE KrauseSW

## 2022-08-30 NOTE — PLAN OF CARE
Goal Outcome Evaluation:    Plan of Care Reviewed With: patient           Pt off bedrest at 1615 post angio via right femoral artery.  Hemostasis maintained to site.  He reported SOB during bedrest period.  SpO2 remained WNL on RA.  Faint fine crackles in bases unchanged and mild right ankle edema.  He was given first dose of Brilinta in cath lab.  Pt stated feeling anxious during bedrest and did much better after he was able to get sit up and reposition on his own.  Pt is hopeful to discharge to home tomorrow.

## 2022-08-30 NOTE — DISCHARGE INSTRUCTIONS
Interventional Cardiology  Coronary Angiogram/Angioplasty/Stent/Atherectomy Discharge Instructions -   Femoral Approach    The instructions below are to help you understand how to take care of yourself. There is also information about when to call the doctor or emergency services    **Do not stop your aspirin or platelet inhibitor unless directed by your Cardiologist.  These medications help to prevent platelets in your blood from sticking together and forming a clot.  Examples of these medications are:  Ticagrelor (Brilinta), Clopidigrel (Plavix), Prasugrel (Effient)          For the first 72 hours after your procedure:  No driving for 24 hours  Do not lift more than 15 pounds.  If you usually lift 50 pounds or more daily, please contact your cardiologist  Avoid any hard work or tiring activities, this includes, yard work, jogging, biking, sexual activity  During the day get up and walk around every 2 hours  You may return to work after 72 hours if you are feeling well and your job does not involve heavy lifting          Groin Site / Wound Care / Bleeding    You may take off the dressing on your groin the day after your procedure  Keep the area dry and clean  It is ok to shower with regular soap.  Pat dry, do not rub  You do not need to use a bandage  No tub/pool or hot tub for 1 week  If your groin starts to bleed or begins to swell suddenly after leaving the hospital, lie flat and apply firm pressure above the site for 15 minutes.  If bleeding continues, call 9-1-1  Bruising around the groin area is normal.  It may take 2-3 weeks for this to go away.  It is normal for the bruised area to turn green and/or yellow as it is healing.  A small lump may also be present and may last 2-3 months.  Your leg may be sore or stiff for a few days.  You may take Tylenol or a pain medicine recommended by your doctor  If you have a fever over 100.4, that lasts more than one day - call your cardiologist.      Our Cardiac Rehab  staff may visit briefly with you while your in the hospital.  If they miss you, someone will contact you after you are home.  You are encouraged to enroll in an Outpatient Cardiac Rehab Program     No elective dental work for 6 weeks after having a stent.     No driving for 24 hours      Your Procedural Physician was: Dr. Cipriano Lucas  the phone number is: [phone]      [facility]:  [phone]  If you are calling after hours, please listen to the entire voicemail, a live  will answer at the end of the message        Follow up appointment and BMP lab draw with Amanda COFFEY at [facility] on 9/2/22 at 10am   [phone]

## 2022-08-31 NOTE — PROGRESS NOTES
This is a recent snapshot of the patient's [facility] medical record.  For current drug dose and complete information and questions, call [phone] or In Basket pool, fv home infusion ([id number])  CSN Number:  [id number]

## 2022-09-02 NOTE — PROGRESS NOTES
This is a recent snapshot of the patient's [facility] medical record.  For current drug dose and complete information and questions, call [phone] or In Basket pool, fv home infusion ([id number])  CSN Number:  [id number]

## 2022-09-06 NOTE — PROGRESS NOTES
Clinic Care Coordination Contact    Clinic Care Coordination Contact  OUTREACH    Referral Information:  Referral Source: Care Team    Primary Diagnosis: Psychosocial    Chief Complaint   Patient presents with     Clinic Care Coordination - Initial        Universal Utilization:   Clinic Utilization: [facility]   Difficulty keeping appointments:: No  Compliance Concerns: No  No-Show Concerns: No  No PCP office visit in Past Year: No  Utilization    Hospital Admissions  7             ED Visits  3             No Show Count (past year)  0                Current as of: 9/3/2022  2:15 AM              Clinical Concerns:  Current Medical Concerns:  Pt stated he has some concerns regarding his colon and how bloated he gets. Pt stated it cuts down on his breathing and makes it hard to breathe when he gets so bloated. Pt stated that he was told he has an extra 2 feet of colon that he isn't needing that he was told makes it harder for him to digest food, and creates more bloat. Pt stated that he was supposed to have a colonoscopy done a year ago, and didn't, but his PCP did put a referral in for a specialist Dr. Birch for the pt. Pt stated that the hadn't heard from the clinic as of yet, but was told he would this week. Pt is concerned they will not be able to do anything to help him with this issue.   Current Behavioral Concerns: CC SW and pt discussed his mental health, financial, and housing concerns that he has. Pt stated that he does have a county worker currently and would be asking them for assistance with housing, but he just hadn't heard from them yet.  Pt stated that due to his amputation he was looking at applying for social security disability. Pt stated that currently he was wiped out financially. Pt stated that he stayed with his sister currently. Pt stated that he was interested in some resources for housing, mental health support due to everything that was going on as well as,  financial supports, as well as he was going to outreach to his county worker. Pt is currently living with his sister.   Education Provided to patient: RINA MURPHY called and spoke with patient; introduced self, discussed role of Care Coordination, and explained reason for call.     Pain  Pain (GOAL):: No  Health Maintenance Reviewed: Not assessed  Clinical Pathway: None    Medication Management:  Medication review status: did not discuss     Functional Status:  Dependent ADLs:: Independent  Dependent IADLs:: Independent  Bed or wheelchair confined:: No  Mobility Status: Independent w/Device  Fallen 2 or more times in the past year?: No  Any fall with injury in the past year?: No    Living Situation:  Current living arrangement:: I live in a private home with family  [facility]    Lifestyle & Psychosocial Needs:    Social Determinants of Health     Tobacco Use: Medium Risk     Smoking Tobacco Use: Former Smoker     Smokeless Tobacco Use: Never Used   Alcohol Use: Not on file   Financial Resource Strain: Not on file   Food Insecurity: Not on file   Transportation Needs: Not on file   Physical Activity: Not on file   Stress: Not on file   Social Connections: Not on file   Intimate Partner Violence: Not on file   Depression: Not on file   Housing Stability: Not on file     Diet:: Regular  Inadequate nutrition (GOAL):: No  Tube Feeding: No  Inadequate activity/exercise (GOAL):: No  Significant changes in sleep pattern (GOAL): No  Transportation means:: Family, Accessible car     Yazidi or spiritual beliefs that impact treatment:: No  Mental health DX:: No  Mental health management concern (GOAL):: Yes  Chemical Dependency Status: No Current Concerns  Informal Support system:: Family          Resources and Interventions:  Current Resources:      Community Resources: County Worker  Supplies Currently Used at Home: Wound Care Supplies  Equipment Currently Used at Home: walker, standard,  wheelchair, manual  Employment Status: disabled         Advance Care Plan/Directive  Advanced Care Plans/Directives on file:: Yes    Referrals Placed: Community Resources, County Resources, Senior Linkage Line         Care Plan:  Care Plan: Community Resources     Problem: Stable Long Term Housing     Goal: General Goal - please update text     Start Date: 9/6/2022 Expected End Date: 12/6/2022    Priority: Medium    Note:     Barriers: eligibility, financial strain  Strengths: strong advocate for self, family support   Patient expressed understanding of goal: yes   Action steps to achieve this goal:  1. I will contact my [facility]  to work together to find some supportive housing.  2. I will contact the resources shared by the CC SW for support.   3. I will contact the CC SW with any questions or concerns.                           Patient/Caregiver understanding: Patient verbalized understanding, engaged in AIDET communication during patient encounter.      Outreach Frequency: monthly  Future Appointments              In 1 week Allegra Connolly APRN CNP [facility]    In 1 week [facility]    In 2 months Moshe Davis MD [facility]          Plan: CC SW to mail out resources to the pt. Pt to follow up with his county worker, and call the CC SW with any questions or concerns. CC SW to outreach to the pt in 1 month.     GREGG Whelan  Social Work Care Coordinator - [facility]  Care Coordination  [email or web address]  Cell Phone: [phone]  Gender pronouns: she/her  Employed by [employer]

## 2022-09-06 NOTE — PROGRESS NOTES
Clinic Care Coordination Contact  [facility]/Voicemail       Clinical Data: Care Coordinator Outreach  Outreach attempted x 1.  Left message on patient's voicemail with call back information and requested return call.  Plan:Care Coordinator will try to reach patient again in 1-2 business days.    GREGG Whelan  Social Work Care Coordinator - [facility]  Care Coordination  [email or web address]  Cell Phone: [phone]  Gender pronouns: she/her  Employed by [employer]

## 2022-09-07 NOTE — TELEPHONE ENCOUNTER
[facility] Call Center    Phone Message    May a detailed message be left on voicemail: no     Reason for Call: Other: Norman called to report increased shortness of breath, he also advised he would like to discuss medication management. Please reach out to Norman at [phone]     Action Taken: Other: [facility]    Travel Screening: Not Applicable

## 2022-09-07 NOTE — TELEPHONE ENCOUNTER
"Discussed with the patient today his concerns with shortness of breath, he denies wt gain. No increased LE edema, some bloating but he blames this on his GI issues. He states that he weighs himself but not every day, reinforced that this is one of the best tools we have to monitor his heart failure. He feels that his wts are not accurate day to day , since he has colon issues.  He has not been able to keep within the 55oz per day fluid restriction due to his diabetes he \"gets very very dry\".  He does feel he does well with the low sodium diet. Does not add salt, and consume mostly fresh vegetables and meats with out a lot of added sauces or seasonings.    Norman also brings up anxiety that can be a barrier to his overall well-being.  He denies cough, dizziness of lightheadedness. No difficulty sleeping.   Norman has f/u next Thursday in clinic with Piedad Brown CNP for HF Management.  He is using Lasix 20mg daily dose.  Azra CARTY RN BSN, CHFN, PCCN-K    FYI to Piedad Brown CNP   "

## 2022-09-08 NOTE — TELEPHONE ENCOUNTER
Labs from 9/2 reviewed and renal function improving.  Increased shortness of breath concerning for fluid retention.  He needs to start weighing himself.      Increase lasix to 40 mg daily for 3 days and then decrease back to 20 mg daily. Please check in with him on Monday to assess symptoms.

## 2022-09-08 NOTE — TELEPHONE ENCOUNTER
LM for patient Zeke to let him know of the recommendations to increase his Lasix up to 40mg daily x3 days then to return to 20mg daily beginning on day 4.   CORE RN will contact him on Monday 9/12 to discuss his response.  Azra CARTY RN BSN, CHFN, PCCN-K

## 2022-09-12 NOTE — TELEPHONE ENCOUNTER
"\"I've got colon problems that cause a lot of build up and with that I have breathing problems. I increased the dosage but I haven't seen any difference with that. My colon started moving a bit so that had relieved some of the pressure.\" No changes in LE edema. Energy levels remain the same, \"maybe a little bit better. Not much urinary output. I'm someone who doesn't use the bathroom very much. I pass a lot when I do go though.\" He states he does follow a low sodium diet and does his best with that but does drink 10 oz more than the 50-60oz due to his DM and being \"dry.\" Advised he sip throughout the day and really try to stay 60oz or 64oz Max.    MARIO CARTY RN  BSN    "

## 2022-09-15 NOTE — PROGRESS NOTES
HEART CARE PROGRESS NOTE      [facility]  [phone]      Assessment/Recommendations   1.  Coronary artery disease: He was hospitalized August 23 to August 30, 2022 with non-STEMI.  Cardiovascular surgery consulted but not candidate for coronary artery bypass surgery.  PCI on August 29, 2022 with drug-eluting stent to proximal RCA and drug-eluting stent to distal RCA.  He is on dual antiplatelet therapy with aspirin and Brilinta. We discussed the importance of antiplatelet therapy and talking with his cardiologist prior to stopping these medications for any reason.      Risk factor modification and lifestyle management topics were discussed including managing comorbidities, weight loss, heart healthy diet and exercise.      2.  Dyslipidemia: Norman Aguilar is on high intensity statin therapy with atorvastatin 80 mg daily.  We discussed a diet low in saturated fat, weight loss, and exercise along with medication for better control of cholesterol.     3.  Hypertension: His blood pressure is 110/60.    4.  Ischemic cardiomyopathy, heart failure with reduced ejection fraction, ejection fraction 30 to 35%: He has no symptoms of acute heart failure.  We reviewed symptoms to monitor for and when to call the clinic.  His weight fluctuates related to his chronic GI problems.    Norman will follow up with Dr. Davis on November 28.       History of Present Illness/Subjective    Norman Aguilar is seen at [facility] for post coronary intervention follow up.  He has a history of heart failure with reduced ejection fraction, hypertension, chronic kidney disease, coronary artery disease, diabetes, paroxysmal atrial fibrillation, and left below-knee amputation.  He had known coronary artery disease in November 2021 with plan for coronary artery bypass surgery but this was never done.  He was hospitalized August 23 to August 30, 2022 with non-STEMI.  Cardiovascular surgery consulted but  "not candidate for coronary artery bypass surgery.  PCI on August 29, 2022 with drug-eluting stent to proximal RCA and drug-eluting stent to distal RCA.  He is on dual antiplatelet therapy with aspirin and Brilinta.  Echocardiogram on August 25, 2022 showed ejection fraction of 30 to 35%.    He had increased weight and shortness of breath last week and Lasix was increased for 3 days.  He never felt that this was fluid retention.  He has chronic bowel problems and felt that his symptoms were related to this.  He was seen by [facility] yesterday and recommended to continue taking MiraLAX.  He denies lightheadedness, shortness of breath, dyspnea on exertion, chest pain and lower extremity edema.        Cardiac Catheterization    Result Date: 8/29/2022    Dist RCA lesion is 80% stenosed.    RPDA-1 lesion is 50% stenosed.    RPDA-2 lesion is 100% stenosed.    Ost RCA to Prox RCA lesion is 70% stenosed.    IVUS was performed on the rca lesions.    4.0x16 mm JESSICA Synergy into proxinal RCA extends into aorta    3.5x16 mm JESSICA Synergy into distal RCA             Physical Examination Review of Systems   /60 (BP Location: Left arm, Patient Position: Sitting, Cuff Size: Adult Regular)   Pulse 97   Resp 16   Ht 1.892 m (6' 2.5\")   Wt 97.7 kg (215 lb 6.4 oz)   BMI 27.29 kg/m    Body mass index is 27.29 kg/m .  Wt Readings from Last 3 Encounters:   09/15/22 97.7 kg (215 lb 6.4 oz)   08/30/22 105.7 kg (233 lb)   04/04/22 93 kg (205 lb)       General Appearance:     Alert, cooperative and in no acute distress.   ENT/Mouth: membranes moist, no oral lesions or bleeding gums.      EYES:  no scleral icterus, normal conjunctivae   Chest/Lungs:   lungs are clear to auscultation, no rales or wheezing, respirations unlabored   Cardiovascular:   Regular. Normal first and second heart sounds, trace right ankle edema   Abdomen:  Soft, nontender, nondistended, bowel sounds present   Extremities: no cyanosis or clubbing "   Skin:  Neurologic: warm, dry.  mood and affect are appropriate, alert and oriented x3     Puncture Site: Right radial site is soft with no bruising.  He reports no pain or bruising of right femoral puncture site. CMS intact.           Please refer above for cardiac ROS details.      Medical History  Surgical History Family History Social History   Past Medical History:   Diagnosis Date     Anemia      Chronic systolic CHF (congestive heart failure) (H)      Coronary artery disease      Diabetic neuropathy (H)      DM type 2 w Neuropathy      GERD (gastroesophageal reflux disease)      Hyperlipidemia      Hypertension      Intermittent atrial fibrillation (H)     on Eliquis     Ischemic cardiomyopathy 11/16/2021    Echo with EF of 30-35%     NSTEMI (non-ST elevated myocardial infarction) (H) 11/15/2021     Osteomyelitis of left foot (H) 04/04/2022     Renal disease      Retinopathy      Sleep disturbance      Past Surgical History:   Procedure Laterality Date     AMPUTATE FOOT Left 3/21/2022    Procedure: Guillotine AMPUTATION, FOOT;  Surgeon: Ronald Bhatt MD;  Location: [facility] OR     AMPUTATE LEG BELOW KNEE Left 4/4/2022    Procedure: LEFT BELOW  KNEE AMPUTATION;  Surgeon: Ronald Bhatt MD;  Location: [facility] OR     AMPUTATE TOE(S) Left 11/16/2021    Procedure: first and second ray amputation;  Surgeon: Eddi Ram DPM;  Location: [facility] OR     APPENDECTOMY       CV CORONARY ANGIOGRAM N/A 11/16/2021    Procedure: CV CORONARY ANGIOGRAM;  Surgeon: Pam Tabares MD;  Location: [facility] LAB CV     CV CORONARY ANGIOGRAM N/A 8/24/2022    Procedure: CV CORONARY ANGIOGRAM;  Surgeon: Cipriano Lucas MD;  Location: [facility] LAB CV     CV CORONARY ANGIOGRAM N/A 8/29/2022    Procedure: CV CORONARY ANGIOGRAM;  Surgeon: Cipriano Lucas MD;  Location: [facility] LAB CV     CV INTRAVASULAR ULTRASOUND N/A 8/29/2022    Procedure: Intravascular Ultrasound;  Surgeon: Lance  Cipriano DESAI MD;  Location: [facility] CATH LAB CV     CV LEFT HEART CATH N/A 11/16/2021    Procedure: Left Heart Cath;  Surgeon: Pam Tabares MD;  Location: [facility] CATH LAB CV     CV PCI N/A 8/29/2022    Procedure: Percutaneous Coronary Intervention stent;  Surgeon: Cipriano Lucas MD;  Location: [facility] LAB CV     CV PCI ANGIOPLASTY N/A 8/29/2022    Procedure: Percutaneous Transluminal Angioplasty;  Surgeon: Cipriano Lucas MD;  Location: [facility] LAB CV     FOOT SURGERY Left      HERNIA REPAIR       INCISION AND DRAINAGE LOWER EXTREMITY, COMBINED Left 11/16/2021    Procedure: INCISION AND DRAINAGE, left foot;  Surgeon: Eddi Ram DPM;  Location: [facility] OR     INCISION AND DRAINAGE LOWER EXTREMITY, COMBINED Left 11/19/2021    Procedure: INCISION AND DRAINAGE, left foot;  Surgeon: Eddi Ram DPM;  Location: [facility] OR     INCISION AND DRAINAGE LOWER EXTREMITY, COMBINED Left 12/9/2021    Procedure: INCISION AND DRAINAGE, Left foot;  Surgeon: Eddi Ram DPM;  Location: [facility] OR     INCISION AND DRAINAGE LOWER EXTREMITY, COMBINED Left 1/10/2022    Procedure: INCISION AND DRAINAGE, left foot;  Surgeon: Eddi Ram DPM;  Location: [facility] OR     INCISION AND DRAINAGE LOWER EXTREMITY, COMBINED Left 1/27/2022    Procedure: INCISION AND DRAINAGE, Left foot;  Surgeon: Eddi Ram DPM;  Location: [facility] OR     No family history on file. Social History     Socioeconomic History     Marital status: Single     Spouse name: Not on file     Number of children: Not on file     Years of education: Not on file     Highest education level: Not on file   Occupational History     Not on file   Tobacco Use     Smoking status: Former Smoker     Types: Cigars     Smokeless tobacco: Never Used     Tobacco comment: Cigars   Vaping Use     Vaping Use: Never used   Substance and Sexual Activity     Alcohol use: Yes     Comment: < 1  drink a month (only holidays)     Drug use: Never     Sexual activity: Not on file   Other Topics Concern     Parent/sibling w/ CABG, MI or angioplasty before 65F 55M? Not Asked   Social History Narrative    Single, currently not working, has done numerous jobs, lives alone.  Full code. (last updated 4/4/2022)      Social Determinants of Health     Financial Resource Strain: Not on file   Food Insecurity: Not on file   Transportation Needs: Not on file   Physical Activity: Not on file   Stress: Not on file   Social Connections: Not on file   Intimate Partner Violence: Not on file   Housing Stability: Not on file          Medications  Allergies   Current Outpatient Medications   Medication Sig Dispense Refill     aspirin (ASA) 81 MG EC tablet Take 1 tablet (81 mg) by mouth daily Start tomorrow. 30 tablet 3     atorvastatin (LIPITOR) 80 MG tablet TAKE 1 TABLET(80 MG) BY MOUTH EVERY EVENING 90 tablet 1     blood glucose (NO BRAND SPECIFIED) test strip Use to test blood sugar 4 times daily or as directed. 100 strip 3     blood glucose monitoring (SOFTCLIX) lancets Use to test blood sugar 4 times daily. 100 each 6     furosemide (LASIX) 20 MG tablet TAKE 1 TABLET(20 MG) BY MOUTH DAILY (Patient taking differently: 20 mg) 90 tablet 1     hydrOXYzine (ATARAX) 50 MG tablet TAKE 1 TABLET BY MOUTH EVERY 6 HOURS AS NEEDED       insulin aspart (NOVOLOG FLEXPEN) 100 UNIT/ML pen For  - 164 give 1 unit. For  - 189 give 2 units. For  - 214 give 3 units. For  - 239 give 4 units. For  - 264 give 5 units. For  - 289 give 6 units. For  - 314 give 7 units. For  - 339 give 8 units For  - 364 give 9 units For  - 389 give 10 units For  - 414 give 11 units For BG greater than or equal to 415 give 12 units 3 mL 3     insulin glargine (LANTUS SOLOSTAR) 100 UNIT/ML pen Inject 38 Units Subcutaneous every morning (Patient taking differently: Inject 38 Units Subcutaneous At Bedtime)  3 mL 3     magnesium oxide (MAG-OX) 400 MG tablet Take 1 tablet (400 mg) by mouth daily 90 tablet 1     metoprolol succinate ER (TOPROL-XL) 50 MG 24 hr tablet TAKE 1 TABLET(50 MG) BY MOUTH DAILY 90 tablet 1     Multiple Vitamin (MULTI VITAMIN) TABS Take 1 tablet by mouth daily        OMEGA-3/DHA/EPA/FISH OIL (FISH OIL-OMEGA-3 FATTY ACIDS) 300-1,000 mg capsule Take 1 g by mouth daily        ticagrelor (BRILINTA) 90 MG tablet Take 1 tablet (90 mg) by mouth 2 times daily Dose to start tomorrow morning. 180 tablet 3     vitamin C (ASCORBIC ACID) 250 MG tablet Take 500 mg by mouth daily      No Known Allergies      Lab Results    Chemistry/lipid CBC Cardiac Enzymes/BNP/TSH/INR   Recent Labs   Lab Test 08/25/22  0357   CHOL 107   HDL 35*   LDL 60   TRIG 58     Recent Labs   Lab Test 08/25/22  0357 05/17/22  1005 11/17/21  0533   LDL 60 71 126     Recent Labs   Lab Test 09/02/22  1009      POTASSIUM 4.3   CHLORIDE 98   CO2 29   *   BUN 16.2   CR 1.51*   GFRESTIMATED 52*   YOSVANY 9.5     Recent Labs   Lab Test 09/02/22  1009 08/30/22  0442 08/29/22  0516   CR 1.51* 1.67* 1.62*     Recent Labs   Lab Test 08/23/22  2358 01/19/22  1548 12/01/21  1633   A1C 8.0* 7.6* 13.4*    Recent Labs   Lab Test 08/30/22  0442 08/29/22  0517   WBC  --  6.3   HGB 9.7* 10.0*   HCT  --  31.6*   MCV  --  79   PLT  --  244     Recent Labs   Lab Test 08/30/22  0442 08/29/22  0517 08/27/22  0512   HGB 9.7* 10.0* 10.4*    Recent Labs   Lab Test 08/30/22  0442 08/24/22  0824 08/23/22  1628   TROPONINI 5.84* 15.76* 14.95*     Recent Labs   Lab Test 08/24/22  0824   *     No results for input(s): TSH in the last 32331 hours.  Recent Labs   Lab Test 08/24/22  0720 08/23/22  1628 03/18/22  1344   INR 1.19* 1.25* 1.47*

## 2022-09-15 NOTE — LETTER
9/15/2022    Jaquan Dickerson MD  [facility] [address]  [facility] [address]    RE: Norman Aguilar       Dear Colleague,     I had the pleasure of seeing Norman Aguilar in the [facility].  HEART CARE PROGRESS NOTE      [facility]  [phone]      Assessment/Recommendations   1.  Coronary artery disease: He was hospitalized August 23 to August 30, 2022 with non-STEMI.  Cardiovascular surgery consulted but not candidate for coronary artery bypass surgery.  PCI on August 29, 2022 with drug-eluting stent to proximal RCA and drug-eluting stent to distal RCA.  He is on dual antiplatelet therapy with aspirin and Brilinta. We discussed the importance of antiplatelet therapy and talking with his cardiologist prior to stopping these medications for any reason.      Risk factor modification and lifestyle management topics were discussed including managing comorbidities, weight loss, heart healthy diet and exercise.      2.  Dyslipidemia: Norman Aguilar is on high intensity statin therapy with atorvastatin 80 mg daily.  We discussed a diet low in saturated fat, weight loss, and exercise along with medication for better control of cholesterol.     3.  Hypertension: His blood pressure is 110/60.    4.  Ischemic cardiomyopathy, heart failure with reduced ejection fraction, ejection fraction 30 to 35%: He has no symptoms of acute heart failure.  We reviewed symptoms to monitor for and when to call the clinic.  His weight fluctuates related to his chronic GI problems.    Norman will follow up with Dr. Davis on November 28.       History of Present Illness/Subjective    Norman Aguilar is seen at [facility] for post coronary intervention follow up.  He has a history of heart failure with reduced ejection fraction, hypertension, chronic kidney disease, coronary artery disease, diabetes, paroxysmal atrial fibrillation, and left  "below-knee amputation.  He had known coronary artery disease in November 2021 with plan for coronary artery bypass surgery but this was never done.  He was hospitalized August 23 to August 30, 2022 with non-STEMI.  Cardiovascular surgery consulted but not candidate for coronary artery bypass surgery.  PCI on August 29, 2022 with drug-eluting stent to proximal RCA and drug-eluting stent to distal RCA.  He is on dual antiplatelet therapy with aspirin and Brilinta.  Echocardiogram on August 25, 2022 showed ejection fraction of 30 to 35%.    He had increased weight and shortness of breath last week and Lasix was increased for 3 days.  He never felt that this was fluid retention.  He has chronic bowel problems and felt that his symptoms were related to this.  He was seen by [facility] yesterday and recommended to continue taking MiraLAX.  He denies lightheadedness, shortness of breath, dyspnea on exertion, chest pain and lower extremity edema.        Cardiac Catheterization    Result Date: 8/29/2022    Dist RCA lesion is 80% stenosed.    RPDA-1 lesion is 50% stenosed.    RPDA-2 lesion is 100% stenosed.    Ost RCA to Prox RCA lesion is 70% stenosed.    IVUS was performed on the rca lesions.    4.0x16 mm JESSICA Synergy into proxinal RCA extends into aorta    3.5x16 mm JESSICA Synergy into distal RCA             Physical Examination Review of Systems   /60 (BP Location: Left arm, Patient Position: Sitting, Cuff Size: Adult Regular)   Pulse 97   Resp 16   Ht 1.892 m (6' 2.5\")   Wt 97.7 kg (215 lb 6.4 oz)   BMI 27.29 kg/m    Body mass index is 27.29 kg/m .  Wt Readings from Last 3 Encounters:   09/15/22 97.7 kg (215 lb 6.4 oz)   08/30/22 105.7 kg (233 lb)   04/04/22 93 kg (205 lb)       General Appearance:     Alert, cooperative and in no acute distress.   ENT/Mouth: membranes moist, no oral lesions or bleeding gums.      EYES:  no scleral icterus, normal conjunctivae   Chest/Lungs:   lungs are clear to auscultation, no " rales or wheezing, respirations unlabored   Cardiovascular:   Regular. Normal first and second heart sounds, trace right ankle edema   Abdomen:  Soft, nontender, nondistended, bowel sounds present   Extremities: no cyanosis or clubbing   Skin:  Neurologic: warm, dry.  mood and affect are appropriate, alert and oriented x3     Puncture Site: Right radial site is soft with no bruising.  He reports no pain or bruising of right femoral puncture site. CMS intact.           Please refer above for cardiac ROS details.      Medical History  Surgical History Family History Social History   Past Medical History:   Diagnosis Date     Anemia      Chronic systolic CHF (congestive heart failure) (H)      Coronary artery disease      Diabetic neuropathy (H)      DM type 2 w Neuropathy      GERD (gastroesophageal reflux disease)      Hyperlipidemia      Hypertension      Intermittent atrial fibrillation (H)     on Eliquis     Ischemic cardiomyopathy 11/16/2021    Echo with EF of 30-35%     NSTEMI (non-ST elevated myocardial infarction) (H) 11/15/2021     Osteomyelitis of left foot (H) 04/04/2022     Renal disease      Retinopathy      Sleep disturbance      Past Surgical History:   Procedure Laterality Date     AMPUTATE FOOT Left 3/21/2022    Procedure: Guillotine AMPUTATION, FOOT;  Surgeon: Ronald Bhatt MD;  Location: [facility] OR     AMPUTATE LEG BELOW KNEE Left 4/4/2022    Procedure: LEFT BELOW  KNEE AMPUTATION;  Surgeon: Ronald Bhatt MD;  Location: [facility] OR     AMPUTATE TOE(S) Left 11/16/2021    Procedure: first and second ray amputation;  Surgeon: Eddi Ram DPM;  Location: [facility] OR     APPENDECTOMY       CV CORONARY ANGIOGRAM N/A 11/16/2021    Procedure: CV CORONARY ANGIOGRAM;  Surgeon: Pam Tabares MD;  Location: [facility] LAB CV     CV CORONARY ANGIOGRAM N/A 8/24/2022    Procedure: CV CORONARY ANGIOGRAM;  Surgeon: Cipriano Lucas MD;  Location: [facility] LAB CV     CV  CORONARY ANGIOGRAM N/A 8/29/2022    Procedure: CV CORONARY ANGIOGRAM;  Surgeon: Cipriano Lucas MD;  Location: [facility] LAB CV     CV INTRAVASULAR ULTRASOUND N/A 8/29/2022    Procedure: Intravascular Ultrasound;  Surgeon: Cipriano Lucas MD;  Location: [facility] LAB CV     CV LEFT HEART CATH N/A 11/16/2021    Procedure: Left Heart Cath;  Surgeon: Pam Tabares MD;  Location: [facility] CATH LAB CV     CV PCI N/A 8/29/2022    Procedure: Percutaneous Coronary Intervention stent;  Surgeon: Cipriano Lucas MD;  Location: [facility] LAB CV     CV PCI ANGIOPLASTY N/A 8/29/2022    Procedure: Percutaneous Transluminal Angioplasty;  Surgeon: Cipriano Lucas MD;  Location: [facility] LAB CV     FOOT SURGERY Left      HERNIA REPAIR       INCISION AND DRAINAGE LOWER EXTREMITY, COMBINED Left 11/16/2021    Procedure: INCISION AND DRAINAGE, left foot;  Surgeon: Eddi Ram DPM;  Location: [facility] OR     INCISION AND DRAINAGE LOWER EXTREMITY, COMBINED Left 11/19/2021    Procedure: INCISION AND DRAINAGE, left foot;  Surgeon: Eddi Ram DPM;  Location: [facility] OR     INCISION AND DRAINAGE LOWER EXTREMITY, COMBINED Left 12/9/2021    Procedure: INCISION AND DRAINAGE, Left foot;  Surgeon: Eddi Ram DPM;  Location: [facility] OR     INCISION AND DRAINAGE LOWER EXTREMITY, COMBINED Left 1/10/2022    Procedure: INCISION AND DRAINAGE, left foot;  Surgeon: Eddi Ram DPM;  Location: [facility] OR     INCISION AND DRAINAGE LOWER EXTREMITY, COMBINED Left 1/27/2022    Procedure: INCISION AND DRAINAGE, Left foot;  Surgeon: Eddi Ram DPM;  Location: [facility] OR     No family history on file. Social History     Socioeconomic History     Marital status: Single     Spouse name: Not on file     Number of children: Not on file     Years of education: Not on file     Highest education level: Not on file   Occupational History     Not on file    Tobacco Use     Smoking status: Former Smoker     Types: Cigars     Smokeless tobacco: Never Used     Tobacco comment: Cigars   Vaping Use     Vaping Use: Never used   Substance and Sexual Activity     Alcohol use: Yes     Comment: < 1 drink a month (only holidays)     Drug use: Never     Sexual activity: Not on file   Other Topics Concern     Parent/sibling w/ CABG, MI or angioplasty before 65F 55M? Not Asked   Social History Narrative    Single, currently not working, has done numerous jobs, lives alone.  Full code. (last updated 4/4/2022)      Social Determinants of Health     Financial Resource Strain: Not on file   Food Insecurity: Not on file   Transportation Needs: Not on file   Physical Activity: Not on file   Stress: Not on file   Social Connections: Not on file   Intimate Partner Violence: Not on file   Housing Stability: Not on file          Medications  Allergies   Current Outpatient Medications   Medication Sig Dispense Refill     aspirin (ASA) 81 MG EC tablet Take 1 tablet (81 mg) by mouth daily Start tomorrow. 30 tablet 3     atorvastatin (LIPITOR) 80 MG tablet TAKE 1 TABLET(80 MG) BY MOUTH EVERY EVENING 90 tablet 1     blood glucose (NO BRAND SPECIFIED) test strip Use to test blood sugar 4 times daily or as directed. 100 strip 3     blood glucose monitoring (SOFTCLIX) lancets Use to test blood sugar 4 times daily. 100 each 6     furosemide (LASIX) 20 MG tablet TAKE 1 TABLET(20 MG) BY MOUTH DAILY (Patient taking differently: 20 mg) 90 tablet 1     hydrOXYzine (ATARAX) 50 MG tablet TAKE 1 TABLET BY MOUTH EVERY 6 HOURS AS NEEDED       insulin aspart (NOVOLOG FLEXPEN) 100 UNIT/ML pen For  - 164 give 1 unit. For  - 189 give 2 units. For  - 214 give 3 units. For  - 239 give 4 units. For  - 264 give 5 units. For  - 289 give 6 units. For  - 314 give 7 units. For  - 339 give 8 units For  - 364 give 9 units For  - 389 give 10 units For  - 414  give 11 units For BG greater than or equal to 415 give 12 units 3 mL 3     insulin glargine (LANTUS SOLOSTAR) 100 UNIT/ML pen Inject 38 Units Subcutaneous every morning (Patient taking differently: Inject 38 Units Subcutaneous At Bedtime) 3 mL 3     magnesium oxide (MAG-OX) 400 MG tablet Take 1 tablet (400 mg) by mouth daily 90 tablet 1     metoprolol succinate ER (TOPROL-XL) 50 MG 24 hr tablet TAKE 1 TABLET(50 MG) BY MOUTH DAILY 90 tablet 1     Multiple Vitamin (MULTI VITAMIN) TABS Take 1 tablet by mouth daily        OMEGA-3/DHA/EPA/FISH OIL (FISH OIL-OMEGA-3 FATTY ACIDS) 300-1,000 mg capsule Take 1 g by mouth daily        ticagrelor (BRILINTA) 90 MG tablet Take 1 tablet (90 mg) by mouth 2 times daily Dose to start tomorrow morning. 180 tablet 3     vitamin C (ASCORBIC ACID) 250 MG tablet Take 500 mg by mouth daily      No Known Allergies      Lab Results    Chemistry/lipid CBC Cardiac Enzymes/BNP/TSH/INR   Recent Labs   Lab Test 08/25/22  0357   CHOL 107   HDL 35*   LDL 60   TRIG 58     Recent Labs   Lab Test 08/25/22  0357 05/17/22  1005 11/17/21  0533   LDL 60 71 126     Recent Labs   Lab Test 09/02/22  1009      POTASSIUM 4.3   CHLORIDE 98   CO2 29   *   BUN 16.2   CR 1.51*   GFRESTIMATED 52*   YOSVANY 9.5     Recent Labs   Lab Test 09/02/22  1009 08/30/22  0442 08/29/22  0516   CR 1.51* 1.67* 1.62*     Recent Labs   Lab Test 08/23/22  2358 01/19/22  1548 12/01/21  1633   A1C 8.0* 7.6* 13.4*    Recent Labs   Lab Test 08/30/22  0442 08/29/22  0517   WBC  --  6.3   HGB 9.7* 10.0*   HCT  --  31.6*   MCV  --  79   PLT  --  244     Recent Labs   Lab Test 08/30/22  0442 08/29/22  0517 08/27/22  0512   HGB 9.7* 10.0* 10.4*    Recent Labs   Lab Test 08/30/22  0442 08/24/22  0824 08/23/22  1628   TROPONINI 5.84* 15.76* 14.95*     Recent Labs   Lab Test 08/24/22  0824   *     No results for input(s): TSH in the last 39922 hours.  Recent Labs   Lab Test 08/24/22  0720 08/23/22  1628 03/18/22  1344   INR  1.19* 1.25* 1.47*                  Thank you for allowing me to participate in the care of your patient.      Sincerely,     Piedad Reese NP     [facility]  cc:   Russel Arredondo MD  [address] [facility] [address] [address]  [place] [address]

## 2022-09-15 NOTE — PATIENT INSTRUCTIONS
Norman Aguilar,    It was a pleasure to see you today at the [facility].     My recommendations after this visit include:  - No changes to your medications.    - Continue to monitor for signs of retaining fluid (increasing weights, shortness of breath, swelling) and call with any concerns.   - Refill of brilinta was sent to [facility]  - If you have any questions or concerns, please call [phone] to talk with my nurse.    Piedad Brown, CNP

## 2022-09-27 NOTE — ED TRIAGE NOTES
"Patient arrives by private car for evaluation of constipation x 5 days.  Patient reports history of this and was diagnosed with \"long colon\"      "

## 2022-09-27 NOTE — ED NOTES
ED Provider In Triage Note  [facility]  Encounter Date: Sep 27, 2022    Chief Complaint   Patient presents with     Constipation       Brief HPI:   Norman Aguilar is a 62 year old male presenting to the Emergency Department with a chief complaint of abdominal discomfort with constipation over the past 5 days or so. No n/v or bleeding. Does report dyspnea, but no chest pain or weight gain/swelling.  Denies any urinary symptoms.      Brief Physical Exam:  /74   Pulse 94   Temp 98.2  F (36.8  C) (Tympanic)   Resp 18   Wt 104.3 kg (230 lb)   SpO2 100%   BMI 29.14 kg/m    General: Non-toxic appearing  HEENT: Atraumatic  Resp: No respiratory distress  Abdomen: Non-peritoneal  Neuro: Alert, oriented, answers questions appropriately  Psych: Behavior appropriate    Plan Initiated in Triage:  Orders Placed This Encounter     CT Abdomen Pelvis w Contrast     Basic metabolic panel     Lipase     Hepatic function panel     Lactic acid whole blood     0.9% sodium chloride BOLUS       PIT Dispo:   Return to [facility] while awaiting workup and ED bed availability    Santy Hernández MD on 9/27/2022 at 1:59 PM    Patient was evaluated by the Physician in Triage due to a limitation of available rooms in the Emergency Department. A plan of care was discussed based on the information obtained on the initial evaluation and patient was consuled to return back to the Emergency Department lobby after this initial evalutaiton until results were obtained or a room became available in the Emergency Department. Patient was counseled not to leave prior to receiving the results of their workup.     Santy Hernández MD  [facility] EMERGENCY DEPARTMENT  [address] [place] [id number]  [phone]     Santy Hernández MD  09/27/22 8030

## 2022-09-27 NOTE — ED PROVIDER NOTES
EMERGENCY DEPARTMENT ENCOUNTER      NAME: Norman Aguilar  AGE: 62 year old male  YOB: 1960  MRN: [id number]  EVALUATION DATE & TIME: No admission date for patient encounter.    PCP: Jaquan Dickerson    ED PROVIDER: Santy Oconnor M.D.      Chief Complaint   Patient presents with     Constipation         FINAL IMPRESSION:  1.  Chronic constipation.  2.  Mild CHF.  3.  Renal insufficiency.    ED COURSE & MEDICAL DECISION MAKIN:42 PM I met with the patient to gather history and to perform my initial exam. We discussed plans for the ED course, including diagnostic testing and treatment. PPE worn: cloth mask, gloves, glasses.  Laboratory evaluation is complete.  CBC unremarkable hemoglobin 1.4.  Lactate normal.  EKG in normal sinus rhythm and similar to previous EKGs.  Lipase and liver function tests unremarkable.  BUN and creatinine mildly elevated at 27 and 1.4.  CT showing mild evidence of pleural effusions and some pulmonary edema.  No evidence for significant pathology.  On review of the CT I do note diffuse stool throughout.  Plan to continue the patient on MiraLAX, milk of magnesia, and magnesium citrate gummy bears as the liquid form is not available at this time.  We will also double his Lasix for a few days.  Patient in agreement with the plan and discharged to home.      Pertinent Labs & Imaging studies reviewed. (See chart for details)    62 year old male presents to the Emergency Department for evaluation of constipation.    At the conclusion of the encounter I discussed the results of all of the tests and the disposition. The questions were answered. The patient or family acknowledged understanding and was agreeable with the care plan.     MEDICATIONS GIVEN IN THE EMERGENCY:  Medications   0.9% sodium chloride BOLUS (500 mLs Intravenous New Bag 22 1419)   iopamidol (ISOVUE-370) solution 75 mL (75 mLs Intravenous Given 22 1610)       NEW PRESCRIPTIONS STARTED AT  "TODAY'S ER VISIT  New Prescriptions    No medications on file          =================================================================    HPI    Patient information was obtained from: Patient    Use of : N/A       Norman Aguilar is a 62 year old male with a pertinent history of type 2 diabetes mellitus, constipation, and acute renal failure who presents to this ED by walk in for evaluation of constipation.     Patient reports that he has had constipation problems for five years now and has been told he has a \"long colon\". Notes it has gotten worse now and has had constipation for five days now. Does take Dulcolax and Miralax for this without any relief. He took two enemas without relief as well. Patient has been short of breath that is better with standing. Reports having a heart attack in August.  He currently takes 20 mg of lasix.     He does not identify any waxing or waning symptoms otherwise, exacerbating or alleviating features,associated symptoms except as mentioned. He denies any pain related complaints.    REVIEW OF SYSTEMS   Review of Systems   Respiratory: Positive for shortness of breath.    Gastrointestinal: Positive for constipation.   All other systems reviewed and are negative.       PAST MEDICAL HISTORY:  Past Medical History:   Diagnosis Date     Anemia      Chronic systolic CHF (congestive heart failure) (H)      Coronary artery disease      Diabetic neuropathy (H)      DM type 2 w Neuropathy      GERD (gastroesophageal reflux disease)      Hyperlipidemia      Hypertension      Intermittent atrial fibrillation (H)     on Eliquis     Ischemic cardiomyopathy 11/16/2021    Echo with EF of 30-35%     NSTEMI (non-ST elevated myocardial infarction) (H) 11/15/2021     Osteomyelitis of left foot (H) 04/04/2022     Renal disease      Retinopathy      Sleep disturbance        PAST SURGICAL HISTORY:  Past Surgical History:   Procedure Laterality Date     AMPUTATE FOOT Left 3/21/2022    " Procedure: Guillotine AMPUTATION, FOOT;  Surgeon: Ronald Bhatt MD;  Location: [facility] OR     AMPUTATE LEG BELOW KNEE Left 4/4/2022    Procedure: LEFT BELOW  KNEE AMPUTATION;  Surgeon: Ronald Bhatt MD;  Location: [facility] OR     AMPUTATE TOE(S) Left 11/16/2021    Procedure: first and second ray amputation;  Surgeon: Eddi Ram DPM;  Location: [facility] OR     APPENDECTOMY       CV CORONARY ANGIOGRAM N/A 11/16/2021    Procedure: CV CORONARY ANGIOGRAM;  Surgeon: Pam Tabares MD;  Location: [facility] LAB CV     CV CORONARY ANGIOGRAM N/A 8/24/2022    Procedure: CV CORONARY ANGIOGRAM;  Surgeon: Cipriano Lucas MD;  Location: [facility] LAB CV     CV CORONARY ANGIOGRAM N/A 8/29/2022    Procedure: CV CORONARY ANGIOGRAM;  Surgeon: Cipriano Lucas MD;  Location: [facility] CV     CV INTRAVASULAR ULTRASOUND N/A 8/29/2022    Procedure: Intravascular Ultrasound;  Surgeon: Cipriano Lucas MD;  Location: [facility] LAB CV     CV LEFT HEART CATH N/A 11/16/2021    Procedure: Left Heart Cath;  Surgeon: Pam Tabares MD;  Location: [facility] LAB CV     CV PCI N/A 8/29/2022    Procedure: Percutaneous Coronary Intervention stent;  Surgeon: Cipriano uLcas MD;  Location: [facility] CV     CV PCI ANGIOPLASTY N/A 8/29/2022    Procedure: Percutaneous Transluminal Angioplasty;  Surgeon: Cipriano Lucas MD;  Location: [facility] LAB CV     FOOT SURGERY Left      HERNIA REPAIR       INCISION AND DRAINAGE LOWER EXTREMITY, COMBINED Left 11/16/2021    Procedure: INCISION AND DRAINAGE, left foot;  Surgeon: Eddi Ram DPM;  Location: [facility] OR     INCISION AND DRAINAGE LOWER EXTREMITY, COMBINED Left 11/19/2021    Procedure: INCISION AND DRAINAGE, left foot;  Surgeon: Eddi Ram DPM;  Location: [facility] OR     INCISION AND DRAINAGE LOWER EXTREMITY, COMBINED Left 12/9/2021    Procedure: INCISION AND DRAINAGE, Left foot;   Surgeon: Eddi Ram DPM;  Location: [facility] OR     INCISION AND DRAINAGE LOWER EXTREMITY, COMBINED Left 1/10/2022    Procedure: INCISION AND DRAINAGE, left foot;  Surgeon: Eddi Ram DPM;  Location: [facility] Main OR     INCISION AND DRAINAGE LOWER EXTREMITY, COMBINED Left 1/27/2022    Procedure: INCISION AND DRAINAGE, Left foot;  Surgeon: Eddi Ram DPM;  Location: [facility] OR           CURRENT MEDICATIONS:    aspirin (ASA) 81 MG EC tablet  atorvastatin (LIPITOR) 80 MG tablet  blood glucose (NO BRAND SPECIFIED) test strip  blood glucose monitoring (SOFTCLIX) lancets  furosemide (LASIX) 20 MG tablet  hydrOXYzine (ATARAX) 50 MG tablet  insulin aspart (NOVOLOG FLEXPEN) 100 UNIT/ML pen  insulin glargine (LANTUS SOLOSTAR) 100 UNIT/ML pen  magnesium oxide (MAG-OX) 400 MG tablet  metoprolol succinate ER (TOPROL-XL) 50 MG 24 hr tablet  Multiple Vitamin (MULTI VITAMIN) TABS  OMEGA-3/DHA/EPA/FISH OIL (FISH OIL-OMEGA-3 FATTY ACIDS) 300-1,000 mg capsule  ticagrelor (BRILINTA) 90 MG tablet  vitamin C (ASCORBIC ACID) 250 MG tablet        ALLERGIES:  No Known Allergies    FAMILY HISTORY:  No family history on file.    SOCIAL HISTORY:   Social History     Socioeconomic History     Marital status: Single   Tobacco Use     Smoking status: Former Smoker     Types: Cigars     Smokeless tobacco: Never Used     Tobacco comment: Cigars   Vaping Use     Vaping Use: Never used   Substance and Sexual Activity     Alcohol use: Yes     Comment: < 1 drink a month (only holidays)     Drug use: Never   Social History Narrative    Single, currently not working, has done numerous jobs, lives alone.  Full code. (last updated 4/4/2022)    No current drugs, alcohol, or tobacco.  Later notes a rare alcohol use.    VITALS:  /74   Pulse 94   Temp 98.2  F (36.8  C) (Tympanic)   Resp 18   Wt 104.3 kg (230 lb)   SpO2 100%   BMI 29.14 kg/m      PHYSICAL EXAM    Vital Signs:  /74   Pulse 94    Temp 98.2  F (36.8  C) (Tympanic)   Resp 18   Wt 104.3 kg (230 lb)   SpO2 100%   BMI 29.14 kg/m    General:  On entering the room he is in no apparent distress.    Pulmonary:  Chest clear to auscultation without rhonchi rales or wheezing.    Cardiovascular:  Cardiac regular rate and rhythm without murmurs rubs or gallops.    Abdomen:  Abdomen soft nontender.  There is no rebound or guarding.    No JVD.       LAB:  All pertinent labs reviewed and interpreted.  Labs Ordered and Resulted from Time of ED Arrival to Time of ED Departure   BASIC METABOLIC PANEL - Abnormal       Result Value    Sodium 138      Potassium 4.1      Chloride 98      Carbon Dioxide (CO2) 27      Anion Gap 13      Urea Nitrogen 27.1 (*)     Creatinine 1.40 (*)     Calcium 9.6      Glucose 153 (*)     GFR Estimate 56 (*)    CBC WITH PLATELETS AND DIFFERENTIAL - Abnormal    WBC Count 6.8      RBC Count 4.47      Hemoglobin 11.4 (*)     Hematocrit 35.3 (*)     MCV 79      MCH 25.5 (*)     MCHC 32.3      RDW 18.6 (*)     Platelet Count 195      % Neutrophils 70      % Lymphocytes 22      % Monocytes 7      % Eosinophils 1      % Basophils 0      % Immature Granulocytes 0      NRBCs per 100 WBC 0      Absolute Neutrophils 4.7      Absolute Lymphocytes 1.5      Absolute Monocytes 0.5      Absolute Eosinophils 0.1      Absolute Basophils 0.0      Absolute Immature Granulocytes 0.0      Absolute NRBCs 0.0     LIPASE - Normal    Lipase 18     HEPATIC FUNCTION PANEL - Normal    Protein Total 7.3      Albumin 4.0      Bilirubin Total 0.9      Alkaline Phosphatase 107      AST 42      ALT 32      Bilirubin Direct 0.22     LACTIC ACID WHOLE BLOOD - Normal    Lactic Acid 1.1         RADIOLOGY:  Reviewed all pertinent imaging. Please see official radiology report.  CT Abdomen Pelvis w Contrast   Final Result   IMPRESSION:    1.  No explanation for abdominal pain.      2.  Moderate bilateral pleural effusions with mild pulmonary edema.      3.   Cholelithiasis.                 EKG:    Normal sinus rhythm at 92, PVCs, low voltages.  Nonspecific ST segment changes.  Very similar to previous EKG of August 3, 2022.    I have independently reviewed and interpreted the EKG(s) documented above.    I, Paul Bartlett, am serving as a scribe to document services personally performed by Dr. Oconnor based on my observation and the provider's statements to me. I, Santy Oconnor MD attest that Paul Bartlett is acting in a scribe capacity, has observed my performance of the services and has documented them in accordance with my direction.    Santy Oconnor M.D.  Emergency Medicine  [facility] EMERGENCY DEPARTMENT  [facility] [id number] [phone]  Dept: [phone]     Santy Oconnor MD  09/27/22 3172

## 2022-09-27 NOTE — DISCHARGE INSTRUCTIONS
Increase Lasix to 40 mg a day from 20 mg a day for 5 days.  Thereafter, go back to 20 mg a day.  Avoid salt or salty foods.  Over-the-counter medications to help with constipation.  MiraLAX 17 g in 8 ounces of juice or water 3 times a day for the next week then once a day.  Milk of magnesium 1 tablespoon 3 times a day for the next week.  Magnesium citrate is no longer available in liquid form, the pharmacy has these and magnesium citrate gummy bears which can help.  Follow-up with your doctor and/or cardiologist within the next week or 2.  See them sooner or return if worse or problems.

## 2022-09-28 NOTE — ED PROVIDER NOTES
"  Emergency Department Encounter         FINAL IMPRESSION:  Constipation        ED COURSE AND MEDICAL DECISION MAKING       ED Course as of 09/28/22 1952   Wed Sep 28, 2022   1921 Patient is a 62-year-old male with history of cardiomyopathy, diagnosed with constipation yesterday after CT scan was performed and sent home with multiple medications to help with his constipation.  Patient presents now today with concerns of his constipation and wanted to be essentially evaluated again to make sure nothing is changed.  Denies any shortness of breath that has been unchanged.  No chest pain or trouble breathing.  Did not fill in his prescriptions.  Has not take anything for his constipation.  States he has \"a redundant colon\" and states that he had a difficult time with constipation in the past.  States has never been impacted or surgically operated on for any constipation issues.  Has been scoped multiple times and has had a longstanding history of constipation.  On arrival he looks well.  Vitals are stable.  Heart and lungs are normal.  Patient does have small pleural effusions on  x-ray.  Denies any other worsening orthopnea, PND, leg swelling or dyspnea on exertion suggesting fluid overload.  His abdomen states he feels mildly distended however feels similar to his previous constipation episodes.  Recently had up dose of his Lasix of which she will be feeling today.  Plan for discharge home with close outpatient follow-up.  Patient seems reliable and states has been following his weights recently.  No history of upper GI bleeds.  No history of lower GI bleeds.  Labs reviewed yesterday which were reassuring.  CT scan normal.  Plan for discharge home with close outpatient follow-up.  Patient denies any abdominal pain today only fullness.  No dysuria.     -Patient's BNP is elevated however the new blood test/lab test, unable to compare to his previous.  I think it is reasonable for him to go home.  It sounds like they " recently increased his Lasix.  He has no signs of fluid overload here.  He has no hypoxia, dyspnea on exertion, PND, orthopnea or leg swelling.  Plan for close outpatient follow-up.         6:30 PM I introduced myself to the patient, obtained patient history, performed a physical exam, and discussed plan for ED workup including potential diagnostic laboratory/imaging studies and interventions.          At the conclusion of the encounter I discussed the results of all the tests and the disposition. The questions were answered. The patient or family acknowledged understanding and was agreeable with the care plan.                  MEDICATIONS GIVEN IN THE EMERGENCY DEPARTMENT:  Medications - No data to display    NEW PRESCRIPTIONS STARTED AT TODAY'S ED VISIT:  New Prescriptions    No medications on file       \[facility]\""   Triage Note  1353 Patient presents here for evaluation of continued constipation. He was seen here last night and given prescriptions for medications to treat. He has not picked up the medications because he states that the nurse from his cardiology clinic and primary physician instructed him to come back.        Patient information obtained from: patient     Use of Interpretor: N/A     Norman Aguilar is a 62 year old male with a pertinent history of DM2, hyperlipidemia, GERD, renal disease, NSTEMI, chronic systolic CHF, DM2, left lower extremity amputation who presents to this ED by walk in for evaluation of constipation.     Patient reports being here yesterday for constipation. He was sent home. Called cardiology today who reportedly told him to report to the ER if symptoms increased. He did not take his prescribed medications, but decided to come to the ER because his constipation symptoms are continuing. He denies any new onset of symptoms currently. States breathing is at baseline. Also reports an extensive history of colonic issues and redundancy.     Overall, patient reports being concerned about  his heart problems and wants to make sure nothing about his constipation has changed.         REVIEW OF SYSTEMS:  Review of Systems   Constitutional: Negative for fever, malaise  HEENT: Negative runny nose, sore throat, ear pain, neck pain  Respiratory: Negative for shortness of breath, cough, congestion  Cardiovascular: Negative for chest pain, leg edema  Gastrointestinal: Positive for constipation.   Genitourinary: Negative for dysuria and hematuria.   Integument: Negative for rash, skin breakdown  Neurological: Negative for paresthesias, weakness, headache.  Musculoskeletal: Negative for joint pain, joint swelling      All other systems reviewed and are negative.          MEDICAL HISTORY     Past Medical History:   Diagnosis Date     Anemia      Chronic systolic CHF (congestive heart failure) (H)      Coronary artery disease      Diabetic neuropathy (H)      DM type 2 w Neuropathy      GERD (gastroesophageal reflux disease)      Hyperlipidemia      Hypertension      Intermittent atrial fibrillation (H)      Ischemic cardiomyopathy 11/16/2021     NSTEMI (non-ST elevated myocardial infarction) (H) 11/15/2021     Osteomyelitis of left foot (H) 04/04/2022     Renal disease      Retinopathy      Sleep disturbance        Past Surgical History:   Procedure Laterality Date     AMPUTATE FOOT Left 3/21/2022    Procedure: Guillotine AMPUTATION, FOOT;  Surgeon: Ronald Bhatt MD;  Location: [facility] OR     AMPUTATE LEG BELOW KNEE Left 4/4/2022    Procedure: LEFT BELOW  KNEE AMPUTATION;  Surgeon: Ronald Bhatt MD;  Location: [facility] OR     AMPUTATE TOE(S) Left 11/16/2021    Procedure: first and second ray amputation;  Surgeon: Eddi Ram DPM;  Location: [facility] OR     APPENDECTOMY       CV CORONARY ANGIOGRAM N/A 11/16/2021    Procedure: CV CORONARY ANGIOGRAM;  Surgeon: Pam Tabares MD;  Location: [facility] CATH LAB CV     CV CORONARY ANGIOGRAM N/A 8/24/2022    Procedure: CV CORONARY  ANGIOGRAM;  Surgeon: Cipriano Lucas MD;  Location: [facility] LAB CV     CV CORONARY ANGIOGRAM N/A 8/29/2022    Procedure: CV CORONARY ANGIOGRAM;  Surgeon: Cipriano Lucas MD;  Location: [facility] CV     CV INTRAVASULAR ULTRASOUND N/A 8/29/2022    Procedure: Intravascular Ultrasound;  Surgeon: Cipriano Lucas MD;  Location: [facility] LAB CV     CV LEFT HEART CATH N/A 11/16/2021    Procedure: Left Heart Cath;  Surgeon: Pam Tabares MD;  Location: [facility] LAB CV     CV PCI N/A 8/29/2022    Procedure: Percutaneous Coronary Intervention stent;  Surgeon: Cipriano Lucas MD;  Location: [facility] CV     CV PCI ANGIOPLASTY N/A 8/29/2022    Procedure: Percutaneous Transluminal Angioplasty;  Surgeon: Cipriano Lucas MD;  Location: [facility] CV     FOOT SURGERY Left      HERNIA REPAIR       INCISION AND DRAINAGE LOWER EXTREMITY, COMBINED Left 11/16/2021    Procedure: INCISION AND DRAINAGE, left foot;  Surgeon: Eddi Ram DPM;  Location: [facility] OR     INCISION AND DRAINAGE LOWER EXTREMITY, COMBINED Left 11/19/2021    Procedure: INCISION AND DRAINAGE, left foot;  Surgeon: Eddi Ram DPM;  Location: [facility] OR     INCISION AND DRAINAGE LOWER EXTREMITY, COMBINED Left 12/9/2021    Procedure: INCISION AND DRAINAGE, Left foot;  Surgeon: dEdi Ram DPM;  Location: [facility] OR     INCISION AND DRAINAGE LOWER EXTREMITY, COMBINED Left 1/10/2022    Procedure: INCISION AND DRAINAGE, left foot;  Surgeon: Eddi Ram DPM;  Location: [facility] OR     INCISION AND DRAINAGE LOWER EXTREMITY, COMBINED Left 1/27/2022    Procedure: INCISION AND DRAINAGE, Left foot;  Surgeon: Eddi Ram DPM;  Location: [facility] OR       Social History     Tobacco Use     Smoking status: Former Smoker     Types: Cigars     Smokeless tobacco: Never Used     Tobacco comment: Cigars   Vaping Use     Vaping Use: Never used    Substance Use Topics     Alcohol use: Yes     Comment: < 1 drink a month (only holidays)     Drug use: Never       aspirin (ASA) 81 MG EC tablet  atorvastatin (LIPITOR) 80 MG tablet  blood glucose (NO BRAND SPECIFIED) test strip  blood glucose monitoring (SOFTCLIX) lancets  furosemide (LASIX) 20 MG tablet  hydrOXYzine (ATARAX) 50 MG tablet  insulin aspart (NOVOLOG FLEXPEN) 100 UNIT/ML pen  insulin glargine (LANTUS SOLOSTAR) 100 UNIT/ML pen  magnesium oxide (MAG-OX) 400 MG tablet  metoprolol succinate ER (TOPROL-XL) 50 MG 24 hr tablet  Multiple Vitamin (MULTI VITAMIN) TABS  OMEGA-3/DHA/EPA/FISH OIL (FISH OIL-OMEGA-3 FATTY ACIDS) 300-1,000 mg capsule  ticagrelor (BRILINTA) 90 MG tablet  vitamin C (ASCORBIC ACID) 250 MG tablet            PHYSICAL EXAM     /73   Pulse 97   Temp 98  F (36.7  C) (Oral)   Resp 16   Wt 104.3 kg (230 lb)   SpO2 100%   BMI 29.14 kg/m        PHYSICAL EXAM:     General: Patient appears well, nontoxic, comfortable  HEENT: Moist mucous membranes,  No head trauma.  No midline neck pain.  Cardiovascular: Normal rate, normal rhythm, no extremity edema.  No appreciable murmur.  Respiratory: No signs of respiratory distress, lungs are clear to auscultation bilaterally with no wheezes rhonchi or rales.  Abdominal: Soft, nontender, nondistended, no palpable masses, no guarding, no rebound  Musculoskeletal: Full range of motion of joints, no deformities appreciated.  Neurological: Alert and oriented, grossly neurologically intact.  Psychological: Normal affect and mood.  Integument: No rashes appreciated          RESULTS       Labs Ordered and Resulted from Time of ED Arrival to Time of ED Departure   BASIC METABOLIC PANEL - Abnormal       Result Value    Sodium 137      Potassium 4.2      Chloride 97 (*)     Carbon Dioxide (CO2) 24      Anion Gap 16 (*)     Urea Nitrogen 23.6 (*)     Creatinine 1.40 (*)     Calcium 9.9      Glucose 138 (*)     GFR Estimate 56 (*)    NT PROBNP INPATIENT -  Abnormal    N terminal Pro BNP Inpatient 3,295 (*)    MAGNESIUM - Normal    Magnesium 1.9         Chest XR,  PA & LAT    (Results Pending)                     PROCEDURES:  Procedures:  Procedures       I, Arnulfo Byrne am serving as a scribe to document services personally performed by Maurice Jones DO, based on my observations and the provider's statements to me.  I, Maurice Jones DO, attest that Arnulfo Byrne is acting in a scribe capacity, has observed my performance of the services and has documented them in accordance with my direction.    Maurice Jones DO  Emergency Medicine  [facility] EMERGENCY DEPARTMENT       Maurice Jones DO  09/28/22 1953

## 2022-09-28 NOTE — ED NOTES
ED Triage Provider Note  [facility]  Encounter Date: Sep 28, 2022    History:  Chief Complaint   Patient presents with     Constipation     Norman Aguilar is a 62 year old male who presents to the ED with constipation.  Patient is now day 7 with no bowel movement.    Review of Systems:  No chest pain    Exam:  /73   Pulse 97   Temp 98  F (36.7  C) (Oral)   Resp 16   Wt 104.3 kg (230 lb)   SpO2 100%   BMI 29.14 kg/m    General: No acute distress. Appears stated age.   Cardio: Regular rate, extremities well perfused  Resp: Normal work of breathing, grossly normal respiratory rate  Neuro: Alert. CN II-XII grossly intact. Grossly intact strength.     Medical Decision Making:  Patient arriving to the ED with problem as above. A medical screening exam was performed. Lab orders initiated from Triage. The patient is appropriate to wait in triage.    I reviewed patient's CT scan which shows some scattered stool in the colon but no sign of obstruction or fecal impaction.  Bilateral pleural effusions.  Patient's abdominal bloating is likely due to fluid retention and not constipation.    Pee Og MD on 9/28/2022 at 1:50 PM      Lab/Imaging Results:       Interventions:  Medications - No data to display    Diagnosis:  1. Bilateral pleural effusion         Pee Og MD  09/28/22 0046

## 2022-09-28 NOTE — PROGRESS NOTES
This is a recent snapshot of the patient's [facility] medical record.  For current drug dose and complete information and questions, call [phone] or In Basket pool, [facility] ([id number])  CSN Number:  [id number]

## 2022-09-28 NOTE — TELEPHONE ENCOUNTER
Pt called and reported he went to ER yesterday due to constipation. ER wants pt to do miralax TID mixed with 8 oz of water and  other stuff thrown in on top of that . He reports he is already going over on his fluid. Encouraged pt to mix miralax with the water he already drinks and to call his primary care provider to discuss his constipation concerns.  He reported a CAT scan yesterday showed a small amount of fluid around the heart in ER last night. He reports he was instructed to increase his lasix from 20mg of Lasix to 40 for 5 days then resume 20mg. He reported he was told there would be a script sent to his pharmacy for this change but he called pharmacy and they do not have order. He said he will run out of his medication before it can be refilled if he does not get a script for the temporary increase sent. He would also like to discuss the chance of a possibly permanent increase of Lasix.    Piedad, any recommendations at this time? Would you feel comfortable filling the temporary increase?    Dona Buitrago RN

## 2022-09-28 NOTE — ED TRIAGE NOTES
Patient presents here for evaluation of continued constipation. He was seen here last night and given prescriptions for medications to treat. He has not picked up the medications because he states that the nurse from his cardiology clinic and primary physician instructed him to come back.

## 2022-09-29 NOTE — DISCHARGE INSTRUCTIONS
Your blood work today was reassuring.  The chest x-ray showed very small amount of fluid in your lungs.  Please continue to take your Lasix and follow-up with your primary care doctor/cardiology.    Return for any worsening breathing difficulties, black or bloody stools, or any other concerning symptoms.    I also recommend you filling your prescriptions for constipation and continue take MiraLAX daily.

## 2022-10-03 NOTE — TELEPHONE ENCOUNTER
Pt called [facility] and reports increase HF symptoms of increased difficulty sleeping due to breathing difficulty, tremors in hands with regular blood sugar levels around 130 he believes are heart related. He has recently had complications associated with severe constipation and taking Miralax. Weights has been going down recently after he has started having bowel movements but does not have exact weights to give.     Pt did go back and forth on what symptoms he was experiencing. Requested pt to schedule appointment with [facility] for assessment and was transferred to scheduling for a [facility] appt.  reported that Pt did not want Monday appt available and wanted Thursday to which there are no current openings. Pt reported he will call back.   Educated pt again to see primary for multiple concerns but he continues to report he will only see primary who does not have any openings.    FYI to Piedad Reese, ,ARIN Buitrago, RN

## 2022-10-13 NOTE — PROGRESS NOTES
This is a recent snapshot of the patient's [facility] medical record.  For current drug dose and complete information and questions, call [phone] or In Basket pool, [facility] home infusion ([id number])  CSN Number:  [id number]

## 2022-10-26 NOTE — PROGRESS NOTES
This is a recent snapshot of the patient's [facility] medical record.  For current drug dose and complete information and questions, call [phone] or In Basket pool, [facility] home infusion ([id number])  CSN Number:  [id number]

## 2022-11-17 NOTE — LETTER
11/17/2022    Jaquan Dickerson MD  [address]  [place] [address]    RE: Norman Aguilar       Dear Colleague,     I had the pleasure of seeing Norman Aguilar in the [facility].            Assessment/Plan:   1.  Significant coronary artery disease, status post non-STEMI and JESSICA to RCA: The patient has multiple vessel disease.  He was planned to have CABG.  However it was not done due to left leg osteomyelitis and subjected to left BKA.  He was admitted to hospital in August 2022 due to non-ST elevation MI.  Subsequently, he had stent placement in RCA.  He still has residual significant stenosis in LAD, diagonal branch.  He had occluded D2, occluded LCx.  We discussed staged PCI for LAD and a diagonal branch.  After long discussion,  The patient would like to postpone his procedure since he has a financial problem and no family support.  He has no interest in treating his current obstructive lesion.  He states that he will think about it.  If he changes his mind, he will call me back.  Meantime, continue aspirin, Brilinta, metoprolol, atorvastatin.    2.  Ischemic cardiomyopathy, LVEF 30 to 35%, chronic systolic congestive heart failure: The patient is compensated at this time.  No signs of for fluid retention.  Continue Lasix 20 mg daily, metoprolol succinate 50 mg daily.  The patient was not able to tolerate to ACE inhibitor due to low blood pressure.  He has no interest in changing medication today.    3.  Paroxysmal atrial fibrillation: The patient is in sinus rhythm.  His heart rate is controlled with metoprolol succinate to 50 mg daily.  He has no interest in chronic anticoagulation.  Currently he is on aspirin and Brilinta.    4.  Primary hypertension: His blood pressure is mildly high in clinic today.  However, he started to have colon problem which usually makes his blood pressure high.  He states that his blood pressure has been at a low side, less than 110/60  mmHg.  Continue metoprolol succinate to 50 mg daily    5.  Dyslipidemia: Continue atorvastatin.  His LDL was well controlled.    7.  Insulin-dependent diabetes, stage III CKD, and other chronic medical issues: Follow-up with Dr. Dickerson.    Total 40 minutes were spent in this visit for face to face visit, physical exam, review of current labs/imaging studies, plan for ongoing treatment with >50% spent on counseling and coordination of care as documented in the above mentioned note.      Thank you for the opportunity to be involved in the care of Norman Aguilar. If you have any questions, please feel free to contact me.  I will see the patient again in 6 months and as needed.    Much or all of the text in this note was generated through the use of Dragon Dictate voice-to-text software. Errors in spelling or words which seem out of context are unintentional. Sound alike errors, in particular, may have escaped editing.       History of Present Illness:   It is my pleasure to see Norman Aguilar at the [facility] clinic for routine cardiology follow up.  Norman Aguilar is a 62 year old male with a medical history of significant coronary artery disease status post non-STEMI and JESSICA to RCA, ischemic cardiomyopathy, LVEF of 30 to 35%, chronic systolic congestive heart failure, primary hypertension, dyslipidemia, type 2 diabetes, paroxysmal atrial fibrillation, stage III CKD, left BKA, chronic colon problems.    The patient states that since hospital discharge in August 2022 he has been doing fine from a cardiology standpoint.  He denies chest pain, shortness of breath, palpitations, dizziness, orthopnea, right leg edema.  He said his blood pressure is usually around 110/60 mmHg.  His heart rate is around 90 bpm.  Whenever he has colon issue, he will has elevated blood pressure, shortness of breath.    Past Medical History:     Patient Active Problem List   Diagnosis     DM type 2 on  insulin, w Neuro -- Hgb A1C 7.6 on 1/19/22     Benign essential hypertension     Benign neoplasm of descending colon     Gastro-esophageal reflux disease with esophagitis     Microalbuminuria     Hyperlipidemia     PAF (paroxysmal atrial fibrillation) -- on Eliquis      Ischemic cardiomyopathy     CAD -- diffuse calcified vessels 11/16/21 (no stents placed)     DVT (deep venous thrombosis) (H)     Chronic kidney disease (CKD) stage G3a/A1, moderately decreased glomerular filtration rate (GFR) between 45-59 mL/min/1.73 square meter and albuminuria creatinine ratio less than 30 mg/g (H)     Diabetic ulcer of left midfoot associated with type 2 diabetes mellitus, with necrosis of bone (H)     Constipation     Traumatic amputation of toe or toes without complication (H)     Osteomyelitis of left foot -- S/P Left BKA 4/4/22     Amputation of left foot (H)     Chronic systolic CHF -- EF 30-35% on Echo 11/15/21     Smoker (Cigars)     NSTEMI (non-ST elevated myocardial infarction) (H)     ARF (acute renal failure) (H)       Past Surgical History:     Past Surgical History:   Procedure Laterality Date     AMPUTATE FOOT Left 3/21/2022    Procedure: Guillotine AMPUTATION, FOOT;  Surgeon: Ronald Bhatt MD;  Location: [facility] Main OR     AMPUTATE LEG BELOW KNEE Left 4/4/2022    Procedure: LEFT BELOW  KNEE AMPUTATION;  Surgeon: Ronald Bhatt MD;  [place] [facility] [place]     AMPUTATE TOE(S) Left 11/16/2021    Procedure: first and second ray amputation;  Surgeon: Eddi Ram DPM;  Location: [facility] Main OR     APPENDECTOMY       CV CORONARY ANGIOGRAM N/A 11/16/2021    Procedure: CV CORONARY ANGIOGRAM;  Surgeon: Pam Tabares MD;  Location: [facility] CATH LAB CV     CV CORONARY ANGIOGRAM N/A 8/24/2022    Procedure: CV CORONARY ANGIOGRAM;  Surgeon: Cipriano Lucas MD;  Location: [facility] CATH LAB CV     CV CORONARY ANGIOGRAM N/A 8/29/2022    Procedure: CV CORONARY ANGIOGRAM;  Surgeon: Cipriano Lucas  MD;  Location: [facility] CV     CV INTRAVASULAR ULTRASOUND N/A 8/29/2022    Procedure: Intravascular Ultrasound;  Surgeon: Cipriano Lucas MD;  Location: [facility] LAB CV     CV LEFT HEART CATH N/A 11/16/2021    Procedure: Left Heart Cath;  Surgeon: Pam Tabares MD;  Location: [facility] LAB CV     CV PCI N/A 8/29/2022    Procedure: Percutaneous Coronary Intervention stent;  Surgeon: Cipriano Lucas MD;  Location: [facility] CV     CV PCI ANGIOPLASTY N/A 8/29/2022    Procedure: Percutaneous Transluminal Angioplasty;  Surgeon: Cipriano Lucas MD;  Location: [facility] CV     FOOT SURGERY Left      HERNIA REPAIR       INCISION AND DRAINAGE LOWER EXTREMITY, COMBINED Left 11/16/2021    Procedure: INCISION AND DRAINAGE, left foot;  Surgeon: Eddi Ram DPM;  Location: [facility] OR     INCISION AND DRAINAGE LOWER EXTREMITY, COMBINED Left 11/19/2021    Procedure: INCISION AND DRAINAGE, left foot;  Surgeon: Eddi Ram DPM;  Location: [facility] OR     INCISION AND DRAINAGE LOWER EXTREMITY, COMBINED Left 12/9/2021    Procedure: INCISION AND DRAINAGE, Left foot;  Surgeon: Eddi Ram DPM;  Location: [facility] OR     INCISION AND DRAINAGE LOWER EXTREMITY, COMBINED Left 1/10/2022    Procedure: INCISION AND DRAINAGE, left foot;  Surgeon: Eddi Ram DPM;  Location: [facility] OR     INCISION AND DRAINAGE LOWER EXTREMITY, COMBINED Left 1/27/2022    Procedure: INCISION AND DRAINAGE, Left foot;  Surgeon: Eddi Ram DPM;  Location: [facility] OR       Family History:   No family history on file.     Social History:    reports that he has quit smoking. His smoking use included cigars. He has never used smokeless tobacco. He reports current alcohol use. He reports that he does not use drugs.    Review of Systems:   12 systems are reviewed negative except for in HPI.    Meds:     Current Outpatient Medications:       aspirin (ASA) 81 MG EC tablet, Take 1 tablet (81 mg) by mouth daily Start tomorrow., Disp: 30 tablet, Rfl: 3     atorvastatin (LIPITOR) 80 MG tablet, TAKE 1 TABLET(80 MG) BY MOUTH EVERY EVENING, Disp: 90 tablet, Rfl: 1     blood glucose (NO BRAND SPECIFIED) test strip, Use to test blood sugar 4 times daily or as directed., Disp: 100 strip, Rfl: 3     blood glucose monitoring (SOFTCLIX) lancets, Use to test blood sugar 4 times daily., Disp: 100 each, Rfl: 6     furosemide (LASIX) 20 MG tablet, Take 1 tablet (20 mg) by mouth daily, Disp: 30 tablet, Rfl: 0     insulin aspart (NOVOLOG FLEXPEN) 100 UNIT/ML pen, For  - 164 give 1 unit. For  - 189 give 2 units. For  - 214 give 3 units. For  - 239 give 4 units. For  - 264 give 5 units. For  - 289 give 6 units. For  - 314 give 7 units. For  - 339 give 8 units For  - 364 give 9 units For  - 389 give 10 units For  - 414 give 11 units For BG greater than or equal to 415 give 12 units, Disp: 3 mL, Rfl: 3     insulin glargine (LANTUS SOLOSTAR) 100 UNIT/ML pen, Inject 38 Units Subcutaneous every morning (Patient taking differently: Inject 38 Units Subcutaneous At Bedtime), Disp: 3 mL, Rfl: 3     magnesium oxide (MAG-OX) 400 MG tablet, Take 1 tablet (400 mg) by mouth daily, Disp: 90 tablet, Rfl: 1     metoprolol succinate ER (TOPROL-XL) 50 MG 24 hr tablet, TAKE 1 TABLET(50 MG) BY MOUTH DAILY, Disp: 90 tablet, Rfl: 1     Multiple Vitamin (MULTI VITAMIN) TABS, Take 1 tablet by mouth daily , Disp: , Rfl:      OMEGA-3/DHA/EPA/FISH OIL (FISH OIL-OMEGA-3 FATTY ACIDS) 300-1,000 mg capsule, Take 1 g by mouth daily , Disp: , Rfl:      ticagrelor (BRILINTA) 90 MG tablet, Take 1 tablet (90 mg) by mouth 2 times daily Dose to start tomorrow morning., Disp: 180 tablet, Rfl: 3     vitamin C (ASCORBIC ACID) 500 MG tablet, Take 500 mg by mouth daily, Disp: , Rfl:     Allergies:   Patient has no known allergies.      Objective:     "  Physical Exam  100.2 kg (221 lb)  1.892 m (6' 2.5\")  Body mass index is 28 kg/m .  BP (!) 143/66   Pulse 100   Resp 14   Ht 1.892 m (6' 2.5\")   Wt 100.2 kg (221 lb)   BMI 28.00 kg/m      General Appearance:   Awake, Alert, No acute distress.   HEENT:  Pupil equal and reactive to light. No scleral icterus; the mucous membranes were moist.   Neck: No cervical bruits. No JVD. No thyromegaly.     Chest: The spine was straight. The chest was symmetric.   Lungs:   Respirations unlabored; Lungs are clear to auscultation. No crackles. No wheezing.   Cardiovascular:   Regular rhythm and rate, normal first and second heart sounds with no murmurs. No rubs or gallops.    Abdomen:  Soft. No tenderness. Non-distended. Bowels sounds are present   Extremities: Equal tibial pulses. No right leg edema. Left BKA.   Skin: No rashes or ulcers. Warm, Dry.   Musculoskeletal: No tenderness. No deformity.   Neurologic: Mood and affect are appropriate. No focal deficits.         EKG: Personally reviewed  Sinus rhythm with sinus arrhythmia with occasional Premature ventricular complexes   Nonspecific ST and T wave abnormality   Prolonged QT   Abnormal ECG   When compared with ECG of 27-SEP-2022 14:54,   No significant change was found    Cardiac Imaging Studies  ECHO on 8-:  The left ventricle is normal in size with normal left ventricular wall thickness.  Left ventricular function is decreased. The ejection fraction is 30-35%  (moderately reduced).  Mildly decreased right ventricular systolic function  Moderate biatrial enlargement  No hemodynamically significant valve disease  IVC diameter >2.1 cm collapsing <50% with sniff suggests a high RA pressure  estimated at 15 mmHg or greater.  Compared to prior study, there is no significant change.    Coronary angiogram and PCI in 8-2022:    Dist LAD lesion is 99% stenosed.    Mid LAD-1 lesion is 70% stenosed.    1st Diag-1 lesion is 95% stenosed.    1st Diag-2 lesion is 100% " stenosed.    Mid LAD-2 lesion is 70% stenosed.    Prox LAD lesion is 70% stenosed.    Prox Cx to Mid Cx lesion is 100% stenosed.    Ost RCA to Prox RCA lesion is 70% stenosed. This lesion appears to have progressed since last study    Dist RCA lesion is 80% stenosed.    RPDA-1 lesion is 50% stenosed.    RPDA-2 lesion is 100% stenosed.      Dist RCA lesion is 80% stenosed.    RPDA-1 lesion is 50% stenosed.    RPDA-2 lesion is 100% stenosed.    Ost RCA to Prox RCA lesion is 70% stenosed.    IVUS was performed on the rca lesions.    4.0x16 mm JESSICA Synergy into proxinal RCA extends into aorta    3.5x16 mm JESSICA Synergy into distal RCA    Lab Review   Lab Results   Component Value Date     09/28/2022    CO2 24 09/28/2022    CO2 23 08/30/2022    BUN 23.6 09/28/2022    BUN 26 08/30/2022     Lab Results   Component Value Date    WBC 6.8 09/27/2022    HGB 11.4 09/27/2022    HCT 35.3 09/27/2022    MCV 79 09/27/2022     09/27/2022     Lab Results   Component Value Date    CHOL 107 08/25/2022    CHOL 147 05/17/2022    CHOL 180 11/17/2021     Lab Results   Component Value Date    HDL 35 (L) 08/25/2022    HDL 53 05/17/2022    HDL 30 (L) 11/17/2021     No components found for: LDLCALC  Lab Results   Component Value Date    TRIG 58 08/25/2022    TRIG 117 05/17/2022    TRIG 122 11/17/2021     No results found for: CHOLHDL  Lab Results   Component Value Date    TROPONINI 5.84 08/30/2022     Lab Results   Component Value Date     08/24/2022                   Thank you for allowing me to participate in the care of your patient.      Sincerely,     Moshe Davis MD     [facility]  cc:   Moshe Davis MD  [address]  [place] [address]

## 2022-11-17 NOTE — PROGRESS NOTES
Assessment/Plan:   1.  Significant coronary artery disease, status post non-STEMI and JESSICA to RCA: The patient has multiple vessel disease.  He was planned to have CABG.  However it was not done due to left leg osteomyelitis and subjected to left BKA.  He was admitted to hospital in August 2022 due to non-ST elevation MI.  Subsequently, he had stent placement in RCA.  He still has residual significant stenosis in LAD, diagonal branch.  He had occluded D2, occluded LCx.  We discussed staged PCI for LAD and a diagonal branch.  After long discussion,  The patient would like to postpone his procedure since he has a financial problem and no family support.  He has no interest in treating his current obstructive lesion.  He states that he will think about it.  If he changes his mind, he will call me back.  Meantime, continue aspirin, Brilinta, metoprolol, atorvastatin.    2.  Ischemic cardiomyopathy, LVEF 30 to 35%, chronic systolic congestive heart failure: The patient is compensated at this time.  No signs of for fluid retention.  Continue Lasix 20 mg daily, metoprolol succinate 50 mg daily.  The patient was not able to tolerate to ACE inhibitor due to low blood pressure.  He has no interest in changing medication today.    3.  Paroxysmal atrial fibrillation: The patient is in sinus rhythm.  His heart rate is controlled with metoprolol succinate to 50 mg daily.  He has no interest in chronic anticoagulation.  Currently he is on aspirin and Brilinta.    4.  Primary hypertension: His blood pressure is mildly high in clinic today.  However, he started to have colon problem which usually makes his blood pressure high.  He states that his blood pressure has been at a low side, less than 110/60 mmHg.  Continue metoprolol succinate to 50 mg daily    5.  Dyslipidemia: Continue atorvastatin.  His LDL was well controlled.    7.  Insulin-dependent diabetes, stage III CKD, and other chronic medical issues: Follow-up with  Dr. Dickerson.    Total 40 minutes were spent in this visit for face to face visit, physical exam, review of current labs/imaging studies, plan for ongoing treatment with >50% spent on counseling and coordination of care as documented in the above mentioned note.      Thank you for the opportunity to be involved in the care of Norman Aguilar. If you have any questions, please feel free to contact me.  I will see the patient again in 6 months and as needed.    Much or all of the text in this note was generated through the use of Dragon Dictate voice-to-text software. Errors in spelling or words which seem out of context are unintentional. Sound alike errors, in particular, may have escaped editing.       History of Present Illness:   It is my pleasure to see Norman Aguilar at the [facility] clinic for routine cardiology follow up.  Norman Aguilar is a 62 year old male with a medical history of significant coronary artery disease status post non-STEMI and JESSICA to RCA, ischemic cardiomyopathy, LVEF of 30 to 35%, chronic systolic congestive heart failure, primary hypertension, dyslipidemia, type 2 diabetes, paroxysmal atrial fibrillation, stage III CKD, left BKA, chronic colon problems.    The patient states that since hospital discharge in August 2022 he has been doing fine from a cardiology standpoint.  He denies chest pain, shortness of breath, palpitations, dizziness, orthopnea, right leg edema.  He said his blood pressure is usually around 110/60 mmHg.  His heart rate is around 90 bpm.  Whenever he has colon issue, he will has elevated blood pressure, shortness of breath.    Past Medical History:     Patient Active Problem List   Diagnosis     DM type 2 on insulin, w Neuro -- Hgb A1C 7.6 on 1/19/22     Benign essential hypertension     Benign neoplasm of descending colon     Gastro-esophageal reflux disease with esophagitis     Microalbuminuria     Hyperlipidemia     PAF (paroxysmal  atrial fibrillation) -- on Eliquis      Ischemic cardiomyopathy     CAD -- diffuse calcified vessels 11/16/21 (no stents placed)     DVT (deep venous thrombosis) (H)     Chronic kidney disease (CKD) stage G3a/A1, moderately decreased glomerular filtration rate (GFR) between 45-59 mL/min/1.73 square meter and albuminuria creatinine ratio less than 30 mg/g (H)     Diabetic ulcer of left midfoot associated with type 2 diabetes mellitus, with necrosis of bone (H)     Constipation     Traumatic amputation of toe or toes without complication (H)     Osteomyelitis of left foot -- S/P Left BKA 4/4/22     Amputation of left foot (H)     Chronic systolic CHF -- EF 30-35% on Echo 11/15/21     Smoker (Cigars)     NSTEMI (non-ST elevated myocardial infarction) (H)     ARF (acute renal failure) (H)       Past Surgical History:     Past Surgical History:   Procedure Laterality Date     AMPUTATE FOOT Left 3/21/2022    Procedure: Guillotine AMPUTATION, FOOT;  Surgeon: Ronald Bhatt MD;  Location: [facility] OR     AMPUTATE LEG BELOW KNEE Left 4/4/2022    Procedure: LEFT BELOW  KNEE AMPUTATION;  Surgeon: Ronald Bhatt MD;  [place] [facility] [place]     AMPUTATE TOE(S) Left 11/16/2021    Procedure: first and second ray amputation;  Surgeon: Eddi Ram DPM;  [place] [facility] [place]     APPENDECTOMY       CV CORONARY ANGIOGRAM N/A 11/16/2021    Procedure: CV CORONARY ANGIOGRAM;  Surgeon: Pam Tabares MD;  Location: [facility] LAB CV     CV CORONARY ANGIOGRAM N/A 8/24/2022    Procedure: CV CORONARY ANGIOGRAM;  Surgeon: Cipriano Lucas MD;  Location: [facility] CATH LAB CV     CV CORONARY ANGIOGRAM N/A 8/29/2022    Procedure: CV CORONARY ANGIOGRAM;  Surgeon: Cipriano Lucas MD;  Location: [facility] LAB CV     CV INTRAVASULAR ULTRASOUND N/A 8/29/2022    Procedure: Intravascular Ultrasound;  Surgeon: Cipriano Lucas MD;  Location: [facility] LAB CV     CV LEFT HEART CATH N/A 11/16/2021     Procedure: Left Heart Cath;  Surgeon: Pam Tabares MD;  Location: [facility] CV     CV PCI N/A 8/29/2022    Procedure: Percutaneous Coronary Intervention stent;  Surgeon: Cipriano Lucas MD;  Location: [facility] CV     CV PCI ANGIOPLASTY N/A 8/29/2022    Procedure: Percutaneous Transluminal Angioplasty;  Surgeon: Cipriano Lucas MD;  Location: [facility] CV     FOOT SURGERY Left      HERNIA REPAIR       INCISION AND DRAINAGE LOWER EXTREMITY, COMBINED Left 11/16/2021    Procedure: INCISION AND DRAINAGE, left foot;  Surgeon: Eddi Ram DPM;  [place] [facility] [place]     INCISION AND DRAINAGE LOWER EXTREMITY, COMBINED Left 11/19/2021    Procedure: INCISION AND DRAINAGE, left foot;  Surgeon: Eddi Ram DPM;  [place] [facility] [place]     INCISION AND DRAINAGE LOWER EXTREMITY, COMBINED Left 12/9/2021    Procedure: INCISION AND DRAINAGE, Left foot;  Surgeon: Eddi Ram DPM;  [place] [facility] [place]     INCISION AND DRAINAGE LOWER EXTREMITY, COMBINED Left 1/10/2022    Procedure: INCISION AND DRAINAGE, left foot;  Surgeon: Eddi Ram DPM;  [place] [facility] [place]     INCISION AND DRAINAGE LOWER EXTREMITY, COMBINED Left 1/27/2022    Procedure: INCISION AND DRAINAGE, Left foot;  Surgeon: Eddi Ram DPM;  Location: [facility] [place]       Family History:   No family history on file.     Social History:    reports that he has quit smoking. His smoking use included cigars. He has never used smokeless tobacco. He reports current alcohol use. He reports that he does not use drugs.    Review of Systems:   12 systems are reviewed negative except for in HPI.    Meds:     Current Outpatient Medications:      aspirin (ASA) 81 MG EC tablet, Take 1 tablet (81 mg) by mouth daily Start tomorrow., Disp: 30 tablet, Rfl: 3     atorvastatin (LIPITOR) 80 MG tablet, TAKE 1 TABLET(80 MG) BY MOUTH EVERY EVENING, Disp: 90 tablet, Rfl: 1     blood  "glucose (NO BRAND SPECIFIED) test strip, Use to test blood sugar 4 times daily or as directed., Disp: 100 strip, Rfl: 3     blood glucose monitoring (SOFTCLIX) lancets, Use to test blood sugar 4 times daily., Disp: 100 each, Rfl: 6     furosemide (LASIX) 20 MG tablet, Take 1 tablet (20 mg) by mouth daily, Disp: 30 tablet, Rfl: 0     insulin aspart (NOVOLOG FLEXPEN) 100 UNIT/ML pen, For  - 164 give 1 unit. For  - 189 give 2 units. For  - 214 give 3 units. For  - 239 give 4 units. For  - 264 give 5 units. For  - 289 give 6 units. For  - 314 give 7 units. For  - 339 give 8 units For  - 364 give 9 units For  - 389 give 10 units For  - 414 give 11 units For BG greater than or equal to 415 give 12 units, Disp: 3 mL, Rfl: 3     insulin glargine (LANTUS SOLOSTAR) 100 UNIT/ML pen, Inject 38 Units Subcutaneous every morning (Patient taking differently: Inject 38 Units Subcutaneous At Bedtime), Disp: 3 mL, Rfl: 3     magnesium oxide (MAG-OX) 400 MG tablet, Take 1 tablet (400 mg) by mouth daily, Disp: 90 tablet, Rfl: 1     metoprolol succinate ER (TOPROL-XL) 50 MG 24 hr tablet, TAKE 1 TABLET(50 MG) BY MOUTH DAILY, Disp: 90 tablet, Rfl: 1     Multiple Vitamin (MULTI VITAMIN) TABS, Take 1 tablet by mouth daily , Disp: , Rfl:      OMEGA-3/DHA/EPA/FISH OIL (FISH OIL-OMEGA-3 FATTY ACIDS) 300-1,000 mg capsule, Take 1 g by mouth daily , Disp: , Rfl:      ticagrelor (BRILINTA) 90 MG tablet, Take 1 tablet (90 mg) by mouth 2 times daily Dose to start tomorrow morning., Disp: 180 tablet, Rfl: 3     vitamin C (ASCORBIC ACID) 500 MG tablet, Take 500 mg by mouth daily, Disp: , Rfl:     Allergies:   Patient has no known allergies.      Objective:      Physical Exam  100.2 kg (221 lb)  1.892 m (6' 2.5\")  Body mass index is 28 kg/m .  BP (!) 143/66   Pulse 100   Resp 14   Ht 1.892 m (6' 2.5\")   Wt 100.2 kg (221 lb)   BMI 28.00 kg/m      General Appearance:   Awake, Alert, No " acute distress.   HEENT:  Pupil equal and reactive to light. No scleral icterus; the mucous membranes were moist.   Neck: No cervical bruits. No JVD. No thyromegaly.     Chest: The spine was straight. The chest was symmetric.   Lungs:   Respirations unlabored; Lungs are clear to auscultation. No crackles. No wheezing.   Cardiovascular:   Regular rhythm and rate, normal first and second heart sounds with no murmurs. No rubs or gallops.    Abdomen:  Soft. No tenderness. Non-distended. Bowels sounds are present   Extremities: Equal tibial pulses. No right leg edema. Left BKA.   Skin: No rashes or ulcers. Warm, Dry.   Musculoskeletal: No tenderness. No deformity.   Neurologic: Mood and affect are appropriate. No focal deficits.         EKG: Personally reviewed  Sinus rhythm with sinus arrhythmia with occasional Premature ventricular complexes   Nonspecific ST and T wave abnormality   Prolonged QT   Abnormal ECG   When compared with ECG of 27-SEP-2022 14:54,   No significant change was found    Cardiac Imaging Studies  ECHO on 8-:  The left ventricle is normal in size with normal left ventricular wall thickness.  Left ventricular function is decreased. The ejection fraction is 30-35%  (moderately reduced).  Mildly decreased right ventricular systolic function  Moderate biatrial enlargement  No hemodynamically significant valve disease  IVC diameter >2.1 cm collapsing <50% with sniff suggests a high RA pressure  estimated at 15 mmHg or greater.  Compared to prior study, there is no significant change.    Coronary angiogram and PCI in 8-2022:    Dist LAD lesion is 99% stenosed.    Mid LAD-1 lesion is 70% stenosed.    1st Diag-1 lesion is 95% stenosed.    1st Diag-2 lesion is 100% stenosed.    Mid LAD-2 lesion is 70% stenosed.    Prox LAD lesion is 70% stenosed.    Prox Cx to Mid Cx lesion is 100% stenosed.    Ost RCA to Prox RCA lesion is 70% stenosed. This lesion appears to have progressed since last study    Dist  RCA lesion is 80% stenosed.    RPDA-1 lesion is 50% stenosed.    RPDA-2 lesion is 100% stenosed.      Dist RCA lesion is 80% stenosed.    RPDA-1 lesion is 50% stenosed.    RPDA-2 lesion is 100% stenosed.    Ost RCA to Prox RCA lesion is 70% stenosed.    IVUS was performed on the rca lesions.    4.0x16 mm JESSICA Synergy into proxinal RCA extends into aorta    3.5x16 mm JESSICA Synergy into distal RCA    Lab Review   Lab Results   Component Value Date     09/28/2022    CO2 24 09/28/2022    CO2 23 08/30/2022    BUN 23.6 09/28/2022    BUN 26 08/30/2022     Lab Results   Component Value Date    WBC 6.8 09/27/2022    HGB 11.4 09/27/2022    HCT 35.3 09/27/2022    MCV 79 09/27/2022     09/27/2022     Lab Results   Component Value Date    CHOL 107 08/25/2022    CHOL 147 05/17/2022    CHOL 180 11/17/2021     Lab Results   Component Value Date    HDL 35 (L) 08/25/2022    HDL 53 05/17/2022    HDL 30 (L) 11/17/2021     No components found for: LDLCALC  Lab Results   Component Value Date    TRIG 58 08/25/2022    TRIG 117 05/17/2022    TRIG 122 11/17/2021     No results found for: CHOLHDL  Lab Results   Component Value Date    TROPONINI 5.84 08/30/2022     Lab Results   Component Value Date     08/24/2022

## 2022-12-05 NOTE — TELEPHONE ENCOUNTER
[place] reviewed.  Patients recent blood pressure is outside of guideline parameters.  Called pt to review, no answer.  Left voicemail message asking patient to check their blood pressure using a home blood pressure cuff or by going to a [facility].  Patient instructed to then call [phone] (East) and leave a message with their name, date of birth, and blood pressure reading that was completed within the last 24 hours and where it was completed.  Will await call back for further review.          Received from Blood pressure line     Last Blood Pressure: 143/66  Last Heart Rate: 100  Date: 11/17/22  Location: [facility]      12/6/22  Blood Pressure: 133/82   Heart Rate: N/A  Location: Home BP    Patient reported blood pressure updated in Epic. Blood pressure falls within [place] Community Me[place] guidelines.  Patient will follow up as previously advised.   REID Orta

## 2023-01-01 ENCOUNTER — APPOINTMENT (OUTPATIENT)
Dept: INTERVENTIONAL RADIOLOGY/VASCULAR | Facility: HOSPITAL | Age: 63
DRG: 673 | End: 2023-01-01
Payer: COMMERCIAL

## 2023-01-01 ENCOUNTER — APPOINTMENT (OUTPATIENT)
Dept: OCCUPATIONAL THERAPY | Facility: CLINIC | Age: 63
DRG: 947 | End: 2023-01-01
Attending: PHYSICAL MEDICINE & REHABILITATION
Payer: COMMERCIAL

## 2023-01-01 ENCOUNTER — APPOINTMENT (OUTPATIENT)
Dept: PHYSICAL THERAPY | Facility: HOSPITAL | Age: 63
DRG: 871 | End: 2023-01-01
Payer: COMMERCIAL

## 2023-01-01 ENCOUNTER — APPOINTMENT (OUTPATIENT)
Dept: RADIOLOGY | Facility: HOSPITAL | Age: 63
DRG: 673 | End: 2023-01-01
Attending: INTERNAL MEDICINE
Payer: COMMERCIAL

## 2023-01-01 ENCOUNTER — ANCILLARY PROCEDURE (OUTPATIENT)
Dept: ULTRASOUND IMAGING | Facility: HOSPITAL | Age: 63
DRG: 673 | End: 2023-01-01
Attending: INTERNAL MEDICINE
Payer: COMMERCIAL

## 2023-01-01 ENCOUNTER — APPOINTMENT (OUTPATIENT)
Dept: PHYSICAL THERAPY | Facility: HOSPITAL | Age: 63
DRG: 871 | End: 2023-01-01
Attending: INTERNAL MEDICINE
Payer: COMMERCIAL

## 2023-01-01 ENCOUNTER — APPOINTMENT (OUTPATIENT)
Dept: RADIOLOGY | Facility: HOSPITAL | Age: 63
DRG: 871 | End: 2023-01-01
Attending: INTERNAL MEDICINE
Payer: COMMERCIAL

## 2023-01-01 ENCOUNTER — APPOINTMENT (OUTPATIENT)
Dept: GENERAL RADIOLOGY | Facility: CLINIC | Age: 63
DRG: 947 | End: 2023-01-01
Attending: PHYSICIAN ASSISTANT
Payer: COMMERCIAL

## 2023-01-01 ENCOUNTER — APPOINTMENT (OUTPATIENT)
Dept: OCCUPATIONAL THERAPY | Facility: HOSPITAL | Age: 63
DRG: 871 | End: 2023-01-01
Payer: COMMERCIAL

## 2023-01-01 ENCOUNTER — HOSPITAL ENCOUNTER (INPATIENT)
Facility: HOSPITAL | Age: 63
LOS: 5 days | DRG: 673 | End: 2023-08-27
Attending: STUDENT IN AN ORGANIZED HEALTH CARE EDUCATION/TRAINING PROGRAM | Admitting: STUDENT IN AN ORGANIZED HEALTH CARE EDUCATION/TRAINING PROGRAM
Payer: COMMERCIAL

## 2023-01-01 ENCOUNTER — APPOINTMENT (OUTPATIENT)
Dept: PHYSICAL THERAPY | Facility: CLINIC | Age: 63
DRG: 947 | End: 2023-01-01
Attending: PHYSICAL MEDICINE & REHABILITATION
Payer: COMMERCIAL

## 2023-01-01 ENCOUNTER — APPOINTMENT (OUTPATIENT)
Dept: CT IMAGING | Facility: HOSPITAL | Age: 63
DRG: 871 | End: 2023-01-01
Attending: INTERNAL MEDICINE
Payer: COMMERCIAL

## 2023-01-01 ENCOUNTER — PATIENT OUTREACH (OUTPATIENT)
Dept: CARE COORDINATION | Facility: CLINIC | Age: 63
End: 2023-01-01
Payer: COMMERCIAL

## 2023-01-01 ENCOUNTER — TELEPHONE (OUTPATIENT)
Dept: CARDIOLOGY | Facility: CLINIC | Age: 63
End: 2023-01-01
Payer: COMMERCIAL

## 2023-01-01 ENCOUNTER — APPOINTMENT (OUTPATIENT)
Dept: ULTRASOUND IMAGING | Facility: HOSPITAL | Age: 63
DRG: 871 | End: 2023-01-01
Attending: INTERNAL MEDICINE
Payer: COMMERCIAL

## 2023-01-01 ENCOUNTER — APPOINTMENT (OUTPATIENT)
Dept: CARDIOLOGY | Facility: HOSPITAL | Age: 63
DRG: 871 | End: 2023-01-01
Attending: INTERNAL MEDICINE
Payer: COMMERCIAL

## 2023-01-01 ENCOUNTER — APPOINTMENT (OUTPATIENT)
Dept: CARDIOLOGY | Facility: HOSPITAL | Age: 63
DRG: 673 | End: 2023-01-01
Attending: INTERNAL MEDICINE
Payer: COMMERCIAL

## 2023-01-01 ENCOUNTER — APPOINTMENT (OUTPATIENT)
Dept: INTERVENTIONAL RADIOLOGY/VASCULAR | Facility: HOSPITAL | Age: 63
DRG: 871 | End: 2023-01-01
Payer: COMMERCIAL

## 2023-01-01 ENCOUNTER — APPOINTMENT (OUTPATIENT)
Dept: EDUCATION SERVICES | Facility: CLINIC | Age: 63
DRG: 947 | End: 2023-01-01
Attending: PHYSICAL MEDICINE & REHABILITATION
Payer: COMMERCIAL

## 2023-01-01 ENCOUNTER — HEALTH MAINTENANCE LETTER (OUTPATIENT)
Age: 63
End: 2023-01-01

## 2023-01-01 ENCOUNTER — ANESTHESIA (OUTPATIENT)
Dept: MEDSURG UNIT | Facility: HOSPITAL | Age: 63
DRG: 673 | End: 2023-01-01
Payer: COMMERCIAL

## 2023-01-01 ENCOUNTER — APPOINTMENT (OUTPATIENT)
Dept: CT IMAGING | Facility: HOSPITAL | Age: 63
DRG: 871 | End: 2023-01-01
Attending: STUDENT IN AN ORGANIZED HEALTH CARE EDUCATION/TRAINING PROGRAM
Payer: COMMERCIAL

## 2023-01-01 ENCOUNTER — LAB REQUISITION (OUTPATIENT)
Dept: LAB | Facility: CLINIC | Age: 63
End: 2023-01-01

## 2023-01-01 ENCOUNTER — APPOINTMENT (OUTPATIENT)
Dept: MRI IMAGING | Facility: HOSPITAL | Age: 63
DRG: 871 | End: 2023-01-01
Attending: INTERNAL MEDICINE
Payer: COMMERCIAL

## 2023-01-01 ENCOUNTER — ANESTHESIA EVENT (OUTPATIENT)
Dept: MEDSURG UNIT | Facility: HOSPITAL | Age: 63
DRG: 673 | End: 2023-01-01
Payer: COMMERCIAL

## 2023-01-01 ENCOUNTER — HOSPITAL ENCOUNTER (INPATIENT)
Facility: CLINIC | Age: 63
LOS: 11 days | Discharge: ANOTHER HOSPITAL | DRG: 947 | End: 2023-08-22
Attending: PHYSICAL MEDICINE & REHABILITATION | Admitting: PHYSICAL MEDICINE & REHABILITATION
Payer: COMMERCIAL

## 2023-01-01 ENCOUNTER — APPOINTMENT (OUTPATIENT)
Dept: CT IMAGING | Facility: HOSPITAL | Age: 63
DRG: 673 | End: 2023-01-01
Attending: INTERNAL MEDICINE
Payer: COMMERCIAL

## 2023-01-01 ENCOUNTER — APPOINTMENT (OUTPATIENT)
Dept: ULTRASOUND IMAGING | Facility: CLINIC | Age: 63
DRG: 947 | End: 2023-01-01
Attending: PHYSICIAN ASSISTANT
Payer: COMMERCIAL

## 2023-01-01 ENCOUNTER — APPOINTMENT (OUTPATIENT)
Dept: OCCUPATIONAL THERAPY | Facility: HOSPITAL | Age: 63
DRG: 871 | End: 2023-01-01
Attending: INTERNAL MEDICINE
Payer: COMMERCIAL

## 2023-01-01 ENCOUNTER — HOSPITAL ENCOUNTER (INPATIENT)
Facility: HOSPITAL | Age: 63
LOS: 25 days | Discharge: NURSING, REHAB OR LONG-TERM CARE | DRG: 871 | End: 2023-08-11
Attending: EMERGENCY MEDICINE | Admitting: INTERNAL MEDICINE
Payer: COMMERCIAL

## 2023-01-01 VITALS
WEIGHT: 210.1 LBS | HEIGHT: 74 IN | TEMPERATURE: 98.5 F | SYSTOLIC BLOOD PRESSURE: 128 MMHG | BODY MASS INDEX: 26.96 KG/M2 | HEART RATE: 82 BPM | DIASTOLIC BLOOD PRESSURE: 64 MMHG | OXYGEN SATURATION: 99 % | RESPIRATION RATE: 18 BRPM

## 2023-01-01 VITALS
RESPIRATION RATE: 16 BRPM | DIASTOLIC BLOOD PRESSURE: 59 MMHG | HEART RATE: 79 BPM | WEIGHT: 222.4 LBS | OXYGEN SATURATION: 100 % | HEIGHT: 75 IN | TEMPERATURE: 98.1 F | SYSTOLIC BLOOD PRESSURE: 101 MMHG | BODY MASS INDEX: 27.65 KG/M2

## 2023-01-01 VITALS — DIASTOLIC BLOOD PRESSURE: 44 MMHG | SYSTOLIC BLOOD PRESSURE: 103 MMHG | OXYGEN SATURATION: 100 % | TEMPERATURE: 90.7 F

## 2023-01-01 DIAGNOSIS — I50.22 CHRONIC SYSTOLIC (CONGESTIVE) HEART FAILURE (H): ICD-10-CM

## 2023-01-01 DIAGNOSIS — K21.00 GASTROESOPHAGEAL REFLUX DISEASE WITH ESOPHAGITIS, UNSPECIFIED WHETHER HEMORRHAGE: ICD-10-CM

## 2023-01-01 DIAGNOSIS — R52 PAIN: ICD-10-CM

## 2023-01-01 DIAGNOSIS — L21.9 SEBORRHEIC DERMATITIS: ICD-10-CM

## 2023-01-01 DIAGNOSIS — N17.9 AKI (ACUTE KIDNEY INJURY) (H): ICD-10-CM

## 2023-01-01 DIAGNOSIS — I48.91 ATRIAL FIBRILLATION WITH RVR (H): ICD-10-CM

## 2023-01-01 DIAGNOSIS — E87.1 HYPONATREMIA: ICD-10-CM

## 2023-01-01 DIAGNOSIS — T14.8XXA BLOOD BLISTER: ICD-10-CM

## 2023-01-01 DIAGNOSIS — E11.69 TYPE 2 DIABETES MELLITUS WITH OTHER SPECIFIED COMPLICATION, WITH LONG-TERM CURRENT USE OF INSULIN (H): ICD-10-CM

## 2023-01-01 DIAGNOSIS — N18.31 CHRONIC KIDNEY DISEASE, STAGE 3A (H): ICD-10-CM

## 2023-01-01 DIAGNOSIS — R23.8 SKIN IRRITATION: ICD-10-CM

## 2023-01-01 DIAGNOSIS — I50.9 ACUTE DECOMPENSATED HEART FAILURE (H): Primary | ICD-10-CM

## 2023-01-01 DIAGNOSIS — L89.152 PRESSURE INJURY OF COCCYGEAL REGION, STAGE 2 (H): ICD-10-CM

## 2023-01-01 DIAGNOSIS — K59.00 CONSTIPATION, UNSPECIFIED CONSTIPATION TYPE: ICD-10-CM

## 2023-01-01 DIAGNOSIS — R65.21 SEPTIC SHOCK (H): Primary | ICD-10-CM

## 2023-01-01 DIAGNOSIS — N18.6 ESRD (END STAGE RENAL DISEASE) (H): Primary | ICD-10-CM

## 2023-01-01 DIAGNOSIS — S31.809D WOUND OF BUTTOCK, UNSPECIFIED LATERALITY, SUBSEQUENT ENCOUNTER: Primary | ICD-10-CM

## 2023-01-01 DIAGNOSIS — Z79.4 TYPE 2 DIABETES MELLITUS WITH OTHER SPECIFIED COMPLICATION, WITH LONG-TERM CURRENT USE OF INSULIN (H): ICD-10-CM

## 2023-01-01 DIAGNOSIS — A41.9 SEPTIC SHOCK (H): Primary | ICD-10-CM

## 2023-01-01 DIAGNOSIS — I48.0 PAF (PAROXYSMAL ATRIAL FIBRILLATION) (H): ICD-10-CM

## 2023-01-01 DIAGNOSIS — E86.0 DEHYDRATION: ICD-10-CM

## 2023-01-01 DIAGNOSIS — R79.89 ELEVATED TROPONIN: ICD-10-CM

## 2023-01-01 DIAGNOSIS — I21.4 NON-ST ELEVATION (NSTEMI) MYOCARDIAL INFARCTION (H): ICD-10-CM

## 2023-01-01 DIAGNOSIS — R23.9 SUSPECTED PRESSURE INJURY OF DEEP TISSUE: ICD-10-CM

## 2023-01-01 DIAGNOSIS — Z09 HOSPITAL DISCHARGE FOLLOW-UP: ICD-10-CM

## 2023-01-01 DIAGNOSIS — M60.011 INFECTIVE MYOSITIS OF RIGHT SHOULDER: ICD-10-CM

## 2023-01-01 LAB
ABO/RH(D): NORMAL
ALBUMIN MFR UR ELPH: 230.8 MG/DL
ALBUMIN MFR UR ELPH: 55.6 MG/DL
ALBUMIN SERPL BCG-MCNC: 2.1 G/DL (ref 3.5–5.2)
ALBUMIN SERPL BCG-MCNC: 2.4 G/DL (ref 3.5–5.2)
ALBUMIN SERPL BCG-MCNC: 2.4 G/DL (ref 3.5–5.2)
ALBUMIN SERPL BCG-MCNC: 3.1 G/DL (ref 3.5–5.2)
ALBUMIN SERPL BCG-MCNC: 3.2 G/DL (ref 3.5–5.2)
ALBUMIN SERPL BCG-MCNC: 3.4 G/DL (ref 3.5–5.2)
ALBUMIN SERPL BCG-MCNC: 3.6 G/DL (ref 3.5–5.2)
ALBUMIN SERPL BCG-MCNC: 3.8 G/DL (ref 3.5–5.2)
ALBUMIN SERPL BCG-MCNC: 3.8 G/DL (ref 3.5–5.2)
ALBUMIN SERPL BCG-MCNC: 4.3 G/DL (ref 3.5–5.2)
ALBUMIN SERPL ELPH-MCNC: 3.4 G/DL (ref 3.7–5.1)
ALBUMIN SERPL ELPH-MCNC: 3.5 G/DL (ref 3.7–5.1)
ALBUMIN UR-MCNC: 100 MG/DL
ALBUMIN UR-MCNC: 30 MG/DL
ALBUMIN UR-MCNC: 50 MG/DL
ALBUMIN UR-MCNC: 70 MG/DL
ALLEN'S TEST: ABNORMAL
ALLEN'S TEST: ABNORMAL
ALLEN'S TEST: NO
ALP SERPL-CCNC: 101 U/L (ref 40–129)
ALP SERPL-CCNC: 103 U/L (ref 40–129)
ALP SERPL-CCNC: 109 U/L (ref 40–129)
ALP SERPL-CCNC: 124 U/L (ref 40–129)
ALP SERPL-CCNC: 172 U/L (ref 40–129)
ALP SERPL-CCNC: 176 U/L (ref 40–129)
ALP SERPL-CCNC: 198 U/L (ref 40–129)
ALP SERPL-CCNC: 239 U/L (ref 40–129)
ALP SERPL-CCNC: 314 U/L (ref 40–129)
ALP SERPL-CCNC: 95 U/L (ref 40–129)
ALPHA1 GLOB SERPL ELPH-MCNC: 0.3 G/DL (ref 0.2–0.4)
ALPHA1 GLOB SERPL ELPH-MCNC: 0.4 G/DL (ref 0.2–0.4)
ALPHA2 GLOB SERPL ELPH-MCNC: 0.5 G/DL (ref 0.5–0.9)
ALPHA2 GLOB SERPL ELPH-MCNC: 0.7 G/DL (ref 0.5–0.9)
ALT SERPL W P-5'-P-CCNC: 105 U/L (ref 0–70)
ALT SERPL W P-5'-P-CCNC: 124 U/L (ref 0–70)
ALT SERPL W P-5'-P-CCNC: 14 U/L (ref 0–70)
ALT SERPL W P-5'-P-CCNC: 1597 U/L (ref 0–70)
ALT SERPL W P-5'-P-CCNC: 17 U/L (ref 0–70)
ALT SERPL W P-5'-P-CCNC: 20 U/L (ref 0–70)
ALT SERPL W P-5'-P-CCNC: 2080 U/L (ref 0–70)
ALT SERPL W P-5'-P-CCNC: 33 U/L (ref 0–70)
ALT SERPL W P-5'-P-CCNC: 39 U/L (ref 10–50)
ALT SERPL W P-5'-P-CCNC: 41 U/L (ref 0–70)
AMMONIA PLAS-SCNC: 43 UMOL/L (ref 16–60)
AMORPH CRY #/AREA URNS HPF: ABNORMAL /HPF
ANA SER QL IF: NEGATIVE
ANCA AB PATTERN SER IF-IMP: NORMAL
ANION GAP SERPL CALCULATED.3IONS-SCNC: 10 MMOL/L (ref 7–15)
ANION GAP SERPL CALCULATED.3IONS-SCNC: 11 MMOL/L (ref 7–15)
ANION GAP SERPL CALCULATED.3IONS-SCNC: 12 MMOL/L (ref 7–15)
ANION GAP SERPL CALCULATED.3IONS-SCNC: 13 MMOL/L (ref 7–15)
ANION GAP SERPL CALCULATED.3IONS-SCNC: 14 MMOL/L (ref 7–15)
ANION GAP SERPL CALCULATED.3IONS-SCNC: 14 MMOL/L (ref 7–15)
ANION GAP SERPL CALCULATED.3IONS-SCNC: 15 MMOL/L (ref 7–15)
ANION GAP SERPL CALCULATED.3IONS-SCNC: 16 MMOL/L (ref 7–15)
ANION GAP SERPL CALCULATED.3IONS-SCNC: 17 MMOL/L (ref 7–15)
ANION GAP SERPL CALCULATED.3IONS-SCNC: 18 MMOL/L (ref 7–15)
ANION GAP SERPL CALCULATED.3IONS-SCNC: 19 MMOL/L (ref 7–15)
ANION GAP SERPL CALCULATED.3IONS-SCNC: 20 MMOL/L (ref 7–15)
ANION GAP SERPL CALCULATED.3IONS-SCNC: 21 MMOL/L (ref 7–15)
ANION GAP SERPL CALCULATED.3IONS-SCNC: 25 MMOL/L (ref 7–15)
ANION GAP SERPL CALCULATED.3IONS-SCNC: 25 MMOL/L (ref 7–15)
ANION GAP SERPL CALCULATED.3IONS-SCNC: 28 MMOL/L (ref 7–15)
ANION GAP SERPL CALCULATED.3IONS-SCNC: 30 MMOL/L (ref 7–15)
ANION GAP SERPL CALCULATED.3IONS-SCNC: 30 MMOL/L (ref 7–15)
ANION GAP SERPL CALCULATED.3IONS-SCNC: 43 MMOL/L (ref 7–15)
ANION GAP SERPL CALCULATED.3IONS-SCNC: 43 MMOL/L (ref 7–15)
ANION GAP SERPL CALCULATED.3IONS-SCNC: 44 MMOL/L (ref 7–15)
ANION GAP SERPL CALCULATED.3IONS-SCNC: 44 MMOL/L (ref 7–15)
ANTIBODY SCREEN: NEGATIVE
APPEARANCE FLD: ABNORMAL
APPEARANCE UR: ABNORMAL
APPEARANCE UR: CLEAR
APTT PPP: 65 SECONDS (ref 22–38)
AST SERPL W P-5'-P-CCNC: 187 U/L (ref 0–45)
AST SERPL W P-5'-P-CCNC: 222 U/L (ref 0–45)
AST SERPL W P-5'-P-CCNC: 24 U/L (ref 0–45)
AST SERPL W P-5'-P-CCNC: 241 U/L (ref 0–45)
AST SERPL W P-5'-P-CCNC: 28 U/L (ref 0–45)
AST SERPL W P-5'-P-CCNC: 35 U/L (ref 0–45)
AST SERPL W P-5'-P-CCNC: 45 U/L (ref 0–45)
AST SERPL W P-5'-P-CCNC: 51 U/L (ref 10–50)
AST SERPL W P-5'-P-CCNC: 5789 U/L (ref 0–45)
AST SERPL W P-5'-P-CCNC: 92 U/L (ref 0–45)
AST SERPL W P-5'-P-CCNC: ABNORMAL U/L
ATRIAL RATE - MUSE: 116 BPM
ATRIAL RATE - MUSE: 118 BPM
ATRIAL RATE - MUSE: 85 BPM
B-GLOBULIN SERPL ELPH-MCNC: 0.5 G/DL (ref 0.6–1)
B-GLOBULIN SERPL ELPH-MCNC: 0.8 G/DL (ref 0.6–1)
B-OH-BUTYR SERPL-SCNC: <0.18 MMOL/L
BACTERIA BLD CULT: ABNORMAL
BACTERIA BLD CULT: NO GROWTH
BACTERIA BLD CULT: NO GROWTH
BASE EXCESS BLDA CALC-SCNC: -14.6 MMOL/L
BASE EXCESS BLDA CALC-SCNC: -16.5 MMOL/L
BASE EXCESS BLDA CALC-SCNC: -19.1 MMOL/L
BASE EXCESS BLDA CALC-SCNC: -19.9 MMOL/L
BASE EXCESS BLDA CALC-SCNC: -21.3 MMOL/L
BASE EXCESS BLDA CALC-SCNC: -21.8 MMOL/L
BASE EXCESS BLDA CALC-SCNC: ABNORMAL MMOL/L
BASE EXCESS BLDV CALC-SCNC: -10.6 MMOL/L
BASE EXCESS BLDV CALC-SCNC: -11.8 MMOL/L
BASE EXCESS BLDV CALC-SCNC: -8.7 MMOL/L
BASOPHILS # BLD AUTO: 0 10E3/UL (ref 0–0.2)
BASOPHILS # BLD AUTO: 0 10E3/UL (ref 0–0.2)
BASOPHILS # BLD MANUAL: 0 10E3/UL (ref 0–0.2)
BASOPHILS # BLD MANUAL: 0 10E3/UL (ref 0–0.2)
BASOPHILS NFR BLD AUTO: 0 %
BASOPHILS NFR BLD AUTO: 0 %
BASOPHILS NFR BLD MANUAL: 0 %
BASOPHILS NFR BLD MANUAL: 0 %
BILIRUB DIRECT SERPL-MCNC: 0.28 MG/DL (ref 0–0.3)
BILIRUB DIRECT SERPL-MCNC: 0.29 MG/DL (ref 0–0.3)
BILIRUB DIRECT SERPL-MCNC: 0.73 MG/DL (ref 0–0.3)
BILIRUB DIRECT SERPL-MCNC: 1.83 MG/DL (ref 0–0.3)
BILIRUB DIRECT SERPL-MCNC: 1.85 MG/DL (ref 0–0.3)
BILIRUB SERPL-MCNC: 0.7 MG/DL
BILIRUB SERPL-MCNC: 0.8 MG/DL
BILIRUB SERPL-MCNC: 0.8 MG/DL
BILIRUB SERPL-MCNC: 1 MG/DL
BILIRUB SERPL-MCNC: 1.5 MG/DL
BILIRUB SERPL-MCNC: 1.7 MG/DL
BILIRUB SERPL-MCNC: 2.1 MG/DL
BILIRUB SERPL-MCNC: 2.4 MG/DL
BILIRUB SERPL-MCNC: 2.5 MG/DL
BILIRUB SERPL-MCNC: 2.8 MG/DL
BILIRUB UR QL STRIP: NEGATIVE
BLD PROD TYP BPU: NORMAL
BLOOD COMPONENT TYPE: NORMAL
BUN SERPL-MCNC: 109.9 MG/DL (ref 8–23)
BUN SERPL-MCNC: 23.1 MG/DL (ref 8–23)
BUN SERPL-MCNC: 23.1 MG/DL (ref 8–23)
BUN SERPL-MCNC: 25.4 MG/DL (ref 8–23)
BUN SERPL-MCNC: 25.7 MG/DL (ref 8–23)
BUN SERPL-MCNC: 25.7 MG/DL (ref 8–23)
BUN SERPL-MCNC: 26.7 MG/DL (ref 8–23)
BUN SERPL-MCNC: 26.9 MG/DL (ref 8–23)
BUN SERPL-MCNC: 33 MG/DL (ref 8–23)
BUN SERPL-MCNC: 37.6 MG/DL (ref 8–23)
BUN SERPL-MCNC: 39.1 MG/DL (ref 8–23)
BUN SERPL-MCNC: 39.6 MG/DL (ref 8–23)
BUN SERPL-MCNC: 41.9 MG/DL (ref 8–23)
BUN SERPL-MCNC: 46.8 MG/DL (ref 8–23)
BUN SERPL-MCNC: 47.8 MG/DL (ref 8–23)
BUN SERPL-MCNC: 49.6 MG/DL (ref 8–23)
BUN SERPL-MCNC: 50.5 MG/DL (ref 8–23)
BUN SERPL-MCNC: 51.2 MG/DL (ref 8–23)
BUN SERPL-MCNC: 51.9 MG/DL (ref 8–23)
BUN SERPL-MCNC: 52.9 MG/DL (ref 8–23)
BUN SERPL-MCNC: 55.2 MG/DL (ref 8–23)
BUN SERPL-MCNC: 55.8 MG/DL (ref 8–23)
BUN SERPL-MCNC: 61 MG/DL (ref 8–23)
BUN SERPL-MCNC: 62.7 MG/DL (ref 8–23)
BUN SERPL-MCNC: 63.1 MG/DL (ref 8–23)
BUN SERPL-MCNC: 64.9 MG/DL (ref 8–23)
BUN SERPL-MCNC: 65 MG/DL (ref 8–23)
BUN SERPL-MCNC: 65.6 MG/DL (ref 8–23)
BUN SERPL-MCNC: 67.9 MG/DL (ref 8–23)
BUN SERPL-MCNC: 69 MG/DL (ref 8–23)
BUN SERPL-MCNC: 70.6 MG/DL (ref 8–23)
BUN SERPL-MCNC: 71.7 MG/DL (ref 8–23)
BUN SERPL-MCNC: 71.8 MG/DL (ref 8–23)
BUN SERPL-MCNC: 74.2 MG/DL (ref 8–23)
BUN SERPL-MCNC: 75.3 MG/DL (ref 8–23)
BUN SERPL-MCNC: 75.6 MG/DL (ref 8–23)
BUN SERPL-MCNC: 76.5 MG/DL (ref 8–23)
BUN SERPL-MCNC: 80.1 MG/DL (ref 8–23)
BUN SERPL-MCNC: 80.2 MG/DL (ref 8–23)
BUN SERPL-MCNC: 80.4 MG/DL (ref 8–23)
BUN SERPL-MCNC: 81.3 MG/DL (ref 8–23)
BUN SERPL-MCNC: 82.5 MG/DL (ref 8–23)
BUN SERPL-MCNC: 85.6 MG/DL (ref 8–23)
BUN SERPL-MCNC: 89.4 MG/DL (ref 8–23)
BUN SERPL-MCNC: 89.5 MG/DL (ref 8–23)
BUN SERPL-MCNC: 90.6 MG/DL (ref 8–23)
BUN SERPL-MCNC: 92.7 MG/DL (ref 8–23)
BUN SERPL-MCNC: 96.9 MG/DL (ref 8–23)
BUN SERPL-MCNC: 98.4 MG/DL (ref 8–23)
C-ANCA TITR SER IF: NORMAL {TITER}
C3 SERPL-MCNC: 129 MG/DL (ref 81–157)
C4 SERPL-MCNC: 26 MG/DL (ref 13–39)
CA-I BLD-MCNC: 4.7 MG/DL (ref 4.4–5.2)
CALCIUM SERPL-MCNC: 10.1 MG/DL (ref 8.8–10.2)
CALCIUM SERPL-MCNC: 10.3 MG/DL (ref 8.8–10.2)
CALCIUM SERPL-MCNC: 7.1 MG/DL (ref 8.8–10.2)
CALCIUM SERPL-MCNC: 7.3 MG/DL (ref 8.8–10.2)
CALCIUM SERPL-MCNC: 7.5 MG/DL (ref 8.8–10.2)
CALCIUM SERPL-MCNC: 7.6 MG/DL (ref 8.8–10.2)
CALCIUM SERPL-MCNC: 7.9 MG/DL (ref 8.8–10.2)
CALCIUM SERPL-MCNC: 8.1 MG/DL (ref 8.8–10.2)
CALCIUM SERPL-MCNC: 8.1 MG/DL (ref 8.8–10.2)
CALCIUM SERPL-MCNC: 8.4 MG/DL (ref 8.8–10.2)
CALCIUM SERPL-MCNC: 8.5 MG/DL (ref 8.8–10.2)
CALCIUM SERPL-MCNC: 8.5 MG/DL (ref 8.8–10.2)
CALCIUM SERPL-MCNC: 8.7 MG/DL (ref 8.8–10.2)
CALCIUM SERPL-MCNC: 8.8 MG/DL (ref 8.8–10.2)
CALCIUM SERPL-MCNC: 8.9 MG/DL (ref 8.8–10.2)
CALCIUM SERPL-MCNC: 8.9 MG/DL (ref 8.8–10.2)
CALCIUM SERPL-MCNC: 9 MG/DL (ref 8.8–10.2)
CALCIUM SERPL-MCNC: 9 MG/DL (ref 8.8–10.2)
CALCIUM SERPL-MCNC: 9.1 MG/DL (ref 8.8–10.2)
CALCIUM SERPL-MCNC: 9.2 MG/DL (ref 8.8–10.2)
CALCIUM SERPL-MCNC: 9.2 MG/DL (ref 8.8–10.2)
CALCIUM SERPL-MCNC: 9.3 MG/DL (ref 8.8–10.2)
CALCIUM SERPL-MCNC: 9.3 MG/DL (ref 8.8–10.2)
CALCIUM SERPL-MCNC: 9.4 MG/DL (ref 8.8–10.2)
CALCIUM SERPL-MCNC: 9.4 MG/DL (ref 8.8–10.2)
CALCIUM SERPL-MCNC: 9.5 MG/DL (ref 8.8–10.2)
CALCIUM SERPL-MCNC: 9.6 MG/DL (ref 8.8–10.2)
CALCIUM SERPL-MCNC: 9.7 MG/DL (ref 8.8–10.2)
CALCIUM SERPL-MCNC: 9.7 MG/DL (ref 8.8–10.2)
CALCIUM SERPL-MCNC: 9.9 MG/DL (ref 8.8–10.2)
CALCIUM, IONIZED MEASURED: 0.93 MMOL/L (ref 1.11–1.3)
CALCIUM, IONIZED MEASURED: 1.07 MMOL/L (ref 1.11–1.3)
CALCIUM, IONIZED MEASURED: 1.12 MMOL/L (ref 1.11–1.3)
CELL COUNT BODY FLUID SOURCE: ABNORMAL
CHLORIDE SERPL-SCNC: 100 MMOL/L (ref 98–107)
CHLORIDE SERPL-SCNC: 80 MMOL/L (ref 98–107)
CHLORIDE SERPL-SCNC: 84 MMOL/L (ref 98–107)
CHLORIDE SERPL-SCNC: 86 MMOL/L (ref 98–107)
CHLORIDE SERPL-SCNC: 88 MMOL/L (ref 98–107)
CHLORIDE SERPL-SCNC: 88 MMOL/L (ref 98–107)
CHLORIDE SERPL-SCNC: 89 MMOL/L (ref 98–107)
CHLORIDE SERPL-SCNC: 90 MMOL/L (ref 98–107)
CHLORIDE SERPL-SCNC: 91 MMOL/L (ref 98–107)
CHLORIDE SERPL-SCNC: 91 MMOL/L (ref 98–107)
CHLORIDE SERPL-SCNC: 92 MMOL/L (ref 98–107)
CHLORIDE SERPL-SCNC: 93 MMOL/L (ref 98–107)
CHLORIDE SERPL-SCNC: 94 MMOL/L (ref 98–107)
CHLORIDE SERPL-SCNC: 95 MMOL/L (ref 98–107)
CHLORIDE SERPL-SCNC: 99 MMOL/L (ref 98–107)
CHOLEST SERPL-MCNC: 157 MG/DL
CK SERPL-CCNC: 1161 U/L (ref 39–308)
CK SERPL-CCNC: 124 U/L (ref 39–308)
CK SERPL-CCNC: 1656 U/L (ref 39–308)
CK SERPL-CCNC: 180 U/L (ref 39–308)
CK SERPL-CCNC: 241 U/L (ref 39–308)
CK SERPL-CCNC: 419 U/L (ref 39–308)
CK SERPL-CCNC: 50 U/L (ref 39–308)
CK SERPL-CCNC: 638 U/L (ref 39–308)
CK SERPL-CCNC: 67 U/L (ref 39–308)
CK SERPL-CCNC: 94 U/L (ref 39–308)
CK SERPL-CCNC: 961 U/L (ref 39–308)
CODING SYSTEM: NORMAL
COHGB MFR BLD: 100 % (ref 96–97)
COHGB MFR BLD: 98.9 % (ref 96–97)
COLOR FLD: ABNORMAL
COLOR UR AUTO: YELLOW
CORTIS SERPL-MCNC: 15.2 UG/DL
CREAT SERPL-MCNC: 1.58 MG/DL (ref 0.67–1.17)
CREAT SERPL-MCNC: 2.06 MG/DL (ref 0.67–1.17)
CREAT SERPL-MCNC: 2.09 MG/DL (ref 0.67–1.17)
CREAT SERPL-MCNC: 2.13 MG/DL (ref 0.67–1.17)
CREAT SERPL-MCNC: 2.18 MG/DL (ref 0.67–1.17)
CREAT SERPL-MCNC: 2.18 MG/DL (ref 0.67–1.17)
CREAT SERPL-MCNC: 2.25 MG/DL (ref 0.67–1.17)
CREAT SERPL-MCNC: 2.26 MG/DL (ref 0.67–1.17)
CREAT SERPL-MCNC: 2.35 MG/DL (ref 0.67–1.17)
CREAT SERPL-MCNC: 2.35 MG/DL (ref 0.67–1.17)
CREAT SERPL-MCNC: 2.49 MG/DL (ref 0.67–1.17)
CREAT SERPL-MCNC: 2.49 MG/DL (ref 0.67–1.17)
CREAT SERPL-MCNC: 2.53 MG/DL (ref 0.67–1.17)
CREAT SERPL-MCNC: 2.71 MG/DL (ref 0.67–1.17)
CREAT SERPL-MCNC: 2.77 MG/DL (ref 0.67–1.17)
CREAT SERPL-MCNC: 2.9 MG/DL (ref 0.67–1.17)
CREAT SERPL-MCNC: 2.9 MG/DL (ref 0.67–1.17)
CREAT SERPL-MCNC: 2.94 MG/DL (ref 0.67–1.17)
CREAT SERPL-MCNC: 2.96 MG/DL (ref 0.67–1.17)
CREAT SERPL-MCNC: 2.98 MG/DL (ref 0.67–1.17)
CREAT SERPL-MCNC: 2.99 MG/DL (ref 0.67–1.17)
CREAT SERPL-MCNC: 3.12 MG/DL (ref 0.67–1.17)
CREAT SERPL-MCNC: 3.14 MG/DL (ref 0.67–1.17)
CREAT SERPL-MCNC: 3.15 MG/DL (ref 0.67–1.17)
CREAT SERPL-MCNC: 3.16 MG/DL (ref 0.67–1.17)
CREAT SERPL-MCNC: 3.16 MG/DL (ref 0.67–1.17)
CREAT SERPL-MCNC: 3.19 MG/DL (ref 0.67–1.17)
CREAT SERPL-MCNC: 3.25 MG/DL (ref 0.67–1.17)
CREAT SERPL-MCNC: 3.25 MG/DL (ref 0.67–1.17)
CREAT SERPL-MCNC: 3.27 MG/DL (ref 0.67–1.17)
CREAT SERPL-MCNC: 3.29 MG/DL (ref 0.67–1.17)
CREAT SERPL-MCNC: 3.31 MG/DL (ref 0.67–1.17)
CREAT SERPL-MCNC: 3.37 MG/DL (ref 0.67–1.17)
CREAT SERPL-MCNC: 3.37 MG/DL (ref 0.67–1.17)
CREAT SERPL-MCNC: 3.39 MG/DL (ref 0.67–1.17)
CREAT SERPL-MCNC: 3.44 MG/DL (ref 0.67–1.17)
CREAT SERPL-MCNC: 3.45 MG/DL (ref 0.67–1.17)
CREAT SERPL-MCNC: 3.6 MG/DL (ref 0.67–1.17)
CREAT SERPL-MCNC: 3.88 MG/DL (ref 0.67–1.17)
CREAT SERPL-MCNC: 3.92 MG/DL (ref 0.67–1.17)
CREAT SERPL-MCNC: 4 MG/DL (ref 0.67–1.17)
CREAT SERPL-MCNC: 4.35 MG/DL (ref 0.67–1.17)
CREAT SERPL-MCNC: 4.67 MG/DL (ref 0.67–1.17)
CREAT SERPL-MCNC: 4.67 MG/DL (ref 0.67–1.17)
CREAT SERPL-MCNC: 4.71 MG/DL (ref 0.67–1.17)
CREAT SERPL-MCNC: 5.22 MG/DL (ref 0.67–1.17)
CREAT SERPL-MCNC: 5.38 MG/DL (ref 0.67–1.17)
CREAT UR-MCNC: 119 MG/DL
CREAT UR-MCNC: 156.5 MG/DL
CREAT UR-MCNC: 24.2 MG/DL
CREAT UR-MCNC: 96.3 MG/DL
CROSSMATCH: NORMAL
CRP SERPL-MCNC: 154.5 MG/L
CRP SERPL-MCNC: 297.8 MG/L
CRP SERPL-MCNC: 63.4 MG/L
CRP SERPL-MCNC: 64.44 MG/L
DEPRECATED HCO3 PLAS-SCNC: 10 MMOL/L (ref 22–29)
DEPRECATED HCO3 PLAS-SCNC: 13 MMOL/L (ref 22–29)
DEPRECATED HCO3 PLAS-SCNC: 14 MMOL/L (ref 22–29)
DEPRECATED HCO3 PLAS-SCNC: 15 MMOL/L (ref 22–29)
DEPRECATED HCO3 PLAS-SCNC: 16 MMOL/L (ref 22–29)
DEPRECATED HCO3 PLAS-SCNC: 18 MMOL/L (ref 22–29)
DEPRECATED HCO3 PLAS-SCNC: 18 MMOL/L (ref 22–29)
DEPRECATED HCO3 PLAS-SCNC: 19 MMOL/L (ref 22–29)
DEPRECATED HCO3 PLAS-SCNC: 20 MMOL/L (ref 22–29)
DEPRECATED HCO3 PLAS-SCNC: 20 MMOL/L (ref 22–29)
DEPRECATED HCO3 PLAS-SCNC: 21 MMOL/L (ref 22–29)
DEPRECATED HCO3 PLAS-SCNC: 23 MMOL/L (ref 22–29)
DEPRECATED HCO3 PLAS-SCNC: 24 MMOL/L (ref 22–29)
DEPRECATED HCO3 PLAS-SCNC: 25 MMOL/L (ref 22–29)
DEPRECATED HCO3 PLAS-SCNC: 26 MMOL/L (ref 22–29)
DEPRECATED HCO3 PLAS-SCNC: 27 MMOL/L (ref 22–29)
DEPRECATED HCO3 PLAS-SCNC: 28 MMOL/L (ref 22–29)
DEPRECATED HCO3 PLAS-SCNC: 29 MMOL/L (ref 22–29)
DEPRECATED HCO3 PLAS-SCNC: 30 MMOL/L (ref 22–29)
DEPRECATED HCO3 PLAS-SCNC: 30 MMOL/L (ref 22–29)
DEPRECATED HCO3 PLAS-SCNC: 31 MMOL/L (ref 22–29)
DEPRECATED HCO3 PLAS-SCNC: 6 MMOL/L (ref 22–29)
DEPRECATED HCO3 PLAS-SCNC: 7 MMOL/L (ref 22–29)
DEPRECATED HCO3 PLAS-SCNC: 8 MMOL/L (ref 22–29)
DEPRECATED HCO3 PLAS-SCNC: 8 MMOL/L (ref 22–29)
DIASTOLIC BLOOD PRESSURE - MUSE: 55 MMHG
DIASTOLIC BLOOD PRESSURE - MUSE: NORMAL MMHG
DIASTOLIC BLOOD PRESSURE - MUSE: NORMAL MMHG
ELLIPTOCYTES BLD QL SMEAR: SLIGHT
ENTEROCOCCUS FAECALIS: NOT DETECTED
ENTEROCOCCUS FAECIUM: NOT DETECTED
EOSINOPHIL # BLD AUTO: 0 10E3/UL (ref 0–0.7)
EOSINOPHIL # BLD AUTO: 0 10E3/UL (ref 0–0.7)
EOSINOPHIL # BLD MANUAL: 0 10E3/UL (ref 0–0.7)
EOSINOPHIL # BLD MANUAL: 0 10E3/UL (ref 0–0.7)
EOSINOPHIL NFR BLD AUTO: 0 %
EOSINOPHIL NFR BLD AUTO: 0 %
EOSINOPHIL NFR BLD MANUAL: 0 %
EOSINOPHIL NFR BLD MANUAL: 0 %
EOSINOPHIL NFR FLD MANUAL: 1 %
ERYTHROCYTE [DISTWIDTH] IN BLOOD BY AUTOMATED COUNT: 15.9 % (ref 10–15)
ERYTHROCYTE [DISTWIDTH] IN BLOOD BY AUTOMATED COUNT: 16.2 % (ref 10–15)
ERYTHROCYTE [DISTWIDTH] IN BLOOD BY AUTOMATED COUNT: 16.4 % (ref 10–15)
ERYTHROCYTE [DISTWIDTH] IN BLOOD BY AUTOMATED COUNT: 16.6 % (ref 10–15)
ERYTHROCYTE [DISTWIDTH] IN BLOOD BY AUTOMATED COUNT: 16.6 % (ref 10–15)
ERYTHROCYTE [DISTWIDTH] IN BLOOD BY AUTOMATED COUNT: 16.7 % (ref 10–15)
ERYTHROCYTE [DISTWIDTH] IN BLOOD BY AUTOMATED COUNT: 16.7 % (ref 10–15)
ERYTHROCYTE [DISTWIDTH] IN BLOOD BY AUTOMATED COUNT: 16.9 % (ref 10–15)
ERYTHROCYTE [DISTWIDTH] IN BLOOD BY AUTOMATED COUNT: 16.9 % (ref 10–15)
ERYTHROCYTE [DISTWIDTH] IN BLOOD BY AUTOMATED COUNT: 17.2 % (ref 10–15)
ERYTHROCYTE [DISTWIDTH] IN BLOOD BY AUTOMATED COUNT: 17.3 % (ref 10–15)
ERYTHROCYTE [DISTWIDTH] IN BLOOD BY AUTOMATED COUNT: 17.4 % (ref 10–15)
ERYTHROCYTE [DISTWIDTH] IN BLOOD BY AUTOMATED COUNT: 17.4 % (ref 10–15)
ERYTHROCYTE [DISTWIDTH] IN BLOOD BY AUTOMATED COUNT: 17.5 % (ref 10–15)
ERYTHROCYTE [DISTWIDTH] IN BLOOD BY AUTOMATED COUNT: 17.5 % (ref 10–15)
ERYTHROCYTE [DISTWIDTH] IN BLOOD BY AUTOMATED COUNT: 17.6 % (ref 10–15)
ERYTHROCYTE [DISTWIDTH] IN BLOOD BY AUTOMATED COUNT: 17.6 % (ref 10–15)
ERYTHROCYTE [DISTWIDTH] IN BLOOD BY AUTOMATED COUNT: 17.7 % (ref 10–15)
ERYTHROCYTE [DISTWIDTH] IN BLOOD BY AUTOMATED COUNT: 17.9 % (ref 10–15)
ERYTHROCYTE [DISTWIDTH] IN BLOOD BY AUTOMATED COUNT: 18.3 % (ref 10–15)
ERYTHROCYTE [DISTWIDTH] IN BLOOD BY AUTOMATED COUNT: 18.5 % (ref 10–15)
ERYTHROCYTE [DISTWIDTH] IN BLOOD BY AUTOMATED COUNT: 19 % (ref 10–15)
ERYTHROCYTE [DISTWIDTH] IN BLOOD BY AUTOMATED COUNT: 19.3 % (ref 10–15)
ERYTHROCYTE [DISTWIDTH] IN BLOOD BY AUTOMATED COUNT: 19.9 % (ref 10–15)
ERYTHROCYTE [DISTWIDTH] IN BLOOD BY AUTOMATED COUNT: 20.2 % (ref 10–15)
FERRITIN SERPL-MCNC: 399 NG/ML (ref 31–409)
FERRITIN SERPL-MCNC: 667 NG/ML (ref 31–409)
GAMMA GLOB SERPL ELPH-MCNC: 1.8 G/DL (ref 0.7–1.6)
GAMMA GLOB SERPL ELPH-MCNC: 2.9 G/DL (ref 0.7–1.6)
GAMMA INTERFERON BACKGROUND BLD IA-ACNC: 0.04 IU/ML
GFR SERPL CREATININE-BSD FRML MDRD: 11 ML/MIN/1.73M2
GFR SERPL CREATININE-BSD FRML MDRD: 12 ML/MIN/1.73M2
GFR SERPL CREATININE-BSD FRML MDRD: 13 ML/MIN/1.73M2
GFR SERPL CREATININE-BSD FRML MDRD: 14 ML/MIN/1.73M2
GFR SERPL CREATININE-BSD FRML MDRD: 16 ML/MIN/1.73M2
GFR SERPL CREATININE-BSD FRML MDRD: 16 ML/MIN/1.73M2
GFR SERPL CREATININE-BSD FRML MDRD: 17 ML/MIN/1.73M2
GFR SERPL CREATININE-BSD FRML MDRD: 18 ML/MIN/1.73M2
GFR SERPL CREATININE-BSD FRML MDRD: 19 ML/MIN/1.73M2
GFR SERPL CREATININE-BSD FRML MDRD: 20 ML/MIN/1.73M2
GFR SERPL CREATININE-BSD FRML MDRD: 21 ML/MIN/1.73M2
GFR SERPL CREATININE-BSD FRML MDRD: 23 ML/MIN/1.73M2
GFR SERPL CREATININE-BSD FRML MDRD: 25 ML/MIN/1.73M2
GFR SERPL CREATININE-BSD FRML MDRD: 25 ML/MIN/1.73M2
GFR SERPL CREATININE-BSD FRML MDRD: 28 ML/MIN/1.73M2
GFR SERPL CREATININE-BSD FRML MDRD: 30 ML/MIN/1.73M2
GFR SERPL CREATININE-BSD FRML MDRD: 30 ML/MIN/1.73M2
GFR SERPL CREATININE-BSD FRML MDRD: 32 ML/MIN/1.73M2
GFR SERPL CREATININE-BSD FRML MDRD: 32 ML/MIN/1.73M2
GFR SERPL CREATININE-BSD FRML MDRD: 33 ML/MIN/1.73M2
GFR SERPL CREATININE-BSD FRML MDRD: 33 ML/MIN/1.73M2
GFR SERPL CREATININE-BSD FRML MDRD: 34 ML/MIN/1.73M2
GFR SERPL CREATININE-BSD FRML MDRD: 35 ML/MIN/1.73M2
GFR SERPL CREATININE-BSD FRML MDRD: 49 ML/MIN/1.73M2
GLUCOSE BLDC GLUCOMTR-MCNC: 101 MG/DL (ref 70–99)
GLUCOSE BLDC GLUCOMTR-MCNC: 104 MG/DL (ref 70–99)
GLUCOSE BLDC GLUCOMTR-MCNC: 106 MG/DL (ref 70–99)
GLUCOSE BLDC GLUCOMTR-MCNC: 107 MG/DL (ref 70–99)
GLUCOSE BLDC GLUCOMTR-MCNC: 108 MG/DL (ref 70–99)
GLUCOSE BLDC GLUCOMTR-MCNC: 109 MG/DL (ref 70–99)
GLUCOSE BLDC GLUCOMTR-MCNC: 111 MG/DL (ref 70–99)
GLUCOSE BLDC GLUCOMTR-MCNC: 111 MG/DL (ref 70–99)
GLUCOSE BLDC GLUCOMTR-MCNC: 114 MG/DL (ref 70–99)
GLUCOSE BLDC GLUCOMTR-MCNC: 114 MG/DL (ref 70–99)
GLUCOSE BLDC GLUCOMTR-MCNC: 116 MG/DL (ref 70–99)
GLUCOSE BLDC GLUCOMTR-MCNC: 117 MG/DL (ref 70–99)
GLUCOSE BLDC GLUCOMTR-MCNC: 120 MG/DL (ref 70–99)
GLUCOSE BLDC GLUCOMTR-MCNC: 120 MG/DL (ref 70–99)
GLUCOSE BLDC GLUCOMTR-MCNC: 122 MG/DL (ref 70–99)
GLUCOSE BLDC GLUCOMTR-MCNC: 123 MG/DL (ref 70–99)
GLUCOSE BLDC GLUCOMTR-MCNC: 123 MG/DL (ref 70–99)
GLUCOSE BLDC GLUCOMTR-MCNC: 124 MG/DL (ref 70–99)
GLUCOSE BLDC GLUCOMTR-MCNC: 124 MG/DL (ref 70–99)
GLUCOSE BLDC GLUCOMTR-MCNC: 127 MG/DL (ref 70–99)
GLUCOSE BLDC GLUCOMTR-MCNC: 127 MG/DL (ref 70–99)
GLUCOSE BLDC GLUCOMTR-MCNC: 128 MG/DL (ref 70–99)
GLUCOSE BLDC GLUCOMTR-MCNC: 129 MG/DL (ref 70–99)
GLUCOSE BLDC GLUCOMTR-MCNC: 131 MG/DL (ref 70–99)
GLUCOSE BLDC GLUCOMTR-MCNC: 131 MG/DL (ref 70–99)
GLUCOSE BLDC GLUCOMTR-MCNC: 132 MG/DL (ref 70–99)
GLUCOSE BLDC GLUCOMTR-MCNC: 133 MG/DL (ref 70–99)
GLUCOSE BLDC GLUCOMTR-MCNC: 133 MG/DL (ref 70–99)
GLUCOSE BLDC GLUCOMTR-MCNC: 134 MG/DL (ref 70–99)
GLUCOSE BLDC GLUCOMTR-MCNC: 135 MG/DL (ref 70–99)
GLUCOSE BLDC GLUCOMTR-MCNC: 135 MG/DL (ref 70–99)
GLUCOSE BLDC GLUCOMTR-MCNC: 136 MG/DL (ref 70–99)
GLUCOSE BLDC GLUCOMTR-MCNC: 139 MG/DL (ref 70–99)
GLUCOSE BLDC GLUCOMTR-MCNC: 140 MG/DL (ref 70–99)
GLUCOSE BLDC GLUCOMTR-MCNC: 140 MG/DL (ref 70–99)
GLUCOSE BLDC GLUCOMTR-MCNC: 141 MG/DL (ref 70–99)
GLUCOSE BLDC GLUCOMTR-MCNC: 142 MG/DL (ref 70–99)
GLUCOSE BLDC GLUCOMTR-MCNC: 143 MG/DL (ref 70–99)
GLUCOSE BLDC GLUCOMTR-MCNC: 143 MG/DL (ref 70–99)
GLUCOSE BLDC GLUCOMTR-MCNC: 146 MG/DL (ref 70–99)
GLUCOSE BLDC GLUCOMTR-MCNC: 146 MG/DL (ref 70–99)
GLUCOSE BLDC GLUCOMTR-MCNC: 147 MG/DL (ref 70–99)
GLUCOSE BLDC GLUCOMTR-MCNC: 149 MG/DL (ref 70–99)
GLUCOSE BLDC GLUCOMTR-MCNC: 150 MG/DL (ref 70–99)
GLUCOSE BLDC GLUCOMTR-MCNC: 153 MG/DL (ref 70–99)
GLUCOSE BLDC GLUCOMTR-MCNC: 155 MG/DL (ref 70–99)
GLUCOSE BLDC GLUCOMTR-MCNC: 156 MG/DL (ref 70–99)
GLUCOSE BLDC GLUCOMTR-MCNC: 157 MG/DL (ref 70–99)
GLUCOSE BLDC GLUCOMTR-MCNC: 157 MG/DL (ref 70–99)
GLUCOSE BLDC GLUCOMTR-MCNC: 159 MG/DL (ref 70–99)
GLUCOSE BLDC GLUCOMTR-MCNC: 160 MG/DL (ref 70–99)
GLUCOSE BLDC GLUCOMTR-MCNC: 161 MG/DL (ref 70–99)
GLUCOSE BLDC GLUCOMTR-MCNC: 162 MG/DL (ref 70–99)
GLUCOSE BLDC GLUCOMTR-MCNC: 163 MG/DL (ref 70–99)
GLUCOSE BLDC GLUCOMTR-MCNC: 164 MG/DL (ref 70–99)
GLUCOSE BLDC GLUCOMTR-MCNC: 165 MG/DL (ref 70–99)
GLUCOSE BLDC GLUCOMTR-MCNC: 166 MG/DL (ref 70–99)
GLUCOSE BLDC GLUCOMTR-MCNC: 167 MG/DL (ref 70–99)
GLUCOSE BLDC GLUCOMTR-MCNC: 168 MG/DL (ref 70–99)
GLUCOSE BLDC GLUCOMTR-MCNC: 169 MG/DL (ref 70–99)
GLUCOSE BLDC GLUCOMTR-MCNC: 170 MG/DL (ref 70–99)
GLUCOSE BLDC GLUCOMTR-MCNC: 171 MG/DL (ref 70–99)
GLUCOSE BLDC GLUCOMTR-MCNC: 172 MG/DL (ref 70–99)
GLUCOSE BLDC GLUCOMTR-MCNC: 172 MG/DL (ref 70–99)
GLUCOSE BLDC GLUCOMTR-MCNC: 173 MG/DL (ref 70–99)
GLUCOSE BLDC GLUCOMTR-MCNC: 176 MG/DL (ref 70–99)
GLUCOSE BLDC GLUCOMTR-MCNC: 176 MG/DL (ref 70–99)
GLUCOSE BLDC GLUCOMTR-MCNC: 177 MG/DL (ref 70–99)
GLUCOSE BLDC GLUCOMTR-MCNC: 177 MG/DL (ref 70–99)
GLUCOSE BLDC GLUCOMTR-MCNC: 178 MG/DL (ref 70–99)
GLUCOSE BLDC GLUCOMTR-MCNC: 179 MG/DL (ref 70–99)
GLUCOSE BLDC GLUCOMTR-MCNC: 180 MG/DL (ref 70–99)
GLUCOSE BLDC GLUCOMTR-MCNC: 182 MG/DL (ref 70–99)
GLUCOSE BLDC GLUCOMTR-MCNC: 184 MG/DL (ref 70–99)
GLUCOSE BLDC GLUCOMTR-MCNC: 184 MG/DL (ref 70–99)
GLUCOSE BLDC GLUCOMTR-MCNC: 185 MG/DL (ref 70–99)
GLUCOSE BLDC GLUCOMTR-MCNC: 186 MG/DL (ref 70–99)
GLUCOSE BLDC GLUCOMTR-MCNC: 186 MG/DL (ref 70–99)
GLUCOSE BLDC GLUCOMTR-MCNC: 187 MG/DL (ref 70–99)
GLUCOSE BLDC GLUCOMTR-MCNC: 188 MG/DL (ref 70–99)
GLUCOSE BLDC GLUCOMTR-MCNC: 188 MG/DL (ref 70–99)
GLUCOSE BLDC GLUCOMTR-MCNC: 189 MG/DL (ref 70–99)
GLUCOSE BLDC GLUCOMTR-MCNC: 190 MG/DL (ref 70–99)
GLUCOSE BLDC GLUCOMTR-MCNC: 191 MG/DL (ref 70–99)
GLUCOSE BLDC GLUCOMTR-MCNC: 192 MG/DL (ref 70–99)
GLUCOSE BLDC GLUCOMTR-MCNC: 193 MG/DL (ref 70–99)
GLUCOSE BLDC GLUCOMTR-MCNC: 193 MG/DL (ref 70–99)
GLUCOSE BLDC GLUCOMTR-MCNC: 194 MG/DL (ref 70–99)
GLUCOSE BLDC GLUCOMTR-MCNC: 195 MG/DL (ref 70–99)
GLUCOSE BLDC GLUCOMTR-MCNC: 195 MG/DL (ref 70–99)
GLUCOSE BLDC GLUCOMTR-MCNC: 196 MG/DL (ref 70–99)
GLUCOSE BLDC GLUCOMTR-MCNC: 197 MG/DL (ref 70–99)
GLUCOSE BLDC GLUCOMTR-MCNC: 198 MG/DL (ref 70–99)
GLUCOSE BLDC GLUCOMTR-MCNC: 199 MG/DL (ref 70–99)
GLUCOSE BLDC GLUCOMTR-MCNC: 199 MG/DL (ref 70–99)
GLUCOSE BLDC GLUCOMTR-MCNC: 200 MG/DL (ref 70–99)
GLUCOSE BLDC GLUCOMTR-MCNC: 200 MG/DL (ref 70–99)
GLUCOSE BLDC GLUCOMTR-MCNC: 201 MG/DL (ref 70–99)
GLUCOSE BLDC GLUCOMTR-MCNC: 203 MG/DL (ref 70–99)
GLUCOSE BLDC GLUCOMTR-MCNC: 206 MG/DL (ref 70–99)
GLUCOSE BLDC GLUCOMTR-MCNC: 207 MG/DL (ref 70–99)
GLUCOSE BLDC GLUCOMTR-MCNC: 207 MG/DL (ref 70–99)
GLUCOSE BLDC GLUCOMTR-MCNC: 208 MG/DL (ref 70–99)
GLUCOSE BLDC GLUCOMTR-MCNC: 208 MG/DL (ref 70–99)
GLUCOSE BLDC GLUCOMTR-MCNC: 209 MG/DL (ref 70–99)
GLUCOSE BLDC GLUCOMTR-MCNC: 210 MG/DL (ref 70–99)
GLUCOSE BLDC GLUCOMTR-MCNC: 210 MG/DL (ref 70–99)
GLUCOSE BLDC GLUCOMTR-MCNC: 211 MG/DL (ref 70–99)
GLUCOSE BLDC GLUCOMTR-MCNC: 211 MG/DL (ref 70–99)
GLUCOSE BLDC GLUCOMTR-MCNC: 213 MG/DL (ref 70–99)
GLUCOSE BLDC GLUCOMTR-MCNC: 218 MG/DL (ref 70–99)
GLUCOSE BLDC GLUCOMTR-MCNC: 219 MG/DL (ref 70–99)
GLUCOSE BLDC GLUCOMTR-MCNC: 219 MG/DL (ref 70–99)
GLUCOSE BLDC GLUCOMTR-MCNC: 220 MG/DL (ref 70–99)
GLUCOSE BLDC GLUCOMTR-MCNC: 221 MG/DL (ref 70–99)
GLUCOSE BLDC GLUCOMTR-MCNC: 222 MG/DL (ref 70–99)
GLUCOSE BLDC GLUCOMTR-MCNC: 223 MG/DL (ref 70–99)
GLUCOSE BLDC GLUCOMTR-MCNC: 223 MG/DL (ref 70–99)
GLUCOSE BLDC GLUCOMTR-MCNC: 224 MG/DL (ref 70–99)
GLUCOSE BLDC GLUCOMTR-MCNC: 225 MG/DL (ref 70–99)
GLUCOSE BLDC GLUCOMTR-MCNC: 225 MG/DL (ref 70–99)
GLUCOSE BLDC GLUCOMTR-MCNC: 226 MG/DL (ref 70–99)
GLUCOSE BLDC GLUCOMTR-MCNC: 227 MG/DL (ref 70–99)
GLUCOSE BLDC GLUCOMTR-MCNC: 227 MG/DL (ref 70–99)
GLUCOSE BLDC GLUCOMTR-MCNC: 228 MG/DL (ref 70–99)
GLUCOSE BLDC GLUCOMTR-MCNC: 228 MG/DL (ref 70–99)
GLUCOSE BLDC GLUCOMTR-MCNC: 229 MG/DL (ref 70–99)
GLUCOSE BLDC GLUCOMTR-MCNC: 229 MG/DL (ref 70–99)
GLUCOSE BLDC GLUCOMTR-MCNC: 232 MG/DL (ref 70–99)
GLUCOSE BLDC GLUCOMTR-MCNC: 234 MG/DL (ref 70–99)
GLUCOSE BLDC GLUCOMTR-MCNC: 234 MG/DL (ref 70–99)
GLUCOSE BLDC GLUCOMTR-MCNC: 235 MG/DL (ref 70–99)
GLUCOSE BLDC GLUCOMTR-MCNC: 239 MG/DL (ref 70–99)
GLUCOSE BLDC GLUCOMTR-MCNC: 239 MG/DL (ref 70–99)
GLUCOSE BLDC GLUCOMTR-MCNC: 240 MG/DL (ref 70–99)
GLUCOSE BLDC GLUCOMTR-MCNC: 240 MG/DL (ref 70–99)
GLUCOSE BLDC GLUCOMTR-MCNC: 248 MG/DL (ref 70–99)
GLUCOSE BLDC GLUCOMTR-MCNC: 248 MG/DL (ref 70–99)
GLUCOSE BLDC GLUCOMTR-MCNC: 249 MG/DL (ref 70–99)
GLUCOSE BLDC GLUCOMTR-MCNC: 249 MG/DL (ref 70–99)
GLUCOSE BLDC GLUCOMTR-MCNC: 256 MG/DL (ref 70–99)
GLUCOSE BLDC GLUCOMTR-MCNC: 258 MG/DL (ref 70–99)
GLUCOSE BLDC GLUCOMTR-MCNC: 260 MG/DL (ref 70–99)
GLUCOSE BLDC GLUCOMTR-MCNC: 265 MG/DL (ref 70–99)
GLUCOSE BLDC GLUCOMTR-MCNC: 266 MG/DL (ref 70–99)
GLUCOSE BLDC GLUCOMTR-MCNC: 271 MG/DL (ref 70–99)
GLUCOSE BLDC GLUCOMTR-MCNC: 275 MG/DL (ref 70–99)
GLUCOSE BLDC GLUCOMTR-MCNC: 315 MG/DL (ref 70–99)
GLUCOSE BLDC GLUCOMTR-MCNC: 321 MG/DL (ref 70–99)
GLUCOSE BLDC GLUCOMTR-MCNC: 330 MG/DL (ref 70–99)
GLUCOSE BLDC GLUCOMTR-MCNC: 360 MG/DL (ref 70–99)
GLUCOSE BLDC GLUCOMTR-MCNC: 362 MG/DL (ref 70–99)
GLUCOSE BLDC GLUCOMTR-MCNC: 384 MG/DL (ref 70–99)
GLUCOSE BLDC GLUCOMTR-MCNC: 43 MG/DL (ref 70–99)
GLUCOSE BLDC GLUCOMTR-MCNC: 55 MG/DL (ref 70–99)
GLUCOSE BLDC GLUCOMTR-MCNC: 60 MG/DL (ref 70–99)
GLUCOSE BLDC GLUCOMTR-MCNC: 62 MG/DL (ref 70–99)
GLUCOSE BLDC GLUCOMTR-MCNC: 62 MG/DL (ref 70–99)
GLUCOSE BLDC GLUCOMTR-MCNC: 64 MG/DL (ref 70–99)
GLUCOSE BLDC GLUCOMTR-MCNC: 65 MG/DL (ref 70–99)
GLUCOSE BLDC GLUCOMTR-MCNC: 65 MG/DL (ref 70–99)
GLUCOSE BLDC GLUCOMTR-MCNC: 68 MG/DL (ref 70–99)
GLUCOSE BLDC GLUCOMTR-MCNC: 69 MG/DL (ref 70–99)
GLUCOSE BLDC GLUCOMTR-MCNC: 70 MG/DL (ref 70–99)
GLUCOSE BLDC GLUCOMTR-MCNC: 74 MG/DL (ref 70–99)
GLUCOSE BLDC GLUCOMTR-MCNC: 75 MG/DL (ref 70–99)
GLUCOSE BLDC GLUCOMTR-MCNC: 76 MG/DL (ref 70–99)
GLUCOSE BLDC GLUCOMTR-MCNC: 78 MG/DL (ref 70–99)
GLUCOSE BLDC GLUCOMTR-MCNC: 80 MG/DL (ref 70–99)
GLUCOSE BLDC GLUCOMTR-MCNC: 80 MG/DL (ref 70–99)
GLUCOSE BLDC GLUCOMTR-MCNC: 87 MG/DL (ref 70–99)
GLUCOSE BLDC GLUCOMTR-MCNC: 87 MG/DL (ref 70–99)
GLUCOSE BLDC GLUCOMTR-MCNC: 88 MG/DL (ref 70–99)
GLUCOSE BLDC GLUCOMTR-MCNC: 88 MG/DL (ref 70–99)
GLUCOSE BLDC GLUCOMTR-MCNC: 89 MG/DL (ref 70–99)
GLUCOSE BLDC GLUCOMTR-MCNC: 90 MG/DL (ref 70–99)
GLUCOSE BLDC GLUCOMTR-MCNC: 90 MG/DL (ref 70–99)
GLUCOSE BLDC GLUCOMTR-MCNC: 91 MG/DL (ref 70–99)
GLUCOSE BLDC GLUCOMTR-MCNC: 92 MG/DL (ref 70–99)
GLUCOSE BLDC GLUCOMTR-MCNC: 92 MG/DL (ref 70–99)
GLUCOSE BLDC GLUCOMTR-MCNC: 93 MG/DL (ref 70–99)
GLUCOSE BLDC GLUCOMTR-MCNC: 93 MG/DL (ref 70–99)
GLUCOSE BLDC GLUCOMTR-MCNC: 94 MG/DL (ref 70–99)
GLUCOSE BLDC GLUCOMTR-MCNC: 96 MG/DL (ref 70–99)
GLUCOSE BLDC GLUCOMTR-MCNC: 97 MG/DL (ref 70–99)
GLUCOSE BLDC GLUCOMTR-MCNC: 98 MG/DL (ref 70–99)
GLUCOSE BLDC GLUCOMTR-MCNC: 98 MG/DL (ref 70–99)
GLUCOSE BLDC GLUCOMTR-MCNC: 99 MG/DL (ref 70–99)
GLUCOSE SERPL-MCNC: 105 MG/DL (ref 70–99)
GLUCOSE SERPL-MCNC: 109 MG/DL (ref 70–99)
GLUCOSE SERPL-MCNC: 109 MG/DL (ref 70–99)
GLUCOSE SERPL-MCNC: 113 MG/DL (ref 70–99)
GLUCOSE SERPL-MCNC: 121 MG/DL (ref 70–99)
GLUCOSE SERPL-MCNC: 127 MG/DL (ref 70–99)
GLUCOSE SERPL-MCNC: 128 MG/DL (ref 70–99)
GLUCOSE SERPL-MCNC: 135 MG/DL (ref 70–99)
GLUCOSE SERPL-MCNC: 137 MG/DL (ref 70–99)
GLUCOSE SERPL-MCNC: 142 MG/DL (ref 70–99)
GLUCOSE SERPL-MCNC: 142 MG/DL (ref 70–99)
GLUCOSE SERPL-MCNC: 153 MG/DL (ref 70–99)
GLUCOSE SERPL-MCNC: 158 MG/DL (ref 70–99)
GLUCOSE SERPL-MCNC: 160 MG/DL (ref 70–99)
GLUCOSE SERPL-MCNC: 170 MG/DL (ref 70–99)
GLUCOSE SERPL-MCNC: 175 MG/DL (ref 70–99)
GLUCOSE SERPL-MCNC: 175 MG/DL (ref 70–99)
GLUCOSE SERPL-MCNC: 177 MG/DL (ref 70–99)
GLUCOSE SERPL-MCNC: 183 MG/DL (ref 70–99)
GLUCOSE SERPL-MCNC: 184 MG/DL (ref 70–99)
GLUCOSE SERPL-MCNC: 186 MG/DL (ref 70–99)
GLUCOSE SERPL-MCNC: 188 MG/DL (ref 70–99)
GLUCOSE SERPL-MCNC: 196 MG/DL (ref 70–99)
GLUCOSE SERPL-MCNC: 200 MG/DL (ref 70–99)
GLUCOSE SERPL-MCNC: 203 MG/DL (ref 70–99)
GLUCOSE SERPL-MCNC: 205 MG/DL (ref 70–99)
GLUCOSE SERPL-MCNC: 207 MG/DL (ref 70–99)
GLUCOSE SERPL-MCNC: 215 MG/DL (ref 70–99)
GLUCOSE SERPL-MCNC: 228 MG/DL (ref 70–99)
GLUCOSE SERPL-MCNC: 233 MG/DL (ref 70–99)
GLUCOSE SERPL-MCNC: 233 MG/DL (ref 70–99)
GLUCOSE SERPL-MCNC: 234 MG/DL (ref 70–99)
GLUCOSE SERPL-MCNC: 235 MG/DL (ref 70–99)
GLUCOSE SERPL-MCNC: 238 MG/DL (ref 70–99)
GLUCOSE SERPL-MCNC: 238 MG/DL (ref 70–99)
GLUCOSE SERPL-MCNC: 244 MG/DL (ref 70–99)
GLUCOSE SERPL-MCNC: 245 MG/DL (ref 70–99)
GLUCOSE SERPL-MCNC: 245 MG/DL (ref 70–99)
GLUCOSE SERPL-MCNC: 256 MG/DL (ref 70–99)
GLUCOSE SERPL-MCNC: 276 MG/DL (ref 70–99)
GLUCOSE SERPL-MCNC: 298 MG/DL (ref 70–99)
GLUCOSE SERPL-MCNC: 494 MG/DL (ref 70–99)
GLUCOSE SERPL-MCNC: 505 MG/DL (ref 70–99)
GLUCOSE SERPL-MCNC: 70 MG/DL (ref 70–99)
GLUCOSE SERPL-MCNC: 71 MG/DL (ref 70–99)
GLUCOSE SERPL-MCNC: 78 MG/DL (ref 70–99)
GLUCOSE SERPL-MCNC: 81 MG/DL (ref 70–99)
GLUCOSE SERPL-MCNC: 99 MG/DL (ref 70–99)
GLUCOSE SERPL-MCNC: 99 MG/DL (ref 70–99)
GLUCOSE UR STRIP-MCNC: 200 MG/DL
GLUCOSE UR STRIP-MCNC: 50 MG/DL
GLUCOSE UR STRIP-MCNC: NEGATIVE MG/DL
GLUCOSE UR STRIP-MCNC: NEGATIVE MG/DL
GRANULAR CAST: 3 /LPF
HBA1C MFR BLD: 6.9 %
HBV CORE AB SERPL QL IA: NONREACTIVE
HBV SURFACE AB SERPL IA-ACNC: 0 M[IU]/ML
HBV SURFACE AB SERPL IA-ACNC: 0.47 M[IU]/ML
HBV SURFACE AB SERPL IA-ACNC: NONREACTIVE M[IU]/ML
HBV SURFACE AB SERPL IA-ACNC: NONREACTIVE M[IU]/ML
HBV SURFACE AG SERPL QL IA: NONREACTIVE
HBV SURFACE AG SERPL QL IA: NONREACTIVE
HCO3 BLD-SCNC: 10 MMOL/L (ref 23–29)
HCO3 BLD-SCNC: 13 MMOL/L (ref 23–29)
HCO3 BLD-SCNC: 7 MMOL/L (ref 23–29)
HCO3 BLD-SCNC: 8 MMOL/L (ref 23–29)
HCO3 BLD-SCNC: ABNORMAL MMOL/L
HCO3 BLDV-SCNC: 15 MMOL/L (ref 24–30)
HCO3 BLDV-SCNC: 16 MMOL/L (ref 24–30)
HCO3 BLDV-SCNC: 18 MMOL/L (ref 24–30)
HCT VFR BLD AUTO: 17.3 % (ref 40–53)
HCT VFR BLD AUTO: 19.9 % (ref 40–53)
HCT VFR BLD AUTO: 20.4 % (ref 40–53)
HCT VFR BLD AUTO: 20.8 % (ref 40–53)
HCT VFR BLD AUTO: 21.3 % (ref 40–53)
HCT VFR BLD AUTO: 21.7 % (ref 40–53)
HCT VFR BLD AUTO: 21.7 % (ref 40–53)
HCT VFR BLD AUTO: 22.3 % (ref 40–53)
HCT VFR BLD AUTO: 23 % (ref 40–53)
HCT VFR BLD AUTO: 23.2 % (ref 40–53)
HCT VFR BLD AUTO: 23.5 % (ref 40–53)
HCT VFR BLD AUTO: 23.8 % (ref 40–53)
HCT VFR BLD AUTO: 23.8 % (ref 40–53)
HCT VFR BLD AUTO: 24 % (ref 40–53)
HCT VFR BLD AUTO: 24.2 % (ref 40–53)
HCT VFR BLD AUTO: 24.4 % (ref 40–53)
HCT VFR BLD AUTO: 24.6 % (ref 40–53)
HCT VFR BLD AUTO: 24.7 % (ref 40–53)
HCT VFR BLD AUTO: 25.3 % (ref 40–53)
HCT VFR BLD AUTO: 25.9 % (ref 40–53)
HCT VFR BLD AUTO: 26.1 % (ref 40–53)
HCT VFR BLD AUTO: 26.1 % (ref 40–53)
HCT VFR BLD AUTO: 26.2 % (ref 40–53)
HCT VFR BLD AUTO: 26.8 % (ref 40–53)
HCT VFR BLD AUTO: 26.9 % (ref 40–53)
HCT VFR BLD AUTO: 35.8 % (ref 40–53)
HCT VFR BLD AUTO: 38.5 % (ref 40–53)
HDLC SERPL-MCNC: 65 MG/DL
HEMOCCULT STL QL: POSITIVE
HGB BLD-MCNC: 12.5 G/DL (ref 13.3–17.7)
HGB BLD-MCNC: 13.2 G/DL (ref 13.3–17.7)
HGB BLD-MCNC: 5.9 G/DL (ref 13.3–17.7)
HGB BLD-MCNC: 6.4 G/DL (ref 13.3–17.7)
HGB BLD-MCNC: 6.7 G/DL (ref 13.3–17.7)
HGB BLD-MCNC: 6.8 G/DL (ref 13.3–17.7)
HGB BLD-MCNC: 7 G/DL (ref 13.3–17.7)
HGB BLD-MCNC: 7.1 G/DL (ref 13.3–17.7)
HGB BLD-MCNC: 7.2 G/DL (ref 13.3–17.7)
HGB BLD-MCNC: 7.5 G/DL (ref 13.3–17.7)
HGB BLD-MCNC: 7.5 G/DL (ref 13.3–17.7)
HGB BLD-MCNC: 7.6 G/DL (ref 13.3–17.7)
HGB BLD-MCNC: 7.7 G/DL (ref 13.3–17.7)
HGB BLD-MCNC: 7.7 G/DL (ref 13.3–17.7)
HGB BLD-MCNC: 7.8 G/DL (ref 13.3–17.7)
HGB BLD-MCNC: 7.9 G/DL (ref 13.3–17.7)
HGB BLD-MCNC: 8 G/DL (ref 13.3–17.7)
HGB BLD-MCNC: 8.2 G/DL (ref 13.3–17.7)
HGB BLD-MCNC: 8.2 G/DL (ref 13.3–17.7)
HGB BLD-MCNC: 8.4 G/DL (ref 13.3–17.7)
HGB BLD-MCNC: 8.4 G/DL (ref 13.3–17.7)
HGB BLD-MCNC: 8.5 G/DL (ref 13.3–17.7)
HGB BLD-MCNC: 9 G/DL (ref 13.3–17.7)
HGB BLD-MCNC: 9.1 G/DL (ref 13.3–17.7)
HGB BLD-MCNC: 9.3 G/DL (ref 13.3–17.7)
HGB UR QL STRIP: ABNORMAL
HGB UR QL STRIP: NEGATIVE
HOLD SPECIMEN: NORMAL
HYALINE CASTS: 38 /LPF
HYALINE CASTS: 4 /LPF
HYALINE CASTS: 7 /LPF
IMM GRANULOCYTES # BLD: 0.6 10E3/UL
IMM GRANULOCYTES # BLD: 0.9 10E3/UL
IMM GRANULOCYTES NFR BLD: 3 %
IMM GRANULOCYTES NFR BLD: 4 %
INR PPP: 4.12 (ref 0.85–1.15)
INR PPP: >13.5 (ref 0.85–1.15)
INTERPRETATION ECG - MUSE: NORMAL
ION CA PH 7.4: 0.9 MMOL/L (ref 1.11–1.3)
ION CA PH 7.4: ABNORMAL
ION CA PH 7.4: ABNORMAL
IRON BINDING CAPACITY (ROCHE): 133 UG/DL (ref 240–430)
IRON BINDING CAPACITY (ROCHE): 172 UG/DL (ref 240–430)
IRON BINDING CAPACITY (ROCHE): 175 UG/DL (ref 240–430)
IRON SATN MFR SERPL: 13 % (ref 15–46)
IRON SATN MFR SERPL: 19 % (ref 15–46)
IRON SATN MFR SERPL: 29 % (ref 15–46)
IRON SERPL-MCNC: 22 UG/DL (ref 61–157)
IRON SERPL-MCNC: 34 UG/DL (ref 61–157)
IRON SERPL-MCNC: 38 UG/DL (ref 61–157)
ISSUE DATE AND TIME: NORMAL
KAPPA LC FREE SER-MCNC: 33.79 MG/DL (ref 0.33–1.94)
KAPPA LC FREE/LAMBDA FREE SER NEPH: 1.45 {RATIO} (ref 0.26–1.65)
KETONES UR STRIP-MCNC: ABNORMAL MG/DL
KETONES UR STRIP-MCNC: NEGATIVE MG/DL
LACTATE SERPL-SCNC: 1.3 MMOL/L (ref 0.7–2)
LACTATE SERPL-SCNC: 1.5 MMOL/L (ref 0.7–2)
LACTATE SERPL-SCNC: 1.6 MMOL/L (ref 0.7–2)
LACTATE SERPL-SCNC: 1.7 MMOL/L (ref 0.7–2)
LACTATE SERPL-SCNC: 1.9 MMOL/L (ref 0.7–2)
LACTATE SERPL-SCNC: 18 MMOL/L (ref 0.7–2)
LACTATE SERPL-SCNC: 2 MMOL/L (ref 0.7–2)
LACTATE SERPL-SCNC: 2.1 MMOL/L (ref 0.7–2)
LACTATE SERPL-SCNC: 2.1 MMOL/L (ref 0.7–2)
LACTATE SERPL-SCNC: 2.2 MMOL/L (ref 0.7–2)
LACTATE SERPL-SCNC: 2.2 MMOL/L (ref 0.7–2)
LACTATE SERPL-SCNC: 2.5 MMOL/L (ref 0.7–2)
LACTATE SERPL-SCNC: 23 MMOL/L (ref 0.7–2)
LACTATE SERPL-SCNC: 3.7 MMOL/L (ref 0.7–2)
LACTATE SERPL-SCNC: 4 MMOL/L (ref 0.7–2)
LACTATE SERPL-SCNC: 4.1 MMOL/L (ref 0.7–2)
LACTATE SERPL-SCNC: 4.3 MMOL/L (ref 0.7–2)
LACTATE SERPL-SCNC: 9 MMOL/L (ref 0.7–2)
LACTATE SERPL-SCNC: >24 MMOL/L (ref 0.7–2)
LAMBDA LC FREE SERPL-MCNC: 23.37 MG/DL (ref 0.57–2.63)
LDLC SERPL CALC-MCNC: 80 MG/DL
LEUKOCYTE ESTERASE UR QL STRIP: ABNORMAL
LEUKOCYTE ESTERASE UR QL STRIP: ABNORMAL
LEUKOCYTE ESTERASE UR QL STRIP: NEGATIVE
LEUKOCYTE ESTERASE UR QL STRIP: NEGATIVE
LISTERIA SPECIES (DETECTED/NOT DETECTED): NOT DETECTED
LVEF ECHO: NORMAL
LYMPHOCYTES # BLD AUTO: 1.8 10E3/UL (ref 0.8–5.3)
LYMPHOCYTES # BLD AUTO: 2.2 10E3/UL (ref 0.8–5.3)
LYMPHOCYTES # BLD MANUAL: 0.4 10E3/UL (ref 0.8–5.3)
LYMPHOCYTES # BLD MANUAL: 1.6 10E3/UL (ref 0.8–5.3)
LYMPHOCYTES NFR BLD AUTO: 14 %
LYMPHOCYTES NFR BLD AUTO: 6 %
LYMPHOCYTES NFR BLD MANUAL: 10 %
LYMPHOCYTES NFR BLD MANUAL: 3 %
LYMPHOCYTES NFR FLD MANUAL: 14 %
M PROTEIN SERPL ELPH-MCNC: 0.1 G/DL
M PROTEIN SERPL ELPH-MCNC: 0.1 G/DL
M TB IFN-G BLD-IMP: NEGATIVE
M TB IFN-G CD4+ BCKGRND COR BLD-ACNC: 6 IU/ML
MAGNESIUM SERPL-MCNC: 1.7 MG/DL (ref 1.7–2.3)
MAGNESIUM SERPL-MCNC: 1.8 MG/DL (ref 1.7–2.3)
MAGNESIUM SERPL-MCNC: 1.9 MG/DL (ref 1.7–2.3)
MAGNESIUM SERPL-MCNC: 2 MG/DL (ref 1.7–2.3)
MAGNESIUM SERPL-MCNC: 2 MG/DL (ref 1.7–2.3)
MAGNESIUM SERPL-MCNC: 2.1 MG/DL (ref 1.7–2.3)
MAGNESIUM SERPL-MCNC: 2.1 MG/DL (ref 1.7–2.3)
MAGNESIUM SERPL-MCNC: 2.4 MG/DL (ref 1.7–2.3)
MAGNESIUM SERPL-MCNC: 2.5 MG/DL (ref 1.7–2.3)
MAGNESIUM SERPL-MCNC: 3 MG/DL (ref 1.7–2.3)
MCH RBC QN AUTO: 26.7 PG (ref 26.5–33)
MCH RBC QN AUTO: 27.5 PG (ref 26.5–33)
MCH RBC QN AUTO: 27.6 PG (ref 26.5–33)
MCH RBC QN AUTO: 27.8 PG (ref 26.5–33)
MCH RBC QN AUTO: 27.8 PG (ref 26.5–33)
MCH RBC QN AUTO: 28 PG (ref 26.5–33)
MCH RBC QN AUTO: 28 PG (ref 26.5–33)
MCH RBC QN AUTO: 28.1 PG (ref 26.5–33)
MCH RBC QN AUTO: 28.4 PG (ref 26.5–33)
MCH RBC QN AUTO: 28.7 PG (ref 26.5–33)
MCH RBC QN AUTO: 29 PG (ref 26.5–33)
MCH RBC QN AUTO: 29.1 PG (ref 26.5–33)
MCH RBC QN AUTO: 29.2 PG (ref 26.5–33)
MCH RBC QN AUTO: 29.2 PG (ref 26.5–33)
MCH RBC QN AUTO: 29.3 PG (ref 26.5–33)
MCH RBC QN AUTO: 29.5 PG (ref 26.5–33)
MCH RBC QN AUTO: 29.5 PG (ref 26.5–33)
MCH RBC QN AUTO: 29.7 PG (ref 26.5–33)
MCH RBC QN AUTO: 29.8 PG (ref 26.5–33)
MCH RBC QN AUTO: 30.4 PG (ref 26.5–33)
MCHC RBC AUTO-ENTMCNC: 29.1 G/DL (ref 31.5–36.5)
MCHC RBC AUTO-ENTMCNC: 29.5 G/DL (ref 31.5–36.5)
MCHC RBC AUTO-ENTMCNC: 29.7 G/DL (ref 31.5–36.5)
MCHC RBC AUTO-ENTMCNC: 30.3 G/DL (ref 31.5–36.5)
MCHC RBC AUTO-ENTMCNC: 31.1 G/DL (ref 31.5–36.5)
MCHC RBC AUTO-ENTMCNC: 32.1 G/DL (ref 31.5–36.5)
MCHC RBC AUTO-ENTMCNC: 32.2 G/DL (ref 31.5–36.5)
MCHC RBC AUTO-ENTMCNC: 32.3 G/DL (ref 31.5–36.5)
MCHC RBC AUTO-ENTMCNC: 32.4 G/DL (ref 31.5–36.5)
MCHC RBC AUTO-ENTMCNC: 32.5 G/DL (ref 31.5–36.5)
MCHC RBC AUTO-ENTMCNC: 32.6 G/DL (ref 31.5–36.5)
MCHC RBC AUTO-ENTMCNC: 32.7 G/DL (ref 31.5–36.5)
MCHC RBC AUTO-ENTMCNC: 33.3 G/DL (ref 31.5–36.5)
MCHC RBC AUTO-ENTMCNC: 33.6 G/DL (ref 31.5–36.5)
MCHC RBC AUTO-ENTMCNC: 33.6 G/DL (ref 31.5–36.5)
MCHC RBC AUTO-ENTMCNC: 33.7 G/DL (ref 31.5–36.5)
MCHC RBC AUTO-ENTMCNC: 34.1 G/DL (ref 31.5–36.5)
MCHC RBC AUTO-ENTMCNC: 34.1 G/DL (ref 31.5–36.5)
MCHC RBC AUTO-ENTMCNC: 34.3 G/DL (ref 31.5–36.5)
MCHC RBC AUTO-ENTMCNC: 34.3 G/DL (ref 31.5–36.5)
MCHC RBC AUTO-ENTMCNC: 34.5 G/DL (ref 31.5–36.5)
MCHC RBC AUTO-ENTMCNC: 34.6 G/DL (ref 31.5–36.5)
MCHC RBC AUTO-ENTMCNC: 34.9 G/DL (ref 31.5–36.5)
MCHC RBC AUTO-ENTMCNC: 35.5 G/DL (ref 31.5–36.5)
MCHC RBC AUTO-ENTMCNC: 35.7 G/DL (ref 31.5–36.5)
MCV RBC AUTO: 100 FL (ref 78–100)
MCV RBC AUTO: 78 FL (ref 78–100)
MCV RBC AUTO: 79 FL (ref 78–100)
MCV RBC AUTO: 80 FL (ref 78–100)
MCV RBC AUTO: 82 FL (ref 78–100)
MCV RBC AUTO: 82 FL (ref 78–100)
MCV RBC AUTO: 83 FL (ref 78–100)
MCV RBC AUTO: 85 FL (ref 78–100)
MCV RBC AUTO: 85 FL (ref 78–100)
MCV RBC AUTO: 87 FL (ref 78–100)
MCV RBC AUTO: 89 FL (ref 78–100)
MCV RBC AUTO: 90 FL (ref 78–100)
MCV RBC AUTO: 92 FL (ref 78–100)
MCV RBC AUTO: 97 FL (ref 78–100)
MCV RBC AUTO: 99 FL (ref 78–100)
MCV RBC AUTO: 99 FL (ref 78–100)
METAMYELOCYTES # BLD MANUAL: 0.3 10E3/UL
METAMYELOCYTES # BLD MANUAL: 0.3 10E3/UL
METAMYELOCYTES NFR BLD MANUAL: 2 %
METAMYELOCYTES NFR BLD MANUAL: 2 %
MICROALBUMIN UR-MCNC: 292 MG/L
MICROALBUMIN/CREAT UR: 1206.61 MG/G CR (ref 0–17)
MITOGEN IGNF BCKGRD COR BLD-ACNC: 0.01 IU/ML
MITOGEN IGNF BCKGRD COR BLD-ACNC: 0.01 IU/ML
MONOCYTES # BLD AUTO: 0.9 10E3/UL (ref 0–1.3)
MONOCYTES # BLD AUTO: 1.4 10E3/UL (ref 0–1.3)
MONOCYTES # BLD MANUAL: 0.3 10E3/UL (ref 0–1.3)
MONOCYTES # BLD MANUAL: 1 10E3/UL (ref 0–1.3)
MONOCYTES NFR BLD AUTO: 5 %
MONOCYTES NFR BLD AUTO: 5 %
MONOCYTES NFR BLD MANUAL: 2 %
MONOCYTES NFR BLD MANUAL: 7 %
MONOS+MACROS NFR FLD MANUAL: 5 %
MUCOUS THREADS #/AREA URNS LPF: PRESENT /LPF
MUCOUS THREADS #/AREA URNS LPF: PRESENT /LPF
MYELOCYTES # BLD MANUAL: 0.1 10E3/UL
MYELOCYTES # BLD MANUAL: 0.2 10E3/UL
MYELOCYTES NFR BLD MANUAL: 1 %
MYELOCYTES NFR BLD MANUAL: 1 %
NEUTROPHILS # BLD AUTO: 12.5 10E3/UL (ref 1.6–8.3)
NEUTROPHILS # BLD AUTO: 23.6 10E3/UL (ref 1.6–8.3)
NEUTROPHILS # BLD MANUAL: 12.6 10E3/UL (ref 1.6–8.3)
NEUTROPHILS # BLD MANUAL: 13.2 10E3/UL (ref 1.6–8.3)
NEUTROPHILS NFR BLD AUTO: 77 %
NEUTROPHILS NFR BLD AUTO: 86 %
NEUTROPHILS NFR BLD MANUAL: 85 %
NEUTROPHILS NFR BLD MANUAL: 87 %
NEUTS BAND NFR FLD MANUAL: 80 %
NITRATE UR QL: NEGATIVE
NONHDLC SERPL-MCNC: 92 MG/DL
NRBC # BLD AUTO: 0.1 10E3/UL
NRBC # BLD AUTO: 0.1 10E3/UL
NRBC BLD AUTO-RTO: 0 /100
NRBC BLD AUTO-RTO: 1 /100
NT-PROBNP SERPL-MCNC: 3664 PG/ML (ref 0–900)
NT-PROBNP SERPL-MCNC: 8551 PG/ML (ref 0–900)
O2/TOTAL GAS SETTING VFR VENT: 1 %
O2/TOTAL GAS SETTING VFR VENT: 1 %
O2/TOTAL GAS SETTING VFR VENT: 100 %
O2/TOTAL GAS SETTING VFR VENT: 35 %
O2/TOTAL GAS SETTING VFR VENT: 60 %
O2/TOTAL GAS SETTING VFR VENT: 70 %
OSMOLALITY UR: 311 MMOL/KG (ref 100–1200)
OXYHGB MFR BLD: 98 % (ref 96–97)
OXYHGB MFR BLD: >98.5 % (ref 96–97)
OXYHGB MFR BLDV: 38.6 % (ref 70–75)
OXYHGB MFR BLDV: 46.1 % (ref 70–75)
OXYHGB MFR BLDV: 59.4 % (ref 70–75)
P AXIS - MUSE: 51 DEGREES
P AXIS - MUSE: 63 DEGREES
P AXIS - MUSE: 73 DEGREES
PCO2 BLD: 17 MM HG (ref 35–45)
PCO2 BLD: 19 MM HG (ref 35–45)
PCO2 BLD: 20 MM HG (ref 35–45)
PCO2 BLD: 28 MM HG (ref 35–45)
PCO2 BLD: <15 MM HG (ref 35–45)
PCO2 BLDV: 32 MM HG (ref 35–50)
PCO2 BLDV: 34 MM HG (ref 35–50)
PCO2 BLDV: 36 MM HG (ref 35–50)
PEEP: 10 CM H2O
PEEP: 5 CM H2O
PH BLD: 7.17 [PH] (ref 7.37–7.44)
PH BLD: 7.18 [PH] (ref 7.37–7.44)
PH BLD: 7.24 [PH] (ref 7.37–7.44)
PH BLD: 7.25 [PH] (ref 7.37–7.44)
PH BLD: 7.26 [PH] (ref 7.37–7.44)
PH BLD: 7.32 [PH] (ref 7.37–7.44)
PH BLD: 7.33 [PH] (ref 7.37–7.44)
PH BLDV: 7.27 [PH] (ref 7.35–7.45)
PH BLDV: 7.28 [PH] (ref 7.35–7.45)
PH BLDV: 7.32 [PH] (ref 7.35–7.45)
PH UR STRIP: 5 [PH] (ref 5–7)
PH UR STRIP: 5.5 [PH] (ref 5–7)
PH: 7.18 (ref 7.35–7.45)
PH: 7.33 (ref 7.35–7.45)
PH: 7.66 (ref 7.35–7.45)
PHOSPHATE SERPL-MCNC: 3 MG/DL (ref 2.5–4.5)
PHOSPHATE SERPL-MCNC: 3.3 MG/DL (ref 2.5–4.5)
PHOSPHATE SERPL-MCNC: 3.4 MG/DL (ref 2.5–4.5)
PHOSPHATE SERPL-MCNC: 3.7 MG/DL (ref 2.5–4.5)
PHOSPHATE SERPL-MCNC: 4.8 MG/DL (ref 2.5–4.5)
PHOSPHATE SERPL-MCNC: 5 MG/DL (ref 2.5–4.5)
PHOSPHATE SERPL-MCNC: 5.4 MG/DL (ref 2.5–4.5)
PHOSPHATE SERPL-MCNC: 7.5 MG/DL (ref 2.5–4.5)
PLAT MORPH BLD: ABNORMAL
PLAT MORPH BLD: ABNORMAL
PLATELET # BLD AUTO: 100 10E3/UL (ref 150–450)
PLATELET # BLD AUTO: 101 10E3/UL (ref 150–450)
PLATELET # BLD AUTO: 109 10E3/UL (ref 150–450)
PLATELET # BLD AUTO: 123 10E3/UL (ref 150–450)
PLATELET # BLD AUTO: 124 10E3/UL (ref 150–450)
PLATELET # BLD AUTO: 125 10E3/UL (ref 150–450)
PLATELET # BLD AUTO: 143 10E3/UL (ref 150–450)
PLATELET # BLD AUTO: 154 10E3/UL (ref 150–450)
PLATELET # BLD AUTO: 167 10E3/UL (ref 150–450)
PLATELET # BLD AUTO: 183 10E3/UL (ref 150–450)
PLATELET # BLD AUTO: 187 10E3/UL (ref 150–450)
PLATELET # BLD AUTO: 191 10E3/UL (ref 150–450)
PLATELET # BLD AUTO: 197 10E3/UL (ref 150–450)
PLATELET # BLD AUTO: 197 10E3/UL (ref 150–450)
PLATELET # BLD AUTO: 227 10E3/UL (ref 150–450)
PLATELET # BLD AUTO: 227 10E3/UL (ref 150–450)
PLATELET # BLD AUTO: 238 10E3/UL (ref 150–450)
PLATELET # BLD AUTO: 247 10E3/UL (ref 150–450)
PLATELET # BLD AUTO: 255 10E3/UL (ref 150–450)
PLATELET # BLD AUTO: 255 10E3/UL (ref 150–450)
PLATELET # BLD AUTO: 276 10E3/UL (ref 150–450)
PLATELET # BLD AUTO: 286 10E3/UL (ref 150–450)
PLATELET # BLD AUTO: 292 10E3/UL (ref 150–450)
PLATELET # BLD AUTO: 298 10E3/UL (ref 150–450)
PLATELET # BLD AUTO: 314 10E3/UL (ref 150–450)
PLATELET # BLD AUTO: 324 10E3/UL (ref 150–450)
PLATELET # BLD AUTO: 338 10E3/UL (ref 150–450)
PLATELET # BLD AUTO: 343 10E3/UL (ref 150–450)
PLATELET # BLD AUTO: 86 10E3/UL (ref 150–450)
PLATELET # BLD AUTO: 98 10E3/UL (ref 150–450)
PO2 BLD: 101 MM HG (ref 80–90)
PO2 BLD: 168 MM HG (ref 80–90)
PO2 BLD: 211 MM HG (ref 80–90)
PO2 BLD: 239 MM HG (ref 80–90)
PO2 BLD: 377 MM HG (ref 80–90)
PO2 BLD: 388 MM HG (ref 80–90)
PO2 BLD: 428 MM HG (ref 80–90)
PO2 BLDV: 30 MM HG (ref 25–47)
PO2 BLDV: 31 MM HG (ref 25–47)
PO2 BLDV: 34 MM HG (ref 25–47)
POLYCHROMASIA BLD QL SMEAR: SLIGHT
POTASSIUM SERPL-SCNC: 2.2 MMOL/L (ref 3.4–5.3)
POTASSIUM SERPL-SCNC: 3.3 MMOL/L (ref 3.4–5.3)
POTASSIUM SERPL-SCNC: 3.3 MMOL/L (ref 3.4–5.3)
POTASSIUM SERPL-SCNC: 3.4 MMOL/L (ref 3.4–5.3)
POTASSIUM SERPL-SCNC: 3.5 MMOL/L (ref 3.4–5.3)
POTASSIUM SERPL-SCNC: 3.6 MMOL/L (ref 3.4–5.3)
POTASSIUM SERPL-SCNC: 3.7 MMOL/L (ref 3.4–5.3)
POTASSIUM SERPL-SCNC: 3.8 MMOL/L (ref 3.4–5.3)
POTASSIUM SERPL-SCNC: 3.9 MMOL/L (ref 3.4–5.3)
POTASSIUM SERPL-SCNC: 4 MMOL/L (ref 3.4–5.3)
POTASSIUM SERPL-SCNC: 4.1 MMOL/L (ref 3.4–5.3)
POTASSIUM SERPL-SCNC: 4.1 MMOL/L (ref 3.4–5.3)
POTASSIUM SERPL-SCNC: 4.3 MMOL/L (ref 3.4–5.3)
POTASSIUM SERPL-SCNC: 4.4 MMOL/L (ref 3.4–5.3)
POTASSIUM SERPL-SCNC: 4.4 MMOL/L (ref 3.4–5.3)
POTASSIUM SERPL-SCNC: 4.6 MMOL/L (ref 3.4–5.3)
POTASSIUM SERPL-SCNC: 4.6 MMOL/L (ref 3.4–5.3)
POTASSIUM SERPL-SCNC: 4.7 MMOL/L (ref 3.4–5.3)
POTASSIUM SERPL-SCNC: 4.9 MMOL/L (ref 3.4–5.3)
POTASSIUM SERPL-SCNC: 5.4 MMOL/L (ref 3.4–5.3)
PR INTERVAL - MUSE: 158 MS
PR INTERVAL - MUSE: 184 MS
PR INTERVAL - MUSE: 208 MS
PROCALCITONIN SERPL IA-MCNC: 0.29 NG/ML
PROCALCITONIN SERPL IA-MCNC: 29.16 NG/ML
PROCALCITONIN SERPL IA-MCNC: 33.63 NG/ML
PROT PATTERN SERPL ELPH-IMP: ABNORMAL
PROT PATTERN SERPL ELPH-IMP: ABNORMAL
PROT PATTERN SERPL IFE-IMP: NORMAL
PROT PATTERN UR ELPH-IMP: NORMAL
PROT SERPL-MCNC: 5.7 G/DL (ref 6.4–8.3)
PROT SERPL-MCNC: 6 G/DL (ref 6.4–8.3)
PROT SERPL-MCNC: 6.5 G/DL (ref 6.4–8.3)
PROT SERPL-MCNC: 6.5 G/DL (ref 6.4–8.3)
PROT SERPL-MCNC: 6.6 G/DL (ref 6.4–8.3)
PROT SERPL-MCNC: 7.2 G/DL (ref 6.4–8.3)
PROT SERPL-MCNC: 7.8 G/DL (ref 6.4–8.3)
PROT SERPL-MCNC: 8.1 G/DL (ref 6.4–8.3)
PROT SERPL-MCNC: 8.4 G/DL (ref 6.4–8.3)
PROT SERPL-MCNC: 8.4 G/DL (ref 6.4–8.3)
PROT/CREAT 24H UR: 0.36 MG/MG CR (ref 0–0.2)
PROT/CREAT 24H UR: 1.94 MG/MG CR (ref 0–0.2)
QRS DURATION - MUSE: 104 MS
QRS DURATION - MUSE: 116 MS
QRS DURATION - MUSE: 90 MS
QT - MUSE: 310 MS
QT - MUSE: 324 MS
QT - MUSE: 386 MS
QTC - MUSE: 434 MS
QTC - MUSE: 450 MS
QTC - MUSE: 459 MS
QUANTIFERON MITOGEN: 6.04 IU/ML
QUANTIFERON NIL TUBE: 0.04 IU/ML
QUANTIFERON TB1 TUBE: 0.05 IU/ML
QUANTIFERON TB2 TUBE: 0.05
R AXIS - MUSE: -35 DEGREES
R AXIS - MUSE: 46 DEGREES
R AXIS - MUSE: 6 DEGREES
RATE: 20 RR/MIN
RATE: 24 RR/MIN
RATE: 24 RR/MIN
RATE: 28 RR/MIN
RATE: 28 RR/MIN
RATE: 32 RR/MIN
RBC # BLD AUTO: 2.12 10E6/UL (ref 4.4–5.9)
RBC # BLD AUTO: 2.2 10E6/UL (ref 4.4–5.9)
RBC # BLD AUTO: 2.29 10E6/UL (ref 4.4–5.9)
RBC # BLD AUTO: 2.3 10E6/UL (ref 4.4–5.9)
RBC # BLD AUTO: 2.31 10E6/UL (ref 4.4–5.9)
RBC # BLD AUTO: 2.32 10E6/UL (ref 4.4–5.9)
RBC # BLD AUTO: 2.54 10E6/UL (ref 4.4–5.9)
RBC # BLD AUTO: 2.56 10E6/UL (ref 4.4–5.9)
RBC # BLD AUTO: 2.62 10E6/UL (ref 4.4–5.9)
RBC # BLD AUTO: 2.62 10E6/UL (ref 4.4–5.9)
RBC # BLD AUTO: 2.66 10E6/UL (ref 4.4–5.9)
RBC # BLD AUTO: 2.68 10E6/UL (ref 4.4–5.9)
RBC # BLD AUTO: 2.7 10E6/UL (ref 4.4–5.9)
RBC # BLD AUTO: 2.71 10E6/UL (ref 4.4–5.9)
RBC # BLD AUTO: 2.71 10E6/UL (ref 4.4–5.9)
RBC # BLD AUTO: 2.72 10E6/UL (ref 4.4–5.9)
RBC # BLD AUTO: 2.74 10E6/UL (ref 4.4–5.9)
RBC # BLD AUTO: 2.76 10E6/UL (ref 4.4–5.9)
RBC # BLD AUTO: 2.83 10E6/UL (ref 4.4–5.9)
RBC # BLD AUTO: 2.88 10E6/UL (ref 4.4–5.9)
RBC # BLD AUTO: 2.9 10E6/UL (ref 4.4–5.9)
RBC # BLD AUTO: 2.98 10E6/UL (ref 4.4–5.9)
RBC # BLD AUTO: 3.02 10E6/UL (ref 4.4–5.9)
RBC # BLD AUTO: 3.26 10E6/UL (ref 4.4–5.9)
RBC # BLD AUTO: 3.34 10E6/UL (ref 4.4–5.9)
RBC # BLD AUTO: 4.46 10E6/UL (ref 4.4–5.9)
RBC # BLD AUTO: 4.64 10E6/UL (ref 4.4–5.9)
RBC MORPH BLD: ABNORMAL
RBC MORPH BLD: ABNORMAL
RBC URINE: 0 /HPF
RBC URINE: 0 /HPF
RBC URINE: 52 /HPF
RBC URINE: 87 /HPF
SAO2 % BLDV: 39.2 % (ref 70–75)
SAO2 % BLDV: 46.8 % (ref 70–75)
SAO2 % BLDV: 60.1 % (ref 70–75)
SODIUM SERPL-SCNC: 119 MMOL/L (ref 136–145)
SODIUM SERPL-SCNC: 121 MMOL/L (ref 136–145)
SODIUM SERPL-SCNC: 123 MMOL/L (ref 136–145)
SODIUM SERPL-SCNC: 123 MMOL/L (ref 136–145)
SODIUM SERPL-SCNC: 124 MMOL/L (ref 136–145)
SODIUM SERPL-SCNC: 126 MMOL/L (ref 136–145)
SODIUM SERPL-SCNC: 127 MMOL/L (ref 136–145)
SODIUM SERPL-SCNC: 128 MMOL/L (ref 136–145)
SODIUM SERPL-SCNC: 129 MMOL/L (ref 136–145)
SODIUM SERPL-SCNC: 130 MMOL/L (ref 136–145)
SODIUM SERPL-SCNC: 131 MMOL/L (ref 136–145)
SODIUM SERPL-SCNC: 132 MMOL/L (ref 136–145)
SODIUM SERPL-SCNC: 133 MMOL/L (ref 136–145)
SODIUM SERPL-SCNC: 134 MMOL/L (ref 136–145)
SODIUM SERPL-SCNC: 135 MMOL/L (ref 136–145)
SODIUM SERPL-SCNC: 137 MMOL/L (ref 136–145)
SODIUM SERPL-SCNC: 137 MMOL/L (ref 136–145)
SODIUM SERPL-SCNC: 139 MMOL/L (ref 136–145)
SODIUM SERPL-SCNC: 144 MMOL/L (ref 136–145)
SODIUM SERPL-SCNC: 145 MMOL/L (ref 136–145)
SODIUM SERPL-SCNC: 147 MMOL/L (ref 136–145)
SODIUM SERPL-SCNC: 147 MMOL/L (ref 136–145)
SODIUM UR-SCNC: 20 MMOL/L
SP GR UR STRIP: 1.01 (ref 1–1.03)
SP GR UR STRIP: 1.02 (ref 1–1.03)
SP GR UR STRIP: 1.02 (ref 1–1.03)
SP GR UR STRIP: 1.03 (ref 1–1.03)
SPECIMEN EXPIRATION DATE: NORMAL
SQUAMOUS EPITHELIAL: 1 /HPF
SQUAMOUS EPITHELIAL: 1 /HPF
SQUAMOUS EPITHELIAL: 2 /HPF
SQUAMOUS EPITHELIAL: 7 /HPF
STAPHYLOCOCCUS AUREUS: NOT DETECTED
STAPHYLOCOCCUS EPIDERMIDIS: NOT DETECTED
STAPHYLOCOCCUS LUGDUNENSIS: NOT DETECTED
STAPHYLOCOCCUS SPECIES: NOT DETECTED
STREPTOCOCCUS AGALACTIAE: NOT DETECTED
STREPTOCOCCUS ANGINOSUS GROUP: NOT DETECTED
STREPTOCOCCUS PNEUMONIAE: NOT DETECTED
STREPTOCOCCUS PYOGENES: DETECTED
SYSTOLIC BLOOD PRESSURE - MUSE: 107 MMHG
SYSTOLIC BLOOD PRESSURE - MUSE: NORMAL MMHG
SYSTOLIC BLOOD PRESSURE - MUSE: NORMAL MMHG
T AXIS - MUSE: 128 DEGREES
T AXIS - MUSE: 70 DEGREES
T AXIS - MUSE: 95 DEGREES
TEMPERATURE: 37 DEGREES C
TOTAL PROTEIN SERUM FOR ELP: 6.7 G/DL (ref 6.4–8.3)
TOTAL PROTEIN SERUM FOR ELP: 8.2 G/DL (ref 6.4–8.3)
TRANSFERRIN SERPL-MCNC: 156 MG/DL (ref 200–360)
TRANSITIONAL EPI: <1 /HPF
TRIGL SERPL-MCNC: 60 MG/DL
TROPONIN T SERPL HS-MCNC: 134 NG/L
TROPONIN T SERPL HS-MCNC: 169 NG/L
TROPONIN T SERPL HS-MCNC: 216 NG/L
TROPONIN T SERPL HS-MCNC: 282 NG/L
TROPONIN T SERPL HS-MCNC: 353 NG/L
TROPONIN T SERPL HS-MCNC: 415 NG/L
UFH PPP CHRO-ACNC: 0.11 IU/ML
UFH PPP CHRO-ACNC: 0.12 IU/ML
UFH PPP CHRO-ACNC: 0.13 IU/ML
UFH PPP CHRO-ACNC: 0.14 IU/ML
UFH PPP CHRO-ACNC: 0.18 IU/ML
UFH PPP CHRO-ACNC: 0.19 IU/ML
UFH PPP CHRO-ACNC: 0.2 IU/ML
UFH PPP CHRO-ACNC: 0.23 IU/ML
UFH PPP CHRO-ACNC: 0.24 IU/ML
UFH PPP CHRO-ACNC: 0.25 IU/ML
UFH PPP CHRO-ACNC: 0.26 IU/ML
UFH PPP CHRO-ACNC: 0.27 IU/ML
UFH PPP CHRO-ACNC: 0.27 IU/ML
UFH PPP CHRO-ACNC: 0.3 IU/ML
UFH PPP CHRO-ACNC: 0.3 IU/ML
UFH PPP CHRO-ACNC: 0.31 IU/ML
UFH PPP CHRO-ACNC: 0.31 IU/ML
UFH PPP CHRO-ACNC: 0.34 IU/ML
UFH PPP CHRO-ACNC: 0.35 IU/ML
UFH PPP CHRO-ACNC: 0.4 IU/ML
UFH PPP CHRO-ACNC: 0.49 IU/ML
UFH PPP CHRO-ACNC: <0.1 IU/ML
UFH PPP CHRO-ACNC: >1.1 IU/ML
UFH PPP CHRO-ACNC: >1.1 IU/ML
UNIT ABO/RH: NORMAL
UNIT NUMBER: NORMAL
UNIT STATUS: NORMAL
UNIT TYPE ISBT: 5100
UNIT TYPE ISBT: 6200
UROBILINOGEN UR STRIP-MCNC: <2 MG/DL
UROBILINOGEN UR STRIP-MCNC: <2 MG/DL
UROBILINOGEN UR STRIP-MCNC: NORMAL MG/DL
UROBILINOGEN UR STRIP-MCNC: NORMAL MG/DL
UUN UR-MCNC: 567 MG/DL (ref 801–1666)
VENTILATION MODE: ABNORMAL
VENTILATION MODE: AC
VENTILATION MODE: AC
VENTILATOR TIDAL VOLUME: 580 ML
VENTILATOR TIDAL VOLUME: 680 ML
VENTRICULAR RATE- MUSE: 116 BPM
VENTRICULAR RATE- MUSE: 118 BPM
VENTRICULAR RATE- MUSE: 85 BPM
WBC # BLD AUTO: 10.8 10E3/UL (ref 4–11)
WBC # BLD AUTO: 12.9 10E3/UL (ref 4–11)
WBC # BLD AUTO: 14.5 10E3/UL (ref 4–11)
WBC # BLD AUTO: 15.5 10E3/UL (ref 4–11)
WBC # BLD AUTO: 16.2 10E3/UL (ref 4–11)
WBC # BLD AUTO: 19 10E3/UL (ref 4–11)
WBC # BLD AUTO: 21.3 10E3/UL (ref 4–11)
WBC # BLD AUTO: 22.1 10E3/UL (ref 4–11)
WBC # BLD AUTO: 24.9 10E3/UL (ref 4–11)
WBC # BLD AUTO: 25.6 10E3/UL (ref 4–11)
WBC # BLD AUTO: 27.8 10E3/UL (ref 4–11)
WBC # BLD AUTO: 28.8 10E3/UL (ref 4–11)
WBC # BLD AUTO: 5.3 10E3/UL (ref 4–11)
WBC # BLD AUTO: 5.7 10E3/UL (ref 4–11)
WBC # BLD AUTO: 6 10E3/UL (ref 4–11)
WBC # BLD AUTO: 6.8 10E3/UL (ref 4–11)
WBC # BLD AUTO: 7.1 10E3/UL (ref 4–11)
WBC # BLD AUTO: 7.2 10E3/UL (ref 4–11)
WBC # BLD AUTO: 7.5 10E3/UL (ref 4–11)
WBC # BLD AUTO: 7.9 10E3/UL (ref 4–11)
WBC # BLD AUTO: 7.9 10E3/UL (ref 4–11)
WBC # BLD AUTO: 8.4 10E3/UL (ref 4–11)
WBC # BLD AUTO: 8.5 10E3/UL (ref 4–11)
WBC # BLD AUTO: 8.9 10E3/UL (ref 4–11)
WBC # BLD AUTO: 9.1 10E3/UL (ref 4–11)
WBC # BLD AUTO: 9.5 10E3/UL (ref 4–11)
WBC # BLD AUTO: 9.7 10E3/UL (ref 4–11)
WBC # FLD AUTO: 8222 /UL
WBC URINE: 0 /HPF
WBC URINE: 4 /HPF
WBC URINE: 6 /HPF
WBC URINE: 7 /HPF

## 2023-01-01 PROCEDURE — 36558 INSERT TUNNELED CV CATH: CPT

## 2023-01-01 PROCEDURE — 93005 ELECTROCARDIOGRAM TRACING: CPT

## 2023-01-01 PROCEDURE — 84075 ASSAY ALKALINE PHOSPHATASE: CPT | Performed by: INTERNAL MEDICINE

## 2023-01-01 PROCEDURE — 250N000011 HC RX IP 250 OP 636: Mod: JZ | Performed by: INTERNAL MEDICINE

## 2023-01-01 PROCEDURE — 82248 BILIRUBIN DIRECT: CPT | Performed by: INTERNAL MEDICINE

## 2023-01-01 PROCEDURE — 97166 OT EVAL MOD COMPLEX 45 MIN: CPT | Mod: GO

## 2023-01-01 PROCEDURE — 83735 ASSAY OF MAGNESIUM: CPT | Performed by: STUDENT IN AN ORGANIZED HEALTH CARE EDUCATION/TRAINING PROGRAM

## 2023-01-01 PROCEDURE — 93321 DOPPLER ECHO F-UP/LMTD STD: CPT | Mod: 26 | Performed by: INTERNAL MEDICINE

## 2023-01-01 PROCEDURE — 83735 ASSAY OF MAGNESIUM: CPT | Performed by: PHYSICIAN ASSISTANT

## 2023-01-01 PROCEDURE — 99239 HOSP IP/OBS DSCHRG MGMT >30: CPT | Performed by: PHYSICIAN ASSISTANT

## 2023-01-01 PROCEDURE — 250N000013 HC RX MED GY IP 250 OP 250 PS 637: Performed by: INTERNAL MEDICINE

## 2023-01-01 PROCEDURE — 82310 ASSAY OF CALCIUM: CPT | Performed by: STUDENT IN AN ORGANIZED HEALTH CARE EDUCATION/TRAINING PROGRAM

## 2023-01-01 PROCEDURE — 80048 BASIC METABOLIC PNL TOTAL CA: CPT | Performed by: INTERNAL MEDICINE

## 2023-01-01 PROCEDURE — 36415 COLL VENOUS BLD VENIPUNCTURE: CPT | Performed by: PHYSICIAN ASSISTANT

## 2023-01-01 PROCEDURE — 85018 HEMOGLOBIN: CPT | Performed by: INTERNAL MEDICINE

## 2023-01-01 PROCEDURE — 250N000011 HC RX IP 250 OP 636: Performed by: NURSE ANESTHETIST, CERTIFIED REGISTERED

## 2023-01-01 PROCEDURE — 84145 PROCALCITONIN (PCT): CPT | Performed by: INTERNAL MEDICINE

## 2023-01-01 PROCEDURE — 84165 PROTEIN E-PHORESIS SERUM: CPT | Mod: TC | Performed by: PATHOLOGY

## 2023-01-01 PROCEDURE — 128N000003 HC R&B REHAB

## 2023-01-01 PROCEDURE — 255N000002 HC RX 255 OP 636: Performed by: INTERNAL MEDICINE

## 2023-01-01 PROCEDURE — 250N000011 HC RX IP 250 OP 636: Performed by: INTERNAL MEDICINE

## 2023-01-01 PROCEDURE — 90937 HEMODIALYSIS REPEATED EVAL: CPT

## 2023-01-01 PROCEDURE — 83550 IRON BINDING TEST: CPT

## 2023-01-01 PROCEDURE — 99233 SBSQ HOSP IP/OBS HIGH 50: CPT | Mod: 24 | Performed by: INTERNAL MEDICINE

## 2023-01-01 PROCEDURE — 36415 COLL VENOUS BLD VENIPUNCTURE: CPT

## 2023-01-01 PROCEDURE — 250N000009 HC RX 250: Performed by: INTERNAL MEDICINE

## 2023-01-01 PROCEDURE — 99232 SBSQ HOSP IP/OBS MODERATE 35: CPT | Performed by: INTERNAL MEDICINE

## 2023-01-01 PROCEDURE — 999N000055 HC STATISTIC END TITIAL CO2 MONITORING

## 2023-01-01 PROCEDURE — 86140 C-REACTIVE PROTEIN: CPT | Performed by: INTERNAL MEDICINE

## 2023-01-01 PROCEDURE — 80053 COMPREHEN METABOLIC PANEL: CPT | Performed by: INTERNAL MEDICINE

## 2023-01-01 PROCEDURE — 99291 CRITICAL CARE FIRST HOUR: CPT | Performed by: INTERNAL MEDICINE

## 2023-01-01 PROCEDURE — 97530 THERAPEUTIC ACTIVITIES: CPT | Mod: GP

## 2023-01-01 PROCEDURE — 97530 THERAPEUTIC ACTIVITIES: CPT | Mod: GP | Performed by: STUDENT IN AN ORGANIZED HEALTH CARE EDUCATION/TRAINING PROGRAM

## 2023-01-01 PROCEDURE — 83605 ASSAY OF LACTIC ACID: CPT | Performed by: INTERNAL MEDICINE

## 2023-01-01 PROCEDURE — P9047 ALBUMIN (HUMAN), 25%, 50ML: HCPCS | Performed by: INTERNAL MEDICINE

## 2023-01-01 PROCEDURE — 97162 PT EVAL MOD COMPLEX 30 MIN: CPT | Mod: GP | Performed by: PHYSICAL THERAPIST

## 2023-01-01 PROCEDURE — 210N000001 HC R&B IMCU HEART CARE

## 2023-01-01 PROCEDURE — 85520 HEPARIN ASSAY: CPT | Performed by: INTERNAL MEDICINE

## 2023-01-01 PROCEDURE — 250N000013 HC RX MED GY IP 250 OP 250 PS 637: Performed by: STUDENT IN AN ORGANIZED HEALTH CARE EDUCATION/TRAINING PROGRAM

## 2023-01-01 PROCEDURE — 258N000003 HC RX IP 258 OP 636: Performed by: INTERNAL MEDICINE

## 2023-01-01 PROCEDURE — 999N000065 XR ABDOMEN PORT 1 VIEW

## 2023-01-01 PROCEDURE — 36556 INSERT NON-TUNNEL CV CATH: CPT

## 2023-01-01 PROCEDURE — 999N000287 HC ICU ADULT ROUNDING, EACH 10 MINS

## 2023-01-01 PROCEDURE — 82310 ASSAY OF CALCIUM: CPT | Performed by: INTERNAL MEDICINE

## 2023-01-01 PROCEDURE — 120N000001 HC R&B MED SURG/OB

## 2023-01-01 PROCEDURE — 36415 COLL VENOUS BLD VENIPUNCTURE: CPT | Performed by: INTERNAL MEDICINE

## 2023-01-01 PROCEDURE — 36600 WITHDRAWAL OF ARTERIAL BLOOD: CPT

## 2023-01-01 PROCEDURE — 250N000013 HC RX MED GY IP 250 OP 250 PS 637: Performed by: PHYSICIAN ASSISTANT

## 2023-01-01 PROCEDURE — 5A1935Z RESPIRATORY VENTILATION, LESS THAN 24 CONSECUTIVE HOURS: ICD-10-PCS | Performed by: INTERNAL MEDICINE

## 2023-01-01 PROCEDURE — 250N000011 HC RX IP 250 OP 636: Mod: JZ | Performed by: MASSAGE THERAPIST

## 2023-01-01 PROCEDURE — 83880 ASSAY OF NATRIURETIC PEPTIDE: CPT | Performed by: PHYSICIAN ASSISTANT

## 2023-01-01 PROCEDURE — 97110 THERAPEUTIC EXERCISES: CPT | Mod: GP

## 2023-01-01 PROCEDURE — 85014 HEMATOCRIT: CPT | Performed by: INTERNAL MEDICINE

## 2023-01-01 PROCEDURE — P9016 RBC LEUKOCYTES REDUCED: HCPCS

## 2023-01-01 PROCEDURE — 86706 HEP B SURFACE ANTIBODY: CPT | Performed by: INTERNAL MEDICINE

## 2023-01-01 PROCEDURE — 99233 SBSQ HOSP IP/OBS HIGH 50: CPT | Performed by: INTERNAL MEDICINE

## 2023-01-01 PROCEDURE — C1769 GUIDE WIRE: HCPCS

## 2023-01-01 PROCEDURE — 84460 ALANINE AMINO (ALT) (SGPT): CPT | Performed by: INTERNAL MEDICINE

## 2023-01-01 PROCEDURE — 250N000009 HC RX 250

## 2023-01-01 PROCEDURE — 84166 PROTEIN E-PHORESIS/URINE/CSF: CPT | Mod: 26

## 2023-01-01 PROCEDURE — 99233 SBSQ HOSP IP/OBS HIGH 50: CPT | Performed by: CLINICAL NURSE SPECIALIST

## 2023-01-01 PROCEDURE — 82550 ASSAY OF CK (CPK): CPT | Performed by: INTERNAL MEDICINE

## 2023-01-01 PROCEDURE — 85027 COMPLETE CBC AUTOMATED: CPT | Performed by: PHYSICIAN ASSISTANT

## 2023-01-01 PROCEDURE — 85027 COMPLETE CBC AUTOMATED: CPT | Performed by: INTERNAL MEDICINE

## 2023-01-01 PROCEDURE — 999N000009 HC STATISTIC AIRWAY CARE

## 2023-01-01 PROCEDURE — 84155 ASSAY OF PROTEIN SERUM: CPT | Performed by: PHYSICIAN ASSISTANT

## 2023-01-01 PROCEDURE — 36415 COLL VENOUS BLD VENIPUNCTURE: CPT | Performed by: EMERGENCY MEDICINE

## 2023-01-01 PROCEDURE — 82310 ASSAY OF CALCIUM: CPT | Performed by: PHYSICIAN ASSISTANT

## 2023-01-01 PROCEDURE — 99233 SBSQ HOSP IP/OBS HIGH 50: CPT | Performed by: PHYSICIAN ASSISTANT

## 2023-01-01 PROCEDURE — 80048 BASIC METABOLIC PNL TOTAL CA: CPT | Performed by: PHYSICIAN ASSISTANT

## 2023-01-01 PROCEDURE — 86923 COMPATIBILITY TEST ELECTRIC: CPT | Performed by: INTERNAL MEDICINE

## 2023-01-01 PROCEDURE — 250N000012 HC RX MED GY IP 250 OP 636 PS 637: Performed by: INTERNAL MEDICINE

## 2023-01-01 PROCEDURE — 97530 THERAPEUTIC ACTIVITIES: CPT | Mod: GP | Performed by: PHYSICAL THERAPIST

## 2023-01-01 PROCEDURE — P9016 RBC LEUKOCYTES REDUCED: HCPCS | Performed by: INTERNAL MEDICINE

## 2023-01-01 PROCEDURE — 36620 INSERTION CATHETER ARTERY: CPT | Performed by: INTERNAL MEDICINE

## 2023-01-01 PROCEDURE — 84100 ASSAY OF PHOSPHORUS: CPT | Performed by: PHYSICIAN ASSISTANT

## 2023-01-01 PROCEDURE — 82805 BLOOD GASES W/O2 SATURATION: CPT | Performed by: STUDENT IN AN ORGANIZED HEALTH CARE EDUCATION/TRAINING PROGRAM

## 2023-01-01 PROCEDURE — 99292 CRITICAL CARE ADDL 30 MIN: CPT | Mod: 25 | Performed by: INTERNAL MEDICINE

## 2023-01-01 PROCEDURE — 999N000208 ECHOCARDIOGRAM COMPLETE

## 2023-01-01 PROCEDURE — 93321 DOPPLER ECHO F-UP/LMTD STD: CPT

## 2023-01-01 PROCEDURE — 97535 SELF CARE MNGMENT TRAINING: CPT | Mod: GO

## 2023-01-01 PROCEDURE — 84100 ASSAY OF PHOSPHORUS: CPT | Performed by: INTERNAL MEDICINE

## 2023-01-01 PROCEDURE — 99222 1ST HOSP IP/OBS MODERATE 55: CPT | Mod: GC | Performed by: INTERNAL MEDICINE

## 2023-01-01 PROCEDURE — 3E043XZ INTRODUCTION OF VASOPRESSOR INTO CENTRAL VEIN, PERCUTANEOUS APPROACH: ICD-10-PCS | Performed by: INTERNAL MEDICINE

## 2023-01-01 PROCEDURE — 93306 TTE W/DOPPLER COMPLETE: CPT | Mod: 26 | Performed by: INTERNAL MEDICINE

## 2023-01-01 PROCEDURE — 36569 INSJ PICC 5 YR+ W/O IMAGING: CPT

## 2023-01-01 PROCEDURE — 82330 ASSAY OF CALCIUM: CPT | Performed by: INTERNAL MEDICINE

## 2023-01-01 PROCEDURE — 97110 THERAPEUTIC EXERCISES: CPT | Mod: GO

## 2023-01-01 PROCEDURE — 84484 ASSAY OF TROPONIN QUANT: CPT | Performed by: INTERNAL MEDICINE

## 2023-01-01 PROCEDURE — 99153 MOD SED SAME PHYS/QHP EA: CPT

## 2023-01-01 PROCEDURE — 97110 THERAPEUTIC EXERCISES: CPT | Mod: GP | Performed by: PHYSICAL THERAPIST

## 2023-01-01 PROCEDURE — 80053 COMPREHEN METABOLIC PANEL: CPT | Performed by: STUDENT IN AN ORGANIZED HEALTH CARE EDUCATION/TRAINING PROGRAM

## 2023-01-01 PROCEDURE — C1750 CATH, HEMODIALYSIS,LONG-TERM: HCPCS

## 2023-01-01 PROCEDURE — 85007 BL SMEAR W/DIFF WBC COUNT: CPT | Performed by: STUDENT IN AN ORGANIZED HEALTH CARE EDUCATION/TRAINING PROGRAM

## 2023-01-01 PROCEDURE — 86850 RBC ANTIBODY SCREEN: CPT | Performed by: INTERNAL MEDICINE

## 2023-01-01 PROCEDURE — 30283B1 TRANSFUSION OF NONAUTOLOGOUS 4-FACTOR PROTHROMBIN COMPLEX CONCENTRATE INTO VEIN, PERCUTANEOUS APPROACH: ICD-10-PCS | Performed by: INTERNAL MEDICINE

## 2023-01-01 PROCEDURE — 36591 DRAW BLOOD OFF VENOUS DEVICE: CPT | Performed by: INTERNAL MEDICINE

## 2023-01-01 PROCEDURE — 84132 ASSAY OF SERUM POTASSIUM: CPT | Performed by: PHYSICIAN ASSISTANT

## 2023-01-01 PROCEDURE — 83735 ASSAY OF MAGNESIUM: CPT | Performed by: INTERNAL MEDICINE

## 2023-01-01 PROCEDURE — 86901 BLOOD TYPING SEROLOGIC RH(D): CPT

## 2023-01-01 PROCEDURE — 81001 URINALYSIS AUTO W/SCOPE: CPT | Performed by: CLINICAL NURSE SPECIALIST

## 2023-01-01 PROCEDURE — 258N000003 HC RX IP 258 OP 636: Performed by: PHYSICIAN ASSISTANT

## 2023-01-01 PROCEDURE — 73060 X-RAY EXAM OF HUMERUS: CPT | Mod: RT

## 2023-01-01 PROCEDURE — 99232 SBSQ HOSP IP/OBS MODERATE 35: CPT | Mod: FS | Performed by: PHYSICIAN ASSISTANT

## 2023-01-01 PROCEDURE — 99291 CRITICAL CARE FIRST HOUR: CPT | Mod: 24 | Performed by: INTERNAL MEDICINE

## 2023-01-01 PROCEDURE — 96361 HYDRATE IV INFUSION ADD-ON: CPT

## 2023-01-01 PROCEDURE — 272N000452 HC KIT SHRLOCK 5FR POWER PICC TRIPLE LUMEN

## 2023-01-01 PROCEDURE — 36415 COLL VENOUS BLD VENIPUNCTURE: CPT | Performed by: STUDENT IN AN ORGANIZED HEALTH CARE EDUCATION/TRAINING PROGRAM

## 2023-01-01 PROCEDURE — 80051 ELECTROLYTE PANEL: CPT | Performed by: INTERNAL MEDICINE

## 2023-01-01 PROCEDURE — 250N000011 HC RX IP 250 OP 636: Mod: JZ

## 2023-01-01 PROCEDURE — 90935 HEMODIALYSIS ONE EVALUATION: CPT

## 2023-01-01 PROCEDURE — 86038 ANTINUCLEAR ANTIBODIES: CPT | Performed by: PHYSICIAN ASSISTANT

## 2023-01-01 PROCEDURE — 999N000150 HC STATISTIC PT MED CONFERENCE < 30 MIN

## 2023-01-01 PROCEDURE — 258N000001 HC RX 258: Performed by: PHYSICIAN ASSISTANT

## 2023-01-01 PROCEDURE — 86704 HEP B CORE ANTIBODY TOTAL: CPT | Performed by: INTERNAL MEDICINE

## 2023-01-01 PROCEDURE — 86140 C-REACTIVE PROTEIN: CPT | Performed by: PHYSICIAN ASSISTANT

## 2023-01-01 PROCEDURE — 99152 MOD SED SAME PHYS/QHP 5/>YRS: CPT

## 2023-01-01 PROCEDURE — 84466 ASSAY OF TRANSFERRIN: CPT

## 2023-01-01 PROCEDURE — 85014 HEMATOCRIT: CPT | Performed by: NURSE PRACTITIONER

## 2023-01-01 PROCEDURE — 03HY32Z INSERTION OF MONITORING DEVICE INTO UPPER ARTERY, PERCUTANEOUS APPROACH: ICD-10-PCS | Performed by: INTERNAL MEDICINE

## 2023-01-01 PROCEDURE — 272N000004 HC RX 272: Performed by: INTERNAL MEDICINE

## 2023-01-01 PROCEDURE — 370N000003 HC ANESTHESIA WARD SERVICE: Performed by: NURSE ANESTHETIST, CERTIFIED REGISTERED

## 2023-01-01 PROCEDURE — 999N000065 XR CHEST PORT 1 VIEW

## 2023-01-01 PROCEDURE — 90947 DIALYSIS REPEATED EVAL: CPT

## 2023-01-01 PROCEDURE — 81001 URINALYSIS AUTO W/SCOPE: CPT | Performed by: INTERNAL MEDICINE

## 2023-01-01 PROCEDURE — 250N000009 HC RX 250: Performed by: STUDENT IN AN ORGANIZED HEALTH CARE EDUCATION/TRAINING PROGRAM

## 2023-01-01 PROCEDURE — 99222 1ST HOSP IP/OBS MODERATE 55: CPT | Performed by: SPECIALIST

## 2023-01-01 PROCEDURE — G0463 HOSPITAL OUTPT CLINIC VISIT: HCPCS

## 2023-01-01 PROCEDURE — 250N000011 HC RX IP 250 OP 636

## 2023-01-01 PROCEDURE — 87340 HEPATITIS B SURFACE AG IA: CPT | Performed by: INTERNAL MEDICINE

## 2023-01-01 PROCEDURE — 0W9D30Z DRAINAGE OF PERICARDIAL CAVITY WITH DRAINAGE DEVICE, PERCUTANEOUS APPROACH: ICD-10-PCS | Performed by: INTERNAL MEDICINE

## 2023-01-01 PROCEDURE — 83036 HEMOGLOBIN GLYCOSYLATED A1C: CPT | Performed by: STUDENT IN AN ORGANIZED HEALTH CARE EDUCATION/TRAINING PROGRAM

## 2023-01-01 PROCEDURE — 999N000125 HC STATISTIC PATIENT MED CONFERENCE < 30 MIN

## 2023-01-01 PROCEDURE — 5A1D70Z PERFORMANCE OF URINARY FILTRATION, INTERMITTENT, LESS THAN 6 HOURS PER DAY: ICD-10-PCS | Performed by: INTERNAL MEDICINE

## 2023-01-01 PROCEDURE — 82248 BILIRUBIN DIRECT: CPT | Performed by: PHYSICIAN ASSISTANT

## 2023-01-01 PROCEDURE — 85007 BL SMEAR W/DIFF WBC COUNT: CPT | Performed by: INTERNAL MEDICINE

## 2023-01-01 PROCEDURE — 86160 COMPLEMENT ANTIGEN: CPT | Performed by: PHYSICIAN ASSISTANT

## 2023-01-01 PROCEDURE — 83880 ASSAY OF NATRIURETIC PEPTIDE: CPT | Performed by: STUDENT IN AN ORGANIZED HEALTH CARE EDUCATION/TRAINING PROGRAM

## 2023-01-01 PROCEDURE — 272N000500 HC NEEDLE CR2

## 2023-01-01 PROCEDURE — 74018 RADEX ABDOMEN 1 VIEW: CPT

## 2023-01-01 PROCEDURE — 200N000001 HC R&B ICU

## 2023-01-01 PROCEDURE — 97530 THERAPEUTIC ACTIVITIES: CPT | Mod: GO

## 2023-01-01 PROCEDURE — 250N000012 HC RX MED GY IP 250 OP 636 PS 637: Performed by: STUDENT IN AN ORGANIZED HEALTH CARE EDUCATION/TRAINING PROGRAM

## 2023-01-01 PROCEDURE — 89050 BODY FLUID CELL COUNT: CPT | Performed by: NURSE PRACTITIONER

## 2023-01-01 PROCEDURE — 84132 ASSAY OF SERUM POTASSIUM: CPT | Performed by: INTERNAL MEDICINE

## 2023-01-01 PROCEDURE — 5A1D90Z PERFORMANCE OF URINARY FILTRATION, CONTINUOUS, GREATER THAN 18 HOURS PER DAY: ICD-10-PCS | Performed by: INTERNAL MEDICINE

## 2023-01-01 PROCEDURE — 84165 PROTEIN E-PHORESIS SERUM: CPT | Mod: 26

## 2023-01-01 PROCEDURE — 82374 ASSAY BLOOD CARBON DIOXIDE: CPT | Performed by: INTERNAL MEDICINE

## 2023-01-01 PROCEDURE — 82140 ASSAY OF AMMONIA: CPT | Performed by: INTERNAL MEDICINE

## 2023-01-01 PROCEDURE — 84484 ASSAY OF TROPONIN QUANT: CPT | Performed by: STUDENT IN AN ORGANIZED HEALTH CARE EDUCATION/TRAINING PROGRAM

## 2023-01-01 PROCEDURE — 02HV33Z INSERTION OF INFUSION DEVICE INTO SUPERIOR VENA CAVA, PERCUTANEOUS APPROACH: ICD-10-PCS | Performed by: INTERNAL MEDICINE

## 2023-01-01 PROCEDURE — 84155 ASSAY OF PROTEIN SERUM: CPT | Performed by: INTERNAL MEDICINE

## 2023-01-01 PROCEDURE — 258N000001 HC RX 258: Performed by: INTERNAL MEDICINE

## 2023-01-01 PROCEDURE — 82570 ASSAY OF URINE CREATININE: CPT | Performed by: FAMILY MEDICINE

## 2023-01-01 PROCEDURE — 97535 SELF CARE MNGMENT TRAINING: CPT | Mod: GP

## 2023-01-01 PROCEDURE — 99233 SBSQ HOSP IP/OBS HIGH 50: CPT | Performed by: PHYSICAL MEDICINE & REHABILITATION

## 2023-01-01 PROCEDURE — 97116 GAIT TRAINING THERAPY: CPT | Mod: GP | Performed by: PHYSICAL THERAPIST

## 2023-01-01 PROCEDURE — P9047 ALBUMIN (HUMAN), 25%, 50ML: HCPCS

## 2023-01-01 PROCEDURE — 84300 ASSAY OF URINE SODIUM: CPT | Performed by: INTERNAL MEDICINE

## 2023-01-01 PROCEDURE — 85014 HEMATOCRIT: CPT | Performed by: STUDENT IN AN ORGANIZED HEALTH CARE EDUCATION/TRAINING PROGRAM

## 2023-01-01 PROCEDURE — 82805 BLOOD GASES W/O2 SATURATION: CPT

## 2023-01-01 PROCEDURE — 85027 COMPLETE CBC AUTOMATED: CPT | Performed by: STUDENT IN AN ORGANIZED HEALTH CARE EDUCATION/TRAINING PROGRAM

## 2023-01-01 PROCEDURE — 82805 BLOOD GASES W/O2 SATURATION: CPT | Performed by: NURSE PRACTITIONER

## 2023-01-01 PROCEDURE — 70496 CT ANGIOGRAPHY HEAD: CPT

## 2023-01-01 PROCEDURE — 94002 VENT MGMT INPAT INIT DAY: CPT

## 2023-01-01 PROCEDURE — 81001 URINALYSIS AUTO W/SCOPE: CPT | Performed by: PHYSICIAN ASSISTANT

## 2023-01-01 PROCEDURE — 36556 INSERT NON-TUNNEL CV CATH: CPT | Performed by: INTERNAL MEDICINE

## 2023-01-01 PROCEDURE — 99232 SBSQ HOSP IP/OBS MODERATE 35: CPT | Performed by: STUDENT IN AN ORGANIZED HEALTH CARE EDUCATION/TRAINING PROGRAM

## 2023-01-01 PROCEDURE — 99207 PR APP CREDIT; MD BILLING SHARED VISIT: CPT | Performed by: INTERNAL MEDICINE

## 2023-01-01 PROCEDURE — 99222 1ST HOSP IP/OBS MODERATE 55: CPT

## 2023-01-01 PROCEDURE — 97535 SELF CARE MNGMENT TRAINING: CPT | Mod: GP | Performed by: PHYSICAL THERAPIST

## 2023-01-01 PROCEDURE — 87040 BLOOD CULTURE FOR BACTERIA: CPT | Performed by: INTERNAL MEDICINE

## 2023-01-01 PROCEDURE — C1752 CATH,HEMODIALYSIS,SHORT-TERM: HCPCS

## 2023-01-01 PROCEDURE — 250N000011 HC RX IP 250 OP 636: Performed by: NURSE PRACTITIONER

## 2023-01-01 PROCEDURE — 80069 RENAL FUNCTION PANEL: CPT | Performed by: INTERNAL MEDICINE

## 2023-01-01 PROCEDURE — 93010 ELECTROCARDIOGRAM REPORT: CPT | Performed by: INTERNAL MEDICINE

## 2023-01-01 PROCEDURE — 82805 BLOOD GASES W/O2 SATURATION: CPT | Performed by: INTERNAL MEDICINE

## 2023-01-01 PROCEDURE — 85730 THROMBOPLASTIN TIME PARTIAL: CPT | Performed by: NURSE PRACTITIONER

## 2023-01-01 PROCEDURE — 84166 PROTEIN E-PHORESIS/URINE/CSF: CPT | Performed by: PATHOLOGY

## 2023-01-01 PROCEDURE — 99292 CRITICAL CARE ADDL 30 MIN: CPT

## 2023-01-01 PROCEDURE — 74176 CT ABD & PELVIS W/O CONTRAST: CPT

## 2023-01-01 PROCEDURE — 89051 BODY FLUID CELL COUNT: CPT | Performed by: NURSE PRACTITIONER

## 2023-01-01 PROCEDURE — 82374 ASSAY BLOOD CARBON DIOXIDE: CPT | Performed by: STUDENT IN AN ORGANIZED HEALTH CARE EDUCATION/TRAINING PROGRAM

## 2023-01-01 PROCEDURE — 99291 CRITICAL CARE FIRST HOUR: CPT | Mod: 25 | Performed by: INTERNAL MEDICINE

## 2023-01-01 PROCEDURE — 85027 COMPLETE CBC AUTOMATED: CPT

## 2023-01-01 PROCEDURE — 5A12012 PERFORMANCE OF CARDIAC OUTPUT, SINGLE, MANUAL: ICD-10-PCS | Performed by: INTERNAL MEDICINE

## 2023-01-01 PROCEDURE — 76770 US EXAM ABDO BACK WALL COMP: CPT | Mod: 26 | Performed by: RADIOLOGY

## 2023-01-01 PROCEDURE — 99232 SBSQ HOSP IP/OBS MODERATE 35: CPT | Performed by: PHYSICIAN ASSISTANT

## 2023-01-01 PROCEDURE — 86901 BLOOD TYPING SEROLOGIC RH(D): CPT | Performed by: INTERNAL MEDICINE

## 2023-01-01 PROCEDURE — 76770 US EXAM ABDO BACK WALL COMP: CPT

## 2023-01-01 PROCEDURE — C9113 INJ PANTOPRAZOLE SODIUM, VIA: HCPCS | Mod: JZ | Performed by: INTERNAL MEDICINE

## 2023-01-01 PROCEDURE — 84450 TRANSFERASE (AST) (SGOT): CPT | Performed by: INTERNAL MEDICINE

## 2023-01-01 PROCEDURE — 272N000240 HC CARDIOVERT/DEFIB/PACER SUPP

## 2023-01-01 PROCEDURE — 93005 ELECTROCARDIOGRAM TRACING: CPT | Performed by: EMERGENCY MEDICINE

## 2023-01-01 PROCEDURE — 80061 LIPID PANEL: CPT | Performed by: FAMILY MEDICINE

## 2023-01-01 PROCEDURE — 71045 X-RAY EXAM CHEST 1 VIEW: CPT

## 2023-01-01 PROCEDURE — 97161 PT EVAL LOW COMPLEX 20 MIN: CPT | Mod: GP | Performed by: PHYSICAL THERAPIST

## 2023-01-01 PROCEDURE — 82728 ASSAY OF FERRITIN: CPT

## 2023-01-01 PROCEDURE — 87077 CULTURE AEROBIC IDENTIFY: CPT

## 2023-01-01 PROCEDURE — 999N000157 HC STATISTIC RCP TIME EA 10 MIN

## 2023-01-01 PROCEDURE — 82330 ASSAY OF CALCIUM: CPT | Performed by: PHYSICIAN ASSISTANT

## 2023-01-01 PROCEDURE — 99231 SBSQ HOSP IP/OBS SF/LOW 25: CPT | Performed by: NURSE PRACTITIONER

## 2023-01-01 PROCEDURE — 83605 ASSAY OF LACTIC ACID: CPT | Performed by: NURSE PRACTITIONER

## 2023-01-01 PROCEDURE — P9059 PLASMA, FRZ BETWEEN 8-24HOUR: HCPCS | Performed by: INTERNAL MEDICINE

## 2023-01-01 PROCEDURE — 83550 IRON BINDING TEST: CPT | Performed by: INTERNAL MEDICINE

## 2023-01-01 PROCEDURE — 93010 ELECTROCARDIOGRAM REPORT: CPT | Mod: 76 | Performed by: INTERNAL MEDICINE

## 2023-01-01 PROCEDURE — 82570 ASSAY OF URINE CREATININE: CPT | Performed by: PHYSICIAN ASSISTANT

## 2023-01-01 PROCEDURE — 82010 KETONE BODYS QUAN: CPT | Performed by: INTERNAL MEDICINE

## 2023-01-01 PROCEDURE — 99231 SBSQ HOSP IP/OBS SF/LOW 25: CPT | Performed by: INTERNAL MEDICINE

## 2023-01-01 PROCEDURE — 96375 TX/PRO/DX INJ NEW DRUG ADDON: CPT

## 2023-01-01 PROCEDURE — 99223 1ST HOSP IP/OBS HIGH 75: CPT | Mod: AI | Performed by: PHYSICAL MEDICINE & REHABILITATION

## 2023-01-01 PROCEDURE — 86923 COMPATIBILITY TEST ELECTRIC: CPT

## 2023-01-01 PROCEDURE — 250N000012 HC RX MED GY IP 250 OP 636 PS 637: Performed by: PHYSICIAN ASSISTANT

## 2023-01-01 PROCEDURE — 84156 ASSAY OF PROTEIN URINE: CPT | Performed by: INTERNAL MEDICINE

## 2023-01-01 PROCEDURE — 99233 SBSQ HOSP IP/OBS HIGH 50: CPT

## 2023-01-01 PROCEDURE — 258N000001 HC RX 258: Performed by: STUDENT IN AN ORGANIZED HEALTH CARE EDUCATION/TRAINING PROGRAM

## 2023-01-01 PROCEDURE — 99222 1ST HOSP IP/OBS MODERATE 55: CPT | Performed by: INTERNAL MEDICINE

## 2023-01-01 PROCEDURE — 999N000259 HC STATISTIC EXTUBATION

## 2023-01-01 PROCEDURE — 93325 DOPPLER ECHO COLOR FLOW MAPG: CPT | Mod: 26 | Performed by: INTERNAL MEDICINE

## 2023-01-01 PROCEDURE — 250N000011 HC RX IP 250 OP 636: Mod: JZ | Performed by: STUDENT IN AN ORGANIZED HEALTH CARE EDUCATION/TRAINING PROGRAM

## 2023-01-01 PROCEDURE — 250N000011 HC RX IP 250 OP 636: Mod: JZ | Performed by: RADIOLOGY

## 2023-01-01 PROCEDURE — 99238 HOSP IP/OBS DSCHRG MGMT 30/<: CPT | Mod: 25 | Performed by: INTERNAL MEDICINE

## 2023-01-01 PROCEDURE — 258N000003 HC RX IP 258 OP 636: Performed by: HOSPITALIST

## 2023-01-01 PROCEDURE — 84156 ASSAY OF PROTEIN URINE: CPT | Performed by: CLINICAL NURSE SPECIALIST

## 2023-01-01 PROCEDURE — 97110 THERAPEUTIC EXERCISES: CPT | Mod: GP | Performed by: STUDENT IN AN ORGANIZED HEALTH CARE EDUCATION/TRAINING PROGRAM

## 2023-01-01 PROCEDURE — 250N000011 HC RX IP 250 OP 636: Performed by: RADIOLOGY

## 2023-01-01 PROCEDURE — 93005 ELECTROCARDIOGRAM TRACING: CPT | Performed by: INTERNAL MEDICINE

## 2023-01-01 PROCEDURE — 99231 SBSQ HOSP IP/OBS SF/LOW 25: CPT | Performed by: PHYSICIAN ASSISTANT

## 2023-01-01 PROCEDURE — 80053 COMPREHEN METABOLIC PANEL: CPT | Performed by: FAMILY MEDICINE

## 2023-01-01 PROCEDURE — 80048 BASIC METABOLIC PNL TOTAL CA: CPT | Performed by: NURSE PRACTITIONER

## 2023-01-01 PROCEDURE — 93971 EXTREMITY STUDY: CPT | Mod: RT

## 2023-01-01 PROCEDURE — 33016 PERICARDIOCENTESIS W/IMAGING: CPT | Performed by: INTERNAL MEDICINE

## 2023-01-01 PROCEDURE — P9045 ALBUMIN (HUMAN), 5%, 250 ML: HCPCS | Mod: JZ | Performed by: INTERNAL MEDICINE

## 2023-01-01 PROCEDURE — 99223 1ST HOSP IP/OBS HIGH 75: CPT | Performed by: INTERNAL MEDICINE

## 2023-01-01 PROCEDURE — 0JH63XZ INSERTION OF TUNNELED VASCULAR ACCESS DEVICE INTO CHEST SUBCUTANEOUS TISSUE AND FASCIA, PERCUTANEOUS APPROACH: ICD-10-PCS | Performed by: RADIOLOGY

## 2023-01-01 PROCEDURE — 85049 AUTOMATED PLATELET COUNT: CPT | Performed by: INTERNAL MEDICINE

## 2023-01-01 PROCEDURE — 85610 PROTHROMBIN TIME: CPT | Performed by: INTERNAL MEDICINE

## 2023-01-01 PROCEDURE — 83935 ASSAY OF URINE OSMOLALITY: CPT | Performed by: INTERNAL MEDICINE

## 2023-01-01 PROCEDURE — 75635 CT ANGIO ABDOMINAL ARTERIES: CPT

## 2023-01-01 PROCEDURE — 250N000011 HC RX IP 250 OP 636: Performed by: STUDENT IN AN ORGANIZED HEALTH CARE EDUCATION/TRAINING PROGRAM

## 2023-01-01 PROCEDURE — 92960 CARDIOVERSION ELECTRIC EXT: CPT

## 2023-01-01 PROCEDURE — 86036 ANCA SCREEN EACH ANTIBODY: CPT | Performed by: PHYSICIAN ASSISTANT

## 2023-01-01 PROCEDURE — 87149 DNA/RNA DIRECT PROBE: CPT | Performed by: STUDENT IN AN ORGANIZED HEALTH CARE EDUCATION/TRAINING PROGRAM

## 2023-01-01 PROCEDURE — 83521 IG LIGHT CHAINS FREE EACH: CPT | Performed by: INTERNAL MEDICINE

## 2023-01-01 PROCEDURE — 97116 GAIT TRAINING THERAPY: CPT | Mod: GP

## 2023-01-01 PROCEDURE — 250N000011 HC RX IP 250 OP 636: Mod: JZ | Performed by: PHYSICAL MEDICINE & REHABILITATION

## 2023-01-01 PROCEDURE — 97535 SELF CARE MNGMENT TRAINING: CPT | Mod: GO | Performed by: STUDENT IN AN ORGANIZED HEALTH CARE EDUCATION/TRAINING PROGRAM

## 2023-01-01 PROCEDURE — 258N000003 HC RX IP 258 OP 636

## 2023-01-01 PROCEDURE — 74018 RADEX ABDOMEN 1 VIEW: CPT | Mod: 26 | Performed by: STUDENT IN AN ORGANIZED HEALTH CARE EDUCATION/TRAINING PROGRAM

## 2023-01-01 PROCEDURE — 96365 THER/PROPH/DIAG IV INF INIT: CPT | Mod: 59

## 2023-01-01 PROCEDURE — 86850 RBC ANTIBODY SCREEN: CPT

## 2023-01-01 PROCEDURE — 85610 PROTHROMBIN TIME: CPT | Performed by: NURSE PRACTITIONER

## 2023-01-01 PROCEDURE — 86334 IMMUNOFIX E-PHORESIS SERUM: CPT | Performed by: PATHOLOGY

## 2023-01-01 PROCEDURE — 250N000013 HC RX MED GY IP 250 OP 250 PS 637

## 2023-01-01 PROCEDURE — 82728 ASSAY OF FERRITIN: CPT | Performed by: INTERNAL MEDICINE

## 2023-01-01 PROCEDURE — 99231 SBSQ HOSP IP/OBS SF/LOW 25: CPT | Mod: FS | Performed by: PHYSICIAN ASSISTANT

## 2023-01-01 PROCEDURE — 99291 CRITICAL CARE FIRST HOUR: CPT

## 2023-01-01 PROCEDURE — 80053 COMPREHEN METABOLIC PANEL: CPT

## 2023-01-01 PROCEDURE — 82272 OCCULT BLD FECES 1-3 TESTS: CPT | Performed by: INTERNAL MEDICINE

## 2023-01-01 PROCEDURE — 99232 SBSQ HOSP IP/OBS MODERATE 35: CPT | Performed by: HOSPITALIST

## 2023-01-01 PROCEDURE — 02H633Z INSERTION OF INFUSION DEVICE INTO RIGHT ATRIUM, PERCUTANEOUS APPROACH: ICD-10-PCS | Performed by: RADIOLOGY

## 2023-01-01 PROCEDURE — 5A2204Z RESTORATION OF CARDIAC RHYTHM, SINGLE: ICD-10-PCS | Performed by: STUDENT IN AN ORGANIZED HEALTH CARE EDUCATION/TRAINING PROGRAM

## 2023-01-01 PROCEDURE — 83605 ASSAY OF LACTIC ACID: CPT | Performed by: STUDENT IN AN ORGANIZED HEALTH CARE EDUCATION/TRAINING PROGRAM

## 2023-01-01 PROCEDURE — 84295 ASSAY OF SERUM SODIUM: CPT | Performed by: INTERNAL MEDICINE

## 2023-01-01 PROCEDURE — 84484 ASSAY OF TROPONIN QUANT: CPT

## 2023-01-01 PROCEDURE — 97165 OT EVAL LOW COMPLEX 30 MIN: CPT | Mod: GO

## 2023-01-01 PROCEDURE — 86481 TB AG RESPONSE T-CELL SUSP: CPT | Performed by: INTERNAL MEDICINE

## 2023-01-01 PROCEDURE — 73221 MRI JOINT UPR EXTREM W/O DYE: CPT | Mod: RT

## 2023-01-01 PROCEDURE — 87040 BLOOD CULTURE FOR BACTERIA: CPT | Performed by: STUDENT IN AN ORGANIZED HEALTH CARE EDUCATION/TRAINING PROGRAM

## 2023-01-01 PROCEDURE — 258N000003 HC RX IP 258 OP 636: Performed by: PHYSICAL MEDICINE & REHABILITATION

## 2023-01-01 PROCEDURE — 258N000003 HC RX IP 258 OP 636: Performed by: STUDENT IN AN ORGANIZED HEALTH CARE EDUCATION/TRAINING PROGRAM

## 2023-01-01 PROCEDURE — 93308 TTE F-UP OR LMTD: CPT | Mod: 26 | Performed by: INTERNAL MEDICINE

## 2023-01-01 PROCEDURE — 99238 HOSP IP/OBS DSCHRG MGMT 30/<: CPT | Performed by: INTERNAL MEDICINE

## 2023-01-01 PROCEDURE — 82595 ASSAY OF CRYOGLOBULIN: CPT | Performed by: PHYSICIAN ASSISTANT

## 2023-01-01 PROCEDURE — 99223 1ST HOSP IP/OBS HIGH 75: CPT | Performed by: STUDENT IN AN ORGANIZED HEALTH CARE EDUCATION/TRAINING PROGRAM

## 2023-01-01 PROCEDURE — 70498 CT ANGIOGRAPHY NECK: CPT

## 2023-01-01 PROCEDURE — 93306 TTE W/DOPPLER COMPLETE: CPT | Mod: 26 | Performed by: GENERAL ACUTE CARE HOSPITAL

## 2023-01-01 PROCEDURE — 82533 TOTAL CORTISOL: CPT | Performed by: INTERNAL MEDICINE

## 2023-01-01 PROCEDURE — 84540 ASSAY OF URINE/UREA-N: CPT | Performed by: PHYSICIAN ASSISTANT

## 2023-01-01 PROCEDURE — 86334 IMMUNOFIX E-PHORESIS SERUM: CPT | Mod: 26

## 2023-01-01 PROCEDURE — 80053 COMPREHEN METABOLIC PANEL: CPT | Performed by: PHYSICIAN ASSISTANT

## 2023-01-01 PROCEDURE — P9035 PLATELET PHERES LEUKOREDUCED: HCPCS | Performed by: INTERNAL MEDICINE

## 2023-01-01 RX ORDER — MAGNESIUM SULFATE HEPTAHYDRATE 40 MG/ML
INJECTION, SOLUTION INTRAVENOUS CONTINUOUS PRN
Status: COMPLETED | OUTPATIENT
Start: 2023-01-01 | End: 2023-01-01

## 2023-01-01 RX ORDER — AMIODARONE HYDROCHLORIDE 200 MG/1
400 TABLET ORAL DAILY
Status: DISCONTINUED | OUTPATIENT
Start: 2023-01-01 | End: 2023-01-01 | Stop reason: HOSPADM

## 2023-01-01 RX ORDER — NOREPINEPHRINE BITARTRATE 0.02 MG/ML
.01-.6 INJECTION, SOLUTION INTRAVENOUS CONTINUOUS
Status: DISCONTINUED | OUTPATIENT
Start: 2023-01-01 | End: 2023-01-01

## 2023-01-01 RX ORDER — ALBUMIN (HUMAN) 12.5 G/50ML
50 SOLUTION INTRAVENOUS 2 TIMES DAILY
Status: DISCONTINUED | OUTPATIENT
Start: 2023-01-01 | End: 2023-01-01

## 2023-01-01 RX ORDER — METHYLPREDNISOLONE SODIUM SUCCINATE 125 MG/2ML
125 INJECTION, POWDER, LYOPHILIZED, FOR SOLUTION INTRAMUSCULAR; INTRAVENOUS
Status: DISCONTINUED | OUTPATIENT
Start: 2023-01-01 | End: 2023-01-01 | Stop reason: HOSPADM

## 2023-01-01 RX ORDER — ACETAMINOPHEN 650 MG/20.3ML
650 LIQUID ORAL EVERY 4 HOURS PRN
Status: DISCONTINUED | OUTPATIENT
Start: 2023-01-01 | End: 2023-01-01 | Stop reason: HOSPADM

## 2023-01-01 RX ORDER — ETOMIDATE 2 MG/ML
8 INJECTION INTRAVENOUS ONCE
Status: COMPLETED | OUTPATIENT
Start: 2023-01-01 | End: 2023-01-01

## 2023-01-01 RX ORDER — PIPERACILLIN SODIUM, TAZOBACTAM SODIUM 3; .375 G/15ML; G/15ML
3.38 INJECTION, POWDER, LYOPHILIZED, FOR SOLUTION INTRAVENOUS EVERY 8 HOURS
Status: DISCONTINUED | OUTPATIENT
Start: 2023-01-01 | End: 2023-01-01

## 2023-01-01 RX ORDER — ONDANSETRON 2 MG/ML
4 INJECTION INTRAMUSCULAR; INTRAVENOUS EVERY 6 HOURS PRN
Status: DISCONTINUED | OUTPATIENT
Start: 2023-01-01 | End: 2023-01-01 | Stop reason: HOSPADM

## 2023-01-01 RX ORDER — FUROSEMIDE 10 MG/ML
80 INJECTION INTRAMUSCULAR; INTRAVENOUS EVERY 12 HOURS
Status: DISCONTINUED | OUTPATIENT
Start: 2023-01-01 | End: 2023-01-01

## 2023-01-01 RX ORDER — MIDAZOLAM HCL IN 0.9 % NACL/PF 1 MG/ML
1-8 PLASTIC BAG, INJECTION (ML) INTRAVENOUS CONTINUOUS
Status: DISCONTINUED | OUTPATIENT
Start: 2023-01-01 | End: 2023-01-01 | Stop reason: HOSPADM

## 2023-01-01 RX ORDER — NALOXONE HYDROCHLORIDE 0.4 MG/ML
0.4 INJECTION, SOLUTION INTRAMUSCULAR; INTRAVENOUS; SUBCUTANEOUS
Status: DISCONTINUED | OUTPATIENT
Start: 2023-01-01 | End: 2023-01-01 | Stop reason: HOSPADM

## 2023-01-01 RX ORDER — INSULIN GLARGINE 100 [IU]/ML
35 INJECTION, SOLUTION SUBCUTANEOUS EVERY MORNING
Qty: 3 ML | Refills: 3 | Status: ON HOLD
Start: 2023-01-01 | End: 2023-01-01

## 2023-01-01 RX ORDER — LORAZEPAM 2 MG/ML
2 INJECTION INTRAMUSCULAR ONCE
Status: DISCONTINUED | OUTPATIENT
Start: 2023-01-01 | End: 2023-01-01 | Stop reason: HOSPADM

## 2023-01-01 RX ORDER — SODIUM CHLORIDE 9 MG/ML
INJECTION, SOLUTION INTRAVENOUS CONTINUOUS
Status: DISCONTINUED | OUTPATIENT
Start: 2023-01-01 | End: 2023-01-01

## 2023-01-01 RX ORDER — SODIUM CHLORIDE 9 MG/ML
INJECTION, SOLUTION INTRAVENOUS
Status: COMPLETED
Start: 2023-01-01 | End: 2023-01-01

## 2023-01-01 RX ORDER — ALBUMIN (HUMAN) 12.5 G/50ML
25 SOLUTION INTRAVENOUS ONCE
Status: COMPLETED | OUTPATIENT
Start: 2023-01-01 | End: 2023-01-01

## 2023-01-01 RX ORDER — ALBUMIN (HUMAN) 12.5 G/50ML
SOLUTION INTRAVENOUS
Status: COMPLETED
Start: 2023-01-01 | End: 2023-01-01

## 2023-01-01 RX ORDER — POTASSIUM CHLORIDE 1500 MG/1
40 TABLET, EXTENDED RELEASE ORAL ONCE
Status: COMPLETED | OUTPATIENT
Start: 2023-01-01 | End: 2023-01-01

## 2023-01-01 RX ORDER — GLYCOPYRROLATE 0.2 MG/ML
0.2 INJECTION, SOLUTION INTRAMUSCULAR; INTRAVENOUS ONCE
Status: DISCONTINUED | OUTPATIENT
Start: 2023-01-01 | End: 2023-01-01 | Stop reason: HOSPADM

## 2023-01-01 RX ORDER — SELENIUM SULFIDE 2.5 MG/100ML
LOTION TOPICAL 2 TIMES DAILY PRN
Status: DISCONTINUED | OUTPATIENT
Start: 2023-01-01 | End: 2023-01-01

## 2023-01-01 RX ORDER — ALBUMIN (HUMAN) 12.5 G/50ML
50 SOLUTION INTRAVENOUS ONCE
Status: COMPLETED | OUTPATIENT
Start: 2023-01-01 | End: 2023-01-01

## 2023-01-01 RX ORDER — BISACODYL 10 MG
10 SUPPOSITORY, RECTAL RECTAL DAILY PRN
Status: DISCONTINUED | OUTPATIENT
Start: 2023-01-01 | End: 2023-01-01 | Stop reason: HOSPADM

## 2023-01-01 RX ORDER — CEFAZOLIN SODIUM/WATER 2 G/20 ML
2 SYRINGE (ML) INTRAVENOUS ONCE
Status: COMPLETED | OUTPATIENT
Start: 2023-01-01 | End: 2023-01-01

## 2023-01-01 RX ORDER — METOCLOPRAMIDE HYDROCHLORIDE 5 MG/ML
5 INJECTION INTRAMUSCULAR; INTRAVENOUS EVERY 6 HOURS PRN
Status: DISCONTINUED | OUTPATIENT
Start: 2023-01-01 | End: 2023-01-01 | Stop reason: HOSPADM

## 2023-01-01 RX ORDER — DEXTROSE MONOHYDRATE 25 G/50ML
25-50 INJECTION, SOLUTION INTRAVENOUS
Status: DISCONTINUED | OUTPATIENT
Start: 2023-01-01 | End: 2023-01-01 | Stop reason: HOSPADM

## 2023-01-01 RX ORDER — LINEZOLID 2 MG/ML
600 INJECTION, SOLUTION INTRAVENOUS EVERY 12 HOURS
Status: DISCONTINUED | OUTPATIENT
Start: 2023-01-01 | End: 2023-01-01

## 2023-01-01 RX ORDER — MORPHINE SULFATE 10 MG/ML
5 INJECTION, SOLUTION INTRAMUSCULAR; INTRAVENOUS EVERY 30 MIN PRN
Status: DISCONTINUED | OUTPATIENT
Start: 2023-01-01 | End: 2023-01-01 | Stop reason: HOSPADM

## 2023-01-01 RX ORDER — INSULIN GLARGINE 100 [IU]/ML
15 INJECTION, SOLUTION SUBCUTANEOUS AT BEDTIME
Qty: 3 ML | Refills: 3 | DISCHARGE
Start: 2023-01-01

## 2023-01-01 RX ORDER — ATORVASTATIN CALCIUM 40 MG/1
80 TABLET, FILM COATED ORAL EVERY EVENING
Status: DISCONTINUED | OUTPATIENT
Start: 2023-01-01 | End: 2023-01-01

## 2023-01-01 RX ORDER — AMIODARONE HYDROCHLORIDE 400 MG/1
400 TABLET ORAL DAILY
Status: ON HOLD
Start: 2023-01-01 | End: 2023-01-01

## 2023-01-01 RX ORDER — VANCOMYCIN HYDROCHLORIDE 1 G/200ML
1000 INJECTION, SOLUTION INTRAVENOUS EVERY 12 HOURS
Status: DISCONTINUED | OUTPATIENT
Start: 2023-01-01 | End: 2023-01-01

## 2023-01-01 RX ORDER — PROCHLORPERAZINE 25 MG
25 SUPPOSITORY, RECTAL RECTAL EVERY 12 HOURS PRN
Status: DISCONTINUED | OUTPATIENT
Start: 2023-01-01 | End: 2023-01-01 | Stop reason: HOSPADM

## 2023-01-01 RX ORDER — MULTIVITAMIN,THERAPEUTIC
1 TABLET ORAL DAILY
Status: DISCONTINUED | OUTPATIENT
Start: 2023-01-01 | End: 2023-01-01 | Stop reason: HOSPADM

## 2023-01-01 RX ORDER — MIDODRINE HYDROCHLORIDE 5 MG/1
10 TABLET ORAL 3 TIMES DAILY
Status: DISCONTINUED | OUTPATIENT
Start: 2023-01-01 | End: 2023-01-01

## 2023-01-01 RX ORDER — PHENYLEPHRINE HCL IN 0.9% NACL 50MG/250ML
.1-6 PLASTIC BAG, INJECTION (ML) INTRAVENOUS CONTINUOUS
Status: DISPENSED | OUTPATIENT
Start: 2023-01-01 | End: 2023-01-01

## 2023-01-01 RX ORDER — HEPARIN SODIUM 10000 [USP'U]/100ML
0-5000 INJECTION, SOLUTION INTRAVENOUS CONTINUOUS
Status: DISCONTINUED | OUTPATIENT
Start: 2023-01-01 | End: 2023-01-01

## 2023-01-01 RX ORDER — FUROSEMIDE 20 MG
20 TABLET ORAL DAILY
Status: ON HOLD | COMMUNITY
End: 2023-01-01

## 2023-01-01 RX ORDER — PANTOPRAZOLE SODIUM 40 MG/1
40 TABLET, DELAYED RELEASE ORAL
Status: DISCONTINUED | OUTPATIENT
Start: 2023-01-01 | End: 2023-01-01

## 2023-01-01 RX ORDER — LIDOCAINE 40 MG/G
CREAM TOPICAL
Status: ACTIVE | OUTPATIENT
Start: 2023-01-01 | End: 2023-01-01

## 2023-01-01 RX ORDER — PANTOPRAZOLE SODIUM 40 MG/1
40 TABLET, DELAYED RELEASE ORAL
Status: DISCONTINUED | OUTPATIENT
Start: 2023-01-01 | End: 2023-01-01 | Stop reason: HOSPADM

## 2023-01-01 RX ORDER — FUROSEMIDE 40 MG
40 TABLET ORAL
Status: ON HOLD
Start: 2023-01-01 | End: 2023-01-01

## 2023-01-01 RX ORDER — FLUMAZENIL 0.1 MG/ML
0.2 INJECTION, SOLUTION INTRAVENOUS
Status: DISCONTINUED | OUTPATIENT
Start: 2023-01-01 | End: 2023-01-01 | Stop reason: HOSPADM

## 2023-01-01 RX ORDER — HEPARIN SODIUM 1000 [USP'U]/ML
4500 INJECTION, SOLUTION INTRAVENOUS; SUBCUTANEOUS ONCE
Status: COMPLETED | OUTPATIENT
Start: 2023-01-01 | End: 2023-01-01

## 2023-01-01 RX ORDER — FUROSEMIDE 20 MG
40 TABLET ORAL
Status: DISCONTINUED | OUTPATIENT
Start: 2023-01-01 | End: 2023-01-01 | Stop reason: HOSPADM

## 2023-01-01 RX ORDER — SODIUM BICARBONATE 650 MG/1
650 TABLET ORAL 4 TIMES DAILY
Status: DISCONTINUED | OUTPATIENT
Start: 2023-01-01 | End: 2023-01-01

## 2023-01-01 RX ORDER — EPINEPHRINE 0.1 MG/ML
INJECTION INTRAVENOUS
Status: COMPLETED
Start: 2023-01-01 | End: 2023-01-01

## 2023-01-01 RX ORDER — LIDOCAINE 40 MG/G
CREAM TOPICAL
Status: DISCONTINUED | OUTPATIENT
Start: 2023-01-01 | End: 2023-01-01

## 2023-01-01 RX ORDER — FENTANYL CITRATE 50 UG/ML
25-50 INJECTION, SOLUTION INTRAMUSCULAR; INTRAVENOUS EVERY 5 MIN PRN
Status: DISCONTINUED | OUTPATIENT
Start: 2023-01-01 | End: 2023-01-01 | Stop reason: HOSPADM

## 2023-01-01 RX ORDER — HEPARIN SODIUM 1000 [USP'U]/ML
2500 INJECTION, SOLUTION INTRAVENOUS; SUBCUTANEOUS ONCE
Status: COMPLETED | OUTPATIENT
Start: 2023-01-01 | End: 2023-01-01

## 2023-01-01 RX ORDER — ACETAMINOPHEN 650 MG/1
650 SUPPOSITORY RECTAL EVERY 4 HOURS PRN
Status: DISCONTINUED | OUTPATIENT
Start: 2023-01-01 | End: 2023-01-01 | Stop reason: HOSPADM

## 2023-01-01 RX ORDER — DEXTROSE MONOHYDRATE 25 G/50ML
INJECTION, SOLUTION INTRAVENOUS PRN
Status: COMPLETED | OUTPATIENT
Start: 2023-01-01 | End: 2023-01-01

## 2023-01-01 RX ORDER — MIDODRINE HYDROCHLORIDE 5 MG/1
5 TABLET ORAL 3 TIMES DAILY
Status: DISCONTINUED | OUTPATIENT
Start: 2023-01-01 | End: 2023-01-01

## 2023-01-01 RX ORDER — LORAZEPAM 0.5 MG/1
0.25 TABLET ORAL ONCE
Status: COMPLETED | OUTPATIENT
Start: 2023-01-01 | End: 2023-01-01

## 2023-01-01 RX ORDER — CEFAZOLIN SODIUM 1 G/50ML
2000 SOLUTION INTRAVENOUS ONCE
Status: COMPLETED | OUTPATIENT
Start: 2023-01-01 | End: 2023-01-01

## 2023-01-01 RX ORDER — ACETAMINOPHEN 325 MG/1
650 TABLET ORAL EVERY 4 HOURS PRN
Status: DISCONTINUED | OUTPATIENT
Start: 2023-01-01 | End: 2023-01-01 | Stop reason: HOSPADM

## 2023-01-01 RX ORDER — ONDANSETRON 4 MG/1
4 TABLET, ORALLY DISINTEGRATING ORAL EVERY 6 HOURS PRN
Status: DISCONTINUED | OUTPATIENT
Start: 2023-01-01 | End: 2023-01-01 | Stop reason: HOSPADM

## 2023-01-01 RX ORDER — METOLAZONE 5 MG/1
5 TABLET ORAL ONCE
Status: COMPLETED | OUTPATIENT
Start: 2023-01-01 | End: 2023-01-01

## 2023-01-01 RX ORDER — METOPROLOL TARTRATE 25 MG/1
25 TABLET, FILM COATED ORAL 2 TIMES DAILY
Status: DISCONTINUED | OUTPATIENT
Start: 2023-01-01 | End: 2023-01-01

## 2023-01-01 RX ORDER — CALCIUM CHLORIDE 100 MG/ML
INJECTION INTRAVENOUS; INTRAVENTRICULAR
Status: COMPLETED
Start: 2023-01-01 | End: 2023-01-01

## 2023-01-01 RX ORDER — EPINEPHRINE 0.1 MG/ML
INJECTION INTRAVENOUS PRN
Status: COMPLETED | OUTPATIENT
Start: 2023-01-01 | End: 2023-01-01

## 2023-01-01 RX ORDER — IOPAMIDOL 755 MG/ML
75 INJECTION, SOLUTION INTRAVASCULAR ONCE
Status: COMPLETED | OUTPATIENT
Start: 2023-01-01 | End: 2023-01-01

## 2023-01-01 RX ORDER — SELENIUM SULFIDE 2.5 MG/100ML
LOTION TOPICAL 2 TIMES DAILY
DISCHARGE
Start: 2023-01-01

## 2023-01-01 RX ORDER — MAGNESIUM SULFATE HEPTAHYDRATE 40 MG/ML
2 INJECTION, SOLUTION INTRAVENOUS EVERY 8 HOURS PRN
Status: DISCONTINUED | OUTPATIENT
Start: 2023-01-01 | End: 2023-01-01 | Stop reason: HOSPADM

## 2023-01-01 RX ORDER — MORPHINE SULFATE 2 MG/ML
2 INJECTION, SOLUTION INTRAMUSCULAR; INTRAVENOUS ONCE
Status: DISCONTINUED | OUTPATIENT
Start: 2023-01-01 | End: 2023-01-01 | Stop reason: HOSPADM

## 2023-01-01 RX ORDER — AMIODARONE HYDROCHLORIDE 200 MG/1
200 TABLET ORAL DAILY
Status: DISCONTINUED | OUTPATIENT
Start: 2023-01-01 | End: 2023-01-01 | Stop reason: HOSPADM

## 2023-01-01 RX ORDER — PIPERACILLIN SODIUM, TAZOBACTAM SODIUM 3; .375 G/15ML; G/15ML
3.38 INJECTION, POWDER, LYOPHILIZED, FOR SOLUTION INTRAVENOUS ONCE
Status: COMPLETED | OUTPATIENT
Start: 2023-01-01 | End: 2023-01-01

## 2023-01-01 RX ORDER — MIDODRINE HYDROCHLORIDE 5 MG/1
10 TABLET ORAL ONCE
Status: COMPLETED | OUTPATIENT
Start: 2023-01-01 | End: 2023-01-01

## 2023-01-01 RX ORDER — CALCIUM GLUCONATE 20 MG/ML
2 INJECTION, SOLUTION INTRAVENOUS EVERY 8 HOURS PRN
Status: DISCONTINUED | OUTPATIENT
Start: 2023-01-01 | End: 2023-01-01 | Stop reason: HOSPADM

## 2023-01-01 RX ORDER — HEPARIN SODIUM 1000 [USP'U]/ML
2500 INJECTION, SOLUTION INTRAVENOUS; SUBCUTANEOUS DAILY PRN
Status: DISCONTINUED | OUTPATIENT
Start: 2023-01-01 | End: 2023-01-01 | Stop reason: HOSPADM

## 2023-01-01 RX ORDER — AMIODARONE HYDROCHLORIDE 200 MG/1
200 TABLET ORAL DAILY
DISCHARGE
Start: 2023-01-01

## 2023-01-01 RX ORDER — OXYCODONE HYDROCHLORIDE 5 MG/1
5 TABLET ORAL EVERY 6 HOURS PRN
Status: DISCONTINUED | OUTPATIENT
Start: 2023-01-01 | End: 2023-01-01 | Stop reason: HOSPADM

## 2023-01-01 RX ORDER — NICOTINE POLACRILEX 4 MG
15-30 LOZENGE BUCCAL
Status: DISCONTINUED | OUTPATIENT
Start: 2023-01-01 | End: 2023-01-01 | Stop reason: HOSPADM

## 2023-01-01 RX ORDER — DIPHENHYDRAMINE HYDROCHLORIDE 50 MG/ML
50 INJECTION INTRAMUSCULAR; INTRAVENOUS
Status: DISCONTINUED | OUTPATIENT
Start: 2023-01-01 | End: 2023-01-01 | Stop reason: HOSPADM

## 2023-01-01 RX ORDER — CALCIUM CHLORIDE 100 MG/ML
100 INJECTION INTRAVENOUS; INTRAVENTRICULAR ONCE
Status: DISCONTINUED | OUTPATIENT
Start: 2023-01-01 | End: 2023-01-01

## 2023-01-01 RX ORDER — POLYMYXIN B SULFATE AND TRIMETHOPRIM 1; 10000 MG/ML; [USP'U]/ML
2 SOLUTION OPHTHALMIC 4 TIMES DAILY
Status: DISCONTINUED | OUTPATIENT
Start: 2023-01-01 | End: 2023-01-01 | Stop reason: HOSPADM

## 2023-01-01 RX ORDER — MORPHINE SULFATE 2 MG/ML
INJECTION, SOLUTION INTRAMUSCULAR; INTRAVENOUS
Status: COMPLETED
Start: 2023-01-01 | End: 2023-01-01

## 2023-01-01 RX ORDER — FENTANYL CITRATE 50 UG/ML
INJECTION, SOLUTION INTRAMUSCULAR; INTRAVENOUS PRN
Status: COMPLETED | OUTPATIENT
Start: 2023-01-01 | End: 2023-01-01

## 2023-01-01 RX ORDER — ASPIRIN 81 MG/1
81 TABLET ORAL DAILY
Status: DISCONTINUED | OUTPATIENT
Start: 2023-01-01 | End: 2023-01-01

## 2023-01-01 RX ORDER — SODIUM CHLORIDE, SODIUM LACTATE, POTASSIUM CHLORIDE, CALCIUM CHLORIDE 600; 310; 30; 20 MG/100ML; MG/100ML; MG/100ML; MG/100ML
INJECTION, SOLUTION INTRAVENOUS ONCE
Status: COMPLETED | OUTPATIENT
Start: 2023-01-01 | End: 2023-01-01

## 2023-01-01 RX ORDER — FUROSEMIDE 10 MG/ML
80 INJECTION INTRAMUSCULAR; INTRAVENOUS ONCE
Status: COMPLETED | OUTPATIENT
Start: 2023-01-01 | End: 2023-01-01

## 2023-01-01 RX ORDER — PANTOPRAZOLE SODIUM 40 MG/1
40 TABLET, DELAYED RELEASE ORAL
DISCHARGE
Start: 2023-01-01

## 2023-01-01 RX ORDER — ASCORBIC ACID 500 MG
500 TABLET ORAL DAILY
Status: DISCONTINUED | OUTPATIENT
Start: 2023-01-01 | End: 2023-01-01 | Stop reason: HOSPADM

## 2023-01-01 RX ORDER — NALOXONE HYDROCHLORIDE 0.4 MG/ML
0.2 INJECTION, SOLUTION INTRAMUSCULAR; INTRAVENOUS; SUBCUTANEOUS
Status: DISCONTINUED | OUTPATIENT
Start: 2023-01-01 | End: 2023-01-01 | Stop reason: HOSPADM

## 2023-01-01 RX ORDER — POLYETHYLENE GLYCOL 3350 17 G/17G
17 POWDER, FOR SOLUTION ORAL DAILY PRN
Status: DISCONTINUED | OUTPATIENT
Start: 2023-01-01 | End: 2023-01-01 | Stop reason: HOSPADM

## 2023-01-01 RX ORDER — CARBOXYMETHYLCELLULOSE SODIUM 5 MG/ML
1 SOLUTION/ DROPS OPHTHALMIC EVERY 8 HOURS PRN
Status: DISCONTINUED | OUTPATIENT
Start: 2023-01-01 | End: 2023-01-01 | Stop reason: HOSPADM

## 2023-01-01 RX ORDER — DEXTROSE 20 G/100ML
INJECTION, SOLUTION INTRAVENOUS CONTINUOUS
Status: DISCONTINUED | OUTPATIENT
Start: 2023-01-01 | End: 2023-01-01 | Stop reason: HOSPADM

## 2023-01-01 RX ORDER — POTASSIUM CHLORIDE 29.8 MG/ML
20 INJECTION INTRAVENOUS EVERY 8 HOURS PRN
Status: DISCONTINUED | OUTPATIENT
Start: 2023-01-01 | End: 2023-01-01 | Stop reason: HOSPADM

## 2023-01-01 RX ORDER — METOCLOPRAMIDE 5 MG/1
5 TABLET ORAL EVERY 6 HOURS PRN
Status: DISCONTINUED | OUTPATIENT
Start: 2023-01-01 | End: 2023-01-01 | Stop reason: HOSPADM

## 2023-01-01 RX ORDER — SODIUM CHLORIDE 9 MG/ML
INJECTION, SOLUTION INTRAVENOUS
Status: DISCONTINUED
Start: 2023-01-01 | End: 2023-01-01 | Stop reason: HOSPADM

## 2023-01-01 RX ORDER — PROPOFOL 10 MG/ML
INJECTION, EMULSION INTRAVENOUS PRN
Status: COMPLETED | OUTPATIENT
Start: 2023-01-01 | End: 2023-01-01

## 2023-01-01 RX ORDER — DESMOPRESSIN ACETATE 4 UG/ML
2 INJECTION, SOLUTION INTRAVENOUS; SUBCUTANEOUS 2 TIMES DAILY
Status: DISCONTINUED | OUTPATIENT
Start: 2023-01-01 | End: 2023-01-01

## 2023-01-01 RX ORDER — CALCIUM CHLORIDE, MAGNESIUM CHLORIDE, SODIUM CHLORIDE, SODIUM BICARBONATE, POTASSIUM CHLORIDE AND SODIUM PHOSPHATE DIBASIC DIHYDRATE 3.68; 3.05; 6.34; 3.09; .314; .187 G/L; G/L; G/L; G/L; G/L; G/L
200 INJECTION INTRAVENOUS CONTINUOUS
Status: DISCONTINUED | OUTPATIENT
Start: 2023-01-01 | End: 2023-01-01 | Stop reason: HOSPADM

## 2023-01-01 RX ORDER — PROPOFOL 10 MG/ML
INJECTION, EMULSION INTRAVENOUS PRN
Status: DISCONTINUED | OUTPATIENT
Start: 2023-01-01 | End: 2023-01-01

## 2023-01-01 RX ORDER — ACETAMINOPHEN 325 MG/1
650 TABLET ORAL EVERY 4 HOURS PRN
Start: 2023-01-01

## 2023-01-01 RX ORDER — MULTIVIT WITH MINERALS/LUTEIN
500 TABLET ORAL DAILY
Status: DISCONTINUED | OUTPATIENT
Start: 2023-01-01 | End: 2023-01-01 | Stop reason: HOSPADM

## 2023-01-01 RX ORDER — DEXTROSE MONOHYDRATE 100 MG/ML
INJECTION, SOLUTION INTRAVENOUS CONTINUOUS PRN
Status: DISCONTINUED | OUTPATIENT
Start: 2023-01-01 | End: 2023-01-01 | Stop reason: HOSPADM

## 2023-01-01 RX ORDER — CALCIUM CHLORIDE 100 MG/ML
1 INJECTION INTRAVENOUS; INTRAVENTRICULAR ONCE
Status: COMPLETED | OUTPATIENT
Start: 2023-01-01 | End: 2023-01-01

## 2023-01-01 RX ORDER — ACETAMINOPHEN 650 MG/1
650 SUPPOSITORY RECTAL EVERY 4 HOURS PRN
Status: ON HOLD
Start: 2023-01-01 | End: 2023-01-01

## 2023-01-01 RX ORDER — GLYCOPYRROLATE 0.2 MG/ML
0.1 INJECTION, SOLUTION INTRAMUSCULAR; INTRAVENOUS EVERY 4 HOURS PRN
Status: DISCONTINUED | OUTPATIENT
Start: 2023-01-01 | End: 2023-01-01 | Stop reason: HOSPADM

## 2023-01-01 RX ORDER — ACETAMINOPHEN 325 MG/1
650 TABLET ORAL EVERY 6 HOURS PRN
Status: DISCONTINUED | OUTPATIENT
Start: 2023-01-01 | End: 2023-01-01

## 2023-01-01 RX ORDER — IOPAMIDOL 755 MG/ML
80 INJECTION, SOLUTION INTRAVASCULAR ONCE
Status: COMPLETED | OUTPATIENT
Start: 2023-01-01 | End: 2023-01-01

## 2023-01-01 RX ORDER — SODIUM CHLORIDE 9 MG/ML
INJECTION, SOLUTION INTRAVENOUS CONTINUOUS PRN
Status: COMPLETED | OUTPATIENT
Start: 2023-01-01 | End: 2023-01-01

## 2023-01-01 RX ORDER — PROCHLORPERAZINE MALEATE 10 MG
10 TABLET ORAL EVERY 6 HOURS PRN
Status: DISCONTINUED | OUTPATIENT
Start: 2023-01-01 | End: 2023-01-01 | Stop reason: HOSPADM

## 2023-01-01 RX ORDER — CALCIUM CHLORIDE, MAGNESIUM CHLORIDE, SODIUM CHLORIDE, SODIUM BICARBONATE, POTASSIUM CHLORIDE AND SODIUM PHOSPHATE DIBASIC DIHYDRATE 3.68; 3.05; 6.34; 3.09; .314; .187 G/L; G/L; G/L; G/L; G/L; G/L
12.5 INJECTION INTRAVENOUS CONTINUOUS
Status: DISCONTINUED | OUTPATIENT
Start: 2023-01-01 | End: 2023-01-01 | Stop reason: HOSPADM

## 2023-01-01 RX ORDER — METOPROLOL TARTRATE 1 MG/ML
5 INJECTION, SOLUTION INTRAVENOUS
Status: DISPENSED | OUTPATIENT
Start: 2023-01-01 | End: 2023-01-01

## 2023-01-01 RX ORDER — PHENYLEPHRINE HCL IN 0.9% NACL 50MG/250ML
.1-6 PLASTIC BAG, INJECTION (ML) INTRAVENOUS CONTINUOUS
Status: DISCONTINUED | OUTPATIENT
Start: 2023-01-01 | End: 2023-01-01

## 2023-01-01 RX ORDER — LINEZOLID 600 MG/1
600 TABLET, FILM COATED ORAL EVERY 12 HOURS SCHEDULED
Status: DISCONTINUED | OUTPATIENT
Start: 2023-01-01 | End: 2023-01-01

## 2023-01-01 RX ORDER — METOCLOPRAMIDE 5 MG/1
5 TABLET ORAL EVERY 6 HOURS PRN
DISCHARGE
Start: 2023-01-01

## 2023-01-01 RX ORDER — CEFTRIAXONE 2 G/1
2 INJECTION, POWDER, FOR SOLUTION INTRAMUSCULAR; INTRAVENOUS EVERY 24 HOURS
Status: DISCONTINUED | OUTPATIENT
Start: 2023-01-01 | End: 2023-01-01

## 2023-01-01 RX ORDER — SELENIUM SULFIDE 2.5 MG/100ML
LOTION TOPICAL 2 TIMES DAILY PRN
Status: DISCONTINUED | OUTPATIENT
Start: 2023-01-01 | End: 2023-01-01 | Stop reason: HOSPADM

## 2023-01-01 RX ORDER — DOCUSATE SODIUM 100 MG/1
100 CAPSULE, LIQUID FILLED ORAL 2 TIMES DAILY
Status: DISCONTINUED | OUTPATIENT
Start: 2023-01-01 | End: 2023-01-01 | Stop reason: HOSPADM

## 2023-01-01 RX ORDER — LIDOCAINE HYDROCHLORIDE 20 MG/ML
JELLY TOPICAL ONCE
Status: COMPLETED | OUTPATIENT
Start: 2023-01-01 | End: 2023-01-01

## 2023-01-01 RX ORDER — FENTANYL CITRATE 50 UG/ML
INJECTION, SOLUTION INTRAMUSCULAR; INTRAVENOUS
Status: COMPLETED
Start: 2023-01-01 | End: 2023-01-01

## 2023-01-01 RX ORDER — SELENIUM SULFIDE 2.5 MG/100ML
LOTION TOPICAL 2 TIMES DAILY
Status: DISCONTINUED | OUTPATIENT
Start: 2023-01-01 | End: 2023-01-01 | Stop reason: HOSPADM

## 2023-01-01 RX ORDER — DOCUSATE SODIUM 100 MG/1
100 CAPSULE, LIQUID FILLED ORAL 2 TIMES DAILY
Status: ON HOLD
Start: 2023-01-01 | End: 2023-01-01

## 2023-01-01 RX ORDER — ATROPINE SULFATE 10 MG/ML
1 SOLUTION/ DROPS OPHTHALMIC
Status: DISCONTINUED | OUTPATIENT
Start: 2023-01-01 | End: 2023-01-01 | Stop reason: HOSPADM

## 2023-01-01 RX ORDER — CHLORHEXIDINE GLUCONATE ORAL RINSE 1.2 MG/ML
15 SOLUTION DENTAL EVERY 12 HOURS
Status: DISCONTINUED | OUTPATIENT
Start: 2023-01-01 | End: 2023-01-01

## 2023-01-01 RX ORDER — LIDOCAINE 40 MG/G
CREAM TOPICAL
Status: DISCONTINUED | OUTPATIENT
Start: 2023-01-01 | End: 2023-01-01 | Stop reason: HOSPADM

## 2023-01-01 RX ORDER — ALBUMIN (HUMAN) 12.5 G/50ML
25 SOLUTION INTRAVENOUS DAILY PRN
Status: COMPLETED | OUTPATIENT
Start: 2023-01-01 | End: 2023-01-01

## 2023-01-01 RX ORDER — METOPROLOL TARTRATE 25 MG/1
12.5 TABLET, FILM COATED ORAL 2 TIMES DAILY
DISCHARGE
Start: 2023-01-01

## 2023-01-01 RX ORDER — BUMETANIDE 0.25 MG/ML
2 INJECTION INTRAMUSCULAR; INTRAVENOUS ONCE
Status: COMPLETED | OUTPATIENT
Start: 2023-01-01 | End: 2023-01-01

## 2023-01-01 RX ORDER — ATORVASTATIN CALCIUM 40 MG/1
80 TABLET, FILM COATED ORAL EVERY EVENING
Status: DISCONTINUED | OUTPATIENT
Start: 2023-01-01 | End: 2023-01-01 | Stop reason: HOSPADM

## 2023-01-01 RX ORDER — LORAZEPAM 2 MG/ML
1 INJECTION INTRAMUSCULAR EVERY 10 MIN PRN
Status: DISCONTINUED | OUTPATIENT
Start: 2023-01-01 | End: 2023-01-01 | Stop reason: HOSPADM

## 2023-01-01 RX ORDER — SODIUM CHLORIDE 9 MG/ML
INJECTION, SOLUTION INTRAVENOUS
Status: DISCONTINUED
Start: 2023-01-01 | End: 2023-01-01 | Stop reason: WASHOUT

## 2023-01-01 RX ORDER — AMIODARONE HYDROCHLORIDE 200 MG/1
400 TABLET ORAL DAILY
Status: DISCONTINUED | OUTPATIENT
Start: 2023-01-01 | End: 2023-01-01

## 2023-01-01 RX ORDER — ROPIVACAINE IN 0.9% SOD CHL/PF 0.1 %
.03-.125 PLASTIC BAG, INJECTION (ML) EPIDURAL CONTINUOUS
Status: DISCONTINUED | OUTPATIENT
Start: 2023-01-01 | End: 2023-01-01

## 2023-01-01 RX ORDER — HEPARIN SODIUM 5000 [USP'U]/.5ML
5000 INJECTION, SOLUTION INTRAVENOUS; SUBCUTANEOUS EVERY 8 HOURS
Status: DISCONTINUED | OUTPATIENT
Start: 2023-01-01 | End: 2023-01-01

## 2023-01-01 RX ORDER — PIPERACILLIN SODIUM, TAZOBACTAM SODIUM 3; .375 G/15ML; G/15ML
3.38 INJECTION, POWDER, LYOPHILIZED, FOR SOLUTION INTRAVENOUS EVERY 12 HOURS
Status: DISCONTINUED | OUTPATIENT
Start: 2023-01-01 | End: 2023-01-01

## 2023-01-01 RX ORDER — SODIUM CHLORIDE, SODIUM LACTATE, POTASSIUM CHLORIDE, CALCIUM CHLORIDE 600; 310; 30; 20 MG/100ML; MG/100ML; MG/100ML; MG/100ML
INJECTION, SOLUTION INTRAVENOUS
Status: COMPLETED
Start: 2023-01-01 | End: 2023-01-01

## 2023-01-01 RX ORDER — MORPHINE SULFATE 10 MG/ML
5 INJECTION, SOLUTION INTRAMUSCULAR; INTRAVENOUS EVERY 10 MIN PRN
Status: DISCONTINUED | OUTPATIENT
Start: 2023-01-01 | End: 2023-01-01 | Stop reason: HOSPADM

## 2023-01-01 RX ORDER — CALCIUM CHLORIDE 100 MG/ML
INJECTION INTRAVENOUS; INTRAVENTRICULAR PRN
Status: COMPLETED | OUTPATIENT
Start: 2023-01-01 | End: 2023-01-01

## 2023-01-01 RX ORDER — FENTANYL CITRATE 50 UG/ML
25-50 INJECTION, SOLUTION INTRAMUSCULAR; INTRAVENOUS
Status: DISCONTINUED | OUTPATIENT
Start: 2023-01-01 | End: 2023-01-01

## 2023-01-01 RX ORDER — MAGNESIUM OXIDE 400 MG/1
400 TABLET ORAL DAILY
Status: DISCONTINUED | OUTPATIENT
Start: 2023-01-01 | End: 2023-01-01 | Stop reason: HOSPADM

## 2023-01-01 RX ORDER — LORAZEPAM 2 MG/ML
INJECTION INTRAMUSCULAR
Status: DISCONTINUED
Start: 2023-01-01 | End: 2023-01-01 | Stop reason: HOSPADM

## 2023-01-01 RX ORDER — LISINOPRIL 5 MG/1
1 TABLET ORAL DAILY
Status: ON HOLD | COMMUNITY
Start: 2023-01-01 | End: 2023-01-01

## 2023-01-01 RX ORDER — MIDODRINE HYDROCHLORIDE 5 MG/1
10 TABLET ORAL
Status: COMPLETED | OUTPATIENT
Start: 2023-01-01 | End: 2023-01-01

## 2023-01-01 RX ADMIN — TICAGRELOR 90 MG: 90 TABLET ORAL at 07:37

## 2023-01-01 RX ADMIN — FUROSEMIDE 40 MG: 20 TABLET ORAL at 18:36

## 2023-01-01 RX ADMIN — AMIODARONE HYDROCHLORIDE 200 MG: 200 TABLET ORAL at 12:46

## 2023-01-01 RX ADMIN — TICAGRELOR 90 MG: 90 TABLET ORAL at 08:16

## 2023-01-01 RX ADMIN — APIXABAN 5 MG: 5 TABLET, FILM COATED ORAL at 21:17

## 2023-01-01 RX ADMIN — TICAGRELOR 90 MG: 90 TABLET ORAL at 12:10

## 2023-01-01 RX ADMIN — INSULIN ASPART 5 UNITS: 100 INJECTION, SOLUTION INTRAVENOUS; SUBCUTANEOUS at 11:31

## 2023-01-01 RX ADMIN — ASPIRIN 81 MG: 81 TABLET, COATED ORAL at 08:44

## 2023-01-01 RX ADMIN — SODIUM CHLORIDE 4 MILLION UNITS: 9 INJECTION, SOLUTION INTRAVENOUS at 14:15

## 2023-01-01 RX ADMIN — TICAGRELOR 90 MG: 90 TABLET ORAL at 08:25

## 2023-01-01 RX ADMIN — INSULIN ASPART 2 UNITS: 100 INJECTION, SOLUTION INTRAVENOUS; SUBCUTANEOUS at 18:11

## 2023-01-01 RX ADMIN — LORAZEPAM 0.25 MG: 0.5 TABLET ORAL at 17:56

## 2023-01-01 RX ADMIN — MICONAZOLE NITRATE: 20 POWDER TOPICAL at 11:07

## 2023-01-01 RX ADMIN — ALBUMIN HUMAN 50 G: 0.25 SOLUTION INTRAVENOUS at 12:01

## 2023-01-01 RX ADMIN — Medication 500 MG: at 09:37

## 2023-01-01 RX ADMIN — POLYMYXIN B SULFATE AND TRIMETHOPRIM SULFATE 2 DROP: 10000; 1 SOLUTION/ DROPS OPHTHALMIC at 08:50

## 2023-01-01 RX ADMIN — SODIUM CHLORIDE 4 MILLION UNITS: 9 INJECTION, SOLUTION INTRAVENOUS at 05:30

## 2023-01-01 RX ADMIN — INSULIN ASPART 4 UNITS: 100 INJECTION, SOLUTION INTRAVENOUS; SUBCUTANEOUS at 07:10

## 2023-01-01 RX ADMIN — CALCIUM CHLORIDE INJECTION 1 G: 100 INJECTION, SOLUTION INTRAVENOUS at 01:50

## 2023-01-01 RX ADMIN — HEPARIN SODIUM 1700 UNITS/HR: 10000 INJECTION, SOLUTION INTRAVENOUS at 06:46

## 2023-01-01 RX ADMIN — FUROSEMIDE 80 MG: 10 INJECTION, SOLUTION INTRAMUSCULAR; INTRAVENOUS at 18:51

## 2023-01-01 RX ADMIN — SELENIUM SULFIDE: 2.5 LOTION TOPICAL at 20:29

## 2023-01-01 RX ADMIN — TICAGRELOR 90 MG: 90 TABLET ORAL at 20:18

## 2023-01-01 RX ADMIN — MAGNESIUM OXIDE TAB 400 MG (241.3 MG ELEMENTAL MG) 400 MG: 400 (241.3 MG) TAB at 08:02

## 2023-01-01 RX ADMIN — ALBUMIN HUMAN 50 G: 0.25 SOLUTION INTRAVENOUS at 09:44

## 2023-01-01 RX ADMIN — MORPHINE SULFATE 5 MG: 2 INJECTION, SOLUTION INTRAMUSCULAR; INTRAVENOUS at 15:44

## 2023-01-01 RX ADMIN — INSULIN GLARGINE 35 UNITS: 100 INJECTION, SOLUTION SUBCUTANEOUS at 07:54

## 2023-01-01 RX ADMIN — LINEZOLID 600 MG: 600 INJECTION, SOLUTION INTRAVENOUS at 11:30

## 2023-01-01 RX ADMIN — ASPIRIN 81 MG: 81 TABLET, COATED ORAL at 11:20

## 2023-01-01 RX ADMIN — APIXABAN 5 MG: 5 TABLET, FILM COATED ORAL at 20:55

## 2023-01-01 RX ADMIN — INSULIN ASPART 4 UNITS: 100 INJECTION, SOLUTION INTRAVENOUS; SUBCUTANEOUS at 09:19

## 2023-01-01 RX ADMIN — HEPARIN SODIUM 1700 UNITS/HR: 10000 INJECTION, SOLUTION INTRAVENOUS at 21:12

## 2023-01-01 RX ADMIN — Medication 50 MCG: at 03:30

## 2023-01-01 RX ADMIN — FUROSEMIDE 20 MG/HR: 10 INJECTION, SOLUTION INTRAVENOUS at 10:59

## 2023-01-01 RX ADMIN — SODIUM CHLORIDE 9.1 UNITS: 9 INJECTION, SOLUTION INTRAVENOUS at 15:25

## 2023-01-01 RX ADMIN — NOREPINEPHRINE BITARTRATE 0.6 MCG/KG/MIN: 1 INJECTION, SOLUTION, CONCENTRATE INTRAVENOUS at 06:02

## 2023-01-01 RX ADMIN — APIXABAN 2.5 MG: 2.5 TABLET, FILM COATED ORAL at 22:35

## 2023-01-01 RX ADMIN — INSULIN ASPART 2 UNITS: 100 INJECTION, SOLUTION INTRAVENOUS; SUBCUTANEOUS at 11:41

## 2023-01-01 RX ADMIN — FUROSEMIDE 40 MG: 20 TABLET ORAL at 08:51

## 2023-01-01 RX ADMIN — TICAGRELOR 90 MG: 90 TABLET ORAL at 20:20

## 2023-01-01 RX ADMIN — APIXABAN 5 MG: 5 TABLET, FILM COATED ORAL at 21:12

## 2023-01-01 RX ADMIN — Medication 500 MG: at 09:43

## 2023-01-01 RX ADMIN — VASOPRESSIN 2.4 UNITS/HR: 20 INJECTION, SOLUTION INTRAMUSCULAR; SUBCUTANEOUS at 02:48

## 2023-01-01 RX ADMIN — TICAGRELOR 90 MG: 90 TABLET ORAL at 09:34

## 2023-01-01 RX ADMIN — SODIUM CHLORIDE, POTASSIUM CHLORIDE, SODIUM LACTATE AND CALCIUM CHLORIDE 1000 ML: 600; 310; 30; 20 INJECTION, SOLUTION INTRAVENOUS at 12:33

## 2023-01-01 RX ADMIN — NOREPINEPHRINE BITARTRATE 0.4 MCG/KG/MIN: 1 INJECTION, SOLUTION, CONCENTRATE INTRAVENOUS at 13:28

## 2023-01-01 RX ADMIN — APIXABAN 2.5 MG: 2.5 TABLET, FILM COATED ORAL at 08:38

## 2023-01-01 RX ADMIN — INSULIN ASPART 1 UNITS: 100 INJECTION, SOLUTION INTRAVENOUS; SUBCUTANEOUS at 08:25

## 2023-01-01 RX ADMIN — THERA TABS 1 TABLET: TAB at 08:53

## 2023-01-01 RX ADMIN — INSULIN ASPART 2 UNITS: 100 INJECTION, SOLUTION INTRAVENOUS; SUBCUTANEOUS at 08:48

## 2023-01-01 RX ADMIN — ATORVASTATIN CALCIUM 80 MG: 40 TABLET, FILM COATED ORAL at 22:07

## 2023-01-01 RX ADMIN — APIXABAN 5 MG: 5 TABLET, FILM COATED ORAL at 08:00

## 2023-01-01 RX ADMIN — TICAGRELOR 90 MG: 90 TABLET ORAL at 21:46

## 2023-01-01 RX ADMIN — EPINEPHRINE 0.4 MCG/KG/MIN: 1 INJECTION INTRAMUSCULAR; INTRAVENOUS; SUBCUTANEOUS at 07:03

## 2023-01-01 RX ADMIN — METOPROLOL TARTRATE 25 MG: 25 TABLET, FILM COATED ORAL at 20:27

## 2023-01-01 RX ADMIN — ATORVASTATIN CALCIUM 80 MG: 40 TABLET, FILM COATED ORAL at 20:31

## 2023-01-01 RX ADMIN — MAGNESIUM OXIDE TAB 400 MG (241.3 MG ELEMENTAL MG) 400 MG: 400 (241.3 MG) TAB at 08:11

## 2023-01-01 RX ADMIN — MICONAZOLE NITRATE: 20 POWDER TOPICAL at 11:51

## 2023-01-01 RX ADMIN — INSULIN GLARGINE 35 UNITS: 100 INJECTION, SOLUTION SUBCUTANEOUS at 21:44

## 2023-01-01 RX ADMIN — APIXABAN 5 MG: 5 TABLET, FILM COATED ORAL at 10:06

## 2023-01-01 RX ADMIN — PANTOPRAZOLE SODIUM 40 MG: 40 TABLET, DELAYED RELEASE ORAL at 06:31

## 2023-01-01 RX ADMIN — FUROSEMIDE 40 MG: 20 TABLET ORAL at 08:54

## 2023-01-01 RX ADMIN — TICAGRELOR 90 MG: 90 TABLET ORAL at 11:39

## 2023-01-01 RX ADMIN — SELENIUM SULFIDE: 2.5 LOTION TOPICAL at 20:38

## 2023-01-01 RX ADMIN — PERFLUTREN 2 ML: 6.52 INJECTION, SUSPENSION INTRAVENOUS at 08:00

## 2023-01-01 RX ADMIN — INSULIN ASPART 2 UNITS: 100 INJECTION, SOLUTION INTRAVENOUS; SUBCUTANEOUS at 07:46

## 2023-01-01 RX ADMIN — PIPERACILLIN AND TAZOBACTAM 3.38 G: 3; .375 INJECTION, POWDER, LYOPHILIZED, FOR SOLUTION INTRAVENOUS at 03:48

## 2023-01-01 RX ADMIN — THERA TABS 1 TABLET: TAB at 08:02

## 2023-01-01 RX ADMIN — LINEZOLID 600 MG: 600 TABLET, FILM COATED ORAL at 08:25

## 2023-01-01 RX ADMIN — ATORVASTATIN CALCIUM 80 MG: 40 TABLET, FILM COATED ORAL at 23:27

## 2023-01-01 RX ADMIN — OXYCODONE HYDROCHLORIDE AND ACETAMINOPHEN 500 MG: 500 TABLET ORAL at 08:45

## 2023-01-01 RX ADMIN — TICAGRELOR 90 MG: 90 TABLET ORAL at 20:47

## 2023-01-01 RX ADMIN — MICONAZOLE NITRATE: 20 POWDER TOPICAL at 08:24

## 2023-01-01 RX ADMIN — INSULIN ASPART 4 UNITS: 100 INJECTION, SOLUTION INTRAVENOUS; SUBCUTANEOUS at 18:54

## 2023-01-01 RX ADMIN — INSULIN ASPART 2 UNITS: 100 INJECTION, SOLUTION INTRAVENOUS; SUBCUTANEOUS at 17:36

## 2023-01-01 RX ADMIN — FUROSEMIDE 20 MG/HR: 10 INJECTION, SOLUTION INTRAVENOUS at 10:46

## 2023-01-01 RX ADMIN — TICAGRELOR 90 MG: 90 TABLET ORAL at 21:33

## 2023-01-01 RX ADMIN — INSULIN ASPART 3 UNITS: 100 INJECTION, SOLUTION INTRAVENOUS; SUBCUTANEOUS at 16:18

## 2023-01-01 RX ADMIN — POLYMYXIN B SULFATE AND TRIMETHOPRIM SULFATE 2 DROP: 10000; 1 SOLUTION/ DROPS OPHTHALMIC at 20:18

## 2023-01-01 RX ADMIN — INSULIN ASPART 1 UNITS: 100 INJECTION, SOLUTION INTRAVENOUS; SUBCUTANEOUS at 18:45

## 2023-01-01 RX ADMIN — APIXABAN 5 MG: 5 TABLET, FILM COATED ORAL at 08:21

## 2023-01-01 RX ADMIN — AMIODARONE HYDROCHLORIDE 400 MG: 200 TABLET ORAL at 08:47

## 2023-01-01 RX ADMIN — OXYCODONE HYDROCHLORIDE AND ACETAMINOPHEN 500 MG: 500 TABLET ORAL at 08:43

## 2023-01-01 RX ADMIN — APIXABAN 5 MG: 5 TABLET, FILM COATED ORAL at 07:36

## 2023-01-01 RX ADMIN — MAGNESIUM OXIDE TAB 400 MG (241.3 MG ELEMENTAL MG) 400 MG: 400 (241.3 MG) TAB at 08:23

## 2023-01-01 RX ADMIN — EPINEPHRINE 1 MG: 0.1 INJECTION INTRACARDIAC; INTRAVENOUS at 01:55

## 2023-01-01 RX ADMIN — TICAGRELOR 90 MG: 90 TABLET ORAL at 20:55

## 2023-01-01 RX ADMIN — MIDODRINE HYDROCHLORIDE 5 MG: 5 TABLET ORAL at 06:34

## 2023-01-01 RX ADMIN — APIXABAN 5 MG: 5 TABLET, FILM COATED ORAL at 08:59

## 2023-01-01 RX ADMIN — HEPARIN SODIUM 1400 UNITS/HR: 10000 INJECTION, SOLUTION INTRAVENOUS at 01:37

## 2023-01-01 RX ADMIN — SUCCINYLCHOLINE CHLORIDE 200 MG: 20 INJECTION, SOLUTION INTRAMUSCULAR; INTRAVENOUS at 01:33

## 2023-01-01 RX ADMIN — SODIUM CHLORIDE: 9 INJECTION, SOLUTION INTRAVENOUS at 18:39

## 2023-01-01 RX ADMIN — Medication 0.03 MCG/KG/MIN: at 04:42

## 2023-01-01 RX ADMIN — THERA TABS 1 TABLET: TAB at 08:25

## 2023-01-01 RX ADMIN — INSULIN ASPART 2 UNITS: 100 INJECTION, SOLUTION INTRAVENOUS; SUBCUTANEOUS at 18:01

## 2023-01-01 RX ADMIN — MAGNESIUM OXIDE TAB 400 MG (241.3 MG ELEMENTAL MG) 400 MG: 400 (241.3 MG) TAB at 08:35

## 2023-01-01 RX ADMIN — DEXTROSE MONOHYDRATE 25 ML: 25 INJECTION, SOLUTION INTRAVENOUS at 17:38

## 2023-01-01 RX ADMIN — CEFTRIAXONE SODIUM 2 G: 2 INJECTION, POWDER, FOR SOLUTION INTRAMUSCULAR; INTRAVENOUS at 22:26

## 2023-01-01 RX ADMIN — PIPERACILLIN AND TAZOBACTAM 3.38 G: 3; .375 INJECTION, POWDER, LYOPHILIZED, FOR SOLUTION INTRAVENOUS at 05:02

## 2023-01-01 RX ADMIN — SODIUM CHLORIDE 4 MILLION UNITS: 9 INJECTION, SOLUTION INTRAVENOUS at 13:24

## 2023-01-01 RX ADMIN — DOCUSATE SODIUM 100 MG: 100 CAPSULE, LIQUID FILLED ORAL at 08:08

## 2023-01-01 RX ADMIN — Medication 1 MCG/KG/MIN: at 17:34

## 2023-01-01 RX ADMIN — APIXABAN 5 MG: 5 TABLET, FILM COATED ORAL at 20:56

## 2023-01-01 RX ADMIN — TICAGRELOR 90 MG: 90 TABLET ORAL at 08:38

## 2023-01-01 RX ADMIN — AMIODARONE HYDROCHLORIDE 400 MG: 200 TABLET ORAL at 07:37

## 2023-01-01 RX ADMIN — AMIODARONE HYDROCHLORIDE 400 MG: 200 TABLET ORAL at 08:54

## 2023-01-01 RX ADMIN — DEXTROSE MONOHYDRATE 25 ML: 25 INJECTION, SOLUTION INTRAVENOUS at 18:05

## 2023-01-01 RX ADMIN — HEPARIN SODIUM 1400 UNITS/HR: 10000 INJECTION, SOLUTION INTRAVENOUS at 23:12

## 2023-01-01 RX ADMIN — TICAGRELOR 90 MG: 90 TABLET ORAL at 08:11

## 2023-01-01 RX ADMIN — TICAGRELOR 90 MG: 90 TABLET ORAL at 21:18

## 2023-01-01 RX ADMIN — INSULIN GLARGINE 35 UNITS: 100 INJECTION, SOLUTION SUBCUTANEOUS at 21:55

## 2023-01-01 RX ADMIN — TICAGRELOR 90 MG: 90 TABLET ORAL at 21:24

## 2023-01-01 RX ADMIN — MICONAZOLE NITRATE 1 APPLICATOR: 20 POWDER TOPICAL at 20:56

## 2023-01-01 RX ADMIN — HEPARIN SODIUM 1400 UNITS/HR: 10000 INJECTION, SOLUTION INTRAVENOUS at 05:12

## 2023-01-01 RX ADMIN — EPINEPHRINE 1 MG: 0.1 INJECTION INTRACARDIAC; INTRAVENOUS at 01:50

## 2023-01-01 RX ADMIN — ONDANSETRON 4 MG: 2 INJECTION INTRAMUSCULAR; INTRAVENOUS at 20:29

## 2023-01-01 RX ADMIN — POLYMYXIN B SULFATE AND TRIMETHOPRIM SULFATE 2 DROP: 10000; 1 SOLUTION/ DROPS OPHTHALMIC at 18:37

## 2023-01-01 RX ADMIN — APIXABAN 5 MG: 5 TABLET, FILM COATED ORAL at 08:39

## 2023-01-01 RX ADMIN — FUROSEMIDE 80 MG: 10 INJECTION, SOLUTION INTRAMUSCULAR; INTRAVENOUS at 19:00

## 2023-01-01 RX ADMIN — MAGNESIUM OXIDE TAB 400 MG (241.3 MG ELEMENTAL MG) 400 MG: 400 (241.3 MG) TAB at 08:21

## 2023-01-01 RX ADMIN — APIXABAN 5 MG: 5 TABLET, FILM COATED ORAL at 09:37

## 2023-01-01 RX ADMIN — PANTOPRAZOLE SODIUM 40 MG: 40 TABLET, DELAYED RELEASE ORAL at 16:26

## 2023-01-01 RX ADMIN — AMIODARONE HYDROCHLORIDE 400 MG: 200 TABLET ORAL at 08:58

## 2023-01-01 RX ADMIN — Medication 0.02 MCG/KG/MIN: at 04:43

## 2023-01-01 RX ADMIN — SELENIUM SULFIDE: 2.5 LOTION TOPICAL at 08:54

## 2023-01-01 RX ADMIN — TICAGRELOR 90 MG: 90 TABLET ORAL at 08:59

## 2023-01-01 RX ADMIN — DOCUSATE SODIUM 100 MG: 100 CAPSULE, LIQUID FILLED ORAL at 20:30

## 2023-01-01 RX ADMIN — Medication 500 MG: at 08:22

## 2023-01-01 RX ADMIN — INSULIN ASPART 1 UNITS: 100 INJECTION, SOLUTION INTRAVENOUS; SUBCUTANEOUS at 07:55

## 2023-01-01 RX ADMIN — INSULIN ASPART 1 UNITS: 100 INJECTION, SOLUTION INTRAVENOUS; SUBCUTANEOUS at 09:34

## 2023-01-01 RX ADMIN — Medication 81 MG: at 08:02

## 2023-01-01 RX ADMIN — APIXABAN 2.5 MG: 2.5 TABLET, FILM COATED ORAL at 14:36

## 2023-01-01 RX ADMIN — AMIODARONE HYDROCHLORIDE 400 MG: 200 TABLET ORAL at 08:43

## 2023-01-01 RX ADMIN — Medication 81 MG: at 08:35

## 2023-01-01 RX ADMIN — DOCUSATE SODIUM 100 MG: 100 CAPSULE, LIQUID FILLED ORAL at 08:11

## 2023-01-01 RX ADMIN — TICAGRELOR 90 MG: 90 TABLET ORAL at 23:27

## 2023-01-01 RX ADMIN — SELENIUM SULFIDE: 25 LOTION TOPICAL at 10:19

## 2023-01-01 RX ADMIN — MAGNESIUM OXIDE TAB 400 MG (241.3 MG ELEMENTAL MG) 400 MG: 400 (241.3 MG) TAB at 08:19

## 2023-01-01 RX ADMIN — Medication 50 MCG/HR: at 02:11

## 2023-01-01 RX ADMIN — SELENIUM SULFIDE: 2.5 LOTION TOPICAL at 08:24

## 2023-01-01 RX ADMIN — INSULIN ASPART 5 UNITS: 100 INJECTION, SOLUTION INTRAVENOUS; SUBCUTANEOUS at 11:44

## 2023-01-01 RX ADMIN — ATORVASTATIN CALCIUM 80 MG: 40 TABLET, FILM COATED ORAL at 21:08

## 2023-01-01 RX ADMIN — PIPERACILLIN AND TAZOBACTAM 3.38 G: 3; .375 INJECTION, POWDER, LYOPHILIZED, FOR SOLUTION INTRAVENOUS at 22:00

## 2023-01-01 RX ADMIN — APIXABAN 2.5 MG: 2.5 TABLET, FILM COATED ORAL at 21:35

## 2023-01-01 RX ADMIN — Medication 500 MG: at 08:24

## 2023-01-01 RX ADMIN — POLYMYXIN B SULFATE AND TRIMETHOPRIM SULFATE 2 DROP: 10000; 1 SOLUTION/ DROPS OPHTHALMIC at 13:20

## 2023-01-01 RX ADMIN — INSULIN GLARGINE 35 UNITS: 100 INJECTION, SOLUTION SUBCUTANEOUS at 08:25

## 2023-01-01 RX ADMIN — DEXTROSE MONOHYDRATE 50 ML: 25 INJECTION, SOLUTION INTRAVENOUS at 05:39

## 2023-01-01 RX ADMIN — HEPARIN SODIUM 1550 UNITS/HR: 10000 INJECTION, SOLUTION INTRAVENOUS at 06:06

## 2023-01-01 RX ADMIN — AMIODARONE HYDROCHLORIDE 400 MG: 200 TABLET ORAL at 08:14

## 2023-01-01 RX ADMIN — Medication 81 MG: at 08:25

## 2023-01-01 RX ADMIN — ATORVASTATIN CALCIUM 80 MG: 40 TABLET, FILM COATED ORAL at 20:59

## 2023-01-01 RX ADMIN — POLYMYXIN B SULFATE AND TRIMETHOPRIM SULFATE 2 DROP: 10000; 1 SOLUTION/ DROPS OPHTHALMIC at 07:46

## 2023-01-01 RX ADMIN — SODIUM BICARBONATE 50 MEQ: 84 INJECTION, SOLUTION INTRAVENOUS at 01:52

## 2023-01-01 RX ADMIN — MICONAZOLE NITRATE 1 APPLICATOR: 20 POWDER TOPICAL at 11:39

## 2023-01-01 RX ADMIN — INSULIN ASPART 3 UNITS: 100 INJECTION, SOLUTION INTRAVENOUS; SUBCUTANEOUS at 07:55

## 2023-01-01 RX ADMIN — AMIODARONE HYDROCHLORIDE 400 MG: 200 TABLET ORAL at 09:52

## 2023-01-01 RX ADMIN — TICAGRELOR 90 MG: 90 TABLET ORAL at 08:00

## 2023-01-01 RX ADMIN — AMIODARONE HYDROCHLORIDE 400 MG: 200 TABLET ORAL at 09:34

## 2023-01-01 RX ADMIN — Medication 1.5 MCG/KG/MIN: at 20:34

## 2023-01-01 RX ADMIN — POLYMYXIN B SULFATE AND TRIMETHOPRIM SULFATE 2 DROP: 10000; 1 SOLUTION/ DROPS OPHTHALMIC at 08:58

## 2023-01-01 RX ADMIN — VANCOMYCIN HYDROCHLORIDE 2000 MG: 5 INJECTION, POWDER, LYOPHILIZED, FOR SOLUTION INTRAVENOUS at 16:07

## 2023-01-01 RX ADMIN — AMIODARONE HYDROCHLORIDE 400 MG: 200 TABLET ORAL at 08:23

## 2023-01-01 RX ADMIN — FUROSEMIDE 20 MG/HR: 10 INJECTION, SOLUTION INTRAVENOUS at 00:06

## 2023-01-01 RX ADMIN — TICAGRELOR 90 MG: 90 TABLET ORAL at 20:44

## 2023-01-01 RX ADMIN — FENTANYL CITRATE 25 MCG: 50 INJECTION, SOLUTION INTRAMUSCULAR; INTRAVENOUS at 18:07

## 2023-01-01 RX ADMIN — PANTOPRAZOLE SODIUM 40 MG: 40 TABLET, DELAYED RELEASE ORAL at 07:09

## 2023-01-01 RX ADMIN — FUROSEMIDE 40 MG: 20 TABLET ORAL at 18:31

## 2023-01-01 RX ADMIN — INSULIN GLARGINE 38 UNITS: 100 INJECTION, SOLUTION SUBCUTANEOUS at 21:25

## 2023-01-01 RX ADMIN — Medication 0.03 MCG/KG/MIN: at 15:48

## 2023-01-01 RX ADMIN — CALCIUM CHLORIDE 1 G: 100 INJECTION INTRAVENOUS; INTRAVENTRICULAR at 05:38

## 2023-01-01 RX ADMIN — PIPERACILLIN AND TAZOBACTAM 3.38 G: 3; .375 INJECTION, POWDER, LYOPHILIZED, FOR SOLUTION INTRAVENOUS at 14:06

## 2023-01-01 RX ADMIN — MICONAZOLE NITRATE: 20 POWDER TOPICAL at 09:38

## 2023-01-01 RX ADMIN — MIDAZOLAM HYDROCHLORIDE 1 MG/HR: 1 INJECTION, SOLUTION INTRAVENOUS at 02:09

## 2023-01-01 RX ADMIN — THERA TABS 1 TABLET: TAB at 09:37

## 2023-01-01 RX ADMIN — DOCUSATE SODIUM 100 MG: 100 CAPSULE, LIQUID FILLED ORAL at 09:37

## 2023-01-01 RX ADMIN — FUROSEMIDE 80 MG: 10 INJECTION, SOLUTION INTRAMUSCULAR; INTRAVENOUS at 17:17

## 2023-01-01 RX ADMIN — ATORVASTATIN CALCIUM 80 MG: 40 TABLET, FILM COATED ORAL at 21:58

## 2023-01-01 RX ADMIN — ASPIRIN 81 MG: 81 TABLET, COATED ORAL at 08:15

## 2023-01-01 RX ADMIN — HEPARIN SODIUM 1300 UNITS: 1000 INJECTION INTRAVENOUS; SUBCUTANEOUS at 08:26

## 2023-01-01 RX ADMIN — INSULIN ASPART 4 UNITS: 100 INJECTION, SOLUTION INTRAVENOUS; SUBCUTANEOUS at 11:21

## 2023-01-01 RX ADMIN — TICAGRELOR 90 MG: 90 TABLET ORAL at 11:11

## 2023-01-01 RX ADMIN — FUROSEMIDE 20 MG/HR: 10 INJECTION, SOLUTION INTRAVENOUS at 00:03

## 2023-01-01 RX ADMIN — HEPARIN SODIUM 4500 UNITS: 1000 INJECTION, SOLUTION INTRAVENOUS; SUBCUTANEOUS at 12:20

## 2023-01-01 RX ADMIN — AMIODARONE HYDROCHLORIDE 400 MG: 200 TABLET ORAL at 09:37

## 2023-01-01 RX ADMIN — INSULIN ASPART 3 UNITS: 100 INJECTION, SOLUTION INTRAVENOUS; SUBCUTANEOUS at 12:25

## 2023-01-01 RX ADMIN — Medication 81 MG: at 09:37

## 2023-01-01 RX ADMIN — EPINEPHRINE 0.55 MCG/KG/MIN: 1 INJECTION INTRAMUSCULAR; INTRAVENOUS; SUBCUTANEOUS at 12:47

## 2023-01-01 RX ADMIN — DEXTROSE MONOHYDRATE 50 ML: 25 INJECTION, SOLUTION INTRAVENOUS at 14:11

## 2023-01-01 RX ADMIN — TICAGRELOR 90 MG: 90 TABLET ORAL at 20:30

## 2023-01-01 RX ADMIN — SODIUM CHLORIDE 4 MILLION UNITS: 9 INJECTION, SOLUTION INTRAVENOUS at 13:34

## 2023-01-01 RX ADMIN — MAGNESIUM OXIDE TAB 400 MG (241.3 MG ELEMENTAL MG) 400 MG: 400 (241.3 MG) TAB at 08:53

## 2023-01-01 RX ADMIN — ASPIRIN 81 MG: 81 TABLET, COATED ORAL at 08:47

## 2023-01-01 RX ADMIN — INSULIN GLARGINE 38 UNITS: 100 INJECTION, SOLUTION SUBCUTANEOUS at 21:22

## 2023-01-01 RX ADMIN — ATORVASTATIN CALCIUM 80 MG: 40 TABLET, FILM COATED ORAL at 21:18

## 2023-01-01 RX ADMIN — FUROSEMIDE 40 MG: 20 TABLET ORAL at 16:30

## 2023-01-01 RX ADMIN — APIXABAN 5 MG: 5 TABLET, FILM COATED ORAL at 20:46

## 2023-01-01 RX ADMIN — ASPIRIN 81 MG: 81 TABLET, COATED ORAL at 09:33

## 2023-01-01 RX ADMIN — POLYMYXIN B SULFATE AND TRIMETHOPRIM SULFATE 2 DROP: 10000; 1 SOLUTION/ DROPS OPHTHALMIC at 20:34

## 2023-01-01 RX ADMIN — TICAGRELOR 90 MG: 90 TABLET ORAL at 22:14

## 2023-01-01 RX ADMIN — SODIUM CHLORIDE 4 MILLION UNITS: 9 INJECTION, SOLUTION INTRAVENOUS at 06:34

## 2023-01-01 RX ADMIN — INSULIN ASPART 1 UNITS: 100 INJECTION, SOLUTION INTRAVENOUS; SUBCUTANEOUS at 18:47

## 2023-01-01 RX ADMIN — Medication 500 MG: at 08:35

## 2023-01-01 RX ADMIN — OXYCODONE HYDROCHLORIDE AND ACETAMINOPHEN 500 MG: 500 TABLET ORAL at 08:20

## 2023-01-01 RX ADMIN — Medication 81 MG: at 08:22

## 2023-01-01 RX ADMIN — MICONAZOLE NITRATE: 20 POWDER TOPICAL at 08:46

## 2023-01-01 RX ADMIN — NOREPINEPHRINE BITARTRATE 0.03 MCG/KG/MIN: 0.02 INJECTION, SOLUTION INTRAVENOUS at 02:12

## 2023-01-01 RX ADMIN — EPINEPHRINE 1 MG: 0.1 INJECTION INTRACARDIAC; INTRAVENOUS at 01:23

## 2023-01-01 RX ADMIN — Medication 100 MCG: at 10:18

## 2023-01-01 RX ADMIN — INSULIN ASPART 1 UNITS: 100 INJECTION, SOLUTION INTRAVENOUS; SUBCUTANEOUS at 17:20

## 2023-01-01 RX ADMIN — FUROSEMIDE 40 MG: 20 TABLET ORAL at 08:59

## 2023-01-01 RX ADMIN — ALBUMIN HUMAN 50 G: 0.25 SOLUTION INTRAVENOUS at 08:22

## 2023-01-01 RX ADMIN — POLYMYXIN B SULFATE AND TRIMETHOPRIM SULFATE 2 DROP: 10000; 1 SOLUTION/ DROPS OPHTHALMIC at 16:50

## 2023-01-01 RX ADMIN — Medication 1.5 MCG/KG/MIN: at 04:25

## 2023-01-01 RX ADMIN — FUROSEMIDE 40 MG: 20 TABLET ORAL at 19:35

## 2023-01-01 RX ADMIN — FUROSEMIDE 80 MG: 10 INJECTION, SOLUTION INTRAMUSCULAR; INTRAVENOUS at 06:58

## 2023-01-01 RX ADMIN — FUROSEMIDE 20 MG/HR: 10 INJECTION, SOLUTION INTRAVENOUS at 22:13

## 2023-01-01 RX ADMIN — MICONAZOLE NITRATE: 20 POWDER TOPICAL at 21:15

## 2023-01-01 RX ADMIN — AMIODARONE HYDROCHLORIDE 400 MG: 200 TABLET ORAL at 08:50

## 2023-01-01 RX ADMIN — PIPERACILLIN AND TAZOBACTAM 3.38 G: 3; .375 INJECTION, POWDER, LYOPHILIZED, FOR SOLUTION INTRAVENOUS at 15:25

## 2023-01-01 RX ADMIN — MICONAZOLE NITRATE: 20 POWDER TOPICAL at 08:43

## 2023-01-01 RX ADMIN — SELENIUM SULFIDE: 2.5 LOTION TOPICAL at 19:28

## 2023-01-01 RX ADMIN — THERA TABS 1 TABLET: TAB at 08:35

## 2023-01-01 RX ADMIN — HEPARIN SODIUM 2500 UNITS: 1000 INJECTION INTRAVENOUS; SUBCUTANEOUS at 15:19

## 2023-01-01 RX ADMIN — SODIUM BICARBONATE 50 ML/HR: 84 INJECTION, SOLUTION INTRAVENOUS at 14:20

## 2023-01-01 RX ADMIN — INSULIN ASPART 8 UNITS: 100 INJECTION, SOLUTION INTRAVENOUS; SUBCUTANEOUS at 16:36

## 2023-01-01 RX ADMIN — SODIUM CHLORIDE 4 MILLION UNITS: 9 INJECTION, SOLUTION INTRAVENOUS at 21:45

## 2023-01-01 RX ADMIN — Medication 500 MG: at 08:51

## 2023-01-01 RX ADMIN — SELENIUM SULFIDE: 2.5 LOTION TOPICAL at 09:36

## 2023-01-01 RX ADMIN — INSULIN ASPART 2 UNITS: 100 INJECTION, SOLUTION INTRAVENOUS; SUBCUTANEOUS at 08:20

## 2023-01-01 RX ADMIN — INSULIN ASPART 1 UNITS: 100 INJECTION, SOLUTION INTRAVENOUS; SUBCUTANEOUS at 16:10

## 2023-01-01 RX ADMIN — TICAGRELOR 90 MG: 90 TABLET ORAL at 21:45

## 2023-01-01 RX ADMIN — INSULIN ASPART 3 UNITS: 100 INJECTION, SOLUTION INTRAVENOUS; SUBCUTANEOUS at 11:44

## 2023-01-01 RX ADMIN — LINEZOLID 600 MG: 600 INJECTION, SOLUTION INTRAVENOUS at 00:54

## 2023-01-01 RX ADMIN — AMIODARONE HYDROCHLORIDE 400 MG: 200 TABLET ORAL at 08:22

## 2023-01-01 RX ADMIN — SELENIUM SULFIDE: 2.5 LOTION TOPICAL at 22:00

## 2023-01-01 RX ADMIN — HEPARIN SODIUM 1250 UNITS/HR: 10000 INJECTION, SOLUTION INTRAVENOUS at 12:44

## 2023-01-01 RX ADMIN — INSULIN ASPART 1 UNITS: 100 INJECTION, SOLUTION INTRAVENOUS; SUBCUTANEOUS at 13:20

## 2023-01-01 RX ADMIN — TICAGRELOR 90 MG: 90 TABLET ORAL at 20:33

## 2023-01-01 RX ADMIN — TICAGRELOR 90 MG: 90 TABLET ORAL at 20:51

## 2023-01-01 RX ADMIN — MAGNESIUM SULFATE HEPTAHYDRATE 2 G: 40 INJECTION, SOLUTION INTRAVENOUS at 01:51

## 2023-01-01 RX ADMIN — SODIUM CHLORIDE 500 ML: 9 INJECTION, SOLUTION INTRAVENOUS at 17:06

## 2023-01-01 RX ADMIN — HEPARIN SODIUM 1850 UNITS/HR: 10000 INJECTION, SOLUTION INTRAVENOUS at 09:07

## 2023-01-01 RX ADMIN — TICAGRELOR 90 MG: 90 TABLET ORAL at 20:08

## 2023-01-01 RX ADMIN — INSULIN ASPART 2 UNITS: 100 INJECTION, SOLUTION INTRAVENOUS; SUBCUTANEOUS at 08:36

## 2023-01-01 RX ADMIN — Medication 1 MCG/KG/MIN: at 08:05

## 2023-01-01 RX ADMIN — PANTOPRAZOLE SODIUM 40 MG: 40 TABLET, DELAYED RELEASE ORAL at 18:08

## 2023-01-01 RX ADMIN — MIDODRINE HYDROCHLORIDE 5 MG: 5 TABLET ORAL at 16:09

## 2023-01-01 RX ADMIN — ATORVASTATIN CALCIUM 80 MG: 40 TABLET, FILM COATED ORAL at 21:38

## 2023-01-01 RX ADMIN — Medication 0.8 MCG/KG/MIN: at 11:34

## 2023-01-01 RX ADMIN — SELENIUM SULFIDE: 2.5 LOTION TOPICAL at 21:13

## 2023-01-01 RX ADMIN — INSULIN GLARGINE 35 UNITS: 100 INJECTION, SOLUTION SUBCUTANEOUS at 07:30

## 2023-01-01 RX ADMIN — SODIUM CHLORIDE 300 ML: 9 INJECTION, SOLUTION INTRAVENOUS at 08:30

## 2023-01-01 RX ADMIN — SELENIUM SULFIDE: 2.5 LOTION TOPICAL at 11:55

## 2023-01-01 RX ADMIN — TICAGRELOR 90 MG: 90 TABLET ORAL at 08:15

## 2023-01-01 RX ADMIN — Medication 0.8 MCG/KG/MIN: at 23:01

## 2023-01-01 RX ADMIN — INSULIN ASPART 2 UNITS: 100 INJECTION, SOLUTION INTRAVENOUS; SUBCUTANEOUS at 11:14

## 2023-01-01 RX ADMIN — SELENIUM SULFIDE: 2.5 LOTION TOPICAL at 09:12

## 2023-01-01 RX ADMIN — SODIUM CHLORIDE 1000 ML: 9 INJECTION, SOLUTION INTRAVENOUS at 01:03

## 2023-01-01 RX ADMIN — INSULIN ASPART 4 UNITS: 100 INJECTION, SOLUTION INTRAVENOUS; SUBCUTANEOUS at 11:35

## 2023-01-01 RX ADMIN — SELENIUM SULFIDE: 2.5 LOTION TOPICAL at 09:37

## 2023-01-01 RX ADMIN — FUROSEMIDE 80 MG: 10 INJECTION, SOLUTION INTRAMUSCULAR; INTRAVENOUS at 06:05

## 2023-01-01 RX ADMIN — SODIUM BICARBONATE: 84 INJECTION, SOLUTION INTRAVENOUS at 09:35

## 2023-01-01 RX ADMIN — Medication 50 MEQ: at 02:58

## 2023-01-01 RX ADMIN — MIDODRINE HYDROCHLORIDE 10 MG: 5 TABLET ORAL at 12:52

## 2023-01-01 RX ADMIN — PROPOFOL 50 MG: 10 INJECTION, EMULSION INTRAVENOUS at 01:34

## 2023-01-01 RX ADMIN — FUROSEMIDE 40 MG: 20 TABLET ORAL at 18:19

## 2023-01-01 RX ADMIN — MICONAZOLE NITRATE: 20 POWDER TOPICAL at 09:47

## 2023-01-01 RX ADMIN — PIPERACILLIN AND TAZOBACTAM 3.38 G: 3; .375 INJECTION, POWDER, LYOPHILIZED, FOR SOLUTION INTRAVENOUS at 13:02

## 2023-01-01 RX ADMIN — MAGNESIUM OXIDE TAB 400 MG (241.3 MG ELEMENTAL MG) 400 MG: 400 (241.3 MG) TAB at 08:47

## 2023-01-01 RX ADMIN — FUROSEMIDE 20 MG/HR: 10 INJECTION, SOLUTION INTRAVENOUS at 09:11

## 2023-01-01 RX ADMIN — DEXTROSE MONOHYDRATE 25 ML: 25 INJECTION, SOLUTION INTRAVENOUS at 19:35

## 2023-01-01 RX ADMIN — MICONAZOLE NITRATE: 20 POWDER TOPICAL at 09:36

## 2023-01-01 RX ADMIN — POLYMYXIN B SULFATE AND TRIMETHOPRIM SULFATE 2 DROP: 10000; 1 SOLUTION/ DROPS OPHTHALMIC at 21:41

## 2023-01-01 RX ADMIN — PIPERACILLIN AND TAZOBACTAM 3.38 G: 3; .375 INJECTION, POWDER, LYOPHILIZED, FOR SOLUTION INTRAVENOUS at 05:48

## 2023-01-01 RX ADMIN — DOCUSATE SODIUM 100 MG: 100 CAPSULE, LIQUID FILLED ORAL at 08:47

## 2023-01-01 RX ADMIN — DOCUSATE SODIUM 100 MG: 100 CAPSULE, LIQUID FILLED ORAL at 08:21

## 2023-01-01 RX ADMIN — APIXABAN 5 MG: 5 TABLET, FILM COATED ORAL at 08:20

## 2023-01-01 RX ADMIN — CEFTRIAXONE SODIUM 2 G: 2 INJECTION, POWDER, FOR SOLUTION INTRAMUSCULAR; INTRAVENOUS at 22:33

## 2023-01-01 RX ADMIN — TICAGRELOR 90 MG: 90 TABLET ORAL at 11:33

## 2023-01-01 RX ADMIN — SODIUM BICARBONATE 100 MEQ: 84 INJECTION, SOLUTION INTRAVENOUS at 09:04

## 2023-01-01 RX ADMIN — APIXABAN 5 MG: 5 TABLET, FILM COATED ORAL at 08:47

## 2023-01-01 RX ADMIN — HEPARIN SODIUM 1400 UNITS: 1000 INJECTION INTRAVENOUS; SUBCUTANEOUS at 08:25

## 2023-01-01 RX ADMIN — TICAGRELOR 90 MG: 90 TABLET ORAL at 21:11

## 2023-01-01 RX ADMIN — APIXABAN 5 MG: 5 TABLET, FILM COATED ORAL at 08:44

## 2023-01-01 RX ADMIN — CEFTRIAXONE SODIUM 2 G: 2 INJECTION, POWDER, FOR SOLUTION INTRAMUSCULAR; INTRAVENOUS at 21:22

## 2023-01-01 RX ADMIN — SELENIUM SULFIDE: 2.5 LOTION TOPICAL at 21:57

## 2023-01-01 RX ADMIN — PANTOPRAZOLE SODIUM 40 MG: 40 TABLET, DELAYED RELEASE ORAL at 16:54

## 2023-01-01 RX ADMIN — MAGNESIUM OXIDE TAB 400 MG (241.3 MG ELEMENTAL MG) 400 MG: 400 (241.3 MG) TAB at 08:22

## 2023-01-01 RX ADMIN — POLYMYXIN B SULFATE AND TRIMETHOPRIM SULFATE 2 DROP: 10000; 1 SOLUTION/ DROPS OPHTHALMIC at 20:56

## 2023-01-01 RX ADMIN — FUROSEMIDE 40 MG: 20 TABLET ORAL at 18:27

## 2023-01-01 RX ADMIN — PANTOPRAZOLE SODIUM 40 MG: 40 INJECTION, POWDER, FOR SOLUTION INTRAVENOUS at 10:07

## 2023-01-01 RX ADMIN — EPINEPHRINE 1 MG: 0.1 INJECTION INTRACARDIAC; INTRAVENOUS at 02:14

## 2023-01-01 RX ADMIN — INSULIN GLARGINE 15 UNITS: 100 INJECTION, SOLUTION SUBCUTANEOUS at 21:42

## 2023-01-01 RX ADMIN — ALBUMIN HUMAN 50 G: 0.25 SOLUTION INTRAVENOUS at 06:05

## 2023-01-01 RX ADMIN — VASOPRESSIN 2.4 UNITS/HR: 20 INJECTION, SOLUTION INTRAMUSCULAR; SUBCUTANEOUS at 22:13

## 2023-01-01 RX ADMIN — MICONAZOLE NITRATE: 20 POWDER TOPICAL at 21:27

## 2023-01-01 RX ADMIN — TICAGRELOR 90 MG: 90 TABLET ORAL at 09:36

## 2023-01-01 RX ADMIN — METOCLOPRAMIDE 5 MG: 5 TABLET ORAL at 06:41

## 2023-01-01 RX ADMIN — POLYMYXIN B SULFATE AND TRIMETHOPRIM SULFATE 2 DROP: 10000; 1 SOLUTION/ DROPS OPHTHALMIC at 21:51

## 2023-01-01 RX ADMIN — TICAGRELOR 90 MG: 90 TABLET ORAL at 20:27

## 2023-01-01 RX ADMIN — PANTOPRAZOLE SODIUM 40 MG: 40 TABLET, DELAYED RELEASE ORAL at 06:41

## 2023-01-01 RX ADMIN — INSULIN ASPART 1 UNITS: 100 INJECTION, SOLUTION INTRAVENOUS; SUBCUTANEOUS at 18:30

## 2023-01-01 RX ADMIN — LINEZOLID 600 MG: 600 INJECTION, SOLUTION INTRAVENOUS at 22:38

## 2023-01-01 RX ADMIN — MAGNESIUM OXIDE TAB 400 MG (241.3 MG ELEMENTAL MG) 400 MG: 400 (241.3 MG) TAB at 11:38

## 2023-01-01 RX ADMIN — INSULIN ASPART 1 UNITS: 100 INJECTION, SOLUTION INTRAVENOUS; SUBCUTANEOUS at 17:53

## 2023-01-01 RX ADMIN — ONDANSETRON 4 MG: 2 INJECTION INTRAMUSCULAR; INTRAVENOUS at 13:42

## 2023-01-01 RX ADMIN — FUROSEMIDE 80 MG: 10 INJECTION, SOLUTION INTRAMUSCULAR; INTRAVENOUS at 07:02

## 2023-01-01 RX ADMIN — THERA TABS 1 TABLET: TAB at 08:44

## 2023-01-01 RX ADMIN — INSULIN ASPART 2 UNITS: 100 INJECTION, SOLUTION INTRAVENOUS; SUBCUTANEOUS at 12:00

## 2023-01-01 RX ADMIN — POLYMYXIN B SULFATE AND TRIMETHOPRIM SULFATE 2 DROP: 10000; 1 SOLUTION/ DROPS OPHTHALMIC at 12:40

## 2023-01-01 RX ADMIN — ATORVASTATIN CALCIUM 80 MG: 40 TABLET, FILM COATED ORAL at 21:45

## 2023-01-01 RX ADMIN — TICAGRELOR 90 MG: 90 TABLET ORAL at 08:23

## 2023-01-01 RX ADMIN — INSULIN ASPART 3 UNITS: 100 INJECTION, SOLUTION INTRAVENOUS; SUBCUTANEOUS at 17:33

## 2023-01-01 RX ADMIN — INSULIN ASPART 3 UNITS: 100 INJECTION, SOLUTION INTRAVENOUS; SUBCUTANEOUS at 18:00

## 2023-01-01 RX ADMIN — INSULIN ASPART 3 UNITS: 100 INJECTION, SOLUTION INTRAVENOUS; SUBCUTANEOUS at 12:28

## 2023-01-01 RX ADMIN — MICONAZOLE NITRATE: 20 POWDER TOPICAL at 22:15

## 2023-01-01 RX ADMIN — THERA TABS 1 TABLET: TAB at 09:36

## 2023-01-01 RX ADMIN — HEPARIN SODIUM 1550 UNITS/HR: 10000 INJECTION, SOLUTION INTRAVENOUS at 18:20

## 2023-01-01 RX ADMIN — Medication 500 MG: at 09:47

## 2023-01-01 RX ADMIN — SELENIUM SULFIDE: 2.5 LOTION TOPICAL at 21:16

## 2023-01-01 RX ADMIN — APIXABAN 2.5 MG: 2.5 TABLET, FILM COATED ORAL at 12:11

## 2023-01-01 RX ADMIN — INSULIN GLARGINE 15 UNITS: 100 INJECTION, SOLUTION SUBCUTANEOUS at 00:10

## 2023-01-01 RX ADMIN — APIXABAN 5 MG: 5 TABLET, FILM COATED ORAL at 21:39

## 2023-01-01 RX ADMIN — SELENIUM SULFIDE: 2.5 LOTION TOPICAL at 19:06

## 2023-01-01 RX ADMIN — AMIODARONE HYDROCHLORIDE 200 MG: 200 TABLET ORAL at 08:38

## 2023-01-01 RX ADMIN — FUROSEMIDE 20 MG/HR: 10 INJECTION, SOLUTION INTRAVENOUS at 03:27

## 2023-01-01 RX ADMIN — HEPARIN SODIUM 1150 UNITS/HR: 10000 INJECTION, SOLUTION INTRAVENOUS at 05:07

## 2023-01-01 RX ADMIN — Medication 500 MG: at 11:34

## 2023-01-01 RX ADMIN — METOPROLOL TARTRATE 25 MG: 25 TABLET, FILM COATED ORAL at 08:20

## 2023-01-01 RX ADMIN — INSULIN ASPART 1 UNITS: 100 INJECTION, SOLUTION INTRAVENOUS; SUBCUTANEOUS at 12:39

## 2023-01-01 RX ADMIN — SELENIUM SULFIDE: 2.5 LOTION TOPICAL at 12:33

## 2023-01-01 RX ADMIN — MICONAZOLE NITRATE: 20 POWDER TOPICAL at 21:16

## 2023-01-01 RX ADMIN — APIXABAN 5 MG: 5 TABLET, FILM COATED ORAL at 20:43

## 2023-01-01 RX ADMIN — Medication 500 MG: at 08:19

## 2023-01-01 RX ADMIN — PANTOPRAZOLE SODIUM 40 MG: 40 TABLET, DELAYED RELEASE ORAL at 12:47

## 2023-01-01 RX ADMIN — HEPARIN SODIUM 1100 UNITS/HR: 10000 INJECTION, SOLUTION INTRAVENOUS at 16:05

## 2023-01-01 RX ADMIN — ASPIRIN 81 MG: 81 TABLET, COATED ORAL at 08:45

## 2023-01-01 RX ADMIN — LINEZOLID 600 MG: 600 INJECTION, SOLUTION INTRAVENOUS at 23:45

## 2023-01-01 RX ADMIN — SODIUM CHLORIDE 4 MILLION UNITS: 9 INJECTION, SOLUTION INTRAVENOUS at 14:44

## 2023-01-01 RX ADMIN — OXYCODONE HYDROCHLORIDE AND ACETAMINOPHEN 500 MG: 500 TABLET ORAL at 08:26

## 2023-01-01 RX ADMIN — FUROSEMIDE 80 MG: 10 INJECTION, SOLUTION INTRAMUSCULAR; INTRAVENOUS at 05:11

## 2023-01-01 RX ADMIN — TICAGRELOR 90 MG: 90 TABLET ORAL at 08:21

## 2023-01-01 RX ADMIN — IOPAMIDOL 80 ML: 755 INJECTION, SOLUTION INTRAVENOUS at 00:54

## 2023-01-01 RX ADMIN — HEPARIN SODIUM 1100 UNITS/HR: 10000 INJECTION, SOLUTION INTRAVENOUS at 20:05

## 2023-01-01 RX ADMIN — DOCUSATE SODIUM 100 MG: 100 CAPSULE, LIQUID FILLED ORAL at 09:49

## 2023-01-01 RX ADMIN — INSULIN GLARGINE 20 UNITS: 100 INJECTION, SOLUTION SUBCUTANEOUS at 20:55

## 2023-01-01 RX ADMIN — Medication 81 MG: at 08:08

## 2023-01-01 RX ADMIN — INSULIN GLARGINE 38 UNITS: 100 INJECTION, SOLUTION SUBCUTANEOUS at 22:14

## 2023-01-01 RX ADMIN — CEFTRIAXONE SODIUM 2 G: 2 INJECTION, POWDER, FOR SOLUTION INTRAMUSCULAR; INTRAVENOUS at 21:18

## 2023-01-01 RX ADMIN — APIXABAN 5 MG: 5 TABLET, FILM COATED ORAL at 08:14

## 2023-01-01 RX ADMIN — HEPARIN SODIUM 1550 UNITS/HR: 10000 INJECTION, SOLUTION INTRAVENOUS at 21:37

## 2023-01-01 RX ADMIN — MIDAZOLAM 2 MG: 1 INJECTION INTRAMUSCULAR; INTRAVENOUS at 01:31

## 2023-01-01 RX ADMIN — Medication 50 MCG: at 05:00

## 2023-01-01 RX ADMIN — Medication 1.2 MCG/KG/MIN: at 18:09

## 2023-01-01 RX ADMIN — MICONAZOLE NITRATE: 20 POWDER TOPICAL at 21:13

## 2023-01-01 RX ADMIN — SELENIUM SULFIDE: 2.5 LOTION TOPICAL at 21:46

## 2023-01-01 RX ADMIN — INSULIN ASPART 4 UNITS: 100 INJECTION, SOLUTION INTRAVENOUS; SUBCUTANEOUS at 18:28

## 2023-01-01 RX ADMIN — CEFTRIAXONE SODIUM 2 G: 2 INJECTION, POWDER, FOR SOLUTION INTRAMUSCULAR; INTRAVENOUS at 22:24

## 2023-01-01 RX ADMIN — POLYMYXIN B SULFATE AND TRIMETHOPRIM SULFATE 2 DROP: 10000; 1 SOLUTION/ DROPS OPHTHALMIC at 21:57

## 2023-01-01 RX ADMIN — EPINEPHRINE 1 MG: 0.1 INJECTION INTRACARDIAC; INTRAVENOUS at 03:00

## 2023-01-01 RX ADMIN — PANTOPRAZOLE SODIUM 40 MG: 40 TABLET, DELAYED RELEASE ORAL at 06:25

## 2023-01-01 RX ADMIN — MICONAZOLE NITRATE: 20 POWDER TOPICAL at 20:37

## 2023-01-01 RX ADMIN — THERA TABS 1 TABLET: TAB at 09:47

## 2023-01-01 RX ADMIN — INSULIN ASPART 3 UNITS: 100 INJECTION, SOLUTION INTRAVENOUS; SUBCUTANEOUS at 16:36

## 2023-01-01 RX ADMIN — INSULIN ASPART 1 UNITS: 100 INJECTION, SOLUTION INTRAVENOUS; SUBCUTANEOUS at 19:20

## 2023-01-01 RX ADMIN — INSULIN ASPART 3 UNITS: 100 INJECTION, SOLUTION INTRAVENOUS; SUBCUTANEOUS at 16:49

## 2023-01-01 RX ADMIN — TICAGRELOR 90 MG: 90 TABLET ORAL at 08:20

## 2023-01-01 RX ADMIN — SODIUM CHLORIDE 4 MILLION UNITS: 9 INJECTION, SOLUTION INTRAVENOUS at 22:41

## 2023-01-01 RX ADMIN — INSULIN ASPART 2 UNITS: 100 INJECTION, SOLUTION INTRAVENOUS; SUBCUTANEOUS at 17:13

## 2023-01-01 RX ADMIN — ASPIRIN 81 MG: 81 TABLET, COATED ORAL at 08:39

## 2023-01-01 RX ADMIN — IRON SUCROSE 300 MG: 20 INJECTION, SOLUTION INTRAVENOUS at 13:46

## 2023-01-01 RX ADMIN — Medication 100 MG: at 01:35

## 2023-01-01 RX ADMIN — INSULIN GLARGINE 20 UNITS: 100 INJECTION, SOLUTION SUBCUTANEOUS at 08:35

## 2023-01-01 RX ADMIN — INSULIN GLARGINE 35 UNITS: 100 INJECTION, SOLUTION SUBCUTANEOUS at 07:57

## 2023-01-01 RX ADMIN — FUROSEMIDE 20 MG/HR: 10 INJECTION, SOLUTION INTRAVENOUS at 22:00

## 2023-01-01 RX ADMIN — SODIUM CHLORIDE 250 ML: 9 INJECTION, SOLUTION INTRAVENOUS at 08:30

## 2023-01-01 RX ADMIN — CEFTRIAXONE SODIUM 2 G: 2 INJECTION, POWDER, FOR SOLUTION INTRAMUSCULAR; INTRAVENOUS at 22:14

## 2023-01-01 RX ADMIN — MICONAZOLE NITRATE: 20 POWDER TOPICAL at 22:03

## 2023-01-01 RX ADMIN — THERA TABS 1 TABLET: TAB at 08:08

## 2023-01-01 RX ADMIN — TICAGRELOR 90 MG: 90 TABLET ORAL at 20:46

## 2023-01-01 RX ADMIN — ONDANSETRON 4 MG: 2 INJECTION INTRAMUSCULAR; INTRAVENOUS at 21:16

## 2023-01-01 RX ADMIN — INSULIN ASPART 4 UNITS: 100 INJECTION, SOLUTION INTRAVENOUS; SUBCUTANEOUS at 11:38

## 2023-01-01 RX ADMIN — AMIODARONE HYDROCHLORIDE 200 MG: 200 TABLET ORAL at 08:39

## 2023-01-01 RX ADMIN — INSULIN ASPART 1 UNITS: 100 INJECTION, SOLUTION INTRAVENOUS; SUBCUTANEOUS at 10:26

## 2023-01-01 RX ADMIN — THERA TABS 1 TABLET: TAB at 08:16

## 2023-01-01 RX ADMIN — POLYMYXIN B SULFATE AND TRIMETHOPRIM SULFATE 2 DROP: 10000; 1 SOLUTION/ DROPS OPHTHALMIC at 03:49

## 2023-01-01 RX ADMIN — FUROSEMIDE 40 MG: 20 TABLET ORAL at 19:10

## 2023-01-01 RX ADMIN — HEPARIN SODIUM 1400 UNITS/HR: 10000 INJECTION, SOLUTION INTRAVENOUS at 17:37

## 2023-01-01 RX ADMIN — INSULIN GLARGINE 35 UNITS: 100 INJECTION, SOLUTION SUBCUTANEOUS at 07:47

## 2023-01-01 RX ADMIN — MICONAZOLE NITRATE: 20 POWDER TOPICAL at 08:59

## 2023-01-01 RX ADMIN — FENTANYL CITRATE 25 MCG: 50 INJECTION, SOLUTION INTRAMUSCULAR; INTRAVENOUS at 12:14

## 2023-01-01 RX ADMIN — OXYCODONE HYDROCHLORIDE AND ACETAMINOPHEN 500 MG: 500 TABLET ORAL at 08:46

## 2023-01-01 RX ADMIN — SODIUM CHLORIDE 300 ML: 9 INJECTION, SOLUTION INTRAVENOUS at 08:28

## 2023-01-01 RX ADMIN — THERA TABS 1 TABLET: TAB at 08:45

## 2023-01-01 RX ADMIN — APIXABAN 5 MG: 5 TABLET, FILM COATED ORAL at 22:14

## 2023-01-01 RX ADMIN — POLYMYXIN B SULFATE AND TRIMETHOPRIM SULFATE 2 DROP: 10000; 1 SOLUTION/ DROPS OPHTHALMIC at 13:56

## 2023-01-01 RX ADMIN — INSULIN ASPART 4 UNITS: 100 INJECTION, SOLUTION INTRAVENOUS; SUBCUTANEOUS at 12:02

## 2023-01-01 RX ADMIN — CALCIUM CHLORIDE, MAGNESIUM CHLORIDE, SODIUM CHLORIDE, SODIUM BICARBONATE, POTASSIUM CHLORIDE AND SODIUM PHOSPHATE DIBASIC DIHYDRATE 200 ML/HR: 3.68; 3.05; 6.34; 3.09; .314; .187 INJECTION INTRAVENOUS at 12:45

## 2023-01-01 RX ADMIN — ALBUMIN HUMAN 25 G: 0.25 SOLUTION INTRAVENOUS at 18:10

## 2023-01-01 RX ADMIN — SODIUM BICARBONATE 100 MEQ: 84 INJECTION, SOLUTION INTRAVENOUS at 05:01

## 2023-01-01 RX ADMIN — THERA TABS 1 TABLET: TAB at 09:48

## 2023-01-01 RX ADMIN — HEPARIN SODIUM 2500 UNITS: 1000 INJECTION INTRAVENOUS; SUBCUTANEOUS at 10:25

## 2023-01-01 RX ADMIN — Medication 500 MG: at 08:02

## 2023-01-01 RX ADMIN — CALCIUM CHLORIDE, MAGNESIUM CHLORIDE, SODIUM CHLORIDE, SODIUM BICARBONATE, POTASSIUM CHLORIDE AND SODIUM PHOSPHATE DIBASIC DIHYDRATE 12.5 ML/KG/HR: 3.68; 3.05; 6.34; 3.09; .314; .187 INJECTION INTRAVENOUS at 13:34

## 2023-01-01 RX ADMIN — POLYMYXIN B SULFATE AND TRIMETHOPRIM SULFATE 2 DROP: 10000; 1 SOLUTION/ DROPS OPHTHALMIC at 21:49

## 2023-01-01 RX ADMIN — MICONAZOLE NITRATE: 20 POWDER TOPICAL at 08:53

## 2023-01-01 RX ADMIN — INSULIN ASPART 2 UNITS: 100 INJECTION, SOLUTION INTRAVENOUS; SUBCUTANEOUS at 17:34

## 2023-01-01 RX ADMIN — APIXABAN 5 MG: 5 TABLET, FILM COATED ORAL at 20:26

## 2023-01-01 RX ADMIN — MICONAZOLE NITRATE: 20 POWDER TOPICAL at 21:55

## 2023-01-01 RX ADMIN — POLYMYXIN B SULFATE AND TRIMETHOPRIM SULFATE 2 DROP: 10000; 1 SOLUTION/ DROPS OPHTHALMIC at 17:17

## 2023-01-01 RX ADMIN — POTASSIUM CHLORIDE 40 MEQ: 1500 TABLET, EXTENDED RELEASE ORAL at 06:56

## 2023-01-01 RX ADMIN — INSULIN ASPART 3 UNITS: 100 INJECTION, SOLUTION INTRAVENOUS; SUBCUTANEOUS at 18:29

## 2023-01-01 RX ADMIN — SODIUM CHLORIDE 4 MILLION UNITS: 9 INJECTION, SOLUTION INTRAVENOUS at 13:19

## 2023-01-01 RX ADMIN — TICAGRELOR 90 MG: 90 TABLET ORAL at 08:51

## 2023-01-01 RX ADMIN — AMIODARONE HYDROCHLORIDE 0.5 MG/MIN: 1.8 INJECTION, SOLUTION INTRAVENOUS at 09:10

## 2023-01-01 RX ADMIN — THERA TABS 1 TABLET: TAB at 08:50

## 2023-01-01 RX ADMIN — DEXTROSE MONOHYDRATE 25 ML: 25 INJECTION, SOLUTION INTRAVENOUS at 19:53

## 2023-01-01 RX ADMIN — APIXABAN 5 MG: 5 TABLET, FILM COATED ORAL at 21:58

## 2023-01-01 RX ADMIN — CALCIUM CHLORIDE, MAGNESIUM CHLORIDE, SODIUM CHLORIDE, SODIUM BICARBONATE, POTASSIUM CHLORIDE AND SODIUM PHOSPHATE DIBASIC DIHYDRATE 12.5 ML/KG/HR: 3.68; 3.05; 6.34; 3.09; .314; .187 INJECTION INTRAVENOUS at 12:45

## 2023-01-01 RX ADMIN — NOREPINEPHRINE BITARTRATE 0.4 MCG/KG/MIN: 1 INJECTION, SOLUTION, CONCENTRATE INTRAVENOUS at 12:50

## 2023-01-01 RX ADMIN — MIDODRINE HYDROCHLORIDE 10 MG: 5 TABLET ORAL at 17:33

## 2023-01-01 RX ADMIN — Medication 2 G: at 12:06

## 2023-01-01 RX ADMIN — LINEZOLID 600 MG: 600 INJECTION, SOLUTION INTRAVENOUS at 22:12

## 2023-01-01 RX ADMIN — MICONAZOLE NITRATE: 20 POWDER TOPICAL at 08:55

## 2023-01-01 RX ADMIN — APIXABAN 5 MG: 5 TABLET, FILM COATED ORAL at 21:54

## 2023-01-01 RX ADMIN — TICAGRELOR 90 MG: 90 TABLET ORAL at 09:38

## 2023-01-01 RX ADMIN — PERFLUTREN 3 ML: 6.52 INJECTION, SUSPENSION INTRAVENOUS at 07:40

## 2023-01-01 RX ADMIN — ALBUMIN HUMAN 50 G: 0.25 SOLUTION INTRAVENOUS at 12:09

## 2023-01-01 RX ADMIN — FUROSEMIDE 40 MG: 20 TABLET ORAL at 09:33

## 2023-01-01 RX ADMIN — CEFTRIAXONE SODIUM 2 G: 2 INJECTION, POWDER, FOR SOLUTION INTRAMUSCULAR; INTRAVENOUS at 21:58

## 2023-01-01 RX ADMIN — APIXABAN 5 MG: 5 TABLET, FILM COATED ORAL at 20:18

## 2023-01-01 RX ADMIN — DOCUSATE SODIUM 100 MG: 100 CAPSULE, LIQUID FILLED ORAL at 11:38

## 2023-01-01 RX ADMIN — Medication: at 08:30

## 2023-01-01 RX ADMIN — SELENIUM SULFIDE: 2.5 LOTION TOPICAL at 22:15

## 2023-01-01 RX ADMIN — THERA TABS 1 TABLET: TAB at 08:22

## 2023-01-01 RX ADMIN — DOCUSATE SODIUM 100 MG: 100 CAPSULE, LIQUID FILLED ORAL at 20:42

## 2023-01-01 RX ADMIN — FUROSEMIDE 20 MG/HR: 10 INJECTION, SOLUTION INTRAVENOUS at 05:10

## 2023-01-01 RX ADMIN — INSULIN ASPART 1 UNITS: 100 INJECTION, SOLUTION INTRAVENOUS; SUBCUTANEOUS at 08:58

## 2023-01-01 RX ADMIN — THERA TABS 1 TABLET: TAB at 08:59

## 2023-01-01 RX ADMIN — OXYCODONE HYDROCHLORIDE AND ACETAMINOPHEN 500 MG: 500 TABLET ORAL at 09:35

## 2023-01-01 RX ADMIN — POLYMYXIN B SULFATE AND TRIMETHOPRIM SULFATE 2 DROP: 10000; 1 SOLUTION/ DROPS OPHTHALMIC at 12:54

## 2023-01-01 RX ADMIN — ALBUMIN HUMAN 50 G: 0.25 SOLUTION INTRAVENOUS at 20:20

## 2023-01-01 RX ADMIN — THERA TABS 1 TABLET: TAB at 08:20

## 2023-01-01 RX ADMIN — MAGNESIUM OXIDE TAB 400 MG (241.3 MG ELEMENTAL MG) 400 MG: 400 (241.3 MG) TAB at 08:16

## 2023-01-01 RX ADMIN — AMIODARONE HYDROCHLORIDE 400 MG: 200 TABLET ORAL at 09:10

## 2023-01-01 RX ADMIN — THERA TABS 1 TABLET: TAB at 11:20

## 2023-01-01 RX ADMIN — ONDANSETRON 4 MG: 4 TABLET, ORALLY DISINTEGRATING ORAL at 19:19

## 2023-01-01 RX ADMIN — POLYMYXIN B SULFATE AND TRIMETHOPRIM SULFATE 2 DROP: 10000; 1 SOLUTION/ DROPS OPHTHALMIC at 20:47

## 2023-01-01 RX ADMIN — TICAGRELOR 90 MG: 90 TABLET ORAL at 08:39

## 2023-01-01 RX ADMIN — POLYMYXIN B SULFATE AND TRIMETHOPRIM SULFATE 2 DROP: 10000; 1 SOLUTION/ DROPS OPHTHALMIC at 11:34

## 2023-01-01 RX ADMIN — PROTHROMBIN, COAGULATION FACTOR VII HUMAN, COAGULATION FACTOR IX HUMAN, COAGULATION FACTOR X HUMAN, PROTEIN C, PROTEIN S HUMAN, AND WATER 5013 UNITS: KIT at 09:33

## 2023-01-01 RX ADMIN — SODIUM CHLORIDE 4 MILLION UNITS: 9 INJECTION, SOLUTION INTRAVENOUS at 05:02

## 2023-01-01 RX ADMIN — INSULIN GLARGINE 20 UNITS: 100 INJECTION, SOLUTION SUBCUTANEOUS at 08:13

## 2023-01-01 RX ADMIN — THERA TABS 1 TABLET: TAB at 09:33

## 2023-01-01 RX ADMIN — MICONAZOLE NITRATE: 20 POWDER TOPICAL at 21:47

## 2023-01-01 RX ADMIN — AMIODARONE HYDROCHLORIDE 1 MG/MIN: 1.8 INJECTION, SOLUTION INTRAVENOUS at 14:06

## 2023-01-01 RX ADMIN — MAGNESIUM OXIDE TAB 400 MG (241.3 MG ELEMENTAL MG) 400 MG: 400 (241.3 MG) TAB at 09:11

## 2023-01-01 RX ADMIN — INSULIN HUMAN 5.5 UNITS/HR: 1 INJECTION, SOLUTION INTRAVENOUS at 18:55

## 2023-01-01 RX ADMIN — MAGNESIUM OXIDE TAB 400 MG (241.3 MG ELEMENTAL MG) 400 MG: 400 (241.3 MG) TAB at 08:08

## 2023-01-01 RX ADMIN — AMIODARONE HYDROCHLORIDE 200 MG: 200 TABLET ORAL at 08:26

## 2023-01-01 RX ADMIN — Medication 50 MCG: at 03:00

## 2023-01-01 RX ADMIN — MICONAZOLE NITRATE: 20 POWDER TOPICAL at 10:12

## 2023-01-01 RX ADMIN — EPINEPHRINE 1 MG: 0.1 INJECTION INTRACARDIAC; INTRAVENOUS at 03:15

## 2023-01-01 RX ADMIN — TICAGRELOR 90 MG: 90 TABLET ORAL at 21:35

## 2023-01-01 RX ADMIN — THERA TABS 1 TABLET: TAB at 08:18

## 2023-01-01 RX ADMIN — TICAGRELOR 90 MG: 90 TABLET ORAL at 09:11

## 2023-01-01 RX ADMIN — CALCIUM CHLORIDE INJECTION 1 G: 100 INJECTION, SOLUTION INTRAVENOUS at 01:56

## 2023-01-01 RX ADMIN — DEXTROSE MONOHYDRATE 25 ML: 25 INJECTION, SOLUTION INTRAVENOUS at 14:47

## 2023-01-01 RX ADMIN — POLYMYXIN B SULFATE AND TRIMETHOPRIM SULFATE 2 DROP: 10000; 1 SOLUTION/ DROPS OPHTHALMIC at 13:00

## 2023-01-01 RX ADMIN — INSULIN ASPART 1 UNITS: 100 INJECTION, SOLUTION INTRAVENOUS; SUBCUTANEOUS at 09:18

## 2023-01-01 RX ADMIN — Medication 1 MCG/KG/MIN: at 01:28

## 2023-01-01 RX ADMIN — Medication 500 MG: at 08:21

## 2023-01-01 RX ADMIN — SODIUM CHLORIDE 4 MILLION UNITS: 9 INJECTION, SOLUTION INTRAVENOUS at 22:15

## 2023-01-01 RX ADMIN — MICONAZOLE NITRATE: 20 POWDER TOPICAL at 21:38

## 2023-01-01 RX ADMIN — SODIUM CHLORIDE 4 MILLION UNITS: 9 INJECTION, SOLUTION INTRAVENOUS at 21:57

## 2023-01-01 RX ADMIN — AMIODARONE HYDROCHLORIDE 400 MG: 200 TABLET ORAL at 08:12

## 2023-01-01 RX ADMIN — SODIUM BICARBONATE 50 ML/HR: 84 INJECTION, SOLUTION INTRAVENOUS at 12:24

## 2023-01-01 RX ADMIN — BUMETANIDE 2 MG: 0.25 INJECTION, SOLUTION INTRAMUSCULAR; INTRAVENOUS at 12:08

## 2023-01-01 RX ADMIN — LIDOCAINE HYDROCHLORIDE 2.5 ML: 10 INJECTION, SOLUTION EPIDURAL; INFILTRATION; INTRACAUDAL; PERINEURAL at 14:46

## 2023-01-01 RX ADMIN — Medication 81 MG: at 08:11

## 2023-01-01 RX ADMIN — DOCUSATE SODIUM 100 MG: 100 CAPSULE, LIQUID FILLED ORAL at 20:26

## 2023-01-01 RX ADMIN — ACETAMINOPHEN 650 MG: 325 TABLET ORAL at 20:05

## 2023-01-01 RX ADMIN — Medication 500 MG: at 08:08

## 2023-01-01 RX ADMIN — POLYMYXIN B SULFATE AND TRIMETHOPRIM SULFATE 2 DROP: 10000; 1 SOLUTION/ DROPS OPHTHALMIC at 08:21

## 2023-01-01 RX ADMIN — MAGNESIUM OXIDE TAB 400 MG (241.3 MG ELEMENTAL MG) 400 MG: 400 (241.3 MG) TAB at 08:25

## 2023-01-01 RX ADMIN — TICAGRELOR 90 MG: 90 TABLET ORAL at 12:46

## 2023-01-01 RX ADMIN — INSULIN ASPART 3 UNITS: 100 INJECTION, SOLUTION INTRAVENOUS; SUBCUTANEOUS at 08:53

## 2023-01-01 RX ADMIN — DOCUSATE SODIUM 100 MG: 100 CAPSULE, LIQUID FILLED ORAL at 21:33

## 2023-01-01 RX ADMIN — Medication 0.06 MCG/KG/MIN: at 16:51

## 2023-01-01 RX ADMIN — INSULIN ASPART 1 UNITS: 100 INJECTION, SOLUTION INTRAVENOUS; SUBCUTANEOUS at 17:06

## 2023-01-01 RX ADMIN — AMIODARONE HYDROCHLORIDE 0.5 MG/MIN: 1.8 INJECTION, SOLUTION INTRAVENOUS at 09:13

## 2023-01-01 RX ADMIN — FUROSEMIDE 15 MG/HR: 10 INJECTION, SOLUTION INTRAVENOUS at 06:36

## 2023-01-01 RX ADMIN — MICONAZOLE NITRATE: 20 POWDER TOPICAL at 09:43

## 2023-01-01 RX ADMIN — LIDOCAINE HYDROCHLORIDE: 20 JELLY TOPICAL at 17:57

## 2023-01-01 RX ADMIN — SODIUM CHLORIDE 4 MILLION UNITS: 9 INJECTION, SOLUTION INTRAVENOUS at 05:16

## 2023-01-01 RX ADMIN — APIXABAN 5 MG: 5 TABLET, FILM COATED ORAL at 08:51

## 2023-01-01 RX ADMIN — Medication: at 08:26

## 2023-01-01 RX ADMIN — POLYMYXIN B SULFATE AND TRIMETHOPRIM SULFATE 2 DROP: 10000; 1 SOLUTION/ DROPS OPHTHALMIC at 17:13

## 2023-01-01 RX ADMIN — Medication 500 MG: at 08:17

## 2023-01-01 RX ADMIN — Medication 500 MG: at 08:00

## 2023-01-01 RX ADMIN — TICAGRELOR 90 MG: 90 TABLET ORAL at 21:44

## 2023-01-01 RX ADMIN — TICAGRELOR 90 MG: 90 TABLET ORAL at 08:46

## 2023-01-01 RX ADMIN — ANTISEPTIC SURGICAL SCRUB: 0.04 SOLUTION TOPICAL at 20:38

## 2023-01-01 RX ADMIN — METOPROLOL TARTRATE 25 MG: 25 TABLET, FILM COATED ORAL at 08:39

## 2023-01-01 RX ADMIN — TICAGRELOR 90 MG: 90 TABLET ORAL at 08:35

## 2023-01-01 RX ADMIN — LINEZOLID 600 MG: 600 INJECTION, SOLUTION INTRAVENOUS at 23:02

## 2023-01-01 RX ADMIN — Medication 0.5 MCG/KG/MIN: at 05:35

## 2023-01-01 RX ADMIN — ALBUMIN (HUMAN) 25 G: 12.5 SOLUTION INTRAVENOUS at 03:18

## 2023-01-01 RX ADMIN — POLYMYXIN B SULFATE AND TRIMETHOPRIM SULFATE 2 DROP: 10000; 1 SOLUTION/ DROPS OPHTHALMIC at 11:39

## 2023-01-01 RX ADMIN — SELENIUM SULFIDE: 2.5 LOTION TOPICAL at 12:52

## 2023-01-01 RX ADMIN — CEFTRIAXONE SODIUM 2 G: 2 INJECTION, POWDER, FOR SOLUTION INTRAMUSCULAR; INTRAVENOUS at 22:04

## 2023-01-01 RX ADMIN — APIXABAN 5 MG: 5 TABLET, FILM COATED ORAL at 20:33

## 2023-01-01 RX ADMIN — INSULIN GLARGINE 35 UNITS: 100 INJECTION, SOLUTION SUBCUTANEOUS at 08:06

## 2023-01-01 RX ADMIN — AMIODARONE HYDROCHLORIDE 200 MG: 200 TABLET ORAL at 08:20

## 2023-01-01 RX ADMIN — INSULIN ASPART 5 UNITS: 100 INJECTION, SOLUTION INTRAVENOUS; SUBCUTANEOUS at 07:54

## 2023-01-01 RX ADMIN — SODIUM CHLORIDE 1000 ML: 0.9 INJECTION, SOLUTION INTRAVENOUS at 02:12

## 2023-01-01 RX ADMIN — METOPROLOL TARTRATE 12.5 MG: 25 TABLET, FILM COATED ORAL at 20:59

## 2023-01-01 RX ADMIN — POLYMYXIN B SULFATE AND TRIMETHOPRIM SULFATE 2 DROP: 10000; 1 SOLUTION/ DROPS OPHTHALMIC at 17:22

## 2023-01-01 RX ADMIN — LINEZOLID 600 MG: 600 INJECTION, SOLUTION INTRAVENOUS at 12:43

## 2023-01-01 RX ADMIN — ALBUMIN HUMAN 50 G: 0.25 SOLUTION INTRAVENOUS at 11:26

## 2023-01-01 RX ADMIN — TICAGRELOR 90 MG: 90 TABLET ORAL at 22:25

## 2023-01-01 RX ADMIN — ACETAMINOPHEN 650 MG: 325 TABLET ORAL at 14:56

## 2023-01-01 RX ADMIN — ETOMIDATE 8 MG: 2 INJECTION INTRAVENOUS at 14:31

## 2023-01-01 RX ADMIN — SODIUM CHLORIDE 250 ML: 9 INJECTION, SOLUTION INTRAVENOUS at 08:28

## 2023-01-01 RX ADMIN — APIXABAN 2.5 MG: 2.5 TABLET, FILM COATED ORAL at 12:46

## 2023-01-01 RX ADMIN — INSULIN GLARGINE 15 UNITS: 100 INJECTION, SOLUTION SUBCUTANEOUS at 22:13

## 2023-01-01 RX ADMIN — SODIUM CHLORIDE 500 ML: 9 INJECTION, SOLUTION INTRAVENOUS at 18:07

## 2023-01-01 RX ADMIN — AMIODARONE HYDROCHLORIDE 400 MG: 200 TABLET ORAL at 08:25

## 2023-01-01 RX ADMIN — TICAGRELOR 90 MG: 90 TABLET ORAL at 08:22

## 2023-01-01 RX ADMIN — DEXTROSE MONOHYDRATE 50 ML: 25 INJECTION, SOLUTION INTRAVENOUS at 13:35

## 2023-01-01 RX ADMIN — MICONAZOLE NITRATE: 20 POWDER TOPICAL at 20:32

## 2023-01-01 RX ADMIN — SODIUM CHLORIDE 4 MILLION UNITS: 9 INJECTION, SOLUTION INTRAVENOUS at 21:24

## 2023-01-01 RX ADMIN — POLYMYXIN B SULFATE AND TRIMETHOPRIM SULFATE 2 DROP: 10000; 1 SOLUTION/ DROPS OPHTHALMIC at 12:39

## 2023-01-01 RX ADMIN — MICONAZOLE NITRATE: 20 POWDER TOPICAL at 08:50

## 2023-01-01 RX ADMIN — INSULIN GLARGINE 20 UNITS: 100 INJECTION, SOLUTION SUBCUTANEOUS at 21:20

## 2023-01-01 RX ADMIN — SODIUM CHLORIDE 500 ML: 9 INJECTION, SOLUTION INTRAVENOUS at 07:48

## 2023-01-01 RX ADMIN — SODIUM CHLORIDE: 9 INJECTION, SOLUTION INTRAVENOUS at 20:48

## 2023-01-01 RX ADMIN — OXYCODONE HYDROCHLORIDE AND ACETAMINOPHEN 500 MG: 500 TABLET ORAL at 09:32

## 2023-01-01 RX ADMIN — THERA TABS 1 TABLET: TAB at 08:47

## 2023-01-01 RX ADMIN — FUROSEMIDE 40 MG: 20 TABLET ORAL at 07:36

## 2023-01-01 RX ADMIN — ALBUMIN HUMAN 25 G: 0.05 INJECTION, SOLUTION INTRAVENOUS at 09:19

## 2023-01-01 RX ADMIN — THERA TABS 1 TABLET: TAB at 08:11

## 2023-01-01 RX ADMIN — Medication 500 MG: at 08:46

## 2023-01-01 RX ADMIN — SODIUM BICARBONATE 50 MEQ: 84 INJECTION, SOLUTION INTRAVENOUS at 02:55

## 2023-01-01 RX ADMIN — FUROSEMIDE 20 MG/HR: 10 INJECTION, SOLUTION INTRAVENOUS at 16:32

## 2023-01-01 RX ADMIN — INSULIN ASPART 3 UNITS: 100 INJECTION, SOLUTION INTRAVENOUS; SUBCUTANEOUS at 08:14

## 2023-01-01 RX ADMIN — Medication 81 MG: at 19:56

## 2023-01-01 RX ADMIN — PIPERACILLIN AND TAZOBACTAM 3.38 G: 3; .375 INJECTION, POWDER, LYOPHILIZED, FOR SOLUTION INTRAVENOUS at 22:45

## 2023-01-01 RX ADMIN — OXYCODONE HYDROCHLORIDE AND ACETAMINOPHEN 500 MG: 500 TABLET ORAL at 08:15

## 2023-01-01 RX ADMIN — TICAGRELOR 90 MG: 90 TABLET ORAL at 21:54

## 2023-01-01 RX ADMIN — HEPARIN SODIUM 1450 UNITS/HR: 10000 INJECTION, SOLUTION INTRAVENOUS at 15:36

## 2023-01-01 RX ADMIN — APIXABAN 2.5 MG: 2.5 TABLET, FILM COATED ORAL at 23:27

## 2023-01-01 RX ADMIN — OXYCODONE HYDROCHLORIDE AND ACETAMINOPHEN 500 MG: 500 TABLET ORAL at 11:20

## 2023-01-01 RX ADMIN — PERFLUTREN 2 ML: 6.52 INJECTION, SUSPENSION INTRAVENOUS at 12:10

## 2023-01-01 RX ADMIN — THERA TABS 1 TABLET: TAB at 09:43

## 2023-01-01 RX ADMIN — SODIUM CHLORIDE 4 MILLION UNITS: 9 INJECTION, SOLUTION INTRAVENOUS at 05:46

## 2023-01-01 RX ADMIN — SELENIUM SULFIDE: 2.5 LOTION TOPICAL at 08:01

## 2023-01-01 RX ADMIN — INSULIN ASPART 3 UNITS: 100 INJECTION, SOLUTION INTRAVENOUS; SUBCUTANEOUS at 08:06

## 2023-01-01 RX ADMIN — MIDODRINE HYDROCHLORIDE 5 MG: 5 TABLET ORAL at 21:34

## 2023-01-01 RX ADMIN — ALBUMIN HUMAN 25 G: 0.25 SOLUTION INTRAVENOUS at 07:10

## 2023-01-01 RX ADMIN — TICAGRELOR 90 MG: 90 TABLET ORAL at 20:32

## 2023-01-01 RX ADMIN — MICONAZOLE NITRATE: 20 POWDER TOPICAL at 22:33

## 2023-01-01 RX ADMIN — SODIUM BICARBONATE 50 MEQ: 84 INJECTION, SOLUTION INTRAVENOUS at 05:27

## 2023-01-01 RX ADMIN — HEPARIN SODIUM 1400 UNITS/HR: 10000 INJECTION, SOLUTION INTRAVENOUS at 06:10

## 2023-01-01 RX ADMIN — AMIODARONE HYDROCHLORIDE 400 MG: 200 TABLET ORAL at 09:35

## 2023-01-01 RX ADMIN — FUROSEMIDE 80 MG: 10 INJECTION, SOLUTION INTRAMUSCULAR; INTRAVENOUS at 05:15

## 2023-01-01 RX ADMIN — POLYMYXIN B SULFATE AND TRIMETHOPRIM SULFATE 2 DROP: 10000; 1 SOLUTION/ DROPS OPHTHALMIC at 12:38

## 2023-01-01 RX ADMIN — MICONAZOLE NITRATE: 20 POWDER TOPICAL at 20:29

## 2023-01-01 RX ADMIN — POLYMYXIN B SULFATE AND TRIMETHOPRIM SULFATE 2 DROP: 10000; 1 SOLUTION/ DROPS OPHTHALMIC at 07:54

## 2023-01-01 RX ADMIN — CEFTRIAXONE SODIUM 2 G: 2 INJECTION, POWDER, FOR SOLUTION INTRAMUSCULAR; INTRAVENOUS at 21:56

## 2023-01-01 RX ADMIN — APIXABAN 5 MG: 5 TABLET, FILM COATED ORAL at 21:08

## 2023-01-01 RX ADMIN — POLYMYXIN B SULFATE AND TRIMETHOPRIM SULFATE 2 DROP: 10000; 1 SOLUTION/ DROPS OPHTHALMIC at 19:10

## 2023-01-01 RX ADMIN — APIXABAN 5 MG: 5 TABLET, FILM COATED ORAL at 21:38

## 2023-01-01 RX ADMIN — APIXABAN 5 MG: 5 TABLET, FILM COATED ORAL at 21:11

## 2023-01-01 RX ADMIN — MICONAZOLE NITRATE: 20 POWDER TOPICAL at 21:53

## 2023-01-01 RX ADMIN — CALCIUM CHLORIDE, MAGNESIUM CHLORIDE, SODIUM CHLORIDE, SODIUM BICARBONATE, POTASSIUM CHLORIDE AND SODIUM PHOSPHATE DIBASIC DIHYDRATE 12.5 ML/KG/HR: 3.68; 3.05; 6.34; 3.09; .314; .187 INJECTION INTRAVENOUS at 14:29

## 2023-01-01 RX ADMIN — MIDAZOLAM HYDROCHLORIDE 1 MG: 1 INJECTION, SOLUTION INTRAMUSCULAR; INTRAVENOUS at 12:11

## 2023-01-01 RX ADMIN — Medication 50 MEQ: at 05:27

## 2023-01-01 RX ADMIN — CEFTRIAXONE SODIUM 2 G: 2 INJECTION, POWDER, FOR SOLUTION INTRAMUSCULAR; INTRAVENOUS at 21:54

## 2023-01-01 RX ADMIN — AMIODARONE HYDROCHLORIDE 200 MG: 200 TABLET ORAL at 08:46

## 2023-01-01 RX ADMIN — DOCUSATE SODIUM 100 MG: 100 CAPSULE, LIQUID FILLED ORAL at 20:29

## 2023-01-01 RX ADMIN — CEFTRIAXONE SODIUM 2 G: 2 INJECTION, POWDER, FOR SOLUTION INTRAMUSCULAR; INTRAVENOUS at 21:23

## 2023-01-01 RX ADMIN — APIXABAN 5 MG: 5 TABLET, FILM COATED ORAL at 21:44

## 2023-01-01 RX ADMIN — LINEZOLID 600 MG: 600 INJECTION, SOLUTION INTRAVENOUS at 11:14

## 2023-01-01 RX ADMIN — TICAGRELOR 90 MG: 90 TABLET ORAL at 14:36

## 2023-01-01 RX ADMIN — Medication 500 MG: at 08:25

## 2023-01-01 RX ADMIN — FUROSEMIDE 40 MG: 20 TABLET ORAL at 08:43

## 2023-01-01 RX ADMIN — POLYMYXIN B SULFATE AND TRIMETHOPRIM SULFATE 2 DROP: 10000; 1 SOLUTION/ DROPS OPHTHALMIC at 08:53

## 2023-01-01 RX ADMIN — OXYCODONE HYDROCHLORIDE AND ACETAMINOPHEN 500 MG: 500 TABLET ORAL at 08:39

## 2023-01-01 RX ADMIN — MICONAZOLE NITRATE: 20 POWDER TOPICAL at 20:44

## 2023-01-01 RX ADMIN — INSULIN GLARGINE 15 UNITS: 100 INJECTION, SOLUTION SUBCUTANEOUS at 21:04

## 2023-01-01 RX ADMIN — TICAGRELOR 90 MG: 90 TABLET ORAL at 20:43

## 2023-01-01 RX ADMIN — APIXABAN 2.5 MG: 2.5 TABLET, FILM COATED ORAL at 20:59

## 2023-01-01 RX ADMIN — ATORVASTATIN CALCIUM 80 MG: 40 TABLET, FILM COATED ORAL at 21:16

## 2023-01-01 RX ADMIN — FUROSEMIDE 20 MG/HR: 10 INJECTION, SOLUTION INTRAVENOUS at 06:08

## 2023-01-01 RX ADMIN — AMIODARONE HYDROCHLORIDE 0.5 MG/MIN: 1.8 INJECTION, SOLUTION INTRAVENOUS at 09:14

## 2023-01-01 RX ADMIN — Medication 500 MG: at 09:10

## 2023-01-01 RX ADMIN — POTASSIUM CHLORIDE 40 MEQ: 1500 TABLET, EXTENDED RELEASE ORAL at 08:21

## 2023-01-01 RX ADMIN — TICAGRELOR 90 MG: 90 TABLET ORAL at 08:02

## 2023-01-01 RX ADMIN — Medication 0.9 MCG/KG/MIN: at 02:19

## 2023-01-01 RX ADMIN — DOCUSATE SODIUM 100 MG: 100 CAPSULE, LIQUID FILLED ORAL at 20:47

## 2023-01-01 RX ADMIN — TICAGRELOR 90 MG: 90 TABLET ORAL at 08:47

## 2023-01-01 RX ADMIN — CHLORHEXIDINE GLUCONATE 15 ML: 1.2 SOLUTION ORAL at 10:08

## 2023-01-01 RX ADMIN — HEPARIN SODIUM 1300 UNITS: 1000 INJECTION INTRAVENOUS; SUBCUTANEOUS at 09:12

## 2023-01-01 RX ADMIN — MIDODRINE HYDROCHLORIDE 10 MG: 5 TABLET ORAL at 00:23

## 2023-01-01 RX ADMIN — MAGNESIUM OXIDE TAB 400 MG (241.3 MG ELEMENTAL MG) 400 MG: 400 (241.3 MG) TAB at 08:00

## 2023-01-01 RX ADMIN — AMIODARONE HYDROCHLORIDE 400 MG: 200 TABLET ORAL at 08:08

## 2023-01-01 RX ADMIN — MICONAZOLE NITRATE: 20 POWDER TOPICAL at 08:44

## 2023-01-01 RX ADMIN — ATORVASTATIN CALCIUM 80 MG: 40 TABLET, FILM COATED ORAL at 21:11

## 2023-01-01 RX ADMIN — POLYMYXIN B SULFATE AND TRIMETHOPRIM SULFATE 2 DROP: 10000; 1 SOLUTION/ DROPS OPHTHALMIC at 09:41

## 2023-01-01 RX ADMIN — HYDROCORTISONE SODIUM SUCCINATE 50 MG: 100 INJECTION, POWDER, FOR SOLUTION INTRAMUSCULAR; INTRAVENOUS at 05:01

## 2023-01-01 RX ADMIN — LINEZOLID 600 MG: 600 TABLET, FILM COATED ORAL at 20:27

## 2023-01-01 RX ADMIN — POLYMYXIN B SULFATE AND TRIMETHOPRIM SULFATE 2 DROP: 10000; 1 SOLUTION/ DROPS OPHTHALMIC at 17:53

## 2023-01-01 RX ADMIN — Medication 0.7 MCG/KG/MIN: at 10:27

## 2023-01-01 RX ADMIN — SODIUM BICARBONATE 50 MEQ: 84 INJECTION, SOLUTION INTRAVENOUS at 02:58

## 2023-01-01 RX ADMIN — APIXABAN 5 MG: 5 TABLET, FILM COATED ORAL at 09:33

## 2023-01-01 RX ADMIN — APIXABAN 5 MG: 5 TABLET, FILM COATED ORAL at 21:45

## 2023-01-01 RX ADMIN — HEPARIN SODIUM 2500 UNITS: 1000 INJECTION INTRAVENOUS; SUBCUTANEOUS at 08:30

## 2023-01-01 RX ADMIN — MIDODRINE HYDROCHLORIDE 10 MG: 5 TABLET ORAL at 12:10

## 2023-01-01 RX ADMIN — ALBUMIN HUMAN 50 G: 0.25 SOLUTION INTRAVENOUS at 08:28

## 2023-01-01 RX ADMIN — AMIODARONE HYDROCHLORIDE 400 MG: 200 TABLET ORAL at 07:35

## 2023-01-01 RX ADMIN — SELENIUM SULFIDE: 2.5 LOTION TOPICAL at 20:32

## 2023-01-01 RX ADMIN — TICAGRELOR 90 MG: 90 TABLET ORAL at 11:20

## 2023-01-01 RX ADMIN — MAGNESIUM OXIDE TAB 400 MG (241.3 MG ELEMENTAL MG) 400 MG: 400 (241.3 MG) TAB at 11:33

## 2023-01-01 RX ADMIN — INSULIN ASPART 4 UNITS: 100 INJECTION, SOLUTION INTRAVENOUS; SUBCUTANEOUS at 16:11

## 2023-01-01 RX ADMIN — ACETAMINOPHEN 650 MG: 325 TABLET ORAL at 02:37

## 2023-01-01 RX ADMIN — APIXABAN 5 MG: 5 TABLET, FILM COATED ORAL at 20:32

## 2023-01-01 RX ADMIN — LINEZOLID 600 MG: 600 INJECTION, SOLUTION INTRAVENOUS at 11:11

## 2023-01-01 RX ADMIN — CEFTRIAXONE SODIUM 2 G: 2 INJECTION, POWDER, FOR SOLUTION INTRAMUSCULAR; INTRAVENOUS at 21:05

## 2023-01-01 RX ADMIN — APIXABAN 2.5 MG: 2.5 TABLET, FILM COATED ORAL at 22:07

## 2023-01-01 RX ADMIN — FENTANYL CITRATE 50 MCG: 50 INJECTION, SOLUTION INTRAMUSCULAR; INTRAVENOUS at 02:04

## 2023-01-01 RX ADMIN — INSULIN GLARGINE 38 UNITS: 100 INJECTION, SOLUTION SUBCUTANEOUS at 21:46

## 2023-01-01 RX ADMIN — INSULIN ASPART 3 UNITS: 100 INJECTION, SOLUTION INTRAVENOUS; SUBCUTANEOUS at 12:37

## 2023-01-01 RX ADMIN — MIDODRINE HYDROCHLORIDE 10 MG: 5 TABLET ORAL at 16:54

## 2023-01-01 RX ADMIN — SODIUM CHLORIDE 4 MILLION UNITS: 9 INJECTION, SOLUTION INTRAVENOUS at 05:20

## 2023-01-01 RX ADMIN — ATORVASTATIN CALCIUM 80 MG: 40 TABLET, FILM COATED ORAL at 21:34

## 2023-01-01 RX ADMIN — INSULIN ASPART 6 UNITS: 100 INJECTION, SOLUTION INTRAVENOUS; SUBCUTANEOUS at 07:46

## 2023-01-01 RX ADMIN — SODIUM BICARBONATE 50 MEQ: 84 INJECTION, SOLUTION INTRAVENOUS at 01:56

## 2023-01-01 RX ADMIN — AMIODARONE HYDROCHLORIDE 150 MG: 1.5 INJECTION, SOLUTION INTRAVENOUS at 13:55

## 2023-01-01 RX ADMIN — LIDOCAINE HYDROCHLORIDE 10 ML: 10 INJECTION, SOLUTION INFILTRATION; PERINEURAL at 12:21

## 2023-01-01 RX ADMIN — APIXABAN 5 MG: 5 TABLET, FILM COATED ORAL at 20:31

## 2023-01-01 RX ADMIN — CEFTRIAXONE SODIUM 2 G: 2 INJECTION, POWDER, FOR SOLUTION INTRAMUSCULAR; INTRAVENOUS at 21:55

## 2023-01-01 RX ADMIN — METOPROLOL TARTRATE 12.5 MG: 25 TABLET, FILM COATED ORAL at 08:37

## 2023-01-01 RX ADMIN — INSULIN ASPART 2 UNITS: 100 INJECTION, SOLUTION INTRAVENOUS; SUBCUTANEOUS at 08:18

## 2023-01-01 RX ADMIN — Medication 500 MG: at 08:54

## 2023-01-01 RX ADMIN — INSULIN ASPART 4 UNITS: 100 INJECTION, SOLUTION INTRAVENOUS; SUBCUTANEOUS at 14:21

## 2023-01-01 RX ADMIN — DOCUSATE SODIUM 100 MG: 100 CAPSULE, LIQUID FILLED ORAL at 20:05

## 2023-01-01 RX ADMIN — ALBUMIN HUMAN 50 G: 0.25 SOLUTION INTRAVENOUS at 08:00

## 2023-01-01 RX ADMIN — INSULIN ASPART 4 UNITS: 100 INJECTION, SOLUTION INTRAVENOUS; SUBCUTANEOUS at 07:57

## 2023-01-01 RX ADMIN — TICAGRELOR 90 MG: 90 TABLET ORAL at 21:09

## 2023-01-01 RX ADMIN — ACETAMINOPHEN 650 MG: 325 TABLET ORAL at 22:41

## 2023-01-01 RX ADMIN — POLYMYXIN B SULFATE AND TRIMETHOPRIM SULFATE 2 DROP: 10000; 1 SOLUTION/ DROPS OPHTHALMIC at 17:33

## 2023-01-01 RX ADMIN — AMIODARONE HYDROCHLORIDE 200 MG: 200 TABLET ORAL at 11:33

## 2023-01-01 RX ADMIN — TICAGRELOR 90 MG: 90 TABLET ORAL at 08:54

## 2023-01-01 RX ADMIN — TICAGRELOR 90 MG: 90 TABLET ORAL at 08:44

## 2023-01-01 RX ADMIN — PANTOPRAZOLE SODIUM 40 MG: 40 TABLET, DELAYED RELEASE ORAL at 16:09

## 2023-01-01 RX ADMIN — POLYMYXIN B SULFATE AND TRIMETHOPRIM SULFATE 2 DROP: 10000; 1 SOLUTION/ DROPS OPHTHALMIC at 08:02

## 2023-01-01 RX ADMIN — METOLAZONE 5 MG: 5 TABLET ORAL at 06:44

## 2023-01-01 RX ADMIN — INSULIN ASPART 3 UNITS: 100 INJECTION, SOLUTION INTRAVENOUS; SUBCUTANEOUS at 07:37

## 2023-01-01 RX ADMIN — FUROSEMIDE 40 MG: 20 TABLET ORAL at 08:47

## 2023-01-01 RX ADMIN — TICAGRELOR 90 MG: 90 TABLET ORAL at 21:55

## 2023-01-01 RX ADMIN — Medication 81 MG: at 09:48

## 2023-01-01 RX ADMIN — APIXABAN 5 MG: 5 TABLET, FILM COATED ORAL at 09:35

## 2023-01-01 RX ADMIN — TICAGRELOR 90 MG: 90 TABLET ORAL at 20:59

## 2023-01-01 RX ADMIN — TICAGRELOR 90 MG: 90 TABLET ORAL at 07:53

## 2023-01-01 RX ADMIN — INSULIN ASPART 1 UNITS: 100 INJECTION, SOLUTION INTRAVENOUS; SUBCUTANEOUS at 11:02

## 2023-01-01 RX ADMIN — Medication 0.05 MCG/KG/MIN: at 04:33

## 2023-01-01 RX ADMIN — FUROSEMIDE 40 MG: 20 TABLET ORAL at 08:21

## 2023-01-01 RX ADMIN — HEPARIN SODIUM 2500 UNITS: 1000 INJECTION INTRAVENOUS; SUBCUTANEOUS at 11:03

## 2023-01-01 RX ADMIN — POTASSIUM CHLORIDE 40 MEQ: 1500 TABLET, EXTENDED RELEASE ORAL at 08:51

## 2023-01-01 RX ADMIN — PIPERACILLIN AND TAZOBACTAM 3.38 G: 3; .375 INJECTION, POWDER, LYOPHILIZED, FOR SOLUTION INTRAVENOUS at 12:42

## 2023-01-01 RX ADMIN — VANCOMYCIN HYDROCHLORIDE 2000 MG: 5 INJECTION, POWDER, LYOPHILIZED, FOR SOLUTION INTRAVENOUS at 03:38

## 2023-01-01 RX ADMIN — LIDOCAINE HYDROCHLORIDE 1.5 ML: 10 INJECTION, SOLUTION EPIDURAL; INFILTRATION; INTRACAUDAL; PERINEURAL at 16:20

## 2023-01-01 RX ADMIN — DOCUSATE SODIUM 100 MG: 100 CAPSULE, LIQUID FILLED ORAL at 08:24

## 2023-01-01 RX ADMIN — AMIODARONE HYDROCHLORIDE 400 MG: 200 TABLET ORAL at 11:32

## 2023-01-01 RX ADMIN — ALBUMIN HUMAN 50 G: 0.25 SOLUTION INTRAVENOUS at 23:05

## 2023-01-01 RX ADMIN — TICAGRELOR 90 MG: 90 TABLET ORAL at 10:06

## 2023-01-01 RX ADMIN — INSULIN ASPART 2 UNITS: 100 INJECTION, SOLUTION INTRAVENOUS; SUBCUTANEOUS at 12:07

## 2023-01-01 RX ADMIN — PANTOPRAZOLE SODIUM 40 MG: 40 TABLET, DELAYED RELEASE ORAL at 16:55

## 2023-01-01 RX ADMIN — INSULIN ASPART 1 UNITS: 100 INJECTION, SOLUTION INTRAVENOUS; SUBCUTANEOUS at 08:40

## 2023-01-01 RX ADMIN — AMIODARONE HYDROCHLORIDE 400 MG: 200 TABLET ORAL at 08:06

## 2023-01-01 RX ADMIN — CALCIUM CHLORIDE 1 G: 100 INJECTION INTRAVENOUS; INTRAVENTRICULAR at 02:58

## 2023-01-01 RX ADMIN — TICAGRELOR 90 MG: 90 TABLET ORAL at 21:58

## 2023-01-01 RX ADMIN — SODIUM BICARBONATE 50 ML/HR: 84 INJECTION, SOLUTION INTRAVENOUS at 08:34

## 2023-01-01 RX ADMIN — PIPERACILLIN AND TAZOBACTAM 3.38 G: 3; .375 INJECTION, POWDER, LYOPHILIZED, FOR SOLUTION INTRAVENOUS at 10:13

## 2023-01-01 RX ADMIN — ALBUMIN HUMAN 25 G: 0.25 SOLUTION INTRAVENOUS at 00:24

## 2023-01-01 RX ADMIN — THERA TABS 1 TABLET: TAB at 09:11

## 2023-01-01 RX ADMIN — POLYMYXIN B SULFATE AND TRIMETHOPRIM SULFATE 2 DROP: 10000; 1 SOLUTION/ DROPS OPHTHALMIC at 16:45

## 2023-01-01 RX ADMIN — INSULIN GLARGINE 20 UNITS: 100 INJECTION, SOLUTION SUBCUTANEOUS at 07:46

## 2023-01-01 RX ADMIN — THERA TABS 1 TABLET: TAB at 08:00

## 2023-01-01 RX ADMIN — MIDAZOLAM 2 MG: 1 INJECTION INTRAMUSCULAR; INTRAVENOUS at 01:34

## 2023-01-01 RX ADMIN — POLYMYXIN B SULFATE AND TRIMETHOPRIM SULFATE 2 DROP: 10000; 1 SOLUTION/ DROPS OPHTHALMIC at 22:00

## 2023-01-01 RX ADMIN — MAGNESIUM OXIDE TAB 400 MG (241.3 MG ELEMENTAL MG) 400 MG: 400 (241.3 MG) TAB at 09:37

## 2023-01-01 RX ADMIN — SODIUM CHLORIDE 4 MILLION UNITS: 9 INJECTION, SOLUTION INTRAVENOUS at 21:08

## 2023-01-01 RX ADMIN — TICAGRELOR 90 MG: 90 TABLET ORAL at 21:17

## 2023-01-01 RX ADMIN — Medication 81 MG: at 08:58

## 2023-01-01 RX ADMIN — CEFTRIAXONE SODIUM 2 G: 2 INJECTION, POWDER, FOR SOLUTION INTRAMUSCULAR; INTRAVENOUS at 21:24

## 2023-01-01 RX ADMIN — MAGNESIUM OXIDE TAB 400 MG (241.3 MG ELEMENTAL MG) 400 MG: 400 (241.3 MG) TAB at 09:47

## 2023-01-01 RX ADMIN — TICAGRELOR 90 MG: 90 TABLET ORAL at 08:45

## 2023-01-01 RX ADMIN — FUROSEMIDE 20 MG/HR: 10 INJECTION, SOLUTION INTRAVENOUS at 14:27

## 2023-01-01 RX ADMIN — APIXABAN 5 MG: 5 TABLET, FILM COATED ORAL at 08:45

## 2023-01-01 RX ADMIN — TICAGRELOR 90 MG: 90 TABLET ORAL at 21:39

## 2023-01-01 RX ADMIN — NOREPINEPHRINE BITARTRATE 0.3 MCG/KG/MIN: 0.02 INJECTION, SOLUTION INTRAVENOUS at 04:49

## 2023-01-01 RX ADMIN — INSULIN ASPART 2 UNITS: 100 INJECTION, SOLUTION INTRAVENOUS; SUBCUTANEOUS at 13:28

## 2023-01-01 RX ADMIN — AMIODARONE HYDROCHLORIDE 400 MG: 200 TABLET ORAL at 07:52

## 2023-01-01 RX ADMIN — MAGNESIUM OXIDE TAB 400 MG (241.3 MG ELEMENTAL MG) 400 MG: 400 (241.3 MG) TAB at 08:58

## 2023-01-01 RX ADMIN — EPINEPHRINE 0.03 MCG/KG/MIN: 1 INJECTION INTRAMUSCULAR; INTRAVENOUS; SUBCUTANEOUS at 04:20

## 2023-01-01 RX ADMIN — INSULIN GLARGINE 38 UNITS: 100 INJECTION, SOLUTION SUBCUTANEOUS at 21:17

## 2023-01-01 RX ADMIN — METOPROLOL TARTRATE 25 MG: 25 TABLET, FILM COATED ORAL at 21:38

## 2023-01-01 RX ADMIN — FUROSEMIDE 40 MG: 20 TABLET ORAL at 09:43

## 2023-01-01 RX ADMIN — TICAGRELOR 90 MG: 90 TABLET ORAL at 09:48

## 2023-01-01 RX ADMIN — CEFTRIAXONE SODIUM 2 G: 2 INJECTION, POWDER, FOR SOLUTION INTRAMUSCULAR; INTRAVENOUS at 21:46

## 2023-01-01 RX ADMIN — Medication 81 MG: at 08:16

## 2023-01-01 RX ADMIN — HEPARIN SODIUM 1750 UNITS/HR: 10000 INJECTION, SOLUTION INTRAVENOUS at 04:16

## 2023-01-01 RX ADMIN — PROCHLORPERAZINE EDISYLATE 10 MG: 5 INJECTION, SOLUTION INTRAMUSCULAR; INTRAVENOUS at 22:19

## 2023-01-01 RX ADMIN — THERA TABS 1 TABLET: TAB at 08:15

## 2023-01-01 RX ADMIN — HEPARIN SODIUM 1550 UNITS/HR: 10000 INJECTION, SOLUTION INTRAVENOUS at 14:17

## 2023-01-01 RX ADMIN — AMIODARONE HYDROCHLORIDE 400 MG: 200 TABLET ORAL at 08:01

## 2023-01-01 RX ADMIN — APIXABAN 5 MG: 5 TABLET, FILM COATED ORAL at 07:50

## 2023-01-01 RX ADMIN — APIXABAN 5 MG: 5 TABLET, FILM COATED ORAL at 11:32

## 2023-01-01 RX ADMIN — MICONAZOLE NITRATE: 20 POWDER TOPICAL at 20:58

## 2023-01-01 RX ADMIN — METOPROLOL TARTRATE 25 MG: 25 TABLET, FILM COATED ORAL at 21:11

## 2023-01-01 RX ADMIN — HEPARIN SODIUM 1400 UNITS/HR: 10000 INJECTION, SOLUTION INTRAVENOUS at 10:02

## 2023-01-01 RX ADMIN — Medication 500 MG: at 11:38

## 2023-01-01 RX ADMIN — LINEZOLID 600 MG: 600 INJECTION, SOLUTION INTRAVENOUS at 11:38

## 2023-01-01 RX ADMIN — TICAGRELOR 90 MG: 90 TABLET ORAL at 09:37

## 2023-01-01 RX ADMIN — INSULIN ASPART 3 UNITS: 100 INJECTION, SOLUTION INTRAVENOUS; SUBCUTANEOUS at 07:34

## 2023-01-01 RX ADMIN — OXYCODONE HYDROCHLORIDE 5 MG: 5 TABLET ORAL at 05:09

## 2023-01-01 RX ADMIN — FUROSEMIDE 40 MG: 20 TABLET ORAL at 09:35

## 2023-01-01 RX ADMIN — FUROSEMIDE 20 MG/HR: 10 INJECTION, SOLUTION INTRAVENOUS at 08:29

## 2023-01-01 RX ADMIN — INSULIN ASPART 2 UNITS: 100 INJECTION, SOLUTION INTRAVENOUS; SUBCUTANEOUS at 09:12

## 2023-01-01 RX ADMIN — FUROSEMIDE 40 MG: 20 TABLET ORAL at 08:14

## 2023-01-01 RX ADMIN — FUROSEMIDE 40 MG: 20 TABLET ORAL at 16:45

## 2023-01-01 RX ADMIN — MAGNESIUM OXIDE TAB 400 MG (241.3 MG ELEMENTAL MG) 400 MG: 400 (241.3 MG) TAB at 09:43

## 2023-01-01 RX ADMIN — Medication 500 MG: at 08:11

## 2023-01-01 RX ADMIN — INSULIN ASPART 4 UNITS: 100 INJECTION, SOLUTION INTRAVENOUS; SUBCUTANEOUS at 13:31

## 2023-01-01 RX ADMIN — SODIUM BICARBONATE 50 ML/HR: 84 INJECTION, SOLUTION INTRAVENOUS at 10:45

## 2023-01-01 RX ADMIN — HYDROCORTISONE SODIUM SUCCINATE 50 MG: 100 INJECTION, POWDER, FOR SOLUTION INTRAMUSCULAR; INTRAVENOUS at 10:12

## 2023-01-01 RX ADMIN — ALBUMIN HUMAN 25 G: 0.25 SOLUTION INTRAVENOUS at 03:18

## 2023-01-01 RX ADMIN — TICAGRELOR 90 MG: 90 TABLET ORAL at 21:38

## 2023-01-01 RX ADMIN — Medication 1.5 MCG/KG/MIN: at 11:11

## 2023-01-01 RX ADMIN — TICAGRELOR 90 MG: 90 TABLET ORAL at 08:18

## 2023-01-01 RX ADMIN — MIDAZOLAM 1 MG: 1 INJECTION INTRAMUSCULAR; INTRAVENOUS at 02:05

## 2023-01-01 RX ADMIN — MIDODRINE HYDROCHLORIDE 10 MG: 5 TABLET ORAL at 09:14

## 2023-01-01 RX ADMIN — ATORVASTATIN CALCIUM 80 MG: 40 TABLET, FILM COATED ORAL at 20:26

## 2023-01-01 RX ADMIN — SODIUM CHLORIDE 2000 ML: 9 INJECTION, SOLUTION INTRAVENOUS at 13:09

## 2023-01-01 RX ADMIN — THERA TABS 1 TABLET: TAB at 11:39

## 2023-01-01 RX ADMIN — FUROSEMIDE 80 MG: 10 INJECTION, SOLUTION INTRAMUSCULAR; INTRAVENOUS at 06:11

## 2023-01-01 RX ADMIN — MICONAZOLE NITRATE: 20 POWDER TOPICAL at 20:46

## 2023-01-01 RX ADMIN — INSULIN ASPART 1 UNITS: 100 INJECTION, SOLUTION INTRAVENOUS; SUBCUTANEOUS at 12:12

## 2023-01-01 RX ADMIN — EPINEPHRINE 0.6 MCG/KG/MIN: 1 INJECTION INTRAMUSCULAR; INTRAVENOUS; SUBCUTANEOUS at 11:07

## 2023-01-01 RX ADMIN — THERA TABS 1 TABLET: TAB at 08:46

## 2023-01-01 RX ADMIN — LINEZOLID 600 MG: 600 INJECTION, SOLUTION INTRAVENOUS at 23:32

## 2023-01-01 RX ADMIN — PIPERACILLIN AND TAZOBACTAM 3.38 G: 3; .375 INJECTION, POWDER, LYOPHILIZED, FOR SOLUTION INTRAVENOUS at 20:51

## 2023-01-01 RX ADMIN — INSULIN ASPART 3 UNITS: 100 INJECTION, SOLUTION INTRAVENOUS; SUBCUTANEOUS at 07:29

## 2023-01-01 RX ADMIN — APIXABAN 5 MG: 5 TABLET, FILM COATED ORAL at 11:20

## 2023-01-01 RX ADMIN — HEPARIN SODIUM 4500 UNITS: 1000 INJECTION INTRAVENOUS; SUBCUTANEOUS at 09:50

## 2023-01-01 RX ADMIN — APIXABAN 5 MG: 5 TABLET, FILM COATED ORAL at 08:53

## 2023-01-01 RX ADMIN — PROPOFOL 50 MG: 10 INJECTION, EMULSION INTRAVENOUS at 01:31

## 2023-01-01 RX ADMIN — IOPAMIDOL 75 ML: 755 INJECTION, SOLUTION INTRAVENOUS at 18:14

## 2023-01-01 RX ADMIN — ASPIRIN 81 MG: 81 TABLET, COATED ORAL at 08:20

## 2023-01-01 RX ADMIN — METOPROLOL TARTRATE 1 MG: 5 INJECTION INTRAVENOUS at 13:33

## 2023-01-01 RX ADMIN — MICONAZOLE NITRATE: 20 POWDER TOPICAL at 08:54

## 2023-01-01 RX ADMIN — Medication: at 09:12

## 2023-01-01 RX ADMIN — AMIODARONE HYDROCHLORIDE 0.5 MG/MIN: 1.8 INJECTION, SOLUTION INTRAVENOUS at 20:46

## 2023-01-01 RX ADMIN — INSULIN GLARGINE 38 UNITS: 100 INJECTION, SOLUTION SUBCUTANEOUS at 21:59

## 2023-01-01 RX ADMIN — AMIODARONE HYDROCHLORIDE 200 MG: 200 TABLET ORAL at 08:00

## 2023-01-01 RX ADMIN — ALBUMIN HUMAN 25 G: 0.25 SOLUTION INTRAVENOUS at 07:02

## 2023-01-01 RX ADMIN — THERA TABS 1 TABLET: TAB at 08:21

## 2023-01-01 RX ADMIN — ATORVASTATIN CALCIUM 80 MG: 40 TABLET, FILM COATED ORAL at 22:14

## 2023-01-01 RX ADMIN — SODIUM BICARBONATE: 84 INJECTION, SOLUTION INTRAVENOUS at 23:48

## 2023-01-01 RX ADMIN — MICONAZOLE NITRATE: 20 POWDER TOPICAL at 08:01

## 2023-01-01 RX ADMIN — INSULIN HUMAN 4 UNITS/HR: 1 INJECTION, SOLUTION INTRAVENOUS at 06:39

## 2023-01-01 RX ADMIN — VASOPRESSIN 2.4 UNITS/HR: 20 INJECTION, SOLUTION INTRAMUSCULAR; SUBCUTANEOUS at 10:37

## 2023-01-01 RX ADMIN — AMIODARONE HYDROCHLORIDE 400 MG: 200 TABLET ORAL at 09:08

## 2023-01-01 RX ADMIN — SELENIUM SULFIDE: 2.5 LOTION TOPICAL at 22:14

## 2023-01-01 RX ADMIN — FUROSEMIDE 20 MG/HR: 10 INJECTION, SOLUTION INTRAVENOUS at 18:20

## 2023-01-01 RX ADMIN — POTASSIUM CHLORIDE 40 MEQ: 1500 TABLET, EXTENDED RELEASE ORAL at 13:50

## 2023-01-01 RX ADMIN — FUROSEMIDE 20 MG/HR: 10 INJECTION, SOLUTION INTRAVENOUS at 16:05

## 2023-01-01 RX ADMIN — CALCIUM GLUCONATE 2 G: 20 INJECTION, SOLUTION INTRAVENOUS at 14:05

## 2023-01-01 RX ADMIN — Medication 0.03 MCG/KG/MIN: at 16:58

## 2023-01-01 RX ADMIN — FUROSEMIDE 40 MG: 20 TABLET ORAL at 08:00

## 2023-01-01 RX ADMIN — FUROSEMIDE 80 MG: 10 INJECTION, SOLUTION INTRAMUSCULAR; INTRAVENOUS at 17:46

## 2023-01-01 RX ADMIN — MIDODRINE HYDROCHLORIDE 10 MG: 5 TABLET ORAL at 07:00

## 2023-01-01 RX ADMIN — AMIODARONE HYDROCHLORIDE 0.5 MG/MIN: 1.8 INJECTION, SOLUTION INTRAVENOUS at 20:38

## 2023-01-01 RX ADMIN — DOCUSATE SODIUM 100 MG: 100 CAPSULE, LIQUID FILLED ORAL at 08:59

## 2023-01-01 RX ADMIN — FUROSEMIDE 80 MG: 10 INJECTION, SOLUTION INTRAMUSCULAR; INTRAVENOUS at 18:06

## 2023-01-01 RX ADMIN — Medication: at 06:24

## 2023-01-01 RX ADMIN — SODIUM CHLORIDE: 9 INJECTION, SOLUTION INTRAVENOUS at 13:55

## 2023-01-01 RX ADMIN — TICAGRELOR 90 MG: 90 TABLET ORAL at 22:07

## 2023-01-01 RX ADMIN — Medication 500 MG: at 08:58

## 2023-01-01 RX ADMIN — POLYMYXIN B SULFATE AND TRIMETHOPRIM SULFATE 2 DROP: 10000; 1 SOLUTION/ DROPS OPHTHALMIC at 18:32

## 2023-01-01 RX ADMIN — LINEZOLID 600 MG: 600 INJECTION, SOLUTION INTRAVENOUS at 10:59

## 2023-01-01 RX ADMIN — INSULIN ASPART 4 UNITS: 100 INJECTION, SOLUTION INTRAVENOUS; SUBCUTANEOUS at 16:24

## 2023-01-01 RX ADMIN — MICONAZOLE NITRATE: 20 POWDER TOPICAL at 08:22

## 2023-01-01 RX ADMIN — ATORVASTATIN CALCIUM 80 MG: 40 TABLET, FILM COATED ORAL at 21:44

## 2023-01-01 RX ADMIN — IRON SUCROSE 300 MG: 20 INJECTION, SOLUTION INTRAVENOUS at 17:01

## 2023-01-01 RX ADMIN — Medication 50 MEQ: at 02:55

## 2023-01-01 RX ADMIN — FUROSEMIDE 20 MG/HR: 10 INJECTION, SOLUTION INTRAVENOUS at 05:45

## 2023-01-01 RX ADMIN — POTASSIUM CHLORIDE 40 MEQ: 1500 TABLET, EXTENDED RELEASE ORAL at 15:54

## 2023-01-01 RX ADMIN — FUROSEMIDE 40 MG: 20 TABLET ORAL at 17:13

## 2023-01-01 RX ADMIN — INSULIN ASPART 2 UNITS: 100 INJECTION, SOLUTION INTRAVENOUS; SUBCUTANEOUS at 19:21

## 2023-01-01 RX ADMIN — AMIODARONE HYDROCHLORIDE 400 MG: 200 TABLET ORAL at 11:20

## 2023-01-01 RX ADMIN — Medication 500 MG: at 09:48

## 2023-01-01 RX ADMIN — FUROSEMIDE 20 MG/HR: 10 INJECTION, SOLUTION INTRAVENOUS at 13:26

## 2023-01-01 RX ADMIN — INSULIN ASPART 4 UNITS: 100 INJECTION, SOLUTION INTRAVENOUS; SUBCUTANEOUS at 17:26

## 2023-01-01 RX ADMIN — FUROSEMIDE 80 MG: 10 INJECTION, SOLUTION INTRAMUSCULAR; INTRAVENOUS at 05:42

## 2023-01-01 RX ADMIN — MAGNESIUM OXIDE TAB 400 MG (241.3 MG ELEMENTAL MG) 400 MG: 400 (241.3 MG) TAB at 09:48

## 2023-01-01 RX ADMIN — Medication 81 MG: at 09:10

## 2023-01-01 RX ADMIN — DEXTROSE 50 % IN WATER (D50W) INTRAVENOUS SYRINGE 50 ML: at 02:02

## 2023-01-01 RX ADMIN — DESMOPRESSIN ACETATE 2 MCG: 4 INJECTION, SOLUTION INTRAVENOUS; SUBCUTANEOUS at 10:09

## 2023-01-01 RX ADMIN — TICAGRELOR 90 MG: 90 TABLET ORAL at 20:31

## 2023-01-01 RX ADMIN — FUROSEMIDE 40 MG: 20 TABLET ORAL at 15:54

## 2023-01-01 RX ADMIN — IRON SUCROSE 100 MG: 20 INJECTION, SOLUTION INTRAVENOUS at 18:07

## 2023-01-01 RX ADMIN — THERA TABS 1 TABLET: TAB at 11:33

## 2023-01-01 RX ADMIN — ASPIRIN 81 MG: 81 TABLET, COATED ORAL at 09:36

## 2023-01-01 RX ADMIN — TICAGRELOR 90 MG: 90 TABLET ORAL at 20:05

## 2023-01-01 RX ADMIN — DOCUSATE SODIUM 100 MG: 100 CAPSULE, LIQUID FILLED ORAL at 08:02

## 2023-01-01 RX ADMIN — TICAGRELOR 90 MG: 90 TABLET ORAL at 08:08

## 2023-01-01 RX ADMIN — SODIUM CHLORIDE, POTASSIUM CHLORIDE, SODIUM LACTATE AND CALCIUM CHLORIDE: 600; 310; 30; 20 INJECTION, SOLUTION INTRAVENOUS at 15:48

## 2023-01-01 RX ADMIN — AMIODARONE HYDROCHLORIDE 400 MG: 200 TABLET ORAL at 08:19

## 2023-01-01 RX ADMIN — AMIODARONE HYDROCHLORIDE 0.5 MG/MIN: 1.8 INJECTION, SOLUTION INTRAVENOUS at 21:24

## 2023-01-01 RX ADMIN — SODIUM CHLORIDE 4 MILLION UNITS: 9 INJECTION, SOLUTION INTRAVENOUS at 14:20

## 2023-01-01 RX ADMIN — POLYMYXIN B SULFATE AND TRIMETHOPRIM SULFATE 2 DROP: 10000; 1 SOLUTION/ DROPS OPHTHALMIC at 13:09

## 2023-01-01 RX ADMIN — Medication 1 MCG/KG/MIN: at 09:50

## 2023-01-01 RX ADMIN — Medication 500 MG: at 09:03

## 2023-01-01 RX ADMIN — THERA TABS 1 TABLET: TAB at 08:39

## 2023-01-01 RX ADMIN — APIXABAN 5 MG: 5 TABLET, FILM COATED ORAL at 21:46

## 2023-01-01 RX ADMIN — HEPARIN SODIUM 1400 UNITS/HR: 10000 INJECTION, SOLUTION INTRAVENOUS at 09:13

## 2023-01-01 RX ADMIN — FUROSEMIDE 20 MG/HR: 10 INJECTION, SOLUTION INTRAVENOUS at 19:28

## 2023-01-01 RX ADMIN — INSULIN GLARGINE 38 UNITS: 100 INJECTION, SOLUTION SUBCUTANEOUS at 21:24

## 2023-01-01 RX ADMIN — TICAGRELOR 90 MG: 90 TABLET ORAL at 20:42

## 2023-01-01 RX ADMIN — SELENIUM SULFIDE: 2.5 LOTION TOPICAL at 09:14

## 2023-01-01 RX ADMIN — SODIUM BICARBONATE 100 MEQ: 84 INJECTION INTRAVENOUS at 01:13

## 2023-01-01 RX ADMIN — TICAGRELOR 90 MG: 90 TABLET ORAL at 21:12

## 2023-01-01 RX ADMIN — PIPERACILLIN AND TAZOBACTAM 3.38 G: 3; .375 INJECTION, POWDER, LYOPHILIZED, FOR SOLUTION INTRAVENOUS at 04:43

## 2023-01-01 RX ADMIN — APIXABAN 5 MG: 5 TABLET, FILM COATED ORAL at 08:46

## 2023-01-01 ASSESSMENT — ACTIVITIES OF DAILY LIVING (ADL)
ADLS_ACUITY_SCORE: 34
ADLS_ACUITY_SCORE: 30
ADLS_ACUITY_SCORE: 34
ADLS_ACUITY_SCORE: 35
ADLS_ACUITY_SCORE: 31
ADLS_ACUITY_SCORE: 40
ADLS_ACUITY_SCORE: 31
ADLS_ACUITY_SCORE: 30
ADLS_ACUITY_SCORE: 40
ADLS_ACUITY_SCORE: 40
ADLS_ACUITY_SCORE: 47
TOILETING_ISSUES: NO
ADLS_ACUITY_SCORE: 30
ADLS_ACUITY_SCORE: 35
ADLS_ACUITY_SCORE: 36
DIFFICULTY_EATING/SWALLOWING: NO
ADLS_ACUITY_SCORE: 29
ADLS_ACUITY_SCORE: 32
ADLS_ACUITY_SCORE: 29
DEPENDENT_IADLS:: CLEANING
ADLS_ACUITY_SCORE: 34
ADLS_ACUITY_SCORE: 34
ADLS_ACUITY_SCORE: 30
ADLS_ACUITY_SCORE: 29
EQUIPMENT_CURRENTLY_USED_AT_HOME: OTHER (SEE COMMENTS)
ADLS_ACUITY_SCORE: 34
ADLS_ACUITY_SCORE: 33
ADLS_ACUITY_SCORE: 34
TOILETING_ISSUES: NO
ADLS_ACUITY_SCORE: 34
ADLS_ACUITY_SCORE: 40
ADLS_ACUITY_SCORE: 40
ADLS_ACUITY_SCORE: 32
ADLS_ACUITY_SCORE: 31
ADLS_ACUITY_SCORE: 44
ADLS_ACUITY_SCORE: 40
ADLS_ACUITY_SCORE: 40
ADLS_ACUITY_SCORE: 34
ADLS_ACUITY_SCORE: 44
ADLS_ACUITY_SCORE: 29
ADLS_ACUITY_SCORE: 40
ADLS_ACUITY_SCORE: 34
ADLS_ACUITY_SCORE: 31
ADLS_ACUITY_SCORE: 47
ADLS_ACUITY_SCORE: 30
ADLS_ACUITY_SCORE: 34
ADLS_ACUITY_SCORE: 32
ADLS_ACUITY_SCORE: 40
ADLS_ACUITY_SCORE: 34
ADLS_ACUITY_SCORE: 36
ADLS_ACUITY_SCORE: 34
ADLS_ACUITY_SCORE: 34
ADLS_ACUITY_SCORE: 36
ADLS_ACUITY_SCORE: 32
ADLS_ACUITY_SCORE: 33
ADLS_ACUITY_SCORE: 34
ADLS_ACUITY_SCORE: 34
ADLS_ACUITY_SCORE: 32
ADLS_ACUITY_SCORE: 34
ADLS_ACUITY_SCORE: 40
ADLS_ACUITY_SCORE: 29
ADLS_ACUITY_SCORE: 40
ADLS_ACUITY_SCORE: 34
ADLS_ACUITY_SCORE: 32
ADLS_ACUITY_SCORE: 32
ADLS_ACUITY_SCORE: 40
ADLS_ACUITY_SCORE: 35
ADLS_ACUITY_SCORE: 32
ADLS_ACUITY_SCORE: 40
ADLS_ACUITY_SCORE: 34
ADLS_ACUITY_SCORE: 34
ADLS_ACUITY_SCORE: 32
ADLS_ACUITY_SCORE: 40
ADLS_ACUITY_SCORE: 32
ADLS_ACUITY_SCORE: 40
ADLS_ACUITY_SCORE: 34
ADLS_ACUITY_SCORE: 34
ADLS_ACUITY_SCORE: 40
ADLS_ACUITY_SCORE: 34
ADLS_ACUITY_SCORE: 31
ADLS_ACUITY_SCORE: 34
ADLS_ACUITY_SCORE: 40
ADLS_ACUITY_SCORE: 34
ADLS_ACUITY_SCORE: 40
ADLS_ACUITY_SCORE: 33
ADLS_ACUITY_SCORE: 34
ADLS_ACUITY_SCORE: 36
ADLS_ACUITY_SCORE: 34
ADLS_ACUITY_SCORE: 32
ADLS_ACUITY_SCORE: 31
ADLS_ACUITY_SCORE: 47
ADLS_ACUITY_SCORE: 34
ADLS_ACUITY_SCORE: 36
ADLS_ACUITY_SCORE: 40
ADLS_ACUITY_SCORE: 32
ADLS_ACUITY_SCORE: 34
ADLS_ACUITY_SCORE: 37
ADLS_ACUITY_SCORE: 30
ADLS_ACUITY_SCORE: 40
ADLS_ACUITY_SCORE: 30
ADLS_ACUITY_SCORE: 30
ADLS_ACUITY_SCORE: 32
ADLS_ACUITY_SCORE: 29
IADLS,_PREVIOUS_FUNCTIONAL_LEVEL: INDEPENDENT
ADLS_ACUITY_SCORE: 40
ADLS_ACUITY_SCORE: 34
ADLS_ACUITY_SCORE: 34
DEPENDENT_IADLS:: CLEANING;COOKING;LAUNDRY;SHOPPING;MEAL PREPARATION;MONEY MANAGEMENT;TRANSPORTATION;MEDICATION MANAGEMENT
ADLS_ACUITY_SCORE: 29
TRANSFERRING: 1-->ASSISTANCE (EQUIPMENT/PERSON) NEEDED
ADLS_ACUITY_SCORE: 34
ADLS_ACUITY_SCORE: 32
ADLS_ACUITY_SCORE: 34
ADLS_ACUITY_SCORE: 35
ADLS_ACUITY_SCORE: 30
ADLS_ACUITY_SCORE: 40
ADLS_ACUITY_SCORE: 32
ADLS_ACUITY_SCORE: 40
ADLS_ACUITY_SCORE: 32
ADLS_ACUITY_SCORE: 34
ADLS_ACUITY_SCORE: 34
ADLS_ACUITY_SCORE: 40
ADLS_ACUITY_SCORE: 36
ADLS_ACUITY_SCORE: 30
ADLS_ACUITY_SCORE: 40
ADLS_ACUITY_SCORE: 35
ADLS_ACUITY_SCORE: 30
ADLS_ACUITY_SCORE: 33
ADLS_ACUITY_SCORE: 44
ADLS_ACUITY_SCORE: 40
ADLS_ACUITY_SCORE: 34
ADLS_ACUITY_SCORE: 32
ADLS_ACUITY_SCORE: 33
ADLS_ACUITY_SCORE: 34
ADLS_ACUITY_SCORE: 34
ADLS_ACUITY_SCORE: 40
ADLS_ACUITY_SCORE: 34
ADLS_ACUITY_SCORE: 33
ADLS_ACUITY_SCORE: 32
ADLS_ACUITY_SCORE: 40
ADLS_ACUITY_SCORE: 32
ADLS_ACUITY_SCORE: 40
ADLS_ACUITY_SCORE: 34
ADLS_ACUITY_SCORE: 40
ADLS_ACUITY_SCORE: 40
ADLS_ACUITY_SCORE: 30
ADLS_ACUITY_SCORE: 33
ADLS_ACUITY_SCORE: 35
ADLS_ACUITY_SCORE: 30
ADLS_ACUITY_SCORE: 34
ADLS_ACUITY_SCORE: 32
FALL_HISTORY_WITHIN_LAST_SIX_MONTHS: NO
ADLS_ACUITY_SCORE: 35
ADLS_ACUITY_SCORE: 32
TRANSFERRING: 0-->ASSISTANCE NEEDED (DEVELOPMENTALLY APPROPRIATE)
DOING_ERRANDS_INDEPENDENTLY_DIFFICULTY: NO
ADLS_ACUITY_SCORE: 40
ADLS_ACUITY_SCORE: 33
ADLS_ACUITY_SCORE: 30
ADLS_ACUITY_SCORE: 34
ADLS_ACUITY_SCORE: 33
ADLS_ACUITY_SCORE: 32
ADLS_ACUITY_SCORE: 34
ADLS_ACUITY_SCORE: 31
ADLS_ACUITY_SCORE: 32
ADLS_ACUITY_SCORE: 34
ADLS_ACUITY_SCORE: 37
ADLS_ACUITY_SCORE: 36
ADLS_ACUITY_SCORE: 34
ADLS_ACUITY_SCORE: 34
ADLS_ACUITY_SCORE: 36
ADLS_ACUITY_SCORE: 31
ADLS_ACUITY_SCORE: 33
ADLS_ACUITY_SCORE: 36
ADLS_ACUITY_SCORE: 30
ADLS_ACUITY_SCORE: 44
ADLS_ACUITY_SCORE: 32
ADLS_ACUITY_SCORE: 29
ADLS_ACUITY_SCORE: 32
ADLS_ACUITY_SCORE: 30
ADLS_ACUITY_SCORE: 32
ADLS_ACUITY_SCORE: 32
ADLS_ACUITY_SCORE: 36
DOING_ERRANDS_INDEPENDENTLY_DIFFICULTY: NO
ADLS_ACUITY_SCORE: 34
WALKING_OR_CLIMBING_STAIRS_DIFFICULTY: NO
ADLS_ACUITY_SCORE: 30
ADLS_ACUITY_SCORE: 34
ADLS_ACUITY_SCORE: 35
ADLS_ACUITY_SCORE: 29
ADLS_ACUITY_SCORE: 34
ADLS_ACUITY_SCORE: 40
ADLS_ACUITY_SCORE: 34
ADLS_ACUITY_SCORE: 34
ADLS_ACUITY_SCORE: 40
ADLS_ACUITY_SCORE: 40
ADLS_ACUITY_SCORE: 34
ADLS_ACUITY_SCORE: 40
ADLS_ACUITY_SCORE: 34
ADLS_ACUITY_SCORE: 40
ADLS_ACUITY_SCORE: 33
ADLS_ACUITY_SCORE: 40
ADLS_ACUITY_SCORE: 35
ADLS_ACUITY_SCORE: 34
ADLS_ACUITY_SCORE: 34
ADLS_ACUITY_SCORE: 35
ADLS_ACUITY_SCORE: 34
ADLS_ACUITY_SCORE: 33
ADLS_ACUITY_SCORE: 33
ADLS_ACUITY_SCORE: 32
ADLS_ACUITY_SCORE: 30
ADLS_ACUITY_SCORE: 34
ADLS_ACUITY_SCORE: 34
CHANGE_IN_FUNCTIONAL_STATUS_SINCE_ONSET_OF_CURRENT_ILLNESS/INJURY: YES
ADLS_ACUITY_SCORE: 34
ADLS_ACUITY_SCORE: 34
ADLS_ACUITY_SCORE: 36
ADLS_ACUITY_SCORE: 33
ADLS_ACUITY_SCORE: 33
ADLS_ACUITY_SCORE: 29
ADLS_ACUITY_SCORE: 34
ADLS_ACUITY_SCORE: 40
ADLS_ACUITY_SCORE: 30
WALKING_OR_CLIMBING_STAIRS_DIFFICULTY: YES
ADLS_ACUITY_SCORE: 40
ADLS_ACUITY_SCORE: 34
ADLS_ACUITY_SCORE: 39
ADLS_ACUITY_SCORE: 34
ADLS_ACUITY_SCORE: 32
ADLS_ACUITY_SCORE: 40
DIFFICULTY_EATING/SWALLOWING: NO
ADLS_ACUITY_SCORE: 33
ADLS_ACUITY_SCORE: 34
ADLS_ACUITY_SCORE: 35
ADLS_ACUITY_SCORE: 31
ADLS_ACUITY_SCORE: 29
ADLS_ACUITY_SCORE: 36
ADLS_ACUITY_SCORE: 34
ADLS_ACUITY_SCORE: 40
ADLS_ACUITY_SCORE: 40
ADLS_ACUITY_SCORE: 29
ADLS_ACUITY_SCORE: 29
ADLS_ACUITY_SCORE: 34
ADLS_ACUITY_SCORE: 40
ADLS_ACUITY_SCORE: 34
ADLS_ACUITY_SCORE: 33
ADLS_ACUITY_SCORE: 30
ADLS_ACUITY_SCORE: 34
ADLS_ACUITY_SCORE: 29
ADLS_ACUITY_SCORE: 40
ADLS_ACUITY_SCORE: 40
ADLS_ACUITY_SCORE: 32
ADLS_ACUITY_SCORE: 34
ADLS_ACUITY_SCORE: 34
ADLS_ACUITY_SCORE: 33
ADLS_ACUITY_SCORE: 34
ADLS_ACUITY_SCORE: 30
ADLS_ACUITY_SCORE: 34
ADLS_ACUITY_SCORE: 40
ADLS_ACUITY_SCORE: 33
DRESSING/BATHING_DIFFICULTY: NO
ADLS_ACUITY_SCORE: 30
ADLS_ACUITY_SCORE: 37
ADLS_ACUITY_SCORE: 32
ADLS_ACUITY_SCORE: 32
ADLS_ACUITY_SCORE: 44
ADLS_ACUITY_SCORE: 37
ADLS_ACUITY_SCORE: 30
ADLS_ACUITY_SCORE: 29
ADLS_ACUITY_SCORE: 40
ADLS_ACUITY_SCORE: 34
ADLS_ACUITY_SCORE: 32
ADLS_ACUITY_SCORE: 40
ADLS_ACUITY_SCORE: 40
ADLS_ACUITY_SCORE: 32
ADLS_ACUITY_SCORE: 40
ADLS_ACUITY_SCORE: 31
ADLS_ACUITY_SCORE: 30
ADLS_ACUITY_SCORE: 33
WEAR_GLASSES_OR_BLIND: YES
ADLS_ACUITY_SCORE: 34
ADLS_ACUITY_SCORE: 34
ADLS_ACUITY_SCORE: 40
ADLS_ACUITY_SCORE: 34
ADLS_ACUITY_SCORE: 31
ADLS_ACUITY_SCORE: 44
ADLS_ACUITY_SCORE: 40
ADLS_ACUITY_SCORE: 40
ADLS_ACUITY_SCORE: 34
ADLS_ACUITY_SCORE: 33
ADLS_ACUITY_SCORE: 32
ADLS_ACUITY_SCORE: 34
ADLS_ACUITY_SCORE: 40
ADLS_ACUITY_SCORE: 34
CHANGE_IN_FUNCTIONAL_STATUS_SINCE_ONSET_OF_CURRENT_ILLNESS/INJURY: YES
ADLS_ACUITY_SCORE: 29
ADLS_ACUITY_SCORE: 36
ADLS_ACUITY_SCORE: 34
ADLS_ACUITY_SCORE: 30
ADLS_ACUITY_SCORE: 35
ADLS_ACUITY_SCORE: 30
ADLS_ACUITY_SCORE: 30
ADLS_ACUITY_SCORE: 36
ADLS_ACUITY_SCORE: 36
ADLS_ACUITY_SCORE: 30
EQUIPMENT_CURRENTLY_USED_AT_HOME: PROSTHESIS
ADLS_ACUITY_SCORE: 40
ADLS_ACUITY_SCORE: 34
ADLS_ACUITY_SCORE: 32
ADLS_ACUITY_SCORE: 30
ADLS_ACUITY_SCORE: 32
ADLS_ACUITY_SCORE: 33
ADLS_ACUITY_SCORE: 34
ADLS_ACUITY_SCORE: 32
ADLS_ACUITY_SCORE: 30
ADLS_ACUITY_SCORE: 40
ADLS_ACUITY_SCORE: 32
ADLS_ACUITY_SCORE: 47
ADLS_ACUITY_SCORE: 47
ADLS_ACUITY_SCORE: 40
ADLS_ACUITY_SCORE: 32
ADLS_ACUITY_SCORE: 30
ADLS_ACUITY_SCORE: 32
ADLS_ACUITY_SCORE: 32
ADLS_ACUITY_SCORE: 40
ADLS_ACUITY_SCORE: 30
ADLS_ACUITY_SCORE: 34
ADLS_ACUITY_SCORE: 40
ADLS_ACUITY_SCORE: 36
ADLS_ACUITY_SCORE: 44
ADLS_ACUITY_SCORE: 32
ADLS_ACUITY_SCORE: 30
ADLS_ACUITY_SCORE: 34
ADLS_ACUITY_SCORE: 40
ADLS_ACUITY_SCORE: 40
ADLS_ACUITY_SCORE: 36
WEAR_GLASSES_OR_BLIND: NO
ADLS_ACUITY_SCORE: 33
ADLS_ACUITY_SCORE: 32
CONCENTRATING,_REMEMBERING_OR_MAKING_DECISIONS_DIFFICULTY: YES
ADLS_ACUITY_SCORE: 32
BADLS,_PREVIOUS_FUNCTIONAL_LEVEL: INDEPENDENT
ADLS_ACUITY_SCORE: 34
ADLS_ACUITY_SCORE: 36
ADLS_ACUITY_SCORE: 32
ADLS_ACUITY_SCORE: 34
ADLS_ACUITY_SCORE: 36
ADLS_ACUITY_SCORE: 44
ADLS_ACUITY_SCORE: 34
ADLS_ACUITY_SCORE: 30
ADLS_ACUITY_SCORE: 34
ADLS_ACUITY_SCORE: 33
ADLS_ACUITY_SCORE: 40
ADLS_ACUITY_SCORE: 36
ADLS_ACUITY_SCORE: 40
ADLS_ACUITY_SCORE: 34
ADLS_ACUITY_SCORE: 32
FALL_HISTORY_WITHIN_LAST_SIX_MONTHS: NO
ADLS_ACUITY_SCORE: 30
ADLS_ACUITY_SCORE: 35
ADLS_ACUITY_SCORE: 34
ADLS_ACUITY_SCORE: 36
ADLS_ACUITY_SCORE: 40
ADLS_ACUITY_SCORE: 33
ADLS_ACUITY_SCORE: 32
ADLS_ACUITY_SCORE: 40
ADLS_ACUITY_SCORE: 30
ADLS_ACUITY_SCORE: 29
ADLS_ACUITY_SCORE: 34
ADLS_ACUITY_SCORE: 32
ADLS_ACUITY_SCORE: 30
ADLS_ACUITY_SCORE: 32
ADLS_ACUITY_SCORE: 44
ADLS_ACUITY_SCORE: 30
ADLS_ACUITY_SCORE: 40
ADLS_ACUITY_SCORE: 40
ADLS_ACUITY_SCORE: 30
ADLS_ACUITY_SCORE: 40
ADLS_ACUITY_SCORE: 30
ADLS_ACUITY_SCORE: 44
ADLS_ACUITY_SCORE: 40
ADLS_ACUITY_SCORE: 35
ADLS_ACUITY_SCORE: 40
ADLS_ACUITY_SCORE: 29
ADLS_ACUITY_SCORE: 30
DRESSING/BATHING_DIFFICULTY: NO
ADLS_ACUITY_SCORE: 40
ADLS_ACUITY_SCORE: 33
ADLS_ACUITY_SCORE: 30
ADLS_ACUITY_SCORE: 34
ADLS_ACUITY_SCORE: 33
ADLS_ACUITY_SCORE: 40
ADLS_ACUITY_SCORE: 33
ADLS_ACUITY_SCORE: 30
ADLS_ACUITY_SCORE: 34
ADLS_ACUITY_SCORE: 33
ADLS_ACUITY_SCORE: 40
ADLS_ACUITY_SCORE: 34
ADLS_ACUITY_SCORE: 33
ADLS_ACUITY_SCORE: 40
ADLS_ACUITY_SCORE: 38
ADLS_ACUITY_SCORE: 30
ADLS_ACUITY_SCORE: 33
ADLS_ACUITY_SCORE: 34
ADLS_ACUITY_SCORE: 30
ADLS_ACUITY_SCORE: 32
ADLS_ACUITY_SCORE: 40
ADLS_ACUITY_SCORE: 44
ADLS_ACUITY_SCORE: 29
ADLS_ACUITY_SCORE: 33
ADLS_ACUITY_SCORE: 40
ADLS_ACUITY_SCORE: 34
ADLS_ACUITY_SCORE: 31
ADLS_ACUITY_SCORE: 34
ADLS_ACUITY_SCORE: 31
ADLS_ACUITY_SCORE: 34
ADLS_ACUITY_SCORE: 34
ADLS_ACUITY_SCORE: 30
ADLS_ACUITY_SCORE: 34
ADLS_ACUITY_SCORE: 33
ADLS_ACUITY_SCORE: 33
ADLS_ACUITY_SCORE: 32
ADLS_ACUITY_SCORE: 34
ADLS_ACUITY_SCORE: 35
ADLS_ACUITY_SCORE: 40
ADLS_ACUITY_SCORE: 30
ADLS_ACUITY_SCORE: 40
ADLS_ACUITY_SCORE: 32
ADLS_ACUITY_SCORE: 40
ADLS_ACUITY_SCORE: 29
ADLS_ACUITY_SCORE: 34
ADLS_ACUITY_SCORE: 30
ADLS_ACUITY_SCORE: 37
ADLS_ACUITY_SCORE: 36
ADLS_ACUITY_SCORE: 30
ADLS_ACUITY_SCORE: 30
ADLS_ACUITY_SCORE: 40
ADLS_ACUITY_SCORE: 47
CONCENTRATING,_REMEMBERING_OR_MAKING_DECISIONS_DIFFICULTY: NO
ADLS_ACUITY_SCORE: 32
ADLS_ACUITY_SCORE: 40
ADLS_ACUITY_SCORE: 33
ADLS_ACUITY_SCORE: 34
ADLS_ACUITY_SCORE: 34
ADLS_ACUITY_SCORE: 33
ADLS_ACUITY_SCORE: 40
ADLS_ACUITY_SCORE: 32
ADLS_ACUITY_SCORE: 35
ADLS_ACUITY_SCORE: 34
ADLS_ACUITY_SCORE: 30
ADLS_ACUITY_SCORE: 44
ADLS_ACUITY_SCORE: 40
ADLS_ACUITY_SCORE: 32
ADLS_ACUITY_SCORE: 34
ADLS_ACUITY_SCORE: 33
ADLS_ACUITY_SCORE: 40
ADLS_ACUITY_SCORE: 40
ADLS_ACUITY_SCORE: 30
ADLS_ACUITY_SCORE: 32
ADLS_ACUITY_SCORE: 34
ADLS_ACUITY_SCORE: 34
ADLS_ACUITY_SCORE: 40
WALKING_OR_CLIMBING_STAIRS: AMBULATION DIFFICULTY, REQUIRES EQUIPMENT
ADLS_ACUITY_SCORE: 40
ADLS_ACUITY_SCORE: 30
ADLS_ACUITY_SCORE: 29
ADLS_ACUITY_SCORE: 40
ADLS_ACUITY_SCORE: 32
ADLS_ACUITY_SCORE: 35
ADLS_ACUITY_SCORE: 34
ADLS_ACUITY_SCORE: 30
ADLS_ACUITY_SCORE: 34
ADLS_ACUITY_SCORE: 40
ADLS_ACUITY_SCORE: 34
ADLS_ACUITY_SCORE: 37
ADLS_ACUITY_SCORE: 40
ADLS_ACUITY_SCORE: 32
ADLS_ACUITY_SCORE: 36
ADLS_ACUITY_SCORE: 35
ADLS_ACUITY_SCORE: 30
ADLS_ACUITY_SCORE: 32
ADLS_ACUITY_SCORE: 30
ADLS_ACUITY_SCORE: 40
ADLS_ACUITY_SCORE: 33
ADLS_ACUITY_SCORE: 36
ADLS_ACUITY_SCORE: 40
ADLS_ACUITY_SCORE: 44
ADLS_ACUITY_SCORE: 33
ADLS_ACUITY_SCORE: 40
ADLS_ACUITY_SCORE: 36
ADLS_ACUITY_SCORE: 40
ADLS_ACUITY_SCORE: 36
ADLS_ACUITY_SCORE: 40
ADLS_ACUITY_SCORE: 34
ADLS_ACUITY_SCORE: 40
ADLS_ACUITY_SCORE: 34
ADLS_ACUITY_SCORE: 32
ADLS_ACUITY_SCORE: 34
ADLS_ACUITY_SCORE: 32
ADLS_ACUITY_SCORE: 32
ADLS_ACUITY_SCORE: 40
ADLS_ACUITY_SCORE: 34
ADLS_ACUITY_SCORE: 32
ADLS_ACUITY_SCORE: 44
ADLS_ACUITY_SCORE: 44
ADLS_ACUITY_SCORE: 29
ADLS_ACUITY_SCORE: 33

## 2023-01-01 ASSESSMENT — COLUMBIA-SUICIDE SEVERITY RATING SCALE - C-SSRS
5. HAVE YOU STARTED TO WORK OUT OR WORKED OUT THE DETAILS OF HOW TO KILL YOURSELF? DO YOU INTEND TO CARRY OUT THIS PLAN?: NO
2. HAVE YOU ACTUALLY HAD ANY THOUGHTS OF KILLING YOURSELF IN THE PAST MONTH?: NO
1. IN THE PAST MONTH, HAVE YOU WISHED YOU WERE DEAD OR WISHED YOU COULD GO TO SLEEP AND NOT WAKE UP?: NO
3. HAVE YOU BEEN THINKING ABOUT HOW YOU MIGHT KILL YOURSELF?: NO
4. HAVE YOU HAD THESE THOUGHTS AND HAD SOME INTENTION OF ACTING ON THEM?: NO

## 2023-01-25 NOTE — PROGRESS NOTES
Clinic Care Coordination Contact  [facility]/Voicemail       Clinical Data: Care Coordinator Outreach  Outreach attempted x 1.  Left message on patient's voicemail with call back information and requested return call.  Plan: Care Coordinator will try to reach patient again in 1 month.    Erin Brannon [facility]  Social Work Care Coordinator - [facility]  Care Coordination  [email or web address]  Cell Phone: [phone]  Gender pronouns: she/her  Employed by [employer]

## 2023-05-01 NOTE — PROGRESS NOTES
Clinic Care Coordination Contact  [facility]/Voicemail       Clinical Data: Care Coordinator Outreach  Outreach attempted x 1.  Left message on patient's voicemail with call back information and requested return call.  Plan: Care Coordinator will try to reach patient again in 1 month.    Erin Brannon [facility]  Social Work Care Coordinator - [facility]  Care Coordination  [email or web address]  Cell Phone: [phone]  Gender pronouns: she/her  Employed by [employer]

## 2023-07-17 PROBLEM — R65.21 SEPTIC SHOCK (H): Status: ACTIVE | Noted: 2023-01-01

## 2023-07-17 PROBLEM — N17.9 AKI (ACUTE KIDNEY INJURY) (H): Status: ACTIVE | Noted: 2023-01-01

## 2023-07-17 PROBLEM — I48.91 ATRIAL FIBRILLATION WITH RVR (H): Status: ACTIVE | Noted: 2023-01-01

## 2023-07-17 PROBLEM — E87.1 HYPONATREMIA: Status: ACTIVE | Noted: 2023-01-01

## 2023-07-17 PROBLEM — E86.0 DEHYDRATION: Status: ACTIVE | Noted: 2023-01-01

## 2023-07-17 PROBLEM — R79.89 ELEVATED TROPONIN: Status: ACTIVE | Noted: 2023-01-01

## 2023-07-17 PROBLEM — A41.9 SEPTIC SHOCK (H): Status: ACTIVE | Noted: 2023-01-01

## 2023-07-17 NOTE — ED TRIAGE NOTES
"Patient arrives with multiple complaints. Reports that he has been having scalp burning and sensitivity.  Also c/o chronic right arm pain that has been worse recently.  States \"having varying degrees of weakness since Thursday and I'm dehydrated.\"       Triage Assessment     Row Name 07/17/23 1236       Triage Assessment (Adult)    Airway WDL WDL       Respiratory WDL    Respiratory WDL WDL       Skin Circulation/Temperature WDL    Skin Circulation/Temperature WDL WDL       Cardiac WDL    Cardiac WDL WDL       Peripheral/Neurovascular WDL    Peripheral Neurovascular WDL WDL       Cognitive/Neuro/Behavioral WDL    Cognitive/Neuro/Behavioral WDL WDL              "

## 2023-07-17 NOTE — MEDICATION SCRIBE - ADMISSION MEDICATION HISTORY
Medication Scribe Admission Medication History    Admission medication history is complete. The information provided in this note is only as accurate as the sources available at the time of the update.    Medication reconciliation/reorder completed by provider prior to medication history? No    Information Source(s): Patient via in-person    Pertinent Information:     Changes made to PTA medication list:    Added: Lisinopril    Deleted: None    Changed: None    Medication Affordability:  Not including over the counter (OTC) medications, was there a time in the past 3 months when you did not take your medications as prescribed because of cost?: No    Allergies reviewed with patient and updates made in EHR: yes    Medication History Completed By: Pee Cummings 7/17/2023 1:44 PM    Prior to Admission medications    Medication Sig Last Dose Taking? Auth Provider Long Term End Date   aspirin (ASA) 81 MG EC tablet Take 1 tablet (81 mg) by mouth daily Start tomorrow. 7/16/2023 at pm Yes Cipriano Lucas MD     atorvastatin (LIPITOR) 80 MG tablet TAKE 1 TABLET(80 MG) BY MOUTH EVERY EVENING 7/16/2023 at pm Yes Piedad Reese NP Yes    furosemide (LASIX) 20 MG tablet Take 20 mg by mouth daily 7/17/2023 at am Yes Reported, Patient Yes    insulin aspart (NOVOLOG FLEXPEN) 100 UNIT/ML pen For  - 164 give 1 unit. For  - 189 give 2 units. For  - 214 give 3 units. For  - 239 give 4 units. For  - 264 give 5 units. For  - 289 give 6 units. For  - 314 give 7 units. For  - 339 give 8 units For  - 364 give 9 units For  - 389 give 10 units For  - 414 give 11 units For BG greater than or equal to 415 give 12 units 7/16/2023 Yes Ronald Bui, DO Yes    insulin glargine (LANTUS SOLOSTAR) 100 UNIT/ML pen Inject 38 Units Subcutaneous every morning  Patient taking differently: Inject 38 Units Subcutaneous At Bedtime 7/16/2023 at pm Yes Ronald Bui, DO Yes     lisinopril (ZESTRIL) 5 MG tablet Take 1 tablet by mouth daily 7/17/2023 at am Yes Reported, Patient No    magnesium oxide (MAG-OX) 400 MG tablet Take 1 tablet (400 mg) by mouth daily 7/17/2023 at am Yes Moshe Davis MD     metoprolol succinate ER (TOPROL-XL) 50 MG 24 hr tablet TAKE 1 TABLET(50 MG) BY MOUTH DAILY 7/17/2023 at am Yes Piedad Reese, DESMOND Yes    Multiple Vitamin (MULTI VITAMIN) TABS Take 1 tablet by mouth daily  7/17/2023 at am Yes Reported, Patient     OMEGA-3/DHA/EPA/FISH OIL (FISH OIL-OMEGA-3 FATTY ACIDS) 300-1,000 mg capsule Take 1 g by mouth daily  7/17/2023 at am Yes Provider, Historical     ticagrelor (BRILINTA) 90 MG tablet Take 1 tablet (90 mg) by mouth 2 times daily Dose to start tomorrow morning. 7/17/2023 at am Yes Piedad Reese, DESMOND Yes    vitamin C (ASCORBIC ACID) 500 MG tablet Take 500 mg by mouth daily 7/17/2023 at am Yes Unknown, Entered By History     blood glucose (NO BRAND SPECIFIED) test strip Use to test blood sugar 4 times daily or as directed.   Ronald Bui, DO     blood glucose monitoring (SOFTCLIX) lancets Use to test blood sugar 4 times daily.   Ronald Bui, DO

## 2023-07-17 NOTE — PROCEDURES
"PICC Line Insertion Procedure Note     Pt. Name:   Norman Aguilar     MRN:          [id number]     Procedure: Insertion of a  Triple Lumen  5 fr  Bard SOLO (valved) Power PICC, Lot number [id number]     Indications: ICU admit and starting pressors     Contraindications : right arm is stiff and painful per patient, will use left UE     Procedure Details:     Patient identified with 2 identifiers and \"Time Out\" conducted.     Central line insertion bundle followed: hand hygiene performed prior to procedure, site cleansed with Cholraprep (CHG), hat, mask, sterile gloves, sterile gown worn, patient draped with maximum barrier head to toe drape, sterile field maintained.     The vein was assessed and found to be compressible and of adequate size.      Lidocaine 1% 1.5 ml administered SQ to the insertion site.      Modified Seldinger Technique (MST) used for insertion, one attempt(s) required to access vein.      A 5 Fr PICC was inserted into the basilic vein of the left upper arm.        Catheter threaded without difficulty. Good blood return noted.     Catheter was flushed with 10 cc normal saline.      Catheter secured with Statlock, Biopatch, and Tegaderm dressing applied.     The sharps that are included in the PICC insertion kit were accounted for and disposed of in the sharps container prior to breakdown of the sterile field.     CLABSI prevention brochure left at bedside.     Patient tolerated procedure well.      Patient's primary RN notified PICC is ready for use.       Findings:     Total catheter length  48 cm, with 1 cm exposed. Mid upper arm circumference is 28 cm.      Tip placement verified in the SVC/RA junction by [employer]:     Comments:  The PICC is properly positioned and ready for use.         Mehreen Gonsalez RN BSN  Vascular Access - [facility]   "

## 2023-07-17 NOTE — ED NOTES
Charge RN made aware of patient's hypotension and need for bed.  Making arrangements.  Patient is awake and talking at this time.  Will place PIV and obtain lab specimens while waiting for bed.

## 2023-07-17 NOTE — PHARMACY-VANCOMYCIN DOSING SERVICE
Pharmacy Vancomycin Initial Note  Date of Service 2023  Patient's  1960  63 year old, male    Indication: Sepsis    Current estimated CrCl = Estimated Creatinine Clearance: 29.5 mL/min (A) (based on SCr of 3.25 mg/dL (H)).    Creatinine for last 3 days  2023:  1:00 PM Creatinine 3.25 mg/dL    Recent Vancomycin Level(s) for last 3 days  No results found for requested labs within last 3 days.      Vancomycin IV Administrations (past 72 hours)      No vancomycin orders with administrations in past 72 hours.                Nephrotoxins and other renal medications (From now, onward)    Start     Dose/Rate Route Frequency Ordered Stop    23 1500  vancomycin (VANCOCIN) 2,000 mg in sodium chloride 0.9 % 500 mL intermittent infusion         2,000 mg  over 2 Hours Intravenous ONCE 23 1438      23 1430  piperacillin-tazobactam (ZOSYN) 3.375 g vial to attach to  mL bag         3.375 g  over 30 Minutes Intravenous ONCE 23 1418            Contrast Orders - past 72 hours (72h ago, onward)    None                Plan:  1. Start vancomycin 2000 mg IV x1       Sue Maciel [facility]

## 2023-07-17 NOTE — ED NOTES
"[facility] ED Handoff Report    ED Chief Complaint:  Arm pain, fatigue.    ED Diagnosis:  (A41.9,  R65.21) Septic shock (H)  (primary encounter diagnosis)  Comment: Lactic acid 9, afebrile.   Plan: Got 2 L NS, down to 4.1.    (E86.0) Dehydration  Comment: Cool / cold dry skin, dry mucus membranes.   Plan: 2 L NS, LR at 150 ml/hr.     (N17.9) JOHANA (acute kidney injury) (H)  Comment: Creat 3.25.   Plan: IV fluids.     (E87.1) Hyponatremia  Comment: Na 119.     (I48.91) Atrial fibrillation with RVR (H)  Comment: up to 180's.   Plan: Attempted IV Metoprolol, did not tolerate. Cardioverted at 200 J.     (R77.8) Elevated troponin  Comment: MD Cody suspects demand ischemia.          PMH:    Past Medical History:   Diagnosis Date    Anemia     Chronic systolic CHF (congestive heart failure) (H)     Coronary artery disease     Diabetic neuropathy (H)     DM type 2 w Neuropathy     GERD (gastroesophageal reflux disease)     Hyperlipidemia     Hypertension     Intermittent atrial fibrillation (H)     on Eliquis    Ischemic cardiomyopathy 11/16/2021    Echo with EF of 30-35%    NSTEMI (non-ST elevated myocardial infarction) (H) 11/15/2021    Osteomyelitis of left foot (H) 04/04/2022    Renal disease     Retinopathy     Sleep disturbance         Code Status:  Prior     Falls Risk: Yes Band: Applied    Current Living Situation/Residence: Spoke with Pt's niece, she states Pt lives in his car.      Elimination Status: Continent: Yes     Activity Level: Total assist/lift    Patients Preferred Language:  English     Needed: No    Vital Signs:  BP 91/50   Pulse 113   Temp 98.5  F (36.9  C) (Oral)   Resp 30   Ht 1.88 m (6' 2\")   Wt 90.7 kg (200 lb)   SpO2 99%   BMI 25.68 kg/m       Cardiac Rhythm: ST.     Pain Score: 6/10, right arm.     Is the Patient Confused:  No    Last Food or Drink: 07/17/23 at 0800    Focused Assessment:  Skin cool, pale. HR in 170's on arrival to ED. Hypotensive with A fib RVR. " Cardioverted with 200 J. BP remains <100. Pt started on Levo. IV Zosyn and Vanco given. PICC placed. Faint pulse dopplerable RLE, limb is cold. RUE painful, pulse palpable, but faint. Cap refill 4 - 5 seconds.     Tests Performed: Done: Labs. CTA abd ordered.     Treatments Provided:  Cardioversion, IV, ABX.     Family Dynamics/Concerns: Yes; please explain: Pt not welcome at sisters home. Pt is homeless.     Family Updated On Visitor Policy: Yes    Plan of Care Communicated to Family: Yes    Who Was Updated about Plan of Care: Pt and niece.     Belongings Checklist Done and Signed by Patient: No      Covid: asymptomatic , Not tested.     RN: Karl Kim RN 7/17/2023 5:00 PM

## 2023-07-17 NOTE — ED PROVIDER NOTES
EMERGENCY DEPARTMENT ENCOUNTER       ED Course & Medical Decision Making     12:52 PM I met with patient for initial interview and encounter. We also discussed plan for treatment and diagnostic interventions.  1:54 PM Spoke to radiology regarding imaging.   2:18 PM Lab called with critical trop, lactic and sodium levels  3:19 PM Spoke to hospitalist, Dr. Mcnamara, recommended patient be sent to ICU with critical care managing   3:38 PM Spoke with Dr Hale (ICU) regarding patient, will have PICC team place a PICC line, start pressors and moved to /h and admit to ICU  3:42 PM Spoke with [employer] CT scan, sounds like they have now gotten clearance from radiology MD regarding scan, patient is currently in the queue    Final Impression  63 year old male presents for evaluation of bilateral arm pain x4 days and feeling generally weak, fatigued, dehydrated.  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  Patient presented to triage with pressure of 80/51, A-fib with RVR with heart rates in the 150s-160s.  Patient brought back to ED bed 1 stat, given 2 L fluid bolus, attempted IV metoprolol though pressure repeatedly in the 80s thus only got 1 mg before nurse felt uncomfortable giving additional metoprolol.  Patient was thus eventually cardioverted as documented below, did convert to normal sinus rhythm following cardioversion.  Labs returned showing significant abnormalities consistent with dehydration, likely NSTEMI, hyperglycemia and hyponatremia.  Sodium 119, prior was 139.  Creatinine 3.25, prior was 1.5, GFR of 20.  Initial lactic acid 9.0, glucose 494,  White count 14.5.  Trope elevated to 216, BNP elevated at 8500.  Patient alert, oriented throughout entire ED stay, though does appear globally weak.  Initially had planned on CTA chest abdomen pelvis with runoff of the right lower extremity due to poor perfusion and coolness of his right foot on initial exam, though after 2 L fluid  bolus perfusion does appear much better, foot is warmer, though still difficult to palpate a pulse, pulses however dopplerable.  After reviewing the labs I do suspect that at least some of this coolness of his right lower extremity was related to hypoperfusion from severe dehydration and poor pump function of his heart given his A-fib with RVR for the last several days.  Patient denies known history of A-fib other than 1 issue a year ago when he was admitted to the hospital for sepsis per his report.    Delay in obtaining CT scan due to patient's hemodynamic instability as well as initial GFR of 20.    Considered IV heparin, however as patient still has not had CT scan yet, would not start heparin without those results. Paged cardiology, though no return call.  Patient has known CAD, post cardioversion EKG does not show any overt ischemia, would favor his troponin elevation to be related to demand ischemia from likely several days of A-fib with RVR with poor coronary arteries at baseline, though will likely need cardiology consult as an inpatient.    Prior to making a final disposition on this patient the results of patient's tests and other diagnostic studies were discussed with the patient. All questions were answered. Patient expressed understanding of the plan and was amenable to it.    Medical Decision Making    History:    Supplemental history from: N/A    External Record(s) reviewed as documented below;  o 8/23/2022 inpatient admission at [facility] for [facility]F, NSTEMI, coronary angiogram 11/16/2021 showed diffuse calcified vessels, no stenting.  Cardiothoracic had recommended CABG in 2 to 3 months time, the patient postponed the procedure  o 4/4/22 left BKA for osteomyelitis of left foot    External consultation:    Discussion of management with another provider: Hospitalist, Radiology, Cardiology    Complicating factors:    Care impacted by chronic illness: Chronic Kidney Disease, Diabetes, Heart  Disease, Hyperlipidemia, Hypertension and Smoking / Nicotine Use    Care affected by social determinants of health: Access to Medical Care    Disposition considerations: Admit.      Medications   sodium chloride (PF) 0.9% PF flush 3 mL (3 mLs Intracatheter $Given 7/17/23 1310)   sodium chloride (PF) 0.9% PF flush 3 mL (3 mLs Intracatheter $Given 7/17/23 1311)   metoprolol (LOPRESSOR) injection 5 mg (0 mg Intravenous Hold 7/17/23 1517)   piperacillin-tazobactam (ZOSYN) 3.375 g vial to attach to  mL bag (3.375 g Intravenous $New Bag 7/17/23 1525)   vancomycin (VANCOCIN) 2,000 mg in sodium chloride 0.9 % 500 mL intermittent infusion (has no administration in time range)   norepinephrine (LEVOPHED) 4 mg in  mL infusion PREMIX (has no administration in time range)   lidocaine 1 % 0.1-5 mL (has no administration in time range)   lidocaine (LMX4) cream (has no administration in time range)   sodium chloride (PF) 0.9% PF flush 10-40 mL (has no administration in time range)   lactated ringers infusion (has no administration in time range)   0.9% sodium chloride BOLUS (0 mLs Intravenous Stopped 7/17/23 1350)   etomidate (AMIDATE) injection 8 mg (8 mg Intravenous $Given 7/17/23 1431)   insulin regular 1 unit/mL injection 9.1 Units (9.1 Units Intravenous $Given 7/17/23 1525)       Final Impression     1. Septic shock (H)    2. Dehydration    3. JOHANA (acute kidney injury) (H)    4. Hyponatremia    5. Atrial fibrillation with RVR (H)    6. Elevated troponin        Procedures:    PROCEDURE: Electrical Cardioversion with Procedural Sedation   INDICATIONS: Sedation is required to allow for Cardioversion for Atrial Fibrilation    CARDIOVERSION TYPE: Biphasic, External   SEDATION PROVIDER: Dr Sushant Menjivar   CARDIOVERSION PROVIDER: Dr Sushant Menjivar   LEVEL OF SEDATION: Moderate Sedation    Defined as:  Minimal = Normal response to verbal  Moderate = Responds to verbal and light tactile stimulation  Deep = Responds  after repeated painful stimulation   CONSENT: Risks, benefits and alternatives were discussed with and Written consent was obtained from Patient.   PROCEDURE SPECIFIC CHECKLIST COMPLETED: Yes   LAST ORAL INTAKE: Regular Meal > 8 hours   ASA CLASS: 3 - Severe systemic disease, but not incapacitating   MALLAMPATI:  II - Faucial pillars and soft palate may be seen, but uvula is masked by the base of the tongue   TIME OUT: Universal protocol was followed. TIME OUT conducted just prior to starting procedure confirmed patient identity, site/side, procedure, patient position, and availability of correct equipment. Yes    Immediately prior to initiation of sedation, reassessment of clinical condition was performed which was unchanged.   MEDICATIONS GIVEN: Etomidate, 8 mg, IV    MONITORING: heart rate, cardiac monitor, continuous pulse oximeter, continuous capnometry (end tidal CO2), frequent blood pressure checks, level of consciousness checks, IV access, constant attendance by RN until patient is recovered, constant attendance by MD until patient is stable and intubation and emergency airway equipment available   RESPONSE: vital signs stable, airway patent and O2 saturations remained >92%   POST-SEDATION ASSESSMENT/PROCEDURE NOTE: CARDIOVERSION: Quick combo electrodes were placed anterior-posterior  Trial 1: Synchronized shock at 200 joules was Successful    SEDATION:  Lowest level oxygen saturation reached was 99.    Post procedure patient was alert and responds to verbal stimuli    Patient was monitored during recovery and returned to pre-procedure baseline.   TOTAL MD DRUG ADMINISTRATION / MONITORING TIME: 16 minutes.   COMPLICATIONS: Patient tolerated procedure well, without complication       Critical Care     Performed by: Sushant Menjivar MD  Authorized by: Sushant Menjivar MD                   Total critical care time: 120 minutes  Critical care was necessary to treat or prevent imminent or life-threatening  "deterioration of the following conditions:  Hypotension, A-fib with RVR, septic shock, hyponatremia, dehydration  Critical care was time spent personally by me on the following activities: development of treatment plan with patient or surrogate, discussions with consultants, examination of patient, evaluation of patient's response to treatment, obtaining history from patient or surrogate, ordering and performing treatments and interventions, ordering and review of laboratory studies, ordering and review of radiographic studies, re-evaluation of patient's condition and monitoring for potential decompensation.  Critical care time was exclusive of separately billable procedures and treating other patients.    Chief Complaint     Chief Complaint   Patient presents with     Arm Pain     Fatigue     Patient arrives with multiple complaints. Reports that he has been having scalp burning and sensitivity.  Also c/o chronic right arm pain that has been worse recently.  States \"having varying degrees of weakness since Thursday and I'm dehydrated.\"      HPI     Norman Aguilar is a 63 year old male who presents for evaluation of arm pain.     Patient reports ongoing bilateral arm pain (R>L) since 7/13/23 (4 days ago) that he notes as feeling generally weak. However, he states that his right leg also feels more numb than normal. Patient  complains of other acute symptoms including feeling generally \"crumpy,\" abdominal discomfort, abdominal distention, and subjective dehydration. No fever.     Of note, patient reports that his left eye pain and swelling was secondary to an injury over 3 years ago, but notes that the pain \"comes and goes.\"     I, Cheikh Maradiaga am serving as a scribe to document services personally performed by Dr. Sushant Menjivar MD, based on my observation and the provider's statements to me. I, Dr. Sushant Menjivar MD attest that Cheikh Maradiaga is acting in a scribe capacity, has observed my performance of " "the services and has documented them in accordance with my direction.    Physical Exam     BP 93/53   Pulse 109   Temp 98.5  F (36.9  C) (Oral)   Resp (!) 9   Ht 1.88 m (6' 2\")   Wt 90.7 kg (200 lb)   SpO2 99%   BMI 25.68 kg/m    Constitutional: Awake, though pale, very fatigued.  Head: Normocephalic, atraumatic.  ENT: Mucous membranes dry  Eyes: Conjunctiva normal.  Pupils equal round reactive, some mild erythema around the left eye, patient states this is chronic  Respiratory: Respirations even, unlabored, no appreciable coarseness or wheezing, in no acute respiratory distress.  Sats 99% on room air  Cardiovascular: A-fib with RVR, rate 160-180s.  Palpable pulses in bilateral wrists, though right foot extremely is cool to touch, no palpable pulse that was dopplerable.  GI: Abdomen soft, non-tender.  Nondistended.  Musculoskeletal: Left BKA.  Right foot cool to touch, no palpable pulse, though is able to wiggle toes.  Endorses diminished sensation throughout right foot.  Grossly normal strength sensation in bilateral upper extremities  Integument: Warm, dry.  Neurologic: Alert & oriented x 3. Normal speech. Grossly normal motor and sensory function. No focal deficits noted.    Labs & Imaging       Results for orders placed or performed during the hospital encounter of 07/17/23   Extra Blue Top Tube   Result Value Ref Range    Hold Specimen JIC    Extra Red Top Tube   Result Value Ref Range    Hold Specimen JIC    Extra Green Top (Lithium Heparin) Tube   Result Value Ref Range    Hold Specimen JIC    Extra Green Top (Lithium Heparin) ON ICE   Result Value Ref Range    Hold Specimen JIC    Extra Purple Top Tube   Result Value Ref Range    Hold Specimen JIC    Extra Green Top (Lithium Heparin) ON ICE   Result Value Ref Range    Hold Specimen JIC    Comprehensive metabolic panel   Result Value Ref Range    Sodium 119 (LL) 136 - 145 mmol/L    Potassium 4.6 3.4 - 5.3 mmol/L    Chloride 80 (L) 98 - 107 mmol/L    " Carbon Dioxide (CO2) 14 (L) 22 - 29 mmol/L    Anion Gap 25 (H) 7 - 15 mmol/L    Urea Nitrogen 64.9 (H) 8.0 - 23.0 mg/dL    Creatinine 3.25 (H) 0.67 - 1.17 mg/dL    Calcium 9.2 8.8 - 10.2 mg/dL    Glucose 494 (H) 70 - 99 mg/dL    Alkaline Phosphatase 103 40 - 129 U/L    AST 92 (H) 0 - 45 U/L    ALT 33 0 - 70 U/L    Protein Total 7.2 6.4 - 8.3 g/dL    Albumin 3.1 (L) 3.5 - 5.2 g/dL    Bilirubin Total 1.7 (H) <=1.2 mg/dL    GFR Estimate 20 (L) >60 mL/min/1.73m2   Lactic acid whole blood   Result Value Ref Range    Lactic Acid 9.0 (HH) 0.7 - 2.0 mmol/L   Blood gas venous   Result Value Ref Range    pH Venous 7.27 (L) 7.35 - 7.45    pCO2 Venous 36 35 - 50 mm Hg    pO2 Venous 31 25 - 47 mm Hg    Bicarbonate Venous 16 (L) 24 - 30 mmol/L    Base Excess/Deficit (+/-) -10.6   mmol/L    Oxyhemoglobin Venous 46.1 (L) 70.0 - 75.0 %    O2 Sat, Venous 46.8 (L) 70.0 - 75.0 %   Troponin T, High Sensitivity (now)   Result Value Ref Range    Troponin T, High Sensitivity 216 (HH) <=22 ng/L   Nt probnp inpatient   Result Value Ref Range    N terminal Pro BNP Inpatient 8,551 (H) 0 - 900 pg/mL   CBC with platelets and differential   Result Value Ref Range    WBC Count 14.5 (H) 4.0 - 11.0 10e3/uL    RBC Count 4.64 4.40 - 5.90 10e6/uL    Hemoglobin 13.2 (L) 13.3 - 17.7 g/dL    Hematocrit 38.5 (L) 40.0 - 53.0 %    MCV 83 78 - 100 fL    MCH 28.4 26.5 - 33.0 pg    MCHC 34.3 31.5 - 36.5 g/dL    RDW 17.2 (H) 10.0 - 15.0 %    Platelet Count 247 150 - 450 10e3/uL   Manual Differential   Result Value Ref Range    % Neutrophils 87 %    % Lymphocytes 3 %    % Monocytes 7 %    % Eosinophils 0 %    % Basophils 0 %    % Metamyelocytes 2 %    % Myelocytes 1 %    Absolute Neutrophils 12.6 (H) 1.6 - 8.3 10e3/uL    Absolute Lymphocytes 0.4 (L) 0.8 - 5.3 10e3/uL    Absolute Monocytes 1.0 0.0 - 1.3 10e3/uL    Absolute Eosinophils 0.0 0.0 - 0.7 10e3/uL    Absolute Basophils 0.0 0.0 - 0.2 10e3/uL    Absolute Metamyelocytes 0.3 (H) <=0.0 10e3/uL    Absolute  Myelocytes 0.1 (H) <=0.0 10e3/uL    RBC Morphology Confirmed RBC Indices     Platelet Assessment  Automated Count Confirmed. Platelet morphology is normal.     Automated Count Confirmed. Platelet morphology is normal.   Basic metabolic panel   Result Value Ref Range    Sodium 121 (L) 136 - 145 mmol/L    Potassium 5.4 (H) 3.4 - 5.3 mmol/L    Chloride 86 (L) 98 - 107 mmol/L    Carbon Dioxide (CO2) 15 (L) 22 - 29 mmol/L    Anion Gap 20 (H) 7 - 15 mmol/L    Urea Nitrogen 63.1 (H) 8.0 - 23.0 mg/dL    Creatinine 2.94 (H) 0.67 - 1.17 mg/dL    Calcium 8.4 (L) 8.8 - 10.2 mg/dL    Glucose 505 (HH) 70 - 99 mg/dL    GFR Estimate 23 (L) >60 mL/min/1.73m2   Result Value Ref Range    Hemoglobin A1C 6.9 (H) <5.7 %       EKG     EKG reviewed and independently interpreted as below;  Initial EKG: A-fib with RVR, rate 168.  .  QTc 434.  No STEMI.    Subsequent EKG: Sinus tachycardia, rate 116, , .  QTc 450.  No STEMI.     Sushant Menjivar MD  07/17/23 1588

## 2023-07-18 NOTE — CONSULTS
Care Management Initial Consult    General Information  Assessment completed with: Other (Sister), Sister Cindy  Type of CM/SW Visit: Initial Assessment    Primary Care Provider verified and updated as needed: Yes   Readmission within the last 30 days: no previous admission in last 30 days         Advance Care Planning: Advance Care Planning Reviewed: other (see comments) (No ACP Documents)          Communication Assessment  Patient's communication style: spoken language (English or Bilingual)    Hearing Difficulty or Deaf: no   Wear Glasses or Blind: no    Cognitive  Cognitive/Neuro/Behavioral: .WDL except, level of consciousness  Level of Consciousness: confused     Orientation: disoriented to, situation             Living Environment:   People in home: other (see comments) (Homeless, recently living with his sister)     Current living Arrangements: house, homeless      Able to return to prior arrangements: yes       Family/Social Support:  Care provided by: self  Provides care for: no one  Marital Status: Single             Description of Support System:           Current Resources:   Patient receiving home care services: No     Community Resources: None  Equipment currently used at home: other (see comments) (unknown, unreliable answer)  Supplies currently used at home: None    Employment/Financial:  Employment Status: unemployed        Financial Concerns:             Does the patient's insurance plan have a 3 day qualifying hospital stay waiver?  Yes   Will the waiver be used for post-acute placement? No    Lifestyle & Psychosocial Needs:  Social Determinants of Health     Tobacco Use: Medium Risk (7/17/2023)    Patient History     Smoking Tobacco Use: Former     Smokeless Tobacco Use: Never     Passive Exposure: Not on file   Alcohol Use: Not on file   Financial Resource Strain: Not on file   Food Insecurity: Not on file   Transportation Needs: Not on file   Physical Activity: Not on file   Stress: Not on file    Social Connections: Not on file   Intimate Partner Violence: Not on file   Depression: Not on file   Housing Stability: Not on file       Functional Status:  Prior to admission patient needed assistance:   Dependent ADLs:: Independent  Dependent IADLs:: Cleaning       Mental Health Status:          Chemical Dependency Status:                Values/Beliefs:  Spiritual, Cultural Beliefs, Amish Practices, Values that affect care:                 Additional Information:  Assessed. Spoke to pts sister Cindy. Pt has been homeless since Nov, 2021. 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 Pt has lived in between Sister [facility], or her daughter's house the last couple years, and in the past has lived at his sister [facility] for 6 yrs at one time. Pt before that was taking care of his parents in their home. He last lived at [facility] prior to hospitalization. Pt Ind at baseline, drives (owns a car). Pt has a prosthetic. 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 She stated pt tried signing up for social security disability, but pt claims he was denied, she does not believe pt. 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 Pt has [employer] [place]. Cindy cannot take pt back into her home, and her daughter will not let pt return either. She prefers if able pt goes to TCU, then either friends home, housing or some sort, or shelter. Transport TBD.       Per flowsheet shows pt is confused to situation, CM will speak with pt when a little less confused.       Cm will continue to follow plan of care,review recommendations,and assist with any discharge needs anticipated.     Sulema Manuel RN

## 2023-07-18 NOTE — CONSULTS
HEART CARE NOTE        Thank you, Dr. Hale, for asking the [facility] Heart Care team to see Norman Aguilar to evaluate shock.    Assessment/Recommendations     1. HFrEF/ICM c/b circulatory shock  Assessment / Plan    Hypervolemic on physical exam; will hold IV fluids and add albumin given degree of 3rd spacing - will need gentle diuresis but will hold for now given hemodynamic instability; plan to start diuresis tomorrow if not alter today depending on hemodynamics    Continue pressor support (wean as tolerated) and supportive care    GDMT on hold given the above      2. NSTEMI  Assessment / Plan    Hx of coronary angiogram significant for severe multi-vessel CAD during admission in 11/21 ; CTS/Pa consulted with plans for CABG in 2-3 months at that time; patient was readmitted before CABG with notable progression of RCA lesion CTS was consulted once again and declined to offer surgical intervention at which time the patient underwent complex PCI with JESSICA x 2 to the prox and distal RCA    He now presents approx 1 year later with an NSTEMI; will plan to proceed with coronary angiogram +/- PCI once there is no longer concern for sepsis/infection     Continue ASA, and ticagrelor    Repeat echo pending      3. Septic shock  Assessment / Plan    Management/antibiotics and supportive care per ICU team    4. DM2  Assessment / Plan    Management per primary team     5. Paroxysmal afib  Assessment / Plan    Rate controlled    Continue heparin gtt       6. JOHANA on CKD  Assessment / Plan    In the setting of circulatory shock and renal hypoperfusion    Continue to monitor renal function closely    Clinically Significant Risk Factors Present on Admission        # Hyperkalemia: Highest K = 5.4 mmol/L in last 2 days, will monitor as appropriate  # Hyponatremia: Lowest Na = 119 mmol/L in last 2 days, will monitor as appropriate     # Anion Gap Metabolic Acidosis: Highest Anion Gap = 25 mmol/L in last 2 days, will monitor  "and treat as appropriate  # Hypoalbuminemia: Lowest albumin = 3.1 g/dL at 7/17/2023  1:00 PM, will monitor as appropriate   # Drug Induced Platelet Defect: home medication list includes an antiplatelet medication   # Hypertension: Noted on problem list  # Chronic heart failure with reduced ejection fraction: last echo with EF <40%  # Circulatory Shock: currently requiring pressors for blood pressure support  # Acute Respiratory Failure: Documented O2 saturation < 91%.  Continue supplemental oxygen as needed    # DMII: A1C = 6.9 % (Ref range: <5.7 %) within past 6 months   # Overweight: Estimated body mass index is 25.68 kg/m  as calculated from the following:    Height as of this encounter: 1.88 m (6' 2\").    Weight as of this encounter: 90.7 kg (200 lb).           Systolic  Shock: Shock, unspecified  Systolic acute    Hyperkalemia, hyponatremia and Hypo-osmolality, Fluid overload, unspecified, Other fluid overload and Other disorders of electrolyte and fluid balance, not elsewhere classified    Acute kidney failure, unspecified  CKD POA List: Stage 4 (GFR 15-29)      History of Present Illness/Subjective    Mr. Norman Aguilar is a 62 year old male with a PMHx significant for CAD, CHF, HTN, IDDM, paroxysmal atrial fibrillation, left foot osteomyelitis failure of antibiotics s/p left BKA, admitted on 8/23/2022 with symptoms concerninf for stroke. Troponin was noted to be elevated.     Today, Mr. Thomas denies any acute cardiac events or complaints; Management plan as detailed above.     ECG: Personally reviewed. sinus tachycardia, occasional PVC noted, unifocal.    ECHO (personnaly Reviewed):  The left ventricle is normal in size with normal left ventricular wall  thickness.  Left ventricular function is decreased. The ejection fraction is 30-35%  (moderately reduced).  Mildly decreased right ventricular systolic function  Moderate biatrial enlargement  No hemodynamically significant valve disease  IVC diameter " ">2.1 cm collapsing <50% with sniff suggests a high RA pressure  estimated at 15 mmHg or greater.  Compared to prior study, there is no significant change.        Physical Examination Review of Systems   /54   Pulse 116   Temp 97.7  F (36.5  C) (Oral)   Resp (!) 37   Ht 1.88 m (6' 2\")   Wt 90.7 kg (200 lb)   SpO2 99%   BMI 25.68 kg/m    Body mass index is 25.68 kg/m .  Wt Readings from Last 3 Encounters:   07/17/23 90.7 kg (200 lb)   11/17/22 100.2 kg (221 lb)   09/28/22 104.3 kg (230 lb)     General Appearance:   no distress, normal body habitus   ENT/Mouth: membranes moist, no oral lesions or bleeding gums.      EYES:  no scleral icterus, normal conjunctivae   Neck: no carotid bruits or thyromegaly   Chest/Lungs:   lungs are clear to auscultation, no rales or wheezing, equal chest wall expansion    Cardiovascular:   Regular. Normal first and second heart sounds with no murmurs, rubs, or gallops; the carotid, radial and posterior tibial pulses are intact, + JVD and LE edema bilaterally    Abdomen:  no organomegaly, masses, bruits, or tenderness; bowel sounds are present   Extremities: no cyanosis or clubbing   Skin: no xanthelasma, warm.    Neurologic: NAD     Psychiatric: NAD     A complete 10 systems ROS was reviewed  And is negative except what is listed in the HPI.          Medical History  Surgical History Family History Social History   Past Medical History:   Diagnosis Date     Anemia      Chronic systolic CHF (congestive heart failure) (H)      Coronary artery disease      Diabetic neuropathy (H)      DM type 2 w Neuropathy      GERD (gastroesophageal reflux disease)      Hyperlipidemia      Hypertension      Intermittent atrial fibrillation (H)     on Eliquis     Ischemic cardiomyopathy 11/16/2021    Echo with EF of 30-35%     NSTEMI (non-ST elevated myocardial infarction) (H) 11/15/2021     Osteomyelitis of left foot (H) 04/04/2022     Renal disease      Retinopathy      Sleep disturbance     " Past Surgical History:   Procedure Laterality Date     AMPUTATE FOOT Left 3/21/2022    Procedure: Guillotine AMPUTATION, FOOT;  Surgeon: Ronald Bhatt MD;  Location: [facility] OR     AMPUTATE LEG BELOW KNEE Left 4/4/2022    Procedure: LEFT BELOW  KNEE AMPUTATION;  Surgeon: Ronald Bhatt MD;  Location: [facility] OR     AMPUTATE TOE(S) Left 11/16/2021    Procedure: first and second ray amputation;  Surgeon: Eddi Ram DPM;  Location: [facility] OR     APPENDECTOMY       CV CORONARY ANGIOGRAM N/A 11/16/2021    Procedure: CV CORONARY ANGIOGRAM;  Surgeon: Pam Tabares MD;  Location: [facility] CATH LAB CV     CV CORONARY ANGIOGRAM N/A 8/24/2022    Procedure: CV CORONARY ANGIOGRAM;  Surgeon: Cipriano Lucas MD;  Location: [facility] LAB CV     CV CORONARY ANGIOGRAM N/A 8/29/2022    Procedure: CV CORONARY ANGIOGRAM;  Surgeon: Cipriano Lucas MD;  Location: [facility] CV     CV INTRAVASULAR ULTRASOUND N/A 8/29/2022    Procedure: Intravascular Ultrasound;  Surgeon: Cipriano Lucas MD;  Location: [facility] LAB CV     CV LEFT HEART CATH N/A 11/16/2021    Procedure: Left Heart Cath;  Surgeon: Pam Tabares MD;  Location: [facility] LAB CV     CV PCI N/A 8/29/2022    Procedure: Percutaneous Coronary Intervention stent;  Surgeon: Cipriano Lucas MD;  Location: [facility] CV     CV PCI ANGIOPLASTY N/A 8/29/2022    Procedure: Percutaneous Transluminal Angioplasty;  Surgeon: Cipriano Lucas MD;  Location: [facility] LAB CV     FOOT SURGERY Left      HERNIA REPAIR       INCISION AND DRAINAGE LOWER EXTREMITY, COMBINED Left 11/16/2021    Procedure: INCISION AND DRAINAGE, left foot;  Surgeon: Eddi Ram DPM;  Location: [facility] OR     INCISION AND DRAINAGE LOWER EXTREMITY, COMBINED Left 11/19/2021    Procedure: INCISION AND DRAINAGE, left foot;  Surgeon: Eddi Ram DPM;  Location: [facility] OR     INCISION AND DRAINAGE LOWER  EXTREMITY, COMBINED Left 12/9/2021    Procedure: INCISION AND DRAINAGE, Left foot;  Surgeon: Eddi Ram DPM;  Location: [facility] OR     INCISION AND DRAINAGE LOWER EXTREMITY, COMBINED Left 1/10/2022    Procedure: INCISION AND DRAINAGE, left foot;  Surgeon: Eddi Ram DPM;  Location: [facility] OR     INCISION AND DRAINAGE LOWER EXTREMITY, COMBINED Left 1/27/2022    Procedure: INCISION AND DRAINAGE, Left foot;  Surgeon: Eddi Ram DPM;  Location: [facility] OR     PICC TRIPLE LUMEN PLACEMENT  7/17/2023         no family history of premature coronary artery disease Social History     Socioeconomic History     Marital status: Single     Spouse name: Not on file     Number of children: Not on file     Years of education: Not on file     Highest education level: Not on file   Occupational History     Not on file   Tobacco Use     Smoking status: Former     Types: Cigars     Smokeless tobacco: Never     Tobacco comments:     Cigars   Vaping Use     Vaping Use: Never used   Substance and Sexual Activity     Alcohol use: Yes     Comment: < 1 drink a month (only holidays)     Drug use: Never     Sexual activity: Not on file   Other Topics Concern     Parent/sibling w/ CABG, MI or angioplasty before 65F 55M? Not Asked   Social History Narrative    Single, currently not working, has done numerous jobs, lives alone.  Full code. (last updated 4/4/2022)      Social Determinants of Health     Financial Resource Strain: Not on file   Food Insecurity: Not on file   Transportation Needs: Not on file   Physical Activity: Not on file   Stress: Not on file   Social Connections: Not on file   Intimate Partner Violence: Not on file   Housing Stability: Not on file           Lab Results    Chemistry/lipid CBC Cardiac Enzymes/BNP/TSH/INR   Lab Results   Component Value Date    CHOL 157 03/24/2023    HDL 65 03/24/2023    TRIG 60 03/24/2023    BUN 70.6 (H) 07/18/2023     (L) 07/18/2023      (L) 07/18/2023    CO2 16 (L) 07/18/2023    CO2 16 (L) 07/18/2023    Lab Results   Component Value Date    WBC 8.4 07/18/2023    HGB 12.5 (L) 07/18/2023    HCT 35.8 (L) 07/18/2023    MCV 80 07/18/2023     07/18/2023    Lab Results   Component Value Date    TROPONINI 5.84 (HH) 08/30/2022     (H) 08/24/2022    INR 1.19 (H) 08/24/2022     Lab Results   Component Value Date    TROPONINI 5.84 (HH) 08/30/2022          Weight:    Wt Readings from Last 3 Encounters:   07/17/23 90.7 kg (200 lb)   11/17/22 100.2 kg (221 lb)   09/28/22 104.3 kg (230 lb)       Allergies  No Known Allergies      Surgical History  Past Surgical History:   Procedure Laterality Date     AMPUTATE FOOT Left 3/21/2022    Procedure: Guillotine AMPUTATION, FOOT;  Surgeon: Ronald Bhatt MD;  Location: [facility] OR     AMPUTATE LEG BELOW KNEE Left 4/4/2022    Procedure: LEFT BELOW  KNEE AMPUTATION;  Surgeon: Ronald Bhatt MD;  Location: [facility] OR     AMPUTATE TOE(S) Left 11/16/2021    Procedure: first and second ray amputation;  Surgeon: Eddi Ram DPM;  Location: [facility] OR     APPENDECTOMY       CV CORONARY ANGIOGRAM N/A 11/16/2021    Procedure: CV CORONARY ANGIOGRAM;  Surgeon: Pam Tabares MD;  Location: [facility] LAB CV     CV CORONARY ANGIOGRAM N/A 8/24/2022    Procedure: CV CORONARY ANGIOGRAM;  Surgeon: Cipriano Lucas MD;  Location: [facility] CATH LAB CV     CV CORONARY ANGIOGRAM N/A 8/29/2022    Procedure: CV CORONARY ANGIOGRAM;  Surgeon: Cipriano Lucas MD;  Location: [facility] LAB CV     CV INTRAVASULAR ULTRASOUND N/A 8/29/2022    Procedure: Intravascular Ultrasound;  Surgeon: Cipriano Lucas MD;  Location: [facility] LAB CV     CV LEFT HEART CATH N/A 11/16/2021    Procedure: Left Heart Cath;  Surgeon: Pam Tabares MD;  Location: [facility] LAB CV     CV PCI N/A 8/29/2022    Procedure: Percutaneous Coronary Intervention stent;  Surgeon: Cipriano Lucas MD;   Location: [facility] CV     CV PCI ANGIOPLASTY N/A 8/29/2022    Procedure: Percutaneous Transluminal Angioplasty;  Surgeon: Cipriano Lucas MD;  Location: [facility] CV     FOOT SURGERY Left      HERNIA REPAIR       INCISION AND DRAINAGE LOWER EXTREMITY, COMBINED Left 11/16/2021    Procedure: INCISION AND DRAINAGE, left foot;  Surgeon: Eddi Ram DPM;  Location: [facility] OR     INCISION AND DRAINAGE LOWER EXTREMITY, COMBINED Left 11/19/2021    Procedure: INCISION AND DRAINAGE, left foot;  Surgeon: Eddi Ram DPM;  Location: [facility] OR     INCISION AND DRAINAGE LOWER EXTREMITY, COMBINED Left 12/9/2021    Procedure: INCISION AND DRAINAGE, Left foot;  Surgeon: Eddi Ram DPM;  Location: [facility] OR     INCISION AND DRAINAGE LOWER EXTREMITY, COMBINED Left 1/10/2022    Procedure: INCISION AND DRAINAGE, left foot;  Surgeon: Eddi Ram DPM;  Location: [facility] OR     INCISION AND DRAINAGE LOWER EXTREMITY, COMBINED Left 1/27/2022    Procedure: INCISION AND DRAINAGE, Left foot;  Surgeon: Eddi Ram DPM;  Location: [facility] OR     PICC TRIPLE LUMEN PLACEMENT  7/17/2023            Social History  Tobacco:   History   Smoking Status     Former     Types: Cigars   Smokeless Tobacco     Never    Alcohol:   Social History    Substance and Sexual Activity      Alcohol use: Yes        Comment: < 1 drink a month (only holidays)   Illicit Drugs:   History   Drug Use Unknown       Family History  No family history on file.       Russel Arredondo MD on 7/18/2023      cc: Jaquan Dickerson,

## 2023-07-18 NOTE — PROCEDURES
"PICC Line Insertion Procedure Note    Pt. Name:   Norman Aguilar  MRN:          [id number]    Procedure:     Insertion of a  TRIPLE Lumen  5 fr  Bard SOLO (valved) Power PICC, Lot number [id number]    Indications: Medication Drips    Contraindications : RUE Swelling    Procedure Details:    Patient identified with 2 identifiers and \"Time Out\" conducted.    Central line insertion bundle followed: Hand hygiene performed prior to procedure, site cleansed with Cholraprep (CHG), hat, mask, sterile gloves, sterile gown worn, patient draped with maximum barrier head to toe drape, sterile field maintained.    The left upper arm previously accessed BASILIC vein was assessed by ultrasound and found to be anechoic, compressible, patent, and of adequate size.     Lidocaine 1% 2.5 ml administered SQ to the insertion site.     PICC was advanced through peel-away sheath.    Catheter threaded without difficulty. The tip of the catheter was positioned in the SVC. Good blood return noted.    Catheter was flushed with 30 cc normal saline.     Catheter secured with Statlock, tissue adhesive (on insertion site only), Biopatch (CHG), and Tegaderm dressing applied.    The sharps that are included in the PICC insertion kit were accounted for and disposed of in the sharps container prior to breakdown of the sterile field.    CLABSI prevention brochure left at bedside.    Patient  tolerated procedure well.     Patient's primary RN notified PICC is ready for use.      Findings:    Total catheter length  47 cm, with 0 cm exposed. Mid upper arm circumference is 30 cm.       Tip placement verified by xray due to AFIB. Xray read by Dr. Allen . Tip placement: \"Left upper extremity PICC terminates at the SVC - RA junction.\"    Comments:  None        Sushant Key, MSN, RN, VA-BC   Vascular Access - [facility]        "

## 2023-07-18 NOTE — PROGRESS NOTES
Clinic Care Coordination Contact  Care Team Conversations    JEFFREY CC performed chart review for monthly outreach and noted the pt is currently admitted. JEFFREY CC will monitor for discharge and outreach then.     Erin Brannon [facility]  Social Work Care Coordinator - [facility]  Care Coordination  [email or web address]  Cell Phone: [phone]  Gender pronouns: she/her  Employed by [employer]

## 2023-07-18 NOTE — PROGRESS NOTES
[facility]:  CRITICAL CARE PROGRESS NOTE     Date / Time of Admission:  7/17/2023 12:47 PM    ID: Norman Aguilar is a 63 year old male with essential hypertension, coronary artery disease/NSTEMI on Brillinta since 08/2022, chronic systolic CHF (LVEF 30-35% on TTE 8/25/22), essential hypertension, CKD stage III, history of left foot osteomyelitis status post left BKA 4/4/2022, insulin-dependent diabetes mellitus type 2 (HgbA1c 6.9% 7/17/23) who presented to [facility] ED with multiple symptoms including weakness, dehydration, scalp pain/burning, and chronic R arm pain, found to have atrial fibrillation with RVR (HR 160s-180s) and treated with IV metoprolol, now admitted to the ICU for circulatory/distributive shock with suspected sepsis (source unknown), JOHANA, uncontrolled diabetes requiring insulin infusion, and NSTEMI. TTE 7/18/23 showing worsening reduced LVEF 25-30%.          Changes for today: 1.   TTE today with LVEF now 25-30%, IVC with > 50% respiratory variation  2. PICC line was partially dislodged - RN to call PICC team for replacement.  3. Duplex US RUE negative for DVT.  4. Cardiology consult appreciated - plan for angiogram at some point when stable  5. Concern from Cards that patient may be hypervolemic, but he is still hypovolemic with sepsis. Echo also showing respiratory variation with IVC, less concerning for volume overload. In addition, did get contrast load from CTA 7/17 and thus will need hydration due to JOHANA. Restart NS infusion @ 75/hr.  6. Atrial fibrillation with intermittent RVR - initiated amiodarone gtt.   7. Recheck blood cultures in AM  8. Urinary retention (1000 mL) - serial bladder scans, may need thakur        Assessment/Plan:   Neurologic: No issues.      Pain control: As needed     Pulmonary: Spiculated lung nodule.  Remains on room air.  VBG consistent with metabolic acidosis. CTA chest 7/17 showing: Calcified granuloma. Scattered atelectasis.  Spiculated appearing lung nodule in the right lower lobe measuring 1.0 x 0.9 cm on series 11 image 166. Punctate nodules in left lung base. No pleural effusion or pneumothorax.    Wean supplemental O2 as tolerated; goal O2 sat > 92%.  HOB > 30 degrees to limit aspiration risk.      Pulmonary hygiene: Incentive spirometry, OOB to chair, PT/OT when able    Once clinical status improved, will need to discuss repeat chest imaging for spiculated nodule.     Cardiovascular: Distributive/septic shock requiring vasopressors, chronic systolic CHF without exacerbation, diffuse coronary artery disease with history of non-STEMI 08/2022, elevated troponin with NSTEMI, paroxysmal atrial fibrillation with RVR, dyslipidemia.  Patient presented to the ED in atrial fibrillation with RVR, HR 160s to 180s, treated with metoprolol IV and responded well.  However, has become progressively hypotensive, suspect distributive shock and hypovolemia despite elevated BNP and troponin.  Denies any chest pain, give concern for non-STEMI.  Has extensive coronary artery history that is not amenable to surgical intervention.  Last cardiac angiogram 08/2022.  On metoprolol, lisinopril, furosemide at baseline.  Also on Brilinta since 08/2022, uncertain timeline in which to stop therapy (? 6 months per discharge summary at that time). TTE 7/17/23 with reduced LVEF 25-30%, IVC > 50% with respiratory variation, technically difficult study.    Cardiac monitoring.  SBP >= 90 mmHg, MAP >= 65 mmHg.  EKG PRN.    Norepinephrine gtt as needed for BP goals. Transitioned off vasopressin gtt.     Hold home PTA antihypertensives. Continue aspirin, Brilinta.    Hold statin for now, monitor LFTs.  Holding diuresis with JOHANA.     Continue heparin infusion. Appreciate Cardiology recommendations.      GI/: Abnormal LFTs, transaminitis.  Mild elevation in AST.    Monitor LFTs    Nutrition: Clear liquid diet for now while correcting hyperglycemia.  Will advance once off  insulin gtt.     Last BM: None recorded.  Meds: None.    GI prophylaxis: Not indicated.     Renal: Acute kidney injury on CKD stage III, metabolic/lactic acidosis, electrolytes derangements (hyponatremia, hypochloremia).  Insetting of dehydration, hemodynamic lability.  Has improved with volume resuscitation.  Received NS 2000 mL in ED.  Lactic acid 9.0 => 4.1 => 1.6. CTA C/A/P study 7/17 with underlying JOHANA, so at risk of worsening renal status.     Monitor I/O's.  Electrolyte repletion PRN.  Avoid/limit nephrotoxic agents.    IVFs: Continue NS at 75 mL/hr.  Monitor for volume overload.    Electrolyte panel every 4 hour.       Heme/Onc: Leukocytosis, unspecified.  Normocytic anemia.      DVT prophylaxis: SCDs, heparin infusion.     Endocrine: Insulin-dependent diabetes mellitus type 2 with hyperglycemia.  Hemoglobin A1c 6.9% on 7/17/2023.  At baseline is on glargine 38 units nightly, aspart sliding scale.  Patient with acidosis and hyperglycemia on presentation, ketones normal.    Regular insulin infusion per protocol.  Hypoglycemia protocol.    Once insulin infusion rate stable, will switch to subcutaneous insulin.     Infectious diseases: Septic shock with GPC bacteremia (Strep pyogenes).  Presentation appears consistent with sepsis in the setting of JOHANA, dehydration/distributive shock.  Patient received vancomycin and Zosyn IV in the ED. Blood cultures with 3/4 bottles + GPC in chains/pairs. Verigene panel reports Strep pyogenes. .80 (7/17), PCT 29.16 (7/16)    Follow-up blood cultures.  Repeat in AM.    Continue empiric antibiotics with Zosyn IV (start 7/17).     Rheum/Musculoskeletal: History of left lower extremity osteomyelitis status post left BKA 04/2022.    Activity: Bedrest.    RISK FACTORS PRESENT ON ADMISSION:  Clinically Significant Risk Factors Present on Admission        # Hyperkalemia: Highest K = 5.4 mmol/L in last 2 days, will monitor as appropriate  # Hyponatremia: Lowest Na = 119 mmol/L  "in last 2 days, will monitor as appropriate     # Anion Gap Metabolic Acidosis: Highest Anion Gap = 25 mmol/L in last 2 days, will monitor and treat as appropriate  # Hypoalbuminemia: Lowest albumin = 3.1 g/dL at 7/17/2023  1:00 PM, will monitor as appropriate   # Drug Induced Platelet Defect: home medication list includes an antiplatelet medication   # Hypertension: Noted on problem list  # Chronic heart failure with reduced ejection fraction: last echo with EF <40%  # Circulatory Shock: currently requiring pressors for blood pressure support    # DMII: A1C = 6.9 % (Ref range: <5.7 %) within past 6 months   # Overweight: Estimated body mass index is 25.68 kg/m  as calculated from the following:    Height as of this encounter: 1.88 m (6' 2\").    Weight as of this encounter: 90.7 kg (200 lb).              Lines/Drains/Tubes:  LUE PICC central line, placed 7/17     Code Status:  Full Code     The patient and/or the family was educated about the above plan of care and indicated understanding.  Updated the patient's sister Henna, all questions answered. She was able to speak with the Case Management team.     This patient is considered critically ill and requires ICU level of care due to circulatory/septic shock on vasopressors.     Total Critical Care time, not including separate billable procedure time: 45 minutes.    Shilpa Hale MD, MPH  Pulmonary/Critical Care Medicine  07/18/2023   1:47 PM         ICU DAILY CHECKLIST:   ICU DAILY CHECKLIST           Can patient transfer out of MICU? no    FAST HUG:    Feeding:  yes.  Patient is receiving ORAL    Garcia: no  Analgesia/Sedation: no; PRNs   Thromboembolic prophylaxis: yes; Mode:  Heparin and SCDs  HOB>30:  yes  Stress Ulcer Protocol Active: yes; Mode: PPI  Glycemic Control: Any glucose > 180 yes; Mode of Insulin Therapy: Insulin gtt    INTUBATED:  Can patient have daily waking:  n/a  Can patient have spontaneous breathing trial:  n/a    Restraints? no    PHYSICAL " "THERAPY AND MOBILITY:  Can patient have PT and mobility trial: no  Activity: Bedrest        Subjective/Interval History:   7/17: Presented to ED with multiple symptoms including weakness, dehydration, scalp pain/burning, and chronic R arm pain, found to have atrial fibrillation with RVR  (HR 160s-180s) and treated with IV metoprolol. Developed hypotension, PICC placed + NE gtt. Zosyn+ Vanco. CTA C/A/P with localized edema to RUE, no dissection but showed Subcutaneous inflammation in the right axilla noted.  Started insulin gtt.    Overnight, duplex US done and negative for DVT to RUE  Started on vasopressin gtt overnight, then stopped before morning    Fatigued, no chest pain.  Wants to rest.     Review of Systems:  The Review of Systems is negative other than noted in the HPI        Allergies/Medications:   Allergies:   No Known Allergies    Continuous Infusions:    amiodarone       amiodarone       dextrose       heparin 1,250 Units/hr (07/18/23 1244)     insulin regular 2 Units/hr (07/18/23 1223)     norepinephrine Stopped (07/18/23 1015)     vasopressin Stopped (07/18/23 0311)     Scheduled Medications:    amiodarone  150 mg Intravenous Once     aspirin  81 mg Oral Daily     magnesium oxide  400 mg Oral Daily     multivitamin, therapeutic  1 tablet Oral Daily     piperacillin-tazobactam  3.375 g Intravenous Q8H     sodium chloride (PF)  10-40 mL Intracatheter Q7 Days     sodium chloride (PF)  3 mL Intracatheter Q8H     ticagrelor  90 mg Oral BID     vitamin C  500 mg Oral Daily           Objective:   Vitals:  /55   Pulse 104   Temp 98.4  F (36.9  C) (Axillary)   Resp 28   Ht 1.88 m (6' 2\")   Wt 90.7 kg (200 lb)   SpO2 100%   BMI 25.68 kg/m    Vent: FiO2 (%): 21 %  Resp: 28    GEN: Fatigued groggy.   HEENT: Normocephalic, atraumatic.  Extraoccular eye movements intact, anicteric sclera. Moist mucous membranes. Face slightly flushed. L eye with mild puffiness.   NECK: Supple.    PULM: Non-labored " breathing.  No use of accessory muscles.  Clear to ausculation bilaterally.   CVS: Irregularly irregular rhythm.  Normal S1, S2.  No rubs, murmurs, or gallops.    ABDOMEN: Normoactive bowel sounds.  Non-tender to palpation.  Non-distended.    EXTREMITES:  No clubbing, cyanosis. S/p left BKA. RUE shoulder/arm with edema, no erythema.  NEURO:  Sleepy/fatigued. Oriented.  Cranial nerves 2-12 grossly intact.  Moving all extremities.      Intake/Output: I/O last 3 completed shifts:  In: 3085.88 [P.O.:1680; I.V.:1405.88]  Out: 1000 [Urine:1000]        Pertinent Studies:   All laboratory data reviewed  Serum Glucose range:   Recent Labs   Lab 07/18/23  1333 07/18/23  1223 07/18/23  1125 07/18/23  1030   * 129* 139* 128*     ABG: No lab results found in last 7 days.  CBC:   Recent Labs   Lab 07/18/23  0414 07/17/23  1302   WBC 8.4 14.5*   HGB 12.5* 13.2*   HCT 35.8* 38.5*   MCV 80 83    247   NEUTROPHIL  --  87   LYMPH  --  3   MONOCYTE  --  7   EOSINOPHIL  --  0     Chemistry:   Recent Labs   Lab 07/18/23  1005 07/18/23  0414 07/18/23  0153 07/17/23  2316 07/17/23  1425 07/17/23  1300   * 124*  124* 124*   < > 121* 119*   POTASSIUM 4.0 4.0  4.0 3.6   < > 5.4* 4.6   CHLORIDE 92* 93*  93* 93*   < > 86* 80*   CO2 16* 16*  16* 16*   < > 15* 14*   BUN  --  70.6*  --   --  63.1* 64.9*   CR  --  3.14*  --   --  2.94* 3.25*   GFRESTIMATED  --  21*  --   --  23* 20*   YOSVANY  --  8.4*  --   --  8.4* 9.2   MAG  --   --   --   --  1.9  --    PROTTOTAL  --   --   --   --   --  7.2   ALBUMIN  --   --   --   --   --  3.1*   AST  --   --   --   --   --  92*   ALT  --   --   --   --   --  33   ALKPHOS  --   --   --   --   --  103   BILITOTAL  --   --   --   --   --  1.7*    < > = values in this interval not displayed.     Coags:  No results for input(s): INR, PROTIME, PTT in the last 168 hours.    Invalid input(s): APTT  Cardiac Markers:  No results for input(s): CKTOTAL, TROPONINI in the last 168 hours.      Latest  Reference Range & Units 11/14/21 21:49 03/18/22 11:26 09/27/22 14:18 09/28/22 14:17 07/17/23 13:00 07/17/23 13:02 07/17/23 14:25 07/17/23 15:49 07/17/23 19:08 07/17/23 21:08 07/17/23 23:16 07/18/23 04:14 07/18/23 11:55   CRP Inflammation <5.00 mg/L       297.80 (H)         Lactic Acid 0.7 - 2.0 mmol/L 1.8 0.9 1.1   9.0 (HH)  4.1 (HH) 3.7 (H)  4.3 (HH) 4.0 (HH) 1.6   N-Terminal Pro BNP Inpatient 0 - 900 pg/mL    3,295 (H) 8,551 (H)           Procalcitonin <0.05 ng/mL       29.16 (HH)         Troponin T, High Sensitivity <=22 ng/L     216 (HH)     282 (HH) 353 (HH) 415 (HH)    (HH): Data is critically high  (H): Data is abnormally high    Microbiology:  Blood cultures x 2, 7/17: 3 of 4 bottles with Gram positive cocci in pairs and chains         Cardiology/Radiology:   Cardiac: All cardiac studies reviewed by me.    EKG:  Reviewed    TTE, 7/18/23:  Summary: Left ventricular function is decreased. The ejection fraction is 25-30% (severely reduced). The left atrium is mildly dilated. IVC diameter <2.1 cm collapsing >50% with sniff suggests a normal RA pressure of 3 mmHg. TAPSE is normal, which is consistent with normal right ventricular systolic Function. The patient exhibited frequent PVCs. No hemodynamically significant valvular abnormalities on 2D or color flow imaging. The study was technically difficult.    Radiology:  All imaging studies reviewed by me.    Chest X-Ray, 7/18/23:  Left PICC tip in upper SVC. Lungs clear. Heart size normal. No pneumothorax.    CTA chest/abdomen/pelvis with IV contrast, 7/17: FINDINGS: CTA CHEST, ABDOMEN, PELVIS: Normal great vessel branch pattern with mild disease of the subclavian artery. Normal caliber thoracic aorta. Abdominal aorta is widely patent without stenosis or dissection. Moderate calcified atherosclerotic disease without significant visceral arterial stenosis.  RIGHT LEG: Iliac arteries appear patent. Due to contrast bolus timing, difficult to evaluate the superficial  femoral artery although this appears grossly patent. Severe infrapopliteal disease. LEFT LEG: Postsurgical changes of left below-knee amputation. Iliac arteries, common femoral artery, profunda femoral artery, superficial femoral artery all appear patent. NONVASCULAR FINDINGS: LUNG/PLEURA: Calcified granuloma. Scattered atelectasis. Spiculated appearing lung nodule in the right lower lobe measuring 1.0 x 0.9 cm on series 11 image 166. Punctate nodules in left lung base. No pleural effusion or pneumothorax. MEDIASTINUM: Severe coronary artery disease with coronary artery stents. Questionable left atrial appendage thrombus. Inflammatory changes in the right axilla. ABDOMEN: Suboptimal evaluation of the solid organs due to the arterial phase of contrast enhanced. Cholelithiasis. Spleen, adrenal glands, kidneys, and pancreas are grossly unremarkable. Hepatic steatosis. PELVIS: No abnormally dilated loops of bowel. No free fluid or free air. 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 Postsurgical changes of left below-knee amputation. IMPRESSION: 1.  Right lower extremity arteries appear largely patent with severe infrapopliteal arterial disease. Evaluation of the superficial femoral artery is difficult due to contrast bolus timing. Recommend Doppler arterial ultrasound if there is continued  clinical concern for embolic disease. 2.  Questionable left atrial appendage thrombus. Recommend echocardiogram for further evaluation. 3.  Spiculated pulmonary nodule in the right lower lobe measuring 1.0 x 0.9 cm. Recommend follow-up per Fleischner Society criteria as described below. 4.  Subcutaneous inflammation in the right axilla noted. Consider right upper extremity Doppler to rule out a DVT. May also represent an infectious process. 5.  Cholelithiasis. 6.  Hepatic steatosis.

## 2023-07-19 NOTE — PROGRESS NOTES
[facility] - ICU    RN Progress Note:            Pertinent Assessments:      Please refer to flowsheet rows for full assessment     Pt drowsy and slow to response, remained bedrest.            Key Events - This Shift:   - insulin gtt transit to sliding scale.  - Levo and Vaso stopped, start on low does of Mohan to maintain MAP > 65  -Right shoulder blister and edematous- MRI has been completed.   - Heparin gtt managed per protocol   - Neph consulted. Low urine output and JOHANA on chronic.   - Amiodarone at 0.5, tele remained A-FIB with RVR.       SJN SAT (Sedation Awakening Trial): For use ONLY if intubated    SAT Safety Screen FAILED   If FAILED why?    SAT Performed FAILED   If FAILED why?               Barriers to Discharge / Downgrade:     Requiring pressers.          Point of Contact Update YES-OR-NO: Yes  Name:Henna   Phone Number: see chart   Summary of Conversation: discuses current plan of care.

## 2023-07-19 NOTE — PROGRESS NOTES
HEART CARE NOTE          Assessment/Recommendations     1. HFrEF/ICM c/b circulatory shock  Assessment / Plan    Hypervolemic on physical exam; will give IV albumin given degree of 3rd spacing and intravascular volume depletion requiring intermittent pressor support - will need gentle diuresis but will hold for now given hemodynamic instability; plan to start diuresis tomorrow if not alter today depending on hemodynamics    Continue pressor support - wean as tolerated    GDMT on hold given the above      2. NSTEMI  Assessment / Plan    Hx of coronary angiogram significant for severe multi-vessel CAD during admission in 11/21 ; CTS/Pa consulted with plans for CABG in 2-3 months at that time; patient was readmitted before CABG with notable progression of RCA lesion CTS was consulted once again and declined to offer surgical intervention at which time the patient underwent complex PCI with JESSICA x 2 to the prox and distal RCA    He now presents approx 1 year later with an NSTEMI; will plan to proceed with coronary angiogram +/- PCI once there is no longer concern for sepsis/infection     Continue ASA, and ticagrelor    Repeat echo pending      3. Septic shock  Assessment / Plan    Management/antibiotics and supportive care per ICU team     4. DM2  Assessment / Plan    Management per primary team     5. Paroxysmal afib  Assessment / Plan    Currently in RVR - continue amiodarone load    Continue heparin gtt       6. JOHANA on CKD  Assessment / Plan    In the setting of circulatory shock and renal hypoperfusion    Continue to monitor renal function closely    Patient remains critically ill in the ICU requiring hemodynamic support via IV vasopressors combined distributive and cardiogenic shock. 50 minutes spent on critical care.      History of Present Illness/Subjective      Mr. Norman Aguilar is a 62 year old male with a PMHx significant for CAD, CHF, HTN, IDDM, paroxysmal atrial fibrillation, left foot  "osteomyelitis failure of antibiotics s/p left BKA, admitted on 8/23/2022 with symptoms concerninf for stroke. Troponin was noted to be elevated.     Today, Mr. Thomas denies any acute cardiac events or complaints; Management plan as detailed above.      ECG: Personally reviewed. sinus tachycardia, occasional PVC noted, unifocal.     ECHO (personnaly Reviewed):  The left ventricle is normal in size with normal left ventricular wall  thickness.  Left ventricular function is decreased. The ejection fraction is 30-35%  (moderately reduced).  Mildly decreased right ventricular systolic function  Moderate biatrial enlargement  No hemodynamically significant valve disease  IVC diameter >2.1 cm collapsing <50% with sniff suggests a high RA pressure  estimated at 15 mmHg or greater.  Compared to prior study, there is no significant change.          Physical Examination Review of Systems   BP (!) 89/52   Pulse (!) 124   Temp 98.4  F (36.9  C) (Axillary)   Resp 15   Ht 1.88 m (6' 2\")   Wt 90.7 kg (200 lb)   SpO2 100%   BMI 25.68 kg/m    Body mass index is 25.68 kg/m .  Wt Readings from Last 3 Encounters:   07/17/23 90.7 kg (200 lb)   11/17/22 100.2 kg (221 lb)   09/28/22 104.3 kg (230 lb)     General Appearance:   no distress, normal body habitus   ENT/Mouth: membranes moist, no oral lesions or bleeding gums.      EYES:  no scleral icterus, normal conjunctivae   Neck: no carotid bruits or thyromegaly   Chest/Lungs:   lungs are clear to auscultation, no rales or wheezing, equal chest wall expansion    Cardiovascular:   Irregular. Normal first and second heart sounds withno murmurs, rubs, or gallops; the carotid, radial and posterior tibial pulses are intact, + JVD and mild LE bilaterally    Abdomen:  no organomegaly, masses, bruits, or tenderness; bowel sounds are present   Extremities: no cyanosis or clubbing   Skin: no xanthelasma, warm.    Neurologic: NAD     Psychiatric: alert and oriented x3, calm     A complete " 10 systems ROS was reviewed  And is negative except what is listed in the HPI.          Medical History  Surgical History Family History Social History   Past Medical History:   Diagnosis Date     Anemia      Chronic systolic CHF (congestive heart failure) (H)      Coronary artery disease      Diabetic neuropathy (H)      DM type 2 w Neuropathy      GERD (gastroesophageal reflux disease)      Hyperlipidemia      Hypertension      Intermittent atrial fibrillation (H)     on Eliquis     Ischemic cardiomyopathy 11/16/2021    Echo with EF of 30-35%     NSTEMI (non-ST elevated myocardial infarction) (H) 11/15/2021     Osteomyelitis of left foot (H) 04/04/2022     Renal disease      Retinopathy      Sleep disturbance     Past Surgical History:   Procedure Laterality Date     AMPUTATE FOOT Left 3/21/2022    Procedure: Guillotine AMPUTATION, FOOT;  Surgeon: Ronald Bhatt MD;  Location: [facility] OR     AMPUTATE LEG BELOW KNEE Left 4/4/2022    Procedure: LEFT BELOW  KNEE AMPUTATION;  Surgeon: Ronald Bhatt MD;  Location: [facility] OR     AMPUTATE TOE(S) Left 11/16/2021    Procedure: first and second ray amputation;  Surgeon: Eddi Ram DPM;  Location: [facility] OR     APPENDECTOMY       CV CORONARY ANGIOGRAM N/A 11/16/2021    Procedure: CV CORONARY ANGIOGRAM;  Surgeon: Pam Tabares MD;  Location: [facility] LAB CV     CV CORONARY ANGIOGRAM N/A 8/24/2022    Procedure: CV CORONARY ANGIOGRAM;  Surgeon: Cipriano Lucas MD;  Location: [facility] CATH LAB CV     CV CORONARY ANGIOGRAM N/A 8/29/2022    Procedure: CV CORONARY ANGIOGRAM;  Surgeon: Cipriano Lucas MD;  Location: [facility] LAB CV     CV INTRAVASULAR ULTRASOUND N/A 8/29/2022    Procedure: Intravascular Ultrasound;  Surgeon: Cipriano Lucas MD;  Location: [facility] LAB CV     CV LEFT HEART CATH N/A 11/16/2021    Procedure: Left Heart Cath;  Surgeon: Pam Tabares MD;  Location: [facility] LAB CV     CV PCI N/A  8/29/2022    Procedure: Percutaneous Coronary Intervention stent;  Surgeon: Cipriano Lucas MD;  Location: [facility] CV     CV PCI ANGIOPLASTY N/A 8/29/2022    Procedure: Percutaneous Transluminal Angioplasty;  Surgeon: Cipriano Lucas MD;  Location: [facility] LAB CV     FOOT SURGERY Left      HERNIA REPAIR       INCISION AND DRAINAGE LOWER EXTREMITY, COMBINED Left 11/16/2021    Procedure: INCISION AND DRAINAGE, left foot;  Surgeon: Eddi Ram DPM;  Location: [facility] OR     INCISION AND DRAINAGE LOWER EXTREMITY, COMBINED Left 11/19/2021    Procedure: INCISION AND DRAINAGE, left foot;  Surgeon: Eddi Ram DPM;  Location: [facility] OR     INCISION AND DRAINAGE LOWER EXTREMITY, COMBINED Left 12/9/2021    Procedure: INCISION AND DRAINAGE, Left foot;  Surgeon: Eddi Ram DPM;  Location: [facility] OR     INCISION AND DRAINAGE LOWER EXTREMITY, COMBINED Left 1/10/2022    Procedure: INCISION AND DRAINAGE, left foot;  Surgeon: Eddi Ram DPM;  Location: [facility] OR     INCISION AND DRAINAGE LOWER EXTREMITY, COMBINED Left 1/27/2022    Procedure: INCISION AND DRAINAGE, Left foot;  Surgeon: Eddi Ram DPM;  Location: [facility] OR     PICC TRIPLE LUMEN PLACEMENT  7/17/2023          PICC TRIPLE LUMEN PLACEMENT  7/18/2023         no family history of premature coronary artery disease Social History     Socioeconomic History     Marital status: Single     Spouse name: Not on file     Number of children: Not on file     Years of education: Not on file     Highest education level: Not on file   Occupational History     Not on file   Tobacco Use     Smoking status: Former     Types: Cigars     Smokeless tobacco: Never     Tobacco comments:     Cigars   Vaping Use     Vaping Use: Never used   Substance and Sexual Activity     Alcohol use: Yes     Comment: < 1 drink a month (only holidays)     Drug use: Never     Sexual activity: Not on  file   Other Topics Concern     Parent/sibling w/ CABG, MI or angioplasty before 65F 55M? Not Asked   Social History Narrative    Single, currently not working, has done numerous jobs, lives alone.  Full code. (last updated 4/4/2022)      Social Determinants of Health     Financial Resource Strain: Not on file   Food Insecurity: Not on file   Transportation Needs: Not on file   Physical Activity: Not on file   Stress: Not on file   Social Connections: Not on file   Intimate Partner Violence: Not on file   Housing Stability: Not on file           Lab Results    Chemistry/lipid CBC Cardiac Enzymes/BNP/TSH/INR   Lab Results   Component Value Date    CHOL 157 03/24/2023    HDL 65 03/24/2023    TRIG 60 03/24/2023    BUN 75.3 (H) 07/19/2023     (L) 07/19/2023    CO2 15 (L) 07/19/2023    Lab Results   Component Value Date    WBC 8.4 07/18/2023    HGB 12.5 (L) 07/18/2023    HCT 35.8 (L) 07/18/2023    MCV 80 07/18/2023     07/18/2023    Lab Results   Component Value Date    TROPONINI 5.84 (HH) 08/30/2022     (H) 08/24/2022    INR 1.19 (H) 08/24/2022     Lab Results   Component Value Date    TROPONINI 5.84 (HH) 08/30/2022          Weight:    Wt Readings from Last 3 Encounters:   07/17/23 90.7 kg (200 lb)   11/17/22 100.2 kg (221 lb)   09/28/22 104.3 kg (230 lb)       Allergies  No Known Allergies      Surgical History  Past Surgical History:   Procedure Laterality Date     AMPUTATE FOOT Left 3/21/2022    Procedure: Guillotine AMPUTATION, FOOT;  Surgeon: Ronald Bhatt MD;  Location: [facility] OR     AMPUTATE LEG BELOW KNEE Left 4/4/2022    Procedure: LEFT BELOW  KNEE AMPUTATION;  Surgeon: Ronald Bhatt MD;  Location: [facility] OR     AMPUTATE TOE(S) Left 11/16/2021    Procedure: first and second ray amputation;  Surgeon: Eddi Ram DPM;  Location: [facility] OR     APPENDECTOMY       CV CORONARY ANGIOGRAM N/A 11/16/2021    Procedure: CV CORONARY ANGIOGRAM;  Surgeon: Raysa  MD Pam;  Location: [facility] LAB CV     CV CORONARY ANGIOGRAM N/A 8/24/2022    Procedure: CV CORONARY ANGIOGRAM;  Surgeon: Cipriano Lucas MD;  Location: [facility] CATH LAB CV     CV CORONARY ANGIOGRAM N/A 8/29/2022    Procedure: CV CORONARY ANGIOGRAM;  Surgeon: Cipriano Lucas MD;  Location: [facility] CATH LAB CV     CV INTRAVASULAR ULTRASOUND N/A 8/29/2022    Procedure: Intravascular Ultrasound;  Surgeon: Cipriano Lucas MD;  Location: [facility] CATH LAB CV     CV LEFT HEART CATH N/A 11/16/2021    Procedure: Left Heart Cath;  Surgeon: Pam Tabares MD;  Location: [facility] LAB CV     CV PCI N/A 8/29/2022    Procedure: Percutaneous Coronary Intervention stent;  Surgeon: Cipriano Lucas MD;  Location: [facility] LAB CV     CV PCI ANGIOPLASTY N/A 8/29/2022    Procedure: Percutaneous Transluminal Angioplasty;  Surgeon: Cipriano Lucas MD;  Location: [facility] LAB CV     FOOT SURGERY Left      HERNIA REPAIR       INCISION AND DRAINAGE LOWER EXTREMITY, COMBINED Left 11/16/2021    Procedure: INCISION AND DRAINAGE, left foot;  Surgeon: Eddi Ram DPM;  Location: [facility] OR     INCISION AND DRAINAGE LOWER EXTREMITY, COMBINED Left 11/19/2021    Procedure: INCISION AND DRAINAGE, left foot;  Surgeon: Eddi Ram DPM;  Location: [facility] OR     INCISION AND DRAINAGE LOWER EXTREMITY, COMBINED Left 12/9/2021    Procedure: INCISION AND DRAINAGE, Left foot;  Surgeon: Eddi Ram DPM;  Location: [facility] OR     INCISION AND DRAINAGE LOWER EXTREMITY, COMBINED Left 1/10/2022    Procedure: INCISION AND DRAINAGE, left foot;  Surgeon: Eddi Ram DPM;  Location: [facility] OR     INCISION AND DRAINAGE LOWER EXTREMITY, COMBINED Left 1/27/2022    Procedure: INCISION AND DRAINAGE, Left foot;  Surgeon: Eddi Ram DPM;  Location[facility]     PICC TRIPLE LUMEN PLACEMENT  7/17/2023          PICC TRIPLE LUMEN PLACEMENT  7/18/2023             Social History  Tobacco:   History   Smoking Status     Former     Types: Cigars   Smokeless Tobacco     Never    Alcohol:   Social History    Substance and Sexual Activity      Alcohol use: Yes        Comment: < 1 drink a month (only holidays)   Illicit Drugs:   History   Drug Use Unknown       Family History  No family history on file.       Russel Arredondo MD on 7/19/2023      cc: Jaquan Dickerson

## 2023-07-19 NOTE — CONSULTS
NEPHROLOGY CONSULTATION    Norman Aguilar  [address] [address]  [phone] (home)   63 year old male  [id number]  PMD: Jaquan Dickerson    CC: I was asked to see Norman Aguilar by Dr. Hale to evaluate his JOHANA.    ASSESSMENT AND RECOMMENDATIONS:  1. JOHANA: pre-renal vs ATN in setting of profound hypotension with sepsis and hypovolemia. +bacertemia based 7/17. IV contrast with CTA on 7/17 without hydronephrosis. UA on 7/18 very concentrated suggesting intact tubular function, although granular casts present.   -No indication for HD at this time   -Check UA, UPCR   -Pressor support per ICU team   -Bicarb gtt as below(appears hypovolemic)   -Daily renal function labs, wts, I/Os    2. Hypotension: hypovolemia, sepsis likely contributing.    -Pressors and renally dosed abx per ICU team   -Increase Bicarb gtt given hypovolemia and acidosis(carefull given CHF)    3. Metabolic acidosis: due to JOHANA.    -Bicarb gtt and trend labs again in am.    4. HFrEF: EF of 25-30% based on 7/18 echo which also showed normal RVF, normal RA pressures.    -Careful IVF   -Dr. Arredondo following    5. Afib: rate control per primary    6. NSTEMI: per Dr. Arredondo.     7. CKD Stage 3a: baseline creatinine range of 1.4-1.7. In the setting of DM, HTN, CHF.   -Check serum immunofixation, renal US    8. DM: per primary.    9. Hyponatremia: in setting of JOHANA, hypovolemia.    -Check urine osmol, urine sodium   -IVF as above   -Repeat sodium in am.    10. Bacteremia: Gram positive cocci in pairs and chains unclear source.    -Renally dose abx per ICU team      I called his sister today and discussed his care.   JOHANA, Acidosis discussed with Dr. Hale. Increasing bicarb gtt.       Sudhakar Marin MD  [facility] Office: [phone]    HPI: Norman Aguilar is a 62 year old man who I am asked to see for management of JOHANA. He has a complex medical history including CKD(basline creatinine  range of 1.4-1.7mg/dl), DM, HTN, CHF, Afib, history of osteo. He was admitted on 7/17 with weakness. He was found to be in afib with rvr. He was treated metoprolol and then amio. Trops elevated and dx with NSTEMI. Cards following. IV started. Bicarb gtt started today. Abx ongoing for suspected sepsis. CXR clear  Creatinine up to 3.88 today. Sodium down to 128. K normal(replaced earlier as was 2.2). Bicarb low at 15(was 10).   No recent NSAIDS. No history of kidney stones. Sister had CKD.  He reports fatigue and generally not feeling well. He denies fever, cp, sob, nausea, and edema.     ROS: Other than above, a comprehensive ROS was negative.        PMH:  Patient Active Problem List   Diagnosis     DM type 2 on insulin, w Neuro -- Hgb A1C 7.6 on 1/19/22     Benign essential hypertension     Benign neoplasm of descending colon     Gastro-esophageal reflux disease with esophagitis     Microalbuminuria     Hyperlipidemia     PAF (paroxysmal atrial fibrillation) -- on Eliquis      Ischemic cardiomyopathy     CAD -- diffuse calcified vessels 11/16/21 (no stents placed)     DVT (deep venous thrombosis) (H)     Chronic kidney disease (CKD) stage G3a/A1, moderately decreased glomerular filtration rate (GFR) between 45-59 mL/min/1.73 square meter and albuminuria creatinine ratio less than 30 mg/g (H)     Diabetic ulcer of left midfoot associated with type 2 diabetes mellitus, with necrosis of bone (H)     Constipation     Traumatic amputation of toe or toes without complication (H)     Osteomyelitis of left foot -- S/P Left BKA 4/4/22     Amputation of left foot (H)     Chronic systolic CHF -- EF 30-35% on Echo 11/15/21     Smoker (Cigars)     NSTEMI (non-ST elevated myocardial infarction) (H)     ARF (acute renal failure) (H)     Dehydration     Hyponatremia     Elevated troponin     JOHANA (acute kidney injury) (H)     Atrial fibrillation with RVR (H)     Septic shock (H)       [unfilled]    Allergies: No Known  "Allergies    Social History:   Social History     Socioeconomic History     Marital status: Single     Spouse name: Not on file     Number of children: Not on file     Years of education: Not on file     Highest education level: Not on file   Occupational History     Not on file   Tobacco Use     Smoking status: Former     Types: Cigars     Smokeless tobacco: Never     Tobacco comments:     Cigars   Vaping Use     Vaping Use: Never used   Substance and Sexual Activity     Alcohol use: Yes     Comment: < 1 drink a month (only holidays)     Drug use: Never     Sexual activity: Not on file   Other Topics Concern     Parent/sibling w/ CABG, MI or angioplasty before 65F 55M? Not Asked   Social History Narrative    Single, currently not working, has done numerous jobs, lives alone.  Full code. (last updated 4/4/2022)      Social Determinants of Health     Financial Resource Strain: Not on file   Food Insecurity: Not on file   Transportation Needs: Not on file   Physical Activity: Not on file   Stress: Not on file   Social Connections: Not on file   Intimate Partner Violence: Not on file   Housing Stability: Not on file         Family History: sister had CKD  No family history on file.       Physical Exam:  Vital signs:  Temp: 97.8  F (36.6  C) Temp src: Oral BP: 92/53 Pulse: 113   Resp: 24 SpO2: 99 % O2 Device: None (Room air) Oxygen Delivery: 2 LPM Height: 188 cm (6' 2\") Weight: 102.3 kg (225 lb 8.5 oz)  Estimated body mass index is 28.96 kg/m  as calculated from the following:    Height as of this encounter: 1.88 m (6' 2\").    Weight as of this encounter: 102.3 kg (225 lb 8.5 oz).       GENERAL: ill appearing, NAD in bed in the ICU  HEENT: NCAT, pupils equal, sclerae not icteric, MMM  NECK: Supple.Trachea midline.   LYMPHATIC: No cervical lymphadenopathy  LUNGS: no respiratory distress,  HEART: Irregular rhythm, tach rate, trace leg edema.   ABDOMEN: Soft, NT,   PSYCH: Alert, flat affect  NEURO:  sensation to light " touch intact  MUSC/SKEL: diminished muscle mass, left BKA  SKIN: No rash     Potassium (mmol/L)   Date Value   07/19/2023 3.6   07/19/2023 2.2 (LL)   07/19/2023 3.9   08/30/2022 3.8   08/29/2022 4.1   08/28/2022 4.1     Chloride (mmol/L)   Date Value   07/19/2023 94 (L)   08/30/2022 102     Urea Nitrogen (mg/dL)   Date Value   07/19/2023 80.4 (H)   08/30/2022 26 (H)     Albumin (g/dL)   Date Value   07/17/2023 3.1 (L)   08/25/2022 2.8 (L)     Hemoglobin (g/dL)   Date Value   07/18/2023 12.5 (L)   07/17/2023 13.2 (L)   09/27/2022 11.4 (L)   .    Above labs reviewed by me       Sudhakar Marin MD

## 2023-07-19 NOTE — CONSULTS
[facility]  WOC Nurse Inpatient Assessment     Consulted for: GURVINDER    Patient History (according to provider note(s):        Assessment:    Skin Injury Location: R axilla area    Skin injury due to: Blood blisters (unknown etiology)  Skin history and plan of care:   New injuries  Affected area:      Skin assessment: Blistering     Measurements (length x width x depth, in cm) 5 or more blood blisters noted in under arm area - largest is approximately 2 cm x 2 cm and the rest are approximately 0.5 cm x 0.5 cm. Small amount of older drainage noted on Covidien pad beneath arm. No drainage noted at this time.     Color: normal and consistent with surrounding tissue     Temperature  normal      Drainage: none .      Color: none      Odor: none  Pain: facial expression of distress, with movement of edematous arm  Pain interventions prior to dressing change: patient tolerated well and slow and gentle cares   Treatment goal: Maintain (prevention of deterioration)  STATUS: initial assessment  Supplies ordered: supplies stored on unit    Treatment Plan:     RUE/axilla - Every 3 days  May leave open to air if no drainage noted.  If drainage is occurring, cleanse with saline and gently dry.  Cover with Mepilex dressing.    Orders: Written    RECOMMEND PRIMARY TEAM ORDER: None, at this time  Education provided: plan of care  Discussed plan of care with: Patient  WOC nurse follow-up plan: weekly  Notify WOC if wound(s) deteriorate.  Nursing to notify the Provider(s) and re-consult the WOC Nurse if new skin concern.    DATA:     Current support surface: Standard  Standard gel/foam mattress (IsoFlex, Atmos air, etc)  Containment of urine/stool: Continent of bladder and Continent of bowel  BMI: Body mass index is 28.96 kg/m .   Active diet order: None     Output: I/O last 3 completed shifts:  In: 2921.31 [I.V.:2921.31]  Out: 243 [Urine:243]     Labs: Recent Labs   Lab 07/18/23  0414 07/17/23  1302  07/17/23  1302 07/17/23  1300   ALBUMIN  --   --   --  3.1*   HGB 12.5*   < > 13.2*  --    WBC 8.4   < > 14.5*  --    A1C  --   --  6.9*  --     < > = values in this interval not displayed.     Pressure injury risk assessment:   Sensory Perception: 3-->slightly limited  Moisture: 2-->very moist  Activity: 1-->bedfast  Mobility: 2-->very limited  Nutrition: 2-->probably inadequate  Friction and Shear: 3-->no apparent problem  Robin Score: 13    Ramandeep Kaur, MSN RN CWOCN  Pager no longer is use, please contact through "[employer]" group: [facility] [employer]

## 2023-07-19 NOTE — PROGRESS NOTES
[facility]:  CRITICAL CARE PROGRESS NOTE     Date / Time of Admission:  7/17/2023 12:47 PM    ID: Norman Aguilar is a 63 year old male with essential hypertension, coronary artery disease/NSTEMI on Brillinta since 08/2022, chronic systolic CHF (LVEF 30-35% on TTE 8/25/22), essential hypertension, CKD stage III, history of left foot osteomyelitis status post left BKA 4/4/2022, insulin-dependent diabetes mellitus type 2 (HgbA1c 6.9% 7/17/23) who presented to [facility] ED with multiple symptoms including weakness, dehydration, scalp pain/burning, and chronic R arm pain, found to have atrial fibrillation with RVR (HR 160s-180s) and treated with IV metoprolol, now admitted to the ICU for circulatory/distributive shock with suspected sepsis (source unknown), JOHANA, uncontrolled diabetes requiring insulin infusion, and NSTEMI. TTE 7/18/23 showing worsening reduced LVEF 25-30%.          Changes for today: 1.   Switch from norepi gtt to phenylephrine gtt due to tachycardia.   2. Keep amio gtt for now.   3. CT scan only showed edema and duplex US negative for DVT of RUE. Get MRI shoulder to evaluate for osteomyelitis or joint effusion, may need orthopedic involvement.   4. Stop NS infusion, started bicarb gtt @ 50 mL/hr  5. Nephrology consult  6. Start lantus 20 units qam, stop insulin gtt 2 hrs after lantus given.        Assessment/Plan:   Neurologic: No issues.  Slightly groggy but improved from prior.     Pain control: As needed     Pulmonary: Spiculated lung nodule.  Remains on room air.  VBG consistent with metabolic acidosis. CTA chest 7/17 showing: Calcified granuloma. Scattered atelectasis. Spiculated appearing lung nodule in the right lower lobe measuring 1.0 x 0.9 cm on series 11 image 166. Punctate nodules in left lung base. No pleural effusion or pneumothorax.    Wean supplemental O2 as tolerated; goal O2 sat > 92%.  HOB > 30 degrees to limit aspiration risk.      Pulmonary hygiene:  Incentive spirometry, OOB to chair, PT/OT when able    Once clinical status improved, will need to discuss repeat chest imaging for spiculated nodule.     Cardiovascular: Distributive/septic shock requiring vasopressors, chronic systolic CHF without exacerbation, diffuse coronary artery disease with history of non-STEMI 08/2022, elevated troponin with NSTEMI, paroxysmal atrial fibrillation with RVR, dyslipidemia.  Patient presented to the ED in atrial fibrillation with RVR, HR 160s to 180s, treated with metoprolol IV and responded well.  However, has become progressively hypotensive, suspect distributive shock and hypovolemia despite elevated BNP and troponin.  Denies any chest pain, give concern for non-STEMI.  Has extensive coronary artery history that is not amenable to surgical intervention.  Last cardiac angiogram 08/2022.  On metoprolol, lisinopril, furosemide at baseline.  Also on Brilinta since 08/2022, uncertain timeline in which to stop therapy (? 6 months per discharge summary at that time). TTE 7/17/23 with reduced LVEF 25-30%, IVC > 50% with respiratory variation, technically difficult study.    Cardiac monitoring.  SBP >= 90 mmHg, MAP >= 65 mmHg.  EKG PRN.    Switch from norepinephrine gtt to phenylephrine gtt for BP goals, TTE with normal RV.    Hold home PTA antihypertensives. Continue aspirin, Brilinta.    Hold statin for now, monitor LFTs.  Holding diuresis with JOHANA.     Continue heparin infusion. Appreciate Cardiology recommendations.      GI/: Abnormal LFTs, transaminitis.  Mild elevation in AST.    Monitor LFTs    Nutrition: Clear liquid diet for now. Will advance once we see results of MRI.    Last BM: None recorded.  Meds: start colace BID.    GI prophylaxis: Not indicated.     Renal: Acute kidney injury on CKD stage III, metabolic/lactic acidosis, electrolytes derangements (hyponatremia, hypochloremia).  Insetting of dehydration, hemodynamic lability.  Has improved with volume  resuscitation.  Received NS 2000 mL in ED.  Lactic acid 9.0 => 4.1 => 1.6. CTA C/A/P study 7/17 with underlying JOHANA, so at risk of worsening renal status.     Monitor I/O's.  Electrolyte repletion PRN.  Avoid/limit nephrotoxic agents.    IVFs: Switch from NS to bicarb gtt @ 50 mL/hr    Repeat BMP as K 2.2 - may be erroneous.     Nephrology consult with JOHANA     Heme/Onc: Leukocytosis, unspecified - improved  Normocytic anemia.      DVT prophylaxis: SCDs, heparin infusion.     Endocrine: Insulin-dependent diabetes mellitus type 2 with hyperglycemia.  Hemoglobin A1c 6.9% on 7/17/2023.  At baseline is on glargine 38 units nightly, aspart sliding scale.  Patient with acidosis and hyperglycemia on presentation, ketones normal.    Regular insulin infusion per protocol.  Hypoglycemia protocol.    Start lantus 20 units qam. Stop insulin gtt in 2 hrs.     Once off insulin gtt: FSBG Q4H, Aspart ISS (high resistance).      Infectious diseases: Septic shock with GPC bacteremia (Strep pyogenes).  Presentation appears consistent with sepsis in the setting of JOHANA, dehydration/distributive shock.  Patient received vancomycin and Zosyn IV in the ED. Blood cultures with 3/4 bottles + GPC in chains/pairs. Verigene panel reports Strep pyogenes. .80 (7/17), PCT 29.16 (7/16). Source of GPC bacteremia uncertain although has enlarged R shoulder.  No evidence of abnormalities aside from edema noted on CT scan, will get dedicated MRI.    Follow-up blood cultures.      Continue empiric antibiotics with Zosyn IV (start 7/17).    Dedicated MRI R shoulder today.  May need orthopedic involvement, will see what images show.     Rheum/Musculoskeletal: Right upper extremity edema. No erythema externally, reports has been swollen for at least a week. CTA C/A/P showed no specific shoulder abnormalities aside from edema. Duplex US negative for DVT on 7/17. History of left lower extremity osteomyelitis status post left BKA 04/2022.    MRI R  "shoulder given persistent edema.     Activity: Bedrest.    RISK FACTORS PRESENT ON ADMISSION:  Clinically Significant Risk Factors        # Hypokalemia: Lowest K = 2.2 mmol/L in last 2 days, will replace as needed  # Hyperkalemia: Highest K = 5.4 mmol/L in last 2 days, will monitor as appropriate  # Hyponatremia: Lowest Na = 119 mmol/L in last 2 days, will monitor as appropriate     # Anion Gap Metabolic Acidosis: Highest Anion Gap = 25 mmol/L in last 2 days, will monitor and treat as appropriate  # Hypoalbuminemia: Lowest albumin = 3.1 g/dL at 7/17/2023  1:00 PM, will monitor as appropriate    # Acute Kidney Injury, unspecified: based on a >150% or 0.3 mg/dL increase in last creatinine compared to past 90 day average, will monitor renal function  # Hypertension: Noted on problem list  # Chronic heart failure with reduced ejection fraction: last echo with EF <40%      # DMII: A1C = 6.9 % (Ref range: <5.7 %) within past 6 months   # Overweight: Estimated body mass index is 28.96 kg/m  as calculated from the following:    Height as of this encounter: 1.88 m (6' 2\").    Weight as of this encounter: 102.3 kg (225 lb 8.5 oz)., PRESENT ON ADMISSION             Lines/Drains/Tubes:  LUE PICC central line, placed 7/17  Garcia catheter, placed 7/18     Code Status:  Full Code     The patient and/or the family was educated about the above plan of care and indicated understanding. Will follow-up with patient's sister this afternoon, she is aware. Staff spoke with her today re: MRI checklist.     This patient is considered critically ill and requires ICU level of care due to circulatory/septic shock on vasopressors.     Total Critical Care time, not including separate billable procedure time: 50 minutes.    Shilpa Hale MD, MPH  Pulmonary/Critical Care Medicine  07/19/2023   10:17 AM         ICU DAILY CHECKLIST:   ICU DAILY CHECKLIST           Can patient transfer out of MICU? no    FAST HUG:    Feeding:  yes.  Patient is " receiving ORAL    Garcia: no  Analgesia/Sedation: no; PRNs   Thromboembolic prophylaxis: yes; Mode:  Heparin and SCDs  HOB>30:  yes  Stress Ulcer Protocol Active: yes; Mode: PPI  Glycemic Control: Any glucose > 180 yes; Mode of Insulin Therapy: Sliding Scale Insulin and Long Acting Insulin    INTUBATED:  Can patient have daily waking:  n/a  Can patient have spontaneous breathing trial:  n/a    Restraints? no    PHYSICAL THERAPY AND MOBILITY:  Can patient have PT and mobility trial: no  Activity: Bedrest        Subjective/Interval History:   7/17: Presented to ED with multiple symptoms including weakness, dehydration, scalp pain/burning, and chronic R arm pain, found to have atrial fibrillation with RVR  (HR 160s-180s) and treated with IV metoprolol. Developed hypotension, PICC placed + NE gtt. Zosyn+ Vanco. CTA C/A/P with localized edema to RUE, no dissection but showed Subcutaneous inflammation in the right axilla noted.  Started insulin gtt. Duplex US done and negative for DVT to RUE  7/18: Started on vasopressin gtt in early morning, then stopped before 7 am. Afib with RVR, started amio gtt. Cards consult. PICC line dislodged, had to be replaced.     Is tired but slightly more awake than earlier.   No chest pain.   RUE swelling feels about the same as before.     Review of Systems:  The Review of Systems is negative other than noted in the HPI        Allergies/Medications:   Allergies:   No Known Allergies    Continuous Infusions:    amiodarone 0.5 mg/min (07/19/23 0910)     dextrose       heparin 1,400 Units/hr (07/19/23 0913)     insulin regular Stopped (07/19/23 0954)     phenylephrine 0.5 mcg/kg/min (07/19/23 0835)     sodium bicarbonate 150 mEq in sterile water (preservative free) 1,000 mL infusion 50 mL/hr at 07/19/23 0935     Scheduled Medications:    aspirin  81 mg Oral Daily     insulin aspart  1-10 Units Subcutaneous TID AC     insulin aspart  1-7 Units Subcutaneous At Bedtime     insulin glargine  20  "Units Subcutaneous QAM AC     magnesium oxide  400 mg Oral Daily     multivitamin, therapeutic  1 tablet Oral Daily     piperacillin-tazobactam  3.375 g Intravenous Q8H     sodium chloride (PF)  10-40 mL Intracatheter Q7 Days     sodium chloride (PF)  3 mL Intracatheter Q8H     ticagrelor  90 mg Oral BID     vitamin C  500 mg Oral Daily           Objective:   Vitals:  BP 98/52   Pulse 104   Temp 98.1  F (36.7  C) (Oral)   Resp 14   Ht 1.88 m (6' 2\")   Wt 102.3 kg (225 lb 8.5 oz)   SpO2 100%   BMI 28.96 kg/m    Vent: Resp: 14    GEN: Fatigued groggy but interactive.   HEENT: Normocephalic, atraumatic.  Extraoccular eye movements intact, anicteric sclera. Moist mucous membranes. Face slightly flushed. L eye with mild puffiness.   NECK: Supple.    PULM: Non-labored breathing.  No use of accessory muscles.  Clear to ausculation bilaterally.   CVS: Irregularly irregular rhythm.  Normal S1, S2.  No rubs, murmurs, or gallops.    ABDOMEN: Normoactive bowel sounds.  Non-tender to palpation.  Non-distended.    EXTREMITES:  No clubbing, cyanosis. S/p left BKA. RUE shoulder/arm with edema, no erythema.  NEURO:  Sleepy/fatigued. Oriented.  Cranial nerves 2-12 grossly intact.  Moving all extremities.      Intake/Output: I/O last 3 completed shifts:  In: 2921.31 [I.V.:2921.31]  Out: 243 [Urine:243]        Pertinent Studies:   All laboratory data reviewed  Serum Glucose range:   Recent Labs   Lab 07/19/23  0953 07/19/23  0749 07/19/23  0713 07/19/23  0546   GLC 90 108* 120* 134*     ABG: No lab results found in last 7 days.  CBC:   Recent Labs   Lab 07/18/23  0414 07/17/23  1302   WBC 8.4 14.5*   HGB 12.5* 13.2*   HCT 35.8* 38.5*   MCV 80 83    247   NEUTROPHIL  --  87   LYMPH  --  3   MONOCYTE  --  7   EOSINOPHIL  --  0     Chemistry:   Recent Labs   Lab 07/19/23  0834 07/19/23  0515 07/19/23  0213 07/18/23  1005 07/18/23  0414 07/17/23  2316 07/17/23  1425 07/17/23  1300   * 128* 128*  126*   < > 124*  124*  "  < > 121* 119*   POTASSIUM 2.2* 3.9 3.7  3.6   < > 4.0  4.0   < > 5.4* 4.6   CHLORIDE 100 93* 95*  94*   < > 93*  93*   < > 86* 80*   CO2 10* 15* 14*  14*   < > 16*  16*   < > 15* 14*   BUN  --   --  75.3*  --  70.6*  --  63.1* 64.9*   CR  --   --  3.60*  --  3.14*  --  2.94* 3.25*   GFRESTIMATED  --   --  18*  --  21*  --  23* 20*   YOSVANY  --   --  7.1*  --  8.4*  --  8.4* 9.2   MAG  --   --   --   --   --   --  1.9  --    PROTTOTAL  --   --   --   --   --   --   --  7.2   ALBUMIN  --   --   --   --   --   --   --  3.1*   AST  --   --   --   --   --   --   --  92*   ALT  --   --   --   --   --   --   --  33   ALKPHOS  --   --   --   --   --   --   --  103   BILITOTAL  --   --   --   --   --   --   --  1.7*    < > = values in this interval not displayed.     Coags:  No results for input(s): INR, PROTIME, PTT in the last 168 hours.    Invalid input(s): APTT  Cardiac Markers:  No results for input(s): CKTOTAL, TROPONINI in the last 168 hours.      Latest Reference Range & Units 11/14/21 21:49 03/18/22 11:26 09/27/22 14:18 09/28/22 14:17 07/17/23 13:00 07/17/23 13:02 07/17/23 14:25 07/17/23 15:49 07/17/23 19:08 07/17/23 21:08 07/17/23 23:16 07/18/23 04:14 07/18/23 11:55   CRP Inflammation <5.00 mg/L       297.80 (H)         Lactic Acid 0.7 - 2.0 mmol/L 1.8 0.9 1.1   9.0 (HH)  4.1 (HH) 3.7 (H)  4.3 (HH) 4.0 (HH) 1.6   N-Terminal Pro BNP Inpatient 0 - 900 pg/mL    3,295 (H) 8,551 (H)           Procalcitonin <0.05 ng/mL       29.16 (HH)         Troponin T, High Sensitivity <=22 ng/L     216 (HH)     282 (HH) 353 (HH) 415 (HH)    (HH): Data is critically high  (H): Data is abnormally high    Microbiology:  Blood cultures x 2, 7/17: 3 of 4 bottles with Gram positive cocci in pairs and chains         Cardiology/Radiology:   Cardiac: All cardiac studies reviewed by me.    EKG:  Reviewed    TTE, 7/18/23:  Summary: Left ventricular function is decreased. The ejection fraction is 25-30% (severely reduced). The left atrium is  mildly dilated. IVC diameter <2.1 cm collapsing >50% with sniff suggests a normal RA pressure of 3 mmHg. TAPSE is normal, which is consistent with normal right ventricular systolic Function. The patient exhibited frequent PVCs. No hemodynamically significant valvular abnormalities on 2D or color flow imaging. The study was technically difficult.    Radiology:  All imaging studies reviewed by me.    Chest X-Ray, 7/18/23:  Left PICC tip in upper SVC. Lungs clear. Heart size normal. No pneumothorax.    CTA chest/abdomen/pelvis with IV contrast, 7/17: FINDINGS: CTA CHEST, ABDOMEN, PELVIS: Normal great vessel branch pattern with mild disease of the subclavian artery. Normal caliber thoracic aorta. Abdominal aorta is widely patent without stenosis or dissection. Moderate calcified atherosclerotic disease without significant visceral arterial stenosis.  RIGHT LEG: Iliac arteries appear patent. Due to contrast bolus timing, difficult to evaluate the superficial femoral artery although this appears grossly patent. Severe infrapopliteal disease. LEFT LEG: Postsurgical changes of left below-knee amputation. Iliac arteries, common femoral artery, profunda femoral artery, superficial femoral artery all appear patent. NONVASCULAR FINDINGS: LUNG/PLEURA: Calcified granuloma. Scattered atelectasis. Spiculated appearing lung nodule in the right lower lobe measuring 1.0 x 0.9 cm on series 11 image 166. Punctate nodules in left lung base. No pleural effusion or pneumothorax. MEDIASTINUM: Severe coronary artery disease with coronary artery stents. Questionable left atrial appendage thrombus. Inflammatory changes in the right axilla. ABDOMEN: Suboptimal evaluation of the solid organs due to the arterial phase of contrast enhanced. Cholelithiasis. Spleen, adrenal glands, kidneys, and pancreas are grossly unremarkable. Hepatic steatosis. PELVIS: No abnormally dilated loops of bowel. No free fluid or free air. MUSCULOSKELETAL: Significant  degenerative disease of the pelvis and lower lumbar spine. Postsurgical changes of left below-knee amputation. IMPRESSION: 1.  Right lower extremity arteries appear largely patent with severe infrapopliteal arterial disease. Evaluation of the superficial femoral artery is difficult due to contrast bolus timing. Recommend Doppler arterial ultrasound if there is continued  clinical concern for embolic disease. 2.  Questionable left atrial appendage thrombus. Recommend echocardiogram for further evaluation. 3.  Spiculated pulmonary nodule in the right lower lobe measuring 1.0 x 0.9 cm. Recommend follow-up per Fleischner Society criteria as described below. 4.  Subcutaneous inflammation in the right axilla noted. Consider right upper extremity Doppler to rule out a DVT. May also represent an infectious process. 5.  Cholelithiasis. 6.  Hepatic steatosis.

## 2023-07-19 NOTE — DISCHARGE INSTRUCTIONS
"[facility] DISCHARGE INSTRUCTIONS:  RUE/axilla - Every 3 days  May leave open to air if no drainage noted.  2. If drainage is occurring, cleanse with saline and gently dry.  3. Cover with large adaptic and abd pad  4. Secure with roll gauze     Coccyx  Protect area with sacral mepilex  Peel back mepilex each shift to monitor wound and place back down  Change mepilex every 3 days + PRN if soiled, saturated, falls off     Pressure Injury Prevention (PIP) Plan:  If patient is declining pressure injury prevention interventions: Explore reason why and address patient's concerns, Educate on pressure injury risk and prevention intervention(s), If patient is still declining, document \"informed refusal\" , and Ensure Care team is aware ( provider, charge nurse, etc)  Mattress: Follow bed algorithm, reassess daily and order specialty mattress, if indicated.  HOB: Maintain at or below 30 degrees, unless contraindicated  Repositioning in bed: Every 1-2 hours , Left/right positioning; avoid supine, Raise foot of bed prior to raising head of bed, to reduce patient sliding down (shear), and Frequent microturns using TAPS wedges, as patient tolerates  Heels: Keep elevated off mattress and Pillows under calves  Protective Dressing: Sacral Mepilex for prevention (#[id number]),  especially for the agitated patient   Positioning Equipment: TAPS wedges (#[id number]) to help maintain 30 degree side lying position   Chair positioning: Chair cushion (#[id number])    If patient has a buttock pressure injury, or high risk for PI use chair cushion or SPS.  Moisture Management: Perineal cleansing /protection: Follow Incontinence Protocol, Avoid brief in bed, Clean and dry skin folds with bathing , and Moisturize dry skin  Under Devices: Inspect skin under all medical devices during skin inspection , Ensure tubes are stabilized without tension, and Ensure patient is not lying on medical devices or equipment when repositioned  Ask provider to discontinue device " when no longer needed.

## 2023-07-19 NOTE — PROGRESS NOTES
Care Management Follow Up    Length of Stay (days): 2    Expected Discharge Date: 07/20/2023     Concerns to be Addressed:   discharge planning     Patient plan of care discussed at interdisciplinary rounds: Yes    Anticipated Discharge Disposition:       Anticipated Discharge Services:    Education Provided on the Discharge Plan:  Per Care Team   Patient/Family in Agreement with the Plan:  Yes    Referrals Placed by CM/SW:    Private pay costs discussed: Not applicable    Additional Information:  Chart reviewed.    Cm updates:  Per rounds pt is still showing confusion this AM.  Pt on hep gtt  Insulin gtt stopped  Phenylephrine gtt  Bicarb gtt    MRI today     Social Hx:  Assessed. Spoke to pts sister Cindy. Pt has been homeless since Nov, 2021. 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 Pt has lived in between  [facility], or her daughter's house the last couple years, and in the past has lived at his [facility] for 6 yrs at one time. Pt before that was taking care of his parents in their home. He last lived at [facility] prior to hospitalization. Pt Ind at baseline, drives (owns a car). Pt has a prosthetic. 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 She stated pt tried signing up for social security disability, but pt claims he was denied, she does not believe pt. 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 Pt has [employer] [place] insurance. Cindy cannot take pt back into her home, and her daughter will not let pt return either. She prefers if able pt goes to TCU, then either friends home, housing or some sort, or shelter. Transport TBD.           Cm will continue to follow plan of care,review recommendations,and assist with any discharge needs anticipated.       Sulema Manuel RN

## 2023-07-20 NOTE — PROGRESS NOTES
Pulmonary/Critical Care Medicine update  Given his Strep pyogenes bacteremia and RUE myositis, and high potential to decompensate and develop necrotizing faciitis or surgical extremity, I consulted Orthopedic surgery service.  ID team also consulted.      Per my discussion with Ortho team, patient has mobility of the shoulder, no concern for joint infection.    Is now developing some bullae to the posterior right arm - uncertain if this related to volume/pressure given positioning of arm and limited mobility over the last few days. However, may reflect early development of necrotizing fasciitis as well.      CK total was 1600 early, down to 1160.   Lactic acid stable ~ 1.7-2.0.    General surgery team consulted regarding case.    May ultimately need transfer to a higher level of service.     Outside of this, discussion with Nephrology team and we had hemodialysis catheter placed today.    Initial plan was to undergo dialysis tonight but this has been delayed until tomorrow.    Shilpa Hale MD, MPH  Pulmonary & Critical Care Medicine  07/20/2023  6:46 PM

## 2023-07-20 NOTE — PROGRESS NOTES
HEART CARE NOTE          Assessment/Recommendations     1. HFrEF/ICM c/b circulatory shock  Assessment / Plan    3rd spacing on physical exam; recommend albumin over NS for intravascular repletion; will need gentle diuresis in the short term, but will hold for now given hemodynamic instability    Continue pressor support - wean as tolerated    GDMT on hold given the above      2. NSTEMI  Assessment / Plan    Hx of coronary angiogram significant for severe multi-vessel CAD during admission in 11/21 ; CTS/Pa consulted with plans for CABG in 2-3 months at that time; patient was readmitted before CABG with notable progression of RCA lesion CTS was consulted once again and declined to offer surgical intervention at which time the patient underwent complex PCI with JESSICA x 2 to the prox and distal RCA    He now presents approx 1 year later with an NSTEMI; will plan to proceed with coronary angiogram +/- PCI once there is no longer concern for sepsis/infection     Continue ASA, and ticagrelor     3. Septic shock  Assessment / Plan    Management/antibiotics and supportive care per ICU team     4. DM2  Assessment / Plan    Management per primary team     5. Paroxysmal afib  Assessment / Plan    Currently in RVR - continue amiodarone load    Continue heparin gtt       6. JOHANA on CKD  Assessment / Plan    In the setting of circulatory shock and renal hypoperfusion    Continue to monitor renal function closely     Patient remains critically ill in the ICU requiring hemodynamic support via IV vasopressors combined distributive and cardiogenic shock. 50 minutes spent on critical care.       History of Present Illness/Subjective      Mr. Norman Aguilar is a 62 year old male with a PMHx significant for CAD, CHF, HTN, IDDM, paroxysmal atrial fibrillation, left foot osteomyelitis failure of antibiotics s/p left BKA, admitted on 8/23/2022 with symptoms concerninf for stroke. Troponin was noted to be elevated.     Today,   "William denies any acute cardiac events or complaints; Management plan as detailed above.      ECG: Personally reviewed. sinus tachycardia, occasional PVC noted, unifocal.     Repeat echo:  Left ventricular function is decreased. The ejection fraction is 25-30%  (severely reduced).  The left atrium is mildly dilated.  IVC diameter <2.1 cm collapsing >50% with sniff suggests a normal RA pressure  of 3 mmHg.  TAPSE is normal, which is consistent with normal right ventricular systolic  function.  The patient exhibited frequent PVCs.  No hemodynamically significant valvular abnormalities on 2D or color flow  imaging. The study was technically difficult.    ECHO (personnaly Reviewed):  The left ventricle is normal in size with normal left ventricular wall  thickness.  Left ventricular function is decreased. The ejection fraction is 30-35%  (moderately reduced).  Mildly decreased right ventricular systolic function  Moderate biatrial enlargement  No hemodynamically significant valve disease  IVC diameter >2.1 cm collapsing <50% with sniff suggests a high RA pressure  estimated at 15 mmHg or greater.  Compared to prior study, there is no significant change.          Physical Examination Review of Systems   BP 92/44   Pulse 98   Temp 97.8  F (36.6  C) (Oral)   Resp (!) 35   Ht 1.88 m (6' 2\")   Wt 106.1 kg (233 lb 14.4 oz)   SpO2 96%   BMI 30.03 kg/m    Body mass index is 30.03 kg/m .  Wt Readings from Last 3 Encounters:   07/20/23 106.1 kg (233 lb 14.4 oz)   11/17/22 100.2 kg (221 lb)   09/28/22 104.3 kg (230 lb)     General Appearance:   no distress   ENT/Mouth: membranes moist, no oral lesions or bleeding gums.      EYES:  no scleral icterus, normal conjunctivae   Neck: no carotid bruits or thyromegaly   Chest/Lungs:   lungs are clear to auscultation, no rales or wheezing, equal chest wall expansion    Cardiovascular:   Irregular. Normal first and second heart sounds with no murmurs, rubs, or gallops; the carotid, " radial and posterior tibial pulses are intact, + extremity edema    Abdomen:  no organomegaly, masses, bruits, or tenderness; bowel sounds are present   Extremities: no cyanosis or clubbing   Skin: no xanthelasma, warm.    Neurologic: NAD     Psychiatric: NAD     A complete 10 systems ROS was reviewed  And is negative except what is listed in the HPI.          Medical History  Surgical History Family History Social History   Past Medical History:   Diagnosis Date     Anemia      Chronic systolic CHF (congestive heart failure) (H)      Coronary artery disease      Diabetic neuropathy (H)      DM type 2 w Neuropathy      GERD (gastroesophageal reflux disease)      Hyperlipidemia      Hypertension      Intermittent atrial fibrillation (H)     on Eliquis     Ischemic cardiomyopathy 11/16/2021    Echo with EF of 30-35%     NSTEMI (non-ST elevated myocardial infarction) (H) 11/15/2021     Osteomyelitis of left foot (H) 04/04/2022     Renal disease      Retinopathy      Sleep disturbance     Past Surgical History:   Procedure Laterality Date     AMPUTATE FOOT Left 3/21/2022    Procedure: Guillotine AMPUTATION, FOOT;  Surgeon: Ronald Bhatt MD;  Location: [facility] [place] OR     AMPUTATE LEG BELOW KNEE Left 4/4/2022    Procedure: LEFT BELOW  KNEE AMPUTATION;  Surgeon: Ronald Bhatt MD;  Location: [facility] [place] OR     AMPUTATE TOE(S) Left 11/16/2021    Procedure: first and second ray amputation;  Surgeon: Eddi Ram DPM;  Location: [facility] [place] OR     APPENDECTOMY       CV CORONARY ANGIOGRAM N/A 11/16/2021    Procedure: CV CORONARY ANGIOGRAM;  Surgeon: Pam Tabares MD;  Location: [facility] CATH LAB CV     CV CORONARY ANGIOGRAM N/A 8/24/2022    Procedure: CV CORONARY ANGIOGRAM;  Surgeon: Cipriano Lucas MD;  Location: [facility] CATH LAB CV     CV CORONARY ANGIOGRAM N/A 8/29/2022    Procedure: CV CORONARY ANGIOGRAM;  Surgeon: Cipriano Lucas MD;  Location: [facility] LAB CV     CV INTRAVASULAR  ULTRASOUND N/A 8/29/2022    Procedure: Intravascular Ultrasound;  Surgeon: Cipriano Lucas MD;  Location: [facility] CATH LAB CV     CV LEFT HEART CATH N/A 11/16/2021    Procedure: Left Heart Cath;  Surgeon: Pam Tabares MD;  Location: [facility] CATH LAB CV     CV PCI N/A 8/29/2022    Procedure: Percutaneous Coronary Intervention stent;  Surgeon: Cipriano Lucas MD;  Location: [facility] LAB CV     CV PCI ANGIOPLASTY N/A 8/29/2022    Procedure: Percutaneous Transluminal Angioplasty;  Surgeon: Cipriano Lucas MD;  Location: [facility] CATH LAB CV     FOOT SURGERY Left      HERNIA REPAIR       INCISION AND DRAINAGE LOWER EXTREMITY, COMBINED Left 11/16/2021    Procedure: INCISION AND DRAINAGE, left foot;  Surgeon: Eddi Ram DPM;  Location: [facility] OR     INCISION AND DRAINAGE LOWER EXTREMITY, COMBINED Left 11/19/2021    Procedure: INCISION AND DRAINAGE, left foot;  Surgeon: Eddi Ram DPM;  Location: [facility] OR     INCISION AND DRAINAGE LOWER EXTREMITY, COMBINED Left 12/9/2021    Procedure: INCISION AND DRAINAGE, Left foot;  Surgeon: Eddi Ram DPM;  Location: [facility] OR     INCISION AND DRAINAGE LOWER EXTREMITY, COMBINED Left 1/10/2022    Procedure: INCISION AND DRAINAGE, left foot;  Surgeon: Eddi Ram DPM;  Location: [facility] OR     INCISION AND DRAINAGE LOWER EXTREMITY, COMBINED Left 1/27/2022    Procedure: INCISION AND DRAINAGE, Left foot;  Surgeon: Eddi Ram DPM;  Location: [facility] OR     PICC TRIPLE LUMEN PLACEMENT  7/17/2023          PICC TRIPLE LUMEN PLACEMENT  7/18/2023         no family history of premature coronary artery disease Social History     Socioeconomic History     Marital status: Single     Spouse name: Not on file     Number of children: Not on file     Years of education: Not on file     Highest education level: Not on file   Occupational History     Not on file   Tobacco Use     Smoking status:  Former     Types: Cigars     Smokeless tobacco: Never     Tobacco comments:     Cigars   Vaping Use     Vaping Use: Never used   Substance and Sexual Activity     Alcohol use: Yes     Comment: < 1 drink a month (only holidays)     Drug use: Never     Sexual activity: Not on file   Other Topics Concern     Parent/sibling w/ CABG, MI or angioplasty before 65F 55M? Not Asked   Social History Narrative    Single, currently not working, has done numerous jobs, lives alone.  Full code. (last updated 4/4/2022)      Social Determinants of Health     Financial Resource Strain: Not on file   Food Insecurity: Not on file   Transportation Needs: Not on file   Physical Activity: Not on file   Stress: Not on file   Social Connections: Not on file   Intimate Partner Violence: Not on file   Housing Stability: Not on file           Lab Results    Chemistry/lipid CBC Cardiac Enzymes/BNP/TSH/INR   Lab Results   Component Value Date    CHOL 157 03/24/2023    HDL 65 03/24/2023    TRIG 60 03/24/2023    BUN 96.9 (H) 07/20/2023     (L) 07/20/2023    CO2 18 (L) 07/20/2023    Lab Results   Component Value Date    WBC 21.3 (H) 07/20/2023    HGB 8.2 (L) 07/20/2023    HCT 23.8 (L) 07/20/2023    MCV 80 07/20/2023     07/20/2023    Lab Results   Component Value Date    TROPONINI 5.84 (HH) 08/30/2022     (H) 08/24/2022    INR 1.19 (H) 08/24/2022     Lab Results   Component Value Date    TROPONINI 5.84 (HH) 08/30/2022          Weight:    Wt Readings from Last 3 Encounters:   07/20/23 106.1 kg (233 lb 14.4 oz)   11/17/22 100.2 kg (221 lb)   09/28/22 104.3 kg (230 lb)       Allergies  No Known Allergies      Surgical History  Past Surgical History:   Procedure Laterality Date     AMPUTATE FOOT Left 3/21/2022    Procedure: Guillotine AMPUTATION, FOOT;  Surgeon: Ronald Bhatt MD;  Location: [facility] OR     AMPUTATE LEG BELOW KNEE Left 4/4/2022    Procedure: LEFT BELOW  KNEE AMPUTATION;  Surgeon: Ronald Bhatt MD;   Location: [facility] [place] OR     AMPUTATE TOE(S) Left 11/16/2021    Procedure: first and second ray amputation;  Surgeon: Eddi Ram DPM;  Location: [facility] [place] OR     APPENDECTOMY       CV CORONARY ANGIOGRAM N/A 11/16/2021    Procedure: CV CORONARY ANGIOGRAM;  Surgeon: Pam Tabares MD;  Location: [facility] CATH LAB CV     CV CORONARY ANGIOGRAM N/A 8/24/2022    Procedure: CV CORONARY ANGIOGRAM;  Surgeon: Cipriano Lucas MD;  Location: [facility] CATH LAB CV     CV CORONARY ANGIOGRAM N/A 8/29/2022    Procedure: CV CORONARY ANGIOGRAM;  Surgeon: Cipriano Lucas MD;  Location: [facility] LAB CV     CV INTRAVASULAR ULTRASOUND N/A 8/29/2022    Procedure: Intravascular Ultrasound;  Surgeon: Cipriano Lucas MD;  Location: [facility] LAB CV     CV LEFT HEART CATH N/A 11/16/2021    Procedure: Left Heart Cath;  Surgeon: Pam Tabares MD;  Location: [facility] LAB CV     CV PCI N/A 8/29/2022    Procedure: Percutaneous Coronary Intervention stent;  Surgeon: Cipriano Lucas MD;  Location: [facility] LAB CV     CV PCI ANGIOPLASTY N/A 8/29/2022    Procedure: Percutaneous Transluminal Angioplasty;  Surgeon: Cipriano Lucas MD;  Location: [facility] LAB CV     FOOT SURGERY Left      HERNIA REPAIR       INCISION AND DRAINAGE LOWER EXTREMITY, COMBINED Left 11/16/2021    Procedure: INCISION AND DRAINAGE, left foot;  Surgeon: Eddi Ram DPM;  Location: [facility] [place] OR     INCISION AND DRAINAGE LOWER EXTREMITY, COMBINED Left 11/19/2021    Procedure: INCISION AND DRAINAGE, left foot;  Surgeon: Eddi Ram DPM;  Location: [facility] [place] OR     INCISION AND DRAINAGE LOWER EXTREMITY, COMBINED Left 12/9/2021    Procedure: INCISION AND DRAINAGE, Left foot;  Surgeon: Eddi Ram DPM;  Location: [facility] [place] OR     INCISION AND DRAINAGE LOWER EXTREMITY, COMBINED Left 1/10/2022    Procedure: INCISION AND DRAINAGE, left foot;  Surgeon: Eddi Ram DPM;   Location: [facility] OR     INCISION AND DRAINAGE LOWER EXTREMITY, COMBINED Left 1/27/2022    Procedure: INCISION AND DRAINAGE, Left foot;  Surgeon: Eddi Ram DPM;  Location: [facility] OR     PICC TRIPLE LUMEN PLACEMENT  7/17/2023          PICC TRIPLE LUMEN PLACEMENT  7/18/2023            Social History  Tobacco:   History   Smoking Status     Former     Types: Cigars   Smokeless Tobacco     Never    Alcohol:   Social History    Substance and Sexual Activity      Alcohol use: Yes        Comment: < 1 drink a month (only holidays)   Illicit Drugs:   History   Drug Use Unknown       Family History  No family history on file.       Russel Arredondo MD on 7/20/2023      cc: Jaquan Dickerson
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
## 2023-07-20 NOTE — CONSULTS
Interventional Radiology - Pre-Procedure Note:  Inpatient - [facility]  07/20/2023     Procedure Requested: Nontunneled HD catheter placement  Requested by: Lyudmila Medina MD    Brief HPI: Norman Aguilar is a 63 year old male with a PMH of CHF, anemia, DM II, CAD s/p PCI, HTN, HLD, atrial fibrillation, NSTEMI, and CKD III who was admitted to [facility] on 07/17/2023 with atrial fibrillation, shock d/t suspected sepsis, JOHANA, and possible NSTEMI. Admitted to ICU, vasopressors and abx. Nephrology following for CKD. Patient now requiring dialysis, requesting placement of nontunneled dialysis catheter for initiation today.     IMAGING:  XR Chest Port 1 View 07/18/2023  EXAM: XR CHEST PORT 1 VIEW  LOCATION: [facility]  DATE: 7/18/2023     INDICATION: RN placed PICC   verify tip placement  COMPARISON: 07/17/2023                                                                      IMPRESSION: Left upper extremity PICC terminates at the SVC - RA junction.     No acute cardiopulmonary abnormality.    NPO: N/A, no sedation  ANTICOAGULANTS:  Heparin gtt, does not need to hold for procedure  Brilinta BID  ASA 81 mg  ANTIBIOTICS: Not needed    ALLERGIES  No Known Allergies      LABS:  INR   Date Value Ref Range Status   08/24/2022 1.19 (H) 0.85 - 1.15 Final      Hemoglobin   Date Value Ref Range Status   07/20/2023 8.2 (L) 13.3 - 17.7 g/dL Final     Platelet Count   Date Value Ref Range Status   07/20/2023 187 150 - 450 10e3/uL Final     Comment:     This result has been called to DELTA INCREASE by Chana Chavez on 07 20 2023 at 0521, and has not been read back.      Creatinine   Date Value Ref Range Status   07/20/2023 4.71 (H) 0.67 - 1.17 mg/dL Final     Potassium   Date Value Ref Range Status   07/20/2023 3.8 3.4 - 5.3 mmol/L Final   08/30/2022 3.8 3.5 - 5.0 mmol/L Final         EXAM:  BP 95/51   Pulse 96   Temp 98  F (36.7  C) (Oral)   Resp (!) 36   Ht 1.88 m  "(6' 2\")   Wt 106.1 kg (233 lb 14.4 oz)   SpO2 99%   BMI 30.03 kg/m    General:  Stable.  In no acute distress.    Neuro:  A&O x 3. Moves all extremities equally.  Resp:  Tachypneic, RR 36-40. Room air.  Skin:  Without excoriations, ecchymosis, erythema, lesions or open sores on neck.    ASA Classification: Class 3 - SEVERE SYSTEMIC DISEASE, DEFINITE FUNCTIONAL LIMITATIONS.   Code Status: FULL CODE      ASSESSMENT/PLAN:   Nontunneled dialysis catheter placement. No sedation required.    Procedural education reviewed with patient in detail including, but not limited to risks, benefits and alternatives with understanding verbalized by patient.    Total time spent on the date of the encounter: 30 minutes.      JUNI PISANO CNP  Interventional Radiology    "

## 2023-07-20 NOTE — PROGRESS NOTES
RENAL PROGRESS NOTE - [facility]        CC: f/u JOHANA    Subjective: ongoing rt shoulder pain with limited movement due to pain, long discussion re: progressive renal failure, need for dialysis, he is agreeable to trial of dialysis, is worried about housing situation and money. Tachypneic with talking, oliguric.    ASSESSMENT AND RECOMMENDATIONS:  1. JOHANA/ CKD 3b: baseline CKD due to longstanding DM2, HTN. Now severe ATN in setting of profound hypotension with sepsis and hypovolemia, now contrast nephropathy after CTA. Group a strep bacteremia. IV contrast with CTA on 7/17, no hydronephrosis. UA on 7/18 very concentrated suggesting intact tubular function, although granular casts present.  Remains oliguric with anephric rise in creatinine              - has metabolic acidosis and hypervolemic on exam   -discussed need for dialysis with him and Dr Hale today, consent on chart   -ask IR to place nontunneled dialysis catheter, will plan dialysis after catheter placed daily x 2-3 days    2. Hypotension: due to bacteremia/sepsis              -Pressors and renally dosed abx per ICU team              -volume up on exam, avoid excessive IVF   -decrease bicarb gtt to 50 ml/hr, heplock when dialysis started     3. Metabolic acidosis: due to JOHANA.               -Bicarb gtt until dialysis started   -dialysis as above     4. HFrEF: EF of 25-30% based on 7/18 echo which also showed normal RVF, normal RA pressures.               -now volume up, will attempt bumex 2 mg iv x 1 while awaiting dialysis              -Dr. Arredondo following     5. Hyponatremia: due to JOHANA, hypervolemia  -attempt bumex today  -dialysis with uf as tolerated  -avoid excessive po fluid, IVF as able     6. NSTEMI: per Dr. Arredondo.           7. Bacteremia: Group a strep and rt shoulder septic joint              -Renally dose abx per ICU team   -will need ortho eval of shoulder     Discussed plan for dialysis with Dr Geoffrey Medina,  "MD  [facility]  [phone]    Objective    PHYSICAL EXAM  BP 95/51   Pulse 96   Temp 98  F (36.7  C) (Oral)   Resp (!) 36   Ht 1.88 m (6' 2\")   Wt 106.1 kg (233 lb 14.4 oz)   SpO2 99%   BMI 30.03 kg/m    I/O last 3 completed shifts:  In: 3764.36 [P.O.:680; I.V.:3084.36]  Out: 180 [Urine:180]  Wt Readings from Last 3 Encounters:   07/20/23 106.1 kg (233 lb 14.4 oz)   11/17/22 100.2 kg (221 lb)   09/28/22 104.3 kg (230 lb)       GENERAL: nad  HEENT:normocephalic, atraumatic  CARDIOVASCULAR: rr, no rub, 1+ leg edema  PULMONARY:cta ant. No cyanosis  GASTROINTESTINAL: soft, nt/nd  MSK: rt shoulder pain with movement/unable to elevate rt arm  NEURO: Alert, no gross focal findings  PSYCHIATRIC: Adequate mood and interaction, tangential responses to questions  SKIN: Pale, no jaundice, no rash.    LABORATORIES  Recent Labs   Lab 07/20/23  0448 07/19/23  1531 07/19/23  0951 07/19/23  0834 07/19/23  0515 07/19/23  0213 07/18/23  2246 07/18/23  1005 07/18/23  0414 07/17/23  2316 07/17/23  1425 07/17/23  1300   * 128* 128* 135* 128* 128*  126* 126*   < > 124*  124*   < > 121* 119*   POTASSIUM 3.8 3.8 3.6 2.2* 3.9 3.7  3.6 4.0   < > 4.0  4.0   < > 5.4* 4.6   CHLORIDE 92* 94* 94* 100 93* 95*  94* 93*   < > 93*  93*   < > 86* 80*   CO2 18* 14* 15* 10* 15* 14*  14* 15*   < > 16*  16*   < > 15* 14*   BUN 96.9*  --  80.4*  --   --  75.3*  --   --  70.6*  --  63.1* 64.9*   CR 4.71*  --  3.88*  --   --  3.60*  --   --  3.14*  --  2.94* 3.25*   GFRESTIMATED 13*  --  17*  --   --  18*  --   --  21*  --  23* 20*   YOSVANY 7.5*  --  7.3*  --   --  7.1*  --   --  8.4*  --  8.4* 9.2   MAG  --   --   --   --   --   --   --   --   --   --  1.9  --    ALBUMIN 2.4*  --   --   --   --   --   --   --   --   --   --  3.1*    < > = values in this interval not displayed.       Recent Labs   Lab 07/20/23  0448 07/19/23  1555 07/18/23  0414 07/17/23  1302   WBC 21.3* 12.9* 8.4 14.5*   HGB 8.2* 9.0* 12.5* 13.2*   HCT " 23.8* 26.8* 35.8* 38.5*   MCV 80 82 80 83    123* 191 247          MEDICATIONS    aspirin  81 mg Oral Daily     docusate sodium  100 mg Oral BID     insulin aspart  1-10 Units Subcutaneous TID AC     insulin aspart  1-7 Units Subcutaneous At Bedtime     insulin glargine  20 Units Subcutaneous QAM AC     linezolid  600 mg Intravenous Q12H     magnesium oxide  400 mg Oral Daily     multivitamin, therapeutic  1 tablet Oral Daily     piperacillin-tazobactam  3.375 g Intravenous Q8H     sodium chloride (PF)  10-40 mL Intracatheter Q7 Days     sodium chloride (PF)  3 mL Intracatheter Q8H     ticagrelor  90 mg Oral BID     vitamin C  500 mg Oral Daily         Lyudmila Medina MD  [facility]  [phone]

## 2023-07-20 NOTE — PROGRESS NOTES
Care Management Follow Up    Length of Stay (days): 3    Expected Discharge Date: 07/25/2023     Concerns to be Addressed:    discharge planning    Patient plan of care discussed at interdisciplinary rounds: Yes    Anticipated Discharge Disposition:       Anticipated Discharge Services:    Education Provided on the Discharge Plan:  Per care team   Patient/Family in Agreement with the Plan:  Yes    Referrals Placed by CM/SW:    Private pay costs discussed: Not applicable    Additional Information:  Chart reviewed.    Cm updates:  Per rounds pt found to have myositis in right shoulder from MRI performed yesterday. Pt on Zyvox and zosyn. Group A strep found in blood cultures. Pt on amio, hep, bicarb, and phenylephrine gtt. Monitoring liver function. CARDS following pt for A-fib w/ RVR and cardiac monitoring. Neph following, pt may need to do a round of dialysis per note pt agreeable to trial of dialysis.     Social Hx:  Spoke to pts sister Cindy. Pt has been homeless since Nov, 2021. 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 Pt has lived in between  [facility], or her daughter's house the last couple years, and in the past has lived at his sister [facility] for 6 yrs at one time. Pt before that was taking care of his parents in their home. He last lived at [facility] prior to hospitalization. Pt Ind at baseline, drives (owns a car). Pt has a prosthetic. Pt does side jobs, or sister Cinyd gives him money. She stated pt tried signing up for social security disability, but pt claims he was denied, she does not believe pt. 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 Pt has [employer] [place]. Cindy cannot take pt back into her home, and her daughter will not let pt return either. She prefers if able pt goes to TCU, then either friends home, housing or some sort, or shelter.  Transport TBD.            Cm will continue to follow plan of care,review recommendations,and assist with any discharge needs anticipated.          Sulema Manuel RN

## 2023-07-20 NOTE — PROGRESS NOTES
[facility]:  CRITICAL CARE PROGRESS NOTE     Date / Time of Admission:  7/17/2023 12:47 PM    ID: Norman Aguilar is a 63 year old male with essential hypertension, coronary artery disease/NSTEMI on Brillinta since 08/2022, chronic systolic CHF (LVEF 30-35% on TTE 8/25/22), essential hypertension, CKD stage III, history of left foot osteomyelitis status post left BKA 4/4/2022, insulin-dependent diabetes mellitus type 2 (HgbA1c 6.9% 7/17/23) who presented to [facility] ED with multiple symptoms including weakness, dehydration, scalp pain/burning, and chronic R arm pain, found to have atrial fibrillation with RVR (HR 160s-180s) and treated with IV metoprolol, now admitted to the ICU for circulatory/distributive shock with suspected sepsis (source unknown), JOHANA, uncontrolled diabetes requiring insulin infusion, and NSTEMI. TTE 7/18/23 showing worsening reduced LVEF 25-30%.          Changes for today: 1.   Low UOP. JOHANA worsening but acidosis and O2 needs stable.   2. Discussed with Nephrology team/patient - plan for dialysis to be initiated.   3. Keep amio gtt for now. Monitor LFTs.  4. LFTs with mild worsening transaminitis.  5. CK checked with arm- 1656. Repeat in AM.   6. Given rising WBC, will begin Linezolid for toxin coverage (as recommended by Antimicrobial Stewardship Rounds).   7. Place Orthopedic surgery consultation for evaluation or R shoulder and continued monitoring.        Assessment/Plan:   Neurologic: No issues.  Slightly groggy but improved from prior.     Pain control: As needed     Pulmonary: Spiculated lung nodule.  Remains on room air.  VBG consistent with metabolic acidosis. CTA chest 7/17 showing: Calcified granuloma. Scattered atelectasis. Spiculated appearing lung nodule in the right lower lobe measuring 1.0 x 0.9 cm on series 11 image 166. Punctate nodules in left lung base. No pleural effusion or pneumothorax.    Wean supplemental O2 as tolerated; goal O2 sat  > 92%.  HOB > 30 degrees to limit aspiration risk.      Pulmonary hygiene: Incentive spirometry, OOB to chair, PT/OT when able    Once clinical status improved, will need to discuss repeat chest imaging for spiculated nodule.     Cardiovascular: Distributive/septic shock requiring vasopressors, chronic systolic CHF without exacerbation, diffuse coronary artery disease with history of non-STEMI 08/2022, elevated troponin with NSTEMI, paroxysmal atrial fibrillation with RVR, dyslipidemia.  Patient presented to the ED in atrial fibrillation with RVR, HR 160s to 180s, treated with metoprolol IV and responded well.  However, has become progressively hypotensive, suspect distributive shock and hypovolemia despite elevated BNP and troponin.  Denies any chest pain, give concern for non-STEMI.  Has extensive coronary artery history that is not amenable to surgical intervention.  Last cardiac angiogram 08/2022.  On metoprolol, lisinopril, furosemide at baseline.  Also on Brilinta since 08/2022, uncertain timeline in which to stop therapy (? 6 months per discharge summary at that time). TTE 7/17/23 with reduced LVEF 25-30%, IVC > 50% with respiratory variation, technically difficult study.    Cardiac monitoring.  SBP >= 90 mmHg, MAP >= 65 mmHg.  EKG PRN.    Continue phenylephrine gtt for BP goals, TTE with normal RV.    Continue amiodarone gtt for Afib control.     Hold home PTA antihypertensives. Continue aspirin, Brilinta.    Hold statin for now, monitor LFTs.  Holding diuresis with JOHANA.     Continue heparin infusion. Appreciate Cardiology recommendations.      GI/: Abnormal LFTs, transaminitis.  Mild elevation in AST.    Monitor LFTs    Nutrition: Diabetic/2 gm Na diet.    Last BM: 7/20  Meds: Continue colace BID.    GI prophylaxis: Not indicated with oral intake.     Renal: Acute kidney injury on CKD stage III, metabolic/lactic acidosis, electrolytes derangements (hyponatremia, hypochloremia).  Ins etting of dehydration,  hemodynamic lability.  Initially improved with volume resuscitation but worsened.  Received NS 2000 mL in ED.  CTA C/A/P study 7/17 with underlying JOHANA, so at risk of worsening renal status. Patient hesitant     Monitor I/O's.  Electrolyte repletion PRN.  Avoid/limit nephrotoxic agents.    IVFs: Bicarb gtt @ 100 mL/hr (increased 7/19 per Nephrology team)    Nephrology consult appreciated.    Plan for initiation of HD today.       Heme/Onc: Leukocytosis, unspecified - worsening today. Rising WBC, concern for untreated infection. Normocytic anemia.      DVT prophylaxis: SCDs, heparin infusion.     Endocrine: Insulin-dependent diabetes mellitus type 2 with hyperglycemia.  Hemoglobin A1c 6.9% on 7/17/2023.  At baseline is on glargine 38 units nightly, aspart sliding scale.  Patient with acidosis and hyperglycemia on presentation, ketones normal. On insulin  Infusion 7/17-7/19.    FSBG Q4H, Aspart ISS (high resistance). Hypoglycemia protocol.    Continue lantus 20 units qam.  Monitor trends. Id c     Infectious diseases: Septic shock with GPC bacteremia (Strep pyogenes) and RUE myositis.  Presentation appears consistent with sepsis in the setting of JOHANA, dehydration/distributive shock.  Patient received vancomycin and Zosyn IV in the ED. Blood cultures with 3/4 bottles + GPC in chains/pairs. Verigene panel reports Strep pyogenes. .80 (7/17), PCT 29.16 (7/16). Source of GPC bacteremia uncertain although has enlarged R shoulder.  No evidence of abnormalities aside from edema noted on CT scan, MRI shoulder with R myositis.     Follow-up blood cultures.      Continue Zosyn IV (start 7/17).     Given rising WBC, will begin Linezolid for toxin coverage (as recommended by Antimicrobial Stewardship Rounds).    Monitor for development of RUE nec fasciitis or toxic shock syndrome.  CK, LFT, and lactic acid monitoring.  Ortho consult as below.     Rheum/Musculoskeletal: Right upper extremity edema with right shoulder  "myositis. No erythema externally, reports has been swollen for at least a week. CTA C/A/P showed no specific shoulder abnormalities aside from edema. Duplex US negative for DVT on 7/17. MRI right shoulder 7/19 with inflammatory myositis findings; no evidence of osteomyelitis. History of left lower extremity osteomyelitis status post left BKA 04/2022.    RUE Cold compresses PRN for comfort.     CK elevated at 1600.  Monitor Q12H    Orthopedic surgery consultation    Activity: Bedrest for now.    RISK FACTORS PRESENT ON ADMISSION:  Clinically Significant Risk Factors        # Hypokalemia: Lowest K = 2.2 mmol/L in last 2 days, will replace as needed  # Hyponatremia: Lowest Na = 126 mmol/L in last 2 days, will monitor as appropriate     # Anion Gap Metabolic Acidosis: Highest Anion Gap = 25 mmol/L in last 2 days, will monitor and treat as appropriate  # Hypoalbuminemia: Lowest albumin = 2.4 g/dL at 7/20/2023  4:48 AM, will monitor as appropriate   # Thrombocytopenia: Lowest platelets = 123 in last 2 days, will monitor for bleeding  # Acute Kidney Injury, unspecified: based on a >150% or 0.3 mg/dL increase in last creatinine compared to past 90 day average, will monitor renal function  # Hypertension: Noted on problem list  # Chronic heart failure with reduced ejection fraction: last echo with EF <40%      # DMII: A1C = 6.9 % (Ref range: <5.7 %) within past 6 months   # Obesity: Estimated body mass index is 30.03 kg/m  as calculated from the following:    Height as of this encounter: 1.88 m (6' 2\").    Weight as of this encounter: 106.1 kg (233 lb 14.4 oz)., PRESENT ON ADMISSION             Lines/Drains/Tubes:  LUE PICC central line, placed 7/17  Garcia catheter, placed 7/18     Code Status:  Full Code. Uncertainty about goals with hemodialysis. Willing to have short term therapy but patient has concerns on long-term support and use of HD.      The patient and/or the family was educated about the above plan of care and " indicated understanding. Called the sister, Henna, with updates. We discussed the challenges that Norman has regarding dialysis and that he was uncertain about his long-term desire for dialysis therapy as well as concerns on future placement.     This patient is considered critically ill and requires ICU level of care due to circulatory/septic shock on vasopressors.     Total Critical Care time, not including separate billable procedure time: 45 minutes.    Shilpa Hale MD, MPH  Pulmonary/Critical Care Medicine  07/20/2023   10:09 AM         ICU DAILY CHECKLIST:   ICU DAILY CHECKLIST           Can patient transfer out of MICU? no    FAST HUG:    Feeding:  yes.  Patient is receiving ORAL    Garcia: no  Analgesia/Sedation: no; PRNs   Thromboembolic prophylaxis: yes; Mode:  Heparin and SCDs  HOB>30:  yes  Stress Ulcer Protocol Active: yes; Mode: PPI  Glycemic Control: Any glucose > 180 yes; Mode of Insulin Therapy: Sliding Scale Insulin and Long Acting Insulin    INTUBATED:  Can patient have daily waking:  n/a  Can patient have spontaneous breathing trial:  n/a    Restraints? no    PHYSICAL THERAPY AND MOBILITY:  Can patient have PT and mobility trial: no  Activity: Bedrest        Subjective/Interval History:   7/17: Presented to ED with multiple symptoms including weakness, dehydration, scalp pain/burning, and chronic R arm pain, found to have atrial fibrillation with RVR  (HR 160s-180s) and treated with IV metoprolol. Developed hypotension, PICC placed + NE gtt. Zosyn+ Vanco. CTA C/A/P with localized edema to RUE, no dissection but showed Subcutaneous inflammation in the right axilla noted.  Started insulin gtt. Duplex US done and negative for DVT to RUE  7/18: Started on vasopressin gtt in early morning, then stopped before 7 am. Afib with RVR, started amio gtt. Cards consult. PICC line dislodged, had to be replaced.   7/19: Switched from NE gtt to phenylephrine gtt. Nephro consult. Increased bicarb gtt.  MRI R  "shoulder with myositis.    Overnight, low urine output. Cr worse today.   Eating breakfast this morning.   Right arm still painful/uncomfortable.     Review of Systems:  The Review of Systems is negative other than noted in the HPI        Allergies/Medications:   Allergies:   No Known Allergies    Continuous Infusions:    amiodarone 0.5 mg/min (07/20/23 0913)     dextrose       heparin 1,400 Units/hr (07/20/23 0137)     [Held by provider] insulin regular Stopped (07/19/23 0954)     phenylephrine 1.5 mcg/kg/min (07/20/23 0425)     sodium bicarbonate 150 mEq in sterile water (preservative free) 1,000 mL infusion 100 mL/hr at 07/19/23 4257     Scheduled Medications:    aspirin  81 mg Oral Daily     docusate sodium  100 mg Oral BID     insulin aspart  1-10 Units Subcutaneous TID AC     insulin aspart  1-7 Units Subcutaneous At Bedtime     insulin glargine  20 Units Subcutaneous QAM AC     magnesium oxide  400 mg Oral Daily     multivitamin, therapeutic  1 tablet Oral Daily     piperacillin-tazobactam  3.375 g Intravenous Q8H     sodium chloride (PF)  10-40 mL Intracatheter Q7 Days     sodium chloride (PF)  3 mL Intracatheter Q8H     ticagrelor  90 mg Oral BID     vitamin C  500 mg Oral Daily           Objective:   Vitals:  /49   Pulse 99   Temp 98  F (36.7  C) (Oral)   Resp (!) 35   Ht 1.88 m (6' 2\")   Wt 106.1 kg (233 lb 14.4 oz)   SpO2 97%   BMI 30.03 kg/m    Vent: Resp: (!) 35    GEN: Fatigued, awake.   HEENT: Normocephalic, atraumatic.  Extraoccular eye movements intact, anicteric sclera. Moist mucous membranes. Face slightly flushed. L eye with mild puffiness.   NECK: Supple.    PULM: Non-labored breathing.  No use of accessory muscles.  Clear to ausculation bilaterally.   CVS: Irregularly irregular rhythm.  Normal S1, S2.  No rubs, murmurs, or gallops.    ABDOMEN: Normoactive bowel sounds.  Non-tender to palpation.  Non-distended.    EXTREMITES:  No clubbing, cyanosis. S/p left BKA. RUE shoulder/arm " with edema, no erythema.  NEURO:  Awake but fatigued. Oriented.  Cranial nerves 2-12 grossly intact.  Moving all extremities.      Intake/Output: I/O last 3 completed shifts:  In: 3764.36 [P.O.:680; I.V.:3084.36]  Out: 180 [Urine:180]        Pertinent Studies:   All laboratory data reviewed  Serum Glucose range:   Recent Labs   Lab 07/20/23  0800 07/20/23  0448 07/19/23  2046 07/19/23  1534   * 188* 176* 128*     ABG: No lab results found in last 7 days.  CBC:   Recent Labs   Lab 07/20/23  0448 07/19/23  1555 07/18/23  0414 07/17/23  1302   WBC 21.3* 12.9* 8.4 14.5*   HGB 8.2* 9.0* 12.5* 13.2*   HCT 23.8* 26.8* 35.8* 38.5*   MCV 80 82 80 83    123* 191 247   NEUTROPHIL  --   --   --  87   LYMPH  --   --   --  3   MONOCYTE  --   --   --  7   EOSINOPHIL  --   --   --  0     Chemistry:   Recent Labs   Lab 07/20/23  0448 07/19/23  1531 07/19/23  0951 07/19/23  0515 07/19/23  0213 07/17/23  2316 07/17/23  1425 07/17/23  1300   * 128* 128*   < > 128*  126*   < > 121* 119*   POTASSIUM 3.8 3.8 3.6   < > 3.7  3.6   < > 5.4* 4.6   CHLORIDE 92* 94* 94*   < > 95*  94*   < > 86* 80*   CO2 18* 14* 15*   < > 14*  14*   < > 15* 14*   BUN 96.9*  --  80.4*  --  75.3*   < > 63.1* 64.9*   CR 4.71*  --  3.88*  --  3.60*   < > 2.94* 3.25*   GFRESTIMATED 13*  --  17*  --  18*   < > 23* 20*   YOSVANY 7.5*  --  7.3*  --  7.1*   < > 8.4* 9.2   MAG  --   --   --   --   --   --  1.9  --    PROTTOTAL 5.7*  --   --   --   --   --   --  7.2   ALBUMIN 2.4*  --   --   --   --   --   --  3.1*   *  --  187*  --   --   --   --  92*   *  --   --   --   --   --   --  33   ALKPHOS 198*  --   --   --   --   --   --  103   BILITOTAL 2.5*  --   --   --   --   --   --  1.7*    < > = values in this interval not displayed.     Coags:  No results for input(s): INR, PROTIME, PTT in the last 168 hours.    Invalid input(s): APTT  Cardiac Markers:  No results for input(s): CKTOTAL, TROPONINI in the last 168 hours.      Latest  Reference Range & Units 11/14/21 21:49 03/18/22 11:26 09/27/22 14:18 09/28/22 14:17 07/17/23 13:00 07/17/23 13:02 07/17/23 14:25 07/17/23 15:49 07/17/23 19:08 07/17/23 21:08 07/17/23 23:16 07/18/23 04:14 07/18/23 11:55   CRP Inflammation <5.00 mg/L       297.80 (H)         Lactic Acid 0.7 - 2.0 mmol/L 1.8 0.9 1.1   9.0 (HH)  4.1 (HH) 3.7 (H)  4.3 (HH) 4.0 (HH) 1.6   N-Terminal Pro BNP Inpatient 0 - 900 pg/mL    3,295 (H) 8,551 (H)           Procalcitonin <0.05 ng/mL       29.16 (HH)         Troponin T, High Sensitivity <=22 ng/L     216 (HH)     282 (HH) 353 (HH) 415 (HH)    (HH): Data is critically high  (H): Data is abnormally high    Microbiology:  Blood cultures x 2, 7/19: NGTD  Blood cultures x 2, 7/17: 3 of 4 bottles with Streptococcus pyogenes (Group A Streptococcus)         Cardiology/Radiology:   Cardiac: All cardiac studies reviewed by me.    EKG:  Reviewed    TTE, 7/18/23:  Summary: Left ventricular function is decreased. The ejection fraction is 25-30% (severely reduced). The left atrium is mildly dilated. IVC diameter <2.1 cm collapsing >50% with sniff suggests a normal RA pressure of 3 mmHg. TAPSE is normal, which is consistent with normal right ventricular systolic Function. The patient exhibited frequent PVCs. No hemodynamically significant valvular abnormalities on 2D or color flow imaging. The study was technically difficult.    Radiology:  All imaging studies reviewed by me.    Chest X-Ray, 7/18/23:  Left PICC tip in upper SVC. Lungs clear. Heart size normal. No pneumothorax.    MRI Right shoulder, 7/19/23:  IMPRESSION: 1.  Extensive signal abnormality within multiple muscles of the shoulder girdle including portions of the visualized latissimus and trapezius muscles. The findings are likely secondary to infectious or inflammatory myositis versus contusion or muscle  strain type injury. No evidence for abscess. 2.  No evidence for osteomyelitis or septic arthropathy. 3.  Small amount of fluid  within the subacromial subdeltoid bursa. 4.  No evidence for fracture. 5.  Moderate severity tendinopathy within the supraspinatus and infraspinatus. 6.  Tendinopathy within the intraarticular portion of the biceps. 7.  Degenerative change at the glenohumeral joint with grade II-grade III cartilage loss but no significant surface irregularity.    CTA chest/abdomen/pelvis with IV contrast, 7/17: FINDINGS: CTA CHEST, ABDOMEN, PELVIS: Normal great vessel branch pattern with mild disease of the subclavian artery. Normal caliber thoracic aorta. Abdominal aorta is widely patent without stenosis or dissection. Moderate calcified atherosclerotic disease without significant visceral arterial stenosis.  RIGHT LEG: Iliac arteries appear patent. Due to contrast bolus timing, difficult to evaluate the superficial femoral artery although this appears grossly patent. Severe infrapopliteal disease. LEFT LEG: Postsurgical changes of left below-knee amputation. Iliac arteries, common femoral artery, profunda femoral artery, superficial femoral artery all appear patent. NONVASCULAR FINDINGS: LUNG/PLEURA: Calcified granuloma. Scattered atelectasis. Spiculated appearing lung nodule in the right lower lobe measuring 1.0 x 0.9 cm on series 11 image 166. Punctate nodules in left lung base. No pleural effusion or pneumothorax. MEDIASTINUM: Severe coronary artery disease with coronary artery stents. Questionable left atrial appendage thrombus. Inflammatory changes in the right axilla. ABDOMEN: Suboptimal evaluation of the solid organs due to the arterial phase of contrast enhanced. Cholelithiasis. Spleen, adrenal glands, kidneys, and pancreas are grossly unremarkable. Hepatic steatosis. PELVIS: No abnormally dilated loops of bowel. No free fluid or free air. 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 Postsurgical changes of left below-knee amputation. IMPRESSION: 1.  Right lower extremity arteries  appear largely patent with severe infrapopliteal arterial disease. Evaluation of the superficial femoral artery is difficult due to contrast bolus timing. Recommend Doppler arterial ultrasound if there is continued  clinical concern for embolic disease. 2.  Questionable left atrial appendage thrombus. Recommend echocardiogram for further evaluation. 3.  Spiculated pulmonary nodule in the right lower lobe measuring 1.0 x 0.9 cm. Recommend follow-up per Fleischner Society criteria as described below. 4.  Subcutaneous inflammation in the right axilla noted. Consider right upper extremity Doppler to rule out a DVT. May also represent an infectious process. 5.  Cholelithiasis. 6.  Hepatic steatosis.

## 2023-07-20 NOTE — PROGRESS NOTES
CLINICAL NUTRITION SERVICES - ASSESSMENT NOTE     Nutrition Prescription    RECOMMENDATIONS FOR MDs/PROVIDERS TO ORDER:      Malnutrition Status:    Not noted    Recommendations already ordered by Registered Dietitian (RD):  Start Glucerna 2x/day    Future/Additional Recommendations:       REASON FOR ASSESSMENT  Norman Aguilar is a/an 63 year old male assessed by the dietitian for Interdisciplinary Rounds    NUTRITION HISTORY  Patient admitted with Atrial fibrillation, sepsis, JOHANA, DM, possible NSTEMI. History of L BKA, DM, CKD.    Patient reports not eating much this admission.  He watches his diet to control blood sugar.  Per chart patient is homeless.      CURRENT NUTRITION ORDERS  Diet: 2 g Sodium and Moderate Consistent Carbohydrate  No intake recorded yet.      LABS  CMPRecent Labs   Lab 07/20/23  1113 07/20/23  0800 07/20/23  0448 07/19/23  2046 07/19/23  1534 07/19/23  1531 07/19/23  0953 07/19/23  0951 07/19/23  0834 07/19/23  0303 07/19/23  0213 07/18/23  0418 07/18/23  0414 07/17/23  1845 07/17/23  1425 07/17/23  1300   NA  --   --  128*  --   --  128*  --  128* 135*   < > 128*  126*   < > 124*  124*   < > 121* 119*   POTASSIUM  --   --  3.8  --   --  3.8  --  3.6 2.2*   < > 3.7  3.6   < > 4.0  4.0   < > 5.4* 4.6   * 186* 188* 176*   < >  --    < > 153*  --    < > 136*  127*   < > 175*   < > 505* 494*   BUN  --   --  96.9*  --   --   --   --  80.4*  --   --  75.3*  --  70.6*  --  63.1* 64.9*   CR  --   --  4.71*  --   --   --   --  3.88*  --   --  3.60*  --  3.14*  --  2.94* 3.25*   YOSVANY  --   --  7.5*  --   --   --   --  7.3*  --   --  7.1*  --  8.4*  --  8.4* 9.2   MAG  --   --   --   --   --   --   --   --   --   --   --   --   --   --  1.9  --    PHOS  --   --  3.7  --   --   --   --   --   --   --   --   --   --   --   --   --    ALBUMIN  --   --  2.4*  --   --   --   --   --   --   --   --   --   --   --   --  3.1*   BILITOTAL  --   --  2.5*  --   --   --   --   --   --   --   --   --  "  --   --   --  1.7*   ALKPHOS  --   --  198*  --   --   --   --   --   --   --   --   --   --   --   --  103   AST  --   --  241*  --   --   --   --  187*  --   --   --   --   --   --   --  92*   ALT  --   --  105*  --   --   --   --   --   --   --   --   --   --   --   --  33    < > = values in this interval not displayed.     Labs reviewed    MEDICATIONS    aspirin  81 mg Oral Daily     docusate sodium  100 mg Oral BID     insulin aspart  1-10 Units Subcutaneous TID AC     insulin aspart  1-7 Units Subcutaneous At Bedtime     insulin glargine  20 Units Subcutaneous QAM AC     linezolid  600 mg Intravenous Q12H     magnesium oxide  400 mg Oral Daily     multivitamin, therapeutic  1 tablet Oral Daily     piperacillin-tazobactam  3.375 g Intravenous Q8H     sodium chloride (PF)  10-40 mL Intracatheter Q7 Days     sodium chloride (PF)  3 mL Intracatheter Q8H     ticagrelor  90 mg Oral BID     vitamin C  500 mg Oral Daily        amiodarone 0.5 mg/min (07/20/23 0913)     dextrose       heparin 1,400 Units/hr (07/20/23 0137)     [Held by provider] insulin regular Stopped (07/19/23 0954)     phenylephrine 1.5 mcg/kg/min (07/20/23 1145)     sodium bicarbonate 150 mEq in sterile water (preservative free) 1,000 mL infusion 50 mL/hr at 07/20/23 1224      acetaminophen **OR** acetaminophen, acetaminophen, bisacodyl, dextrose, glucose **OR** dextrose **OR** glucagon, lidocaine 4%, lidocaine (buffered or not buffered), polyethylene glycol, sodium chloride (PF), sodium chloride (PF), sodium chloride (PF), sodium chloride (PF)   Medications reviewed    ANTHROPOMETRICS  Height: 188 cm (6' 2\")  Most Recent Weight: 106.1 kg (233 lb 14.4 oz)    BMI: Obesity Grade I BMI 30-34.9  Weight History:   Wt Readings from Last 10 Encounters:   07/20/23 106.1 kg (233 lb 14.4 oz)   11/17/22 100.2 kg (221 lb)   09/28/22 104.3 kg (230 lb)   09/27/22 104.3 kg (230 lb)   09/15/22 97.7 kg (215 lb 6.4 oz)   08/30/22 105.7 kg (233 lb)   04/04/22 93 kg " (205 lb)   03/23/22 95.7 kg (211 lb)   01/27/22 90.3 kg (199 lb)   01/10/22 93 kg (205 lb)   No wt loss noted    Dosing Weight: 106 kg    ASSESSED NUTRITION NEEDS  Estimated Energy Needs: 2120+ kcals/day (20+ kcals/kg)  Justification: critically ill and Obese  Estimated Protein Needs:  grams protein/day (0.8 - 1.1 grams of pro/kg)  Justification: CKD  Estimated Fluid Needs: 1800 mL/day (or per renal)   Justification: CKD, hyponatremia    PHYSICAL FINDINGS  See malnutrition section below.  Last BM 7/20   WOC RN following:  RUE axilla blistering.    MALNUTRITION:  % Weight Loss:  None noted  % Intake:  Decreased intake does not meet criteria for malnutrition 2 days  Subcutaneous Fat Loss:  None observed  Muscle Loss:  None observed  Fluid Retention:  Mild generalized per chart    Malnutrition Diagnosis: Patient does not meet two of the above criteria necessary for diagnosing malnutrition      NUTRITION DIAGNOSIS  Altered nutrition-related laboratory values related to DM, renal disease, critical illness as evidenced by elevated LFTs, elevated renal labs, elevated BG      INTERVENTIONS  Implementation  Nutrition Education: Not appropriate at this time due to patient condition   Medical food supplement therapy - Glucerna 2x/day.    Goals  Meet nutrition needs  BG , 180     Monitoring/Evaluation  Progress toward goals will be monitored and evaluated per protocol.

## 2023-07-21 NOTE — CONSULTS
ORTHOPEDIC CONSULTATION    Consultation  Norman Aguilar,  1960, MRN [id number]    [facility]  Dehydration [E86.0]  Hyponatremia [E87.1]  Elevated troponin [R77.8]  JOHANA (acute kidney injury) (H) [N17.9]  Atrial fibrillation with RVR (H) [I48.91]  Septic shock (H) [A41.9, R65.21]    PCP: Jaquan Dickerson, [phone]   Code status:  Full Code       Extended Emergency Contact Information  Primary Emergency Contact: Henna Tellez   [place]  Mobile Phone: [phone]  Relation: Sister         IMPRESSION:  63-year-old male with past medical history significant for essential hypertension, coronary artery disease/NSTEMI, chronic CHF, CKD stage III, 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 Patient demonstrating right shoulder/upper extremity swelling and weakness, clinically concerned for right shoulder inflammatory myositis versus pyogenic myositis with blood cultures positive for S. Pyogenes (Group A strep), elevated CRP (297 on ), elevated WBC (21.3 on ), elevated CK (1,161 on ) and recent development of right underarm bullae.      PLAN:  This patient was discussed with Dr. Fox, on-call surgeon for [facility] and they are in agreement with the following plan.     --Based on MRI findings, at this time no evidence of joint effusion or deep abscess formation. Lower suspicion for septic glenohumeral joint at this time. Given current clinical picture, concern is for inflammatory versus pyogenic myositis. His S pyogenes bacteremia, sepsis, development of underarm bullae raises the concern for possible progression to a necrotizing fascitis picture, and would appreciate general surgery input. In this setting, as well as his complex medical condition, would consider transfer to a  higher level of care where appropriate multiple surgical/medical subspecialty resources are available to handle this complex case.   --Continue IV antibiotics per ID recommendations   -- Started on penicillin G IV for strep A infection; continued linezolid 600mg IV BID  - Pain control as needed  - Continue to closely monitoring of underarm bullae    Thank you for including [facility] in the care of Norman Aguilar. It has been a pleasure participating in Norman's care.        CHIEF COMPLAINT: Elevated troponin    HISTORY OF PRESENT ILLNESS:  Norman is a 63-year-old male with past medical history significant for essential hypertension, coronary artery disease/NSTEMI, chronic CHF, CKD stage III, history of left BKA for osteomyelitis of left foot in April 2022, and insulin-dependent DMII. Patient initially presented to [facility] ED on 7/17 with reports of bilateral arm pain x 4 days and clinically demonstrated low pressures, A-fib with RVR and increased heart rate. Patient was further cardioverted to normal sinus rhythm in the ED. Appeared globally weak at time of initial evaluation and was further admitted to the ICU for circulatory/distributive shock with suspected sepsis, JOHANA, uncontrolled diabetes, and possible NSTEMI. PICC line as further placed upon admission, and he initially received on IV Zosyn and vancomycin in the ED. Blood cultures positive for Strep pyogenes on 7/17. Further evaluation of RUE demonstrated US negative for DVT. MRI was ordered to further evaluate for osteomyelitis and/or joint effusion and orthopedics was consulted given presence of myositis on MRI. Patient is resting in bed upon evaluation. He overall notes a sense of improved pain at the right shoulder. He notes pain is well controlled. Primarily complains of swelling at the right upper extremity and weakness. Notes one episode of right shoulder swelling in the past when he was admitted and tells me it was managed with IV  antibiotics at that time. Patient notes prior right shoulder function a little over a week ago before onset of pain and swelling. Denies any trauma to the right shoulder. Denies any numbness or tingling at the right upper extremity. Denies any fevers or chills. Denies any significant increase in swelling at the right upper extremity since date of admission.       PAST MEDICAL HISTORY:  Active Ambulatory Problems     Diagnosis Date Noted     DM type 2 on insulin, w Neuro -- Hgb A1C 7.6 on 1/19/22 11/17/2018     Benign essential hypertension 11/14/2021     Benign neoplasm of descending colon 04/19/2018     Gastro-esophageal reflux disease with esophagitis 02/13/2018     Microalbuminuria 11/14/2021     Hyperlipidemia 11/14/2021     PAF (paroxysmal atrial fibrillation) -- on Eliquis  11/15/2021     Ischemic cardiomyopathy 11/29/2021     CAD -- diffuse calcified vessels 11/16/21 (no stents placed) 11/29/2021     DVT (deep venous thrombosis) (H) 11/29/2021     Chronic kidney disease (CKD) stage G3a/A1, moderately decreased glomerular filtration rate (GFR) between 45-59 mL/min/1.73 square meter and albuminuria creatinine ratio less than 30 mg/g (H) 11/29/2021     Diabetic ulcer of left midfoot associated with type 2 diabetes mellitus, with necrosis of bone (H) 12/03/2021     Constipation 04/24/2018     Traumatic amputation of toe or toes without complication (H) 01/04/2022     Osteomyelitis of left foot -- S/P Left BKA 4/4/22 03/18/2022     Amputation of left foot (H) 04/04/2022     Chronic systolic CHF -- EF 30-35% on Echo 11/15/21 04/04/2022     Smoker (Cigars) 04/04/2022     NSTEMI (non-ST elevated myocardial infarction) (H) 08/23/2022     ARF (acute renal failure) (H) 08/24/2022     Resolved Ambulatory Problems     Diagnosis Date Noted     Diabetic oculopathy associated with type 2 diabetes mellitus (H) 11/14/2021     Gastroesophageal reflux disease without esophagitis 04/17/2018     Hypercalcemia 11/14/2021     Long  term (current) use of oral hypoglycemic drugs 11/14/2021     Polyp of colon 04/17/2018     Hypocalcemia 11/15/2021     Hyperglycemia 11/15/2021     Wound infection 11/15/2021     Diabetic ketoacidosis without coma associated with type 2 diabetes mellitus (H) 11/15/2021     Heart failure with reduced ejection fraction (H) 11/29/2021     Past Medical History:   Diagnosis Date     Anemia      Chronic systolic CHF (congestive heart failure) (H)      Coronary artery disease      Diabetic neuropathy (H)      DM type 2 w Neuropathy      GERD (gastroesophageal reflux disease)      Hypertension      Intermittent atrial fibrillation (H)      Renal disease      Retinopathy      Sleep disturbance            ALLERGIES:   Review of patient's allergies indicates No Known Allergies      MEDICATIONS UPON ADMISSION:  Medications were reviewed.  They include:   Medications Prior to Admission   Medication Sig Dispense Refill Last Dose     aspirin (ASA) 81 MG EC tablet Take 1 tablet (81 mg) by mouth daily Start tomorrow. 30 tablet 3 7/16/2023 at pm     atorvastatin (LIPITOR) 80 MG tablet TAKE 1 TABLET(80 MG) BY MOUTH EVERY EVENING 90 tablet 1 7/16/2023 at pm     furosemide (LASIX) 20 MG tablet Take 20 mg by mouth daily   7/17/2023 at am     insulin aspart (NOVOLOG FLEXPEN) 100 UNIT/ML pen For  - 164 give 1 unit. For  - 189 give 2 units. For  - 214 give 3 units. For  - 239 give 4 units. For  - 264 give 5 units. For  - 289 give 6 units. For  - 314 give 7 units. For  - 339 give 8 units For  - 364 give 9 units For  - 389 give 10 units For  - 414 give 11 units For BG greater than or equal to 415 give 12 units 3 mL 3 7/16/2023     insulin glargine (LANTUS SOLOSTAR) 100 UNIT/ML pen Inject 38 Units Subcutaneous every morning (Patient taking differently: Inject 38 Units Subcutaneous At Bedtime) 3 mL 3 7/16/2023 at pm     lisinopril (ZESTRIL) 5 MG tablet Take 1 tablet by mouth  daily   7/17/2023 at am     magnesium oxide (MAG-OX) 400 MG tablet Take 1 tablet (400 mg) by mouth daily 90 tablet 1 7/17/2023 at am     metoprolol succinate ER (TOPROL-XL) 50 MG 24 hr tablet TAKE 1 TABLET(50 MG) BY MOUTH DAILY 90 tablet 1 7/17/2023 at am     Multiple Vitamin (MULTI VITAMIN) TABS Take 1 tablet by mouth daily    7/17/2023 at am     OMEGA-3/DHA/EPA/FISH OIL (FISH OIL-OMEGA-3 FATTY ACIDS) 300-1,000 mg capsule Take 1 g by mouth daily    7/17/2023 at am     ticagrelor (BRILINTA) 90 MG tablet Take 1 tablet (90 mg) by mouth 2 times daily Dose to start tomorrow morning. 180 tablet 3 7/17/2023 at am     vitamin C (ASCORBIC ACID) 500 MG tablet Take 500 mg by mouth daily   7/17/2023 at am     blood glucose (NO BRAND SPECIFIED) test strip Use to test blood sugar 4 times daily or as directed. 100 strip 3      blood glucose monitoring (SOFTCLIX) lancets Use to test blood sugar 4 times daily. 100 each 6          SOCIAL HISTORY:   he  reports that he has quit smoking. His smoking use included cigars. He has never used smokeless tobacco. He reports current alcohol use. He reports that he does not use drugs.    FAMILY HISTORY:  family history is not on file.      REVIEW OF SYSTEMS:   Reviewed with patient. See HPI, otherwise negative.      PHYSICAL EXAMINATION:  Vitals: Temp:  [97  F (36.1  C)-99.1  F (37.3  C)] 99.1  F (37.3  C)  Pulse:  [] 96  Resp:  [24-42] 38  BP: ()/(42-86) 100/55  SpO2:  [94 %-100 %] 98 %  General: On examination, the patient is resting comfortably, NAD, awake and alert and oriented to person, place, time, and and general circumstances   SKIN: Generalized swelling at the right upper extremity. No evidence of erythema or ecchymosis at the shoulder. No warmth appreciated at the shoulder. There is evidence of a few areas of 1x1cm underarm skin bullae without drainage.  Pulses:  radial pulse is intact although weak.  Sensation: Intact at the axillary, radial, median, and ulnar nerve  distributions.  Tenderness: Nontender to palpation about the shoulder and distally at the upper extremity.  ROM:  Passively able to forward flex to 120 degrees, externally rotate to 50 degrees, and internally rotate to belly comfortably. Passively able to move elbow through full arc of motion without pain. Wrist motion intact in all directions. Appropraitely firing EPL and intrinsics.      RADIOGRAPHIC EVALUATION:  Personally reviewed.    EXAM: MR SHOULDER RIGHT W/O CONTRAST  LOCATION: [facility]  DATE: 7/19/2023     INDICATION: Septic shock with bacteremia, concern for right shoulder osteomyelitis or joint infection.  COMPARISON: None.  TECHNIQUE: Unenhanced.     FINDINGS:     ROTATOR CUFF:  -Supraspinatus: Moderate severity tendinopathy. No tearing or retraction. No atrophy.  -Infraspinatus: Moderate severity infraspinatus tendinopathy without evidence for well-defined partial or full-thickness tearing. No retraction but there is T2 signal abnormality within the muscular portion, which may be due to muscle strain. Infectious   or inflammatory myositis could have this appearance.  -Subscapularis: No tendon tear, tendinopathy or fatty atrophy.  -Teres minor: No tendon tear, tendinopathy or fatty atrophy.     CORACOACROMIAL ARCH:  -Morphology: Type II acromion. No subacromial spur. Subacromial and subcoracoid space are normal.   -Bursa: Fluid in the subacromial subdeltoid bursa. The coracoacromial and coracoclavicular ligaments are negative.     ACROMIOCLAVICULAR JOINT:   -Hypertrophic change. No significant diastasis or effusion.      LONG HEAD OF BICEPS TENDON:   -Mild tendinopathy within the intra-articular portion of the long head of the biceps.     GLENOHUMERAL JOINT:   -Labrum: No labral tear. No paralabral cyst.   -Cartilage: Grade II-grade III cartilage loss both sides of the joint without significant surface irregularity.   -Joint space: No effusion or synovitis.  -Glenohumeral  ligaments and capsule: No pericapsular inflammation.     BONES:   -No fracture or concerning marrow replacing lesion. No evidence for osteomyelitis.     SOFT TISSUES:   -Extensive but nonspecific T2 signal abnormality is present within the deltoid musculature, most marked posteriorly but this is also seen within the latissimus muscle, distal aspect of the trapezius, infraspinatus as described, and portions of the   subscapularis. The multiplicity of muscle groups involved makes a strain type injury considered unlikely and this is more likely secondary to infectious or inflammatory myositis. There is no evidence for organized fluid collection to suggest abscess.                                                                      IMPRESSION:  1.  Extensive signal abnormality within multiple muscles of the shoulder girdle including portions of the visualized latissimus and trapezius muscles. The findings are likely secondary to infectious or inflammatory myositis versus contusion or muscle   strain type injury. No evidence for abscess.  2.  No evidence for osteomyelitis or septic arthropathy.  3.  Small amount of fluid within the subacromial subdeltoid bursa.  4.  No evidence for fracture.  5.  Moderate severity tendinopathy within the supraspinatus and infraspinatus.  6.  Tendinopathy within the intraarticular portion of the biceps.  7.  Degenerative change at the glenohumeral joint with grade II-grade III cartilage loss but no significant surface irregularity.      EXAM: US UPPER EXTREMITY VENOUS DUPLEX RIGHT  LOCATION: [facility]  DATE: 7/18/2023     INDICATION: RUE swelling w  concern for DVT (see CTA)  COMPARISON: None.  TECHNIQUE: Venous Duplex ultrasound of the right upper extremity with (when possible) and without compression, augmentation, and duplex. Color flow and spectral Doppler with waveform analysis performed.     FINDINGS: Ultrasound includes evaluation of the internal jugular  vein, innominate vein, subclavian vein, axillary vein, and brachial vein. The superficial cephalic and basilic veins were also evaluated where seen.      RIGHT: No deep venous thrombosis. No superficial thrombophlebitis. Internal jugular vein, subclavian vein, axillary vein partially decompressed and not well visualized.                                                                      IMPRESSION:  1.  No deep venous thrombosis in the right upper extremity.    PERTINENT LABS:  Lab Results: personally reviewed.     Lab Results   Component Value Date    WBC 21.3 07/20/2023    HGB 8.2 07/20/2023    HCT 23.8 07/20/2023    MCV 80 07/20/2023     07/20/2023     CRP Inflammation   Date Value Ref Range Status   07/17/2023 297.80 (H) <5.00 mg/L Final       CK Total   Date Value Ref Range Status   07/20/2023 1,161 (H) 39 - 308 U/L Final       ABEL DELGADO PA-C  Date: 7/20/2023  Time: 7:01 PM    CC1:   Shilpa Hale MD    CC2:   Jaquan Dickerson

## 2023-07-21 NOTE — PROGRESS NOTES
Cardiology Progress Note  New to me.  Chart reviewed.  Assessment/Plan:    1. Non-ST elevation myocardial infarction with known multivessel coronary disease, status post percutaneous intervention 11/2021.  Current troponin elevation suspicious for type II myocardial infarction secondary to sepsis syndrome.  Considering future coronary angiography, on aspirin and ticagrelor  2. Septic shock on top of ischemic cardiomyopathy, LVEF 25 to 30%  3. Paroxysmal atrial fibrillation on amiodarone load IV day 4 now in sinus rhythm, will switch to oral regimen.  4. And stage renal disease on hemodialysis, now via SC catheter with right arm fistula breakdown.  Volume management per nephrology  5. Hyponatremia recurrent.  Dialysis today    Principal Problem:    Elevated troponin  Active Problems:    Dehydration    Hyponatremia    JOHANA (acute kidney injury) (H)    Atrial fibrillation with RVR (H)    Septic shock (H)     LOS: 4 days     Subjective:  Complains of persistent right arm pain at fistula site with minimal movement.  Breathing improved.  On dialysis presently via subclavian catheter      Objective:   Vital signs in last 24 hours:  Vitals:    07/21/23 0745 07/21/23 0800 07/21/23 0815 07/21/23 0830   BP: 101/54 97/55 90/54 94/50   Pulse: 77 76 74 74   Resp: 30 (!) 31 29 29   Temp:       TempSrc:       SpO2: 98% 98%     Weight:       Height:         Weight:   Wt Readings from Last 3 Encounters:   07/20/23 106.1 kg (233 lb 14.4 oz)   11/17/22 100.2 kg (221 lb)   09/28/22 104.3 kg (230 lb)           PHYSICAL EXAM      Respiratory:  Normal breath sounds, No respiratory distress, No wheezing, No chest tenderness.   Cardiovascular:   Normal heart rate, Normal rhythm, systolic/diastolic murmur audible upper right sternal border.  GI:  Bowel sounds normal, Soft, No tenderness, No masses  Extremities: Left BKA.  1-2+ right lower extremity edema, warm, chronic stasis changes.       Cardiographics:   Telemetry atrial fibrillation in  the 90s converting to sinus rhythm in the 70s at 2200 last evening.      Imaging:   Echocardiogram 7/18:  Left ventricular function is decreased. The ejection fraction is 25-30%  (severely reduced).  The left atrium is mildly dilated.  IVC diameter <2.1 cm collapsing >50% with sniff suggests a normal RA pressure  of 3 mmHg.  TAPSE is normal, which is consistent with normal right ventricular systolic  function.  The patient exhibited frequent PVCs.  No hemodynamically significant valvular abnormalities on 2D or color flow  imaging. The study was technically difficult.    Lab Results:   Lab Results   Component Value Date    WBC 21.3 (H) 07/20/2023    HGB 8.2 (L) 07/20/2023    HCT 23.8 (L) 07/20/2023     07/20/2023    CHOL 157 03/24/2023    TRIG 60 03/24/2023    HDL 65 03/24/2023     (H) 07/21/2023     (H) 07/21/2023     (L) 07/21/2023    .9 (H) 07/21/2023    CO2 19 (L) 07/21/2023    PSA 1.69 08/24/2022    INR 1.19 (H) 08/24/2022    MICROALBUR 40.09 (H) 05/17/2022     Lab Results   Component Value Date    TROPONINI 5.84 (HH) 08/30/2022         Scott Martinez MD [facility]  7/21/2023

## 2023-07-21 NOTE — PROGRESS NOTES
[facility]:  CRITICAL CARE PROGRESS NOTE     Date / Time of Admission:  7/17/2023 12:47 PM    ID: Norman Aguilar is a 63 year old male with essential hypertension, coronary artery disease/NSTEMI on Brillinta since 08/2022, chronic systolic CHF (LVEF 30-35% on TTE 8/25/22), essential hypertension, CKD stage III, history of left foot osteomyelitis status post left BKA 4/4/2022, insulin-dependent diabetes mellitus type 2 (HgbA1c 6.9% 7/17/23) who presented to [facility] ED with multiple symptoms including weakness, dehydration, scalp pain/burning, and chronic R arm pain, found to have atrial fibrillation with RVR (HR 160s-180s) and treated with IV metoprolol, now admitted to the ICU for circulatory/distributive shock with suspected sepsis (source unknown), JOHANA, uncontrolled diabetes requiring insulin infusion, and NSTEMI. TTE 7/18/23 showing worsening reduced LVEF 25-30%.          Changes for today: 1.   Worsening JOHANA and electrolyte abnormalities. IV fluids discontinued, started on HD today. 1000 mL removed.    2. Switched from amio gtt to oral therapy.  Monitor LFTs.   3. CK down but bullae present to RUE.  Ortho surgery signed off.   4. Increased Lantus to 35 units daily (closer to home regimen with oral intake).   5. WOC consult requested  6. Follow-up repeat CRP, PCT given WBC trend. If worsening than prior, I will send for repeat MRI shoulder.         Assessment/Plan:   Neurologic: No issues.  Had some grogginess for a few days initially, this has resolved.     Pain control: As needed     Pulmonary: Spiculated lung nodule.  Remains on room air.  Initial VBG with metabolic acidosis. CTA chest 7/17 showing: Calcified granuloma. Scattered atelectasis. Spiculated appearing lung nodule in the right lower lobe measuring 1.0 x 0.9 cm on series 11 image 166. Punctate nodules in left lung base. No pleural effusion or pneumothorax.    Wean supplemental O2 as tolerated; goal O2 sat > 92%.   HOB > 30 degrees to limit aspiration risk.      Pulmonary hygiene: Incentive spirometry, OOB to chair, PT/OT when able    Once clinical status improved, will need to discuss repeat chest imaging for spiculated nodule.     Cardiovascular: Distributive/septic shock requiring vasopressors, chronic systolic CHF without exacerbation, diffuse coronary artery disease with history of non-STEMI 08/2022, elevated troponin with NSTEMI, paroxysmal atrial fibrillation with RVR, dyslipidemia.  Patient presented to the ED in atrial fibrillation with RVR, HR 160s to 180s, treated with metoprolol IV and responded well.  However, has become progressively hypotensive, suspect distributive shock and hypovolemia despite elevated BNP and troponin.  Denies any chest pain, give concern for non-STEMI.  Has extensive coronary artery history that is not amenable to surgical intervention.  Last cardiac angiogram 08/2022.  On metoprolol, lisinopril, furosemide at baseline.  Also on Brilinta since 08/2022, uncertain timeline in which to stop therapy (? 6 months per discharge summary at that time). TTE 7/17/23 with reduced LVEF 25-30%, IVC > 50% with respiratory variation, technically difficult study.    Cardiac monitoring.  SBP >= 90 mmHg, MAP >= 65 mmHg.  EKG PRN.    Continue phenylephrine gtt for BP goals, TTE with normal RV.    Transitioning from amiodarone gtt to oral amiodarone for Afib control.     Hold home PTA antihypertensives. Continue aspirin, Brilinta.    Hold statin for now, monitor LFTs.      Continue heparin infusion. Appreciate Cardiology recommendations.      GI/: Abnormal LFTs, transaminitis.  Mild elevation in LFTs. May be related to possible MI + myositis/muscle breakdown, medication induced as we also started amiodarone, congestive hepatopathy.     Monitor LFTs    Nutrition: Diabetic/2 gm Na diet.    Last BM: 7/20  Meds: Colace BID.    GI prophylaxis: Not indicated with oral intake.     Renal: Acute kidney injury on CKD  stage III, metabolic/lactic acidosis, electrolytes derangements (hyponatremia, hypochloremia).  In setting of dehydration, hemodynamic lability.  Initially improved with volume resuscitation but worsened.  Received NS 2000 mL in ED.  CTA C/A/P study 7/17 with underlying JOHANA, so at risk of worsening renal status. Patient hesitant about dialysis given his socioeconomic status and affects on life, but was okay with trialing in the acute setting.  HD catheter placed 7/20, first HD run 7/21 (1000 mL removed).     Monitor I/O's.  Electrolyte repletion PRN.  Avoid/limit nephrotoxic agents.    IVFs: Saline lock.     Nephrology consult appreciated. Underwent HD today, 1000 mL removed.     Follow-up electrolyte panel this afternoon.     Heme/Onc: Leukocytosis, unspecified - worsening today. Rising WBC, may be worsening infection vs partial hemoconcentration; CBC obtained after HD initiation. Normocytic anemia.      DVT prophylaxis: SCDs, heparin infusion.     Endocrine: Insulin-dependent diabetes mellitus type 2 with hyperglycemia.  Hemoglobin A1c 6.9% on 7/17/2023.  At baseline is on glargine 38 units nightly, aspart sliding scale.  Patient with acidosis and hyperglycemia on presentation, ketones normal. On insulin  Infusion 7/17-7/19.    FSBG Q4H, Aspart ISS (high resistance). Hypoglycemia protocol.    Increase lantus to 35 units qam.  Monitor trends with JOHANA.     Infectious diseases: Septic shock with GPC bacteremia (Strep pyogenes) and RUE myositis with bullae/blisters.  Presentation appears consistent with sepsis in the setting of JOHANA, dehydration/distributive shock.  Patient received vancomycin and Zosyn IV in the ED.  Given Zosyn IV 7/17 - 7/20. Switched to PCN G + Linezolid IV on 7/21. Blood cultures with 3/4 bottles + Strep pyogenes (Grp A Strep). .80 (7/17), PCT 29.16 (7/16). Source of GPC bacteremia uncertain although has evidence of R shoulder myositis as seen on MRI. New bullae/blisters in setting of  "edematous arm, with these occurring on dependent portions constantly resting on pillow.     Follow-up blood cultures.      Continue Penicillin G IV (start 7/20) and Linezolid IV (start 7/20)    Monitor for development of RUE nec fasciitis or toxic shock syndrome.  CK, LFT, and lactic acid monitoring.      Repeat CRP, PCT.  If worsening, will send for repeat MRI.      Rheum/Musculoskeletal: Right upper extremity edema with right shoulder myositis. No erythema externally, reports has been swollen for at least a week. CTA C/A/P showed no specific shoulder abnormalities aside from edema. Duplex US negative for DVT on 7/17. MRI right shoulder 7/19 with inflammatory myositis findings; no evidence of osteomyelitis. Ortho eval 7/20, no concern for joint infection. Elevated CK level - improved.  Initial CK 1656 on 7/20. History of left lower extremity osteomyelitis status post left BKA 04/2022.     RUE Cold compresses PRN for comfort.     Monitor CK level Q12H    Appreciate Orthopedic surgery consult - their service signed off 7/21    Appreciate General Surgery consultation    Activity: Bedrest for now.    RISK FACTORS PRESENT ON ADMISSION:  Clinically Significant Risk Factors         # Hyponatremia: Lowest Na = 123 mmol/L in last 2 days, will monitor as appropriate     # Anion Gap Metabolic Acidosis: Highest Anion Gap = 20 mmol/L in last 2 days, will monitor and treat as appropriate  # Hypoalbuminemia: Lowest albumin = 2.1 g/dL at 7/21/2023  4:38 AM, will monitor as appropriate   # Thrombocytopenia: Lowest platelets = 123 in last 2 days, will monitor for bleeding   # Hypertension: Noted on problem list  # Acute heart failure with reduced ejection fraction: last echo with EF <40% and receiving IV diuretics      # DMII: A1C = 6.9 % (Ref range: <5.7 %) within past 6 months   # Obesity: Estimated body mass index is 30.03 kg/m  as calculated from the following:    Height as of this encounter: 1.88 m (6' 2\").    Weight as of this " encounter: 106.1 kg (233 lb 14.4 oz)., PRESENT ON ADMISSION             Lines/Drains/Tubes:  MARLENIE PICC central line, placed 7/17  Garcia catheter, placed 7/18     Code Status:  Full Code. Uncertainty about goals with hemodialysis. Willing to have short term therapy but patient has concerns on long-term support and use of HD.      The patient and/or the family was educated about the above plan of care and indicated understanding. Called the sister, Henna, with updates. She may come to visit the patient with her daughter this weekend.     This patient is considered critically ill and requires ICU level of care due to circulatory/septic shock on vasopressors.     Total Critical Care time, not including separate billable procedure time: 55 minutes.    Shilpa Hale MD, MPH  Pulmonary/Critical Care Medicine  07/21/2023   11:28 AM         ICU DAILY CHECKLIST:   ICU DAILY CHECKLIST           Can patient transfer out of MICU? no    FAST HUG:    Feeding:  yes.  Patient is receiving ORAL    Garcia: no  Analgesia/Sedation: no; PRNs   Thromboembolic prophylaxis: yes; Mode:  Heparin and SCDs  HOB>30:  yes  Stress Ulcer Protocol Active: yes; Mode: PPI  Glycemic Control: Any glucose > 180 yes; Mode of Insulin Therapy: Sliding Scale Insulin and Long Acting Insulin    INTUBATED:  Can patient have daily waking:  n/a  Can patient have spontaneous breathing trial:  n/a    Restraints? no    PHYSICAL THERAPY AND MOBILITY:  Can patient have PT and mobility trial: no  Activity: Bedrest        Subjective/Interval History:   7/17: Presented to ED with multiple symptoms including weakness, dehydration, scalp pain/burning, and chronic R arm pain, found to have atrial fibrillation with RVR  (HR 160s-180s) and treated with IV metoprolol. Developed hypotension, PICC placed + NE gtt. Zosyn+ Vanco. CTA C/A/P with localized edema to RUE, no dissection but showed Subcutaneous inflammation in the right axilla noted.  Started insulin gtt. Duplex US  done and negative for DVT to RUE  7/18: Started on vasopressin gtt in early morning, then stopped before 7 am. Afib with RVR, started amio gtt. Cards consult. PICC line dislodged, had to be replaced.   7/19: Switched from NE gtt to phenylephrine gtt. Nephro consult. Increased bicarb gtt.  MRI R shoulder with myositis.  7/20: Started linezolid. Switched Zosyn to PCN G. ID / Ortho / Gen Surg / Nephro consults. HD catheter placed.    Overnight, low urine output. Cr worse today. Initiated on HD this morning.  Right arm still painful/uncomfortable but feels it is better than yesterday  No dyspnea, wheezing    Review of Systems:  The Review of Systems is negative other than noted in the HPI        Allergies/Medications:   Allergies:   No Known Allergies    Continuous Infusions:    dextrose       heparin 1,400 Units/hr (07/21/23 1002)     [Held by provider] insulin regular Stopped (07/19/23 0954)     phenylephrine 1 mcg/kg/min (07/21/23 1000)     sodium bicarbonate 150 mEq in sterile water (preservative free) 1,000 mL infusion Stopped (07/21/23 0655)     Scheduled Medications:    amiodarone  400 mg Oral Daily     aspirin  81 mg Oral Daily     docusate sodium  100 mg Oral BID     insulin aspart  1-10 Units Subcutaneous TID AC     insulin aspart  1-7 Units Subcutaneous At Bedtime     [START ON 7/22/2023] insulin glargine  35 Units Subcutaneous QAM AC     linezolid  600 mg Intravenous Q12H     magnesium oxide  400 mg Oral Daily     multivitamin, therapeutic  1 tablet Oral Daily     penicillin G potassium  4 Million Units Intravenous Q8H     sodium chloride (PF)  10-40 mL Intracatheter Q7 Days     sodium chloride (PF)  3 mL Intracatheter Q8H     sodium chloride (PF)  9 mL Intracatheter During Dialysis/CRRT (from stock)     sodium chloride (PF)  9 mL Intracatheter During Dialysis/CRRT (from stock)     ticagrelor  90 mg Oral BID     vitamin C  500 mg Oral Daily           Objective:   Vitals:  BP 94/50 (BP Location: Right leg,  "Cuff Size: Adult Regular)   Pulse 75   Temp 97.7  F (36.5  C) (Oral)   Resp 23   Ht 1.88 m (6' 2\")   Wt 106.1 kg (233 lb 14.4 oz)   SpO2 100%   BMI 30.03 kg/m    Vent: Resp: 23    GEN: Fatigued, awake. Getting HD run this AM.   HEENT: Normocephalic, atraumatic.  Extraoccular eye movements intact, anicteric sclera. Moist mucous membranes. Face slightly flushed.   NECK: Supple.    PULM: Non-labored breathing.  No use of accessory muscles.  Clear to ausculation bilaterally.   CVS: Regular rate/rhythm.  Normal S1, S2.  No rubs, murmurs, or gallops.    ABDOMEN: Normoactive bowel sounds.  Non-tender to palpation.  Non-distended.    EXTREMITES:  No clubbing, cyanosis. S/p left BKA. RUE shoulder/arm with edema, no erythema. Bullae/blistering to RUE posteriorly (dependent portion resting against pillow) - most have popped, covered in dressings. Slightly less edema than yesterday.   NEURO:  Awake but fatigued. Oriented.  Cranial nerves 2-12 grossly intact.  Moving all extremities.      Intake/Output: I/O last 3 completed shifts:  In: 2828.33 [I.V.:2828.33]  Out: 160 [Urine:160]        Pertinent Studies:   All laboratory data reviewed  Serum Glucose range:   Recent Labs   Lab 07/21/23  0744 07/21/23  0438 07/20/23 2002 07/20/23  1554   * 298* 240* 208*     ABG: No lab results found in last 7 days.  CBC:   Recent Labs   Lab 07/21/23  0900 07/20/23  0448 07/19/23  1555 07/18/23  0414 07/17/23  1302   WBC 25.6* 21.3* 12.9*   < > 14.5*   HGB 9.3* 8.2* 9.0*   < > 13.2*   HCT 26.2* 23.8* 26.8*   < > 38.5*   MCV 78 80 82   < > 83    187 123*   < > 247   NEUTROPHIL  --   --   --   --  87   LYMPH  --   --   --   --  3   MONOCYTE  --   --   --   --  7   EOSINOPHIL  --   --   --   --  0    < > = values in this interval not displayed.     Chemistry:   Recent Labs   Lab 07/21/23  0438 07/20/23  0448 07/19/23  1531 07/19/23  0951 07/17/23  2316 07/17/23  1425 07/17/23  1300   * 128* 128* 128*   < > 121* 119* "   POTASSIUM 3.6 3.8 3.8 3.6   < > 5.4* 4.6   CHLORIDE 84* 92* 94* 94*   < > 86* 80*   CO2 19* 18* 14* 15*   < > 15* 14*   .9* 96.9*  --  80.4*   < > 63.1* 64.9*   CR 5.22* 4.71*  --  3.88*   < > 2.94* 3.25*   GFRESTIMATED 12* 13*  --  17*   < > 23* 20*   YOSVANY 7.6* 7.5*  --  7.3*   < > 8.4* 9.2   MAG  --   --   --   --   --  1.9  --    PROTTOTAL 6.0* 5.7*  --   --   --   --  7.2   ALBUMIN 2.1* 2.4*  --   --   --   --  3.1*   * 241*  --  187*  --   --  92*   * 105*  --   --   --   --  33   ALKPHOS 314* 198*  --   --   --   --  103   BILITOTAL 2.4* 2.5*  --   --   --   --  1.7*    < > = values in this interval not displayed.     Coags:  No results for input(s): INR, PROTIME, PTT in the last 168 hours.    Invalid input(s): APTT  Cardiac Markers:  No results for input(s): CKTOTAL, TROPONINI in the last 168 hours.      Latest Reference Range & Units 11/14/21 21:49 03/18/22 11:26 09/27/22 14:18 09/28/22 14:17 07/17/23 13:00 07/17/23 13:02 07/17/23 14:25 07/17/23 15:49 07/17/23 19:08 07/17/23 21:08 07/17/23 23:16 07/18/23 04:14 07/18/23 11:55   CRP Inflammation <5.00 mg/L       297.80 (H)         Lactic Acid 0.7 - 2.0 mmol/L 1.8 0.9 1.1   9.0 (HH)  4.1 (HH) 3.7 (H)  4.3 (HH) 4.0 (HH) 1.6   N-Terminal Pro BNP Inpatient 0 - 900 pg/mL    3,295 (H) 8,551 (H)           Procalcitonin <0.05 ng/mL       29.16 (HH)         Troponin T, High Sensitivity <=22 ng/L     216 (HH)     282 (HH) 353 (HH) 415 (HH)    (HH): Data is critically high  (H): Data is abnormally high    Microbiology:  Blood cultures x 2, 7/19: NGTD  Blood cultures x 2, 7/17: 3 of 4 bottles with Streptococcus pyogenes (Group A Streptococcus)         Cardiology/Radiology:   Cardiac: All cardiac studies reviewed by me.    EKG:  Reviewed    TTE, 7/18/23:  Summary: Left ventricular function is decreased. The ejection fraction is 25-30% (severely reduced). The left atrium is mildly dilated. IVC diameter <2.1 cm collapsing >50% with sniff suggests a  normal RA pressure of 3 mmHg. TAPSE is normal, which is consistent with normal right ventricular systolic Function. The patient exhibited frequent PVCs. No hemodynamically significant valvular abnormalities on 2D or color flow imaging. The study was technically difficult.    Radiology:  All imaging studies reviewed by me.    Chest X-Ray, 7/18/23:  Left PICC tip in upper SVC. Lungs clear. Heart size normal. No pneumothorax.    MRI Right shoulder, 7/19/23:  IMPRESSION: 1.  Extensive signal abnormality within multiple muscles of the shoulder girdle including portions of the visualized latissimus and trapezius muscles. The findings are likely secondary to infectious or inflammatory myositis versus contusion or muscle  strain type injury. No evidence for abscess. 2.  No evidence for osteomyelitis or septic arthropathy. 3.  Small amount of fluid within the subacromial subdeltoid bursa. 4.  No evidence for fracture. 5.  Moderate severity tendinopathy within the supraspinatus and infraspinatus. 6.  Tendinopathy within the intraarticular portion of the biceps. 7.  Degenerative change at the glenohumeral joint with grade II-grade III cartilage loss but no significant surface irregularity.    CTA chest/abdomen/pelvis with IV contrast, 7/17: FINDINGS: CTA CHEST, ABDOMEN, PELVIS: Normal great vessel branch pattern with mild disease of the subclavian artery. Normal caliber thoracic aorta. Abdominal aorta is widely patent without stenosis or dissection. Moderate calcified atherosclerotic disease without significant visceral arterial stenosis.  RIGHT LEG: Iliac arteries appear patent. Due to contrast bolus timing, difficult to evaluate the superficial femoral artery although this appears grossly patent. Severe infrapopliteal disease. LEFT LEG: Postsurgical changes of left below-knee amputation. Iliac arteries, common femoral artery, profunda femoral artery, superficial femoral artery all appear patent. NONVASCULAR FINDINGS:  LUNG/PLEURA: Calcified granuloma. Scattered atelectasis. Spiculated appearing lung nodule in the right lower lobe measuring 1.0 x 0.9 cm on series 11 image 166. Punctate nodules in left lung base. No pleural effusion or pneumothorax. MEDIASTINUM: Severe coronary artery disease with coronary artery stents. Questionable left atrial appendage thrombus. Inflammatory changes in the right axilla. ABDOMEN: Suboptimal evaluation of the solid organs due to the arterial phase of contrast enhanced. Cholelithiasis. Spleen, adrenal glands, kidneys, and pancreas are grossly unremarkable. Hepatic steatosis. PELVIS: No abnormally dilated loops of bowel. No free fluid or free air. 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 Postsurgical changes of left below-knee amputation. IMPRESSION: 1.  Right lower extremity arteries appear largely patent with severe infrapopliteal arterial disease. Evaluation of the superficial femoral artery is difficult due to contrast bolus timing. Recommend Doppler arterial ultrasound if there is continued  clinical concern for embolic disease. 2.  Questionable left atrial appendage thrombus. Recommend echocardiogram for further evaluation. 3.  Spiculated pulmonary nodule in the right lower lobe measuring 1.0 x 0.9 cm. Recommend follow-up per Fleischner Society criteria as described below. 4.  Subcutaneous inflammation in the right axilla noted. Consider right upper extremity Doppler to rule out a DVT. May also represent an infectious process. 5.  Cholelithiasis. 6.  Hepatic steatosis.

## 2023-07-21 NOTE — PROGRESS NOTES
1st HD Tx complete    1L removed during 2Hr HD Tx    Tolerated well    Hep B labs drawn    See flow sheet for Tx Data    Report given to RACHAEL Fitzgerald

## 2023-07-21 NOTE — PROGRESS NOTES
Infectious Diseases Progress Note  [facility]    Date of visit: 07/21/2023     ASSESSMENT   63-year-old man with a history of hypertension, coronary artery disease, CHF, diabetes, who is admitted with septic shock.  ID is consulted regarding strep bacteremia.    1. Group A strep bacteremia.  Unclear source.  Admitted to the ICU on pressors.  Acute kidney injury, and transaminitis.  Concerning for toxic shock syndrome.  Also having right shoulder pain.  Myositis seen on MRI. Blistering starting on 7/21, possibly from pressure or swelling. Seen by ortho and general surgery  2. Septic shock.  On pressors.  3. Right shoulder myositis. Denies acute injury. Elevated ck, improving.    4. Acute kidney injury.  Rising creatinine.  Starting dialysis 7/20    Principal Problem:    Elevated troponin  Active Problems:    Dehydration    Hyponatremia    JOHANA (acute kidney injury) (H)    Atrial fibrillation with RVR (H)    Septic shock (H)       PLAN   -continue penicillin G IV for group A strep infection.  Pharmacy to assist with renal dosing  -Continue linezolid 600 mg IV twice daily for antitoxin effects  -appreciate surgical assessment. No indication for surgery at this time  -no IVIG due to renal failure      Ryan Rendon MD  [facility]  Direct messaging: [facility] P[facility]  On-Call ID provider: [phone], option: 9      ===========================================      SUBJECTIVE / INTERVAL HISTORY:     HD this morning. Still on pressors. Feels a little better. Says that arm feel a little more comfortable, able to move elbow a little more today.      Antibiotics   Penicillin g 7/20-   Linezolid 7/20-    Previous:  Zosyn 7/17-7/20  Vancomycin 7/17      Physical Exam     Temp:  [97.6  F (36.4  C)-99.1  F (37.3  C)] 97.6  F (36.4  C)  Pulse:  [] 76  Resp:  [22-42] 33  BP: ()/(44-86) 100/57  SpO2:  [94 %-100 %] 99 %    /57   Pulse 76   Temp 97.6  F (36.4  C) (Oral)   Resp  "(!) 33   Ht 1.88 m (6' 2\")   Wt 106.1 kg (233 lb 14.4 oz)   SpO2 99%   BMI 30.03 kg/m      GENERAL:  well-developed, well-nourished, lying in bed in no acute distress.   HENT:  Head is normocephalic, atraumatic.   EYES:  Eyes have anicteric sclerae without conjunctival injection   LUNGS:  Clear to auscultation.  Tachypneic (better today)  CARDIOVASCULAR:  Regular rate and rhythm with no murmurs, gallops or rubs.  ABDOMEN:  Normal bowel sounds, soft, nontender.   EXT: Right shoulder swelling, tenderness.  SKIN:  Shallow blisters on posterior right arm, superficial, weeping. Right IJ line is in place without any surrounding erythema.   NEUROLOGIC:  Grossly nonfocal.      Cultures   7/17 blood cultures x2: Strep pyogenes  7/19 blood cultures x2: No growth to date          Pertinent Labs:     Recent Labs   Lab 07/21/23  0900 07/20/23  0448 07/19/23  1555   WBC 25.6* 21.3* 12.9*   HGB 9.3* 8.2* 9.0*    187 123*       Recent Labs   Lab 07/21/23  0438 07/20/23  0448 07/19/23  1531 07/19/23  0951 07/17/23  1425 07/17/23  1300   * 128* 128* 128*   < > 119*   CO2 19* 18* 14* 15*   < > 14*   .9* 96.9*  --  80.4*   < > 64.9*   ALBUMIN 2.1* 2.4*  --   --   --  3.1*   ALKPHOS 314* 198*  --   --   --  103   * 105*  --   --   --  33   * 241*  --  187*  --  92*    < > = values in this interval not displayed.       No results for input(s): CRP, SED in the last 168 hours.        Imaging:     IR CVC Non Tunnel Placement > 5 Yrs    Result Date: 7/20/2023  LOCATION: [facility] DATE: 7/20/2023 PROCEDURE: NON-TUNNELED DIALYSIS CATHETER PLACEMENT 1.  Insertion of a non-tunneled central venous catheter. 2.  Ultrasound guidance for vascular access. 3.  Fluoroscopic guidance for central venous access device placement. INTERVENTIONAL RADIOLOGIST: Joseph Campos MD INDICATION: Renal insufficiency requiring renal replacement therapy, plan for nontunneled dialysis catheter placement. " CONSENT: The risks, benefits and alternatives of the procedure were discussed with the patient or representative in detail. All questions were answered. Informed consent was given to proceed with the procedure. MODERATE SEDATION: None. CONTRAST: None. ANTIBIOTICS: None. ADDITIONAL MEDICATIONS: None. FLUOROSCOPIC TIME: 0.5 minutes. RADIATION DOSE: Air Kerma: 5 mGy. COMPLICATIONS: No immediate complications. UNIVERSAL PROTOCOL: The operative site was marked and any prior imaging was reviewed. Required items including blood products, implants, devices and special equipment was made available. Patient identity was confirmed either verbally, with demographic information, hospital assigned identification or other identification markers. A timeout was performed immediately prior to the procedure. STERILE BARRIER TECHNIQUE: Maximum sterile barrier technique was used. Cutaneous antisepsis was performed at the operative site with application of 2% chlorhexidine and large sterile drape. Prior to the procedure, the  and assistant performed hand hygiene and wore hat, mask, sterile gown, and sterile gloves during the entire procedure. PROCEDURE:  Using local anesthesia and real-time ultrasound guidance the right internal jugular vein was accessed. Imaging demonstrates an anechoic and compressible vein. A permanent image was stored to the patient's medical record. Using this access, a 20 cm dialysis catheter was advanced using fluoroscopic guidance until the tip was in the proximal right atrium. FINDINGS: At the completion of the study he radiograph was performed utilizing the in room fluoroscopic unit. Imaging demonstrates the nontunneled dialysis catheter tip terminating in the proximal right atrium.     IMPRESSION:  1.  Successful non-tunneled dialysis catheter placement. 2.  The catheter is ready for use.    MR Shoulder Right w/o Contrast    Result Date: 7/19/2023  EXAM: MR SHOULDER RIGHT W/O CONTRAST LOCATION: [facility] DATE: 7/19/2023 INDICATION: Septic shock with bacteremia, concern for right shoulder osteomyelitis or joint infection. COMPARISON: None. TECHNIQUE: Unenhanced. FINDINGS: ROTATOR CUFF: -Supraspinatus: Moderate severity tendinopathy. No tearing or retraction. No atrophy. -Infraspinatus: Moderate severity infraspinatus tendinopathy without evidence for well-defined partial or full-thickness tearing. No retraction but there is T2 signal abnormality within the muscular portion, which may be due to muscle strain. Infectious or inflammatory myositis could have this appearance. -Subscapularis: No tendon tear, tendinopathy or fatty atrophy. -Teres minor: No tendon tear, tendinopathy or fatty atrophy. CORACOACROMIAL ARCH: -Morphology: Type II acromion. No subacromial spur. Subacromial and subcoracoid space are normal. -Bursa: Fluid in the subacromial subdeltoid bursa. The coracoacromial and coracoclavicular ligaments are negative. ACROMIOCLAVICULAR JOINT: -Hypertrophic change. No significant diastasis or effusion. LONG HEAD OF BICEPS TENDON: -Mild tendinopathy within the intra-articular portion of the long head of the biceps. GLENOHUMERAL JOINT: -Labrum: No labral tear. No paralabral cyst. -Cartilage: Grade II-grade III cartilage loss both sides of the joint without significant surface irregularity. -Joint space: No effusion or synovitis. -Glenohumeral ligaments and capsule: No pericapsular inflammation. BONES: -No fracture or concerning marrow replacing lesion. No evidence for osteomyelitis. SOFT TISSUES: -Extensive but nonspecific T2 signal abnormality is present within the deltoid musculature, most marked posteriorly but this is also seen within the latissimus muscle, distal aspect of the trapezius, infraspinatus as described, and portions of the subscapularis. The multiplicity of muscle groups involved makes a strain type injury considered unlikely and this is more likely secondary to  infectious or inflammatory myositis. There is no evidence for organized fluid collection to suggest abscess.     IMPRESSION: 1.  Extensive signal abnormality within multiple muscles of the shoulder girdle including portions of the visualized latissimus and trapezius muscles. The findings are likely secondary to infectious or inflammatory myositis versus contusion or muscle strain type injury. No evidence for abscess. 2.  No evidence for osteomyelitis or septic arthropathy. 3.  Small amount of fluid within the subacromial subdeltoid bursa. 4.  No evidence for fracture. 5.  Moderate severity tendinopathy within the supraspinatus and infraspinatus. 6.  Tendinopathy within the intraarticular portion of the biceps. 7.  Degenerative change at the glenohumeral joint with grade II-grade III cartilage loss but no significant surface irregularity.    XR Chest Port 1 View    Result Date: 7/18/2023  EXAM: XR CHEST PORT 1 VIEW LOCATION: [facility] DATE: 7/18/2023 INDICATION: RN placed PICC   verify tip placement COMPARISON: 07/17/2023     IMPRESSION: Left upper extremity PICC terminates at the SVC - RA junction. No acute cardiopulmonary abnormality.        Data reviewed today: I reviewed all medications, new labs and imaging results over the last 24 hours. I personally reviewed no images or EKG's today.  The patient's care was discussed with the Bedside Nurse, Patient and Primary team.

## 2023-07-21 NOTE — PROGRESS NOTES
Pulmonary/Critical Care Medicine update    Patient relatively stable today, tolerated dialysis well, still requiring phenylephrine gtt.    Plan for repeat HD run tomorrow.  Electrolyte panel this afternoon improved - Na now 129 (previously 123).  PCT was slightly elevated compared to prior.  CRP elevated but lower than prior.      Latest Reference Range & Units 07/17/23 14:25 07/21/23 04:38   CRP Inflammation <5.00 mg/L 297.80 (H) 154.50 (H)   Procalcitonin <0.05 ng/mL 29.16 (HH) 33.63 (HH)   (HH): Data is critically high  (H): Data is abnormally high      Given stability, will hold off on repeat MRI shoulder/arm tonight.    CK level coming down. Lactic acid stable but slightly elevated at 2.1.    Orthopedic surgery team signed off.   General surgery team signed off.     Having some anxiety and would like something to help.  Ordered ativan 0.25 mg oral to see response, can increase dose as needed.    Finally, glucoses elevated but trending down.  Will continue to monitor trends prior to increasing the Lantus.    Shilpa Hale MD, MPH  Pulmonary & Critical Care Medicine  07/21/2023  5:09 PM

## 2023-07-21 NOTE — PROGRESS NOTES
General Surgery Progress Note:    Hospital Day # 4    ASSESSMENT:   1. Septic shock (H)    2. Dehydration    3. JOHANA (acute kidney injury) (H)    4. Hyponatremia    5. Atrial fibrillation with RVR (H)    6. Elevated troponin    7. Blood blister        Norman Aguilar is a 63 year old male with myositis of the right arm/shoulder and septic shock. Arm is soft with no signs of crepitus and good pulses.    PLAN:   No nec fasc noted on exam or imaging  If worsens suggest repeat imaging and re-consult surgery   Surgery will sign off at this time      SUBJECTIVE:   Norman Aguilar reports that his arm is less swollen and less painful over the last couple days. He states that his pain seems to be positional and he feels swollen but has sensation in the arm.     Patient Vitals for the past 24 hrs:   BP Temp Temp src Pulse Resp SpO2   07/21/23 1100 -- -- -- 75 23 100 %   07/21/23 1000 94/50 97.7  F (36.5  C) Oral 76 (!) 34 99 %   07/21/23 0918 100/57 97.6  F (36.4  C) Oral 76 (!) 33 --   07/21/23 0915 95/51 -- -- 77 29 99 %   07/21/23 0900 100/57 -- -- 76 26 99 %   07/21/23 0845 93/55 -- -- 74 25 98 %   07/21/23 0830 94/50 -- -- 74 29 --   07/21/23 0815 90/54 -- -- 74 29 --   07/21/23 0800 97/55 97.7  F (36.5  C) Oral 76 (!) 31 98 %   07/21/23 0745 101/54 -- -- 77 30 98 %   07/21/23 0730 104/52 -- -- 73 28 98 %   07/21/23 0715 102/54 -- -- 72 (!) 31 98 %   07/21/23 0700 100/56 98.7  F (37.1  C) Oral 70 26 96 %   07/21/23 0630 99/51 -- -- 72 24 --   07/21/23 0600 103/52 -- -- 72 22 --   07/21/23 0513 100/53 -- -- 74 (!) 32 98 %   07/21/23 0500 102/56 -- -- 71 23 98 %   07/21/23 0430 103/54 -- -- 73 (!) 31 --   07/21/23 0400 112/56 98.8  F (37.1  C) Oral 75 (!) 31 98 %   07/21/23 0330 113/56 -- -- 75 (!) 31 98 %   07/21/23 0300 110/57 -- -- 74 30 98 %   07/21/23 0230 108/54 -- -- 74 25 98 %   07/21/23 0200 111/54 -- -- 74 28 97 %   07/21/23 0130 115/53 -- -- 74 (!) 34 98 %   07/21/23 0100 118/57 -- -- 74 24 98 %   07/21/23  0000 109/55 98.7  F (37.1  C) Oral 79 (!) 31 --   07/20/23 2230 104/56 -- -- 78 (!) 31 97 %   07/20/23 2200 99/58 -- -- 77 27 98 %   07/20/23 2145 -- -- -- 94 30 98 %   07/20/23 2130 101/54 -- -- 95 27 97 %   07/20/23 2115 90/51 -- -- 89 (!) 37 97 %   07/20/23 2100 91/50 -- -- 93 (!) 33 98 %   07/20/23 2045 (!) 89/50 -- -- 94 (!) 40 98 %   07/20/23 2030 (!) 88/51 -- -- 97 (!) 38 98 %   07/20/23 2015 (!) 84/54 -- -- 94 (!) 32 98 %   07/20/23 2000 93/46 97.9  F (36.6  C) -- 93 (!) 32 98 %   07/20/23 1930 95/53 -- -- 91 (!) 31 97 %   07/20/23 1900 97/54 -- -- 90 30 96 %   07/20/23 1845 99/56 -- -- 93 28 99 %   07/20/23 1830 101/56 -- -- 95 (!) 33 99 %   07/20/23 1815 100/51 -- -- 91 (!) 32 99 %   07/20/23 1800 100/55 -- -- 96 (!) 38 98 %   07/20/23 1745 97/49 -- -- 97 (!) 37 100 %   07/20/23 1730 90/51 -- -- 95 (!) 36 99 %   07/20/23 1715 (!) 151/63 -- -- 95 29 98 %   07/20/23 1700 91/55 -- -- 96 (!) 37 99 %   07/20/23 1645 103/58 -- -- 98 (!) 41 99 %   07/20/23 1630 102/54 -- -- 94 30 99 %   07/20/23 1615 103/51 -- -- 100 (!) 36 98 %   07/20/23 1600 104/57 99.1  F (37.3  C) Axillary 103 29 98 %   07/20/23 1545 -- -- -- 105 (!) 36 98 %   07/20/23 1515 -- -- -- 106 (!) 40 --   07/20/23 1511 -- -- -- 103 (!) 41 --   07/20/23 1510 -- -- -- 105 (!) 42 --   07/20/23 1509 -- -- -- 106 (!) 38 --   07/20/23 1508 -- -- -- 107 (!) 39 --   07/20/23 1507 -- -- -- 107 (!) 33 --   07/20/23 1506 -- -- -- 102 (!) 38 --   07/20/23 1505 -- -- -- 104 29 --   07/20/23 1504 -- -- -- 102 (!) 40 --   07/20/23 1503 -- -- -- 104 (!) 37 --   07/20/23 1502 -- -- -- 96 (!) 36 --   07/20/23 1501 -- -- -- 100 (!) 34 --   07/20/23 1500 -- -- -- 99 (!) 40 --   07/20/23 1459 -- -- -- 102 (!) 37 --   07/20/23 1458 -- -- -- 99 (!) 39 --   07/20/23 1457 -- -- -- 103 (!) 34 --   07/20/23 1456 -- -- -- 104 (!) 36 --   07/20/23 1455 -- -- -- 99 (!) 38 --   07/20/23 1454 -- -- -- 107 (!) 36 --   07/20/23 1453 -- -- -- 106 (!) 37 --   07/20/23 1452 -- -- --  102 (!) 39 --   07/20/23 1451 -- -- -- 103 (!) 39 --   07/20/23 1450 -- -- -- 99 (!) 41 --   07/20/23 1445 -- -- -- 104 (!) 39 --   07/20/23 1430 -- -- -- 94 (!) 36 98 %   07/20/23 1415 106/55 -- -- 95 (!) 36 99 %   07/20/23 1400 110/57 -- -- 96 (!) 31 99 %   07/20/23 1345 113/54 -- -- 90 28 97 %   07/20/23 1330 125/55 -- -- 96 (!) 32 98 %   07/20/23 1315 128/54 -- -- 97 (!) 32 97 %   07/20/23 1300 116/57 -- -- 98 (!) 39 97 %   07/20/23 1245 128/59 -- -- 102 (!) 34 97 %   07/20/23 1230 128/58 -- -- 99 (!) 35 96 %   07/20/23 1215 119/86 -- -- 101 (!) 38 94 %   07/20/23 1200 117/58 98.5  F (36.9  C) Oral 95 (!) 31 97 %   07/20/23 1145 110/55 -- -- 98 (!) 37 99 %   07/20/23 1130 102/55 -- -- 94 28 98 %   07/20/23 1115 (!) 89/50 -- -- 95 30 97 %       Physical Exam:  General: NAD, pleasant  CV:RRR  LUNGS:CTA bilaterally  Left Arm: swollen from shoulder to fingers, soft, no crepitus, no severe pain with palpation but pain with manipulation of position; good cap refill and pulses  EXT:no CCE    No results displayed because visit has over 200 results.           Becky Jorge, JUNI CNP

## 2023-07-21 NOTE — PROGRESS NOTES
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 Patient demonstrating right shoulder/upper extremity swelling and weakness, clinically concerned for right shoulder inflammatory myositis versus pyogenic myositis with blood cultures positive for S. Pyogenes (Group A strep), elevated CRP (297 on 7/17), elevated WBC (21.3 on 7/20), elevated CK (1,161 on 7/20) and recent development of right underarm bullae.     Plan:   - My exam today shows no further concern for developing involvement of the glenohumeral joint.  No indication for orthopedic surgical intervention at this time.  He does report some improvement in pain and swelling  - Could consider transfer to higher level of care due to complexity of case  - Gen surg input indicated unlikely nec fascitis - no debridement indicated  - Continue IV antibiotics per ID  - Continue current pain regimen  - No change in underarm bullae. Continue monitoring      Subjective:    Patient reports feeling well today. Feels pain and swelling in arm are improving.  He does not feel that there is any involvement in shoulder based on location of pain.  All questions/concerns answered.      Objective:  /57   Pulse 76   Temp 98.7  F (37.1  C) (Oral)   Resp 26   Ht 1.88 m (6' 2\")   Wt 106.1 kg (233 lb 14.4 oz)   SpO2 98%   BMI 30.03 kg/m        General: On examination, the patient is resting comfortably, NAD, awake and alert and oriented to person, place, time, and and general circumstances   SKIN: Generalized swelling at the right upper extremity. No evidence of erythema or ecchymosis at the shoulder. No warmth " appreciated at the shoulder. There is evidence of a few areas of 1x1cm underarm skin bullae.  Dressing covering, appears draining clear yellow fluid.  Pulses:  radial pulse is intact although weak.  Sensation: Intact at the axillary, radial, median, and ulnar nerve distributions.  Tenderness: Nontender to palpation about the shoulder and distally at the upper extremity.  ROM:  Passively able to forward flex to 120 degrees, externally rotate to 50 degrees, and internally rotate to belly comfortably. Passively able to move elbow through full arc of motion without pain. Wrist motion intact in all directions. Appropraitely firing EPL and intrinsics.        Pertinent Labs   Lab Results: personally reviewed.   Lab Results   Component Value Date    INR 1.19 (H) 08/24/2022    INR 1.25 (H) 08/23/2022    INR 1.47 (H) 03/18/2022     Lab Results   Component Value Date    WBC 21.3 (H) 07/20/2023    HGB 8.2 (L) 07/20/2023    HCT 23.8 (L) 07/20/2023    MCV 80 07/20/2023     07/20/2023     Lab Results   Component Value Date     (L) 07/21/2023    CO2 19 (L) 07/21/2023         Report completed by:  CHARLINE WINKLER PA-C  Date: 7/21/2023  Time: 9:05 AM  [facility]

## 2023-07-21 NOTE — PLAN OF CARE
"Goal Outcome Evaluation:         [facility] - ICU    RN Progress Note:            Pertinent Assessments:      Please refer to flowsheet rows for full assessment     Patient remains tachypneic with RR 28-40. Sats >92% on RA. LS clear. Productive cough with moderate clear sputum production.    Patient states that he has pain in right shoulder but declines pain medication. States that he \"does not want to load his system up with more medication.\" Patient has limited mobility of RUE. RUE remains edemetous. Blisters present under patients right arm and right chest. Mepilex in place.     Patient has black/purple, non blanchable area on coccyx, upper buttocks. Patient denies pain over this site. Mepilex in place. Changed this shift. Patient frequently refuses to lie on his side. Patient educated on importance to keep weight off this area and position from side to side    Remains on phenyephrine to keep MAP >65                  Key Events - This Shift:       See above               Barriers to Discharge / Downgrade:     Vasopressor                          "

## 2023-07-21 NOTE — PROGRESS NOTES
Care Management Follow Up    Length of Stay (days): 4    Expected Discharge Date: 07/25/2023     Concerns to be Addressed:   discharge planning   Patient plan of care discussed at interdisciplinary rounds: Yes    Anticipated Discharge Disposition:       Anticipated Discharge Services:    Education Provided on the Discharge Plan:  Per Care Team   Patient/Family in Agreement with the Plan: Yes     Referrals Placed by CM/SW:    Private pay costs discussed: Not applicable    Additional Information:  Chart reviewed.    Cm updates:  Per rounds pt on mult gtts. Starting penicillin pt has +group strep cultures. Pt has myositis of right shoulder. Pt on Bicarb gtt as well. CARDS following pt. ID still following pt. Pt receiving first round of dialysis this AM.     RNCM spoke with pt , he is unsure at this time where he will live at discharge. He is nervous about that and is unsure about wanting to start dialysis OP if needed due to this as well. RNCM spoke with sister Cindy she cannot take pt back in but would like to see pt go to TCU to get stronger if able at discharge.     Social Hx:  Spoke to pts sister Cindy. Pt has been homeless since Nov, 2021. 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 Pt has lived in between [facility], or her daughter's house the last couple years, and in the past has lived at his [facility] for 6 yrs at one time. Pt before that was taking care of his parents in their home. He last lived at [facility] prior to hospitalization. Pt Ind at baseline, drives (owns a car). Pt has a prosthetic. 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 She stated pt tried signing up for social security disability, but pt claims he was denied, she does not believe pt. 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 Pt has [employer] [place] insurance. Cindy cannot take pt back  into her home, and her daughter will not let pt return either. She prefers if able pt goes to TCU, then either friends home, housing or some sort, or shelter. Transport TBD.           Cm will continue to follow plan of care,review recommendations,and assist with any discharge needs anticipated.       Sulema Manuel RN

## 2023-07-22 NOTE — PROGRESS NOTES
3L removed during 3Hr HD Tx    Became hypotensive at last 10mins of Tx. Otherwise, Tolerated Tx well    Post Tx vss    See flow sheet for Tx data    Report given to Beny Guerra

## 2023-07-22 NOTE — PROGRESS NOTES
"Critical Care Progress Note      07/22/2023    Name: Norman Aguilar MRN#: [id number]   Age: 63 year old YOB: 1960     Hsptl Day# 5  ICU DAY #    MV DAY #             Problem List:   Principal Problem:    Elevated troponin  Active Problems:    Dehydration    Hyponatremia    JOHANA (acute kidney injury) (H)    Atrial fibrillation with RVR (H)    Septic shock (H)    Clinically Significant Risk Factors         # Hyponatremia: Lowest Na = 123 mmol/L in last 2 days, will monitor as appropriate     # Anion Gap Metabolic Acidosis: Highest Anion Gap = 20 mmol/L in last 2 days, will monitor and treat as appropriate  # Hypoalbuminemia: Lowest albumin = 2.1 g/dL at 7/21/2023  4:38 AM, will monitor as appropriate     # Hypertension: Noted on problem list  # Acute heart failure with reduced ejection fraction: last echo with EF <40% and receiving IV diuretics      # DMII: A1C = 6.9 % (Ref range: <5.7 %) within past 6 months   # Obesity: Estimated body mass index is 31.65 kg/m  as calculated from the following:    Height as of this encounter: 1.88 m (6' 2\").    Weight as of this encounter: 111.8 kg (246 lb 7.6 oz).                             Summary/Hospital Course:     Norman Aguilar is a 63 year old male with essential hypertension, coronary artery disease/NSTEMI on Brillinta since 08/2022, chronic systolic CHF (LVEF 30-35% on TTE 8/25/22), essential hypertension, CKD stage III, history of left foot osteomyelitis status post left BKA 4/4/2022, insulin-dependent diabetes mellitus type 2 (HgbA1c 6.9% 7/17/23) who presented to [facility] ED with multiple symptoms including weakness, dehydration, scalp pain/burning, and chronic R arm pain, found to have atrial fibrillation with RVR (HR 160s-180s) and treated with IV metoprolol, now admitted to the ICU for circulatory/distributive shock with suspected sepsis (source unknown), JOHANA, uncontrolled diabetes requiring insulin infusion, and NSTEMI. TTE 7/18/23 " showing worsening reduced LVEF 25-30%.            Assessment and plan :       I have personally reviewed the daily labs, imaging studies, cultures and discussed the case with referring physician and consulting physicians.     My assessment and plan by system for this patient is as follows:    Neurology/Psychiatry:   Awake.  Answers questions appropriately.    Cardiovascular:   Septic shock on phenylephrine  Ischemic cardiomyopathy, EF 25 to 30%  Paroxysmal A-fib, converted with amiodarone.  Currently normal sinus rhythm    If he cannot wean off Amio then we will switch him to norepinephrine    Continue heparin drip    Pulmonary/Ventilator Management:   Supplemental oxygen to keep O2 sats above 90%.      GI and Nutrition :   Elevated LFTs most likely secondary to shock liver from hypotension plus some increase in AST secondary to muscle damage, patient had increased CK      Renal/Fluids/Electrolytes:   JOHANA on CKD 3.  Started on hemodialysis  Hyponatremia    HD per nephrology    - monitor function and electrolytes as needed with replacement per ICU protocols.  - generally avoid nephrotoxic agents such as NSAID, IV contrast unless specifically required  - adjust medications as needed for renal clearance  - follow I/O's as appropriate.    Infectious Disease:   Group A strep bacteremia.  On MRI myositis.  Blistering on the right upper extremity started on 721.  Visited by Ortho and general surgery.    Continue linezolid.  Continue penicillin G      Endocrine:   Diabetes mellitus on Lantus and sliding scale insulin.    Added 10 units of Lantus at bedtime.  As patient's infection gets under control his glucose will be easier to control as well.  Plan  - ICU insulin protocol, goal sugar <180      Hematology/Oncology:   Worsening leukocytosis.  X-ray of right upper extremity without any signs of subcutaneous gas        ICU Prophylaxis:   Heparin drip         Key Medications:       amiodarone  400 mg Oral Daily     aspirin   81 mg Oral Daily     docusate sodium  100 mg Oral BID     insulin aspart  1-10 Units Subcutaneous TID AC     insulin aspart  1-7 Units Subcutaneous At Bedtime     insulin glargine  35 Units Subcutaneous QAM AC     linezolid  600 mg Intravenous Q12H     magnesium oxide  400 mg Oral Daily     multivitamin, therapeutic  1 tablet Oral Daily     penicillin G potassium  4 Million Units Intravenous Q8H     sodium chloride (PF)  10-40 mL Intracatheter Q7 Days     sodium chloride (PF)  3 mL Intracatheter Q8H     sodium chloride (PF)  9 mL Intracatheter During Dialysis/CRRT (from stock)     sodium chloride (PF)  9 mL Intracatheter During Dialysis/CRRT (from stock)     ticagrelor  90 mg Oral BID     vitamin C  500 mg Oral Daily       dextrose       heparin 1,400 Units/hr (07/22/23 1000)     [Held by provider] insulin regular Stopped (07/19/23 0954)     phenylephrine 0.8 mcg/kg/min (07/22/23 1134)               Physical Examination:   Temp:  [97.6  F (36.4  C)-98  F (36.7  C)] 97.6  F (36.4  C)  Pulse:  [75-83] 75  Resp:  [19-36] 22  BP: ()/(47-59) 101/53  SpO2:  [90 %-100 %] 100 %    Intake/Output Summary (Last 24 hours) at 7/22/2023 1145  Last data filed at 7/22/2023 1000  Gross per 24 hour   Intake 2011.14 ml   Output 350 ml   Net 1661.14 ml     Wt Readings from Last 4 Encounters:   07/22/23 111.8 kg (246 lb 7.6 oz)   11/17/22 100.2 kg (221 lb)   09/28/22 104.3 kg (230 lb)   09/27/22 104.3 kg (230 lb)     BP - Mean:  [64-82] 74  Resp: 22    No lab results found in last 7 days.    GEN: no acute distress   HEENT: head ncat, sclera anicteric, OP patent, trachea midline   PULM: clear anteriorly    CV/COR: RRR S1S2 no gallop,  No rub, no murmur  ABD: soft nontender, hypoactive bowel sounds, no mass  EXT: Upper extremity swollen  NEURO: grossly intact  SKIN: Blisters on right upper extremity  LINES: clean, dry intact         Data:   All data and imaging reviewed     ROUTINE ICU LABS (Last four results)  CMP  Recent Labs    Lab 07/22/23  0740 07/22/23  0517 07/21/23 2004 07/21/23  1609 07/21/23  1601 07/21/23  0744 07/21/23  0438 07/20/23  0800 07/20/23  0448 07/19/23  0953 07/19/23  0951 07/17/23  1845 07/17/23  1425 07/17/23  1300   NA  --  127*  --   --  129*  --  123*  --  128*   < > 128*   < > 121* 119*   POTASSIUM  --  3.8  --   --  4.4  --  3.6  --  3.8   < > 3.6   < > 5.4* 4.6   CHLORIDE  --  89*  --   --  90*  --  84*  --  92*   < > 94*   < > 86* 80*   CO2  --  21*  --   --  21*  --  19*  --  18*   < > 15*   < > 15* 14*   ANIONGAP  --  17*  --   --  18*  --  20*  --  18*   < > 19*   < > 20* 25*   * 228* 185* 239*  --    < > 298*   < > 188*   < > 153*   < > 505* 494*   BUN  --  92.7*  --   --   --   --  109.9*  --  96.9*  --  80.4*   < > 63.1* 64.9*   CR  --  4.67*  --   --   --   --  5.22*  --  4.71*  --  3.88*   < > 2.94* 3.25*   GFRESTIMATED  --  13*  --   --   --   --  12*  --  13*  --  17*   < > 23* 20*   YOSVANY  --  8.5*  --   --   --   --  7.6*  --  7.5*  --  7.3*   < > 8.4* 9.2   MAG  --   --   --   --   --   --   --   --   --   --   --   --  1.9  --    PHOS  --   --   --   --   --   --  5.0*  --  3.7  --   --   --   --   --    PROTTOTAL  --   --   --   --   --   --  6.0*  --  5.7*  --   --   --   --  7.2   ALBUMIN  --   --   --   --   --   --  2.1*  --  2.4*  --   --   --   --  3.1*   BILITOTAL  --   --   --   --   --   --  2.4*  --  2.5*  --   --   --   --  1.7*   ALKPHOS  --   --   --   --   --   --  314*  --  198*  --   --   --   --  103   AST  --   --   --   --   --   --  222*  --  241*  --  187*  --   --  92*   ALT  --   --   --   --   --   --  124*  --  105*  --   --   --   --  33    < > = values in this interval not displayed.     CBC  Recent Labs   Lab 07/22/23  0517 07/21/23  0900 07/20/23  0448 07/19/23  1555   WBC 28.8* 25.6* 21.3* 12.9*   RBC 3.02* 3.34* 2.98* 3.26*   HGB 8.5* 9.3* 8.2* 9.0*   HCT 23.8* 26.2* 23.8* 26.8*   MCV 79 78 80 82   MCH 28.1 27.8 27.5 27.6   MCHC 35.7 35.5 34.5 33.6   RDW  18.3* 17.9* 17.4* 17.6*    197 187 123*     INRNo lab results found in last 7 days.  Arterial Blood GasNo lab results found in last 7 days.    All cultures:  No results for input(s): CULT in the last 168 hours.  No results found for this or any previous visit (from the past 24 hour(s)).      Billing: This patient is critically ill: Yes. Total critical care time today 33 min exclusive of procedures or teaching.  Aging septic shock

## 2023-07-22 NOTE — PLAN OF CARE
"[facility] - ICU    RN Progress Note:            Pertinent Assessments:      Please refer to flowsheet rows for full assessment     VSS on phenylephrine; denies pain at rest but R arm is \"very painful\" with movement & repositioning. Pt declining full turns; using pillows and bed lateral rotation w/ weight shifting and lift sling for positioning. Poor appetite, has snacks at bedside but declines food today. HD planned for 3hr this afternoon. See flowsheets and MAR for complete assessment.           Key Events - This Shift:       Dialysis             Barriers to Discharge / Downgrade:     Pressors, dialysis needs         Point of Contact Update YES-OR-NO: Yes  If No, reason:   Name:Henna (sister)  Phone Number:  Summary of Conversation: Updated re: dialysis plan and overnight events. Planning to visit this afternoon and will bring pts phone .          "

## 2023-07-22 NOTE — PLAN OF CARE
[facility] - ICU    RN Progress Note:            Pertinent Assessments:      Please refer to flowsheet rows for full assessment     Alert and oriented x4, but a little anxious. Stated R shoulder pain only when being moved. Refused reposition and linen changed. R upper arm dressing change with moderate drainage from blisters  under right arm. Poor appetite. Remained on pressor for BP.         Mobility Level:     Refused to move and reposition.  Bed mattress set in position rotation.           Key Events - This Shift:     uneventful            Barriers to Discharge / Downgrade:      Vasopressor     Mia Au RN

## 2023-07-22 NOTE — PROGRESS NOTES
Infectious Diseases Progress Note  [facility]    Date of visit: 07/22/2023     ASSESSMENT   63-year-old man with a history of hypertension, coronary artery disease, CHF, diabetes, who is admitted with septic shock.  ID is consulted regarding strep bacteremia.    1. Group A strep bacteremia.  Unclear source.  Admitted to the ICU on pressors.  Acute kidney injury, and transaminitis.  Concerning for toxic shock syndrome.  Also having right shoulder pain.  Myositis seen on MRI. Blistering starting on 7/21, possibly from pressure or swelling. Seen by ortho and general surgery  2. Septic shock.  On pressors.  3. Right shoulder myositis. Denies acute injury. Elevated ck, improving.    4. Acute kidney injury.  Rising creatinine.  Starting dialysis 7/20  5. Rising white count also    Principal Problem:    Elevated troponin  Active Problems:    Dehydration    Hyponatremia    JOHANA (acute kidney injury) (H)    Atrial fibrillation with RVR (H)    Septic shock (H)       PLAN   -continue penicillin G IV for group A strep infection.  Pharmacy to assist with renal dosing  -Continue linezolid 600 mg IV twice daily for antitoxin effects  -appreciate surgical assessment. No indication for surgery at this time  -no IVIG due to renal failure  Renal issues noted.     MANE Srivastava MD  [facility]  Direct messaging: Evi Paging  On-Call ID provider: [phone], option: 9      ===========================================      SUBJECTIVE / INTERVAL HISTORY:     No fever.  Surgery has signed off.  White count up.  Doesn't complain so much of shoulder pain as positional discomfort and trouble transferring.       Antibiotics   Penicillin g 7/20-   Linezolid 7/20-    Previous:  Zosyn 7/17-7/20  Vancomycin 7/17      Physical Exam     Temp:  [97.6  F (36.4  C)-98  F (36.7  C)] 97.6  F (36.4  C)  Pulse:  [75-83] 76  Resp:  [19-36] 25  BP: ()/(47-59) 99/53  SpO2:  [90 %-100 %] 100 %    BP 99/53   Pulse 76    "Temp 97.6  F (36.4  C) (Oral)   Resp 25   Ht 1.88 m (6' 2\")   Wt 111.8 kg (246 lb 7.6 oz)   SpO2 100%   BMI 31.65 kg/m      GENERAL:  well-developed, well-nourished, lying in bed in no acute distress.   HENT:  Head is normocephalic, atraumatic.   EYES:  Eyes have anicteric sclerae without conjunctival injection   LUNGS:  Clear to auscultation.  Tachypneic (better today)  CARDIOVASCULAR:  Regular rate and rhythm with no murmurs, gallops or rubs.  ABDOMEN:  Normal bowel sounds, soft, nontender.   EXT: Right shoulder swelling, tenderness.  SKIN:  Shallow blisters on posterior right arm, superficial, weeping. Right IJ line is in place without any surrounding erythema.   NEUROLOGIC:  Grossly nonfocal.      Cultures   7/17 blood cultures x2: Strep pyogenes  7/19 blood cultures x2: No growth to date          Pertinent Labs:     Recent Labs   Lab 07/22/23  0517 07/21/23  0900 07/20/23  0448   WBC 28.8* 25.6* 21.3*   HGB 8.5* 9.3* 8.2*    197 187       Recent Labs   Lab 07/22/23  0517 07/21/23  1601 07/21/23  0438 07/20/23  0448 07/19/23  1531 07/19/23  0951 07/17/23  1425 07/17/23  1300   * 129* 123* 128*   < > 128*   < > 119*   CO2 21* 21* 19* 18*   < > 15*   < > 14*   BUN 92.7*  --  109.9* 96.9*  --  80.4*   < > 64.9*   ALBUMIN  --   --  2.1* 2.4*  --   --   --  3.1*   ALKPHOS  --   --  314* 198*  --   --   --  103   ALT  --   --  124* 105*  --   --   --  33   AST  --   --  222* 241*  --  187*  --  92*    < > = values in this interval not displayed.       No results for input(s): CRP, SED in the last 168 hours.        Imaging:     IR CVC Non Tunnel Placement > 5 Yrs    Result Date: 7/20/2023  LOCATION: [facility] DATE: 7/20/2023 PROCEDURE: NON-TUNNELED DIALYSIS CATHETER PLACEMENT 1.  Insertion of a non-tunneled central venous catheter. 2.  Ultrasound guidance for vascular access. 3.  Fluoroscopic guidance for central venous access device placement. INTERVENTIONAL RADIOLOGIST: " Joseph Campos MD INDICATION: Renal insufficiency requiring renal replacement therapy, plan for nontunneled dialysis catheter placement. CONSENT: The risks, benefits and alternatives of the procedure were discussed with the patient or representative in detail. All questions were answered. Informed consent was given to proceed with the procedure. MODERATE SEDATION: None. CONTRAST: None. ANTIBIOTICS: None. ADDITIONAL MEDICATIONS: None. FLUOROSCOPIC TIME: 0.5 minutes. RADIATION DOSE: Air Kerma: 5 mGy. COMPLICATIONS: No immediate complications. UNIVERSAL PROTOCOL: The operative site was marked and any prior imaging was reviewed. Required items including blood products, implants, devices and special equipment was made available. Patient identity was confirmed either verbally, with demographic information, hospital assigned identification or other identification markers. A timeout was performed immediately prior to the procedure. STERILE BARRIER TECHNIQUE: Maximum sterile barrier technique was used. Cutaneous antisepsis was performed at the operative site with application of 2% chlorhexidine and large sterile drape. Prior to the procedure, the  and assistant performed hand hygiene and wore hat, mask, sterile gown, and sterile gloves during the entire procedure. PROCEDURE:  Using local anesthesia and real-time ultrasound guidance the right internal jugular vein was accessed. Imaging demonstrates an anechoic and compressible vein. A permanent image was stored to the patient's medical record. Using this access, a 20 cm dialysis catheter was advanced using fluoroscopic guidance until the tip was in the proximal right atrium. FINDINGS: At the completion of the study he radiograph was performed utilizing the in room fluoroscopic unit. Imaging demonstrates the nontunneled dialysis catheter tip terminating in the proximal right atrium.     IMPRESSION:  1.  Successful non-tunneled dialysis catheter placement. 2.  The  catheter is ready for use.    MR Shoulder Right w/o Contrast    Result Date: 7/19/2023  EXAM: MR SHOULDER RIGHT W/O CONTRAST LOCATION: [facility] DATE: 7/19/2023 INDICATION: Septic shock with bacteremia, concern for right shoulder osteomyelitis or joint infection. COMPARISON: None. TECHNIQUE: Unenhanced. FINDINGS: ROTATOR CUFF: -Supraspinatus: Moderate severity tendinopathy. No tearing or retraction. No atrophy. -Infraspinatus: Moderate severity infraspinatus tendinopathy without evidence for well-defined partial or full-thickness tearing. No retraction but there is T2 signal abnormality within the muscular portion, which may be due to muscle strain. Infectious or inflammatory myositis could have this appearance. -Subscapularis: No tendon tear, tendinopathy or fatty atrophy. -Teres minor: No tendon tear, tendinopathy or fatty atrophy. CORACOACROMIAL ARCH: -Morphology: Type II acromion. No subacromial spur. Subacromial and subcoracoid space are normal. -Bursa: Fluid in the subacromial subdeltoid bursa. The coracoacromial and coracoclavicular ligaments are negative. ACROMIOCLAVICULAR JOINT: -Hypertrophic change. No significant diastasis or effusion. LONG HEAD OF BICEPS TENDON: -Mild tendinopathy within the intra-articular portion of the long head of the biceps. GLENOHUMERAL JOINT: -Labrum: No labral tear. No paralabral cyst. -Cartilage: Grade II-grade III cartilage loss both sides of the joint without significant surface irregularity. -Joint space: No effusion or synovitis. -Glenohumeral ligaments and capsule: No pericapsular inflammation. BONES: -No fracture or concerning marrow replacing lesion. No evidence for osteomyelitis. SOFT TISSUES: -Extensive but nonspecific T2 signal abnormality is present within the deltoid musculature, most marked posteriorly but this is also seen within the latissimus muscle, distal aspect of the trapezius, infraspinatus as described, and portions of the  subscapularis. The multiplicity of muscle groups involved makes a strain type injury considered unlikely and this is more likely secondary to infectious or inflammatory myositis. There is no evidence for organized fluid collection to suggest abscess.     IMPRESSION: 1.  Extensive signal abnormality within multiple muscles of the shoulder girdle including portions of the visualized latissimus and trapezius muscles. The findings are likely secondary to infectious or inflammatory myositis versus contusion or muscle strain type injury. No evidence for abscess. 2.  No evidence for osteomyelitis or septic arthropathy. 3.  Small amount of fluid within the subacromial subdeltoid bursa. 4.  No evidence for fracture. 5.  Moderate severity tendinopathy within the supraspinatus and infraspinatus. 6.  Tendinopathy within the intraarticular portion of the biceps. 7.  Degenerative change at the glenohumeral joint with grade II-grade III cartilage loss but no significant surface irregularity.    XR Chest Port 1 View    Result Date: 7/18/2023  EXAM: XR CHEST PORT 1 VIEW LOCATION: [facility] DATE: 7/18/2023 INDICATION: RN placed PICC   verify tip placement COMPARISON: 07/17/2023     IMPRESSION: Left upper extremity PICC terminates at the SVC - RA junction. No acute cardiopulmonary abnormality.        Data reviewed today: I reviewed all medications, new labs and imaging results over the last 24 hours. I personally reviewed no images or EKG's today.  The patient's care was discussed with the Bedside Nurse, Patient and Primary team.

## 2023-07-22 NOTE — PROGRESS NOTES
General Surgery Progress Note:    Hospital Day # 5    ASSESSMENT:   1. Septic shock (H)    2. Dehydration    3. JOHANA (acute kidney injury) (H)    4. Hyponatremia    5. Atrial fibrillation with RVR (H)    6. Elevated troponin    7. Blood blister        Norman Aguilar is a 63 year old male admitted with myositis of the right arm/shoulder and septic shock.  Right arm is soft, non-tender with no evidence of crepitus and good distal pulses.    PLAN:   -No concern for soft tissue necrotizing infection at this time  -If worsening suggest repeat imaging and re-consult surgery  -General surgery will sign off at this time      SUBJECTIVE:   Norman Aguilar is feeling okay.  Overall feels improvement in his right arm pain and swelling.  No pain with active range of motion today.  Certain positions cause pain, especially with turns.    Patient Vitals for the past 24 hrs:   BP Temp Temp src Pulse Resp SpO2 Weight   07/22/23 1000 101/53 97.6  F (36.4  C) Oral 75 22 100 % --   07/22/23 0900 106/58 -- -- 75 19 100 % --   07/22/23 0800 102/55 97.6  F (36.4  C) Oral 76 25 100 % --   07/22/23 0700 107/53 -- -- 79 21 100 % --   07/22/23 0645 108/53 -- -- 78 30 100 % --   07/22/23 0630 110/59 -- -- 79 27 99 % --   07/22/23 0615 109/58 -- -- 79 24 99 % --   07/22/23 0600 109/55 -- -- 82 (!) 32 100 % 111.8 kg (246 lb 7.6 oz)   07/22/23 0545 105/55 -- -- 80 28 100 % --   07/22/23 0530 105/51 -- -- 79 29 100 % --   07/22/23 0515 104/50 -- -- 79 26 99 % --   07/22/23 0500 120/59 -- -- 80 (!) 36 96 % --   07/22/23 0445 105/51 -- -- 80 30 100 % --   07/22/23 0430 109/53 -- -- 79 22 100 % --   07/22/23 0415 110/55 -- -- 80 26 100 % --   07/22/23 0400 110/56 97.6  F (36.4  C) Oral 80 25 100 % --   07/22/23 0345 110/57 -- -- 79 24 100 % --   07/22/23 0330 109/55 -- -- 80 28 100 % --   07/22/23 0315 109/56 -- -- 80 26 100 % --   07/22/23 0300 106/54 -- -- 81 27 100 % --   07/22/23 0245 106/54 -- -- 79 21 100 % --   07/22/23 0230 104/55 -- --  78 22 100 % --   07/22/23 0215 103/51 -- -- 80 22 100 % --   07/22/23 0200 111/54 -- -- 81 25 99 % --   07/22/23 0145 105/55 -- -- 80 23 100 % --   07/22/23 0130 104/53 -- -- 80 22 100 % --   07/22/23 0115 107/56 -- -- 79 23 100 % --   07/22/23 0100 104/54 -- -- 80 27 99 % --   07/22/23 0045 109/53 -- -- 80 24 100 % --   07/22/23 0030 112/57 -- -- 80 (!) 31 100 % --   07/22/23 0015 108/56 -- -- 80 28 99 % --   07/22/23 0000 116/55 97.7  F (36.5  C) Oral 83 24 100 % --   07/21/23 2345 110/59 -- -- 81 25 100 % --   07/21/23 2330 111/58 -- -- 81 23 100 % --   07/21/23 2315 99/56 -- -- 80 28 98 % --   07/21/23 2300 110/55 -- -- 81 28 97 % --   07/21/23 2245 111/55 -- -- 82 25 98 % --   07/21/23 2230 104/58 -- -- 81 28 91 % --   07/21/23 2215 110/56 -- -- 81 24 94 % --   07/21/23 2200 110/59 97.9  F (36.6  C) Oral 81 25 94 % --   07/21/23 2145 105/55 -- -- 82 27 94 % --   07/21/23 2130 107/56 -- -- 81 25 93 % --   07/21/23 2115 100/59 -- -- 81 30 96 % --   07/21/23 2100 112/58 -- -- 82 24 95 % --   07/21/23 2045 112/57 -- -- 82 27 90 % --   07/21/23 2030 110/58 -- -- 83 (!) 32 97 % --   07/21/23 2015 103/56 -- -- 80 (!) 34 98 % --   07/21/23 2000 100/54 97.7  F (36.5  C) Oral 81 22 96 % --   07/21/23 1945 100/55 -- -- 82 (!) 35 97 % --   07/21/23 1930 100/58 -- -- 83 (!) 35 98 % --   07/21/23 1915 109/58 -- -- 82 25 95 % --   07/21/23 1900 101/56 -- -- 81 26 95 % --   07/21/23 1845 98/55 -- -- 81 25 96 % --   07/21/23 1830 107/55 -- -- 83 23 97 % --   07/21/23 1815 103/53 -- -- 82 25 93 % --   07/21/23 1800 99/50 -- -- 82 29 97 % --   07/21/23 1745 100/52 -- -- 82 24 97 % --   07/21/23 1730 102/55 -- -- 82 26 97 % --   07/21/23 1715 97/51 -- -- 81 22 97 % --   07/21/23 1700 101/53 -- -- 81 24 97 % --   07/21/23 1645 96/52 -- -- 80 27 96 % --   07/21/23 1630 93/50 -- -- 81 24 97 % --   07/21/23 1615 90/54 -- -- 80 27 97 % --   07/21/23 1600 93/55 97.8  F (36.6  C) Oral 79 25 96 % --   07/21/23 1545 (!) 85/49 -- -- 80 29  96 % --   07/21/23 1530 92/47 -- -- 81 24 96 % --   07/21/23 1515 91/50 -- -- 82 24 91 % --   07/21/23 1500 90/51 -- -- 83 28 90 % --   07/21/23 1400 92/50 98  F (36.7  C) Oral 79 25 94 % --   07/21/23 1300 98/52 -- -- 81 (!) 31 100 % --   07/21/23 1200 92/50 97.9  F (36.6  C) Oral 78 25 100 % --   07/21/23 1100 -- -- -- 75 23 100 % --       Physical Exam:  General: patient seen resting in bed, no acute distress  Resp: no respiratory distress, breathing comfortably on 2L via nasal canula  MSK: Right arm edematous from shoulder distal to hand.  Good cap refill and distal pulses.  Nontender over right shoulder and arm.  No pain with active range of motion.  Sensation and strength intact.  Extremities: warm and well perfused    No results displayed because visit has over 200 results.           Lianne Gee PA-C  [facility] General Surgery  [address]  [address]  [place] [address]

## 2023-07-22 NOTE — PROGRESS NOTES
"RENAL PROGRESS NOTE - [facility]        CC: f/u JOHANA    Subjective: Remains in the icu on pressors. HD started yesterday. 348 ml urine yesterday. Patient reports tolerated HD well. Breathing slowly improving.     ASSESSMENT AND RECOMMENDATIONS:  1. JOHANA/ CKD 3b: baseline CKD due to longstanding DM2, HTN. Now severe ATN in setting of profound hypotension with sepsis, group a strep bacteremia and hypovolemia and contrast nephropathy after CTA, mild rhabdo. CTA on 7/17 with no hydronephrosis. UA on 7/18 very concentrated suggesting intact tubular function, although granular casts present.  Remains oliguric   -dialysis started 7/21 using non tunneled dialysis catheter    - will plan dialysis again today.    -cont to monitor labs, urine output for e/o renal recovery    2. Hypotension: due to bacteremia/sepsis              -Pressors prn per ICU team              -volume up on exam, avoid excessive IVF        3. Metabolic acidosis: due to JOHANA.               -better today   -dialysis as above   -no need for further iv bicarb     4. HFrEF: EF of 25-30% based on 7/18 echo which also showed normal RVF, normal RA pressures.               -now volume up, plan 2-3 liter uf with dialysis tomorrow               -Dr. Arredondo following     5. Hyponatremia: due to JOHANA, hypervolemia  -dialysis against 134 sodium bath with uf as tolerated  -avoid excessive po fluid, IVF as able     6. NSTEMI: per Dr. Arredondo.           7. Bacteremia: Group a strep and rt shoulder myositis              -now on penicillin g and linezolid   -ongoing orthosurgical eval of shoulder       Refugio Reich DO  [facility]  [phone]    Objective    PHYSICAL EXAM  /53 (BP Location: Right leg, Cuff Size: Adult Regular)   Pulse 75   Temp 97.6  F (36.4  C) (Oral)   Resp 22   Ht 1.88 m (6' 2\")   Wt 111.8 kg (246 lb 7.6 oz)   SpO2 100%   BMI 31.65 kg/m    I/O last 3 completed shifts:  In: 1915.64 [I.V.:1915.64]  Out: 1333 " [Urine:333; Other:1000]  Wt Readings from Last 3 Encounters:   07/22/23 111.8 kg (246 lb 7.6 oz)   11/17/22 100.2 kg (221 lb)   09/28/22 104.3 kg (230 lb)       GENERAL: nad, tired appearing  HEENT:normocephalic, atraumatic  CARDIOVASCULAR: rr, no rub, 1+ leg edema  PULMONARY:cta ant. No cyanosis  GASTROINTESTINAL: soft, nt/nd  MSK: rt shoulder pain with movement/unable to elevate rt arm  NEURO: Alert, no gross focal findings  PSYCHIATRIC: Adequate mood and interaction, tangential responses to questions  SKIN: Pale, no jaundice, no rash.    LABORATORIES  Recent Labs   Lab 07/22/23  0517 07/21/23  1601 07/21/23  0438 07/20/23  0448 07/19/23  1531 07/19/23  0951 07/19/23  0834 07/19/23  0515 07/19/23  0213 07/18/23  1005 07/18/23  0414 07/17/23  2316 07/17/23  1425 07/17/23  1300   * 129* 123* 128* 128* 128* 135*   < > 128*  126*   < > 124*  124*   < > 121* 119*   POTASSIUM 3.8 4.4 3.6 3.8 3.8 3.6 2.2*   < > 3.7  3.6   < > 4.0  4.0   < > 5.4* 4.6   CHLORIDE 89* 90* 84* 92* 94* 94* 100   < > 95*  94*   < > 93*  93*   < > 86* 80*   CO2 21* 21* 19* 18* 14* 15* 10*   < > 14*  14*   < > 16*  16*   < > 15* 14*   BUN 92.7*  --  109.9* 96.9*  --  80.4*  --   --  75.3*  --  70.6*  --  63.1* 64.9*   CR 4.67*  --  5.22* 4.71*  --  3.88*  --   --  3.60*  --  3.14*  --  2.94* 3.25*   GFRESTIMATED 13*  --  12* 13*  --  17*  --   --  18*  --  21*  --  23* 20*   YOSVANY 8.5*  --  7.6* 7.5*  --  7.3*  --   --  7.1*  --  8.4*  --  8.4* 9.2   MAG  --   --   --   --   --   --   --   --   --   --   --   --  1.9  --    ALBUMIN  --   --  2.1* 2.4*  --   --   --   --   --   --   --   --   --  3.1*    < > = values in this interval not displayed.       Recent Labs   Lab 07/22/23  0517 07/21/23  0900 07/20/23  0448 07/19/23  1555 07/18/23  0414 07/17/23  1302   WBC 28.8* 25.6* 21.3* 12.9* 8.4 14.5*   HGB 8.5* 9.3* 8.2* 9.0* 12.5* 13.2*   HCT 23.8* 26.2* 23.8* 26.8* 35.8* 38.5*   MCV 79 78 80 82 80 83    197 187 123* 191 247           MEDICATIONS    amiodarone  400 mg Oral Daily     aspirin  81 mg Oral Daily     docusate sodium  100 mg Oral BID     insulin aspart  1-10 Units Subcutaneous TID AC     insulin aspart  1-7 Units Subcutaneous At Bedtime     insulin glargine  35 Units Subcutaneous QAM AC     linezolid  600 mg Intravenous Q12H     magnesium oxide  400 mg Oral Daily     multivitamin, therapeutic  1 tablet Oral Daily     penicillin G potassium  4 Million Units Intravenous Q8H     sodium chloride (PF)  10-40 mL Intracatheter Q7 Days     sodium chloride (PF)  3 mL Intracatheter Q8H     sodium chloride (PF)  9 mL Intracatheter During Dialysis/CRRT (from stock)     sodium chloride (PF)  9 mL Intracatheter During Dialysis/CRRT (from stock)     ticagrelor  90 mg Oral BID     vitamin C  500 mg Oral Daily         Lyudmila Medina MD  [facility]  [phone]

## 2023-07-22 NOTE — PROGRESS NOTES
"    [facility]   [address]  [place] [address]   [email or web address]   OFFICE: [phone]     CARDIOLOGY FOLLOW-UP NOTE      Impression and Plan     Impression:   NSTEMI - known multivessel coronary artery disease, s/p PCI in 11/2021. Elevated troponin this admission most likely related to type II demand MI in the setting of sepsis, acute renal failure, and profound hypotension  Ischemic cardiomyopathy with LVEF 25-30%  Acute on chronic heart failure with reduced LVEF  Paroxysmal atrial fibrillation - currently on amiodarone load. Currently maintaining sinus rhythm.  Hyponatremia due to JOHANA and hypervolemia  Septic shock due to group A strep bacteremia and R shoulder myositis. Continues on phenylephrine  Acute on chronic renal failure - recently started hemodialysis    Plan:  If continues to have problems weaning phenylephrine, would transition to norepinephrine. Could also consider RHC to guide hemodynamic support as he may have a mixed septic and cardiogenic picture.  Not on GDMT due to shock  Continue amiodarone  Continue heparin gtt until more stabilized and can transition to PO OAC.  Discussed with Dr. Carey    Primary Cardiologist: Dr. Moshe Davis MD    Subjective     \"Trying to be patient\". No SOB, no chest pain. RUE sore.    Cardiac Diagnostics   Telemetry (personally reviewed): sinus rhythm.    Echocardiogram (results reviewed):   TTE 7/18/23  Left ventricular function is decreased. The ejection fraction is 25-30% (severely reduced).  The left atrium is mildly dilated.  IVC diameter <2.1 cm collapsing >50% with sniff suggests a normal RA pressure of 3 mmHg.  TAPSE is normal, which is consistent with normal right ventricular systolic function.  The patient exhibited frequent PVCs.  No hemodynamically significant valvular abnormalities on 2D or color flow imaging. The study was technically difficult.    Physical Examination       /53 (BP Location: " "Right leg, Cuff Size: Adult Regular)   Pulse 75   Temp 97.6  F (36.4  C) (Oral)   Resp 22   Ht 1.88 m (6' 2\")   Wt 111.8 kg (246 lb 7.6 oz)   SpO2 100%   BMI 31.65 kg/m            Intake/Output Summary (Last 24 hours) at 7/22/2023 1051  Last data filed at 7/22/2023 1000  Gross per 24 hour   Intake 2011.14 ml   Output 350 ml   Net 1661.14 ml       General: pleasant male. No acute distress.   HENT: external ears normal. Nares patent. Mucous membranes moist.  Eyes: perrla, extraocular muscles intact. No scleral icterus.   Neck: No JVD  Lungs: clear to auscultation  COR: normal rhythm, regular rate, + rub  Abd: nondistended, BS present  Extrem: RUE swollen. 1+ BLE edema         Imaging      No new non-cardiac imaging.    Lab Results   Lab Results   Component Value Date    CHOL 157 03/24/2023    HDL 65 03/24/2023    TRIG 60 03/24/2023    BUN 92.7 (H) 07/22/2023     (L) 07/22/2023    CO2 21 (L) 07/22/2023       Lab Results   Component Value Date    WBC 28.8 (H) 07/22/2023    HGB 8.5 (L) 07/22/2023    HCT 23.8 (L) 07/22/2023    MCV 79 07/22/2023     07/22/2023       Lab Results   Component Value Date    TROPONINI 5.84 (HH) 08/30/2022     (H) 08/24/2022    INR 1.19 (H) 08/24/2022               Current Inpatient Scheduled Medications   Scheduled Meds:   amiodarone  400 mg Oral Daily    aspirin  81 mg Oral Daily    docusate sodium  100 mg Oral BID    insulin aspart  1-10 Units Subcutaneous TID AC    insulin aspart  1-7 Units Subcutaneous At Bedtime    insulin glargine  35 Units Subcutaneous QAM AC    linezolid  600 mg Intravenous Q12H    magnesium oxide  400 mg Oral Daily    multivitamin, therapeutic  1 tablet Oral Daily    penicillin G potassium  4 Million Units Intravenous Q8H    sodium chloride (PF)  10-40 mL Intracatheter Q7 Days    sodium chloride (PF)  3 mL Intracatheter Q8H    sodium chloride (PF)  9 mL Intracatheter During Dialysis/CRRT (from stock)    sodium chloride (PF)  9 mL " Intracatheter During Dialysis/CRRT (from stock)    ticagrelor  90 mg Oral BID    vitamin C  500 mg Oral Daily     Continuous Infusions:   dextrose      heparin 1,400 Units/hr (07/22/23 1000)    [Held by provider] insulin regular Stopped (07/19/23 0954)    phenylephrine 0.8 mcg/kg/min (07/22/23 1000)            Medications Prior to Admission   Prior to Admission medications    Medication Sig Start Date End Date Taking? Authorizing Provider   aspirin (ASA) 81 MG EC tablet Take 1 tablet (81 mg) by mouth daily Start tomorrow. 8/30/22  Yes Cipriano Lucas MD   atorvastatin (LIPITOR) 80 MG tablet TAKE 1 TABLET(80 MG) BY MOUTH EVERY EVENING 2/21/22  Yes Piedad Reese NP   furosemide (LASIX) 20 MG tablet Take 20 mg by mouth daily   Yes Reported, Patient   insulin aspart (NOVOLOG FLEXPEN) 100 UNIT/ML pen For  - 164 give 1 unit. For  - 189 give 2 units. For  - 214 give 3 units. For  - 239 give 4 units. For  - 264 give 5 units. For  - 289 give 6 units. For  - 314 give 7 units. For  - 339 give 8 units For  - 364 give 9 units For  - 389 give 10 units For  - 414 give 11 units For BG greater than or equal to 415 give 12 units 11/24/21  Yes Ronald Bui,    insulin glargine (LANTUS SOLOSTAR) 100 UNIT/ML pen Inject 38 Units Subcutaneous every morning  Patient taking differently: Inject 38 Units Subcutaneous At Bedtime 11/24/21  Yes Ronald Bui,    lisinopril (ZESTRIL) 5 MG tablet Take 1 tablet by mouth daily 7/7/23  Yes Reported, Patient   magnesium oxide (MAG-OX) 400 MG tablet Take 1 tablet (400 mg) by mouth daily 1/7/22  Yes Moshe Davis MD   metoprolol succinate ER (TOPROL-XL) 50 MG 24 hr tablet TAKE 1 TABLET(50 MG) BY MOUTH DAILY 2/21/22  Yes Piedad Reese NP   Multiple Vitamin (MULTI VITAMIN) TABS Take 1 tablet by mouth daily    Yes Reported, Patient   OMEGA-3/DHA/EPA/FISH OIL (FISH OIL-OMEGA-3 FATTY ACIDS) 300-1,000 mg capsule Take 1  g by mouth daily  1/11/18  Yes Provider, Historical   ticagrelor (BRILINTA) 90 MG tablet Take 1 tablet (90 mg) by mouth 2 times daily Dose to start tomorrow morning. 9/15/22  Yes Piedad Reese, DESMOND   vitamin C (ASCORBIC ACID) 500 MG tablet Take 500 mg by mouth daily   Yes Unknown, Entered By History   blood glucose (NO BRAND SPECIFIED) test strip Use to test blood sugar 4 times daily or as directed. 11/24/21   Ronald Bui,    blood glucose monitoring (SOFTCLIX) lancets Use to test blood sugar 4 times daily. 11/24/21   Ronald Bui, DO

## 2023-07-22 NOTE — PLAN OF CARE
[facility] - ICU    RN Progress Note:            Pertinent Assessments:      Please refer to flowsheet rows for full assessment     Afebrile, on neosynephrine gtt keeping MAP >65, SR with BBB, right shoulder swelling and pain with movement but did not require pain med. Elevated on pillows. Heparin gtt running. Lactic acid at 1.9 x 2.            Key Events - This Shift:     See above notes.             Barriers to Discharge / Downgrade:     Patient hemodynamically unstable,on 1 pressor.

## 2023-07-23 NOTE — PROGRESS NOTES
"RENAL PROGRESS NOTE - [facility]        CC: f/u JOHANA    Subjective: Remains in the icu on pressors. HD yesterday with 3L removed. Hypotension at end of run. Off oxygen this am. Doing well. No sob.      ASSESSMENT AND RECOMMENDATIONS:  1. JOHANA/ CKD 3b: baseline CKD due to longstanding DM2, HTN. Now severe ATN in setting of profound hypotension with sepsis, group a strep bacteremia and hypovolemia and contrast nephropathy after CTA, mild rhabdo. CTA on 7/17 with no hydronephrosis. UA on 7/18 very concentrated suggesting intact tubular function, although granular casts present.  Remains oliguric   -dialysis started 7/21 using non tunneled dialysis catheter    - will plan dialysis tomorrow.    -cont to monitor labs, urine output for e/o renal recovery    2. Hypotension: due to bacteremia/sepsis              -Pressors prn per ICU team              -volume up on exam, avoid excessive IVF        3. Metabolic acidosis: due to JOHANA.               -better today   -dialysis as above   -no need for further iv bicarb     4. HFrEF: EF of 25-30% based on 7/18 echo which also showed normal RVF, normal RA pressures.               -now volume up, plan 2-3 liter uf with dialysis tomorrow               -Dr. Arredondo following     5. Hyponatremia: due to JOHANA, hypervolemia  -dialysis against 134 sodium bath with uf as tolerated  -avoid excessive po fluid, IVF as able     6. NSTEMI: per Dr. Arredondo.         7. Bacteremia: Group a strep and rt shoulder myositis              -now on penicillin g and linezolid   -ongoing orthosurgical eval of shoulder       Refugio Reich DO  [facility]  [phone]    Objective    PHYSICAL EXAM  BP 98/52   Pulse 76   Temp 97.9  F (36.6  C) (Oral)   Resp 21   Ht 1.88 m (6' 2\")   Wt 111.8 kg (246 lb 7.6 oz)   SpO2 96%   BMI 31.65 kg/m    I/O last 3 completed shifts:  In: 2207.5 [P.O.:1140; I.V.:1067.5]  Out: 3668 [Urine:668; Other:3000]  Wt Readings from Last 3 Encounters: "   07/22/23 111.8 kg (246 lb 7.6 oz)   11/17/22 100.2 kg (221 lb)   09/28/22 104.3 kg (230 lb)       GENERAL: nad, tired appearing  HEENT:normocephalic, atraumatic  CARDIOVASCULAR: 1+ leg edema  PULMONARY: Normal effort,  No cyanosis  GASTROINTESTINAL: soft, nt/nd  NEURO: Alert, no gross focal findings  PSYCHIATRIC: Adequate mood and interaction  SKIN: Pale, no jaundice, no rash.    LABORATORIES  Recent Labs   Lab 07/23/23  0524 07/22/23  0517 07/21/23  1601 07/21/23  0438 07/20/23  0448 07/19/23  1531 07/19/23  0951 07/19/23  0515 07/19/23  0213 07/18/23  1005 07/18/23  0414 07/17/23  2316 07/17/23  1425 07/17/23  1300   * 127* 129* 123* 128* 128* 128*   < > 128*  126*   < > 124*  124*   < > 121* 119*   POTASSIUM 4.0 3.8 4.4 3.6 3.8 3.8 3.6   < > 3.7  3.6   < > 4.0  4.0   < > 5.4* 4.6   CHLORIDE 93* 89* 90* 84* 92* 94* 94*   < > 95*  94*   < > 93*  93*   < > 86* 80*   CO2 23 21* 21* 19* 18* 14* 15*   < > 14*  14*   < > 16*  16*   < > 15* 14*   BUN 65.6* 92.7*  --  109.9* 96.9*  --  80.4*  --  75.3*  --  70.6*  --  63.1* 64.9*   CR 3.31* 4.67*  --  5.22* 4.71*  --  3.88*  --  3.60*  --  3.14*  --  2.94* 3.25*   GFRESTIMATED 20* 13*  --  12* 13*  --  17*  --  18*  --  21*  --  23* 20*   YOSVANY 8.1* 8.5*  --  7.6* 7.5*  --  7.3*  --  7.1*  --  8.4*  --  8.4* 9.2   MAG  --   --   --   --   --   --   --   --   --   --   --   --  1.9  --    ALBUMIN  --   --   --  2.1* 2.4*  --   --   --   --   --   --   --   --  3.1*    < > = values in this interval not displayed.       Recent Labs   Lab 07/23/23  0524 07/22/23  0517 07/21/23  0900 07/20/23  0448 07/19/23  1555 07/18/23  0414 07/17/23  1302   WBC 24.9* 28.8* 25.6* 21.3* 12.9* 8.4 14.5*   HGB 7.1* 8.5* 9.3* 8.2* 9.0* 12.5* 13.2*   HCT 21.3* 23.8* 26.2* 23.8* 26.8* 35.8* 38.5*   MCV 80 79 78 80 82 80 83    183 197 187 123* 191 247          MEDICATIONS    amiodarone  400 mg Oral Daily     aspirin  81 mg Oral Daily     docusate sodium  100 mg Oral BID      insulin aspart  1-10 Units Subcutaneous TID AC     insulin aspart  1-7 Units Subcutaneous At Bedtime     insulin glargine  35 Units Subcutaneous QAM AC     linezolid  600 mg Intravenous Q12H     magnesium oxide  400 mg Oral Daily     multivitamin, therapeutic  1 tablet Oral Daily     penicillin G potassium  4 Million Units Intravenous Q8H     sodium chloride (PF)  10-40 mL Intracatheter Q7 Days     sodium chloride (PF)  3 mL Intracatheter Q8H     sodium chloride (PF)  9 mL Intracatheter During Dialysis/CRRT (from stock)     sodium chloride (PF)  9 mL Intracatheter During Dialysis/CRRT (from stock)     ticagrelor  90 mg Oral BID     vitamin C  500 mg Oral Daily         Lyudmila Medina MD  [facility]  [phone]

## 2023-07-23 NOTE — PROGRESS NOTES
"Critical Care Progress Note      07/23/2023    Name: Norman Aguilar MRN#: [id number]   Age: 63 year old YOB: 1960     Hsptl Day# 6  ICU DAY #    MV DAY #             Problem List:   Principal Problem:    Elevated troponin  Active Problems:    Dehydration    Hyponatremia    JOHANA (acute kidney injury) (H)    Atrial fibrillation with RVR (H)    Septic shock (H)    Clinically Significant Risk Factors         # Hyponatremia: Lowest Na = 127 mmol/L in last 2 days, will monitor as appropriate      # Hypoalbuminemia: Lowest albumin = 2.1 g/dL at 7/21/2023  4:38 AM, will monitor as appropriate     # Hypertension: Noted on problem list  # Chronic heart failure with reduced ejection fraction: last echo with EF <40%      # DMII: A1C = 6.9 % (Ref range: <5.7 %) within past 6 months   # Obesity: Estimated body mass index is 31.65 kg/m  as calculated from the following:    Height as of this encounter: 1.88 m (6' 2\").    Weight as of this encounter: 111.8 kg (246 lb 7.6 oz).                             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 TTE 7/18/23 showing worsening reduced LVEF 25-30%.            Assessment and plan :       I have personally reviewed the daily labs, imaging studies, " cultures and discussed the case with referring physician and consulting physicians.     My assessment and plan by system for this patient is as follows:    Neurology/Psychiatry:   Awake.  Answers questions appropriately.    Cardiovascular:   Septic shock on phenylephrine  Ischemic cardiomyopathy, EF 25 to 30%  Paroxysmal A-fib, converted with amiodarone.  Currently normal sinus rhythm    Start norepinephrine and try to get him off Mohan-Synephrine    Continue heparin drip    Pulmonary/Ventilator Management:   Supplemental oxygen to keep O2 sats above 90%.      GI and Nutrition :   Elevated LFTs most likely secondary to shock liver from hypotension plus some increase in AST secondary to muscle damage, patient had increased CK      Renal/Fluids/Electrolytes:   JOHANA on CKD 3.  Started on hemodialysis  Hyponatremia slowly improving    HD per nephrology    - monitor function and electrolytes as needed with replacement per ICU protocols.  - generally avoid nephrotoxic agents such as NSAID, IV contrast unless specifically required  - adjust medications as needed for renal clearance  - follow I/O's as appropriate.    Infectious Disease:   Group A strep bacteremia.  On MRI myositis.  Blistering on the right upper extremity started on 721.  Visited by Ortho and general surgery.    Continue linezolid.  Continue penicillin G      Endocrine:   Diabetes mellitus on Lantus and sliding scale insulin.    Add 10 units of Lantus at bedtime.  As patient's infection gets under control his glucose will be easier to control as well.  Plan  - ICU insulin protocol, goal sugar <180      Hematology/Oncology:   Leukocytosis improving today        ICU Prophylaxis:   Heparin drip         Key Medications:       amiodarone  400 mg Oral Daily     aspirin  81 mg Oral Daily     docusate sodium  100 mg Oral BID     insulin aspart  1-10 Units Subcutaneous TID AC     insulin aspart  1-7 Units Subcutaneous At Bedtime     insulin glargine  10 Units  Subcutaneous At Bedtime     insulin glargine  35 Units Subcutaneous QAM AC     linezolid  600 mg Intravenous Q12H     magnesium oxide  400 mg Oral Daily     multivitamin, therapeutic  1 tablet Oral Daily     penicillin G potassium  4 Million Units Intravenous Q8H     sodium chloride (PF)  10-40 mL Intracatheter Q7 Days     sodium chloride (PF)  3 mL Intracatheter Q8H     sodium chloride (PF)  9 mL Intracatheter During Dialysis/CRRT (from stock)     sodium chloride (PF)  9 mL Intracatheter During Dialysis/CRRT (from stock)     ticagrelor  90 mg Oral BID     vitamin C  500 mg Oral Daily       dextrose       heparin 1,550 Units/hr (07/23/23 1417)     [Held by provider] insulin regular Stopped (07/19/23 0954)     norepinephrine       phenylephrine 0.7 mcg/kg/min (07/23/23 1400)               Physical Examination:   Temp:  [97.6  F (36.4  C)-97.9  F (36.6  C)] 97.7  F (36.5  C)  Pulse:  [] 95  Resp:  [17-31] 23  BP: ()/(51-61) 101/57  SpO2:  [68 %-100 %] 94 %    Intake/Output Summary (Last 24 hours) at 7/22/2023 1145  Last data filed at 7/22/2023 1000  Gross per 24 hour   Intake 2011.14 ml   Output 350 ml   Net 1661.14 ml     Wt Readings from Last 4 Encounters:   07/22/23 111.8 kg (246 lb 7.6 oz)   11/17/22 100.2 kg (221 lb)   09/28/22 104.3 kg (230 lb)   09/27/22 104.3 kg (230 lb)     BP - Mean:  [66-77] 76  Resp: 23    No lab results found in last 7 days.    GEN: no acute distress   HEENT: head ncat, sclera anicteric, OP patent, trachea midline   PULM: clear anteriorly    CV/COR: RRR S1S2 no gallop,  No rub, no murmur  ABD: soft nontender, hypoactive bowel sounds, no mass  EXT: Upper extremity swollen  NEURO: grossly intact  SKIN: Blisters on right upper extremity  LINES: clean, dry intact         Data:   All data and imaging reviewed     ROUTINE ICU LABS (Last four results)  CMP  Recent Labs   Lab 07/23/23  1610 07/23/23  1121 07/23/23  0753 07/23/23  0524 07/22/23  0740 07/22/23  0517 07/21/23  1609  07/21/23  1601 07/21/23  0744 07/21/23  0438 07/20/23  0800 07/20/23  0448 07/19/23  0953 07/19/23  0951 07/17/23  1845 07/17/23  1425 07/17/23  1300   NA  --   --   --  128*  --  127*  --  129*  --  123*  --  128*   < > 128*   < > 121* 119*   POTASSIUM  --   --   --  4.0  --  3.8  --  4.4  --  3.6  --  3.8   < > 3.6   < > 5.4* 4.6   CHLORIDE  --   --   --  93*  --  89*  --  90*  --  84*  --  92*   < > 94*   < > 86* 80*   CO2  --   --   --  23  --  21*  --  21*  --  19*  --  18*   < > 15*   < > 15* 14*   ANIONGAP  --   --   --  12  --  17*  --  18*  --  20*  --  18*   < > 19*   < > 20* 25*   * 222* 249* 245*   < > 228*   < >  --    < > 298*   < > 188*   < > 153*   < > 505* 494*   BUN  --   --   --  65.6*  --  92.7*  --   --   --  109.9*  --  96.9*  --  80.4*   < > 63.1* 64.9*   CR  --   --   --  3.31*  --  4.67*  --   --   --  5.22*  --  4.71*  --  3.88*   < > 2.94* 3.25*   GFRESTIMATED  --   --   --  20*  --  13*  --   --   --  12*  --  13*  --  17*   < > 23* 20*   YOSVANY  --   --   --  8.1*  --  8.5*  --   --   --  7.6*  --  7.5*  --  7.3*   < > 8.4* 9.2   MAG  --   --   --   --   --   --   --   --   --   --   --   --   --   --   --  1.9  --    PHOS  --   --   --   --   --   --   --   --   --  5.0*  --  3.7  --   --   --   --   --    PROTTOTAL  --   --   --   --   --   --   --   --   --  6.0*  --  5.7*  --   --   --   --  7.2   ALBUMIN  --   --   --   --   --   --   --   --   --  2.1*  --  2.4*  --   --   --   --  3.1*   BILITOTAL  --   --   --   --   --   --   --   --   --  2.4*  --  2.5*  --   --   --   --  1.7*   ALKPHOS  --   --   --   --   --   --   --   --   --  314*  --  198*  --   --   --   --  103   AST  --   --   --   --   --   --   --   --   --  222*  --  241*  --  187*  --   --  92*   ALT  --   --   --   --   --   --   --   --   --  124*  --  105*  --   --   --   --  33    < > = values in this interval not displayed.     CBC  Recent Labs   Lab 07/23/23  0524 07/22/23  0517 07/21/23  0909  07/20/23  0448   WBC 24.9* 28.8* 25.6* 21.3*   RBC 2.66* 3.02* 3.34* 2.98*   HGB 7.1* 8.5* 9.3* 8.2*   HCT 21.3* 23.8* 26.2* 23.8*   MCV 80 79 78 80   MCH 26.7 28.1 27.8 27.5   MCHC 33.3 35.7 35.5 34.5   RDW 18.5* 18.3* 17.9* 17.4*    183 197 187     INRNo lab results found in last 7 days.  Arterial Blood GasNo lab results found in last 7 days.    All cultures:  No results for input(s): CULT in the last 168 hours.  No results found for this or any previous visit (from the past 24 hour(s)).      Billing: This patient is critically ill: Yes. Total critical care time today 31 min exclusive of procedures or teaching.  Aging septic shock

## 2023-07-23 NOTE — PLAN OF CARE
[facility] - ICU    RN Progress Note:            Pertinent Assessments:      Please refer to flowsheet rows for full assessment     VSS on alberto, afebrile. No HD today. Improved appetite. Magdi @ bedside - tachy 130-140. See flowsheets and MAR for complete assessment.            Key Events - This Shift:       Lissette x1             Barriers to Discharge / Downgrade:     Pressor & HD

## 2023-07-23 NOTE — PROGRESS NOTES
[facility] - ICU    RN Progress Note:            Pertinent Assessments:      Please refer to flowsheet rows for full assessment     Pt OVSS- currently on room hair- tolerating. Got Tylenol x 1 for pain in R- arm- effective. Ice pack placed on R-arm for comfort-effective. Hard time with positioning, weight shifted frequently.           Key Events - This Shift:       NA                 Barriers to Discharge / Downgrade:     Pressors and dialysis.

## 2023-07-23 NOTE — PROGRESS NOTES
[facility] HEART [facility]ARE   [address]  [place] [address]   [email or web address]   OFFICE: [phone]     CARDIOLOGY FOLLOW-UP NOTE      Impression and Plan     Impression:   NSTEMI - known multivessel coronary artery disease, s/p PCI in 11/2021. Elevated troponin this admission most likely related to type II demand MI in the setting of sepsis, acute renal failure, and profound hypotension  Ischemic cardiomyopathy with LVEF 25-30%  Acute on chronic heart failure with reduced LVEF  Paroxysmal atrial fibrillation - currently on amiodarone load. In and out of rate-controlled atrial fibrillation.  Hyponatremia due to JOHANA and hypervolemia  Septic shock due to group A strep bacteremia and R shoulder myositis. Continues on phenylephrine which is increasing afterload, likely adversely affecting cardiac function.  Acute on chronic renal failure - recently started hemodialysis    Plan:  With no significant progress in weaning phenylephrine, would transition to norepinephrine or even dobutamine for blood pressure support. Could also consider RHC to guide hemodynamic support as he may have a mixed septic and cardiogenic picture.  Not on GDMT due to shock  Continue amiodarone  Continue heparin gtt until more stabilized and can transition to PO OAC.      Primary Cardiologist: Dr. Moshe Davis MD    Subjective     Right arm is feeling a little better. Less swelling with HD. No chest pain or SOB.    Cardiac Diagnostics       Echocardiogram (results reviewed):   TTE 7/18/23  Left ventricular function is decreased. The ejection fraction is 25-30% (severely reduced).  The left atrium is mildly dilated.  IVC diameter <2.1 cm collapsing >50% with sniff suggests a normal RA pressure of 3 mmHg.  TAPSE is normal, which is consistent with normal right ventricular systolic function.  The patient exhibited frequent PVCs.  No hemodynamically significant valvular abnormalities on 2D or color flow imaging.  "The study was technically difficult.    Physical Examination       BP 98/52   Pulse 76   Temp 97.9  F (36.6  C) (Oral)   Resp 20   Ht 1.88 m (6' 2\")   Wt 111.8 kg (246 lb 7.6 oz)   SpO2 93%   BMI 31.65 kg/m            Intake/Output Summary (Last 24 hours) at 7/22/2023 1051  Last data filed at 7/22/2023 1000  Gross per 24 hour   Intake 2011.14 ml   Output 350 ml   Net 1661.14 ml       General: pleasant male. No acute distress.   HENT: external ears normal. Nares patent. Mucous membranes moist.  Eyes: perrla, extraocular muscles intact. No scleral icterus.   Neck: No JVD  Lungs: clear to auscultation  COR: normal rhythm, regular rate, soft systolic murmur  Ext: L BKA, trace RLE edema         Imaging      Xray RUE - IMPRESSION: No acute fracture or dislocation. Significant soft tissue swelling throughout the visualized arm, which may be infectious in the given clinical scenario. No radiographic evidence of osteomyelitis.     Lab Results   Lab Results   Component Value Date    CHOL 157 03/24/2023    HDL 65 03/24/2023    TRIG 60 03/24/2023    BUN 92.7 (H) 07/22/2023     (L) 07/22/2023    CO2 21 (L) 07/22/2023       Lab Results   Component Value Date    WBC 24.9 (H) 07/23/2023    HGB 7.1 (L) 07/23/2023    HCT 21.3 (L) 07/23/2023    MCV 80 07/23/2023     07/23/2023       Lab Results   Component Value Date    TROPONINI 5.84 (HH) 08/30/2022     (H) 08/24/2022    INR 1.19 (H) 08/24/2022               Current Inpatient Scheduled Medications   Scheduled Meds:   amiodarone  400 mg Oral Daily    aspirin  81 mg Oral Daily    docusate sodium  100 mg Oral BID    insulin aspart  1-10 Units Subcutaneous TID AC    insulin aspart  1-7 Units Subcutaneous At Bedtime    insulin glargine  35 Units Subcutaneous QAM AC    linezolid  600 mg Intravenous Q12H    magnesium oxide  400 mg Oral Daily    multivitamin, therapeutic  1 tablet Oral Daily    penicillin G potassium  4 Million Units Intravenous Q8H    sodium " chloride (PF)  10-40 mL Intracatheter Q7 Days    sodium chloride (PF)  3 mL Intracatheter Q8H    sodium chloride (PF)  9 mL Intracatheter During Dialysis/CRRT (from stock)    sodium chloride (PF)  9 mL Intracatheter During Dialysis/CRRT (from stock)    ticagrelor  90 mg Oral BID    vitamin C  500 mg Oral Daily     Continuous Infusions:   dextrose      heparin 1,550 Units/hr (07/23/23 0649)    [Held by provider] insulin regular Stopped (07/19/23 0954)    phenylephrine 0.7 mcg/kg/min (07/23/23 0322)            Medications Prior to Admission   Prior to Admission medications    Medication Sig Start Date End Date Taking? Authorizing Provider   aspirin (ASA) 81 MG EC tablet Take 1 tablet (81 mg) by mouth daily Start tomorrow. 8/30/22  Yes Cipriano Lucas MD   atorvastatin (LIPITOR) 80 MG tablet TAKE 1 TABLET(80 MG) BY MOUTH EVERY EVENING 2/21/22  Yes Piedad Reese NP   furosemide (LASIX) 20 MG tablet Take 20 mg by mouth daily   Yes Reported, Patient   insulin aspart (NOVOLOG FLEXPEN) 100 UNIT/ML pen For  - 164 give 1 unit. For  - 189 give 2 units. For  - 214 give 3 units. For  - 239 give 4 units. For  - 264 give 5 units. For  - 289 give 6 units. For  - 314 give 7 units. For  - 339 give 8 units For  - 364 give 9 units For  - 389 give 10 units For  - 414 give 11 units For BG greater than or equal to 415 give 12 units 11/24/21  Yes Ronald Bui DO   insulin glargine (LANTUS SOLOSTAR) 100 UNIT/ML pen Inject 38 Units Subcutaneous every morning  Patient taking differently: Inject 38 Units Subcutaneous At Bedtime 11/24/21  Yes Ronald Bui DO   lisinopril (ZESTRIL) 5 MG tablet Take 1 tablet by mouth daily 7/7/23  Yes Reported, Patient   magnesium oxide (MAG-OX) 400 MG tablet Take 1 tablet (400 mg) by mouth daily 1/7/22  Yes Moshe Davis MD   metoprolol succinate ER (TOPROL-XL) 50 MG 24 hr tablet TAKE 1 TABLET(50 MG) BY MOUTH DAILY 2/21/22   Yes Piedad Reese NP   Multiple Vitamin (MULTI VITAMIN) TABS Take 1 tablet by mouth daily    Yes Reported, Patient   OMEGA-3/DHA/EPA/FISH OIL (FISH OIL-OMEGA-3 FATTY ACIDS) 300-1,000 mg capsule Take 1 g by mouth daily  1/11/18  Yes Provider, Historical   ticagrelor (BRILINTA) 90 MG tablet Take 1 tablet (90 mg) by mouth 2 times daily Dose to start tomorrow morning. 9/15/22  Yes Piedad Reese NP   vitamin C (ASCORBIC ACID) 500 MG tablet Take 500 mg by mouth daily   Yes Unknown, Entered By History   blood glucose (NO BRAND SPECIFIED) test strip Use to test blood sugar 4 times daily or as directed. 11/24/21   Ronald Bui, DO   blood glucose monitoring (SOFTCLIX) lancets Use to test blood sugar 4 times daily. 11/24/21   Ronald Bui, DO

## 2023-07-23 NOTE — PROGRESS NOTES
Infectious Diseases Progress Note  [facility]    Date of visit: 07/23/2023     ASSESSMENT   63-year-old man with a history of hypertension, coronary artery disease, CHF, diabetes, who is admitted with septic shock.  ID is consulted regarding strep bacteremia.    1. Group A strep bacteremia.  Unclear source.  Admitted to the ICU on pressors.  Acute kidney injury, and transaminitis.  Concerning for toxic shock syndrome.  Also having right shoulder pain.  Myositis seen on MRI. Blistering starting on 7/21, possibly from pressure or swelling. Seen by ortho and general surgery  2. Septic shock.  On pressors.  3. Right shoulder myositis. Denies acute injury. Elevated ck, improving.    4. Acute kidney injury.  Rising creatinine.  Starting dialysis 7/20 see Dr Reich notes, reviewed  5. Rising white count also    Principal Problem:    Elevated troponin  Active Problems:    Dehydration    Hyponatremia    JOHANA (acute kidney injury) (H)    Atrial fibrillation with RVR (H)    Septic shock (H)       PLAN   -continue penicillin G IV for group A strep infection.  Pharmacy to assist with renal dosing  -Continue linezolid 600 mg IV twice daily for antitoxin effects  -appreciate surgical assessment. No indication for surgery at this time  -no IVIG due to renal failure  Renal issues noted.     MANE Srivastava MD  [facility]  Direct messaging: [employer] Paging  On-Call ID provider: [phone], option: [phone]      ===========================================      SUBJECTIVE / INTERVAL HISTORY:     No fever.  Improved white count. 3L HD run yesterday.  Will run tomorrow again.       Antibiotics   Penicillin g 7/20- 12 MU per day  Linezolid 7/20-    Previous:  Zosyn 7/17-7/20  Vancomycin 7/17      Physical Exam     Temp:  [97.7  F (36.5  C)-97.9  F (36.6  C)] 97.9  F (36.6  C)  Pulse:  [75-82] 76  Resp:  [17-32] 21  BP: ()/(49-58) 98/52  SpO2:  [93 %-100 %] 96 %    BP 98/52   Pulse 76   Temp 97.9  F (36.6  C)  "(Oral)   Resp 21   Ht 1.88 m (6' 2\")   Wt 111.8 kg (246 lb 7.6 oz)   SpO2 96%   BMI 31.65 kg/m      GENERAL:  well-developed, well-nourished, lying in bed in no acute distress.   HENT:  Head is normocephalic, atraumatic.   EYES:  Eyes have anicteric sclerae without conjunctival injection   LUNGS:  Clear to auscultation.  Tachypneic (better today)  CARDIOVASCULAR:  Regular rate and rhythm with no murmurs, gallops or rubs.  ABDOMEN:  Normal bowel sounds, soft, nontender.   EXT: Right shoulder swelling, tenderness.  SKIN:  Shallow blisters on posterior right arm, superficial, weeping. Right IJ line is in place without any surrounding erythema.   NEUROLOGIC:  Grossly nonfocal.      Cultures   7/17 blood cultures x2: Strep pyogenes  7/19 blood cultures x2: No growth to date          Pertinent Labs:     Recent Labs   Lab 07/23/23  0524 07/22/23  0517 07/21/23  0900   WBC 24.9* 28.8* 25.6*   HGB 7.1* 8.5* 9.3*    183 197       Recent Labs   Lab 07/23/23  0524 07/22/23  0517 07/21/23  1601 07/21/23  0438 07/20/23  0448 07/19/23  1531 07/19/23  0951 07/17/23  1425 07/17/23  1300   * 127* 129* 123* 128*   < > 128*   < > 119*   CO2 23 21* 21* 19* 18*   < > 15*   < > 14*   BUN 65.6* 92.7*  --  109.9* 96.9*  --  80.4*   < > 64.9*   ALBUMIN  --   --   --  2.1* 2.4*  --   --   --  3.1*   ALKPHOS  --   --   --  314* 198*  --   --   --  103   ALT  --   --   --  124* 105*  --   --   --  33   AST  --   --   --  222* 241*  --  187*  --  92*    < > = values in this interval not displayed.       No results for input(s): CRP, SED in the last 168 hours.        Imaging:     IR CVC Non Tunnel Placement > 5 Yrs    Result Date: 7/20/2023  LOCATION: [facility] DATE: 7/20/2023 PROCEDURE: NON-TUNNELED DIALYSIS CATHETER PLACEMENT 1.  Insertion of a non-tunneled central venous catheter. 2.  Ultrasound guidance for vascular access. 3.  Fluoroscopic guidance for central venous access device placement. " INTERVENTIONAL RADIOLOGIST: Joseph Campos MD INDICATION: Renal insufficiency requiring renal replacement therapy, plan for nontunneled dialysis catheter placement. CONSENT: The risks, benefits and alternatives of the procedure were discussed with the patient or representative in detail. All questions were answered. Informed consent was given to proceed with the procedure. MODERATE SEDATION: None. CONTRAST: None. ANTIBIOTICS: None. ADDITIONAL MEDICATIONS: None. FLUOROSCOPIC TIME: 0.5 minutes. RADIATION DOSE: Air Kerma: 5 mGy. COMPLICATIONS: No immediate complications. UNIVERSAL PROTOCOL: The operative site was marked and any prior imaging was reviewed. Required items including blood products, implants, devices and special equipment was made available. Patient identity was confirmed either verbally, with demographic information, hospital assigned identification or other identification markers. A timeout was performed immediately prior to the procedure. STERILE BARRIER TECHNIQUE: Maximum sterile barrier technique was used. Cutaneous antisepsis was performed at the operative site with application of 2% chlorhexidine and large sterile drape. Prior to the procedure, the  and assistant performed hand hygiene and wore hat, mask, sterile gown, and sterile gloves during the entire procedure. PROCEDURE:  Using local anesthesia and real-time ultrasound guidance the right internal jugular vein was accessed. Imaging demonstrates an anechoic and compressible vein. A permanent image was stored to the patient's medical record. Using this access, a 20 cm dialysis catheter was advanced using fluoroscopic guidance until the tip was in the proximal right atrium. FINDINGS: At the completion of the study he radiograph was performed utilizing the in room fluoroscopic unit. Imaging demonstrates the nontunneled dialysis catheter tip terminating in the proximal right atrium.     IMPRESSION:  1.  Successful non-tunneled dialysis  catheter placement. 2.  The catheter is ready for use.    MR Shoulder Right w/o Contrast    Result Date: 7/19/2023  EXAM: MR SHOULDER RIGHT W/O CONTRAST LOCATION: [facility] DATE: 7/19/2023 INDICATION: Septic shock with bacteremia, concern for right shoulder osteomyelitis or joint infection. COMPARISON: None. TECHNIQUE: Unenhanced. FINDINGS: ROTATOR CUFF: -Supraspinatus: Moderate severity tendinopathy. No tearing or retraction. No atrophy. -Infraspinatus: Moderate severity infraspinatus tendinopathy without evidence for well-defined partial or full-thickness tearing. No retraction but there is T2 signal abnormality within the muscular portion, which may be due to muscle strain. Infectious or inflammatory myositis could have this appearance. -Subscapularis: No tendon tear, tendinopathy or fatty atrophy. -Teres minor: No tendon tear, tendinopathy or fatty atrophy. CORACOACROMIAL ARCH: -Morphology: Type II acromion. No subacromial spur. Subacromial and subcoracoid space are normal. -Bursa: Fluid in the subacromial subdeltoid bursa. The coracoacromial and coracoclavicular ligaments are negative. ACROMIOCLAVICULAR JOINT: -Hypertrophic change. No significant diastasis or effusion. LONG HEAD OF BICEPS TENDON: -Mild tendinopathy within the intra-articular portion of the long head of the biceps. GLENOHUMERAL JOINT: -Labrum: No labral tear. No paralabral cyst. -Cartilage: Grade II-grade III cartilage loss both sides of the joint without significant surface irregularity. -Joint space: No effusion or synovitis. -Glenohumeral ligaments and capsule: No pericapsular inflammation. BONES: -No fracture or concerning marrow replacing lesion. No evidence for osteomyelitis. SOFT TISSUES: -Extensive but nonspecific T2 signal abnormality is present within the deltoid musculature, most marked posteriorly but this is also seen within the latissimus muscle, distal aspect of the trapezius, infraspinatus as described,  and portions of the subscapularis. The multiplicity of muscle groups involved makes a strain type injury considered unlikely and this is more likely secondary to infectious or inflammatory myositis. There is no evidence for organized fluid collection to suggest abscess.     IMPRESSION: 1.  Extensive signal abnormality within multiple muscles of the shoulder girdle including portions of the visualized latissimus and trapezius muscles. The findings are likely secondary to infectious or inflammatory myositis versus contusion or muscle strain type injury. No evidence for abscess. 2.  No evidence for osteomyelitis or septic arthropathy. 3.  Small amount of fluid within the subacromial subdeltoid bursa. 4.  No evidence for fracture. 5.  Moderate severity tendinopathy within the supraspinatus and infraspinatus. 6.  Tendinopathy within the intraarticular portion of the biceps. 7.  Degenerative change at the glenohumeral joint with grade II-grade III cartilage loss but no significant surface irregularity.    XR Chest Port 1 View    Result Date: 7/18/2023  EXAM: XR CHEST PORT 1 VIEW LOCATION: [facility] DATE: 7/18/2023 INDICATION: RN placed PICC   verify tip placement COMPARISON: 07/17/2023     IMPRESSION: Left upper extremity PICC terminates at the SVC - RA junction. No acute cardiopulmonary abnormality.        Data reviewed today: I reviewed all medications, new labs and imaging results over the last 24 hours. I personally reviewed no images or EKG's today.  The patient's care was discussed with the Bedside Nurse, Patient and Primary team.

## 2023-07-24 NOTE — PROGRESS NOTES
"RENAL PROGRESS NOTE - [facility]        CC: f/u JOHANA    Subjective: Mr. Aguilar is seen/examined on HD today.   No cp, sob.   Remains on norepi.   UOP seems to be picking up.     ASSESSMENT AND RECOMMENDATIONS:  1. JOHANA/ CKD 3b: baseline CKD due to longstanding DM2, HTN. Now severe ATN in setting of profound hypotension with sepsis, group a strep bacteremia and hypovolemia and contrast nephropathy after CTA, mild rhabdo. CTA on 7/17 with no hydronephrosis. UA on 7/18 very concentrated suggesting intact tubular function, although granular casts present.  Remains oliguric   -dialysis started 7/21 using non tunneled dialysis catheter    -will eval daily for HD needs   -UOP improving   -Consider loop diuretic   -cont to monitor labs, urine output for e/o renal recovery    2. Hypotension: due to bacteremia/sepsis              -Pressors prn per ICU team              -volume up on exam, avoid excessive IVF        3. Metabolic acidosis: due to JOHANA.               -better today   -dialysis as above   -no need for further iv bicarb     4. HFrEF: EF of 25-30% based on 7/18 echo which also showed normal RVF, normal RA pressures.               -now volume up, plan 2-3 liter uf with dialysis tomorrow               -Dr. Arredondo following     5. Hyponatremia: due to JOHANA, hypervolemia  -dialysis against 134 sodium bath with uf as tolerated  -avoid excessive po fluid, IVF as able     6. NSTEMI: per Dr. Arredondo.         7. Bacteremia: Group a strep and rt shoulder myositis              -now on penicillin g and linezolid   -ongoing orthosurgical eval of shoulder     Sudhakar Marin MD  [facility]  [phone]      Objective    PHYSICAL EXAM  /56   Pulse 116   Temp 98.1  F (36.7  C) (Oral)   Resp 19   Ht 1.88 m (6' 2\")   Wt 111.4 kg (245 lb 9.5 oz)   SpO2 98%   BMI 31.53 kg/m    I/O last 3 completed shifts:  In: 3645.71 [P.O.:1080; I.V.:2565.71]  Out: 1145 [Urine:1145]  Wt Readings from Last 3 " Encounters:   07/24/23 111.4 kg (245 lb 9.5 oz)   11/17/22 100.2 kg (221 lb)   09/28/22 104.3 kg (230 lb)       GENERAL: nad on HD  HEENT:normocephalic, atraumatic  CARDIOVASCULAR: 1+ leg edema  PULMONARY: Normal effort,  No cyanosis  GASTROINTESTINAL: soft, nt/nd  NEURO: Alert, no gross focal findings  PSYCHIATRIC: Adequate mood and interaction  SKIN: Pale, no jaundice, no rash.    LABORATORIES  Recent Labs   Lab 07/24/23  0442 07/23/23  0524 07/22/23  0517 07/21/23  1601 07/21/23  0438 07/20/23  0448 07/19/23  1531 07/19/23  0951 07/19/23  0515 07/19/23  0213 07/17/23  2316 07/17/23  1425 07/17/23  1300   * 128* 127* 129* 123* 128* 128* 128*   < > 128*  126*   < > 121* 119*   POTASSIUM 3.7 4.0 3.8 4.4 3.6 3.8 3.8 3.6   < > 3.7  3.6   < > 5.4* 4.6   CHLORIDE 92* 93* 89* 90* 84* 92* 94* 94*   < > 95*  94*   < > 86* 80*   CO2 21* 23 21* 21* 19* 18* 14* 15*   < > 14*  14*   < > 15* 14*   BUN 71.7* 65.6* 92.7*  --  109.9* 96.9*  --  80.4*  --  75.3*   < > 63.1* 64.9*   CR 3.37* 3.31* 4.67*  --  5.22* 4.71*  --  3.88*  --  3.60*   < > 2.94* 3.25*   GFRESTIMATED 20* 20* 13*  --  12* 13*  --  17*  --  18*   < > 23* 20*   YOSVANY 8.4* 8.1* 8.5*  --  7.6* 7.5*  --  7.3*  --  7.1*   < > 8.4* 9.2   MAG  --   --   --   --   --   --   --   --   --   --   --  1.9  --    ALBUMIN  --   --   --   --  2.1* 2.4*  --   --   --   --   --   --  3.1*    < > = values in this interval not displayed.         Recent Labs   Lab 07/23/23  0524 07/22/23  0517 07/21/23  0900 07/20/23  0448 07/19/23  1555 07/18/23  0414 07/17/23  1302   WBC 24.9* 28.8* 25.6* 21.3* 12.9* 8.4 14.5*   HGB 7.1* 8.5* 9.3* 8.2* 9.0* 12.5* 13.2*   HCT 21.3* 23.8* 26.2* 23.8* 26.8* 35.8* 38.5*   MCV 80 79 78 80 82 80 83    183 197 187 123* 191 247            MEDICATIONS   amiodarone  400 mg Oral Daily    aspirin  81 mg Oral Daily    docusate sodium  100 mg Oral BID    insulin aspart  1-10 Units Subcutaneous TID AC    insulin aspart  1-7 Units Subcutaneous At  Bedtime    insulin glargine  15 Units Subcutaneous At Bedtime    insulin glargine  35 Units Subcutaneous QAM AC    linezolid  600 mg Intravenous Q12H    magnesium oxide  400 mg Oral Daily    multivitamin, therapeutic  1 tablet Oral Daily    penicillin G potassium  4 Million Units Intravenous Q8H    sodium chloride (PF)  10-40 mL Intracatheter Q7 Days    sodium chloride (PF)  3 mL Intracatheter Q8H    sodium chloride (PF)  9 mL Intracatheter During Dialysis/CRRT (from stock)    sodium chloride (PF)  9 mL Intracatheter During Dialysis/CRRT (from stock)    ticagrelor  90 mg Oral BID    vitamin C  500 mg Oral Daily         Lyudmila Medina MD  [facility]  [phone]

## 2023-07-24 NOTE — PLAN OF CARE
[facility] - ICU    RN Progress Note:            Pertinent Assessments:      Please refer to flowsheet rows for full assessment     VSS on Levo; a-fib 90s-100s. RA, 97%. RUE painful w/ movement; rates 2-4 at rest. Appetite improving ~50% of lunch; seen by dietician. Tolerated HD run this am. Seen by WOC for RUE and sacral wound. See flowsheets and MAR for complete assessment.           Key Events - This Shift:       WOC consult; HD run.                Barriers to Discharge / Downgrade:     Pressors & dialysis needs         Point of Contact Update YES-OR-NO: Yes  If No, reason:   Name:Cindy  Phone Number:[chart]  Summary of Conversation: Updated re: dialysis run & WOC recommendations

## 2023-07-24 NOTE — PROGRESS NOTES
Infectious Diseases Progress Note  [facility]    Date of visit: 07/24/2023     ASSESSMENT   63-year-old man with a history of hypertension, coronary artery disease, CHF, diabetes, who is admitted with septic shock.  ID following regarding strep bacteremia.    Group A strep bacteremia.  Complicated skin and soft tissue infection.  Admitted to the ICU on pressors.  Acute kidney injury, and transaminitis.  Concerning for toxic shock syndrome.  Also having right shoulder pain.  Myositis seen on MRI. Blistering starting on 7/21, possibly from pressure or swelling. Seen by ortho and general surgery  Septic shock.  On pressors. Active issue  Right shoulder myositis. Denies acute injury. Elevated ck, improving.    Acute kidney injury.  Rising creatinine.  Starting dialysis 7/20 see Dr Reich notes, reviewed  Rising white count also    Principal Problem:    Elevated troponin  Active Problems:    Dehydration    Hyponatremia    JOHANA (acute kidney injury) (H)    Atrial fibrillation with RVR (H)    Septic shock (H)       PLAN   -continue penicillin G IV for group A strep infection.  Pharmacy to assist with renal dosing  -Continue linezolid 600 mg IV twice daily for antitoxin effects  -appreciate surgical assessment. No indication for surgery at this time  -no IVIG due to renal failure  Renal issues noted.   Patient seen in ICU. Appreciate input from ICU nurse at bedside.   Patient new to me on 7/24/2023       Lacie Mata MD  [facility]  Answering Service: [phone]  On-Call ID provider: [phone], option: 9          ===========================================      SUBJECTIVE / INTERVAL HISTORY:     Seen in ICU  Dressing on arm  No fever.  Improved white count. 3L HD run yesterday.  Will run tomorrow again.       Antibiotics   Penicillin g 7/20- 12 MU per day  Linezolid 7/20-    Previous:  Zosyn 7/17-7/20  Vancomycin 7/17      Physical Exam     Temp:  [97.7  F (36.5  C)-98.4  F (36.9  " C)] 98.1  F (36.7  C)  Pulse:  [] 106  Resp:  [17-33] 22  BP: ()/(45-71) 91/53  SpO2:  [68 %-100 %] 95 %    BP 91/53   Pulse 106   Temp 98.1  F (36.7  C) (Oral)   Resp 22   Ht 1.88 m (6' 2\")   Wt 111.4 kg (245 lb 9.5 oz)   SpO2 95%   BMI 31.53 kg/m      GENERAL:  appears ill, lying in bed  HENT:  Head is normocephalic, atraumatic.   EYES:  Eyes have anicteric sclerae without conjunctival injection   LUNGS:  Clear to auscultation.  Tachypneic (better today)  CARDIOVASCULAR:  Regular rate and rhythm with no murmurs, gallops or rubs.  ABDOMEN:  Normal bowel sounds, soft, nontender.   EXT: Right shoulder swelling, tenderness.  SKIN:  Shallow blisters on posterior right arm, superficial, weeping. Right IJ line is in place without any surrounding erythema.   NEUROLOGIC:  Grossly nonfocal.    Photo in chart      Cultures   7/17 blood cultures x2: Strep pyogenes  7/19 blood cultures x2: No growth to date          Pertinent Labs:     Recent Labs   Lab 07/23/23  0524 07/22/23  0517 07/21/23  0900   WBC 24.9* 28.8* 25.6*   HGB 7.1* 8.5* 9.3*    183 197         Recent Labs   Lab 07/24/23  0442 07/23/23  0524 07/22/23  0517 07/21/23  1601 07/21/23  0438 07/20/23  0448 07/19/23  1531 07/19/23  0951 07/17/23  1425 07/17/23  1300   * 128* 127*   < > 123* 128*   < > 128*   < > 119*   CO2 21* 23 21*   < > 19* 18*   < > 15*   < > 14*   BUN 71.7* 65.6* 92.7*  --  109.9* 96.9*  --  80.4*   < > 64.9*   ALBUMIN  --   --   --   --  2.1* 2.4*  --   --   --  3.1*   ALKPHOS  --   --   --   --  314* 198*  --   --   --  103   ALT  --   --   --   --  124* 105*  --   --   --  33   AST  --   --   --   --  222* 241*  --  187*  --  92*    < > = values in this interval not displayed.         No results for input(s): CRP, SED in the last 168 hours.        Imaging:     IR CVC Non Tunnel Placement > 5 Yrs    Result Date: 7/20/2023  LOCATION: [facility] DATE: 7/20/2023 PROCEDURE: NON-TUNNELED " DIALYSIS CATHETER PLACEMENT 1.  Insertion of a non-tunneled central venous catheter. 2.  Ultrasound guidance for vascular access. 3.  Fluoroscopic guidance for central venous access device placement. INTERVENTIONAL RADIOLOGIST: Joseph Campos MD INDICATION: Renal insufficiency requiring renal replacement therapy, plan for nontunneled dialysis catheter placement. CONSENT: The risks, benefits and alternatives of the procedure were discussed with the patient or representative in detail. All questions were answered. Informed consent was given to proceed with the procedure. MODERATE SEDATION: None. CONTRAST: None. ANTIBIOTICS: None. ADDITIONAL MEDICATIONS: None. FLUOROSCOPIC TIME: 0.5 minutes. RADIATION DOSE: Air Kerma: 5 mGy. COMPLICATIONS: No immediate complications. UNIVERSAL PROTOCOL: The operative site was marked and any prior imaging was reviewed. Required items including blood products, implants, devices and special equipment was made available. Patient identity was confirmed either verbally, with demographic information, hospital assigned identification or other identification markers. A timeout was performed immediately prior to the procedure. STERILE BARRIER TECHNIQUE: Maximum sterile barrier technique was used. Cutaneous antisepsis was performed at the operative site with application of 2% chlorhexidine and large sterile drape. Prior to the procedure, the  and assistant performed hand hygiene and wore hat, mask, sterile gown, and sterile gloves during the entire procedure. PROCEDURE:  Using local anesthesia and real-time ultrasound guidance the right internal jugular vein was accessed. Imaging demonstrates an anechoic and compressible vein. A permanent image was stored to the patient's medical record. Using this access, a 20 cm dialysis catheter was advanced using fluoroscopic guidance until the tip was in the proximal right atrium. FINDINGS: At the completion of the study he radiograph was performed  utilizing the in room fluoroscopic unit. Imaging demonstrates the nontunneled dialysis catheter tip terminating in the proximal right atrium.     IMPRESSION:  1.  Successful non-tunneled dialysis catheter placement. 2.  The catheter is ready for use.    MR Shoulder Right w/o Contrast    Result Date: 7/19/2023  EXAM: MR SHOULDER RIGHT W/O CONTRAST LOCATION: [facility] DATE: 7/19/2023 INDICATION: Septic shock with bacteremia, concern for right shoulder osteomyelitis or joint infection. COMPARISON: None. TECHNIQUE: Unenhanced. FINDINGS: ROTATOR CUFF: -Supraspinatus: Moderate severity tendinopathy. No tearing or retraction. No atrophy. -Infraspinatus: Moderate severity infraspinatus tendinopathy without evidence for well-defined partial or full-thickness tearing. No retraction but there is T2 signal abnormality within the muscular portion, which may be due to muscle strain. Infectious or inflammatory myositis could have this appearance. -Subscapularis: No tendon tear, tendinopathy or fatty atrophy. -Teres minor: No tendon tear, tendinopathy or fatty atrophy. CORACOACROMIAL ARCH: -Morphology: Type II acromion. No subacromial spur. Subacromial and subcoracoid space are normal. -Bursa: Fluid in the subacromial subdeltoid bursa. The coracoacromial and coracoclavicular ligaments are negative. ACROMIOCLAVICULAR JOINT: -Hypertrophic change. No significant diastasis or effusion. LONG HEAD OF BICEPS TENDON: -Mild tendinopathy within the intra-articular portion of the long head of the biceps. GLENOHUMERAL JOINT: -Labrum: No labral tear. No paralabral cyst. -Cartilage: Grade II-grade III cartilage loss both sides of the joint without significant surface irregularity. -Joint space: No effusion or synovitis. -Glenohumeral ligaments and capsule: No pericapsular inflammation. BONES: -No fracture or concerning marrow replacing lesion. No evidence for osteomyelitis. SOFT TISSUES: -Extensive but nonspecific T2  signal abnormality is present within the deltoid musculature, most marked posteriorly but this is also seen within the latissimus muscle, distal aspect of the trapezius, infraspinatus as described, and portions of the subscapularis. The multiplicity of muscle groups involved makes a strain type injury considered unlikely and this is more likely secondary to infectious or inflammatory myositis. There is no evidence for organized fluid collection to suggest abscess.     IMPRESSION: 1.  Extensive signal abnormality within multiple muscles of the shoulder girdle including portions of the visualized latissimus and trapezius muscles. The findings are likely secondary to infectious or inflammatory myositis versus contusion or muscle strain type injury. No evidence for abscess. 2.  No evidence for osteomyelitis or septic arthropathy. 3.  Small amount of fluid within the subacromial subdeltoid bursa. 4.  No evidence for fracture. 5.  Moderate severity tendinopathy within the supraspinatus and infraspinatus. 6.  Tendinopathy within the intraarticular portion of the biceps. 7.  Degenerative change at the glenohumeral joint with grade II-grade III cartilage loss but no significant surface irregularity.    XR Chest Port 1 View    Result Date: 7/18/2023  EXAM: XR CHEST PORT 1 VIEW LOCATION: [facility] DATE: 7/18/2023 INDICATION: RN placed PICC   verify tip placement COMPARISON: 07/17/2023     IMPRESSION: Left upper extremity PICC terminates at the SVC - RA junction. No acute cardiopulmonary abnormality.        Data reviewed today: I reviewed all medications, new labs and imaging results over the last 24 hours. I personally reviewed no images or EKG's today.  The patient's care was discussed with the patient, nurse    Total Time Spent 35 minutes with >50% of the time spent in chart review, evaluation, management, counseling, education and care coordination.

## 2023-07-24 NOTE — PROGRESS NOTES
CLINICAL NUTRITION SERVICES NOTE    Diet: 60 gm cho per meal, 2 gm Na  Glucerna 2 x times daily.    Per Rounds, pt appetite is low. Nursing reports he did eat soup yesterday. Pt reports he is going to order carrots and green beans and mandarin oranges for lunch.  He may have chicken salad this evening.  He is feeling dehydrated so foods are dry in his mouth.  He does not like the after taste of nutrition drinks.  He does like strawberry, not vanilla or chocolate.  Pt will try to drink a strawberry Glucerna and a cherry Gelatein plus. He is going to ask one of his visitors to bring a cup of tapioca pudding. Will follow up regarding meal/supplement acceptance.

## 2023-07-24 NOTE — PROGRESS NOTES
Resumed care [id number].    Neuro: A&O x4. PERRL. MORRIS to command. Right arm significantly weak due to pain. Face droop at baseline.  CV: Remains in atrial fibrillation. Remains on vasoactive agent for blood pressure support. Afebrile. Pulses 1/1/1R. Edematous.   Respiratory: Lung sounds clear. On RA. Oxygen saturations WDL. No complaints of SOB/CP.  GI/: Garcia remains patent. UOP adequate. 1 bowel movement during shift. Abnormal characteristics of yellow mucous. Abdomen soft. Minimal appetite.   Skin: Skin remains unchanged. Wound on coccyx remains unchanged. WOC consulted. Wound on R upper arm remains unchanged.   Activity: Q2 hour turns.   Diet: Minimal intake. Complains of no appetite.  Drips: Remains on heparin and norepinephrine.    Plan: Wean off of vasoactive agent and address barriers to status downgrade.    No family updated throughout shift.

## 2023-07-24 NOTE — PROGRESS NOTES
"Critical Care Progress Note      07/24/2023    Name: Norman Aguilar MRN#: [id number]   Age: 63 year old YOB: 1960     Hsptl Day# 7  ICU DAY #    MV DAY #             Problem List:   Principal Problem:    Elevated troponin  Active Problems:    Dehydration    Hyponatremia    JOHANA (acute kidney injury) (H)    Atrial fibrillation with RVR (H)    Septic shock (H)    Clinically Significant Risk Factors         # Hyponatremia: Lowest Na = 127 mmol/L in last 2 days, will monitor as appropriate      # Hypoalbuminemia: Lowest albumin = 2.1 g/dL at 7/21/2023  4:38 AM, will monitor as appropriate     # Hypertension: Noted on problem list  # Chronic heart failure with reduced ejection fraction: last echo with EF <40%      # DMII: A1C = 6.9 % (Ref range: <5.7 %) within past 6 months   # Obesity: Estimated body mass index is 31.53 kg/m  as calculated from the following:    Height as of this encounter: 1.88 m (6' 2\").    Weight as of this encounter: 111.4 kg (245 lb 9.5 oz).                             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 TTE 7/18/23 showing worsening reduced LVEF 25-30%.            Assessment and plan :       I have personally reviewed the daily labs, imaging studies, " cultures and discussed the case with referring physician and consulting physicians.     My assessment and plan by system for this patient is as follows:    Neurology/Psychiatry:   Awake.  Answers questions appropriately.    Cardiovascular:   Septic shock   Ischemic cardiomyopathy, EF 25 to 30%  Remains in A-fib.  On amiodarone.    On norepinephrine, 0.05 mcg/kg/min    Continue heparin drip    Pulmonary/Ventilator Management:   Supplemental oxygen to keep O2 sats above 90%.      GI and Nutrition :   Elevated LFTs most likely secondary to shock liver from hypotension plus some increase in AST secondary to muscle damage, patient had increased CK      Renal/Fluids/Electrolytes:   JOHANA on CKD 3.  Started on hemodialysis  Hyponatremia     HD per nephrology    - monitor function and electrolytes as needed with replacement per ICU protocols.  - generally avoid nephrotoxic agents such as NSAID, IV contrast unless specifically required  - adjust medications as needed for renal clearance  - follow I/O's as appropriate.    Infectious Disease:   Group A strep bacteremia.  On MRI myositis.  Blistering on the right upper extremity started on 721.  Visited by Ortho and general surgery.    Continue linezolid.  Continue penicillin G      Endocrine:   Diabetes mellitus on Lantus and sliding scale insulin.    Increase p.m. Lantus to 15 units  Plan  - ICU insulin protocol, goal sugar <180      Hematology/Oncology:   Leukocytosis improving today        ICU Prophylaxis:   Heparin drip         Key Medications:      amiodarone  400 mg Oral Daily    aspirin  81 mg Oral Daily    docusate sodium  100 mg Oral BID    insulin aspart  1-10 Units Subcutaneous TID AC    insulin aspart  1-7 Units Subcutaneous At Bedtime    insulin glargine  15 Units Subcutaneous At Bedtime    insulin glargine  35 Units Subcutaneous QAM AC    linezolid  600 mg Intravenous Q12H    magnesium oxide  400 mg Oral Daily    multivitamin, therapeutic  1 tablet Oral Daily     penicillin G potassium  4 Million Units Intravenous Q8H    sodium chloride (PF)  10-40 mL Intracatheter Q7 Days    sodium chloride (PF)  3 mL Intracatheter Q8H    sodium chloride (PF)  9 mL Intracatheter During Dialysis/CRRT (from stock)    sodium chloride (PF)  9 mL Intracatheter During Dialysis/CRRT (from stock)    ticagrelor  90 mg Oral BID    vitamin C  500 mg Oral Daily      dextrose      heparin 1,550 Units/hr (07/24/23 1000)    [Held by provider] insulin regular Stopped (07/19/23 0954)    norepinephrine 0.05 mcg/kg/min (07/24/23 1000)    phenylephrine Stopped (07/23/23 1700)               Physical Examination:   Temp:  [97.6  F (36.4  C)-98.4  F (36.9  C)] 98.1  F (36.7  C)  Pulse:  [] 115  Resp:  [17-33] 25  BP: ()/(45-71) 97/52  SpO2:  [68 %-100 %] 99 %    Intake/Output Summary (Last 24 hours) at 7/22/2023 1145  Last data filed at 7/22/2023 1000  Gross per 24 hour   Intake 2011.14 ml   Output 350 ml   Net 1661.14 ml     Wt Readings from Last 4 Encounters:   07/24/23 111.4 kg (245 lb 9.5 oz)   11/17/22 100.2 kg (221 lb)   09/28/22 104.3 kg (230 lb)   09/27/22 104.3 kg (230 lb)     BP - Mean:  [58-87] 70  Resp: 25    No lab results found in last 7 days.    GEN: no acute distress   HEENT: head ncat, sclera anicteric, OP patent, trachea midline   PULM: clear anteriorly    CV/COR: RRR S1S2 no gallop,  No rub, no murmur  ABD: soft nontender, hypoactive bowel sounds, no mass  EXT: Upper extremity swollen but improving  NEURO: grossly intact  SKIN: Blisters on right upper extremity. Redness over back/coccyx  LINES: clean, dry intact         Data:   All data and imaging reviewed     ROUTINE ICU LABS (Last four results)  CMP  Recent Labs   Lab 07/24/23  0728 07/24/23  0442 07/23/23  2032 07/23/23  1610 07/23/23  0753 07/23/23  0524 07/22/23  0740 07/22/23  0517 07/21/23  1609 07/21/23  1601 07/21/23  0744 07/21/23  0438 07/20/23  0800 07/20/23  0448 07/19/23  0953 07/19/23  0951 07/17/23  1845  07/17/23  1425 07/17/23  1300   NA  --  127*  --   --   --  128*  --  127*  --  129*  --  123*  --  128*   < > 128*   < > 121* 119*   POTASSIUM  --  3.7  --   --   --  4.0  --  3.8  --  4.4  --  3.6  --  3.8   < > 3.6   < > 5.4* 4.6   CHLORIDE  --  92*  --   --   --  93*  --  89*  --  90*  --  84*  --  92*   < > 94*   < > 86* 80*   CO2  --  21*  --   --   --  23  --  21*  --  21*  --  19*  --  18*   < > 15*   < > 15* 14*   ANIONGAP  --  14  --   --   --  12  --  17*  --  18*  --  20*  --  18*   < > 19*   < > 20* 25*   * 215* 194* 219*   < > 245*   < > 228*   < >  --    < > 298*   < > 188*   < > 153*   < > 505* 494*   BUN  --  71.7*  --   --   --  65.6*  --  92.7*  --   --   --  109.9*  --  96.9*  --  80.4*   < > 63.1* 64.9*   CR  --  3.37*  --   --   --  3.31*  --  4.67*  --   --   --  5.22*  --  4.71*  --  3.88*   < > 2.94* 3.25*   GFRESTIMATED  --  20*  --   --   --  20*  --  13*  --   --   --  12*  --  13*  --  17*   < > 23* 20*   YOSVANY  --  8.4*  --   --   --  8.1*  --  8.5*  --   --   --  7.6*  --  7.5*  --  7.3*   < > 8.4* 9.2   MAG  --   --   --   --   --   --   --   --   --   --   --   --   --   --   --   --   --  1.9  --    PHOS  --   --   --   --   --   --   --   --   --   --   --  5.0*  --  3.7  --   --   --   --   --    PROTTOTAL  --   --   --   --   --   --   --   --   --   --   --  6.0*  --  5.7*  --   --   --   --  7.2   ALBUMIN  --   --   --   --   --   --   --   --   --   --   --  2.1*  --  2.4*  --   --   --   --  3.1*   BILITOTAL  --   --   --   --   --   --   --   --   --   --   --  2.4*  --  2.5*  --   --   --   --  1.7*   ALKPHOS  --   --   --   --   --   --   --   --   --   --   --  314*  --  198*  --   --   --   --  103   AST  --   --   --   --   --   --   --   --   --   --   --  222*  --  241*  --  187*  --   --  92*   ALT  --   --   --   --   --   --   --   --   --   --   --  124*  --  105*  --   --   --   --  33    < > = values in this interval not displayed.     [facility]  Recent Labs    Lab 07/23/23  0524 07/22/23  0517 07/21/23  0900 07/20/23  0448   WBC 24.9* 28.8* 25.6* 21.3*   RBC 2.66* 3.02* 3.34* 2.98*   HGB 7.1* 8.5* 9.3* 8.2*   HCT 21.3* 23.8* 26.2* 23.8*   MCV 80 79 78 80   MCH 26.7 28.1 27.8 27.5   MCHC 33.3 35.7 35.5 34.5   RDW 18.5* 18.3* 17.9* 17.4*    183 197 187     INRNo lab results found in last 7 days.  Arterial Blood GasNo lab results found in last 7 days.    All cultures:  No results for input(s): CULT in the last 168 hours.  No results found for this or any previous visit (from the past 24 hour(s)).      Billing: This patient is critically ill: Yes. Total critical care time today 32 min exclusive of procedures or teaching. Managing septic shock

## 2023-07-24 NOTE — PROGRESS NOTES
[facility] Nurse Inpatient Assessment     Consulted for: GURVINDER    Patient History (according to provider note(s):        Assessment:    Skin Injury Location: R axilla area    7/24    Skin injury due to:  Blood blisters (unknown etiology)  Skin history and plan of care:  blisters deroofed, no bloody discharge noted, clear serous fluid noted  Affected area:      Skin assessment: Blistering     Measurements (length x width x depth, in cm) 16 x 3 x 0.1cm for the entire blistered area. Small amount of older drainage noted on abd pad beneath arm.      Color: normal and consistent with surrounding tissue     Temperature  normal      Drainage: none .      Color: none      Odor: none  Pain: facial expression of distress, with movement of edematous arm  Pain interventions prior to dressing change: patient tolerated well and slow and gentle cares   Treatment goal: Maintain (prevention of deterioration)  STATUS: evolving  Supplies ordered: supplies stored on unit  ____________________________________________________________________________________________________________________________________________________________________________________________    Pressure Injury Location: coccyx    7/24    Last photo: 7/24  Wound type: Pressure Injury     Pressure Injury Stage: Deep Tissue Pressure Injury (DTPI), hospital acquired   Wound history/plan of care:   A pressure injury to the perineum was charted on 7/17. This wound is surrounding the coccyx.     Wound base: 100 % purple,      Palpation of the wound bed: normal      Drainage: none     Description of drainage: none     Measurements (length x width x depth, in cm) 5.5  x 9 cm      Tunneling N/A     Undermining N/A  Periwound skin: Intact      Color:  blanchable redness      Temperature: normal   Odor: none  Pain: no grimacing or signs of discomfort, none  Pain intervention prior to dressing change: slow and gentle cares   Treatment goal: Heal ,  "Protection, and offload pressure  STATUS: initial assessment  Supplies ordered: at bedside    My PI Risk Assessment     Sensory Perception: 2 - Very Limited     Moisture: 1 - Constantly moist     Activity: 1 - Bedfast      Mobility: 2 - Very limited     Nutrition: 2 - Probably inadequate      Friction/Shear: 2 - Potential problem      TOTAL: 10      Treatment Plan:     RUE/axilla - Every 3 days  May leave open to air if no drainage noted.  2. If drainage is occurring, cleanse with saline and gently dry.  3. Cover with large adaptic and abd pad  4. Secure with roll gauze    Coccyx  Protect area with sacral mepilex  Peel back mepilex each shift to monitor wound and place back down  Change mepilex every 3 days + PRN if soiled, saturated, falls off    Pressure Injury Prevention (PIP) Plan:  If patient is declining pressure injury prevention interventions: Explore reason why and address patient's concerns, Educate on pressure injury risk and prevention intervention(s), If patient is still declining, document \"informed refusal\" , and Ensure Care team is aware ( provider, charge nurse, etc)  Mattress: Follow bed algorithm, reassess daily and order specialty mattress, if indicated.  HOB: Maintain at or below 30 degrees, unless contraindicated  Repositioning in bed: Every 1-2 hours , Left/right positioning; avoid supine, Raise foot of bed prior to raising head of bed, to reduce patient sliding down (shear), and Frequent microturns using TAPS wedges, as patient tolerates  Heels: Keep elevated off mattress and Pillows under calves  Protective Dressing: Sacral Mepilex for prevention (#[id number]),  especially for the agitated patient   Positioning Equipment: TAPS wedges (#[id number]) to help maintain 30 degree side lying position   Chair positioning: Chair cushion (#[id number])    If patient has a buttock pressure injury, or high risk for PI use chair cushion or SPS.  Moisture Management: Perineal cleansing /protection: Follow Incontinence " Protocol, Avoid brief in bed, Clean and dry skin folds with bathing , and Moisturize dry skin  Under Devices: Inspect skin under all medical devices during skin inspection , Ensure tubes are stabilized without tension, and Ensure patient is not lying on medical devices or equipment when repositioned  Ask provider to discontinue device when no longer needed.     Orders: Written    RECOMMEND PRIMARY TEAM ORDER: None, at this time  Education provided: plan of care  Discussed plan of care with: Patient  WO nurse follow-up plan: weekly  Notify WOC if wound(s) deteriorate.  Nursing to notify the Provider(s) and re-consult the WO Nurse if new skin concern.    DATA:     Current support surface: Standard  Standard gel/foam mattress (IsoFlex, Atmos air, etc)  Containment of urine/stool: Continent of bladder and Continent of bowel  BMI: Body mass index is 31.53 kg/m .   Active diet order: Orders Placed This Encounter      Advance Diet as Tolerated: Fully Advanced to diet(s) per Provider order; Moderate Consistent Carb (60 g CHO per Meal) Diet; 2 gm NA Diet     Output: I/O last 3 completed shifts:  In: 3645.71 [P.O.:1080; I.V.:2565.71]  Out: 1145 [Urine:1145]     Labs:   Recent Labs   Lab 07/23/23  0524 07/21/23  0900 07/21/23  0438   ALBUMIN  --   --  2.1*   HGB 7.1*   < >  --    WBC 24.9*   < >  --     < > = values in this interval not displayed.       Pressure injury risk assessment:   Sensory Perception: 3-->slightly limited  Moisture: 2-->very moist  Activity: 1-->bedfast  Mobility: 2-->very limited  Nutrition: 1-->very poor  Friction and Shear: 1-->problem  Robin Score: 10    ROSALIND Ovalle RN CWOCN  Pager no longer in use, please contact through to be group: [facility] [employer]

## 2023-07-24 NOTE — TELEPHONE ENCOUNTER
Post Hospital F/U for HF is requested - ms   Telephone Encounter by Bisi Porter RN at 09/10/18 02:35 PM     Author:  Bisi Porter RN Service:  (none) Author Type:  Registered Nurse     Filed:  09/10/18 02:37 PM Encounter Date:  9/10/2018 Status:  Signed     :  Bisi Porter RN (Registered Nurse)            Message to AB  It looks like her last regular scope was 9/18/13.[SB1.1M]      Revision History        User Key Date/Time User Provider Type Action    > SB1.1 09/10/18 02:37 PM Bisi Porter RN Registered Nurse Sign    M - Manual

## 2023-07-24 NOTE — PROGRESS NOTES
HEART CARE NOTE          Assessment/Recommendations     1. HFrEF/ICM c/b circulatory shock  Assessment / Plan  Volume management per nephrology via HD  Continue pressor support - wean as tolerated  GDMT on hold given the above      2. NSTEMI  Assessment / Plan  Hx of coronary angiogram significant for severe multi-vessel CAD during admission in 11/21 ; CTS/Pa consulted with plans for CABG in 2-3 months at that time; patient was readmitted before CABG with notable progression of RCA lesion CTS was consulted once again and declined to offer surgical intervention at which time the patient underwent complex PCI with JESSICA x 2 to the prox and distal RCA  He now presents approx 1 year later with an NSTEMI; will plan to proceed with coronary angiogram +/- PCI once there is no longer concern for sepsis/infection - patient denies chest pain/anginal equivalents  Continue ASA, and ticagrelor     3. Septic shock  Assessment / Plan  Management/antibiotics and supportive care per ICU team  Wean pressors as tolerated  - currently on levo     4. DM2  Assessment / Plan  Management per primary team     5. Paroxysmal afib  Assessment / Plan  Currently in RVR - continue amiodarone load  Continue heparin gtt       6. JOHANA on CKD  Assessment / Plan  In the setting of circulatory shock and renal hypoperfusion  Continue to monitor renal function closely    Patient remains critically ill in the ICU requiring hemodynamic support via Iv pressors for combined cardiogenic and distributive shock. 60 minutes spent on critical care.    History of Present Illness/Subjective      Mr. Norman Aguilar is a 62 year old male with a PMHx significant for CAD, CHF, HTN, IDDM, paroxysmal atrial fibrillation, left foot osteomyelitis failure of antibiotics s/p left BKA, admitted on 8/23/2022 with symptoms concerninf for stroke. Troponin was noted to be elevated.     Today, Mr. Thomas denies any acute cardiac events or complaints; Management plan as  "detailed above.      ECG: Personally reviewed. sinus tachycardia, occasional PVC noted, unifocal.     Repeat echo:  Left ventricular function is decreased. The ejection fraction is 25-30%  (severely reduced).  The left atrium is mildly dilated.  IVC diameter <2.1 cm collapsing >50% with sniff suggests a normal RA pressure  of 3 mmHg.  TAPSE is normal, which is consistent with normal right ventricular systolic  function.  The patient exhibited frequent PVCs.  No hemodynamically significant valvular abnormalities on 2D or color flow  imaging. The study was technically difficult.     ECHO (personnaly Reviewed):  The left ventricle is normal in size with normal left ventricular wall  thickness.  Left ventricular function is decreased. The ejection fraction is 30-35%  (moderately reduced).  Mildly decreased right ventricular systolic function  Moderate biatrial enlargement  No hemodynamically significant valve disease  IVC diameter >2.1 cm collapsing <50% with sniff suggests a high RA pressure  estimated at 15 mmHg or greater.  Compared to prior study, there is no significant change.          Physical Examination Review of Systems   /54   Pulse 112   Temp 98.4  F (36.9  C) (Oral)   Resp 20   Ht 1.88 m (6' 2\")   Wt 111.4 kg (245 lb 9.5 oz)   SpO2 97%   BMI 31.53 kg/m    Body mass index is 31.53 kg/m .  Wt Readings from Last 3 Encounters:   07/24/23 111.4 kg (245 lb 9.5 oz)   11/17/22 100.2 kg (221 lb)   09/28/22 104.3 kg (230 lb)     General Appearance:   no distress, normal body habitus   ENT/Mouth: membranes moist, no oral lesions or bleeding gums.      EYES:  no scleral icterus, normal conjunctivae   Neck: no carotid bruits or thyromegaly   Chest/Lungs:   lungs are clear to auscultation, no rales or wheezing, equal chest wall expansion    Cardiovascular:   Irregular. Normal first and second heart sounds with no murmurs, rubs, or gallops; the carotid, radial and posterior tibial pulses are intact, + JVD and " LE edema bilaterally    Abdomen:  no organomegaly, masses, bruits, or tenderness; bowel sounds are present   Extremities: no cyanosis or clubbing   Skin: no xanthelasma, warm.    Neurologic: NAD     Psychiatric: NAD    A complete 10 systems ROS was reviewed  And is negative except what is listed in the HPI.          Medical History  Surgical History Family History Social History   Past Medical History:   Diagnosis Date    Anemia     Chronic systolic CHF (congestive heart failure) (H)     Coronary artery disease     Diabetic neuropathy (H)     DM type 2 w Neuropathy     GERD (gastroesophageal reflux disease)     Hyperlipidemia     Hypertension     Intermittent atrial fibrillation (H)     on Eliquis    Ischemic cardiomyopathy 11/16/2021    Echo with EF of 30-35%    NSTEMI (non-ST elevated myocardial infarction) (H) 11/15/2021    Osteomyelitis of left foot (H) 04/04/2022    Renal disease     Retinopathy     Sleep disturbance     Past Surgical History:   Procedure Laterality Date    AMPUTATE FOOT Left 3/21/2022    Procedure: Guillotine AMPUTATION, FOOT;  Surgeon: Ronald Bhatt MD;  Location: [facility] [place] OR    AMPUTATE LEG BELOW KNEE Left 4/4/2022    Procedure: LEFT BELOW  KNEE AMPUTATION;  Surgeon: Ronald Bhatt MD;  Location: [facility] OR    AMPUTATE TOE(S) Left 11/16/2021    Procedure: first and second ray amputation;  Surgeon: Eddi Ram DPM;  Location: [facility] OR    APPENDECTOMY      CV CORONARY ANGIOGRAM N/A 11/16/2021    Procedure: CV CORONARY ANGIOGRAM;  Surgeon: Pam Tabares MD;  Location: [facility] CATH LAB CV    CV CORONARY ANGIOGRAM N/A 8/24/2022    Procedure: CV CORONARY ANGIOGRAM;  Surgeon: Cipriano Lucas MD;  Location: [facility] CATH LAB CV    CV CORONARY ANGIOGRAM N/A 8/29/2022    Procedure: CV CORONARY ANGIOGRAM;  Surgeon: Cipriano Lucas MD;  Location: [facility] LAB CV    CV INTRAVASULAR ULTRASOUND N/A 8/29/2022    Procedure: Intravascular Ultrasound;  Surgeon:  Cipriano Lucas MD;  Location: [facility] CATH LAB CV    CV LEFT HEART CATH N/A 11/16/2021    Procedure: Left Heart Cath;  Surgeon: Pam Tabares MD;  Location: [facility] CATH LAB CV    CV PCI N/A 8/29/2022    Procedure: Percutaneous Coronary Intervention stent;  Surgeon: Cipriano Lucas MD;  Location: [facility] LAB CV    CV PCI ANGIOPLASTY N/A 8/29/2022    Procedure: Percutaneous Transluminal Angioplasty;  Surgeon: Cipriano Lucas MD;  Location: [facility] CATH LAB CV    FOOT SURGERY Left     HERNIA REPAIR      INCISION AND DRAINAGE LOWER EXTREMITY, COMBINED Left 11/16/2021    Procedure: INCISION AND DRAINAGE, left foot;  Surgeon: Eddi Ram DPM;  Location: [facility]    INCISION AND DRAINAGE LOWER EXTREMITY, COMBINED Left 11/19/2021    Procedure: INCISION AND DRAINAGE, left foot;  Surgeon: Eddi Ram DPM;  Location: [facility] OR    INCISION AND DRAINAGE LOWER EXTREMITY, COMBINED Left 12/9/2021    Procedure: INCISION AND DRAINAGE, Left foot;  Surgeon: Eddi Ram DPM;  Location: [facility] OR    INCISION AND DRAINAGE LOWER EXTREMITY, COMBINED Left 1/10/2022    Procedure: INCISION AND DRAINAGE, left foot;  Surgeon: Eddi Ram DPM;  Location: [facility] OR    INCISION AND DRAINAGE LOWER EXTREMITY, COMBINED Left 1/27/2022    Procedure: INCISION AND DRAINAGE, Left foot;  Surgeon: Eddi Ram DPM;  Location: [facility] OR    IR CVC NON TUNNEL PLACEMENT > 5 YRS  7/20/2023    PICC TRIPLE LUMEN PLACEMENT  7/17/2023         PICC TRIPLE LUMEN PLACEMENT  7/18/2023         no family history of premature coronary artery disease Social History     Socioeconomic History    Marital status: Single     Spouse name: Not on file    Number of children: Not on file    Years of education: Not on file    Highest education level: Not on file   Occupational History    Not on file   Tobacco Use    Smoking status: Former     Types: Cigars    Smokeless  tobacco: Never    Tobacco comments:     Cigars   Vaping Use    Vaping Use: Never used   Substance and Sexual Activity    Alcohol use: Yes     Comment: < 1 drink a month (only holidays)    Drug use: Never    Sexual activity: Not on file   Other Topics Concern    Parent/sibling w/ CABG, MI or angioplasty before 65F 55M? Not Asked   Social History Narrative    Single, currently not working, has done numerous jobs, lives alone.  Full code. (last updated 4/4/2022)      Social Determinants of Health     Financial Resource Strain: Not on file   Food Insecurity: Not on file   Transportation Needs: Not on file   Physical Activity: Not on file   Stress: Not on file   Social Connections: Not on file   Intimate Partner Violence: Not on file   Housing Stability: Not on file           Lab Results    Chemistry/lipid CBC Cardiac Enzymes/BNP/TSH/INR   Lab Results   Component Value Date    CHOL 157 03/24/2023    HDL 65 03/24/2023    TRIG 60 03/24/2023    BUN 71.7 (H) 07/24/2023     (L) 07/24/2023    CO2 21 (L) 07/24/2023    Lab Results   Component Value Date    WBC 24.9 (H) 07/23/2023    HGB 7.1 (L) 07/23/2023    HCT 21.3 (L) 07/23/2023    MCV 80 07/23/2023     07/23/2023    Lab Results   Component Value Date    TROPONINI 5.84 (HH) 08/30/2022     (H) 08/24/2022    INR 1.19 (H) 08/24/2022     Lab Results   Component Value Date    TROPONINI 5.84 (HH) 08/30/2022          Weight:    Wt Readings from Last 3 Encounters:   07/24/23 111.4 kg (245 lb 9.5 oz)   11/17/22 100.2 kg (221 lb)   09/28/22 104.3 kg (230 lb)       Allergies  No Known Allergies      Surgical History  Past Surgical History:   Procedure Laterality Date    AMPUTATE FOOT Left 3/21/2022    Procedure: Guillotine AMPUTATION, FOOT;  Surgeon: Ronald Bhatt MD;  Location: [facility] [place] OR    AMPUTATE LEG BELOW KNEE Left 4/4/2022    Procedure: LEFT BELOW  KNEE AMPUTATION;  Surgeon: Ronald Bhatt MD;  Location: [facility] OR    AMPUTATE TOE(S) Left  11/16/2021    Procedure: first and second ray amputation;  Surgeon: Eddi Ram DPM;  Location: [facility] OR    APPENDECTOMY      CV CORONARY ANGIOGRAM N/A 11/16/2021    Procedure: CV CORONARY ANGIOGRAM;  Surgeon: Pam Tabares MD;  Location: [facility] CATH LAB CV    CV CORONARY ANGIOGRAM N/A 8/24/2022    Procedure: CV CORONARY ANGIOGRAM;  Surgeon: Cipriano Lucas MD;  Location: [facility] CATH LAB CV    CV CORONARY ANGIOGRAM N/A 8/29/2022    Procedure: CV CORONARY ANGIOGRAM;  Surgeon: Cipriano Lucas MD;  Location: [facility] LAB CV    CV INTRAVASULAR ULTRASOUND N/A 8/29/2022    Procedure: Intravascular Ultrasound;  Surgeon: Cipriano Lucas MD;  Location: [facility] LAB CV    CV LEFT HEART CATH N/A 11/16/2021    Procedure: Left Heart Cath;  Surgeon: Pam Tabares MD;  Location: [facility] LAB CV    CV PCI N/A 8/29/2022    Procedure: Percutaneous Coronary Intervention stent;  Surgeon: Cipriano Lucas MD;  Location: [facility] CATH LAB CV    CV PCI ANGIOPLASTY N/A 8/29/2022    Procedure: Percutaneous Transluminal Angioplasty;  Surgeon: Cipriano Lucas MD;  Location: [facility] LAB CV    FOOT SURGERY Left     HERNIA REPAIR      INCISION AND DRAINAGE LOWER EXTREMITY, COMBINED Left 11/16/2021    Procedure: INCISION AND DRAINAGE, left foot;  Surgeon: Eddi Ram DPM;  Location: [facility]    INCISION AND DRAINAGE LOWER EXTREMITY, COMBINED Left 11/19/2021    Procedure: INCISION AND DRAINAGE, left foot;  Surgeon: Eddi Ram DPM;  Location: [facility]    INCISION AND DRAINAGE LOWER EXTREMITY, COMBINED Left 12/9/2021    Procedure: INCISION AND DRAINAGE, Left foot;  Surgeon: Eddi Ram DPM;  Location: [facility]    INCISION AND DRAINAGE LOWER EXTREMITY, COMBINED Left 1/10/2022    Procedure: INCISION AND DRAINAGE, left foot;  Surgeon: Eddi Ram DPM;  Location: [facility] OR    INCISION AND DRAINAGE LOWER EXTREMITY,  COMBINED Left 1/27/2022    Procedure: INCISION AND DRAINAGE, Left foot;  Surgeon: Eddi Ram DPM;  Location: [facility] OR     CVC NON TUNNEL PLACEMENT > 5 YRS  7/20/2023    PICC TRIPLE LUMEN PLACEMENT  7/17/2023         PICC TRIPLE LUMEN PLACEMENT  7/18/2023            Social History  Tobacco:   History   Smoking Status    Former    Types: Cigars   Smokeless Tobacco    Never    Alcohol:   Social History    Substance and Sexual Activity      Alcohol use: Yes        Comment: < 1 drink a month (only holidays)   Illicit Drugs:   History   Drug Use Unknown       Family History  No family history on file.       Russel Arredondo MD on 7/24/2023      cc: Jaquan Dickerson

## 2023-07-24 NOTE — PLAN OF CARE
Problem: Plan of Care - These are the overarching goals to be used throughout the patient stay.    Goal: Plan of Care Review  Description: The Plan of Care Review/Shift note should be completed every shift.  The Outcome Evaluation is a brief statement about your assessment that the patient is improving, declining, or no change.  This information will be displayed automatically on your shift note.  Outcome: Progressing     Problem: Pain Acute  Goal: Optimal Pain Control and Function  Outcome: Progressing  Intervention: Prevent or Manage Pain  Recent Flowsheet Documentation  Taken 7/23/2023 1600 by Elvia Ledesma RN  Medication Review/Management:    medications reviewed    pharmacy consulted   Goal Outcome Evaluation:         [facility] - ICU    RN Progress Note:            Pertinent Assessments:      Please refer to flowsheet rows for full assessment     Alert and oriented. Denied pain. Dressing to the right arm changed. Garcia patent with clear urine out put.            Key Events - This Shift:       Levo started BP with in goal. 450 ml urine out put for this shift for this shift. A-fib Dr Carey updated.                      Barriers to Discharge / Downgrade:     Pressors

## 2023-07-24 NOTE — PROCEDURES
2L removed during 3Hr HD Tx     Tachycardia pre and throughout treatment. BP soft througout treament. Albumin 200 ml given by primary RN     Post Tx vss     See flow sheet for Tx data     Report given to: RACHAEL Rivero

## 2023-07-25 NOTE — PROGRESS NOTES
Care Management Follow Up    Length of Stay (days): 8    Expected Discharge Date: pending     Concerns to be Addressed:  renal and cardio status, nutrition, infection    Patient plan of care discussed at interdisciplinary rounds: Yes    Anticipated Discharge Disposition:  TBD     Anticipated Discharge Services:  TBD    Anticipated Discharge DME:  TBD        Additional Information:  Patient presenting with multiple symptoms including weakness, dehydration, scalp pain/burning, and chronic R arm pain, found to have atrial fibrillation with RVR  (HR 160s-180s) and treated with IV metoprolol, now admitted to the ICU for circulatory/distributive shock with suspected sepsis (source unknown), JOHANA, uncontrolled diabetes requiring insulin infusion, and possible NSTEMI vs demand ischemia.   Nephrology following JOHANA, started on dialysis 7/21  ID following Group A strep bacteremia. Current plan; continue penicillin G IV for group A strep infection. Continue linezolid 600 mg IV twice daily for antitoxin effects.  Cardiology following hypervolemic, IV diuresing.  WOC following, wound care every three days.    PT/OT eval when appropriate.      Social History:  Per sister Cindy, patient has been homeless since Nov, 2021. 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 Pt has lived in between [facility], or her daughter's house the last couple years, and in the past has lived at his [facility] for 6 yrs at one time. Pt before that was taking care of his parents in their home. He last lived at [facility] prior to hospitalization. Pt Ind at baseline, drives (owns a car). Pt has a prosthetic. 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 She stated pt tried signing up for social security disability, but pt claims he was denied, she does not believe pt. 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  Pt has [employer] [place]. Cindy cannot take pt back into her home, and her daughter will not let pt return either. She prefers if able pt goes to TCU, then either friends home, housing or some sort, or shelter. Transport TBD.       7/25/23:  No medically ready for discharge. Final discharge plan pending progression and recommendations.         Lillian Montalvo RN

## 2023-07-25 NOTE — PLAN OF CARE
"[facility] - ICU    RN Progress Note:            Pertinent Assessments:      Please refer to flowsheet rows for full assessment     Patient received 2 units of blood today following hgb 5.9, recheck was 7.5. ABD CT done results pending. Heparin drip stopped early this morning. Patient denies pain most the day, right arm limited movement and appears painful but patient states, \"its better today.\" Patient continue depressed flat affect, frustrated with his health and repeat GI issues.            Key Events - This Shift:       Levophed off this morning MAP >65. Lasix drip running, low urine output 375ml in 12 hours. Patient is drinking well, but very poor appetite. LG clear mucous stool output once today.              Barriers to Discharge / Downgrade:     Poor nutrition, decreased physical activity, depression.          Point of Contact Update YES-OR-NO:  If No, reason: Patient contacted his sister, updating his family directly.   Name:  Phone Number:  Summary of Conversation:            "

## 2023-07-25 NOTE — PROGRESS NOTES
Infectious Diseases Progress Note  [facility]    Date of visit: 07/25/2023     ASSESSMENT   63-year-old man with a history of hypertension, coronary artery disease, CHF, diabetes, who is admitted with septic shock.  ID following regarding strep bacteremia.    Group A strep bacteremia.  Complicated skin and soft tissue infection.  Admitted to the ICU on pressors.  Acute kidney injury, and transaminitis.  Concerning for toxic shock syndrome.  Also having right shoulder pain.  Myositis seen on MRI. Blistering starting on 7/21, possibly from pressure or swelling. Seen by ortho and general surgery  Septic shock.  Had been On pressors. Active issue  Right shoulder myositis. Denies acute injury. Elevated ck, improving.    Acute kidney injury.  Rising creatinine.  Starting dialysis 7/20 see Dr Reich notes, reviewed  Leukocytosis- improved    Principal Problem:    Elevated troponin  Active Problems:    Dehydration    Hyponatremia    JOHANA (acute kidney injury) (H)    Atrial fibrillation with RVR (H)    Septic shock (H)       PLAN   -continue penicillin G IV for group A strep infection.  Pharmacy to assist with renal dosing  -Continue linezolid 600 mg IV twice daily for antitoxin effects  -appreciate surgical assessment. No indication for surgery at this time  -no IVIG due to renal failure  Renal issues noted.   Patient seen in ICU. Appreciate input from ICU nurse   Patient new to me on 7/24/2023       Lacie Mata MD  [facility]  Answering Service: [phone]  On-Call ID provider: [phone], option: 9          ===========================================      SUBJECTIVE / INTERVAL HISTORY:     Seen in ICU  Patient fatigued, tired  Discussed with nurse- appreciate input    Previous note:   Dressing on arm  No fever.  Improved white count. 3L HD run yesterday.  Will run tomorrow again.       Antibiotics   Penicillin g 7/20- 12 MU per day  Linezolid 7/20-    Previous:  Zosyn  "7/17-7/20  Vancomycin 7/17      Physical Exam     Temp:  [97.6  F (36.4  C)-97.8  F (36.6  C)] 97.6  F (36.4  C)  Pulse:  [] 87  Resp:  [10-30] 17  BP: ()/(48-62) 104/55  SpO2:  [79 %-100 %] 100 %    /55   Pulse 87   Temp 97.6  F (36.4  C) (Oral)   Resp 17   Ht 1.88 m (6' 2\")   Wt 113.6 kg (250 lb 7.1 oz)   SpO2 100%   BMI 32.15 kg/m      GENERAL:  appears ill, lying in bed  HENT:  Head is normocephalic, atraumatic.   EYES:  Eyes have anicteric sclerae without conjunctival injection   LUNGS:  Clear to auscultation.  Tachypneic (better today)  CARDIOVASCULAR:  Regular rate and rhythm with no murmurs, gallops or rubs.  ABDOMEN:  Normal bowel sounds, soft, nontender.   EXT: Right shoulder swelling, tenderness.  SKIN:  Shallow blisters on posterior right arm, superficial, weeping. Right IJ line is in place without any surrounding erythema.   NEUROLOGIC:  Grossly nonfocal.    Photo in chart      Cultures   7/17 blood cultures x2: Strep pyogenes  7/19 blood cultures x2: No growth to date          Pertinent Labs:     Recent Labs   Lab 07/25/23  0442 07/23/23  0524 07/22/23  0517   WBC 15.5* 24.9* 28.8*   HGB 5.9* 7.1* 8.5*   * 167 183         Recent Labs   Lab 07/25/23  0442 07/24/23  0442 07/23/23  0524 07/21/23  1601 07/21/23  0438 07/20/23  0448   * 127* 128*   < > 123* 128*   CO2 24 21* 23   < > 19* 18*   BUN 55.2* 71.7* 65.6*   < > 109.9* 96.9*   ALBUMIN 2.4*  --   --   --  2.1* 2.4*   ALKPHOS 239*  --   --   --  314* 198*   ALT 41  --   --   --  124* 105*   AST 45  --   --   --  222* 241*    < > = values in this interval not displayed.         No results for input(s): CRP, SED in the last 168 hours.        Imaging:     IR CVC Non Tunnel Placement > 5 Yrs    Result Date: 7/20/2023  LOCATION: [facility] DATE: 7/20/2023 PROCEDURE: NON-TUNNELED DIALYSIS CATHETER PLACEMENT 1.  Insertion of a non-tunneled central venous catheter. 2.  Ultrasound guidance for " vascular access. 3.  Fluoroscopic guidance for central venous access device placement. INTERVENTIONAL RADIOLOGIST: Joseph Campos MD INDICATION: Renal insufficiency requiring renal replacement therapy, plan for nontunneled dialysis catheter placement. CONSENT: The risks, benefits and alternatives of the procedure were discussed with the patient or representative in detail. All questions were answered. Informed consent was given to proceed with the procedure. MODERATE SEDATION: None. CONTRAST: None. ANTIBIOTICS: None. ADDITIONAL MEDICATIONS: None. FLUOROSCOPIC TIME: 0.5 minutes. RADIATION DOSE: Air Kerma: 5 mGy. COMPLICATIONS: No immediate complications. UNIVERSAL PROTOCOL: The operative site was marked and any prior imaging was reviewed. Required items including blood products, implants, devices and special equipment was made available. Patient identity was confirmed either verbally, with demographic information, hospital assigned identification or other identification markers. A timeout was performed immediately prior to the procedure. STERILE BARRIER TECHNIQUE: Maximum sterile barrier technique was used. Cutaneous antisepsis was performed at the operative site with application of 2% chlorhexidine and large sterile drape. Prior to the procedure, the  and assistant performed hand hygiene and wore hat, mask, sterile gown, and sterile gloves during the entire procedure. PROCEDURE:  Using local anesthesia and real-time ultrasound guidance the right internal jugular vein was accessed. Imaging demonstrates an anechoic and compressible vein. A permanent image was stored to the patient's medical record. Using this access, a 20 cm dialysis catheter was advanced using fluoroscopic guidance until the tip was in the proximal right atrium. FINDINGS: At the completion of the study he radiograph was performed utilizing the in room fluoroscopic unit. Imaging demonstrates the nontunneled dialysis catheter tip terminating in  the proximal right atrium.     IMPRESSION:  1.  Successful non-tunneled dialysis catheter placement. 2.  The catheter is ready for use.    MR Shoulder Right w/o Contrast    Result Date: 7/19/2023  EXAM: MR SHOULDER RIGHT W/O CONTRAST LOCATION: [facility] DATE: 7/19/2023 INDICATION: Septic shock with bacteremia, concern for right shoulder osteomyelitis or joint infection. COMPARISON: None. TECHNIQUE: Unenhanced. FINDINGS: ROTATOR CUFF: -Supraspinatus: Moderate severity tendinopathy. No tearing or retraction. No atrophy. -Infraspinatus: Moderate severity infraspinatus tendinopathy without evidence for well-defined partial or full-thickness tearing. No retraction but there is T2 signal abnormality within the muscular portion, which may be due to muscle strain. Infectious or inflammatory myositis could have this appearance. -Subscapularis: No tendon tear, tendinopathy or fatty atrophy. -Teres minor: No tendon tear, tendinopathy or fatty atrophy. CORACOACROMIAL ARCH: -Morphology: Type II acromion. No subacromial spur. Subacromial and subcoracoid space are normal. -Bursa: Fluid in the subacromial subdeltoid bursa. The coracoacromial and coracoclavicular ligaments are negative. ACROMIOCLAVICULAR JOINT: -Hypertrophic change. No significant diastasis or effusion. LONG HEAD OF BICEPS TENDON: -Mild tendinopathy within the intra-articular portion of the long head of the biceps. GLENOHUMERAL JOINT: -Labrum: No labral tear. No paralabral cyst. -Cartilage: Grade II-grade III cartilage loss both sides of the joint without significant surface irregularity. -Joint space: No effusion or synovitis. -Glenohumeral ligaments and capsule: No pericapsular inflammation. BONES: -No fracture or concerning marrow replacing lesion. No evidence for osteomyelitis. SOFT TISSUES: -Extensive but nonspecific T2 signal abnormality is present within the deltoid musculature, most marked posteriorly but this is also seen within  the latissimus muscle, distal aspect of the trapezius, infraspinatus as described, and portions of the subscapularis. The multiplicity of muscle groups involved makes a strain type injury considered unlikely and this is more likely secondary to infectious or inflammatory myositis. There is no evidence for organized fluid collection to suggest abscess.     IMPRESSION: 1.  Extensive signal abnormality within multiple muscles of the shoulder girdle including portions of the visualized latissimus and trapezius muscles. The findings are likely secondary to infectious or inflammatory myositis versus contusion or muscle strain type injury. No evidence for abscess. 2.  No evidence for osteomyelitis or septic arthropathy. 3.  Small amount of fluid within the subacromial subdeltoid bursa. 4.  No evidence for fracture. 5.  Moderate severity tendinopathy within the supraspinatus and infraspinatus. 6.  Tendinopathy within the intraarticular portion of the biceps. 7.  Degenerative change at the glenohumeral joint with grade II-grade III cartilage loss but no significant surface irregularity.    XR Chest Port 1 View    Result Date: 7/18/2023  EXAM: XR CHEST PORT 1 VIEW LOCATION: [facility] DATE: 7/18/2023 INDICATION: RN placed PICC   verify tip placement COMPARISON: 07/17/2023     IMPRESSION: Left upper extremity PICC terminates at the SVC - RA junction. No acute cardiopulmonary abnormality.        Data reviewed today: I reviewed all medications, new labs and imaging results over the last 24 hours. I personally reviewed no images or EKG's today.  The patient's care was discussed with the ICU nurse      Total Time Spent 35 minutes with >50% of the time spent in chart review, evaluation, management, counseling, education and care coordination.

## 2023-07-25 NOTE — PROGRESS NOTES
"Critical Care Progress Note      07/25/2023    Name: Norman Aguilar MRN#: [id number]   Age: 63 year old YOB: 1960     Hsptl Day# 8  ICU DAY #    MV DAY #             Problem List:   Principal Problem:    Elevated troponin  Active Problems:    Dehydration    Hyponatremia    JOHANA (acute kidney injury) (H)    Atrial fibrillation with RVR (H)    Septic shock (H)    Clinically Significant Risk Factors         # Hyponatremia: Lowest Na = 127 mmol/L in last 2 days, will monitor as appropriate      # Hypoalbuminemia: Lowest albumin = 2.1 g/dL at 7/21/2023  4:38 AM, will monitor as appropriate   # Thrombocytopenia: Lowest platelets = 125 in last 2 days, will monitor for bleeding   # Hypertension: Noted on problem list  # Acute heart failure with reduced ejection fraction: last echo with EF <40% and receiving IV diuretics      # DMII: A1C = 6.9 % (Ref range: <5.7 %) within past 6 months   # Obesity: Estimated body mass index is 32.15 kg/m  as calculated from the following:    Height as of this encounter: 1.88 m (6' 2\").    Weight as of this encounter: 113.6 kg (250 lb 7.1 oz).                             Summary/Hospital Course:     Norman Aguilar is a 63 year old male with essential hypertension, coronary artery disease/NSTEMI on Brillinta since 08/2022, chronic systolic CHF (LVEF 30-35% on TTE 8/25/22), essential hypertension, CKD stage III, history of left foot osteomyelitis status post left BKA 4/4/2022, insulin-dependent diabetes mellitus type 2 (HgbA1c 6.9% 7/17/23) who presented to [facility] ED with multiple symptoms including weakness, dehydration, scalp pain/burning, and chronic R arm pain, found to have atrial fibrillation with RVR (HR 160s-180s) and treated with IV metoprolol, now admitted to the ICU for circulatory/distributive shock with suspected sepsis (source unknown), JOHANA, uncontrolled diabetes requiring insulin infusion, and NSTEMI. TTE 7/18/23 showing worsening reduced LVEF " Urology attempted francisco insertion without success - hx of meatal stricture - pt will be going to OR tonight, has been NPO since this AM, 25-30%.            Assessment and plan :       I have personally reviewed the daily labs, imaging studies, cultures and discussed the case with referring physician and consulting physicians.     My assessment and plan by system for this patient is as follows:    Neurology/Psychiatry:   Awake.  Answers questions appropriately.    Cardiovascular:   Septic shock   Ischemic cardiomyopathy, EF 25 to 30%  Remains in A-fib.  On amiodarone.    On norepinephrine, 0.05 mcg/kg/min    Heparin drip on hold due to acute anemia    Pulmonary/Ventilator Management:   Supplemental oxygen to keep O2 sats above 90%.      GI and Nutrition :   Elevated LFTs most likely secondary to shock liver from hypotension plus some increase in AST secondary to muscle damage, patient had increased CK      Renal/Fluids/Electrolytes:   JOHANA on CKD 3.  Started on hemodialysis  Hyponatremia     HD per nephrology    Improved urine output yesterday.  Now on Lasix drip    - monitor function and electrolytes as needed with replacement per ICU protocols.  - generally avoid nephrotoxic agents such as NSAID, IV contrast unless specifically required  - adjust medications as needed for renal clearance  - follow I/O's as appropriate.    Infectious Disease:   Group A strep bacteremia.  On MRI myositis.  Blistering on the right upper extremity started on 721.  Visited by Ortho and general surgery.    Continue linezolid.  Continue penicillin G      Endocrine:   Diabetes mellitus on Lantus and sliding scale insulin.    Increased p.m. Lantus to 15 units  Plan  - ICU insulin protocol, goal sugar <180      Hematology/Oncology:   Anemia today getting 2 units of PRBCs  No signs of bleeding  Get CT of the abdomen to ensure she does not have retroperitoneal bleed  Heparin currently on hold        ICU Prophylaxis:   Heparin drip         Key Medications:      amiodarone  400 mg Oral Daily    aspirin  81 mg Oral Daily    docusate sodium  100 mg Oral BID    insulin aspart  1-10  Units Subcutaneous TID AC    insulin aspart  1-7 Units Subcutaneous At Bedtime    insulin glargine  15 Units Subcutaneous At Bedtime    insulin glargine  35 Units Subcutaneous QAM AC    linezolid  600 mg Intravenous Q12H    magnesium oxide  400 mg Oral Daily    multivitamin, therapeutic  1 tablet Oral Daily    penicillin G potassium  4 Million Units Intravenous Q8H    sodium chloride (PF)  10-40 mL Intracatheter Q7 Days    sodium chloride (PF)  3 mL Intracatheter Q8H    ticagrelor  90 mg Oral BID    vitamin C  500 mg Oral Daily      dextrose      furosemide (LASIX) 100 mg in sodium chloride 0.9 % 100 mL infusion 20 mg/hr (07/25/23 1326)    heparin Stopped (07/25/23 0632)    [Held by provider] insulin regular Stopped (07/19/23 0954)    norepinephrine Stopped (07/25/23 1045)    phenylephrine Stopped (07/23/23 1700)               Physical Examination:   Temp:  [97.6  F (36.4  C)-99.1  F (37.3  C)] 98  F (36.7  C)  Pulse:  [] 90  Resp:  [10-30] 26  BP: ()/(47-62) 96/52  SpO2:  [70 %-100 %] 98 %    Intake/Output Summary (Last 24 hours) at 7/22/2023 1145  Last data filed at 7/22/2023 1000  Gross per 24 hour   Intake 2011.14 ml   Output 350 ml   Net 1661.14 ml     Wt Readings from Last 4 Encounters:   07/25/23 113.6 kg (250 lb 7.1 oz)   11/17/22 100.2 kg (221 lb)   09/28/22 104.3 kg (230 lb)   09/27/22 104.3 kg (230 lb)     BP - Mean:  [61-81] 67  Resp: 26    No lab results found in last 7 days.    GEN: no acute distress   HEENT: head ncat, sclera anicteric, OP patent, trachea midline   PULM: clear anteriorly    CV/COR: RRR S1S2 no gallop,  No rub, no murmur  ABD: soft nontender, hypoactive bowel sounds, no mass  EXT: Upper extremity swollen but improving  NEURO: grossly intact  SKIN: Blisters on right upper extremity improving. Redness over back/coccyx  LINES: clean, dry intact         Data:   All data and imaging reviewed     ROUTINE ICU LABS (Last four results)  CMP  Recent Labs   Lab 07/25/23  [id number]  07/25/23  0741 07/25/23 0442 07/24/23  2033 07/24/23  0728 07/24/23 0442 07/23/23  0753 07/23/23  0524 07/22/23  0740 07/22/23  0517 07/21/23  0744 07/21/23  0438 07/20/23  0800 07/20/23  0448 07/19/23  0953 07/19/23  0951   NA  --   --  132*  --   --  127*  --  128*  --  127*   < > 123*  --  128*   < > 128*   POTASSIUM  --   --  4.0  --   --  3.7  --  4.0  --  3.8   < > 3.6  --  3.8   < > 3.6   CHLORIDE  --   --  93*  --   --  92*  --  93*  --  89*   < > 84*  --  92*   < > 94*   CO2  --   --  24  --   --  21*  --  23  --  21*   < > 19*  --  18*   < > 15*   ANIONGAP  --   --  15  --   --  14  --  12  --  17*   < > 20*  --  18*   < > 19*   * 196* 177* 266*   < > 215*   < > 245*   < > 228*   < > 298*   < > 188*   < > 153*   BUN  --   --  55.2*  --   --  71.7*  --  65.6*  --  92.7*  --  109.9*  --  96.9*  --  80.4*   CR  --   --  2.71*  --   --  3.37*  --  3.31*  --  4.67*  --  5.22*  --  4.71*  --  3.88*   GFRESTIMATED  --   --  25*  --   --  20*  --  20*  --  13*  --  12*  --  13*  --  17*   YOSVANY  --   --  7.9*  --   --  8.4*  --  8.1*  --  8.5*  --  7.6*  --  7.5*  --  7.3*   PHOS  --   --   --   --   --   --   --   --   --   --   --  5.0*  --  3.7  --   --    PROTTOTAL  --   --  6.5  --   --   --   --   --   --   --   --  6.0*  --  5.7*  --   --    ALBUMIN  --   --  2.4*  --   --   --   --   --   --   --   --  2.1*  --  2.4*  --   --    BILITOTAL  --   --  1.5*  --   --   --   --   --   --   --   --  2.4*  --  2.5*  --   --    ALKPHOS  --   --  239*  --   --   --   --   --   --   --   --  314*  --  198*  --   --    AST  --   --  45  --   --   --   --   --   --   --   --  222*  --  241*  --  187*   ALT  --   --  41  --   --   --   --   --   --   --   --  124*  --  105*  --   --     < > = values in this interval not displayed.     CBC  Recent Labs   Lab 07/25/23  0442 07/23/23  0524 07/22/23  0517 07/21/23  0900   WBC 15.5* 24.9* 28.8* 25.6*   RBC 2.12* 2.66* 3.02* 3.34*   HGB 5.9* 7.1* 8.5* 9.3*   HCT 17.3*  21.3* 23.8* 26.2*   MCV 82 80 79 78   MCH 27.8 26.7 28.1 27.8   MCHC 34.1 33.3 35.7 35.5   RDW 17.3* 18.5* 18.3* 17.9*   * 167 183 197     INRNo lab results found in last 7 days.  Arterial Blood GasNo lab results found in last 7 days.    All cultures:  No results for input(s): CULT in the last 168 hours.  No results found for this or any previous visit (from the past 24 hour(s)).      Billing: This patient is critically ill: Yes. Total critical care time today 31 min exclusive of procedures or teaching. Managing septic shock

## 2023-07-25 NOTE — PLAN OF CARE
Problem: Plan of Care - These are the overarching goals to be used throughout the patient stay.    Goal: Plan of Care Review  Description: The Plan of Care Review/Shift note should be completed every shift.  The Outcome Evaluation is a brief statement about your assessment that the patient is improving, declining, or no change.  This information will be displayed automatically on your shift note.  Outcome: Progressing   Goal Outcome Evaluation:             [facility] - ICU    RN Progress Note:            Pertinent Assessments:      Please refer to flowsheet rows for full assessment     Alert and oriented. Vitals stable. RA sating high 90's. Right arm pain with movement. A-fib 100's. Coccyx Pressure injury purple in color meplix in place.            Key Events - This Shift:       Turned right and left with the TAPS wedge tolerated well.                     Barriers to Discharge / Downgrade:     Pressor.

## 2023-07-25 NOTE — PROGRESS NOTES
CLINICAL NUTRITION SERVICES NOTE    Diet: 60 gm cho per meal, 2 gm Na    Glucerna strawberry daily Gelatein plus daily.    Poor po intake.    Pt not in room when RD visited this afternoon.  Per flowsheets pt ate Gelatein plus today and 60 mL Glucerna.  Will continue to monitor intake and plan to discuss the possibility of short term tube feeding with pt if he is unable to eat more in the next day or two.

## 2023-07-25 NOTE — PROGRESS NOTES
HEART CARE NOTE          Assessment/Recommendations     1. HFrEF/ICM c/b circulatory shock  Assessment / Plan  Hypervolemic; now s/p multiple rounds of IHD; some improvement in UOP with time - will attempt IV diuresis and continue to monitor UOP, hemodynamics and renal function closely; discussed with nephrology  Continue pressor support - wean as tolerated  GDMT on hold given the above      2. NSTEMI  Assessment / Plan  Hx of coronary angiogram significant for severe multi-vessel CAD during admission in 11/21 ; ELY/Pa consulted with plans for CABG in 2-3 months at that time; patient was readmitted before CABG with notable progression of RCA lesion CTS was consulted once again and declined to offer surgical intervention at which time the patient underwent complex PCI with JESSICA x 2 to the prox and distal RCA  He now presents approx 1 year later with an NSTEMI; will plan to proceed with coronary angiogram +/- PCI once there is no longer concern for sepsis/infection - patient denies chest pain/anginal equivalents  Continue ASA, and ticagrelor     3. Septic shock  Assessment / Plan  Management/antibiotics and supportive care per ICU team  Wean pressors as tolerated  - currently on levo     4. DM2  Assessment / Plan  Management per primary team     5. Paroxysmal afib  Assessment / Plan  Currently in RVR - continue amiodarone load  Continue heparin gtt       6. JOHANA on CKD  Assessment / Plan  In the setting of circulatory shock and renal hypoperfusion  Now on intermittent IHD - UOP picking up; will attempt IV diuresis today and continue to monitor renal function closely     Patient remains critically ill in the ICU requiring hemodynamic support via Iv pressors for combined cardiogenic and distributive shock. 60 minutes spent on critical care.    History of Present Illness/Subjective      Mr. Norman Aguilar is a 62 year old male with a PMHx significant for CAD, CHF, HTN, IDDM, paroxysmal atrial fibrillation, left  "foot osteomyelitis failure of antibiotics s/p left BKA, admitted on 8/23/2022 with symptoms concerninf for stroke. Troponin was noted to be elevated.     Today, Mr. Thomas denies any acute cardiac events or complaints; Management plan as detailed above.      ECG: Personally reviewed. sinus tachycardia, occasional PVC noted, unifocal.     Repeat echo:  Left ventricular function is decreased. The ejection fraction is 25-30%  (severely reduced).  The left atrium is mildly dilated.  IVC diameter <2.1 cm collapsing >50% with sniff suggests a normal RA pressure  of 3 mmHg.  TAPSE is normal, which is consistent with normal right ventricular systolic  function.  The patient exhibited frequent PVCs.  No hemodynamically significant valvular abnormalities on 2D or color flow  imaging. The study was technically difficult.     ECHO (personnaly Reviewed):  The left ventricle is normal in size with normal left ventricular wall  thickness.  Left ventricular function is decreased. The ejection fraction is 30-35%  (moderately reduced).  Mildly decreased right ventricular systolic function  Moderate biatrial enlargement  No hemodynamically significant valve disease  IVC diameter >2.1 cm collapsing <50% with sniff suggests a high RA pressure  estimated at 15 mmHg or greater.  Compared to prior study, there is no significant change.          Physical Examination Review of Systems   /62   Pulse 91   Temp 97.7  F (36.5  C) (Oral)   Resp 18   Ht 1.88 m (6' 2\")   Wt 113.6 kg (250 lb 7.1 oz)   SpO2 100%   BMI 32.15 kg/m    Body mass index is 32.15 kg/m .  Wt Readings from Last 3 Encounters:   07/25/23 113.6 kg (250 lb 7.1 oz)   11/17/22 100.2 kg (221 lb)   09/28/22 104.3 kg (230 lb)     General Appearance:   no distress, normal body habitus   ENT/Mouth: membranes moist, no oral lesions or bleeding gums.      EYES:  no scleral icterus, normal conjunctivae   Neck: no carotid bruits or thyromegaly   Chest/Lungs:   lungs are clear " to auscultation, no rales or wheezing, equal chest wall expansion    Cardiovascular:   Irregular. Normal first and second heart sounds with no murmurs, rubs, or gallops; the carotid, radial and posterior tibial pulses are intact, + JVD and LE edema bilaterally    Abdomen:  no organomegaly, masses, bruits, or tenderness; bowel sounds are present   Extremities: no cyanosis or clubbing   Skin: no xanthelasma, warm.    Neurologic: alert and oriented x3, calm     Psychiatric: alert and oriented x3, calm     A complete 10 systems ROS was reviewed  And is negative except what is listed in the HPI.          Medical History  Surgical History Family History Social History   Past Medical History:   Diagnosis Date    Anemia     Chronic systolic CHF (congestive heart failure) (H)     Coronary artery disease     Diabetic neuropathy (H)     DM type 2 w Neuropathy     GERD (gastroesophageal reflux disease)     Hyperlipidemia     Hypertension     Intermittent atrial fibrillation (H)     on Eliquis    Ischemic cardiomyopathy 11/16/2021    Echo with EF of 30-35%    NSTEMI (non-ST elevated myocardial infarction) (H) 11/15/2021    Osteomyelitis of left foot (H) 04/04/2022    Renal disease     Retinopathy     Sleep disturbance     Past Surgical History:   Procedure Laterality Date    AMPUTATE FOOT Left 3/21/2022    Procedure: Guillotine AMPUTATION, FOOT;  Surgeon: Ronald Bhatt MD;  Location: [facility] OR    AMPUTATE LEG BELOW KNEE Left 4/4/2022    Procedure: LEFT BELOW  KNEE AMPUTATION;  Surgeon: Ronald Bhatt MD;  Location: [facility] OR    AMPUTATE TOE(S) Left 11/16/2021    Procedure: first and second ray amputation;  Surgeon: Eddi Ram DPM;  Location: [facility] OR    APPENDECTOMY      CV CORONARY ANGIOGRAM N/A 11/16/2021    Procedure: CV CORONARY ANGIOGRAM;  Surgeon: Pam Tabares MD;  Location: [facility] CATH LAB CV    CV CORONARY ANGIOGRAM N/A 8/24/2022    Procedure: CV CORONARY ANGIOGRAM;  Surgeon:  Cipriano Lucas MD;  Location: [facility] LAB CV    CV CORONARY ANGIOGRAM N/A 8/29/2022    Procedure: CV CORONARY ANGIOGRAM;  Surgeon: Cipriano Lucas MD;  Location: [facility] LAB CV    CV INTRAVASULAR ULTRASOUND N/A 8/29/2022    Procedure: Intravascular Ultrasound;  Surgeon: Cipriano Lucas MD;  Location: [facility] LAB CV    CV LEFT HEART CATH N/A 11/16/2021    Procedure: Left Heart Cath;  Surgeon: Pam Tabares MD;  Location: [facility] LAB CV    CV PCI N/A 8/29/2022    Procedure: Percutaneous Coronary Intervention stent;  Surgeon: Cipriano Lucas MD;  Location: [facility] CV    CV PCI ANGIOPLASTY N/A 8/29/2022    Procedure: Percutaneous Transluminal Angioplasty;  Surgeon: Cipriano Lucas MD;  Location: [facility] CV    FOOT SURGERY Left     HERNIA REPAIR      INCISION AND DRAINAGE LOWER EXTREMITY, COMBINED Left 11/16/2021    Procedure: INCISION AND DRAINAGE, left foot;  Surgeon: Eddi Ram DPM;  Location: [facility] OR    INCISION AND DRAINAGE LOWER EXTREMITY, COMBINED Left 11/19/2021    Procedure: INCISION AND DRAINAGE, left foot;  Surgeon: Eddi Ram DPM;  Location: [facility] OR    INCISION AND DRAINAGE LOWER EXTREMITY, COMBINED Left 12/9/2021    Procedure: INCISION AND DRAINAGE, Left foot;  Surgeon: Eddi Ram DPM;  Location: [facility] OR    INCISION AND DRAINAGE LOWER EXTREMITY, COMBINED Left 1/10/2022    Procedure: INCISION AND DRAINAGE, left foot;  Surgeon: Eddi Ram DPM;  Location: [facility] OR    INCISION AND DRAINAGE LOWER EXTREMITY, COMBINED Left 1/27/2022    Procedure: INCISION AND DRAINAGE, Left foot;  Surgeon: Eddi Ram DPM;  Location: [facility] OR    IR CVC NON TUNNEL PLACEMENT > 5 YRS  7/20/2023    PICC TRIPLE LUMEN PLACEMENT  7/17/2023         PICC TRIPLE LUMEN PLACEMENT  7/18/2023         no family history of premature coronary artery disease Social History      Socioeconomic History    Marital status: Single     Spouse name: Not on file    Number of children: Not on file    Years of education: Not on file    Highest education level: Not on file   Occupational History    Not on file   Tobacco Use    Smoking status: Former     Types: Cigars    Smokeless tobacco: Never    Tobacco comments:     Cigars   Vaping Use    Vaping Use: Never used   Substance and Sexual Activity    Alcohol use: Yes     Comment: < 1 drink a month (only holidays)    Drug use: Never    Sexual activity: Not on file   Other Topics Concern    Parent/sibling w/ CABG, MI or angioplasty before 65F 55M? Not Asked   Social History Narrative    Single, currently not working, has done numerous jobs, lives alone.  Full code. (last updated 4/4/2022)      Social Determinants of Health     Financial Resource Strain: Not on file   Food Insecurity: Not on file   Transportation Needs: Not on file   Physical Activity: Not on file   Stress: Not on file   Social Connections: Not on file   Intimate Partner Violence: Not on file   Housing Stability: Not on file           Lab Results    Chemistry/lipid CBC Cardiac Enzymes/BNP/TSH/INR   Lab Results   Component Value Date    CHOL 157 03/24/2023    HDL 65 03/24/2023    TRIG 60 03/24/2023    BUN 55.2 (H) 07/25/2023     (L) 07/25/2023    CO2 24 07/25/2023    Lab Results   Component Value Date    WBC 15.5 (H) 07/25/2023    HGB 5.9 (LL) 07/25/2023    HCT 17.3 (L) 07/25/2023    MCV 82 07/25/2023     (L) 07/25/2023    Lab Results   Component Value Date    TROPONINI 5.84 (HH) 08/30/2022     (H) 08/24/2022    INR 1.19 (H) 08/24/2022     Lab Results   Component Value Date    TROPONINI 5.84 (HH) 08/30/2022          Weight:    Wt Readings from Last 3 Encounters:   07/25/23 113.6 kg (250 lb 7.1 oz)   11/17/22 100.2 kg (221 lb)   09/28/22 104.3 kg (230 lb)       Allergies  No Known Allergies      Surgical History  Past Surgical History:   Procedure Laterality Date     AMPUTATE FOOT Left 3/21/2022    Procedure: Guillotine AMPUTATION, FOOT;  Surgeon: Ronald Bhatt MD;  Location: [facility] OR    AMPUTATE LEG BELOW KNEE Left 4/4/2022    Procedure: LEFT BELOW  KNEE AMPUTATION;  Surgeon: Ronald Bhatt MD;  Location: [facility] OR    AMPUTATE TOE(S) Left 11/16/2021    Procedure: first and second ray amputation;  Surgeon: Eddi Ram DPM;  Location: [facility] OR    APPENDECTOMY      CV CORONARY ANGIOGRAM N/A 11/16/2021    Procedure: CV CORONARY ANGIOGRAM;  Surgeon: Pam Tabares MD;  Location: [facility] LAB CV    CV CORONARY ANGIOGRAM N/A 8/24/2022    Procedure: CV CORONARY ANGIOGRAM;  Surgeon: Cipriano Lucas MD;  Location: [facility] LAB CV    CV CORONARY ANGIOGRAM N/A 8/29/2022    Procedure: CV CORONARY ANGIOGRAM;  Surgeon: Cipriano Lucas MD;  Location: [facility] CV    CV INTRAVASULAR ULTRASOUND N/A 8/29/2022    Procedure: Intravascular Ultrasound;  Surgeon: Cipriano Lucas MD;  Location: [facility] LAB CV    CV LEFT HEART CATH N/A 11/16/2021    Procedure: Left Heart Cath;  Surgeon: Pam Tabares MD;  Location: [facility] LAB CV    CV PCI N/A 8/29/2022    Procedure: Percutaneous Coronary Intervention stent;  Surgeon: Cipriano Lucas MD;  Location: [facility] CV    CV PCI ANGIOPLASTY N/A 8/29/2022    Procedure: Percutaneous Transluminal Angioplasty;  Surgeon: Cipriano Lucas MD;  Location: [facility] LAB CV    FOOT SURGERY Left     HERNIA REPAIR      INCISION AND DRAINAGE LOWER EXTREMITY, COMBINED Left 11/16/2021    Procedure: INCISION AND DRAINAGE, left foot;  Surgeon: Eddi Ram DPM;  Location: [facility] OR    INCISION AND DRAINAGE LOWER EXTREMITY, COMBINED Left 11/19/2021    Procedure: INCISION AND DRAINAGE, left foot;  Surgeon: Eddi Ram DPM;  Location: [facility] OR    INCISION AND DRAINAGE LOWER EXTREMITY, COMBINED Left 12/9/2021    Procedure: INCISION AND DRAINAGE,  Left foot;  Surgeon: Eddi Ram DPM;  Location: [facility] [place] OR    INCISION AND DRAINAGE LOWER EXTREMITY, COMBINED Left 1/10/2022    Procedure: INCISION AND DRAINAGE, left foot;  Surgeon: Eddi Ram DPM;  Location: [facility] [place] OR    INCISION AND DRAINAGE LOWER EXTREMITY, COMBINED Left 1/27/2022    Procedure: INCISION AND DRAINAGE, Left foot;  Surgeon: Eddi Ram DPM;  Location: [facility] OR    IR CVC NON TUNNEL PLACEMENT > 5 YRS  7/20/2023    PICC TRIPLE LUMEN PLACEMENT  7/17/2023         PICC TRIPLE LUMEN PLACEMENT  7/18/2023            Social History  Tobacco:   History   Smoking Status    Former    Types: Cigars   Smokeless Tobacco    Never    Alcohol:   Social History    Substance and Sexual Activity      Alcohol use: Yes        Comment: < 1 drink a month (only holidays)   Illicit Drugs:   History   Drug Use Unknown       Family History  No family history on file.       Russel Arredondo MD on 7/25/2023      cc: Jaquan Dickerson

## 2023-07-25 NOTE — PLAN OF CARE
[facility] - ICU    RN Progress Note:            Pertinent Assessments:      Please refer to flowsheet rows for full assessment       Flat affect , withrawn. Had abdominal distention ,discomfort ,constipation  and poor appetite per evening report.Updated MD during NOC rounds. Ordered tap water enema. When explained to patient about the plan, he denies any issues with his abdomen. Refused enema. When patient turned on his left side, had large amount of mucus BM that came out.            Key Events - This Shift:     Hemoglobin dropped to 5.9. Text messaged Dr. Thomas at 05:16. Heparin gtt held. Ordered to transfuse 2 units of PRBC . Sent typed and screen . Consent signed . Blood not ready yet when followed up as of report time. Urine output for 8 hours was 185 ml. Had dialysis yesterday. Cardiologist Dr. Arredondo ordered 80  mg IV lasix, albumin 25% 50 gms IV then started Lasix gtt at 15 ml/hr.                                       Barriers to Discharge / Downgrade:     On levophed gtt

## 2023-07-25 NOTE — PROGRESS NOTES
"RENAL PROGRESS NOTE - [facility]        CC: f/u JOHANA    Subjective: Mr. Aguilar last had HD yesterday with UF of 2L, albumin given during run.  No cp, sob. Sleepy today. Discussed with ICU RN.  Remains on norepi.   Start on lasix gtt  UOP 1L yesterday and 0.2L so far today.    ASSESSMENT AND RECOMMENDATIONS:  1. JOHANA/ CKD 3b: baseline CKD due to longstanding DM2, HTN. Now severe ATN in setting of profound hypotension with sepsis, group a strep bacteremia and hypovolemia and contrast nephropathy after CTA, mild rhabdo. CTA on 7/17 with no hydronephrosis. UA on 7/18 very concentrated suggesting intact tubular function, although granular casts present.  Remains oliguric   -dialysis started 7/21 using non tunneled dialysis catheter    -will eval daily for HD needs   -UOP improving   -lasix gtt started, increase to 20mg/hr   -cont to monitor labs, urine output for e/o renal recovery   -Keep HD in place, eval for possible HD needs tomorrow.    2. Hypotension: due to bacteremia/sepsis              -Pressors prn per ICU team              -volume up on exam, avoid excessive IVF        3. Metabolic acidosis: due to JOHANA.               -better today   -dialysis as above   -no need for further iv bicarb     4. HFrEF: EF of 25-30% based on 7/18 echo which also showed normal RVF, normal RA pressures.               -now volume up, plan 2-3 liter uf with dialysis tomorrow               -Dr. Arredondo following     5. Hyponatremia: due to JOHANA, hypervolemia  -improved  -avoid excessive po fluid, IVF as able     6. NSTEMI: per Dr. Arredondo.         7. Bacteremia: Group a strep and rt shoulder myositis              -now on penicillin g and linezolid   -ongoing orthosurgical eval of shoulder     Sudhakar Marin MD  [facility]  [phone]      Objective    PHYSICAL EXAM  BP 92/51   Pulse 85   Temp 99.1  F (37.3  C) (Oral)   Resp 20   Ht 1.88 m (6' 2\")   Wt 113.6 kg (250 lb 7.1 oz)   SpO2 96%   BMI 32.15 " kg/m    I/O last 3 completed shifts:  In: 3363.3 [P.O.:480; I.V.:2833.3]  Out: 2820 [Urine:820; Other:2000]  Wt Readings from Last 3 Encounters:   07/25/23 113.6 kg (250 lb 7.1 oz)   11/17/22 100.2 kg (221 lb)   09/28/22 104.3 kg (230 lb)       GENERAL: nad   HEENT:normocephalic, atraumatic  CARDIOVASCULAR: 1+ leg edema  PULMONARY: Normal effort,  No cyanosis  GASTROINTESTINAL: soft, nt/nd  NEURO: Alert, no gross focal findings  PSYCHIATRIC: Adequate mood and interaction  SKIN: Pale, no jaundice, no rash.    LABORATORIES  Recent Labs   Lab 07/25/23 0442 07/24/23  0442 07/23/23  0524 07/22/23  0517 07/21/23  1601 07/21/23  0438 07/20/23  0448 07/19/23  1531 07/19/23  0951   * 127* 128* 127* 129* 123* 128*   < > 128*   POTASSIUM 4.0 3.7 4.0 3.8 4.4 3.6 3.8   < > 3.6   CHLORIDE 93* 92* 93* 89* 90* 84* 92*   < > 94*   CO2 24 21* 23 21* 21* 19* 18*   < > 15*   BUN 55.2* 71.7* 65.6* 92.7*  --  109.9* 96.9*  --  80.4*   CR 2.71* 3.37* 3.31* 4.67*  --  5.22* 4.71*  --  3.88*   GFRESTIMATED 25* 20* 20* 13*  --  12* 13*  --  17*   YOSVANY 7.9* 8.4* 8.1* 8.5*  --  7.6* 7.5*  --  7.3*   ALBUMIN 2.4*  --   --   --   --  2.1* 2.4*  --   --     < > = values in this interval not displayed.         Recent Labs   Lab 07/25/23  0442 07/23/23  0524 07/22/23  0517 07/21/23  0900 07/20/23  0448 07/19/23  1555   WBC 15.5* 24.9* 28.8* 25.6* 21.3* 12.9*   HGB 5.9* 7.1* 8.5* 9.3* 8.2* 9.0*   HCT 17.3* 21.3* 23.8* 26.2* 23.8* 26.8*   MCV 82 80 79 78 80 82   * 167 183 197 187 123*            MEDICATIONS   amiodarone  400 mg Oral Daily    aspirin  81 mg Oral Daily    docusate sodium  100 mg Oral BID    insulin aspart  1-10 Units Subcutaneous TID AC    insulin aspart  1-7 Units Subcutaneous At Bedtime    insulin glargine  15 Units Subcutaneous At Bedtime    insulin glargine  35 Units Subcutaneous QAM AC    linezolid  600 mg Intravenous Q12H    magnesium oxide  400 mg Oral Daily    multivitamin, therapeutic  1 tablet Oral Daily     penicillin G potassium  4 Million Units Intravenous Q8H    sodium chloride (PF)  10-40 mL Intracatheter Q7 Days    sodium chloride (PF)  3 mL Intracatheter Q8H    sodium chloride (PF)  9 mL Intracatheter During Dialysis/CRRT (from stock)    sodium chloride (PF)  9 mL Intracatheter During Dialysis/CRRT (from stock)    ticagrelor  90 mg Oral BID    vitamin C  500 mg Oral Daily         Lyudmila Medina MD  [facility]  [phone]

## 2023-07-26 NOTE — PROGRESS NOTES
RENAL PROGRESS NOTE    CC: f/u JOHANA    Subjective: Since last seen, pt with improved UO on Lasix gtt with 1675 ml since midnight. Dyspnea improved and denies orthopnea. Right arm sore and still weeping. No dizziness, cramping. No need for HD today but discussed ongoing monitoring.          ASSESSMENT AND RECOMMENDATIONS:  1. JOHANA/ CKD 3b: baseline CKD due to longstanding DM2, HTN. Now severe ATN in setting of profound hypotension with sepsis, group a strep bacteremia and hypovolemia and contrast nephropathy after CTA, mild rhabdo. CTA on 7/17 with no hydronephrosis. UA on 7/18 very concentrated suggesting intact tubular function, although granular casts present.  Remains oliguric   -dialysis started 7/21 with last HD 7/24.   - Improved UO on lasix gtt (20 mg/hr - continue same) and labs stable so continue holding dialysis with daily assessment for needs.    - Maintain HD catheter for now.     - Daily labs and trend UO/weights    2. Hypotension: due to bacteremia/sepsis. Improved and off pressors this AM.               -Pressors prn per ICU team              -Continue efforts to diurese given severe CMP. V         3. Metabolic acidosis: due to JOHANA.               - Stable at present. Trend off dialysis        4. HFrEF: Decompensated. EF of 25-30% based on 7/18 echo which also showed normal RVF, normal RA pressures.               -Good UO on Lasix gtt so continue same for now nad hold further HD/UF.               -Dr. Arredondo following. Will need cor angio once sepsis improves.      5. Hyponatremia: due to JOHANA, hypervolemia  -improved  -avoid excessive po fluid, IVF as able     6. NSTEMI: per Dr. Arredondo.         7. Bacteremia: Group a strep and rt shoulder myositis              -now on penicillin g and linezolid   -ongoing orthosurgical eval of shoulder     Akil Farah MD  [facility]  [phone] (off)         Objective    PHYSICAL EXAM  /63   Pulse 89   Temp 97.9  F (36.6  " C) (Oral)   Resp 23   Ht 1.88 m (6' 2\")   Wt 114.4 kg (252 lb 3.2 oz)   SpO2 98%   BMI 32.38 kg/m    I/O last 3 completed shifts:  In: 2369.25 [P.O.:1120; I.V.:439.25; Other:100]  Out: 1900 [Urine:1900]  Wt Readings from Last 3 Encounters:   07/26/23 114.4 kg (252 lb 3.2 oz)   11/17/22 100.2 kg (221 lb)   09/28/22 104.3 kg (230 lb)       GENERAL: nad , weak  HEENT:normocephalic, atraumatic  CARDIOVASCULAR: RRR, 2+ edema to thighs  PULMONARY: Normal effort,  No cyanosis  GASTROINTESTINAL: soft, nt/nd  NEURO: Alert, no gross focal findings  PSYCHIATRIC: Appropriate mood and interaction  SKIN: right arm wrapped with kerlix but weeping serous fluid    LABORATORIES  Recent Labs   Lab 07/26/23  0427 07/25/23  0442 07/24/23  0442 07/23/23  0524 07/22/23  0517 07/21/23  1601 07/21/23  0438 07/20/23  0448   * 132* 127* 128* 127* 129* 123* 128*   POTASSIUM 4.0 4.0 3.7 4.0 3.8 4.4 3.6 3.8   CHLORIDE 94* 93* 92* 93* 89* 90* 84* 92*   CO2 21* 24 21* 23 21* 21* 19* 18*   BUN 65.0* 55.2* 71.7* 65.6* 92.7*  --  109.9* 96.9*   CR 2.99* 2.71* 3.37* 3.31* 4.67*  --  5.22* 4.71*   GFRESTIMATED 23* 25* 20* 20* 13*  --  12* 13*   YOSVANY 8.5* 7.9* 8.4* 8.1* 8.5*  --  7.6* 7.5*   ALBUMIN  --  2.4*  --   --   --   --  2.1* 2.4*         Recent Labs   Lab 07/26/23  0427 07/25/23  1734 07/25/23  0442 07/23/23  0524 07/22/23  0517 07/21/23  0900 07/20/23  0448 07/19/23  1555   WBC 10.8  --  15.5* 24.9* 28.8* 25.6* 21.3* 12.9*   HGB 7.6* 7.5* 5.9* 7.1* 8.5* 9.3* 8.2* 9.0*   HCT 22.3*  --  17.3* 21.3* 23.8* 26.2* 23.8* 26.8*   MCV 85  --  82 80 79 78 80 82   *  --  125* 167 183 197 187 123*            MEDICATIONS   amiodarone  400 mg Oral Daily    aspirin  81 mg Oral Daily    docusate sodium  100 mg Oral BID    insulin aspart  1-10 Units Subcutaneous TID AC    insulin aspart  1-7 Units Subcutaneous At Bedtime    insulin glargine  15 Units Subcutaneous At Bedtime    insulin glargine  35 Units Subcutaneous QAM AC    linezolid  600 " mg Intravenous Q12H    magnesium oxide  400 mg Oral Daily    multivitamin, therapeutic  1 tablet Oral Daily    penicillin G potassium  4 Million Units Intravenous Q8H    sodium chloride (PF)  10-40 mL Intracatheter Q7 Days    sodium chloride (PF)  3 mL Intracatheter Q8H    ticagrelor  90 mg Oral BID    vitamin C  500 mg Oral Daily

## 2023-07-26 NOTE — PROGRESS NOTES
"Critical Care Progress Note      07/26/2023    Name: Norman Aguilar MRN#: [id number]   Age: 63 year old YOB: 1960     Hsptl Day# 9  ICU DAY #    MV DAY #             Problem List:   Principal Problem:    Elevated troponin  Active Problems:    Dehydration    Hyponatremia    JOHANA (acute kidney injury) (H)    Atrial fibrillation with RVR (H)    Septic shock (H)    Clinically Significant Risk Factors              # Hypoalbuminemia: Lowest albumin = 2.1 g/dL at 7/21/2023  4:38 AM, will monitor as appropriate   # Thrombocytopenia: Lowest platelets = 109 in last 2 days, will monitor for bleeding   # Hypertension: Noted on problem list  # Acute heart failure with reduced ejection fraction: last echo with EF <40% and receiving IV diuretics      # DMII: A1C = 6.9 % (Ref range: <5.7 %) within past 6 months   # Obesity: Estimated body mass index is 32.38 kg/m  as calculated from the following:    Height as of this encounter: 1.88 m (6' 2\").    Weight as of this encounter: 114.4 kg (252 lb 3.2 oz).   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 ED with multiple symptoms including weakness, dehydration, scalp pain/burning, and chronic R arm pain, found to have atrial fibrillation with RVR (HR 160s-180s) and treated with IV metoprolol, now admitted to the ICU for circulatory/distributive shock with suspected sepsis (source unknown), JOHANA, uncontrolled diabetes requiring insulin infusion, and NSTEMI. TTE 7/18/23 showing worsening reduced LVEF 25-30%.            Assessment " and plan :       I have personally reviewed the daily labs, imaging studies, cultures and discussed the case with referring physician and consulting physicians.     My assessment and plan by system for this patient is as follows:    Neurology/Psychiatry:   Awake.  Answers questions appropriately.    Cardiovascular:   Septic shock   Ischemic cardiomyopathy, EF 25 to 30%  Remains in A-fib.  On amiodarone.    Off norepinephrine    Heparin drip on hold due to acute anemia.  Think we can go ahead and restart it.  CT was negative for any retroperitoneal bleed    Pulmonary/Ventilator Management:   Supplemental oxygen to keep O2 sats above 90%.      GI and Nutrition :   Elevated LFTs most likely secondary to shock liver from hypotension plus some increase in AST secondary to muscle damage, patient had increased CK      Renal/Fluids/Electrolytes:   JOHANA on CKD 3.  Started on hemodialysis  Hyponatremia     HD per nephrology, currently on hold    Improved urine output yesterday.  Now on Lasix drip.  1.9 L output yesterday.  So far today 1-1/2 L out    - monitor function and electrolytes as needed with replacement per ICU protocols.  - generally avoid nephrotoxic agents such as NSAID, IV contrast unless specifically required  - adjust medications as needed for renal clearance  - follow I/O's as appropriate.    Infectious Disease:   Group A strep bacteremia.  On MRI myositis.  Blistering on the right upper extremity started on 721.  Visited by Ortho and general surgery.    Continue linezolid.  Continue penicillin G      Endocrine:   Diabetes mellitus on Lantus and sliding scale insulin.    Increased p.m. Lantus to 15 units  Plan  - ICU insulin protocol, goal sugar <180      Hematology/Oncology:   Anemia today getting 2 units of PRBCs  No signs of bleeding  Get CT of the abdomen to ensure she does not have retroperitoneal bleed  Heparin currently on hold        ICU Prophylaxis:   Heparin drip         Key Medications:       amiodarone  400 mg Oral Daily    aspirin  81 mg Oral Daily    docusate sodium  100 mg Oral BID    insulin aspart  1-10 Units Subcutaneous TID AC    insulin aspart  1-7 Units Subcutaneous At Bedtime    insulin glargine  15 Units Subcutaneous At Bedtime    insulin glargine  35 Units Subcutaneous QAM AC    linezolid  600 mg Intravenous Q12H    magnesium oxide  400 mg Oral Daily    multivitamin, therapeutic  1 tablet Oral Daily    penicillin G potassium  4 Million Units Intravenous Q8H    sodium chloride (PF)  10-40 mL Intracatheter Q7 Days    sodium chloride (PF)  3 mL Intracatheter Q8H    ticagrelor  90 mg Oral BID    vitamin C  500 mg Oral Daily      dextrose      furosemide (LASIX) 100 mg in sodium chloride 0.9 % 100 mL infusion 20 mg/hr (07/26/23 1400)    heparin Stopped (07/25/23 0632)    [Held by provider] insulin regular Stopped (07/19/23 0954)    norepinephrine Stopped (07/25/23 1045)    phenylephrine Stopped (07/23/23 1700)               Physical Examination:   Temp:  [97.9  F (36.6  C)-98.2  F (36.8  C)] 98.2  F (36.8  C)  Pulse:  [82-92] 85  Resp:  [14-26] 16  BP: ()/(55-67) 102/58  SpO2:  [96 %-100 %] 97 %    Intake/Output Summary (Last 24 hours) at 7/22/2023 1145  Last data filed at 7/22/2023 1000  Gross per 24 hour   Intake 2011.14 ml   Output 350 ml   Net 1661.14 ml     Wt Readings from Last 4 Encounters:   07/26/23 114.4 kg (252 lb 3.2 oz)   11/17/22 100.2 kg (221 lb)   09/28/22 104.3 kg (230 lb)   09/27/22 104.3 kg (230 lb)     BP - Mean:  [68-84] 74  Resp: 16    No lab results found in last 7 days.    GEN: no acute distress   HEENT: head ncat, sclera anicteric, OP patent, trachea midline   PULM: clear anteriorly    CV/COR: RRR S1S2 no gallop,  No rub, no murmur  ABD: soft nontender, hypoactive bowel sounds, no mass  EXT: Upper extremity swollen but improving  NEURO: grossly intact  SKIN: Blisters on right upper extremity improving. Redness over back/coccyx  LINES: clean, dry intact         Data:    All data and imaging reviewed     ROUTINE ICU LABS (Last four results)  CMP  Recent Labs   Lab 07/26/23  1123 07/26/23  0744 07/26/23  0427 07/25/23  2045 07/25/23  0741 07/25/23  0442 07/24/23  0728 07/24/23  0442 07/23/23  0753 07/23/23  0524 07/21/23  0744 07/21/23  0438 07/20/23  0800 07/20/23  0448   NA  --   --  130*  --   --  132*  --  127*  --  128*   < > 123*  --  128*   POTASSIUM  --   --  4.0  --   --  4.0  --  3.7  --  4.0   < > 3.6  --  3.8   CHLORIDE  --   --  94*  --   --  93*  --  92*  --  93*   < > 84*  --  92*   CO2  --   --  21*  --   --  24  --  21*  --  23   < > 19*  --  18*   ANIONGAP  --   --  15  --   --  15  --  14  --  12   < > 20*  --  18*   * 200* 205* 194*   < > 177*   < > 215*   < > 245*   < > 298*   < > 188*   BUN  --   --  65.0*  --   --  55.2*  --  71.7*  --  65.6*   < > 109.9*  --  96.9*   CR  --   --  2.99*  --   --  2.71*  --  3.37*  --  3.31*   < > 5.22*  --  4.71*   GFRESTIMATED  --   --  23*  --   --  25*  --  20*  --  20*   < > 12*  --  13*   YOSVANY  --   --  8.5*  --   --  7.9*  --  8.4*  --  8.1*   < > 7.6*  --  7.5*   PHOS  --   --   --   --   --   --   --   --   --   --   --  5.0*  --  3.7   PROTTOTAL  --   --   --   --   --  6.5  --   --   --   --   --  6.0*  --  5.7*   ALBUMIN  --   --   --   --   --  2.4*  --   --   --   --   --  2.1*  --  2.4*   BILITOTAL  --   --   --   --   --  1.5*  --   --   --   --   --  2.4*  --  2.5*   ALKPHOS  --   --   --   --   --  239*  --   --   --   --   --  314*  --  198*   AST  --   --   --   --   --  45  --   --   --   --   --  222*  --  241*   ALT  --   --   --   --   --  41  --   --   --   --   --  124*  --  105*    < > = values in this interval not displayed.     CBC  Recent Labs   Lab 07/26/23  0427 07/25/23  1734 07/25/23  0442 07/23/23  0524 07/22/23  0517   WBC 10.8  --  15.5* 24.9* 28.8*   RBC 2.62*  --  2.12* 2.66* 3.02*   HGB 7.6* 7.5* 5.9* 7.1* 8.5*   HCT 22.3*  --  17.3* 21.3* 23.8*   MCV 85  --  82 80 79   MCH 29.0   --  27.8 26.7 28.1   MCHC 34.1  --  34.1 33.3 35.7   RDW 17.5*  --  17.3* 18.5* 18.3*   *  --  125* 167 183     INRNo lab results found in last 7 days.  Arterial Blood GasNo lab results found in last 7 days.    All cultures:  No results for input(s): CULT in the last 168 hours.  Recent Results (from the past 24 hour(s))   CT Abdomen Pelvis w/o Contrast    Narrative    EXAM: CT ABDOMEN PELVIS W/O CONTRAST  LOCATION: [facility]  DATE: 7/25/2023    INDICATION: anemia, ? Retroperitoneal bleed  COMPARISON: 07/17/2023 CTA CAP  TECHNIQUE: CT scan of the abdomen and pelvis was performed without IV contrast. Multiplanar reformats were obtained. Dose reduction techniques were used.  CONTRAST: None.    FINDINGS:   LOWER CHEST: Dual lumen dialysis catheter tips and PICC tip at RA/SVC junction. Mild generalized cardiac enlargement with extensive three-vessel coronary artery calcification. No pericardial effusion. Decreased density intracardiac blood pool consistent   with patient's known nonspecific anemia. Small to moderate pleural effusions, bibasilar atelectasis and interstitial edema have developed.     HEPATOBILIARY: Normal liver. No bile duct dilatation. Cholelithiasis unchanged.    PANCREAS: Normal.    SPLEEN: Normal.    ADRENAL GLANDS: Normal.    KIDNEYS/BLADDER: Well-positioned Garcia catheter. Kidneys, ureters and bladder are normal.    BOWEL: Small volume of ascites has developed. Mild gaseous distention of the stomach and the nondependent small bowel loops and colon could indicate adynamic ileus. No bowel obstruction. No free air.    LYMPH NODES: No lymphadenopathy.    VASCULATURE: Mild calcified atheromatous plaque throughout the normal caliber abdominal aorta.    PELVIC ORGANS: Unremarkable.    MUSCULOSKELETAL: Severe generalized subcutaneous edema has developed. No bone lesion or fracture.      Impression    IMPRESSION:   1.  No focal hematoma or other signs of bleeding  identified.  2.  Severe generalized subcutaneous edema, small volume ascites, small pleural effusions, bibasilar atelectasis and interstitial edema have developed.   3.  Mild gaseous distention of the stomach and the nondependent small bowel loops and colon could indicate adynamic ileus.   4.  Cholelithiasis unchanged.         30 minutes were spent with the patient

## 2023-07-26 NOTE — PROGRESS NOTES
Care Management Follow Up    Length of Stay (days): 9    Expected Discharge Date: pending     Concerns to be Addressed:  renal and cardio status, nutrition, infection, Lasix gtt, IV antibiotic     Patient plan of care discussed at interdisciplinary rounds: Yes     Anticipated Discharge Disposition:  TBD     Anticipated Discharge Services:  TBD     Anticipated Discharge DME:  TBD           Additional Information:  Patient presenting with multiple symptoms including weakness, dehydration, scalp pain/burning, and chronic R arm pain, found to have atrial fibrillation with RVR  (HR 160s-180s) and treated with IV metoprolol, now admitted to the ICU for circulatory/distributive shock with suspected sepsis (source unknown), JOHANA, uncontrolled diabetes requiring insulin infusion, and possible NSTEMI vs demand ischemia.   Nephrology following JOHANA, started on dialysis 7/21  ID following Group A strep bacteremia. Current plan; continue penicillin G IV for group A strep infection. Continue linezolid 600 mg IV twice daily for antitoxin effects.  Cardiology following hypervolemic, IV diuresing.  WOC following, wound care every three days.     Will need PT/OT eval when appropriate.        Social History:  Per sister Cindy, patient has been homeless since Nov, 2021. 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 Pt has lived in between [facility], or her daughter's house the last couple years, and in the past has lived at his [facility] for 6 yrs at one time. Pt before that was taking care of his parents in their home. He last lived at [facility] prior to hospitalization. Pt Ind at baseline, drives (owns a car). Pt has a prosthetic. 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 She stated pt tried signing up for social security disability, but pt claims he was denied, she does not believe pt. Pt does get food stamps of some sort, and does have a  county worker, he will not give her info for [place]. Pt has [employer] [place] insurance. Cindy cannot take pt back into her home, and her daughter will not let pt return either. She prefers if able pt goes to TCU, then either friends home, housing or some sort, or shelter. Transport TBD.         7/26/23:  No medically ready for discharge. Final discharge plan pending progression and recommendations.       Lillian Montalvo RN

## 2023-07-26 NOTE — PROGRESS NOTES
Infectious Diseases Progress Note  [facility]    Date of visit: 07/26/2023     ASSESSMENT   63-year-old man with a history of hypertension, coronary artery disease, CHF, diabetes, who is admitted with septic shock.  ID following regarding strep bacteremia.    Group A strep bacteremia.  Complicated skin and soft tissue infection.  Admitted to the ICU on pressors.  Acute kidney injury, and transaminitis.  Concerning for toxic shock syndrome.  Also having right shoulder pain.  Myositis seen on MRI. Blistering starting on 7/21, possibly from pressure or swelling. Seen by ortho and general surgery  Septic shock.  Had been On pressors. Active issue  Right shoulder myositis. Denies acute injury. Elevated ck, improving.    Acute kidney injury.  Rising creatinine.  Starting dialysis 7/20 see Dr Reich notes, reviewed  Leukocytosis- improved    Principal Problem:    Elevated troponin  Active Problems:    Dehydration    Hyponatremia    JOHNAA (acute kidney injury) (H)    Atrial fibrillation with RVR (H)    Septic shock (H)       PLAN   -had been on penicillin G IV for group A strep infection.  Switch to ceftriaxone, and monitor results  Pharmacy to assist with renal dosing  Linezolid switch to oral, likely stop in 24 to 48 hours, monitor CK  appreciate surgical assessment. No indication for surgery at this time  -no IVIG due to renal failure initially, clinically improved now  -Updated patient.  Patient will likely need LTAC on discharge, will need IV antibiotics for 2 weeks depending on clinical response, longer course may be needed.?  LTAC candidate  Patient new to me on 7/24/2023       Lacie Mata MD  [facility]  Answering Service: [phone]  On-Call ID provider: [phone], option: 9          ===========================================      SUBJECTIVE / INTERVAL HISTORY:     Seen in ICU  Patient fatigued, tired, some pain in arm, overall slightly improved  Previous note:  Discussed  "with nurse- appreciate input    Previous note:   Dressing on arm  No fever.  Improved white count. 3L HD run yesterday.  Will run tomorrow again.       Antibiotics   Penicillin g 7/20- 12 MU per day  Linezolid 7/20-    Previous:  Zosyn 7/17-7/20  Vancomycin 7/17      Physical Exam     Temp:  [97.9  F (36.6  C)-98.2  F (36.8  C)] 98.2  F (36.8  C)  Pulse:  [82-92] 86  Resp:  [14-26] 19  BP: ()/(55-67) 105/61  SpO2:  [96 %-100 %] 98 %    /61   Pulse 86   Temp 98.2  F (36.8  C) (Oral)   Resp 19   Ht 1.88 m (6' 2\")   Wt 114.4 kg (252 lb 3.2 oz)   SpO2 98%   BMI 32.38 kg/m      GENERAL:  appears ill, lying in bed  HENT:  Head is normocephalic, atraumatic.   EYES:  Eyes have anicteric sclerae without conjunctival injection   LUNGS:  Clear to auscultation.  Tachypneic (better today)  CARDIOVASCULAR:  Regular rate and rhythm with no murmurs, gallops or rubs.  ABDOMEN:  Normal bowel sounds, soft, nontender.   EXT: Right shoulder swelling, tenderness.  SKIN:  Shallow blisters on posterior right arm, superficial, weeping. Right IJ line is in place without any surrounding erythema.   NEUROLOGIC:  Grossly nonfocal.    Photo in chart previously      Cultures   7/17 blood cultures x2: Strep pyogenes  7/19 blood cultures x2: No growth to date          Pertinent Labs:     Recent Labs   Lab 07/26/23  0427 07/25/23  1734 07/25/23  0442 07/23/23  0524   WBC 10.8  --  15.5* 24.9*   HGB 7.6* 7.5* 5.9* 7.1*   *  --  125* 167         Recent Labs   Lab 07/26/23  0427 07/25/23  0442 07/24/23  0442 07/21/23  1601 07/21/23  0438 07/20/23  0448   * 132* 127*   < > 123* 128*   CO2 21* 24 21*   < > 19* 18*   BUN 65.0* 55.2* 71.7*   < > 109.9* 96.9*   ALBUMIN  --  2.4*  --   --  2.1* 2.4*   ALKPHOS  --  239*  --   --  314* 198*   ALT  --  41  --   --  124* 105*   AST  --  45  --   --  222* 241*    < > = values in this interval not displayed.         No results for input(s): CRP, SED in the last 168 " hours.        Imaging:     IR CVC Non Tunnel Placement > 5 Yrs    Result Date: 7/20/2023  LOCATION: [facility] DATE: 7/20/2023 PROCEDURE: NON-TUNNELED DIALYSIS CATHETER PLACEMENT 1.  Insertion of a non-tunneled central venous catheter. 2.  Ultrasound guidance for vascular access. 3.  Fluoroscopic guidance for central venous access device placement. INTERVENTIONAL RADIOLOGIST: Joseph Campos MD INDICATION: Renal insufficiency requiring renal replacement therapy, plan for nontunneled dialysis catheter placement. CONSENT: The risks, benefits and alternatives of the procedure were discussed with the patient or representative in detail. All questions were answered. Informed consent was given to proceed with the procedure. MODERATE SEDATION: None. CONTRAST: None. ANTIBIOTICS: None. ADDITIONAL MEDICATIONS: None. FLUOROSCOPIC TIME: 0.5 minutes. RADIATION DOSE: Air Kerma: 5 mGy. COMPLICATIONS: No immediate complications. UNIVERSAL PROTOCOL: The operative site was marked and any prior imaging was reviewed. Required items including blood products, implants, devices and special equipment was made available. Patient identity was confirmed either verbally, with demographic information, hospital assigned identification or other identification markers. A timeout was performed immediately prior to the procedure. STERILE BARRIER TECHNIQUE: Maximum sterile barrier technique was used. Cutaneous antisepsis was performed at the operative site with application of 2% chlorhexidine and large sterile drape. Prior to the procedure, the  and assistant performed hand hygiene and wore hat, mask, sterile gown, and sterile gloves during the entire procedure. PROCEDURE:  Using local anesthesia and real-time ultrasound guidance the right internal jugular vein was accessed. Imaging demonstrates an anechoic and compressible vein. A permanent image was stored to the patient's medical record. Using this access, a 20 cm  dialysis catheter was advanced using fluoroscopic guidance until the tip was in the proximal right atrium. FINDINGS: At the completion of the study he radiograph was performed utilizing the in room fluoroscopic unit. Imaging demonstrates the nontunneled dialysis catheter tip terminating in the proximal right atrium.     IMPRESSION:  1.  Successful non-tunneled dialysis catheter placement. 2.  The catheter is ready for use.    MR Shoulder Right w/o Contrast    Result Date: 7/19/2023  EXAM: MR SHOULDER RIGHT W/O CONTRAST LOCATION: [facility] DATE: 7/19/2023 INDICATION: Septic shock with bacteremia, concern for right shoulder osteomyelitis or joint infection. COMPARISON: None. TECHNIQUE: Unenhanced. FINDINGS: ROTATOR CUFF: -Supraspinatus: Moderate severity tendinopathy. No tearing or retraction. No atrophy. -Infraspinatus: Moderate severity infraspinatus tendinopathy without evidence for well-defined partial or full-thickness tearing. No retraction but there is T2 signal abnormality within the muscular portion, which may be due to muscle strain. Infectious or inflammatory myositis could have this appearance. -Subscapularis: No tendon tear, tendinopathy or fatty atrophy. -Teres minor: No tendon tear, tendinopathy or fatty atrophy. CORACOACROMIAL ARCH: -Morphology: Type II acromion. No subacromial spur. Subacromial and subcoracoid space are normal. -Bursa: Fluid in the subacromial subdeltoid bursa. The coracoacromial and coracoclavicular ligaments are negative. ACROMIOCLAVICULAR JOINT: -Hypertrophic change. No significant diastasis or effusion. LONG HEAD OF BICEPS TENDON: -Mild tendinopathy within the intra-articular portion of the long head of the biceps. GLENOHUMERAL JOINT: -Labrum: No labral tear. No paralabral cyst. -Cartilage: Grade II-grade III cartilage loss both sides of the joint without significant surface irregularity. -Joint space: No effusion or synovitis. -Glenohumeral ligaments and  capsule: No pericapsular inflammation. BONES: -No fracture or concerning marrow replacing lesion. No evidence for osteomyelitis. SOFT TISSUES: -Extensive but nonspecific T2 signal abnormality is present within the deltoid musculature, most marked posteriorly but this is also seen within the latissimus muscle, distal aspect of the trapezius, infraspinatus as described, and portions of the subscapularis. The multiplicity of muscle groups involved makes a strain type injury considered unlikely and this is more likely secondary to infectious or inflammatory myositis. There is no evidence for organized fluid collection to suggest abscess.     IMPRESSION: 1.  Extensive signal abnormality within multiple muscles of the shoulder girdle including portions of the visualized latissimus and trapezius muscles. The findings are likely secondary to infectious or inflammatory myositis versus contusion or muscle strain type injury. No evidence for abscess. 2.  No evidence for osteomyelitis or septic arthropathy. 3.  Small amount of fluid within the subacromial subdeltoid bursa. 4.  No evidence for fracture. 5.  Moderate severity tendinopathy within the supraspinatus and infraspinatus. 6.  Tendinopathy within the intraarticular portion of the biceps. 7.  Degenerative change at the glenohumeral joint with grade II-grade III cartilage loss but no significant surface irregularity.    XR Chest Port 1 View    Result Date: 7/18/2023  EXAM: XR CHEST PORT 1 VIEW LOCATION: [facility] DATE: 7/18/2023 INDICATION: RN placed PICC   verify tip placement COMPARISON: 07/17/2023     IMPRESSION: Left upper extremity PICC terminates at the SVC - RA junction. No acute cardiopulmonary abnormality.    Reviewed labs cultures, discussed with patient  Chart reviewed  Notes reviewed

## 2023-07-26 NOTE — PROGRESS NOTES
"CLINICAL NUTRITION SERVICES - REASSESSMENT NOTE     Nutrition Prescription    RECOMMENDATIONS FOR MDs/PROVIDERS TO ORDER:    Malnutrition Status:    Severe in context of acute illness    Recommendations already ordered by Registered Dietitian (RD):  Discontinued Gelatein plus per pt preference  Continue Glucerna  Offered special k bar- pt declined    Presented the idea of a temporary tube feeding to patient.  He replied by saying he plans on ordering a meal.    Future/Additional Recommendations:  Will monitor po, wt, labs     EVALUATION OF THE PROGRESS TOWARD GOALS   Diet: 2 g Sodium and 60 mg per meal Consistent Carbohydrate, Needs assistance  Gelatein plus daily - 60 mL yesterday, pt says he is trying to drink this supplement  Glucerna daily strawberry at lunch - ate one yesterday, not eating today  Intake: Poor, 0% of meals,  pt is taking fluids per Nursing  - day 9 of poor intake.  Pt does order  1- 2 x daily  Pt reports the Gelatein plus causes problems with his intestines and colon.  Pt offered Special K bar - he declined  Pt says he may order a chicken caesar salad, or an omelet, or the beef roast this afternoon.  Writer volunteered to order for the patient but, he declined.   *Pt has placed an order for chicken caesar salad since RD visited.     NEW FINDINGS   Per ICU Rounds/chart review  HD yesterday  Urine output is up today  Intensivist considered liberalized diet yesterday per RD request however, pt says he doesn't like sweet or salty foods.    ANTHROPOMETRICS  Height: 188 cm (6' 2\")  Most Recent Weight: 114.4 kg (252 lb 3.2 oz) , pt is fluid up  Admit weight: 90.7 kg (200 lb) 7/17/23  102.3 kg (225 lf 8.5 oz) 7/19/23  BMI: Obesity Grade I BMI 30-34.9    GI CONCERNS  Watery stool today    LABS  Reviewed  Na 130 L  BUN 65 H  Crat 2.99 H  FSBG 200, 187    MEDICATIONS  Reviewed  Colace  Novolog  Lantus 15 unit(s) at bedtime and 35 units in the morning  Mag ox  Multivitamin with minerals  IV ABX  Vit " C  Cont, IV Lasix    MALNUTRITION:  % Weight Loss:  None noted, fluid up  % Intake:  </= 50% for >/= 5 days (severe malnutrition)  Subcutaneous Fat Loss:  None observed - possibly masked by fluid retention, did not view lower extremities or back  Muscle Loss:  None observed - possibly masked by fluid retention, did not view lower extremities or back  Fluid Retention:  Moderate +2 -+3    Malnutrition Diagnosis: Severe malnutrition  In Context of:  Acute illness or injury    Previous Goals   Meet nutrition needs  BG , 180  Evaluation: Not met    Previous Nutrition Diagnosis  Altered nutrition-related laboratory values related to DM, renal disease, critical illness as evidenced by elevated LFTs, elevated renal labs, elevated BG    Evaluation: labs elevated but, some are improving    CURRENT NUTRITION DIAGNOSIS  Malnutrition related to poor appetite as evidenced by <=50% of meals in >= 5 days and moderate fluid accumulation      INTERVENTIONS  Implementation  Nutrition Education - Discussed pt's poor intake during hospitalization and the importance of eating to start getting stronger.  Writer presented the idea of a temporary tube feeding - the patient replied he is thinking about eating and plans to order a meal.  He said he is not concerned about limited movement and limited nutrition - he was sick last year and once he started feeling better he was fine.  Medical food supplement therapy - continue Glucerna and discontinued Gelatein plus    Goals  Meet nutrition needs - drinking more fluids, eating very little  BG < 180 - not met    Monitoring/Evaluation  Progress toward goals will be monitored and evaluated per protocol.

## 2023-07-26 NOTE — PROGRESS NOTES
HEART CARE NOTE          Assessment/Recommendations     1. HFrEF/ICM c/b circulatory shock  Assessment / Plan  Hypervolemic; now s/p multiple rounds of IHD; UOP continues to improve on furosemide gtt; continue to monitor UOP, hemodynamics and renal function closely; discussed with nephrology  Continue pressor support - wean as tolerated  GDMT on hold given the above      2. NSTEMI  Assessment / Plan  Hx of coronary angiogram significant for severe multi-vessel CAD during admission in 11/21 ; ELY/Pa consulted with plans for CABG in 2-3 months at that time; patient was readmitted before CABG with notable progression of RCA lesion CTS was consulted once again and declined to offer surgical intervention at which time the patient underwent complex PCI with JESSICA x 2 to the prox and distal RCA  He now presents approx 1 year later with an NSTEMI; will plan to proceed with coronary angiogram +/- PCI once there is no longer concern for sepsis/infection and renal function stable - patient denies chest pain/anginal equivalents  Continue ASA, and ticagrelor     3. Septic shock  Assessment / Plan  Management/antibiotics and supportive care per ICU team  Wean pressors as tolerated  - currently on levo     4. DM2  Assessment / Plan  Management per primary team     5. Paroxysmal afib  Assessment / Plan  Currently in RVR - continue amiodarone load  Continue heparin gtt       6. JOHANA on CKD  Assessment / Plan  In the setting of circulatory shock and renal hypoperfusion  Now on intermittent IHD - UOP picking up; will attempt IV diuresis today and continue to monitor renal function closely     Patient remains critically ill in the ICU requiring hemodynamic support via Iv pressors for combined cardiogenic and distributive shock. 60 minutes spent on critical care.    History of Present Illness/Subjective      Mr. Norman Aguilar is a 62 year old male with a PMHx significant for CAD, CHF, HTN, IDDM, paroxysmal atrial fibrillation,  "left foot osteomyelitis failure of antibiotics s/p left BKA, admitted on 8/23/2022 with symptoms concerninf for stroke. Troponin was noted to be elevated.     Today, Mr. Thomas denies any acute cardiac events or complaints; Management plan as detailed above.      ECG: Personally reviewed. sinus tachycardia, occasional PVC noted, unifocal.     Repeat echo:  Left ventricular function is decreased. The ejection fraction is 25-30%  (severely reduced).  The left atrium is mildly dilated.  IVC diameter <2.1 cm collapsing >50% with sniff suggests a normal RA pressure  of 3 mmHg.  TAPSE is normal, which is consistent with normal right ventricular systolic  function.  The patient exhibited frequent PVCs.  No hemodynamically significant valvular abnormalities on 2D or color flow  imaging. The study was technically difficult.     ECHO (personnaly Reviewed):  The left ventricle is normal in size with normal left ventricular wall  thickness.  Left ventricular function is decreased. The ejection fraction is 30-35%  (moderately reduced).  Mildly decreased right ventricular systolic function  Moderate biatrial enlargement  No hemodynamically significant valve disease  IVC diameter >2.1 cm collapsing <50% with sniff suggests a high RA pressure  estimated at 15 mmHg or greater.  Compared to prior study, there is no significant change.          Physical Examination Review of Systems   /60   Pulse 86   Temp 98.1  F (36.7  C) (Oral)   Resp 15   Ht 1.88 m (6' 2\")   Wt 114.4 kg (252 lb 3.2 oz)   SpO2 100%   BMI 32.38 kg/m    Body mass index is 32.38 kg/m .  Wt Readings from Last 3 Encounters:   07/26/23 114.4 kg (252 lb 3.2 oz)   11/17/22 100.2 kg (221 lb)   09/28/22 104.3 kg (230 lb)     General Appearance:   no distress, normal body habitus   ENT/Mouth: membranes moist, no oral lesions or bleeding gums.      EYES:  no scleral icterus, normal conjunctivae   Neck: no carotid bruits or thyromegaly   Chest/Lungs:   lungs are " clear to auscultation, no rales or wheezing, equal chest wall expansion    Cardiovascular:   Irregular. Normal first and second heart sounds with no murmurs, rubs, or gallops; the carotid, radial and posterior tibial pulses are intact, + JVD and LE edema bilaterally    Abdomen:  no organomegaly, masses, bruits, or tenderness; bowel sounds are present   Extremities: no cyanosis or clubbing   Skin: no xanthelasma, warm.    Neurologic: alert and oriented x3, calm     Psychiatric: alert and oriented x3, calm     A complete 10 systems ROS was reviewed  And is negative except what is listed in the HPI.          Medical History  Surgical History Family History Social History   Past Medical History:   Diagnosis Date    Anemia     Chronic systolic CHF (congestive heart failure) (H)     Coronary artery disease     Diabetic neuropathy (H)     DM type 2 w Neuropathy     GERD (gastroesophageal reflux disease)     Hyperlipidemia     Hypertension     Intermittent atrial fibrillation (H)     on Eliquis    Ischemic cardiomyopathy 11/16/2021    Echo with EF of 30-35%    NSTEMI (non-ST elevated myocardial infarction) (H) 11/15/2021    Osteomyelitis of left foot (H) 04/04/2022    Renal disease     Retinopathy     Sleep disturbance     Past Surgical History:   Procedure Laterality Date    AMPUTATE FOOT Left 3/21/2022    Procedure: Guillotine AMPUTATION, FOOT;  Surgeon: Ronald Bhatt MD;  Location: [facility] OR    AMPUTATE LEG BELOW KNEE Left 4/4/2022    Procedure: LEFT BELOW  KNEE AMPUTATION;  Surgeon: Ronald Bhatt MD;  Location: [facility] OR    AMPUTATE TOE(S) Left 11/16/2021    Procedure: first and second ray amputation;  Surgeon: Eddi Ram DPM;  Location: [facility] OR    APPENDECTOMY      CV CORONARY ANGIOGRAM N/A 11/16/2021    Procedure: CV CORONARY ANGIOGRAM;  Surgeon: Pam Tabares MD;  Location: [facility] CATH LAB CV    CV CORONARY ANGIOGRAM N/A 8/24/2022    Procedure: CV CORONARY ANGIOGRAM;   Surgeon: Cipriano Lucas MD;  Location: [facility] LAB CV    CV CORONARY ANGIOGRAM N/A 8/29/2022    Procedure: CV CORONARY ANGIOGRAM;  Surgeon: Cipriano Lucas MD;  Location: [facility] LAB CV    CV INTRAVASULAR ULTRASOUND N/A 8/29/2022    Procedure: Intravascular Ultrasound;  Surgeon: Cipriano Lucas MD;  Location: [facility] LAB CV    CV LEFT HEART CATH N/A 11/16/2021    Procedure: Left Heart Cath;  Surgeon: Pam Tabares MD;  Location: [facility] LAB CV    CV PCI N/A 8/29/2022    Procedure: Percutaneous Coronary Intervention stent;  Surgeon: Cipriano Lucas MD;  Location: [facility] CV    CV PCI ANGIOPLASTY N/A 8/29/2022    Procedure: Percutaneous Transluminal Angioplasty;  Surgeon: Cipriano uLcas MD;  Location: [facility] LAB CV    FOOT SURGERY Left     HERNIA REPAIR      INCISION AND DRAINAGE LOWER EXTREMITY, COMBINED Left 11/16/2021    Procedure: INCISION AND DRAINAGE, left foot;  Surgeon: Eddi Ram DPM;  Location: [facility] OR    INCISION AND DRAINAGE LOWER EXTREMITY, COMBINED Left 11/19/2021    Procedure: INCISION AND DRAINAGE, left foot;  Surgeon: Eddi Ram DPM;  Location: [facility] OR    INCISION AND DRAINAGE LOWER EXTREMITY, COMBINED Left 12/9/2021    Procedure: INCISION AND DRAINAGE, Left foot;  Surgeon: Eddi Ram DPM;  Location: [facility] OR    INCISION AND DRAINAGE LOWER EXTREMITY, COMBINED Left 1/10/2022    Procedure: INCISION AND DRAINAGE, left foot;  Surgeon: Eddi Ram DPM;  Location: [facility] OR    INCISION AND DRAINAGE LOWER EXTREMITY, COMBINED Left 1/27/2022    Procedure: INCISION AND DRAINAGE, Left foot;  Surgeon: Eddi Ram DPM;  Location: [facility] OR    IR CVC NON TUNNEL PLACEMENT > 5 YRS  7/20/2023    PICC TRIPLE LUMEN PLACEMENT  7/17/2023         PICC TRIPLE LUMEN PLACEMENT  7/18/2023         no family history of premature coronary artery disease Social History      Socioeconomic History    Marital status: Single     Spouse name: Not on file    Number of children: Not on file    Years of education: Not on file    Highest education level: Not on file   Occupational History    Not on file   Tobacco Use    Smoking status: Former     Types: Cigars    Smokeless tobacco: Never    Tobacco comments:     Cigars   Vaping Use    Vaping Use: Never used   Substance and Sexual Activity    Alcohol use: Yes     Comment: < 1 drink a month (only holidays)    Drug use: Never    Sexual activity: Not on file   Other Topics Concern    Parent/sibling w/ CABG, MI or angioplasty before 65F 55M? Not Asked   Social History Narrative    Single, currently not working, has done numerous jobs, lives alone.  Full code. (last updated 4/4/2022)      Social Determinants of Health     Financial Resource Strain: Not on file   Food Insecurity: Not on file   Transportation Needs: Not on file   Physical Activity: Not on file   Stress: Not on file   Social Connections: Not on file   Intimate Partner Violence: Not on file   Housing Stability: Not on file           Lab Results    Chemistry/lipid CBC Cardiac Enzymes/BNP/TSH/INR   Lab Results   Component Value Date    CHOL 157 03/24/2023    HDL 65 03/24/2023    TRIG 60 03/24/2023    BUN 65.0 (H) 07/26/2023     (L) 07/26/2023    CO2 21 (L) 07/26/2023    Lab Results   Component Value Date    WBC 10.8 07/26/2023    HGB 7.6 (L) 07/26/2023    HCT 22.3 (L) 07/26/2023    MCV 85 07/26/2023     (L) 07/26/2023    Lab Results   Component Value Date    TROPONINI 5.84 (HH) 08/30/2022     (H) 08/24/2022    INR 1.19 (H) 08/24/2022     Lab Results   Component Value Date    TROPONINI 5.84 (HH) 08/30/2022          Weight:    Wt Readings from Last 3 Encounters:   07/26/23 114.4 kg (252 lb 3.2 oz)   11/17/22 100.2 kg (221 lb)   09/28/22 104.3 kg (230 lb)       Allergies  No Known Allergies      Surgical History  Past Surgical History:   Procedure Laterality Date     AMPUTATE FOOT Left 3/21/2022    Procedure: Guillotine AMPUTATION, FOOT;  Surgeon: Ronald Bhatt MD;  Location: [facility] [place] OR    AMPUTATE LEG BELOW KNEE Left 4/4/2022    Procedure: LEFT BELOW  KNEE AMPUTATION;  Surgeon: Ronald Bhatt MD;  Location: [facility] [place] OR    AMPUTATE TOE(S) Left 11/16/2021    Procedure: first and second ray amputation;  Surgeon: Eddi Ram DPM;  Location: [facility] [place] OR    APPENDECTOMY      CV CORONARY ANGIOGRAM N/A 11/16/2021    Procedure: CV CORONARY ANGIOGRAM;  Surgeon: Pam Tabares MD;  Location: [facility] LAB CV    CV CORONARY ANGIOGRAM N/A 8/24/2022    Procedure: CV CORONARY ANGIOGRAM;  Surgeon: Cipriano Lucas MD;  Location: [facility] LAB CV    CV CORONARY ANGIOGRAM N/A 8/29/2022    Procedure: CV CORONARY ANGIOGRAM;  Surgeon: Cipriano Lucas MD;  Location: [facility] CV    CV INTRAVASULAR ULTRASOUND N/A 8/29/2022    Procedure: Intravascular Ultrasound;  Surgeon: Cipriano Lucas MD;  Location: [facility] LAB CV    CV LEFT HEART CATH N/A 11/16/2021    Procedure: Left Heart Cath;  Surgeon: Pam Tabares MD;  Location: [facility] LAB CV    CV PCI N/A 8/29/2022    Procedure: Percutaneous Coronary Intervention stent;  Surgeon: Cipriano Lucas MD;  Location: [facility] CV    CV PCI ANGIOPLASTY N/A 8/29/2022    Procedure: Percutaneous Transluminal Angioplasty;  Surgeon: Cipriano Lucas MD;  Location: [facility] LAB CV    FOOT SURGERY Left     HERNIA REPAIR      INCISION AND DRAINAGE LOWER EXTREMITY, COMBINED Left 11/16/2021    Procedure: INCISION AND DRAINAGE, left foot;  Surgeon: Eddi Ram DPM;  Location: [facility] [place] OR    INCISION AND DRAINAGE LOWER EXTREMITY, COMBINED Left 11/19/2021    Procedure: INCISION AND DRAINAGE, left foot;  Surgeon: Eddi Ram DPM;  Location: [facility] [place] OR    INCISION AND DRAINAGE LOWER EXTREMITY, COMBINED Left 12/9/2021    Procedure: INCISION AND DRAINAGE,  Left foot;  Surgeon: Eddi Ram DPM;  Location: [facility] OR    INCISION AND DRAINAGE LOWER EXTREMITY, COMBINED Left 1/10/2022    Procedure: INCISION AND DRAINAGE, left foot;  Surgeon: Eddi Ram DPM;  Location: [facility] OR    INCISION AND DRAINAGE LOWER EXTREMITY, COMBINED Left 1/27/2022    Procedure: INCISION AND DRAINAGE, Left foot;  Surgeon: Eddi Ram DPM;  Location: [facility] OR    IR CVC NON TUNNEL PLACEMENT > 5 YRS  7/20/2023    PICC TRIPLE LUMEN PLACEMENT  7/17/2023         PICC TRIPLE LUMEN PLACEMENT  7/18/2023            Social History  Tobacco:   History   Smoking Status    Former    Types: Cigars   Smokeless Tobacco    Never    Alcohol:   Social History    Substance and Sexual Activity      Alcohol use: Yes        Comment: < 1 drink a month (only holidays)   Illicit Drugs:   History   Drug Use Unknown       Family History  No family history on file.       Russel Arredondo MD on 7/26/2023      cc: Jaquan Dickerson

## 2023-07-26 NOTE — PLAN OF CARE
Problem: Malnutrition  Goal: Improved Nutritional Intake  Outcome: Not Progressing     Problem: Oral Intake Inadequate  Goal: Improved Oral Intake  Outcome: Not Progressing   Goal Outcome Evaluation:     Pt is drinking fluids, intake remains poor. Pt says he will try to drink Glucerna. Dislikes Gelatein plus.  Declined special k bar when RD offered.  Presented idea of temporary tube feeding.  Pt stated he was thinking of ordering a meal.  Will continue to monitor.

## 2023-07-27 NOTE — PROGRESS NOTES
Infectious Diseases Progress Note  [facility]    Date of visit: 07/27/2023     ASSESSMENT   63-year-old man with a history of hypertension, coronary artery disease, CHF, diabetes, who is admitted with septic shock.  ID following regarding strep bacteremia.    Group A strep bacteremia.  Complicated skin and soft tissue infection.  Admitted to the ICU on pressors.  Acute kidney injury, and transaminitis.  Concerning for toxic shock syndrome.  Also having right shoulder pain.  Myositis seen on MRI. Blistering starting on 7/21, possibly from pressure or swelling. Seen by ortho and general surgery  Septic shock.  Had been On pressors. Active issue  Right shoulder myositis. Denies acute injury. Elevated ck, improving.    Acute kidney injury.  Rising creatinine.  Starting dialysis 7/20 see Dr Reich notes, reviewed  Leukocytosis- improved    Principal Problem:    Elevated troponin  Active Problems:    Dehydration    Hyponatremia    JOHANA (acute kidney injury) (H)    Atrial fibrillation with RVR (H)    Septic shock (H)       PLAN   -had been on penicillin G IV for group A strep infection.  Switched to ceftriaxone on 7/27/2023, and monitor results  Pharmacy to assist with renal dosing  Linezolid switch to oral, and stopped on 7/27/2023   appreciate surgical assessment. No indication for surgery at this time  -no IVIG due to renal failure initially, clinically improved now  -Updated patient.  Patient will likely need LTAC on discharge, will need IV antibiotics for 2 weeks depending on clinical response, longer course may be needed.?  LTAC candidate  Patient new to me on 7/24/2023   Updated ICU staff on 7/27/2023         Lacie Mata MD  [facility]  Answering Service: [phone]  On-Call ID provider: [phone], option: 9          ===========================================      SUBJECTIVE / INTERVAL HISTORY:     Seen in ICU  Thirsty   Hoping to reposition  Updated ICU staff    Patient  "fatigued, tired, some pain in arm, overall slightly improved  Previous note:  Discussed with nurse- appreciate input    Previous note:   Dressing on arm  No fever.  Improved white count. 3L HD run yesterday.  Will run tomorrow again.       Antibiotics   Penicillin g 7/20- 12 MU per day  Linezolid 7/20-    Previous:  Zosyn 7/17-7/20  Vancomycin 7/17      Physical Exam     Temp:  [97.5  F (36.4  C)-98.7  F (37.1  C)] 98.7  F (37.1  C)  Pulse:  [] 86  Resp:  [13-26] 15  BP: ()/(52-67) 102/58  SpO2:  [95 %-100 %] 95 %    /58 (BP Location: Left leg)   Pulse 86   Temp 98.7  F (37.1  C) (Oral)   Resp 15   Ht 1.88 m (6' 2\")   Wt 112.6 kg (248 lb 3.2 oz)   SpO2 95%   BMI 31.87 kg/m      GENERAL:  appears ill, lying in bed  HENT:  Head is normocephalic, atraumatic.   EYES:  Eyes have anicteric sclerae without conjunctival injection   LUNGS:  Clear to auscultation.  Tachypneic (better today)  CARDIOVASCULAR:  Regular rate and rhythm with no murmurs, gallops or rubs.  ABDOMEN:  Normal bowel sounds, soft, nontender.   EXT: Right shoulder swelling, tenderness.  SKIN:  Shallow blisters on posterior right arm, superficial, weeping. Right IJ line is in place without any surrounding erythema.   NEUROLOGIC:  Grossly nonfocal.    Photo in chart previously      Cultures   7/17 blood cultures x2: Strep pyogenes  7/19 blood cultures x2: No growth to date          Pertinent Labs:     Recent Labs   Lab 07/27/23  0833 07/26/23  0427 07/25/23  1734 07/25/23  0442   WBC 9.5 10.8  --  15.5*   HGB 7.5* 7.6* 7.5* 5.9*   * 109*  --  125*         Recent Labs   Lab 07/27/23  0401 07/26/23  0427 07/25/23  0442 07/21/23  1601 07/21/23  0438   * 130* 132*   < > 123*   CO2 23 21* 24   < > 19*   BUN 74.2* 65.0* 55.2*   < > 109.9*   ALBUMIN  --   --  2.4*  --  2.1*   ALKPHOS  --   --  239*  --  314*   ALT  --   --  41  --  124*   AST  --   --  45  --  222*    < > = values in this interval not displayed.         No " results for input(s): CRP, SED in the last 168 hours.        Imaging:     IR CVC Non Tunnel Placement > 5 Yrs    Result Date: 7/20/2023  LOCATION: [facility] DATE: 7/20/2023 PROCEDURE: NON-TUNNELED DIALYSIS CATHETER PLACEMENT 1.  Insertion of a non-tunneled central venous catheter. 2.  Ultrasound guidance for vascular access. 3.  Fluoroscopic guidance for central venous access device placement. INTERVENTIONAL RADIOLOGIST: Joseph Campos MD INDICATION: Renal insufficiency requiring renal replacement therapy, plan for nontunneled dialysis catheter placement. CONSENT: The risks, benefits and alternatives of the procedure were discussed with the patient or representative in detail. All questions were answered. Informed consent was given to proceed with the procedure. MODERATE SEDATION: None. CONTRAST: None. ANTIBIOTICS: None. ADDITIONAL MEDICATIONS: None. FLUOROSCOPIC TIME: 0.5 minutes. RADIATION DOSE: Air Kerma: 5 mGy. COMPLICATIONS: No immediate complications. UNIVERSAL PROTOCOL: The operative site was marked and any prior imaging was reviewed. Required items including blood products, implants, devices and special equipment was made available. Patient identity was confirmed either verbally, with demographic information, hospital assigned identification or other identification markers. A timeout was performed immediately prior to the procedure. STERILE BARRIER TECHNIQUE: Maximum sterile barrier technique was used. Cutaneous antisepsis was performed at the operative site with application of 2% chlorhexidine and large sterile drape. Prior to the procedure, the  and assistant performed hand hygiene and wore hat, mask, sterile gown, and sterile gloves during the entire procedure. PROCEDURE:  Using local anesthesia and real-time ultrasound guidance the right internal jugular vein was accessed. Imaging demonstrates an anechoic and compressible vein. A permanent image was stored to the  patient's medical record. Using this access, a 20 cm dialysis catheter was advanced using fluoroscopic guidance until the tip was in the proximal right atrium. FINDINGS: At the completion of the study he radiograph was performed utilizing the in room fluoroscopic unit. Imaging demonstrates the nontunneled dialysis catheter tip terminating in the proximal right atrium.     IMPRESSION:  1.  Successful non-tunneled dialysis catheter placement. 2.  The catheter is ready for use.    MR Shoulder Right w/o Contrast    Result Date: 7/19/2023  EXAM: MR SHOULDER RIGHT W/O CONTRAST LOCATION: [facility] DATE: 7/19/2023 INDICATION: Septic shock with bacteremia, concern for right shoulder osteomyelitis or joint infection. COMPARISON: None. TECHNIQUE: Unenhanced. FINDINGS: ROTATOR CUFF: -Supraspinatus: Moderate severity tendinopathy. No tearing or retraction. No atrophy. -Infraspinatus: Moderate severity infraspinatus tendinopathy without evidence for well-defined partial or full-thickness tearing. No retraction but there is T2 signal abnormality within the muscular portion, which may be due to muscle strain. Infectious or inflammatory myositis could have this appearance. -Subscapularis: No tendon tear, tendinopathy or fatty atrophy. -Teres minor: No tendon tear, tendinopathy or fatty atrophy. CORACOACROMIAL ARCH: -Morphology: Type II acromion. No subacromial spur. Subacromial and subcoracoid space are normal. -Bursa: Fluid in the subacromial subdeltoid bursa. The coracoacromial and coracoclavicular ligaments are negative. ACROMIOCLAVICULAR JOINT: -Hypertrophic change. No significant diastasis or effusion. LONG HEAD OF BICEPS TENDON: -Mild tendinopathy within the intra-articular portion of the long head of the biceps. GLENOHUMERAL JOINT: -Labrum: No labral tear. No paralabral cyst. -Cartilage: Grade II-grade III cartilage loss both sides of the joint without significant surface irregularity. -Joint space:  No effusion or synovitis. -Glenohumeral ligaments and capsule: No pericapsular inflammation. BONES: -No fracture or concerning marrow replacing lesion. No evidence for osteomyelitis. SOFT TISSUES: -Extensive but nonspecific T2 signal abnormality is present within the deltoid musculature, most marked posteriorly but this is also seen within the latissimus muscle, distal aspect of the trapezius, infraspinatus as described, and portions of the subscapularis. The multiplicity of muscle groups involved makes a strain type injury considered unlikely and this is more likely secondary to infectious or inflammatory myositis. There is no evidence for organized fluid collection to suggest abscess.     IMPRESSION: 1.  Extensive signal abnormality within multiple muscles of the shoulder girdle including portions of the visualized latissimus and trapezius muscles. The findings are likely secondary to infectious or inflammatory myositis versus contusion or muscle strain type injury. No evidence for abscess. 2.  No evidence for osteomyelitis or septic arthropathy. 3.  Small amount of fluid within the subacromial subdeltoid bursa. 4.  No evidence for fracture. 5.  Moderate severity tendinopathy within the supraspinatus and infraspinatus. 6.  Tendinopathy within the intraarticular portion of the biceps. 7.  Degenerative change at the glenohumeral joint with grade II-grade III cartilage loss but no significant surface irregularity.    XR Chest Port 1 View    Result Date: 7/18/2023  EXAM: XR CHEST PORT 1 VIEW LOCATION: [facility] DATE: 7/18/2023 INDICATION: RN placed PICC   verify tip placement COMPARISON: 07/17/2023     IMPRESSION: Left upper extremity PICC terminates at the SVC - RA junction. No acute cardiopulmonary abnormality.    Reviewed labs cultures, discussed with patient  Chart reviewed  Notes reviewed    Total Time Spent 35 minutes with >50% of the time spent in chart review, evaluation, management,  counseling, education and care coordination.

## 2023-07-27 NOTE — PLAN OF CARE
[facility] - ICU    RN Progress Note:            Pertinent Assessments:      Please refer to flowsheet rows for full assessment     Alert and oriented. Vitals stable. Denied any pain. Restarted heparin drip. Lasix drip remained running.          Mobility Level:     Frequent small position adjustment to shift weight.          Key Events - This Shift:     Restarted Heparin drip per Dr Carey            Barriers to Discharge / Downgrade:      Poor nutrition intake, decreased physical activities        Mia Au RN

## 2023-07-27 NOTE — PROGRESS NOTES
"Critical Care Progress Note      07/27/2023    Name: Norman Aguilar MRN#: [id number]   Age: 63 year old YOB: 1960     Hsptl Day# 10  ICU DAY #    MV DAY #             Problem List:   Principal Problem:    Elevated troponin  Active Problems:    Dehydration    Hyponatremia    JOHANA (acute kidney injury) (H)    Atrial fibrillation with RVR (H)    Septic shock (H)    Clinically Significant Risk Factors              # Hypoalbuminemia: Lowest albumin = 2.1 g/dL at 7/21/2023  4:38 AM, will monitor as appropriate   # Thrombocytopenia: Lowest platelets = 101 in last 2 days, will monitor for bleeding   # Hypertension: Noted on problem list  # Acute heart failure with reduced ejection fraction: last echo with EF <40% and receiving IV diuretics      # DMII: A1C = 6.9 % (Ref range: <5.7 %) within past 6 months   # Obesity: Estimated body mass index is 31.87 kg/m  as calculated from the following:    Height as of this encounter: 1.88 m (6' 2\").    Weight as of this encounter: 112.6 kg (248 lb 3.2 oz).   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 TTE 7/18/23 showing worsening reduced LVEF 25-30%.            " Assessment and plan :       I have personally reviewed the daily labs, imaging studies, cultures and discussed the case with referring physician and consulting physicians.     My assessment and plan by system for this patient is as follows:    Neurology/Psychiatry:   Awake.  Answers questions appropriately.    Cardiovascular:   Septic shock   Ischemic cardiomyopathy, EF 25 to 30%  Remains in A-fib.  On amiodarone.    Off norepinephrine      Pulmonary/Ventilator Management:   Supplemental oxygen to keep O2 sats above 90%.      GI and Nutrition :   Elevated LFTs most likely secondary to shock liver from hypotension plus some increase in AST secondary to muscle damage, patient had increased CK      Renal/Fluids/Electrolytes:   JOHANA on CKD 3.  Started on hemodialysis  Hyponatremia     HD per nephrology, currently on hold    Good UO on lasix gtt  Still over 10 liters positive    - monitor function and electrolytes as needed with replacement per ICU protocols.  - generally avoid nephrotoxic agents such as NSAID, IV contrast unless specifically required  - adjust medications as needed for renal clearance  - follow I/O's as appropriate.    Infectious Disease:   Group A strep bacteremia.  On MRI myositis.  Blistering on the right upper extremity started on 721.  Visited by Ortho and general surgery.    Continue linezolid.    PenG switched to ceftriaxone      Endocrine:   Diabetes mellitus on Lantus and sliding scale insulin.    Increased p.m. Lantus to 15 units  Plan  - ICU insulin protocol, goal sugar <180      Hematology/Oncology:   Anemia today getting 2 units of PRBCs  No signs of bleeding  CT abdomen without signs of bleeding  Heparin restarted yesterday        ICU Prophylaxis:   Heparin drip         Key Medications:      albumin human  50 g Intravenous BID    amiodarone  400 mg Oral Daily    aspirin  81 mg Oral Daily    cefTRIAXone  2 g Intravenous Q24H    docusate sodium  100 mg Oral BID    insulin aspart  1-10 Units  Subcutaneous TID AC    insulin aspart  1-7 Units Subcutaneous At Bedtime    insulin glargine  15 Units Subcutaneous At Bedtime    insulin glargine  35 Units Subcutaneous QAM AC    linezolid  600 mg Oral Q12H MISTI (08/20)    magnesium oxide  400 mg Oral Daily    multivitamin, therapeutic  1 tablet Oral Daily    sodium chloride (PF)  10-40 mL Intracatheter Q7 Days    sodium chloride (PF)  3 mL Intracatheter Q8H    ticagrelor  90 mg Oral BID    vitamin C  500 mg Oral Daily      dextrose      furosemide (LASIX) 100 mg in sodium chloride 0.9 % 100 mL infusion 20 mg/hr (07/27/23 1000)    heparin 1,400 Units/hr (07/27/23 1000)               Physical Examination:   Temp:  [97.5  F (36.4  C)-98.4  F (36.9  C)] 98.4  F (36.9  C)  Pulse:  [] 85  Resp:  [14-23] 17  BP: ()/(55-67) 110/58  SpO2:  [97 %-100 %] 99 %    Intake/Output Summary (Last 24 hours) at 7/22/2023 1145  Last data filed at 7/22/2023 1000  Gross per 24 hour   Intake 2011.14 ml   Output 350 ml   Net 1661.14 ml     Wt Readings from Last 4 Encounters:   07/27/23 112.6 kg (248 lb 3.2 oz)   11/17/22 100.2 kg (221 lb)   09/28/22 104.3 kg (230 lb)   09/27/22 104.3 kg (230 lb)     BP - Mean:  [73-84] 78  Resp: 17    No lab results found in last 7 days.    GEN: no acute distress   HEENT: head ncat, sclera anicteric, OP patent, trachea midline   PULM: clear anteriorly    CV/COR: RRR S1S2 no gallop,  No rub, no murmur  ABD: soft nontender, hypoactive bowel sounds, no mass  EXT: Upper extremity swollen but improving  NEURO: grossly intact  SKIN: Blisters on right upper extremity improving. Redness over back/coccyx  LINES: clean, dry intact         Data:   All data and imaging reviewed     ROUTINE ICU LABS (Last four results)  CMP  Recent Labs   Lab 07/27/23  0803 07/27/23  0401 07/26/23 2006 07/26/23  1725 07/26/23  0744 07/26/23  0427 07/25/23  0741 07/25/23  0442 07/24/23  0728 07/24/23  0442 07/21/23  0744 07/21/23  0438   NA  --  131*  --   --   --  130*  --   132*  --  127*   < > 123*   POTASSIUM  --  3.6  --   --   --  4.0  --  4.0  --  3.7   < > 3.6   CHLORIDE  --  93*  --   --   --  94*  --  93*  --  92*   < > 84*   CO2  --  23  --   --   --  21*  --  24  --  21*   < > 19*   ANIONGAP  --  15  --   --   --  15  --  15  --  14   < > 20*   * 170* 170* 157*   < > 205*   < > 177*   < > 215*   < > 298*   BUN  --  74.2*  --   --   --  65.0*  --  55.2*  --  71.7*   < > 109.9*   CR  --  3.15*  --   --   --  2.99*  --  2.71*  --  3.37*   < > 5.22*   GFRESTIMATED  --  21*  --   --   --  23*  --  25*  --  20*   < > 12*   YOSVANY  --  8.8  --   --   --  8.5*  --  7.9*  --  8.4*   < > 7.6*   PHOS  --   --   --   --   --   --   --   --   --   --   --  5.0*   PROTTOTAL  --   --   --   --   --   --   --  6.5  --   --   --  6.0*   ALBUMIN  --   --   --   --   --   --   --  2.4*  --   --   --  2.1*   BILITOTAL  --   --   --   --   --   --   --  1.5*  --   --   --  2.4*   ALKPHOS  --   --   --   --   --   --   --  239*  --   --   --  314*   AST  --   --   --   --   --   --   --  45  --   --   --  222*   ALT  --   --   --   --   --   --   --  41  --   --   --  124*    < > = values in this interval not displayed.     CBC  Recent Labs   Lab 07/27/23  0833 07/26/23  0427 07/25/23  173 07/25/23  0442 07/23/23  0524   WBC 9.5 10.8  --  15.5* 24.9*   RBC 2.54* 2.62*  --  2.12* 2.66*   HGB 7.5* 7.6* 7.5* 5.9* 7.1*   HCT 21.7* 22.3*  --  17.3* 21.3*   MCV 85 85  --  82 80   MCH 29.5 29.0  --  27.8 26.7   MCHC 34.6 34.1  --  34.1 33.3   RDW 17.5* 17.5*  --  17.3* 18.5*   * 109*  --  125* 167     INRNo lab results found in last 7 days.  Arterial Blood GasNo lab results found in last 7 days.    All cultures:  No results for input(s): CULT in the last 168 hours.  No results found for this or any previous visit (from the past 24 hour(s)).      31 minutes were spent with the patient

## 2023-07-27 NOTE — PROGRESS NOTES
"Care Management Follow Up    Length of Stay (days): 10    Expected Discharge Date: 07/31/2023     Concerns to be Addressed:  discharge planning      Patient plan of care discussed at interdisciplinary rounds: Yes    Anticipated Discharge Disposition:       Anticipated Discharge Services:    Anticipated Discharge DME:      Education Provided on the Discharge Plan:  Per Care Team   Patient/Family in Agreement with the Plan:      Referrals Placed by CM/SW:    Private pay costs discussed: Not applicable    Additional Information:  Chart reviewed.    Cm updates:  Per rounds pt will likely step down.  Therapy consults placed yesterday,awaiting recs.   Neph following.       Social Hx:  \"Per sister Cindy, patient has been homeless since Nov, 2021. 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 Pt has lived in between  [facility], or her daughter's house the last couple years, and in the past has lived at his [facility] for 6 yrs at one time. Pt before that was taking care of his parents in their home. He last lived at [facility] prior to hospitalization. Pt Ind at baseline, drives (owns a car). Pt has a prosthetic. 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 She stated pt tried signing up for social security disability, but pt claims he was denied, she does not believe pt. 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 Pt has [employer] [place] insurance. Cindy cannot take pt back into her home, and her daughter will not let pt return either. She prefers if able pt goes to TCU, then either friends home, housing or some sort, or shelter. Transport TBD.  \"        Cm will continue to follow plan of care, review recommendations, and assist with any discharge needs anticipated.       Sulema Manuel RN      "

## 2023-07-27 NOTE — PROGRESS NOTES
HEART CARE NOTE          Assessment/Recommendations     1. HFrEF/ICM c/b circulatory shock  Assessment / Plan  Hypervolemic; now s/p multiple rounds of IHD; UOP continues to improve on furosemide gtt; continue to monitor UOP, hemodynamics and renal function closely; discussed with nephrology; no changes to diuretic regimen at this time - will add IV albumin boluses given degree of 3rd spacing  Remains hemodynamically stable off pressor support  GDMT on hold given the above      2. NSTEMI  Assessment / Plan  Hx of coronary angiogram significant for severe multi-vessel CAD during admission in 11/21 ; CTS/Pa consulted with plans for CABG in 2-3 months at that time; patient was readmitted before CABG with notable progression of RCA lesion CTS was consulted once again and declined to offer surgical intervention at which time the patient underwent complex PCI with JESSICA x 2 to the prox and distal RCA  He now presents approx 1 year later with an NSTEMI; will plan to proceed with coronary angiogram +/- PCI once there is no longer concern for sepsis/infection and renal function stable - patient denies chest pain/anginal equivalents  Continue ASA, and ticagrelor     3. Septic shock  Assessment / Plan  Management/antibiotics and supportive care per ICU team  Wean pressors as tolerated  - currently on levo     4. DM2  Assessment / Plan  Management per primary team     5. Paroxysmal afib  Assessment / Plan  Currently in NSR - continue amiodarone load  Continue heparin gtt       6. JOHANA on CKD  Assessment / Plan  In the setting of circulatory shock and renal hypoperfusion  Now on intermittent IHD - UOP picking up; will attempt IV diuresis today and continue to monitor renal function closely     Patient remains critically ill in the ICU requiring hemodynamic support via Iv pressors for combined cardiogenic and distributive shock. 50 minutes spent on critical care.    History of Present Illness/Subjective       Norman BETHANY  "Lauren is a 62 year old male with a PMHx significant for CAD, CHF, HTN, IDDM, paroxysmal atrial fibrillation, left foot osteomyelitis failure of antibiotics s/p left BKA, admitted on 8/23/2022 with symptoms concerninf for stroke. Troponin was noted to be elevated.     Today, Mr. Thomas denies any acute cardiac events or complaints; Management plan as detailed above.      ECG: Personally reviewed. sinus tachycardia, occasional PVC noted, unifocal.     Repeat echo:  Left ventricular function is decreased. The ejection fraction is 25-30%  (severely reduced).  The left atrium is mildly dilated.  IVC diameter <2.1 cm collapsing >50% with sniff suggests a normal RA pressure  of 3 mmHg.  TAPSE is normal, which is consistent with normal right ventricular systolic  function.  The patient exhibited frequent PVCs.  No hemodynamically significant valvular abnormalities on 2D or color flow  imaging. The study was technically difficult.     ECHO (personnaly Reviewed):  The left ventricle is normal in size with normal left ventricular wall  thickness.  Left ventricular function is decreased. The ejection fraction is 30-35%  (moderately reduced).  Mildly decreased right ventricular systolic function  Moderate biatrial enlargement  No hemodynamically significant valve disease  IVC diameter >2.1 cm collapsing <50% with sniff suggests a high RA pressure  estimated at 15 mmHg or greater.  Compared to prior study, there is no significant change.          Physical Examination Review of Systems   /59   Pulse 96   Temp 97.5  F (36.4  C) (Oral)   Resp 15   Ht 1.88 m (6' 2\")   Wt 112.6 kg (248 lb 3.2 oz)   SpO2 100%   BMI 31.87 kg/m    Body mass index is 31.87 kg/m .  Wt Readings from Last 3 Encounters:   07/27/23 112.6 kg (248 lb 3.2 oz)   11/17/22 100.2 kg (221 lb)   09/28/22 104.3 kg (230 lb)     General Appearance:   no distress   ENT/Mouth: membranes moist, no oral lesions or bleeding gums.      EYES:  no scleral " icterus, normal conjunctivae   Neck: no carotid bruits or thyromegaly   Chest/Lungs:   lungs are clear to auscultation, no rales or wheezing, equal chest wall expansion    Cardiovascular:   Regular. Normal first and second heart sounds with no murmurs, rubs, or gallops; the carotid, radial and posterior tibial pulses are intact, + JVD and anasarca     Abdomen:  no organomegaly, masses, bruits, or tenderness; bowel sounds are present   Extremities: no cyanosis or clubbing   Skin: no xanthelasma, warm.    Neurologic: NAD     Psychiatric: alert and oriented x3, calm     A complete 10 systems ROS was reviewed  And is negative except what is listed in the HPI.          Medical History  Surgical History Family History Social History   Past Medical History:   Diagnosis Date    Anemia     Chronic systolic CHF (congestive heart failure) (H)     Coronary artery disease     Diabetic neuropathy (H)     DM type 2 w Neuropathy     GERD (gastroesophageal reflux disease)     Hyperlipidemia     Hypertension     Intermittent atrial fibrillation (H)     on Eliquis    Ischemic cardiomyopathy 11/16/2021    Echo with EF of 30-35%    NSTEMI (non-ST elevated myocardial infarction) (H) 11/15/2021    Osteomyelitis of left foot (H) 04/04/2022    Renal disease     Retinopathy     Sleep disturbance     Past Surgical History:   Procedure Laterality Date    AMPUTATE FOOT Left 3/21/2022    Procedure: Guillotine AMPUTATION, FOOT;  Surgeon: Ronald Bhatt MD;  Location: [facility] [place] [place] OR    AMPUTATE LEG BELOW KNEE Left 4/4/2022    Procedure: LEFT BELOW  KNEE AMPUTATION;  Surgeon: Ronald Bhatt MD;  Location: [facility] [place] [place] OR    AMPUTATE TOE(S) Left 11/16/2021    Procedure: first and second ray amputation;  Surgeon: Eddi Ram DPM;  Location: [facility] [place] [place] OR    APPENDECTOMY      CV CORONARY ANGIOGRAM N/A 11/16/2021    Procedure: CV CORONARY ANGIOGRAM;  Surgeon: Pam Tabares MD;  Location: [facility] CATH LAB CV    CV CORONARY  ANGIOGRAM N/A 8/24/2022    Procedure: CV CORONARY ANGIOGRAM;  Surgeon: Cipriano Lucas MD;  Location: [facility] LAB CV    CV CORONARY ANGIOGRAM N/A 8/29/2022    Procedure: CV CORONARY ANGIOGRAM;  Surgeon: Cipriano Lucas MD;  Location: [facility] LAB CV    CV INTRAVASULAR ULTRASOUND N/A 8/29/2022    Procedure: Intravascular Ultrasound;  Surgeon: Cipriano Lucas MD;  Location: [facility] LAB CV    CV LEFT HEART CATH N/A 11/16/2021    Procedure: Left Heart Cath;  Surgeon: Pam Tabares MD;  Location: [facility] LAB CV    CV PCI N/A 8/29/2022    Procedure: Percutaneous Coronary Intervention stent;  Surgeon: Cipriano Lucas MD;  Location: [facility] LAB CV    CV PCI ANGIOPLASTY N/A 8/29/2022    Procedure: Percutaneous Transluminal Angioplasty;  Surgeon: Cipriano Lucas MD;  Location: [facility] LAB CV    FOOT SURGERY Left     HERNIA REPAIR      INCISION AND DRAINAGE LOWER EXTREMITY, COMBINED Left 11/16/2021    Procedure: INCISION AND DRAINAGE, left foot;  Surgeon: Eddi Ram DPM;  Location: [facility] [place] OR    INCISION AND DRAINAGE LOWER EXTREMITY, COMBINED Left 11/19/2021    Procedure: INCISION AND DRAINAGE, left foot;  Surgeon: Eddi Ram DPM;  Location: [facility] OR    INCISION AND DRAINAGE LOWER EXTREMITY, COMBINED Left 12/9/2021    Procedure: INCISION AND DRAINAGE, Left foot;  Surgeon: Eddi Ram DPM;  Location: [facility] OR    INCISION AND DRAINAGE LOWER EXTREMITY, COMBINED Left 1/10/2022    Procedure: INCISION AND DRAINAGE, left foot;  Surgeon: Eddi Ram DPM;  Location: [facility] OR    INCISION AND DRAINAGE LOWER EXTREMITY, COMBINED Left 1/27/2022    Procedure: INCISION AND DRAINAGE, Left foot;  Surgeon: Eddi Ram DPM;  Location: [facility] [place] OR    IR CVC NON TUNNEL PLACEMENT > 5 YRS  7/20/2023    PICC TRIPLE LUMEN PLACEMENT  7/17/2023         PICC TRIPLE LUMEN PLACEMENT  7/18/2023         no family  history of premature coronary artery disease Social History     Socioeconomic History    Marital status: Single     Spouse name: Not on file    Number of children: Not on file    Years of education: Not on file    Highest education level: Not on file   Occupational History    Not on file   Tobacco Use    Smoking status: Former     Types: Cigars    Smokeless tobacco: Never    Tobacco comments:     Cigars   Vaping Use    Vaping Use: Never used   Substance and Sexual Activity    Alcohol use: Yes     Comment: < 1 drink a month (only holidays)    Drug use: Never    Sexual activity: Not on file   Other Topics Concern    Parent/sibling w/ CABG, MI or angioplasty before 65F 55M? Not Asked   Social History Narrative    Single, currently not working, has done numerous jobs, lives alone.  Full code. (last updated 4/4/2022)      Social Determinants of Health     Financial Resource Strain: Not on file   Food Insecurity: Not on file   Transportation Needs: Not on file   Physical Activity: Not on file   Stress: Not on file   Social Connections: Not on file   Intimate Partner Violence: Not on file   Housing Stability: Not on file           Lab Results    Chemistry/lipid CBC Cardiac Enzymes/BNP/TSH/INR   Lab Results   Component Value Date    CHOL 157 03/24/2023    HDL 65 03/24/2023    TRIG 60 03/24/2023    BUN 74.2 (H) 07/27/2023     (L) 07/27/2023    CO2 23 07/27/2023    Lab Results   Component Value Date    WBC 10.8 07/26/2023    HGB 7.6 (L) 07/26/2023    HCT 22.3 (L) 07/26/2023    MCV 85 07/26/2023     (L) 07/26/2023    Lab Results   Component Value Date    TROPONINI 5.84 (HH) 08/30/2022     (H) 08/24/2022    INR 1.19 (H) 08/24/2022     Lab Results   Component Value Date    TROPONINI 5.84 (HH) 08/30/2022          Weight:    Wt Readings from Last 3 Encounters:   07/27/23 112.6 kg (248 lb 3.2 oz)   11/17/22 100.2 kg (221 lb)   09/28/22 104.3 kg (230 lb)       Allergies  No Known Allergies      Surgical  History  Past Surgical History:   Procedure Laterality Date    AMPUTATE FOOT Left 3/21/2022    Procedure: Guillotine AMPUTATION, FOOT;  Surgeon: Ronald Bhatt MD;  Location: [facility] OR    AMPUTATE LEG BELOW KNEE Left 4/4/2022    Procedure: LEFT BELOW  KNEE AMPUTATION;  Surgeon: Ronald Bhatt MD;  Location: [facility] OR    AMPUTATE TOE(S) Left 11/16/2021    Procedure: first and second ray amputation;  Surgeon: Eddi Ram DPM;  Location: [facility] OR    APPENDECTOMY      CV CORONARY ANGIOGRAM N/A 11/16/2021    Procedure: CV CORONARY ANGIOGRAM;  Surgeon: Pam Tabares MD;  Location: [facility] CATH LAB CV    CV CORONARY ANGIOGRAM N/A 8/24/2022    Procedure: CV CORONARY ANGIOGRAM;  Surgeon: Cipriano Lucas MD;  Location: [facility] LAB CV    CV CORONARY ANGIOGRAM N/A 8/29/2022    Procedure: CV CORONARY ANGIOGRAM;  Surgeon: Cipriano Lucas MD;  Location: [facility] LAB CV    CV INTRAVASULAR ULTRASOUND N/A 8/29/2022    Procedure: Intravascular Ultrasound;  Surgeon: Cipriano Lucas MD;  Location: [facility] LAB CV    CV LEFT HEART CATH N/A 11/16/2021    Procedure: Left Heart Cath;  Surgeon: Pam Tabares MD;  Location: [facility] LAB CV    CV PCI N/A 8/29/2022    Procedure: Percutaneous Coronary Intervention stent;  Surgeon: Cipriano Lucas MD;  Location: [facility] CV    CV PCI ANGIOPLASTY N/A 8/29/2022    Procedure: Percutaneous Transluminal Angioplasty;  Surgeon: Cipriano Lucas MD;  Location: [facility] LAB CV    FOOT SURGERY Left     HERNIA REPAIR      INCISION AND DRAINAGE LOWER EXTREMITY, COMBINED Left 11/16/2021    Procedure: INCISION AND DRAINAGE, left foot;  Surgeon: Eddi Ram DPM;  Location: [facility] OR    INCISION AND DRAINAGE LOWER EXTREMITY, COMBINED Left 11/19/2021    Procedure: INCISION AND DRAINAGE, left foot;  Surgeon: Eddi Ram DPM;  Location: [facility] OR    INCISION AND DRAINAGE LOWER EXTREMITY,  COMBINED Left 12/9/2021    Procedure: INCISION AND DRAINAGE, Left foot;  Surgeon: Eddi Ram DPM;  Location: [facility] [place] OR    INCISION AND DRAINAGE LOWER EXTREMITY, COMBINED Left 1/10/2022    Procedure: INCISION AND DRAINAGE, left foot;  Surgeon: Eddi Ram DPM;  Location: [facility] [place] OR    INCISION AND DRAINAGE LOWER EXTREMITY, COMBINED Left 1/27/2022    Procedure: INCISION AND DRAINAGE, Left foot;  Surgeon: Eddi Ram DPM;  Location: [facility] [place] OR    IR CVC NON TUNNEL PLACEMENT > 5 YRS  7/20/2023    PICC TRIPLE LUMEN PLACEMENT  7/17/2023         PICC TRIPLE LUMEN PLACEMENT  7/18/2023            Social History  Tobacco:   History   Smoking Status    Former    Types: Cigars   Smokeless Tobacco    Never    Alcohol:   Social History    Substance and Sexual Activity      Alcohol use: Yes        Comment: < 1 drink a month (only holidays)   Illicit Drugs:   History   Drug Use Unknown       Family History  No family history on file.       Russel Arredondo MD on 7/27/2023      cc: Jaquan Dickerson

## 2023-07-28 NOTE — PLAN OF CARE
Problem: Pain Acute  Goal: Optimal Pain Control and Function  Outcome: Progressing     Problem: Sepsis/Septic Shock  Goal: Optimal Coping  Outcome: Progressing   Goal Outcome Evaluation:    VSS on RA. R shoulder pain managed with prn tylenol and oxycodone. Ice pack and pillow support also utilized. Patient uncomfortable in bed, turned mulitple times. Sat on the edge of the bed for about 1 and 1/2 hour. Heparin gtt and Lasix gtt infusing.NSR on cardiac monitor.

## 2023-07-28 NOTE — PROGRESS NOTES
[facility] Nurse Inpatient Assessment     Consulted for: GURVINDER    Patient History (according to provider note(s):        Assessment:    Skin Injury Location: R axilla area     7/24 7/28    Skin injury due to:  Blood blisters (unknown etiology)  Skin history and plan of care:  blisters deroofed, scant bloody oozing to proximal ruptured blister, clear serous fluid noted to the ruptured blister nearest the elbow  Affected area:      Skin assessment: Blistering     Measurements (length x width x depth, in cm) Proximal 5 x 2.5 x 0.1cm, distal 5 x 2.8 x 0.1 cm     Color: normal and consistent with surrounding tissue     Temperature  normal      Drainage: scant.      Color: see above      Odor: none  Pain: facial expression of distress, with movement of edematous arm  Pain interventions prior to dressing change: patient tolerated well and slow and gentle cares   Treatment goal: Maintain (prevention of deterioration)  STATUS: healing  Supplies ordered: supplies stored on unit  ____________________________________________________________________________________________________________________________________________________________________________________________    Pressure Injury Location: coccyx      7/24 7/28    Last photo: 7/28  Wound type: Pressure Injury     Pressure Injury Stage: Deep Tissue Pressure Injury (DTPI), hospital acquired   Wound history/plan of care:   A pressure injury to the perineum was charted on 7/17. This wound is surrounding the coccyx.     Wound base: 100 % purple, fading to brown, edges flaking up     Palpation of the wound bed: normal and edges flaking, crepe paper feeling       Drainage: scant     Description of drainage: serous noted on mepilex     Measurements (length x width x depth, in cm) 6.7  x 9 cm      Tunneling N/A      "Undermining N/A  Periwound skin: Intact      Color:  blanchable redness      Temperature: normal   Odor: none  Pain: no grimacing or signs of discomfort, none  Pain intervention prior to dressing change: slow and gentle cares   Treatment goal: Heal , Protection, and offload pressure  STATUS: evolving  Supplies ordered: at bedside    My PI Risk Assessment     Sensory Perception: 3 - Slightly Limited     Moisture: 1 - Constantly moist     Activity: 1 - Bedfast      Mobility: 2 - Very limited     Nutrition: 2 - Probably inadequate      Friction/Shear: 2 - Potential problem      TOTAL: 11      Treatment Plan:     RUE/axilla - Every 3 days  May leave open to air if no drainage noted.  2. If drainage is occurring, cleanse with saline and gently dry.  3. Cover with mepilex dressing     Coccyx  Protect area with sacral mepilex  Peel back mepilex each shift to monitor wound and place back down  Change mepilex every 3 days + PRN if soiled, saturated, falls off    Pressure Injury Prevention (PIP) Plan:  If patient is declining pressure injury prevention interventions: Explore reason why and address patient's concerns, Educate on pressure injury risk and prevention intervention(s), If patient is still declining, document \"informed refusal\" , and Ensure Care team is aware ( provider, charge nurse, etc)  Mattress: Follow bed algorithm, reassess daily and order specialty mattress, if indicated.  HOB: Maintain at or below 30 degrees, unless contraindicated  Repositioning in bed: Every 1-2 hours , Left/right positioning; avoid supine, Raise foot of bed prior to raising head of bed, to reduce patient sliding down (shear), and Frequent microturns using TAPS wedges, as patient tolerates  Heels: Keep elevated off mattress and Pillows under calves  Protective Dressing: Sacral Mepilex for prevention (#[id number]),  especially for the agitated patient   Positioning Equipment: TAPS wedges (#[id number]) to help maintain 30 degree side lying position "   Chair positioning: Chair cushion (#[id number])    If patient has a buttock pressure injury, or high risk for PI use chair cushion or SPS.  Moisture Management: Perineal cleansing /protection: Follow Incontinence Protocol, Avoid brief in bed, Clean and dry skin folds with bathing , and Moisturize dry skin  Under Devices: Inspect skin under all medical devices during skin inspection , Ensure tubes are stabilized without tension, and Ensure patient is not lying on medical devices or equipment when repositioned  Ask provider to discontinue device when no longer needed.     Orders: Reviewed and Updated    RECOMMEND PRIMARY TEAM ORDER: None, at this time  Education provided: plan of care  Discussed plan of care with: Patient  [facility] nurse follow-up plan: weekly  Notify WOC if wound(s) deteriorate.  Nursing to notify the Provider(s) and re-consult the [facility] Nurse if new skin concern.    DATA:     Current support surface: Standard  Standard gel/foam mattress (IsoFlex, Atmos air, etc)  Containment of urine/stool: Continent of bladder and Continent of bowel  BMI: Body mass index is 31.25 kg/m .   Active diet order: Orders Placed This Encounter      Advance Diet as Tolerated: Fully Advanced to diet(s) per Provider order; Moderate Consistent Carb (60 g CHO per Meal) Diet     Output: I/O last 3 completed shifts:  In: 1379.96 [P.O.:740; I.V.:639.96]  Out: 2200 [Urine:2200]     Labs:   Recent Labs   Lab 07/28/23  0653 07/25/23  1734 07/25/23  0442   ALBUMIN  --   --  2.4*   HGB 6.7*   < > 5.9*   WBC 9.7   < > 15.5*    < > = values in this interval not displayed.       Pressure injury risk assessment:   Sensory Perception: 3-->slightly limited  Moisture: 4-->rarely moist  Activity: 1-->bedfast  Mobility: 2-->very limited  Nutrition: 2-->probably inadequate  Friction and Shear: 2-->potential problem  Robin Score: 14    ZOILA OvalleN RN CWOCN  Pager no longer in use, please contact through [email or web address] group: [facility]  [place]  [place] [facility]

## 2023-07-28 NOTE — PROGRESS NOTES
[facility]    Medicine Progress Note - Hospitalist Service    Date of Admission:  7/17/2023    Assessment & Plan   63-year-old male with history of CAD on Brilinta, chronic systolic CHF with EF of 30 to 35% prior to admission, CKD 3, history of left foot osteomyelitis status post left BKA, and DM 2 who presents 7/17/2023 with septic shock, NSTEMI, JOHANA and A-fib with RVR.  Patient was stabilized with pressors and transferred out of ICU 7/27/2023  Source of septic shock thought secondary to group A strep bacteremia and myositis.      Septic shock: Resolved  Source thought secondary to group A strep bacteremia secondary to skin and soft tissue infection.  Concern for toxic shock syndrome  MRI right shoulder 7/19/2023 showed myositis  -- Switched to ceftriaxone from penicillin G 7/27/2023  --Completed course of oral linezolid 7/27/2023  -- ID to follow  -- Anticipate TCU on discharge given need for IV antibiotics for likely 2 weeks depending on clinical course  -- Patient already has PICC line in place      NSTEMI  History of CAD and ischemic cardiomyopathy   Prior coronary stents  A-fib with RVR  --Cardiology following  -- Continue amiodarone and IV heparin drip  -- Cardiology following for possible coronary angiogram this hospitalization  -- Continue aspirin and Brilinta      Acute on chronic systolic CHF exacerbation  TTE 7/17/2023 shows EF of 25 to 30%  -- IV lasix gtt and albumin boluses per cardiology      JOHANA on CKD: Likely secondary to circulatory shock  -- improving  -- Status post intermittent hemodialysis with evidence of renal recovery.  Dialysis started 7/21/2023, last dialyzed 7/24/2023  -- Defer diuresis to cardiology and nephrology      Hyponatremia: Mild, trend      Related LFTs: Thought secondary to shock liver and myositis  --Improved      Acute anemia:  No overt signs of bleeding, CT abdomen without signs of bleeding  -- Remains on heparin drip  --Status post 2 units PRBC  "7/25/2023 and hemoglobin has since been stable  -- Trend hemoglobin in the a.m.        DM2:  -- Continue Lantus 35 units every morning and 15 units every afternoon  -- Continue current sliding scale insulin  -- Monitor for need to start mealtime coverage           Diet: Snacks/Supplements Adult: Glucerna; With Meals  Advance Diet as Tolerated: Fully Advanced to diet(s) per Provider order; Moderate Consistent Carb (60 g CHO per Meal) Diet    DVT Prophylaxis: IV heparin  Garcia Catheter: PRESENT, indication: Strict 1-2 Hour I&O  Lines: PRESENT      PICC 07/18/23 Triple Lumen Left Basilic Vasopressor,Access-Site Assessment: WDL  CVC Double Lumen Right Subclavian Non - tunneled-Site Assessment: WDL      Cardiac Monitoring: ACTIVE order. Indication: ICU  Code Status: Full Code      Clinically Significant Risk Factors              # Hypoalbuminemia: Lowest albumin = 2.1 g/dL at 7/21/2023  4:38 AM, will monitor as appropriate   # Thrombocytopenia: Lowest platelets = 101 in last 2 days, will monitor for bleeding   # Hypertension: Noted on problem list  # Acute heart failure with reduced ejection fraction: last echo with EF <40% and receiving IV diuretics      # DMII: A1C = 6.9 % (Ref range: <5.7 %) within past 6 months   # Obesity: Estimated body mass index is 31.87 kg/m  as calculated from the following:    Height as of this encounter: 1.88 m (6' 2\").    Weight as of this encounter: 112.6 kg (248 lb 3.2 oz).   # Severe Malnutrition: based on nutrition assessment         Disposition Plan     Expected Discharge Date: 07/31/2023                  Prabhu Mendez DO  Hospitalist Service  [facility]  Securely message with [facility] (more info)  Text page via [employer] Paging/Directory   ______________________________________________________________________       "

## 2023-07-28 NOTE — PROGRESS NOTES
Infectious Diseases Progress Note  [facility]    Date of visit: 07/28/2023     ASSESSMENT   63-year-old man with a history of hypertension, coronary artery disease, CHF, diabetes, who is admitted with septic shock.  ID following regarding strep bacteremia.    Group A strep bacteremia.  Complicated skin and soft tissue infection.  Admitted to the ICU on pressors.  Acute kidney injury, and transaminitis.  Concerning for toxic shock syndrome.  Also having right shoulder pain.  Myositis seen on MRI. Blistering starting on 7/21, possibly from pressure or swelling. Seen by ortho and general surgery  Septic shock.  Had been On pressors. Active issue  Right shoulder myositis. Denies acute injury. Elevated ck, improving.    Acute kidney injury.  Rising creatinine.  Starting dialysis 7/20 see Dr Reich notes, reviewed  Leukocytosis- improved  Transferred out of ICU on 7/28/2023     Principal Problem:    Septic shock (H)  Active Problems:    Dehydration    Hyponatremia    Elevated troponin    JOHANA (acute kidney injury) (H)    Atrial fibrillation with RVR (H)       PLAN   -had been on penicillin G IV for group A strep infection.  Switched to ceftriaxone on 7/27/2023, and monitor results  Pharmacy to assist with renal dosing  Linezolid switch to oral, and stopped on 7/27/2023   appreciate surgical assessment. No indication for surgery at this time  -no IVIG due to renal failure initially, clinically improved now  -Updated patient.  Patient will likely need LTAC on discharge, will need IV antibiotics for 2 weeks depending on clinical response, longer course may be needed.?  LTAC candidate  Patient new to me on 7/24/2023   ID will follow        Lacie Mata MD  [facility]  Answering Service: [phone]  On-Call ID provider: [phone], option: 9          ===========================================      SUBJECTIVE / INTERVAL HISTORY:     Transferred out of ICU-- transferred to P3  Doing the  "same  PT working with patient- unable to pivot    Previous note  Thirsty   Hoping to reposition  Updated ICU staff    Patient fatigued, tired, some pain in arm, overall slightly improved  Previous note:  Discussed with nurse- appreciate input    Previous note:   Dressing on arm  No fever.  Improved white count. 3L HD run yesterday.  Will run tomorrow again.       Antibiotics   Penicillin g 7/20- 12 MU per day  Linezolid 7/20-    Previous:  Zosyn 7/17-7/20  Vancomycin 7/17      Physical Exam     Temp:  [97.1  F (36.2  C)-98.7  F (37.1  C)] 98  F (36.7  C)  Pulse:  [] 89  Resp:  [13-24] 16  BP: ()/(53-93) 114/61  SpO2:  [95 %-100 %] 96 %    /61   Pulse 89   Temp 98  F (36.7  C) (Oral)   Resp 16   Ht 1.88 m (6' 2\")   Wt 110.4 kg (243 lb 6.2 oz)   SpO2 96%   BMI 31.25 kg/m      GENERAL:  appears ill, lying in bed  HENT:  Head is normocephalic, atraumatic.   EYES:  Eyes have anicteric sclerae without conjunctival injection   LUNGS:  Clear to auscultation.  Tachypneic (better today)  CARDIOVASCULAR:  Regular rate and rhythm with no murmurs, gallops or rubs.  ABDOMEN:  Normal bowel sounds, soft, nontender.   EXT: Right shoulder swelling, tenderness.  SKIN:  Shallow blisters on posterior right arm, superficial, weeping. Right IJ line is in place without any surrounding erythema.   NEUROLOGIC:  Grossly nonfocal.    Photo in chart 7/28/2023       Previous photo        Cultures   7/17 blood cultures x2: Strep pyogenes  7/19 blood cultures x2: No growth to date          Pertinent Labs:     Recent Labs   Lab 07/28/23  0653 07/27/23  0833 07/26/23  0427   WBC 9.7 9.5 10.8   HGB 6.7* 7.5* 7.6*   * 101* 109*         Recent Labs   Lab 07/28/23  0653 07/27/23  0401 07/26/23  0427 07/25/23  0442   * 131* 130* 132*   CO2 23 23 21* 24   BUN 82.5* 74.2* 65.0* 55.2*   ALBUMIN  --   --   --  2.4*   ALKPHOS  --   --   --  239*   ALT  --   --   --  41   AST  --   --   --  45         No results for " input(s): CRP, SED in the last 168 hours.        Imaging:     IR CVC Non Tunnel Placement > 5 Yrs    Result Date: 7/20/2023  LOCATION: [facility] DATE: 7/20/2023 PROCEDURE: NON-TUNNELED DIALYSIS CATHETER PLACEMENT 1.  Insertion of a non-tunneled central venous catheter. 2.  Ultrasound guidance for vascular access. 3.  Fluoroscopic guidance for central venous access device placement. INTERVENTIONAL RADIOLOGIST: Joseph Campos MD INDICATION: Renal insufficiency requiring renal replacement therapy, plan for nontunneled dialysis catheter placement. CONSENT: The risks, benefits and alternatives of the procedure were discussed with the patient or representative in detail. All questions were answered. Informed consent was given to proceed with the procedure. MODERATE SEDATION: None. CONTRAST: None. ANTIBIOTICS: None. ADDITIONAL MEDICATIONS: None. FLUOROSCOPIC TIME: 0.5 minutes. RADIATION DOSE: Air Kerma: 5 mGy. COMPLICATIONS: No immediate complications. UNIVERSAL PROTOCOL: The operative site was marked and any prior imaging was reviewed. Required items including blood products, implants, devices and special equipment was made available. Patient identity was confirmed either verbally, with demographic information, hospital assigned identification or other identification markers. A timeout was performed immediately prior to the procedure. STERILE BARRIER TECHNIQUE: Maximum sterile barrier technique was used. Cutaneous antisepsis was performed at the operative site with application of 2% chlorhexidine and large sterile drape. Prior to the procedure, the  and assistant performed hand hygiene and wore hat, mask, sterile gown, and sterile gloves during the entire procedure. PROCEDURE:  Using local anesthesia and real-time ultrasound guidance the right internal jugular vein was accessed. Imaging demonstrates an anechoic and compressible vein. A permanent image was stored to the patient's medical  record. Using this access, a 20 cm dialysis catheter was advanced using fluoroscopic guidance until the tip was in the proximal right atrium. FINDINGS: At the completion of the study he radiograph was performed utilizing the in room fluoroscopic unit. Imaging demonstrates the nontunneled dialysis catheter tip terminating in the proximal right atrium.     IMPRESSION:  1.  Successful non-tunneled dialysis catheter placement. 2.  The catheter is ready for use.    MR Shoulder Right w/o Contrast    Result Date: 7/19/2023  EXAM: MR SHOULDER RIGHT W/O CONTRAST LOCATION: [facility] DATE: 7/19/2023 INDICATION: Septic shock with bacteremia, concern for right shoulder osteomyelitis or joint infection. COMPARISON: None. TECHNIQUE: Unenhanced. FINDINGS: ROTATOR CUFF: -Supraspinatus: Moderate severity tendinopathy. No tearing or retraction. No atrophy. -Infraspinatus: Moderate severity infraspinatus tendinopathy without evidence for well-defined partial or full-thickness tearing. No retraction but there is T2 signal abnormality within the muscular portion, which may be due to muscle strain. Infectious or inflammatory myositis could have this appearance. -Subscapularis: No tendon tear, tendinopathy or fatty atrophy. -Teres minor: No tendon tear, tendinopathy or fatty atrophy. CORACOACROMIAL ARCH: -Morphology: Type II acromion. No subacromial spur. Subacromial and subcoracoid space are normal. -Bursa: Fluid in the subacromial subdeltoid bursa. The coracoacromial and coracoclavicular ligaments are negative. ACROMIOCLAVICULAR JOINT: -Hypertrophic change. No significant diastasis or effusion. LONG HEAD OF BICEPS TENDON: -Mild tendinopathy within the intra-articular portion of the long head of the biceps. GLENOHUMERAL JOINT: -Labrum: No labral tear. No paralabral cyst. -Cartilage: Grade II-grade III cartilage loss both sides of the joint without significant surface irregularity. -Joint space: No effusion or  synovitis. -Glenohumeral ligaments and capsule: No pericapsular inflammation. BONES: -No fracture or concerning marrow replacing lesion. No evidence for osteomyelitis. SOFT TISSUES: -Extensive but nonspecific T2 signal abnormality is present within the deltoid musculature, most marked posteriorly but this is also seen within the latissimus muscle, distal aspect of the trapezius, infraspinatus as described, and portions of the subscapularis. The multiplicity of muscle groups involved makes a strain type injury considered unlikely and this is more likely secondary to infectious or inflammatory myositis. There is no evidence for organized fluid collection to suggest abscess.     IMPRESSION: 1.  Extensive signal abnormality within multiple muscles of the shoulder girdle including portions of the visualized latissimus and trapezius muscles. The findings are likely secondary to infectious or inflammatory myositis versus contusion or muscle strain type injury. No evidence for abscess. 2.  No evidence for osteomyelitis or septic arthropathy. 3.  Small amount of fluid within the subacromial subdeltoid bursa. 4.  No evidence for fracture. 5.  Moderate severity tendinopathy within the supraspinatus and infraspinatus. 6.  Tendinopathy within the intraarticular portion of the biceps. 7.  Degenerative change at the glenohumeral joint with grade II-grade III cartilage loss but no significant surface irregularity.    XR Chest Port 1 View    Result Date: 7/18/2023  EXAM: XR CHEST PORT 1 VIEW LOCATION: [facility] DATE: 7/18/2023 INDICATION: RN placed PICC   verify tip placement COMPARISON: 07/17/2023     IMPRESSION: Left upper extremity PICC terminates at the SVC - RA junction. No acute cardiopulmonary abnormality.    Reviewed labs cultures, discussed with patient  Chart reviewed  Notes reviewed    Total Time Spent 35 minutes with >50% of the time spent in chart review, evaluation, management, counseling,  education and care coordination.

## 2023-07-28 NOTE — PROGRESS NOTES
"   07/28/23 1400   Appointment Info   Signing Clinician's Name / Credentials (PT) Yvette Fang, PT, DPT   Living Environment   People in Home sibling(s)   Current Living Arrangements house   Living Environment Comments Patient has lived between sister and niece's home. Per SW note, was living at sister's house just prior to hospitalization however is not able to return.   Self-Care   Equipment Currently Used at Home prosthesis   Activity/Exercise/Self-Care Comment Pt independent with ADLs at baseline.   General Information   Onset of Illness/Injury or Date of Surgery 07/17/23   Referring Physician Azul Lee MD   Patient/Family Therapy Goals Statement (PT) None stated.   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, insulin-dependent diabetes mellitus type 2 (HgbA1c 6.9% 7/17/23) who presented to [facility] ED with multiple symptoms including weakness, dehydration, scalp pain/burning, and chronic R arm pain, found to have atrial fibrillation with RVR  (HR 160s-180s) and treated with IV metoprolol, now admitted to the ICU for circulatory/distributive shock with suspected sepsis (source unknown), JOHANA, uncontrolled diabetes requiring insulin infusion, and possible NSTEMI vs demand ischemia.\"   Existing Precautions/Restrictions fall;other (see comments)  (L BKA)   Range of Motion (ROM)   Range of Motion ROM deficits secondary to pain;ROM deficits secondary to swelling;ROM deficits secondary to weakness   Strength (Manual Muscle Testing)   Strength (Manual Muscle Testing) Deficits observed during functional mobility   Bed Mobility   Bed Mobility supine-sit;sit-supine   Supine-Sit " [place] (Bed Mobility) moderate assist (50% patient effort);verbal cues   Sit-Supine [place] (Bed Mobility) minimum assist (75% patient effort);verbal cues   Bed Mobility Limitations decreased ability to use arms for pushing/pulling;decreased ability to use legs for bridging/pushing   Impairments Contributing to Impaired Bed Mobility pain;decreased strength   Assistive Device (Bed Mobility) bed rails;draw sheet   Transfers   Transfers sit-stand transfer   Transfer Safety Concerns Noted decreased weight-shifting ability   Impairments Contributing to Impaired Transfers impaired balance;decreased strength   Sit-Stand Transfer   Sit-Stand [place] (Transfers) moderate assist (50% patient effort);maximum assist (25% patient effort);2 person assist   Assistive Device (Sit-Stand Transfers) other (see comments)  (arm-in-arm)   Gait/Stairs (Locomotion)   Comment, (Gait/Stairs) Patient unable to ambulate this session due to weakness and difficulty achieving standing position. Sister plans to bring prosthesis in the next few days.   Clinical Impression   Criteria for Skilled Therapeutic Intervention Yes, treatment indicated   PT Diagnosis (PT) impaired functional mobility   Influenced by the following impairments decreased strength, impaired balance, pain, decreased activity tolerance   Functional limitations due to impairments transfers, ambulation   Clinical Presentation (PT Evaluation Complexity) Evolving/Changing   Clinical Presentation Rationale Pt presents as medically diagnosed.   Clinical Decision Making (Complexity) moderate complexity   Planned Therapy Interventions (PT) balance training;bed mobility training;gait training;home exercise program;neuromuscular re-education;patient/family education;strengthening;transfer training   Anticipated Equipment Needs at Discharge (PT) other (see comments)  (TBD pending progression with mobility)   Risk & Benefits of therapy have been explained evaluation/treatment  results reviewed;participants voiced agreement with care plan;participants included;patient   PT Total Evaluation Time   PT Eval, Moderate Complexity Minutes (29435) 15   Physical Therapy Goals   PT Frequency Daily   PT Predicted Duration/Target Date for Goal Attainment 08/04/23   PT Goals Bed Mobility;Transfers;Gait   PT: Bed Mobility Supervision/stand-by assist;Supine to/from sit   PT: Transfers Minimal assist;Sit to/from stand;Bed to/from chair;Assistive device   PT: Gait Minimal assist;Assistive device;Rolling walker;100 feet   Interventions   Interventions Quick Adds Therapeutic Activity   Therapeutic Activity   Therapeutic Activities: dynamic activities to improve functional performance Minutes (27725) 8   Symptoms Noted During/After Treatment Fatigue   Treatment Detail/Skilled Intervention Pt supine in bed upon therapist entry. Supine > sit, min to mod assist of 2. Pt requires assist at LEs and trunk. Pt is having difficulty using R UE due to swelling and pain. Sit < >stand x 2 reps from elevated EOB with arm-in-arm x 2. Pt able to achieve more upright positioning during second transfer with verbal cues for upright posture. Sit > supine, min assist of 1. Pt rolls x 1 rep each direction with mod assist of 1 for placement of nadya sling. Unable to complete nadya transfer due to unavailable equipment. Pt supine in bed at end of session, call light in reach, bed alarm engaged.   PT Discharge Planning   PT Plan sit <> stand, pivot when able, LE strengthening   PT Discharge Recommendation (DC Rec) Transitional Care Facility   PT Rationale for DC Rec Patient is currently requiring mechanical lift for transfers. Recommend TCU at discharge.   PT Brief overview of current status Supine <> sit, mod assist of 2. Sit <> stand, mod to max of 2. Unable to transfer.   Total Session Time   Timed Code Treatment Minutes 8   Total Session Time (sum of timed and untimed services) 23

## 2023-07-28 NOTE — PROGRESS NOTES
[facility]    Medicine Progress Note - Hospitalist Service    Date of Admission:  7/17/2023    Assessment & Plan   63-year-old male with history of CAD on Brilinta, chronic systolic CHF with EF of 30 to 35% prior to admission, CKD 3, history of left foot osteomyelitis status post left BKA, and DM 2 who presents 7/17/2023 with septic shock, NSTEMI, JOHANA and A-fib with RVR.  Patient was stabilized with pressors and transferred out of ICU 7/27/2023  Source of septic shock thought secondary to group A strep bacteremia and myositis.      Septic shock: Resolved  Source thought secondary to group A strep bacteremia secondary to skin and soft tissue infection.  Concern for toxic shock syndrome  MRI right shoulder 7/19/2023 showed myositis  -- Switched to ceftriaxone from penicillin G 7/27/2023  --Completed course of oral linezolid 7/27/2023  -- ID to follow  -- Anticipate TCU on discharge given need for IV antibiotics for likely 2 weeks depending on clinical course  -- Patient already has PICC line in place      NSTEMI  History of CAD and ischemic cardiomyopathy   Prior coronary stents  A-fib with RVR  --Cardiology following  -- Continue amiodarone and IV heparin drip  -- Cardiology following for possible coronary angiogram this hospitalization  -- Continue aspirin and Brilinta      Acute on chronic systolic CHF exacerbation  TTE 7/17/2023 shows EF of 25 to 30%  -- IV lasix gtt and albumin boluses per cardiology      JOHANA on CKD: Likely secondary to circulatory shock  -- improving  -- Status post intermittent hemodialysis with evidence of renal recovery.  Dialysis started 7/21/2023, last dialyzed 7/24/2023  -- Defer diuresis to cardiology and nephrology      Hyponatremia: Mild, trend      Related LFTs: Thought secondary to shock liver and myositis  --Improved      Acute anemia:  No overt signs of bleeding, CT abdomen without signs of bleeding  -- Remains on heparin drip  --Status post 2 units PRBC  "7/25/2023 and hemoglobin has since been stable  -- Transfused with 1 unit of PRBC on 7/28/2023  --Monitor hemoglobin and transfuse as needed          DM2:  -- Decrease morning Lantus to 33 units every morning and evening Lantus to 12 units  -- Continue current sliding scale insulin  -- Monitor for need to start mealtime coverage           Diet: Snacks/Supplements Adult: Glucerna; With Meals  Advance Diet as Tolerated: Fully Advanced to diet(s) per Provider order; Moderate Consistent Carb (60 g CHO per Meal) Diet    DVT Prophylaxis: IV heparin  Garcia Catheter: PRESENT, indication: Strict 1-2 Hour I&O  Lines: PRESENT      PICC 07/18/23 Triple Lumen Left Basilic Vasopressor,Access-Site Assessment: WDL  CVC Double Lumen Right Subclavian Non - tunneled-Site Assessment: WDL      Cardiac Monitoring: ACTIVE order. Indication: ICU  Code Status: Full Code      Clinically Significant Risk Factors              # Hypoalbuminemia: Lowest albumin = 2.1 g/dL at 7/21/2023  4:38 AM, will monitor as appropriate   # Thrombocytopenia: Lowest platelets = 100 in last 2 days, will monitor for bleeding     # Hypertension: Noted on problem list  # Acute heart failure with reduced ejection fraction: last echo with EF <40% and receiving IV diuretics        # DMII: A1C = 6.9 % (Ref range: <5.7 %) within past 6 months     # Obesity: Estimated body mass index is 31.25 kg/m  as calculated from the following:    Height as of this encounter: 1.88 m (6' 2\").    Weight as of this encounter: 110.4 kg (243 lb 6.2 oz).     # Severe Malnutrition: based on nutrition assessment           Disposition Plan     Expected Discharge Date: 07/31/2023                  Interval history   He complains of generalized body weakness.  He states he vomited once yesterday.  He attributes the vomiting to his \"slow colon since many years ago\".  Hemoglobin is low today at 6.7.  1 unit of PRBC ordered-currently being transfused.    Nurse reports glucose of 71 this " "morning.    Vital signs:  Temp: 97.8  F (36.6  C) Temp src: Oral BP: 112/61 Pulse: 107   Resp: 16 SpO2: 100 % O2 Device: None (Room air) Oxygen Delivery: 2 LPM Height: 188 cm (6' 2\") Weight: 110.4 kg (243 lb 6.2 oz)  Estimated body mass index is 31.25 kg/m  as calculated from the following:    Height as of this encounter: 1.88 m (6' 2\").    Weight as of this encounter: 110.4 kg (243 lb 6.2 oz).      General appearance: Obese, deconditioned, awake, Alert, Cooperative, not in any obvious distress and appears stated age   HEENT: Normocephalic, atraumatic, conjunctiva clear without icterus and ears without discharge  Chest: Tunneled dialysis catheter in the right upper chest, bibasilar rales.  Cardiovascular: Regular Rate and Rythm, normal apical impulse, normal S1 and S2, 2+ right lower extremity edema.  Abdomen: Obese, soft, non-tender and Non-distended, active bowel sounds  : Garcia catheter in place draining clear urine  Skin: Skin color, texture normal and bruising or bleeding. No rashes or lesions over face, neck, arms and legs, turgor normal.  Musculoskeletal: Status post left BKA  Neurologic: Alert & Oriented X 3, Facial symmetry preserved and upper & lower extremities moving well with symmetry  Psychiatric: Calm, normal eye contact and normal affect      Feliciano Dillon MD  Hospitalist Service  [facility]  Securely message with Bharat (more info)  Text page via [facility] Paging/Directory   ______________________________________________________________________       "

## 2023-07-28 NOTE — PLAN OF CARE
Problem: Pain Acute  Goal: Optimal Pain Control and Function  Outcome: Progressing     Problem: Malnutrition  Goal: Improved Nutritional Intake  Outcome: Progressing     Problem: Sepsis/Septic Shock  Goal: Blood Glucose Level Within Targeted Range  Outcome: Progressing     Pt A/O. Denied pain. Reported intermittent SOB. Repositioning in bed q2. Hemoglobin this AM 6.7, hospitalist notified, 1 unit RBC ordered and given, pt tolerated well. Hemoglobin re-check at 1300 is 7.2.     Heparin gtt at 1700u/hr, next anti-xa re-draw 7/29 AM. Lasix gtt 20mg/hr. UOP this shift 650mls.

## 2023-07-28 NOTE — PROGRESS NOTES
"CLINICAL NUTRITION SERVICES - REASSESSMENT NOTE     Nutrition Prescription    RECOMMENDATIONS FOR MDs/PROVIDERS TO ORDER:    Malnutrition Status:    Severe malnutrition In Context of: Acute illness or injury     Recommendations already ordered by Registered Dietitian (RD):  Continue glucerna daily     Future/Additional Recommendations:  Will monitor po, wt, labs, wound healing      EVALUATION OF THE PROGRESS TOWARD GOALS   Diet: Moderate Consistent Carbohydrate  Nutrition Supplement: Glucerna strawberry daily   Intake: 0-75% per flowsheets, Fair     Per healthouch pt eating 1-2 meals/day averaging ~570 kcals per day     NEW FINDINGS   Attempted to meet with pt x3 today. Per chart review- x1 emesis yesterday per pt related to \"slow colon since many years ago\", BG regimen adjusted per MD.     Per WOC- arm wound healing, coccyx deep tissue pressure injury evolving.     ANTHROPOMETRICS  Height: 188 cm (6' 2\")  Most Recent Weight: 110.4 kg (243 lb 6.2 oz)    BMI: Obesity Grade I BMI 30-34.9  Weight History:   Date/Time Weight Weight Method   07/28/23 0453 110.4 kg (243 lb 6.2 oz) Bed scale   07/27/23 0400 112.6 kg (248 lb 3.2 oz) Bed scale   07/26/23 0530 114.4 kg (252 lb 3.2 oz) Bed scale   07/25/23 0100 113.6 kg (250 lb 7.1 oz) Bed scale   07/24/23 0600 111.4 kg (245 lb 9.5 oz) Bed scale   07/22/23 0600 111.8 kg (246 lb 7.6 oz) Bed scale   07/20/23 0545 106.1 kg (233 lb 14.4 oz) --   07/19/23 0700 102.3 kg (225 lb 8.5 oz) Bed scale   07/17/23 1316 90.7 kg (200 lb) --     PHYSICAL FINDINGS  See malnutrition section below.  Per Flowhseets:  3325 ml UOP yesterday, x2 BM this AM  Moderate/mild/trace edema R-LE, R-UE, L-hand   Rounded abdomen, normoactive bowel sounds  Wound- arm, coccyx pressure injury deep tissue- WOC following     LABS  Reviewed- Na 133(L), BUN 82.5(H), Creat 3.45(H)    MEDICATIONS  Reviewed- IV lasix, pacerone, rocephin, novolog, lantus, Mg oxide, MVI, Vit C    MALNUTRITION:  Malnutrition Diagnosis: " Severe malnutrition  In Context of:  Acute illness or injury    NUTRITION DIAGNOSIS  Malnutrition related to poor appetite as evidenced by <=50% of meals in >= 5 days and moderate fluid accumulation    Evaluation: Improving, po intakes 0-75%, decreasing edema     INTERVENTIONS  Implementation  Continue glucerna daily     Goals  Meet nutrition needs - Not Met   BG < 180 - Met  Wound healing- NEW    Monitoring/Evaluation  Progress toward goals will be monitored and evaluated per protocol.

## 2023-07-28 NOTE — PLAN OF CARE
Problem: Plan of Care - These are the overarching goals to be used throughout the patient stay.    Goal: Optimal Comfort and Wellbeing  Outcome: Progressing  Intervention: Monitor Pain and Promote Comfort  Recent Flowsheet Documentation  Taken 7/27/2023 2100 by Emiliano Correia RN  Pain Management Interventions: cold applied   Goal Outcome Evaluation:    Patient transferred from ICU to P3 around 2100. IV lasix, heparin, albumin, and antibiotic running this shift. He is uncomfortable in the bed, however was able to put him in reverse Trendelenburg and head in semi-fowlers and that was helpful.

## 2023-07-28 NOTE — PROGRESS NOTES
Occupational Therapy      07/28/23 1605   Appointment Info   Signing Clinician's Name / Credentials (OT) Yandy Collins OTR/L   Living Environment   People in Home sibling(s)   Current Living Arrangements house   Living Environment Comments Pt was living between daughter/neices house per social work pt was staying w/ sister prior to hospitalization   Self-Care   Usual Activity Tolerance moderate   Current Activity Tolerance fair   Regular Exercise No   Equipment Currently Used at Home prosthesis   Fall history within last six months no   Activity/Exercise/Self-Care Comment Pt reports being Ind. at baseline   Instrumental Activities of Daily Living (IADL)   IADL Comments unclear on home OLIVIA/ pt level of assist w/ IADL routine   General Information   Onset of Illness/Injury or Date of Surgery 07/17/23   Referring Physician Feliciano Dillon MD   Additional Occupational Profile Info/Pertinent History of Current Problem 63-year-old male with history of CAD on Brilinta, chronic systolic CHF with EF of 30 to 35% prior to admission, CKD 3, history of left foot osteomyelitis status post left BKA, and DM 2 who presents 7/17/2023 with septic shock, NSTEMI, JOHANA and A-fib with RVR.  Patient was stabilized with pressors and transferred out of ICU 7/27/2023  Source of septic shock thought secondary to group A strep bacteremia and myositis.   Existing Precautions/Restrictions fall  (B. BKA)   Range of Motion Comprehensive   Comment, General Range of Motion reports of pain in RUE pt RUE swollen Xray neg. fx/dislocation   Bed Mobility   Bed Mobility supine-sit;sit-supine   Supine-Sit [place] (Bed Mobility) moderate assist (50% patient effort);minimum assist (75% patient effort)   Sit-Supine [place] (Bed Mobility) minimum assist (75% patient effort);moderate assist (50% patient effort)   Assistive Device (Bed Mobility) draw sheet   Transfers   Transfers sit-stand transfer   Sit-Stand Transfer   Sit-Stand [place]  (Transfers) moderate assist (50% patient effort);maximum assist (25% patient effort);2 person assist   Balance   Balance Assessment standing balance: dynamic;standing balance: static   Activities of Daily Living   BADL Assessment/Intervention lower body dressing   Lower Body Dressing Assessment/Training   Comment, (Lower Body Dressing) Assist for dressing at this time d/t weakness/fatigue   [place] Level (Lower Body Dressing) moderate assist (50% patient effort)   Clinical Impression   Criteria for Skilled Therapeutic Interventions Met (OT) Yes, treatment indicated   OT Diagnosis decreased ADL ind/safety   OT Problem List-Impairments impacting ADL problems related to;activity tolerance impaired;balance;mobility;strength   Assessment of Occupational Performance 1-3 Performance Deficits   Identified Performance Deficits trnfs, dressing, toileting, safety   Planned Therapy Interventions (OT) ADL retraining;balance training;strengthening;transfer training   Clinical Decision Making Complexity (OT) low complexity   Risk & Benefits of therapy have been explained evaluation/treatment results reviewed;patient   OT Total Evaluation Time   OT Eval, Low Complexity Minutes (76520) 12   OT Goals   Therapy Frequency (OT) Daily   OT Predicted Duration/Target Date for Goal Attainment 08/04/23   OT Goals Hygiene/Grooming;Lower Body Dressing;Transfers;Toilet Transfer/Toileting   OT: Hygiene/Grooming minimal assist;while standing   OT: Lower Body Dressing Minimal assist;using adaptive equipment   OT: Transfer Minimal assist;Moderate assist;with assistive device   OT: Toilet Transfer/Toileting Minimal assist;using adaptive equipment   Self-Care/Home Management   Self-Care/Home Mgmt/ADL, Compensatory, Meal Prep Minutes (95124) 9   Symptoms Noted During/After Treatment (Meal Preparation/Planning Training) fatigue   Treatment Detail/Skilled Intervention Pt up in bed upon OT arrival pt agreeable to OT session, Min/Mod.A for trunk  control sup>EOB, pt did not have LLE prosthetic but agreeable to STS trnfs, STSx2 Mod/Max.Ax2 arm in arm, pt unable to use RUE d/t increased pain/swelling- xray/MRI negative. Pt unableto clear bottom on first stand but able to clear bottom on second stand w/ Max.Ax2- therapy recommending use of [facility] lift for safety, Attempted to nadya to recliner d/t portable nadya not working properly, pt back supine in bed at end, ModA roll L/R to get nadya sling out, Alarm on. Charge nurse/RN ntoified that portable nadya was not working properly and that pt would benefit from lift room   OT Discharge Planning   OT Plan STS assistx2, LB dressing, Pt family to bring LLE prosthetic   OT Discharge Recommendation (DC Rec) Transitional Care Facility   OT Rationale for DC Rec Pt requiring mod-Max.Ax2 STS trnf unable to ambulate/get up to chair d/t weakness. OT recommending TCU upon d/c to enhance safety- pt is in between homes at this time was staying w/ sister prior to hospitalization   OT Brief overview of current status Max.Ax2 STS   Total Session Time   Timed Code Treatment Minutes 9   Total Session Time (sum of timed and untimed services) 21

## 2023-07-29 NOTE — PROGRESS NOTES
"SW met with pt to discuss therapy recommendations; TCU . Pt stated that he is agreeable to said recommendations but also noted that he may not be able to return home with family as he has been staying with sister and niece. Both stated that they are no able to accommodate pt's needs. Pt stated that he may be interested in LTC post Transitional Care Unit stay and at this time does not have a preference. SW sent to [place] to look for Transitional Care Unit and LTC beds. Pt has active MA. JEFFREY following.  9:09 AM    Per ID \"Patient will likely need LTAC on discharge, will need IV antibiotics for 2 weeks depending on clinical response, longer course may be needed\" [facility]  3:24 PM    CATHERINE Dunn  7/29/2023     "

## 2023-07-29 NOTE — PROGRESS NOTES
Infectious Diseases Progress Note  [facility]    Date of visit: 07/29/2023     ASSESSMENT   63-year-old man with a history of hypertension, coronary artery disease, CHF, diabetes, who was admitted with septic shock.  ID following regarding strep bacteremia.    Group A strep bacteremia.  Complicated skin and soft tissue infection.  Was admitted to the ICU on pressors.  Acute kidney injury, and transaminitis.  Concerning for toxic shock syndrome.  Also having right shoulder pain.  Myositis seen on MRI. Blistering starting on 7/21, possibly from pressure or swelling. Seen by ortho and general surgery. Non-surgical management. -no IVIG due to renal failure initially, clinically improved now.  Septic shock.  Had been On pressors. Resolved.  Right shoulder myositis. Denies acute injury. Elevated ck, improving.    Acute kidney injury.  Rising creatinine.  Starting dialysis 7/20 see renal notes  Leukocytosis- resolved  Transferred out of ICU on 7/28/2023     Principal Problem:    Septic shock (H)  Active Problems:    Dehydration    Hyponatremia    Elevated troponin    JOHANA (acute kidney injury) (H)    Atrial fibrillation with RVR (H)       PLAN   -had been on penicillin G IV for group A strep infection.  Switched to ceftriaxone on 7/27/2023, and monitor results    -Updated patient.  Patient will likely need LTAC on discharge, will need IV antibiotics for 2 weeks depending on clinical response, longer course may be needed.?  LTAC candidate      Sushant Díaz MD   [facility]  Answering Service: [phone]  On-Call ID provider: [phone], option: 9          ===========================================      SUBJECTIVE / INTERVAL HISTORY:     R shoulder area still painful but improved. Decreased ROM. No other pain. Tolerating antibiotics.        Antibiotics   Penicillin g 7/20- 12 MU per day-->ceftriaxone 7/26-  Linezolid 7/20-27    Previous:  Zosyn 7/17-7/20  Vancomycin  "7/17      Physical Exam     Temp:  [97.8  F (36.6  C)-98.5  F (36.9  C)] 97.8  F (36.6  C)  Pulse:  [81-85] 85  Resp:  [16-20] 18  BP: ()/(51-67) 132/63  SpO2:  [94 %-99 %] 94 %    /63 (BP Location: Right leg)   Pulse 85   Temp 97.8  F (36.6  C) (Oral)   Resp 18   Ht 1.88 m (6' 2\")   Wt 110.5 kg (243 lb 9.7 oz)   SpO2 94%   BMI 31.28 kg/m      GENERAL:  no distress  HENT:  Head is normocephalic, atraumatic.   EYES:  Eyes have anicteric sclerae without conjunctival injection   LUNGS:  Clear to auscultation.  normal respiratory effort   CARDIOVASCULAR:  Regular rate and rhythm with no murmurs, gallops or rubs.  ABDOMEN:  Normal bowel sounds, soft, nontender.   EXT: Right shoulder swelling, tenderness.  SKIN:  Shallow blisters on posterior right arm, superficial, weeping. Right IJ line is in place without any surrounding erythema.   NEUROLOGIC:  Grossly nonfocal.  L UE PICC    Photo in chart 7/28/2023       Previous photo        Cultures   7/17 blood cultures x2: Strep pyogenes  7/19 blood cultures x2: No growth to date          Pertinent Labs:     Recent Labs   Lab 07/29/23  0603 07/28/23  1317 07/28/23  0653 07/27/23  0833   WBC 8.9  --  9.7 9.5   HGB 7.0* 7.2* 6.7* 7.5*   PLT 86*  --  100* 101*       Recent Labs   Lab 07/29/23  0603 07/28/23  0653 07/27/23  0401 07/26/23  0427 07/25/23  0442   * 133* 131*   < > 132*   CO2 20* 23 23   < > 24   BUN 89.4* 82.5* 74.2*   < > 55.2*   ALBUMIN  --   --   --   --  2.4*   ALKPHOS  --   --   --   --  239*   ALT  --   --   --   --  41   AST  --   --   --   --  45    < > = values in this interval not displayed.       No results for input(s): CRP, SED in the last 168 hours.        Imaging:     IR CVC Non Tunnel Placement > 5 Yrs    Result Date: 7/20/2023  LOCATION: [facility] DATE: 7/20/2023 PROCEDURE: NON-TUNNELED DIALYSIS CATHETER PLACEMENT 1.  Insertion of a non-tunneled central venous catheter. 2.  Ultrasound guidance for " vascular access. 3.  Fluoroscopic guidance for central venous access device placement. INTERVENTIONAL RADIOLOGIST: Joseph Campos MD INDICATION: Renal insufficiency requiring renal replacement therapy, plan for nontunneled dialysis catheter placement. CONSENT: The risks, benefits and alternatives of the procedure were discussed with the patient or representative in detail. All questions were answered. Informed consent was given to proceed with the procedure. MODERATE SEDATION: None. CONTRAST: None. ANTIBIOTICS: None. ADDITIONAL MEDICATIONS: None. FLUOROSCOPIC TIME: 0.5 minutes. RADIATION DOSE: Air Kerma: 5 mGy. COMPLICATIONS: No immediate complications. UNIVERSAL PROTOCOL: The operative site was marked and any prior imaging was reviewed. Required items including blood products, implants, devices and special equipment was made available. Patient identity was confirmed either verbally, with demographic information, hospital assigned identification or other identification markers. A timeout was performed immediately prior to the procedure. STERILE BARRIER TECHNIQUE: Maximum sterile barrier technique was used. Cutaneous antisepsis was performed at the operative site with application of 2% chlorhexidine and large sterile drape. Prior to the procedure, the  and assistant performed hand hygiene and wore hat, mask, sterile gown, and sterile gloves during the entire procedure. PROCEDURE:  Using local anesthesia and real-time ultrasound guidance the right internal jugular vein was accessed. Imaging demonstrates an anechoic and compressible vein. A permanent image was stored to the patient's medical record. Using this access, a 20 cm dialysis catheter was advanced using fluoroscopic guidance until the tip was in the proximal right atrium. FINDINGS: At the completion of the study he radiograph was performed utilizing the in room fluoroscopic unit. Imaging demonstrates the nontunneled dialysis catheter tip terminating in  the proximal right atrium.     IMPRESSION:  1.  Successful non-tunneled dialysis catheter placement. 2.  The catheter is ready for use.    MR Shoulder Right w/o Contrast    Result Date: 7/19/2023  EXAM: MR SHOULDER RIGHT W/O CONTRAST LOCATION: [facility] DATE: 7/19/2023 INDICATION: Septic shock with bacteremia, concern for right shoulder osteomyelitis or joint infection. COMPARISON: None. TECHNIQUE: Unenhanced. FINDINGS: ROTATOR CUFF: -Supraspinatus: Moderate severity tendinopathy. No tearing or retraction. No atrophy. -Infraspinatus: Moderate severity infraspinatus tendinopathy without evidence for well-defined partial or full-thickness tearing. No retraction but there is T2 signal abnormality within the muscular portion, which may be due to muscle strain. Infectious or inflammatory myositis could have this appearance. -Subscapularis: No tendon tear, tendinopathy or fatty atrophy. -Teres minor: No tendon tear, tendinopathy or fatty atrophy. CORACOACROMIAL ARCH: -Morphology: Type II acromion. No subacromial spur. Subacromial and subcoracoid space are normal. -Bursa: Fluid in the subacromial subdeltoid bursa. The coracoacromial and coracoclavicular ligaments are negative. ACROMIOCLAVICULAR JOINT: -Hypertrophic change. No significant diastasis or effusion. LONG HEAD OF BICEPS TENDON: -Mild tendinopathy within the intra-articular portion of the long head of the biceps. GLENOHUMERAL JOINT: -Labrum: No labral tear. No paralabral cyst. -Cartilage: Grade II-grade III cartilage loss both sides of the joint without significant surface irregularity. -Joint space: No effusion or synovitis. -Glenohumeral ligaments and capsule: No pericapsular inflammation. BONES: -No fracture or concerning marrow replacing lesion. No evidence for osteomyelitis. SOFT TISSUES: -Extensive but nonspecific T2 signal abnormality is present within the deltoid musculature, most marked posteriorly but this is also seen within  the latissimus muscle, distal aspect of the trapezius, infraspinatus as described, and portions of the subscapularis. The multiplicity of muscle groups involved makes a strain type injury considered unlikely and this is more likely secondary to infectious or inflammatory myositis. There is no evidence for organized fluid collection to suggest abscess.     IMPRESSION: 1.  Extensive signal abnormality within multiple muscles of the shoulder girdle including portions of the visualized latissimus and trapezius muscles. The findings are likely secondary to infectious or inflammatory myositis versus contusion or muscle strain type injury. No evidence for abscess. 2.  No evidence for osteomyelitis or septic arthropathy. 3.  Small amount of fluid within the subacromial subdeltoid bursa. 4.  No evidence for fracture. 5.  Moderate severity tendinopathy within the supraspinatus and infraspinatus. 6.  Tendinopathy within the intraarticular portion of the biceps. 7.  Degenerative change at the glenohumeral joint with grade II-grade III cartilage loss but no significant surface irregularity.    XR Chest Port 1 View    Result Date: 7/18/2023  EXAM: XR CHEST PORT 1 VIEW LOCATION: [facility] DATE: 7/18/2023 INDICATION: RN placed PICC   verify tip placement COMPARISON: 07/17/2023     IMPRESSION: Left upper extremity PICC terminates at the SVC - RA junction. No acute cardiopulmonary abnormality.    Reviewed labs cultures, discussed with patient  Chart reviewed  Notes reviewed    Total Time Spent 35 minutes with >50% of the time spent in chart review, evaluation, management, counseling, education and care coordination.

## 2023-07-29 NOTE — PLAN OF CARE
Problem: Pain Acute  Goal: Optimal Pain Control and Function  Outcome: Progressing     Problem: Sepsis/Septic Shock  Goal: Blood Glucose Level Within Targeted Range  Outcome: Progressing     Problem: Oral Intake Inadequate  Goal: Improved Oral Intake  Outcome: Progressing     Pt A/O. Up In chair x1 with nadya lift. Repositioning q2. Denied pain and SOB this shift. Lasix gtt d/c'd. Pt left unit at 1230 for dialysis. Writer took a critical anti-xa at 1330 that was greater than 1.10. Heparin stopped for 60 min. per protocol and restarted at 1350 unit(s)/hr. Hospitalist notified of critical value. Next anti-xa at 2100. Pt still down at dialysis.

## 2023-07-29 NOTE — PLAN OF CARE
Problem: Plan of Care - These are the overarching goals to be used throughout the patient stay.    Goal: Absence of Hospital-Acquired Illness or Injury  Intervention: Identify and Manage Fall Risk  Recent Flowsheet Documentation  Taken 7/28/2023 2200 by Jaz Gr, RN  Safety Promotion/Fall Prevention:   activity supervised   assistive device/personal items within reach   clutter free environment maintained   increased rounding and observation   increase visualization of patient   lighting adjusted   mobility aid in reach   nonskid shoes/slippers when out of bed   patient and family education   room door open   room near nurse's station   room organization consistent   safety round/check completed   supervised activity   toileting scheduled  Taken 7/28/2023 1700 by Jaz Gr, RN  Safety Promotion/Fall Prevention:   activity supervised   assistive device/personal items within reach   clutter free environment maintained   increased rounding and observation   increase visualization of patient   lighting adjusted   mobility aid in reach   nonskid shoes/slippers when out of bed   patient and family education   room door open   room near nurse's station   room organization consistent   safety round/check completed   supervised activity   toileting scheduled     Problem: Plan of Care - These are the overarching goals to be used throughout the patient stay.    Goal: Optimal Comfort and Wellbeing  Outcome: Progressing   Goal Outcome Evaluation:    BG at supper was 55. D 50 of 25 ml x 2 administered and BG came up to 129. Still on heparin and lasix drip. Reports constant ache in his right arm, says MDs are aware. Tylenol administered and somewhat effective per pt. Reports feeling uncomfortable regardless of being repositioned every 1-2 hours and says everything staff was doing was only making him less comfortable. VSS, NSR, on RA but still reports having difficulty breathing. O2 NC applied for comfort but pt states  that it did not help. May need to consider [facility] consult.

## 2023-07-29 NOTE — PLAN OF CARE
Problem: Plan of Care - These are the overarching goals to be used throughout the patient stay.    Goal: Absence of Hospital-Acquired Illness or Injury  Intervention: Prevent Skin Injury  Recent Flowsheet Documentation  Taken 7/29/2023 0502 by David Brewster RN  Body Position:   turned   left  Taken 7/29/2023 0325 by David Brewster RN  Body Position:   weight shifting   turned   right  Taken 7/29/2023 0139 by David Brewster RN  Body Position:   weight shifting   log-rolled       Problem: Sepsis/Septic Shock  Goal: Absence of Infection Signs and Symptoms  Intervention: Initiate Sepsis Management  Recent Flowsheet Documentation  Taken 7/29/2023 0325 by David Brewster RN  Infection Prevention: rest/sleep promoted  Taken 7/29/2023 0040 by David Brewster RN  Infection Prevention: rest/sleep promoted     Goal Outcome Evaluation:  Patient is aox4. Patient denied chest pain. Has NSR. VSS. Patient's sating adequately on room air. Anti-xa 0.20 around 6:00 am. Bolus given and heparin rate increased per protocol. Heparin drip running at 1,850 units/hr. Recheck anti-xa at 12:36 pm today. Lasix drip running at 20 ml/hr. Garcia patent. Output 400 ml. Intake 240 ml. Cr 3.44 today from 3.45 yesterday. Patient had loose stool this morning. Stool black in color(not new). Abdomen soft and non-tender. Hgb 7.0. Continue to monitor.

## 2023-07-29 NOTE — PROGRESS NOTES
"    [facility]  [phone]          Assessment/Recommendations   Patient with known heart failure with reduced left ventricular ejection fraction, renal failure, admitted with sepsis with jonathan hemodynamic course.  Has not been dialyzed for a few days and furosemide drip however urine output is lagging a bit based on charted urine output.  He does have evidence of mild pulmonary vascular congestion with crackles at the bases, and also has significant elevation in his jugular venous pressure with hepatojugular reflux.    We will continue furosemide drip but will look forward to nephrology opinion today.  Possibly will need further dialysis to get rid of additional fluid?    No other changes in his cardiac medications today.    Awaiting stabilization of infection and renal function prior to repeat coronary angiography.    Has had intermittent atrial fibrillation during this hospital stay and is being loaded with amiodarone.  Continue amiodarone load, follow rhythm, and continue intravenous heparin at this point.    We will continue to follow.       Subjective   Patient reports breathing is comfortable at rest.  He is not having any chest discomfort.  His abdomen feels bloated which happens to him periodically and sometimes can cause difficulties with discomfort and breathing.  Sitting up in a chair and complains that his tailbone is hurting so wants to get back into bed.    ECG/Tele: Personally reviewed.  Atrial fibrillation with ventricular response of approximately 100 bpm.       Physical Examination Review of Systems   /67   Pulse 84   Temp 98  F (36.7  C) (Oral)   Resp 18   Ht 1.88 m (6' 2\")   Wt 110.5 kg (243 lb 9.7 oz)   SpO2 98%   BMI 31.28 kg/m    Body mass index is 31.28 kg/m .  Wt Readings from Last 3 Encounters:   07/29/23 110.5 kg (243 lb 9.7 oz)   11/17/22 100.2 kg (221 lb)   09/28/22 104.3 kg (230 lb)     General Appearance:   Alert, cooperative and in no acute distress. " "  ENT/Mouth: Pink/moist     EYES:  no scleral icterus, normal conjunctivae   Neck: JVP 15 cm.  Positive hepatojugular reflux. Thyroid not visualized.   Chest/Lungs:   Lungs diminished breath sounds with crackles at the bases bilaterally.  Equal chest wall expansion.   Cardiovascular:   S1, S2 with 2/6 systolic murmur , no clicks or rubs.    Abdomen:  Nontender. B   Extremities: Right upper extremity wrapped in lower extremities wrapped   Skin: no xanthelasma, warm.    Neurologic: normal arm movement bilateral, no tremors     Psychiatric: Appropriate affect.      Enc Vitals  BP: 129/67  Pulse: 84  Resp: 18  Temp: 98  F (36.7  C)  Temp src: Oral  SpO2: 98 %  Weight: 110.5 kg (243 lb 9.7 oz)  Height: 188 cm (6' 2\")                                           Medical History  Surgical History Family History Social History   Past Medical History:   Diagnosis Date    Anemia     Chronic systolic CHF (congestive heart failure) (H)     Coronary artery disease     Diabetic neuropathy (H)     DM type 2 w Neuropathy     GERD (gastroesophageal reflux disease)     Hyperlipidemia     Hypertension     Intermittent atrial fibrillation (H)     on Eliquis    Ischemic cardiomyopathy 11/16/2021    Echo with EF of 30-35%    NSTEMI (non-ST elevated myocardial infarction) (H) 11/15/2021    Osteomyelitis of left foot (H) 04/04/2022    Renal disease     Retinopathy     Sleep disturbance     Past Surgical History:   Procedure Laterality Date    AMPUTATE FOOT Left 3/21/2022    Procedure: Guillotine AMPUTATION, FOOT;  Surgeon: Ronald Bhatt MD;  Location: [facility] OR    AMPUTATE LEG BELOW KNEE Left 4/4/2022    Procedure: LEFT BELOW  KNEE AMPUTATION;  Surgeon: Ronald Bhatt MD;  Location: [facility] OR    AMPUTATE TOE(S) Left 11/16/2021    Procedure: first and second ray amputation;  Surgeon: Eddi Ram DPM;  Location: [facility] OR    APPENDECTOMY      CV CORONARY ANGIOGRAM N/A 11/16/2021    Procedure: CV CORONARY " ANGIOGRAM;  Surgeon: Pam Tabares MD;  Location: [facility] LAB CV    CV CORONARY ANGIOGRAM N/A 8/24/2022    Procedure: CV CORONARY ANGIOGRAM;  Surgeon: Cipriano Lucas MD;  Location: [facility] CATH LAB CV    CV CORONARY ANGIOGRAM N/A 8/29/2022    Procedure: CV CORONARY ANGIOGRAM;  Surgeon: Cipriano Lucas MD;  Location: [facility] LAB CV    CV INTRAVASULAR ULTRASOUND N/A 8/29/2022    Procedure: Intravascular Ultrasound;  Surgeon: Cipriano Lucas MD;  Location: [facility] LAB CV    CV LEFT HEART CATH N/A 11/16/2021    Procedure: Left Heart Cath;  Surgeon: Pam Tabares MD;  Location: [facility] CATH LAB CV    CV PCI N/A 8/29/2022    Procedure: Percutaneous Coronary Intervention stent;  Surgeon: Cipriano Lucas MD;  Location: [facility] LAB CV    CV PCI ANGIOPLASTY N/A 8/29/2022    Procedure: Percutaneous Transluminal Angioplasty;  Surgeon: Cipriano Lucas MD;  Location: [facility] CV    FOOT SURGERY Left     HERNIA REPAIR      INCISION AND DRAINAGE LOWER EXTREMITY, COMBINED Left 11/16/2021    Procedure: INCISION AND DRAINAGE, left foot;  Surgeon: Eddi Ram DPM;  [place] [facility] [place]    INCISION AND DRAINAGE LOWER EXTREMITY, COMBINED Left 11/19/2021    Procedure: INCISION AND DRAINAGE, left foot;  Surgeon: Eddi Ram DPM;  [place] [facility] [place]    INCISION AND DRAINAGE LOWER EXTREMITY, COMBINED Left 12/9/2021    Procedure: INCISION AND DRAINAGE, Left foot;  Surgeon: Eddi Ram DPM;  [place] [facility] [place]    INCISION AND DRAINAGE LOWER EXTREMITY, COMBINED Left 1/10/2022    Procedure: INCISION AND DRAINAGE, left foot;  Surgeon: Eddi Ram DPM;  [place] [facility] [place]    INCISION AND DRAINAGE LOWER EXTREMITY, COMBINED Left 1/27/2022    Procedure: INCISION AND DRAINAGE, Left foot;  Surgeon: Eddi Ram DPM;  [place] [facility] [place]    IR CVC NON TUNNEL PLACEMENT > 5 YRS  7/20/2023    PICC TRIPLE LUMEN  PLACEMENT  7/17/2023         PICC TRIPLE LUMEN PLACEMENT  7/18/2023         No family history on file. Social History     Socioeconomic History    Marital status: Single     Spouse name: Not on file    Number of children: Not on file    Years of education: Not on file    Highest education level: Not on file   Occupational History    Not on file   Tobacco Use    Smoking status: Former     Types: Cigars    Smokeless tobacco: Never    Tobacco comments:     Cigars   Vaping Use    Vaping Use: Never used   Substance and Sexual Activity    Alcohol use: Yes     Comment: < 1 drink a month (only holidays)    Drug use: Never    Sexual activity: Not on file   Other Topics Concern    Parent/sibling w/ CABG, MI or angioplasty before 65F 55M? Not Asked   Social History Narrative    Single, currently not working, has done numerous jobs, lives alone.  Full code. (last updated 4/4/2022)      Social Determinants of Health     Financial Resource Strain: Not on file   Food Insecurity: Not on file   Transportation Needs: Not on file   Physical Activity: Not on file   Stress: Not on file   Social Connections: Not on file   Intimate Partner Violence: Not on file   Housing Stability: Not on file          Medications  Allergies   No current outpatient medications on file.    No Known Allergies      Lab Results    Chemistry/lipid CBC Cardiac Enzymes/BNP/TSH/INR   Lab Results   Component Value Date    CHOL 157 03/24/2023    HDL 65 03/24/2023    TRIG 60 03/24/2023    BUN 89.4 (H) 07/29/2023     (L) 07/29/2023    CO2 20 (L) 07/29/2023    Lab Results   Component Value Date    WBC 8.9 07/29/2023    HGB 7.0 (L) 07/29/2023    HCT 20.4 (L) 07/29/2023    MCV 89 07/29/2023    PLT 86 (L) 07/29/2023    Lab Results   Component Value Date    TROPONINI 5.84 (HH) 08/30/2022     (H) 08/24/2022    INR 1.19 (H) 08/24/2022

## 2023-07-29 NOTE — PROGRESS NOTES
[facility]    Medicine Progress Note - Hospitalist Service    Date of Admission:  7/17/2023    Assessment & Plan   Mr. Aguilar is a 63-year-old male with history of CAD on Brilinta, chronic systolic CHF with EF of 30 to 35% prior to admission, CKD 3, history of left foot osteomyelitis status post left BKA, and DM 2 who presents 7/17/2023 with septic shock, NSTEMI, JOHANA and A-fib with RVR.  Patient was stabilized with pressors and transferred out of ICU 7/27/2023  Source of septic shock thought secondary to group A strep bacteremia and myositis.     Septic shock: Resolved  -Source thought secondary to group A strep bacteremia secondary to skin and soft tissue infection.  Concern for toxic shock syndrome  MRI right shoulder 7/19/2023 showed myositis  -Switched to ceftriaxone from penicillin G 7/27/2023  -Completed course of oral linezolid 7/27/2023  -ID to follow: Will likely need IV antibiotics for 2 weeks, possibly longer course  -Anticipate TCU on discharge given need for IV antibiotics for likely 2 weeks depending on clinical course  -Patient already has PICC line in place     NSTEMI  History of CAD and ischemic cardiomyopathy   Prior coronary stents  A-fib with RVR  -Cardiology following  -Continue amiodarone and IV heparin drip  -Cardiology following for possible coronary angiogram this hospitalization  -Continue aspirin and Brilinta      Acute on chronic systolic CHF exacerbation  TTE 7/17/2023 shows EF of 25 to 30%  -IV lasix gtt and albumin boluses per cardiology      JOHANA on CKD stage 3: Likely secondary to circulatory shock  -Baseline creatinine 1.4-1.6, 3.25 on admission, peak at 5.22 on 7/21, some fluctuation but now generally improving  -Status post intermittent hemodialysis with evidence of renal recovery.  Dialysis started 7/21/2023, last dialyzed 7/24/2023  -Defer diuresis to cardiology and nephrology  -Started dialysis 7/21 to 7/24, resumed 7/29 due to increased volume    "  Hyponatremia-now 131  -Initially severe, 119 on presentation  -We will continue to monitor     Related LFTs: Thought secondary to shock liver and myositis  -Improved     Acute on anemia of chronic disease  No overt signs of bleeding, CT abdomen without signs of bleeding  -Remains on heparin drip  -Status post 2 units PRBC 7/25/2023 hgb 5.9  -Transfused with 1 unit of PRBC on 7/28/2023 hgb 6.7  -Monitor hemoglobin and transfuse as needed     DM2:  -Decrease morning Lantus to 30 units every morning  -hold evening Lantus to 12 units until eating better  -Continue current sliding scale insulin  -Monitor for need to start mealtime coverage       Diet: Snacks/Supplements Adult: Glucerna; With Meals  Advance Diet as Tolerated: Fully Advanced to diet(s) per Provider order; Moderate Consistent Carb (60 g CHO per Meal) Diet    DVT Prophylaxis: Heparin drip  Garcia Catheter: PRESENT, indication: Strict 1-2 Hour I&O  Lines: PRESENT      PICC 07/18/23 Triple Lumen Left Basilic Vasopressor,Access-Site Assessment: WDL  CVC Double Lumen Right Subclavian Non - tunneled-Site Assessment: WDL      Cardiac Monitoring: ACTIVE order. Indication: ICU  Code Status: Full Code      Clinically Significant Risk Factors             # Anion Gap Metabolic Acidosis: Highest Anion Gap = 21 mmol/L in last 2 days, will monitor and treat as appropriate  # Hypoalbuminemia: Lowest albumin = 2.1 g/dL at 7/21/2023  4:38 AM, will monitor as appropriate   # Thrombocytopenia: Lowest platelets = 86 in last 2 days, will monitor for bleeding   # Hypertension: Noted on problem list  # Acute heart failure with reduced ejection fraction: last echo with EF <40% and receiving IV diuretics      # DMII: A1C = 6.9 % (Ref range: <5.7 %) within past 6 months   # Obesity: Estimated body mass index is 31.28 kg/m  as calculated from the following:    Height as of this encounter: 1.88 m (6' 2\").    Weight as of this encounter: 110.5 kg (243 lb 9.7 oz).   # Severe " Malnutrition: based on nutrition assessment         Disposition Plan      Expected Discharge Date: 07/31/2023        Discharge Comments: iv abx, heparin & lasix gtt, trend hgb  homeless          Mabel Worthy MD  Hospitalist Service  [facility]  Securely message with [employer] (more info)  Text page via [employer] Paging/Directory   ______________________________________________________________________    Interval History   Mr. Aguilar is new to me today.  He is looking well.  He does state that he feels like his stomach is more bloated or like he is having more gas today.  He felt like his volume status has gone up over the past day.  He will restart dialysis today per nephrology.  Otherwise he is not having complaints.    Physical Exam   Vital Signs: Temp: 98  F (36.7  C) Temp src: Oral BP: 127/61 Pulse: 84   Resp: 18 SpO2: 98 % O2 Device: None (Room air)    Weight: 243 lbs 9.73 oz  General Appearance: Awake, alert, in no acute distress  Respiratory: CTAB, no wheeze  Cardiovascular: Borderline tachycardia  GI: Taut, distended, bowel sounds regular  Skin: no jaundice, no rash      Medical Decision Making       55 MINUTES SPENT BY ME on the date of service doing chart review, history, exam, documentation & further activities per the note.      Data     I have personally reviewed the following data over the past 24 hrs:    8.9  \   7.0 (L)   / 86 (L)     131 (L) 90 (L) 89.4 (H) /  170 (H)   3.7 20 (L) 3.44 (H) \     Procal: N/A CRP: 63.40 (H) Lactic Acid: N/A         Imaging results reviewed over the past 24 hrs:   No results found for this or any previous visit (from the past 24 hour(s)).

## 2023-07-29 NOTE — PROGRESS NOTES
"        RENAL PROGRESS NOTE    CC: f/u JOHANA    Subjective:remains on lasix gtt, c/o abd distention and \"gas\" today.  Denies sob.  Weight unchanged since yest, 400 ml urine output today. We discussed resuming dialysis today.       ASSESSMENT AND RECOMMENDATIONS:  1. JOHANA/ CKD 3b: baseline CKD due to longstanding DM2, HTN. Now severe ATN in setting of profound hypotension with sepsis, group a strep bacteremia and hypovolemia and contrast nephropathy after CTA, mild rhabdo. CTA on 7/17 with no hydronephrosis. UA on 7/18 with granular casts   -dialysis started 7/21 with last HD 7/24.   - poor response to lasix gtt  - resume dialysis today  -stop lasix gtt with dialysis resumption, can give 80 mg iv bid   - Daily labs and trend UO/weights    2. Hypotension due to CMP and exacerbated by bacteremia/sepsis. Improved and off pressors.    -will give midodrine with dialysis to prevent hypotension and allow uf                   3. Metabolic acidosis: due to JOHANA.               - Stable at present.  -cont to trend       4. NSTEMI with ischemic CMP/ HFrEF: Decompensated. EF of 25-30% based on 7/18 echo which also showed normal RVF, normal RA pressures.    -Needs further cor angio once renal function/infection stable. Management per Dr. Arredondo.               - Weight 100.2 Kg during November 2022 Cardiology visit. Still remains markedly overloaded at 110.5 Kg today,    -suboptimal response to lasix gtt due to ongoing JOHANA/CKD, cardiomyopathy   -resume dialysis today, plan 3-4 liter uf as able   -stop lasix gtt, give lasix 80 mg iv bid to maximize urine output    5. Hyponatremia: due to hypervolemia, JOHANA  -worse   - will improve with uf with dialysis today     6. NSTEMI with ischemic CMP : Needs further cor angio once renal function/infection stable. Management per Dr. Arredondo.       7. Bacteremia: Group A strep and rt shoulder myositis              -now on Rocephin / linezolid   -Shoulder pain improved.      Discussed need for " "dialysis today with dialysis nurse    Lyudmila Medina MD  [facility]  [phone]        Objective    PHYSICAL EXAM  /63 (BP Location: Right leg)   Pulse 85   Temp 97.8  F (36.6  C) (Oral)   Resp 18   Ht 1.88 m (6' 2\")   Wt 110.5 kg (243 lb 9.7 oz)   SpO2 94%   BMI 31.28 kg/m    I/O last 3 completed shifts:  In: 1567 [P.O.:1160; I.V.:107]  Out: 1350 [Urine:1350]  Wt Readings from Last 3 Encounters:   07/29/23 110.5 kg (243 lb 9.7 oz)   11/17/22 100.2 kg (221 lb)   09/28/22 104.3 kg (230 lb)       GENERAL: nad in bed  HEENT:normocephalic, atraumatic  CARDIOVASCULAR: RRR, 1+ diffuse edema   PULMONARY: Normal effort,  No cyanosis  GASTROINTESTINAL: soft, nt/nd, BS present  NEURO: Alert, no gross focal findings  PSYCHIATRIC: Appropriate mood and interaction  SKIN: warm, dry  MS: left bka       LABORATORIES  Recent Labs   Lab 07/29/23  0603 07/28/23  0653 07/27/23  0401 07/26/23  0427 07/25/23  0442 07/24/23  0442 07/23/23  0524   * 133* 131* 130* 132* 127* 128*   POTASSIUM 3.7 3.4 3.6 4.0 4.0 3.7 4.0   CHLORIDE 90* 94* 93* 94* 93* 92* 93*   CO2 20* 23 23 21* 24 21* 23   BUN 89.4* 82.5* 74.2* 65.0* 55.2* 71.7* 65.6*   CR 3.44* 3.45* 3.15* 2.99* 2.71* 3.37* 3.31*   GFRESTIMATED 19* 19* 21* 23* 25* 20* 20*   YOSVANY 8.8 8.9 8.8 8.5* 7.9* 8.4* 8.1*   ALBUMIN  --   --   --   --  2.4*  --   --          Recent Labs   Lab 07/29/23  0603 07/28/23  1317 07/28/23  0653 07/27/23  0833 07/26/23  0427 07/25/23  1734 07/25/23  0442 07/23/23  0524   WBC 8.9  --  9.7 9.5 10.8  --  15.5* 24.9*   HGB 7.0* 7.2* 6.7* 7.5* 7.6* 7.5* 5.9* 7.1*   HCT 20.4*  --  19.9* 21.7* 22.3*  --  17.3* 21.3*   MCV 89  --  87 85 85  --  82 80   PLT 86*  --  100* 101* 109*  --  125* 167            MEDICATIONS   albumin human  50 g Intravenous BID    amiodarone  400 mg Oral Daily    aspirin  81 mg Oral Daily    cefTRIAXone  2 g Intravenous Q24H    docusate sodium  100 mg Oral BID    insulin aspart  1-10 Units Subcutaneous TID " AC    insulin aspart  1-7 Units Subcutaneous At Bedtime    [Held by provider] insulin glargine  12 Units Subcutaneous At Bedtime    insulin glargine  30 Units Subcutaneous QAM AC    magnesium oxide  400 mg Oral Daily    multivitamin, therapeutic  1 tablet Oral Daily    sodium chloride (PF)  10-40 mL Intracatheter Q7 Days    sodium chloride (PF)  3 mL Intracatheter Q8H    ticagrelor  90 mg Oral BID    vitamin C  500 mg Oral Daily

## 2023-07-29 NOTE — PROGRESS NOTES
HEMODIALYSIS NOTE:     TREATMENT TIME: 3hr        UF TOTAL(net) : 3500 ml      BVP:  56.0L        ACCESS PRE: Right Chest CVC , no access issues, Heparin instills     HEPATITIS STATUS:  Hbsag: Negative, 07/21/23     RUN SUMMARY: 3 hr run on a K3 bath per lab protocol.      Tolerated Tx     Pt seen by Dr. Medina in Room prior to dialysis. Overall stable treatment, pt denies SOB, NV, cramping, chest pain.   See HD flow sheet for all data. Post report given to TUSHAR Gr RN.      ACCESS POST:CVC clean dry and intact     INTERVENTIONS: Monitor VS Q 15 minutes and PRN.     PLAN: Per Renal Team

## 2023-07-30 NOTE — PROGRESS NOTES
[facility]    Medicine Progress Note - Hospitalist Service    Date of Admission:  7/17/2023    Assessment & Plan   Mr. Aguilar is a 63-year-old male with history of CAD on Brilinta, chronic systolic CHF with EF of 30 to 35% prior to admission, CKD 3, history of left foot osteomyelitis status post left BKA, and DM 2 who presents 7/17/2023 with septic shock, NSTEMI, JOHANA and A-fib with RVR.  Patient was stabilized with pressors and transferred out of ICU 7/27/2023  Source of septic shock thought secondary to group A strep bacteremia and myositis.     Septic shock: Resolved  -Source thought secondary to group A strep bacteremia secondary to skin and soft tissue infection.  Concern for toxic shock syndrome  MRI right shoulder 7/19/2023 showed myositis  -Switched to ceftriaxone from penicillin G 7/27/2023  -Completed course of oral linezolid 7/27/2023  -ID to follow: Will likely need IV antibiotics for 2 weeks, possibly longer course  -Anticipate TCU on discharge given need for IV antibiotics for likely 2 weeks depending on clinical course  -Patient already has PICC line in place     NSTEMI  History of CAD and ischemic cardiomyopathy   Prior coronary stents  A-fib with RVR  -Cardiology following  -Continue amiodarone and IV heparin drip  -Cardiology following for possible coronary angiogram this hospitalization-Will work with ID on timing  -Continue aspirin and Brilinta      Acute on chronic systolic CHF exacerbation  TTE 7/17/2023 shows EF of 25 to 30%  -Continuing intermittent dialysis as below      JOHANA on CKD stage 3: Likely secondary to circulatory shock  -Baseline creatinine 1.4-1.6, 3.25 on admission, peak at 5.22 on 7/21, some fluctuation but now generally improving  -Status post intermittent hemodialysis with evidence of renal recovery.  Dialysis started 7/21/2023, last dialyzed 7/24/2023  -Defer diuresis to cardiology and nephrology  -Started dialysis 7/21 to 7/24, resumed 7/29 due to  "increased volume     Hyponatremia-now 131  -Initially severe, 119 on presentation  -We will continue to monitor     Related LFTs: Thought secondary to shock liver and myositis  -Improved     Acute on anemia of chronic disease  No overt signs of bleeding, CT abdomen without signs of bleeding  -Remains on heparin drip  -Status post 2 units PRBC 7/25/2023 hgb 5.9  -Transfused with 1 unit of PRBC on 7/28/2023 hgb 6.7  -Monitor hemoglobin and transfuse as needed     DM2:  -Decrease morning Lantus to 30 units every morning  -hold evening Lantus to 12 units until eating better  -Continue current sliding scale insulin  -Monitor for need to start mealtime coverage       Diet: Snacks/Supplements Adult: Glucerna; With Meals  Advance Diet as Tolerated: Fully Advanced to diet(s) per Provider order; Moderate Consistent Carb (60 g CHO per Meal) Diet    DVT Prophylaxis: Heparin drip  Garcia Catheter: PRESENT, indication: Strict 1-2 Hour I&O  Lines: PRESENT      PICC 07/18/23 Triple Lumen Left Basilic Vasopressor,Access-Site Assessment: WDL  CVC Double Lumen Right Subclavian Non - tunneled-Site Assessment: WDL      Cardiac Monitoring: ACTIVE order. Indication: Tachyarrhythmias, acute (48 hours)  Code Status: Full Code      Clinically Significant Risk Factors             # Anion Gap Metabolic Acidosis: Highest Anion Gap = 21 mmol/L in last 2 days, will monitor and treat as appropriate  # Hypoalbuminemia: Lowest albumin = 2.1 g/dL at 7/21/2023  4:38 AM, will monitor as appropriate   # Thrombocytopenia: Lowest platelets = 86 in last 2 days, will monitor for bleeding   # Hypertension: Noted on problem list  # Acute heart failure with reduced ejection fraction: last echo with EF <40% and receiving IV diuretics      # DMII: A1C = 6.9 % (Ref range: <5.7 %) within past 6 months   # Obesity: Estimated body mass index is 30.63 kg/m  as calculated from the following:    Height as of this encounter: 1.88 m (6' 2\").    Weight as of this " encounter: 108.2 kg (238 lb 8.6 oz).   # Severe Malnutrition: based on nutrition assessment         Disposition Plan      Expected Discharge Date: 07/31/2023        Discharge Comments: iv abx for 2 weeks HD 7/31  ltach reviewing          Mabel Worthy MD  Hospitalist Service  [facility]  Securely message with [employer] (more info)  Text page via [employer] Paging/Directory   ______________________________________________________________________    Interval History   Mr. Aguilar is doing well today.  He is feeling better with his breathing since dialysis yesterday.  Otherwise he is not having any medical complaints.    Physical Exam   Vital Signs: Temp: 98.5  F (36.9  C) Temp src: Oral BP: 123/66 Pulse: 87   Resp: 18 SpO2: 98 % O2 Device: None (Room air)    Weight: 238 lbs 8.6 oz    General Appearance: Awake, alert, in no acute distress  Respiratory: CTAB, no wheeze  Cardiovascular: RRR, generalized edema present  GI: soft, nontender, non distended, normal bowel sounds  Skin: no jaundice, no rash      Medical Decision Making       50 MINUTES SPENT BY ME on the date of service doing chart review, history, exam, documentation & further activities per the note.      Data     I have personally reviewed the following data over the past 24 hrs:    N/A  \   7.7 (L)   / N/A     133 (L) 93 (L) 69.0 (H) /  165 (H)   3.6 21 (L) 2.98 (H) \       Imaging results reviewed over the past 24 hrs:   No results found for this or any previous visit (from the past 24 hour(s)).

## 2023-07-30 NOTE — PROGRESS NOTES
RENAL PROGRESS NOTE    CC: f/u JOHANA    Subjective: says sob better today, had dialysis yest with 3.5 liter uf.  Denies nausea. Tolerating po.  Tired today.       ASSESSMENT AND RECOMMENDATIONS:  1. JOHANA/ CKD 3b: baseline CKD due to longstanding DM2, HTN. Now severe ATN in setting of profound hypotension with sepsis, group a strep bacteremia and hypovolemia and contrast nephropathy after CTA, mild rhabdo. CTA on 7/17 with no hydronephrosis. UA on 7/18 with granular casts   -dialysis  7/21 -7/24.  Resumed on 7/29 due to worsening anasarca and poor response to lasix gtt  - dialysis again Monday  -will cont lasix 80 mg iv bid   - Daily labs and trend UO/weights   -he continues to require dialysis for volume management    2. Hypotension due to CMP and exacerbated by bacteremia/sepsis. Improved and off pressors.    -cont midodrine with dialysis to prevent hypotension and allow uf                   3. Metabolic acidosis: due to JOHANA.               - Stable.  -cont to trend       4. NSTEMI with ischemic CMP/ HFrEF: Decompensated. EF of 25-30% based on 7/18 echo which also showed normal RVF, normal RA pressures.    -Needs further cor angio, as back on dialysis, expect ok to proceed              - Weight 100.2 Kg during November 2022 Cardiology visit. Still remains markedly overloaded at 108.2 Kg today,    -suboptimal response to lasix gtt due to ongoing JOHANA/CKD, cardiomyopathy so dialysis resumed on 7/29, next dialysis Monday     5. Hyponatremia: due to hypervolemia, JOHANA  -  improved with uf with dialysis             6. Bacteremia: Group A strep and rt shoulder myositis              -now on Rocephin   -Shoulder pain improved.      7. Anemia - likely due to critical illness, CKD, JOHANA, bacteremia   -update iron stores, spep, upep    Lyudmila Medina MD  [facility]  [phone]        Objective    PHYSICAL EXAM  /58 (BP Location: Left leg)   Pulse 85   Temp 98.7  F (37.1  C) (Oral)   Resp 18    "Ht 1.88 m (6' 2\")   Wt 108.2 kg (238 lb 8.6 oz)   SpO2 98%   BMI 30.63 kg/m    I/O last 3 completed shifts:  In: 1731.9 [P.O.:480; I.V.:1251.9]  Out: 4700 [Urine:1200; Other:3500]  Wt Readings from Last 3 Encounters:   07/30/23 108.2 kg (238 lb 8.6 oz)   11/17/22 100.2 kg (221 lb)   09/28/22 104.3 kg (230 lb)       GENERAL: nad in bed  HEENT:normocephalic, atraumatic  CARDIOVASCULAR: RRR, 1+ diffuse edema   PULMONARY: Normal effort,  No cyanosis  GASTROINTESTINAL: soft, nt/nd, BS present  NEURO: Alert, no gross focal findings  PSYCHIATRIC: Appropriate mood and interaction  SKIN: warm, dry  MS: left bka       LABORATORIES  Recent Labs   Lab 07/30/23  0419 07/29/23  0603 07/28/23  0653 07/27/23  0401 07/26/23  0427 07/25/23  0442 07/24/23  0442   * 131* 133* 131* 130* 132* 127*   POTASSIUM 3.6 3.7 3.4 3.6 4.0 4.0 3.7   CHLORIDE 93* 90* 94* 93* 94* 93* 92*   CO2 21* 20* 23 23 21* 24 21*   BUN 69.0* 89.4* 82.5* 74.2* 65.0* 55.2* 71.7*   CR 2.98* 3.44* 3.45* 3.15* 2.99* 2.71* 3.37*   GFRESTIMATED 23* 19* 19* 21* 23* 25* 20*   YOSVANY 8.7* 8.8 8.9 8.8 8.5* 7.9* 8.4*   MAG 1.8  --   --   --   --   --   --    ALBUMIN  --   --   --   --   --  2.4*  --          Recent Labs   Lab 07/30/23  0419 07/29/23  1656 07/29/23  0603 07/28/23  1317 07/28/23  0653 07/27/23  0833 07/26/23  0427 07/25/23  1734 07/25/23  0442   WBC  --   --  8.9  --  9.7 9.5 10.8  --  15.5*   HGB 7.7* 6.7* 7.0* 7.2* 6.7* 7.5* 7.6*   < > 5.9*   HCT  --   --  20.4*  --  19.9* 21.7* 22.3*  --  17.3*   MCV  --   --  89  --  87 85 85  --  82   PLT  --   --  86*  --  100* 101* 109*  --  125*    < > = values in this interval not displayed.            MEDICATIONS   sodium chloride 0.9%  250 mL Intravenous Once in dialysis/CRRT    sodium chloride 0.9%  300 mL Hemodialysis Machine Once    amiodarone  400 mg Oral Daily    [Held by provider] aspirin  81 mg Oral Daily    cefTRIAXone  2 g Intravenous Q24H    docusate sodium  100 mg Oral BID    furosemide  80 mg " Intravenous Q12H    insulin aspart  1-10 Units Subcutaneous TID AC    insulin aspart  1-7 Units Subcutaneous At Bedtime    [Held by provider] insulin glargine  12 Units Subcutaneous At Bedtime    insulin glargine  30 Units Subcutaneous QAM AC    magnesium oxide  400 mg Oral Daily    midodrine  10 mg Oral Once in dialysis/CRRT    multivitamin, therapeutic  1 tablet Oral Daily    - MEDICATION INSTRUCTIONS -   Does not apply Once    sodium chloride (PF)  10-40 mL Intracatheter Q7 Days    sodium chloride (PF)  3 mL Intracatheter Q8H    ticagrelor  90 mg Oral BID    vitamin C  500 mg Oral Daily

## 2023-07-30 NOTE — PROGRESS NOTES
SW spoke with admissions from LTACH/Acute Rehab admissions; team will review 7/31 once they have a better understanding of what pt would benefit from and what services. Care management will continue to follow and update admissions.  10:43 AM    CATHERINE Dunn  7/30/2023

## 2023-07-30 NOTE — PLAN OF CARE
Problem: Pain Acute  Goal: Optimal Pain Control and Function  Outcome: Progressing     Problem: Malnutrition  Goal: Improved Nutritional Intake  Outcome: Progressing     Problem: Sepsis/Septic Shock  Goal: Blood Glucose Level Within Targeted Range  Outcome: Progressing     Pt A/O. Refused to sit in chair this shift. Repositioning in bed q2. Sacral mepilex and posterior mepilex on RUE changed d/t drainage. Lymph wraps changed this shift. Anti-xa at 1100 was 0.10. Adjusted heparin per protocol. Heparin gtt at 1450u/hr. Next anti-xa to be drawn at 1720. Tele has been converting between NSR and afib this shift.

## 2023-07-30 NOTE — PLAN OF CARE
"  Problem: Pain Acute  Goal: Optimal Pain Control and Function  Outcome: Progressing  Intervention: Prevent or Manage Pain  Recent Flowsheet Documentation  Taken 7/29/2023 9618 by David Brewster RN  Medication Review/Management: medications reviewed     Problem: Plan of Care - These are the overarching goals to be used throughout the patient stay.    Goal: Patient-Specific Goal (Individualized)  Description: You can add care plan individualizations to a care plan. Examples of Individualization might be:  \"Parent requests to be called daily at 9am for status\", \"I have a hard time hearing out of my right ear\", or \"Do not touch me to wake me up as it startles me\".  Outcome: Progressing          Goal Outcome Evaluation:  Patient is aox4. Denied chest pain. Has a-fib with controlled hr. Patient c/o SOB with activity. Patient's sating adequately on room air. Getting IV lasix q12 hrs. Creatinine improving. Cr 2.98 today. Continue to monitor. On heparin gtt. Anti-xa 0.10 this morning. Heparin drip rate increased 1,150 units/hr. Recheck anti-xa at 10:48 am.    "

## 2023-07-30 NOTE — PROGRESS NOTES
"[facility]  [phone]          Assessment/Recommendations   Patient with known heart failure with reduced left ventricular ejection fraction, known coronary artery disease with evidence of pulmonary vascular congestion this admission and associated with a jonathan hemodynamic course/sepsis.  He is stabilizing at this time.  He was responding to some diuretics and was dialyzed earlier in his hospitalization but then his response dwindled and was dialyzed again yesterday with significant improvement in his breathing.    Agree with intermittent dialysis to relieve pulmonary vascular congestion.  Patient still receiving intravenous antibiotics for bacteremia.    Plans were for probable coronary angiography and would discuss with infectious disease as to the timing.    Can hold aspirin while on heparin and ticagelor.       Subjective   Patient reports that breathing is better after dialysis.  Significant amount of fluid removed.  Denies any chest discomfort.  Appetite is a cake.  Having some nosebleeds.    ECG/Tele: Personally reviewed.  Atrial fibrillation today.       Physical Examination Review of Systems   /73 (BP Location: Right leg)   Pulse 99   Temp 98.2  F (36.8  C) (Oral)   Resp 18   Ht 1.88 m (6' 2\")   Wt 108.2 kg (238 lb 8.6 oz)   SpO2 98%   BMI 30.63 kg/m    Body mass index is 30.63 kg/m .  Wt Readings from Last 3 Encounters:   07/30/23 108.2 kg (238 lb 8.6 oz)   11/17/22 100.2 kg (221 lb)   09/28/22 104.3 kg (230 lb)     General Appearance:   Alert, cooperative and in no acute distress.   ENT/Mouth: Pink/moist     EYES:  no scleral icterus, normal conjunctivae   Neck: JVP 8 cm.  positive hepatojugular reflux. Thyroid not visualized.   Chest/Lungs:   Lungs are clear to auscultation, equal chest wall expansion.   Cardiovascular:   S1, S2 with 1/6 systolic murmur , no clicks or rubs.    Abdomen:  Nontender. BS+. No bruits.   Extremities: Wrapped right upper arm, below the knee " "amputation on the left   Skin: no xanthelasma, warm.    Neurologic: normal arm movement bilateral, no tremors     Psychiatric: Appropriate affect.      Enc Vitals  BP: 136/73  Pulse: 99  Resp: 18  Temp: 98.2  F (36.8  C)  Temp src: Oral  SpO2: 98 %  Weight: 108.2 kg (238 lb 8.6 oz)  Height: 188 cm (6' 2\")                                           Medical History  Surgical History Family History Social History   Past Medical History:   Diagnosis Date    Anemia     Chronic systolic CHF (congestive heart failure) (H)     Coronary artery disease     Diabetic neuropathy (H)     DM type 2 w Neuropathy     GERD (gastroesophageal reflux disease)     Hyperlipidemia     Hypertension     Intermittent atrial fibrillation (H)     on Eliquis    Ischemic cardiomyopathy 11/16/2021    Echo with EF of 30-35%    NSTEMI (non-ST elevated myocardial infarction) (H) 11/15/2021    Osteomyelitis of left foot (H) 04/04/2022    Renal disease     Retinopathy     Sleep disturbance     Past Surgical History:   Procedure Laterality Date    AMPUTATE FOOT Left 3/21/2022    Procedure: Guillotine AMPUTATION, FOOT;  Surgeon: Ronald Bhatt MD;  Location: [facility] OR    AMPUTATE LEG BELOW KNEE Left 4/4/2022    Procedure: LEFT BELOW  KNEE AMPUTATION;  Surgeon: Ronald Bhatt MD;  Location: [facility] [place] [place] OR    AMPUTATE TOE(S) Left 11/16/2021    Procedure: first and second ray amputation;  Surgeon: Eddi Ram DPM;  Location: [facility] [place] [place] OR    APPENDECTOMY      CV CORONARY ANGIOGRAM N/A 11/16/2021    Procedure: CV CORONARY ANGIOGRAM;  Surgeon: Pam Tabares MD;  Location: [facility] LAB CV    CV CORONARY ANGIOGRAM N/A 8/24/2022    Procedure: CV CORONARY ANGIOGRAM;  Surgeon: Cipriano Lucas MD;  Location: [facility] LAB CV    CV CORONARY ANGIOGRAM N/A 8/29/2022    Procedure: CV CORONARY ANGIOGRAM;  Surgeon: Cipriano Lucas MD;  Location: [facility] CV    CV INTRAVASULAR ULTRASOUND N/A 8/29/2022    Procedure: " Intravascular Ultrasound;  Surgeon: Cipriano Lucas MD;  Location: [facility] CATH LAB CV    CV LEFT HEART CATH N/A 11/16/2021    Procedure: Left Heart Cath;  Surgeon: Pam Tabares MD;  Location: [facility] CATH LAB CV    CV PCI N/A 8/29/2022    Procedure: Percutaneous Coronary Intervention stent;  Surgeon: Cipriano Lucas MD;  Location: [facility] LAB CV    CV PCI ANGIOPLASTY N/A 8/29/2022    Procedure: Percutaneous Transluminal Angioplasty;  Surgeon: Cipriano Lucas MD;  Location: [facility] CATH LAB CV    FOOT SURGERY Left     HERNIA REPAIR      INCISION AND DRAINAGE LOWER EXTREMITY, COMBINED Left 11/16/2021    Procedure: INCISION AND DRAINAGE, left foot;  Surgeon: Eddi Ram DPM;  Location: [facility] OR    INCISION AND DRAINAGE LOWER EXTREMITY, COMBINED Left 11/19/2021    Procedure: INCISION AND DRAINAGE, left foot;  Surgeon: Eddi Ram DPM;  Location: [facility] OR    INCISION AND DRAINAGE LOWER EXTREMITY, COMBINED Left 12/9/2021    Procedure: INCISION AND DRAINAGE, Left foot;  Surgeon: Eddi Ram DPM;  Location: [facility] OR    INCISION AND DRAINAGE LOWER EXTREMITY, COMBINED Left 1/10/2022    Procedure: INCISION AND DRAINAGE, left foot;  Surgeon: Eddi Ram DPM;  Location: [facility] OR    INCISION AND DRAINAGE LOWER EXTREMITY, COMBINED Left 1/27/2022    Procedure: INCISION AND DRAINAGE, Left foot;  Surgeon: Eddi Ram DPM;  Location: [facility] OR    IR CVC NON TUNNEL PLACEMENT > 5 YRS  7/20/2023    PICC TRIPLE LUMEN PLACEMENT  7/17/2023         PICC TRIPLE LUMEN PLACEMENT  7/18/2023         No family history on file. Social History     Socioeconomic History    Marital status: Single     Spouse name: Not on file    Number of children: Not on file    Years of education: Not on file    Highest education level: Not on file   Occupational History    Not on file   Tobacco Use    Smoking status: Former     Types: Cigars     Smokeless tobacco: Never    Tobacco comments:     Cigars   Vaping Use    Vaping Use: Never used   Substance and Sexual Activity    Alcohol use: Yes     Comment: < 1 drink a month (only holidays)    Drug use: Never    Sexual activity: Not on file   Other Topics Concern    Parent/sibling w/ CABG, MI or angioplasty before 65F 55M? Not Asked   Social History Narrative    Single, currently not working, has done numerous jobs, lives alone.  Full code. (last updated 4/4/2022)      Social Determinants of Health     Financial Resource Strain: Not on file   Food Insecurity: Not on file   Transportation Needs: Not on file   Physical Activity: Not on file   Stress: Not on file   Social Connections: Not on file   Intimate Partner Violence: Not on file   Housing Stability: Not on file          Medications  Allergies   No current outpatient medications on file.    No Known Allergies      Lab Results    Chemistry/lipid CBC Cardiac Enzymes/BNP/TSH/INR   Lab Results   Component Value Date    CHOL 157 03/24/2023    HDL 65 03/24/2023    TRIG 60 03/24/2023    BUN 69.0 (H) 07/30/2023     (L) 07/30/2023    CO2 21 (L) 07/30/2023    Lab Results   Component Value Date    WBC 8.9 07/29/2023    HGB 7.7 (L) 07/30/2023    HCT 20.4 (L) 07/29/2023    MCV 89 07/29/2023    PLT 86 (L) 07/29/2023    Lab Results   Component Value Date    TROPONINI 5.84 (HH) 08/30/2022     (H) 08/24/2022    INR 1.19 (H) 08/24/2022

## 2023-07-30 NOTE — PROGRESS NOTES
Infectious Diseases Progress Note  [facility]    Date of visit: 07/30/2023     ASSESSMENT   63-year-old man with a history of hypertension, coronary artery disease, CHF, diabetes, who was admitted with septic shock.  ID following regarding strep bacteremia.    Group A strep bacteremia.  Complicated skin and soft tissue infection.  Was admitted to the ICU on pressors.  Acute kidney injury, and transaminitis.  Concerning for toxic shock syndrome.  Also having right shoulder pain.  Myositis seen on MRI. Blistering starting on 7/21, possibly from pressure or swelling. Seen by ortho and general surgery. Non-surgical management. -no IVIG due to renal failure initially, clinically improved now.  Septic shock.  Had been On pressors. Resolved.  Right shoulder myositis. Denies acute injury. Elevated ck, improving.    Acute kidney injury.  Rising creatinine.  Starting dialysis 7/20 see renal notes  Leukocytosis- resolved  Transferred out of ICU on 7/28/2023     Principal Problem:    Septic shock (H)  Active Problems:    Dehydration    Hyponatremia    Elevated troponin    JOHANA (acute kidney injury) (H)    Atrial fibrillation with RVR (H)       PLAN   -had been on penicillin G IV for group A strep infection.  Switched to ceftriaxone on 7/27/2023, and monitor results    -Updated patient.  Patient will likely need LTAC on discharge, will need IV antibiotics for 2 weeks depending on clinical response, longer course may be needed.?  LTAC candidate      Sushant Díaz MD   [facility]  Answering Service: [phone]  On-Call ID provider: [phone], option: 9          ===========================================      SUBJECTIVE / INTERVAL HISTORY:     R shoulder area still painful but improved. Decreased ROM. No other pain. Tolerating antibiotics.        Antibiotics   Penicillin g 7/20- 12 MU per day-->ceftriaxone 7/26-  Linezolid 7/20-27    Previous:  Zosyn 7/17-7/20  Vancomycin  "7/17      Physical Exam     Temp:  [97.9  F (36.6  C)-98.7  F (37.1  C)] 98.7  F (37.1  C)  Pulse:  [] 85  Resp:  [18-25] 18  BP: (101-136)/(57-73) 113/58  SpO2:  [94 %-100 %] 98 %    /58 (BP Location: Left leg)   Pulse 85   Temp 98.7  F (37.1  C) (Oral)   Resp 18   Ht 1.88 m (6' 2\")   Wt 108.2 kg (238 lb 8.6 oz)   SpO2 98%   BMI 30.63 kg/m      GENERAL:  no distress  HENT:  Head is normocephalic, atraumatic.   EYES:  Eyes have anicteric sclerae without conjunctival injection   LUNGS:  Clear to auscultation.  normal respiratory effort   CARDIOVASCULAR:  Regular rate and rhythm with no murmurs, gallops or rubs.  ABDOMEN:  Normal bowel sounds, soft, nontender.   EXT: Right shoulder swelling, tenderness.  SKIN:  Shallow blisters on posterior right arm, superficial, weeping. Right IJ line is in place without any surrounding erythema.   NEUROLOGIC:  Grossly nonfocal.  L UE PICC    Photo in chart 7/28/2023       Previous photo        Cultures   7/17 blood cultures x2: Strep pyogenes  7/19 blood cultures x2: No growth to date          Pertinent Labs:     Recent Labs   Lab 07/30/23  0419 07/29/23  1656 07/29/23  0603 07/28/23  1317 07/28/23  0653 07/27/23  0833   WBC  --   --  8.9  --  9.7 9.5   HGB 7.7* 6.7* 7.0*   < > 6.7* 7.5*   PLT  --   --  86*  --  100* 101*    < > = values in this interval not displayed.       Recent Labs   Lab 07/30/23  0419 07/29/23  0603 07/28/23  0653 07/26/23  0427 07/25/23  0442   * 131* 133*   < > 132*   CO2 21* 20* 23   < > 24   BUN 69.0* 89.4* 82.5*   < > 55.2*   ALBUMIN  --   --   --   --  2.4*   ALKPHOS  --   --   --   --  239*   ALT  --   --   --   --  41   AST  --   --   --   --  45    < > = values in this interval not displayed.       No results for input(s): CRP, SED in the last 168 hours.        Imaging:     IR CVC Non Tunnel Placement > 5 Yrs    Result Date: 7/20/2023  LOCATION: [facility] DATE: 7/20/2023 PROCEDURE: NON-TUNNELED " DIALYSIS CATHETER PLACEMENT 1.  Insertion of a non-tunneled central venous catheter. 2.  Ultrasound guidance for vascular access. 3.  Fluoroscopic guidance for central venous access device placement. INTERVENTIONAL RADIOLOGIST: Joseph Campos MD INDICATION: Renal insufficiency requiring renal replacement therapy, plan for nontunneled dialysis catheter placement. CONSENT: The risks, benefits and alternatives of the procedure were discussed with the patient or representative in detail. All questions were answered. Informed consent was given to proceed with the procedure. MODERATE SEDATION: None. CONTRAST: None. ANTIBIOTICS: None. ADDITIONAL MEDICATIONS: None. FLUOROSCOPIC TIME: 0.5 minutes. RADIATION DOSE: Air Kerma: 5 mGy. COMPLICATIONS: No immediate complications. UNIVERSAL PROTOCOL: The operative site was marked and any prior imaging was reviewed. Required items including blood products, implants, devices and special equipment was made available. Patient identity was confirmed either verbally, with demographic information, hospital assigned identification or other identification markers. A timeout was performed immediately prior to the procedure. STERILE BARRIER TECHNIQUE: Maximum sterile barrier technique was used. Cutaneous antisepsis was performed at the operative site with application of 2% chlorhexidine and large sterile drape. Prior to the procedure, the  and assistant performed hand hygiene and wore hat, mask, sterile gown, and sterile gloves during the entire procedure. PROCEDURE:  Using local anesthesia and real-time ultrasound guidance the right internal jugular vein was accessed. Imaging demonstrates an anechoic and compressible vein. A permanent image was stored to the patient's medical record. Using this access, a 20 cm dialysis catheter was advanced using fluoroscopic guidance until the tip was in the proximal right atrium. FINDINGS: At the completion of the study he radiograph was performed  utilizing the in room fluoroscopic unit. Imaging demonstrates the nontunneled dialysis catheter tip terminating in the proximal right atrium.     IMPRESSION:  1.  Successful non-tunneled dialysis catheter placement. 2.  The catheter is ready for use.    MR Shoulder Right w/o Contrast    Result Date: 7/19/2023  EXAM: MR SHOULDER RIGHT W/O CONTRAST LOCATION: [facility] DATE: 7/19/2023 INDICATION: Septic shock with bacteremia, concern for right shoulder osteomyelitis or joint infection. COMPARISON: None. TECHNIQUE: Unenhanced. FINDINGS: ROTATOR CUFF: -Supraspinatus: Moderate severity tendinopathy. No tearing or retraction. No atrophy. -Infraspinatus: Moderate severity infraspinatus tendinopathy without evidence for well-defined partial or full-thickness tearing. No retraction but there is T2 signal abnormality within the muscular portion, which may be due to muscle strain. Infectious or inflammatory myositis could have this appearance. -Subscapularis: No tendon tear, tendinopathy or fatty atrophy. -Teres minor: No tendon tear, tendinopathy or fatty atrophy. CORACOACROMIAL ARCH: -Morphology: Type II acromion. No subacromial spur. Subacromial and subcoracoid space are normal. -Bursa: Fluid in the subacromial subdeltoid bursa. The coracoacromial and coracoclavicular ligaments are negative. ACROMIOCLAVICULAR JOINT: -Hypertrophic change. No significant diastasis or effusion. LONG HEAD OF BICEPS TENDON: -Mild tendinopathy within the intra-articular portion of the long head of the biceps. GLENOHUMERAL JOINT: -Labrum: No labral tear. No paralabral cyst. -Cartilage: Grade II-grade III cartilage loss both sides of the joint without significant surface irregularity. -Joint space: No effusion or synovitis. -Glenohumeral ligaments and capsule: No pericapsular inflammation. BONES: -No fracture or concerning marrow replacing lesion. No evidence for osteomyelitis. SOFT TISSUES: -Extensive but nonspecific T2  signal abnormality is present within the deltoid musculature, most marked posteriorly but this is also seen within the latissimus muscle, distal aspect of the trapezius, infraspinatus as described, and portions of the subscapularis. The multiplicity of muscle groups involved makes a strain type injury considered unlikely and this is more likely secondary to infectious or inflammatory myositis. There is no evidence for organized fluid collection to suggest abscess.     IMPRESSION: 1.  Extensive signal abnormality within multiple muscles of the shoulder girdle including portions of the visualized latissimus and trapezius muscles. The findings are likely secondary to infectious or inflammatory myositis versus contusion or muscle strain type injury. No evidence for abscess. 2.  No evidence for osteomyelitis or septic arthropathy. 3.  Small amount of fluid within the subacromial subdeltoid bursa. 4.  No evidence for fracture. 5.  Moderate severity tendinopathy within the supraspinatus and infraspinatus. 6.  Tendinopathy within the intraarticular portion of the biceps. 7.  Degenerative change at the glenohumeral joint with grade II-grade III cartilage loss but no significant surface irregularity.    XR Chest Port 1 View    Result Date: 7/18/2023  EXAM: XR CHEST PORT 1 VIEW LOCATION: [facility] DATE: 7/18/2023 INDICATION: RN placed PICC   verify tip placement COMPARISON: 07/17/2023     IMPRESSION: Left upper extremity PICC terminates at the SVC - RA junction. No acute cardiopulmonary abnormality.    Reviewed labs cultures, discussed with patient  Chart reviewed  Notes reviewed

## 2023-07-30 NOTE — PLAN OF CARE
Problem: Plan of Care - These are the overarching goals to be used throughout the patient stay.    Goal: Absence of Hospital-Acquired Illness or Injury  Intervention: Prevent Skin Injury  Recent Flowsheet Documentation  Taken 7/29/2023 2230 by Jaz Gr RN  Body Position:   turned   supine, legs elevated   supine, head elevated   heels elevated   legs elevated  Taken 7/29/2023 2115 by Jaz Gr RN  Body Position:   turned   supine, legs elevated   supine, head elevated   heels elevated   legs elevated  Taken 7/29/2023 1900 by Jaz Gr RN  Body Position:   turned   heels elevated   legs elevated   supine, legs elevated   supine, head elevated     Problem: Plan of Care - These are the overarching goals to be used throughout the patient stay.    Goal: Optimal Comfort and Wellbeing  Outcome: Progressing     Problem: Pain Acute  Goal: Optimal Pain Control and Function  Outcome: Progressing  Intervention: Prevent or Manage Pain  Recent Flowsheet Documentation  Taken 7/29/2023 2115 by Jaz Gr RN  Medication Review/Management: medications reviewed   Goal Outcome Evaluation:    Pt had a run of dialysis this afternoon with 3.5 L removed. Pt reported feeling better after dialysis, denied any nausea or pain. Hgb was 6.7 and MD ordered 1 unit PRBC. Crosscosalvador ALTAMIRANO put in order for a hgb recheck in the AM. Anti-xa came back >1.10, heparin protocol ran. Blood sugars stable this evening. VSS, on room air, afib HR controlled.

## 2023-07-31 NOTE — PROGRESS NOTES
Infectious Diseases Progress Note  [facility]    Date of visit: 07/31/2023     ASSESSMENT   63-year-old man with a history of hypertension, coronary artery disease, CHF, diabetes, who was admitted with septic shock.  ID following regarding strep bacteremia.    Group A strep bacteremia.  Complicated skin and soft tissue infection.  Was admitted to the ICU on pressors.  Acute kidney injury, and transaminitis.  Concerning for toxic shock syndrome.  Also having right shoulder pain.  Myositis seen on MRI. Blistering starting on 7/21, possibly from pressure or swelling. Seen by ortho and general surgery. Non-surgical management. -no IVIG due to renal failure initially, clinically improved now.  Septic shock.  Had been On pressors. Resolved.  Right shoulder myositis. Denies acute injury. Elevated ck, improving.    Acute kidney injury.  Rising creatinine.  Starting dialysis 7/20 see renal notes  Leukocytosis- resolved  Transferred out of ICU on 7/28/2023     Principal Problem:    Septic shock (H)  Active Problems:    Dehydration    Hyponatremia    Elevated troponin    JOHANA (acute kidney injury) (H)    Atrial fibrillation with RVR (H)       PLAN   -had been on penicillin G IV for group A strep infection.  Switched to ceftriaxone on 7/27/2023, and monitor results    -Updated patient.  Patient will likely need LTAC ([facility] would be ideal) on discharge, will need IV antibiotics for 2 weeks while weekly monitor of shoulder, and lab work.   I in person talked to disposition team about KOLTON Srivastava MD   [facility]  Answering Service: [phone]  On-Call ID provider: [phone], option: 9          ===========================================      SUBJECTIVE / INTERVAL HISTORY:     Looks better than when I saw him last weekend.  Still needing HD.  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  PICC L arm.       Antibiotics   Penicillin g 7/20- 12 MU per day-->ceftriaxone 7/26-  Linezolid  "7/20-27    Previous:  Zosyn 7/17-7/20  Vancomycin 7/17      Physical Exam     Temp:  [97.4  F (36.3  C)-98.7  F (37.1  C)] 98.4  F (36.9  C)  Pulse:  [77-87] 80  Resp:  [14-24] 16  BP: (112-149)/(56-72) 120/57  SpO2:  [95 %-100 %] 100 %    /57   Pulse 80   Temp 98.4  F (36.9  C) (Oral)   Resp 16   Ht 1.88 m (6' 2\")   Wt 114.5 kg (252 lb 6.8 oz)   SpO2 100%   BMI 32.41 kg/m      GENERAL:  no distress  HENT:  Head is normocephalic, atraumatic.   EYES:  Eyes have anicteric sclerae without conjunctival injection   LUNGS:  Clear to auscultation.  normal respiratory effort   CARDIOVASCULAR:  Regular rate and rhythm with no murmurs, gallops or rubs.  ABDOMEN:  Normal bowel sounds, soft, nontender.   EXT: Right shoulder swelling, tenderness.  SKIN:  Shallow blisters on posterior right arm, superficial, weeping. Right IJ line is in place without any surrounding erythema.   NEUROLOGIC:  Grossly nonfocal.  L UE PICC    Photo in chart 7/28/2023       Previous photo        Cultures   7/17 blood cultures x2: Strep pyogenes  7/19 blood cultures x2: No growth to date          Pertinent Labs:     Recent Labs   Lab 07/31/23  0650 07/30/23  0419 07/29/23  1656 07/29/23  0603 07/28/23  1317 07/28/23  0653   WBC 5.7  --   --  8.9  --  9.7   HGB 6.8* 7.7* 6.7* 7.0*   < > 6.7*   PLT 98*  --   --  86*  --  100*    < > = values in this interval not displayed.         Recent Labs   Lab 07/31/23  0650 07/30/23  0419 07/29/23  0603 07/26/23  0427 07/25/23  0442   * 133* 131*   < > 132*   CO2 24 21* 20*   < > 24   BUN 80.2* 69.0* 89.4*   < > 55.2*   ALBUMIN 3.6  --   --   --  2.4*   ALKPHOS  --   --   --   --  239*   ALT  --   --   --   --  41   AST  --   --   --   --  45    < > = values in this interval not displayed.         No results for input(s): CRP, SED in the last 168 hours.        Imaging:     IR CVC Non Tunnel Placement > 5 Yrs    Result Date: 7/20/2023  LOCATION: [facility] DATE: 7/20/2023 " PROCEDURE: NON-TUNNELED DIALYSIS CATHETER PLACEMENT 1.  Insertion of a non-tunneled central venous catheter. 2.  Ultrasound guidance for vascular access. 3.  Fluoroscopic guidance for central venous access device placement. INTERVENTIONAL RADIOLOGIST: Joseph Campos MD INDICATION: Renal insufficiency requiring renal replacement therapy, plan for nontunneled dialysis catheter placement. CONSENT: The risks, benefits and alternatives of the procedure were discussed with the patient or representative in detail. All questions were answered. Informed consent was given to proceed with the procedure. MODERATE SEDATION: None. CONTRAST: None. ANTIBIOTICS: None. ADDITIONAL MEDICATIONS: None. FLUOROSCOPIC TIME: 0.5 minutes. RADIATION DOSE: Air Kerma: 5 mGy. COMPLICATIONS: No immediate complications. UNIVERSAL PROTOCOL: The operative site was marked and any prior imaging was reviewed. Required items including blood products, implants, devices and special equipment was made available. Patient identity was confirmed either verbally, with demographic information, hospital assigned identification or other identification markers. A timeout was performed immediately prior to the procedure. STERILE BARRIER TECHNIQUE: Maximum sterile barrier technique was used. Cutaneous antisepsis was performed at the operative site with application of 2% chlorhexidine and large sterile drape. Prior to the procedure, the  and assistant performed hand hygiene and wore hat, mask, sterile gown, and sterile gloves during the entire procedure. PROCEDURE:  Using local anesthesia and real-time ultrasound guidance the right internal jugular vein was accessed. Imaging demonstrates an anechoic and compressible vein. A permanent image was stored to the patient's medical record. Using this access, a 20 cm dialysis catheter was advanced using fluoroscopic guidance until the tip was in the proximal right atrium. FINDINGS: At the completion of the study he  radiograph was performed utilizing the in room fluoroscopic unit. Imaging demonstrates the nontunneled dialysis catheter tip terminating in the proximal right atrium.     IMPRESSION:  1.  Successful non-tunneled dialysis catheter placement. 2.  The catheter is ready for use.    MR Shoulder Right w/o Contrast    Result Date: 7/19/2023  EXAM: MR SHOULDER RIGHT W/O CONTRAST LOCATION: [facility] DATE: 7/19/2023 INDICATION: Septic shock with bacteremia, concern for right shoulder osteomyelitis or joint infection. COMPARISON: None. TECHNIQUE: Unenhanced. FINDINGS: ROTATOR CUFF: -Supraspinatus: Moderate severity tendinopathy. No tearing or retraction. No atrophy. -Infraspinatus: Moderate severity infraspinatus tendinopathy without evidence for well-defined partial or full-thickness tearing. No retraction but there is T2 signal abnormality within the muscular portion, which may be due to muscle strain. Infectious or inflammatory myositis could have this appearance. -Subscapularis: No tendon tear, tendinopathy or fatty atrophy. -Teres minor: No tendon tear, tendinopathy or fatty atrophy. CORACOACROMIAL ARCH: -Morphology: Type II acromion. No subacromial spur. Subacromial and subcoracoid space are normal. -Bursa: Fluid in the subacromial subdeltoid bursa. The coracoacromial and coracoclavicular ligaments are negative. ACROMIOCLAVICULAR JOINT: -Hypertrophic change. No significant diastasis or effusion. LONG HEAD OF BICEPS TENDON: -Mild tendinopathy within the intra-articular portion of the long head of the biceps. GLENOHUMERAL JOINT: -Labrum: No labral tear. No paralabral cyst. -Cartilage: Grade II-grade III cartilage loss both sides of the joint without significant surface irregularity. -Joint space: No effusion or synovitis. -Glenohumeral ligaments and capsule: No pericapsular inflammation. BONES: -No fracture or concerning marrow replacing lesion. No evidence for osteomyelitis. SOFT TISSUES:  -Extensive but nonspecific T2 signal abnormality is present within the deltoid musculature, most marked posteriorly but this is also seen within the latissimus muscle, distal aspect of the trapezius, infraspinatus as described, and portions of the subscapularis. The multiplicity of muscle groups involved makes a strain type injury considered unlikely and this is more likely secondary to infectious or inflammatory myositis. There is no evidence for organized fluid collection to suggest abscess.     IMPRESSION: 1.  Extensive signal abnormality within multiple muscles of the shoulder girdle including portions of the visualized latissimus and trapezius muscles. The findings are likely secondary to infectious or inflammatory myositis versus contusion or muscle strain type injury. No evidence for abscess. 2.  No evidence for osteomyelitis or septic arthropathy. 3.  Small amount of fluid within the subacromial subdeltoid bursa. 4.  No evidence for fracture. 5.  Moderate severity tendinopathy within the supraspinatus and infraspinatus. 6.  Tendinopathy within the intraarticular portion of the biceps. 7.  Degenerative change at the glenohumeral joint with grade II-grade III cartilage loss but no significant surface irregularity.    XR Chest Port 1 View    Result Date: 7/18/2023  EXAM: XR CHEST PORT 1 VIEW LOCATION: [facility] DATE: 7/18/2023 INDICATION: RN placed PICC   verify tip placement COMPARISON: 07/17/2023     IMPRESSION: Left upper extremity PICC terminates at the SVC - RA junction. No acute cardiopulmonary abnormality.    Reviewed labs cultures, discussed with patient  Chart reviewed  Notes reviewed

## 2023-07-31 NOTE — PLAN OF CARE
Problem: Plan of Care - These are the overarching goals to be used throughout the patient stay.    Goal: Absence of Hospital-Acquired Illness or Injury  Intervention: Prevent Skin Injury  Recent Flowsheet Documentation  Taken 7/31/2023 1114 by Catie Crews RN  Body Position:   weight shifting   heels elevated   legs elevated  Taken 7/31/2023 1030 by Catie Crews RN  Body Position:   log-rolled   turned   left   heels elevated   legs elevated  Taken 7/31/2023 0746 by Catie Crews RN  Body Position:   weight shifting   heels elevated   legs elevated     Problem: Plan of Care - These are the overarching goals to be used throughout the patient stay.    Goal: Optimal Comfort and Wellbeing  Outcome: Progressing     Problem: Sepsis/Septic Shock  Goal: Optimal Coping  Outcome: Progressing     Pt A/O. Denied pain and SOB this shift. Lab notified writer of critical hemoglobin of 6.8, hospitalist notified. 1 unit RBC given per orders. Next hemoglobin check at 1800. K noted to be 3.3 this AM, hospitalist notified and K protocol ordered and ran. K re-check at 1250 was 3.4, protocol ran. Next K re-check is 1720. Dressings changed on RUE and sacrum. Lymph wraps re-applied this shift. Pt left unit at 1130 for dialysis.

## 2023-07-31 NOTE — PROGRESS NOTES
"        RENAL PROGRESS NOTE    CC: f/u JOHANA    Subjective: Patient tells me that he is doing better today.  Denies chest pain, shortness of breath, no nausea, no vomiting.  He remains weak.    Seen on dialysis at 1430 hrs. today, discussed with with acute dialysis RN at the bedside.       ASSESSMENT AND RECOMMENDATIONS:    JOHANA/ CKD 3b: baseline CKD due to longstanding DM2, HTN. Now severe ATN in setting of profound hypotension with sepsis, group a strep bacteremia and hypovolemia and contrast nephropathy after CTA, mild rhabdo. CTA on 7/17 with no hydronephrosis. UA on 7/18 with granular casts  dialysis  7/21 -7/24.    Resumed on 7/29 due to worsening anasarca and poor response to lasix gtt  Continue MWF dialysis for now.  Monitor for evidence of renal function recovery.  He continues to require dialysis for volume management.  Hypotension due to CMP and exacerbated by bacteremia/sepsis. Improved and off pressors.   Continue midodrine with dialysis to prevent hypotension and allow ultrafiltration.  Non-ST elevation myocardial infarction with ischemic cardiomyopathy.  Heart failure with reduced ejection fraction.  Decompensated heart failure, continue fluid removal on dialysis.  LVEF 25-30% based on 7/18 cardiac echo which also showed normal right ventricular function, normal right atrial pressure.  As above failure to respond to IV diuretics.  Continue volume management with dialysis.  Bacteremia.  Group A Peptococcus.  Also right shoulder myositis.  Continue antibiotics as per ID recommendations.  Anemia secondary to critical illness, chronic kidney disease, acute kidney injury, bacteremia.  Monitor hemoglobin.    Terrance Waters MD  Nephrology      Objective    PHYSICAL EXAM  /53   Pulse 82   Temp 98  F (36.7  C) (Temporal)   Resp 17   Ht 1.88 m (6' 2\")   Wt 114.5 kg (252 lb 6.8 oz)   SpO2 95%   BMI 32.41 kg/m    I/O last 3 completed shifts:  In: 860 [P.O.:560]  Out: 4925 [Urine:1925; Other:3000]  Wt " Readings from Last 3 Encounters:   07/31/23 114.5 kg (252 lb 6.8 oz)   11/17/22 100.2 kg (221 lb)   09/28/22 104.3 kg (230 lb)       GENERAL: nad in bed  HEENT:normocephalic, atraumatic  CARDIOVASCULAR: RRR, 1+ diffuse edema   PULMONARY: Normal effort,  No cyanosis  GASTROINTESTINAL: soft, nt/nd, BS present  NEURO: Alert, no gross focal findings  PSYCHIATRIC: Appropriate mood and interaction  SKIN: warm, dry  MS: left bka       LABORATORIES  Recent Labs   Lab 07/31/23  1241 07/31/23  0650 07/30/23  0419 07/29/23  0603 07/28/23  0653 07/27/23  0401 07/26/23  0427 07/25/23  0442   NA  --  133* 133* 131* 133* 131* 130* 132*   POTASSIUM 3.4 3.3* 3.6 3.7 3.4 3.6 4.0 4.0   CHLORIDE  --  93* 93* 90* 94* 93* 94* 93*   CO2  --  24 21* 20* 23 23 21* 24   BUN  --  80.2* 69.0* 89.4* 82.5* 74.2* 65.0* 55.2*   CR  --  3.19* 2.98* 3.44* 3.45* 3.15* 2.99* 2.71*   GFRESTIMATED  --  21* 23* 19* 19* 21* 23* 25*   YOSVANY  --  8.7* 8.7* 8.8 8.9 8.8 8.5* 7.9*   MAG  --   --  1.8  --   --   --   --   --    ALBUMIN  --  3.6  --   --   --   --   --  2.4*         Recent Labs   Lab 07/31/23  0650 07/30/23  0419 07/29/23  1656 07/29/23  0603 07/28/23  1317 07/28/23  0653 07/27/23  0833 07/26/23  0427 07/25/23  1734 07/25/23  0442   WBC 5.7  --   --  8.9  --  9.7 9.5 10.8  --  15.5*   HGB 6.8* 7.7* 6.7* 7.0* 7.2* 6.7* 7.5* 7.6*   < > 5.9*   HCT 20.8*  --   --  20.4*  --  19.9* 21.7* 22.3*  --  17.3*   MCV 90  --   --  89  --  87 85 85  --  82   PLT 98*  --   --  86*  --  100* 101* 109*  --  125*    < > = values in this interval not displayed.            MEDICATIONS   sodium chloride 0.9%  250 mL Intravenous Once in dialysis/CRRT    sodium chloride 0.9%  300 mL Hemodialysis Machine Once    amiodarone  400 mg Oral Daily    [Held by provider] aspirin  81 mg Oral Daily    cefTRIAXone  2 g Intravenous Q24H    docusate sodium  100 mg Oral BID    furosemide  80 mg Intravenous Q12H    insulin aspart   Subcutaneous TID AC    insulin aspart  1-10 Units  Subcutaneous TID AC    insulin aspart  1-7 Units Subcutaneous At Bedtime    insulin glargine  30 Units Subcutaneous At Bedtime    magnesium oxide  400 mg Oral Daily    midodrine  10 mg Oral Once in dialysis/CRRT    multivitamin, therapeutic  1 tablet Oral Daily    - MEDICATION INSTRUCTIONS -   Does not apply Once    sodium chloride (PF)  10-40 mL Intracatheter Q7 Days    sodium chloride (PF)  3 mL Intracatheter Q8H    ticagrelor  90 mg Oral BID    vitamin C  500 mg Oral Daily

## 2023-07-31 NOTE — PROGRESS NOTES
Care Management Follow Up    Length of Stay (days): 14    Expected Discharge Date: 07/31/2023     Concerns to be Addressed:  placement  Patient plan of care discussed at interdisciplinary rounds: Yes    Anticipated Discharge Disposition:  [facility]     Anticipated Discharge Services:   PT/OT, OP dialysis  Anticipated Discharge DME:  TBD    Patient/family educated on Medicare website which has current facility and service quality ratings:  NA  Education Provided on the Discharge Plan:  per team  Patient/Family in Agreement with the Plan:  yes    Referrals Placed by CM/SW:  [facility]  Private pay costs discussed: NA    Additional Information:  Pt is a 63 year old male who was admitted for septic shock and now needing dialysis.     ID stating pt needing 2 weeks of IV abx and pt is new to dialysis. Recommendation is pt go to [facility].    Spoke to [facility] today and he does qualify for it due to new dialysis if he goes in soon. Will speak with nephrology today and have them start process so we an start process of OP dialysis set up through [facility].     Pt will also need to have a place to stay when he is discharged from [facility].    CM will continue to follow.     Cinthya Martini RN

## 2023-07-31 NOTE — PROGRESS NOTES
HEMODIALYSIS NOTE:     TREATMENT TIME: 3hr        UF TOTAL(net) : 3000 ml      BVP:66.5 L        ACCESS PRE:Right Chest CVC , no access issues, Heparin instills     HEPATITIS STATUS:  Hbsag: Negative, 07/21/23     RUN SUMMARY: 3 hr run on a K3 bath per lab protocol.      Tolerated Tx     Pt seen by Dr. Waters on dialysis. Overall stable treatment, pt denies SOB, NV, cramping, chest pain.   See HD flow sheet for all data. Post report given to BRIANNA Coats RN,      ACCESS POST: CVC dressing changed     INTERVENTIONS: Monitor VS Q 15 minutes and PRN.     PLAN: Per Renal Team

## 2023-07-31 NOTE — PROGRESS NOTES
[facility]    Medicine Progress Note - Hospitalist Service    Date of Admission:  7/17/2023    Assessment & Plan   Mr. Aguilar is a 63-year-old male with history of CAD on Brilinta, chronic systolic CHF with EF of 30 to 35% prior to admission, CKD 3, history of left foot osteomyelitis status post left BKA, and DM 2 who presents 7/17/2023 with septic shock, NSTEMI, JOHANA and A-fib with RVR.  Patient was stabilized with pressors and transferred out of ICU 7/27/2023  Source of septic shock thought secondary to group A strep bacteremia and myositis.     Septic shock: Resolved  -Source thought secondary to group A strep bacteremia secondary to skin and soft tissue infection.  Concern for toxic shock syndrome  MRI right shoulder 7/19/2023 showed myositis  -Switched to ceftriaxone from penicillin G 7/27/2023  -Completed course of oral linezolid 7/27/2023  -ID to follow: Will likely need IV antibiotics for 2 weeks, possibly longer course  -Anticipate TCU on discharge given need for IV antibiotics for likely 2 weeks depending on clinical course  -Patient already has PICC line in place     NSTEMI  History of CAD and ischemic cardiomyopathy   Prior coronary stents  A-fib with RVR  -Cardiology following  -Continue amiodarone  -Cardiology following for possible coronary angiogram this hospitalization-Will work with ID on timing  -Continue aspirin and Brilinta  -7/31 holding heparin drip, see below      Acute on chronic systolic CHF exacerbation  TTE 7/17/2023 shows EF of 25 to 30%  -Continuing intermittent dialysis as below      JOHANA on CKD stage 3: Likely secondary to circulatory shock  -Baseline creatinine 1.4-1.6, 3.25 on admission, peak at 5.22 on 7/21, some fluctuation but now generally improving  -Status post intermittent hemodialysis with evidence of renal recovery.  Dialysis started 7/21/2023, last dialyzed 7/24/2023  -Defer diuresis to cardiology and nephrology  -Started dialysis 7/21 to 7/24,  resumed 7/29 due to increased volume     Hyponatremia-now 131  -Initially severe, 119 on presentation  -We will continue to monitor     Related LFTs: Thought secondary to shock liver and myositis  -Improved     Acute on anemia of chronic disease  -No overt signs of bleeding, CT abdomen without signs of bleeding  -Remains on heparin drip (held 7/31), heparin with dialysis, ticagrelor  -Transfused 2 units PRBC 7/25 hgb 5.9  -Transfused 1 unit PRBCs 7/28 hgb 6.7  -Transfused 1 unit PRBCs 7/29 Hgb 6.7  -Transfused 1 unit PRBCs 7/31 Hgb 6.8  -Has been having black stools since 7/24 so likely blood in stool, FOBT ordered  -Holding heparin for now, will monitor Hgb q12 and add back once Hgb stable  -Transfuse for hemoglobin less than 7     DM2:  -Continue morning Lantus to 30 units every morning  -hold evening Lantus to 12 units until eating better  -Continue current sliding scale insulin  -Starting carb count coverage with meals       Diet: Snacks/Supplements Adult: Glucerna; With Meals  Combination Diet Renal Diet (dialysis); Moderate Consistent Carb (60 g CHO per Meal) Diet    DVT Prophylaxis: held due to bleeding  Garcia Catheter: PRESENT, indication: Strict 1-2 Hour I&O  Lines: PRESENT      PICC 07/18/23 Triple Lumen Left Basilic Vasopressor,Access-Site Assessment: WDL  CVC Double Lumen Right Subclavian Non - tunneled-Site Assessment: WDL      Cardiac Monitoring: ACTIVE order. Indication: Tachyarrhythmias, acute (48 hours)  Code Status: Full Code      Clinically Significant Risk Factors        # Hypokalemia: Lowest K = 3.3 mmol/L in last 2 days, will replace as needed      # Anion Gap Metabolic Acidosis: Highest Anion Gap = 19 mmol/L in last 2 days, will monitor and treat as appropriate  # Hypoalbuminemia: Lowest albumin = 2.1 g/dL at 7/21/2023  4:38 AM, will monitor as appropriate   # Thrombocytopenia: Lowest platelets = 98 in last 2 days, will monitor for bleeding   # Hypertension: Noted on problem list  # Acute  "heart failure with reduced ejection fraction: last echo with EF <40% and receiving IV diuretics      # DMII: A1C = 6.9 % (Ref range: <5.7 %) within past 6 months   # Obesity: Estimated body mass index is 32.41 kg/m  as calculated from the following:    Height as of this encounter: 1.88 m (6' 2\").    Weight as of this encounter: 114.5 kg (252 lb 6.8 oz).   # Severe Malnutrition: based on nutrition assessment         Disposition Plan      Expected Discharge Date: 07/31/2023        Discharge Comments: iv abx for 2 weeks HD 7/31  ltach reviewing          Mabel Worthy MD  Hospitalist Service  [facility]  Securely message with [employer] (more info)  Text page via [employer] Paging/Directory   ______________________________________________________________________    Interval History   Patient's stools have been dark since the 24th.  He is now required transfusion 3 days in a row.  Will hold heparin drip until stable and then restart.  Cardiology has signed off and does not have plans to do any additional testing or procedures until his infectious issues have resolved.  He was in dialysis when I saw him today and doing well.  He seems to tolerate it well.  He was feeling fatigued.    Physical Exam   Vital Signs: Temp: 98.2  F (36.8  C) Temp src: Oral BP: 112/56 Pulse: 77   Resp: 16 SpO2: 98 % O2 Device: None (Room air)    Weight: 252 lbs 6.83 oz    General Appearance: Awake, alert, in no acute distress  Respiratory: Normal work of breathing  Cardiovascular: Regular rate  GI: soft, nontender, non distended, normal bowel sounds  Skin: no jaundice, no rash      Medical Decision Making       45 MINUTES SPENT BY ME on the date of service doing chart review, history, exam, documentation & further activities per the note.      Data     I have personally reviewed the following data over the past 24 hrs:    5.7  \   7.8 (L)   / 98 (L)     133 (L) 93 (L) 80.2 (H) /  172 (H)   3.7 24 3.19 (H) \     ALT: N/A AST: " N/A AP: N/A TBILI: N/A   ALB: 3.6 TOT PROTEIN: N/A LIPASE: N/A     Ferritin:  399 % Retic:  N/A LDH:  N/A       Imaging results reviewed over the past 24 hrs:   No results found for this or any previous visit (from the past 24 hour(s)).

## 2023-07-31 NOTE — PLAN OF CARE
Problem: Chronic Kidney Disease  Goal: Electrolyte Balance  Outcome: Progressing     Problem: Diabetes Comorbidity  Goal: Blood Glucose Level Within Targeted Range  Outcome: Progressing     Problem: Heart Failure Comorbidity  Goal: Maintenance of Heart Failure Symptom Control  Outcome: Progressing  Intervention: Maintain Heart Failure Management  Recent Flowsheet Documentation  Taken 7/31/2023 1600 by Dima Lynch RN  Medication Review/Management: medications reviewed     Problem: Hypertension Comorbidity  Goal: Blood Pressure in Desired Range  Outcome: Progressing  Intervention: Maintain Blood Pressure Management  Recent Flowsheet Documentation  Taken 7/31/2023 1600 by Dima Lynch RN  Medication Review/Management: medications reviewed      Goal Outcome Evaluation:  Back from Hemodialysis past 3pm.   A/O. Room Air. VSS. Normal Sinus Rhythm with BBB. Ac BG- 172, sliding scale insulin given. Ate well,  Insulin per carb count given. HS BG- 249, sliding scale insulin and scheduled Lantus. Declined snacks when offered. PICC patent, labs drawn. Repeat Potassium-3.7, recheck in AM per protocol. Repeat Hemoglobin-7.8. CBC in AM. Had a smear only, unable to collect stool sample. Started on eye drops for left eye dryness.  Ice pack applied on right shoulder, declined pain meds. Frequent repositioning done to promote comfort. Left BKA, Lymph wraps on right leg. Producing urine, Garcia intact, drained.

## 2023-07-31 NOTE — PLAN OF CARE
Problem: Sepsis/Septic Shock  Goal: Absence of Bleeding  Outcome: Progressing       Problem: Pain Acute  Goal: Optimal Pain Control and Function  Outcome: Progressing  Intervention: Prevent or Manage Pain  Recent Flowsheet Documentation  Taken 7/31/2023 0015 by David Brewster RN  Medication Review/Management: medications reviewed     Goal Outcome Evaluation:    Patient is aox4. Denied chest pain, dizziness, and lightheadedness. VSS. Has NSR. Patient is sating adequately on room air. On heparin gtt. Anti-xa 0.12 at midnight. Bolus given and heparin drip rate increased to 1,750 units/hr. Recheck anti-xa pending.

## 2023-07-31 NOTE — PLAN OF CARE
Volume management per nephrology via IHD. Cardiology team will sign-off for now. Please do not hesitate to consult us again if new questions or concerns arise. Please re-consult when infectious concerns have resolved and renal function improved to the point of tolerating IV contrast dye from a nephrology standpoint or please let us know when/of patient  is considered ESRD. Follow-up appointment will be arranged by CORE/HF clinic.     Russel Arredondo MD., MHS

## 2023-07-31 NOTE — PLAN OF CARE
Problem: Plan of Care - These are the overarching goals to be used throughout the patient stay.    Goal: Absence of Hospital-Acquired Illness or Injury  Intervention: Prevent Skin Injury  Recent Flowsheet Documentation  Taken 7/30/2023 2130 by Jaz Gr RN  Body Position:   turned   supine, legs elevated   supine, head elevated   heels elevated  Taken 7/30/2023 1645 by Jaz Gr RN  Body Position:   turned   supine, legs elevated   supine, head elevated   heels elevated     Problem: Plan of Care - These are the overarching goals to be used throughout the patient stay.    Goal: Optimal Comfort and Wellbeing  Outcome: Progressing     Problem: Risk for Delirium  Goal: Optimal Coping  Outcome: Progressing   Goal Outcome Evaluation:    Pt calls often to be repositioned. He sat up in chair for supper this evening. Denies pain, VSS, NSR, on room air. Dialysis will be initiated MWF now. Still on heparin drip. Hgb stable today, 7.7. No acute events.

## 2023-08-01 NOTE — PROGRESS NOTES
CLINICAL NUTRITION THERAPY NOTE    Diet: 60 gm cho, Renal (dialysis)  Supplement: Glucerna with lunch  Intake: Slowly improving. Pt ate 100% of his dinner last night. He ate 50% of lunch today and doing his best to drink the Glucerna - he is trying chocolate.    Pt has some digestive issues - he finds it hard to eat in bed.  He was up in the chair earlier but, went to bed after PT  He is craving fresh vegetables and his own salads.   He is also concerned about knowing where he is going next.  Encouraged pt by letting him know he appears to be better, and eating and drinking will help him keep getting stronger.    Will continue to monitor.

## 2023-08-01 NOTE — PROGRESS NOTES
Care Management Follow Up    Length of Stay (days): 15    Expected Discharge Date: 08/03/2023     Concerns to be Addressed:  placemen  Patient plan of care discussed at interdisciplinary rounds: Yes    Anticipated Discharge Disposition:  LTACH vs TCU     Anticipated Discharge Services:   PT/OT. Op dialysis, IV abx  Anticipated Discharge DME:  TBD    Patient/family educated on Medicare website which has current facility and service quality ratings:  yes  Education Provided on the Discharge Plan:  Per team  Patient/Family in Agreement with the Plan:  yes    Referrals Placed by CM/SW:  LTACH  Private pay costs discussed: NA    Additional Information:  Referrals have been sent to LTACH for admission.  However, pt is slowly getting to the point where he may not meet criteria as things are being changed to oral, ot he is stabilizing some.  Presently his new dialysis is what mostly qualifies him. IV abx do not qualify him at this time as they are only 1 time a day.     If pt does not qualify for LTACH will make referrals to TCU.     CM will continue to follow.    Cinthya Martini RN

## 2023-08-01 NOTE — PLAN OF CARE
Problem: Plan of Care - These are the overarching goals to be used throughout the patient stay.    Goal: Plan of Care Review  Description: The Plan of Care Review/Shift note should be completed every shift.  The Outcome Evaluation is a brief statement about your assessment that the patient is improving, declining, or no change.  This information will be displayed automatically on your shift note.  Outcome: Progressing     Problem: Risk for Delirium  Goal: Optimal Coping  Outcome: Progressing     Problem: Pain Acute  Goal: Optimal Pain Control and Function  Outcome: Progressing  Intervention: Prevent or Manage Pain  Recent Flowsheet Documentation  Taken 8/1/2023 0400 by Sybil Echevarria RN  Medication Review/Management: medications reviewed     Problem: Pain Acute  Goal: Optimal Pain Control and Function  Intervention: Prevent or Manage Pain  Recent Flowsheet Documentation  Taken 8/1/2023 0400 by Sybil Echevarria RN  Medication Review/Management: medications reviewed     Problem: Sepsis/Septic Shock  Goal: Optimal Coping  Outcome: Progressing     Problem: Oral Intake Inadequate  Goal: Improved Oral Intake  Outcome: Progressing     Problem: Chronic Kidney Disease  Goal: Optimal Coping with Chronic Illness  Outcome: Progressing     Problem: Diabetes Comorbidity  Goal: Blood Glucose Level Within Targeted Range  Outcome: Progressing     Problem: Heart Failure Comorbidity  Goal: Maintenance of Heart Failure Symptom Control  Outcome: Progressing  Intervention: Maintain Heart Failure Management  Recent Flowsheet Documentation  Taken 8/1/2023 0400 by Sybil Echevarria RN  Medication Review/Management: medications reviewed     Problem: Heart Failure Comorbidity  Goal: Maintenance of Heart Failure Symptom Control  Intervention: Maintain Heart Failure Management  Recent Flowsheet Documentation  Taken 8/1/2023 0400 by Sybil Echevarria RN  Medication Review/Management: medications reviewed     Problem: Hypertension  Comorbidity  Goal: Blood Pressure in Desired Range  Outcome: Progressing  Intervention: Maintain Blood Pressure Management  Recent Flowsheet Documentation  Taken 8/1/2023 0400 by Sybil Echevarria, RN  Medication Review/Management: medications reviewed     Problem: Hypertension Comorbidity  Goal: Blood Pressure in Desired Range  Intervention: Maintain Blood Pressure Management  Recent Flowsheet Documentation  Taken 8/1/2023 0400 by Sybil Echevarria, RN  Medication Review/Management: medications reviewed   Goal Outcome Evaluation:       Patient is A&Ox4, denies pain and VSS. Patient turned and repositioned, chlorahexadine wipes used to clean skin. Garcia in place with good out put.02 sat's 100% on room air and rhythm= NSR.

## 2023-08-01 NOTE — PLAN OF CARE
Problem: Malnutrition  Goal: Improved Nutritional Intake  Outcome: Progressing     Problem: Oral Intake Inadequate  Goal: Improved Oral Intake  Outcome: Progressing   Goal Outcome Evaluation:    Pt's intakes are gradually improving.  Taking supplement even though he doesn't care for it.

## 2023-08-01 NOTE — PROGRESS NOTES
"        RENAL PROGRESS NOTE    CC: f/u JOHANA    Subjective: Resting in bed today.      No new issues overnight.  Dialysis on 7/31 was well-tolerated, 3 L fluid removed.       ASSESSMENT AND RECOMMENDATIONS:    JOHANA/ CKD 3b: baseline CKD due to longstanding DM2, HTN. Now severe ATN in setting of profound hypotension with sepsis, group a strep bacteremia and hypovolemia and contrast nephropathy after CTA, mild rhabdo. CTA on 7/17 with no hydronephrosis. UA on 7/18 with granular casts  dialysis  7/21 -7/24.    Resumed on 7/29 due to worsening anasarca and poor response to lasix gtt  Continue MWF dialysis for now.  Monitor for evidence of renal function recovery.  Urine output is improving, 2500 mL (7/31)  Renal function might be beginning to improve  Hypotension due to CMP and exacerbated by bacteremia/sepsis. Improved and off pressors.   Continue midodrine with dialysis to prevent hypotension and allow ultrafiltration.  Non-ST elevation myocardial infarction with ischemic cardiomyopathy.  Heart failure with reduced ejection fraction.  Decompensated heart failure, continue fluid removal on dialysis.  LVEF 25-30% based on 7/18 cardiac echo which also showed normal right ventricular function, normal right atrial pressure.  As above failure to respond to IV diuretics.  Continue volume management with dialysis.  Bacteremia.  Group A Peptococcus.  Also right shoulder myositis.  Continue antibiotics as per ID recommendations.  Anemia secondary to critical illness, chronic kidney disease, acute kidney injury, bacteremia.  Monitor hemoglobin.    Terrance Waters MD  Nephrology      Objective    PHYSICAL EXAM  BP (!) 142/74 (BP Location: Right leg)   Pulse 81   Temp 98.7  F (37.1  C) (Oral)   Resp 18   Ht 1.88 m (6' 2\")   Wt 108.7 kg (239 lb 10.2 oz)   SpO2 98%   BMI 30.77 kg/m    I/O last 3 completed shifts:  In: 1060 [P.O.:630; I.V.:130]  Out: 5000 [Urine:2000; Other:3000]  Wt Readings from Last 3 Encounters:   08/01/23 " 108.7 kg (239 lb 10.2 oz)   11/17/22 100.2 kg (221 lb)   09/28/22 104.3 kg (230 lb)       GENERAL: nad in bed  HEENT:normocephalic, atraumatic  CARDIOVASCULAR: RRR, 1+ diffuse edema   PULMONARY: Normal effort,  No cyanosis  GASTROINTESTINAL: soft, nt/nd, BS present  NEURO: Alert, no gross focal findings  PSYCHIATRIC: Appropriate mood and interaction  SKIN: warm, dry  MS: left bka       LABORATORIES  Recent Labs   Lab 08/01/23  0504 07/31/23  1811 07/31/23  1241 07/31/23  0650 07/30/23  0419 07/29/23  0603 07/28/23  0653 07/27/23  0401 07/26/23  0427   *  --   --  133* 133* 131* 133* 131* 130*   POTASSIUM 3.7 3.7 3.4 3.3* 3.6 3.7 3.4 3.6 4.0   CHLORIDE 95*  --   --  93* 93* 90* 94* 93* 94*   CO2 28  --   --  24 21* 20* 23 23 21*   BUN 51.9*  --   --  80.2* 69.0* 89.4* 82.5* 74.2* 65.0*   CR 2.49*  --   --  3.19* 2.98* 3.44* 3.45* 3.15* 2.99*   GFRESTIMATED 28*  --   --  21* 23* 19* 19* 21* 23*   YOSVANY 9.3  --   --  8.7* 8.7* 8.8 8.9 8.8 8.5*   MAG  --   --   --   --  1.8  --   --   --   --    ALBUMIN  --   --   --  3.6  --   --   --   --   --          Recent Labs   Lab 08/01/23  0504 07/31/23  1811 07/31/23  0650 07/30/23  0419 07/29/23  1656 07/29/23  0603 07/28/23  1317 07/28/23  0653 07/27/23  0833 07/26/23  0427   WBC 6.0  --  5.7  --   --  8.9  --  9.7 9.5 10.8   HGB 7.8*  7.8* 7.8* 6.8* 7.7* 6.7* 7.0* 7.2* 6.7* 7.5* 7.6*   HCT 23.2*  --  20.8*  --   --  20.4*  --  19.9* 21.7* 22.3*   MCV 89  --  90  --   --  89  --  87 85 85   *  --  98*  --   --  86*  --  100* 101* 109*            MEDICATIONS   sodium chloride 0.9%  250 mL Intravenous Once in dialysis/CRRT    sodium chloride 0.9%  300 mL Hemodialysis Machine Once    amiodarone  400 mg Oral Daily    apixaban ANTICOAGULANT  5 mg Oral BID    [Held by provider] aspirin  81 mg Oral Daily    cefTRIAXone  2 g Intravenous Q24H    docusate sodium  100 mg Oral BID    furosemide  80 mg Intravenous Q12H    insulin aspart   Subcutaneous TID AC    insulin aspart   1-10 Units Subcutaneous TID AC    insulin aspart  1-7 Units Subcutaneous At Bedtime    insulin glargine  30 Units Subcutaneous At Bedtime    magnesium oxide  400 mg Oral Daily    midodrine  10 mg Oral Once in dialysis/CRRT    multivitamin, therapeutic  1 tablet Oral Daily    - MEDICATION INSTRUCTIONS -   Does not apply Once    sodium chloride (PF)  10-40 mL Intracatheter Q7 Days    sodium chloride (PF)  3 mL Intracatheter Q8H    ticagrelor  90 mg Oral BID    trimethoprim-polymyxin b  2 drop Left Eye 4x Daily    vitamin C  500 mg Oral Daily

## 2023-08-01 NOTE — PROGRESS NOTES
[facility]    Medicine Progress Note - Hospitalist Service    Date of Admission:  7/17/2023    Assessment & Plan   Mr. Aguilar is a 63-year-old male with history of CAD on Brilinta, chronic systolic CHF with EF of 30 to 35% prior to admission, CKD 3, history of left foot osteomyelitis status post left BKA, and DM 2 who presents 7/17/2023 with septic shock, NSTEMI, JOHANA and A-fib with RVR.  Patient was stabilized with pressors and transferred out of ICU 7/27/2023  Source of septic shock thought secondary to group A strep bacteremia and myositis.     Septic shock: Resolved  -Source thought secondary to group A strep bacteremia secondary to skin and soft tissue infection.  Concern for toxic shock syndrome  MRI right shoulder 7/19/2023 showed myositis  -Switched to ceftriaxone from penicillin G 7/27/2023  -Completed course of oral linezolid 7/27/2023  -ID to follow: Will likely need IV antibiotics for 2 weeks, possibly longer course  -Anticipate TCU on discharge given need for IV antibiotics for likely 2 weeks depending on clinical course  -Patient already has PICC line in place     NSTEMI  History of CAD and ischemic cardiomyopathy   Prior coronary stents  A-fib with RVR  -Cardiology following  -Continue amiodarone  -Cardiology following for possible coronary angiogram this hospitalization-Will work with ID on timing  -Continue aspirin and Brilinta  -Heparin drip stopped 7/31  -Starting apixaban 8/1, will monitor for bleeding      Acute on chronic systolic CHF exacerbation  TTE 7/17/2023 shows EF of 25 to 30%  -Continuing intermittent dialysis as below      JOHANA on CKD stage 3: Likely secondary to circulatory shock  -Baseline creatinine 1.4-1.6, 3.25 on admission, peak at 5.22 on 7/21, some fluctuation but now generally improving  -Status post intermittent hemodialysis with evidence of renal recovery.  Dialysis started 7/21/2023, last dialyzed 7/24/2023  -Defer diuresis to cardiology and  nephrology  -Started dialysis 7/21 to 7/24, resumed 7/29 due to increased volume     Hyponatremia-now 131  -Initially severe, 119 on presentation  -We will continue to monitor     Related LFTs: Thought secondary to shock liver and myositis  -Improved     Acute on anemia of chronic disease  -No overt signs of bleeding, CT abdomen without signs of bleeding  -Remains on heparin drip (held 7/31), heparin with dialysis, ticagrelor  -Transfused 2 units PRBC 7/25 hgb 5.9  -Transfused 1 unit PRBCs 7/28 hgb 6.7  -Transfused 1 unit PRBCs 7/29 Hgb 6.7  -Transfused 1 unit PRBCs 7/31 Hgb 6.8  -Has been having black stools since 7/24 so likely blood in stool  -Transfuse for hemoglobin less than 7     DM2:  -Continue morning Lantus to 30 units every morning  -hold evening Lantus to 12 units until eating better  -Continue current sliding scale insulin  -Starting carb count coverage with meals       Diet: Snacks/Supplements Adult: Glucerna; With Meals  Combination Diet Renal Diet (dialysis); Moderate Consistent Carb (60 g CHO per Meal) Diet    DVT Prophylaxis: DOAC  Garcia Catheter: PRESENT, indication: Strict 1-2 Hour I&O  Lines: PRESENT      PICC 07/18/23 Triple Lumen Left Basilic Vasopressor,Access-Site Assessment: WDL  CVC Double Lumen Right Subclavian Non - tunneled-Site Assessment: WDL      Cardiac Monitoring: None  Code Status: Full Code      Clinically Significant Risk Factors        # Hypokalemia: Lowest K = 3.3 mmol/L in last 2 days, will replace as needed       # Hypoalbuminemia: Lowest albumin = 2.1 g/dL at 7/21/2023  4:38 AM, will monitor as appropriate   # Thrombocytopenia: Lowest platelets = 98 in last 2 days, will monitor for bleeding   # Hypertension: Noted on problem list  # Acute heart failure with reduced ejection fraction: last echo with EF <40% and receiving IV diuretics      # DMII: A1C = 6.9 % (Ref range: <5.7 %) within past 6 months   # Obesity: Estimated body mass index is 30.77 kg/m  as calculated from the  "following:    Height as of this encounter: 1.88 m (6' 2\").    Weight as of this encounter: 108.7 kg (239 lb 10.2 oz).   # Severe Malnutrition: based on nutrition assessment         Disposition Plan     Expected Discharge Date: 08/02/2023        Discharge Comments: iv abx for 2 weeks HD 7/31  ltach reviewing          Mabel Worthy MD  Hospitalist Service  [facility]  Securely message with [employer] (more info)  Text page via [employer] Paging/Directory   ______________________________________________________________________    Interval History   Patient is doing well.  He has not had a bowel movement so FOBT was not done.  We will start her on DOAC and monitor for bleeding.  Working on placement issues.  Discussed with him his concerns for posthospital and post LTAC.    Physical Exam   Vital Signs: Temp: 98.2  F (36.8  C) Temp src: Oral BP: 138/71 Pulse: 83   Resp: 18 SpO2: 99 % O2 Device: None (Room air)    Weight: 239 lbs 10.24 oz    General Appearance: Awake, alert, somewhat depressed mood today  Respiratory: CTAB, no wheeze  Cardiovascular: RRR, no murmur noted  GI: soft, nontender, non distended, normal bowel sounds  Skin: no jaundice, no rash      Medical Decision Making       56 MINUTES SPENT BY ME on the date of service doing chart review, history, exam, documentation & further activities per the note.      Data     I have personally reviewed the following data over the past 24 hrs:    6.0  \   7.8 (L)   / 124 (L)     134 (L) 95 (L) 51.9 (H) /  142 (H)   3.7 28 2.49 (H) \       Imaging results reviewed over the past 24 hrs:   No results found for this or any previous visit (from the past 24 hour(s)).  "

## 2023-08-02 NOTE — PLAN OF CARE
Patient is developing rashes in his bilateral groin area. Area cleansed with sponge bath wipes. Hospitalist notified. Miconazole ordered and applied.    Patient was initially Afib with BBB at 7pm- converted back to NSR around 2200.

## 2023-08-02 NOTE — PLAN OF CARE
Ice pack to right shoulder effective for mild discomfort.  Right arm and right leg with lymphedma wraps. Patient still receiving IV lasix.  Indwelling thakur with 2,250 out so far today.  Hgb q 12 hours. 1800 was 7.8, unchanged from this AM.  No signs of bleeding.  Need to collect stool for occult blood check yet.  Pt reports poor appetite.  State he feels distended.  He declined colace this AM stating that is not what he needs and that he has had GI issues for years.  Timing of meals were 1000 breakfast and 1500 lunch.  BG this evening was 219 and patient stated that he was not going to eat dinner but that he might have some ice cream.  Call placed to Hospitalist to advise amount of insuline to give.  Advised to give 4 units per meal sliding scale and to hold meal carb coverage.  Lantus adjusted.   Patient up to chair today with ceiling lift and 2 assist.  Patient calls to let staff know his needs.  Mepelix intact on RUE and sacrum.      Problem: Malnutrition  Goal: Improved Nutritional Intake  Outcome: Not Progressing     Problem: Plan of Care - These are the overarching goals to be used throughout the patient stay.    Goal: Plan of Care Review  Description: The Plan of Care Review/Shift note should be completed every shift.  The Outcome Evaluation is a brief statement about your assessment that the patient is improving, declining, or no change.  This information will be displayed automatically on your shift note.  Outcome: Progressing     Problem: Pain Acute  Goal: Optimal Pain Control and Function  Outcome: Progressing     Problem: Plan of Care - These are the overarching goals to be used throughout the patient stay.    Goal: Absence of Hospital-Acquired Illness or Injury  Intervention: Identify and Manage Fall Risk  Recent Flowsheet Documentation  Taken 8/1/2023 1600 by Angely Elder, RN  Safety Promotion/Fall Prevention:   activity supervised   clutter free environment maintained  Taken 8/1/2023 0800 by  Elder, Angely L, RN  Safety Promotion/Fall Prevention:   activity supervised   clutter free environment maintained  Intervention: Prevent Skin Injury  Recent Flowsheet Documentation  Taken 8/1/2023 1200 by Angely Elder RN  Body Position:   turned   right  Taken 8/1/2023 0800 by Angely Elder RN  Body Position:   turned   right     Problem: Sepsis/Septic Shock  Goal: Absence of Infection Signs and Symptoms  Intervention: Promote Recovery  Recent Flowsheet Documentation  Taken 8/1/2023 1600 by Angely Elder RN  Activity Management: activity adjusted per tolerance  Taken 8/1/2023 1200 by Angely Elder RN  Activity Management: activity adjusted per tolerance  Taken 8/1/2023 0800 by Angely Elder RN  Activity Management: activity adjusted per tolerance     Problem: Fall Injury Risk  Goal: Absence of Fall and Fall-Related Injury  Intervention: Promote Injury-Free Environment  Recent Flowsheet Documentation  Taken 8/1/2023 1600 by Angely Elder RN  Safety Promotion/Fall Prevention:   activity supervised   clutter free environment maintained  Taken 8/1/2023 0800 by Angely Elder RN  Safety Promotion/Fall Prevention:   activity supervised   clutter free environment maintained     Problem: Chronic Kidney Disease  Goal: Optimal Functional Ability  Intervention: Optimize Functional Ability  Recent Flowsheet Documentation  Taken 8/1/2023 1600 by Angely Elder RN  Activity Management: activity adjusted per tolerance  Taken 8/1/2023 1200 by Angely Elder RN  Activity Management: activity adjusted per tolerance  Taken 8/1/2023 0800 by Angely Elder RN  Activity Management: activity adjusted per tolerance   Goal Outcome Evaluation:

## 2023-08-02 NOTE — PROGRESS NOTES
RENAL/ DIALYSIS - Patient seen on dialysis at 1015  CC: f/u JOHANA    Subjective: No new complaints this AM but right arm sore after therapies. UO excellent overnight and creatinine stable at 2.49 compared to yesterday suggesting he is likely recovering. Dialysis started today before my arrival at hospital. Edema better. No dyspnea, dizziness. No other new complaints. Will Trend daily labs to eval for possible recovery.          ASSESSMENT AND RECOMMENDATIONS:    JOHANA/ CKD 3b: baseline CKD due to longstanding DM2, HTN. Now severe ATN in setting of profound hypotension with sepsis, group a strep bacteremia and hypovolemia and contrast nephropathy after CTA, mild rhabdo. CTA on 7/17 with no hydronephrosis. UA on 7/18 with granular casts  dialysis  7/21 -7/24.    Resumed on 7/29 due to worsening anasarca and poor response to lasix gtt  Continue MWF dialysis for now.  May be recovering renal function   Urine output is improved and Creatinine was stable without dialysis 8/1-->8.2 (Had HD 8/2)  Trend daily BMP and hold on further dialysis plans at this time. Would delay discharge a couple days to see if HD still needed (particularly if no longer qualifies for LTACH)    Hypotension due to CMP and exacerbated by bacteremia/sepsis. Improved and off pressors.   Continue midodrine with dialysis to prevent hypotension and allow ultrafiltration.  Non-ST elevation myocardial infarction with ischemic cardiomyopathy.  Heart failure with reduced ejection fraction.  Decompensated heart failure, continue fluid removal on dialysis.  LVEF 25-30% based on 7/18 cardiac echo which also showed normal right ventricular function, normal right atrial pressure.  Excellent UO at present and volume status improving.  Bacteremia.  Group A Streptococcus on 7/17.  Also right shoulder myositis.  Continue Rocephin as per ID recommendations.  Anemia secondary to critical illness, chronic kidney disease, acute kidney injury, bacteremia. STABLE.    "Monitor hemoglobin.    Akil Farah MD  [facility]  [phone] (off)         Objective    PHYSICAL EXAM  /60   Pulse 82   Temp 97.3  F (36.3  C) (Temporal)   Resp 16   Ht 1.88 m (6' 2\")   Wt 108.7 kg (239 lb 10.2 oz)   SpO2 97%   BMI 30.77 kg/m    I/O last 3 completed shifts:  In: 1580 [P.O.:1480; I.V.:100]  Out: 3700 [Urine:3700]  Wt Readings from Last 3 Encounters:   08/01/23 108.7 kg (239 lb 10.2 oz)   11/17/22 100.2 kg (221 lb)   09/28/22 104.3 kg (230 lb)       GENERAL: alert, NAD  HEENT:normocephalic, atraumatic  CARDIOVASCULAR: RRR, 1+ diffuse edema   PULMONARY: Normal effort,  No cyanosis  GASTROINTESTINAL: soft, nt/nd, BS present  NEURO: Alert, no gross focal findings  PSYCHIATRIC: Appropriate mood and interaction  SKIN: warm, dry  MS: left bka   RIJ CVC working well on dialysis    LABORATORIES  Recent Labs   Lab 08/02/23  0505 08/01/23  0504 07/31/23  1811 07/31/23  1241 07/31/23  0650 07/30/23  0419 07/29/23  0603 07/28/23  0653 07/27/23  0401   * 134*  --   --  133* 133* 131* 133* 131*   POTASSIUM 3.7 3.7 3.7 3.4 3.3* 3.6 3.7 3.4 3.6   CHLORIDE 95* 95*  --   --  93* 93* 90* 94* 93*   CO2 28 28  --   --  24 21* 20* 23 23   BUN 52.9* 51.9*  --   --  80.2* 69.0* 89.4* 82.5* 74.2*   CR 2.49* 2.49*  --   --  3.19* 2.98* 3.44* 3.45* 3.15*   GFRESTIMATED 28* 28*  --   --  21* 23* 19* 19* 21*   YOSVANY 9.6 9.3  --   --  8.7* 8.7* 8.8 8.9 8.8   MAG  --   --   --   --   --  1.8  --   --   --    ALBUMIN  --   --   --   --  3.6  --   --   --   --          Recent Labs   Lab 08/02/23  0505 08/01/23  1759 08/01/23  0504 07/31/23  1811 07/31/23  0650 07/30/23  0419 07/29/23  1656 07/29/23  0603 07/28/23  1317 07/28/23  0653 07/27/23  0833   WBC  --   --  6.0  --  5.7  --   --  8.9  --  9.7 9.5   HGB 7.9* 7.8* 7.8*  7.8* 7.8* 6.8* 7.7* 6.7* 7.0*   < > 6.7* 7.5*   HCT  --   --  23.2*  --  20.8*  --   --  20.4*  --  19.9* 21.7*   MCV  --   --  89 --  90  --   --  89 --  87 85 "   PLT  --   --  124*  --  98*  --   --  86*  --  100* 101*    < > = values in this interval not displayed.            MEDICATIONS   sodium chloride 0.9%  250 mL Intravenous Once in dialysis/CRRT    sodium chloride 0.9%  300 mL Hemodialysis Machine Once    amiodarone  400 mg Oral Daily    apixaban ANTICOAGULANT  5 mg Oral BID    [Held by provider] aspirin  81 mg Oral Daily    cefTRIAXone  2 g Intravenous Q24H    docusate sodium  100 mg Oral BID    furosemide  80 mg Intravenous Q12H    insulin aspart   Subcutaneous TID AC    insulin aspart  1-10 Units Subcutaneous TID AC    insulin aspart  1-7 Units Subcutaneous At Bedtime    insulin glargine  15 Units Subcutaneous At Bedtime    insulin glargine  5 Units Subcutaneous Once    magnesium oxide  400 mg Oral Daily    miconazole   Topical BID    multivitamin, therapeutic  1 tablet Oral Daily    - MEDICATION INSTRUCTIONS -   Does not apply Once    sodium chloride (PF)  10-40 mL Intracatheter Q7 Days    sodium chloride (PF)  3 mL Intracatheter Q8H    ticagrelor  90 mg Oral BID    trimethoprim-polymyxin b  2 drop Left Eye 4x Daily    vitamin C  500 mg Oral Daily

## 2023-08-02 NOTE — PROGRESS NOTES
HEMODIALYSIS NOTE:     TREATMENT TIME: 3hr        UF TOTAL(net) : 3000 ml      BVP:64.7 L        ACCESS PRE:Right Chest CVC , no access issues, Heparin instills      HEPATITIS STATUS:  Hbsag: Negative, 07/21/23     RUN SUMMARY: 3 hr run on a K3 bath per lab protocol.      Tolerated Tx     Pt seen by Dr. Farah on dialysis. Overall stable treatment, pt denies SOB, NV, cramping, chest pain.   See HD flow sheet for all data. Post report given to DIAN Weber RN,      ACCESS POST: CVC dressing clean dry and intact and heparin instilled     INTERVENTIONS: Monitor VS Q 15 minutes and PRN.     PLAN: Per Renal Team

## 2023-08-02 NOTE — PROGRESS NOTES
Infectious Diseases Progress Note  [facility]    Date of visit: 08/02/2023     ASSESSMENT   63-year-old man with a history of hypertension, coronary artery disease, CHF, diabetes, who was admitted with septic shock.  ID following regarding strep bacteremia.    Group A strep bacteremia.  Complicated skin and soft tissue infection.  Was admitted to the ICU on pressors.  Acute kidney injury, and transaminitis.  Concerning for toxic shock syndrome.  Also having right shoulder pain.  Myositis seen on MRI. Blistering starting on 7/21, possibly from pressure or swelling. Seen by ortho and general surgery. Non-surgical management. -no IVIG due to renal failure initially, clinically improved now.  Septic shock.  Had been On pressors. Resolved.  Right shoulder myositis. Denies acute injury. Elevated ck, improving.    Acute kidney injury.  May be returning function.   Leukocytosis- resolved  Transferred out of ICU on 7/28/2023     Principal Problem:    Septic shock (H)  Active Problems:    Dehydration    Hyponatremia    Elevated troponin    JOHANA (acute kidney injury) (H)    Atrial fibrillation with RVR (H)       PLAN   -had been on penicillin G IV for group A strep infection.  Switched to ceftriaxone on 7/27/2023, and monitor results    -Updated patient.  Patient will likely need LTAC ([facility] would be ideal) on discharge, will need IV antibiotics for 2 weeks while weekly monitor of shoulder, and lab work.   Selenium sulfide skin wash BID for facial seb derm.     MANE Srivastava MD   [facility]  Answering Service: [phone]  On-Call ID provider: [phone], option: 9          ===========================================      SUBJECTIVE / INTERVAL HISTORY:     Dry facial skin.  Kidneys may be coming back.     Antibiotics   Penicillin g 7/20- 12 MU per day-->ceftriaxone 7/26-  Linezolid 7/20-27    Previous:  Zosyn 7/17-7/20  Vancomycin 7/17      Physical Exam     Temp:  [97.3  F (36.3  C)-98.8  " F (37.1  C)] 98.1  F (36.7  C)  Pulse:  [79-99] 83  Resp:  [12-20] 18  BP: ()/(53-72) 148/72  SpO2:  [94 %-100 %] 94 %    BP (!) 148/72 (BP Location: Right leg)   Pulse 83   Temp 98.1  F (36.7  C) (Oral)   Resp 18   Ht 1.88 m (6' 2\")   Wt 108.7 kg (239 lb 10.2 oz)   SpO2 94%   BMI 30.77 kg/m      GENERAL:  no distress  HENT:  Head is normocephalic, atraumatic.   EYES:  Eyes have anicteric sclerae without conjunctival injection   LUNGS:  Clear to auscultation.  normal respiratory effort   CARDIOVASCULAR:  Regular rate and rhythm with no murmurs, gallops or rubs.  ABDOMEN:  Normal bowel sounds, soft, nontender.   EXT: Right shoulder swelling, tenderness.  SKIN:  Shallow blisters on posterior right arm, superficial, weeping. Right IJ line is in place without any surrounding erythema.   NEUROLOGIC:  Grossly nonfocal.  L UE PICC    Photo in chart 7/28/2023       Previous photo        Cultures   7/17 blood cultures x2: Strep pyogenes  7/19 blood cultures x2: No growth to date          Pertinent Labs:     Recent Labs   Lab 08/02/23  0505 08/01/23  1759 08/01/23  0504 07/31/23  1811 07/31/23  0650 07/29/23  1656 07/29/23  0603   WBC  --   --  6.0  --  5.7  --  8.9   HGB 7.9* 7.8* 7.8*  7.8*   < > 6.8*   < > 7.0*   PLT  --   --  124*  --  98*  --  86*    < > = values in this interval not displayed.         Recent Labs   Lab 08/02/23  0505 08/01/23  0504 07/31/23  0650   * 134* 133*   CO2 28 28 24   BUN 52.9* 51.9* 80.2*   ALBUMIN  --   --  3.6         No results for input(s): CRP, SED in the last 168 hours.        Imaging:     IR CVC Non Tunnel Placement > 5 Yrs    Result Date: 7/20/2023  LOCATION: [facility] DATE: 7/20/2023 PROCEDURE: NON-TUNNELED DIALYSIS CATHETER PLACEMENT 1.  Insertion of a non-tunneled central venous catheter. 2.  Ultrasound guidance for vascular access. 3.  Fluoroscopic guidance for central venous access device placement. INTERVENTIONAL RADIOLOGIST: Joseph" MD Beth INDICATION: Renal insufficiency requiring renal replacement therapy, plan for nontunneled dialysis catheter placement. CONSENT: The risks, benefits and alternatives of the procedure were discussed with the patient or representative in detail. All questions were answered. Informed consent was given to proceed with the procedure. MODERATE SEDATION: None. CONTRAST: None. ANTIBIOTICS: None. ADDITIONAL MEDICATIONS: None. FLUOROSCOPIC TIME: 0.5 minutes. RADIATION DOSE: Air Kerma: 5 mGy. COMPLICATIONS: No immediate complications. UNIVERSAL PROTOCOL: The operative site was marked and any prior imaging was reviewed. Required items including blood products, implants, devices and special equipment was made available. Patient identity was confirmed either verbally, with demographic information, hospital assigned identification or other identification markers. A timeout was performed immediately prior to the procedure. STERILE BARRIER TECHNIQUE: Maximum sterile barrier technique was used. Cutaneous antisepsis was performed at the operative site with application of 2% chlorhexidine and large sterile drape. Prior to the procedure, the  and assistant performed hand hygiene and wore hat, mask, sterile gown, and sterile gloves during the entire procedure. PROCEDURE:  Using local anesthesia and real-time ultrasound guidance the right internal jugular vein was accessed. Imaging demonstrates an anechoic and compressible vein. A permanent image was stored to the patient's medical record. Using this access, a 20 cm dialysis catheter was advanced using fluoroscopic guidance until the tip was in the proximal right atrium. FINDINGS: At the completion of the study he radiograph was performed utilizing the in room fluoroscopic unit. Imaging demonstrates the nontunneled dialysis catheter tip terminating in the proximal right atrium.     IMPRESSION:  1.  Successful non-tunneled dialysis catheter placement. 2.  The catheter is  ready for use.    MR Shoulder Right w/o Contrast    Result Date: 7/19/2023  EXAM: MR SHOULDER RIGHT W/O CONTRAST LOCATION: [facility] DATE: 7/19/2023 INDICATION: Septic shock with bacteremia, concern for right shoulder osteomyelitis or joint infection. COMPARISON: None. TECHNIQUE: Unenhanced. FINDINGS: ROTATOR CUFF: -Supraspinatus: Moderate severity tendinopathy. No tearing or retraction. No atrophy. -Infraspinatus: Moderate severity infraspinatus tendinopathy without evidence for well-defined partial or full-thickness tearing. No retraction but there is T2 signal abnormality within the muscular portion, which may be due to muscle strain. Infectious or inflammatory myositis could have this appearance. -Subscapularis: No tendon tear, tendinopathy or fatty atrophy. -Teres minor: No tendon tear, tendinopathy or fatty atrophy. CORACOACROMIAL ARCH: -Morphology: Type II acromion. No subacromial spur. Subacromial and subcoracoid space are normal. -Bursa: Fluid in the subacromial subdeltoid bursa. The coracoacromial and coracoclavicular ligaments are negative. ACROMIOCLAVICULAR JOINT: -Hypertrophic change. No significant diastasis or effusion. LONG HEAD OF BICEPS TENDON: -Mild tendinopathy within the intra-articular portion of the long head of the biceps. GLENOHUMERAL JOINT: -Labrum: No labral tear. No paralabral cyst. -Cartilage: Grade II-grade III cartilage loss both sides of the joint without significant surface irregularity. -Joint space: No effusion or synovitis. -Glenohumeral ligaments and capsule: No pericapsular inflammation. BONES: -No fracture or concerning marrow replacing lesion. No evidence for osteomyelitis. SOFT TISSUES: -Extensive but nonspecific T2 signal abnormality is present within the deltoid musculature, most marked posteriorly but this is also seen within the latissimus muscle, distal aspect of the trapezius, infraspinatus as described, and portions of the subscapularis. The  multiplicity of muscle groups involved makes a strain type injury considered unlikely and this is more likely secondary to infectious or inflammatory myositis. There is no evidence for organized fluid collection to suggest abscess.     IMPRESSION: 1.  Extensive signal abnormality within multiple muscles of the shoulder girdle including portions of the visualized latissimus and trapezius muscles. The findings are likely secondary to infectious or inflammatory myositis versus contusion or muscle strain type injury. No evidence for abscess. 2.  No evidence for osteomyelitis or septic arthropathy. 3.  Small amount of fluid within the subacromial subdeltoid bursa. 4.  No evidence for fracture. 5.  Moderate severity tendinopathy within the supraspinatus and infraspinatus. 6.  Tendinopathy within the intraarticular portion of the biceps. 7.  Degenerative change at the glenohumeral joint with grade II-grade III cartilage loss but no significant surface irregularity.    XR Chest Port 1 View    Result Date: 7/18/2023  EXAM: XR CHEST PORT 1 VIEW LOCATION: [facility] DATE: 7/18/2023 INDICATION: RN placed PICC   verify tip placement COMPARISON: 07/17/2023     IMPRESSION: Left upper extremity PICC terminates at the SVC - RA junction. No acute cardiopulmonary abnormality.    Reviewed labs cultures, discussed with patient  Chart reviewed  Notes reviewed

## 2023-08-02 NOTE — PLAN OF CARE
Problem: Pain Acute  Goal: Optimal Pain Control and Function  Outcome: Progressing     Problem: Sepsis/Septic Shock  Goal: Optimal Coping  Outcome: Progressing  Goal: Absence of Infection Signs and Symptoms  Intervention: Promote Recovery  Recent Flowsheet Documentation  Taken 8/2/2023 1219 by Charmaine Weber, RN  Activity Management: activity adjusted per tolerance  Taken 8/2/2023 0900 by Charmaine Weber, RN  Activity Management: activity adjusted per tolerance   Goal Outcome Evaluation:    No c/o pain.  NSR BBB.  Left for dialysis at 0800.  Back on the unit at 1130.  Blood sugars 200 & 139.  Pt did not eat breakfast but did eat 100% lunch.  Garcia 500cc. Right arm and leg lymph wrapped.

## 2023-08-03 NOTE — PLAN OF CARE
Physical Therapy Discharge Summary    Reason for therapy discharge:    All goals and outcomes met, no further needs identified.    Progress towards therapy goal(s). See goals on Care Plan in Epic electronic health record for goal details.  Goals met    Therapy recommendation(s):    No further therapy is recommended. Pt tolerating tetragrip on R UE and LE. Further management to be completed by nursing. No further inpatient lymphedema therapy needs. Recommend lymphedema follow-up at discharge destination.    Yvette Fang, PT  8/3/2023

## 2023-08-03 NOTE — PROGRESS NOTES
[facility]    Medicine Progress Note - Hospitalist Service    Date of Admission:  7/17/2023    Assessment & Plan   Mr. Aguilar is a 63-year-old male with history of CAD on Brilinta, chronic systolic CHF with EF of 30 to 35% prior to admission, CKD 3, history of left foot osteomyelitis status post left BKA, and DM 2 who presents 7/17/2023 with septic shock, NSTEMI, JOHANA and A-fib with RVR.  Patient was stabilized with pressors and transferred out of ICU 7/27/2023     Septic shock: Resolved  Group A strep bacteremia secondary to skin and soft tissue infection right shoulder.  Concern for toxic shock syndrome  MRI right shoulder 7/19/2023 showed myositis  -Switched to ceftriaxone from penicillin G 7/27/2023  -Completed course of oral linezolid 7/27/2023  -ID to follow: Will likely need IV antibiotics for 2 weeks, possibly longer course  -Anticipate TCU on discharge given need for IV antibiotics for likely 2 weeks depending on clinical course  -Patient already has PICC line in place     NSTEMI  History of CAD and ischemic cardiomyopathy   Prior coronary stents  A-fib with RVR  -Cardiology following  -Continue amiodarone  -Cardiology following for possible coronary angiogram this hospitalization-Will work with ID on timing  -Continue aspirin and Brilinta  -Heparin drip stopped 7/31  -Starting apixaban 8/1, will monitor for bleeding      Acute on chronic systolic CHF exacerbation  TTE 7/17/2023 shows EF of 25 to 30%  -Continuing intermittent dialysis as below      JOHANA on CKD stage 3: Likely secondary to circulatory shock, requiring intermittent dialysis  -Baseline creatinine 1.4-1.6, 3.25 on admission, peak at 5.22 on 7/21, some fluctuation but now generally improving  -Status post intermittent hemodialysis with evidence of renal recovery.  Dialysis started 7/21/2023, last dialyzed 7/24/2023  -Defer diuresis to cardiology and nephrology  -Started dialysis 7/21 to 7/24, resumed 7/29 due to  "increased volume  -kidney function may be recovering per nephrology, will hold dialysis     Hyponatremia- improved  -Initially severe, 119 on presentation  -We will continue to monitor     Related LFTs:   -Thought secondary to shock liver and myositis  -Improved     Acute on anemia of chronic disease  -No overt signs of bleeding, CT abdomen without signs of bleeding  -Remains on heparin drip (held 7/31), heparin with dialysis, ticagrelor  -Transfused 2 units PRBC 7/25 hgb 5.9  -Transfused 1 unit PRBCs 7/28 hgb 6.7  -Transfused 1 unit PRBCs 7/29 Hgb 6.7  -Transfused 1 unit PRBCs 7/31 Hgb 6.8  -Has been having black stools since 7/24 so likely blood in stool  -Transfuse for hemoglobin less than 7     DM2:  -Continue morning Lantus to 30 units every morning  -hold evening Lantus to 12 units until eating better  -Continue current sliding scale insulin  -Starting carb count coverage with meals       Diet: Combination Diet Renal Diet (dialysis); Moderate Consistent Carb (60 g CHO per Meal) Diet  Snacks/Supplements Adult: Glucerna; With Meals    DVT Prophylaxis: DOAC  Garcia Catheter: PRESENT, indication: Strict 1-2 Hour I&O  Lines: PRESENT      PICC 07/18/23 Triple Lumen Left Basilic Vasopressor,Access-Site Assessment: WDL  CVC Double Lumen Right Subclavian Non - tunneled-Site Assessment: WDL      Cardiac Monitoring: None  Code Status: Full Code      Clinically Significant Risk Factors              # Hypoalbuminemia: Lowest albumin = 2.1 g/dL at 7/21/2023  4:38 AM, will monitor as appropriate   # Thrombocytopenia: Lowest platelets = 124 in last 2 days, will monitor for bleeding   # Hypertension: Noted on problem list  # Acute heart failure with reduced ejection fraction: last echo with EF <40% and receiving IV diuretics      # DMII: A1C = 6.9 % (Ref range: <5.7 %) within past 6 months   # Obesity: Estimated body mass index is 30.77 kg/m  as calculated from the following:    Height as of this encounter: 1.88 m (6' 2\").    " Weight as of this encounter: 108.7 kg (239 lb 10.2 oz).   # Severe Malnutrition: based on nutrition assessment         Disposition Plan     Expected Discharge Date: 08/03/2023        Discharge Comments: iv abx for 2 weeks HD 7/31  ltach reviewing          Mabel Worthy MD  Hospitalist Service  [facility]  Securely message with [employer] (more info)  Text page via [employer] Paging/Directory   ______________________________________________________________________    Interval History   Mr. De Los Santos is in a better mood today. He was told his renal function is recovering. Discharge planning is yet to be in place and now complicated by no dialysis. Will work with social work.     Physical Exam   Vital Signs: Temp: 98.6  F (37  C) Temp src: Oral BP: 120/56 Pulse: 85   Resp: 14 SpO2: 95 % O2 Device: None (Room air)    Weight: 239 lbs 10.24 oz    General Appearance: Awake, alert, in no acute distress  Respiratory: CTAB, no wheeze  Cardiovascular: RRR, no murmur noted  GI: soft, nontender, non distended, normal bowel sounds  Skin: no jaundice, no rash      Medical Decision Making       50 MINUTES SPENT BY ME on the date of service doing chart review, history, exam, documentation & further activities per the note.      Data     I have personally reviewed the following data over the past 24 hrs:    N/A  \   8.0 (L)   / N/A     133 (L) 95 (L) 52.9 (H) /  224 (H)   3.7 28 2.49 (H) \       Imaging results reviewed over the past 24 hrs:   No results found for this or any previous visit (from the past 24 hour(s)).

## 2023-08-03 NOTE — PROGRESS NOTES
Daily Progress Note        CODE STATUS:  Full Code    08/03/23  Assessment/Plan:  Mr. Aguilar is a 63-year-old male with history of CAD on Brilinta, chronic systolic CHF with EF of 30 to 35% prior to admission, CKD 3, history of left foot osteomyelitis status post left BKA, and DM 2 who presents 7/17/2023 with septic shock, NSTEMI, JOHANA and A-fib with RVR.  Patient was stabilized with pressors and transferred out of ICU 7/27/2023     Septic shock: Resolved  Group A strep bacteremia secondary to skin and soft tissue Infection right shoulder.  Concern for toxic shock syndrome  MRI right shoulder 7/19/2023 showed myositis  -Switched to ceftriaxone from penicillin G 7/27/2023  -Completed course of oral linezolid 7/27/2023  -ID to follow: Will likely need IV antibiotics for 2 weeks, possibly longer course  -Anticipate TCU on discharge given need for IV antibiotics for likely 2 weeks depending on clinical course  -Patient already has PICC line in place     NSTEMI  History of CAD and ischemic cardiomyopathy   Prior coronary stents  A-fib with RVR  -Cardiology following  -Continue amiodarone  -Cardiology following for possible coronary angiogram this hospitalization-Will work with ID on timing  -Continue aspirin and Brilinta  -Heparin drip stopped 7/31  -Starting apixaban 8/1, will monitor for bleeding      Acute on chronic systolic CHF exacerbation  -TTE 7/17/2023 shows EF of 25 to 30%  -Continuing intermittent dialysis as below      JOHANA on CKD stage 3: Likely secondary to circulatory shock, requiring intermittent dialysis  -Baseline creatinine 1.4-1.6, 3.25 on admission, peak at 5.22 on 7/21, some fluctuation but now generally improving  -Status post intermittent hemodialysis with evidence of renal recovery.  Dialysis started 7/21/2023, last dialyzed 8/2/2023  -Defer diuresis to cardiology and nephrology  -Started dialysis 7/21 to 7/24, resumed 7/29 due to increased volume  -Kidney function may be recovering per  "nephrology, will hold dialysis, and watch for recovery     Hyponatremia- improved  -Initially severe, 119 on presentation  -We will continue to monitor     Elevated LFTs:   -Thought secondary to shock liver and myositis  -Improved     Acute on anemia of chronic disease  -No overt signs of bleeding, CT abdomen without signs of bleeding  -Remains on heparin drip (held 7/31), heparin with dialysis, ticagrelor  -Transfused 2 units PRBC 7/25 hgb 5.9  -Transfused 1 unit PRBCs 7/28 hgb 6.7  -Transfused 1 unit PRBCs 7/29 Hgb 6.7  -Transfused 1 unit PRBCs 7/31 Hgb 6.8  -Has been having black stools since 7/24 so likely blood in stool  -Transfuse for hemoglobin less than 7     DM2:  -Currently Lantus 15 units at bedtime, sliding scale insulin, and mealtime novolog. Was on 38 unit of lantus and sliding scale insulin at home.   -Monitor blood sugar, adjust insulins accordingly.        Diet: Combination Diet Renal Diet (dialysis); Moderate Consistent Carb (60 g CHO per Meal) Diet  Snacks/Supplements Adult: Glucerna; With Meals    DVT Prophylaxis: DOAC  Garcia Catheter: PRESENT, indication: Strict 1-2 Hour I&O  Lines: PRESENT      PICC 07/18/23 Triple Lumen Left Basilic Vasopressor,Access-Site Assessment: WDL  CVC Double Lumen Right Subclavian Non - tunneled-Site Assessment: WDL      Cardiac Monitoring: None  Code Status: Full Code      Clinically Significant Risk Factors              # Hypoalbuminemia: Lowest albumin = 2.1 g/dL at 7/21/2023  4:38 AM, will monitor as appropriate     # Hypertension: Noted on problem list  # Acute heart failure with reduced ejection fraction: last echo with EF <40% and receiving IV diuretics      # DMII: A1C = 6.9 % (Ref range: <5.7 %) within past 6 months   # Overweight: Estimated body mass index is 27.99 kg/m  as calculated from the following:    Height as of this encounter: 1.88 m (6' 2\").    Weight as of this encounter: 98.9 kg (218 lb 0.6 oz).   # Severe Malnutrition: based on nutrition " assessment           Disposition; Couple more days here. Then LTACH vs TCU.  Barrier to discharge; IV antibitiocs, need to monitor for renal recovery     LOS: 17 days     Subjective:  Interval History: Patient seen and examined. Notes, labs, imaging reports personally reviewed. Patient is new to me today. Patient reports doing about the same. Pain over the right shoulder area.    Review of Systems:   As mentioned in subjective.    Patient Active Problem List   Diagnosis    DM type 2 on insulin, w Neuro -- Hgb A1C 7.6 on 1/19/22    Benign essential hypertension    Benign neoplasm of descending colon    Gastro-esophageal reflux disease with esophagitis    Microalbuminuria    Hyperlipidemia    PAF (paroxysmal atrial fibrillation) -- on Eliquis     Ischemic cardiomyopathy    CAD -- diffuse calcified vessels 11/16/21 (no stents placed)    DVT (deep venous thrombosis) (H)    Chronic kidney disease (CKD) stage G3a/A1, moderately decreased glomerular filtration rate (GFR) between 45-59 mL/min/1.73 square meter and albuminuria creatinine ratio less than 30 mg/g (H)    Diabetic ulcer of left midfoot associated with type 2 diabetes mellitus, with necrosis of bone (H)    Constipation    Traumatic amputation of toe or toes without complication (H)    Osteomyelitis of left foot -- S/P Left BKA 4/4/22    Amputation of left foot (H)    Chronic systolic CHF -- EF 30-35% on Echo 11/15/21    Smoker (Cigars)    NSTEMI (non-ST elevated myocardial infarction) (H)    ARF (acute renal failure) (H)    Dehydration    Hyponatremia    Elevated troponin    JOHANA (acute kidney injury) (H)    Atrial fibrillation with RVR (H)    Septic shock (H)       Scheduled Meds:   sodium chloride 0.9%  250 mL Intravenous Once in dialysis/CRRT    sodium chloride 0.9%  300 mL Hemodialysis Machine Once    amiodarone  400 mg Oral Daily    apixaban ANTICOAGULANT  5 mg Oral BID    [Held by provider] aspirin  81 mg Oral Daily    cefTRIAXone  2 g Intravenous Q24H     docusate sodium  100 mg Oral BID    furosemide  80 mg Intravenous Q12H    insulin aspart   Subcutaneous TID AC    insulin aspart  1-10 Units Subcutaneous TID AC    insulin aspart  1-7 Units Subcutaneous At Bedtime    insulin glargine  15 Units Subcutaneous At Bedtime    magnesium oxide  400 mg Oral Daily    miconazole   Topical BID    multivitamin, therapeutic  1 tablet Oral Daily    - MEDICATION INSTRUCTIONS -   Does not apply Once    sodium chloride (PF)  10-40 mL Intracatheter Q7 Days    sodium chloride (PF)  3 mL Intracatheter Q8H    ticagrelor  90 mg Oral BID    trimethoprim-polymyxin b  2 drop Left Eye 4x Daily    vitamin C  500 mg Oral Daily     Continuous Infusions:   dextrose       PRN Meds:.sodium chloride 0.9%, acetaminophen **OR** acetaminophen, acetaminophen, bisacodyl, dextrose, glucose **OR** dextrose **OR** glucagon, heparin Lock (1000 units/mL High concentration), naloxone **OR** naloxone **OR** naloxone **OR** naloxone, ondansetron **OR** ondansetron, oxyCODONE, polyethylene glycol, prochlorperazine **OR** prochlorperazine **OR** prochlorperazine, selenium sulfide, sodium chloride (PF), sodium chloride (PF), sodium chloride (PF), sodium chloride (PF)    Objective:  Vital signs in last 24 hours:  Temp:  [97.4  F (36.3  C)-98.9  F (37.2  C)] 98.8  F (37.1  C)  Pulse:  [79-85] 80  Resp:  [14-20] 18  BP: ()/(53-72) 140/70  SpO2:  [93 %-100 %] 98 %        Intake/Output Summary (Last 24 hours) at 8/3/2023 0807  Last data filed at 8/3/2023 0702  Gross per 24 hour   Intake 740 ml   Output 5000 ml   Net -4260 ml       Physical Exam:    General: Not in obvious distress.  HEENT: NC, AT   Chest: Clear to auscultation bilaterally  Heart: S1S2 normal, regular. No M/R/G  Abdomen: Soft. NT, ND. Bowel sounds- active.  Extremities: Left BKA. Swelling/induration and tenderness over the right shoulder area.   Neuro: Alert and awake, grossly non-focal      Lab Results:(I have personally reviewed the  results)    Recent Results (from the past 24 hour(s))   Glucose by meter    Collection Time: 08/02/23 11:28 AM   Result Value Ref Range    GLUCOSE BY METER POCT 139 (H) 70 - 99 mg/dL   Occult blood stool    Collection Time: 08/02/23  4:32 PM   Result Value Ref Range    Occult Blood Positive (A) Negative   Glucose by meter    Collection Time: 08/02/23  5:16 PM   Result Value Ref Range    GLUCOSE BY METER POCT 224 (H) 70 - 99 mg/dL   Hemoglobin    Collection Time: 08/02/23  5:55 PM   Result Value Ref Range    Hemoglobin 8.0 (L) 13.3 - 17.7 g/dL   Glucose by meter    Collection Time: 08/02/23  8:57 PM   Result Value Ref Range    GLUCOSE BY METER POCT 258 (H) 70 - 99 mg/dL   Basic metabolic panel    Collection Time: 08/03/23  5:12 AM   Result Value Ref Range    Sodium 134 (L) 136 - 145 mmol/L    Potassium 3.5 3.4 - 5.3 mmol/L    Chloride 95 (L) 98 - 107 mmol/L    Carbon Dioxide (CO2) 29 22 - 29 mmol/L    Anion Gap 10 7 - 15 mmol/L    Urea Nitrogen 39.6 (H) 8.0 - 23.0 mg/dL    Creatinine 2.09 (H) 0.67 - 1.17 mg/dL    Calcium 9.1 8.8 - 10.2 mg/dL    Glucose 233 (H) 70 - 99 mg/dL    GFR Estimate 35 (L) >60 mL/min/1.73m2   Hemoglobin    Collection Time: 08/03/23  5:12 AM   Result Value Ref Range    Hemoglobin 7.9 (L) 13.3 - 17.7 g/dL   Glucose by meter    Collection Time: 08/03/23  7:32 AM   Result Value Ref Range    GLUCOSE BY METER POCT 196 (H) 70 - 99 mg/dL       All laboratory and imaging data in the past 24 hours reviewed  Serum Glucose range:   Recent Labs   Lab 08/03/23  0732 08/03/23  0512 08/02/23 2057 08/02/23  1716   * 233* 258* 224*     ABG: No lab results found in last 7 days.  CBC:   Recent Labs   Lab 08/03/23  0512 08/02/23  1755 08/02/23  0505 08/01/23  1759 08/01/23  0504 07/31/23  1811 07/31/23  0650 07/29/23  1656 07/29/23  0603   WBC  --   --   --   --  6.0  --  5.7  --  8.9   HGB 7.9* 8.0* 7.9*   < > 7.8*  7.8*   < > 6.8*   < > 7.0*   HCT  --   --   --   --  23.2*  --  20.8*  --  20.4*   MCV   --   --   --   --  89  --  90  --  89   PLT  --   --   --   --  124*  --  98*  --  86*    < > = values in this interval not displayed.     Chemistry:   Recent Labs   Lab 08/03/23  0512 08/02/23  0505 08/01/23  0504 07/31/23  1241 07/31/23  0650 07/30/23  0419   * 133* 134*  --  133* 133*   POTASSIUM 3.5 3.7 3.7   < > 3.3* 3.6   CHLORIDE 95* 95* 95*  --  93* 93*   CO2 29 28 28  --  24 21*   BUN 39.6* 52.9* 51.9*  --  80.2* 69.0*   CR 2.09* 2.49* 2.49*  --  3.19* 2.98*   GFRESTIMATED 35* 28* 28*  --  21* 23*   YOSVANY 9.1 9.6 9.3  --  8.7* 8.7*   MAG  --   --   --   --   --  1.8   ALBUMIN  --   --   --   --  3.6  --     < > = values in this interval not displayed.     Coags:  No results for input(s): INR, PROTIME, PTT in the last 168 hours.    Invalid input(s): APTT  Cardiac Markers:  No results for input(s): CKTOTAL, TROPONINI in the last 168 hours.       CT Abdomen Pelvis w/o Contrast    Result Date: 7/25/2023  EXAM: CT ABDOMEN PELVIS W/O CONTRAST LOCATION: [facility] DATE: 7/25/2023 INDICATION: anemia, ? Retroperitoneal bleed COMPARISON: 07/17/2023 CTA CAP TECHNIQUE: CT scan of the abdomen and pelvis was performed without IV contrast. Multiplanar reformats were obtained. Dose reduction techniques were used. CONTRAST: None. FINDINGS: LOWER CHEST: Dual lumen dialysis catheter tips and PICC tip at RA/SVC junction. Mild generalized cardiac enlargement with extensive three-vessel coronary artery calcification. No pericardial effusion. Decreased density intracardiac blood pool consistent with patient's known nonspecific anemia. Small to moderate pleural effusions, bibasilar atelectasis and interstitial edema have developed. HEPATOBILIARY: Normal liver. No bile duct dilatation. Cholelithiasis unchanged. PANCREAS: Normal. SPLEEN: Normal. ADRENAL GLANDS: Normal. KIDNEYS/BLADDER: Well-positioned Garcia catheter. Kidneys, ureters and bladder are normal. BOWEL: Small volume of ascites has developed.  Mild gaseous distention of the stomach and the nondependent small bowel loops and colon could indicate adynamic ileus. No bowel obstruction. No free air. LYMPH NODES: No lymphadenopathy. VASCULATURE: Mild calcified atheromatous plaque throughout the normal caliber abdominal aorta. PELVIC ORGANS: Unremarkable. MUSCULOSKELETAL: Severe generalized subcutaneous edema has developed. No bone lesion or fracture.     IMPRESSION: 1.  No focal hematoma or other signs of bleeding identified. 2.  Severe generalized subcutaneous edema, small volume ascites, small pleural effusions, bibasilar atelectasis and interstitial edema have developed. 3.  Mild gaseous distention of the stomach and the nondependent small bowel loops and colon could indicate adynamic ileus. 4.  Cholelithiasis unchanged.     XR Humerus Right G/E 2 Views    Result Date: 7/22/2023  EXAM: XR HUMERUS RIGHT G/E 2 VIEWS LOCATION: [facility] DATE: 7/22/2023 INDICATION: Pain. COMPARISON: MRI right shoulder 07/19/2023.     IMPRESSION: No acute fracture or dislocation. Significant soft tissue swelling throughout the visualized arm, which may be infectious in the given clinical scenario. No radiographic evidence of osteomyelitis.    IR CVC Non Tunnel Placement > 5 Yrs    Result Date: 7/20/2023  LOCATION: [facility] DATE: 7/20/2023 PROCEDURE: NON-TUNNELED DIALYSIS CATHETER PLACEMENT 1.  Insertion of a non-tunneled central venous catheter. 2.  Ultrasound guidance for vascular access. 3.  Fluoroscopic guidance for central venous access device placement. INTERVENTIONAL RADIOLOGIST: Joseph Campos MD INDICATION: Renal insufficiency requiring renal replacement therapy, plan for nontunneled dialysis catheter placement. CONSENT: The risks, benefits and alternatives of the procedure were discussed with the patient or representative in detail. All questions were answered. Informed consent was given to proceed with the procedure.  MODERATE SEDATION: None. CONTRAST: None. ANTIBIOTICS: None. ADDITIONAL MEDICATIONS: None. FLUOROSCOPIC TIME: 0.5 minutes. RADIATION DOSE: Air Kerma: 5 mGy. COMPLICATIONS: No immediate complications. UNIVERSAL PROTOCOL: The operative site was marked and any prior imaging was reviewed. Required items including blood products, implants, devices and special equipment was made available. Patient identity was confirmed either verbally, with demographic information, hospital assigned identification or other identification markers. A timeout was performed immediately prior to the procedure. STERILE BARRIER TECHNIQUE: Maximum sterile barrier technique was used. Cutaneous antisepsis was performed at the operative site with application of 2% chlorhexidine and large sterile drape. Prior to the procedure, the  and assistant performed hand hygiene and wore hat, mask, sterile gown, and sterile gloves during the entire procedure. PROCEDURE:  Using local anesthesia and real-time ultrasound guidance the right internal jugular vein was accessed. Imaging demonstrates an anechoic and compressible vein. A permanent image was stored to the patient's medical record. Using this access, a 20 cm dialysis catheter was advanced using fluoroscopic guidance until the tip was in the proximal right atrium. FINDINGS: At the completion of the study he radiograph was performed utilizing the in room fluoroscopic unit. Imaging demonstrates the nontunneled dialysis catheter tip terminating in the proximal right atrium.     IMPRESSION:  1.  Successful non-tunneled dialysis catheter placement. 2.  The catheter is ready for use.    MR Shoulder Right w/o Contrast    Result Date: 7/19/2023  EXAM: MR SHOULDER RIGHT W/O CONTRAST LOCATION: [facility] DATE: 7/19/2023 INDICATION: Septic shock with bacteremia, concern for right shoulder osteomyelitis or joint infection. COMPARISON: None. TECHNIQUE: Unenhanced. FINDINGS: ROTATOR  CUFF: -Supraspinatus: Moderate severity tendinopathy. No tearing or retraction. No atrophy. -Infraspinatus: Moderate severity infraspinatus tendinopathy without evidence for well-defined partial or full-thickness tearing. No retraction but there is T2 signal abnormality within the muscular portion, which may be due to muscle strain. Infectious or inflammatory myositis could have this appearance. -Subscapularis: No tendon tear, tendinopathy or fatty atrophy. -Teres minor: No tendon tear, tendinopathy or fatty atrophy. CORACOACROMIAL ARCH: -Morphology: Type II acromion. No subacromial spur. Subacromial and subcoracoid space are normal. -Bursa: Fluid in the subacromial subdeltoid bursa. The coracoacromial and coracoclavicular ligaments are negative. ACROMIOCLAVICULAR JOINT: -Hypertrophic change. No significant diastasis or effusion. LONG HEAD OF BICEPS TENDON: -Mild tendinopathy within the intra-articular portion of the long head of the biceps. GLENOHUMERAL JOINT: -Labrum: No labral tear. No paralabral cyst. -Cartilage: Grade II-grade III cartilage loss both sides of the joint without significant surface irregularity. -Joint space: No effusion or synovitis. -Glenohumeral ligaments and capsule: No pericapsular inflammation. BONES: -No fracture or concerning marrow replacing lesion. No evidence for osteomyelitis. SOFT TISSUES: -Extensive but nonspecific T2 signal abnormality is present within the deltoid musculature, most marked posteriorly but this is also seen within the latissimus muscle, distal aspect of the trapezius, infraspinatus as described, and portions of the subscapularis. The multiplicity of muscle groups involved makes a strain type injury considered unlikely and this is more likely secondary to infectious or inflammatory myositis. There is no evidence for organized fluid collection to suggest abscess.     IMPRESSION: 1.  Extensive signal abnormality within multiple muscles of the shoulder girdle including  portions of the visualized latissimus and trapezius muscles. The findings are likely secondary to infectious or inflammatory myositis versus contusion or muscle strain type injury. No evidence for abscess. 2.  No evidence for osteomyelitis or septic arthropathy. 3.  Small amount of fluid within the subacromial subdeltoid bursa. 4.  No evidence for fracture. 5.  Moderate severity tendinopathy within the supraspinatus and infraspinatus. 6.  Tendinopathy within the intraarticular portion of the biceps. 7.  Degenerative change at the glenohumeral joint with grade II-grade III cartilage loss but no significant surface irregularity.    XR Chest Port 1 View    Result Date: 2023  EXAM: XR CHEST PORT 1 VIEW LOCATION: [facility] DATE: 2023 INDICATION: RN placed PICC   verify tip placement COMPARISON: 2023     IMPRESSION: Left upper extremity PICC terminates at the SVC - RA junction. No acute cardiopulmonary abnormality.    Echocardiogram Complete    Result Date: 2023  [id number]^MUMTAZ^ROBERT^[place] [address]  Name: JOSE YO MRN: [id number] : 1960 Study Date: 2023 06:57 AM Age: 63 yrs Gender: Male Patient Location: [facility] Reason For Study: MI Ordering Physician: ROBERT PANDYA Performed By: DENISSE  BSA: 2.1 m2 Height: 72 in Weight: 200 lb HR: 100 BP: 99/66 mmHg ______________________________________________________________________________ Procedure Complete Echo Adult. Definity (NDC #76288-789) given intravenously. ______________________________________________________________________________ Interpretation Summary  Left ventricular function is decreased. The ejection fraction is 25-30% (severely reduced). The left atrium is mildly dilated. IVC diameter <2.1 cm collapsing >50% with sniff suggests a normal RA pressure of 3 mmHg. TAPSE is normal, which is consistent with normal right ventricular  systolic function. The patient exhibited frequent PVCs. No hemodynamically significant valvular abnormalities on 2D or color flow imaging. The study was technically difficult. ______________________________________________________________________________ Left Ventricle The left ventricle is normal in size. Left ventricular function is decreased. The ejection fraction is 25-30% (severely reduced). There is mild concentric left ventricular hypertrophy. Diastolic function not assessed due to arrhythmia. There is severe global hypokinesia of the left ventricle.  Right Ventricle Normal right ventricle size and systolic function. TAPSE is normal, which is consistent with normal right ventricular systolic function.  Atria The left atrium is mildly dilated. Right atrial size is normal. There is no color Doppler evidence of an atrial shunt.  Mitral Valve The mitral valve is not well visualized. The mitral valve leaflets are mildly thickened. There is no mitral regurgitation noted. There is no mitral valve stenosis.  Tricuspid Valve The tricuspid valve is not well visualized. Right ventricular systolic pressure could not be approximated due to inadequate tricuspid regurgitation.  Aortic Valve The aortic valve is not well visualized. Mild aortic valve calcification is present. No hemodynamically significant valvular aortic stenosis.  Pulmonic Valve The pulmonic valve is not well visualized.  Vessels Normal size ascending aorta. IVC diameter <2.1 cm collapsing >50% with sniff suggests a normal RA pressure of 3 mmHg.  Pericardium There is no pericardial effusion.  Rhythm The patient exhibited frequent PVCs. ______________________________________________________________________________ MMode/2D Measurements & Calculations  IVSd: 1.3 cm LVIDd: 5.4 cm LVIDs: 4.6 cm LVPWd: 1.2 cm FS: 14.8 % LV mass(C)d: 279.8 grams LV mass(C)dI: 131.3 grams/m2 Ao root diam: 3.4 cm asc Aorta Diam: 3.5 cm LVOT diam: 2.2 cm LVOT area: 3.8 cm2 LA Volume  Indexed (AL/bp): 34.2 ml/m2 RV Base: 3.9 cm RWT: 0.44  TAPSE: 1.9 cm  Doppler Measurements & Calculations MV E max juan carlos: 56.3 cm/sec MV A max juan carlos: 71.5 cm/sec MV E/A: 0.79 MV max P.6 mmHg MV mean P.0 mmHg MV V2 VTI: 14.0 cm MVA(VTI): 3.3 cm2 MV dec slope: 391.0 cm/sec2 MV dec time: 0.14 sec Ao V2 max: 105.3 cm/sec Ao max P.0 mmHg Ao V2 mean: 79.1 cm/sec Ao mean PG: 3.0 mmHg Ao V2 VTI: 19.8 cm JAEL(I,D): 2.4 cm2 JAEL(V,D): 3.3 cm2 LV V1 max PG: 3.3 mmHg LV V1 max: 90.3 cm/sec LV V1 VTI: 12.3 cm SV(LVOT): 46.7 ml SI(LVOT): 21.9 ml/m2 PA acc time: 0.10 sec AV Juan Carlos Ratio (DI): 0.86 JAEL Index (cm2/m2): 1.1 E/E': 16.7 E/E' av.2 Lateral E/e': 11.8 Medial E/e': 16.7  Peak E' Juan Carlos: 3.4 cm/sec RV S Juan Carlos: 11.1 cm/sec  ______________________________________________________________________________ Report approved by: Eveline Green 2023 09:18 AM       US Upper Extremity Venous Duplex Right    Result Date: 2023  EXAM: US UPPER EXTREMITY VENOUS DUPLEX RIGHT LOCATION: [facility] DATE: 2023 INDICATION: RUE swelling w  concern for DVT (see CTA) COMPARISON: None. TECHNIQUE: Venous Duplex ultrasound of the right upper extremity with (when possible) and without compression, augmentation, and duplex. Color flow and spectral Doppler with waveform analysis performed. FINDINGS: Ultrasound includes evaluation of the internal jugular vein, innominate vein, subclavian vein, axillary vein, and brachial vein. The superficial cephalic and basilic veins were also evaluated where seen. RIGHT: No deep venous thrombosis. No superficial thrombophlebitis. Internal jugular vein, subclavian vein, axillary vein partially decompressed and not well visualized.     IMPRESSION: 1.  No deep venous thrombosis in the right upper extremity.    XR Chest Port 1 View    Result Date: 2023  EXAM: XR CHEST PORT 1 VIEW LOCATION: [facility] DATE: 2023 INDICATION: Evaluate PICC  placement COMPARISON: 09/20/2022     IMPRESSION: Left PICC tip in upper SVC. Lungs clear. Heart size normal. No pneumothorax.    CTA Chest Abdomen Pelvis Runoff w Contrast    Result Date: 7/17/2023  EXAM: CTA CHEST ABDOMEN PELVIS RUNOFF W CONTRAST LOCATION: [facility] DATE/TIME: 7/17/2023 6:12 PM CDT INDICATION: Cold right foot, no palpable pulses, known peripheral arterial disease and new atrial fibrillation A. Fib COMPARISON: 09/27/2022. TECHNIQUE: CT angiogram through the chest, abdomen, pelvis, and bilateral lower extremities was performed during the arterial phase of contrast enhancement. Second phase arterial imaging was performed through the bilateral lower extremities. 2D and 3D reconstructions performed by the CT technologist. Dose reduction techniques were used. CONTRAST: isovue 370  75ml from a single use vial FINDINGS: CTA CHEST, ABDOMEN, PELVIS: Normal great vessel branch pattern with mild disease of the subclavian artery. Normal caliber thoracic aorta. Abdominal aorta is widely patent without stenosis or dissection. Moderate calcified atherosclerotic disease without significant visceral arterial stenosis. RIGHT LEG: Iliac arteries appear patent. Due to contrast bolus timing, difficult to evaluate the superficial femoral artery although this appears grossly patent. Severe infrapopliteal disease. LEFT LEG: Postsurgical changes of left below-knee amputation. Iliac arteries, common femoral artery, profunda femoral artery, superficial femoral artery all appear patent. NONVASCULAR FINDINGS: LUNG/PLEURA: Calcified granuloma. Scattered atelectasis. Spiculated appearing lung nodule in the right lower lobe measuring 1.0 x 0.9 cm on series 11 image 166. Punctate nodules in left lung base. No pleural effusion or pneumothorax. MEDIASTINUM: Severe coronary artery disease with coronary artery stents. Questionable left atrial appendage thrombus. Inflammatory changes in the right axilla.  ABDOMEN: Suboptimal evaluation of the solid organs due to the arterial phase of contrast enhanced. Cholelithiasis. Spleen, adrenal glands, kidneys, and pancreas are grossly unremarkable. Hepatic steatosis. PELVIS: No abnormally dilated loops of bowel. No free fluid or free air. 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 Postsurgical changes of left below-knee amputation.     IMPRESSION: 1.  Right lower extremity arteries appear largely patent with severe infrapopliteal arterial disease. Evaluation of the superficial femoral artery is difficult due to contrast bolus timing. Recommend Doppler arterial ultrasound if there is continued clinical concern for embolic disease. 2.  Questionable left atrial appendage thrombus. Recommend echocardiogram for further evaluation. 3.  Spiculated pulmonary nodule in the right lower lobe measuring 1.0 x 0.9 cm. Recommend follow-up per Fleischner Society criteria as described below. 4.  Subcutaneous inflammation in the right axilla noted. Consider right upper extremity Doppler to rule out a DVT. May also represent an infectious process. 5.  Cholelithiasis. 6.  Hepatic steatosis. REFERENCE: Guidelines for Management of Incidental Pulmonary Nodules Detected on CT Images: From the Fleischner Society 2017. Guidelines apply to incidental nodules in patients who are 35 years or older. Guidelines do not apply to lung cancer screening, patients with immunosuppression, or patients with known primary cancer. SINGLE NODULE Nodule size less than or equal to 6 mm Low-risk patients: No follow-up needed. High-risk patients: Optional follow-up at 12 months. Nodule size 6-8 mm Low-risk patients: Follow-up CT at 6-12 months, then consider CT at 18-24 months. High-risk patients: Follow-up CT at 6-12 months, then at 18-24 months if no change. Nodule size >8 mm Either low or high-risk patients: Consider CT, PET/CT, or tissue sampling at 3 months.       Latest  radiology report personally reviewed.    Note created using dragon voice recognition software so sounds alike errors may have escaped editing.      08/03/2023   Anival Bucio MD  Hospitalist, [facility]  Pager: [phone]

## 2023-08-03 NOTE — PLAN OF CARE
Problem: Malnutrition  Goal: Improved Nutritional Intake  Outcome: Not Progressing     Problem: Sepsis/Septic Shock  Goal: Optimal Coping  Outcome: Progressing     Problem: Chronic Kidney Disease  Goal: Electrolyte Balance  Outcome: Progressing     Problem: Chronic Kidney Disease  Goal: Fluid Balance  Outcome: Progressing     Pt A/O x4. Up in chair x1 this shift. Denied pain and SOB. Repositioning q2 as pt allows. Educated pt about importance of repositioning, pt verbalized understanding. Hemoglobin at 1800 was 8.0, next re-draw in the AM. Occult stool sample collected this shift. K protocol, recheck in the AM. Tele NSR this shift.

## 2023-08-03 NOTE — PLAN OF CARE
Problem: Plan of Care - These are the overarching goals to be used throughout the patient stay.    Goal: Absence of Hospital-Acquired Illness or Injury  Intervention: Prevent Skin Injury  Recent Flowsheet Documentation  Taken 8/3/2023 0348 by Carolyn Mosley RN  Body Position: weight shifting  Taken 8/3/2023 0200 by Carolyn Mosley RN  Body Position: refuses positioning  Taken 8/3/2023 0010 by Carolyn Mosley RN  Body Position:   weight shifting   heels elevated   foot of bed elevated   turned   left     Problem: Sepsis/Septic Shock  Goal: Absence of Infection Signs and Symptoms  Outcome: Progressing  Intervention: Promote Recovery  Recent Flowsheet Documentation  Taken 8/3/2023 0348 by Carolyn Mosley RN  Activity Management: activity adjusted per tolerance  Taken 8/3/2023 0010 by Carolyn Mosley RN  Activity Management: activity adjusted per tolerance     Problem: Chronic Kidney Disease  Goal: Fluid Balance  Outcome: Progressing  Goal: Optimal Functional Ability  Intervention: Optimize Functional Ability  Recent Flowsheet Documentation  Taken 8/3/2023 0348 by Carolyn Mosley RN  Activity Management: activity adjusted per tolerance  Taken 8/3/2023 0010 by Carolyn Mosley RN  Activity Management: activity adjusted per tolerance     Problem: Heart Failure Comorbidity  Goal: Maintenance of Heart Failure Symptom Control  Outcome: Progressing     Goal Outcome Evaluation:         Lungs clear to diminished. On room air. Getting IV lasix. Weight trending down. Right upper extremity edema, tubigrip in place. Had dialysis yesterday. On IV ceftriaxone for his bacteremia. Denied any pain. Has pressure injury on his buttocks. Mepilex in place. Refused turning at times. Patient stated he does shift his weight while in bed. Has anemia, hemoglobin stable at 7.9 this morning, was 8 yesterday.

## 2023-08-03 NOTE — PROGRESS NOTES
"Care Management Follow Up    Length of Stay (days): 17    Expected Discharge Date: 08/04/2023     Concerns to be Addressed:    placement after rehab   Patient plan of care discussed at interdisciplinary rounds: Yes    Anticipated Discharge Disposition:  [facility] acute rehab     Anticipated Discharge Services:  rehab  Anticipated Discharge DME:  has leg prosthesis    Patient/family educated on Medicare website which has current facility and service quality ratings:  yes  Education Provided on the Discharge Plan:  yes  Patient/Family in Agreement with the Plan:  patient agrees    Referrals Placed by CM/SW:  discussed situation with [facility] for long term placement after rehab  Private pay costs discussed: Not applicable    Additional Information: Patient is interested in going to [facility] and he agrees with long term placement either SNF or assisted living at [facility]. Patient identifies himself as being\" homeless but not hopeless\". CM following AR updated.    GREGG Sheppard     "

## 2023-08-03 NOTE — PROGRESS NOTES
RENAL/ DIALYSIS   CC: f/u JOHANA    Subjective: Since last seen, feels tired today but denies dyspnea, dizziness, nausea or pain. Good UO still noted. Discussed need to follow renal function closely as may be recovering and this would implicate outpt discharge options for TCU potentially.         ASSESSMENT AND RECOMMENDATIONS:    JOHANA/ CKD 3b: baseline CKD due to longstanding DM2, HTN. Now severe ATN in setting of profound hypotension with sepsis, group a strep bacteremia and hypovolemia and contrast nephropathy after CTA, mild rhabdo. CTA on 7/17 with no hydronephrosis. UA on 7/18 with granular casts  dialysis  7/21 -7/24.    Resumed on 7/29 due to worsening anasarca and poor response to lasix gtt  May be recovering renal function   Urine output is improved and Creatinine was stable without dialysis 8/1-->8.2 (Started HD 8/2 after labs but before MD visit)  Trend daily BMP and hold on further dialysis plans at this time. Would delay discharge a couple days to see if HD still needed (particularly if no longer qualifies for LTACH).   Will check on for HD in AM after labs back.     Hypotension due to CMP and exacerbated by bacteremia/sepsis. BP now improved. Using Midodrine PRN with dialysis. Trend for now.      Non-ST elevation myocardial infarction with ischemic cardiomyopathy.  Heart failure with reduced ejection fraction.  Decompensated heart failure, continue fluid removal on dialysis.  LVEF 25-30% based on 7/18 cardiac echo which also showed normal right ventricular function, normal right atrial pressure.  Excellent UO at present and volume status improving.  Trend on Lasix 80 mg IV q12h (UO 1.9-3.1 L/day for past 48 hrs)    Bacteremia.  Group A Streptococcus on 7/17 with right shoulder myositis.  Continue Rocephin as per ID recommendations.  Anemia secondary to critical illness, chronic kidney disease, acute kidney injury, bacteremia. STABLE.   Monitor hemoglobin.    Akil Farah MD  [facility]  [phone] (off)         Objective    PHYSICAL EXAM  /64 (BP Location: Right leg, Patient Position: Semi-Escamilla's)   Pulse 87   Temp 98.5  F (36.9  C) (Oral)   Resp 18   Ht 1.88 m (6' 2\")   Wt 98.9 kg (218 lb 0.6 oz)   SpO2 95%   BMI 27.99 kg/m    I/O last 3 completed shifts:  In: 740 [P.O.:740]  Out: 4400 [Urine:1400; Other:3000]  Wt Readings from Last 3 Encounters:   08/03/23 98.9 kg (218 lb 0.6 oz)   11/17/22 100.2 kg (221 lb)   09/28/22 104.3 kg (230 lb)       GENERAL: alert, NAD, weak/fatigued  HEENT:normocephalic, atraumatic  CARDIOVASCULAR: RRR, 1+  edema (Improved)  PULMONARY: Normal effort,  No cyanosis  GASTROINTESTINAL: soft, nt/nd, BS present  NEURO: Alert, no gross focal findings  PSYCHIATRIC: Appropriate mood and interaction  SKIN: warm, dry  MS: left bka   RIJ CVC in place    LABORATORIES  Recent Labs   Lab 08/03/23  0512 08/02/23  0505 08/01/23  0504 07/31/23  1811 07/31/23  1241 07/31/23  0650 07/30/23  0419 07/29/23  0603 07/28/23  0653   * 133* 134*  --   --  133* 133* 131* 133*   POTASSIUM 3.5 3.7 3.7 3.7 3.4 3.3* 3.6 3.7 3.4   CHLORIDE 95* 95* 95*  --   --  93* 93* 90* 94*   CO2 29 28 28  --   --  24 21* 20* 23   BUN 39.6* 52.9* 51.9*  --   --  80.2* 69.0* 89.4* 82.5*   CR 2.09* 2.49* 2.49*  --   --  3.19* 2.98* 3.44* 3.45*   GFRESTIMATED 35* 28* 28*  --   --  21* 23* 19* 19*   YOSVANY 9.1 9.6 9.3  --   --  8.7* 8.7* 8.8 8.9   MAG  --   --   --   --   --   --  1.8  --   --    ALBUMIN  --   --   --   --   --  3.6  --   --   --          Recent Labs   Lab 08/03/23  0512 08/02/23  1755 08/02/23  0505 08/01/23  1759 08/01/23  0504 07/31/23  1811 07/31/23  0650 07/29/23  1656 07/29/23  0603 07/28/23  1317 07/28/23  0653   WBC  --   --   --   --  6.0  --  5.7  --  8.9  --  9.7   HGB 7.9* 8.0* 7.9* 7.8* 7.8*  7.8* 7.8* 6.8*   < > 7.0*   < > 6.7*   HCT  --   --   --   --  23.2*  --  20.8*  --  20.4*  --  19.9*   MCV  --   --   --   --  89  --  90  --  89  --  87   PLT  " --   --   --   --  124*  --  98*  --  86*  --  100*    < > = values in this interval not displayed.            MEDICATIONS   sodium chloride 0.9%  250 mL Intravenous Once in dialysis/CRRT    sodium chloride 0.9%  300 mL Hemodialysis Machine Once    amiodarone  400 mg Oral Daily    apixaban ANTICOAGULANT  5 mg Oral BID    [Held by provider] aspirin  81 mg Oral Daily    cefTRIAXone  2 g Intravenous Q24H    docusate sodium  100 mg Oral BID    furosemide  80 mg Intravenous Q12H    insulin aspart   Subcutaneous TID AC    insulin aspart  1-10 Units Subcutaneous TID AC    insulin aspart  1-7 Units Subcutaneous At Bedtime    insulin glargine  15 Units Subcutaneous At Bedtime    magnesium oxide  400 mg Oral Daily    miconazole   Topical BID    multivitamin, therapeutic  1 tablet Oral Daily    - MEDICATION INSTRUCTIONS -   Does not apply Once    sodium chloride (PF)  10-40 mL Intracatheter Q7 Days    sodium chloride (PF)  3 mL Intracatheter Q8H    ticagrelor  90 mg Oral BID    trimethoprim-polymyxin b  2 drop Left Eye 4x Daily    vitamin C  500 mg Oral Daily

## 2023-08-04 NOTE — PLAN OF CARE
Problem: Plan of Care - These are the overarching goals to be used throughout the patient stay.    Goal: Optimal Comfort and Wellbeing  Intervention: Monitor Pain and Promote Comfort  Recent Flowsheet Documentation  Taken 8/4/2023 0408 by Finesse Gr RN  Pain Management Interventions:   care clustered   emotional support   medication offered but refused   pillow support provided   repositioned   rest  Taken 8/4/2023 0004 by Finesse Gr RN  Pain Management Interventions:   cold applied   emotional support   pillow support provided   repositioned   rest   prescribed exercises encouraged   medication offered but refused     Problem: Risk for Delirium  Goal: Improved Attention and Thought Clarity  Outcome: Progressing     Problem: Sepsis/Septic Shock  Goal: Absence of Bleeding  Outcome: Progressing     Problem: Sepsis/Septic Shock  Goal: Absence of Infection Signs and Symptoms  Outcome: Progressing  Intervention: Promote Recovery  Recent Flowsheet Documentation  Taken 8/4/2023 0004 by Finesse Gr RN  Activity Management: activity adjusted per tolerance   Goal Outcome Evaluation:  Patient reported ongoing right shoulder pain, refused PRN pain meds. Repositioning, elevation, and ice pack applied with some relief. Patient with minimal sleep overnight, this AM at 0400 nadya lifted patient to recliner per his request. No signs of bleeding noted; hgb this morning at 7.9. Good urine output via thakur.

## 2023-08-04 NOTE — PROGRESS NOTES
Infectious Diseases Progress Note  [facility]    Date of visit: 08/04/2023     ASSESSMENT   63-year-old man with a history of hypertension, coronary artery disease, CHF, diabetes, who was admitted with septic shock.  ID following regarding strep bacteremia.    Group A strep bacteremia.  Complicated skin and soft tissue infection.  Was admitted to the ICU on pressors.  Acute kidney injury, and transaminitis.  Concerning for toxic shock syndrome.  Also having right shoulder pain.  Myositis seen on MRI. Blistering starting on 7/21, possibly from pressure or swelling. Seen by ortho and general surgery. Non-surgical management. -no IVIG due to renal failure initially, clinically improved now.  Septic shock.  Had been On pressors. Resolved.  Right shoulder myositis. Denies acute injury. Elevated ck, improving.    Acute kidney injury.  May be returning function.   Leukocytosis- resolved  Transferred out of ICU on 7/28/2023     Principal Problem:    Septic shock (H)  Active Problems:    Dehydration    Hyponatremia    Elevated troponin    JOHANA (acute kidney injury) (H)    Atrial fibrillation with RVR (H)       PLAN   -had been on penicillin G IV for group A strep infection.  Switched to ceftriaxone on 7/27/2023, and monitor results    -Saw pt at HD today.  Will plan to stop the CTX 14 days after the start which was July 27, so stop August 10th.    Nothing else to really add.  The R shoulder does remain doughy on exam c/w his Left.       MANE Srivastava MD   [facility]  Answering Service: [phone]  On-Call ID provider: [phone], option: 9          ===========================================      SUBJECTIVE / INTERVAL HISTORY:     Saw in basement at HD.  Facial shampoo is applied.  No new rash.  The R shoulder remains with some swelling.      Antibiotics   Penicillin g 7/20- 12 MU per day-->ceftriaxone 7/26-  Linezolid 7/20-27    Previous:  Zosyn 7/17-7/20  Vancomycin 7/17      Physical  "Exam     Temp:  [97.6  F (36.4  C)-99.1  F (37.3  C)] 97.6  F (36.4  C)  Pulse:  [82-87] 85  Resp:  [15-22] 21  BP: (105-143)/(56-69) 139/65  SpO2:  [93 %-99 %] 99 %    /65   Pulse 85   Temp 97.6  F (36.4  C) (Temporal)   Resp 21   Ht 1.88 m (6' 2\")   Wt 97.8 kg (215 lb 9.8 oz)   SpO2 99%   BMI 27.68 kg/m      GENERAL:  no distress  HENT:  Head is normocephalic, atraumatic.   EYES:  Eyes have anicteric sclerae without conjunctival injection   LUNGS:  Clear to auscultation.  normal respiratory effort   CARDIOVASCULAR:  Regular rate and rhythm with no murmurs, gallops or rubs.  ABDOMEN:  Normal bowel sounds, soft, nontender.   EXT: Right shoulder swelling, tenderness.  SKIN:  Shallow blisters on posterior right arm, superficial, weeping. Right IJ line is in place without any surrounding erythema.   NEUROLOGIC:  Grossly nonfocal.  L UE PICC    Photo in chart 7/28/2023       Previous photo        Cultures   7/17 blood cultures x2: Strep pyogenes  7/19 blood cultures x2: No growth to date          Pertinent Labs:     Recent Labs   Lab 08/04/23  0542 08/03/23  1742 08/03/23  0512 08/01/23  1759 08/01/23  0504 07/31/23  1811 07/31/23  0650 07/29/23  1656 07/29/23  0603   WBC  --   --   --   --  6.0  --  5.7  --  8.9   HGB 7.9* 7.8* 7.9*   < > 7.8*  7.8*   < > 6.8*   < > 7.0*     --   --   --  124*  --  98*  --  86*    < > = values in this interval not displayed.         Recent Labs   Lab 08/04/23  0542 08/03/23  0512 08/02/23  0505 08/01/23  0504 07/31/23  0650   * 134* 133*   < > 133*   CO2 28 29 28   < > 24   BUN 47.8* 39.6* 52.9*   < > 80.2*   ALBUMIN  --   --   --   --  3.6    < > = values in this interval not displayed.         No results for input(s): CRP, SED in the last 168 hours.        Imaging:     IR CVC Non Tunnel Placement > 5 Yrs    Result Date: 7/20/2023  LOCATION: [facility] DATE: 7/20/2023 PROCEDURE: NON-TUNNELED DIALYSIS CATHETER PLACEMENT 1.  Insertion " of a non-tunneled central venous catheter. 2.  Ultrasound guidance for vascular access. 3.  Fluoroscopic guidance for central venous access device placement. INTERVENTIONAL RADIOLOGIST: Joseph Campos MD INDICATION: Renal insufficiency requiring renal replacement therapy, plan for nontunneled dialysis catheter placement. CONSENT: The risks, benefits and alternatives of the procedure were discussed with the patient or representative in detail. All questions were answered. Informed consent was given to proceed with the procedure. MODERATE SEDATION: None. CONTRAST: None. ANTIBIOTICS: None. ADDITIONAL MEDICATIONS: None. FLUOROSCOPIC TIME: 0.5 minutes. RADIATION DOSE: Air Kerma: 5 mGy. COMPLICATIONS: No immediate complications. UNIVERSAL PROTOCOL: The operative site was marked and any prior imaging was reviewed. Required items including blood products, implants, devices and special equipment was made available. Patient identity was confirmed either verbally, with demographic information, hospital assigned identification or other identification markers. A timeout was performed immediately prior to the procedure. STERILE BARRIER TECHNIQUE: Maximum sterile barrier technique was used. Cutaneous antisepsis was performed at the operative site with application of 2% chlorhexidine and large sterile drape. Prior to the procedure, the  and assistant performed hand hygiene and wore hat, mask, sterile gown, and sterile gloves during the entire procedure. PROCEDURE:  Using local anesthesia and real-time ultrasound guidance the right internal jugular vein was accessed. Imaging demonstrates an anechoic and compressible vein. A permanent image was stored to the patient's medical record. Using this access, a 20 cm dialysis catheter was advanced using fluoroscopic guidance until the tip was in the proximal right atrium. FINDINGS: At the completion of the study he radiograph was performed utilizing the in room fluoroscopic unit.  Imaging demonstrates the nontunneled dialysis catheter tip terminating in the proximal right atrium.     IMPRESSION:  1.  Successful non-tunneled dialysis catheter placement. 2.  The catheter is ready for use.    MR Shoulder Right w/o Contrast    Result Date: 7/19/2023  EXAM: MR SHOULDER RIGHT W/O CONTRAST LOCATION: [facility] DATE: 7/19/2023 INDICATION: Septic shock with bacteremia, concern for right shoulder osteomyelitis or joint infection. COMPARISON: None. TECHNIQUE: Unenhanced. FINDINGS: ROTATOR CUFF: -Supraspinatus: Moderate severity tendinopathy. No tearing or retraction. No atrophy. -Infraspinatus: Moderate severity infraspinatus tendinopathy without evidence for well-defined partial or full-thickness tearing. No retraction but there is T2 signal abnormality within the muscular portion, which may be due to muscle strain. Infectious or inflammatory myositis could have this appearance. -Subscapularis: No tendon tear, tendinopathy or fatty atrophy. -Teres minor: No tendon tear, tendinopathy or fatty atrophy. CORACOACROMIAL ARCH: -Morphology: Type II acromion. No subacromial spur. Subacromial and subcoracoid space are normal. -Bursa: Fluid in the subacromial subdeltoid bursa. The coracoacromial and coracoclavicular ligaments are negative. ACROMIOCLAVICULAR JOINT: -Hypertrophic change. No significant diastasis or effusion. LONG HEAD OF BICEPS TENDON: -Mild tendinopathy within the intra-articular portion of the long head of the biceps. GLENOHUMERAL JOINT: -Labrum: No labral tear. No paralabral cyst. -Cartilage: Grade II-grade III cartilage loss both sides of the joint without significant surface irregularity. -Joint space: No effusion or synovitis. -Glenohumeral ligaments and capsule: No pericapsular inflammation. BONES: -No fracture or concerning marrow replacing lesion. No evidence for osteomyelitis. SOFT TISSUES: -Extensive but nonspecific T2 signal abnormality is present within the  deltoid musculature, most marked posteriorly but this is also seen within the latissimus muscle, distal aspect of the trapezius, infraspinatus as described, and portions of the subscapularis. The multiplicity of muscle groups involved makes a strain type injury considered unlikely and this is more likely secondary to infectious or inflammatory myositis. There is no evidence for organized fluid collection to suggest abscess.     IMPRESSION: 1.  Extensive signal abnormality within multiple muscles of the shoulder girdle including portions of the visualized latissimus and trapezius muscles. The findings are likely secondary to infectious or inflammatory myositis versus contusion or muscle strain type injury. No evidence for abscess. 2.  No evidence for osteomyelitis or septic arthropathy. 3.  Small amount of fluid within the subacromial subdeltoid bursa. 4.  No evidence for fracture. 5.  Moderate severity tendinopathy within the supraspinatus and infraspinatus. 6.  Tendinopathy within the intraarticular portion of the biceps. 7.  Degenerative change at the glenohumeral joint with grade II-grade III cartilage loss but no significant surface irregularity.    XR Chest Port 1 View    Result Date: 7/18/2023  EXAM: XR CHEST PORT 1 VIEW LOCATION: [facility] DATE: 7/18/2023 INDICATION: RN placed PICC   verify tip placement COMPARISON: 07/17/2023     IMPRESSION: Left upper extremity PICC terminates at the SVC - RA junction. No acute cardiopulmonary abnormality.    Reviewed labs cultures, discussed with patient  Chart reviewed  Notes reviewed

## 2023-08-04 NOTE — PROGRESS NOTES
4L removed during 3Hr HD Tx.    Tolerated well    See flow sheet for other Tx data    Report given to RACHAEL Leone

## 2023-08-04 NOTE — PROGRESS NOTES
RENAL/ DIALYSIS   CC: f/u JOHANA    Subjective: Since last seen, no new issues.  His energy level is stable but not the best.    Denies chest pain, no shortness of breath, no nausea, no vomiting, no uremic symptoms.      Good urine output on furosemide 80 mg IV twice daily (discontinued 8/4)     Dialysis on 8/4 well-tolerated, 4 L removed.           ASSESSMENT AND RECOMMENDATIONS:    JOHANA/ CKD 3b: baseline CKD due to longstanding DM2, HTN. Now severe ATN in setting of profound hypotension with sepsis, group a strep bacteremia and hypovolemia and contrast nephropathy after CTA, mild rhabdo. CTA on 7/17 with no hydronephrosis. UA on 7/18 with granular casts  dialysis  7/21 -7/24.    Resumed on 7/29 due to worsening anasarca and poor response to lasix gtt  Renal function seems to be recovering.  Good urine output,  So my dose reduced to 40 mg p.o. twice daily  Hemodialysis on 8/4, 4 L removed  No hemodialysis over the weekend.  Will evaluate again on Monday 8/8 to decide if dialysis is still needed.   Hypotension (improving) due to CMP and exacerbated by bacteremia/sepsis. BP now improved. Using Midodrine PRN with dialysis. Trend for now.   Non-ST elevation myocardial infarction with ischemic cardiomyopathy.  Heart failure with reduced ejection fraction.  Decompensated heart failure, continue fluid removal on dialysis.  LVEF 25-30% based on 7/18 cardiac echo which also showed normal right ventricular function, normal right atrial pressure.  Tolerating fluid removal on dialysis well.  Diuretic dose adjusted as above.  Bacteremia.  Group A Streptococcus on 7/17 with right shoulder myositis.  Continue Rocephin as per ID recommendations.  Anemia secondary to critical illness, chronic kidney disease, acute kidney injury, bacteremia. STABLE.   Monitor hemoglobin.    Terrance Waters MD  [facility]  [phone] (off)         Objective    PHYSICAL EXAM  /63 (BP Location: Right leg)   Pulse  "87   Temp 98  F (36.7  C) (Oral)   Resp 22   Ht 1.88 m (6' 2\")   Wt 97.8 kg (215 lb 9.8 oz)   SpO2 97%   BMI 27.68 kg/m    I/O last 3 completed shifts:  In: 1440 [P.O.:1440]  Out: 3350 [Urine:3350]  Wt Readings from Last 3 Encounters:   08/04/23 97.8 kg (215 lb 9.8 oz)   11/17/22 100.2 kg (221 lb)   09/28/22 104.3 kg (230 lb)       GENERAL: alert, NAD, weak/fatigued  HEENT:normocephalic, atraumatic  CARDIOVASCULAR: RRR, 1+  edema (Improved)  PULMONARY: Normal effort,  No cyanosis  GASTROINTESTINAL: soft, nt/nd, BS present  NEURO: Alert, no gross focal findings  PSYCHIATRIC: Appropriate mood and interaction  SKIN: warm, dry  MSK: left bka   RIJ CVC in place    LABORATORIES  Recent Labs   Lab 08/04/23  1126 08/04/23  0542 08/03/23  0512 08/02/23  0505 08/01/23  0504 07/31/23  1811 07/31/23  1241 07/31/23  0650 07/30/23  0419 07/29/23  0603   NA  --  132* 134* 133* 134*  --   --  133* 133* 131*   POTASSIUM 3.8 3.4 3.5 3.7 3.7 3.7 3.4 3.3* 3.6 3.7   CHLORIDE  --  93* 95* 95* 95*  --   --  93* 93* 90*   CO2  --  28 29 28 28  --   --  24 21* 20*   BUN  --  47.8* 39.6* 52.9* 51.9*  --   --  80.2* 69.0* 89.4*   CR  --  2.35* 2.09* 2.49* 2.49*  --   --  3.19* 2.98* 3.44*   GFRESTIMATED  --  30* 35* 28* 28*  --   --  21* 23* 19*   YOSVANY  --  9.7 9.1 9.6 9.3  --   --  8.7* 8.7* 8.8   MAG  --   --   --   --   --   --   --   --  1.8  --    ALBUMIN  --   --   --   --   --   --   --  3.6  --   --          Recent Labs   Lab 08/04/23  0542 08/03/23  1742 08/03/23  0512 08/02/23  1755 08/02/23  0505 08/01/23  1759 08/01/23  0504 07/31/23  1811 07/31/23  0650 07/29/23  1656 07/29/23  0603   WBC  --   --   --   --   --   --  6.0  --  5.7  --  8.9   HGB 7.9* 7.8* 7.9* 8.0* 7.9* 7.8* 7.8*  7.8*   < > 6.8*   < > 7.0*   HCT  --   --   --   --   --   --  23.2*  --  20.8*  --  20.4*   MCV  --   --   --   --   --   --  89  --  90  --  89     --   --   --   --   --  124*  --  98*  --  86*    < > = values in this interval not " displayed.            MEDICATIONS   amiodarone  400 mg Oral Daily    apixaban ANTICOAGULANT  5 mg Oral BID    [Held by provider] aspirin  81 mg Oral Daily    cefTRIAXone  2 g Intravenous Q24H    docusate sodium  100 mg Oral BID    furosemide  80 mg Intravenous Q12H    insulin aspart   Subcutaneous TID AC    insulin aspart  1-10 Units Subcutaneous TID AC    insulin aspart  1-7 Units Subcutaneous At Bedtime    insulin glargine  20 Units Subcutaneous At Bedtime    magnesium oxide  400 mg Oral Daily    miconazole   Topical BID    multivitamin, therapeutic  1 tablet Oral Daily    sodium chloride (PF)  10-40 mL Intracatheter Q7 Days    sodium chloride (PF)  3 mL Intracatheter Q8H    ticagrelor  90 mg Oral BID    trimethoprim-polymyxin b  2 drop Left Eye 4x Daily    vitamin C  500 mg Oral Daily

## 2023-08-04 NOTE — PLAN OF CARE
"  Problem: Plan of Care - These are the overarching goals to be used throughout the patient stay.    Goal: Plan of Care Review  Description: The Plan of Care Review/Shift note should be completed every shift.  The Outcome Evaluation is a brief statement about your assessment that the patient is improving, declining, or no change.  This information will be displayed automatically on your shift note.  Outcome: Progressing  Flowsheets (Taken 8/4/2023 1508)  Plan of Care Reviewed With: patient  Goal: Patient-Specific Goal (Individualized)  Description: You can add care plan individualizations to a care plan. Examples of Individualization might be:  \"Parent requests to be called daily at 9am for status\", \"I have a hard time hearing out of my right ear\", or \"Do not touch me to wake me up as it startles me\".  Outcome: Progressing  Goal: Absence of Hospital-Acquired Illness or Injury  Outcome: Progressing  Intervention: Identify and Manage Fall Risk  Recent Flowsheet Documentation  Taken 8/4/2023 1120 by Sulema Deluca RN  Safety Promotion/Fall Prevention:   room door open   nonskid shoes/slippers when out of bed   safety round/check completed   room organization consistent  Intervention: Prevent Skin Injury  Recent Flowsheet Documentation  Taken 8/4/2023 1140 by Sulema Deluca RN  Body Position:   log-rolled   turned   supine  Intervention: Prevent Infection  Recent Flowsheet Documentation  Taken 8/4/2023 1120 by Sulema Deluca RN  Infection Prevention: rest/sleep promoted  Goal: Optimal Comfort and Wellbeing  Outcome: Progressing  Intervention: Monitor Pain and Promote Comfort  Recent Flowsheet Documentation  Taken 8/4/2023 1140 by Sulema Deluca RN  Pain Management Interventions:   emotional support   heat applied   pillow support provided   repositioned  Goal: Readiness for Transition of Care  Outcome: Progressing     Problem: Risk for Delirium  Goal: Optimal Coping  Outcome: Progressing  Goal: Improved Behavioral " Control  Outcome: Progressing  Intervention: Minimize Safety Risk  Recent Flowsheet Documentation  Taken 8/4/2023 1120 by Sulema Deluca RN  Enhanced Safety Measures: room near unit station  Goal: Improved Attention and Thought Clarity  Outcome: Progressing  Goal: Improved Sleep  Outcome: Progressing     Problem: Pain Acute  Goal: Optimal Pain Control and Function  Outcome: Progressing  Intervention: Develop Pain Management Plan  Recent Flowsheet Documentation  Taken 8/4/2023 1140 by Sulema Deluca RN  Pain Management Interventions:   emotional support   heat applied   pillow support provided   repositioned  Intervention: Prevent or Manage Pain  Recent Flowsheet Documentation  Taken 8/4/2023 1120 by Sulema Deluca RN  Medication Review/Management: medications reviewed     Problem: Malnutrition  Goal: Improved Nutritional Intake  Outcome: Progressing     Problem: Sepsis/Septic Shock  Goal: Optimal Coping  Outcome: Progressing  Goal: Absence of Bleeding  Outcome: Progressing  Goal: Blood Glucose Level Within Targeted Range  Outcome: Progressing  Goal: Absence of Infection Signs and Symptoms  Outcome: Progressing  Intervention: Initiate Sepsis Management  Recent Flowsheet Documentation  Taken 8/4/2023 1120 by Sulema Deluca RN  Infection Prevention: rest/sleep promoted  Intervention: Promote Recovery  Recent Flowsheet Documentation  Taken 8/4/2023 1140 by Sulema Deluca RN  Activity Management: activity adjusted per tolerance  Goal: Optimal Nutrition Intake  Outcome: Progressing     Problem: Oral Intake Inadequate  Goal: Improved Oral Intake  Outcome: Progressing     Problem: Fall Injury Risk  Goal: Absence of Fall and Fall-Related Injury  Outcome: Progressing  Intervention: Identify and Manage Contributors  Recent Flowsheet Documentation  Taken 8/4/2023 1120 by Sulema Deluca RN  Medication Review/Management: medications reviewed  Intervention: Promote Injury-Free Environment  Recent Flowsheet Documentation  Taken 8/4/2023  1120 by Sulema Deluca RN  Safety Promotion/Fall Prevention:   room door open   nonskid shoes/slippers when out of bed   safety round/check completed   room organization consistent     Problem: Chronic Kidney Disease  Goal: Optimal Coping with Chronic Illness  Outcome: Progressing  Goal: Electrolyte Balance  Outcome: Progressing  Goal: Fluid Balance  Outcome: Progressing  Goal: Optimal Functional Ability  Outcome: Progressing  Intervention: Optimize Functional Ability  Recent Flowsheet Documentation  Taken 8/4/2023 1140 by Sulema Deluca RN  Activity Management: activity adjusted per tolerance  Goal: Absence of Anemia Signs and Symptoms  Outcome: Progressing  Goal: Optimal Oral Intake  Outcome: Progressing  Goal: Acceptable Pain Control  Outcome: Progressing  Intervention: Prevent or Manage Pain  Recent Flowsheet Documentation  Taken 8/4/2023 1140 by Sulema Deluca RN  Pain Management Interventions:   emotional support   heat applied   pillow support provided   repositioned  Goal: Minimize Renal Failure Effects  Outcome: Progressing  Intervention: Monitor and Support Renal Function  Recent Flowsheet Documentation  Taken 8/4/2023 1120 by Sulema Deluca RN  Medication Review/Management: medications reviewed     Problem: Heart Failure Comorbidity  Goal: Maintenance of Heart Failure Symptom Control  Outcome: Progressing  Intervention: Maintain Heart Failure Management  Recent Flowsheet Documentation  Taken 8/4/2023 1120 by Sulema Deluca RN  Medication Review/Management: medications reviewed     Problem: Hypertension Comorbidity  Goal: Blood Pressure in Desired Range  Outcome: Progressing  Intervention: Maintain Blood Pressure Management  Recent Flowsheet Documentation  Taken 8/4/2023 1120 by Sulema Deluca RN  Medication Review/Management: medications reviewed   Goal Outcome Evaluation:      Plan of Care Reviewed With: patient  Pt was off the unit for most of the shift for dialysis as ordered.  Pt was fatigue following  dialysis and cares were grouped for him.  Pt did tolerate hair shampoo as ordered. Poor PO intake is noted, protein shake was encouraged. Pt refused any pain medication for right shoulder discomfort, pt would not rate pain. Writer elevated arm and heat was applied. Pt was re-checked = sleeping. Will continue POC and monitor pt's needs.

## 2023-08-04 NOTE — PROGRESS NOTES
Daily Progress Note        CODE STATUS:  Full Code    08/03/23  Assessment/Plan:  Mr. Aguilar is a 63-year-old male with history of CAD on Brilinta, chronic systolic CHF with EF of 30 to 35% prior to admission, CKD 3, history of left foot osteomyelitis status post left BKA, and DM 2 who presents 7/17/2023 with septic shock, NSTEMI, JOHANA and A-fib with RVR.  Patient was stabilized with pressors and transferred out of ICU 7/27/2023     Septic shock: Resolved  Group A strep bacteremia secondary to skin and soft tissue Infection right shoulder.  Concern for toxic shock syndrome  MRI right shoulder 7/19/2023 showed myositis  -Switched to ceftriaxone from penicillin G 7/27/2023  -Completed course of oral linezolid 7/27/2023  -ID to follow: Will likely need IV antibiotics for 2 weeks, possibly longer course  -Anticipate TCU on discharge given need for IV antibiotics for likely 2 weeks depending on clinical course  -Patient already has PICC line in place     NSTEMI  History of CAD and ischemic cardiomyopathy   Prior coronary stents  A-fib with RVR  -Cardiology following  -Continue amiodarone  -Cardiology following for possible coronary angiogram this hospitalization-Will work with ID on timing  -Continue aspirin and Brilinta  -Heparin drip stopped 7/31  -Starting apixaban 8/1, will monitor for bleeding      Acute on chronic systolic CHF exacerbation  -TTE 7/17/2023 shows EF of 25 to 30%  -Continuing intermittent dialysis as below      JOHANA on CKD stage 3: Likely secondary to circulatory shock, requiring intermittent dialysis  -Baseline creatinine 1.4-1.6, 3.25 on admission, peak at 5.22 on 7/21, some fluctuation but now generally improving  -Status post intermittent hemodialysis with evidence of renal recovery.  Dialysis started 7/21/2023, last dialyzed 8/2/2023  -Defer diuresis to cardiology and nephrology  -Started dialysis 7/21 to 7/24, resumed 7/29 due to increased volume. Dialyzed again today, 8/4. Kidney function may  "be recovering per nephrology. No HD on weekend. Nephrology will assess on Monday.      Hyponatremia- improved  -Initially severe, 119 on presentation  -We will continue to monitor     Elevated LFTs:   -Thought secondary to shock liver and myositis  -Improved     Acute on anemia of chronic disease  -No overt signs of bleeding, CT abdomen without signs of bleeding  -Remains on heparin drip (held 7/31), heparin with dialysis, ticagrelor  -Transfused 2 units PRBC 7/25 hgb 5.9  -Transfused 1 unit PRBCs 7/28 hgb 6.7  -Transfused 1 unit PRBCs 7/29 Hgb 6.7  -Transfused 1 unit PRBCs 7/31 Hgb 6.8  -Has been having black stools since 7/24 so likely blood in stool  -Transfuse for hemoglobin less than 7     DM2:  -Currently Lantus 15 units at bedtime, sliding scale insulin, and mealtime novolog. Was on 38 unit of lantus and sliding scale insulin at home.   -Monitor blood sugar, adjust insulins accordingly.        Diet: Combination Diet Renal Diet (dialysis); Moderate Consistent Carb (60 g CHO per Meal) Diet  Snacks/Supplements Adult: Glucerna; With Meals    DVT Prophylaxis: DOAC  Garcia Catheter: PRESENT, indication: Strict 1-2 Hour I&O  Lines: PRESENT      PICC 07/18/23 Triple Lumen Left Basilic Vasopressor,Access-Site Assessment: WDL  CVC Double Lumen Right Subclavian Non - tunneled-Site Assessment: WDL      Cardiac Monitoring: None  Code Status: Full Code      Clinically Significant Risk Factors              # Hypoalbuminemia: Lowest albumin = 2.1 g/dL at 7/21/2023  4:38 AM, will monitor as appropriate       # Hypertension: Noted on problem list    # Acute heart failure with reduced ejection fraction: last echo with EF <40% and receiving IV diuretics      # DMII: A1C = 6.9 % (Ref range: <5.7 %) within past 6 months     # Overweight: Estimated body mass index is 27.68 kg/m  as calculated from the following:    Height as of this encounter: 1.88 m (6' 2\").    Weight as of this encounter: 97.8 kg (215 lb 9.8 oz).       # Severe " Malnutrition: based on nutrition assessment           Disposition; Couple more days here. Then LTACH vs TCU.  Barrier to discharge; IV antibitiocs, need to monitor for renal recovery     LOS: 17 days     Subjective:  Interval History: Patient seen and examined During HD. No acute issues reported overnight. Right shoulder area remains painful and swollen.     Review of Systems:   As mentioned in subjective.    Patient Active Problem List   Diagnosis    DM type 2 on insulin, w Neuro -- Hgb A1C 7.6 on 1/19/22    Benign essential hypertension    Benign neoplasm of descending colon    Gastro-esophageal reflux disease with esophagitis    Microalbuminuria    Hyperlipidemia    PAF (paroxysmal atrial fibrillation) -- on Eliquis     Ischemic cardiomyopathy    CAD -- diffuse calcified vessels 11/16/21 (no stents placed)    DVT (deep venous thrombosis) (H)    Chronic kidney disease (CKD) stage G3a/A1, moderately decreased glomerular filtration rate (GFR) between 45-59 mL/min/1.73 square meter and albuminuria creatinine ratio less than 30 mg/g (H)    Diabetic ulcer of left midfoot associated with type 2 diabetes mellitus, with necrosis of bone (H)    Constipation    Traumatic amputation of toe or toes without complication (H)    Osteomyelitis of left foot -- S/P Left BKA 4/4/22    Amputation of left foot (H)    Chronic systolic CHF -- EF 30-35% on Echo 11/15/21    Smoker (Cigars)    NSTEMI (non-ST elevated myocardial infarction) (H)    ARF (acute renal failure) (H)    Dehydration    Hyponatremia    Elevated troponin    JOHANA (acute kidney injury) (H)    Atrial fibrillation with RVR (H)    Septic shock (H)       Scheduled Meds:   amiodarone  400 mg Oral Daily    apixaban ANTICOAGULANT  5 mg Oral BID    [Held by provider] aspirin  81 mg Oral Daily    cefTRIAXone  2 g Intravenous Q24H    docusate sodium  100 mg Oral BID    [START ON 8/5/2023] furosemide  40 mg Oral BID    insulin aspart   Subcutaneous TID AC    insulin aspart  1-10  Units Subcutaneous TID AC    insulin aspart  1-7 Units Subcutaneous At Bedtime    insulin glargine  20 Units Subcutaneous At Bedtime    magnesium oxide  400 mg Oral Daily    miconazole   Topical BID    multivitamin, therapeutic  1 tablet Oral Daily    sodium chloride (PF)  10-40 mL Intracatheter Q7 Days    sodium chloride (PF)  3 mL Intracatheter Q8H    ticagrelor  90 mg Oral BID    trimethoprim-polymyxin b  2 drop Left Eye 4x Daily    vitamin C  500 mg Oral Daily     Continuous Infusions:   dextrose       PRN Meds:.sodium chloride 0.9%, acetaminophen **OR** acetaminophen, acetaminophen, bisacodyl, dextrose, glucose **OR** dextrose **OR** glucagon, heparin Lock (1000 units/mL High concentration), naloxone **OR** naloxone **OR** naloxone **OR** naloxone, ondansetron **OR** ondansetron, oxyCODONE, polyethylene glycol, prochlorperazine **OR** prochlorperazine **OR** prochlorperazine, selenium sulfide, sodium chloride (PF), sodium chloride (PF), sodium chloride (PF), sodium chloride (PF)    Objective:  Vital signs in last 24 hours:  Temp:  [97.6  F (36.4  C)-99.1  F (37.3  C)] 98.8  F (37.1  C)  Pulse:  [82-88] 88  Resp:  [15-22] 20  BP: (105-143)/(51-69) 117/51  SpO2:  [95 %-99 %] 96 %        Intake/Output Summary (Last 24 hours) at 8/3/2023 0807  Last data filed at 8/3/2023 0702  Gross per 24 hour   Intake 740 ml   Output 5000 ml   Net -4260 ml       Physical Exam:    General: Not in obvious distress.  HEENT: NC, AT   Chest: Clear to auscultation bilaterally  Heart: S1S2 normal, regular. No M/R/G  Abdomen: Soft. NT, ND. Bowel sounds- active.  Extremities: Left BKA. Swelling/induration and tenderness over the right shoulder area.   Neuro: Alert and awake, grossly non-focal      Lab Results:(I have personally reviewed the results)    Recent Results (from the past 24 hour(s))   Glucose by meter    Collection Time: 08/03/23  5:32 PM   Result Value Ref Range    GLUCOSE BY METER POCT 206 (H) 70 - 99 mg/dL   Hemoglobin     Collection Time: 08/03/23  5:42 PM   Result Value Ref Range    Hemoglobin 7.8 (L) 13.3 - 17.7 g/dL   Glucose by meter    Collection Time: 08/03/23  9:26 PM   Result Value Ref Range    GLUCOSE BY METER POCT 234 (H) 70 - 99 mg/dL   Platelet count    Collection Time: 08/04/23  5:42 AM   Result Value Ref Range    Platelet Count 154 150 - 450 10e3/uL   Basic metabolic panel    Collection Time: 08/04/23  5:42 AM   Result Value Ref Range    Sodium 132 (L) 136 - 145 mmol/L    Potassium 3.4 3.4 - 5.3 mmol/L    Chloride 93 (L) 98 - 107 mmol/L    Carbon Dioxide (CO2) 28 22 - 29 mmol/L    Anion Gap 11 7 - 15 mmol/L    Urea Nitrogen 47.8 (H) 8.0 - 23.0 mg/dL    Creatinine 2.35 (H) 0.67 - 1.17 mg/dL    Calcium 9.7 8.8 - 10.2 mg/dL    Glucose 276 (H) 70 - 99 mg/dL    GFR Estimate 30 (L) >60 mL/min/1.73m2   Hemoglobin    Collection Time: 08/04/23  5:42 AM   Result Value Ref Range    Hemoglobin 7.9 (L) 13.3 - 17.7 g/dL   Glucose by meter    Collection Time: 08/04/23  7:09 AM   Result Value Ref Range    GLUCOSE BY METER POCT 234 (H) 70 - 99 mg/dL   Glucose by meter    Collection Time: 08/04/23 11:17 AM   Result Value Ref Range    GLUCOSE BY METER POCT 221 (H) 70 - 99 mg/dL   Potassium    Collection Time: 08/04/23 11:26 AM   Result Value Ref Range    Potassium 3.8 3.4 - 5.3 mmol/L       All laboratory and imaging data in the past 24 hours reviewed  Serum Glucose range:   Recent Labs   Lab 08/04/23  1117 08/04/23  0709 08/04/23  0542 08/03/23  2126   * 234* 276* 234*       ABG: No lab results found in last 7 days.  CBC:   Recent Labs   Lab 08/04/23  0542 08/03/23  1742 08/03/23  0512 08/01/23  1759 08/01/23  0504 07/31/23  1811 07/31/23  0650 07/29/23  1656 07/29/23  0603   WBC  --   --   --   --  6.0  --  5.7  --  8.9   HGB 7.9* 7.8* 7.9*   < > 7.8*  7.8*   < > 6.8*   < > 7.0*   HCT  --   --   --   --  23.2*  --  20.8*  --  20.4*   MCV  --   --   --   --  89  --  90  --  89     --   --   --  124*  --  98*  --  86*    <  > = values in this interval not displayed.       Chemistry:   Recent Labs   Lab 08/04/23  1126 08/04/23  0542 08/03/23  0512 08/02/23  0505 07/31/23  1241 07/31/23  0650 07/30/23  0419   NA  --  132* 134* 133*   < > 133* 133*   POTASSIUM 3.8 3.4 3.5 3.7   < > 3.3* 3.6   CHLORIDE  --  93* 95* 95*   < > 93* 93*   CO2  --  28 29 28   < > 24 21*   BUN  --  47.8* 39.6* 52.9*   < > 80.2* 69.0*   CR  --  2.35* 2.09* 2.49*   < > 3.19* 2.98*   GFRESTIMATED  --  30* 35* 28*   < > 21* 23*   YOSVANY  --  9.7 9.1 9.6   < > 8.7* 8.7*   MAG  --   --   --   --   --   --  1.8   ALBUMIN  --   --   --   --   --  3.6  --     < > = values in this interval not displayed.       Coags:  No results for input(s): INR, PROTIME, PTT in the last 168 hours.    Invalid input(s): APTT  Cardiac Markers:  No results for input(s): CKTOTAL, TROPONINI in the last 168 hours.       CT Abdomen Pelvis w/o Contrast    Result Date: 7/25/2023  EXAM: CT ABDOMEN PELVIS W/O CONTRAST LOCATION: [facility] DATE: 7/25/2023 INDICATION: anemia, ? Retroperitoneal bleed COMPARISON: 07/17/2023 CTA CAP TECHNIQUE: CT scan of the abdomen and pelvis was performed without IV contrast. Multiplanar reformats were obtained. Dose reduction techniques were used. CONTRAST: None. FINDINGS: LOWER CHEST: Dual lumen dialysis catheter tips and PICC tip at RA/SVC junction. Mild generalized cardiac enlargement with extensive three-vessel coronary artery calcification. No pericardial effusion. Decreased density intracardiac blood pool consistent with patient's known nonspecific anemia. Small to moderate pleural effusions, bibasilar atelectasis and interstitial edema have developed. HEPATOBILIARY: Normal liver. No bile duct dilatation. Cholelithiasis unchanged. PANCREAS: Normal. SPLEEN: Normal. ADRENAL GLANDS: Normal. KIDNEYS/BLADDER: Well-positioned Garcia catheter. Kidneys, ureters and bladder are normal. BOWEL: Small volume of ascites has developed. Mild gaseous  distention of the stomach and the nondependent small bowel loops and colon could indicate adynamic ileus. No bowel obstruction. No free air. LYMPH NODES: No lymphadenopathy. VASCULATURE: Mild calcified atheromatous plaque throughout the normal caliber abdominal aorta. PELVIC ORGANS: Unremarkable. MUSCULOSKELETAL: Severe generalized subcutaneous edema has developed. No bone lesion or fracture.     IMPRESSION: 1.  No focal hematoma or other signs of bleeding identified. 2.  Severe generalized subcutaneous edema, small volume ascites, small pleural effusions, bibasilar atelectasis and interstitial edema have developed. 3.  Mild gaseous distention of the stomach and the nondependent small bowel loops and colon could indicate adynamic ileus. 4.  Cholelithiasis unchanged.     XR Humerus Right G/E 2 Views    Result Date: 7/22/2023  EXAM: XR HUMERUS RIGHT G/E 2 VIEWS LOCATION: [facility] DATE: 7/22/2023 INDICATION: Pain. COMPARISON: MRI right shoulder 07/19/2023.     IMPRESSION: No acute fracture or dislocation. Significant soft tissue swelling throughout the visualized arm, which may be infectious in the given clinical scenario. No radiographic evidence of osteomyelitis.    IR CVC Non Tunnel Placement > 5 Yrs    Result Date: 7/20/2023  LOCATION: [facility] DATE: 7/20/2023 PROCEDURE: NON-TUNNELED DIALYSIS CATHETER PLACEMENT 1.  Insertion of a non-tunneled central venous catheter. 2.  Ultrasound guidance for vascular access. 3.  Fluoroscopic guidance for central venous access device placement. INTERVENTIONAL RADIOLOGIST: Joseph Campos MD INDICATION: Renal insufficiency requiring renal replacement therapy, plan for nontunneled dialysis catheter placement. CONSENT: The risks, benefits and alternatives of the procedure were discussed with the patient or representative in detail. All questions were answered. Informed consent was given to proceed with the procedure. MODERATE  SEDATION: None. CONTRAST: None. ANTIBIOTICS: None. ADDITIONAL MEDICATIONS: None. FLUOROSCOPIC TIME: 0.5 minutes. RADIATION DOSE: Air Kerma: 5 mGy. COMPLICATIONS: No immediate complications. UNIVERSAL PROTOCOL: The operative site was marked and any prior imaging was reviewed. Required items including blood products, implants, devices and special equipment was made available. Patient identity was confirmed either verbally, with demographic information, hospital assigned identification or other identification markers. A timeout was performed immediately prior to the procedure. STERILE BARRIER TECHNIQUE: Maximum sterile barrier technique was used. Cutaneous antisepsis was performed at the operative site with application of 2% chlorhexidine and large sterile drape. Prior to the procedure, the  and assistant performed hand hygiene and wore hat, mask, sterile gown, and sterile gloves during the entire procedure. PROCEDURE:  Using local anesthesia and real-time ultrasound guidance the right internal jugular vein was accessed. Imaging demonstrates an anechoic and compressible vein. A permanent image was stored to the patient's medical record. Using this access, a 20 cm dialysis catheter was advanced using fluoroscopic guidance until the tip was in the proximal right atrium. FINDINGS: At the completion of the study he radiograph was performed utilizing the in room fluoroscopic unit. Imaging demonstrates the nontunneled dialysis catheter tip terminating in the proximal right atrium.     IMPRESSION:  1.  Successful non-tunneled dialysis catheter placement. 2.  The catheter is ready for use.    MR Shoulder Right w/o Contrast    Result Date: 7/19/2023  EXAM: MR SHOULDER RIGHT W/O CONTRAST LOCATION: [facility] DATE: 7/19/2023 INDICATION: Septic shock with bacteremia, concern for right shoulder osteomyelitis or joint infection. COMPARISON: None. TECHNIQUE: Unenhanced. FINDINGS: ROTATOR CUFF:  -Supraspinatus: Moderate severity tendinopathy. No tearing or retraction. No atrophy. -Infraspinatus: Moderate severity infraspinatus tendinopathy without evidence for well-defined partial or full-thickness tearing. No retraction but there is T2 signal abnormality within the muscular portion, which may be due to muscle strain. Infectious or inflammatory myositis could have this appearance. -Subscapularis: No tendon tear, tendinopathy or fatty atrophy. -Teres minor: No tendon tear, tendinopathy or fatty atrophy. CORACOACROMIAL ARCH: -Morphology: Type II acromion. No subacromial spur. Subacromial and subcoracoid space are normal. -Bursa: Fluid in the subacromial subdeltoid bursa. The coracoacromial and coracoclavicular ligaments are negative. ACROMIOCLAVICULAR JOINT: -Hypertrophic change. No significant diastasis or effusion. LONG HEAD OF BICEPS TENDON: -Mild tendinopathy within the intra-articular portion of the long head of the biceps. GLENOHUMERAL JOINT: -Labrum: No labral tear. No paralabral cyst. -Cartilage: Grade II-grade III cartilage loss both sides of the joint without significant surface irregularity. -Joint space: No effusion or synovitis. -Glenohumeral ligaments and capsule: No pericapsular inflammation. BONES: -No fracture or concerning marrow replacing lesion. No evidence for osteomyelitis. SOFT TISSUES: -Extensive but nonspecific T2 signal abnormality is present within the deltoid musculature, most marked posteriorly but this is also seen within the latissimus muscle, distal aspect of the trapezius, infraspinatus as described, and portions of the subscapularis. The multiplicity of muscle groups involved makes a strain type injury considered unlikely and this is more likely secondary to infectious or inflammatory myositis. There is no evidence for organized fluid collection to suggest abscess.     IMPRESSION: 1.  Extensive signal abnormality within multiple muscles of the shoulder girdle including  portions of the visualized latissimus and trapezius muscles. The findings are likely secondary to infectious or inflammatory myositis versus contusion or muscle strain type injury. No evidence for abscess. 2.  No evidence for osteomyelitis or septic arthropathy. 3.  Small amount of fluid within the subacromial subdeltoid bursa. 4.  No evidence for fracture. 5.  Moderate severity tendinopathy within the supraspinatus and infraspinatus. 6.  Tendinopathy within the intraarticular portion of the biceps. 7.  Degenerative change at the glenohumeral joint with grade II-grade III cartilage loss but no significant surface irregularity.    XR Chest Port 1 View    Result Date: 2023  EXAM: XR CHEST PORT 1 VIEW LOCATION: [facility] DATE: 2023 INDICATION: RN placed PICC   verify tip placement COMPARISON: 2023     IMPRESSION: Left upper extremity PICC terminates at the SVC - RA junction. No acute cardiopulmonary abnormality.    Echocardiogram Complete    Result Date: 2023  [id number]^MUMTAZ^ROBERT^[place] [address]  Name: JOSE YO MRN: [id number] : 1960 Study Date: 2023 06:57 AM Age: 63 yrs Gender: Male Patient Location: [facility] Reason For Study: MI Ordering Physician: ROBERT PANDYA Performed By: DENISSE  BSA: 2.1 m2 Height: 72 in Weight: 200 lb HR: 100 BP: 99/66 mmHg ______________________________________________________________________________ Procedure Complete Echo Adult. Definity (NDC #25764-420) given intravenously. ______________________________________________________________________________ Interpretation Summary  Left ventricular function is decreased. The ejection fraction is 25-30% (severely reduced). The left atrium is mildly dilated. IVC diameter <2.1 cm collapsing >50% with sniff suggests a normal RA pressure of 3 mmHg. TAPSE is normal, which is consistent with normal right ventricular  systolic function. The patient exhibited frequent PVCs. No hemodynamically significant valvular abnormalities on 2D or color flow imaging. The study was technically difficult. ______________________________________________________________________________ Left Ventricle The left ventricle is normal in size. Left ventricular function is decreased. The ejection fraction is 25-30% (severely reduced). There is mild concentric left ventricular hypertrophy. Diastolic function not assessed due to arrhythmia. There is severe global hypokinesia of the left ventricle.  Right Ventricle Normal right ventricle size and systolic function. TAPSE is normal, which is consistent with normal right ventricular systolic function.  Atria The left atrium is mildly dilated. Right atrial size is normal. There is no color Doppler evidence of an atrial shunt.  Mitral Valve The mitral valve is not well visualized. The mitral valve leaflets are mildly thickened. There is no mitral regurgitation noted. There is no mitral valve stenosis.  Tricuspid Valve The tricuspid valve is not well visualized. Right ventricular systolic pressure could not be approximated due to inadequate tricuspid regurgitation.  Aortic Valve The aortic valve is not well visualized. Mild aortic valve calcification is present. No hemodynamically significant valvular aortic stenosis.  Pulmonic Valve The pulmonic valve is not well visualized.  Vessels Normal size ascending aorta. IVC diameter <2.1 cm collapsing >50% with sniff suggests a normal RA pressure of 3 mmHg.  Pericardium There is no pericardial effusion.  Rhythm The patient exhibited frequent PVCs. ______________________________________________________________________________ MMode/2D Measurements & Calculations  IVSd: 1.3 cm LVIDd: 5.4 cm LVIDs: 4.6 cm LVPWd: 1.2 cm FS: 14.8 % LV mass(C)d: 279.8 grams LV mass(C)dI: 131.3 grams/m2 Ao root diam: 3.4 cm asc Aorta Diam: 3.5 cm LVOT diam: 2.2 cm LVOT area: 3.8 cm2 LA Volume  Indexed (AL/bp): 34.2 ml/m2 RV Base: 3.9 cm RWT: 0.44  TAPSE: 1.9 cm  Doppler Measurements & Calculations MV E max juan carlos: 56.3 cm/sec MV A max juan carlos: 71.5 cm/sec MV E/A: 0.79 MV max P.6 mmHg MV mean P.0 mmHg MV V2 VTI: 14.0 cm MVA(VTI): 3.3 cm2 MV dec slope: 391.0 cm/sec2 MV dec time: 0.14 sec Ao V2 max: 105.3 cm/sec Ao max P.0 mmHg Ao V2 mean: 79.1 cm/sec Ao mean PG: 3.0 mmHg Ao V2 VTI: 19.8 cm JAEL(I,D): 2.4 cm2 JAEL(V,D): 3.3 cm2 LV V1 max PG: 3.3 mmHg LV V1 max: 90.3 cm/sec LV V1 VTI: 12.3 cm SV(LVOT): 46.7 ml SI(LVOT): 21.9 ml/m2 PA acc time: 0.10 sec AV Juan Carlos Ratio (DI): 0.86 JAEL Index (cm2/m2): 1.1 E/E': 16.7 E/E' av.2 Lateral E/e': 11.8 Medial E/e': 16.7  Peak E' Juan Carlos: 3.4 cm/sec RV S Juan Carlos: 11.1 cm/sec  ______________________________________________________________________________ Report approved by: Eveline Green 2023 09:18 AM       US Upper Extremity Venous Duplex Right    Result Date: 2023  EXAM: US UPPER EXTREMITY VENOUS DUPLEX RIGHT LOCATION: [facility] DATE: 2023 INDICATION: RUE swelling w  concern for DVT (see CTA) COMPARISON: None. TECHNIQUE: Venous Duplex ultrasound of the right upper extremity with (when possible) and without compression, augmentation, and duplex. Color flow and spectral Doppler with waveform analysis performed. FINDINGS: Ultrasound includes evaluation of the internal jugular vein, innominate vein, subclavian vein, axillary vein, and brachial vein. The superficial cephalic and basilic veins were also evaluated where seen. RIGHT: No deep venous thrombosis. No superficial thrombophlebitis. Internal jugular vein, subclavian vein, axillary vein partially decompressed and not well visualized.     IMPRESSION: 1.  No deep venous thrombosis in the right upper extremity.    XR Chest Port 1 View    Result Date: 2023  EXAM: XR CHEST PORT 1 VIEW LOCATION: [facility] DATE: 2023 INDICATION: Evaluate PICC  placement COMPARISON: 09/20/2022     IMPRESSION: Left PICC tip in upper SVC. Lungs clear. Heart size normal. No pneumothorax.    CTA Chest Abdomen Pelvis Runoff w Contrast    Result Date: 7/17/2023  EXAM: CTA CHEST ABDOMEN PELVIS RUNOFF W CONTRAST LOCATION: [facility] DATE/TIME: 7/17/2023 6:12 PM CDT INDICATION: Cold right foot, no palpable pulses, known peripheral arterial disease and new atrial fibrillation A. Fib COMPARISON: 09/27/2022. TECHNIQUE: CT angiogram through the chest, abdomen, pelvis, and bilateral lower extremities was performed during the arterial phase of contrast enhancement. Second phase arterial imaging was performed through the bilateral lower extremities. 2D and 3D reconstructions performed by the CT technologist. Dose reduction techniques were used. CONTRAST: isovue 370  75ml from a single use vial FINDINGS: CTA CHEST, ABDOMEN, PELVIS: Normal great vessel branch pattern with mild disease of the subclavian artery. Normal caliber thoracic aorta. Abdominal aorta is widely patent without stenosis or dissection. Moderate calcified atherosclerotic disease without significant visceral arterial stenosis. RIGHT LEG: Iliac arteries appear patent. Due to contrast bolus timing, difficult to evaluate the superficial femoral artery although this appears grossly patent. Severe infrapopliteal disease. LEFT LEG: Postsurgical changes of left below-knee amputation. Iliac arteries, common femoral artery, profunda femoral artery, superficial femoral artery all appear patent. NONVASCULAR FINDINGS: LUNG/PLEURA: Calcified granuloma. Scattered atelectasis. Spiculated appearing lung nodule in the right lower lobe measuring 1.0 x 0.9 cm on series 11 image 166. Punctate nodules in left lung base. No pleural effusion or pneumothorax. MEDIASTINUM: Severe coronary artery disease with coronary artery stents. Questionable left atrial appendage thrombus. Inflammatory changes in the right axilla.  ABDOMEN: Suboptimal evaluation of the solid organs due to the arterial phase of contrast enhanced. Cholelithiasis. Spleen, adrenal glands, kidneys, and pancreas are grossly unremarkable. Hepatic steatosis. PELVIS: No abnormally dilated loops of bowel. No free fluid or free air. 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 Postsurgical changes of left below-knee amputation.     IMPRESSION: 1.  Right lower extremity arteries appear largely patent with severe infrapopliteal arterial disease. Evaluation of the superficial femoral artery is difficult due to contrast bolus timing. Recommend Doppler arterial ultrasound if there is continued clinical concern for embolic disease. 2.  Questionable left atrial appendage thrombus. Recommend echocardiogram for further evaluation. 3.  Spiculated pulmonary nodule in the right lower lobe measuring 1.0 x 0.9 cm. Recommend follow-up per Fleischner Society criteria as described below. 4.  Subcutaneous inflammation in the right axilla noted. Consider right upper extremity Doppler to rule out a DVT. May also represent an infectious process. 5.  Cholelithiasis. 6.  Hepatic steatosis. REFERENCE: Guidelines for Management of Incidental Pulmonary Nodules Detected on CT Images: From the Fleischner Society 2017. Guidelines apply to incidental nodules in patients who are 35 years or older. Guidelines do not apply to lung cancer screening, patients with immunosuppression, or patients with known primary cancer. SINGLE NODULE Nodule size less than or equal to 6 mm Low-risk patients: No follow-up needed. High-risk patients: Optional follow-up at 12 months. Nodule size 6-8 mm Low-risk patients: Follow-up CT at 6-12 months, then consider CT at 18-24 months. High-risk patients: Follow-up CT at 6-12 months, then at 18-24 months if no change. Nodule size >8 mm Either low or high-risk patients: Consider CT, PET/CT, or tissue sampling at 3 months.       Latest  radiology report personally reviewed.    Note created using dragon voice recognition software so sounds alike errors may have escaped editing.      08/04/2023   Anival Bucio MD  Hospitalist, [facility]  Pager: [phone]

## 2023-08-04 NOTE — PLAN OF CARE
Problem: Pain Acute  Goal: Optimal Pain Control and Function  Outcome: Progressing     Problem: Malnutrition  Goal: Improved Nutritional Intake  Outcome: Progressing     Problem: Oral Intake Inadequate  Goal: Improved Oral Intake  Outcome: Progressing     Pt A/O x4. Repositioning q2 in bed. Denied pain and SOB this shift. PRN lotion applied to face/head for dryness/peeling x1. Dressing changed on RUE and sacrum. Elastic stockings in place on RUE/RLE. Hemoglobin check at 1800 was 7.8, next re-check in the AM. Tele NSR with a BBB.

## 2023-08-04 NOTE — PROGRESS NOTES
[facility] Nurse Inpatient Assessment     Consulted for: GURVINDER    Patient History (according to provider note(s):        Assessment:    Skin Injury Location: R axilla area       7/24 7/28 8/4    Skin injury due to:  Blood blisters (unknown etiology)  Skin history and plan of care:  blisters deroofed, scant bloody oozing to proximal ruptured blister, clear serous fluid noted to the ruptured blister nearest the elbow  Affected area:      Skin assessment: Blistering     Measurements (length x width x depth, in cm) superficial scabbing noted to both areas     Color: normal and consistent with surrounding tissue     Temperature  normal      Drainage: scant.      Color: see above      Odor: none  Pain: facial expression of distress, with movement of edematous arm  Pain interventions prior to dressing change: patient tolerated well and slow and gentle cares   Treatment goal: Maintain (prevention of deterioration)  STATUS: healed  Supplies ordered: supplies stored on unit  ____________________________________________________________________________________________________________________________________________________________________________________________    Pressure Injury Location: coccyx        7/24 7/28 8/4    Last photo: 8/4  Wound type: Pressure Injury     Pressure Injury Stage: 2, hospital acquired   Wound history/plan of care:   A pressure injury to the perineum was charted on 7/17. This wound is surrounding the coccyx.     Wound base: 100 % epidermis and deep pink , no budding or granulation tissue noted     Palpation of the wound bed: normal and edges flaking, crepe paper feeling       Drainage: scant     Description of  "drainage: serous noted on mepilex, some peeling skin      Measurements (length x width x depth, in cm) 5.3  x 2.5 x 0.2 cm to L sided wound, 2 x 0.6 x 0.1cm to the right sided opening, neoepithelium along the gluteal cleft     Tunneling N/A     Undermining N/A  Periwound skin: Intact      Color:  blanchable redness      Temperature: normal   Odor: none  Pain: no grimacing or signs of discomfort, none  Pain intervention prior to dressing change: slow and gentle cares   Treatment goal: Heal , Protection, and offload pressure  STATUS: healing  Supplies ordered: at bedside    My PI Risk Assessment     Sensory Perception: 3 - Slightly Limited     Moisture: 1 - Constantly moist     Activity: 1 - Bedfast      Mobility: 2 - Very limited     Nutrition: 2 - Probably inadequate      Friction/Shear: 2 - Potential problem      TOTAL: 11      Treatment Plan:     RUE/axilla - Every 3 days  May leave open to air if no drainage noted.  2. If drainage is occurring, cleanse with saline and gently dry.  3. Cover with mepilex dressing     Coccyx  Protect area with sacral mepilex  Peel back mepilex each shift to monitor wound and place back down  Change mepilex every 3 days + PRN if soiled, saturated, falls off    Pressure Injury Prevention (PIP) Plan:  If patient is declining pressure injury prevention interventions: Explore reason why and address patient's concerns, Educate on pressure injury risk and prevention intervention(s), If patient is still declining, document \"informed refusal\" , and Ensure Care team is aware ( provider, charge nurse, etc)  Mattress: Follow bed algorithm, reassess daily and order specialty mattress, if indicated.  HOB: Maintain at or below 30 degrees, unless contraindicated  Repositioning in bed: Every 1-2 hours , Left/right positioning; avoid supine, Raise foot of bed prior to raising head of bed, to reduce patient sliding down (shear), and Frequent microturns using TAPS wedges, as patient tolerates  Heels: " Keep elevated off mattress and Pillows under calves  Protective Dressing: Sacral Mepilex for prevention (#875961),  especially for the agitated patient   Positioning Equipment: TAPS wedges (#[id number]) to help maintain 30 degree side lying position   Chair positioning: Chair cushion (#[id number])    If patient has a buttock pressure injury, or high risk for PI use chair cushion or SPS.  Moisture Management: Perineal cleansing /protection: Follow Incontinence Protocol, Avoid brief in bed, Clean and dry skin folds with bathing , and Moisturize dry skin  Under Devices: Inspect skin under all medical devices during skin inspection , Ensure tubes are stabilized without tension, and Ensure patient is not lying on medical devices or equipment when repositioned  Ask provider to discontinue device when no longer needed.     Orders: Reviewed and Updated    RECOMMEND PRIMARY TEAM ORDER: None, at this time  Education provided: plan of care  Discussed plan of care with: Patient  WOC nurse follow-up plan: weekly  Notify WOC if wound(s) deteriorate.  Nursing to notify the Provider(s) and re-consult the WOC Nurse if new skin concern.    DATA:     Current support surface: Standard  Standard gel/foam mattress (IsoFlex, Atmos air, etc)  Containment of urine/stool: Continent of bladder and Continent of bowel  BMI: Body mass index is 27.68 kg/m .   Active diet order: Orders Placed This Encounter      Combination Diet Renal Diet (dialysis); Moderate Consistent Carb (60 g CHO per Meal) Diet     Output: I/O last 3 completed shifts:  In: 1440 [P.O.:1440]  Out: 6600 [Urine:2600; Other:4000]     Labs:   Recent Labs   Lab 08/04/23  0542 08/01/23  1759 08/01/23  0504 07/31/23  1811 07/31/23  0650   ALBUMIN  --   --   --   --  3.6   HGB 7.9*   < > 7.8*  7.8*   < > 6.8*   WBC  --   --  6.0  --  5.7    < > = values in this interval not displayed.       Pressure injury risk assessment:   Sensory Perception: 4-->no impairment  Moisture: 4-->rarely  moist  Activity: 2-->chairfast  Mobility: 2-->very limited  Nutrition: 2-->probably inadequate  Friction and Shear: 1-->problem  Robin Score: 15    ROSALIND Ovalle RN CWOCN  Pager no longer in use, please contact through [employer] group: [facility] [employer] [facility]

## 2023-08-04 NOTE — PROGRESS NOTES
"CLINICAL NUTRITION SERVICES - REASSESSMENT NOTE     Nutrition Prescription    RECOMMENDATIONS FOR MDs/PROVIDERS TO ORDER:    Malnutrition Status:    Severe malnutrition In Context of: Acute illness or injury     Recommendations already ordered by Registered Dietitian (RD):  Continue glucerna daily     Future/Additional Recommendations:  Will monitor po, wt, labs, wound healing       EVALUATION OF THE PROGRESS TOWARD GOALS   Diet: Renal, Moderate Consistent Carbohydrate Diet   Nutrition Supplement: Glucerna   Intake: Per flowsheets pt eating 100% x2-3 mesals per day    Pt consuming estimated 1100 kcals, 55 gm protein per day, meeting 50-75% needs.      NEW FINDINGS   Met with pt at bedside this afternoon. Pt reports okay appetite and intakes- states they are improving. Pt tolerable of Glucerna nutrition supplement- knows it is important to meet nutritional needs. Pt reports last BM this AM. Pt reports eating breakfast at dialysis and a nutrition supplement after returning to room. Pt states he does not like to eat right after dialysis.     ANTHROPOMETRICS  Height: 188 cm (6' 2\")  Most Recent Weight: 97.8 kg (215 lb 9.8 oz)    Weight History:   Date/Time Weight Weight Method   08/04/23 0408 97.8 kg (215 lb 9.8 oz) Bed scale   08/03/23 0334 98.9 kg (218 lb 0.6 oz) Bed scale   08/01/23 0321 108.7 kg (239 lb 10.2 oz) Standing scale   07/31/23 0333 114.5 kg (252 lb 6.8 oz) Bed scale   07/30/23 0358 108.2 kg (238 lb 8.6 oz) Bed scale   07/29/23 0502 110.5 kg (243 lb 9.7 oz) Bed scale   07/28/23 0453 110.4 kg (243 lb 6.2 oz) Bed scale   07/27/23 0400 112.6 kg (248 lb 3.2 oz) Bed scale   07/26/23 0530 114.4 kg (252 lb 3.2 oz) Bed scale   07/25/23 0100 113.6 kg (250 lb 7.1 oz) Bed scale   07/24/23 0600 111.4 kg (245 lb 9.5 oz) Bed scale   07/22/23 0600 111.8 kg (246 lb 7.6 oz) Bed scale   07/20/23 0545 106.1 kg (233 lb 14.4 oz) --   07/19/23 0700 102.3 kg (225 lb 8.5 oz) Bed scale   07/17/23 1316 90.7 kg (200 lb) -- "     PHYSICAL FINDINGS  See malnutrition section below.  Per Flowhseets:  4000 mL removed via dialysis today   1200 ml UOP, x1 BM today   Trace/mild edema RUE, RLE  Rounded abdomen, abdominal fullness   Wound- arm, thigh, deep tissue pressure injury coccyx    LABS  Reviewed- Glucose 206-240 last 24 hr     MEDICATIONS  Reviewed- eliquis, colace, lasix, novolog, lantus, Mg oxide, MVI     Malnutrition Diagnosis: Severe malnutrition  In Context of:  Acute illness or injury    NUTRITION DIAGNOSIS  Malnutrition related to poor appetite as evidenced by <=50% of meals in >= 5 days and moderate fluid accumulation     Evaluation: Improving     INTERVENTIONS  Implementation  Continue Glucerna daily     Goals  Meet nutrition needs - Progressing   BG < 180 - Not Met  Wound healing- progressing/healing, see WOC note 7/28/23     Monitoring/Evaluation  Progress toward goals will be monitored and evaluated per protocol.

## 2023-08-05 NOTE — PROGRESS NOTES
Nephrology    JOHANA. Last HD 8/4.   Prolonged hospitalization  No planned HD this weekend  Reevaluate on Monday.  Please call with questions.    Sudhakar Marin MD  [facility]  [phone]

## 2023-08-05 NOTE — PLAN OF CARE
Problem: Pain Acute  Goal: Optimal Pain Control and Function  Intervention: Prevent or Manage Pain  Recent Flowsheet Documentation  Taken 8/5/2023 0319 by Chelo Kowalski RN  Medication Review/Management: medications reviewed    Problem: Fall Injury Risk  Goal: Absence of Fall and Fall-Related Injury  Outcome: Progressing  Intervention: Identify and Manage Contributors  Recent Flowsheet Documentation  Taken 8/5/2023 0319 by Chelo Kowalski RN  Medication Review/Management: medications reviewed  Taken 8/5/2023 0000 by Chelo Kowalski RN  Medication Review/Management: medications reviewed  Taken 8/4/2023 2003 by Chelo Kowalski RN  Medication Review/Management: medications reviewed  Taken 8/4/2023 1630 by Chelo Kowalski RN  Medication Review/Management: medications reviewed  Intervention: Promote Injury-Free Environment  Recent Flowsheet Documentation  Taken 8/5/2023 0319 by Chelo Kowalski RN  Safety Promotion/Fall Prevention:   room door open   nonskid shoes/slippers when out of bed   safety round/check completed   room organization consistent    Goal Outcome Evaluation:       Pt is alert and oriented x 4, denies pain, has some soreness in his right arm, gets relief with repositioning and applying ice pack. Turned and repositioned per pt's request. He verbalized he does shift his weight every now and then to get comfortable. Up in the chair at 0615 using nadya lift.

## 2023-08-05 NOTE — PROGRESS NOTES
Daily Progress Note        CODE STATUS:  Full Code    08/03/23  Assessment/Plan:  Mr. Aguilar is a 63-year-old male with history of CAD on Brilinta, chronic systolic CHF with EF of 30 to 35% prior to admission, CKD 3, history of left foot osteomyelitis status post left BKA, and DM 2 who presents 7/17/2023 with septic shock, NSTEMI, JOHANA and A-fib with RVR.  Patient was stabilized with pressors and transferred out of ICU 7/27/2023     Septic shock: Resolved  Group A strep bacteremia secondary to skin and soft tissue Infection right shoulder.  Concern for toxic shock syndrome  MRI right shoulder 7/19/2023 showed myositis  -Switched to ceftriaxone from penicillin G 7/27/2023  -Completed course of oral linezolid 7/27/2023  -ID to follow: Will likely need IV antibiotics for 2 weeks, possibly longer course  -Anticipate TCU on discharge given need for IV antibiotics for likely 2 weeks depending on clinical course  -Patient already has PICC line in place     NSTEMI  History of CAD and ischemic cardiomyopathy   Prior coronary stents  A-fib with RVR  -Cardiology following  -Continue amiodarone  -Cardiology following for possible coronary angiogram this hospitalization-Will work with ID on timing  -Continue aspirin and Brilinta  -Heparin drip stopped 7/31  -Starting apixaban 8/1, will monitor for bleeding      Acute on chronic systolic CHF exacerbation  -TTE 7/17/2023 shows EF of 25 to 30%  -Continuing intermittent dialysis as below      JOHANA on CKD stage 3: Likely secondary to circulatory shock, requiring intermittent dialysis  -Baseline creatinine 1.4-1.6, 3.25 on admission, peak at 5.22 on 7/21, some fluctuation but now generally improving  -Status post intermittent hemodialysis with evidence of renal recovery.  Dialysis started 7/21/2023, last dialyzed 8/2/2023  -Defer diuresis to cardiology and nephrology  -Started dialysis 7/21 to 7/24, resumed 7/29 due to increased volume. Dialyzed again 8/4. Kidney function may be  "recovering per nephrology. No HD on weekend. Nephrology will assess on Monday.      Hyponatremia- improved  -Initially severe, 119 on presentation  -We will continue to monitor     Elevated LFTs:   -Thought secondary to shock liver and myositis  -Improved     Acute on anemia of chronic disease  -No overt signs of bleeding, CT abdomen without signs of bleeding  -Remains on heparin drip (held 7/31), heparin with dialysis, ticagrelor  -Transfused 2 units PRBC 7/25 hgb 5.9  -Transfused 1 unit PRBCs 7/28 hgb 6.7  -Transfused 1 unit PRBCs 7/29 Hgb 6.7  -Transfused 1 unit PRBCs 7/31 Hgb 6.8  -Has been having black stools since 7/24 so likely blood in stool  -Transfuse for hemoglobin less than 7     DM2:  -Lantus increased from 15 to 20 units at bedtime, sliding scale insulin, and mealtime novolog. Was on 38 unit of lantus and sliding scale insulin at home.   -Monitor blood sugar, adjust insulins accordingly.        Diet: Combination Diet Renal Diet (dialysis); Moderate Consistent Carb (60 g CHO per Meal) Diet  Snacks/Supplements Adult: Glucerna; With Meals    DVT Prophylaxis: DOAC  Garcia Catheter: PRESENT, indication: Strict 1-2 Hour I&O  Lines: PRESENT      PICC 07/18/23 Triple Lumen Left Basilic Vasopressor,Access-Site Assessment: WDL  CVC Double Lumen Right Subclavian Non - tunneled-Site Assessment: WDL      Cardiac Monitoring: None  Code Status: Full Code      Clinically Significant Risk Factors              # Hypoalbuminemia: Lowest albumin = 2.1 g/dL at 7/21/2023  4:38 AM, will monitor as appropriate       # Hypertension: Noted on problem list    # Acute heart failure with reduced ejection fraction: last echo with EF <40% and receiving IV diuretics      # DMII: A1C = 6.9 % (Ref range: <5.7 %) within past 6 months     # Overweight: Estimated body mass index is 27.74 kg/m  as calculated from the following:    Height as of this encounter: 1.88 m (6' 2\").    Weight as of this encounter: 98 kg (216 lb 0.8 oz).       # " Severe Malnutrition: based on nutrition assessment           Disposition; Couple more days here. Then LTACH vs TCU.  Barrier to discharge; IV antibitiocs, need to monitor for renal recovery    Subjective:  Interval History: Patient seen this morning. Dozing off on a recliner. No acute issues reported overnight.      Review of Systems:   As mentioned in subjective.    Patient Active Problem List   Diagnosis    DM type 2 on insulin, w Neuro -- Hgb A1C 7.6 on 1/19/22    Benign essential hypertension    Benign neoplasm of descending colon    Gastro-esophageal reflux disease with esophagitis    Microalbuminuria    Hyperlipidemia    PAF (paroxysmal atrial fibrillation) -- on Eliquis     Ischemic cardiomyopathy    CAD -- diffuse calcified vessels 11/16/21 (no stents placed)    DVT (deep venous thrombosis) (H)    Chronic kidney disease (CKD) stage G3a/A1, moderately decreased glomerular filtration rate (GFR) between 45-59 mL/min/1.73 square meter and albuminuria creatinine ratio less than 30 mg/g (H)    Diabetic ulcer of left midfoot associated with type 2 diabetes mellitus, with necrosis of bone (H)    Constipation    Traumatic amputation of toe or toes without complication (H)    Osteomyelitis of left foot -- S/P Left BKA 4/4/22    Amputation of left foot (H)    Chronic systolic CHF -- EF 30-35% on Echo 11/15/21    Smoker (Cigars)    NSTEMI (non-ST elevated myocardial infarction) (H)    ARF (acute renal failure) (H)    Dehydration    Hyponatremia    Elevated troponin    JOHANA (acute kidney injury) (H)    Atrial fibrillation with RVR (H)    Septic shock (H)       Scheduled Meds:   amiodarone  400 mg Oral Daily    apixaban ANTICOAGULANT  5 mg Oral BID    [Held by provider] aspirin  81 mg Oral Daily    cefTRIAXone  2 g Intravenous Q24H    docusate sodium  100 mg Oral BID    furosemide  40 mg Oral BID    insulin aspart   Subcutaneous TID AC    insulin aspart  1-10 Units Subcutaneous TID AC    insulin aspart  1-7 Units  Subcutaneous At Bedtime    insulin glargine  20 Units Subcutaneous At Bedtime    magnesium oxide  400 mg Oral Daily    miconazole   Topical BID    multivitamin, therapeutic  1 tablet Oral Daily    sodium chloride (PF)  10-40 mL Intracatheter Q7 Days    sodium chloride (PF)  3 mL Intracatheter Q8H    ticagrelor  90 mg Oral BID    trimethoprim-polymyxin b  2 drop Left Eye 4x Daily    vitamin C  500 mg Oral Daily     Continuous Infusions:   dextrose       PRN Meds:.sodium chloride 0.9%, acetaminophen **OR** acetaminophen, acetaminophen, bisacodyl, dextrose, glucose **OR** dextrose **OR** glucagon, heparin Lock (1000 units/mL High concentration), naloxone **OR** naloxone **OR** naloxone **OR** naloxone, ondansetron **OR** ondansetron, oxyCODONE, polyethylene glycol, prochlorperazine **OR** prochlorperazine **OR** prochlorperazine, selenium sulfide, sodium chloride (PF), sodium chloride (PF), sodium chloride (PF), sodium chloride (PF)    Objective:  Vital signs in last 24 hours:  Temp:  [98  F (36.7  C)-99.2  F (37.3  C)] 98  F (36.7  C)  Pulse:  [81-88] 85  Resp:  [18-20] 18  BP: (116-125)/(51-69) 125/69  SpO2:  [94 %-100 %] 100 %        Intake/Output Summary (Last 24 hours) at 8/3/2023 0807  Last data filed at 8/3/2023 0702  Gross per 24 hour   Intake 740 ml   Output 5000 ml   Net -4260 ml       Physical Exam:    General: Not in obvious distress.  HEENT: NC, AT   Chest: Clear to auscultation bilaterally  Heart: S1S2 normal, regular. No M/R/G  Abdomen: Soft. NT, ND. Bowel sounds- active.  Extremities: Left BKA. Swelling/induration and tenderness over the right shoulder area.   Neuro: Alert and awake, grossly non-focal      Lab Results:(I have personally reviewed the results)    Recent Results (from the past 24 hour(s))   Glucose by meter    Collection Time: 08/04/23  4:46 PM   Result Value Ref Range    GLUCOSE BY METER POCT 211 (H) 70 - 99 mg/dL   Hemoglobin    Collection Time: 08/04/23  5:46 PM   Result Value Ref Range     Hemoglobin 7.8 (L) 13.3 - 17.7 g/dL   Glucose by meter    Collection Time: 08/04/23  8:54 PM   Result Value Ref Range    GLUCOSE BY METER POCT 232 (H) 70 - 99 mg/dL   Basic metabolic panel    Collection Time: 08/05/23  6:11 AM   Result Value Ref Range    Sodium 133 (L) 136 - 145 mmol/L    Potassium 3.8 3.4 - 5.3 mmol/L    Chloride 95 (L) 98 - 107 mmol/L    Carbon Dioxide (CO2) 27 22 - 29 mmol/L    Anion Gap 11 7 - 15 mmol/L    Urea Nitrogen 37.6 (H) 8.0 - 23.0 mg/dL    Creatinine 2.06 (H) 0.67 - 1.17 mg/dL    Calcium 9.5 8.8 - 10.2 mg/dL    Glucose 233 (H) 70 - 99 mg/dL    GFR Estimate 35 (L) >60 mL/min/1.73m2   Hemoglobin    Collection Time: 08/05/23  6:11 AM   Result Value Ref Range    Hemoglobin 7.6 (L) 13.3 - 17.7 g/dL   Glucose by meter    Collection Time: 08/05/23  7:23 AM   Result Value Ref Range    GLUCOSE BY METER POCT 193 (H) 70 - 99 mg/dL   Glucose by meter    Collection Time: 08/05/23 11:30 AM   Result Value Ref Range    GLUCOSE BY METER POCT 223 (H) 70 - 99 mg/dL       All laboratory and imaging data in the past 24 hours reviewed  Serum Glucose range:   Recent Labs   Lab 08/05/23  1130 08/05/23  0723 08/05/23  0611 08/04/23  2054   * 193* 233* 232*       ABG: No lab results found in last 7 days.  CBC:   Recent Labs   Lab 08/05/23  0611 08/04/23  1746 08/04/23  0542 08/01/23  1759 08/01/23  0504 07/31/23  1811 07/31/23  0650   WBC  --   --   --   --  6.0  --  5.7   HGB 7.6* 7.8* 7.9*   < > 7.8*  7.8*   < > 6.8*   HCT  --   --   --   --  23.2*  --  20.8*   MCV  --   --   --   --  89  --  90   PLT  --   --  154  --  124*  --  98*    < > = values in this interval not displayed.       Chemistry:   Recent Labs   Lab 08/05/23  0611 08/04/23  1126 08/04/23  0542 08/03/23  0512 07/31/23  1241 07/31/23  0650 07/30/23  0419   *  --  132* 134*   < > 133* 133*   POTASSIUM 3.8 3.8 3.4 3.5   < > 3.3* 3.6   CHLORIDE 95*  --  93* 95*   < > 93* 93*   CO2 27  --  28 29   < > 24 21*   BUN 37.6*  --  47.8*  39.6*   < > 80.2* 69.0*   CR 2.06*  --  2.35* 2.09*   < > 3.19* 2.98*   GFRESTIMATED 35*  --  30* 35*   < > 21* 23*   OYSVANY 9.5  --  9.7 9.1   < > 8.7* 8.7*   MAG  --   --   --   --   --   --  1.8   ALBUMIN  --   --   --   --   --  3.6  --     < > = values in this interval not displayed.       Coags:  No results for input(s): INR, PROTIME, PTT in the last 168 hours.    Invalid input(s): APTT  Cardiac Markers:  No results for input(s): CKTOTAL, TROPONINI in the last 168 hours.       CT Abdomen Pelvis w/o Contrast    Result Date: 7/25/2023  EXAM: CT ABDOMEN PELVIS W/O CONTRAST LOCATION: [facility] DATE: 7/25/2023 INDICATION: anemia, ? Retroperitoneal bleed COMPARISON: 07/17/2023 CTA CAP TECHNIQUE: CT scan of the abdomen and pelvis was performed without IV contrast. Multiplanar reformats were obtained. Dose reduction techniques were used. CONTRAST: None. FINDINGS: LOWER CHEST: Dual lumen dialysis catheter tips and PICC tip at RA/SVC junction. Mild generalized cardiac enlargement with extensive three-vessel coronary artery calcification. No pericardial effusion. Decreased density intracardiac blood pool consistent with patient's known nonspecific anemia. Small to moderate pleural effusions, bibasilar atelectasis and interstitial edema have developed. HEPATOBILIARY: Normal liver. No bile duct dilatation. Cholelithiasis unchanged. PANCREAS: Normal. SPLEEN: Normal. ADRENAL GLANDS: Normal. KIDNEYS/BLADDER: Well-positioned Garcia catheter. Kidneys, ureters and bladder are normal. BOWEL: Small volume of ascites has developed. Mild gaseous distention of the stomach and the nondependent small bowel loops and colon could indicate adynamic ileus. No bowel obstruction. No free air. LYMPH NODES: No lymphadenopathy. VASCULATURE: Mild calcified atheromatous plaque throughout the normal caliber abdominal aorta. PELVIC ORGANS: Unremarkable. MUSCULOSKELETAL: Severe generalized subcutaneous edema has developed. No  bone lesion or fracture.     IMPRESSION: 1.  No focal hematoma or other signs of bleeding identified. 2.  Severe generalized subcutaneous edema, small volume ascites, small pleural effusions, bibasilar atelectasis and interstitial edema have developed. 3.  Mild gaseous distention of the stomach and the nondependent small bowel loops and colon could indicate adynamic ileus. 4.  Cholelithiasis unchanged.     XR Humerus Right G/E 2 Views    Result Date: 7/22/2023  EXAM: XR HUMERUS RIGHT G/E 2 VIEWS LOCATION: [facility] DATE: 7/22/2023 INDICATION: Pain. COMPARISON: MRI right shoulder 07/19/2023.     IMPRESSION: No acute fracture or dislocation. Significant soft tissue swelling throughout the visualized arm, which may be infectious in the given clinical scenario. No radiographic evidence of osteomyelitis.    IR CVC Non Tunnel Placement > 5 Yrs    Result Date: 7/20/2023  LOCATION: [facility] DATE: 7/20/2023 PROCEDURE: NON-TUNNELED DIALYSIS CATHETER PLACEMENT 1.  Insertion of a non-tunneled central venous catheter. 2.  Ultrasound guidance for vascular access. 3.  Fluoroscopic guidance for central venous access device placement. INTERVENTIONAL RADIOLOGIST: Joseph Campos MD INDICATION: Renal insufficiency requiring renal replacement therapy, plan for nontunneled dialysis catheter placement. CONSENT: The risks, benefits and alternatives of the procedure were discussed with the patient or representative in detail. All questions were answered. Informed consent was given to proceed with the procedure. MODERATE SEDATION: None. CONTRAST: None. ANTIBIOTICS: None. ADDITIONAL MEDICATIONS: None. FLUOROSCOPIC TIME: 0.5 minutes. RADIATION DOSE: Air Kerma: 5 mGy. COMPLICATIONS: No immediate complications. UNIVERSAL PROTOCOL: The operative site was marked and any prior imaging was reviewed. Required items including blood products, implants, devices and special equipment was made  available. Patient identity was confirmed either verbally, with demographic information, hospital assigned identification or other identification markers. A timeout was performed immediately prior to the procedure. STERILE BARRIER TECHNIQUE: Maximum sterile barrier technique was used. Cutaneous antisepsis was performed at the operative site with application of 2% chlorhexidine and large sterile drape. Prior to the procedure, the  and assistant performed hand hygiene and wore hat, mask, sterile gown, and sterile gloves during the entire procedure. PROCEDURE:  Using local anesthesia and real-time ultrasound guidance the right internal jugular vein was accessed. Imaging demonstrates an anechoic and compressible vein. A permanent image was stored to the patient's medical record. Using this access, a 20 cm dialysis catheter was advanced using fluoroscopic guidance until the tip was in the proximal right atrium. FINDINGS: At the completion of the study he radiograph was performed utilizing the in room fluoroscopic unit. Imaging demonstrates the nontunneled dialysis catheter tip terminating in the proximal right atrium.     IMPRESSION:  1.  Successful non-tunneled dialysis catheter placement. 2.  The catheter is ready for use.    MR Shoulder Right w/o Contrast    Result Date: 7/19/2023  EXAM: MR SHOULDER RIGHT W/O CONTRAST LOCATION: [facility] DATE: 7/19/2023 INDICATION: Septic shock with bacteremia, concern for right shoulder osteomyelitis or joint infection. COMPARISON: None. TECHNIQUE: Unenhanced. FINDINGS: ROTATOR CUFF: -Supraspinatus: Moderate severity tendinopathy. No tearing or retraction. No atrophy. -Infraspinatus: Moderate severity infraspinatus tendinopathy without evidence for well-defined partial or full-thickness tearing. No retraction but there is T2 signal abnormality within the muscular portion, which may be due to muscle strain. Infectious or inflammatory myositis could  have this appearance. -Subscapularis: No tendon tear, tendinopathy or fatty atrophy. -Teres minor: No tendon tear, tendinopathy or fatty atrophy. CORACOACROMIAL ARCH: -Morphology: Type II acromion. No subacromial spur. Subacromial and subcoracoid space are normal. -Bursa: Fluid in the subacromial subdeltoid bursa. The coracoacromial and coracoclavicular ligaments are negative. ACROMIOCLAVICULAR JOINT: -Hypertrophic change. No significant diastasis or effusion. LONG HEAD OF BICEPS TENDON: -Mild tendinopathy within the intra-articular portion of the long head of the biceps. GLENOHUMERAL JOINT: -Labrum: No labral tear. No paralabral cyst. -Cartilage: Grade II-grade III cartilage loss both sides of the joint without significant surface irregularity. -Joint space: No effusion or synovitis. -Glenohumeral ligaments and capsule: No pericapsular inflammation. BONES: -No fracture or concerning marrow replacing lesion. No evidence for osteomyelitis. SOFT TISSUES: -Extensive but nonspecific T2 signal abnormality is present within the deltoid musculature, most marked posteriorly but this is also seen within the latissimus muscle, distal aspect of the trapezius, infraspinatus as described, and portions of the subscapularis. The multiplicity of muscle groups involved makes a strain type injury considered unlikely and this is more likely secondary to infectious or inflammatory myositis. There is no evidence for organized fluid collection to suggest abscess.     IMPRESSION: 1.  Extensive signal abnormality within multiple muscles of the shoulder girdle including portions of the visualized latissimus and trapezius muscles. The findings are likely secondary to infectious or inflammatory myositis versus contusion or muscle strain type injury. No evidence for abscess. 2.  No evidence for osteomyelitis or septic arthropathy. 3.  Small amount of fluid within the subacromial subdeltoid bursa. 4.  No evidence for fracture. 5.  Moderate  severity tendinopathy within the supraspinatus and infraspinatus. 6.  Tendinopathy within the intraarticular portion of the biceps. 7.  Degenerative change at the glenohumeral joint with grade II-grade III cartilage loss but no significant surface irregularity.    XR Chest Port 1 View    Result Date: 2023  EXAM: XR CHEST PORT 1 VIEW LOCATION: [facility] DATE: 2023 INDICATION: RN placed PICC   verify tip placement COMPARISON: 2023     IMPRESSION: Left upper extremity PICC terminates at the SVC - RA junction. No acute cardiopulmonary abnormality.    Echocardiogram Complete    Result Date: 2023  [id number]^MUMTAZ^ROBERT^[place] [address]  Name: JOSE YO MRN: [id number] : 1960 Study Date: 2023 06:57 AM Age: 63 yrs Gender: Male Patient Location: [facility] Reason For Study: MI Ordering Physician: ROBERT PANDYA Performed By: DENISSE  BSA: 2.1 m2 Height: 72 in Weight: 200 lb HR: 100 BP: 99/66 mmHg ______________________________________________________________________________ Procedure Complete Echo Adult. Definity (NDC #33646-833) given intravenously. ______________________________________________________________________________ Interpretation Summary  Left ventricular function is decreased. The ejection fraction is 25-30% (severely reduced). The left atrium is mildly dilated. IVC diameter <2.1 cm collapsing >50% with sniff suggests a normal RA pressure of 3 mmHg. TAPSE is normal, which is consistent with normal right ventricular systolic function. The patient exhibited frequent PVCs. No hemodynamically significant valvular abnormalities on 2D or color flow imaging. The study was technically difficult. ______________________________________________________________________________ Left Ventricle The left ventricle is normal in size. Left ventricular function is decreased. The ejection fraction is  25-30% (severely reduced). There is mild concentric left ventricular hypertrophy. Diastolic function not assessed due to arrhythmia. There is severe global hypokinesia of the left ventricle.  Right Ventricle Normal right ventricle size and systolic function. TAPSE is normal, which is consistent with normal right ventricular systolic function.  Atria The left atrium is mildly dilated. Right atrial size is normal. There is no color Doppler evidence of an atrial shunt.  Mitral Valve The mitral valve is not well visualized. The mitral valve leaflets are mildly thickened. There is no mitral regurgitation noted. There is no mitral valve stenosis.  Tricuspid Valve The tricuspid valve is not well visualized. Right ventricular systolic pressure could not be approximated due to inadequate tricuspid regurgitation.  Aortic Valve The aortic valve is not well visualized. Mild aortic valve calcification is present. No hemodynamically significant valvular aortic stenosis.  Pulmonic Valve The pulmonic valve is not well visualized.  Vessels Normal size ascending aorta. IVC diameter <2.1 cm collapsing >50% with sniff suggests a normal RA pressure of 3 mmHg.  Pericardium There is no pericardial effusion.  Rhythm The patient exhibited frequent PVCs. ______________________________________________________________________________ MMode/2D Measurements & Calculations  IVSd: 1.3 cm LVIDd: 5.4 cm LVIDs: 4.6 cm LVPWd: 1.2 cm FS: 14.8 % LV mass(C)d: 279.8 grams LV mass(C)dI: 131.3 grams/m2 Ao root diam: 3.4 cm asc Aorta Diam: 3.5 cm LVOT diam: 2.2 cm LVOT area: 3.8 cm2 LA Volume Indexed (AL/bp): 34.2 ml/m2 RV Base: 3.9 cm RWT: 0.44  TAPSE: 1.9 cm  Doppler Measurements & Calculations MV E max swathi: 56.3 cm/sec MV A max swathi: 71.5 cm/sec MV E/A: 0.79 MV max P.6 mmHg MV mean P.0 mmHg MV V2 VTI: 14.0 cm MVA(VTI): 3.3 cm2 MV dec slope: 391.0 cm/sec2 MV dec time: 0.14 sec Ao V2 max: 105.3 cm/sec Ao max P.0 mmHg Ao V2 mean: 79.1 cm/sec Ao  mean PG: 3.0 mmHg Ao V2 VTI: 19.8 cm JAEL(I,D): 2.4 cm2 JAEL(V,D): 3.3 cm2 LV V1 max PG: 3.3 mmHg LV V1 max: 90.3 cm/sec LV V1 VTI: 12.3 cm SV(LVOT): 46.7 ml SI(LVOT): 21.9 ml/m2 PA acc time: 0.10 sec AV Juan Carlos Ratio (DI): 0.86 JAEL Index (cm2/m2): 1.1 E/E': 16.7 E/E' av.2 Lateral E/e': 11.8 Medial E/e': 16.7  Peak E' Juan Carlos: 3.4 cm/sec RV S Juan Carlos: 11.1 cm/sec  ______________________________________________________________________________ Report approved by: Eveline Green 2023 09:18 AM       US Upper Extremity Venous Duplex Right    Result Date: 2023  EXAM: US UPPER EXTREMITY VENOUS DUPLEX RIGHT LOCATION: [facility] DATE: 2023 INDICATION: RUE swelling w  concern for DVT (see CTA) COMPARISON: None. TECHNIQUE: Venous Duplex ultrasound of the right upper extremity with (when possible) and without compression, augmentation, and duplex. Color flow and spectral Doppler with waveform analysis performed. FINDINGS: Ultrasound includes evaluation of the internal jugular vein, innominate vein, subclavian vein, axillary vein, and brachial vein. The superficial cephalic and basilic veins were also evaluated where seen. RIGHT: No deep venous thrombosis. No superficial thrombophlebitis. Internal jugular vein, subclavian vein, axillary vein partially decompressed and not well visualized.     IMPRESSION: 1.  No deep venous thrombosis in the right upper extremity.    XR Chest Port 1 View    Result Date: 2023  EXAM: XR CHEST PORT 1 VIEW LOCATION: [facility] DATE: 2023 INDICATION: Evaluate PICC placement COMPARISON: 2022     IMPRESSION: Left PICC tip in upper SVC. Lungs clear. Heart size normal. No pneumothorax.    CTA Chest Abdomen Pelvis Runoff w Contrast    Result Date: 2023  EXAM: CTA CHEST ABDOMEN PELVIS RUNOFF W CONTRAST LOCATION: [facility] DATE/TIME: 2023 6:12 PM CDT INDICATION: Cold right foot, no palpable  pulses, known peripheral arterial disease and new atrial fibrillation A. Fib COMPARISON: 09/27/2022. TECHNIQUE: CT angiogram through the chest, abdomen, pelvis, and bilateral lower extremities was performed during the arterial phase of contrast enhancement. Second phase arterial imaging was performed through the bilateral lower extremities. 2D and 3D reconstructions performed by the CT technologist. Dose reduction techniques were used. CONTRAST: isovue [id number]  75ml from a single use vial FINDINGS: CTA CHEST, ABDOMEN, PELVIS: Normal great vessel branch pattern with mild disease of the subclavian artery. Normal caliber thoracic aorta. Abdominal aorta is widely patent without stenosis or dissection. Moderate calcified atherosclerotic disease without significant visceral arterial stenosis. RIGHT LEG: Iliac arteries appear patent. Due to contrast bolus timing, difficult to evaluate the superficial femoral artery although this appears grossly patent. Severe infrapopliteal disease. LEFT LEG: Postsurgical changes of left below-knee amputation. Iliac arteries, common femoral artery, profunda femoral artery, superficial femoral artery all appear patent. NONVASCULAR FINDINGS: LUNG/PLEURA: Calcified granuloma. Scattered atelectasis. Spiculated appearing lung nodule in the right lower lobe measuring 1.0 x 0.9 cm on series 11 image 166. Punctate nodules in left lung base. No pleural effusion or pneumothorax. MEDIASTINUM: Severe coronary artery disease with coronary artery stents. Questionable left atrial appendage thrombus. Inflammatory changes in the right axilla. ABDOMEN: Suboptimal evaluation of the solid organs due to the arterial phase of contrast enhanced. Cholelithiasis. Spleen, adrenal glands, kidneys, and pancreas are grossly unremarkable. Hepatic steatosis. PELVIS: No abnormally dilated loops of bowel. No free fluid or free air. 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  Postsurgical changes of left below-knee amputation.     IMPRESSION: 1.  Right lower extremity arteries appear largely patent with severe infrapopliteal arterial disease. Evaluation of the superficial femoral artery is difficult due to contrast bolus timing. Recommend Doppler arterial ultrasound if there is continued clinical concern for embolic disease. 2.  Questionable left atrial appendage thrombus. Recommend echocardiogram for further evaluation. 3.  Spiculated pulmonary nodule in the right lower lobe measuring 1.0 x 0.9 cm. Recommend follow-up per Fleischner Society criteria as described below. 4.  Subcutaneous inflammation in the right axilla noted. Consider right upper extremity Doppler to rule out a DVT. May also represent an infectious process. 5.  Cholelithiasis. 6.  Hepatic steatosis. REFERENCE: Guidelines for Management of Incidental Pulmonary Nodules Detected on CT Images: From the Fleischner Society 2017. Guidelines apply to incidental nodules in patients who are 35 years or older. Guidelines do not apply to lung cancer screening, patients with immunosuppression, or patients with known primary cancer. SINGLE NODULE Nodule size less than or equal to 6 mm Low-risk patients: No follow-up needed. High-risk patients: Optional follow-up at 12 months. Nodule size 6-8 mm Low-risk patients: Follow-up CT at 6-12 months, then consider CT at 18-24 months. High-risk patients: Follow-up CT at 6-12 months, then at 18-24 months if no change. Nodule size >8 mm Either low or high-risk patients: Consider CT, PET/CT, or tissue sampling at 3 months.       Latest radiology report personally reviewed.    Note created using dragon voice recognition software so sounds alike errors may have escaped editing.      08/05/2023   Anival Bucio MD  Hospitalist, [facility]  Pager: [phone]

## 2023-08-05 NOTE — PLAN OF CARE
Problem: Pain Acute  Goal: Optimal Pain Control and Function  Intervention: Prevent or Manage Pain  Recent Flowsheet Documentation  Taken 8/4/2023 2003 by Chelo Kowalski RN  Medication Review/Management: medications reviewed  Taken 8/4/2023 1630 by Chelo Kowalski RN  Medication Review/Management: medications reviewed     Problem: Fall Injury Risk  Goal: Absence of Fall and Fall-Related Injury  Intervention: Identify and Manage Contributors  Recent Flowsheet Documentation  Taken 8/4/2023 2003 by Chelo Kowalski RN  Medication Review/Management: medications reviewed  Taken 8/4/2023 1630 by Chelo Kowalski RN  Medication Review/Management: medications reviewed  Intervention: Promote Injury-Free Environment  Recent Flowsheet Documentation  Taken 8/4/2023 2003 by Chelo Kowalski RN  Safety Promotion/Fall Prevention:   room door open   nonskid shoes/slippers when out of bed   safety round/check completed   room organization consistent  Taken 8/4/2023 1630 by Chelo Kowalski RN  Safety Promotion/Fall Prevention:   room door open   nonskid shoes/slippers when out of bed   safety round/check completed   room organization consistent     Problem: Chronic Kidney Disease  Goal: Optimal Coping with Chronic Illness  Outcome: Progressing  Goal: Electrolyte Balance  Outcome: Progressing  Goal: Fluid Balance  Outcome: Progressing  Goal: Optimal Functional Ability  Outcome: Progressing  Intervention: Optimize Functional Ability  Recent Flowsheet Documentation  Taken 8/4/2023 2003 by Chelo Kowalski RN  Activity Management: activity adjusted per tolerance  Taken 8/4/2023 1630 by Chelo Kowalski RN  Activity Management: activity adjusted per tolerance  Goal: Minimize Renal Failure Effects  Intervention: Monitor and Support Renal Function  Recent Flowsheet Documentation  Taken 8/4/2023 2003 by Chelo Kowalski RN  Medication Review/Management: medications  reviewed  Taken 8/4/2023 1630 by Chelo Kowalski RN  Medication Review/Management: medications reviewed     Problem: Chronic Kidney Disease  Goal: Minimize Renal Failure Effects  Intervention: Monitor and Support Renal Function  Recent Flowsheet Documentation  Taken 8/4/2023 2003 by Chelo Kowalski RN  Medication Review/Management: medications reviewed  Taken 8/4/2023 1630 by Chelo Kowalski RN  Medication Review/Management: medications reviewed     Problem: Hypertension Comorbidity  Goal: Blood Pressure in Desired Range  Outcome: Progressing  Intervention: Maintain Blood Pressure Management  Recent Flowsheet Documentation  Taken 8/4/2023 2003 by Chelo Kowalski RN  Medication Review/Management: medications reviewed  Taken 8/4/2023 1630 by Chelo Kowalski RN  Medication Review/Management: medications reviewed   Goal Outcome Evaluation:       Pt is alert and oriented x 4, c/o soreness in the right arm, applied ice pack and repositioned per pt's request. Lung sounds clear, diminished, on RA. HR in the 80's, NSR with inverted TW with occ PVC's. Pt is a nadya lift, stayed in the recliner during dinner time, back to bed around 1945. CHG wipes, and thakur cath cares done. Pt refuse oral cares tonight. Turned and repositioned q 2 -3 hours with 2 assist.

## 2023-08-05 NOTE — PLAN OF CARE
"  Problem: Plan of Care - These are the overarching goals to be used throughout the patient stay.    Goal: Plan of Care Review  Description: The Plan of Care Review/Shift note should be completed every shift.  The Outcome Evaluation is a brief statement about your assessment that the patient is improving, declining, or no change.  This information will be displayed automatically on your shift note.  Outcome: Progressing  Flowsheets (Taken 8/5/2023 1434)  Plan of Care Reviewed With: patient  Goal: Patient-Specific Goal (Individualized)  Description: You can add care plan individualizations to a care plan. Examples of Individualization might be:  \"Parent requests to be called daily at 9am for status\", \"I have a hard time hearing out of my right ear\", or \"Do not touch me to wake me up as it startles me\".  Outcome: Progressing  Goal: Absence of Hospital-Acquired Illness or Injury  Outcome: Progressing  Intervention: Identify and Manage Fall Risk  Recent Flowsheet Documentation  Taken 8/5/2023 0815 by Sulema Deluca RN  Safety Promotion/Fall Prevention:   room door open   nonskid shoes/slippers when out of bed   safety round/check completed   room organization consistent  Intervention: Prevent Skin Injury  Recent Flowsheet Documentation  Taken 8/5/2023 1112 by Sulema Deluca RN  Body Position:   log-rolled   sitting up in bed   supine  Taken 8/5/2023 0815 by Sulema Deluca RN  Body Position:   turned   left  Intervention: Prevent Infection  Recent Flowsheet Documentation  Taken 8/5/2023 0815 by Sulema Deluca RN  Infection Prevention: rest/sleep promoted  Goal: Optimal Comfort and Wellbeing  Outcome: Progressing  Intervention: Provide Person-Centered Care  Recent Flowsheet Documentation  Taken 8/5/2023 0815 by Sulema Deluca RN  Trust Relationship/Rapport: (pt reports abd fullness, will spread out pt's AM meds for better comfort)   care explained   questions encouraged   questions answered   empathic listening provided   " emotional support provided   choices provided   thoughts/feelings acknowledged   reassurance provided  Goal: Readiness for Transition of Care  Outcome: Progressing     Problem: Risk for Delirium  Goal: Optimal Coping  Outcome: Progressing  Goal: Improved Behavioral Control  Outcome: Progressing  Intervention: Minimize Safety Risk  Recent Flowsheet Documentation  Taken 8/5/2023 0815 by Sulema Deluca RN  Enhanced Safety Measures: room near unit station  Trust Relationship/Rapport: (pt reports abd fullness, will spread out pt's AM meds for better comfort)   care explained   questions encouraged   questions answered   empathic listening provided   emotional support provided   choices provided   thoughts/feelings acknowledged   reassurance provided  Goal: Improved Attention and Thought Clarity  Outcome: Progressing  Goal: Improved Sleep  Outcome: Progressing     Problem: Pain Acute  Goal: Optimal Pain Control and Function  Outcome: Progressing  Intervention: Prevent or Manage Pain  Recent Flowsheet Documentation  Taken 8/5/2023 0815 by Sulema Deluca RN  Medication Review/Management: medications reviewed     Problem: Malnutrition  Goal: Improved Nutritional Intake  Outcome: Progressing     Problem: Sepsis/Septic Shock  Goal: Optimal Coping  Outcome: Progressing  Goal: Absence of Bleeding  Outcome: Progressing  Goal: Blood Glucose Level Within Targeted Range  Outcome: Progressing  Goal: Absence of Infection Signs and Symptoms  Outcome: Progressing  Intervention: Initiate Sepsis Management  Recent Flowsheet Documentation  Taken 8/5/2023 0815 by Sulema Deluca RN  Infection Prevention: rest/sleep promoted  Intervention: Promote Recovery  Recent Flowsheet Documentation  Taken 8/5/2023 0815 by Sulema Deluca RN  Activity Management: activity adjusted per tolerance  Goal: Optimal Nutrition Intake  Outcome: Progressing     Problem: Oral Intake Inadequate  Goal: Improved Oral Intake  Outcome: Progressing     Problem: Fall Injury  Risk  Goal: Absence of Fall and Fall-Related Injury  Outcome: Progressing  Intervention: Identify and Manage Contributors  Recent Flowsheet Documentation  Taken 8/5/2023 0815 by Sulema Deluca RN  Medication Review/Management: medications reviewed  Intervention: Promote Injury-Free Environment  Recent Flowsheet Documentation  Taken 8/5/2023 0815 by Sulema Deluca RN  Safety Promotion/Fall Prevention:   room door open   nonskid shoes/slippers when out of bed   safety round/check completed   room organization consistent     Problem: Chronic Kidney Disease  Goal: Optimal Coping with Chronic Illness  Outcome: Progressing  Goal: Electrolyte Balance  Outcome: Progressing  Goal: Fluid Balance  Outcome: Progressing  Goal: Optimal Functional Ability  Outcome: Progressing  Intervention: Optimize Functional Ability  Recent Flowsheet Documentation  Taken 8/5/2023 0815 by Sulema Deluca RN  Activity Management: activity adjusted per tolerance  Goal: Absence of Anemia Signs and Symptoms  Outcome: Progressing  Goal: Optimal Oral Intake  Outcome: Progressing  Goal: Acceptable Pain Control  Outcome: Progressing  Goal: Minimize Renal Failure Effects  Outcome: Progressing  Intervention: Monitor and Support Renal Function  Recent Flowsheet Documentation  Taken 8/5/2023 0815 by Sulema Deluca RN  Medication Review/Management: medications reviewed     Problem: Heart Failure Comorbidity  Goal: Maintenance of Heart Failure Symptom Control  Outcome: Progressing  Intervention: Maintain Heart Failure Management  Recent Flowsheet Documentation  Taken 8/5/2023 0815 by Sulema Deluca RN  Medication Review/Management: medications reviewed     Problem: Diabetes Comorbidity  Goal: Blood Glucose Level Within Targeted Range  Outcome: Progressing     Problem: Hypertension Comorbidity  Goal: Blood Pressure in Desired Range  Outcome: Progressing  Intervention: Maintain Blood Pressure Management  Recent Flowsheet Documentation  Taken 8/5/2023 0815 by Sandrine  Sulema FINLEY RN  Medication Review/Management: medications reviewed   Goal Outcome Evaluation:      Plan of Care Reviewed With: patient  Pt has had an uneventful shift, VSS, NSR, denies pain, tolerated cares well. Assist 2-3 is being used and ceiling lift. Pt states he is unable to wear his prothesis due to proper fit with being able to apply suction/weight to device. Pt verbalized understanding POC.

## 2023-08-06 NOTE — PLAN OF CARE
"  Problem: Plan of Care - These are the overarching goals to be used throughout the patient stay.    Goal: Plan of Care Review  Description: The Plan of Care Review/Shift note should be completed every shift.  The Outcome Evaluation is a brief statement about your assessment that the patient is improving, declining, or no change.  This information will be displayed automatically on your shift note.  Outcome: Progressing  Flowsheets (Taken 8/6/2023 1423)  Plan of Care Reviewed With: patient  Goal: Patient-Specific Goal (Individualized)  Description: You can add care plan individualizations to a care plan. Examples of Individualization might be:  \"Parent requests to be called daily at 9am for status\", \"I have a hard time hearing out of my right ear\", or \"Do not touch me to wake me up as it startles me\".  Outcome: Progressing  Goal: Absence of Hospital-Acquired Illness or Injury  Outcome: Progressing  Intervention: Identify and Manage Fall Risk  Recent Flowsheet Documentation  Taken 8/6/2023 1150 by Sulema Deluca RN  Safety Promotion/Fall Prevention:   room door open   nonskid shoes/slippers when out of bed   safety round/check completed   room organization consistent  Taken 8/6/2023 0758 by Sulema Deluca RN  Safety Promotion/Fall Prevention:   room door open   nonskid shoes/slippers when out of bed   safety round/check completed   room organization consistent  Intervention: Prevent Skin Injury  Recent Flowsheet Documentation  Taken 8/6/2023 1150 by Sulema Deluca RN  Body Position:   turned   left  Taken 8/6/2023 0758 by Sulema Deluca RN  Body Position:   turned   left  Taken 8/6/2023 0752 by Sulema Deluca RN  Body Position: (Pt up in chair at this time) --  Intervention: Prevent Infection  Recent Flowsheet Documentation  Taken 8/6/2023 1150 by Sulema Deluca RN  Infection Prevention: rest/sleep promoted  Taken 8/6/2023 0758 by Sulema Deluca RN  Infection Prevention: rest/sleep promoted  Goal: Optimal Comfort and " Wellbeing  Outcome: Progressing  Intervention: Provide Person-Centered Care  Recent Flowsheet Documentation  Taken 8/6/2023 1150 by Sulema Deluca RN  Trust Relationship/Rapport:   care explained   questions encouraged   questions answered   empathic listening provided   emotional support provided   choices provided   thoughts/feelings acknowledged   reassurance provided  Taken 8/6/2023 0758 by Sulema Deluca RN  Trust Relationship/Rapport:   care explained   questions encouraged   questions answered   empathic listening provided   emotional support provided   choices provided   thoughts/feelings acknowledged   reassurance provided  Goal: Readiness for Transition of Care  Outcome: Progressing     Problem: Risk for Delirium  Goal: Optimal Coping  Outcome: Progressing  Goal: Improved Behavioral Control  Outcome: Progressing  Intervention: Minimize Safety Risk  Recent Flowsheet Documentation  Taken 8/6/2023 1150 by Sulema Deluca RN  Enhanced Safety Measures: room near unit station  Trust Relationship/Rapport:   care explained   questions encouraged   questions answered   empathic listening provided   emotional support provided   choices provided   thoughts/feelings acknowledged   reassurance provided  Taken 8/6/2023 0758 by Sulema Deluca RN  Enhanced Safety Measures: room near unit station  Trust Relationship/Rapport:   care explained   questions encouraged   questions answered   empathic listening provided   emotional support provided   choices provided   thoughts/feelings acknowledged   reassurance provided  Goal: Improved Attention and Thought Clarity  Outcome: Progressing  Goal: Improved Sleep  Outcome: Progressing     Problem: Pain Acute  Goal: Optimal Pain Control and Function  Outcome: Progressing  Intervention: Prevent or Manage Pain  Recent Flowsheet Documentation  Taken 8/6/2023 1150 by Sulema Deluca RN  Medication Review/Management: medications reviewed  Taken 8/6/2023 0758 by Sulema Deluca RN  Medication  Review/Management: medications reviewed     Problem: Malnutrition  Goal: Improved Nutritional Intake  Outcome: Progressing     Problem: Sepsis/Septic Shock  Goal: Optimal Coping  Outcome: Progressing  Goal: Absence of Bleeding  Outcome: Progressing  Goal: Blood Glucose Level Within Targeted Range  Outcome: Progressing  Goal: Absence of Infection Signs and Symptoms  Outcome: Progressing  Intervention: Initiate Sepsis Management  Recent Flowsheet Documentation  Taken 8/6/2023 1150 by Sulema Deluca RN  Infection Prevention: rest/sleep promoted  Taken 8/6/2023 0758 by Sulema Deluca RN  Infection Prevention: rest/sleep promoted  Intervention: Promote Recovery  Recent Flowsheet Documentation  Taken 8/6/2023 1300 by Sulema Deluca RN  Activity Management: activity adjusted per tolerance  Taken 8/6/2023 1150 by Sulema Deluca RN  Activity Management: activity adjusted per tolerance  Taken 8/6/2023 0758 by Sulema Deluca RN  Activity Management: activity adjusted per tolerance  Taken 8/6/2023 0752 by Sulema Deluca RN  Activity Management: activity adjusted per tolerance  Goal: Optimal Nutrition Intake  Outcome: Progressing     Problem: Oral Intake Inadequate  Goal: Improved Oral Intake  Outcome: Progressing     Problem: Fall Injury Risk  Goal: Absence of Fall and Fall-Related Injury  Outcome: Progressing  Intervention: Identify and Manage Contributors  Recent Flowsheet Documentation  Taken 8/6/2023 1150 by Sulema Deluca RN  Medication Review/Management: medications reviewed  Taken 8/6/2023 0758 by Sulema Deluca RN  Medication Review/Management: medications reviewed  Intervention: Promote Injury-Free Environment  Recent Flowsheet Documentation  Taken 8/6/2023 1150 by Sulema Deluca RN  Safety Promotion/Fall Prevention:   room door open   nonskid shoes/slippers when out of bed   safety round/check completed   room organization consistent  Taken 8/6/2023 0758 by Sulema Deluca RN  Safety Promotion/Fall Prevention:   room door open    nonskid shoes/slippers when out of bed   safety round/check completed   room organization consistent     Problem: Chronic Kidney Disease  Goal: Optimal Coping with Chronic Illness  Outcome: Progressing  Goal: Electrolyte Balance  Outcome: Progressing  Goal: Fluid Balance  Outcome: Progressing  Goal: Optimal Functional Ability  Outcome: Progressing  Intervention: Optimize Functional Ability  Recent Flowsheet Documentation  Taken 8/6/2023 1300 by Sulema Deluca RN  Activity Management: activity adjusted per tolerance  Taken 8/6/2023 1150 by Sulema Deluca RN  Activity Management: activity adjusted per tolerance  Taken 8/6/2023 0758 by Sulema Deluca RN  Activity Management: activity adjusted per tolerance  Taken 8/6/2023 0752 by Sulema Deluca RN  Activity Management: activity adjusted per tolerance  Goal: Absence of Anemia Signs and Symptoms  Outcome: Progressing  Goal: Optimal Oral Intake  Outcome: Progressing  Goal: Acceptable Pain Control  Outcome: Progressing  Goal: Minimize Renal Failure Effects  Outcome: Progressing  Intervention: Monitor and Support Renal Function  Recent Flowsheet Documentation  Taken 8/6/2023 1150 by Sulema Deluca RN  Medication Review/Management: medications reviewed  Taken 8/6/2023 0758 by Sulema Deluca RN  Medication Review/Management: medications reviewed     Problem: Diabetes Comorbidity  Goal: Blood Glucose Level Within Targeted Range  Outcome: Progressing     Problem: Heart Failure Comorbidity  Goal: Maintenance of Heart Failure Symptom Control  Outcome: Progressing  Intervention: Maintain Heart Failure Management  Recent Flowsheet Documentation  Taken 8/6/2023 1150 by Sulema Deluca RN  Medication Review/Management: medications reviewed  Taken 8/6/2023 0758 by Sulema Deluca RN  Medication Review/Management: medications reviewed     Problem: Hypertension Comorbidity  Goal: Blood Pressure in Desired Range  Outcome: Progressing  Intervention: Maintain Blood Pressure Management  Recent  Flowsheet Documentation  Taken 8/6/2023 1150 by Sulema Deluca, RN  Medication Review/Management: medications reviewed  Taken 8/6/2023 3059 by Sulema Deluca, RN  Medication Review/Management: medications reviewed   Goal Outcome Evaluation:      Plan of Care Reviewed With: patient      Pt has had an uneventful shift. VSS NSR, denies pain, has tolerated cares and up in chair. CHG bath, leticia & thakur, scalp care was completed.  Pt is now med-surg. Potassium protocol as ordered with re-check in AM. Pt needs encouragement with PO intake. Pt did well this AM with having scheduled meds in two administrations so he could eat more and not have abd fullness from medications. Will continue POC and monitor pt's needs.

## 2023-08-06 NOTE — PLAN OF CARE
Problem: Sepsis/Septic Shock  Goal: Absence of Infection Signs and Symptoms  Outcome: Progressing  Intervention: Initiate Sepsis Management  Recent Flowsheet Documentation  Taken 8/5/2023 1948 by Carolyn Mosley RN  Infection Prevention: rest/sleep promoted  Taken 8/5/2023 1538 by Carolyn Mosley RN  Infection Prevention: rest/sleep promoted  Intervention: Promote Recovery  Recent Flowsheet Documentation  Taken 8/5/2023 1948 by Carolyn Mosley RN  Activity Management: back to bed  Taken 8/5/2023 1538 by Carolyn Mosley RN  Activity Management: activity adjusted per tolerance     Problem: Chronic Kidney Disease  Goal: Optimal Functional Ability  Intervention: Optimize Functional Ability  Recent Flowsheet Documentation  Taken 8/5/2023 1948 by Carolyn Mosley RN  Activity Management: back to bed  Taken 8/5/2023 1538 by Carolyn Mosley RN  Activity Management: activity adjusted per tolerance  Goal: Acceptable Pain Control  Outcome: Progressing  Intervention: Prevent or Manage Pain  Recent Flowsheet Documentation  Taken 8/5/2023 1948 by Carolyn Mosley RN  Pain Management Interventions: repositioned  Goal: Minimize Renal Failure Effects  Intervention: Monitor and Support Renal Function  Recent Flowsheet Documentation  Taken 8/5/2023 1948 by Carolyn Mosley RN  Medication Review/Management: medications reviewed  Taken 8/5/2023 1538 by Carolyn Mosley RN  Medication Review/Management: medications reviewed     Problem: Heart Failure Comorbidity  Goal: Maintenance of Heart Failure Symptom Control  Outcome: Progressing  Intervention: Maintain Heart Failure Management  Recent Flowsheet Documentation  Taken 8/5/2023 1948 by Carolyn Mosley RN  Medication Review/Management: medications reviewed  Taken 8/5/2023 1538 by Carolyn Mosley RN  Medication Review/Management: medications reviewed     Goal Outcome Evaluation:         Minimal pain on right arm, no PRN given. Getting IV ceftriaxone. On oral lasix. Garcia intact with good  urine output. Up to the chair for 4 hours.

## 2023-08-06 NOTE — PLAN OF CARE
Problem: Pain Acute  Goal: Optimal Pain Control and Function  Outcome: Progressing  Intervention: Develop Pain Management Plan  Recent Flowsheet Documentation  Taken 8/6/2023 0405 by Carolyn Mosley, RN  Pain Management Interventions:   declines   repositioned  Taken 8/5/2023 2307 by Carolyn Mosley RN  Pain Management Interventions: cold applied  Intervention: Prevent or Manage Pain  Recent Flowsheet Documentation  Taken 8/5/2023 2307 by Carolyn Mosley RN  Medication Review/Management: medications reviewed       Problem: Heart Failure Comorbidity  Goal: Maintenance of Heart Failure Symptom Control  Outcome: Progressing  Intervention: Maintain Heart Failure Management  Recent Flowsheet Documentation  Taken 8/5/2023 2307 by Carolyn Mosley RN  Medication Review/Management: medications reviewed     Problem: Hypertension Comorbidity  Goal: Blood Pressure in Desired Range  Intervention: Maintain Blood Pressure Management  Recent Flowsheet Documentation  Taken 8/5/2023 2307 by Carolyn Mosley RN  Medication Review/Management: medications reviewed     Goal Outcome Evaluation:         Minimal right arm pain. Elevated and iced. On oral lasix. Garcia with good output. On IV antibiotic.

## 2023-08-06 NOTE — PROGRESS NOTES
Daily Progress Note        CODE STATUS:  Full Code    08/03/23  Assessment/Plan:  Mr. Aguilar is a 63-year-old male with history of CAD on Brilinta, chronic systolic CHF with EF of 30 to 35% prior to admission, CKD 3, history of left foot osteomyelitis status post left BKA, and DM 2 who presents 7/17/2023 with septic shock, NSTEMI, JOHANA and A-fib with RVR.  Patient was stabilized with pressors and transferred out of ICU 7/27/2023     Septic shock: Resolved  Group A strep bacteremia secondary to skin and soft tissue Infection right shoulder.  Concern for toxic shock syndrome  MRI right shoulder 7/19/2023 showed myositis  -Switched to ceftriaxone from penicillin G 7/27/2023  -Completed course of oral linezolid 7/27/2023  -ID consulted. ID recommends 2 weeks of IV antibiotics.   -Anticipate TCU on discharge given need for IV antibiotics for likely 2 weeks depending on clinical course  -Patient already has PICC line in place     NSTEMI  History of CAD and ischemic cardiomyopathy   Prior coronary stents  A-fib with RVR  -Cardiology following  -Continue amiodarone  -Cardiology consulted. Cardiology signed off. Recommendation is to follow up for further ischemic work up when infectious concerns have resolved and renal function improved or when patient is considered ESRD.   -Continue aspirin and Brilinta  -Heparin drip stopped 7/31  -Starting apixaban 8/1, will monitor for bleeding      Acute on chronic systolic CHF exacerbation  -TTE 7/17/2023 shows EF of 25 to 30%  -Continuing intermittent dialysis as below      JOHANA on CKD stage 3: Likely secondary to circulatory shock, requiring intermittent dialysis  -Baseline creatinine 1.4-1.6, 3.25 on admission, peak at 5.22 on 7/21, some fluctuation but now generally improving  -Status post intermittent hemodialysis with evidence of renal recovery.   -Started dialysis 7/21 to 7/24, resumed 7/29 due to increased volume. Dialyzed again 8/4. Kidney function may be recovering per  "nephrology. No HD on weekend. Nephrology will assess on Monday.      Hyponatremia- improved  -Initially severe, 119 on presentation  -We will continue to monitor     Elevated LFTs:   -Thought secondary to shock liver and myositis  -Improved     Acute on anemia of chronic disease  -No overt signs of bleeding, CT abdomen without signs of bleeding  -Remains on heparin drip (held 7/31), heparin with dialysis, ticagrelor  -Transfused 2 units PRBC 7/25 hgb 5.9  -Transfused 1 unit PRBCs 7/28 hgb 6.7  -Transfused 1 unit PRBCs 7/29 Hgb 6.7  -Transfused 1 unit PRBCs 7/31 Hgb 6.8  -Transfuse for hemoglobin less than 7     DM2:  -Lantus increased from 15 to 20 to 30 units at bedtime, sliding scale insulin, and mealtime novolog. Was on 38 unit of lantus and sliding scale insulin at home.   -Monitor blood sugar, adjust insulins accordingly.        Diet: Combination Diet Renal Diet (dialysis); Moderate Consistent Carb (60 g CHO per Meal) Diet  Snacks/Supplements Adult: Glucerna; With Meals    DVT Prophylaxis: DOAC  Garcia Catheter: PRESENT, indication: Strict 1-2 Hour I&O  Lines: PRESENT      PICC 07/18/23 Triple Lumen Left Basilic Vasopressor,Access-Site Assessment: WDL  CVC Double Lumen Right Subclavian Non - tunneled-Site Assessment: WDL      Cardiac Monitoring: None  Code Status: Full Code      Clinically Significant Risk Factors              # Hypoalbuminemia: Lowest albumin = 2.1 g/dL at 7/21/2023  4:38 AM, will monitor as appropriate       # Hypertension: Noted on problem list    # Acute heart failure with reduced ejection fraction: last echo with EF <40% and receiving IV diuretics      # DMII: A1C = 6.9 % (Ref range: <5.7 %) within past 6 months     # Overweight: Estimated body mass index is 26.55 kg/m  as calculated from the following:    Height as of this encounter: 1.88 m (6' 2\").    Weight as of this encounter: 93.8 kg (206 lb 12.7 oz).       # Severe Malnutrition: based on nutrition assessment           Disposition; " LTACH vs TCU.  Barrier to discharge; IV antibitiocs, need to monitor for renal recovery    Subjective:  Interval History: No new issues overnight. Continues to have pain over the right shoulder area that has doughy swelling compared to the left.     Review of Systems:   As mentioned in subjective.    Patient Active Problem List   Diagnosis    DM type 2 on insulin, w Neuro -- Hgb A1C 7.6 on 1/19/22    Benign essential hypertension    Benign neoplasm of descending colon    Gastro-esophageal reflux disease with esophagitis    Microalbuminuria    Hyperlipidemia    PAF (paroxysmal atrial fibrillation) -- on Eliquis     Ischemic cardiomyopathy    CAD -- diffuse calcified vessels 11/16/21 (no stents placed)    DVT (deep venous thrombosis) (H)    Chronic kidney disease (CKD) stage G3a/A1, moderately decreased glomerular filtration rate (GFR) between 45-59 mL/min/1.73 square meter and albuminuria creatinine ratio less than 30 mg/g (H)    Diabetic ulcer of left midfoot associated with type 2 diabetes mellitus, with necrosis of bone (H)    Constipation    Traumatic amputation of toe or toes without complication (H)    Osteomyelitis of left foot -- S/P Left BKA 4/4/22    Amputation of left foot (H)    Chronic systolic CHF -- EF 30-35% on Echo 11/15/21    Smoker (Cigars)    NSTEMI (non-ST elevated myocardial infarction) (H)    ARF (acute renal failure) (H)    Dehydration    Hyponatremia    Elevated troponin    JOHANA (acute kidney injury) (H)    Atrial fibrillation with RVR (H)    Septic shock (H)       Scheduled Meds:   amiodarone  400 mg Oral Daily    apixaban ANTICOAGULANT  5 mg Oral BID    [Held by provider] aspirin  81 mg Oral Daily    cefTRIAXone  2 g Intravenous Q24H    docusate sodium  100 mg Oral BID    furosemide  40 mg Oral BID    insulin aspart   Subcutaneous TID AC    insulin aspart  1-10 Units Subcutaneous TID AC    insulin aspart  1-7 Units Subcutaneous At Bedtime    insulin glargine  30 Units Subcutaneous At Bedtime     magnesium oxide  400 mg Oral Daily    miconazole   Topical BID    multivitamin, therapeutic  1 tablet Oral Daily    sodium chloride (PF)  10-40 mL Intracatheter Q7 Days    sodium chloride (PF)  3 mL Intracatheter Q8H    ticagrelor  90 mg Oral BID    trimethoprim-polymyxin b  2 drop Left Eye 4x Daily    vitamin C  500 mg Oral Daily     Continuous Infusions:   dextrose       PRN Meds:.sodium chloride 0.9%, acetaminophen **OR** acetaminophen, acetaminophen, bisacodyl, dextrose, glucose **OR** dextrose **OR** glucagon, heparin Lock (1000 units/mL High concentration), naloxone **OR** naloxone **OR** naloxone **OR** naloxone, ondansetron **OR** ondansetron, oxyCODONE, polyethylene glycol, prochlorperazine **OR** prochlorperazine **OR** prochlorperazine, selenium sulfide, sodium chloride (PF), sodium chloride (PF), sodium chloride (PF), sodium chloride (PF)    Objective:  Vital signs in last 24 hours:  Temp:  [97.9  F (36.6  C)-98.7  F (37.1  C)] 98.3  F (36.8  C)  Pulse:  [81-84] 84  Resp:  [16-20] 16  BP: (127-147)/(60-74) 138/65  SpO2:  [96 %-100 %] 96 %        Intake/Output Summary (Last 24 hours) at 8/3/2023 0807  Last data filed at 8/3/2023 0702  Gross per 24 hour   Intake 740 ml   Output 5000 ml   Net -4260 ml       Physical Exam:    General: Not in obvious distress.  HEENT: NC, AT   Chest: Clear to auscultation bilaterally  Heart: S1S2 normal, regular. No M/R/G  Abdomen: Soft. NT, ND. Bowel sounds- active.  Extremities: Left BKA. Swelling/induration and tenderness over the right shoulder area.   Neuro: Alert and awake, grossly non-focal      Lab Results:(I have personally reviewed the results)    Recent Results (from the past 24 hour(s))   Glucose by meter    Collection Time: 08/05/23  4:57 PM   Result Value Ref Range    GLUCOSE BY METER POCT 164 (H) 70 - 99 mg/dL   Glucose by meter    Collection Time: 08/05/23  9:09 PM   Result Value Ref Range    GLUCOSE BY METER POCT 239 (H) 70 - 99 mg/dL   Basic metabolic  panel    Collection Time: 08/06/23  5:39 AM   Result Value Ref Range    Sodium 132 (L) 136 - 145 mmol/L    Potassium 3.7 3.4 - 5.3 mmol/L    Chloride 93 (L) 98 - 107 mmol/L    Carbon Dioxide (CO2) 28 22 - 29 mmol/L    Anion Gap 11 7 - 15 mmol/L    Urea Nitrogen 41.9 (H) 8.0 - 23.0 mg/dL    Creatinine 2.06 (H) 0.67 - 1.17 mg/dL    Calcium 10.1 8.8 - 10.2 mg/dL    Glucose 245 (H) 70 - 99 mg/dL    GFR Estimate 35 (L) >60 mL/min/1.73m2   Glucose by meter    Collection Time: 08/06/23  7:14 AM   Result Value Ref Range    GLUCOSE BY METER POCT 209 (H) 70 - 99 mg/dL   Glucose by meter    Collection Time: 08/06/23 11:31 AM   Result Value Ref Range    GLUCOSE BY METER POCT 199 (H) 70 - 99 mg/dL       All laboratory and imaging data in the past 24 hours reviewed  Serum Glucose range:   Recent Labs   Lab 08/06/23  1131 08/06/23  0714 08/06/23  0539 08/05/23  2109   * 209* 245* 239*       ABG: No lab results found in last 7 days.  CBC:   Recent Labs   Lab 08/05/23  0611 08/04/23  1746 08/04/23  0542 08/01/23  1759 08/01/23  0504 07/31/23  1811 07/31/23  0650   WBC  --   --   --   --  6.0  --  5.7   HGB 7.6* 7.8* 7.9*   < > 7.8*  7.8*   < > 6.8*   HCT  --   --   --   --  23.2*  --  20.8*   MCV  --   --   --   --  89  --  90   PLT  --   --  154  --  124*  --  98*    < > = values in this interval not displayed.       Chemistry:   Recent Labs   Lab 08/06/23  0539 08/05/23  0611 08/04/23  1126 08/04/23  0542 07/31/23  1241 07/31/23  0650   * 133*  --  132*   < > 133*   POTASSIUM 3.7 3.8 3.8 3.4   < > 3.3*   CHLORIDE 93* 95*  --  93*   < > 93*   CO2 28 27  --  28   < > 24   BUN 41.9* 37.6*  --  47.8*   < > 80.2*   CR 2.06* 2.06*  --  2.35*   < > 3.19*   GFRESTIMATED 35* 35*  --  30*   < > 21*   YOSVANY 10.1 9.5  --  9.7   < > 8.7*   ALBUMIN  --   --   --   --   --  3.6    < > = values in this interval not displayed.       Coags:  No results for input(s): INR, PROTIME, PTT in the last 168 hours.    Invalid input(s):  APTT  Cardiac Markers:  No results for input(s): CKTOTAL, TROPONINI in the last 168 hours.       CT Abdomen Pelvis w/o Contrast    Result Date: 7/25/2023  EXAM: CT ABDOMEN PELVIS W/O CONTRAST LOCATION: [facility] DATE: 7/25/2023 INDICATION: anemia, ? Retroperitoneal bleed COMPARISON: 07/17/2023 CTA CAP TECHNIQUE: CT scan of the abdomen and pelvis was performed without IV contrast. Multiplanar reformats were obtained. Dose reduction techniques were used. CONTRAST: None. FINDINGS: LOWER CHEST: Dual lumen dialysis catheter tips and PICC tip at RA/SVC junction. Mild generalized cardiac enlargement with extensive three-vessel coronary artery calcification. No pericardial effusion. Decreased density intracardiac blood pool consistent with patient's known nonspecific anemia. Small to moderate pleural effusions, bibasilar atelectasis and interstitial edema have developed. HEPATOBILIARY: Normal liver. No bile duct dilatation. Cholelithiasis unchanged. PANCREAS: Normal. SPLEEN: Normal. ADRENAL GLANDS: Normal. KIDNEYS/BLADDER: Well-positioned Garcia catheter. Kidneys, ureters and bladder are normal. BOWEL: Small volume of ascites has developed. Mild gaseous distention of the stomach and the nondependent small bowel loops and colon could indicate adynamic ileus. No bowel obstruction. No free air. LYMPH NODES: No lymphadenopathy. VASCULATURE: Mild calcified atheromatous plaque throughout the normal caliber abdominal aorta. PELVIC ORGANS: Unremarkable. MUSCULOSKELETAL: Severe generalized subcutaneous edema has developed. No bone lesion or fracture.     IMPRESSION: 1.  No focal hematoma or other signs of bleeding identified. 2.  Severe generalized subcutaneous edema, small volume ascites, small pleural effusions, bibasilar atelectasis and interstitial edema have developed. 3.  Mild gaseous distention of the stomach and the nondependent small bowel loops and colon could indicate adynamic ileus. 4.   Cholelithiasis unchanged.     XR Humerus Right G/E 2 Views    Result Date: 7/22/2023  EXAM: XR HUMERUS RIGHT G/E 2 VIEWS LOCATION: [facility] DATE: 7/22/2023 INDICATION: Pain. COMPARISON: MRI right shoulder 07/19/2023.     IMPRESSION: No acute fracture or dislocation. Significant soft tissue swelling throughout the visualized arm, which may be infectious in the given clinical scenario. No radiographic evidence of osteomyelitis.    IR CVC Non Tunnel Placement > 5 Yrs    Result Date: 7/20/2023  LOCATION: [facility] DATE: 7/20/2023 PROCEDURE: NON-TUNNELED DIALYSIS CATHETER PLACEMENT 1.  Insertion of a non-tunneled central venous catheter. 2.  Ultrasound guidance for vascular access. 3.  Fluoroscopic guidance for central venous access device placement. INTERVENTIONAL RADIOLOGIST: Joseph Campos MD INDICATION: Renal insufficiency requiring renal replacement therapy, plan for nontunneled dialysis catheter placement. CONSENT: The risks, benefits and alternatives of the procedure were discussed with the patient or representative in detail. All questions were answered. Informed consent was given to proceed with the procedure. MODERATE SEDATION: None. CONTRAST: None. ANTIBIOTICS: None. ADDITIONAL MEDICATIONS: None. FLUOROSCOPIC TIME: 0.5 minutes. RADIATION DOSE: Air Kerma: 5 mGy. COMPLICATIONS: No immediate complications. UNIVERSAL PROTOCOL: The operative site was marked and any prior imaging was reviewed. Required items including blood products, implants, devices and special equipment was made available. Patient identity was confirmed either verbally, with demographic information, hospital assigned identification or other identification markers. A timeout was performed immediately prior to the procedure. STERILE BARRIER TECHNIQUE: Maximum sterile barrier technique was used. Cutaneous antisepsis was performed at the operative site with application of 2% chlorhexidine and large  sterile drape. Prior to the procedure, the  and assistant performed hand hygiene and wore hat, mask, sterile gown, and sterile gloves during the entire procedure. PROCEDURE:  Using local anesthesia and real-time ultrasound guidance the right internal jugular vein was accessed. Imaging demonstrates an anechoic and compressible vein. A permanent image was stored to the patient's medical record. Using this access, a 20 cm dialysis catheter was advanced using fluoroscopic guidance until the tip was in the proximal right atrium. FINDINGS: At the completion of the study he radiograph was performed utilizing the in room fluoroscopic unit. Imaging demonstrates the nontunneled dialysis catheter tip terminating in the proximal right atrium.     IMPRESSION:  1.  Successful non-tunneled dialysis catheter placement. 2.  The catheter is ready for use.    MR Shoulder Right w/o Contrast    Result Date: 7/19/2023  EXAM: MR SHOULDER RIGHT W/O CONTRAST LOCATION: [facility] DATE: 7/19/2023 INDICATION: Septic shock with bacteremia, concern for right shoulder osteomyelitis or joint infection. COMPARISON: None. TECHNIQUE: Unenhanced. FINDINGS: ROTATOR CUFF: -Supraspinatus: Moderate severity tendinopathy. No tearing or retraction. No atrophy. -Infraspinatus: Moderate severity infraspinatus tendinopathy without evidence for well-defined partial or full-thickness tearing. No retraction but there is T2 signal abnormality within the muscular portion, which may be due to muscle strain. Infectious or inflammatory myositis could have this appearance. -Subscapularis: No tendon tear, tendinopathy or fatty atrophy. -Teres minor: No tendon tear, tendinopathy or fatty atrophy. CORACOACROMIAL ARCH: -Morphology: Type II acromion. No subacromial spur. Subacromial and subcoracoid space are normal. -Bursa: Fluid in the subacromial subdeltoid bursa. The coracoacromial and coracoclavicular ligaments are negative.  ACROMIOCLAVICULAR JOINT: -Hypertrophic change. No significant diastasis or effusion. LONG HEAD OF BICEPS TENDON: -Mild tendinopathy within the intra-articular portion of the long head of the biceps. GLENOHUMERAL JOINT: -Labrum: No labral tear. No paralabral cyst. -Cartilage: Grade II-grade III cartilage loss both sides of the joint without significant surface irregularity. -Joint space: No effusion or synovitis. -Glenohumeral ligaments and capsule: No pericapsular inflammation. BONES: -No fracture or concerning marrow replacing lesion. No evidence for osteomyelitis. SOFT TISSUES: -Extensive but nonspecific T2 signal abnormality is present within the deltoid musculature, most marked posteriorly but this is also seen within the latissimus muscle, distal aspect of the trapezius, infraspinatus as described, and portions of the subscapularis. The multiplicity of muscle groups involved makes a strain type injury considered unlikely and this is more likely secondary to infectious or inflammatory myositis. There is no evidence for organized fluid collection to suggest abscess.     IMPRESSION: 1.  Extensive signal abnormality within multiple muscles of the shoulder girdle including portions of the visualized latissimus and trapezius muscles. The findings are likely secondary to infectious or inflammatory myositis versus contusion or muscle strain type injury. No evidence for abscess. 2.  No evidence for osteomyelitis or septic arthropathy. 3.  Small amount of fluid within the subacromial subdeltoid bursa. 4.  No evidence for fracture. 5.  Moderate severity tendinopathy within the supraspinatus and infraspinatus. 6.  Tendinopathy within the intraarticular portion of the biceps. 7.  Degenerative change at the glenohumeral joint with grade II-grade III cartilage loss but no significant surface irregularity.    XR Chest Port 1 View    Result Date: 7/18/2023  EXAM: XR CHEST PORT 1 VIEW LOCATION: [facility]  HOSPITAL DATE: 2023 INDICATION: RN placed PICC   verify tip placement COMPARISON: 2023     IMPRESSION: Left upper extremity PICC terminates at the SVC - RA junction. No acute cardiopulmonary abnormality.    Echocardiogram Complete    Result Date: 2023  [id number]^MUMTAZ^ROBERT^[place] [address]  Name: JOSE YO MRN: [id number] : 1960 Study Date: 2023 06:57 AM Age: 63 yrs Gender: Male Patient Location: [facility] Reason For Study: MI Ordering Physician: ROBERT PANDYA Performed By: DENISSE  BSA: 2.1 m2 Height: 72 in Weight: 200 lb HR: 100 BP: 99/66 mmHg ______________________________________________________________________________ Procedure Complete Echo Adult. Definity (NDC #36413-015) given intravenously. ______________________________________________________________________________ Interpretation Summary  Left ventricular function is decreased. The ejection fraction is 25-30% (severely reduced). The left atrium is mildly dilated. IVC diameter <2.1 cm collapsing >50% with sniff suggests a normal RA pressure of 3 mmHg. TAPSE is normal, which is consistent with normal right ventricular systolic function. The patient exhibited frequent PVCs. No hemodynamically significant valvular abnormalities on 2D or color flow imaging. The study was technically difficult. ______________________________________________________________________________ Left Ventricle The left ventricle is normal in size. Left ventricular function is decreased. The ejection fraction is 25-30% (severely reduced). There is mild concentric left ventricular hypertrophy. Diastolic function not assessed due to arrhythmia. There is severe global hypokinesia of the left ventricle.  Right Ventricle Normal right ventricle size and systolic function. TAPSE is normal, which is consistent with normal right ventricular systolic function.  Atria The left atrium is mildly  dilated. Right atrial size is normal. There is no color Doppler evidence of an atrial shunt.  Mitral Valve The mitral valve is not well visualized. The mitral valve leaflets are mildly thickened. There is no mitral regurgitation noted. There is no mitral valve stenosis.  Tricuspid Valve The tricuspid valve is not well visualized. Right ventricular systolic pressure could not be approximated due to inadequate tricuspid regurgitation.  Aortic Valve The aortic valve is not well visualized. Mild aortic valve calcification is present. No hemodynamically significant valvular aortic stenosis.  Pulmonic Valve The pulmonic valve is not well visualized.  Vessels Normal size ascending aorta. IVC diameter <2.1 cm collapsing >50% with sniff suggests a normal RA pressure of 3 mmHg.  Pericardium There is no pericardial effusion.  Rhythm The patient exhibited frequent PVCs. ______________________________________________________________________________ MMode/2D Measurements & Calculations  IVSd: 1.3 cm LVIDd: 5.4 cm LVIDs: 4.6 cm LVPWd: 1.2 cm FS: 14.8 % LV mass(C)d: 279.8 grams LV mass(C)dI: 131.3 grams/m2 Ao root diam: 3.4 cm asc Aorta Diam: 3.5 cm LVOT diam: 2.2 cm LVOT area: 3.8 cm2 LA Volume Indexed (AL/bp): 34.2 ml/m2 RV Base: 3.9 cm RWT: 0.44  TAPSE: 1.9 cm  Doppler Measurements & Calculations MV E max juan carlos: 56.3 cm/sec MV A max juan carlos: 71.5 cm/sec MV E/A: 0.79 MV max P.6 mmHg MV mean P.0 mmHg MV V2 VTI: 14.0 cm MVA(VTI): 3.3 cm2 MV dec slope: 391.0 cm/sec2 MV dec time: 0.14 sec Ao V2 max: 105.3 cm/sec Ao max P.0 mmHg Ao V2 mean: 79.1 cm/sec Ao mean PG: 3.0 mmHg Ao V2 VTI: 19.8 cm JAEL(I,D): 2.4 cm2 JAEL(V,D): 3.3 cm2 LV V1 max PG: 3.3 mmHg LV V1 max: 90.3 cm/sec LV V1 VTI: 12.3 cm SV(LVOT): 46.7 ml SI(LVOT): 21.9 ml/m2 PA acc time: 0.10 sec AV Juan Carlos Ratio (DI): 0.86 JAEL Index (cm2/m2): 1.1 E/E': 16.7 E/E' av.2 Lateral E/e': 11.8 Medial E/e': 16.7  Peak E' Juan Carlos: 3.4 cm/sec RV S Juan Carlos: 11.1 cm/sec   ______________________________________________________________________________ Report approved by: Eveline Green 07/18/2023 09:18 AM       US Upper Extremity Venous Duplex Right    Result Date: 7/18/2023  EXAM: US UPPER EXTREMITY VENOUS DUPLEX RIGHT LOCATION: [facility] DATE: 7/18/2023 INDICATION: RUE swelling w  concern for DVT (see CTA) COMPARISON: None. TECHNIQUE: Venous Duplex ultrasound of the right upper extremity with (when possible) and without compression, augmentation, and duplex. Color flow and spectral Doppler with waveform analysis performed. FINDINGS: Ultrasound includes evaluation of the internal jugular vein, innominate vein, subclavian vein, axillary vein, and brachial vein. The superficial cephalic and basilic veins were also evaluated where seen. RIGHT: No deep venous thrombosis. No superficial thrombophlebitis. Internal jugular vein, subclavian vein, axillary vein partially decompressed and not well visualized.     IMPRESSION: 1.  No deep venous thrombosis in the right upper extremity.    XR Chest Port 1 View    Result Date: 7/18/2023  EXAM: XR CHEST PORT 1 VIEW LOCATION: [facility] DATE: 7/17/2023 INDICATION: Evaluate PICC placement COMPARISON: 09/20/2022     IMPRESSION: Left PICC tip in upper SVC. Lungs clear. Heart size normal. No pneumothorax.    CTA Chest Abdomen Pelvis Runoff w Contrast    Result Date: 7/17/2023  EXAM: CTA CHEST ABDOMEN PELVIS RUNOFF W CONTRAST LOCATION: [facility] DATE/TIME: 7/17/2023 6:12 PM CDT INDICATION: Cold right foot, no palpable pulses, known peripheral arterial disease and new atrial fibrillation A. Fib COMPARISON: 09/27/2022. TECHNIQUE: CT angiogram through the chest, abdomen, pelvis, and bilateral lower extremities was performed during the arterial phase of contrast enhancement. Second phase arterial imaging was performed through the bilateral lower extremities. 2D and 3D  reconstructions performed by the CT technologist. Dose reduction techniques were used. CONTRAST: isovue 370  75ml from a single use vial FINDINGS: CTA CHEST, ABDOMEN, PELVIS: Normal great vessel branch pattern with mild disease of the subclavian artery. Normal caliber thoracic aorta. Abdominal aorta is widely patent without stenosis or dissection. Moderate calcified atherosclerotic disease without significant visceral arterial stenosis. RIGHT LEG: Iliac arteries appear patent. Due to contrast bolus timing, difficult to evaluate the superficial femoral artery although this appears grossly patent. Severe infrapopliteal disease. LEFT LEG: Postsurgical changes of left below-knee amputation. Iliac arteries, common femoral artery, profunda femoral artery, superficial femoral artery all appear patent. NONVASCULAR FINDINGS: LUNG/PLEURA: Calcified granuloma. Scattered atelectasis. Spiculated appearing lung nodule in the right lower lobe measuring 1.0 x 0.9 cm on series 11 image 166. Punctate nodules in left lung base. No pleural effusion or pneumothorax. MEDIASTINUM: Severe coronary artery disease with coronary artery stents. Questionable left atrial appendage thrombus. Inflammatory changes in the right axilla. ABDOMEN: Suboptimal evaluation of the solid organs due to the arterial phase of contrast enhanced. Cholelithiasis. Spleen, adrenal glands, kidneys, and pancreas are grossly unremarkable. Hepatic steatosis. PELVIS: No abnormally dilated loops of bowel. No free fluid or free air. 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 Postsurgical changes of left below-knee amputation.     IMPRESSION: 1.  Right lower extremity arteries appear largely patent with severe infrapopliteal arterial disease. Evaluation of the superficial femoral artery is difficult due to contrast bolus timing. Recommend Doppler arterial ultrasound if there is continued clinical concern for embolic disease. 2.   Questionable left atrial appendage thrombus. Recommend echocardiogram for further evaluation. 3.  Spiculated pulmonary nodule in the right lower lobe measuring 1.0 x 0.9 cm. Recommend follow-up per Fleischner Society criteria as described below. 4.  Subcutaneous inflammation in the right axilla noted. Consider right upper extremity Doppler to rule out a DVT. May also represent an infectious process. 5.  Cholelithiasis. 6.  Hepatic steatosis. REFERENCE: Guidelines for Management of Incidental Pulmonary Nodules Detected on CT Images: From the Fleischner Society 2017. Guidelines apply to incidental nodules in patients who are 35 years or older. Guidelines do not apply to lung cancer screening, patients with immunosuppression, or patients with known primary cancer. SINGLE NODULE Nodule size less than or equal to 6 mm Low-risk patients: No follow-up needed. High-risk patients: Optional follow-up at 12 months. Nodule size 6-8 mm Low-risk patients: Follow-up CT at 6-12 months, then consider CT at 18-24 months. High-risk patients: Follow-up CT at 6-12 months, then at 18-24 months if no change. Nodule size >8 mm Either low or high-risk patients: Consider CT, PET/CT, or tissue sampling at 3 months.       Latest radiology report personally reviewed.    Note created using dragon voice recognition software so sounds alike errors may have escaped editing.      08/06/2023   Anival Bucio MD  Hospitalist, [facility]  Pager: [phone]

## 2023-08-07 NOTE — PROGRESS NOTES
Daily Progress Note        CODE STATUS:  Full Code    08/03/23  Assessment/Plan:  Mr. Aguilar is a 63-year-old male with history of CAD on Brilinta, chronic systolic CHF with EF of 30 to 35% prior to admission, CKD 3, history of left foot osteomyelitis status post left BKA, and DM 2 who presents 7/17/2023 with septic shock, NSTEMI, JOHANA and A-fib with RVR.  Patient was stabilized with pressors and transferred out of ICU 7/27/2023     Septic shock: Resolved  Group A strep bacteremia secondary to skin and soft tissue Infection right shoulder.  Concern for toxic shock syndrome  MRI right shoulder 7/19/2023 showed myositis  -Switched to ceftriaxone from penicillin G 7/27/2023  -Completed course of oral linezolid 7/27/2023  -ID consulted. ID recommends 2 weeks of IV antibiotics.   -Anticipate TCU on discharge given need for IV antibiotics for likely 2 weeks depending on clinical course  -Patient already has PICC line in place     NSTEMI  History of CAD and ischemic cardiomyopathy   Prior coronary stents  A-fib with RVR  -Cardiology consulted  -Continue amiodarone  -Cardiology signed off. Recommendation is to follow up for further ischemic work up when infectious concerns have resolved and renal function improved or when patient is considered ESRD.   -Continue aspirin and Brilinta  -Heparin drip stopped 7/31  -Started apixaban 8/1, will monitor for bleeding      Acute on chronic systolic CHF exacerbation  -TTE 7/17/2023 shows EF of 25 to 30%  -Cont lasix PO  -Continuing intermittent dialysis as below      JOHANA on CKD stage 3: Likely secondary to circulatory shock, requiring intermittent dialysis  -Baseline creatinine 1.4-1.6, 3.25 on admission, peak at 5.22 on 7/21, some fluctuation but now generally improving  -Status post intermittent hemodialysis with evidence of renal recovery.   -Started dialysis 7/21 to 7/24, resumed 7/29 due to increased volume. Dialyzed again 8/4. Kidney function may be recovering. Keep the  "dialysis cath in for now. No hemodialysis today. Will re-assess tomorrow     Hyponatremia- improved  -Initially severe, 119 on presentation  -We will continue to monitor     Elevated LFTs:   -Thought secondary to shock liver and myositis  -Improved     Acute on anemia of chronic disease  -No overt signs of bleeding, CT abdomen without signs of bleeding  -Remains on heparin drip (held 7/31), heparin with dialysis, ticagrelor  -Transfused 2 units PRBC 7/25 hgb 5.9  -Transfused 1 unit PRBCs 7/28 hgb 6.7  -Transfused 1 unit PRBCs 7/29 Hgb 6.7  -Transfused 1 unit PRBCs 7/31 Hgb 6.8  -Transfuse for hemoglobin less than 7     DM2:  -Lantus increased from 15 to 20 to 30 units at bedtime, sliding scale insulin, and mealtime novolog. Was on 38 unit of lantus and sliding scale insulin at home.   -Monitor blood sugar, adjust insulins accordingly.        Diet: Combination Diet Renal Diet (dialysis); Moderate Consistent Carb (60 g CHO per Meal) Diet  Snacks/Supplements Adult: Glucerna; With Meals    DVT Prophylaxis: DOAC  Garcia Catheter: PRESENT, indication: Strict 1-2 Hour I&O  Lines: PRESENT      PICC 07/18/23 Triple Lumen Left Basilic Vasopressor,Access-Site Assessment: WDL  CVC Double Lumen Right Subclavian Non - tunneled-Site Assessment: WDL      Cardiac Monitoring: None  Code Status: Full Code      Clinically Significant Risk Factors           # Hypercalcemia: Highest Ca = 10.3 mg/dL in last 2 days, will monitor as appropriate    # Hypoalbuminemia: Lowest albumin = 2.1 g/dL at 7/21/2023  4:38 AM, will monitor as appropriate       # Hypertension: Noted on problem list    # Chronic heart failure with reduced ejection fraction: last echo with EF <40%      # DMII: A1C = 6.9 % (Ref range: <5.7 %) within past 6 months     # Overweight: Estimated body mass index is 26.55 kg/m  as calculated from the following:    Height as of this encounter: 1.88 m (6' 2\").    Weight as of this encounter: 93.8 kg (206 lb 12.7 oz).       # Severe " Malnutrition: based on nutrition assessment           Disposition; LTACH vs TCU.  Barrier to discharge; IV antibitiocs, need to monitor for renal recovery    Subjective:  Interval History: No new issues overnight. He appears to be doing better. Denies any shortness of breath or chest pain. No fevers or chills.     Review of Systems:   As mentioned in subjective.    Patient Active Problem List   Diagnosis    DM type 2 on insulin, w Neuro -- Hgb A1C 7.6 on 1/19/22    Benign essential hypertension    Benign neoplasm of descending colon    Gastro-esophageal reflux disease with esophagitis    Microalbuminuria    Hyperlipidemia    PAF (paroxysmal atrial fibrillation) -- on Eliquis     Ischemic cardiomyopathy    CAD -- diffuse calcified vessels 11/16/21 (no stents placed)    DVT (deep venous thrombosis) (H)    Chronic kidney disease (CKD) stage G3a/A1, moderately decreased glomerular filtration rate (GFR) between 45-59 mL/min/1.73 square meter and albuminuria creatinine ratio less than 30 mg/g (H)    Diabetic ulcer of left midfoot associated with type 2 diabetes mellitus, with necrosis of bone (H)    Constipation    Traumatic amputation of toe or toes without complication (H)    Osteomyelitis of left foot -- S/P Left BKA 4/4/22    Amputation of left foot (H)    Chronic systolic CHF -- EF 30-35% on Echo 11/15/21    Smoker (Cigars)    NSTEMI (non-ST elevated myocardial infarction) (H)    ARF (acute renal failure) (H)    Dehydration    Hyponatremia    Elevated troponin    JOHANA (acute kidney injury) (H)    Atrial fibrillation with RVR (H)    Septic shock (H)       Scheduled Meds:   amiodarone  400 mg Oral Daily    apixaban ANTICOAGULANT  5 mg Oral BID    [Held by provider] aspirin  81 mg Oral Daily    cefTRIAXone  2 g Intravenous Q24H    docusate sodium  100 mg Oral BID    furosemide  40 mg Oral BID    insulin aspart   Subcutaneous TID AC    insulin aspart  1-10 Units Subcutaneous TID AC    insulin aspart  1-7 Units  Subcutaneous At Bedtime    insulin glargine  30 Units Subcutaneous At Bedtime    magnesium oxide  400 mg Oral Daily    miconazole   Topical BID    multivitamin, therapeutic  1 tablet Oral Daily    sodium chloride (PF)  10-40 mL Intracatheter Q7 Days    sodium chloride (PF)  3 mL Intracatheter Q8H    ticagrelor  90 mg Oral BID    trimethoprim-polymyxin b  2 drop Left Eye 4x Daily    vitamin C  500 mg Oral Daily     Continuous Infusions:   dextrose       PRN Meds:.sodium chloride 0.9%, acetaminophen **OR** acetaminophen, acetaminophen, bisacodyl, dextrose, glucose **OR** dextrose **OR** glucagon, heparin Lock (1000 units/mL High concentration), naloxone **OR** naloxone **OR** naloxone **OR** naloxone, ondansetron **OR** ondansetron, oxyCODONE, polyethylene glycol, prochlorperazine **OR** prochlorperazine **OR** prochlorperazine, selenium sulfide, sodium chloride (PF), sodium chloride (PF), sodium chloride (PF)    Objective:  Vital signs in last 24 hours:  Temp:  [98  F (36.7  C)-98.6  F (37  C)] 98  F (36.7  C)  Pulse:  [86-88] 86  Resp:  [18] 18  BP: (126-145)/(65-67) 145/65  SpO2:  [96 %-100 %] 98 %        Intake/Output Summary (Last 24 hours) at 8/3/2023 0807  Last data filed at 8/3/2023 0702  Gross per 24 hour   Intake 740 ml   Output 5000 ml   Net -4260 ml       Physical Exam:    General: Not in obvious distress.  HEENT: NC, AT   Chest: Clear to auscultation bilaterally  Heart: S1S2 normal, regular. No M/R/G  Abdomen: Soft. NT, ND. Bowel sounds- active.  Extremities: Left BKA. Swelling/induration and tenderness over the right shoulder area.   Neuro: Alert and awake, grossly non-focal      Lab Results:(I have personally reviewed the results)    Recent Results (from the past 24 hour(s))   Glucose by meter    Collection Time: 08/06/23  5:38 PM   Result Value Ref Range    GLUCOSE BY METER POCT 195 (H) 70 - 99 mg/dL   Glucose by meter    Collection Time: 08/06/23  9:08 PM   Result Value Ref Range    GLUCOSE BY METER  POCT 208 (H) 70 - 99 mg/dL   Platelet count    Collection Time: 08/07/23  6:49 AM   Result Value Ref Range    Platelet Count 197 150 - 450 10e3/uL   Basic metabolic panel    Collection Time: 08/07/23  6:49 AM   Result Value Ref Range    Sodium 131 (L) 136 - 145 mmol/L    Potassium 3.8 3.4 - 5.3 mmol/L    Chloride 91 (L) 98 - 107 mmol/L    Carbon Dioxide (CO2) 29 22 - 29 mmol/L    Anion Gap 11 7 - 15 mmol/L    Urea Nitrogen 46.8 (H) 8.0 - 23.0 mg/dL    Creatinine 2.06 (H) 0.67 - 1.17 mg/dL    Calcium 10.3 (H) 8.8 - 10.2 mg/dL    Glucose 244 (H) 70 - 99 mg/dL    GFR Estimate 35 (L) >60 mL/min/1.73m2   Glucose by meter    Collection Time: 08/07/23  8:47 AM   Result Value Ref Range    GLUCOSE BY METER POCT 235 (H) 70 - 99 mg/dL   Glucose by meter    Collection Time: 08/07/23 11:12 AM   Result Value Ref Range    GLUCOSE BY METER POCT 275 (H) 70 - 99 mg/dL       All laboratory and imaging data in the past 24 hours reviewed  Serum Glucose range:   Recent Labs   Lab 08/07/23  1112 08/07/23  0847 08/07/23  0649 08/06/23  2108   * 235* 244* 208*       ABG: No lab results found in last 7 days.  CBC:   Recent Labs   Lab 08/07/23  0649 08/05/23  0611 08/04/23  1746 08/04/23  0542 08/01/23  1759 08/01/23  0504   WBC  --   --   --   --   --  6.0   HGB  --  7.6* 7.8* 7.9*   < > 7.8*  7.8*   HCT  --   --   --   --   --  23.2*   MCV  --   --   --   --   --  89     --   --  154  --  124*    < > = values in this interval not displayed.       Chemistry:   Recent Labs   Lab 08/07/23  0649 08/06/23  0539 08/05/23  0611   * 132* 133*   POTASSIUM 3.8 3.7 3.8   CHLORIDE 91* 93* 95*   CO2 29 28 27   BUN 46.8* 41.9* 37.6*   CR 2.06* 2.06* 2.06*   GFRESTIMATED 35* 35* 35*   YOSVANY 10.3* 10.1 9.5       Coags:  No results for input(s): INR, PROTIME, PTT in the last 168 hours.    Invalid input(s): APTT  Cardiac Markers:  No results for input(s): CKTOTAL, TROPONINI in the last 168 hours.       CT Abdomen Pelvis w/o  Contrast    Result Date: 7/25/2023  EXAM: CT ABDOMEN PELVIS W/O CONTRAST LOCATION: [facility] DATE: 7/25/2023 INDICATION: anemia, ? Retroperitoneal bleed COMPARISON: 07/17/2023 CTA CAP TECHNIQUE: CT scan of the abdomen and pelvis was performed without IV contrast. Multiplanar reformats were obtained. Dose reduction techniques were used. CONTRAST: None. FINDINGS: LOWER CHEST: Dual lumen dialysis catheter tips and PICC tip at RA/SVC junction. Mild generalized cardiac enlargement with extensive three-vessel coronary artery calcification. No pericardial effusion. Decreased density intracardiac blood pool consistent with patient's known nonspecific anemia. Small to moderate pleural effusions, bibasilar atelectasis and interstitial edema have developed. HEPATOBILIARY: Normal liver. No bile duct dilatation. Cholelithiasis unchanged. PANCREAS: Normal. SPLEEN: Normal. ADRENAL GLANDS: Normal. KIDNEYS/BLADDER: Well-positioned Garcia catheter. Kidneys, ureters and bladder are normal. BOWEL: Small volume of ascites has developed. Mild gaseous distention of the stomach and the nondependent small bowel loops and colon could indicate adynamic ileus. No bowel obstruction. No free air. LYMPH NODES: No lymphadenopathy. VASCULATURE: Mild calcified atheromatous plaque throughout the normal caliber abdominal aorta. PELVIC ORGANS: Unremarkable. MUSCULOSKELETAL: Severe generalized subcutaneous edema has developed. No bone lesion or fracture.     IMPRESSION: 1.  No focal hematoma or other signs of bleeding identified. 2.  Severe generalized subcutaneous edema, small volume ascites, small pleural effusions, bibasilar atelectasis and interstitial edema have developed. 3.  Mild gaseous distention of the stomach and the nondependent small bowel loops and colon could indicate adynamic ileus. 4.  Cholelithiasis unchanged.     XR Humerus Right G/E 2 Views    Result Date: 7/22/2023  EXAM: XR HUMERUS RIGHT G/E 2 VIEWS  LOCATION: [facility] DATE: 7/22/2023 INDICATION: Pain. COMPARISON: MRI right shoulder 07/19/2023.     IMPRESSION: No acute fracture or dislocation. Significant soft tissue swelling throughout the visualized arm, which may be infectious in the given clinical scenario. No radiographic evidence of osteomyelitis.    IR CVC Non Tunnel Placement > 5 Yrs    Result Date: 7/20/2023  LOCATION: [facility] DATE: 7/20/2023 PROCEDURE: NON-TUNNELED DIALYSIS CATHETER PLACEMENT 1.  Insertion of a non-tunneled central venous catheter. 2.  Ultrasound guidance for vascular access. 3.  Fluoroscopic guidance for central venous access device placement. INTERVENTIONAL RADIOLOGIST: Joseph Campos MD INDICATION: Renal insufficiency requiring renal replacement therapy, plan for nontunneled dialysis catheter placement. CONSENT: The risks, benefits and alternatives of the procedure were discussed with the patient or representative in detail. All questions were answered. Informed consent was given to proceed with the procedure. MODERATE SEDATION: None. CONTRAST: None. ANTIBIOTICS: None. ADDITIONAL MEDICATIONS: None. FLUOROSCOPIC TIME: 0.5 minutes. RADIATION DOSE: Air Kerma: 5 mGy. COMPLICATIONS: No immediate complications. UNIVERSAL PROTOCOL: The operative site was marked and any prior imaging was reviewed. Required items including blood products, implants, devices and special equipment was made available. Patient identity was confirmed either verbally, with demographic information, hospital assigned identification or other identification markers. A timeout was performed immediately prior to the procedure. STERILE BARRIER TECHNIQUE: Maximum sterile barrier technique was used. Cutaneous antisepsis was performed at the operative site with application of 2% chlorhexidine and large sterile drape. Prior to the procedure, the  and assistant performed hand hygiene and wore hat, mask, sterile  gown, and sterile gloves during the entire procedure. PROCEDURE:  Using local anesthesia and real-time ultrasound guidance the right internal jugular vein was accessed. Imaging demonstrates an anechoic and compressible vein. A permanent image was stored to the patient's medical record. Using this access, a 20 cm dialysis catheter was advanced using fluoroscopic guidance until the tip was in the proximal right atrium. FINDINGS: At the completion of the study he radiograph was performed utilizing the in room fluoroscopic unit. Imaging demonstrates the nontunneled dialysis catheter tip terminating in the proximal right atrium.     IMPRESSION:  1.  Successful non-tunneled dialysis catheter placement. 2.  The catheter is ready for use.    MR Shoulder Right w/o Contrast    Result Date: 7/19/2023  EXAM: MR SHOULDER RIGHT W/O CONTRAST LOCATION: [facility] DATE: 7/19/2023 INDICATION: Septic shock with bacteremia, concern for right shoulder osteomyelitis or joint infection. COMPARISON: None. TECHNIQUE: Unenhanced. FINDINGS: ROTATOR CUFF: -Supraspinatus: Moderate severity tendinopathy. No tearing or retraction. No atrophy. -Infraspinatus: Moderate severity infraspinatus tendinopathy without evidence for well-defined partial or full-thickness tearing. No retraction but there is T2 signal abnormality within the muscular portion, which may be due to muscle strain. Infectious or inflammatory myositis could have this appearance. -Subscapularis: No tendon tear, tendinopathy or fatty atrophy. -Teres minor: No tendon tear, tendinopathy or fatty atrophy. CORACOACROMIAL ARCH: -Morphology: Type II acromion. No subacromial spur. Subacromial and subcoracoid space are normal. -Bursa: Fluid in the subacromial subdeltoid bursa. The coracoacromial and coracoclavicular ligaments are negative. ACROMIOCLAVICULAR JOINT: -Hypertrophic change. No significant diastasis or effusion. LONG HEAD OF BICEPS TENDON: -Mild  tendinopathy within the intra-articular portion of the long head of the biceps. GLENOHUMERAL JOINT: -Labrum: No labral tear. No paralabral cyst. -Cartilage: Grade II-grade III cartilage loss both sides of the joint without significant surface irregularity. -Joint space: No effusion or synovitis. -Glenohumeral ligaments and capsule: No pericapsular inflammation. BONES: -No fracture or concerning marrow replacing lesion. No evidence for osteomyelitis. SOFT TISSUES: -Extensive but nonspecific T2 signal abnormality is present within the deltoid musculature, most marked posteriorly but this is also seen within the latissimus muscle, distal aspect of the trapezius, infraspinatus as described, and portions of the subscapularis. The multiplicity of muscle groups involved makes a strain type injury considered unlikely and this is more likely secondary to infectious or inflammatory myositis. There is no evidence for organized fluid collection to suggest abscess.     IMPRESSION: 1.  Extensive signal abnormality within multiple muscles of the shoulder girdle including portions of the visualized latissimus and trapezius muscles. The findings are likely secondary to infectious or inflammatory myositis versus contusion or muscle strain type injury. No evidence for abscess. 2.  No evidence for osteomyelitis or septic arthropathy. 3.  Small amount of fluid within the subacromial subdeltoid bursa. 4.  No evidence for fracture. 5.  Moderate severity tendinopathy within the supraspinatus and infraspinatus. 6.  Tendinopathy within the intraarticular portion of the biceps. 7.  Degenerative change at the glenohumeral joint with grade II-grade III cartilage loss but no significant surface irregularity.    XR Chest Port 1 View    Result Date: 7/18/2023  EXAM: XR CHEST PORT 1 VIEW LOCATION: [facility] DATE: 7/18/2023 INDICATION: RN placed PICC   verify tip placement COMPARISON: 07/17/2023     IMPRESSION: Left upper  extremity PICC terminates at the SVC - RA junction. No acute cardiopulmonary abnormality.    Echocardiogram Complete    Result Date: 2023  [id number]^MUMTAZ^ROBERT^[place] [address]  Name: JOSE YO MRN: [id number] : 1960 Study Date: 2023 06:57 AM Age: 63 yrs Gender: Male Patient Location: [facility] Reason For Study: MI Ordering Physician: ROBERT PANDYA Performed By: DENISSE  BSA: 2.1 m2 Height: 72 in Weight: 200 lb HR: 100 BP: 99/66 mmHg ______________________________________________________________________________ Procedure Complete Echo Adult. Definity (NDC #30279-538) given intravenously. ______________________________________________________________________________ Interpretation Summary  Left ventricular function is decreased. The ejection fraction is 25-30% (severely reduced). The left atrium is mildly dilated. IVC diameter <2.1 cm collapsing >50% with sniff suggests a normal RA pressure of 3 mmHg. TAPSE is normal, which is consistent with normal right ventricular systolic function. The patient exhibited frequent PVCs. No hemodynamically significant valvular abnormalities on 2D or color flow imaging. The study was technically difficult. ______________________________________________________________________________ Left Ventricle The left ventricle is normal in size. Left ventricular function is decreased. The ejection fraction is 25-30% (severely reduced). There is mild concentric left ventricular hypertrophy. Diastolic function not assessed due to arrhythmia. There is severe global hypokinesia of the left ventricle.  Right Ventricle Normal right ventricle size and systolic function. TAPSE is normal, which is consistent with normal right ventricular systolic function.  Atria The left atrium is mildly dilated. Right atrial size is normal. There is no color Doppler evidence of an atrial shunt.  Mitral Valve The mitral valve is  not well visualized. The mitral valve leaflets are mildly thickened. There is no mitral regurgitation noted. There is no mitral valve stenosis.  Tricuspid Valve The tricuspid valve is not well visualized. Right ventricular systolic pressure could not be approximated due to inadequate tricuspid regurgitation.  Aortic Valve The aortic valve is not well visualized. Mild aortic valve calcification is present. No hemodynamically significant valvular aortic stenosis.  Pulmonic Valve The pulmonic valve is not well visualized.  Vessels Normal size ascending aorta. IVC diameter <2.1 cm collapsing >50% with sniff suggests a normal RA pressure of 3 mmHg.  Pericardium There is no pericardial effusion.  Rhythm The patient exhibited frequent PVCs. ______________________________________________________________________________ MMode/2D Measurements & Calculations  IVSd: 1.3 cm LVIDd: 5.4 cm LVIDs: 4.6 cm LVPWd: 1.2 cm FS: 14.8 % LV mass(C)d: 279.8 grams LV mass(C)dI: 131.3 grams/m2 Ao root diam: 3.4 cm asc Aorta Diam: 3.5 cm LVOT diam: 2.2 cm LVOT area: 3.8 cm2 LA Volume Indexed (AL/bp): 34.2 ml/m2 RV Base: 3.9 cm RWT: 0.44  TAPSE: 1.9 cm  Doppler Measurements & Calculations MV E max juan carlos: 56.3 cm/sec MV A max juan carlos: 71.5 cm/sec MV E/A: 0.79 MV max P.6 mmHg MV mean P.0 mmHg MV V2 VTI: 14.0 cm MVA(VTI): 3.3 cm2 MV dec slope: 391.0 cm/sec2 MV dec time: 0.14 sec Ao V2 max: 105.3 cm/sec Ao max P.0 mmHg Ao V2 mean: 79.1 cm/sec Ao mean PG: 3.0 mmHg Ao V2 VTI: 19.8 cm JAEL(I,D): 2.4 cm2 JAEL(V,D): 3.3 cm2 LV V1 max PG: 3.3 mmHg LV V1 max: 90.3 cm/sec LV V1 VTI: 12.3 cm SV(LVOT): 46.7 ml SI(LVOT): 21.9 ml/m2 PA acc time: 0.10 sec AV Juan Carlos Ratio (DI): 0.86 JAEL Index (cm2/m2): 1.1 E/E': 16.7 E/E' av.2 Lateral E/e': 11.8 Medial E/e': 16.7  Peak E' Juan Carlos: 3.4 cm/sec RV S Juan Carlos: 11.1 cm/sec  ______________________________________________________________________________ Report approved by: Eveline Green 2023 09:18 AM       US  Upper Extremity Venous Duplex Right    Result Date: 7/18/2023  EXAM: US UPPER EXTREMITY VENOUS DUPLEX RIGHT LOCATION: [facility] DATE: 7/18/2023 INDICATION: RUE swelling w  concern for DVT (see CTA) COMPARISON: None. TECHNIQUE: Venous Duplex ultrasound of the right upper extremity with (when possible) and without compression, augmentation, and duplex. Color flow and spectral Doppler with waveform analysis performed. FINDINGS: Ultrasound includes evaluation of the internal jugular vein, innominate vein, subclavian vein, axillary vein, and brachial vein. The superficial cephalic and basilic veins were also evaluated where seen. RIGHT: No deep venous thrombosis. No superficial thrombophlebitis. Internal jugular vein, subclavian vein, axillary vein partially decompressed and not well visualized.     IMPRESSION: 1.  No deep venous thrombosis in the right upper extremity.    XR Chest Port 1 View    Result Date: 7/18/2023  EXAM: XR CHEST PORT 1 VIEW LOCATION: [facility] DATE: 7/17/2023 INDICATION: Evaluate PICC placement COMPARISON: 09/20/2022     IMPRESSION: Left PICC tip in upper SVC. Lungs clear. Heart size normal. No pneumothorax.    CTA Chest Abdomen Pelvis Runoff w Contrast    Result Date: 7/17/2023  EXAM: CTA CHEST ABDOMEN PELVIS RUNOFF W CONTRAST LOCATION: [facility] DATE/TIME: 7/17/2023 6:12 PM CDT INDICATION: Cold right foot, no palpable pulses, known peripheral arterial disease and new atrial fibrillation A. Fib COMPARISON: 09/27/2022. TECHNIQUE: CT angiogram through the chest, abdomen, pelvis, and bilateral lower extremities was performed during the arterial phase of contrast enhancement. Second phase arterial imaging was performed through the bilateral lower extremities. 2D and 3D reconstructions performed by the CT technologist. Dose reduction techniques were used. CONTRAST: isovue 370  75ml from a single use vial FINDINGS: CTA CHEST,  ABDOMEN, PELVIS: Normal great vessel branch pattern with mild disease of the subclavian artery. Normal caliber thoracic aorta. Abdominal aorta is widely patent without stenosis or dissection. Moderate calcified atherosclerotic disease without significant visceral arterial stenosis. RIGHT LEG: Iliac arteries appear patent. Due to contrast bolus timing, difficult to evaluate the superficial femoral artery although this appears grossly patent. Severe infrapopliteal disease. LEFT LEG: Postsurgical changes of left below-knee amputation. Iliac arteries, common femoral artery, profunda femoral artery, superficial femoral artery all appear patent. NONVASCULAR FINDINGS: LUNG/PLEURA: Calcified granuloma. Scattered atelectasis. Spiculated appearing lung nodule in the right lower lobe measuring 1.0 x 0.9 cm on series 11 image 166. Punctate nodules in left lung base. No pleural effusion or pneumothorax. MEDIASTINUM: Severe coronary artery disease with coronary artery stents. Questionable left atrial appendage thrombus. Inflammatory changes in the right axilla. ABDOMEN: Suboptimal evaluation of the solid organs due to the arterial phase of contrast enhanced. Cholelithiasis. Spleen, adrenal glands, kidneys, and pancreas are grossly unremarkable. Hepatic steatosis. PELVIS: No abnormally dilated loops of bowel. No free fluid or free air. 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 Postsurgical changes of left below-knee amputation.     IMPRESSION: 1.  Right lower extremity arteries appear largely patent with severe infrapopliteal arterial disease. Evaluation of the superficial femoral artery is difficult due to contrast bolus timing. Recommend Doppler arterial ultrasound if there is continued clinical concern for embolic disease. 2.  Questionable left atrial appendage thrombus. Recommend echocardiogram for further evaluation. 3.  Spiculated pulmonary nodule in the right lower lobe  measuring 1.0 x 0.9 cm. Recommend follow-up per Fleischner Society criteria as described below. 4.  Subcutaneous inflammation in the right axilla noted. Consider right upper extremity Doppler to rule out a DVT. May also represent an infectious process. 5.  Cholelithiasis. 6.  Hepatic steatosis. REFERENCE: Guidelines for Management of Incidental Pulmonary Nodules Detected on CT Images: From the Fleischner Society 2017. Guidelines apply to incidental nodules in patients who are 35 years or older. Guidelines do not apply to lung cancer screening, patients with immunosuppression, or patients with known primary cancer. SINGLE NODULE Nodule size less than or equal to 6 mm Low-risk patients: No follow-up needed. High-risk patients: Optional follow-up at 12 months. Nodule size 6-8 mm Low-risk patients: Follow-up CT at 6-12 months, then consider CT at 18-24 months. High-risk patients: Follow-up CT at 6-12 months, then at 18-24 months if no change. Nodule size >8 mm Either low or high-risk patients: Consider CT, PET/CT, or tissue sampling at 3 months.       Latest radiology report personally reviewed.    Note created using dragon voice recognition software so sounds alike errors may have escaped editing.      08/07/2023   Anival Bucio MD  Hospitalist, [facility]  Pager: [phone]

## 2023-08-07 NOTE — PLAN OF CARE
"  Problem: Plan of Care - These are the overarching goals to be used throughout the patient stay.    Goal: Plan of Care Review  Description: The Plan of Care Review/Shift note should be completed every shift.  The Outcome Evaluation is a brief statement about your assessment that the patient is improving, declining, or no change.  This information will be displayed automatically on your shift note.  Outcome: Progressing  Flowsheets (Taken 8/7/2023 1820)  Plan of Care Reviewed With: patient  Overall Patient Progress: improving  Goal: Patient-Specific Goal (Individualized)  Description: You can add care plan individualizations to a care plan. Examples of Individualization might be:  \"Parent requests to be called daily at 9am for status\", \"I have a hard time hearing out of my right ear\", or \"Do not touch me to wake me up as it startles me\".  Outcome: Progressing  Goal: Absence of Hospital-Acquired Illness or Injury  Outcome: Progressing  Intervention: Identify and Manage Fall Risk  Recent Flowsheet Documentation  Taken 8/7/2023 1700 by Latoya Chau RN  Safety Promotion/Fall Prevention:   activity supervised   assistive device/personal items within reach  Intervention: Prevent Skin Injury  Recent Flowsheet Documentation  Taken 8/7/2023 1700 by Latoya Chau RN  Body Position:   neutral head position   neutral body alignment  Intervention: Prevent and Manage VTE (Venous Thromboembolism) Risk  Recent Flowsheet Documentation  Taken 8/7/2023 1700 by Latoya Chau RN  VTE Prevention/Management: compression stockings on  Goal: Optimal Comfort and Wellbeing  Outcome: Progressing  Intervention: Provide Person-Centered Care  Recent Flowsheet Documentation  Taken 8/7/2023 1700 by Latoya Chau RN  Trust Relationship/Rapport:   care explained   emotional support provided   empathic listening provided   thoughts/feelings acknowledged  Goal: Readiness for Transition of Care  Outcome: Progressing   Goal Outcome " Evaluation:      Plan of Care Reviewed With: patient    Patient is showing less signs of sepsis and improving on all of his medications.  His blood sugars are beginning to improve and he is eating well.

## 2023-08-07 NOTE — PROGRESS NOTES
"        RENAL/ DIALYSIS   CC: f/u JOHANA    Subjective:  stable night, working with therapy today, still ongoing rt shoulder pain, edema improved, he thinks due to not being \"manhandled\"  We discussed plan to hold off on dialysis today.    Dialysis last on 8/4        ASSESSMENT AND RECOMMENDATIONS:    JOHANA/ CKD 3b: baseline CKD due to longstanding DM2, HTN. Now severe ATN in setting of profound hypotension with sepsis, group a strep bacteremia and hypovolemia and contrast nephropathy after CTA, mild rhabdo. CTA on 7/17 with no hydronephrosis. UA on 7/18 with granular casts.  Has small gamma monoclonal gammopathy but normal free light chain ratio, expect this is MGUS  dialysis  7/21 -7/24.    Resumed on 7/29 due to worsening anasarca and poor response to lasix gtt, last dialysis on 8/4  Renal function stable and good urine output  Hold further dialysis today, keep dialysis catheter in for now  Cont lasix 40 mg bid  Repeat labs tomorrow to determine need for further dialysis.  Hypotension (improving) due to CMP and exacerbated by bacteremia/sepsis. BP now improved.Trend for now.   Non-ST elevation myocardial infarction with ischemic cardiomyopathy.  Heart failure with reduced ejection fraction.  Decompensated heart failure, continue fluid removal on dialysis.  LVEF 25-30% based on 7/18 cardiac echo which also showed normal right ventricular function, normal right atrial pressure.  Tolerating fluid removal on dialysis well.  Diuretic dose adjusted as above.  Bacteremia.  Group A Streptococcus on 7/17 with right shoulder myositis.  Continue Rocephin as per ID recommendations.  Anemia secondary to critical illness, chronic kidney disease, acute kidney injury, bacteremia.    Monitor hemoglobin.   -will need serial monitoring of MGUS and if worsening or anemia ongoing, consider hematology eval   Hyponatremia - due to anasarca,JOHANA/CKD  -avoid excessive fluid intake, hypotonic ivf  -cont lasix 40 mg bid    Lyudmila Medina, " "MD  [facility]  [phone]          Objective    PHYSICAL EXAM  BP (!) 145/65 (BP Location: Right leg)   Pulse 86   Temp 98  F (36.7  C) (Oral)   Resp 18   Ht 1.88 m (6' 2\")   Wt 93.8 kg (206 lb 12.7 oz)   SpO2 98%   BMI 26.55 kg/m    I/O last 3 completed shifts:  In: 480 [P.O.:480]  Out: 2100 [Urine:2100]  Wt Readings from Last 3 Encounters:   08/06/23 93.8 kg (206 lb 12.7 oz)   11/17/22 100.2 kg (221 lb)   09/28/22 104.3 kg (230 lb)       GENERAL: alert, NAD, weak/fatigued  HEENT:normocephalic, atraumatic  CARDIOVASCULAR: RRR, 1+  edema (Improved)  PULMONARY: Normal effort,  No cyanosis  GASTROINTESTINAL: soft, nt/nd, BS present  NEURO: Alert, no gross focal findings  PSYCHIATRIC: Appropriate mood and interaction  SKIN: warm, dry  MSK: left bka   RIJ CVC in place    LABORATORIES  Recent Labs   Lab 08/07/23  0649 08/06/23  0539 08/05/23  0611 08/04/23  1126 08/04/23  0542 08/03/23  0512 08/02/23  0505 08/01/23  0504   * 132* 133*  --  132* 134* 133* 134*   POTASSIUM 3.8 3.7 3.8 3.8 3.4 3.5 3.7 3.7   CHLORIDE 91* 93* 95*  --  93* 95* 95* 95*   CO2 29 28 27  --  28 29 28 28   BUN 46.8* 41.9* 37.6*  --  47.8* 39.6* 52.9* 51.9*   CR 2.06* 2.06* 2.06*  --  2.35* 2.09* 2.49* 2.49*   GFRESTIMATED 35* 35* 35*  --  30* 35* 28* 28*   YOSVANY 10.3* 10.1 9.5  --  9.7 9.1 9.6 9.3         Recent Labs   Lab 08/07/23  0649 08/05/23  0611 08/04/23  1746 08/04/23  0542 08/03/23  1742 08/03/23  0512 08/02/23  1755 08/02/23  0505 08/01/23  1759 08/01/23  0504   WBC  --   --   --   --   --   --   --   --   --  6.0   HGB  --  7.6* 7.8* 7.9* 7.8* 7.9* 8.0* 7.9*   < > 7.8*  7.8*   HCT  --   --   --   --   --   --   --   --   --  23.2*   MCV  --   --   --   --   --   --   --   --   --  89     --   --  154  --   --   --   --   --  124*    < > = values in this interval not displayed.            MEDICATIONS   amiodarone  400 mg Oral Daily    apixaban ANTICOAGULANT  5 mg Oral BID    [Held by provider] aspirin  " 81 mg Oral Daily    cefTRIAXone  2 g Intravenous Q24H    docusate sodium  100 mg Oral BID    furosemide  40 mg Oral BID    insulin aspart   Subcutaneous TID AC    insulin aspart  1-10 Units Subcutaneous TID AC    insulin aspart  1-7 Units Subcutaneous At Bedtime    insulin glargine  30 Units Subcutaneous At Bedtime    magnesium oxide  400 mg Oral Daily    miconazole   Topical BID    multivitamin, therapeutic  1 tablet Oral Daily    sodium chloride (PF)  10-40 mL Intracatheter Q7 Days    sodium chloride (PF)  3 mL Intracatheter Q8H    ticagrelor  90 mg Oral BID    trimethoprim-polymyxin b  2 drop Left Eye 4x Daily    vitamin C  500 mg Oral Daily

## 2023-08-07 NOTE — PLAN OF CARE
Problem: Plan of Care - These are the overarching goals to be used throughout the patient stay.    Goal: Absence of Hospital-Acquired Illness or Injury  Intervention: Prevent Infection  Recent Flowsheet Documentation  Taken 8/6/2023 1600 by Liliam Melchor RN  Infection Prevention:   equipment surfaces disinfected   hand hygiene promoted   personal protective equipment utilized   rest/sleep promoted     Problem: Pain Acute  Goal: Optimal Pain Control and Function  Intervention: Develop Pain Management Plan  Recent Flowsheet Documentation  Taken 8/6/2023 1600 by Liliam Melchor RN  Pain Management Interventions:   declines   repositioned     Problem: Malnutrition  Goal: Improved Nutritional Intake  Outcome: Progressing     Goal Outcome Evaluation:       VSS this shift. Telemetry discontinued.  Pt discussed his current concerns about mobility with RN this shift. Stated that sometimes he has been getting discouraged and frustrated with how things are going because he is used to being more independent and feels like very dependent on others and struggling with feelings of the lack of support from others in his life.  States a desire to regain independence.  Ate well this shift and no complaints of pain.  PICC dressing changed using sterile technique.  Patient transferred to room 426 at 1920.

## 2023-08-07 NOTE — PLAN OF CARE
Problem: Plan of Care - These are the overarching goals to be used throughout the patient stay.    Goal: Plan of Care Review  Description: The Plan of Care Review/Shift note should be completed every shift.  The Outcome Evaluation is a brief statement about your assessment that the patient is improving, declining, or no change.  This information will be displayed automatically on your shift note.  Outcome: Progressing     Problem: Pain Acute  Goal: Optimal Pain Control and Function  Outcome: Progressing     Problem: Sepsis/Septic Shock  Goal: Optimal Coping  Outcome: Progressing     Problem: Chronic Kidney Disease  Goal: Optimal Coping with Chronic Illness  Outcome: Progressing   Goal Outcome Evaluation:       Pt a&o, able to make needs known. Denies any pain, n&v. Garcia cath in place, intact with good urine output. IV abx ceftriaxone given as scheduled. Refused colace, stated having a regular bowel movement. On room air, VSS. Will continue to monitor.

## 2023-08-07 NOTE — CONSULTS
"ACUPUNCTURIST TREATMENT NOTE    Name: Norman Aguilar  :  1960  MRN:  [id number]    Acupuncture Treatment  Patient Type: Medical  Intervention Reason: Pain, Desires Experience  Pain Location: neck/shoulders  Pre-session Pain Ratin  Post-session Pain Ratin  Patient complaint:: Chronic neck and shoulder pain  Initial insertions: Baihui, Du24, Yintang, Gb8, Gb20, (L): Gb21, Lu7, Li4  Number of needles inserted: 10  Number of needles removed: 10  Treatment Observations: Patient relaxed well during treatment         \"Risks and benefits of acupuncture were discussed with patient. Consent for treatment was given. We thank you for the referral.\"     Farnaz Garza L.Ac.    Date:  2023  Time:  11:50 AM    "

## 2023-08-07 NOTE — PROGRESS NOTES
JEFFREY spoke with Ana at [facility].  Pt off IV Lasix.  Deciding if pt will continue with dialysis.  Pt needs to work with therapy more.  Pt usually an assist of two but one recent note says two to three and they cannot do three.  Hopefully the three assist was a one time thing.  They do have a bed available on Wednesday for pt.          NE Miguel, KAMARISW 08/07/23 6:53 PM

## 2023-08-08 NOTE — PLAN OF CARE
Problem: Plan of Care - These are the overarching goals to be used throughout the patient stay.    Goal: Plan of Care Review  Description: The Plan of Care Review/Shift note should be completed every shift.  The Outcome Evaluation is a brief statement about your assessment that the patient is improving, declining, or no change.  This information will be displayed automatically on your shift note.  Outcome: Progressing  Flowsheets (Taken 8/8/2023 9403)  Plan of Care Reviewed With: patient     Problem: Pain Acute  Goal: Optimal Pain Control and Function  Outcome: Progressing  Intervention: Prevent or Manage Pain  Recent Flowsheet Documentation  Taken 8/8/2023 4528 by Maritza Limon RN  Medication Review/Management: medications reviewed-understands pain scale (0-10) and medicate as per mar  Problem: Malnutrition  Goal: Improved Nutritional Intake  Outcome: Progressing-patient eating meals - skipping lunch   Problem: Chronic Kidney Disease  Goal: Optimal Coping with Chronic Illness  Outcome: Progressing-getting periodic dialysis as per lab results - continue to monitor  Problem: Diabetes Comorbidity  Goal: Blood Glucose Level Within Targeted Range  Outcome: Progressing-monitoring sugars and administer insulin            Goal Outcome Evaluation:      Plan of Care Reviewed With: patient

## 2023-08-08 NOTE — PROGRESS NOTES
RENAL/ DIALYSIS   CC: f/u JOHANA    Subjective:  IN bed. No swelling. Denies any sob. Reports he want thakur still. Very limited mobility and can not urinate in bed. Discussed labs and plan below with patient.     Dialysis last on 8/4        ASSESSMENT AND RECOMMENDATIONS:    JOHANA/ CKD 3b: baseline CKD due to longstanding DM2, HTN. Now severe ATN in setting of profound hypotension with sepsis, group a strep bacteremia and hypovolemia and contrast nephropathy after CTA, mild rhabdo. CTA on 7/17 with no hydronephrosis. UA on 7/18 with granular casts.  Has small gamma monoclonal gammopathy but normal free light chain ratio, expect this is MGUS  dialysis  7/21 -7/24.    Resumed on 7/29 due to worsening anasarca and poor response to lasix gtt, last dialysis on 8/4  Renal function up slightly but good urine output  Hold further dialysis today, keep dialysis catheter in for now likely remove cath tomorrow as this is likely new baseline  Cont lasix 40 mg bid  Repeat labs am  Hypotension (improving) due to CMP and exacerbated by bacteremia/sepsis. BP now improved.Trend for now.   Non-ST elevation myocardial infarction with ischemic cardiomyopathy.  Heart failure with reduced ejection fraction.  Decompensated heart failure, continue fluid removal on dialysis.  LVEF 25-30% based on 7/18 cardiac echo which also showed normal right ventricular function, normal right atrial pressure.  Diuretic dose adjusted as above.  Bacteremia.  Group A Streptococcus on 7/17 with right shoulder myositis.  Continue Rocephin as per ID recommendations.  Anemia secondary to critical illness, chronic kidney disease, acute kidney injury, bacteremia.    Monitor hemoglobin.   -will need serial monitoring of MGUS and if worsening or anemia ongoing, consider hematology eval   Hyponatremia - due to anasarca,JOHANA/CKD  -avoid excessive fluid intake, hypotonic ivf  -cont lasix 40 mg bid    Refugio Reich, DO  [facility]  [phone]     "      Objective    PHYSICAL EXAM  /71 (BP Location: Right leg)   Pulse 82   Temp 98.1  F (36.7  C) (Oral)   Resp 16   Ht 1.88 m (6' 2\")   Wt 93.8 kg (206 lb 12.7 oz)   SpO2 100%   BMI 26.55 kg/m    I/O last 3 completed shifts:  In: 245 [P.O.:245]  Out: 5200 [Urine:5200]  Wt Readings from Last 3 Encounters:   08/06/23 93.8 kg (206 lb 12.7 oz)   11/17/22 100.2 kg (221 lb)   09/28/22 104.3 kg (230 lb)       GENERAL: alert, NAD, weak/fatigued  HEENT:normocephalic, atraumatic  CARDIOVASCULAR:  no edema.   PULMONARY: Normal effort,  No cyanosis  GASTROINTESTINAL:  nt/nd,   NEURO: Alert, no gross focal findings  PSYCHIATRIC: Appropriate mood and interaction  SKIN: warm, dry  MSK: left bka   RIJ CVC in place    LABORATORIES  Recent Labs   Lab 08/08/23  0647 08/07/23  0649 08/06/23  0539 08/05/23  0611 08/04/23  1126 08/04/23  0542 08/03/23  0512 08/02/23  0505   * 131* 132* 133*  --  132* 134* 133*   POTASSIUM 3.5 3.8 3.7 3.8 3.8 3.4 3.5 3.7   CHLORIDE 91* 91* 93* 95*  --  93* 95* 95*   CO2 31* 29 28 27  --  28 29 28   BUN 49.6* 46.8* 41.9* 37.6*  --  47.8* 39.6* 52.9*   CR 2.18* 2.06* 2.06* 2.06*  --  2.35* 2.09* 2.49*   GFRESTIMATED 33* 35* 35* 35*  --  30* 35* 28*   YOSVANY 10.1 10.3* 10.1 9.5  --  9.7 9.1 9.6   MAG 1.7  --   --   --   --   --   --   --          Recent Labs   Lab 08/08/23  0647 08/07/23  0649 08/05/23  0611 08/04/23  1746 08/04/23  0542 08/03/23  1742 08/03/23  0512 08/02/23  1755   HGB 7.9*  --  7.6* 7.8* 7.9* 7.8* 7.9* 8.0*   PLT  --  197  --   --  154  --   --   --             MEDICATIONS   amiodarone  400 mg Oral Daily    apixaban ANTICOAGULANT  5 mg Oral BID    [Held by provider] aspirin  81 mg Oral Daily    cefTRIAXone  2 g Intravenous Q24H    docusate sodium  100 mg Oral BID    furosemide  40 mg Oral BID    insulin aspart   Subcutaneous TID AC    insulin aspart  1-10 Units Subcutaneous TID AC    insulin aspart  1-7 Units Subcutaneous At Bedtime    insulin glargine  30 Units " Subcutaneous At Bedtime    magnesium oxide  400 mg Oral Daily    miconazole   Topical BID    multivitamin, therapeutic  1 tablet Oral Daily    sodium chloride (PF)  10-40 mL Intracatheter Q7 Days    sodium chloride (PF)  3 mL Intracatheter Q8H    ticagrelor  90 mg Oral BID    trimethoprim-polymyxin b  2 drop Left Eye 4x Daily    vitamin C  500 mg Oral Daily

## 2023-08-08 NOTE — PROGRESS NOTES
Care Management Follow Up    Length of Stay (days): 22    Expected Discharge Date: 08/09/2023     Concerns to be Addressed: discharge planning       Patient plan of care discussed at interdisciplinary rounds: Yes    Anticipated Discharge Disposition: Acute Rehab     Anticipated Discharge Services: Other (see comment) (therapy services)    Anticipated Discharge DME: None    Patient/family educated on Medicare website which has current facility and service quality ratings: yes    Education Provided on the Discharge Plan: Yes    Patient/Family in Agreement with the Plan: yes    Referrals Placed by CM/SW: Post Acute Facilities    Private pay costs discussed: Not applicable    Additional Information: JEFFREY spoke with Cassie at [facility] regarding pt.  She stated that they were just following pt.  Pt not yet fully accepted.  They have a waiting list of 7 people and pt not yet on it.  They are still monitoring for renal recovery.  She asked if pt medically ready and SW let her know pt is.      JEFFREY spoke with Ashley at [facility].  He stated that he reviewed pt and he looks good.  He stated that it looks like nephrology is trying to decide if will remove hemodialysis catheter or not tomorrow.  Once dialysis plan figured out they can add pt to waiting list.        NE Miguel, SELENA 08/08/23 5:27 PM

## 2023-08-08 NOTE — PROGRESS NOTES
1215-contacted dietician for patient because he likes chocolate supplement and keeps getting berry flavored.  She will make sure he will get flavor of choice

## 2023-08-09 NOTE — PLAN OF CARE
"  Problem: Plan of Care - These are the overarching goals to be used throughout the patient stay.    Goal: Plan of Care Review  Description: The Plan of Care Review/Shift note should be completed every shift.  The Outcome Evaluation is a brief statement about your assessment that the patient is improving, declining, or no change.  This information will be displayed automatically on your shift note.  Outcome: Progressing  Goal: Patient-Specific Goal (Individualized)  Description: You can add care plan individualizations to a care plan. Examples of Individualization might be:  \"Parent requests to be called daily at 9am for status\", \"I have a hard time hearing out of my right ear\", or \"Do not touch me to wake me up as it startles me\".  Outcome: Progressing  Goal: Absence of Hospital-Acquired Illness or Injury  Outcome: Progressing  Intervention: Identify and Manage Fall Risk  Recent Flowsheet Documentation  Taken 8/9/2023 0100 by Maribeth Tony, RN  Safety Promotion/Fall Prevention:   activity supervised   nonskid shoes/slippers when out of bed   safety round/check completed  Intervention: Prevent Skin Injury  Recent Flowsheet Documentation  Taken 8/9/2023 0100 by Maribeth Tony, RN  Body Position: position changed independently  Goal: Optimal Comfort and Wellbeing  Outcome: Progressing  Intervention: Monitor Pain and Promote Comfort  Recent Flowsheet Documentation  Taken 8/9/2023 0453 by Maribeth Tony, RN  Pain Management Interventions: emotional support  Taken 8/9/2023 0100 by Maribeth Tony, RN  Pain Management Interventions: medication offered but refused  Goal: Readiness for Transition of Care  Outcome: Progressing   Goal Outcome Evaluation:         Uneventful shift. Pt a/o with VSS on room air. Pt having minimal pian in RUE. Will monitor.                "

## 2023-08-09 NOTE — PROGRESS NOTES
[facility]    Medicine Progress Note - Hospitalist Service    Date of Admission:  7/17/2023    Assessment & Plan   Mr. Aguilar is a 63-year-old male with history of CAD on Brilinta, chronic systolic CHF with EF of 30 to 35% prior to admission, CKD 3, history of left foot osteomyelitis status post left BKA, and DM 2 who presented 7/17/2023 with septic shock, NSTEMI, JOHANA requiring dialysis and A-fib with RVR.  Patient was stabilized with pressors and transferred out of ICU 7/27/2023.  His renal function has waxed and waned.  Dialysis held for a few days then restarted but now nephrology is holding it again and is hopeful he can remain off dialysis.     Septic shock: Resolved  Group A strep bacteremia secondary to skin and soft tissue Infection right shoulder.  Concern for toxic shock syndrome  MRI right shoulder 7/19/2023 showed myositis  -Switched to ceftriaxone from penicillin G 7/26/2023  -Completed course of oral linezolid 7/27/2023  -ID consulted. ID recommends 2 weeks of IV antibiotics to complete on 8/10/23.   -Anticipate acute rehab on discharge   -Patient already has PICC line in place     NSTEMI  History of CAD and ischemic cardiomyopathy   Prior coronary stents  A-fib with RVR  -Cardiology consulted  -Continue amiodarone  -Cardiology signed off. Recommendation is to follow up for further ischemic work up when infectious concerns have resolved and renal function improved or when patient is considered ESRD.   -Continue aspirin and Brilinta  -Heparin drip stopped 7/31  -Started apixaban 8/1, will monitor for bleeding      Acute on chronic systolic CHF exacerbation  -TTE 7/17/2023 shows EF of 25 to 30%  -Cont lasix PO with good urine output      JOHANA on CKD stage 3: Likely secondary to circulatory shock, requiring intermittent dialysis  -Baseline creatinine 1.4-1.6, 3.25 on admission, peak at 5.22 on 7/21, some fluctuation but now generally improving  -Status post intermittent  hemodialysis with evidence of renal recovery.   -Started dialysis 7/21 to 7/24, resumed 7/29 due to increased volume. Dialyzed again 8/4. Kidney function may be recovering.   -Seen by nephrology cleared to have dialysis catheter removed   -Garcia still in, consider removing tomorrow and doing voiding trial     Hyponatremia- improved  -Initially severe, 119 on presentation  -We will continue to monitor     Elevated LFTs:   -Thought secondary to shock liver and myositis  -Improved     Acute on anemia of chronic disease  -No overt signs of bleeding, CT abdomen without signs of bleeding  -was on heparin drip (held 7/31), heparin with dialysis, ticagrelor  -Transfused 2 units PRBC 7/25 hgb 5.9  -Transfused 1 unit PRBCs 7/28 hgb 6.7  -Transfused 1 unit PRBCs 7/29 Hgb 6.7  -Transfused 1 unit PRBCs 7/31 Hgb 6.8  -Transfuse for hemoglobin less than 7     DM2:  -Lantus increased from 15 to 20 to 30 units at bedtime, sliding scale insulin, and mealtime novolog. Was on 38 unit of lantus and sliding scale insulin at home. -Continue Lantus 35 U at HS.  -Monitor blood sugar, adjust insulins accordingly.     Diet: Combination Diet Renal Diet (dialysis); Moderate Consistent Carb (60 g CHO per Meal) Diet  Snacks/Supplements Adult: Glucerna; With Meals    DVT Prophylaxis: DOAC and Pneumatic Compression Devices  Garcia Catheter: PRESENT, indication: Strict 1-2 Hour I&O  Lines: PRESENT      PICC 07/18/23 Triple Lumen Left Basilic Vasopressor,Access-Site Assessment: WDL  CVC Double Lumen Right Subclavian Non - tunneled-Site Assessment: WDL      Cardiac Monitoring: None  Code Status: Full Code      Clinically Significant Risk Factors              # Hypoalbuminemia: Lowest albumin = 2.1 g/dL at 7/21/2023  4:38 AM, will monitor as appropriate     # Hypertension: Noted on problem list  # Chronic heart failure with reduced ejection fraction: last echo with EF <40%      # DMII: A1C = 6.9 % (Ref range: <5.7 %) within past 6 months   # Overweight:  "Estimated body mass index is 25.76 kg/m  as calculated from the following:    Height as of this encounter: 1.88 m (6' 2\").    Weight as of this encounter: 91 kg (200 lb 9.9 oz).   # Severe Malnutrition: based on nutrition assessment         Disposition Plan      Expected Discharge Date: 08/10/2023    Discharge Delays: Placement - TCU  Placement - LTAC/ARU  Destination: other (comment) (acute rehab)  Discharge Comments: iv abx for 2 weeks HD 7/31  ltach reviewing          Prince Contreras MD  Hospitalist Service  [facility]  Securely message with [employer] (more info)  Text page via [employer] Paging/Directory   ______________________________________________________________________    Interval History   Patient seen at bedside/chart reviewed.  He is sitting up in bed he just had breakfast.  He reports to be feeling better daily.  Still pretty weak though.  Currently on room air, no acute events overnight.  Creatinine 2.13 today    Physical Exam   Vital Signs: Temp: 98.8  F (37.1  C) Temp src: Oral BP: 132/64 Pulse: 82   Resp: 19 SpO2: 99 % O2 Device: None (Room air)    Weight: 200 lbs 9.9 oz    General Appearance:  Older M in NAD  Respiratory: occas rhonchi  Cardiovascular: irreg S1S2  GI: +BS, Soft, NT/ND  Skin: no rashes or lesions on exposed areas  Neuro: Alert, generally nonfocal on motor and sensory testing - left BKA    Medical Decision Making       40 MINUTES SPENT BY ME on the date of service doing chart review, history, exam, documentation & further activities per the note.      Data     I have personally reviewed the following data over the past 24 hrs:    N/A  \   N/A   / N/A     132 (L) 90 (L) 51.2 (H) /  198 (H)   3.4 30 (H) 2.13 (H) \       "

## 2023-08-09 NOTE — PLAN OF CARE
Problem: Risk for Delirium  Goal: Improved Behavioral Control  Outcome: Progressing  Intervention: Minimize Safety Risk  Recent Flowsheet Documentation  Taken 8/9/2023 0820 by Nicolle Wadsworth RN  Trust Relationship/Rapport: care explained  Goal: Improved Attention and Thought Clarity  Outcome: Progressing   Goal Outcome Evaluation:         Pt alert and orientated x 4.  Pt denied pain this shift.

## 2023-08-09 NOTE — PROGRESS NOTES
"CLINICAL NUTRITION SERVICES - REASSESSMENT NOTE     Nutrition Prescription    RECOMMENDATIONS FOR MDs/PROVIDERS TO ORDER:  None    Malnutrition Status:    Severe malnutrition In Context of: Acute illness or injury     Recommendations already ordered by Registered Dietitian (RD):  Trial Gel plus daily at lunch    Future/Additional Recommendations:  Adjust supplements pending intake, tolerance, acceptance, weight, wound healing     EVALUATION OF THE PROGRESS TOWARD GOALS   Diet: Renal, Moderate Consistent Carbohydrate Diet   Nutrition Supplement: Glucerna daily   Intake: Eating 100% of 2-3 meal/day + supplement  With estimated intake 935-1560 kcal, 41-51 g protein/day meeting 50-75% of estimated nutrition needs    Pt reports appetite has been ok. He is eating less here than at home to preference for fresher foods and BG being higher in hospital-trying to be careful with not too many CHO. Pt likes and takes the Glucerna but not getting consistently since moving to . Pt is willing to try gel plus for more protein and reports shakes make him \"phlegmy\"       NEW FINDINGS   - Last HD 8/4 and port being removed today    ANTHROPOMETRICS  Height: 188 cm (6' 2\")  Most Recent Weight: 91 kg (200 lb 9.9oz) 8/9, fluctuating with fluid. Down overall  Weight History:     Weight Method     08/09/23 0453 91 kg (200 lb 9.9 oz) Bed scale   08/08/23 1142 95.3 kg (210 lb 1.6 oz) Bed scale   08/06/23 0449 93.8 kg (206 lb 12.7 oz) Bed scale   08/05/23 0321 98 kg (216 lb 0.8 oz) Bed scale   08/04/23 0408 97.8 kg (215 lb 9.8 oz) Bed scale   08/03/23 0334 98.9 kg (218 lb 0.6 oz) Bed scale   08/01/23 0321 108.7 kg (239 lb 10.2 oz) Standing scale   07/31/23 0333 114.5 kg (252 lb 6.8 oz) Bed scale   07/30/23 0358 108.2 kg (238 lb 8.6 oz) Bed scale   07/29/23 0502 110.5 kg (243 lb 9.7 oz) Bed scale   07/28/23 0453 110.4 kg (243 lb 6.2 oz) Bed scale   07/27/23 0400 112.6 kg (248 lb 3.2 oz) Bed scale   07/26/23 0530 114.4 kg (252 lb 3.2 oz) Bed " scale   07/25/23 0100 113.6 kg (250 lb 7.1 oz) Bed scale   07/24/23 0600 111.4 kg (245 lb 9.5 oz) Bed scale   07/22/23 0600 111.8 kg (246 lb 7.6 oz) Bed scale   07/20/23 0545 106.1 kg (233 lb 14.4 oz) --   07/19/23 0700 102.3 kg (225 lb 8.5 oz) Bed scale     PHYSICAL FINDINGS  HD stopped, removing port today  Per WOC 8/4 Axilla blisters healed. Coccyx stg 2 healing  1 BM /day    LABS  Reviewed-   BUN/CR 51/2.1 (H), increased  -227 mg/dl past 24 hours, in fair control  Na 132 (L), no change    MEDICATIONS  Reviewed- iv abx, lasix bid, ssi, novolog 1u: 10 g cho, lantus, magox daily, mvi with minerals, vit C 500 mg daily    Dosing Weight: 106 kg     ASSESSED NUTRITION NEEDS  Estimated Energy Needs: 2120+ kcals/day (20+ kcals/kg)  Justification: critically ill and Obese  Estimated Protein Needs:  grams protein/day (0.8 - 1.1 grams of pro/kg)  Justification: CKD  Estimated Fluid Needs: 1800 mL/day (or per renal)   Justification: CKD, hyponatremia  Malnutrition Diagnosis: Severe malnutrition  In Context of:  Acute illness or injury    NUTRITION DIAGNOSIS  Malnutrition related to poor appetite as evidenced by <=50% of meals in >= 5 days and moderate fluid accumulation     Evaluation: Improving     INTERVENTIONS  Implementation  Add gel plus daily = 150 kcal, 20 g protein    Goals  Meet nutrition needs - Progressing, not met  BG < 180 - Not Met  Wound healing- progressing    Monitoring/Evaluation  Progress toward goals will be monitored and evaluated per protocol.

## 2023-08-09 NOTE — PROGRESS NOTES
RENAL/ DIALYSIS   CC: f/u JOHANA    Subjective:  denies sob and feels rt arm mobility is improving.  Edema improved and now approaching admit weight, still has thakur.    Dialysis last on 8/4        ASSESSMENT AND RECOMMENDATIONS:    JOHANA/ CKD 3b: baseline CKD due to longstanding DM2, HTN. Now severe ATN in setting of profound hypotension with sepsis, group a strep bacteremia and hypovolemia and contrast nephropathy after CTA, mild rhabdo. CTA on 7/17 with no hydronephrosis. UA on 7/18 with granular casts.  Has small gamma monoclonal gammopathy but normal free light chain ratio, expect this is MGUS  dialysis  7/21 -7/24.    Resumed on 7/29 due to worsening anasarca and poor response to lasix gtt, last dialysis on 8/4  Renal function stable and good urine output  Not planning further dialysis, ok to remove dialysis catheter  Cont lasix 40 mg bid  Repeat labs am  When thakur removed, will need spont voiding trial before discharge to ensure no urinary retention  Hypotension (improving) due to CMP and exacerbated by bacteremia/sepsis. BP now improved.Trend for now.   Non-ST elevation myocardial infarction with ischemic cardiomyopathy.  Heart failure with reduced ejection fraction.  Decompensated heart failure, continue fluid removal on dialysis.  LVEF 25-30% based on 7/18 cardiac echo which also showed normal right ventricular function, normal right atrial pressure.  Lasix as above  Bacteremia.  Group A Streptococcus on 7/17 with right shoulder myositis.  Continue Rocephin as per ID recommendations.  Anemia secondary to critical illness, chronic kidney disease, acute kidney injury, bacteremia.    Monitor hemoglobin.   -will need serial monitoring of MGUS and if worsening or anemia ongoing, consider hematology eval   Hyponatremia - due to anasarca,JOHANA/CKD  -avoid excessive fluid intake, hypotonic ivf  -cont lasix 40 mg bid    Lyudmila Medina MD  [facility]  [phone]          Objective    PHYSICAL  "EXAM  /64 (BP Location: Right leg)   Pulse 82   Temp 98.8  F (37.1  C) (Oral)   Resp 19   Ht 1.88 m (6' 2\")   Wt 91 kg (200 lb 9.9 oz)   SpO2 99%   BMI 25.76 kg/m    I/O last 3 completed shifts:  In: 586 [P.O.:586]  Out: 1700 [Urine:1700]  Wt Readings from Last 3 Encounters:   08/09/23 91 kg (200 lb 9.9 oz)   11/17/22 100.2 kg (221 lb)   09/28/22 104.3 kg (230 lb)       GENERAL: alert, NAD, weak/fatigued  HEENT:normocephalic, atraumatic  CARDIOVASCULAR:  trace edema.   PULMONARY: Normal effort,  No cyanosis  GASTROINTESTINAL:  nt/nd,   NEURO: Alert, no gross focal findings  PSYCHIATRIC: Appropriate mood and interaction  SKIN: warm, dry  MSK: left bka   RIJ CVC in place    LABORATORIES  Recent Labs   Lab 08/09/23  0553 08/08/23  0647 08/07/23  0649 08/06/23  0539 08/05/23  0611 08/04/23  1126 08/04/23  0542 08/03/23  0512   * 132* 131* 132* 133*  --  132* 134*   POTASSIUM 3.4 3.5 3.8 3.7 3.8 3.8 3.4 3.5   CHLORIDE 90* 91* 91* 93* 95*  --  93* 95*   CO2 30* 31* 29 28 27  --  28 29   BUN 51.2* 49.6* 46.8* 41.9* 37.6*  --  47.8* 39.6*   CR 2.13* 2.18* 2.06* 2.06* 2.06*  --  2.35* 2.09*   GFRESTIMATED 34* 33* 35* 35* 35*  --  30* 35*   YOSVANY 10.1 10.1 10.3* 10.1 9.5  --  9.7 9.1   MAG  --  1.7  --   --   --   --   --   --          Recent Labs   Lab 08/08/23  0647 08/07/23  0649 08/05/23  0611 08/04/23  1746 08/04/23  0542 08/03/23  1742 08/03/23  0512 08/02/23  1755   HGB 7.9*  --  7.6* 7.8* 7.9* 7.8* 7.9* 8.0*   PLT  --  197  --   --  154  --   --   --             MEDICATIONS   amiodarone  400 mg Oral Daily    apixaban ANTICOAGULANT  5 mg Oral BID    [Held by provider] aspirin  81 mg Oral Daily    cefTRIAXone  2 g Intravenous Q24H    docusate sodium  100 mg Oral BID    furosemide  40 mg Oral BID    insulin aspart   Subcutaneous TID AC    insulin aspart  1-10 Units Subcutaneous TID AC    insulin aspart  1-7 Units Subcutaneous At Bedtime    insulin glargine  35 Units Subcutaneous At Bedtime    magnesium " oxide  400 mg Oral Daily    miconazole   Topical BID    multivitamin, therapeutic  1 tablet Oral Daily    sodium chloride (PF)  10-40 mL Intracatheter Q7 Days    sodium chloride (PF)  3 mL Intracatheter Q8H    ticagrelor  90 mg Oral BID    trimethoprim-polymyxin b  2 drop Left Eye 4x Daily    vitamin C  500 mg Oral Daily

## 2023-08-09 NOTE — CONSULTS
"ACUPUNCTURIST TREATMENT NOTE    Name: Norman Aguilar  :  1960  MRN:  [id number]    Acupuncture Treatment  Patient Type: Medical  Intervention Reason: Pain, Desires Experience  Pain Location: neck/shoulders  Pre-session Pain Ratin  Post-session Pain Ratin  Patient complaint:: Chronic neck and shoulder pain  Initial insertions: Baihui, Du24, Gb8, Gb20, Gb21  Number of needles inserted: 8  Number of needles removed: 8  Treatment Observations: Patient relaxed well during treatment         \"Risks and benefits of acupuncture were discussed with patient. Consent for treatment was given. We thank you for the referral.\"     Farnaz Garza L.Ac.    Date:  2023  Time:  10:24 AM    "

## 2023-08-09 NOTE — PLAN OF CARE
Problem: Chronic Kidney Disease  Goal: Electrolyte Balance  Outcome: Progressing     Problem: Chronic Kidney Disease  Goal: Absence of Anemia Signs and Symptoms  Outcome: Progressing     Problem: Diabetes Comorbidity  Goal: Blood Glucose Level Within Targeted Range  Outcome: Not Progressing     Goal Outcome Evaluation:       Pt presented to the hospital on 7/17 with septic shock, NSTEMI, JOHANA and A. Fib. Stabilized and transferred out of the ICU on 7/27. Positive blood cultures. Pt remains on IV abx in the form of Rocephin. MRI positive for RUE myositis. ID consulting. Pt will be on an extended course of IV abx. Triple lumen PICC in place to the LUE. Flushed and patent. Unit blood draws. Decreased movement in RUE. 2+ edema present. Pt remains on oral Lasix for diuresis. He does have a heart history with TTE this hospitalization showing an EF of 25-35%. Pt also has acute on chronic CKD requiring intermittent dialysis this hospitalization. He was last dialyzed on 8/04. Creatinine is 2.18. Dialysis catheter remains in place to right chest. Nephrology is consulting. Hgb stable at 7.9. Last transfused on 7/31. Catheter may be removed tomorrow. Garcia catheter in place for input and output. Patent and draining rosa colored urine. Pt is A&O x4. Denies pain. Assist of 2 with a full mechanical lift for transfers. Hx of left BKA. Receiving PT and OT services. Appetite is adequate. He is tolerating a renal diet. Blood sugar was 225 before meal and 227 at hs. Receives s/s, carb coverage and scheduled Lantus insulin. On K+ protocol. Recheck in AM. Continent of bowel. Will transition to TCU once dialysis has been completed.

## 2023-08-09 NOTE — PROGRESS NOTES
Care Management Follow Up    Length of Stay (days): 23    Expected Discharge Date: 08/10/2023     Concerns to be Addressed: discharge planning       Patient plan of care discussed at interdisciplinary rounds: Yes    Anticipated Discharge Disposition: Acute Rehab     Anticipated Discharge Services: Other (see comment) (therapy services)    Anticipated Discharge DME: None    Patient/family educated on Medicare website which has current facility and service quality ratings: yes    Education Provided on the Discharge Plan: Yes    Patient/Family in Agreement with the Plan: yes    Referrals Placed by CM/SW: Post Acute Facilities    Private pay costs discussed: Not applicable    Additional Information: Per nephrology, plan to remove dialysis catheter today and no further dialysis needed.    JEFFREY spoke with Ana at [facility] regarding pt.  She is glad to see pt did more with PT yesterday.  Please have OT work with pt on ADLs which will help strengthen approval for acute rehab services.  No bed available tomorrow but they will continue to follow pt.  Discussed that pt to have dialysis catheter removed and no need for further dialysis.  Discussed that pt is medically ready for discharge.        NE Miguel, SELENA 08/09/23 6:26 PM

## 2023-08-09 NOTE — PLAN OF CARE
Problem: Oral Intake Inadequate  Goal: Improved Oral Intake  Outcome: Progressing     Problem: Diabetes Comorbidity  Goal: Blood Glucose Level Within Targeted Range  Outcome: Progressing     Problem: Heart Failure Comorbidity  Goal: Maintenance of Heart Failure Symptom Control  Outcome: Progressing     Problem: Hypertension Comorbidity  Goal: Blood Pressure in Desired Range  Outcome: Progressing     Problem: Chronic Kidney Disease  Goal: Acceptable Pain Control  Outcome: Adequate for Care Transition   Goal Outcome Evaluation:       Patient is A/O x4. BP elevated in mid 150's systolic, but other VSS. Patient denies pain. He requested to have his thakur catheter remain in-place due to difficulty utilizing the bedside urinal. Sticky-note left for Hospitalist to review.      Patient endorsed neuropathy. Patient frequently repositioned to assist in pain control, per patient request.     Dialysis dressing changed.

## 2023-08-09 NOTE — PROGRESS NOTES
[facility]    Medicine Progress Note - Hospitalist Service    Date of Admission:  7/17/2023    Assessment & Plan   Mr. Aguilar is a 63-year-old male with history of CAD on Brilinta, chronic systolic CHF with EF of 30 to 35% prior to admission, CKD 3, history of left foot osteomyelitis status post left BKA, and DM 2 who presented 7/17/2023 with septic shock, NSTEMI, JOHANA requiring dialysis and A-fib with RVR.  Patient was stabilized with pressors and transferred out of ICU 7/27/2023.  His renal function has waxed and waned.  Dialysis held for a few days then restarted but now nephrology is holding it again and is hopeful he can remain off dialysis.     Septic shock: Resolved  Group A strep bacteremia secondary to skin and soft tissue Infection right shoulder.  Concern for toxic shock syndrome  MRI right shoulder 7/19/2023 showed myositis  -Switched to ceftriaxone from penicillin G 7/26/2023  -Completed course of oral linezolid 7/27/2023  -ID consulted. ID recommends 2 weeks of IV antibiotics to complete on 8/10/23.   -Anticipate acute rehab on discharge   -Patient already has PICC line in place     NSTEMI  History of CAD and ischemic cardiomyopathy   Prior coronary stents  A-fib with RVR  -Cardiology consulted  -Continue amiodarone  -Cardiology signed off. Recommendation is to follow up for further ischemic work up when infectious concerns have resolved and renal function improved or when patient is considered ESRD.   -Continue aspirin and Brilinta  -Heparin drip stopped 7/31  -Started apixaban 8/1, will monitor for bleeding      Acute on chronic systolic CHF exacerbation  -TTE 7/17/2023 shows EF of 25 to 30%  -Cont lasix PO with good urine output      JOHANA on CKD stage 3: Likely secondary to circulatory shock, requiring intermittent dialysis  -Baseline creatinine 1.4-1.6, 3.25 on admission, peak at 5.22 on 7/21, some fluctuation but now generally improving  -Status post intermittent  hemodialysis with evidence of renal recovery.   -Started dialysis 7/21 to 7/24, resumed 7/29 due to increased volume. Dialyzed again 8/4. Kidney function may be recovering. Keep the dialysis cath in for now. No hemodialysis today. Will re-assess tomorrow     Hyponatremia- improved  -Initially severe, 119 on presentation  -We will continue to monitor     Elevated LFTs:   -Thought secondary to shock liver and myositis  -Improved     Acute on anemia of chronic disease  -No overt signs of bleeding, CT abdomen without signs of bleeding  -was on heparin drip (held 7/31), heparin with dialysis, ticagrelor  -Transfused 2 units PRBC 7/25 hgb 5.9  -Transfused 1 unit PRBCs 7/28 hgb 6.7  -Transfused 1 unit PRBCs 7/29 Hgb 6.7  -Transfused 1 unit PRBCs 7/31 Hgb 6.8  -Transfuse for hemoglobin less than 7     DM2:  -Lantus increased from 15 to 20 to 30 units at bedtime, sliding scale insulin, and mealtime novolog. Was on 38 unit of lantus and sliding scale insulin at home. Will increase to 35 at HS.  -Monitor blood sugar, adjust insulins accordingly.       Diet: Combination Diet Renal Diet (dialysis); Moderate Consistent Carb (60 g CHO per Meal) Diet  Snacks/Supplements Adult: Glucerna; With Meals    DVT Prophylaxis: DOAC  Garcia Catheter: PRESENT, indication: Strict 1-2 Hour I&O  Lines: PRESENT      PICC 07/18/23 Triple Lumen Left Basilic Vasopressor,Access-Site Assessment: WDL  CVC Double Lumen Right Subclavian Non - tunneled-Site Assessment: WDL      Cardiac Monitoring: None  Code Status: Full Code      Clinically Significant Risk Factors           # Hypercalcemia: Highest Ca = 10.3 mg/dL in last 2 days, will monitor as appropriate    # Hypoalbuminemia: Lowest albumin = 2.1 g/dL at 7/21/2023  4:38 AM, will monitor as appropriate     # Hypertension: Noted on problem list  # Chronic heart failure with reduced ejection fraction: last echo with EF <40%      # DMII: A1C = 6.9 % (Ref range: <5.7 %) within past 6 months   # Overweight:  "Estimated body mass index is 26.98 kg/m  as calculated from the following:    Height as of this encounter: 1.88 m (6' 2\").    Weight as of this encounter: 95.3 kg (210 lb 1.6 oz).   # Severe Malnutrition: based on nutrition assessment         Disposition Plan      Expected Discharge Date: 08/09/2023    Discharge Delays: Placement - TCU  Placement - LTAC/ARU  Destination: other (comment) (acute rehab)  Discharge Comments: iv abx for 2 weeks HD 7/31  ltach reviewing          Mahesh Hermosillo MD  Hospitalist Service  [facility]  Securely message with [employer] (more info)  Text page via [employer] Paging/Directory   ______________________________________________________________________    Interval History   Feels ok    Physical Exam   Vital Signs: Temp: (!) 96.2  F (35.7  C) Temp src: Axillary BP: 139/77 Pulse: 87   Resp: 18 SpO2: 98 % O2 Device: None (Room air)    Weight: 210 lbs 1.57 oz    General Appearance: Older M in NAD  Respiratory: occas rhonchi  Cardiovascular: irreg S1S2  GI: +BS, Soft, NT/ND  Skin: no rashes or lesions on exposed areas  Neuro: Alert, generally nonfocal on motor and sensory testing - left BKA       Medical Decision Making       45 MINUTES SPENT BY ME on the date of service doing chart review, history, exam, documentation & further activities per the note.      Data     I have personally reviewed the following data over the past 24 hrs:    N/A  \   7.9 (L)   / N/A     132 (L) 91 (L) 49.6 (H) /  227 (H)   3.5 31 (H) 2.18 (H) \       Imaging results reviewed over the past 24 hrs:   No results found for this or any previous visit (from the past 24 hour(s)).  "

## 2023-08-10 NOTE — PROGRESS NOTES
[facility]    Hospitalist Progress Note    Assessment & Plan   63-year-old male with history of CAD on Brilinta, chronic systolic CHF with EF of 30 to 35% prior to admission, CKD 3, history of left foot osteomyelitis S/P left BKA  and DM 2 who presents 7/17/2023 with septic shock, NSTEMI, JOHANA and A-fib with RVR.  Patient was stabilized with pressors and transferred out of ICU 7/27/2023     Septic shock: Resolved  Group A strep bacteremia secondary to skin and soft tissue Infection right shoulder.  Concern for toxic shock syndrome  MRI right shoulder 7/19/2023 showed myositis  -Switched to ceftriaxone from penicillin G 7/27/2023  -Completed course of oral linezolid 7/27/2023  -ID consulted. ID recommends 2 weeks of IV antibiotics which has been completed     NSTEMI  History of CAD and ischemic cardiomyopathy   Prior coronary stents  A-fib with RVR  -Continue amiodarone  -Cardiology consulted. Cardiology signed off. Recommendation is to follow up for further ischemic work up when infectious concerns have resolved and renal function improved or when patient is considered ESRD.   -Continue aspirin and Brilinta  -Heparin drip stopped 7/31, on apixaban 8/1      Acute on chronic systolic CHF exacerbation  -TTE 7/17/2023 shows EF of 25 to 30%      JOHANA on CKD stage 3: Likely secondary to circulatory shock, requiring intermittent dialysis  -Baseline creatinine 1.4-1.6, 3.25 on admission, peak at 5.22 on 7/21, some fluctuation but now generally improving  -Status post intermittent hemodialysis with evidence of renal recovery.   -Started dialysis 7/21, last on 8/4, now stable off dialysis (CRF stage 4)     Hyponatremia- improved  -Initially severe, 119 on presentation     Elevated LFTs:   -Thought secondary to shock liver and myositis  -Improved     Acute on anemia of chronic disease  -No overt signs of bleeding, CT abdomen without signs of bleeding  -Transfused 5 units total, tansfuse for hemoglobin  less than 7     DM2:  -Lantus 30 units at bedtime, sliding scale insulin, and mealtime novolog. Was on 38 unit of lantus at home.        Impression:   Principal Problem:    Septic shock (H)  Active Problems:    DM type 2 on insulin, w Neuro -- Hgb A1C 6.9 on 7/17/23    Dehydration    Hyponatremia    Elevated troponin    JOHANA (acute kidney injury) (H)    Atrial fibrillation with RVR (H)      Plan:  as above, awaiting placement     DVT Prophylaxis: DOAC  Code Status: Full Code    Disposition: Expected discharge to LTAC, ARU or TCU     [place] MD  Pager [phone]  Cell Phone [phone]  Text Page (7am to 6pm)    Interval History   Generally weak but improving.     Physical Exam   Temp: 98.8  F (37.1  C) Temp src: Oral BP: 135/61 Pulse: 85   Resp: 20 SpO2: 97 % O2 Device: None (Room air)    Vitals:    08/06/23 0449 08/08/23 1142 08/09/23 0453   Weight: 93.8 kg (206 lb 12.7 oz) 95.3 kg (210 lb 1.6 oz) 91 kg (200 lb 9.9 oz)     Vital Signs with Ranges  Temp:  [98.6  F (37  C)-98.8  F (37.1  C)] 98.8  F (37.1  C)  Pulse:  [82-85] 85  Resp:  [18-20] 20  BP: (121-156)/(61-73) 135/61  SpO2:  [97 %-99 %] 97 %  I/O last 3 completed shifts:  In: 240 [P.O.:240]  Out: 2650 [Urine:2650]    # Pain Assessment:      8/10/2023     3:30 AM   Current Pain Score   Patient currently in pain? denies   Norman s pain level was assessed and he currently denies pain.        Constitutional: Awake, alert, cooperative, no apparent distress  Respiratory: Clear to auscultation bilaterally, no crackles or wheezing  Cardiovascular: Regular rate and rhythm, normal S1 and S2, and no murmur noted  GI: Normal bowel sounds, soft, non-distended, non-tender  Extrem: No calf tenderness, no ankle edema, has left BKA.  Has sleeve on right arm.   Neuro: Ox3, no focal motor or sensory deficits    Medications    dextrose        amiodarone  400 mg Oral Daily    apixaban ANTICOAGULANT  5 mg Oral BID    [Held by provider] aspirin  81 mg Oral Daily     docusate sodium  100 mg Oral BID    furosemide  40 mg Oral BID    insulin aspart   Subcutaneous TID AC    insulin aspart  1-10 Units Subcutaneous TID AC    insulin aspart  1-7 Units Subcutaneous At Bedtime    insulin glargine  35 Units Subcutaneous At Bedtime    magnesium oxide  400 mg Oral Daily    miconazole   Topical BID    multivitamin, therapeutic  1 tablet Oral Daily    sodium chloride (PF)  10-40 mL Intracatheter Q7 Days    sodium chloride (PF)  3 mL Intracatheter Q8H    ticagrelor  90 mg Oral BID    trimethoprim-polymyxin b  2 drop Left Eye 4x Daily    vitamin C  500 mg Oral Daily       Data   Recent Labs   Lab 08/10/23  0750 08/10/23  0607 08/10/23  0147 08/09/23  1707 08/09/23  1300 08/09/23  0804 08/09/23  0553 08/08/23  0755 08/08/23  0647 08/07/23  0847 08/07/23  0649 08/05/23  0723 08/05/23  0611 08/04/23  2054 08/04/23  1746 08/04/23  0709 08/04/23  0542   HGB  --   --   --   --   --   --   --   --  7.9*  --   --   --  7.6*  --  7.8*  --  7.9*   PLT  --  292  --   --   --   --   --   --   --   --  197  --   --   --   --   --  154   NA  --  133*  --   --   --   --  132*  --  132*  --  131*   < > 133*  --   --   --  132*   POTASSIUM  --  3.5  --   --  3.7  --  3.4  --  3.5  --  3.8   < > 3.8  --   --    < > 3.4   CHLORIDE  --  93*  --   --   --   --  90*  --  91*  --  91*   < > 95*  --   --   --  93*   CO2  --  30*  --   --   --   --  30*  --  31*  --  29   < > 27  --   --   --  28   BUN  --  50.5*  --   --   --   --  51.2*  --  49.6*  --  46.8*   < > 37.6*  --   --   --  47.8*   CR  --  2.18*  --   --   --   --  2.13*  --  2.18*  --  2.06*   < > 2.06*  --   --   --  2.35*   ANIONGAP  --  10  --   --   --   --  12  --  10  --  11   < > 11  --   --   --  11   YOSVANY  --  10.1  --   --   --   --  10.1  --  10.1  --  10.3*   < > 9.5  --   --   --  9.7   * 128* 155*   < >  --    < > 196*   < > 207*   < > 244*   < > 233*   < >  --    < > 276*    < > = values in this interval not displayed.        Imaging:   No results found for this or any previous visit (from the past 24 hour(s)).

## 2023-08-10 NOTE — CONSULTS
"ACUPUNCTURIST TREATMENT NOTE    Name: Norman Aguilar  :  1960  MRN:  [id number]    Acupuncture Treatment  Patient Type: Medical  Intervention Reason: Desires Experience  Patient complaint:: Chronic pain and stiffness of neck  Initial insertions: Temporalis motor point, Gb 20, 21, Yin davis  Number of needles inserted: 7  Number of needles removed: 7         \"Risks and benefits of acupuncture were discussed with patient. Consent for treatment was given. We thank you for the referral.\"     Prabhjot Lemus    Date:  8/10/2023  Time:  4:10 PM    "

## 2023-08-10 NOTE — PROGRESS NOTES
RENAL/ DIALYSIS   CC: f/u JOHANA    Subjective:  denies sob and feels rt arm mobility is improving.  Edema improved and now approaching admit weight, he is worried about ability to get to appointments after discharge.  I removed nontunneled dialysis catheter at bedside.    Dialysis last on 8/4        ASSESSMENT AND RECOMMENDATIONS:    JOHANA/ CKD 3b: baseline CKD due to longstanding DM2, HTN. Now severe ATN in setting of profound hypotension with sepsis, group a strep bacteremia and hypovolemia and contrast nephropathy after CTA, mild rhabdo. CTA on 7/17 with no hydronephrosis. UA on 7/18 with granular casts.  Has small gamma monoclonal gammopathy but normal free light chain ratio, expect this is MGUS  dialysis  7/21 -7/24.    Resumed on 7/29 due to worsening anasarca and poor response to lasix gtt, last dialysis on 8/4  Renal function stable and good urine output  Not planning further dialysis,  removed dialysis catheter 8/10  Cont lasix 40 mg bid  Repeat labs am  When thakur removed, will need spont voiding trial before discharge to ensure no urinary retention  Hypotension (improving) due to CMP and exacerbated by bacteremia/sepsis. BP now improved.Trend for now.   Non-ST elevation myocardial infarction with ischemic cardiomyopathy.  Heart failure with reduced ejection fraction.  Decompensated heart failure, continue fluid removal on dialysis.  LVEF 25-30% based on 7/18 cardiac echo which also showed normal right ventricular function, normal right atrial pressure.  Lasix as above  Bacteremia.  Group A Streptococcus on 7/17 with right shoulder myositis.  Now off abx  Anemia secondary to critical illness, chronic kidney disease, acute kidney injury, bacteremia.       -will need serial monitoring of MGUS and if worsening or anemia ongoing, consider hematology eval   -low iron stores and now off abx, will give venofer while here   Hyponatremia - due to anasarca,JOHANA/CKD  -avoid excessive fluid intake, hypotonic  "ivf  -cont lasix 40 mg bid    Our office will contact him for follow up for CKD  Will need bmp repeated within a week of discharge to ensure stable renal function    Lyudmila Medina MD  [facility]  [phone]          Objective    PHYSICAL EXAM  /74 (BP Location: Right leg)   Pulse 85   Temp 99.3  F (37.4  C) (Oral)   Resp 18   Ht 1.88 m (6' 2\")   Wt 91 kg (200 lb 9.9 oz)   SpO2 96%   BMI 25.76 kg/m    I/O last 3 completed shifts:  In: 240 [P.O.:240]  Out: 2650 [Urine:2650]  Wt Readings from Last 3 Encounters:   08/09/23 91 kg (200 lb 9.9 oz)   11/17/22 100.2 kg (221 lb)   09/28/22 104.3 kg (230 lb)       GENERAL: alert, NAD, weak/fatigued  HEENT:normocephalic, atraumatic  CARDIOVASCULAR:  trace edema.   PULMONARY: Normal effort,  No cyanosis  GASTROINTESTINAL:  nt/nd,   NEURO: Alert, no gross focal findings  PSYCHIATRIC: Appropriate mood and interaction  SKIN: warm, dry  MSK: left bka   RIJ CVC in place    LABORATORIES  Recent Labs   Lab 08/10/23  0607 08/09/23  1300 08/09/23  0553 08/08/23  0647 08/07/23  0649 08/06/23  0539 08/05/23  0611 08/04/23  1126 08/04/23  0542   *  --  132* 132* 131* 132* 133*  --  132*   POTASSIUM 3.5 3.7 3.4 3.5 3.8 3.7 3.8   < > 3.4   CHLORIDE 93*  --  90* 91* 91* 93* 95*  --  93*   CO2 30*  --  30* 31* 29 28 27  --  28   BUN 50.5*  --  51.2* 49.6* 46.8* 41.9* 37.6*  --  47.8*   CR 2.18*  --  2.13* 2.18* 2.06* 2.06* 2.06*  --  2.35*   GFRESTIMATED 33*  --  34* 33* 35* 35* 35*  --  30*   YOSVANY 10.1  --  10.1 10.1 10.3* 10.1 9.5  --  9.7   MAG 1.8  --   --  1.7  --   --   --   --   --     < > = values in this interval not displayed.         Recent Labs   Lab 08/10/23  0607 08/08/23  0647 08/07/23  0649 08/05/23  0611 08/04/23  1746 08/04/23  0542 08/03/23 1742   HGB  --  7.9*  --  7.6* 7.8* 7.9* 7.8*     --  197  --   --  154  --             MEDICATIONS   amiodarone  400 mg Oral Daily    apixaban ANTICOAGULANT  5 mg Oral BID    [Held by " provider] aspirin  81 mg Oral Daily    docusate sodium  100 mg Oral BID    furosemide  40 mg Oral BID    insulin aspart   Subcutaneous TID AC    insulin aspart  1-10 Units Subcutaneous TID AC    insulin aspart  1-7 Units Subcutaneous At Bedtime    insulin glargine  35 Units Subcutaneous At Bedtime    magnesium oxide  400 mg Oral Daily    miconazole   Topical BID    multivitamin, therapeutic  1 tablet Oral Daily    sodium chloride (PF)  10-40 mL Intracatheter Q7 Days    sodium chloride (PF)  3 mL Intracatheter Q8H    ticagrelor  90 mg Oral BID    trimethoprim-polymyxin b  2 drop Left Eye 4x Daily    vitamin C  500 mg Oral Daily

## 2023-08-10 NOTE — PLAN OF CARE
"  Problem: Plan of Care - These are the overarching goals to be used throughout the patient stay.    Goal: Plan of Care Review  Description: The Plan of Care Review/Shift note should be completed every shift.  The Outcome Evaluation is a brief statement about your assessment that the patient is improving, declining, or no change.  This information will be displayed automatically on your shift note.  Outcome: Progressing  Goal: Patient-Specific Goal (Individualized)  Description: You can add care plan individualizations to a care plan. Examples of Individualization might be:  \"Parent requests to be called daily at 9am for status\", \"I have a hard time hearing out of my right ear\", or \"Do not touch me to wake me up as it startles me\".  Outcome: Progressing  Goal: Absence of Hospital-Acquired Illness or Injury  Outcome: Progressing  Intervention: Identify and Manage Fall Risk  Recent Flowsheet Documentation  Taken 8/10/2023 0000 by Maribeth Tony, RN  Safety Promotion/Fall Prevention:   activity supervised   safety round/check completed   lighting adjusted  Intervention: Prevent Skin Injury  Recent Flowsheet Documentation  Taken 8/10/2023 0000 by Maribeth Tony, RN  Body Position:   position changed independently   tilted   right   heels elevated  Intervention: Prevent and Manage VTE (Venous Thromboembolism) Risk  Recent Flowsheet Documentation  Taken 8/10/2023 0000 by Maribeth Tony, RN  VTE Prevention/Management: compression stockings on  Goal: Optimal Comfort and Wellbeing  Outcome: Progressing  Goal: Readiness for Transition of Care  Outcome: Progressing   Goal Outcome Evaluation:             Pt a/o without c/o pain. Pt continues to have diarrhea and is anxious about discharge plan. Pt encouraged to increase activity and to improve skin on coccyx by sleeping on sides. Will monitor.            "

## 2023-08-10 NOTE — PLAN OF CARE
Problem: Malnutrition  Goal: Improved Nutritional Intake  Outcome: Progressing     Problem: Sepsis/Septic Shock  Goal: Optimal Coping  Outcome: Progressing   Goal Outcome Evaluation:             Pt ate 100% of breakfast and lunch this shift.  Pt now afibrile

## 2023-08-10 NOTE — INTERIM SUMMARY
[facility] Pre-Admission Screen    Referral Source:  [facility] P4 -[id number]  Admit date to referring facility: 7/17/2023    Physical Medicine and Rehab Consult Completed: No    Rehab Diagnosis:    16 Debility (non-cardiac, non-pulmonary) Septic shock with JOHANA on CKD secondary to circulatory shock requiring HD.     Justification for Acute Inpatient Rehabilitation  Mr. Aguilar is a 63-year-old male with history of CAD on Brilinta, chronic systolic CHF with EF of 30 to 35% prior to admission, CKD 3, history of left foot osteomyelitis s/p left BKA, and DM 2 who presented 7/17/2023 with septic shock, NSTEMI, JOHANA requiring dialysis and A-fib with RVR. Patient was stabilized with pressors and transferred out of ICU 7/27/2023. His renal function has waxed and waned, w/ last dialysis on 8/4. Nephrology not planning further dialysis. He has also required ongoing medical mgmt of his anemia, severe malnutrition, anticoagulation needs in setting of Afib w/ RVR, acute on chronic systolic CHF exacerbation, and diabetes. He is now medically stable and ready to discharge to acute inpatient rehab.   The patient requires transfer to [facility] for intensive therapies not available in a lesser level of care including PT/OT, ongoing medical management at least 3 days per week, and rehabilitative nursing care. The patient requires an intensive inpatient rehab program to address the following acute impairments: pitting edema in RUE and RLE, pain, weakness, impaired balance, impaired activity tolerance, and fatigue.  These impairments are further impacted by his prior L BKA. These impairments are contributing to functional limitations impacting his safety and independence w/ bed mobility, transfers, gait, stairs, ADLs and IADLs.  Currently he is requiring Ax2 or use of lift for all edge of bed transfers and mobility and A x1 for ADLs. This is well below his baseline level of independence.      Active Medical Management Needs/Risks for Clinical Complications  The patient requires the high level of rehabilitation physician supervision that accompanies the provision of intensive rehabilitation therapy.  The patient needs the services of the rehabilitation physician to assess the patient medically and functionally and to modify the course of treatment as needed to maximize the patient's capacity to benefit from the rehabilitation process.  The patient requires physician oversight at least 3x/wk to medically manage and assess:  Renal: Requires medical management of renal function by hospitalist due to onset of JOHANA in the setting circulatory shock and h/o CKD which required HD and aggressive IV diuresis. Requires close monitoring of labs, fluid status, and titration of oral diuretics as pt is at risk for reoccurrence of renal failure.   ID Pt is at risk for oppurtunistic infection due to h/o of osteomyelitis and myositis with recent bacteriemia secondary to skin and soft tissue infection right shoulder - completed course for IV abx. Monitor labs (CBC/WBC, CK) and fever curve.   Cardiac: Pt with h/o heart failure with reduced left ventricular ejection fraction, known coronary artery disease with complicated hemodynamic course/sepsis. Onset of A-fib with RVR, pt on amiodarone for rate control and Eliquis which places pt at risk for bleeding. Manage electrolytes with recent hyperkalemia and hyponatremia while on oral diuretic.  Endocrine/DM management: Fluctuations of blood glucose levels ranging from 124-244 with in the last 48hrs.requires ongoing DM education to ensure compliance with home treatment plan.   Skin - Pt is at risk for skin breakdown due to limited mobility. Requires ongoing assessment and assist with mobility for prevention of wounds.   Edema/Pain Management: In the setting of recent R shoulder pain/swelling and concern for inflammatory versus pyogenic myositis, manage effective of pain meds -  oxycodone, Tylenol. Medication plan to enhance progression with therapies.     Past Medical/Surgical History  Surgery in the past 100 days: No  Additional relevant past medical history: coronary artery disease, status post non-STEMI and JESSICA to RCA, chronic systolic CHF w/ EF 30-35% PTA, CKD, ischemic cardiomyopathy, Atrial fibrillation, HTN, HLD, insulin-dependent diabetes, stage III CKD, osteomyelitis s/p L BKA, DVT, + smoker (cigars)    Level of Functioning Prior to Admission:  LIVING ENVIRONMENT  People in Home: sibling(s) (sister)  Current Living Arrangements: house  Home Accessibility:  (varies by home)  Transportation Anticipated: other (see comments) (TBD)  Living Environment Comments: sleeps in recliner at baseline, has been staying between dtr/niece/sister homes.    SELF-CARE  Usual Activity Tolerance: moderate  Regular Exercise: No  Equipment Currently Used at Home: prosthesis (L BKA)  Activity/Exercise/Self-Care Comment: Pt reports being Ind. at baseline  Additional Comments: Plan for [facility] for assisted living at discharge.     Level of Function: GG Scale (Section GG Functional Ability and Goals; CMS's LEE Version 3.0 Manual effective 10.1.2019):  PT Current Function Goals for Rehab   Bed Rolling 4 Supervision or touching assitance 6 Independent   Supine to Sit 4 Supervision or touching assitance 6 Independent   Sit to Stand 1 Dependent 6 Independent   Transfer 1 Dependent 6 Independent   Ambulation 88 Not attempted due to safety 6 Independent   Stairs 88 Not attempted due to safety 4 Supervision or touching assitance     OT Current Function Goals for Rehab   Feeding 5 Setup or clean-up assistance 6 Independent   Grooming Not completed 6 Independent   Bathing 3 Partial/moderate assistance for LB bathing while seated 6 Independent   Upper Body Dressing Not completed 6 Independent   Lower Body Dressing Not completed 6 Independent   Toileting 2 Substantial/maximal assistance 6 Independent    Toilet Transfer 1 Dependent 6 Independent   Tub/Shower Transfer 88 Not attempted due to safety 6 Independent   Cognition Not Assessed Independent     SLP Current Function Goals for Rehab   Swallow Not Impaired Not applicable   Communication Not Impaired Not applicable     Current Diet:  0-Thin, 7-Regular, Diabetic, and Renal    Summary Statement:  The patient has need for intensive therapies including PT/OT while at [facility]. He currently requires Ax1 for ADLs, and Ax2 (or use of lift) for all mobility out of bed. Able to stand for 90 seconds. Previously he was mod IND w/ use of RLE prosthesis - currency requiring assist to radha/doff prothesis.     Expected Therapies and Services Required During Inpatient Rehab Admission  Intensity of Therapy: Patient requires intensive therapies not available in a lesser level of care. Patient is motivated, making gains, and can tolerate 3 hours of therapy a day.  Physical Therapy: 90 minutes per day, 7 days a week for 21 days  Occupational Therapy: 90 minutes per day, 7 days a week for 21 days  Speech and Language Therapy: No SLP needs anticipated at this time.   Rehabilitation Nursing Needs: Patient requires 24 hour Rehab Nursing to manage vitals, medication education, tone management, positioning, carryover of new rehab techniques, care coordination, skin integrity, blood sugar management, diabetes education, pain management, provide safe environment for patient at falls risk, and edema management.    Precautions/restrictions/special needs:  Precautions: fall precautions  Restrictions: none  Special Needs: lift, prothesis    Expected Level of Improvement: Mod I to independent for mobility, transfers, stairs, and ADLs  Expected Length of time to achieve: 21 days    Anticipated Discharge Needs:  Anticipated Discharge Destination: Assisted Living Facility  Anticipated Discharge Support:  Unknown  24/7 support available : Unknown  Identified caregiver(s):  Family/friends  Anticipated  Discharge Needs: Home with homecare and Home with outpatient therapy    Identified challenges/barriers:  N/A    Liaison signature/date/time:    Physician statement of review and agreement:  I have reviewed and am in agreement of the need for IRF stay to address above functional and medical needs. In addition to above statements address, Patient requires intensive active and ongoing therapeutic intervention and multiple therapies; Patient requires medical supervision; Expected to actively participate in the intensive rehab program; Sufficiently stable to actively participate; Expectation for measurable improvement in functional capacity or adaption to impairments.    MD signature/date/time:

## 2023-08-10 NOTE — PROGRESS NOTES
Patient accepted to [facility] ([facility]).  [facility] has a tentative bed on 8/11.  Ride set up.    [facility] set up transport with [facility] by stretcher for Friday, 8/11.  Transport  window: [phone].    PCS completed.

## 2023-08-11 PROBLEM — R53.81 DEBILITY: Status: ACTIVE | Noted: 2023-01-01

## 2023-08-11 NOTE — H&P
[facility]   Acute Rehabilitation Unit  Admission History and Physical    CHIEF COMPLAINT   deconditioning    HISTORY OF PRESENT ILLNESS  Norman Aguilar is a 63 year old man with past medical history of cAD, Chronic systolic heart failure, EF 30-35%, CKD stage 3, left foot osteomyelitis s/p left BKA, and DM type 2 who presented 7/17/23 with septic shock in setting of group a strep bacteremia with right shoulder myositis.  Course was complicated by NSTEMI, JOHANA, hyponatremia, and A-fib with RVR.      He was treated with 2 week course of antibiotics, and weekly Hibiclens wash.  Seen by cardiology for decompensated heart failure, with medical management and recommendation for outpatient coronary angiogram.   He received dialysis 7/21-24 and 7/29-8/4 for ATN.  Dialysis catheter was removed 8/10.     Functionally noted to have impaired strength, impaired activity tolerance, and impaired balance.  He is currently assist of 2 or lift dependent for transfers. Requires mod assist for bathing, max assist for toileting.  Goals for mod I with mobilty and adls.     On arrival to rehab, reports no concerns. Eager to participate.    PAST MEDICAL HISTORY   Reviewed and updated in Epic.  Past Medical History:   Diagnosis Date    Anemia     Chronic systolic CHF (congestive heart failure) (H)     Coronary artery disease     Diabetic neuropathy (H)     DM type 2 w Neuropathy     GERD (gastroesophageal reflux disease)     Hyperlipidemia     Hypertension     Intermittent atrial fibrillation (H)     on Eliquis    Ischemic cardiomyopathy 11/16/2021    Echo with EF of 30-35%    NSTEMI (non-ST elevated myocardial infarction) (H) 11/15/2021    Osteomyelitis of left foot (H) 04/04/2022    Renal disease     Retinopathy     Sleep disturbance        SURGICAL HISTORY  Reviewed and updated in Epic.  Past Surgical History:   Procedure Laterality Date    AMPUTATE FOOT Left 3/21/2022    Procedure: Gisele  AMPUTATION, FOOT;  Surgeon: Ronald Bhatt MD;  Location: [facility] OR    AMPUTATE LEG BELOW KNEE Left 4/4/2022    Procedure: LEFT BELOW  KNEE AMPUTATION;  Surgeon: Ronald Bhatt MD;  Location: [facility] OR    AMPUTATE TOE(S) Left 11/16/2021    Procedure: first and second ray amputation;  Surgeon: Eddi Ram DPM;  Location: [facility] OR    APPENDECTOMY      CV CORONARY ANGIOGRAM N/A 11/16/2021    Procedure: CV CORONARY ANGIOGRAM;  Surgeon: Pam Tabares MD;  Location: [facility] LAB CV    CV CORONARY ANGIOGRAM N/A 8/24/2022    Procedure: CV CORONARY ANGIOGRAM;  Surgeon: Cipriano Lucas MD;  Location: [facility] LAB CV    CV CORONARY ANGIOGRAM N/A 8/29/2022    Procedure: CV CORONARY ANGIOGRAM;  Surgeon: Cipriano Lucas MD;  Location: [facility] CV    CV INTRAVASULAR ULTRASOUND N/A 8/29/2022    Procedure: Intravascular Ultrasound;  Surgeon: Cipriano Lucas MD;  Location: [facility] LAB CV    CV LEFT HEART CATH N/A 11/16/2021    Procedure: Left Heart Cath;  Surgeon: Pam Tabares MD;  Location: [facility] LAB CV    CV PCI N/A 8/29/2022    Procedure: Percutaneous Coronary Intervention stent;  Surgeon: Cipriano Lucas MD;  Location: [facility] CV    CV PCI ANGIOPLASTY N/A 8/29/2022    Procedure: Percutaneous Transluminal Angioplasty;  Surgeon: Cipriano Lucas MD;  Location: [facility] LAB CV    FOOT SURGERY Left     HERNIA REPAIR      INCISION AND DRAINAGE LOWER EXTREMITY, COMBINED Left 11/16/2021    Procedure: INCISION AND DRAINAGE, left foot;  Surgeon: Eddi Ram DPM;  Location: [facility] OR    INCISION AND DRAINAGE LOWER EXTREMITY, COMBINED Left 11/19/2021    Procedure: INCISION AND DRAINAGE, left foot;  Surgeon: Eddi Ram DPM;  Location: [facility] OR    INCISION AND DRAINAGE LOWER EXTREMITY, COMBINED Left 12/9/2021    Procedure: INCISION AND DRAINAGE, Left foot;  Surgeon: Eddi Ram DPM;   Location: [facility] OR    INCISION AND DRAINAGE LOWER EXTREMITY, COMBINED Left 1/10/2022    Procedure: INCISION AND DRAINAGE, left foot;  Surgeon: Eddi Ram DPM;  Location: [facility] OR    INCISION AND DRAINAGE LOWER EXTREMITY, COMBINED Left 1/27/2022    Procedure: INCISION AND DRAINAGE, Left foot;  Surgeon: Eddi Ram DPM;  Location: [facility] OR    IR CVC NON TUNNEL PLACEMENT > 5 YRS  7/20/2023    PICC TRIPLE LUMEN PLACEMENT  7/17/2023         PICC TRIPLE LUMEN PLACEMENT  7/18/2023            SOCIAL HISTORY  Reviewed and updated in Epic.  Living situation: living between several family member's home with plan for discharge to assisted living facility  Family support: supportive daughter/ sister  Tobacco use: former smoker  Alcohol use: rare etoh use  Illicit drug use: none  Social History     Socioeconomic History    Marital status: Single     Spouse name: Not on file    Number of children: Not on file    Years of education: Not on file    Highest education level: Not on file   Occupational History    Not on file   Tobacco Use    Smoking status: Former     Types: Cigars    Smokeless tobacco: Never    Tobacco comments:     Cigars   Vaping Use    Vaping Use: Never used   Substance and Sexual Activity    Alcohol use: Yes     Comment: < 1 drink a month (only holidays)    Drug use: Never    Sexual activity: Not on file   Other Topics Concern    Parent/sibling w/ CABG, MI or angioplasty before 65F 55M? Not Asked   Social History Narrative    Single, currently not working, has done numerous jobs, lives alone.  Full code. Lives in an .  (last updated 4/4/2022)      Social Determinants of Health     Financial Resource Strain: Not on file   Food Insecurity: Not on file   Transportation Needs: Not on file   Physical Activity: Not on file   Stress: Not on file   Social Connections: Not on file   Intimate Partner Violence: Not on file   Housing Stability: Not on file       FAMILY  HISTORY  Reviewed and updated in Epic.  No family history on file.      PRIOR FUNCTIONAL HISTORY   Staying between daughter/ sister/ nieces homes, uses L LE prosthesis planning for discharge to assisted living facility    MEDICATIONS  Scheduled meds  Medications Prior to Admission   Medication Sig Dispense Refill Last Dose    aspirin (ASA) 81 MG EC tablet Take 1 tablet (81 mg) by mouth daily Start tomorrow. 30 tablet 3     atorvastatin (LIPITOR) 80 MG tablet TAKE 1 TABLET(80 MG) BY MOUTH EVERY EVENING 90 tablet 1     insulin aspart (NOVOLOG FLEXPEN) 100 UNIT/ML pen For  - 164 give 1 unit. For  - 189 give 2 units. For  - 214 give 3 units. For  - 239 give 4 units. For  - 264 give 5 units. For  - 289 give 6 units. For  - 314 give 7 units. For  - 339 give 8 units For  - 364 give 9 units For  - 389 give 10 units For  - 414 give 11 units For BG greater than or equal to 415 give 12 units 3 mL 3     Multiple Vitamin (MULTI VITAMIN) TABS Take 1 tablet by mouth daily        OMEGA-3/DHA/EPA/FISH OIL (FISH OIL-OMEGA-3 FATTY ACIDS) 300-1,000 mg capsule Take 1 g by mouth daily        ticagrelor (BRILINTA) 90 MG tablet Take 1 tablet (90 mg) by mouth 2 times daily Dose to start tomorrow morning. 180 tablet 3     vitamin C (ASCORBIC ACID) 500 MG tablet Take 500 mg by mouth daily       blood glucose (NO BRAND SPECIFIED) test strip Use to test blood sugar 4 times daily or as directed. 100 strip 3     blood glucose monitoring (SOFTCLIX) lancets Use to test blood sugar 4 times daily. 100 each 6     [DISCONTINUED] furosemide (LASIX) 20 MG tablet Take 20 mg by mouth daily       [DISCONTINUED] insulin glargine (LANTUS SOLOSTAR) 100 UNIT/ML pen Inject 38 Units Subcutaneous every morning (Patient taking differently: Inject 38 Units Subcutaneous At Bedtime) 3 mL 3     [DISCONTINUED] lisinopril (ZESTRIL) 5 MG tablet Take 1 tablet by mouth daily       [DISCONTINUED] magnesium  "oxide (MAG-OX) 400 MG tablet Take 1 tablet (400 mg) by mouth daily 90 tablet 1     [DISCONTINUED] metoprolol succinate ER (TOPROL-XL) 50 MG 24 hr tablet TAKE 1 TABLET(50 MG) BY MOUTH DAILY 90 tablet 1        ALLERGIES   No Known Allergies      REVIEW OF SYSTEMS  A 10 point ROS was performed and negative unless otherwise noted in HPI.       PHYSICAL EXAM  VITAL SIGNS:  There were no vitals taken for this visit.  BMI:  Estimated body mass index is 26.98 kg/m  as calculated from the following:    Height as of 7/17/23: 1.88 m (6' 2\").    Weight as of an earlier encounter on 8/11/23: 95.3 kg (210 lb 1.6 oz).     General: Oriented to time, place and person  Pulmonary: BEAE  Cardiovascular: S1 S2 no murmurs  Abdominal: NAD  Extremities: warm, well perfused, no edema in right lower extremity and left above knee   Mental Status:  alert and oriented x3   Cranial Nerves: grossly normal. Occasionally hard of hearing  2nd CN: Pupils equal, round, reactive to light and accomodation. and visual fields intact to confrontation.   3rd,4th,6th CN:  EOMI, appropriate pupillary responses  5th CN: facial sensation intact   7th CN: face symmetrical   8th CN: functional hearing bilaterally  9th, 10th CN: palate elevates symmetrically   11th CN: sternocleidomastoids and trapezii strong   12th CN: tongue midline and without fasciculations     Sensory: Normal to light touch in bilateral upper extremities and in right lower extremity and left above knee   Strength:    SF  EF  EE  WE  G  I  HF  KE  DF  EHL  PF   R  5/5 5/5 5/5 5/5 5/5 5/5 5/5 5/5 5/5 5/5 5/5  L  5/5 5/5 5/5 5/5 5/5 5/5 5/5 NA NA NA NA      Levy's test: negative bilaterally       LABS  CBC RESULTS:   Recent Labs   Lab Test 08/11/23  0519 08/10/23  0607 08/08/23  0647 08/07/23  0649 08/05/23  0611 08/01/23  1759 08/01/23  0504 07/31/23  1811 07/31/23  0650   WBC 5.3  --   --   --   --   --  6.0  --  5.7   RBC 2.90*  --   --   --   --   --  2.62*  --  2.32*   HGB 8.4*  --  7.9* "  --  7.6*   < > 7.8*  7.8*   < > 6.8*   HCT 26.1*  --   --   --   --   --  23.2*  --  20.8*   MCV 90  --   --   --   --   --  89  --  90   MCH 29.0  --   --   --   --   --  29.8  --  29.3   MCHC 32.2  --   --   --   --   --  33.6  --  32.7   RDW 16.2*  --   --   --   --   --  15.9*  --  16.6*    292  --  197  --    < > 124*  --  98*    < > = values in this interval not displayed.     Last Basic Metabolic Panel:  Recent Labs   Lab Test 08/11/23  1137 08/11/23  0801 08/11/23  0519 08/10/23  0750 08/10/23  0607 08/09/23  1707 08/09/23  1300 08/09/23  0804 08/09/23  0553   NA  --   --  134*  --  133*  --   --   --  132*   POTASSIUM  --   --  3.6  --  3.5  --  3.7  --  3.4   CHLORIDE  --   --  94*  --  93*  --   --   --  90*   CO2  --   --  29  --  30*  --   --   --  30*   ANIONGAP  --   --  11  --  10  --   --   --  12   * 128* 137*   < > 128*   < >  --    < > 196*   BUN  --   --  55.8*  --  50.5*  --   --   --  51.2*   CR  --   --  2.26*  --  2.18*  --   --   --  2.13*   GFRESTIMATED  --   --  32*  --  33*  --   --   --  34*   YOSVANY  --   --  9.9  --  10.1  --   --   --  10.1    < > = values in this interval not displayed.       IMPRESSION/PLAN:  Norman Aguilar is a 63-year-old man with history of CAD, chronic systolic CHF  EF of 30 to 35%, CKD 3, left foot osteomyelitis s/p left BKA, and DM 2 who presented 7/17/2023 with septic shock in setting of bacteremia secondary to right shoulder myositis, NSTEMI, JOHANA requiring dialysis and A-fib with RVR. Functionally noted to have impaired strength, impaired activity tolerance, impaired balance, admitted to rehab 8/11/23.     Admission to acute inpatient rehab debility.    Impairment group code: 16      PT, OT 90 minutes of each on a daily basis, in addition to rehab nursing and close management of physiatrist.      Impairment of ADL's: Noted to have impaired strength, impaired activity tolerance, and impaired balance leading to decreased ability to  independently complete ADL's.  Will benefit from ongoing OT with goal for MOD I with basic ADLs.     Impairment of mobility:  Noted to have impaired strength, impaired activity tolerance, and impaired balance leading to decreased mobility.  Will benefit from ongoing PT with goal for VEE with basic mobility .         Medical Conditions  - appreciate hospitalist support for medial management    Debility   In setting of acute illness prolonged hospitalization  -continue PT/OT    Group A strep bacteremia  Right Shoulder Myositis  Originally admitted to ICU on pressors with complicated skin and soft tissue infection.  Positive for group A strep bacteremia.  Was having right shoulder pain, myositis seen on MRI.  Was during started 7/21 from pressure or swelling.  ID followed while in hospital.  Penicillin G changed to ceftriaxone 7/27, 2 weeks IV antibiotics complete.  Completed course of oral linezolid 7/27 if recurs-  ID would consider long term PCN suppression  -Hibiclens wash right shoulder weekly    NSTEMI w/ ICM  CAD with coronary stents-hx of  Afib w/ RVR  HFrEF  Hypotension-resolved   Cardiology followed while inpatient. Decompensated heart failure c/b hypotension, sepsis, fluid overload. Plan for coronary angiography after stabilization of infection and renal function. Converted from AF to NSR after amiodarone load. ECHO EF 25-30% with decreased LV function. -Continue PT ASA, Brilinta  -Continue Apixaban BID  -Continue amiodarone daily  -Continue furosemide BID  -Follow up with Cardiology outpatient for coronary angiogram     JOHANA on CKD3b  Baseline CKD 2/2 longstanding DM2, HTN. Cr range 2.06-2.26. severe ATN in setting of profound hypotension with sepsis, group A strep bacteremia and hypovolemia, contrast nephropathy after CTA, and mild rhabdo. CTA on 7/17 with no hydronephrosis. dialysis 7/21-7/24 & 7/29-8/4. 8 dialysis cath removed 8/10  -Continue furosemide BID  -I&Os   -trend renal function.       Hyponatremia  Initially 119 on admission to hospital. 8/11 134  -Continue furosemide BID  -Avoid excessive fluid intake  -trend Na     Anemia of chronic disease  2/2 critical illness, CKD, JOHANA, bacteremia. Received venofer and 5 units PRBC while inpatient. No overt signs of bleeding.  Hgb 8.4 8/11.   - trend CBC     DM2 (Hb A1c 6.9 on 7/17/23)  -Continue PTA lantus 35 units daily  -Carb coverage 1 unit per 10 g carbohydrates  -sliding scale insulin   -accu checks AC/HS/0200  -hypoglycemia protocol     Seborrheic Dermatitis  -continue Selenium shampoo     Urinary Retention  -continue thakur plan for repeat trial of void in near future    Elevated lfts   2/2 infection improved.   -trend      Adjustment to disability:  Clinical psychology to eval and treat as indicated  FEN: reg  Bowel: monitor  Bladder:   DVT Prophylaxis: apixaban  GI Prophylaxis: none  Code: full  Disposition: goal for home  ELOS:  3+ weeks.  Rehab prognosis:  fair  Follow up Appointments on Discharge: cardiology, primary care, nephrology ([facility] clinic)      note prepared by Melissa Gillespie.    Physician Attestation      I saw and evaluated Norman SIMS [place]ia as part of a shared APRN/PA visit.     63-year-old male with history of CAD on Brilinta, chronic systolic CHF with EF of 30 to 35% prior to admission, CKD 3, history of left foot osteomyelitis S/P left BKA and DM 2 who presents 7/17/2023 with septic shock, NSTEMI, JOHANA and A-fib with RVR. Patient was stabilized and transerref to ARU.    Comorbid medical conditions being managed:     Septic shock: Resolved  Group A strep bacteremia secondary to skin and soft tissue Infection right shoulder.  MRI right shoulder 7/19/2023 showed myositis  -antimicrobial course completed    NSTEMI  History of CAD and ischemic cardiomyopathy   Prior coronary stents  A-fib with RVR  -Cardiology consulted. Cardiology signed off. Recommendation is to follow up for further ischemic work up when infectious  concerns have resolved and renal function improved or when patient is considered ESRD.   -Continue aspirin and Brilinta  -apixaban  -Continue amiodarone   Acute on chronic systolic CHF exacerbation  -TTE 2023 shows EF of 25 to 30%  -CHF clinic post discharge  -Lasix as ordered      JOHANA on CKD stage 3: Likely secondary to circulatory shock, requiring intermittent dialysis  -Baseline creatinine 1.4-1.6, 3.25 on admission, peak at 5.22 on , some fluctuation but now generally improving  -Status post intermittent hemodialysis with evidence of renal recovery.   -Started dialysis , last on , now stable off dialysis (CRF stage 4)  -Nephrology signed off    Hyponatremia- improved    Elevated LFTs:   -Thought secondary to shock liver and myositis  -Improved/resolving    Acute on anemia of chronic disease  - Resolved    DM2:  -Lantus 35U every day, novolog 1U:10g CHO ac tid with meals, sliding scale insulin, accuchecks ac/hs    Prior functional level: sleeps in recliner at baseline, has been staying between dtr/niece/sister homes.     Present function: mod-max A with toileting and grooming.  Anticipated rehabilitation course: mod I ADLs    Will benefit from intensive rehabilitation includin minutes each of PT, OT and SLP  Rehabilitation nursing  Close management by physiatry  Prognosis: good  Estimated length of stay: +/- 14 days    I personally reviewed the vital signs.    Key management decisions made by me and carried out under my direction include: see attached     Counseling and/or coordination of care performed by me:  see attached    90 MINUTES SPENT BY ME on the date of service doing chart review, history, exam, documentation & further activities per the note.   I performed the substantial portion of this visit.     Esther Herndon MD  Date of Service (when I saw the patient): 23

## 2023-08-11 NOTE — PLAN OF CARE
Goal Outcome Evaluation:       Pt. Planning on discharge this afternoon to  acute rehab via stretcher, pt. Pleasant and cooperative, denies pain, pt. Is a bit nervous about the transition but also ready to leave hospital, will cont to mointor.

## 2023-08-11 NOTE — PLAN OF CARE
Problem: Plan of Care - These are the overarching goals to be used throughout the patient stay.    Goal: Plan of Care Review  Description: The Plan of Care Review/Shift note should be completed every shift.  The Outcome Evaluation is a brief statement about your assessment that the patient is improving, declining, or no change.  This information will be displayed automatically on your shift note.  Outcome: Progressing     Problem: Plan of Care - These are the overarching goals to be used throughout the patient stay.    Goal: Optimal Comfort and Wellbeing  Outcome: Progressing     Problem: Risk for Delirium  Goal: Improved Sleep  Outcome: Progressing     Problem: Pain Acute  Goal: Optimal Pain Control and Function  Outcome: Progressing     Problem: Sepsis/Septic Shock  Goal: Absence of Infection Signs and Symptoms  Intervention: Promote Recovery  Recent Flowsheet Documentation  Taken 8/11/2023 0006 by Grisel Pollard RN  Activity Management: activity adjusted per tolerance     Problem: Hypertension Comorbidity  Goal: Blood Pressure in Desired Range  Intervention: Maintain Blood Pressure Management  Recent Flowsheet Documentation  Taken 8/11/2023 0006 by Grisel Pollard, RN  Medication Review/Management: medications reviewed   Goal Outcome Evaluation:  Pt is alert and oriented x4. VS T 98.4, RR 18/min,AK 85/min, /59, O2 sat 98% RA. Denies pain . Repositioned with pillows used in his side.  at 0200AM. Dressing to buttocks/ coccyx intact.

## 2023-08-11 NOTE — CONSULTS
[facility], [place]    Internal Medicine Consult Note       Date of Admission: 8/11/2023  Consult Requested by: Esther Herndon MD  Reason for Consult: Medical co-management     Assessment & Plan   Norman Aguilar is a 63 year old man with a history of CAD on Brilinta, chronic systolic CHF with EF of 30-35%, CKD 3, left foot osteomyelitis s/p left BKA and DM 2.  Presented to [facility] 7/17/2023 with septic shock, NSTEMI, JOHANA, A-fib with RVR.  Transfer to acute rehab 8/11 for ongoing rehabilitation.    Physical deconditioning  -PT/OT consults    Septic shock-resolved  Group A strep bacteremia secondary to seborrheic dermatitis R shoulder   Concern for toxic shock syndrome  Myositis  Originally admitted to ICU on pressors with complicated skin and soft tissue infection.  Positive for group A strep bacteremia.  Was having right shoulder pain, myositis seen on MRI.  Was during started 7/21 from pressure or swelling.  ID followed while in hospital.  Penicillin G changed to ceftriaxone 7/27, 2 weeks IV antibiotics complete.  Completed course of oral linezolid 7/27.  -Hibiclens wash right shoulder weekly  -Selenium sulfide 2.5% lotion BID PRN irritation- apply to yellow flaking skin   -WOCN consult   -If was to get septic again, ID would consider long term PCN suppression for recurrent cellulitis    NSTEMI w/ ICM  CAD with coronary stents-hx of  Afib w/ RVR  HFrEF  Hypotension-resolved   Cardiology followed while inpatient. Decompensated heart failure c/b hypotension, sepsis, fluid overload. Plan for coronary angiography after stabilization of infection and renal function. Converted from AF to NSR after amiodarone load. ECHO EF 25-30% with decreased LV function.   -Continue PTA ASA, Brilinta  -Continue Apixaban BID  -Continue amiodarone daily  -Continue furosemide BID  -Follow up with Cardiology outpatient for coronary angiogram    JOHANA on CKD3  Baseline CKD 2/2 longstanding DM2,  HTN. Became severe ATN in setting of profound hypotension with sepsis, group A strep bacteremia and hypovolemia, contrast nephropathy after CTA, and mild rhabdo. CTA on 7/17 with no hydronephrosis. Completed dialysis 7/21-7/24 and had to be resumed 7/29 2/2 worsening anasarca and poor response to lasix gtt. Last dialysis 8/4 and dialysis cath removed 8/10. Baseline Cr range 2.06-2.26.  -Continue furosemide BID  -I&Os   -BMP Mondays    Hyponatremia  Initially 119 on admission to hospital. Currently 134.   -Continue furosemide BID  -Avoid excessive fluid intake    Anemia of chronic disease  2/2 critical illness, CKD, JOHANA, bacteremia. Received venofer and 5 units PRBC while inpatient. No overt signs of bleeding   -CBC Mondays     DM2 (Hb A1c 6.9 on 7/17/23)  -Continue PTA lantus 35 units daily  -Carb coverage 1 unit per 10 g carbohydrates  -sliding scale insulin medium resistance   -accu checks AC/HS/0200  -hypoglycemia protocol  -Declined DM consult, has managed his Diabetes well for 30 years   Recent Labs   Lab 08/12/23  0240 08/11/23  2130 08/11/23  1728 08/11/23  1137 08/11/23  0801 08/11/23  0519   * 186* 128* 179* 128* 137*     Urinary retention  Currently has indwelling thakur.   -Removal with voiding trial in near future     Transaminitis   Was thought to be 2/2 shock liver and myositis.   -Hepatic panel Mondays     R axilla blood blisters-healing  Pressure injury coccyx  -wound and skin care per WOCN recommendations     S/p L BKA  -Prosthetic leg in room, pt uses alcohol spray to knee prior to placing. OK for family to bring from home.     The patient's care was discussed with the Patient.    JUNI Benitez [facility]  Hospitalist Service  [facility]  Pager: [phone]    ______________________________________________________________________    Chief Complaint   Weakness    History is obtained from the patient and electronic health record    History of Present Illness   Norman Aguilar is a 63  year old man with a history of CAD on Brilinta, chronic systolic CHF with EF of 30-35%, CKD 3, left foot osteomyelitis s/p left BKA and DM 2.  Presented to [facility] 7/17/2023 with septic shock, NSTEMI, JOHANA, A-fib with RVR.  Transfer to acute rehab 8/11 for ongoing rehabilitation.  Looking forward to progressing with therapy. He is very frustrated with how weak he has felt since admission. States he walked into the hospital independent and has not been up much since. Manages his own diabetes well, declines further intervention. Mood is very depressed, declined health psychology or other interventions. Discussed lotion and hibiclens interventions for skin. Denies other needs or concerns at this time.     Review of Systems   10 point ROS performed and negative unless otherwise noted in HPI     Past Medical History    I have reviewed this patient's medical history and updated it with pertinent information if needed.   Past Medical History:   Diagnosis Date    Anemia     Chronic systolic CHF (congestive heart failure) (H)     Coronary artery disease     Diabetic neuropathy (H)     DM type 2 w Neuropathy     GERD (gastroesophageal reflux disease)     Hyperlipidemia     Hypertension     Intermittent atrial fibrillation (H)     on Eliquis    Ischemic cardiomyopathy 11/16/2021    Echo with EF of 30-35%    NSTEMI (non-ST elevated myocardial infarction) (H) 11/15/2021    Osteomyelitis of left foot (H) 04/04/2022    Renal disease     Retinopathy     Sleep disturbance         Past Surgical History   I have reviewed this patient's surgical history and updated it with pertinent information if needed.  Past Surgical History:   Procedure Laterality Date    AMPUTATE FOOT Left 3/21/2022    Procedure: Guillotine AMPUTATION, FOOT;  Surgeon: Ronald Bhatt MD;  Location: [facility] [place] OR    AMPUTATE LEG BELOW KNEE Left 4/4/2022    Procedure: LEFT BELOW  KNEE AMPUTATION;  Surgeon: Ronald Bhatt MD;  Location: [facility] [place]  OR    AMPUTATE TOE(S) Left 11/16/2021    Procedure: first and second ray amputation;  Surgeon: Eddi Ram DPM;  Location: [facility] [place]    APPENDECTOMY      CV CORONARY ANGIOGRAM N/A 11/16/2021    Procedure: CV CORONARY ANGIOGRAM;  Surgeon: Pam Tabaers MD;  Location: [facility] CATH LAB CV    CV CORONARY ANGIOGRAM N/A 8/24/2022    Procedure: CV CORONARY ANGIOGRAM;  Surgeon: Cipriano Lucas MD;  Location: [facility] CATH LAB CV    CV CORONARY ANGIOGRAM N/A 8/29/2022    Procedure: CV CORONARY ANGIOGRAM;  Surgeon: Cipriano Lucas MD;  Location: [facility] LAB CV    CV INTRAVASULAR ULTRASOUND N/A 8/29/2022    Procedure: Intravascular Ultrasound;  Surgeon: Cipriano Lucas MD;  Location: [facility] LAB CV    CV LEFT HEART CATH N/A 11/16/2021    Procedure: Left Heart Cath;  Surgeon: Pam Tabares MD;  Location: [facility] LAB CV    CV PCI N/A 8/29/2022    Procedure: Percutaneous Coronary Intervention stent;  Surgeon: Cipriano Lucas MD;  Location: [facility] LAB CV    CV PCI ANGIOPLASTY N/A 8/29/2022    Procedure: Percutaneous Transluminal Angioplasty;  Surgeon: Cipriano Lucsa MD;  Location: [facility] LAB CV    FOOT SURGERY Left     HERNIA REPAIR      INCISION AND DRAINAGE LOWER EXTREMITY, COMBINED Left 11/16/2021    Procedure: INCISION AND DRAINAGE, left foot;  Surgeon: Eddi Ram DPM;  Location: [facility] [place]    INCISION AND DRAINAGE LOWER EXTREMITY, COMBINED Left 11/19/2021    Procedure: INCISION AND DRAINAGE, left foot;  Surgeon: Eddi Ram DPM;  Location: [facility] OR    INCISION AND DRAINAGE LOWER EXTREMITY, COMBINED Left 12/9/2021    Procedure: INCISION AND DRAINAGE, Left foot;  Surgeon: Eddi Ram DPM;  Location: [facility] OR    INCISION AND DRAINAGE LOWER EXTREMITY, COMBINED Left 1/10/2022    Procedure: INCISION AND DRAINAGE, left foot;  Surgeon: Eddi Ram DPM;  Location: [facility] [place]    INCISION AND  DRAINAGE LOWER EXTREMITY, COMBINED Left 1/27/2022    Procedure: INCISION AND DRAINAGE, Left foot;  Surgeon: Eddi Ram DPM;  Location: [facility] [place] [place]     CVC NON TUNNEL PLACEMENT > 5 YRS  7/20/2023    PICC TRIPLE LUMEN PLACEMENT  7/17/2023         PICC TRIPLE LUMEN PLACEMENT  7/18/2023             Social History   Social History     Tobacco Use    Smoking status: Former     Types: Cigars    Smokeless tobacco: Never    Tobacco comments:     Cigars   Vaping Use    Vaping Use: Never used   Substance Use Topics    Alcohol use: Yes     Comment: < 1 drink a month (only holidays)    Drug use: Never       Family History   I have reviewed this patient's family history and updated it with pertinent information if needed.   No family history on file.    Medications   I have reviewed this patient's current medications    Allergies    No Known Allergies      Physical Exam   There were no vitals taken for this visit.     GENERAL: Alert and oriented x 3. Well nourished, well developed.  No acute distress.    HEENT: Normocephalic, atraumatic. Anicteric sclera. Mucous membranes moist.   CV: RRR. S1, S2. No murmurs appreciated.   RESPIRATORY: Effort normal on RA. Lungs CTAB with no wheezing, rales, or rhonchi.   GI: Abdomen soft and non distended, bowel sounds present x all 4 quadrants. No tenderness, rebound, or guarding.   NEUROLOGICAL: No focal deficits. Follows commands.  Strength weaker in lower then upper extremities.   MUSCULOSKELETAL: No joint swelling or tenderness. Moves all extremities. S/p L BKA, prosthetic in rrom  EXTREMITIES: No gross deformities. No peripheral edema.   SKIN: Blisters on right arm covered. Shoulder skin yellow, flaky    Data   Data reviewed today: I reviewed all medications, new labs and imaging results over the last 24 hours. I personally reviewed no images or EKG's today.    ROUTINE IP LABS (Last four results)  CMP   Recent Labs   Lab 08/11/23  1137 08/11/23  0801 08/11/23  0519  23  0209 08/10/23  0750 08/10/23  0607 23  1707 23  1300 23  0804 23  0553 23  0755 23  0647   NA  --   --  134*  --   --  133*  --   --   --  132*  --  132*   POTASSIUM  --   --  3.6  --   --  3.5  --  3.7  --  3.4  --  3.5   CHLORIDE  --   --  94*  --   --  93*  --   --   --  90*  --  91*   CO2  --   --  29  --   --  30*  --   --   --  30*  --  31*   ANIONGAP  --   --  11  --   --  10  --   --   --  12  --  10   * 128* 137* 150*   < > 128*   < >  --    < > 196*   < > 207*   BUN  --   --  55.8*  --   --  50.5*  --   --   --  51.2*  --  49.6*   CR  --   --  2.26*  --   --  2.18*  --   --   --  2.13*  --  2.18*   YOSVANY  --   --  9.9  --   --  10.1  --   --   --  10.1  --  10.1   MAG  --   --  1.8  --   --  1.8  --   --   --   --   --  1.7    < > = values in this interval not displayed.     CBC   Recent Labs   Lab 23  0519 08/10/23  0623  0647 23  0649 23  0611 23  1746   WBC 5.3  --   --   --   --   --    RBC 2.90*  --   --   --   --   --    HGB 8.4*  --  7.9*  --  7.6* 7.8*   HCT 26.1*  --   --   --   --   --    MCV 90  --   --   --   --   --    MCH 29.0  --   --   --   --   --    MCHC 32.2  --   --   --   --   --    RDW 16.2*  --   --   --   --   --     292  --  197  --   --      INR No lab results found in last 7 days.    OTHER:  NA       Medical Decision Makin MINUTES SPENT BY ME on the date of service doing chart review, history, exam, documentation & further activities per the note.

## 2023-08-11 NOTE — PLAN OF CARE
Occupational Therapy Discharge Summary    Reason for therapy discharge:    Discharged to acute rehabilitation facility.    Progress towards therapy goal(s). See goals on Care Plan in [facility] electronic health record for goal details.  Goals not met.  Barriers to achieving goals:   discharge from facility.    Therapy recommendation(s):    Continued therapy is recommended.  Rationale/Recommendations:  pt not at baseline for BADLs.

## 2023-08-11 NOTE — PROGRESS NOTES
RENAL/ DIALYSIS   CC: f/u JOHANA    Subjective:  denies sob, worried about the tcu and what it will be like.  Renal function stable. We discussed need for low salt diet after discharge, he is familiar with this.         ASSESSMENT AND RECOMMENDATIONS:    JOHANA/ CKD 3b: baseline CKD due to longstanding DM2, HTN. Now severe ATN in setting of profound hypotension with sepsis, group a strep bacteremia and hypovolemia and contrast nephropathy after CTA, mild rhabdo. CTA on 7/17 with no hydronephrosis. UA on 7/18 with granular casts.  Has small gamma monoclonal gammopathy but normal free light chain ratio, expect this is MGUS  dialysis  7/21 -7/24.    Resumed on 7/29 due to worsening anasarca and poor response to lasix gtt, last dialysis on 8/4  Renal function stable and good urine output  Not planning further dialysis,  removed dialysis catheter 8/10  Cont lasix 40 mg bid at discharge  When thakur removed, will need spont voiding trial before discharge to ensure no urinary retention  Hypotension (improving) due to CMP and exacerbated by bacteremia/sepsis. BP now improved.Trend for now.   Non-ST elevation myocardial infarction with ischemic cardiomyopathy.  Heart failure with reduced ejection fraction.  Decompensated heart failure, continue fluid removal on dialysis.  LVEF 25-30% based on 7/18 cardiac echo which also showed normal right ventricular function, normal right atrial pressure.  Lasix as above  Bacteremia.  Group A Streptococcus on 7/17 with right shoulder myositis.  Now off abx  Anemia secondary to critical illness, chronic kidney disease, acute kidney injury, bacteremia.       -will need serial monitoring of MGUS and if worsening or anemia ongoing, consider hematology eval   -low iron stores and now off abx, will give venofer while here   Hyponatremia - due to anasarca,JOHANA/CKD  -avoid excessive fluid intake, hypotonic ivf  -cont lasix 40 mg bid    Our office will contact him for follow up for CKD  Will need  "bmp repeated within a week of discharge to ensure stable renal function    Lyudmila Medina MD  [facility]  [phone]          Objective    PHYSICAL EXAM  /64 (BP Location: Right leg)   Pulse 82   Temp 98.5  F (36.9  C) (Oral)   Resp 18   Ht 1.88 m (6' 2\")   Wt 95.3 kg (210 lb 1.6 oz)   SpO2 99%   BMI 26.98 kg/m    I/O last 3 completed shifts:  In: 185 [P.O.:180; I.V.:5]  Out: 2100 [Urine:2100]  Wt Readings from Last 3 Encounters:   08/11/23 95.3 kg (210 lb 1.6 oz)   11/17/22 100.2 kg (221 lb)   09/28/22 104.3 kg (230 lb)       GENERAL: alert, NAD  HEENT:normocephalic, atraumatic  CARDIOVASCULAR:  trace edema.   PULMONARY: Normal effort,  No cyanosis  GASTROINTESTINAL:  nt/nd,   NEURO: Alert, no gross focal findings  PSYCHIATRIC: Appropriate mood and interaction  SKIN: warm, dry  MSK: left bka   RIJ CVC in place    LABORATORIES  Recent Labs   Lab 08/11/23  0519 08/10/23  0607 08/09/23  1300 08/09/23  0553 08/08/23  0647 08/07/23  0649 08/06/23  0539 08/05/23  0611   * 133*  --  132* 132* 131* 132* 133*   POTASSIUM 3.6 3.5 3.7 3.4 3.5 3.8 3.7 3.8   CHLORIDE 94* 93*  --  90* 91* 91* 93* 95*   CO2 29 30*  --  30* 31* 29 28 27   BUN 55.8* 50.5*  --  51.2* 49.6* 46.8* 41.9* 37.6*   CR 2.26* 2.18*  --  2.13* 2.18* 2.06* 2.06* 2.06*   GFRESTIMATED 32* 33*  --  34* 33* 35* 35* 35*   YOSVANY 9.9 10.1  --  10.1 10.1 10.3* 10.1 9.5   MAG 1.8 1.8  --   --  1.7  --   --   --          Recent Labs   Lab 08/11/23  0519 08/10/23  0607 08/08/23  0647 08/07/23  0649 08/05/23  0611 08/04/23  1746   WBC 5.3  --   --   --   --   --    HGB 8.4*  --  7.9*  --  7.6* 7.8*   HCT 26.1*  --   --   --   --   --    MCV 90  --   --   --   --   --     292  --  197  --   --             MEDICATIONS   amiodarone  400 mg Oral Daily    apixaban ANTICOAGULANT  5 mg Oral BID    [Held by provider] aspirin  81 mg Oral Daily    docusate sodium  100 mg Oral BID    furosemide  40 mg Oral BID    insulin aspart   " Subcutaneous TID AC    insulin aspart  1-10 Units Subcutaneous TID AC    insulin aspart  1-7 Units Subcutaneous At Bedtime    insulin glargine  35 Units Subcutaneous At Bedtime    iron sucrose  300 mg Intravenous Every Other Day    magnesium oxide  400 mg Oral Daily    miconazole   Topical BID    multivitamin, therapeutic  1 tablet Oral Daily    sodium chloride (PF)  10-40 mL Intracatheter Q7 Days    sodium chloride (PF)  3 mL Intracatheter Q8H    ticagrelor  90 mg Oral BID    trimethoprim-polymyxin b  2 drop Left Eye 4x Daily    vitamin C  500 mg Oral Daily

## 2023-08-11 NOTE — DISCHARGE SUMMARY
"[facility]  Hospitalist Discharge Summary      Date of Admission:  7/17/2023  Date of Discharge:  8/11/2023  Discharging Provider: Ronald Bui DO, DO  Discharge Service: Hospitalist Service    Discharge Diagnoses   Principal Problem:    Septic shock (H)  Active Problems:    DM type 2 on insulin, w Neuro -- Hgb A1C 6.9 on 7/17/23    Dehydration    Hyponatremia    Elevated troponin    JOHANA (acute kidney injury) (H)    Atrial fibrillation with RVR (H)    Clinically Significant Risk Factors     # DMII: A1C = 6.9 % (Ref range: <5.7 %) within past 6 months  # Overweight: Estimated body mass index is 26.98 kg/m  as calculated from the following:    Height as of this encounter: 1.88 m (6' 2\").    Weight as of this encounter: 95.3 kg (210 lb 1.6 oz).  # Severe Malnutrition: based on nutrition assessment      Follow-ups Needed After Discharge   Follow-up Appointments     Follow Up and recommended labs and tests      Follow up with long term physician.  The following labs/tests are   recommended: CBC, BMP 3 days.            Unresulted Labs Ordered in the Past 30 Days of this Admission       No orders found from 6/17/2023 to 7/18/2023.            Discharge Disposition   Discharged to rehabilitation facility  Condition at discharge: Stable    Hospital Course   63-year-old male with history of CAD on Brilinta, chronic systolic CHF with EF of 30 to 35% prior to admission, CKD 3, history of left foot osteomyelitis S/P left BKA  and DM 2 who presents 7/17/2023 with septic shock, NSTEMI, JOHANA and A-fib with RVR.  Patient was stabilized with pressors and transferred out of ICU 7/27/2023     Septic shock: Resolved  Group A strep bacteremia secondary to skin and soft tissue Infection right shoulder.  Concern for toxic shock syndrome  MRI right shoulder 7/19/2023 showed myositis  -antimicrobial course completed     NSTEMI  History of CAD and ischemic cardiomyopathy   Prior coronary stents  A-fib with " RVR  -Cardiology consulted. Cardiology signed off. Recommendation is to follow up for further ischemic work up when infectious concerns have resolved and renal function improved or when patient is considered ESRD.   -Continue aspirin and Brilinta  -apixaban  -Continue amiodarone      Acute on chronic systolic CHF exacerbation  -TTE 7/17/2023 shows EF of 25 to 30%  -CHF clinic post discharge  -Lasix as ordered per discharge      JOHANA on CKD stage 3: Likely secondary to circulatory shock, requiring intermittent dialysis  -Baseline creatinine 1.4-1.6, 3.25 on admission, peak at 5.22 on 7/21, some fluctuation but now generally improving  -Status post intermittent hemodialysis with evidence of renal recovery.   -Started dialysis 7/21, last on 8/4, now stable off dialysis (CRF stage 4)  -Nephrology cleared for discharge.     Hyponatremia- improved  -Initially severe, 119 on presentation     Elevated LFTs:   -Thought secondary to shock liver and myositis  -Improved/resolving     Acute on anemia of chronic disease  -No overt signs of bleeding, CT abdomen without signs of bleeding  -Transfused 5 units total this admission  -no current e/o bleeding     DM2:  -Lantus 35U every day, novolog 1U:10g CHO ac tid with meals, sliding scale insulin, accuchecks ac/hs    Consultations This Hospital Stay   PHARMACY TO DOSE VANCO  VASCULAR ACCESS ADULT IP CONSULT  PHARMACY IP CONSULT  PHARMACY IP CONSULT  CARDIOLOGY IP CONSULT  CARE MANAGEMENT / SOCIAL WORK IP CONSULT  VASCULAR ACCESS ADULT IP CONSULT  WOUND OSTOMY CONTINENCE NURSE  IP CONSULT  NEPHROLOGY IP CONSULT  INTERVENTIONAL RADIOLOGY ADULT/PEDS IP CONSULT  ORTHOPEDIC SURGERY IP CONSULT  INFECTIOUS DISEASES IP CONSULT  SURGERY GENERAL IP CONSULT  WOUND OSTOMY CONTINENCE NURSE  IP CONSULT  PHYSICAL THERAPY ADULT IP CONSULT  OCCUPATIONAL THERAPY ADULT IP CONSULT  LYMPHEDEMA THERAPY IP CONSULT  ACUPUNCTURE IP CONSULT  ACUPUNCTURE IP CONSULT  PHYSICAL THERAPY ADULT IP  CONSULT  OCCUPATIONAL THERAPY ADULT IP CONSULT    Code Status   Full Code    Time Spent on this Encounter          Ronald Bui DO, DO  [address] [address]  Phone: [phone]  Fax: [phone]  ______________________________________________________________________    Physical Exam   Vital Signs: Temp: 98.5  F (36.9  C) Temp src: Oral BP: 128/64 Pulse: 82   Resp: 18 SpO2: 99 % O2 Device: None (Room air)    Weight: 210 lbs 1.57 oz  Gen nad  Cv rrr  Lungs decreased bases  Abd bs+, nd       Primary Care Physician   Jaquan Dickerson    Discharge Orders      General info for SNF    Length of Stay Estimate: Short Term Care: Estimated # of Days <30  Condition at Discharge: Stable  Level of care:skilled   Rehabilitation Potential: Good  Admission H&P remains valid and up-to-date: Yes  Recent Chemotherapy: N/A  Use Nursing Home Standing Orders: Yes     Mantoux instructions    Give two-step Mantoux (PPD) Per Facility Policy Yes     Follow Up and recommended labs and tests    Follow up with prison physician.  The following labs/tests are recommended: CBC, BMP 3 days.     Reason for your hospital stay    Refer to medical record     Glucose monitor nursing POCT    Before meals and at bedtime     Intake and output    Every shift     Daily weights    Call Provider for weight gain of more than 2 pounds per day or 5 pounds per week.     Activity - Up with nursing assistance     Full Code     Physical Therapy Adult Consult    Evaluate and treat as clinically indicated.    Reason:  weakness     Occupational Therapy Adult Consult    Evaluate and treat as clinically indicated.    Reason:  weakness     Fall precautions     Diet    Follow this diet upon discharge: Orders Placed This Encounter      Snacks/Supplements Adult: Gelatein Plus; With Meals      Snacks/Supplements Adult: Glucerna; With Meals      Combination Diet Renal Diet (dialysis); Moderate  Consistent Carb (60 g CHO per Meal) Diet       Significant Results and Procedures       Discharge Medications      Medication List        Started      * acetaminophen 650 MG suppository  Commonly known as: TYLENOL  650 mg, Rectal, EVERY 4 HOURS PRN     * acetaminophen 325 MG tablet  Commonly known as: TYLENOL  650 mg, Oral, EVERY 4 HOURS PRN     amiodarone 400 MG tablet  Commonly known as: PACERONE  400 mg, Oral, DAILY  Start taking on: August 12, 2023     apixaban ANTICOAGULANT 5 MG tablet  Commonly known as: ELIQUIS  5 mg, Oral, 2 TIMES DAILY     docusate sodium 100 MG capsule  Commonly known as: COLACE  100 mg, Oral, 2 TIMES DAILY     miconazole 2 % external powder  Commonly known as: MICATIN  Topical, 2 TIMES DAILY, Apply to affected areas           * This list has 2 medication(s) that are the same as other medications prescribed for you. Read the directions carefully, and ask your doctor or other care provider to review them with you.                Modified      furosemide 40 MG tablet  Commonly known as: LASIX  40 mg, Oral, 2 TIMES DAILY (Diuretics and Nitrates)  What changed:   medication strength  how much to take  when to take this     Lantus SoloStar 100 UNIT/ML pen  Generic drug: insulin glargine  35 Units, Subcutaneous, EVERY MORNING  What changed: how much to take     * NovoLOG FLEXPEN 100 UNIT/ML pen  Generic drug: insulin aspart  For  - 164 give 1 unit. For  - 189 give 2 units. For  - 214 give 3 units. For  - 239 give 4 units. For  - 264 give 5 units. For  - 289 give 6 units. For  - 314 give 7 units. For  - 339 give 8 units For  - 364 give 9 units For  - 389 give 10 units For  - 414 give 11 units For BG greater than or equal to 415 give 12 units  What changed: Another medication with the same name was added. Make sure you understand how and when to take each.     * insulin aspart 100 UNIT/ML pen  Commonly known as: NovoLOG PEN  Dose =  1 units per 10 grams of carbohydate. If given at mealtime, administer within 30 minutes of start of meal.  What changed: You were already taking a medication with the same name, and this prescription was added. Make sure you understand how and when to take each.           * This list has 2 medication(s) that are the same as other medications prescribed for you. Read the directions carefully, and ask your doctor or other care provider to review them with you.                Discontinued      aspirin 81 MG EC tablet  Commonly known as: ASA     lisinopril 5 MG tablet  Commonly known as: ZESTRIL     magnesium oxide 400 MG tablet  Commonly known as: MAG-OX     metoprolol succinate ER 50 MG 24 hr tablet  Commonly known as: TOPROL XL                Allergies   No Known Allergies

## 2023-08-11 NOTE — PLAN OF CARE
Patient had a calm restful evening. Patient denied pain or discomfort. Vital signs stable and patient ate  all of a small dinner. Fluids encouraged and offered. Patient has skin breakdown on buttocks coccyx and was repositioned frequently. Many pillows in use.   Problem: Plan of Care - These are the overarching goals to be used throughout the patient stay.    Goal: Absence of Hospital-Acquired Illness or Injury  Intervention: Identify and Manage Fall Risk  Recent Flowsheet Documentation  Taken 8/10/2023 1700 by Charmaine Holloway RN  Safety Promotion/Fall Prevention:   activity supervised   assistive device/personal items within reach   safety round/check completed     Problem: Risk for Delirium  Goal: Optimal Coping  Outcome: Progressing     Problem: Pain Acute  Goal: Optimal Pain Control and Function  Outcome: Progressing     Problem: Malnutrition  Goal: Improved Nutritional Intake  Outcome: Progressing     Problem: Sepsis/Septic Shock  Goal: Absence of Infection Signs and Symptoms  Outcome: Progressing   Goal Outcome Evaluation:

## 2023-08-11 NOTE — PLAN OF CARE
Physical Therapy Discharge Summary    Reason for therapy discharge:    Discharged to acute rehabilitation facility.    Progress towards therapy goal(s). See goals on Care Plan in [facility] electronic health record for goal details.  Goals partially met.  Barriers to achieving goals:   discharge from facility.    Therapy recommendation(s):    Continued therapy is recommended.  Rationale/Recommendations:  recommend continued PT at acute rehab.    Yvette Fang, PT  8/11/2023

## 2023-08-11 NOTE — PLAN OF CARE
Goal Outcome Evaluation:       Pt. Discharge to  acute rehab via [facility] stretcher at 1537, belongings packed and sent with pt.

## 2023-08-11 NOTE — PROGRESS NOTES
"[facility] Nurse Inpatient Assessment     Consulted for: GURVINDER    Patient History (according to provider note(s):        Assessment:    Skin Injury Location: R axilla area       7/24 7/28 8/4    Skin injury due to:  Blood blisters (unknown etiology)  Skin history and plan of care:  blisters deroofed, scant bloody oozing to proximal ruptured blister, clear serous fluid noted to the ruptured blister nearest the elbow  Affected area:    Healed  ____________________________________________________________________________________________________________________________________________________________________________________________    Pressure Injury Location: coccyx        7/24 7/28 8/4    Last photo: 8/4  Wound type: Pressure Injury     Pressure Injury Stage: 2, hospital acquired   Wound history/plan of care:   A pressure injury to the perineum was charted on 7/17. This wound is surrounding the coccyx.     Wound base: re-epithelialized with 2 scabs 0.3 x 0.3cm and smaller, erythema     Palpation of the wound bed: normal      Drainage: none     Description of drainage: none  Continue Mepilex for protection      Treatment Plan:       Coccyx  Protect area with sacral mepilex  Peel back mepilex each shift to monitor wound and place back down  Change mepilex every 3 days + PRN if soiled, saturated, falls off    Pressure Injury Prevention (PIP) Plan:  If patient is declining pressure injury prevention interventions: Explore reason why and address patient's concerns, Educate on pressure injury risk and prevention intervention(s), If patient is still declining, document \"informed refusal\" , and Ensure Care team is aware ( provider, charge " nurse, etc)  Mattress: Follow bed algorithm, reassess daily and order specialty mattress, if indicated.  HOB: Maintain at or below 30 degrees, unless contraindicated  Repositioning in bed: Every 1-2 hours , Left/right positioning; avoid supine, Raise foot of bed prior to raising head of bed, to reduce patient sliding down (shear), and Frequent microturns using TAPS wedges, as patient tolerates  Heels: Keep elevated off mattress and Pillows under calves  Protective Dressing: Sacral Mepilex for prevention (#273704),  especially for the agitated patient   Positioning Equipment: TAPS wedges (#096893) to help maintain 30 degree side lying position   Chair positioning: Chair cushion (#[id number])    If patient has a buttock pressure injury, or high risk for PI use chair cushion or SPS.  Moisture Management: Perineal cleansing /protection: Follow Incontinence Protocol, Avoid brief in bed, Clean and dry skin folds with bathing , and Moisturize dry skin  Under Devices: Inspect skin under all medical devices during skin inspection , Ensure tubes are stabilized without tension, and Ensure patient is not lying on medical devices or equipment when repositioned  Ask provider to discontinue device when no longer needed.     Orders: Reviewed and Updated    RECOMMEND PRIMARY TEAM ORDER: None, at this time  Education provided: plan of care  Discussed plan of care with: Patient  WOC nurse follow-up plan: weekly  Notify WOC if wound(s) deteriorate.  Nursing to notify the Provider(s) and re-consult the WOC Nurse if new skin concern.    DATA:     Current support surface: Standard  Standard gel/foam mattress (IsoFlex, Atmos air, etc)  Containment of urine/stool: Continent of bladder and Continent of bowel  BMI: Body mass index is 26.98 kg/m .   Active diet order: Orders Placed This Encounter      Combination Diet Renal Diet (dialysis); Moderate Consistent Carb (60 g CHO per Meal) Diet      Diet     Output: I/O last 3 completed shifts:  In:  185 [P.O.:180; I.V.:5]  Out: 2300 [Urine:2300]     Labs:   Recent Labs   Lab 08/11/23  0519   HGB 8.4*   WBC 5.3       Pressure injury risk assessment:   Sensory Perception: 4-->no impairment  Moisture: 4-->rarely moist  Activity: 1-->bedfast  Mobility: 3-->slightly limited  Nutrition: 3-->adequate  Friction and Shear: 3-->no apparent problem  Robin Score: 18    ZOILA CannonN, RN, PHN, HNB-BC, CWOCN  Pager no longer is use, please contact through [employer] group: [facility] [employer]

## 2023-08-11 NOTE — PROGRESS NOTES
[facility]  [facility] Nurse Inpatient Assessment     Consulted for: GURVINDER    Patient History (according to provider note(s):        Assessment:    Skin Injury Location: R axilla area    Skin injury due to:  Blood blisters (unknown etiology)  Skin history and plan of care:   New injuries  Affected area:      Skin assessment: Blistering     Measurements (length x width x depth, in cm) All areas resolved         Treatment Plan:         RECOMMEND PRIMARY TEAM ORDER: None, at this time  Education provided: plan of care  Discussed plan of care with: Patient  WO nurse follow-up plan: signing off  Notify [facility] if wound(s) deteriorate.  Nursing to notify the Provider(s) and re-consult the WO Nurse if new skin concern.    DATA:     Current support surface: Standard  Standard gel/foam mattress (IsoFlex, Atmos air, etc)  Containment of urine/stool: Continent of bladder and Continent of bowel  BMI: Body mass index is 26.98 kg/m .   Active diet order: Orders Placed This Encounter      Combination Diet Renal Diet (dialysis); Moderate Consistent Carb (60 g CHO per Meal) Diet      Diet     Output: I/O last 3 completed shifts:  In: 185 [P.O.:180; I.V.:5]  Out: 2300 [Urine:2300]     Labs:   Recent Labs   Lab 08/11/23  0519   HGB 8.4*   WBC 5.3       Pressure injury risk assessment:   Sensory Perception: 4-->no impairment  Moisture: 4-->rarely moist  Activity: 1-->bedfast  Mobility: 3-->slightly limited  Nutrition: 3-->adequate  Friction and Shear: 3-->no apparent problem  Robin Score: 18    ZOILA CannonN, RN, PHN, HNB-BC, CWOCN  Pager no longer is use, please contact through [employer] group: [facility] [employer]

## 2023-08-12 NOTE — PLAN OF CARE
Goal Outcome Evaluation:      Plan of Care Reviewed With: patient    Overall Patient Progress: no changeOverall Patient Progress: no change    Outcome Evaluation: Pt is alert and oriented x4, able to make needs kown and calls appropriately. Denies chest pains, sob or n/v. Vitals are stable at RA. Blood sugars are 128 and 186. Left BKA, stump is healed. Pt uses leg prosthesis. Has thakur in place, patent and draining clear yellow urine. Right arm swollen, with tubigrip intact. Weakness noted on the RUE. Mepilex dressing to the coccyx changed. WOC consulted.Tolerating regular, thin liquids.

## 2023-08-12 NOTE — PLAN OF CARE
Pt is alert and oriented x4. Able to make needs known. Denied chest pain , SOB, N/V. Noted small amount of bleeding under Right UE/axilla. Notified on coming nurse to change dressing on R under arm.

## 2023-08-12 NOTE — PLAN OF CARE
FOCUS/GOAL  Bowel management, Bladder management, Mobility, and Skin integrity    ASSESSMENT, INTERVENTIONS AND CONTINUING PLAN FOR GOAL:    Orientation: alert and oriented x4  VS: stable  Pain: denies  Transfers: Ax1-2 with walker for stand pivot, L prosthesis in the room.  Bowel: continent, used bedpan at night, LBM 8/12  Bladder: thakur catheter in place.  Diet/ Liquids: 3gm sodium/ thin  Blood Sugars: 92 and 140  Tubes/ Lines/ Drains: thakur catheter  Skin: sacral mepilex to coccyx, CDI     Bed and chair alarms on for safety, call light within reach. Continue with POC.

## 2023-08-12 NOTE — PROGRESS NOTES
08/12/23 0815   Appointment Info   Signing Clinician's Name / Credentials (PT) Randall Arguello, PT   Rehab Comments (PT) resting /65 HR 84 SpO2 97%   Living Environment   People in Home other (see comments)  (occasionally lives with others)   Current Living Arrangements other (see comments)   Transportation Anticipated car, drives self   Living Environment Comments currently no permanent residence, living with friends and family in various environments and sometimes sleeping in car   Self-Care   Usual Activity Tolerance good   Current Activity Tolerance fair   Regular Exercise Yes   Activity/Exercise Type walking;biking   Exercise Amount/Frequency 30 mins   Equipment Currently Used at Home prosthesis   Fall history within last six months no   Activity/Exercise/Self-Care Comment varied day to day   Post-Acute Assessment Only   Post-Acute Functional Assessment See below   Previous Level of Function/Home Environm   Bed Mobility, Premorbid Functional Level independent   Transfers, Premorbid Functional Level independent   Household Ambulation, Premorbid Functional Level independent   Stairs, Premorbid Functional Level independent   Community Ambulation, Premorbid Functional Level independent   General Information   Onset of Illness/Injury or Date of Surgery 07/07/23   Referring Physician Sushant Orourke, DO   Patient/Family Therapy Goals Statement (PT) return to prior level of function   Pertinent History of Current Problem (include personal factors and/or comorbidities that impact the POC) 63 year old man with past medical history of cAD, Chronic systolic heart failure, EF 30-35%, CKD stage 3, left foot osteomyelitis s/p left BKA, and DM type 2 who presented 7/17/23 with septic shock in setting of group a strep bacteremia with right shoulder myositis.  Course was complicated by NSTEMI, JOHANA, hyponatremia, and A-fib with RVR.       He was treated with 2 week course of antibiotics, and weekly Hibiclens wash.   Seen by cardiology for decompensated heart failure, with medical management and recommendation for outpatient coronary angiogram.   He received dialysis 7/21-24 and 7/29-8/4 for ATN.  Dialysis catheter was removed 8/10.   Existing Precautions/Restrictions fall   Weight-Bearing Status - LLE other (see comments)  (Lt prosthesis)   General Observations Rt shoulder myositis   Cognition   Affect/Mental Status (Cognition) WFL   Orientation Status (Cognition) oriented x 4   Follows Commands (Cognition) WNL   Pain Assessment   Patient Currently in Pain No   Integumentary/Edema   Integumentary/Edema other (describe)   Integumentary/Edema Comments sacral sores   Posture    Posture Not impaired   Range of Motion (ROM)   Range of Motion ROM is WFL   ROM Comment Lt BKA   Strength (Manual Muscle Testing)   Strength (Manual Muscle Testing) Deficits observed during functional mobility   Strength Comments 4/5 MMT strength, gross screening B LEs in supine   Balance   Balance other (describe)   Sitting Balance: Static good balance   Sitting Balance: Dynamic fair balance   Sit-to-Stand Balance fair balance   Standing Balance: Static poor balance   Standing Balance: Dynamic unable to balance   Systems Impairment Contributing to Balance Disturbance musculoskeletal   Identified Impairments Contributing to Balance Disturbance decreased strength   Balance Comments relies on UE support in standing   Balance Quick Add Sitting balance: Static;Sitting balance: dynamic;Sit to stand balance;Standing balance: static;Standing balance: dynamic;Systems impairment contributing to balance disturbance;Identified impairments contributing to balance disturbance   Sensory Examination   Sensory Perception patient reports no sensory changes   Coordination   Coordination no deficits were identified   Muscle Tone   Muscle Tone no deficits were identified   Clinical Impression   Criteria for Skilled Therapeutic Intervention Yes, treatment indicated   PT  Diagnosis (PT) force production deficit   Influenced by the following impairments generalized weakness, decreased activity tolerance, impaired balance   Functional limitations due to impairments decreased bed mobility, transfers, ambulation, stair climbing   Clinical Presentation (PT Evaluation Complexity) Stable/Uncomplicated   Clinical Presentation Rationale 62 y/o male presents with force production deficit associated with generalized weakness, decreased activity tolerance, impaired balance and resulting in decreased bed mobility, transfers, ambulation, stair climbing.   Clinical Decision Making (Complexity) low complexity   Planned Therapy Interventions (PT) balance training;bed mobility training;gait training;home exercise program;neuromuscular re-education;patient/family education;strengthening;transfer training   Anticipated Equipment Needs at Discharge (PT) other (see comments)   Risk & Benefits of therapy have been explained evaluation/treatment results reviewed;participants voiced agreement with care plan;participants included;patient   PT Total Evaluation Time   PT Eval, Low Complexity Minutes (15922) 30   Physical Therapy Goals   PT Frequency Daily   PT Predicted Duration/Target Date for Goal Attainment 08/26/23   PT Goals Bed Mobility;Transfers;Gait;Stairs;Aerobic Activity;PT Goal 1   PT: Bed Mobility Modified independent;Rolling;Bridging   PT: Transfers Modified independent;Sit to/from stand;Bed to/from chair;Assistive device   PT: Gait Modified independent;Rolling walker;50 feet   PT: Stairs Modified independent;1 stair;Rail on both sides   PT: Perform aerobic activity with stable cardiovascular response continuous activity;5 minutes;ambulation   PT: Goal 1 Independent with HEP   Interventions   Interventions Quick Adds Gait Training;Neuromuscular Re-ed;Therapeutic Activity;Therapeutic Procedure   Therapeutic Procedure/Exercise   Ther. Procedure: strength, endurance, ROM, flexibillity Minutes (58739)  5   Symptoms Noted During/After Treatment fatigue   Treatment Detail/Skilled Intervention PT: to improve sustained activity tolerance, standing endurance trg at edge of bed 1' with SBA using B UE support on FWW, seated rest for recovery   Therapeutic Activity   Therapeutic Activities: dynamic activities to improve functional performance Minutes (51110) 10   Symptoms Noted During/After Treatment Fatigue   Treatment Detail/Skilled Intervention PT: to improve functional mobility, sit/stand transfer trg from edge of bed using FWW for UE support and min/mod A with progressively lower seat to floor height of bed.   PT Discharge Planning   PT Plan Sit/stand trg from elevated STF height, unsupported sitting/standing; prostetic trial with gait trg   PT Discharge Recommendation (DC Rec) home with assist;home with home care physical therapy   PT Rationale for DC Rec unspecified d/c location or social support   Total Session Time   Timed Code Treatment Minutes 15   Total Session Time (sum of timed and untimed services) 45   Post Acute Settings Only   What unit is patient on? Acute Rehab   PT - Acute Rehab Center Time   Individual Time (minutes) - enter zero if not applicable - PT 60   Group Time (minutes) - enter zero if not applicable  - PT 0   Concurrent Time (minutes) - enter zero if not applicable  - PT 0   Co-Treatment Time (minutes) - enter zero if not applicable  - PT 0   ARC Total Session Time (minutes) - PT 60   Roll Left and Right   Assistance Needed Independent;Adaptive equipment   Roll Left to Right CARE Score 6   Sit to Lying   Assistance Needed Independent;Adaptive equipment   Sit to Lying CARE Score 6   Lying to Sitting on Side of Bed   Assistance Needed Independent;Adaptive equipment   Lying to Sitting CARE Score 6   Sit to Stand   Assistance Needed Adaptive equipment;Set-up / clean-up   Physical Assistance Level 50%-74%   Sitting to Standing CARE Score 2   Walk 10 Feet   Reason if not Attempted Safety concerns    Walk 10 Ft. CARE Score 88   Walk 50 Feet with Two Turns   Reason if not Attempted Safety concerns   Walk 50 Ft. CARE Score 88   Walk 150 Feet   Reason if not Attempted Safety concerns   Walk 150 Ft. CARE Score 88   Walking 10 Feet on Uneven Surfaces   Reason if not Attempted Safety concerns   Walking 10 Feet on Uneven Surfaces CARE Score 88   Wheel 50 Feet with Two Turns   Reason if not Attempted Medical concerns   Wheel 50 Feet with Two Turns CARE Score 88   Wheel 150 Feet   Reason if not Attempted Medical concerns   Wheel 150 Feet CARE Score 88   1 Step (Curb)   Reason if not Attempted Safety concerns   1 Step CARE Score 88   4 Steps   Reason if not Attempted Safety concerns   4 Steps CARE Score 88   12 Steps   Reason if not Attempted Safety concerns   12 Steps CARE Score 88   Picking Up Object   Reason if not Attempted Safety concerns    CARE Score 88   Car Transfer   Reason if not Attempted Safety concerns   Car Transfer CARE Score 88

## 2023-08-12 NOTE — PHARMACY-MEDICATION REGIMEN REVIEW
Pharmacy Medication Regimen Review  Norman Aguilar is a 63 year old male who is currently in the Acute Rehab Unit.    Assessment: All medications have an appropriate indications, durations and no unnecessary use was found    Plan:   Continue current medication regimen.     Attending provider will be sent this note for review.  If there are any emergent issues noted above, pharmacist will contact provider directly by phone.      Pharmacy will periodically review the resident's medication regimen for any PRN medications not administered in > 72 hours and discontinue them. The pharmacist will discuss gradual dose reductions of psychopharmacologic medications with interdisciplinary team on a regular basis.    Please contact pharmacy if the above does not answer specific medication questions/concerns.    Background:  A pharmacist has reviewed all medications and pertinent medical history today.  Medications were reviewed for appropriate use and any irregularities found are listed with recommendations.      Current Facility-Administered Medications:     acetaminophen (TYLENOL) tablet 650 mg, 650 mg, Oral, Q4H PRN, Melissa Banuelos PA    amiodarone (PACERONE) tablet 400 mg, 400 mg, Oral, Daily, Melissa Banuelos PA, 400 mg at 08/12/23 0843    apixaban ANTICOAGULANT (ELIQUIS) tablet 5 mg, 5 mg, Oral, BID, Melissa Banuelos PA, 5 mg at 08/12/23 0844    aspirin EC tablet 81 mg, 81 mg, Oral, Daily, Melissa Banuelos PA, 81 mg at 08/12/23 0844    atorvastatin (LIPITOR) tablet 80 mg, 80 mg, Oral, QPM, Melissa Banuelos PA, 80 mg at 08/11/23 2108    chlorhexidine (HIBICLENS) 4 % liquid, , Topical, Weekly, Melissa Banuelos PA    glucose gel 15-30 g, 15-30 g, Oral, Q15 Min PRN **OR** dextrose 50 % injection 25-50 mL, 25-50 mL, Intravenous, Q15 Min PRN **OR** glucagon injection 1 mg, 1 mg, Subcutaneous, Q15 Min PRN, Melissa Banuelos PA    furosemide (LASIX) tablet 40 mg, 40 mg, Oral, BID, Melissa Banuelos PA,  40 mg at 08/12/23 0843    insulin aspart (NovoLOG) injection (RAPID ACTING), , Subcutaneous, TID w/meals, Melissa Banuelos PA, 6 Units at 08/12/23 1212    insulin aspart (NovoLOG) injection (RAPID ACTING), , Subcutaneous, With Snacks or Supplements, Melissa Banuelos PA    insulin aspart (NovoLOG) injection (RAPID ACTING), 1-7 Units, Subcutaneous, TID AC, Melissa Banuelos PA, 1 Units at 08/12/23 1212    insulin aspart (NovoLOG) injection (RAPID ACTING), 1-5 Units, Subcutaneous, At Bedtime, Melissa Banuelos PA    insulin glargine (LANTUS PEN) injection 35 Units, 35 Units, Subcutaneous, At Bedtime, Melissa Banuelos PA, 35 Units at 08/11/23 2155    miconazole (MICATIN) 2 % powder, , Topical, BID, Melissa Banuelos PA, Given at 08/12/23 0844    multivitamin, therapeutic (THERA-VIT) tablet 1 tablet, 1 tablet, Oral, Daily, Melissa Banuelos PA, 1 tablet at 08/12/23 0844    Patient is already receiving anticoagulation with heparin, enoxaparin (LOVENOX), warfarin (COUMADIN)  or other anticoagulant medication, , Does not apply, Continuous PRN, Melissa Banuelos PA    selenium sulfide (SELSUN) 2.5 % lotion, , Topical, BID PRN, Melissa Banuelos PA    ticagrelor (BRILINTA) tablet 90 mg, 90 mg, Oral, BID, Melissa Banuelos PA, 90 mg at 08/12/23 0844    vitamin C (ASCORBIC ACID) tablet 500 mg, 500 mg, Oral, Daily, Melissa Banuelos PA, 500 mg at 08/12/23 0843  No current outpatient prescriptions on file.

## 2023-08-12 NOTE — PLAN OF CARE
Patient alert and oriented. Slept on and off this shift. Denied any pain or discomfort. Vitals stable during the shift. BG within parameters not needing Insulin. Right arm Tubigrip intact. Garcia catheter patent and running well. X1 semi formed BM this shift. Resting in bed with no acute distress noted. Utilizing call light appropriately. Continue with current care plan.

## 2023-08-12 NOTE — PLAN OF CARE
Discharge Planner Post-Acute Rehab OT:     Discharge Plan: unknown. Pt does not have a home at this time.     Precautions: RUE pain and weakness s/p infection, hx of L BKA(pt was I using prosthetic leg at PLOF)     Current Status:  ADLs:  Mobility: total A, STS from EOB mod A, liko lift to WC or Ax2 FWW SPT to WC  Grooming: SBA set up sitting  Dressing: total A LB, UB TBA  Bathing: TBA, liko lift to dependent shower chair  Toileting: bedpan, planning to progress to BSC using FWW  IADLs: TBD  Vision/Cognition: further assessment recommended    Assessment: Pt presenting with significantly declined level of I. Pt I with all I/ADLs using prosthetic leg. Pt reports rarely taking the leg off even when he was sleeping at PLOF. Level of I also limited by R shoulder weakness and pain. Pt would benefit from skilled OT service to reach max potential level of I in I/ADLs    Other Barriers to Discharge (DME, Family Training, etc): TBD

## 2023-08-12 NOTE — PROGRESS NOTES
08/12/23 0956   Appointment Info   Signing Clinician's Name / Credentials (OT) Angel Jama OTRL   Rehab Comments (OT) Initial evaluation   Living Environment   People in Home other (see comments)  (homeless at this time. siblings are unable to help due to their own health)   Transportation Anticipated car, drives self   Self-Care   Activity/Exercise Type walking   Equipment Currently Used at Home prosthesis   Activity/Exercise/Self-Care Comment Pt I with all I/ADLs at PLOF   Instrumental Activities of Daily Living (IADL)   Previous Responsibilities meal prep;housekeeping;medication management;driving;finances   Previous Level of Function/Home Environm   Bathing/Grooming, Premorbid Functional Level independent   Dressing, Premorbid Functional Level independent   Eating/Feeding, Premorbid Functional Level independent   Toileting, Premorbid Functional Level independent   BADLs, Premorbid Functional Level independent   IADLs, Premorbid Functional Level independent   Bed Mobility, Premorbid Functional Level independent   Transfers, Premorbid Functional Level independent   Household Ambulation, Premorbid Functional Level independent   Stairs, Premorbid Functional Level independent   Community Ambulation, Premorbid Functional Level independent   General Information   Additional Occupational Profile Info/Pertinent History of Current Problem past medical history of cAD, Chronic systolic heart failure, EF 30-35%, CKD stage 3, left foot osteomyelitis s/p left BKA, and DM type 2 who presented 7/17/23 with septic shock in setting of group a strep bacteremia with right shoulder myositis.  Course was complicated by NSTEMI, JOHANA, hyponatremia, and A-fib with RVR.   Existing Precautions/Restrictions fall;other (see comments)  (R shld pain, s/p infection, hx of LLE BKA prosthetic)   Left Upper Extremity (Weight-bearing Status) weight-bearing as tolerated (WBAT)   Right Upper Extremity (Weight-bearing Status) other (see comments)    Left Lower Extremity (Weight-bearing Status)   (hx of BKA, previously I using prosthetic leg)   Right Lower Extremity (Weight-bearing Status) weight-bearing as tolerated (WBAT)   Cognitive Status Examination   Orientation Status orientation to person, place and time   Affect/Mental Status (Cognitive) agitated   Follows Commands WFL   Cognitive Status Comments further cognitive assessment recommended   Visual Perception   Visual Impairment/Limitations corrective lenses full-time   Range of Motion Comprehensive   General Range of Motion upper extremity range of motion deficits identified   General Upper Extremity Assessment (Range of Motion)   Upper Extremity: Range of Motion LUE ROM WNL;shoulder, right: UE ROM   Strength Comprehensive (MMT)   General Manual Muscle Testing (MMT) Assessment upper extremity strength deficits identified   Upper Extremity (Manual Muscle Testing)   Upper Extremity: Manual Muscle Testing (MMT) left UE strength is WNL;right shoulder strength deficit;right elbow/forearm strength deficit   Right Shoulder (Manual Muscle Testing)   Comment, MMT: Right Shoulder all motions 2-   Right Elbow/Forearm (Manual Muscle Testing)   Comment, MMT: Right Elbow/Forearm R forearm 2+ to 3-   Coordination   Upper Extremity Coordination No deficits were identified   Activities of Daily Living   BADL Assessment/Intervention bathing;upper body dressing;lower body dressing;grooming;toileting   Clinical Impression   Criteria for Skilled Therapeutic Interventions Met (OT) Yes, treatment indicated   OT Diagnosis declined level of I in I/ADLs   Influenced by the following impairments unable to don prosthetic leg on at this time, RUE proximal weakness and pain, RLE weakness, imbalance   OT Problem List-Impairments impacting ADL problems related to;balance;cognition;coordination;activity tolerance impaired;flexibility;mobility;motor control;range of motion (ROM)   ADL comments/analysis declined I and safety   Assessment  of Occupational Performance 5 or more Performance Deficits   Identified Performance Deficits dressing, g/h, toileting, bathing, meal prep   Planned Therapy Interventions (OT) ADL retraining;IADL retraining;balance training;bed mobility training;fine motor coordination training;groups;joint mobilization;manual therapy;motor coordination training;ROM;prosthetic fitting/training;strengthening;stretching;transfer training;home program guidelines;progressive activity/exercise;risk factor education   Clinical Decision Making Complexity (OT) moderate complexity   Anticipated Equipment Needs Upon Discharge (OT) walker, standard   Risk & Benefits of therapy have been explained care plan/treatment goals reviewed;evaluation/treatment results reviewed;risks/benefits reviewed;current/potential barriers reviewed;participants voiced agreement with care plan;participants included;patient   Clinical Impression Comments OT: pt presenting with significatnly declined level of I and safety in I/ADLs due to inability to transfer or stand safely or use RUE during ADLs. pt mod I using prosthetic leg and no AD at PLOF. Pt now requires max to total A with all I/ADLs. pt would benefit from skilled OT service to reach max level of I and safety in I/ADLs.   OT Total Evaluation Time   OT Eval, Moderate Complexity Minutes (20956) 16   OT Goals   Therapy Frequency (OT) Daily   OT Predicted Duration/Target Date for Goal Attainment 09/01/23   OT Goals Hygiene/Grooming;Upper Body Dressing;Lower Body Dressing;Upper Body Bathing;Lower Body Bathing;Transfers;Toilet Transfer/Toileting;Meal Preparation;Home Management;Cognition   OT: Hygiene/Grooming modified independent   OT: Upper Body Dressing Modified independent   OT: Lower Body Dressing Modified independent   OT: Upper Body Bathing Modified independent   OT: Lower Body Bathing Modified independent   OT: Transfer Modified independent   OT: Toilet Transfer/Toileting Modified independent   OT: Meal  Preparation Modified independent   OT: Home Management Modified independent   OT: Cognitive Patient/caregiver will verbalize understanding of cognitive assessment results/recommendations as needed for safe discharge planning   Interventions   Interventions Quick Adds Therapeutic Activity;Therapeutic Procedures/Exercise   Self-Care/Home Management   Self-Care/Home Mgmt/ADL, Compensatory, Meal Prep Minutes (64066) 15   Treatment Detail/Skilled Intervention OT: sitting on EOB for g/h set up and SBA. supine to EOB min A   Therapeutic Activities   Therapeutic Activity Minutes (65778) 15   Treatment Detail/Skilled Intervention OT: EOB sitting and STS min A with EOB raised. pt tolerating ~2mins of standing with FWW. cues to limit use of RUE. Discussed goal for using commode for toileting and dependent chair for shower. pt expressing numerous concerns. OT addressed pt's concerns to help increase active participation and progress in therpay   Total Session Time   Timed Code Treatment Minutes 30   Total Session Time (sum of timed and untimed services) 46   Post Acute Settings Only   What unit is patient on? Acute Rehab   OT - Acute Rehab Center Time   Individual Time (minutes) - enter zero if not applicable - OT 46   Group Time (minutes) - enter zero if not applicable  - OT 0   Concurrent Time (minutes) - enter zero if not applicable  - OT 0   Co-Treatment Time (minutes) - enter zero if not applicable  - OT 0   ARC Total Session Time (minutes) - OT 46   Oral Hygiene   Describe performance set up and SBA sitting   Grooming (except oral cares)   Grooming Comment set up and SBA   Lower Body Dressing putting on/taking off footwear   Describe performance dependent with a sock   Toilet Hygiene   Describe performance total A   Toilet Transfer   Describe performance NA, unsafe, bedpan at this time

## 2023-08-12 NOTE — PROGRESS NOTES
Discharge Planner Post-Acute Rehab PT:     Discharge Plan: potential to live with sibling for short transition    Precautions: fall precautions, Lt BKA (has prosthesis), sacral wound    Current Status:  Bed Mobility: Mod I  Transfer: Mod A sit/stand and SPT bed/wc with FWW  Gait: NA  Stairs: NA  Balance: decreased stability w/o UE support in standing.    Assessment:  62 y/o male presents with force production deficit associated with generalized weakness, decreased activity tolerance, impaired balance and resulting in decreased bed mobility, transfers, ambulation, stair climbing. Would benefit from skilled PT services to return to prior level of function.    Other Barriers to Discharge (DME, Family Training, etc): unspecified prior residence, reports living with various friends or out of car

## 2023-08-13 NOTE — PROGRESS NOTES
"    [facility]   Acute Rehabilitation Unit  Progress Note  INTERVAL HISTORY:  Norman was seen at bedside this morning. He is feeling very fatigued this morning, and states that his body just doesn't feel \"right\". He denies urinary symptoms, abdominal pain, chest pain, SOB or cough. No systemic signs/symptoms of infectious process.     Functionally noted to have impaired strength, impaired activity tolerance, and impaired balance.  He is currently assist of 2 or lift dependent for transfers. Requires mod assist for bathing, max assist for toileting.  Goals for mod I with mobilty and adls.       PRIOR FUNCTIONAL HISTORY   Staying between daughter/ sister/ nieces homes, uses L LE prosthesis planning for discharge to assisted living facility    MEDICATIONS  Scheduled meds  Medications Prior to Admission   Medication Sig Dispense Refill Last Dose    acetaminophen (TYLENOL) 325 MG tablet Take 2 tablets (650 mg) by mouth every 4 hours as needed for mild pain or fever   Unknown    acetaminophen (TYLENOL) 650 MG suppository Place 1 suppository (650 mg) rectally every 4 hours as needed for mild pain   Unknown    amiodarone (PACERONE) 400 MG tablet Take 1 tablet (400 mg) by mouth daily   8/11/2023    apixaban ANTICOAGULANT (ELIQUIS) 5 MG tablet Take 1 tablet (5 mg) by mouth 2 times daily   8/11/2023    atorvastatin (LIPITOR) 80 MG tablet TAKE 1 TABLET(80 MG) BY MOUTH EVERY EVENING 90 tablet 1 8/11/2023    blood glucose (NO BRAND SPECIFIED) test strip Use to test blood sugar 4 times daily or as directed. 100 strip 3 Unknown    blood glucose monitoring (SOFTCLIX) lancets Use to test blood sugar 4 times daily. 100 each 6 Unknown    docusate sodium (COLACE) 100 MG capsule Take 1 capsule (100 mg) by mouth 2 times daily   Past Week    furosemide (LASIX) 40 MG tablet Take 1 tablet (40 mg) by mouth 2 times daily   8/11/2023    insulin aspart (NOVOLOG FLEXPEN) 100 UNIT/ML pen For  - 164 give 1 " "unit. For  - 189 give 2 units. For  - 214 give 3 units. For  - 239 give 4 units. For  - 264 give 5 units. For  - 289 give 6 units. For  - 314 give 7 units. For  - 339 give 8 units For  - 364 give 9 units For  - 389 give 10 units For  - 414 give 11 units For BG greater than or equal to 415 give 12 units 3 mL 3 Past Week    insulin aspart (NOVOLOG PEN) 100 UNIT/ML pen Dose = 1 units per 10 grams of carbohydate.  If given at mealtime, administer within 30 minutes of start of meal. 15 mL  8/11/2023    insulin glargine (LANTUS SOLOSTAR) 100 UNIT/ML pen Inject 35 Units Subcutaneous every morning 3 mL 3 8/11/2023    miconazole (MICATIN) 2 % external powder Apply topically 2 times daily Apply to affected areas   8/11/2023    Multiple Vitamin (MULTI VITAMIN) TABS Take 1 tablet by mouth daily    8/11/2023    OMEGA-3/DHA/EPA/FISH OIL (FISH OIL-OMEGA-3 FATTY ACIDS) 300-1,000 mg capsule Take 1 g by mouth daily    Unknown    ticagrelor (BRILINTA) 90 MG tablet Take 1 tablet (90 mg) by mouth 2 times daily Dose to start tomorrow morning. 180 tablet 3 8/11/2023    vitamin C (ASCORBIC ACID) 500 MG tablet Take 500 mg by mouth daily   8/11/2023       ALLERGIES   No Known Allergies      REVIEW OF SYSTEMS  A 10 point ROS was performed and negative unless otherwise noted in HPI.       PHYSICAL EXAM  VITAL SIGNS:  /55 (BP Location: Left arm)   Pulse 87   Temp 98.6  F (37  C) (Oral)   Resp 24   Ht 1.905 m (6' 3\")   SpO2 97%   BMI 26.26 kg/m    BMI:  Estimated body mass index is 26.26 kg/m  as calculated from the following:    Height as of this encounter: 1.905 m (6' 3\").    Weight as of an earlier encounter on 8/11/23: 95.3 kg (210 lb 1.6 oz).     General: Oriented to time, place and person  Pulmonary: BEAE  Cardiovascular: S1 S2 no murmurs  Abdominal: NAD  Extremities: warm, well perfused, no edema in right lower extremity and left above knee   Mental Status:  alert and " oriented x3   Sensory: Normal to light touch in bilateral upper extremities and in right lower extremity and left above knee      LABS  CBC RESULTS:   Recent Labs   Lab Test 08/13/23  1113 08/11/23  0519 08/10/23  0607 08/08/23  0647 08/01/23  1759 08/01/23  0504   WBC 6.8 5.3  --   --   --  6.0   RBC 2.56* 2.90*  --   --   --  2.62*   HGB 7.6* 8.4*  --  7.9*   < > 7.8*  7.8*   HCT 23.5* 26.1*  --   --   --  23.2*   MCV 92 90  --   --   --  89   MCH 29.7 29.0  --   --   --  29.8   MCHC 32.3 32.2  --   --   --  33.6   RDW 16.9* 16.2*  --   --   --  15.9*    324 292  --    < > 124*    < > = values in this interval not displayed.     Last Basic Metabolic Panel:  Recent Labs   Lab Test 08/13/23  1152 08/13/23  1113 08/13/23  0807 08/11/23  0801 08/11/23  0519 08/10/23  0750 08/10/23  0607   NA  --  133*  --   --  134*  --  133*   POTASSIUM  --  4.0  --   --  3.6  --  3.5   CHLORIDE  --  94*  --   --  94*  --  93*   CO2  --  26  --   --  29  --  30*   ANIONGAP  --  13  --   --  11  --  10   * 256* 196*   < > 137*   < > 128*   BUN  --  61.0*  --   --  55.8*  --  50.5*   CR  --  2.35*  --   --  2.26*  --  2.18*   GFRESTIMATED  --  30*  --   --  32*  --  33*   YOSVANY  --  9.5  --   --  9.9  --  10.1    < > = values in this interval not displayed.       IMPRESSION/PLAN:  Norman Aguilar is a 63-year-old man with history of CAD, chronic systolic CHF  EF of 30 to 35%, CKD 3, left foot osteomyelitis s/p left BKA, and DM 2 who presented 7/17/2023 with septic shock in setting of bacteremia secondary to right shoulder myositis, NSTEMI, JOHANA requiring dialysis and A-fib with RVR. Functionally noted to have impaired strength, impaired activity tolerance, impaired balance, admitted to rehab 8/11/23.     --Patient with significant fatigue today- repeat labs CBC, BMP ordered, Hb at 7.6 and Cr of 2.35, BUN 61. Discussed with IM co-managing, appreciate their assistance. Will give pRBCs to help bring Hb up, recheck labs  periodically, close monitoring in setting of heart and kidney function.   -- Glucose levels consistently high, discussed lantus dosing at home, and patient has always been on 38 U lantus HS. Increased lantus to 38 U to see if this achieves better coverage. Could consider increasing further if needed.   --Continue ongoing medical management.  --Continue therapies and plan of care.     Zaira Cardenas MD  Physical Medicine & Rehabilitation   I spent a total of 30 minutes face-to-face and managing the care of the patient. Over 50% of my time on the unit was spent counseling the patient and coordinating care. See note for details.

## 2023-08-13 NOTE — PROGRESS NOTES
Discharge Planner Post-Acute Rehab PT:     Discharge Plan: potential to live with sibling for short transition    Precautions: fall precautions, Lt BKA (has prosthesis), sacral wound, Rt shoulder compression sleeve/bandage near posterior axilla area    Current Status:  Bed Mobility: Mod I  Transfer: Min/mod A sit/stand (variable with seat to floor height); progressing to SPT bed/wc with FWW; Cassy Stedy with nsg  Gait: 3 ft with FWW/prosthesis  Stairs: NA  Balance: decreased stability w/o UE support in standing.    Assessment:  Progressing with transfers and ambulation using Lt prosthesis; trial of Cassy Stedy for use with OT/nsg as needed; NuStep for legs only. Initiated NuStep using legs only.    Other Barriers to Discharge (DME, Family Training, etc): unspecified prior residence, reports living with various friends or out of car

## 2023-08-13 NOTE — PLAN OF CARE
Goal Outcome Evaluation:      Plan of Care Reviewed With: patient    Overall Patient Progress: no change    Outcome Evaluation: Pt.alert oriented x4, uses call light for assistance, Denies pain, chest pain and SOB. vitals are stable, pt. complaints of feeling tired hgb. 7.6 with blood transfusion order, consent done by MD, PIV line inserted and patent. thakur cath in place with adequate amount of urine. R arm swollen with tubugrio, Mepilex dress to coccyx. Blood sugar 196 and 248 mg/dl. 3 siderails up, call light within reach, bed alarm on. Continue with current POC.

## 2023-08-13 NOTE — PLAN OF CARE
Goal Outcome Evaluation:  R arm washed and lotion applied. Mepilex applied to posterior R arm. Mepilex reapplied to sacrum. New tubi  applied to R arm    Orientation: AOx4  Bowel: Bedpan, LBM 8/13  Bladder: Garcia   Ambulation/Transfers: Ax1-2 pivot to wheelchair   Diet/ Liquids: 3g sodium, carb coverage   Tubes/ Lines/ Drains: n/a   Tube Feeding: n/a   Oxygen: RA  Skin: R arm peeling, wound on posterior upper arm, wrist. R bka. Wound on sacrum, swelling on R hand. R leg prosthesis

## 2023-08-13 NOTE — PROGRESS NOTES
ARU Progress Note          Assessment & Plan:   Norman Aguilar is a 63 year old male with PMHx of CAD on Brilinta, chronic systolic CHF with EF of 30-35%, CKD 3, left foot osteomyelitis s/p left BKA and DM 2.  Presented to [facility] 7/17/2023 with septic shock, NSTEMI, JOHANA, A-fib with RVR.  Transfer to acute rehab 8/11 for ongoing rehabilitation.     Physical deconditioning  Modified independent. L BKA, prosthetic in room.   -PT/OT consults     Septic shock-resolved  Group A strep bacteremia secondary to seborrheic dermatitis R shoulder   Concern for toxic shock syndrome  Myositis  Originally admitted to ICU on pressors with complicated skin and soft tissue infection.  Positive for group A strep bacteremia.  Was having right shoulder pain, myositis seen on MRI.  Was during started 7/21 from pressure or swelling.  ID followed while in hospital.  Penicillin G changed to ceftriaxone 7/27, 2 weeks IV antibiotics complete.  Completed course of oral linezolid 7/27.  -Hibiclens wash right shoulder weekly  -Selenium sulfide 2.5% lotion BID PRN irritation- apply to yellow flaking skin   -WOCN consult   -If was to get septic again, ID would consider long term PCN suppression for recurrent cellulitis     NSTEMI w/ ICM  CAD with coronary stents-hx of  Afib w/ RVR  HFrEF  Hypotension-resolved   Cardiology followed while inpatient. Decompensated heart failure c/b hypotension, sepsis, fluid overload. Plan for coronary angiography after stabilization of infection and renal function. Converted from AF to NSR after amiodarone load. ECHO EF 25-30% with decreased LV function.   -Continue PTA ASA, Brilinta  -Continue Apixaban BID  -Continue amiodarone daily  -Continue furosemide BID  -Follow up with Cardiology outpatient for coronary angiogram     JOHANA on CKD3  Baseline CKD 2/2 longstanding DM2, HTN. Became severe ATN in setting of profound hypotension with sepsis, group A strep bacteremia and hypovolemia, contrast  nephropathy after CTA, and mild rhabdo. CTA on 7/17 with no hydronephrosis. Completed dialysis 7/21-7/24 and had to be resumed 7/29 2/2 worsening anasarca and poor response to lasix gtt. Last dialysis 8/4 and dialysis cath removed 8/10. Baseline Cr range 2.06-2.26.  -Continue furosemide BID  -I&Os   -BMP Mondays     Hyponatremia  Initially 119 on admission to hospital. Currently 134.   -Continue furosemide BID  -Avoid excessive fluid intake     Anemia of chronic disease  2/2 critical illness, CKD, JOHANA, bacteremia. Received venofer and 5 units PRBC while inpatient. No overt signs of bleeding. C/o weakness. Hgb checked and has decreased to 7.6.   -transfuse 1 unit PRBCs  -Add on  iron studies  -CBC AM      DM2 (Hb A1c 6.9 on 7/17/23)  Pt states his blood sugars are always higher while in hospital than at home. Currently declines changes in insulin regimen.   -Continue PTA lantus 35 units daily  -Carb coverage 1 unit per 10 g carbohydrates  -sliding scale insulin medium resistance   -accu checks AC/HS/0200  -hypoglycemia protocol  -Declined DM consult, has managed his Diabetes well for 30 years   Recent Labs   Lab 08/13/23  0807 08/13/23  0248 08/12/23  2136 08/12/23  1823 08/12/23  1133 08/12/23  0935   * 228* 226* 140* 140* 92     Urinary retention  Currently has indwelling thakur. Declining removal until he is more independent with therapies.   -Removal with voiding trial in near future      Transaminitis   Was thought to be 2/2 shock liver and myositis.   -Hepatic panel Mondays      R axilla blood blisters-healing  Pressure injury coccyx  -wound and skin care per WOCN recommendations      S/p L BKA  -Prosthetic leg in room, pt uses alcohol spray to knee prior to placing. OK for family to bring from home.            Ashlyn Garcia, CNP, APRN  Internal Medicine LAY Hospitalist  [facility]  Pager [phone]            Interval History:   Chief complaint of concern for MA paperwork. Pt is concerned with MA  "insurance paperwork that is due 8/23. He is unable to write with right hand at this time. SW consult placed. He is up in the wheelchair with prosthesis in place. Had a good night. No acute issues or concerns.            Physical Exam:   Blood pressure 118/55, pulse 87, temperature 98.6  F (37  C), temperature source Oral, resp. rate 24, height 1.905 m (6' 3\"), SpO2 97 %.    GENERAL: Alert and oriented x 3. Well nourished, well developed.  No acute distress.    HEENT: Normocephalic, atraumatic. Anicteric sclera. Mucous membranes moist.   CV: RRR. S1, S2. No murmurs appreciated.   RESPIRATORY: Effort normal on RA. Lungs CTAB with no wheezing, rales, or rhonchi.   GI: Abdomen soft and non distended, bowel sounds present x all 4 quadrants. No tenderness, rebound, or guarding.   NEUROLOGICAL: No focal deficits. Follows commands.  Strength weak in upper and lower extremities.   MUSCULOSKELETAL: No joint swelling or tenderness. Moves all extremities. L BKA, prosthetic use with standing and ambulation. 4/5 MMT strength   EXTREMITIES: No gross deformities. R hand edematous  SKIN: Grossly warm, dry, and intact. No jaundice. No rashes. Dry, scaling skin RUE      ROUTINE IP LABS (Last four results)  CMP   Recent Labs   Lab 08/13/23  0807 08/13/23  0248 08/12/23  2136 08/12/23  1823 08/11/23  0801 08/11/23  0519 08/10/23  0750 08/10/23  0607 08/09/23  1707 08/09/23  1300 08/09/23  0804 08/09/23  0553 08/08/23  0755 08/08/23  0647   NA  --   --   --   --   --  134*  --  133*  --   --   --  132*  --  132*   POTASSIUM  --   --   --   --   --  3.6  --  3.5  --  3.7  --  3.4  --  3.5   CHLORIDE  --   --   --   --   --  94*  --  93*  --   --   --  90*  --  91*   CO2  --   --   --   --   --  29  --  30*  --   --   --  30*  --  31*   ANIONGAP  --   --   --   --   --  11  --  10  --   --   --  12  --  10   * 228* 226* 140*   < > 137*   < > 128*   < >  --    < > 196*   < > 207*   BUN  --   --   --   --   --  55.8*  --  50.5*  --   " --   --  51.2*  --  49.6*   CR  --   --   --   --   --  2.26*  --  2.18*  --   --   --  2.13*  --  2.18*   YOSVANY  --   --   --   --   --  9.9  --  10.1  --   --   --  10.1  --  10.1   MAG  --   --   --   --   --  1.8  --  1.8  --   --   --   --   --  1.7    < > = values in this interval not displayed.     CBC   Recent Labs   Lab 23  0519 08/10/23  0607 23  0647 23  0649   WBC 5.3  --   --   --    RBC 2.90*  --   --   --    HGB 8.4*  --  7.9*  --    HCT 26.1*  --   --   --    MCV 90  --   --   --    MCH 29.0  --   --   --    MCHC 32.2  --   --   --    RDW 16.2*  --   --   --     292  --  197     INR No lab results found in last 7 days.    OTHER:  NA       Medical Decision Makin MINUTES SPENT BY ME on the date of service doing chart review, history, exam, documentation & further activities per the note.

## 2023-08-14 NOTE — CONSULTS
[facility]  WO Nurse Inpatient Assessment     Consulted for: coccyx and right arm    Summary: Skin tear right upper arm. Previously seen by [facility] for Deep Tissue Pressure Injury to coccyx area. This has resolved.    Patient History (according to provider note(s):      Norman Aguilar is a 63-year-old man with history of CAD, chronic systolic CHF  EF of 30 to 35%, CKD 3, left foot osteomyelitis s/p left BKA, and DM 2 who presented 7/17/2023 with septic shock in setting of bacteremia secondary to right shoulder myositis, NSTEMI, JOHANA requiring dialysis and A-fib with RVR. Functionally noted to have impaired strength, impaired activity tolerance, impaired balance, admitted to rehab 8/11/23.      Assessment:      Areas visualized during today's visit: Focused:    Wound location: R UE    Last photo: 8/14  Wound due to: Skin Tear  Wound history/plan of care: Skin tear present on admit.   Wound base: 100 % dermis     Palpation of the wound bed: normal      Drainage: scant     Description of drainage: serosanguinous     Measurements (length x width x depth, in cm): 2 x 1 x 0.1 cm   Periwound skin: Intact      Color: normal and consistent with surrounding tissue      Temperature: normal   Odor: none  Pain: denies , none  Pain interventions prior to dressing change: no significant pain present   Treatment goal: Heal   STATUS: initial assessment  Supplies ordered: supplies stored on unit, discussed with RN, and discussed with patient      Wound location: buttocks    Last photo: 8/14  Wound due to: Friction  Wound history/plan of care: Pt had previous deep tissue pressure injury that has now resolved.   Wound base: 100 % blanchable  erythema      Odor: none  Pain: denies , none  Pain interventions prior to dressing change: no significant pain present   Treatment goal: Protection  STATUS: initial assessment  Supplies ordered: supplies stored on unit, discussed with RN, and discussed  "with patient       Treatment Plan:     RUE wound(s): Every 3 days  Cleanse the area with NS and pat dry.  Apply No sting film barrier to periwound skin.  Cover wound with 4 x 4 Mepilex  Change dressing Q 3 days.  Apply Sween Cream (pink top) to remainder of arm DAILY.    Pressure Injury Prevention (PIP) Plan:    Mattress: Follow bed algorithm, reassess daily and order specialty mattress, if indicated.  HOB: Maintain at or below 30 degrees, unless contraindicated  Repositioning in bed: Every 1-2 hours   Heels: Keep elevated off mattress  Protective Dressing: Sacral Mepilex for prevention (#[id number]),  especially for the agitated patient   Chair positioning: Chair cushion (#[id number])  Do not use pillow  If patient has a buttock pressure injury, or high risk for PI use chair cushion or SPS.  Moisture Management: Perineal cleansing /protection: Follow Incontinence Protocol and Clean and dry skin folds with bathing   Under Devices: Inspect skin under all medical devices during skin inspection , Ensure tubes are stabilized without tension, and Ensure patient is not lying on medical devices or equipment when repositioned  Ask provider to discontinue device when no longer needed.  If patient is declining pressure injury prevention interventions:   Explore reason why and address patient's concerns, Educate on pressure injury risk and prevention intervention(s), If patient is still declining, document \"informed refusal\" , and Ensure Care team is aware ( provider, charge nurse, etc)      Orders: Reviewed    RECOMMEND PRIMARY TEAM ORDER: None, at this time  Education provided: plan of care and wound progress  Discussed plan of care with: Patient and Nurse  WOC nurse follow-up plan: weekly  Notify WOC if wound(s) deteriorate.  Nursing to notify the Provider(s) and re-consult the WOC Nurse if new skin concern.    DATA:     Current support surface: Standard  Standard gel/foam mattress (IsoFlex, Atmos air, etc)  Containment of " urine/stool: Continent of bladder and Continent of bowel  BMI: Body mass index is 26.26 kg/m .   Active diet order: Orders Placed This Encounter      Combination Diet 3 gm NA Diet     Output: I/O last 3 completed shifts:  In: 300   Out: 650 [Urine:650]     Labs:   Recent Labs   Lab 08/14/23  0634   ALBUMIN 3.2*   HGB 8.4*   WBC 7.1     Pressure injury risk assessment:   Sensory Perception: 4-->no impairment  Moisture: 4-->rarely moist  Activity: 2-->chairfast  Mobility: 3-->slightly limited  Nutrition: 3-->adequate  Friction and Shear: 2-->potential problem  Robin Score: 18    Jocelyne Bucio RN CWOCN  Pager no longer is use, please contact through [employer] group: [facility] Nurse [facility]  Dept. Office Number: [phone]

## 2023-08-14 NOTE — PROGRESS NOTES
Clinic Care Coordination Contact  Care Team Conversations    JEFFREY FLYNN performed chart review and noted the pt was discharged to a SNF. JEFFREY CC will monitor and outreach upon discharge from SNF.    Erin Brannon [facility]  Social Work Care Coordinator - [facility]  Care Coordination  [email or web address]  Cell Phone: [phone]  Gender pronouns: she/her  Employed by [employer]

## 2023-08-14 NOTE — PLAN OF CARE
Goal Outcome Evaluation:      Plan of Care Reviewed With: patient    Patient alert and oriented, able to make needs known, uses the call light. Patient reported feeling much better after blood transfusion yesterday evening. Garcia present and draining adequately,  although blood tinged noted in UO from Garcia this AM. Patient denies pain from insertion site. Left a sticky note for MD notification and oncoming nurse informed as well. No new complaints voiced this shift. Appeared sleeping on nursing rounds. No respiratory distress noted. Fall precautions maintained, call light in reach, safety checks completed.     Continue with POC.

## 2023-08-14 NOTE — PROGRESS NOTES
Individualized Overall Plan Of Care (IOPOC)      Rehab diagnosis/Impairment Group Code: 16 debility (non-cardiac, non-pulmonary) septic shock with brenda on ckd secondary to circulatory shock requiring hd.  Debility       Expected functional outcome: reach a level of mod I     Clinical Impression Comments: debility post prolonged medical course with functional decline     Mobility:  reach a level of mod I     ADL: OT: pt presenting with significatnly declined level of I and safety in I/ADLs due to inability to transfer or stand safely or use RUE during ADLs. pt mod I using prosthetic leg and no AD at PLOF. Pt now requires max to total A with all I/ADLs. pt would benefit from skilled OT service to reach max level of I and safety in I/ADLs.    Communication/Cognition/Swallow:     Patient Active Problem List   Diagnosis Code    DM type 2 on insulin, w Neuro -- Hgb A1C 6.9 on 7/17/23 E11.9, Z79.4    Benign essential hypertension I10    Benign neoplasm of descending colon D12.4    Gastro-esophageal reflux disease with esophagitis K21.00    Microalbuminuria R80.9    Hyperlipidemia E78.5    PAF (paroxysmal atrial fibrillation) -- on Eliquis  I48.0    Ischemic cardiomyopathy I25.5    CAD -- diffuse calcified vessels 11/16/21 (no stents placed) I25.10    DVT (deep venous thrombosis) (H) I82.409    Chronic kidney disease (CKD) stage G3a/A1, moderately decreased glomerular filtration rate (GFR) between 45-59 mL/min/1.73 square meter and albuminuria creatinine ratio less than 30 mg/g (H) N18.31    Diabetic ulcer of left midfoot associated with type 2 diabetes mellitus, with necrosis of bone (H) E11.621, L97.424    Constipation K59.00    Traumatic amputation of toe or toes without complication (H) S98.139A    Osteomyelitis of left foot -- S/P Left BKA 4/4/22 M86.9    Amputation of left foot (H) S98.912A    Chronic systolic CHF -- EF 30-35% on Echo 11/15/21 I50.22    Smoker (Cigars) F17.200    NSTEMI (non-ST elevated myocardial  infarction) (H) I21.4    ARF (acute renal failure) (H) N17.9    Dehydration E86.0    Hyponatremia E87.1    Elevated troponin R77.8    JOHANA on CRF stage 4 N17.9    Atrial fibrillation with RVR (H) I48.91    Septic shock (H) A41.9, R65.21    Debility R53.81         Intensity of therapy:   PT 90 minutes, Daily, for 18 days   OT 90 minutes, Daily, for 18 days       Education anticoagulation  Neuropsychology Testing: Yes        Medical Prognosis: good       Physician summary statement: debility post prolonged medical course with functional decline        Discharge destination: prior home  Discharge rehabilitation needs: home care, PT, and OT      Estimated length of stay: 18 days      Rehabilitation Physician Sushant Orourke DO

## 2023-08-14 NOTE — PROGRESS NOTES
CLINICAL NUTRITION SERVICES - BRIEF NOTE     Received patient/family request: pt likes to have Glucerna chocolate flavor.    Findings:  Per chart review, pt was receiving Glucerna during hospitalization. Appetite and intake were improving. RD will order chocolate flavor BID between meals. Pt may order additional PRN.    RD to follow for LOS, or earlier, if consulted.     Tanisha Barron RD, SUAD  ARU RD pager: [phone]  Weekend/Holiday RD pager: [phone]

## 2023-08-14 NOTE — PLAN OF CARE
Goal Outcome Evaluation:         Pt alert and oriented x4, able to make needs known. No c/o chest pain, SOB, N/V. FC intact, draining dark rosa urine. Seen by WOC RN (see notes). Had a medium BM this shift. Selenium sulfide lotion applied to right arm and hands. Latest potassium 3.3 RN Managed, replacement given at 1530H. Lab recheck at 2006. Urine starting to clear this evening. Will continue with POC.               Patient's most recent vital signs are:     Vital signs:  BP: 124/59  Temp: 98.3  HR: 83  RR: 16  SpO2: 99 %     Patient does not have new respiratory symptoms.  Patient does not have new sore throat.  Patient does not have a fever greater than 99.5.

## 2023-08-14 NOTE — PROGRESS NOTES
"  [facility]   Acute Rehabilitation Unit  Daily progress note    INTERVAL HISTORY  Norman Aguilar was seen and examined at bedside, sitting up in chair.  Very verbose, he denies n/v, stool is soft and \"firming up\".  Denies sob, fevers and dizziness.  Reports sleep is interrupted \"for over 40 years\", he reports challenges with his health, homelessness, he reported feeling angry and down, he declined interest in psychology or .  He is motivated to discharge though unsure where this will be long term would like to return to independent living.     He is disinterested in recommendations, verbalizes distrust in medical system and medications.  He says he will not allow thakur to be removed until more mobile. Right shoulder pain, and reports this is due to how it was handled during hospitalization.       OT:   Pt improving with LB dressing and donning of prosthesis. CGA for transfer with prosthesis on and walker. -30 breakfast     MEDICATIONS   amiodarone  400 mg Oral Daily    apixaban ANTICOAGULANT  5 mg Oral BID    aspirin  81 mg Oral Daily    atorvastatin  80 mg Oral QPM    chlorhexidine   Topical Weekly    furosemide  40 mg Oral BID    insulin aspart   Subcutaneous TID w/meals    insulin aspart  1-7 Units Subcutaneous TID AC    insulin aspart  1-5 Units Subcutaneous At Bedtime    insulin glargine  38 Units Subcutaneous At Bedtime    miconazole   Topical BID    multivitamin, therapeutic  1 tablet Oral Daily    sodium chloride (PF)  3 mL Intracatheter Q8H    ticagrelor  90 mg Oral BID    vitamin C  500 mg Oral Daily        sodium chloride 0.9%, acetaminophen, glucose **OR** dextrose **OR** glucagon, insulin aspart, lidocaine 4%, lidocaine (buffered or not buffered), - MEDICATION INSTRUCTIONS -, selenium sulfide, sodium chloride (PF)     PHYSICAL EXAM  /45 (BP Location: Left arm, Patient Position: Semi-Escamilla's, Cuff Size: Adult Regular)   Pulse 83   Temp 98.3  F " "(36.8  C) (Oral)   Resp 16   Ht 1.905 m (6' 3\")   SpO2 99%   BMI 26.26 kg/m    Gen: awake alert   HEENT: mmm   Cardio: rrr  Pulm: non labored   Abd: soft non tender non distended  Ext: warm dry without edema   Neuro/MSK: alert speech clear     LABS  CBC RESULTS:   Recent Labs   Lab Test 08/14/23  0634 08/13/23  1113 08/11/23  0519   WBC 7.1 6.8 5.3   RBC 2.88* 2.56* 2.90*   HGB 8.4* 7.6* 8.4*   HCT 25.9* 23.5* 26.1*   MCV 90 92 90   MCH 29.2 29.7 29.0   MCHC 32.4 32.3 32.2   RDW 16.4* 16.9* 16.2*    338 324     Last Basic Metabolic Panel:  Recent Labs   Lab Test 08/14/23  0658 08/14/23  0634 08/14/23 0224 08/13/23  1152 08/13/23  1113 08/11/23  0801 08/11/23  0519   NA  --  131*  --   --  133*  --  134*   POTASSIUM  --  3.3*  --   --  4.0  --  3.6   CHLORIDE  --  94*  --   --  94*  --  94*   CO2  --  26  --   --  26  --  29   ANIONGAP  --  11  --   --  13  --  11   * 235* 229*   < > 256*   < > 137*   BUN  --  62.7*  --   --  61.0*  --  55.8*   CR  --  2.25*  --   --  2.35*  --  2.26*   GFRESTIMATED  --  32*  --   --  30*  --  32*   YOSVANY  --  9.5  --   --  9.5  --  9.9    < > = values in this interval not displayed.         Rehabilitation - continue comprehensive acute inpatient rehabilitation program with multidisciplinary approach including therapies, rehab nursing, and physiatry following. See interval history for updates.      ASSESSMENT AND PLAN  Norman Aguilar is a 63-year-old man with history of CAD, chronic systolic CHF  EF of 30 to 35%, CKD 3, left foot osteomyelitis s/p left BKA, and DM 2 who presented 7/17/2023 with septic shock in setting of bacteremia secondary to right shoulder myositis, NSTEMI, JOHANA requiring dialysis and A-fib with RVR. Functionally noted to have impaired strength, impaired activity tolerance, impaired balance, admitted to rehab 8/11/23.       appreciate hospitalist support for medial management     Debility   In setting of acute illness prolonged " hospitalization  -continue PT/OT     Group A strep bacteremia  Right Shoulder Myositis  Originally admitted to ICU on pressors with complicated skin and soft tissue infection.  Positive for group A strep bacteremia.  Was having right shoulder pain, myositis seen on MRI.  Was during started 7/21 from pressure or swelling.  ID followed while in hospital.  Penicillin G changed to ceftriaxone 7/27, 2 weeks IV antibiotics complete.  Completed course of oral linezolid 7/27 if recurs-  ID would consider long term PCN suppression  -Hibiclens wash right shoulder weekly     NSTEMI w/ ICM  CAD with coronary stents-hx of  Afib w/ RVR  HFrEF  Hypotension-resolved   Cardiology followed while inpatient. Decompensated heart failure c/b hypotension, sepsis, fluid overload. Plan for coronary angiography after stabilization of infection and renal function. Converted from AF to NSR after amiodarone load. ECHO EF 25-30% with decreased LV function. -Continue PT ASA, Brilinta  -Continue Apixaban BID  -Continue amiodarone daily  -Continue furosemide BID  -Follow up with Cardiology outpatient for coronary angiogram     JOHANA on CKD3b  Baseline CKD 2/2 longstanding DM2, HTN. Cr range 2.06-2.26. severe ATN in setting of profound hypotension with sepsis, group A strep bacteremia and hypovolemia, contrast nephropathy after CTA, and mild rhabdo. CTA on 7/17 with no hydronephrosis. dialysis 7/21-7/24 & 7/29-8/4. dialysis cath removed 8/10.  Cr 2.25 8/14/23.   -Continue furosemide BID  -I&Os   -trend renal function.      Hyponatremia  Initially 119 on admission to hospital. 8/14 131   -Continue furosemide BID  -Avoid excessive fluid intake  -trend Na    Hypokalemia  K 3.3   -add mag level onto am labs  -replace per protocol     Anemia of chronic disease  2/2 critical illness, CKD, JOHANA, bacteremia. Received venofer and 5 units PRBC while inpatient. No overt signs of bleeding.  Hgb 8.4 8/14.   - trend CBC     DM2 (Hb A1c 6.9 on 7/17/23)  -Continue PTA  lantus 35 units daily  -Carb coverage 1 unit per 10 g carbohydrates  -sliding scale insulin   -accu checks AC/HS/0200  -hypoglycemia protocol     Seborrheic Dermatitis  -continue Selenium      Urinary Retention  -continue thakur plan for repeat trial of void in near future     Elevated lfts  (resolved)   2/2 infection improved. WNL 8/14.   -trend      Adjustment to disability:  continue to encourage psychology  FEN: 3 grams na  Bowel: continent- monitor  Bladder: thakur- refusing removal at this time.   DVT Prophylaxis: apixaban  GI Prophylaxis: none  Code: full   Disposition: goal for home   ELOS: 8/26/23.   Follow up Appointments on Discharge:  Cardiology, primary care, nephrology ([facility])      Melissa Banuelos PA-C  Physical Medicine & Rehabilitation

## 2023-08-14 NOTE — PLAN OF CARE
Discharge Planner Post-Acute Rehab PT:      Discharge Plan: potential to live with sibling for short transition?     Precautions: fall precautions, Lt BKA (has prosthesis), sacral wound, Rt shoulder compression sleeve/bandage near posterior axilla area     Current Status:  Bed Mobility: Mod I  Transfer: CGA-ModA sit>stand depending on surface height, recommend Ax1-2 pivot transfer with walker with nursing  Gait: up to 2x ~40ft with walker and CGA, WC brought close behind  Stairs: NA  Balance: decreased stability w/o UE support in standing.     Assessment: Worked on gait progression, pt able to ambulate in paredes with walker and CGA and WC brought close behind. Continued NuStep for endurance progression and for pt engagement. Initiated standing exercises.      Other Barriers to Discharge (DME, Family Training, etc): unspecified prior residence, reports living with various friends or out of car

## 2023-08-14 NOTE — PLAN OF CARE
Goal Outcome Evaluation:      Plan of Care Reviewed With: patient    Overall Patient Progress: no change    Outcome Evaluation: Pt is alert and oriented x 4. Denies pain and dizziness but c/o feeling tired. VSS. Pt received I unit of blood . No  Pt has Left PIV saline locked. Continent of bowel and has a thakur draining well. LBM 8/13. Call light within arm's reach and bed alarm is on.

## 2023-08-14 NOTE — PLAN OF CARE
Discharge Planner Post-Acute Rehab OT:      Discharge Plan: unknown. Pt does not have a home at this time.      Precautions: RUE pain and weakness s/p infection, hx of L BKA(pt was I using prosthetic leg at PLOF) , catheter     Current Status:  ADLs:  Mobility: Stand pivot transfer using FWW CGA-min A with prosthetic leg on, without the prosthetic leg on pt requires min A towards R side for stand pivot   Grooming: SBA set up sitting  Dressing: set up for shorts, therapist sofie thakur. MIN A for donning prosthesis at EOB, UB set up  Bathing: min A with stand pivot transfer from  to shower chair using grab bar with prosthetic leg on, mod A with tasks  Toileting: stand pivot to BSC using FWW min A for transfer total A for tasks  IADLs: TBD  Vision/Cognition: appears WNL, wears glasses full time     Assessment: Pt improving with LB dressing and donning of prosthesis. CGA for transfer with prosthesis on and walker. -30 breakfast    Pt presenting with significantly declined level of I. Pt I with all I/ADLs using prosthetic leg. Pt reports rarely taking the leg off even when he was sleeping at PLOF. Level of I also limited by R shoulder weakness and pain. Pt would benefit from skilled OT service to reach max potential level of I in I/ADLs     Other Barriers to Discharge (DME, Family Training, etc): TBD

## 2023-08-15 NOTE — PLAN OF CARE
"Goal Outcome Evaluation:      Plan of Care Reviewed With: patient    Patient alert and oriented, able to make needs known, uses the call light. Patient reported interrupted sleep (baseline per patient) and sacrum pain but refusing any med/s for such. Reported feeling something \"unusual\" after therapy yesterday afternoon, patient can't fully describe what it is. Stated feeling \"okay\" when just sitting and not having activities. Stated he will talk to the doctor about it. Garcia present, see I/O for output. L PIV patent and flushed. Fall precautions maintained, call light in reach, safety checks completed.      Continue with POC.      "

## 2023-08-15 NOTE — PLAN OF CARE
Pt aox4, A2 pivot, L P IV SL,  thakur catheter, 3gm Na diet, thin liquids, takes pills whole, denied pain, no changes continue with POC

## 2023-08-15 NOTE — PLAN OF CARE
Discharge Planner Post-Acute Rehab PT:      Discharge Plan: potential to live with sibling for short transition?     Precautions: fall precautions, Lt BKA (has prosthesis), sacral wound, Rt shoulder compression sleeve/bandage near posterior axilla area     Current Status:  Bed Mobility: Mod I  Transfer: CGA-ModA sit>stand depending on surface height, recommend Ax1-2 pivot transfer with walker with nursing  Gait: up to 2x ~40ft with walker and CGA, WC brought close behind  Stairs: NA  Balance: decreased stability w/o UE support in standing.     Assessment: Pt fatigued with nustep, declined increasing workload. Performed seated exercises in AM as pt declined standing following nustep.     Other Barriers to Discharge (DME, Family Training, etc): unspecified prior residence, reports living with various friends or out of car

## 2023-08-15 NOTE — PLAN OF CARE
Patient alert and oriented. Slept most of the shift. Endorsed pain but stated does not warrant pain medication. Denied any discomfort. Garcia patent and draining well. Left PIV patent and flushes well. Resting in bed with no acute distress noted. Call light within reach. Continue with current plan of care.

## 2023-08-15 NOTE — PLAN OF CARE
Discharge Planner Post-Acute Rehab OT:      Discharge Plan: unknown. Pt does not have a home at this time.      Precautions: RUE pain and weakness s/p infection, hx of L BKA(pt was I using prosthetic leg at PLOF) , catheter     Current Status:  ADLs:  Mobility: Stand pivot transfer using FWW CGA-min A with prosthetic leg on, without the prosthetic leg on pt requires min A towards R side for stand pivot   Grooming: SBA set up sitting  Dressing: set up for shorts, therapist sofie thakur. MIN A for donning prosthesis at EOB, UB set up  Bathing: min A with stand pivot transfer from WC to shower chair using grab bar with prosthetic leg on, mod A with tasks  Toileting: stand pivot to BSC using FWW min A for transfer total A for tasks  IADLs: TBD  Vision/Cognition: appears WNL, wears glasses full time     Assessment: Pt's mood appeared down today. Pt needing additional time to process adjustment to his new medical condition, disability and expressed anxiety about his discharge placement/uncertainty about his future w/ lack of resources/support. This OT provided therapeutic listening. Pt only requested to self propel in w/c w/ using his RUE as tolerated w/ brief breaks down in the hallway. Offered psych consult in which pt declined. Cont. To monitor patient's mood and motivation.    Other Barriers to Discharge (DME, Family Training, etc): TBD

## 2023-08-15 NOTE — PROGRESS NOTES
"  [facility]   Acute Rehabilitation Unit  Daily progress note    INTERVAL HISTORY  Norman Aguilar was seen sitting up in wheel chair, says everything about the same right arm feeling fatigued as tried to use some in therapy.  Denies sob, headache,dizziness, fevers denies other new concerns.       PT:   Worked on gait progression, pt able to ambulate in paredes with walker and CGA and WC brought close behind. Continued NuStep for endurance progression and for pt engagement. Initiated standing exercises.      MEDICATIONS   amiodarone  400 mg Oral Daily    apixaban ANTICOAGULANT  5 mg Oral BID    aspirin  81 mg Oral Daily    atorvastatin  80 mg Oral QPM    chlorhexidine   Topical Weekly    furosemide  40 mg Oral BID    insulin aspart   Subcutaneous TID w/meals    insulin aspart  1-7 Units Subcutaneous TID AC    insulin aspart  1-5 Units Subcutaneous At Bedtime    insulin glargine  38 Units Subcutaneous At Bedtime    miconazole   Topical BID    multivitamin, therapeutic  1 tablet Oral Daily    selenium sulfide   Topical BID    sodium chloride (PF)  3 mL Intracatheter Q8H    ticagrelor  90 mg Oral BID    vitamin C  500 mg Oral Daily        sodium chloride 0.9%, acetaminophen, glucose **OR** dextrose **OR** glucagon, insulin aspart, lidocaine 4%, lidocaine (buffered or not buffered), - MEDICATION INSTRUCTIONS -, sodium chloride (PF)     PHYSICAL EXAM  BP 98/48 (BP Location: Left arm)   Pulse 84   Temp 98.6  F (37  C) (Oral)   Resp 18   Ht 1.905 m (6' 3\")   SpO2 100%   BMI 26.26 kg/m    Gen: awake alert   HEENT: mmm   Cardio: rrr  Pulm: non labored clear  Abd: soft non tender non distended  Ext: warm dry LLE prosthesis, LUE without edema.  RLE with pitting edema without calf tenderness, right arm with swelling with tubigrip in place, hand swollen.   Neuro/MSK: alert speech clear     LABS  CBC RESULTS:   Recent Labs   Lab Test 08/14/23  0634 08/13/23  1113 08/11/23  0519   WBC 7.1 " 6.8 5.3   RBC 2.88* 2.56* 2.90*   HGB 8.4* 7.6* 8.4*   HCT 25.9* 23.5* 26.1*   MCV 90 92 90   MCH 29.2 29.7 29.0   MCHC 32.4 32.3 32.2   RDW 16.4* 16.9* 16.2*    338 324       Last Basic Metabolic Panel:  Recent Labs   Lab Test 08/15/23  0208 08/14/23  2143 08/14/23 2007 08/14/23  1719 08/14/23  0658 08/14/23  0634 08/13/23  1152 08/13/23  1113 08/11/23  0801 08/11/23  0519   NA  --   --   --   --   --  131*  --  133*  --  134*   POTASSIUM  --   --  3.9  --   --  3.3*  --  4.0  --  3.6   CHLORIDE  --   --   --   --   --  94*  --  94*  --  94*   CO2  --   --   --   --   --  26  --  26  --  29   ANIONGAP  --   --   --   --   --  11  --  13  --  11   * 321*  --  256*   < > 235*   < > 256*   < > 137*   BUN  --   --   --   --   --  62.7*  --  61.0*  --  55.8*   CR  --   --   --   --   --  2.25*  --  2.35*  --  2.26*   GFRESTIMATED  --   --   --   --   --  32*  --  30*  --  32*   YOSVANY  --   --   --   --   --  9.5  --  9.5  --  9.9    < > = values in this interval not displayed.       Rehabilitation - continue comprehensive acute inpatient rehabilitation program with multidisciplinary approach including therapies, rehab nursing, and physiatry following. See interval history for updates.      ASSESSMENT AND PLAN  Norman Aguilar is a 63-year-old man with history of CAD, chronic systolic CHF  EF of 30 to 35%, CKD 3, left foot osteomyelitis s/p left BKA, and DM 2 who presented 7/17/2023 with septic shock in setting of bacteremia secondary to right shoulder myositis, NSTEMI, JOHANA requiring dialysis and A-fib with RVR. Functionally noted to have impaired strength, impaired activity tolerance, impaired balance, admitted to rehab 8/11/23.       appreciate hospitalist support for medial management     Debility   In setting of acute illness prolonged hospitalization  -continue PT/OT     Group A strep bacteremia  Right Shoulder Myositis  Originally admitted to ICU on pressors with complicated skin and soft tissue  infection.  Positive for group A strep bacteremia.  Was having right shoulder pain, myositis seen on MRI.  Was during started 7/21 from pressure or swelling.  ID followed while in hospital.  Penicillin G changed to ceftriaxone 7/27, 2 weeks IV antibiotics complete.  Completed course of oral linezolid 7/27 if recurs-  ID would consider long term PCN suppression  -Hibiclens wash right shoulder weekly     NSTEMI w/ ICM  CAD with coronary stents-hx of  Afib w/ RVR  HFrEF  Hypotension-resolved   Cardiology followed while inpatient. Decompensated heart failure c/b hypotension, sepsis, fluid overload. Plan for coronary angiography after stabilization of infection and renal function. Converted from AF to NSR after amiodarone load. ECHO EF 25-30% with decreased LV function. -Continue PT ASA, Brilinta  -Continue Apixaban BID  -Continue amiodarone daily  -Continue furosemide BID  -Follow up with Cardiology outpatient for coronary angiogram     JOHANA on CKD3b  Baseline CKD 2/2 longstanding DM2, HTN. Cr range 2.06-2.26. severe ATN in setting of profound hypotension with sepsis, group A strep bacteremia and hypovolemia, contrast nephropathy after CTA, and mild rhabdo. CTA on 7/17 with no hydronephrosis. dialysis 7/21-7/24 & 7/29-8/4. dialysis cath removed 8/10.  Cr 2.25 8/14/23.   -Continue furosemide BID  -I&Os   -trend renal function.      Hyponatremia  Initially 119 on admission to hospital. 8/14 131   -Continue furosemide BID  -Avoid excessive fluid intake  -trend Na    Hypokalemia  K 3.3   -add mag level onto am labs  -replace per protocol     Anemia of chronic disease  2/2 critical illness, CKD, JOHANA, bacteremia. Received venofer and 5 units PRBC while inpatient. No overt signs of bleeding.  Hgb 8.4 8/14.   - trend CBC     DM2 (Hb A1c 6.9 on 7/17/23)  -Continue PTA lantus 35 units daily  -Carb coverage 1 unit per 10 g carbohydrates  -sliding scale insulin   -accu checks AC/HS/0200  -hypoglycemia protocol     Seborrheic  Dermatitis  -continue Selenium      Urinary Retention  -continue thakur plan for repeat trial of void in near future     Elevated lfts  (resolved)   2/2 infection improved. WNL 8/14.   -trend      Adjustment to disability:  continue to encourage psychology  FEN: 3 grams na  Bowel: continent- monitor  Bladder: thakur- refusing removal at this time.   DVT Prophylaxis: apixaban  GI Prophylaxis: none  Code: full   Disposition: goal for home   ELOS: 8/26/23.   Follow up Appointments on Discharge:  Cardiology, primary care, nephrology ([facility])      Melissa Banuelos PA-C  Physical Medicine & Rehabilitation

## 2023-08-15 NOTE — PROGRESS NOTES
[facility]  Transitional Care Unit Progress Note  Norman Aguilar  [id number]  August 15, 2023         Assessment & Plan:   Norman Aguilar is a 63 year old male with PMHx of CAD on Brilinta, chronic systolic CHF with EF of 30-35%, CKD 3, left foot osteomyelitis s/p left BKA and DM 2.  Presented to [facility] 7/17/2023 with septic shock, NSTEMI, JOHANA, A-fib with RVR.  Transfer to acute rehab 8/11 for ongoing rehabilitation.     # Physical deconditioning  -Per primary PMR team      # Septic shock secondary to Group A strep bacteremia (7/17/23) from R shoulder dermatitis, resolving   # Myositis  # Transaminitis secondary to shock liver and myositis, resolved  Originally admitted to ICU on pressors with complicated skin and soft tissue infection.  Positive for group A strep bacteremia.  Was having right shoulder pain, myositis seen on MRI.  Was during started 7/21 from pressure or swelling.      -ID was following in the hospital and per recommendations, he was switched from Pen G to Ceftriaxone on 7/27/23 with plans to complete a 2 week course, last dose   - Completed a course of Linezolid 7/27/23  - WOCN consult and per recommendations, he continues with a Hibiclens wash to the right shoulder weekly and selenium sulfide 2.5% lotion twice daily as needed for irritation  - Liver enzymes were initially elevated in the 200's and have now resolved to normal and felt to be secondary to shock liver     # NSTEMI w/ ICM  # CAD with coronary stents-hx of  # Afib w/ RVR  # HFrEF  Cardiology followed while inpatient. Decompensated heart failure compensated by hypotension, sepsis and subsequently fluid overload. Plan for coronary angiography after stabilization of infection and renal function. Converted from AF to NSR after amiodarone load. ECHO EF 25-30% with decreased LV function.   -Continue ASA 81mg once daily and Brilinta 90mg twice daily   -Continue Apixaban 5mg twice daily   -Continue  Amiodarone 400mg once daily  -Continue Furosemide 40mg twice daily   -Follow up with Cardiology outpatient for coronary angiogram     # Severe ATN secondary to hypotension, resolved  # Chronic kidney disease stage III secondary to DM2, Hypertension  - CTA on 7/17 with no hydronephrosis.   - Completed dialysis 7/21-7/24 and had to be resumed 7/29 2/2 worsening anasarca and poor response to lasix gtt. Last dialysis 8/4 and dialysis cath removed 8/10.   - Cr is currently 2.25 and at his baseline of  2.0-2.3.  - Continue furosemide 40 BID  - BMP, Mag, Phos on MWF and replete electrolytes as indicated  - Continue strict I/O, daily weights, dose adjust medications accordingly and avoid nephrotoxins     # Hyponatremia secondary to anasarca, slowly improving  Sodium was last normal in March 2023. Upon admission, sodium was 119 and has now resolved to 131.    -Last seen by Nephrology on 8/9/23. Per recommendations, continue furosemide BID  -Avoid excessive fluid intake     # Anemia of chronic disease  -Received venofer and 5 units PRBC while inpatient.   -Hemoglobin is stable at 8.4g/dL. Continue to monitor and transfuse to maintain Hgb of 8 or greater given ischemic heart disease.     # DM2 (Hb A1c 6.9 on 7/17/23)  -Continue PTA lantus 35 units daily with sliding scale insulin Aspart QID and carb coverage 1U per 10g CHO at meals  -hypoglycemia protocol  -Declined DM consult, has managed his Diabetes well for 30 years      # Urinary retention  Currently has indwelling thakur. Declining removal until he is more independent with therapies.   -Removal with voiding trial in near future      R axilla blood blisters-healing  Pressure injury coccyx  -wound and skin care per WOCN recommendations      # S/p L BKA  -Prosthetic leg in room, pt uses alcohol spray to knee prior to placing. OK for family to bring from home.           Interval History:   Mr. Aguilar was up workign with physical therapy today, in good spirits. He reports  "minimal pain and feels he is making slow progress with therapies. He denies any chest pain, shortness of breath, fevers, chills or additional new concerns. We reveiwed his labs, plan of care and he had no further questions or concerns.         Physical Exam:   Vitals were reviewed  Blood pressure 101/46, pulse 94, temperature 98.1  F (36.7  C), temperature source Oral, resp. rate 18, height 1.905 m (6' 3\"), SpO2 97 %.  General:63 year old gentleman sitting in a wheelchair doing therapies, denies pain at present  Cardiovascular: Regular rate and rhythm, no appreciable murmurs, rubs or gallops  Lungs: breathing comfortably on room air, no adventitious sounds to bilateral auscultation  Musculoskeletal: prosthetic leg in place on left side, no surrounding skin irriration  Skin: no jaundice, rashes, or lesions      Amanda Avelar PA-C, MPH  Internal Medicine [facility] Hospitalist  [phone]    "

## 2023-08-16 NOTE — CONSULTS
Met with patient for anticoagulation class. At the time of our scheduled class, the patient was experiencing a substantial nose bleed and was waiting to hear from his care team about lab results and next steps. I asked if he would like to proceed with class or would prefer to reschedule. He stated that he has had education in the past on anticoagulation and did not want to take the PLC class at this time. I left the handout with him to reference if he would like. Please re-consult the PLC if patient decides he would like the class. Bedside RN updated.    Literature given: Guide to the Newer Oral Anticoagulants: Medicines to Treat and Prevent Blood Clots

## 2023-08-16 NOTE — PROGRESS NOTES
Alert and oriented times four. Denies pain. Garcia patent. Last BM 8/14. Left BKA. Takes pills whole.  and . Left PIV patent. Nose bleed persistent for over two hours. Nose spray ordered. Once applied insert nasal dressing.     Patient's most recent vital signs are:     Vital signs:  BP: 95/53  Temp: 96.7  HR: 85  RR: 16  SpO2: 94 %     Patient does not have new respiratory symptoms.  Patient does not have new sore throat.  Patient does not have a fever greater than 99.5.

## 2023-08-16 NOTE — PLAN OF CARE
Discharge Planner Post-Acute Rehab OT:      Discharge Plan: unknown. Pt does not have a home at this time.      Precautions: RUE pain and weakness s/p infection, hx of L BKA(pt was I using prosthetic leg at PLOF) , catheter     Current Status:  ADLs:  Mobility: Stand pivot transfer using FWW CGA-min A with prosthetic leg on, without the prosthetic leg on pt requires min A towards R side for stand pivot   Grooming: SBA set up sitting  Dressing: set up for shorts, therapist sofie thakur. MIN A for donning prosthesis at EOB, UB set up  Bathing: min A with stand pivot transfer from WC to shower chair using grab bar with prosthetic leg on, mod A with tasks  Toileting: stand pivot to BSC using FWW min A for transfer total A for tasks  IADLs: TBD  Vision/Cognition: appears WNL, wears glasses full time     Assessment: OT -60 min, pt with ongoning epistaxis this am, and c/o not feeling well.     Other Barriers to Discharge (DME, Family Training, etc): TBD

## 2023-08-16 NOTE — PROGRESS NOTES
[facility]  Transitional Care Unit Progress Note  Norman Aguilar  [id number]  August 15, 2023         Assessment & Plan:   Norman Aguilar is a 63 year old male with PMHx of CAD on Brilinta, chronic systolic CHF with EF of 30-35%, CKD 3, left foot osteomyelitis s/p left BKA and DM 2.  Presented to [facility] 7/17/2023 with septic shock, NSTEMI, JOHANA, A-fib with RVR.  Transfer to acute rehab 8/11 for ongoing rehabilitation.    # Severe ATN secondary to hypotension, resolved  # Chronic kidney disease stage III secondary to DM2, Hypertension  - CTA on 7/17/23 with no hydronephrosis.   - Completed dialysis 7/21-7/24 and had to be resumed 7/29 2/2 worsening anasarca and poor response to lasix gtt. Last dialysis 8/4 and dialysis cath removed 8/10.   - Cr is currently 2.7 and a little higher than baseline of  2.0-2.3.  - HOLD furosemide 40 BID  - Give 1L of LR today  - Continue strict I/O, daily weights, daily electrolyte monitoring, dose adjust medications accordingly and avoid nephrotoxins     # Hyponatremia secondary to chronic diuretics and recent anasarca  Sodium was last normal in March 2023. Upon admission, sodium was 119 and has now resolved to 131 but has been trending down again.  -Sodium 129 today in the setting of worsening Cr. Will hold Lasix and give 1L of IVF.   -Check strict I/O, daily electrolytes    -Last seen by Nephrology on 8/9/23. Per recommendations, continue furosemide BID  -Avoid excessive fluid intake    # NSTEMI w/ ICM  # CAD with coronary stents-hx of  # Afib w/ RVR  # HFrEF  Decompensated heart failure compensated by hypotension, sepsis and subsequently fluid overload.  Converted from AF to NSR after amiodarone load. ECHO EF 25-30% with decreased LV function.     -Cardiology followed while inpatient at [facility] and recommended follow up for tomorrow, but since Mr. Aguilar is in ARU and will not be discharging anytime soon, we have asked [facility]  to visit with him. There were previous recommendations for possible angiogram and adjustment to Amiodarone (reached his 10gm limit).   -Continue ASA 81mg once daily and Brilinta 90mg twice daily   -Continue Apixaban 5mg twice daily   -Continue Amiodarone 400mg once daily  -Continue Furosemide 40mg twice daily      # Septic shock secondary to Group A strep bacteremia (7/17/23) from R shoulder dermatitis, resolving   # Myositis to right shoulder with related pain  # Transaminitis secondary to shock liver and myositis, resolved  Originally admitted to ICU on pressors with complicated skin and soft tissue infection.  Positive for group A strep bacteremia.       -ID was following in the hospital and per recommendations, he was switched from Pen G to Ceftriaxone on 7/27/23 with plans to complete a 2 week course, last dose   - Completed a course of Linezolid 7/27/23  - WOCN consult and per recommendations, he continues with a Hibiclens wash to the right shoulder weekly and selenium sulfide 2.5% lotion twice daily as needed for irritation  - Liver enzymes were initially elevated in the 200's and have now resolved to normal and felt to be secondary to shock liver     # Physical deconditioning  -Per primary PMR team      # Anemia of chronic disease  -Received venofer and 5 units PRBC while inpatient.   -Hemoglobin is stable at 8.0ag/dL. Continue to monitor and transfuse to maintain Hgb of 8 or greater given ischemic heart disease.     # DM2 (Hb A1c 6.9 on 7/17/23)  -Continue PTA lantus 35 units daily with sliding scale insulin Aspart QID and carb coverage 1U per 10g CHO at meals  -hypoglycemia protocol  -Declined DM consult, has managed his Diabetes well for 30 years      # Urinary retention  Currently has indwelling thakur. He is now able to start moving to a commode and we will plan to remove this in the next 48hr if his strength and mobility continue to support independent bathroom visits.     # S/p L BKA  -Prosthetic leg  "in room, pt uses alcohol spray to knee prior to placing. OK for family to bring from home.           Interval History:   Mr. Aguilar was sitting up in a chair today reporting that he feels more fatigued than usual. He denies any fevers, chills, chest pain, headaches, diarrhea. He reports that while PT and OT wear him out, that he just feels more worn out than usual. We discussed a reasonable work up and plan and he had no further questions or concerns.         Physical Exam:   Vitals were reviewed  Blood pressure 95/53, pulse 85, temperature (!) 96.7  F (35.9  C), temperature source Oral, resp. rate 16, height 1.905 m (6' 3\"), SpO2 94 %.  General:63 year old gentleman sitting in a wheelchair doing therapies, denies pain at present, very pale appearing  Cardiovascular: Regular rate and rhythm, no appreciable murmurs, rubs or gallops  Lungs: breathing comfortably on room air, no adventitious sounds to bilateral auscultation  Musculoskeletal: prosthetic leg in place on left side, no surrounding skin irriration  Skin: no jaundice, rashes, or lesions      Amanda Avelar PA-C, MPH  Internal Medicine [facility] Hospitalist  [phone]  "

## 2023-08-16 NOTE — CONSULTS
Cardiology Inpatient Consultation  August 16, 2023    I have evaluated the patient and agree with the assessment and plan as outlined below. Initiate GDMT w/ beta blockade; other agents contraindicated due to renal dysfunction. Consider ischemic evaluation in outpatient setting and reduce amiodarone to 200 mg daily.     Cardiology will sign off, please reach out for any additional questions.    Frank Pichardo MD  Cardiology Fellow    ASSESSMENT/RECOMMENDATIONS  Norman Aguilar is a 63 year old male with PMHx of CAD on DAPT, chronic systolic CHF with EF of 30-35%, CKD 3, left foot osteomyelitis s/p left BKA and DM 2.  Presented to [facility] 7/17/2023 with septic shock, NSTEMI, JOHANA, A-fib with RVR.  He was transferred to ARU on 8/11/23 where he was been stable and progressing through rehab. Cardiology is being consulted for management of newly reduced EF and Afib following hospitalization.     #HFrEF with new reduced EF  #NSTEMI  Cardiology followed while inpatient at [facility] and recommended follow up outpatient, but since Mr. Aguilar is in ARU and will not be discharging anytime soon, Cardiology was consulted to determine if ischemic evaluation is necessary inpatient. There were previous recommendations for possible angiogram. His last angiogram on 8/29/22 demonstrated multi vessel disease on the pRCA, dRCA and RPDA-2 with 2 JESSICA placed in the RCA. Echocardiogram on 7/18/23 demonstrated newly reduced EF (25-30%) with severe hypokinesis of LV from previous echocardiogram on 8/25/22 (EF 30-35% with moderate hypokinesis of LV). Per conversation with LAY, since arriving at the ARU, patient has not had chest pain but has been short of breath with activity during PT sessions. Vitals are stable and patient does not appear to be hypervolemic. Given that his kidneys function is low, we do not feel inclined to recommend a CTA or angiogram at the moment. He should however, restart GDMT for his heart failure  that can be given in the context of his reduced kidney function.     #Afib  EKG on admission 7/17/23 showed Afib RVR. Patient was given 1X metoprolol and then loaded with amiodarone, initially on drip and then 400 mg daily. He was been on amiodarone 400 mg daily since and has remained NSR. EKG 8/17/23 was NSR. Per conversation with LAY, patient has not been symptomatic since being transferred to ARU. We recommend transitioning to PO maintenance therapy.     Recommendations:  Recommend starting patient on metoprolol tartrate 25 mg BID for GDMT allowed given kidney function. Uptitrate to goal if pressures allow. Can consider hydralazine with a nitrate in the future for better BP control if necessary.   Do not recommend ischemic evaluation while inpatient at the moment given patient is not currently having any chest pain, orthopnea or PND. The reduced EF is minor and does not warrant any procedure to assess his coronary artery anatomy that may worsen his kidney function.   Recommend amiodarone 200 mg PO daily for Afib maintenance therapy.    Plan of care discussed with Dr. Feng, who agrees with above plan.    Thank you for consulting the cardiovascular services at the [facility]. Please do not hesitate to call us with any questions.     Cardiology will sign off    Everett Perez  Medical student    HISTORY OF PRESENT ILLNESS  Norman Aguilar is a 63 year old male with PMHx of CAD on DAPT, chronic systolic CHF with EF of 30-35%, CKD 3, left foot osteomyelitis s/p left BKA and DM 2.  Presented to [facility] 7/17/2023 with septic shock, NSTEMI, JOHANA, A-fib with RVR.  He was transferred to ARU on 8/11/23 where he was been stable and progressing through rehab. Cardiology is being consulted for management of newly reduced EF and Afib following hospitalization.     Per conversation with LAY, the patient denies chest pain, orthopnea, PND, palpitations, lightheadedness, or syncope. He  does have shortness of breath with exertion while working with PT.    Review of Systems:    Complete review of systems was performed and negative except per HPI.      CARDIAC HISTORY:  EK/17/23          Transthoracic echocardiogram:     23    Interpretation Summary     Left ventricular function is decreased. The ejection fraction is 25-30%  (severely reduced).  The left atrium is mildly dilated.  IVC diameter <2.1 cm collapsing >50% with sniff suggests a normal RA pressure  of 3 mmHg.  TAPSE is normal, which is consistent with normal right ventricular systolic  function.  The patient exhibited frequent PVCs.  No hemodynamically significant valvular abnormalities on 2D or color flow  imaging. The study was technically difficult.    22    Interpretation Summary     The left ventricle is normal in size with normal left ventricular wall  thickness.  Left ventricular function is decreased. The ejection fraction is 30-35%  (moderately reduced).  Mildly decreased right ventricular systolic function  Moderate biatrial enlargement  No hemodynamically significant valve disease  IVC diameter >2.1 cm collapsing <50% with sniff suggests a high RA pressure  estimated at 15 mmHg or greater.  Compared to prior study, there is no significant change.    Coronary angiogram:     22    Conclusion    Dist RCA lesion is 80% stenosed.  RPDA-1 lesion is 50% stenosed.  RPDA-2 lesion is 100% stenosed.  Ost RCA to Prox RCA lesion is 70% stenosed.  IVUS was performed on the rca lesions.  4.0x16 mm JESSICA Synergy into proxinal RCA extends into aorta  3.5x16 mm JESSICA Synergy into distal RCA          PMH:  Past Medical History:   Diagnosis Date    Anemia     Chronic systolic CHF (congestive heart failure) (H)     Coronary artery disease     Diabetic neuropathy (H)     DM type 2 w Neuropathy     GERD (gastroesophageal reflux disease)     Hyperlipidemia     Hypertension     Intermittent atrial fibrillation (H)     on Eliquis     Ischemic cardiomyopathy 11/16/2021    Echo with EF of 30-35%    NSTEMI (non-ST elevated myocardial infarction) (H) 11/15/2021    Osteomyelitis of left foot (H) 04/04/2022    Renal disease     Retinopathy     Sleep disturbance      Active Problems:  Patient Active Problem List    Diagnosis Date Noted    Debility 08/11/2023     Priority: Medium    Dehydration 07/17/2023     Priority: Medium    Hyponatremia 07/17/2023     Priority: Medium    Elevated troponin 07/17/2023     Priority: Medium    JOHANA on CRF stage 4 07/17/2023     Priority: Medium    Atrial fibrillation with RVR (H) 07/17/2023     Priority: Medium    Septic shock (H) 07/17/2023     Priority: Medium    ARF (acute renal failure) (H) 08/24/2022     Priority: Medium    NSTEMI (non-ST elevated myocardial infarction) (H) 08/23/2022     Priority: Medium    Amputation of left foot (H) 04/04/2022     Priority: Medium    Chronic systolic CHF -- EF 30-35% on Echo 11/15/21 04/04/2022     Priority: Medium    Smoker (Cigars) 04/04/2022     Priority: Medium    Osteomyelitis of left foot -- S/P Left BKA 4/4/22 03/18/2022     Priority: Medium    Traumatic amputation of toe or toes without complication (H) 01/04/2022     Priority: Medium    Diabetic ulcer of left midfoot associated with type 2 diabetes mellitus, with necrosis of bone (H) 12/03/2021     Priority: Medium    Ischemic cardiomyopathy 11/29/2021     Priority: Medium    CAD -- diffuse calcified vessels 11/16/21 (no stents placed) 11/29/2021     Priority: Medium    DVT (deep venous thrombosis) (H) 11/29/2021     Priority: Medium    Chronic kidney disease (CKD) stage G3a/A1, moderately decreased glomerular filtration rate (GFR) between 45-59 mL/min/1.73 square meter and albuminuria creatinine ratio less than 30 mg/g (H) 11/29/2021     Priority: Medium    PAF (paroxysmal atrial fibrillation) -- on Eliquis  11/15/2021     Priority: Medium    Benign essential hypertension 11/14/2021     Priority: Medium     Microalbuminuria 11/14/2021     Priority: Medium    Hyperlipidemia 11/14/2021     Priority: Medium    DM type 2 on insulin, w Neuro -- Hgb A1C 6.9 on 7/17/23 11/17/2018     Priority: Medium    Constipation 04/24/2018     Priority: Medium    Benign neoplasm of descending colon 04/19/2018     Priority: Medium    Gastro-esophageal reflux disease with esophagitis 02/13/2018     Priority: Medium     Social History:  Social History     Tobacco Use    Smoking status: Former     Types: Cigars    Smokeless tobacco: Never    Tobacco comments:     Cigars   Vaping Use    Vaping Use: Never used   Substance Use Topics    Alcohol use: Yes     Comment: < 1 drink a month (only holidays)    Drug use: Never     Family History:  No family history on file.    Medications:   amiodarone  400 mg Oral Daily    apixaban ANTICOAGULANT  5 mg Oral BID    aspirin  81 mg Oral Daily    atorvastatin  80 mg Oral QPM    chlorhexidine   Topical Weekly    [Held by provider] furosemide  40 mg Oral BID    insulin aspart   Subcutaneous TID w/meals    insulin aspart  1-7 Units Subcutaneous TID AC    insulin aspart  1-5 Units Subcutaneous At Bedtime    insulin glargine  38 Units Subcutaneous At Bedtime    lactated ringers  1,000 mL Intravenous Once    miconazole   Topical BID    multivitamin, therapeutic  1 tablet Oral Daily    selenium sulfide   Topical BID    sodium chloride (PF)  3 mL Intracatheter Q8H    thrombin   Topical Once    ticagrelor  90 mg Oral BID    vitamin C  500 mg Oral Daily        - MEDICATION INSTRUCTIONS -         PHYSICAL EXAM:  Temp:  [96.7  F (35.9  C)] 96.7  F (35.9  C)  Pulse:  [85] 85  Resp:  [16] 16  BP: ()/(46-53) 95/53  SpO2:  [94 %] 94 %    Intake/Output Summary (Last 24 hours) at 8/16/2023 1610  Last data filed at 8/16/2023 0619  Gross per 24 hour   Intake --   Output 100 ml   Net -100 ml     Patient could not be examined as he is on [place]    DIAGNOSTICS  All labs and imaging were reviewed, of note:    CMP  Recent  Labs   Lab 08/16/23  1150 08/16/23  1119 08/16/23  0929 08/16/23  0214 08/15/23  0929 08/15/23  0924 08/14/23  2143 08/14/23  2007 08/14/23  0658 08/14/23 0634 08/13/23  1152 08/13/23  1113 08/11/23  0801 08/11/23  0519   NA  --  126*  --   --   --  130*  --   --   --  131*  --  133*  --  134*   POTASSIUM  --  3.9  --   --   --  3.6  3.6  --  3.9  --  3.3*  --  4.0  --  3.6   CHLORIDE  --  88*  --   --   --  93*  --   --   --  94*  --  94*  --  94*   CO2  --  25  --   --   --  25  --   --   --  26  --  26  --  29   ANIONGAP  --  13  --   --   --  12  --   --   --  11  --  13  --  11   * 200* 188* 162*   < > 142*   < >  --    < > 235*   < > 256*   < > 137*   BUN  --  71.8*  --   --   --  67.9*  --   --   --  62.7*  --  61.0*  --  55.8*   CR  --  2.77*  --   --   --  2.53*  --   --   --  2.25*  --  2.35*  --  2.26*   GFRESTIMATED  --  25*  --   --   --  28*  --   --   --  32*  --  30*  --  32*   YOSVANY  --  9.4  --   --   --  9.5  --   --   --  9.5  --  9.5  --  9.9   MAG  --  2.0  --   --   --  2.0  --   --   --  2.1  --   --   --  1.8   PHOS  --  3.3  --   --   --  3.0  --   --   --   --   --   --   --   --    PROTTOTAL  --  8.4*  --   --   --   --   --   --   --  8.4*  --   --   --   --    ALBUMIN  --  3.2*  --   --   --   --   --   --   --  3.2*  --   --   --   --    BILITOTAL  --  0.8  --   --   --   --   --   --   --  0.8  --   --   --   --    ALKPHOS  --  109  --   --   --   --   --   --   --  95  --   --   --   --    AST  --  28  --   --   --   --   --   --   --  24  --   --   --   --    ALT  --  17  --   --   --   --   --   --   --  14  --   --   --   --     < > = values in this interval not displayed.     CBC  Recent Labs   Lab 08/16/23  1119 08/14/23  0634 08/13/23  1113 08/11/23  0519   WBC 7.9 7.1 6.8 5.3   RBC 2.74* 2.88* 2.56* 2.90*   HGB 8.0* 8.4* 7.6* 8.4*   HCT 24.7* 25.9* 23.5* 26.1*   MCV 90 90 92 90   MCH 29.2 29.2 29.7 29.0   MCHC 32.4 32.4 32.3 32.2   RDW 16.7* 16.4* 16.9* 16.2*     343 338 324     INRNo lab results found in last 7 days.  Arterial Blood GasNo lab results found in last 7 days.    No results found for: TROPI, TROPONIN, TROPR, TROPN

## 2023-08-16 NOTE — PROGRESS NOTES
"  [facility]   Acute Rehabilitation Unit  Daily progress note    INTERVAL HISTORY  Norman Aguilar was seen sitting up in chair, reports feeling fatigued/ run down.  He denies nausea, vomiting, headache, dizziness, fever, sob and pain.  Reports appetite low, sleep chronically poor.  Is having epistaxis and bleeding from right thumb nail at time of visit.      MEDICATIONS   amiodarone  400 mg Oral Daily    apixaban ANTICOAGULANT  5 mg Oral BID    aspirin  81 mg Oral Daily    atorvastatin  80 mg Oral QPM    chlorhexidine   Topical Weekly    furosemide  40 mg Oral BID    insulin aspart   Subcutaneous TID w/meals    insulin aspart  1-7 Units Subcutaneous TID AC    insulin aspart  1-5 Units Subcutaneous At Bedtime    insulin glargine  38 Units Subcutaneous At Bedtime    miconazole   Topical BID    multivitamin, therapeutic  1 tablet Oral Daily    selenium sulfide   Topical BID    sodium chloride (PF)  3 mL Intracatheter Q8H    ticagrelor  90 mg Oral BID    vitamin C  500 mg Oral Daily        sodium chloride 0.9%, acetaminophen, glucose **OR** dextrose **OR** glucagon, insulin aspart, lidocaine 4%, lidocaine (buffered or not buffered), - MEDICATION INSTRUCTIONS -, sodium chloride (PF)     PHYSICAL EXAM  /46 (BP Location: Left arm)   Pulse 85   Temp (!) 96.7  F (35.9  C) (Oral)   Resp 16   Ht 1.905 m (6' 3\")   SpO2 94%   BMI 26.26 kg/m    Gen: awake alert   HEENT: mmm   Cardio: rrr  Pulm: non labored clear  Abd: soft non tender non distended  Ext: warm dry LLE prosthesis, LUE without edema.  RLE with pitting edema without calf tenderness, right arm with swelling with tubigrip in place, hand swollen.   Neuro/MSK: alert speech clear     LABS  CBC RESULTS:   Recent Labs   Lab Test 08/14/23  0634 08/13/23  1113 08/11/23  0519   WBC 7.1 6.8 5.3   RBC 2.88* 2.56* 2.90*   HGB 8.4* 7.6* 8.4*   HCT 25.9* 23.5* 26.1*   MCV 90 92 90   MCH 29.2 29.7 29.0   MCHC 32.4 32.3 32.2   RDW " 16.4* 16.9* 16.2*    338 324       Last Basic Metabolic Panel:  Recent Labs   Lab Test 08/16/23  0214 08/15/23  2120 08/15/23  1634 08/15/23  0929 08/15/23  0924 08/14/23  2143 08/14/23 2007 08/14/23  0658 08/14/23  0634 08/13/23  1152 08/13/23  1113   NA  --   --   --   --  130*  --   --   --  131*  --  133*   POTASSIUM  --   --   --   --  3.6  3.6  --  3.9  --  3.3*  --  4.0   CHLORIDE  --   --   --   --  93*  --   --   --  94*  --  94*   CO2  --   --   --   --  25  --   --   --  26  --  26   ANIONGAP  --   --   --   --  12  --   --   --  11  --  13   * 147* 116*   < > 142*   < >  --    < > 235*   < > 256*   BUN  --   --   --   --  67.9*  --   --   --  62.7*  --  61.0*   CR  --   --   --   --  2.53*  --   --   --  2.25*  --  2.35*   GFRESTIMATED  --   --   --   --  28*  --   --   --  32*  --  30*   YOSVANY  --   --   --   --  9.5  --   --   --  9.5  --  9.5    < > = values in this interval not displayed.       Rehabilitation - continue comprehensive acute inpatient rehabilitation program with multidisciplinary approach including therapies, rehab nursing, and physiatry following. See interval history for updates.      ASSESSMENT AND PLAN  Norman Aguilar is a 63-year-old man with history of CAD, chronic systolic CHF  EF of 30 to 35%, CKD 3, left foot osteomyelitis s/p left BKA, and DM 2 who presented 7/17/2023 with septic shock in setting of bacteremia secondary to right shoulder myositis, NSTEMI, JOHANA requiring dialysis and A-fib with RVR. Functionally noted to have impaired strength, impaired activity tolerance, impaired balance, admitted to rehab 8/11/23.       appreciate hospitalist support for medial management     Debility   In setting of acute illness prolonged hospitalization  -continue PT/OT     Group A strep bacteremia  Right Shoulder Myositis  Originally admitted to ICU on pressors with complicated skin and soft tissue infection.  Positive for group A strep bacteremia.  Was having right  shoulder pain, myositis seen on MRI.  Was during started 7/21 from pressure or swelling.  ID followed while in hospital.  Penicillin G changed to ceftriaxone 7/27, 2 weeks IV antibiotics complete.  Completed course of oral linezolid 7/27 if recurs-  ID would consider long term PCN suppression  -Hibiclens wash right shoulder weekly     NSTEMI w/ ICM  CAD with coronary stents-hx of  Afib w/ RVR  HFrEF  Hypotension-resolved   Cardiology followed while inpatient. Decompensated heart failure c/b hypotension, sepsis, fluid overload. Plan for coronary angiography after stabilization of infection and renal function. Converted from AF to NSR after amiodarone load. ECHO EF 25-30% with decreased LV function. -Continue PT ASA, Brilinta  -Continue Apixaban BID  -Continue amiodarone daily  -lasix per hospitalist recs.   -Follow up with Cardiology outpatient for coronary angiogram     JOHANA on CKD3b  Baseline CKD 2/2 longstanding DM2, HTN. Cr range 2.06-2.26. severe ATN in setting of profound hypotension with sepsis, group A strep bacteremia and hypovolemia, contrast nephropathy after CTA, and mild rhabdo. CTA on 7/17 with no hydronephrosis. dialysis 7/21-7/24 & 7/29-8/4. dialysis cath removed 8/10.  Cr 2.25 8/14/23--> 2.53 8/15/23  -I&Os   -trend renal function.   -diuresis per hospitalist recs see note by OLYA Avelar PA-C for details     Hyponatremia  Initially 119 on admission to hospital. 8/15 130- appreciate ongoing support per hospitalist- diuresis per recs- see note by OLYA Avelar PA-C for details.   -Avoid excessive fluid intake  -trend Na    Hypokalemia  K 3.6 8/15.   -replace per protocol     Anemia of chronic disease  2/2 critical illness, CKD, JOHANA, bacteremia. Received venofer and 5 units PRBC while inpatient. Epistaxis 8/16.  Hgb 8.4 8/14.   - trend CBC     DM2 (Hb A1c 6.9 on 7/17/23)  -Continue PTA lantus 35 units daily  -Carb coverage 1 unit per 10 g carbohydrates  -sliding scale insulin   -accu checks  AC/HS/0200  -hypoglycemia protocol     Seborrheic Dermatitis  -continue Selenium      Urinary Retention  -continue thakur plan for repeat trial of void 8/18.      Elevated lfts  (resolved)   2/2 infection improved. WNL 8/14.   -trend      Adjustment to disability:  continue to encourage psychology  FEN: 3 grams na  Bowel: continent- monitor  Bladder: thakur- refusing removal at this time.   DVT Prophylaxis: apixaban  GI Prophylaxis: none  Code: full   Disposition: goal for home   ELOS: 8/26/23.   Follow up Appointments on Discharge:  Cardiology, primary care, nephrology (CKD clinic)      Melissa Banuelos PA-C  Physical Medicine & Rehabilitation

## 2023-08-17 NOTE — PLAN OF CARE
Discharge Planner Post-Acute Rehab OT:      Discharge Plan: TBD, potential TC     Precautions: RUE pain and weakness s/p infection, hx of L BKA(pt was I using prosthetic leg at PLOF) , catheter     Current Status:  ADLs:  Mobility: Stand pivot transfer using FWW CGA-min A with prosthetic leg on, without the prosthetic leg on pt requires min A towards R side for stand pivot   Grooming: SBA set up sitting  Dressing: set up for shorts, therapist sofie thakur. MIN A for donning prosthesis at EOB, UB set up  Bathing: min A with stand pivot transfer from WC to shower chair using grab bar with prosthetic leg on, mod A with tasks  Toileting: stand pivot to BSC using FWW min A for transfer total A for tasks  IADLs: TBD  Vision/Cognition: appears WNL, wears glasses full time     Assessment: Pt seen for disciplinary rounds, see POC note. Following rounds, discussion with pt appears discharge targeting TCU.    Other Barriers to Discharge (DME, Family Training, etc): Unstable housing situation at baseline, alternating between family's homes as able with potential for TCU/YON placement per chart.     Family training - TBD pending discharge plan.    DME: Pending discharge plan. Anticipate shower seat, toilet seat riser/commode, toileting aide.

## 2023-08-17 NOTE — PLAN OF CARE
Goal Outcome Evaluation:       Overall Patient Progress: no changeOverall Patient Progress: no change    Outcome Evaluation: Denies pain and with epistaxis since morning shift but gradually resolving at end start of evening shift. At 1800H, pt siad epistaxis stopped, orderes thrombin not administered per pt preference. Ate well for dinner= 100%. BS checkd and insulin given per orders. Epistaxis recolved at 2200H. Garcia in place and draining rosa urine. Able to use his call light appropriately and waited for assistance.

## 2023-08-17 NOTE — PLAN OF CARE
FOCUS/GOAL  Bowel management, Bladder management, Nutrition/Feeding/Swallowing precautions, Wound care management, Medical management, and Mobility    ASSESSMENT, INTERVENTIONS AND CONTINUING PLAN FOR GOAL:    Orientation: alert and oriented x4  VS: stable  Pain: denies  Ambulation/ Transfers: Ax1 with walker   Bowel: continent, LBM 8/17 used BSC at night per report  Bladder: thakur catheter in place. Plan to remove it aroud 4pm per pt's request. Needs urine sample via voiding or straight cath once thakur is removed.   Diet/ Liquids: poor appetite  Blood Sugars: 164 and 189  Tubes/ Lines/ Drains: Piv in left forearm. LR 1L IVF bolus infusing for hyponatremia.  Misc: edema to R hand/arm, maribell glove and tubi  applied to RUE. K+ 4 today, recheck tomorrow am per protocol.   Scheduled to see Cardiologist at 3pm via Ipad.  Recliner ordered to promote comfort.  Had team rounds today, see note.

## 2023-08-17 NOTE — PROGRESS NOTES
Spent 30 minutes with pt at bedside discussing home and financial situation, county resources and support, disability and detention, and discharge plans. Pt reported that he has no income and has applied for disability and in contact with [employer]. Swer also provided pt with information on the [place] [employer]. Pt long-term goal is to live independently but worried about the financial cost of doing so, since he has no income and he is aware of his limited SSDI can be. Discussed [employer] and waiver programs in the [facility], and housing assistance that might be beneficial. Pt expressed interested and understanding. Discussed therapy goals and needs. TCU recommended and pt agreeable. Pt prefers [facility] as he knows two people who work there and [facility]. Pt agreeable to SW sending a referral. Referral sent and pending. Pt aware that SW will follow-up with him once more information determined.     Pt mentioned that he gave ppwk to Swer on the weekend. SW sent email to weekend Swer (Love Carty) and requested that Love follow-up with pt tomorrow about his ppwk.     Pt denied additional needs, questions or concerns. Pt appropriate, alert, orient, and appears motivated.    SW will remain available and continue to follow.     Jaelyn Cam [facility] Center[facility]FFREY   [facility]   Direct Phone: [phone]  I   Pager: [phone]  I  Fax: [phone]

## 2023-08-17 NOTE — PLAN OF CARE
Discharge Planner Post-Acute Rehab PT:      Discharge Plan: potential to live with sibling for short transition?     Precautions: fall precautions, Lt BKA (has prosthesis), sacral wound, Rt shoulder compression sleeve/bandage near posterior axilla area     Current Status:  Bed Mobility: Mod I  Transfer: CGA-ModA sit>stand depending on surface height, recommend Ax1-2 pivot transfer with walker with nursing  Gait: up to 2x ~40ft with walker and CGA, WC brought close behind  Stairs: NA  Balance: decreased stability w/o UE support in standing.     Assessment: pt w/ c/o increased fatigue today, am willing to use nustep, pm willing to work on wc mobility. Completed functional transfers with CGA.      Other Barriers to Discharge (DME, Family Training, etc): unspecified prior residence, reports living with various friends or out of car

## 2023-08-17 NOTE — PLAN OF CARE
Acute Rehab Care Conference/Team Rounds      Type: Team Rounds    Present: Dr. Thierno Fan PM&R, Melissa Banuelos PA, Melissa Severino PT, Osmani Marie OT, Jaelyn SALGADO, Tanisha Barron RD, Dexter Stephens RN, and Norman Lauren Patient.       Discharge Barriers/Treatment/Education    Rehab Diagnosis: 16 Debility (non-cardiac, non-pulmonary) Septic shock with JOHANA on CKD secondary to circulatory shock requiring HD.        Active Medical Co-morbidities/Prognosis:   Patient Active Problem List   Diagnosis    DM type 2 on insulin, w Neuro -- Hgb A1C 6.9 on 7/17/23    Benign essential hypertension    Benign neoplasm of descending colon    Gastro-esophageal reflux disease with esophagitis    Microalbuminuria    Hyperlipidemia    PAF (paroxysmal atrial fibrillation) -- on Eliquis     Ischemic cardiomyopathy    CAD -- diffuse calcified vessels 11/16/21 (no stents placed)    DVT (deep venous thrombosis) (H)    Chronic kidney disease (CKD) stage G3a/A1, moderately decreased glomerular filtration rate (GFR) between 45-59 mL/min/1.73 square meter and albuminuria creatinine ratio less than 30 mg/g (H)    Diabetic ulcer of left midfoot associated with type 2 diabetes mellitus, with necrosis of bone (H)    Constipation    Traumatic amputation of toe or toes without complication (H)    Osteomyelitis of left foot -- S/P Left BKA 4/4/22    Amputation of left foot (H)    Chronic systolic CHF -- EF 30-35% on Echo 11/15/21    Smoker (Cigars)    NSTEMI (non-ST elevated myocardial infarction) (H)    ARF (acute renal failure) (H)    Dehydration    Hyponatremia    Elevated troponin    JOHANA on CRF stage 4    Atrial fibrillation with RVR (H)    Septic shock (H)    Debility        Safety: Patient is alert and oriented, able to make needs known, uses the call light appropriately.    Pain: Denied any pain overnight.     Medications, Skin, Tubes/Lines: Takes pills whole. Patient with history of L BKA - with prosthesis, right arm wound,  coccyx wound, right thigh scab.    Swallowing/Nutrition:    Bowel/Bladder: Had a small BM at the start of NOC shift. Garcia present draining rosa urine.    Psychosocial: Homeless. Was living between sister, niece, and dtr home. Not working, no income. Denied anxiety, reported depression. Denied substance abuse. [employer] [place] insurance.     ADLs/IADLs: Pt presents with generalized weakness, decreased activity tolerance, RUE pain/weakness/edema, and history of LBKA requiring prosthesis impairing balance, endurance, and independence and safety within ADLs/IADLs. Previously mod I ADLs and IADLs, currently requires CGA-min A pivot transfers, Min A dressing, Mod A bathing, and total A toileting cares. Pt demonstrating low mood re: disability, distrust of medical system; responsive to therapeutic listening and validation of independence as goal. Barriers include safe discharge plan d/t unstable housing situation at baseline. Family training TBD pending discharge environment confirmation. DME recommendations pending environmental discharge environment but anticipate need for shower seat, toilet seat riser, toileting aide.     Mobility: For functional mobility pt currently requires CGA>modA w/ FWW for sit<>stand transfers depending on chair height and fatigue. Amb 40' with CGA and wc follow at times. Pt opposed to use of wheelchair for safe mobility option due to housing insecurity (storage of wc, potential stairs as barrier etc). Participation delayed at times d/t pt perseveration on both social and medical grievances, along with hesitation to use of R UE in presence of swelling. Therapist addressing these behaviors with creating consistency in  therapists, and providing therapeutic listening along with providing recommendations for psychology/health psych services. DME and family needs pending pt housing plan, current goals for Hui.    Cognition/Language:    Community Re-Entry: At this time recommend wc based  mobility for community re-entry until goal for community amb Hui is met. OP vs HH therapies pending pt housing situation.     Transportation: Goal for car transfer with FWW, anticipate family assist with transportation.    Decision maker: self    Plan of Care and goals reviewed and updated.    Discharge Plan/Recommendations    Fall Precautions: continue    Patient/Family input to goals: Yes    Anticipated rehab needs following discharge: TCU    Anticipated care giver support after discharge: TCU    Estimated length of stay: 8/26/23    Overall plan for the patient: Continue IP Rehabilitation.       Utilization Review and Continued Stay Justification    Medical Necessity Criteria:    For any criteria that is not met, please document reason and plan for discharge, transfer, or modification of plan of care to address.    Requires intensive rehabilitation program to treat functional deficits?: Yes    Requires 3x per week or greater involvement of rehabilitation physician to oversee rehabilitation program?: Yes    Requires rehabilitation nursing interventions?: Yes    Patient is making functional progress?: Yes    There is a potential for additional functional progress? Yes    Patient is participating in therapy 3 hours per day a minimum of 5 days per week or 15 hours per week in 7 day period?:Yes    Has discharge needs that require coordinated discharge planning approach?:Yes          Final Physician Sign off    Statement of Approval: I approve the plan of care.     Patient Goals  Social Work Goals: Confirm discharge recommendations with therapy, coordinate safe discharge plan and remain available to support and assist as needed.    OT Predicted Duration/Target Date for Goal Attainment: 08/26/23  Therapy Frequency (OT): Daily  OT: Hygiene/Grooming: modified independent  OT: Upper Body Dressing: Modified independent  OT: Lower Body Dressing: Modified independent  OT: Upper Body Bathing: Modified independent  OT: Lower  Body Bathing: Modified independent  OT: Transfer: Modified independent  OT: Toilet Transfer/Toileting: Modified independent  OT: Meal Preparation: Modified independent  OT: Home Management: Modified independent  OT: Cognitive: Patient/caregiver will verbalize understanding of cognitive assessment results/recommendations as needed for safe discharge planning        PT Predicted Duration/Target Date for Goal Attainment: 08/26/23  PT Frequency: Daily  PT: Bed Mobility: Modified independent, Rolling, Bridging  PT: Transfers: Modified independent, Sit to/from stand, Bed to/from chair, Assistive device  PT: Gait: Modified independent, Rolling walker, 50 feet  PT: Stairs: Modified independent, 1 stair, Rail on both sides  PT: Perform aerobic activity with stable cardiovascular response: continuous activity, 5 minutes, ambulation  PT: Goal 1: Independent with HEP                                                             RN goal 1. Pain Management: Pt will verbalize adequate pain control and 2 non pharmacological methods of pain control while at [facility].  RN goal 2. Skin Integrity: Patiet will identify need for repositioning and /or request assistance to reposition every 2 hrs to prevent skin breakdown while at [facility].  RN goal 3. Safety Management: Pt will be aware of fall preventive measures as evidenced by using the call ligth appropriately while at [facility].               Goal Outcome Evaluation:

## 2023-08-17 NOTE — PROGRESS NOTES
"  [facility]   Acute Rehabilitation Unit  Daily progress note    INTERVAL HISTORY  Norman Aguilar seen sitting up in chair during team rounds.  Hospitalist following: holding lasix, removing thakur, checking urine studies per hospitalist recs, cardiology consulted- recs pending.     Functionally cga for sit to stand from higher surfaces and cga ambulating up to 40 feet.  Up to mod assist for sit to stand from lower surfaces.  Given current rehabilitation needs in setting of acute on chronic illness patient intends to discharge for ongoing therapy at TCU.  SW involved and sending referrals.     See rounds note by Dr. Fan for further details.     MEDICATIONS   amiodarone  400 mg Oral Daily    apixaban ANTICOAGULANT  5 mg Oral BID    aspirin  81 mg Oral Daily    atorvastatin  80 mg Oral QPM    chlorhexidine   Topical Weekly    [Held by provider] furosemide  40 mg Oral BID    insulin aspart   Subcutaneous TID w/meals    insulin aspart  1-7 Units Subcutaneous TID AC    insulin aspart  1-5 Units Subcutaneous At Bedtime    insulin glargine  38 Units Subcutaneous At Bedtime    lactated ringers  1,000 mL Intravenous Once    miconazole   Topical BID    multivitamin, therapeutic  1 tablet Oral Daily    selenium sulfide   Topical BID    sodium chloride (PF)  3 mL Intracatheter Q8H    ticagrelor  90 mg Oral BID    vitamin C  500 mg Oral Daily        sodium chloride 0.9%, acetaminophen, glucose **OR** dextrose **OR** glucagon, insulin aspart, lidocaine 4%, lidocaine (buffered or not buffered), - MEDICATION INSTRUCTIONS -, sodium chloride (PF), thrombin     PHYSICAL EXAM  /56 (BP Location: Left arm, Patient Position: Sitting, Cuff Size: Adult Regular)   Pulse 85   Temp 97.8  F (36.6  C) (Oral)   Resp 16   Ht 1.905 m (6' 3\")   SpO2 100%   BMI 26.26 kg/m    Gen: awake alert   HEENT: mmm   Pulm: non labored   Abd: non distended  Ext:  LLE prosthesis, LUE without edema.  " RLE with pitting edema without calf tenderness, right arm with swelling with tubigrip in place, hand swollen.   Neuro/MSK: alert speech clear, limited ROM by pain in right shoulder.     LABS  CBC RESULTS:   Recent Labs   Lab Test 08/16/23  1119 08/14/23  0634 08/13/23  1113   WBC 7.9 7.1 6.8   RBC 2.74* 2.88* 2.56*   HGB 8.0* 8.4* 7.6*   HCT 24.7* 25.9* 23.5*   MCV 90 90 92   MCH 29.2 29.2 29.7   MCHC 32.4 32.4 32.3   RDW 16.7* 16.4* 16.9*    343 338       Last Basic Metabolic Panel:  Recent Labs   Lab Test 08/17/23  0634 08/17/23  0209 08/16/23  2143 08/16/23  1150 08/16/23  1119 08/15/23  0929 08/15/23  0924   *  --   --   --  126*  --  130*   POTASSIUM 4.0  --   --   --  3.9  --  3.6  3.6   CHLORIDE 93*  --   --   --  88*  --  93*   CO2 24  --   --   --  25  --  25   ANIONGAP 13  --   --   --  13  --  12   * 182* 199*   < > 200*   < > 142*   BUN 76.5*  --   --   --  71.8*  --  67.9*   CR 2.96*  --   --   --  2.77*  --  2.53*   GFRESTIMATED 23*  --   --   --  25*  --  28*   YOSVANY 9.3  --   --   --  9.4  --  9.5    < > = values in this interval not displayed.       Rehabilitation - continue comprehensive acute inpatient rehabilitation program with multidisciplinary approach including therapies, rehab nursing, and physiatry following. See interval history for updates.      ASSESSMENT AND PLAN  Norman Aguilar is a 63-year-old man with history of CAD, chronic systolic CHF  EF of 30 to 35%, CKD 3, left foot osteomyelitis s/p left BKA, and DM 2 who presented 7/17/2023 with septic shock in setting of bacteremia secondary to right shoulder myositis, NSTEMI, JOHANA requiring dialysis and A-fib with RVR. Functionally noted to have impaired strength, impaired activity tolerance, impaired balance, admitted to rehab 8/11/23.       appreciate hospitalist support for medial management     Debility   In setting of acute illness prolonged hospitalization  -continue PT/OT     Group A strep bacteremia  Right  Shoulder Myositis  Originally admitted to ICU on pressors with complicated skin and soft tissue infection.  Positive for group A strep bacteremia.  Was having right shoulder pain, myositis seen on MRI.  Was during started 7/21 from pressure or swelling.  ID followed while in hospital.  Penicillin G changed to ceftriaxone 7/27, 2 weeks IV antibiotics complete.  Completed course of oral linezolid 7/27 if recurs-  ID would consider long term PCN suppression  -Hibiclens wash right shoulder weekly     NSTEMI w/ ICM  CAD with coronary stents-hx of  Afib w/ RVR  HFrEF  Hypotension-resolved   Cardiology followed while inpatient. Decompensated heart failure c/b hypotension, sepsis, fluid overload. Plan for coronary angiography after stabilization of infection and renal function. Converted from AF to NSR after amiodarone load. ECHO EF 25-30% with decreased LV function. -Continue PT ASA, Brilinta  -Continue Apixaban BID  -Continue amiodarone daily  - hold lasix per hospitalist recs.   -consult cardiology see note by Dr. Colvin 8/17 for details.      JOHANA on CKD3b  Baseline CKD 2/2 longstanding DM2, HTN. Cr range 2.06-2.26. severe ATN in setting of profound hypotension with sepsis, group A strep bacteremia and hypovolemia, contrast nephropathy after CTA, and mild rhabdo. CTA on 7/17 with no hydronephrosis. dialysis 7/21-7/24 & 7/29-8/4. dialysis cath removed 8/10.  Cr 2.25 8/14/23--> 2.53 8/15/23--> 2.96 8/17  -I&Os   -trend renal function.   -diuresis per hospitalist recs see note by OLYA Avelar PA-C for details     Hyponatremia  Initially 119 on admission to hospital. 8/15 130- appreciate ongoing support per hospitalist- diuresis per recs- see note by OLYA Avelar PA-C for details. Stable s/p 1 liter LR 8/16.   -Avoid excessive fluid intake  -trend Na       Anemia of chronic disease  2/2 critical illness, CKD, JOHANA, bacteremia. Received venofer and 5 units PRBC while inpatient. Epistaxis 8/16.  Hgb 8.2 8/17.   - trend CBC      DM2 (Hb A1c 6.9 on 7/17/23)  -Continue PTA lantus 35 units daily  -Carb coverage 1 unit per 10 g carbohydrates  -sliding scale insulin   -accu checks AC/HS/0200  -hypoglycemia protocol     Seborrheic Dermatitis  -continue Selenium      Urinary Retention  -remove thakur repeat trial of void 8/17.      Elevated lfts  (resolved)   2/2 infection improved. WNL 8/14.   -trend      Adjustment to disability:  continue to encourage psychology  FEN: 3 grams na  Bowel: continent- monitor  Bladder: trial of void.   DVT Prophylaxis: apixaban  GI Prophylaxis: none  Code: full   Disposition: goal for home   ELOS: 8/26/23.   Follow up Appointments on Discharge:  Cardiology, primary care, nephrology ([facility])      Melissa Banuelos PA-C  Physical Medicine & Rehabilitation

## 2023-08-17 NOTE — PROGRESS NOTES
[facility]  Transitional Care Unit Progress Note  Norman Aguilar  [id number]  August 17, 2023         Assessment & Plan:   Norman Aguilar is a 63 year old male with PMHx of CAD on Brilinta, chronic systolic CHF with EF of 30-35%, CKD 3, left foot osteomyelitis s/p left BKA and DM 2.  Presented to [facility] 7/17/2023 with septic shock, NSTEMI, JOHANA, A-fib with RVR.  Transfer to acute rehab 8/11 for ongoing rehabilitation.    # Physical deconditioning  -PT/OT continue to work with him twice per day and this is managed per primary PMR team. He is currently able with assistance to mobilize to a bedside commode, is sitting in a wheelchair much of the day.     # Home insecurity  -Social work is working on finding a safe place to go for him once his safe for discharge    # Acute on chronic kidney disease stage III  # Severe ATN secondary to hypotension requiring multiple episodes of dialysis (last dialysis 8/4/23), resolved  He was initially on dialysis 7/21-7/24 and then this had to be resumed 7/29 sceondary to worsening anasarca and poor response to a lasix gtt. Last dialysis 8/4 and dialysis cath removed 8/10.   - Cr is currently 2.96 and a little higher than baseline of  2.0-2.3 and has been creeping up for the past few days. Urine is still clear but he has a Garcia in that needs to come out.   - Remove Garcia catheter  - HOLD furosemide 40 BID  - Give 1L of LR today  - Check UA, urine electrolytes  - Consider renal ultrasound if Cr does not improve with these interventions  - Continue strict I/O, daily weights, daily electrolyte monitoring, dose adjust medications accordingly and avoid nephrotoxins     # Hyponatremia secondary to chronic diuretics and recent anasarca  Sodium was last normal in March 2023. Upon admission, sodium was 119 and has now resolved to 130 but continues to fluctuate.   -Sodium 130 today in the setting of worsening Cr. Will hold Lasix and give 1L of IVF.    -Check strict I/O, daily electrolytes    -Last seen by Nephrology on 8/9/23. Will see how he responds to his treatments as per above and if not improved, reconsult Nephrology.    # NSTEMI w/ ICM  # CAD with coronary stents-hx of  # Afib w/ RVR  # HFrEF  Decompensated heart failure compensated by hypotension, sepsis and subsequently fluid overload.  Converted from AF to NSR after amiodarone load. ECHO EF 25-30% with decreased LV function.     -Cardiology followed while inpatient at [facility] and recommended follow up for tomorrow, but since Mr. Aguilar is in ARU and will not be discharging anytime soon, we have asked [facility] to visit with him. There were previous recommendations for possible angiogram and adjustment to Amiodarone (reached his 10gm limit).   -Continue ASA 81mg once daily and Brilinta 90mg twice daily   -Continue Apixaban 5mg twice daily   -Continue Amiodarone 400mg once daily  -Continue Furosemide 40mg twice daily      # Septic shock secondary to Group A strep bacteremia (7/17/23) from R shoulder dermatitis, resolved  # Myositis to right shoulder with related pain  # Transaminitis secondary to shock liver and myositis, resolved  Originally admitted to ICU on pressors with complicated skin and soft tissue infection.  Positive for group A strep bacteremia.   He has now completed all antibiotics for treatment and is working on wound care.   -ID was following in the hospital and their last note at [facility] is dated 8/10/23. Per recommendations, he was switched from Pen G to Ceftriaxone on 7/27/23 and completed a 2 week course.   - Completed a course of Linezolid 7/27/23  - WOCN consult and per recommendations, he continues with a Hibiclens wash to the right shoulder weekly and selenium sulfide 2.5% lotion twice daily as needed for irritation  - Liver enzymes were initially elevated in the 200's and have now resolved to normal and felt to be secondary to shock liver     # Anemia of chronic  "disease  -Received venofer and 5 units PRBC while inpatient.   -Hemoglobin is stable at 8.2g/dL. Continue to monitor and transfuse to maintain Hgb of 8 or greater given ischemic heart disease.     # DM2 (Hb A1c 6.9 on 7/17/23)  -Continue PTA lantus 38 units daily with sliding scale insulin Aspart QID and carb coverage 1U per 10g CHO at meals  -hypoglycemia protocol  -Declined DM consult, has managed his Diabetes well for 30 years      # S/p L BKA  -Prosthetic leg in room, pt uses alcohol spray to knee prior to placing. OK for family to bring from home.           Interval History:   Mr. Aguilar was sitting up in a chair today reporting that he feels overwhelmed by all the different medical issues going on with him. He reports his fatigue still being present, especially after therapies, but he has been able to keep up with his exercises thus far. We discussed his labwork from today, recommendations to remove his thakur catheter which he was in agreement with. We also discussed his cardiac medications and plans to visit with cardiology. He denies any chest pain, but reports feeling a little short of breath when lying down or exerting himself with therapies.         Physical Exam:   Vitals were reviewed  Blood pressure 95/53, pulse 82, temperature 97.7  F (36.5  C), temperature source Oral, resp. rate 16, height 1.905 m (6' 3\"), SpO2 100 %.  General:63 year old gentleman sitting in a wheelchair doing therapies, denies pain at present, fatigued  Cardiovascular: Regular rate and rhythm, no appreciable murmurs, rubs or gallops  Lungs: breathing comfortably on room air, no adventitious sounds to bilateral auscultation  Musculoskeletal: prosthetic leg in place on left side, no surrounding skin irriration  Skin: no jaundice, rashes, or lesions      Amanda Avelar PA-C, MPH  Internal Medicine [facility] Hospitalist  [phone]  "

## 2023-08-18 NOTE — PLAN OF CARE
Goal Outcome Evaluation:         Overall Patient Progress: improvingOverall Patient Progress: improving    Outcome Evaluation: Denies pain the whole pm shift. Garcia catheter removed at 1630H. Voided once only after cath removal with 200ml of rosa urine. PVR=0. Urine specimen sent to lab.Continent of bowel. LBM: tonight using commode. Ate 75% of dinner served and insulin given per orders. Able to make his needs known and waited for assistance.

## 2023-08-18 NOTE — PLAN OF CARE
Goal Outcome Evaluation:      Plan of Care Reviewed With: patient    Overall Patient Progress:  (Initial)Overall Patient Progress:  (Initial)    Outcome Evaluation: Pt to consume small, frequent meals and use oral nutrition supplements to meet estimated nutrition needs. See RD note 8/18

## 2023-08-18 NOTE — CONSULTS
Nephrology Initial Consult  August 18, 2023      Norman Aguilar MRN:[id number] YOB: 1960  Date of Admission:8/11/2023  Primary care provider: Jaquan Dickerson  Requesting physician: Sushant Orourek DO    ASSESSMENT AND RECOMMENDATIONS:     Norman Aguilar is a 63 year old male with PMHx of CAD on Brilinta, chronic systolic CHF with EF of 30-35%, CKD 3, left foot osteomyelitis s/p left BKA and DM 2.  Presented to [facility] 7/17/2023 with septic shocksetting of group a strep bacteremia with right shoulder myositis , NSTEMI, JOHANA, A-fib with RVR.  Transfer to acute rehab 8/11 for ongoing rehabilitation, now has slowly worsening serum creatinine for which nephrology has been consulted     # JOHANA on CKD -received HD from 7/21-24 and 7/29-8/4 for prior JOHANA during this admission  # DM type 2  # MGUS   # NSTEMI s/p stent placements  # Ischemic CMP - EF 25-30%  # Afib - now on metoprolol   # s/p BKA     Unclear what caused his recent worsening of creatinine, however this was in the setting of ongoing diuresis and blood pressure lower than his baseline co-relating with his timing of creatinine increase.  This is most likely prerenal, and hemodynamic result of cardiorenal syndrome ,however he could have progressed to ATN.    He does have underlying chronic kidney disease secondary to diabetes and had maintained a baseline of 1.4 to 1.6 mg/dL prior to the admission, and 2 to 2.2 mg/dL during this admission.  He does have microscopic hematuria, proteinuria of 1.9 g/g checked in July 23.  SPEP was done during his hospital course which was positive for small monoclonal peak of 0.1 g/dL, however no immunofixation was done.  He did have significantly elevated kappa and lambda chains however the ratio was within normal limits.  Currently he maintains blood pressures in 90s -100s systolics, and his diuretics have been held.    -- Recheck SPEP with immunofixation, free light chains.  --Check  ionized calcium  -- Check TANNER, ANCA, complements, cryoglobulins  -- Check urine sodium  -- Check ultrasound kidneys  -- Continue to hold diuretics for now and follow along, however he is still hypervolemic, will eventually need diuretics. Do not recommend more IVF at this time. Please discuss furthercardiac function optimization with cardiology.    Recommendations were communicated to primary team via this note       Gemini Sanchez MD   Division of Renal Disease and Hypertension  [facility]  [email or web address]  Vocera Web Console      REASON FOR CONSULT: JOHANA on CKD     HISTORY OF PRESENT ILLNESS:  Admitting provider and nursing notes reviewed    Norman Aguilar is a 63 year old male with PMHx of CAD on Brilinta, chronic systolic CHF with EF of 30-35%, CKD 3, left foot osteomyelitis s/p left BKA and DM 2.  Presented to [facility] 7/17/2023 with septic shocksetting of group a strep bacteremia with right shoulder myositis , NSTEMI, JOHANA, A-fib with RVR.  Transfer to acute rehab 8/11 for ongoing rehabilitation, now has slowly worsening serum creatinine for which nephrology has been consulted.     Before his hospital admission the patient had a baseline creatinine of 1.4 to 1.6 mg/dL which was most likely from diabetic nephropathy, has had albuminuria going back to 2018. He presented to [facility] with septic shock and NSTEMI, had JOHANA and eventually required HD rom 7/21-24 and 7/29-8/4. He did not have complete resolution of JOHANA and maintained a baseline creatinine of 2 to 2.2 mg/dL.  His creatinine has been slowly worsening since 14 August again and is now up to 3.12 mg/dL.  During his rehab course, he was continued on Lasix 80 mg daily for his CHF.  His blood pressures have been on the lower side starting 12 August and there have been some readings in the 90s and 100 systolics.  His diuretics were stopped and he received 1L of LR. He has not received any IV contrast, and is not on any nephrotoxic medications.  His urine  output remains stable from what I can tell.    PAST MEDICAL HISTORY:  Reviewed with patient on 08/18/2023     Past Medical History:   Diagnosis Date    Anemia     Chronic systolic CHF (congestive heart failure) (H)     Coronary artery disease     Diabetic neuropathy (H)     DM type 2 w Neuropathy     GERD (gastroesophageal reflux disease)     Hyperlipidemia     Hypertension     Intermittent atrial fibrillation (H)     on Eliquis    Ischemic cardiomyopathy 11/16/2021    Echo with EF of 30-35%    NSTEMI (non-ST elevated myocardial infarction) (H) 11/15/2021    Osteomyelitis of left foot (H) 04/04/2022    Renal disease     Retinopathy     Sleep disturbance        Past Surgical History:   Procedure Laterality Date    AMPUTATE FOOT Left 3/21/2022    Procedure: Guillotine AMPUTATION, FOOT;  Surgeon: Ronald Bhatt MD;  Location: [facility] Main OR    AMPUTATE LEG BELOW KNEE Left 4/4/2022    Procedure: LEFT BELOW  KNEE AMPUTATION;  Surgeon: Ronald Bhatt MD;  Location: [facility] Main OR    AMPUTATE TOE(S) Left 11/16/2021    Procedure: first and second ray amputation;  Surgeon: Eddi Ram DPM;  Location: [facility] [place] OR    APPENDECTOMY      CV CORONARY ANGIOGRAM N/A 11/16/2021    Procedure: CV CORONARY ANGIOGRAM;  Surgeon: Pam Tabares MD;  Location: [facility] LAB CV    CV CORONARY ANGIOGRAM N/A 8/24/2022    Procedure: CV CORONARY ANGIOGRAM;  Surgeon: Cipriano Lucas MD;  Location: [facility] CATH LAB CV    CV CORONARY ANGIOGRAM N/A 8/29/2022    Procedure: CV CORONARY ANGIOGRAM;  Surgeon: Cipriano Lucas MD;  Location: [facility] LAB CV    CV INTRAVASULAR ULTRASOUND N/A 8/29/2022    Procedure: Intravascular Ultrasound;  Surgeon: Cipriano Lucas MD;  Location: [facility] LAB CV    CV LEFT HEART CATH N/A 11/16/2021    Procedure: Left Heart Cath;  Surgeon: Pam Tabares MD;  Location: [facility] LAB CV    CV PCI N/A 8/29/2022    Procedure: Percutaneous Coronary Intervention stent;   Surgeon: Cipriano Lucas MD;  Location: [facility] CV    CV PCI ANGIOPLASTY N/A 8/29/2022    Procedure: Percutaneous Transluminal Angioplasty;  Surgeon: Cipriano Lucas MD;  Location: [facility] LAB CV    FOOT SURGERY Left     HERNIA REPAIR      INCISION AND DRAINAGE LOWER EXTREMITY, COMBINED Left 11/16/2021    Procedure: INCISION AND DRAINAGE, left foot;  Surgeon: Eddi Ram DPM;  [facility]    INCISION AND DRAINAGE LOWER EXTREMITY, COMBINED Left 11/19/2021    Procedure: INCISION AND DRAINAGE, left foot;  Surgeon: Eddi Ram DPM;  Location: [facility]    INCISION AND DRAINAGE LOWER EXTREMITY, COMBINED Left 12/9/2021    Procedure: INCISION AND DRAINAGE, Left foot;  Surgeon: Eddi Ram DPM;  Location: [facility]    INCISION AND DRAINAGE LOWER EXTREMITY, COMBINED Left 1/10/2022    Procedure: INCISION AND DRAINAGE, left foot;  Surgeon: Eddi Ram DPM;  Location: [facility]    INCISION AND DRAINAGE LOWER EXTREMITY, COMBINED Left 1/27/2022    Procedure: INCISION AND DRAINAGE, Left foot;  Surgeon: Eddi Ram DPM;  Location: [facility]    IR CVC NON TUNNEL PLACEMENT > 5 YRS  7/20/2023    PICC TRIPLE LUMEN PLACEMENT  7/17/2023         PICC TRIPLE LUMEN PLACEMENT  7/18/2023             MEDICATIONS:  PTA Meds  Prior to Admission medications    Medication Sig Last Dose Taking? Auth Provider Long Term End Date   acetaminophen (TYLENOL) 325 MG tablet Take 2 tablets (650 mg) by mouth every 4 hours as needed for mild pain or fever Unknown Yes Ronald Bui DO     acetaminophen (TYLENOL) 650 MG suppository Place 1 suppository (650 mg) rectally every 4 hours as needed for mild pain Unknown Yes Ronald Bui DO     amiodarone (PACERONE) 400 MG tablet Take 1 tablet (400 mg) by mouth daily 8/11/2023 Yes Ronald Bui DO Yes    apixaban ANTICOAGULANT (ELIQUIS) 5 MG tablet Take 1 tablet (5 mg) by mouth  2 times daily 8/11/2023 Yes Ronald Bui, DO No    atorvastatin (LIPITOR) 80 MG tablet TAKE 1 TABLET(80 MG) BY MOUTH EVERY EVENING 8/11/2023 Yes Piedad Reese, NP Yes    blood glucose (NO BRAND SPECIFIED) test strip Use to test blood sugar 4 times daily or as directed. Unknown Yes Ronald Bui DO     blood glucose monitoring (SOFTCLIX) lancets Use to test blood sugar 4 times daily. Unknown Yes Ronald Bui DO     docusate sodium (COLACE) 100 MG capsule Take 1 capsule (100 mg) by mouth 2 times daily Past Week Yes Ronald Bui DO     furosemide (LASIX) 40 MG tablet Take 1 tablet (40 mg) by mouth 2 times daily 8/11/2023 Yes Ronald Bui, DO Yes    insulin aspart (NOVOLOG FLEXPEN) 100 UNIT/ML pen For  - 164 give 1 unit. For  - 189 give 2 units. For  - 214 give 3 units. For  - 239 give 4 units. For  - 264 give 5 units. For  - 289 give 6 units. For  - 314 give 7 units. For  - 339 give 8 units For  - 364 give 9 units For  - 389 give 10 units For  - 414 give 11 units For BG greater than or equal to 415 give 12 units Past Week Yes Ronald Bui, DO Yes    insulin aspart (NOVOLOG PEN) 100 UNIT/ML pen Dose = 1 units per 10 grams of carbohydate.  If given at mealtime, administer within 30 minutes of start of meal. 8/11/2023 Yes Ronald Bui, DO Yes    insulin glargine (LANTUS SOLOSTAR) 100 UNIT/ML pen Inject 35 Units Subcutaneous every morning 8/11/2023 Yes Ronald Bui, DO Yes    miconazole (MICATIN) 2 % external powder Apply topically 2 times daily Apply to affected areas 8/11/2023 Yes Ronald Bui DO     Multiple Vitamin (MULTI VITAMIN) TABS Take 1 tablet by mouth daily  8/11/2023 Yes Reported, Patient     OMEGA-3/DHA/EPA/FISH OIL (FISH OIL-OMEGA-3 FATTY ACIDS) 300-1,000 mg capsule Take 1 g by mouth daily  Unknown Yes Provider, Historical     ticagrelor (BRILINTA) 90 MG tablet Take 1 tablet  (90 mg) by mouth 2 times daily Dose to start tomorrow morning. 8/11/2023 Yes Piedad Reese, DESMOND Yes    vitamin C (ASCORBIC ACID) 500 MG tablet Take 500 mg by mouth daily 8/11/2023 Yes Unknown, Entered By History        Current Meds   amiodarone  200 mg Oral Daily    apixaban ANTICOAGULANT  5 mg Oral BID    aspirin  81 mg Oral Daily    atorvastatin  80 mg Oral QPM    chlorhexidine   Topical Weekly    [Held by provider] furosemide  40 mg Oral BID    insulin aspart   Subcutaneous TID w/meals    insulin aspart  1-7 Units Subcutaneous TID AC    insulin aspart  1-5 Units Subcutaneous At Bedtime    insulin glargine  38 Units Subcutaneous At Bedtime    lactated ringers  1,000 mL Intravenous Once    metoprolol tartrate  25 mg Oral BID    miconazole   Topical BID    multivitamin, therapeutic  1 tablet Oral Daily    selenium sulfide   Topical BID    sodium chloride (PF)  3 mL Intracatheter Q8H    ticagrelor  90 mg Oral BID    vitamin C  500 mg Oral Daily     Infusion Meds   - MEDICATION INSTRUCTIONS -         ALLERGIES:    No Known Allergies    REVIEW OF SYSTEMS:  A comprehensive of systems was negative except as noted above.    SOCIAL HISTORY:   Social History     Socioeconomic History    Marital status: Single     Spouse name: Not on file    Number of children: Not on file    Years of education: Not on file    Highest education level: Not on file   Occupational History    Not on file   Tobacco Use    Smoking status: Former     Types: Cigars    Smokeless tobacco: Never    Tobacco comments:     Cigars   Vaping Use    Vaping Use: Never used   Substance and Sexual Activity    Alcohol use: Yes     Comment: < 1 drink a month (only holidays)    Drug use: Never    Sexual activity: Not on file   Other Topics Concern    Parent/sibling w/ CABG, MI or angioplasty before 65F 55M? Not Asked   Social History Narrative    Single, currently not working, has done numerous jobs, lives alone.  Full code. Lives in an RV.  (last updated  "2022)      Social Determinants of Health     Financial Resource Strain: Not on file   Food Insecurity: Not on file   Transportation Needs: Not on file   Physical Activity: Not on file   Stress: Not on file   Social Connections: Not on file   Intimate Partner Violence: Not on file   Housing Stability: Not on file     Reviewed with patient     FAMILY MEDICAL HISTORY:   No family history on file.    Reviewed with patient     PHYSICAL EXAM:   Temp  Av.1  F (36.7  C)  Min: 95.3  F (35.2  C)  Max: 100.1  F (37.8  C)      Pulse  Av.7  Min: 70  Max: 178 Resp  Av.3  Min: 0  Max: 43  FiO2 (%)  Av %  Min: 21 %  Max: 21 %  SpO2  Av.6 %  Min: 68 %  Max: 100 %       /52 (BP Location: Left arm)   Pulse 80   Temp 98.3  F (36.8  C) (Oral)   Resp 16   Ht 1.905 m (6' 3\")   SpO2 99%   BMI 26.26 kg/m     Date 23 07 - 23 0659   Shift 7759-8551 0794-7718 5366-0467 24 Hour Total   INTAKE   I.V. 3   3   Shift Total 3   3   OUTPUT   Shift Total       Weight (kg)          Admit       GENERAL APPEARANCE: no distress,  awake  EYES: no scleral icterus, pupils equal  Lymphatics: no cervical or supraclavicular LAD  Pulmonary: lungs clear to auscultation with equal breath sounds bilaterally, no clubbing  CV: regular rhythm, normal rate, no rub   - JVD none   - Edema : rt LE edema +  GI: soft, nontender, normal bowel sounds  MS: no evidence of inflammation in joints, no muscle tenderness  : no thakur  SKIN: no rash, warm, dry, no cyanosis  NEURO: face symmetric, no asterixis     LABS:   I have reviewed the following labs:  CMP  Recent Labs   Lab 23  0819 23  0522 23  0234 23  2108 23  0916 23  0634 23  1150 23  1119 08/15/23  0929 08/15/23  0924 23  0658 23  0634   NA  --  132*  --   --   --  130*  --  126*  --  130*  --  131*   POTASSIUM  --  3.8  --   --   --  4.0  --  3.9  --  3.6  3.6   < > 3.3*   CHLORIDE  --  95*  --   --   --  " 93*  --  88*  --  93*  --  94*   CO2  --  26  --   --   --  24  --  25  --  25  --  26   ANIONGAP  --  11  --   --   --  13  --  13  --  12  --  11   * 113* 124* 203*   < > 186*   < > 200*   < > 142*   < > 235*   BUN  --  75.6*  --   --   --  76.5*  --  71.8*  --  67.9*  --  62.7*   CR  --  3.12*  --   --   --  2.96*  --  2.77*  --  2.53*  --  2.25*   GFRESTIMATED  --  21*  --   --   --  23*  --  25*  --  28*  --  32*   YOSVANY  --  9.4  --   --   --  9.3  --  9.4  --  9.5  --  9.5   MAG  --  2.1  --   --   --   --   --  2.0  --  2.0  --  2.1   PHOS  --  3.4  --   --   --   --   --  3.3  --  3.0  --   --    PROTTOTAL  --   --   --   --   --   --   --  8.4*  --   --   --  8.4*   ALBUMIN  --   --   --   --   --   --   --  3.2*  --   --   --  3.2*   BILITOTAL  --   --   --   --   --   --   --  0.8  --   --   --  0.8   ALKPHOS  --   --   --   --   --   --   --  109  --   --   --  95   AST  --   --   --   --   --   --   --  28  --   --   --  24   ALT  --   --   --   --   --   --   --  17  --   --   --  14    < > = values in this interval not displayed.     CBC  Recent Labs   Lab 08/17/23  0634 08/16/23  1119 08/14/23 0634 08/13/23  1113   HGB 8.2* 8.0* 8.4* 7.6*   WBC 7.2 7.9 7.1 6.8   RBC 2.83* 2.74* 2.88* 2.56*   HCT 25.3* 24.7* 25.9* 23.5*   MCV 89 90 90 92   MCH 29.0 29.2 29.2 29.7   MCHC 32.4 32.4 32.4 32.3   RDW 16.6* 16.7* 16.4* 16.9*    298 343 338     INRNo lab results found in last 7 days.  ABGNo lab results found in last 7 days.   URINE STUDIES  Recent Labs   Lab Test 08/17/23  2303 07/19/23  1303 07/18/23  0016 08/24/22  2303   COLOR Yellow Yellow Yellow Light Yellow   APPEARANCE Clear Cloudy* Turbid* Clear   URINEGLC Negative 50* 200* Negative   URINEBILI Negative Negative Negative Negative   URINEKETONE Negative Trace* Negative Negative   SG 1.013 1.025 1.031* 1.032*   UBLD Moderate* >1.0 mg/dL* >1.0 mg/dL* Negative   URINEPH 5.0 5.5 5.0 5.5   PROTEIN 30* 100* 70* 70*   NITRITE Negative Negative  Negative Negative   LEUKEST Trace* 75 Parish/uL* Negative Negative   RBCU 87* 52* 0 1   WBCU 4 0 6* 4     No lab results found.  PTH  No lab results found.  IRON STUDIES  Recent Labs   Lab Test 08/13/23  1113 07/31/23  0650   IRON 22* 38*   * 133*   IRONSAT 13* 29   AFSHAN 667* 399       IMAGING:  All imaging studies reviewed by me.     Gemini Sanchez MD

## 2023-08-18 NOTE — PROGRESS NOTES
"  [facility]   Acute Rehabilitation Unit  Daily progress note    INTERVAL HISTORY  Norman Aguilar seen sitting up in chair, says he is feeling \"not right\" describes fatigue, light headedness, reduced appetite feeling generally unwell, reports \"I know this is all vague and non specific\".  Denies vomiting, fevers, sob, chest pain, heart palpitations, headaches, dysuria, has only urinated once since thakur removed yesterday, planning to try again soon.  Also reports poor intake both food and drink yesterday due to feeling unwell, feeling somewhat constipated, feels he will go soon.      Cr continues to trend up,  sodium stable/ improved.  case discussed with hospitalist see note by OLYA Avelar PA-C for details she would like to order renal US and consult nephrology for further management/ recs.        MEDICATIONS   amiodarone  200 mg Oral Daily    apixaban ANTICOAGULANT  5 mg Oral BID    aspirin  81 mg Oral Daily    atorvastatin  80 mg Oral QPM    chlorhexidine   Topical Weekly    [Held by provider] furosemide  40 mg Oral BID    insulin aspart   Subcutaneous TID w/meals    insulin aspart  1-7 Units Subcutaneous TID AC    insulin aspart  1-5 Units Subcutaneous At Bedtime    insulin glargine  38 Units Subcutaneous At Bedtime    lactated ringers  1,000 mL Intravenous Once    metoprolol tartrate  25 mg Oral BID    miconazole   Topical BID    multivitamin, therapeutic  1 tablet Oral Daily    selenium sulfide   Topical BID    sodium chloride (PF)  3 mL Intracatheter Q8H    ticagrelor  90 mg Oral BID    vitamin C  500 mg Oral Daily        sodium chloride 0.9%, acetaminophen, glucose **OR** dextrose **OR** glucagon, insulin aspart, lidocaine 4%, lidocaine (buffered or not buffered), - MEDICATION INSTRUCTIONS -, sodium chloride (PF), thrombin     PHYSICAL EXAM  /52 (BP Location: Left arm)   Pulse 80   Temp 98.3  F (36.8  C) (Oral)   Resp 16   Ht 1.905 m (6' 3\")   SpO2 99%   BMI " 26.26 kg/m    Gen: awake alert   HEENT: mmm   Pulm: non labored clear diminished  CV: rrr  Abd: non distended  Ext:  LLE prosthesis, LUE without edema.  RLE with pitting edema without calf tenderness, right arm with swelling with tubigrip in place, hand swollen.   Neuro/MSK: alert speech clear, Bilateral HF 4/5.  Right KE 4/5, limited ROM by pain in right shoulder. Left Hip flexion 4/5, left upper 5/5.     LABS  CBC RESULTS:   Recent Labs   Lab Test 08/17/23  0634 08/16/23  1119 08/14/23  0634   WBC 7.2 7.9 7.1   RBC 2.83* 2.74* 2.88*   HGB 8.2* 8.0* 8.4*   HCT 25.3* 24.7* 25.9*   MCV 89 90 90   MCH 29.0 29.2 29.2   MCHC 32.4 32.4 32.4   RDW 16.6* 16.7* 16.4*    298 343       Last Basic Metabolic Panel:  Recent Labs   Lab Test 08/18/23  0522 08/18/23  0234 08/17/23  2108 08/17/23  0916 08/17/23  0634 08/16/23  1150 08/16/23  1119   *  --   --   --  130*  --  126*   POTASSIUM 3.8  --   --   --  4.0  --  3.9   CHLORIDE 95*  --   --   --  93*  --  88*   CO2 26  --   --   --  24  --  25   ANIONGAP 11  --   --   --  13  --  13   * 124* 203*   < > 186*   < > 200*   BUN 75.6*  --   --   --  76.5*  --  71.8*   CR 3.12*  --   --   --  2.96*  --  2.77*   GFRESTIMATED 21*  --   --   --  23*  --  25*   YOSVANY 9.4  --   --   --  9.3  --  9.4    < > = values in this interval not displayed.       Rehabilitation - continue comprehensive acute inpatient rehabilitation program with multidisciplinary approach including therapies, rehab nursing, and physiatry following. See interval history for updates.      ASSESSMENT AND PLAN  Norman Aguilar is a 63-year-old man with history of CAD, chronic systolic CHF  EF of 30 to 35%, CKD 3, left foot osteomyelitis s/p left BKA, and DM 2 who presented 7/17/2023 with septic shock in setting of bacteremia secondary to right shoulder myositis, NSTEMI, JOHANA requiring dialysis and A-fib with RVR. Functionally noted to have impaired strength, impaired activity tolerance, impaired  balance, admitted to rehab 8/11/23.       appreciate hospitalist support for medial management     Debility   In setting of acute illness prolonged hospitalization  -continue PT/OT     Group A strep bacteremia  Right Shoulder Myositis  Originally admitted to ICU on pressors with complicated skin and soft tissue infection.  Positive for group A strep bacteremia.  Was having right shoulder pain, myositis seen on MRI.  Was during started 7/21 from pressure or swelling.  ID followed while in hospital.  Penicillin G changed to ceftriaxone 7/27, 2 weeks IV antibiotics complete.  Completed course of oral linezolid 7/27 if recurs-  ID would consider long term PCN suppression  -Hibiclens wash right shoulder weekly     NSTEMI w/ ICM  CAD with coronary stents-hx of  Afib w/ RVR  HFrEF  Hypotension-resolved   Cardiology followed while inpatient. Decompensated heart failure c/b hypotension, sepsis, fluid overload. Plan for coronary angiography after stabilization of infection and renal function. Converted from AF to NSR after amiodarone load. ECHO EF 25-30% with decreased LV function. -Continue PT ASA, Brilinta  -Continue Apixaban BID  -Continue amiodarone 200 mg daily daily  - hold lasix per hospitalist recs.   -consult cardiology see note by Dr. Colvin 8/17 for details.   -start metoprolol 25 mg bid     JOHANA on CKD3b  Baseline CKD 2/2 longstanding DM2, HTN. Cr range 2.06-2.26. severe ATN in setting of profound hypotension with sepsis, group A strep bacteremia and hypovolemia, contrast nephropathy after CTA, and mild rhabdo. CTA on 7/17 with no hydronephrosis. dialysis 7/21-7/24 & 7/29-8/4. dialysis cath removed 8/10.  Cr 2.25 8/14/23--> 2.53 8/15/23--> 2.96 8/17-->3/13 8/18-  diuresis held per hospitalist recs see note by OLYA Avelar PA-C for details  -renal us  -consult nephrology  -I&Os   -daily weights (not done but ordered)   -trend renal function.     Hyponatremia  Initially 119 on admission to hospital. 8/15 130-  appreciate ongoing support per hospitalist- diuresis per recs- see note by OLYA Avelar PA-C for details. Stable s/p 1 liter LR 8/16. Na 132 8/18  -Avoid excessive fluid intake  -trend Na    Anemia of chronic disease  2/2 critical illness, CKD, JOHANA, bacteremia. Received venofer and 5 units PRBC while inpatient. Epistaxis 8/16.  Hgb 8.2 8/17.   - trend CBC     DM2 (Hb A1c 6.9 on 7/17/23)  -Continue PTA lantus 35 units daily  -Carb coverage 1 unit per 10 g carbohydrates  -sliding scale insulin   -accu checks AC/HS/0200  -hypoglycemia protocol     Seborrheic Dermatitis  -continue Selenium      Urinary Retention  -remove thakur repeat trial of void 8/17 voiding with some retention, low uop since removal. .      Elevated lfts  (resolved)   2/2 infection improved. WNL 8/14.   -trend      Adjustment to disability:  continue to encourage psychology  FEN: 3 grams na  Bowel: continent- monitor  Bladder: trial of void.   DVT Prophylaxis: apixaban  GI Prophylaxis: none  Code: full   Disposition: goal for home   ELOS: 8/26/23.   Follow up Appointments on Discharge:  Cardiology, primary care, nephrology ([facility])      Melissa Banuelos PA-C  Physical Medicine & Rehabilitation

## 2023-08-18 NOTE — PLAN OF CARE
"Discharge Planner Post-Acute Rehab OT:      Discharge Plan: TCU     Precautions: RUE pain and weakness s/p infection, hx of L BKA (uses prosthetic leg)     Current Status:  ADLs:  Mobility: CGA FWW with LLE prosthetic ambulation; wc longer distances. Recommend nsg wc hallway, CGA FWW ambulation with LLE prosthetic in room.   Grooming: Supervision with set up, seated  Dressing: FB SBA FWW including prosthetic, cues for safety  Bathing: Upon admission transfer Min A SPT wc <> ETB with grab bar. Mod A bathing A.  Toileting: CGA FWW to 24\" BS. Total A cares.  IADLs: Previously IND. TBD pending progress.  Vision/Cognition: Glasses. Cognition WNL, low mood and adjustment to disability.     Assessment: In collaboration with PT, progressed pt to CGA FWW ambulation with nsg in room. Pt continues to demonstrate low mood re: perception of health and rehab potential.    Other Barriers to Discharge (DME, Family Training, etc): Unstable housing situation at baseline. Targeting TCU placement where pt can utilize community resources for safe discharge plan.                                "

## 2023-08-18 NOTE — PROGRESS NOTES
SW met with pt and completed his MA renewal paperwork. MA renewal paperwork faxed to [facility] at [phone] and mailed to [facility] [employer] ([address], [address] [place] [address]). TCU placement pending, SANTIAGO Cam will follow up on Monday. Pt had no further questions for JEFFREY at this time.    GREGG Garner  Post Acute Float   ARU/TCU/LTACH    Phone: [phone]  Fax: [phone]

## 2023-08-18 NOTE — PLAN OF CARE
Discharge Planner Post-Acute Rehab PT:      Discharge Plan: potential to live with sibling for short transition?     Precautions: fall precautions, Lt BKA (has prosthesis), sacral wound, Rt shoulder compression sleeve/bandage near posterior axilla area     Current Status:  Bed Mobility: Mod I  Transfer: CGA-ModA w/ FWW depending on surface height   Gait: up to 2x ~40ft with walker and CGA, WC brought close behind  Stairs: NA  Balance: decreased stability w/o UE support in standing.     Assessment: Pt with some missed PT session time today d/t fatigue and declining feeling unwell. Agreeable to some participation in Nustep and STS for LE strengthening in room.     Other Barriers to Discharge (DME, Family Training, etc): unspecified prior residence, reports living with various friends or out of car

## 2023-08-18 NOTE — PROGRESS NOTES
[facility]  Transitional Care Unit Progress Note  Norman Aguilar  [id number]  August 18, 2023         Assessment & Plan:   Norman Aguilar is a 63 year old male with PMHx of CAD on Brilinta, chronic systolic CHF with EF of 30-35%, CKD 3, left foot osteomyelitis s/p left BKA and DM 2.  Presented to [facility] 7/17/2023 with septic shock, NSTEMI, JOHANA, A-fib with RVR.  Transfer to acute rehab 8/11 for ongoing rehabilitation.    # Physical deconditioning  -PT/OT continue to work with him twice per day and this is managed per primary PMR team. He is currently able with assistance to mobilize to a bedside commode, is sitting in a wheelchair much of the day.     # Acute on chronic kidney disease stage III  # Severe ATN secondary to hypotension requiring multiple episodes of dialysis (last dialysis 8/4/23), resolved  He was initially on dialysis 7/21-7/24 and then this had to be resumed 7/29 sceondary to worsening anasarca and poor response to a lasix gtt. Last dialysis 8/4 and dialysis cath removed 8/10.   - Cr is currently 3.12 and has steadily increased from his recent baseline of   2.0-2.3. UA negative for infection. Garcia is out. Lasix has been held for 3 days and needs to be restarted for heart failure concerns.     -Check renal ultrasound today  -Consult Nephrology to provide further recommendations on work up and care of progressive renal failure  - Continue strict I/O, daily weights, daily electrolyte monitoring, dose adjust medications accordingly and avoid nephrotoxins     # Hyponatremia secondary to chronic diuretics and recent anasarca  Sodium was last normal in March 2023. Upon admission, sodium was 119 and has now resolved to 132 but continues to fluctuate.   -Check strict I/O, daily electrolytes      # NSTEMI w/ ICM  # CAD with coronary stents-hx of  # Afib w/ RVR  # HFrEF  Decompensated heart failure compensated by hypotension, sepsis and subsequently fluid overload.   Converted from AF to NSR after amiodarone load. ECHO EF 25-30% with decreased LV function.     -Cardiology followed while inpatient at [facility] and recommended follow up while at [facility]. [facility] saw him on 8/17/23 and per recommendations, no angiogram is needed at this time. We will decrease the dose of his Amiodarone to 200mg once daily and add Metoprolol 25mg twice daily which were started today.  -Continue ASA 81mg once daily and Brilinta 90mg twice daily   -Continue Apixaban 5mg twice daily   -Continue Amiodarone 200mg once daily  -Continue Furosemide 40mg twice daily      # Septic shock secondary to Group A strep bacteremia (7/17/23) from R shoulder dermatitis, resolved  # Myositis to right shoulder with related pain  # Transaminitis secondary to shock liver and myositis, resolved  Originally admitted to ICU on pressors with complicated skin and soft tissue infection.  Positive for group A strep bacteremia.   He has now completed all antibiotics for treatment and is working on wound care.   -ID was following in the hospital and their last note at [facility] is dated 8/10/23. Per recommendations, he was switched from Pen G to Ceftriaxone on 7/27/23 and completed a 2 week course.   - Completed a course of Linezolid 7/27/23  - WOCN consult and per recommendations, he continues with a Hibiclens wash to the right shoulder weekly and selenium sulfide 2.5% lotion twice daily as needed for irritation  - Liver enzymes were initially elevated in the 200's and have now resolved to normal and felt to be secondary to shock liver     # Anemia of chronic disease  -Received venofer and 5 units PRBC while inpatient.   -Hemoglobin is stable at 8.2g/dL. Continue to monitor and transfuse to maintain Hgb of 8 or greater given ischemic heart disease.     # DM2 (Hb A1c 6.9 on 7/17/23)  -Continue PTA lantus 38 units daily with sliding scale insulin Aspart QID and carb coverage 1U per 10g CHO at meals  -hypoglycemia  "protocol  -Declined DM consult, has managed his Diabetes well for 30 years      # S/p L BKA  -Prosthetic leg in room, pt uses alcohol spray to knee prior to placing. OK for family to bring from home.     # Home insecurity  -Social work is working on finding a safe place to go for him once his safe for discharge          Interval History:   Mr. Aguilar was sitting up in a chair today continuing to report that he feels generally fatigued and is frustrated with his slow process with PT/OT. He also reports having to get up and move a lot more now that his Garcia catheter is out. He denies any chest pains, shortness of breath, fevers, or additional new symptoms.          Physical Exam:   Vitals were reviewed  Blood pressure 112/52, pulse 80, temperature 98.3  F (36.8  C), temperature source Oral, resp. rate 16, height 1.905 m (6' 3\"), SpO2 99 %.  General:63 year old gentleman sitting in a wheelchair doing therapies, denies pain at present, fatigued  Cardiovascular: Regular rate and rhythm, no appreciable murmurs, rubs or gallops  Lungs: breathing comfortably on room air, no adventitious sounds to bilateral auscultation  Musculoskeletal: prosthetic leg in place on left side, no surrounding skin irriration  Skin: no jaundice, rashes, or lesions      Amanda Avelar PA-C, MPH  Internal Medicine LAY Hospitalist  [phone]  "

## 2023-08-18 NOTE — PLAN OF CARE
Patient alert and oriented. Slept through this shift. Denied any pain or discomfort. Ax1 with walker, Continent of bowel and bladder and utilize the BSC. No  nose bleed reported or noted this shift. Left PIV with SL.  Patient is on strict I and O. Will continue to monitor and intervene per plan of care.

## 2023-08-18 NOTE — PROGRESS NOTES
"CLINICAL NUTRITION SERVICES - ASSESSMENT NOTE     Nutrition Prescription    RECOMMENDATIONS FOR MDs/PROVIDERS TO ORDER:  Appreciate encouragement surrounding PO intake    Malnutrition Status:    Moderate malnutrition in the context of acute illness.     Recommendations already ordered by Registered Dietitian (RD):  - Ordered weight (no weight since admission)  - Discontinued Glucerna  - Snacks/supplements PRN - reviewed recommended supplements to try    Future/Additional Recommendations:  Monitor PO intake, supplement use, labs, and weight trends     REASON FOR ASSESSMENT  Norman Aguilar is a/an 63 year old male assessed by the dietitian for [facility]  History of CAD, chronic systolic CHF  EF of 30 to 35%, CKD 3, left foot osteomyelitis s/p left BKA, and DM 2 who presented 7/17/2023 with septic shock in setting of bacteremia secondary to right shoulder myositis, NSTEMI, JOHANA requiring dialysis and A-fib with RVR. Functionally noted to have impaired strength, impaired activity tolerance, impaired balance, admitted to rehab 8/11/23.      NUTRITION HISTORY  Per chart review:  Pt was followed by RD during hospitalization. Per RD note 8/9, he was eating 100% of 2-3 meals/day + supplement. \"Pt reports appetite has been ok. He is eating less here than at home to preference for fresher foods and BG being higher in hospital-trying to be careful with not too many CHO. Pt likes and takes the Glucerna but not getting consistently since moving to . Pt is willing to try gel plus for more protein and reports shakes make him \"phlegmy\"\"    Per pt visit:  Met with pt in room. During his hospitalization, his intake was improving but has been declining since admission to ARU. Pt reports very poor intake for past ~5 days. Nothing sounds good and he is not able to eat much when he does eat. He does not like the Glucerna - he had 2 unopened on his side table. He is going to have family bring in some food tonight.    CURRENT " "NUTRITION ORDERS  Diet: 3 g Sodium  Snacks/supplements: Glucerna BID between meals, pt may order additional PRN    Intake/Tolerance: 0-100% per flowsheets since admission, most intakes 100%  Per [employer], pt ordering 1-2 meals/day from room service. Ordered 3-day average of 863 kcal and 38 g protein.     LABS  Labs reviewed  Na: 132 (L)  BUN: 75.6 (H)  Cr: 3.12 (H)  Hemoglobin A1C: 6.9 (7/17)  Glucose POCT: 124-203 over 24 hours    MEDICATIONS  Medications reviewed  Lasix, BID - on hold, last given 8/16  Novolog, medium intensity sliding scale, 1 unit per 10 g CHO  Lantus, 38 units at bedtime  Thera-vit  Vitamin C    ANTHROPOMETRICS  Height: 190.5 cm (6' 3\")  Most Recent Weight: 95.3 kg (210 lb 1.6 oz) - from 8/11  IBW: 84 kg (adjusted for BKA)  BMI: Overweight BMI 25-29.9  Weight History:   Wt Readings from Last 5 Encounters:   08/11/23 95.3 kg (210 lb 1.6 oz)   11/17/22 100.2 kg (221 lb)   09/28/22 104.3 kg (230 lb)   09/27/22 104.3 kg (230 lb)   09/15/22 97.7 kg (215 lb 6.4 oz)     Care Everywhere:  3/24/23: 217 lbs     Limited recent weight history. No significant weight loss based on available weights.     Dosing Weight: 95 kg (most recent wt from 8/11)    ASSESSED NUTRITION NEEDS  Estimated Energy Needs: 8561-5865 kcals/day (25 - 30 kcals/kg)  Justification: Maintenance  Estimated Protein Needs: 76-95 grams protein/day (0.8 - 1 grams of pro/kg)  Justification: CKD  Estimated Fluid Needs: 1 mL/kcal  Justification: Maintenance and Per provider pending fluid status    PHYSICAL FINDINGS  See malnutrition section below.  WOCN consulted for coccyx and right arm   - See note 8/14  - Pt previously seen by WOC for deep tissue pressure injury to coccyx are. This has resolved.     MALNUTRITION  % Intake: </= 50% for >/= 5 days (severe)  % Weight Loss: Unable to assess  Subcutaneous Fat Loss: Facial region: mild and Upper arm: mild  Muscle Loss: Temporal: moderate, Facial & jaw region: mild, Thoracic region (clavicle, " acromium bone, deltoid, trapezius, pectoral): mild, and Upper arm (bicep, tricep): mild to moderate  Fluid Accumulation/Edema: Trace - Moderate  Malnutrition Diagnosis: Moderate malnutrition in the context of acute illness.     NUTRITION DIAGNOSIS  Inadequate oral intake related to decreased appetite, early satiety, and nausea as evidenced by pt report, documented intakes, and fat and muscle wasting.       INTERVENTIONS  Implementation  Nutrition Education: Provided education on role of RD in care. Discussed available snacks/supplements to increase oral intake. Discussed importance of adequate nutrition. Encouraged small, frequent meals and use of oral nutrition supplements to increase oral intake. Encouraged high calorie food options since pt not able to eat much at once. Discussed enteral nutrition options with pt if poor oral intake continues. Pt unsure if this is something he would be willing to pursue.    Collaboration with other providers - discussed with PA  Medical food supplement therapy - PRN only. Pt does not want scheduled supplements    Goals  Patient to consume % of nutritionally adequate meal trays TID, or the equivalent with supplements/snacks.     Monitoring/Evaluation  Progress toward goals will be monitored and evaluated per protocol.   Tanisha Barron RD, SUAD  ARU RD pager: [phone]  Weekend/Holiday RD pager: [phone]

## 2023-08-19 NOTE — PLAN OF CARE
"Discharge Planner Post-Acute Rehab OT:      Discharge Plan: TCU     Precautions: RUE pain and weakness s/p infection, hx of L BKA (uses prosthetic leg)     Current Status:  ADLs:  Mobility: CGA FWW with LLE prosthetic ambulation; wc longer distances. Recommend nsg wc hallway, CGA FWW ambulation with LLE prosthetic in room.   Grooming: Supervision with set up, seated  Dressing: FB SBA FWW including prosthetic, cues for safety  Bathing: Upon admission transfer Min A SPT wc <> ETB with grab bar. Mod A bathing A.  Toileting: CGA FWW to 24\" BS. Total A cares.  IADLs: Previously IND. TBD pending progress.  Vision/Cognition: Glasses. Cognition WNL, low mood and adjustment to disability.     Assessment: Focus on upper extremity strengthening to improve strength and activity tolerance for ADLS and w/c mobility. Patient actively participated during session.    Other Barriers to Discharge (DME, Family Training, etc): Unstable housing situation at baseline. Targeting TCU placement where pt can utilize community resources for safe discharge plan.                                "

## 2023-08-19 NOTE — PLAN OF CARE
Goal Outcome Evaluation:    Overall Patient Progress: no change    Outcome Evaluation: No change in Pt progress this shift.    Pt is alert and oriented. Continent of B&B. [facility] 8/19. Ax1 walker. Reported pain; declined pain interventions, pillow support given for comfort. Call light within reach. Will continue with POC.

## 2023-08-19 NOTE — PLAN OF CARE
Discharge Planner Post-Acute Rehab PT:      Discharge Plan: potential to live with sibling for short transition?     Precautions: fall precautions, Lt BKA (has prosthesis), sacral wound, Rt shoulder compression sleeve/bandage near posterior axilla area     Current Status:  Bed Mobility: Mod I  Transfer: CGA-ModA w/ FWW depending on surface height   Gait: up to 2x ~40ft with walker and CGA, WC brought close behind  Stairs: NA  Balance: decreased stability w/o UE support in standing.     Assessment: Pt had noted concern about high level of blood thinners pt is currently taking.  Pt noted feeling much better with outdoor therapy and would like to continue that as able.  Pt feels last 36 hours overall function has turned and is getting better.      Other Barriers to Discharge (DME, Family Training, etc): unspecified prior residence, reports living with various friends or out of car

## 2023-08-19 NOTE — PLAN OF CARE
FOCUS/GOAL  Bowel management, Bladder management, and Mobility    ASSESSMENT, INTERVENTIONS AND CONTINUING PLAN FOR GOAL:    Orientation: alert and oriented x4  VS: stable  Pain: denies except some pain to RUE w/ movement  Ambulation/ Transfers: Ax1 with walker, stand pivot  Bowel: continent of bm today  Bladder: continent, used BSC. PVR 0ml  Blood Sugars: 135 and 106  Skin: R hand has very small open area with bleeding, dressing applied.  Sacral mepilex CDI  Pt participated in therapies, call light within reach. Continue with POC.

## 2023-08-19 NOTE — PROGRESS NOTES
[facility]  Transitional Care Unit Progress Note  Norman Aguilar  [id number]  August 18, 2023         Assessment & Plan:   Norman Aguilar is a 63 year old male with PMHx of CAD on Brilinta, chronic systolic CHF with EF of 30-35%, CKD 3, left foot osteomyelitis s/p left BKA and DM 2.  Presented to [facility] 7/17/2023 with septic shock, NSTEMI, JOHANA, A-fib with RVR.  Transfer to acute rehab 8/11 for ongoing rehabilitation.    # Acute on chronic kidney disease stage III  # Severe ATN secondary to hypotension requiring multiple episodes of dialysis (last dialysis 8/4/23), resolved  He was initially on dialysis 7/21-7/24 and then this had to be resumed 7/29 sceondary to worsening anasarca and poor response to a lasix gtt. Last dialysis 8/4 and dialysis cath removed 8/10.   - Cr is currently 3.12 and has steadily increased from his recent baseline of   2.0-2.3. UA negative for infection. Garcia is out. Lasix has been held for 3 days and needs to be restarted for heart failure concerns.     -Renal Ultrasound was done 8/17 and revealed no acute issues  -Nephrology was consulted and is following closely. Per recommendations, autoimmune labs including TANNER, ANCA, complement studies, cryoglobulins, SPEP with immunofixation and free light chains were ordered and are pending  -It is likely that Mr. Aguilar is headed towards dialysis again and we will consult IR tomorrow to plan for dialysis line placement on Monday  -Hold off on kidney biopsy for now until autoimmune studies return  - Continue strict I/O, daily weights, daily electrolyte monitoring, dose adjust medications accordingly and avoid nephrotoxins    # Physical deconditioning  -PT/OT continue to work with him twice per day and this is managed per primary PMR team. He is currently able with assistance to mobilize to a bedside commode, is sitting in a wheelchair much of the day.     # Hyponatremia secondary to chronic diuretics and  recent anasarca  Sodium was last normal in March 2023. Upon admission, sodium was 119 and has now resolved to 130 but continues to fluctuate.   -Check strict I/O, daily electrolytes      # NSTEMI w/ ICM  # CAD with coronary stents-hx of  # Afib w/ RVR  # HFrEF  Decompensated heart failure compensated by hypotension, sepsis and subsequently fluid overload.  Converted from AF to NSR after amiodarone load. ECHO EF 25-30% with decreased LV function.     -Cardiology followed while inpatient at [facility] and recommended follow up while at [facility]. [facility] saw him on 8/17/23 and per recommendations, no angiogram is needed at this time. We will decrease the dose of his Amiodarone to 200mg once daily and add Metoprolol 25mg twice daily which were started today.  -Continue ASA 81mg once daily and Brilinta 90mg twice daily   -Continue Apixaban 5mg twice daily   -Continue Amiodarone 200mg once daily  -HOLD Furosemide 40mg twice daily      # Septic shock secondary to Group A strep bacteremia (7/17/23) from R shoulder dermatitis, resolved  # Myositis to right shoulder with related pain  # Transaminitis secondary to shock liver and myositis, resolved  Originally admitted to ICU on pressors with complicated skin and soft tissue infection.  Positive for group A strep bacteremia.   He has now completed all antibiotics for treatment and is working on wound care.   -ID was following in the hospital and their last note at [facility] is dated 8/10/23. Per recommendations, he was switched from Pen G to Ceftriaxone on 7/27/23 and completed a 2 week course.   - Completed a course of Linezolid 7/27/23  - WOCN consult and per recommendations, he continues with a Hibiclens wash to the right shoulder weekly and selenium sulfide 2.5% lotion twice daily as needed for irritation  - Liver enzymes were initially elevated in the 200's and have now resolved to normal and felt to be secondary to shock liver     # Anemia of chronic  "disease  -Received venofer and 5 units PRBC while inpatient.   -Hemoglobin is stable at 8.2g/dL. Continue to monitor and transfuse to maintain Hgb of 8 or greater given ischemic heart disease.     # DM2 (Hb A1c 6.9 on 7/17/23)  -Continue PTA lantus 38 units daily with sliding scale insulin Aspart QID and carb coverage 1U per 10g CHO at meals  -hypoglycemia protocol  -Declined DM consult, has managed his Diabetes well for 30 years      # S/p L BKA  -Prosthetic leg in room, pt uses alcohol spray to knee prior to placing. OK for family to bring from home.     # Home insecurity  -Social work is working on finding a safe place to go for him once his safe for discharge          Interval History:   Mr. Aguilar was sitting up in a chair today reporting that he slept pretty well last night but still feels tired today. He also reports feeling mildly short of breath. He denies any coughing, fevers, chills, chest pain or additional new concerns. We discussed the progression of his kidney disease and likelihood of heading back to dialysis which he was really disappointed about.          Physical Exam:   Vitals were reviewed  Blood pressure 114/60, pulse 76, temperature 97.8  F (36.6  C), temperature source Oral, resp. rate 20, height 1.905 m (6' 3\"), weight 98.6 kg (217 lb 6.4 oz), SpO2 98 %.  General:63 year old gentleman sitting in a wheelchair doing therapies, denies pain at present, fatigued  Cardiovascular: Regular rate and rhythm, no appreciable murmurs, rubs or gallops  Lungs: breathing comfortably on room air, no adventitious sounds to bilateral auscultation  Musculoskeletal: prosthetic leg in place on left side, no surrounding skin irriration  Skin: no jaundice, rashes, or lesions      Amanda Avelar PA-C, MPH  Internal Medicine LAY Hospitalist  [phone]  "

## 2023-08-19 NOTE — PLAN OF CARE
Goal Outcome Evaluation:      Plan of Care Reviewed With: patient    Overall Patient Progress: improvingOverall Patient Progress: improving    Outcome Evaluation: Patient did not have any concern from day shift. Ate dinner late with his niece who cameto visit. No concern. Nursing staff will continue with poc.

## 2023-08-19 NOTE — PROGRESS NOTES
"  [facility]   Acute Rehabilitation Unit  Daily progress note    INTERVAL HISTORY  Norman Aguilar seen in his room. Denies vomiting, fevers, sob, chest pain, heart palpitations, headaches, dysuria, Eating OK.    Cr continues to trend up,  sodium stable/ improved.   OLYA Avelar PA-C ordered renal US and consult nephrology for further management/ recs.      IMPRESSION:  1.  Normal kidneys.  2.  Cholelithiasis without evidence of cholecystitis.     I have personally reviewed the examination and initial interpretation  and I agree with the findings.     TWAN MCINTYRE MD       MEDICATIONS   amiodarone  200 mg Oral Daily    apixaban ANTICOAGULANT  5 mg Oral BID    aspirin  81 mg Oral Daily    atorvastatin  80 mg Oral QPM    chlorhexidine   Topical Weekly    [Held by provider] furosemide  40 mg Oral BID    insulin aspart   Subcutaneous TID w/meals    insulin aspart  1-7 Units Subcutaneous TID AC    insulin aspart  1-5 Units Subcutaneous At Bedtime    insulin glargine  38 Units Subcutaneous At Bedtime    lactated ringers  1,000 mL Intravenous Once    metoprolol tartrate  25 mg Oral BID    miconazole   Topical BID    multivitamin, therapeutic  1 tablet Oral Daily    selenium sulfide   Topical BID    sodium chloride (PF)  3 mL Intracatheter Q8H    ticagrelor  90 mg Oral BID    vitamin C  500 mg Oral Daily        sodium chloride 0.9%, acetaminophen, glucose **OR** dextrose **OR** glucagon, insulin aspart, lidocaine 4%, lidocaine (buffered or not buffered), - MEDICATION INSTRUCTIONS -, sodium chloride (PF), thrombin     PHYSICAL EXAM  /60 (BP Location: Left arm, Patient Position: Semi-Escamilla's, Cuff Size: Adult Regular)   Pulse 76   Temp 97.8  F (36.6  C) (Oral)   Resp 20   Ht 1.905 m (6' 3\")   Wt 98.6 kg (217 lb 6.4 oz)   SpO2 98%   BMI 27.17 kg/m    Gen: awake alert   HEENT: mmm   Pulm: non labored clear diminished  CV: rrr  Abd: non distended  Ext:  LLE prosthesis, LUE " without edema.  RLE with pitting edema without calf tenderness, right arm with swelling with tubigrip in place, hand swollen.   Neuro/MSK: alert speech clear, Bilateral HF 4/5.  Right KE 4/5, limited ROM by pain in right shoulder. Left Hip flexion 4/5, left upper 5/5.     Vitals:    08/18/23 2338 08/19/23 0500   Weight: 99.5 kg (219 lb 4.8 oz) 98.6 kg (217 lb 6.4 oz)          LABS  CBC RESULTS:   Recent Labs   Lab Test 08/17/23  0634 08/16/23  1119 08/14/23  0634   WBC 7.2 7.9 7.1   RBC 2.83* 2.74* 2.88*   HGB 8.2* 8.0* 8.4*   HCT 25.3* 24.7* 25.9*   MCV 89 90 90   MCH 29.0 29.2 29.2   MCHC 32.4 32.4 32.4   RDW 16.6* 16.7* 16.4*    298 343       Last Basic Metabolic Panel:  Recent Labs   Lab Test 08/19/23  1111 08/19/23  0910 08/19/23  0721 08/19/23  0623 08/18/23  0819 08/18/23  0522 08/17/23  0916 08/17/23  0634   NA  --   --   --  130*  --  132*  --  130*   POTASSIUM  --   --   --  3.5  --  3.8  --  4.0   CHLORIDE  --   --   --  94*  --  95*  --  93*   CO2  --   --   --  23  --  26  --  24   ANIONGAP  --   --   --  13  --  11  --  13   * 135* 150* 158*   < > 113*   < > 186*   BUN  --   --   --  80.1*  --  75.6*  --  76.5*   CR  --   --   --  3.29*  --  3.12*  --  2.96*   GFRESTIMATED  --   --   --  20*  --  21*  --  23*   YOSVANY  --   --   --  9.1  --  9.4  --  9.3    < > = values in this interval not displayed.       Rehabilitation - continue comprehensive acute inpatient rehabilitation program with multidisciplinary approach including therapies, rehab nursing, and physiatry following. See interval history for updates.      ASSESSMENT AND PLAN  Norman Aguilar is a 63-year-old man with history of CAD, chronic systolic CHF  EF of 30 to 35%, CKD 3, left foot osteomyelitis s/p left BKA, and DM 2 who presented 7/17/2023 with septic shock in setting of bacteremia secondary to right shoulder myositis, NSTEMI, JOHANA requiring dialysis and A-fib with RVR. Functionally noted to have impaired strength, impaired  activity tolerance, impaired balance, admitted to rehab 8/11/23.      I appreciate hospitalist support for medial management     Debility   In setting of acute illness prolonged hospitalization  -continue PT/OT     Group A strep bacteremia  Right Shoulder Myositis  Originally admitted to ICU on pressors with complicated skin and soft tissue infection.  Positive for group A strep bacteremia.  Was having right shoulder pain, myositis seen on MRI.  Was during started 7/21 from pressure or swelling.  ID followed while in hospital.  Penicillin G changed to ceftriaxone 7/27, 2 weeks IV antibiotics complete.  Completed course of oral linezolid 7/27 if recurs-  ID would consider long term PCN suppression  -Hibiclens wash right shoulder weekly     NSTEMI w/ ICM  CAD with coronary stents-hx of  Afib w/ RVR  HFrEF  Hypotension-resolved   Cardiology followed while inpatient. Decompensated heart failure c/b hypotension, sepsis, fluid overload. Plan for coronary angiography after stabilization of infection and renal function. Converted from AF to NSR after amiodarone load. ECHO EF 25-30% with decreased LV function. -Continue PT ASA, Brilinta  -Continue Apixaban BID  -Continue amiodarone 200 mg daily daily  - hold lasix per hospitalist recs.   -consult cardiology see note by Dr. Colvin 8/17 for details.   -start metoprolol 25 mg bid     JOHANA on CKD3b  Baseline CKD 2/2 longstanding DM2, HTN. Cr range 2.06-2.26. severe ATN in setting of profound hypotension with sepsis, group A strep bacteremia and hypovolemia, contrast nephropathy after CTA, and mild rhabdo. CTA on 7/17 with no hydronephrosis. dialysis 7/21-7/24 & 7/29-8/4. dialysis cath removed 8/10.  Cr 2.25 8/14/23--> 2.53 8/15/23--> 2.96 8/17-->3/13 8/18-  diuresis held per hospitalist recs see note by OLYA Avelar PA-C for details  -renal us  -consult nephrology  -I&Os   -daily weights (not done but ordered)   -trend renal function.     Hyponatremia  Initially 119 on  admission to hospital. 8/15 130- appreciate ongoing support per hospitalist- diuresis per recs- see note by OLYA Avelar PA-C for details. Stable s/p 1 liter LR 8/16. Na 132 8/18  -Avoid excessive fluid intake  -trend Na    Anemia of chronic disease  2/2 critical illness, CKD, JOHANA, bacteremia. Received venofer and 5 units PRBC while inpatient. Epistaxis 8/16.  Hgb 8.2 8/17.   - trend CBC     DM2 (Hb A1c 6.9 on 7/17/23)  -Continue PTA lantus 35 units daily  -Carb coverage 1 unit per 10 g carbohydrates  -sliding scale insulin   -accu checks AC/HS/0200  -hypoglycemia protocol     Seborrheic Dermatitis  -continue Selenium      Urinary Retention  -remove thakur repeat trial of void 8/17 voiding with some retention, low uop since removal. .      Elevated lfts  (resolved)   2/2 infection improved. WNL 8/14.   -trend      Adjustment to disability:  continue to encourage psychology  FEN: 3 grams na  Bowel: continent- monitor  Bladder: trial of void.   DVT Prophylaxis: apixaban  GI Prophylaxis: none  Code: full   Disposition: goal for home   ELOS: 8/26/23.   Follow up Appointments on Discharge:  Cardiology, primary care, nephrology ([facility])    Discussed with team. Continue cares and plans outlined.    Thierno Fan MD

## 2023-08-20 NOTE — PROGRESS NOTES
"  [facility]  Transitional Care Unit Progress Note  Norman Aguilar  [id number]  August 19, 2023         Assessment & Plan:   Norman Aguilar is a 63 year old male with PMHx of CAD on Brilinta, chronic systolic CHF with EF of 30-35%, CKD 3, left foot osteomyelitis s/p left BKA and DM 2.  Presented to [facility] 7/17/2023 with septic shock, NSTEMI, JOHANA, A-fib with RVR.  Transfer to acute rehab 8/11 for ongoing rehabilitation.    # Goals of care discussion  # Depression  Mr. Aguilar  has shown signs of depression throughout his stay at acute rehab but continues to be resistant to talking to a psychiatrist or taking any medication for depression.  This morning he reports feeling \"'overwhelmed and not wanting any more aggressive medical care if this is how life is going to be.  I just want to sit by a lake and listen to the waves lap on the shore.\"  He denies any thoughts of harm to himself or others but does report feeling extremely fatigued every day and does not have a bright outlook on the future if he continues to have\"'9-5 medical care every day\" .  He is also frustrated that he has lost his home and does not have a place to live of recent and his goals are to have less medical care, get stronger and discharged to a new home where he can stay out of hospitals.  As we discussed his kidneys getting worse he currently is not interested in dialysis and considering not going through another course of dialysis.  He is open to talking to palliative care about ways to stay comfortable out of an aggressive medical care plan and off of dialysis.  -Palliative Care consult to further discuss goals of care and if his kidneys continue to worsen and he decides to not have dialysis, would be entering hospice  -Continue to encourage discussions with a therapist or Psychiatry, but the patient is still resistant    # Acute on chronic kidney disease stage III  # Severe ATN secondary to hypotension " requiring multiple episodes of dialysis (last dialysis 8/4/23), resolved  He was initially on dialysis 7/21-7/24 and then this had to be resumed 7/29 sceondary to worsening anasarca and poor response to a lasix gtt. Last dialysis 8/4 and dialysis cath removed 8/10.   - Cr is currently 3.27 and has steadily increased from his recent baseline of   2.0-2.3. UA negative for infection. Garcia is out. Lasix has been held for 4 days and needs to be restarted for heart failure concerns.     -Renal Ultrasound was done 8/17 and revealed no acute issues  -Nephrology was consulted and is following closely. Per recommendations, autoimmune labs including TANNER, ANCA, complement studies, cryoglobulins, SPEP with immunofixation and free light chains were ordered and are pending  -It is likely that Mr. Aguilar is headed towards dialysis again and would likely need an IR consult on Monday for dialysis line placement, however, in talking with the patient today, he is not sure he would want to go through dialysis again. He is taking an approach in our conversations about not wanting aggressive medical care moving forward.  -Hold off on kidney biopsy for now until autoimmune studies return  - Continue strict I/O, daily weights, daily electrolyte monitoring, dose adjust medications accordingly and avoid nephrotoxins    # Physical deconditioning  -PT/OT continue to work with him twice per day and this is managed per primary PMR team. He is currently able with assistance to mobilize to a bedside commode, is sitting in a wheelchair much of the day.     # Hyponatremia secondary to chronic diuretics and recent anasarca  Sodium was last normal in March 2023. Upon admission, sodium was 119 and has now resolved to 129 but continues to fluctuate.   -Check strict I/O, daily electrolytes      # NSTEMI w/ ICM  # CAD with coronary stents-hx of  # Afib w/ RVR  # HFrEF  Decompensated heart failure compensated by hypotension, sepsis and subsequently  fluid overload.  Converted from AF to NSR after amiodarone load. ECHO EF 25-30% with decreased LV function.     -Cardiology followed while inpatient at [facility] and recommended follow up while at [facility]. [facility] saw him on 8/17/23 and per recommendations, no angiogram is needed at this time. We will decrease the dose of his Amiodarone to 200mg once daily and add Metoprolol 25mg twice daily which were started today.  -Continue ASA 81mg once daily and Brilinta 90mg twice daily   -Continue Apixaban 5mg twice daily   -Continue Amiodarone 200mg once daily  -HOLD Furosemide 40mg twice daily      # Septic shock secondary to Group A strep bacteremia (7/17/23) from R shoulder dermatitis, resolved  # Myositis to right shoulder with related pain  # Transaminitis secondary to shock liver and myositis, resolved  Originally admitted to ICU on pressors with complicated skin and soft tissue infection.  Positive for group A strep bacteremia.   He has now completed all antibiotics for treatment and is working on wound care.   -ID was following in the hospital and their last note at [facility] is dated 8/10/23. Per recommendations, he was switched from Pen G to Ceftriaxone on 7/27/23 and completed a 2 week course.   - Completed a course of Linezolid 7/27/23  - WOCN consult and per recommendations, he continues with a Hibiclens wash to the right shoulder weekly and selenium sulfide 2.5% lotion twice daily as needed for irritation  - Liver enzymes were initially elevated in the 200's and have now resolved to normal and felt to be secondary to shock liver     # Anemia of chronic disease  -Received venofer and 5 units PRBC while inpatient.   -Hemoglobin is stable at 8.2g/dL. Continue to monitor and transfuse to maintain Hgb of 8 or greater given ischemic heart disease.     # DM2 (Hb A1c 6.9 on 7/17/23)  -Continue PTA lantus 38 units daily with sliding scale insulin Aspart QID and carb coverage 1U per 10g CHO at  "meals  -hypoglycemia protocol  -Declined DM consult, has managed his Diabetes well for 30 years      # S/p L BKA  -Prosthetic leg in room, pt uses alcohol spray to knee prior to placing. OK for family to bring from home.     # Home insecurity  -Social work is working on finding a safe place to go for him once his safe for discharge          Interval History:   Mr. Aguilar was sitting up at the edge of his bed today reporting that he feels, \"overwhelmed and frustrated with all of my medical issues and I do not want this level of medical care to continue. I want to be by the lake, listening to the waves and not spending my days in a hospital\" . He denies any chest pain, shortness of breath, fevers, chills but does report feeling very fatigued. He would like to talk about ways to move forward with less medical intervention and is open to talking with Palliaive Care in a little more detail.         Physical Exam:   Vitals were reviewed  Blood pressure 90/49, pulse 73, temperature 98.1  F (36.7  C), temperature source Oral, resp. rate 16, height 1.905 m (6' 3\"), weight 98.6 kg (217 lb 6.4 oz), SpO2 100 %.  General:63 year old gentleman sitting in a wheelchair doing therapies, denies pain at present, fatigued  Cardiovascular: Regular rate and rhythm, no appreciable murmurs, rubs or gallops  Lungs: breathing comfortably on room air, no adventitious sounds to bilateral auscultation  Musculoskeletal: prosthetic leg in place on left side, no surrounding skin irriration  Skin: no jaundice, rashes, or lesions      Amanda Avelar PA-C, MPH  Internal Medicine [facility] Hospitalist  [phone]  "

## 2023-08-20 NOTE — PLAN OF CARE
"Discharge Planner Post-Acute Rehab OT:      Discharge Plan: TCU     Precautions: RUE pain and weakness s/p infection, hx of L BKA (uses prosthetic leg)     Current Status:  ADLs:  Mobility: CGA FWW with LLE prosthetic ambulation; wc longer distances. Recommend nsg wc hallway, CGA FWW ambulation with LLE prosthetic in room.   Grooming: Supervision with set up, seated  Dressing: FB SBA FWW including prosthetic, cues for safety  Bathing: Upon admission transfer Min A SPT wc <> ETB with grab bar. Mod A bathing A.  Toileting: CGA FWW to 24\" BS. Total A cares.  IADLs: Previously IND. TBD pending progress.  Vision/Cognition: Glasses. Cognition WNL, low mood and adjustment to disability.     Assessment: Unable to observe improvements in OT today, with the exception of pt noting his RUE has overall improved.  Tx limited to light UE exercise.  Current POC remains appropriate.  Recommend con't to provide pt with encouragement and modify tx as needed.     Other Barriers to Discharge (DME, Family Training, etc): Unstable housing situation at baseline. Targeting TCU placement where pt can utilize community resources for safe discharge plan.                                "

## 2023-08-20 NOTE — PROGRESS NOTES
"  [facility]   Acute Rehabilitation Unit  Daily progress note    INTERVAL HISTORY  Norman Aguilar seen in his room. Denies vomiting, fevers, sob, chest pain, heart palpitations, headaches, dysuria, Eating OK. Feels down, ?overwhelmed.     Cr continues to trend up,  sodium stable/ improved.   OLYA Avelar PA-C ordered renal US and consult nephrology for further management/ recs.      IMPRESSION:  1.  Normal kidneys.  2.  Cholelithiasis without evidence of cholecystitis.     I have personally reviewed the examination and initial interpretation  and I agree with the findings.     TWAN MCINTYRE MD       MEDICATIONS   amiodarone  200 mg Oral Daily    apixaban ANTICOAGULANT  5 mg Oral BID    aspirin  81 mg Oral Daily    atorvastatin  80 mg Oral QPM    chlorhexidine   Topical Weekly    [Held by provider] furosemide  40 mg Oral BID    insulin aspart   Subcutaneous TID w/meals    insulin aspart  1-7 Units Subcutaneous TID AC    insulin aspart  1-5 Units Subcutaneous At Bedtime    insulin glargine  38 Units Subcutaneous At Bedtime    lactated ringers  1,000 mL Intravenous Once    metoprolol tartrate  25 mg Oral BID    miconazole   Topical BID    multivitamin, therapeutic  1 tablet Oral Daily    selenium sulfide   Topical BID    sodium chloride (PF)  3 mL Intracatheter Q8H    ticagrelor  90 mg Oral BID    vitamin C  500 mg Oral Daily        sodium chloride 0.9%, acetaminophen, glucose **OR** dextrose **OR** glucagon, insulin aspart, lidocaine 4%, lidocaine (buffered or not buffered), - MEDICATION INSTRUCTIONS -, sodium chloride (PF), thrombin     PHYSICAL EXAM  BP 90/49 (BP Location: Left leg, Patient Position: Semi-Escamilla's, Cuff Size: Adult Regular)   Pulse 73   Temp 98.1  F (36.7  C) (Oral)   Resp 16   Ht 1.905 m (6' 3\")   Wt 98.6 kg (217 lb 6.4 oz)   SpO2 100%   BMI 27.17 kg/m    Gen: awake alert   HEENT: mmm   Pulm: non labored clear diminished  CV: rrr  Abd: non " distended  Ext:  LLE prosthesis, LUE without edema.  RLE with pitting edema without calf tenderness, right arm with swelling with tubigrip in place, hand swollen.   Neuro/MSK: alert speech clear, Bilateral HF 4/5.  Right KE 4/5, limited ROM by pain in right shoulder. Left Hip flexion 4/5, left upper 5/5.     Vitals:    08/18/23 2338 08/19/23 0500   Weight: 99.5 kg (219 lb 4.8 oz) 98.6 kg (217 lb 6.4 oz)          LABS  CBC RESULTS:   Recent Labs   Lab Test 08/17/23  0634 08/16/23  1119 08/14/23  0634   WBC 7.2 7.9 7.1   RBC 2.83* 2.74* 2.88*   HGB 8.2* 8.0* 8.4*   HCT 25.3* 24.7* 25.9*   MCV 89 90 90   MCH 29.0 29.2 29.2   MCHC 32.4 32.4 32.4   RDW 16.6* 16.7* 16.4*    298 343       Last Basic Metabolic Panel:  Recent Labs   Lab Test 08/20/23  1225 08/20/23  1012 08/20/23  0649 08/19/23  0721 08/19/23  0623 08/18/23  0819 08/18/23  0522   NA  --   --  129*  --  130*  --  132*   POTASSIUM  --   --  3.5  3.5  --  3.5  --  3.8   CHLORIDE  --   --  93*  --  94*  --  95*   CO2  --   --  21*  --  23  --  26   ANIONGAP  --   --  15  --  13  --  11   * 80 105*   < > 158*   < > 113*   BUN  --   --  81.3*  --  80.1*  --  75.6*   CR  --   --  3.27*  --  3.29*  --  3.12*   GFRESTIMATED  --   --  20*  --  20*  --  21*   YOSVANY  --   --  8.9  --  9.1  --  9.4    < > = values in this interval not displayed.       Rehabilitation - continue comprehensive acute inpatient rehabilitation program with multidisciplinary approach including therapies, rehab nursing, and physiatry following. See interval history for updates.      ASSESSMENT AND PLAN  Norman Aguilar is a 63-year-old man with history of CAD, chronic systolic CHF  EF of 30 to 35%, CKD 3, left foot osteomyelitis s/p left BKA, and DM 2 who presented 7/17/2023 with septic shock in setting of bacteremia secondary to right shoulder myositis, NSTEMI, JOHANA requiring dialysis and A-fib with RVR. Functionally noted to have impaired strength, impaired activity tolerance,  impaired balance, admitted to rehab 8/11/23.      I appreciate hospitalist support for medial management     Debility   In setting of acute illness prolonged hospitalization  -continue PT/OT     Group A strep bacteremia  Right Shoulder Myositis  Originally admitted to ICU on pressors with complicated skin and soft tissue infection.  Positive for group A strep bacteremia.  Was having right shoulder pain, myositis seen on MRI.  Was during started 7/21 from pressure or swelling.  ID followed while in hospital.  Penicillin G changed to ceftriaxone 7/27, 2 weeks IV antibiotics complete.  Completed course of oral linezolid 7/27 if recurs-  ID would consider long term PCN suppression  -Hibiclens wash right shoulder weekly     NSTEMI w/ ICM  CAD with coronary stents-hx of  Afib w/ RVR  HFrEF  Hypotension-resolved   Cardiology followed while inpatient. Decompensated heart failure c/b hypotension, sepsis, fluid overload. Plan for coronary angiography after stabilization of infection and renal function. Converted from AF to NSR after amiodarone load. ECHO EF 25-30% with decreased LV function. -Continue PT ASA, Brilinta  -Continue Apixaban BID  -Continue amiodarone 200 mg daily daily  - hold lasix per hospitalist recs.   -consult cardiology see note by Dr. Colvin 8/17 for details.   -On metoprolol 25 mg bid     JOHANA on CKD3b  Baseline CKD 2/2 longstanding DM2, HTN. Cr range 2.06-2.26. severe ATN in setting of profound hypotension with sepsis, group A strep bacteremia and hypovolemia, contrast nephropathy after CTA, and mild rhabdo. CTA on 7/17 with no hydronephrosis. dialysis 7/21-7/24 & 7/29-8/4. dialysis cath removed 8/10.  Cr 2.25 8/14/23--> 2.53 8/15/23--> 2.96 8/17-->3/13 8/18-  diuresis held per hospitalist recs see note by OLYA Avelar PA-C for details  -renal us  -consult nephrology  -I&Os   -daily weights (not done but ordered)   -trend renal function.     Hyponatremia  Initially 119 on admission to hospital. 8/15  130- appreciate ongoing support per hospitalist- diuresis per recs- see note by OLYA Avelar PA-C for details. Stable s/p 1 liter LR 8/16. Na 132 8/18  -Avoid excessive fluid intake  -trend Na    Anemia of chronic disease  2/2 critical illness, CKD, JOHANA, bacteremia. Received venofer and 5 units PRBC while inpatient. Epistaxis 8/16.  Hgb 8.2 8/17.   - trend CBC     DM2 (Hb A1c 6.9 on 7/17/23)  -Continue PTA lantus 35 units daily  -Carb coverage 1 unit per 10 g carbohydrates  -sliding scale insulin   -accu checks AC/HS/0200  -hypoglycemia protocol     Seborrheic Dermatitis  -continue Selenium      Urinary Retention  -remove thakur repeat trial of void 8/17 voiding with some retention, low uop since removal. .      Elevated lfts  (resolved)   2/2 infection improved. WNL 8/14.   -trend      Adjustment to disability:  continue to encourage psychology  FEN: 3 grams na  Bowel: continent- monitor  Bladder: trial of void.   DVT Prophylaxis: apixaban  GI Prophylaxis: none  Code: full   Disposition: goal for home   ELOS: 8/26/23.   Follow up Appointments on Discharge:  Cardiology, primary care, nephrology ([facility])    Discussed with team. Continue cares and plans outlined.    Thierno Fan MD

## 2023-08-20 NOTE — PLAN OF CARE
Goal Outcome Evaluation:    Overall Patient Progress: no change    Outcome Evaluation: No change in Pt progress this shift.    Pt is alert and oriented. Continent of B&B. [facility] 8/19. Ax1 walker. Denied pain, SOB, CP, and n/t. Call light within reach. Pt is able to make needs known. LUE PIV is saline locked and WDL. BG was 149. Will continue with POC.

## 2023-08-20 NOTE — PLAN OF CARE
Discharge Planner Post-Acute Rehab PT:      Discharge Plan: potential to live with sibling for short transition?     Precautions: fall precautions, Lt BKA (has prosthesis), sacral wound, Rt shoulder compression sleeve/bandage near posterior axilla area     Current Status:  Bed Mobility: Mod I  Transfer: CGA-ModA w/ FWW depending on surface height   Gait: up to 2x ~40ft with walker and CGA, WC brought close behind  Stairs: NA  Balance: decreased stability w/o UE support in standing.     Assessment: Pt feeling defeated with continued medical set backs and feels something is not right.  Mood improves with outside activity.  Decreased overall activity 2/2 medical set backs.    Pt in tilt-n-space wheelchair and can be reclined for increased comfort when OOB.     Other Barriers to Discharge (DME, Family Training, etc): unspecified prior residence, reports living with various friends or out of car

## 2023-08-20 NOTE — PLAN OF CARE
Goal Outcome Evaluation:      Plan of Care Reviewed With: patient    Overall Patient Progress: improvingOverall Patient Progress: improving    Patient is alert and oriented x 4. Able to use a call light and make his needs known. Assist of x 1 SPV for transfers. Continent of BB, LBM 8/19. Wears prothesis to the LLE. Denies any c/o pain at this time. Is diabetic see flowsheet. Nursing staff will continue with poc.

## 2023-08-20 NOTE — PLAN OF CARE
"  VS: BP 90/49 (BP Location: Left leg, Patient Position: Semi-Escamilla's, Cuff Size: Adult Regular)   Pulse 73   Temp 98.1  F (36.7  C) (Oral)   Resp 16   Ht 1.905 m (6' 3\")   Wt 98.6 kg (217 lb 6.4 oz)   SpO2 100%   BMI 27.17 kg/m     O2: 100% on RA.   Output: Pt continent of B&B.   Last BM: 08/19/23   Activity: A1 with walker   Skin: R arm edema, R hand has very small open area w/mepilex.   Pain: Denies   CMS: AOX4. Denies SOB, CP, numbness, dizziness, tingling.    Dressing: CDI   Diet: Regular diet, thin liquids, takes pills whole with water   LDA: L PIV SL.   Plan: Continue POC   Additional Info: PA put in palative care consult for pt related to pt's depression and daily life.                          " Patient called, left message, stating he was returning your call and asked for a call back at [phone].

## 2023-08-20 NOTE — PROGRESS NOTES
Pt attended Falls Prevention class today with group of 5 patients. Pt selected for class due to documented gait deficit and falls risk. Class includes education in falls risks, how to decrease that risk through behavior and home modifications and energy conservation; and instruction in available equipment designed to increase home safety. Pt was able to verbalize understanding of materials and participated appropriately in the discussion and problem-solving segments of the class.   Pt was instructed he will be quizzed later in order to demonstrate comprehension of material.

## 2023-08-21 NOTE — PROGRESS NOTES
Nephrology Progress Note  08/21/2023         Assessment & Recommendations:   63 year old male with PMHx of CAD on Brilinta, chronic systolic CHF with EF of 30-35%, CKD 3, left foot osteomyelitis s/p left BKA and DM 2.  Presented to [facility] 7/17/2023 with septic shocksetting of group a strep bacteremia with right shoulder myositis , NSTEMI, JOHANA, A-fib with RVR.  Transfer to acute rehab 8/11 for ongoing rehabilitation, now has slowly worsening serum creatinine for which nephrology has been consulted     # JOHANA on CKD -received HD from 7/21-24 and 7/29-8/4 for prior JOHANA during this admission  # DM type 2  # MGUS   # NSTEMI s/p stent placements  # Ischemic CMP - EF 25-30%  # Afib - now on metoprolol   # s/p BKA   # Anemia     See consult note from 8/18/23 for details. He did have JOHANA initially at [facility] and required 4 HD treatments before kidneys began to recover. However, the etiology of his recent worsening kidney function is not entirely clear.  Work-up including SPEP with immunofixation, free light chains, and glomerulonephritis work-up is currently pending.  While doing a kidney biopsy may help establish the etiology of his kidney disease, it was not previously recommended given that he has had chronic kidney disease for a number of years and that he is on dual antiplatelet, anticoagulation and the risk of bleeding is very high.  Recommendations were communicated to primary team via verbally and this note.     - strict I/Os  - ordered IV Venofer 100 mg daily x 5 treatments. First dose given 8/21/23.   - will continue to monitor for recovery.   - consider diuretics tomorrow     Discussed with Dr. Manny ESPINO, PAPatriciaC     Interval History :   Provider and nursing notes reviewed from past 24 hours. Scr up to 4. He had 700 ml of UOP yesterday. 200 mls since midnight. He has not had much oral intake. He has abdominal pain, nausea. No v/d. He feels tired. Denies shortness of breath, notes some LE  edema    Review of Systems:   A 4 point review of systems was negative except as noted above.    Physical Exam:   I/O last 3 completed shifts:  In: 240 [P.O.:240]  Out: 525 [Urine:525]  Vitals: /64   Pulse 71  Resp 16  SpO2 100%  GENERAL APPEARANCE: alert and no distress  EYES:  no scleral icterus, pupils equal  PULM: lungs clear to auscultation  CV: regular rhythm, normal rate, no rub     -edema 1+ LE edema   GI: soft, nontender, noindistended  NEURO: mentation intact and speech normal, no asterixis       Labs:   All labs reviewed by me  Electrolytes/Renal -   Recent Labs   Lab Test 08/21/23  1306 08/21/23  1051 08/21/23  0823 08/21/23  0820 08/20/23  1012 08/20/23  0649 08/19/23  0721 08/19/23  0623 08/18/23  0819 08/18/23  0522 08/16/23  1150 08/16/23  1119 08/15/23  0929 08/15/23  0924   NA  --   --   --  129*  --  129*  --  130*  --  132*   < > 126*  --  130*   POTASSIUM  --   --   --  4.1  --  3.5  3.5  --  3.5  --  3.8   < > 3.9  --  3.6  3.6   CHLORIDE  --   --   --  92*  --  93*  --  94*  --  95*   < > 88*  --  93*   CO2  --   --   --  23  --  21*  --  23  --  26   < > 25  --  25   BUN  --   --   --  85.6*  --  81.3*  --  80.1*  --  75.6*   < > 71.8*  --  67.9*   CR  --   --   --  4.00*  --  3.27*  --  3.29*  --  3.12*   < > 2.77*  --  2.53*   GLC 68* 74 69* 81   < > 105*   < > 158*   < > 113*   < > 200*   < > 142*   YOSVANY  --   --   --  9.1  --  8.9  --  9.1  --  9.4   < > 9.4  --  9.5   MAG  --   --   --   --   --   --   --   --   --  2.1  --  2.0  --  2.0   PHOS  --   --   --   --   --   --   --   --   --  3.4  --  3.3  --  3.0    < > = values in this interval not displayed.       CBC -   Recent Labs   Lab Test 08/21/23  0820 08/17/23  0634 08/16/23  1119   WBC 9.1 7.2 7.9   HGB 7.7* 8.2* 8.0*    286 298       LFTs -   Recent Labs   Lab Test 08/16/23  1119 08/14/23  0634 07/31/23  0650 07/25/23  0442   ALKPHOS 109 95  --  239*   BILITOTAL 0.8 0.8  --  1.5*   ALT 17 14  --  41   AST 28 24  --   45   PROTTOTAL 8.4* 8.4*  --  6.5   ALBUMIN 3.2* 3.2* 3.6 2.4*       Iron Panel -   Recent Labs   Lab Test 08/13/23  1113 07/31/23  0650   IRON 22* 38*   IRONSAT 13* 29   AFSHAN 667* 399         Imaging:  Reviewed      Current Medications:   amiodarone  200 mg Oral Daily    apixaban ANTICOAGULANT  5 mg Oral BID    atorvastatin  80 mg Oral QPM    chlorhexidine   Topical Weekly    [Held by provider] furosemide  40 mg Oral BID    insulin aspart   Subcutaneous TID w/meals    insulin aspart  1-7 Units Subcutaneous TID AC    insulin aspart  1-5 Units Subcutaneous At Bedtime    insulin glargine  20 Units Subcutaneous At Bedtime    iron sucrose  100 mg Intravenous Once    metoprolol tartrate  25 mg Oral BID    miconazole   Topical BID    multivitamin, therapeutic  1 tablet Oral Daily    pantoprazole  40 mg Oral BID AC    selenium sulfide   Topical BID    sodium chloride (PF)  3 mL Intracatheter Q8H    ticagrelor  90 mg Oral BID    vitamin C  500 mg Oral Daily      - MEDICATION INSTRUCTIONS -       CLAUDIO ESPINO, EDGAR

## 2023-08-21 NOTE — PLAN OF CARE
Discharge Planner Post-Acute Rehab PT:      Discharge Plan: potential to live with sibling for short transition?     Precautions: fall precautions, Lt BKA (has prosthesis), sacral wound, Rt shoulder compression sleeve/bandage near posterior axilla area     Current Status:  Bed Mobility: Mod I  Transfer: CGA-ModA w/ FWW depending on surface height   Gait: up to 2x ~40ft with walker and CGA, WC brought close behind  Stairs: NA  Balance: decreased stability w/o UE support in standing.     Assessment: Pt with missed PT time today in setting of low BG, abdominal pain, several MD visits, and declining therapy. Pt brought new recliner, agreeable to mobility in order to trial this, pt reports improved comfort with this.      Other Barriers to Discharge (DME, Family Training, etc): unspecified prior residence, reports living with various friends or out of car

## 2023-08-21 NOTE — PROGRESS NOTES
"Nephrology Inpatient Visit Note    Reason for Visit  Acute on chronic renal failure    Summary  Per prior note: \"63 year old male with PMHx of CAD on Brilinta, chronic systolic CHF with EF of 30-35%, CKD 3, left foot osteomyelitis s/p left BKA and DM 2.  Presented to [facility] 7/17/2023 with septic shocksetting of group a strep bacteremia with right shoulder myositis , NSTEMI, JOHANA, A-fib with RVR.  Transfer to acute rehab 8/11 for ongoing rehabilitation, now has slowly worsening serum creatinine for which nephrology has been consulted\"     Subjective   The following portions of the patient's chart were reviewed: current medications, problem list, laboratory workup, and diagnostic data.    Patient was interviewed in his room. He is comfortable. No acute complaints.    Objective   Vital Signs  Blood pressure 105/58, pulse 68, temperature 97.7  F (36.5  C), temperature source Oral, resp. rate 16, height 1.905 m (6' 3\"), weight 98.6 kg (217 lb 6.4 oz), SpO2 100 %.  I/O last 3 completed shifts:  In: 243 [P.O.:240; I.V.:3]  Out: 300 [Urine:300]    Physical Exam   GENERAL: no distress,  awake  EYES: no scleral icterus, pupils equal  Lymphatics: no cervical or supraclavicular LAD  Pulmonary: lungs clear to auscultation with equal breath sounds bilaterally, no clubbing  CV: regular rhythm, normal rate, no rub. Bilateral lower ext edema is noted.  GI: soft, nontender, normal bowel sounds  MS: no evidence of inflammation in joints, no muscle tenderness  : no thakur  SKIN: no rash, warm, dry, no cyanosis     Diagnostic Data  The following portions of the patient's chart were reviewed: current medications, problem list, laboratory workup, and diagnostic data.    Lab Results   Component Value Date    WBC 7.2 08/17/2023    HGB 8.2 (L) 08/17/2023    HCT 25.3 (L) 08/17/2023    MCV 89 08/17/2023     08/17/2023     (L) 08/20/2023    BUN 81.3 (H) 08/20/2023    ANIONGAP 15 08/20/2023    ALBUMIN 3.2 (L) 08/16/2023 "       Assessment & Plan   # JOHANA on CKD -received HD from 7/21-24 and 7/29-8/4 for prior JOHANA during this admission  # DM type 2  # MGUS   # NSTEMI s/p stent placements  # Ischemic CMP - EF 25-30%  # Afib - now on metoprolol   # s/p BKA     See consult note from yesterday for details. However, the etiology of his recent worsening kidney function is not entirely clear.  Work-up including SPEP with immunofixation, free light chains, and glomerulonephritis work-up is currently pending.  While doing a kidney biopsy may help establish the etiology of his kidney disease, I would not recommend that given that he has had chronic kidney disease for a number of years and that he is on dual antiplatelet, anticoagulation and the risk of bleeding is very high.    At this point, I recommend to continue supportive care.  I discussed with the patient that he may need further dialysis down the road.  He is agreeable to it if it becomes indicated.  In the meantime, please ensure that all medications are renally adjusted.    Pj Ruvalcaba MD  Division of Nephrology and Hypertension  Pager [phone]  [employer]   [employer] ([email or web address])   [employer] ([employer])     The plan of care was discussed with the patient, nursing staff, and primary service. Total time is 35 minutes. More than 50 percent of my time was spent on counseling or coordination of care.
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
## 2023-08-21 NOTE — PROGRESS NOTES
[facility]    Medicine Progress Note - Hospitalist Service    Date of Admission:  8/11/2023    Assessment & Plan    Norman Aguilar is a 63 year old male with PMHx of CAD on Brilinta, chronic HFrEF (LVEF 30-35%), CKD stage III, DM2, Hx of left foot osteomyelitis s/p left BKA, who was admitted to [facility] 7/17/23 - 8/11/23 with septic shock 2/2 group A strep bacteremia from SSTI with hospital course c/b NSTEMI, JOHANA 2/2 ATN requiring temporary iHD, A-fib with RVR.  Transfer to acute rehab 8/11 for ongoing rehabilitation. Medicine consulted and following for medical co-management.     Today's plan:   - KUB with reports abdominal discomfort and distension  - Reglan for N/V, suspect patient could have possible gastroparesis (though Gastric emptying study normal in 2018)  - Start PPI for possible GERD/gastritis/PUD  - Trend hemoglobin closely with reported dark stools, especially while on DAPT and AC   - Nephrology following, awaiting recommendations about HD now that patient is agreeable  - Palliative care consult  - Decrease lantus by 50% to 20 Units daily with minimal PO intake today and persistent hypoglycemia    - Stop ASA 81 mg daily after discussion with cardiology     # Physical deconditioning  PT/OT continue to work with him twice per day and this is managed per primary PMR team. He is currently able with assistance to mobilize to a bedside commode, is sitting in a wheelchair much of the day.      # Acute on chronic kidney disease stage III  # Severe ATN secondary to hypotension requiring multiple episodes of dialysis (last dialysis 8/4/23), resolved  He was initially on dialysis 7/21-7/24 and then this had to be resumed 7/29 sceondary to worsening anasarca and poor response to a lasix gtt. Last dialysis 8/4 and dialysis cath removed 8/10. Creatinine recent baseline 2.0-2.3 but has been steadily rising to 4.0 with elevated BUN 80s despite holding  diuretics. Previous UA with microscopic hematuria and proteinuria. SPEP during recent hospital stay with small monoclonal peak of 0.1 g/dL but no immunofixation completed. Work up this admission with repeat UA showing moderate blood. normal renal US negative for hydronephrosis. Normal C3/C4.   - Nephrology consulted and following   - Follow up ANCA, TANNER, SPEP/UPEP. Immunofixation and cryoglobulins do not appear to be added, added today.   - Discussed HD today, and patient agreeable with short term HD in hopes that his kidney function improves   - Awaiting if nephrology recommends pursing. No urgent needs for HD at this time   - Will possibly need kidney biopsy   - Holding diuretics   - Trend BMP daily   - Continue strict I/O, daily weights, daily electrolyte monitoring, dose adjust medications accordingly and avoid nephrotoxins    # Hyponatremia secondary to chronic diuretics and recent anasarca  Sodium was last normal in March 2023. Upon admission, sodium was 119 and has now resolved to 129 but continues to fluctuate.   -Check strict I/O, daily electrolytes      # Abdominal pain, distention   # N/V   Patient reporting increasing abdominal distension with poor PO intake and N/V. Having bowel movements daily. Exam benign without any peritoneal signs. Per nursing patient has had darker stools, and on DAPT and AC. Hgb only down slightly from baseline at 7.7. Suspect gastritis/PUD vs gastroparesis with DM (though gastric emptying study normal in 2018) vs ileus. Low suspicion for SBO.  Denies any diarrhea or infectious symptoms. Patient could also be developing uremia with worsening kidney function.   - KUB  - PPI BID  - Reglan PRN nausea      # NSTEMI w/ ICM  # Severe CAD s/p 2 JESSICA to RCA 8/2022  # HFrEF (LVEF 25-30%)  Hx of NSTEMI with last Angiogram 8/23/2022 with significant multivessel disease with JESSICA placed in RCA, continues on DAPT with ASA/brilliant. Patient was planned to have repeat PCI or CABG but not done  2/2 recent osteomyelitis and L BKA.  Also full anticoagulated on eliquis. C/b ischemic cardiomyopathy with HFrEF. ECHO EF 25-30% with decreased LV function. Recent hospital stay with sepsis c/b CHF exacerbation and afib with RVR.  Cardiology followed while inpatient at [facility] and recommended follow up while at [facility]. [facility] saw him on 8/17/23 and per recommendations, no angiogram is needed at this time. Discussed with cardiology today, who recommended now that patient > 1 year from JESSICA placement to stop ASA and continue brilinta while on full AC.   - Stop ASA   - Continue Brilinta 90mg twice daily and atorvastatin 80 mg daily   - HOLD Furosemide 40mg twice daily with rising Cr  - Not on ACE-I, aldosterone antagonist with GDMT per cardiology  - Close follow up with cardiology in outpatient     # Paroxsymal Afib w/ RVR, [id number] for HTN, CHF, DM and vascular disease. Rate controlled with metoprolol XL 50 mg daily. AC with eliquis. Recent hospital stay with sepsis c/b CHF exacerbation and afib with RVR.  Converted from AF to NSR after amiodarone load. Cardiology seen as noted above.   - Continue metoprolol 25 mg BID with holding parameters and Amiodarone 200mg once daily  - Continue Apixaban 5mg twice daily, renally dose if start HD to 2.5 mg BID     # Anemia of chronic disease  Likely multifactorial with anemia of chronic disease (elevated ferritin) and CKD. Received venofer with low iron levels and 5 units PRBC while inpatient. Hemoglobin baseline 7.0-8.0s. Current at 7.7.  - Monitor closely for bleeding   - Continue to monitor and transfuse to maintain Hgb of 7 or greater. No active ischemia and worry about volume overload with worsening kidney function      # DM2 (Hb A1c 6.9 on 7/17/23)  # Episodes of hypoglycemia    PTA lantus 38 units daily with sliding scale insulin Aspart QID and carb coverage 1U per 10g CHO at meals  - Decrease lantus to 20 units daily with poor PO intake and multiple  "episodes of hypoglycemia   - Nursing to hold prandial insulin while not eating   - hypoglycemia protocol  - Declined DM consult, has managed his Diabetes well for 30 years      # S/p L BKA  -Prosthetic leg in room, pt uses alcohol spray to knee prior to placing. OK for family to bring from home.      # Home insecurity  Norman was very worried about his finances and finding a safe place for him to go to as he states he was forced to sell his trailer home due to the landlord selling off the property. He hopes to go to a facility in [place] which would be nearer to his family.   -Social work is working on finding a safe place to go for him once his safe for discharge      # Goals of care discussion  # Depression  Mr. Aguilar  has shown signs of depression throughout his stay at acute rehab but continues to be resistant to talking to a psychiatrist or taking any medication for depression.  patient reporting feeling \"'overwhelmed and not wanting any more aggressive medical care if this is how life is going to be.  I just want to sit by a lake and listen to the waves lap on the shore.\"  After discussion with medicine and palliative care, patient is agreeable to HD and restorative care. He does not want to live in a facility long term   - Palliative care consult, appreciate recommendations   - Restorative without limits, okay to start dialysis per patient   -Continue to encourage discussions with a therapist or Psychiatry, but the patient is still resistant      ----- Resolved or Stable Hospital Conditions -----  # Septic shock secondary to Group A strep bacteremia (7/17/23) from R shoulder dermatitis, resolved  # Myositis to right shoulder with related pain, improving  Presented in septic shock requiring pressors found to have Group A strep bacteremia likely 2/2 complicated skin and soft tissue infection over the R shoulder with MRI showing likely inflammatory vs infectious myositis. CK significantly elevated " "and normalized on trend. Completed 2 week course of ceftriaxone and linezolid on 7/27/23 with clinical improvement. RUE US was negative for DVT.   - WOCN consult and per recommendations, he continues with a Hibiclens wash to the right shoulder weekly and selenium sulfide 2.5% lotion twice daily as needed for irritation    # Transaminitis secondary to shock liver and myositis, resolved- Liver enzymes were initially elevated in the 200's and have now resolved to normal and felt to be secondary to shock liver         Diet: Combination Diet 3 gm NA Diet  Snacks/Supplements Adult: Other; Please allow pt to order any snack/supplement PRN - do not send automatically; Between Meals    DVT Prophylaxis: DOAC  Garcia Catheter: Not present  Lines: None     Cardiac Monitoring: None  Code Status: Full Code      Clinically Significant Risk Factors         # Hyponatremia: Lowest Na = 129 mmol/L in last 2 days, will monitor as appropriate      # Hypoalbuminemia: Lowest albumin = 3.2 g/dL at 8/16/2023 11:19 AM, will monitor as appropriate       # Hypertension: Noted on problem list    # Chronic heart failure with reduced ejection fraction: last echo with EF <40%      # DMII: A1C = 6.9 % (Ref range: <5.7 %) within past 6 months   # Overweight: Estimated body mass index is 27.79 kg/m  as calculated from the following:    Height as of this encounter: 1.905 m (6' 3\").    Weight as of this encounter: 100.8 kg (222 lb 4.8 oz).     # Moderate Malnutrition: based on nutrition assessment      # Housing Instability: noted in nursing assessment         Disposition Plan    Per primary team      The patient's care was discussed with the Attending Physician, Dr. Mena Martínez, Bedside Nurse, Patient, and Primary team. Medicine will continue to follow.     Jaci Flores PA-C  Hospitalist Service  [facility]  Securely message with [employer] (more info)  Text page via [facility] Paging/Directory " "  ______________________________________________________________________    Interval History   Patient reports having increased abdominal discomfort and distention, with associated nausea and vomiting in the last day.  Reports having regular bowel movements with at least 1 bowel movement a day.  Denies any black or bloody stools.  Per nursing though they have noted a darker color of stool.  Patient reports a longstanding history of a \"long colon\" and has been seen by GI for this.  Patient is worried he is on too many blood thinners and is bleeding from his fingers.  Denies picking at his fingers or cuticles.     After discussion of his kidney function worsening, patient is agreeable to getting a dialysis line placed and starting dialysis in the short-term.  He is still not sure if he wants to proceed with dialysis in the long-term.  He is hopeful that his kidney function improves.  Patient states he is urinating well without difficulty.  Denies any dysuria or hematuria.    Patient denies any shortness of breath though states he has a hard time taking a deep breath due to his belly feeling distended.  Denies any chest pain.  Reports swelling in his right upper arms when he arrived to the hospital initially with his infection.  States the swelling has improved significantly.    Physical Exam   Vital Signs: Temp: 97.1  F (36.2  C) Temp src: Oral BP: 115/64 Pulse: 71   Resp: 16 SpO2: 100 % O2 Device: None (Room air)    Weight: 222 lbs 4.8 oz  GENERAL: Alert and awake. Answering questions appropriately. NAD. Pleasant and conversational. Chronically ill appearing.   HEENT: Anicteric sclera. Mucous membranes moist   CARDIOVASCULAR: RRR. S1, S2. No murmurs, rubs, or gallops.   RESPIRATORY: Effort normal on RA. Clear to auscultation bilaterally, no rales, rhonchi or wheezes  GI: Abdomen moderately distended, soft, non-tender abdomen without rebound or guarding, Hypoactive bowel sounds present  MUSCULOSKELETAL: L BKA. "   EXTREMITIES: +1 pitting edema in RLE and RUE. No overlying erythema.   NEUROLOGICAL:  CN II-XII grossly intact. Moving all extremities symmetrically.   SKIN: Intact. Warm and dry. No jaundice. Mild oozing of blood around cuticles and nails on hands bilaterally.     Medical Decision Making       60 MINUTES SPENT BY ME on the date of service doing chart review, history, exam, documentation & further activities per the note.      Data     I have personally reviewed the following data over the past 24 hrs:    9.1  \   7.7 (L)   / 238     129 (L) 92 (L) 85.6 (H) /  68 (L)   4.1 23 4.00 (H) \     Imaging results reviewed over the past 24 hrs:   No results found for this or any previous visit (from the past 24 hour(s)).

## 2023-08-21 NOTE — PLAN OF CARE
"  VS: BP 98/52 (BP Location: Left arm)   Pulse 69   Temp 98.3  F (36.8  C) (Oral)   Resp 20   Ht 1.905 m (6' 3\")   Wt 100.8 kg (222 lb 4.8 oz)   SpO2 100%   BMI 27.79 kg/m       O2: 100% on RA.   Output: Pt is continent of B&B. Pt voids spontaneously and without issue in bathroom.   Last BM: 08/21/23   Activity: A1 w/ walker and GB.   Skin: Edema R arm and leg.   Pain: Pt complained of abdominal pain.   CMS: AOX4. Pt denied new numbness, tingling, CP, SOB.    Dressing: CDI   Diet: Regular diet. Pt only ordered dinner. Pt did not eat breakfast or lunch. Writer and NST's tried to have pt eat today. Pt refused everything from kitchen and did  not want crackers, toast, or any foods available on unit.    LDA: L PIV infusing iron.    Equipment: Recliner, IV pole   Plan: Continue POC   Additional Info: Pt comes across very depressed. When asked if writer could do anything more for pt, pt stated that he just wanted to be shot in the head. Primary care team aware of statements. Pt not eating and BG was low. BG 63. Asked pt to drink oj and eat something. Pt drank some oj. BG recheck 69. Both writer and NST tried to offer pt food from floor stock and offered to order food. Pt refused. Provider notified. Next BG check 74, then 68. Provider notified. Pt still not eating lunch. Provider asked writer to administer dextrose bolus. BG raised to 197. Pre-dinner BG check 108. Pt finally ordered food but stated he would keep it to nibble on through out evening.         Writer did not administer short acting insulin from 9079-1840 shift. Provider said to not give short acting insulin as well. Pt seen by palliative care this AM. See note.         Writer tried to encourage pt this shift to eat, participate in therapies, actively listen to pt's issues. Writer appreciates care team, especially the NST's, help.                          "

## 2023-08-21 NOTE — PLAN OF CARE
Goal Outcome Evaluation:      Plan of Care Reviewed With: patient    Overall Patient Progress: improvingOverall Patient Progress: improving    Patient is alert and oriented x 4. Able to use a call light and make his needs known. Assist of x 1 SPV for transfers. Continent of BB, LBM 8/20. Wears prothesis to the LLE. Reported some abdominal discomfort and was given ginger ale with relief. Is diabetic see flowsheet. Nursing staff will continue with poc.

## 2023-08-21 NOTE — PROGRESS NOTES
[facility] declined. Called and left  for admissions to check on referral to [facility]. Additional referrals sent to: [facility], [facility], [facility], and [facility].     Pt updated. SW will remain available and continue to follow. Will update IDT and pt once more information obtained.     ADDENDUM: [facility], [facility], & [facility] all declined. Will follow-up on pending referrals and send additional.     Jaelyn Cam [facility]   Direct Phone: [phone]  I   Pager: [phone]  I  Fax: [phone]

## 2023-08-22 PROBLEM — N18.6 ESRD (END STAGE RENAL DISEASE) (H): Status: ACTIVE | Noted: 2023-01-01

## 2023-08-22 NOTE — PROGRESS NOTES
Nephrology Progress Note  08/22/2023       63 year old male with PMHx of CAD on Brilinta, chronic systolic CHF with EF of 30-35%, CKD 3, left foot osteomyelitis s/p left BKA and DM 2.  Presented to [facility] 7/17/2023 with septic shocksetting of group a strep bacteremia with right shoulder myositis , NSTEMI, JOHANA, A-fib with RVR.  Transfer to acute rehab 8/11 for ongoing rehabilitation, now has slowly worsening serum creatinine for which nephrology has been consulted     Interval History :   Mr Aguilar's Cr continues to slowly worsen, up from 4.0=>4.3 in the past 48h.  He is mildly symptomatic with nausea, fatigue and some fluid retention over the past ~week.  Given hyaline casts on UA I suspect this is hypoperfusion with his SBP's of 90's recently despite holding his antihypertensives.  Not considering biopsy as he has dual plt therapy for JESSICA and would not likely , I am rechecking UA and UPCR.      Given his symptomatic low gfr and no reversible issue it is reasonable to consider re-initiation of RRT, I did convey he is likely to be on RRT long term which he understood.  Logistically this is not very feasible at [facility] and he is not able to participate in therapy due to his nausea/fatigue so would favor admission.  His preference is to be admitted to [facility] so we are working on getting him there as soon as reasonable, no emergency issue in his chemistries.      Assessment & Recommendations:   JOHANA on CKD vs progression of CKD3-Baseline Cr 1.4-1.7 prior to sepsis in mid July 2023 when he was started on HD acutely in the hospital.  Admitted to TCU on 8/11 off of RRT with Cr of ~2.2-2.5 but up acutely to ~4 in the past ~week without clear cause other than some borderline BP's. UA showed hyaline casts on 8/17, I am rechecking UA and UPCR today to see if there is anything active but I suspect this is a low level hemodynamic injury likely due to his low EF.  Has not responded to some IVF, has  "gained ~2kg in the time of Cr rise.  I also suspect his low muscle mass with prolonged hospitalization and LLE amputation his Cr is overestimating his gfr.      Functionally, he is more tired, is nauseous and not able to participate in PT/OT today.  Given this, would agree with pt and team to transfer to hospital to evaluate for re-initiation of RRT.  There is no emergency issue in his chemistries and is still making reasonable UOP (particularly given his minimal intake).        Volume status-Has BLE edema +2, BP's borderline with SBP's 90's over the past week correlating well with Cr rise.  No acute event, has known low EF.      Electrolytes/pH-K 4.4, bicarb 23, Na 128, slowly downtrending.  On fluid restriction.      Ca/phos/pth-Ca 9.2, Mg and Phos mildly up consistent with poor clearance.      Anemia-Hgb ~8, iron loaded after low iron sats on 8/13, not started on RAMÍREZ yet.      Nutrition-Poor PO intake, nauseous.      Time spent: 40 minutes on this date of encounter for chart review, physical exam, medical decision making and co-ordination of care.     Recommendations were communicated to primary team via verbal communication.     JUNI Khan CNS  Clinical Nurse Specialist  [phone]      Review of Systems:   I reviewed the following systems:  Gen: No fevers or chills  CV: No CP at rest  Resp: No SOB at rest  GI: Mild nausea, slowly worsening.      Physical Exam:   I/O last 3 completed shifts:  In: 220 [P.O.:220]  Out: 75 [Urine:75]   BP 96/43 (BP Location: Left arm)   Pulse 68   Temp 98  F (36.7  C) (Oral)   Resp 16   Ht 1.905 m (6' 3\")   Wt 100.9 kg (222 lb 6.4 oz)   SpO2 100%   BMI 27.80 kg/m       GENERAL APPEARANCE: alert and no distress  EYES:  no scleral icterus, pupils equal  PULM: lungs clear to auscultation  CV: regular rhythm, normal rate, no rub     -edema 1+ LE edema   GI: soft, nontender, noindistended  NEURO: mentation intact and speech normal, no asterixis     Labs:   All labs " reviewed by me  Electrolytes/Renal -   Recent Labs   Lab Test 08/22/23  0754 08/22/23  0620 08/22/23  0606 08/22/23  0604 08/21/23  0823 08/21/23  0820 08/20/23  1012 08/20/23  0649 08/18/23  0819 08/18/23  0522 08/16/23  1150 08/16/23  1119   NA  --   --   --  128*  --  129*  --  129*   < > 132*   < > 126*   POTASSIUM  --   --   --  4.4  --  4.1  --  3.5  3.5   < > 3.8   < > 3.9   CHLORIDE  --   --   --  90*  --  92*  --  93*   < > 95*   < > 88*   CO2  --   --   --  23  --  23  --  21*   < > 26   < > 25   BUN  --   --   --  89.5*  --  85.6*  --  81.3*   < > 75.6*   < > 71.8*   CR  --   --   --  4.35*  --  4.00*  --  3.27*   < > 3.12*   < > 2.77*   GLC 88  --  76 78   < > 81   < > 105*   < > 113*   < > 200*   YOSVANY  --   --   --  9.2  --  9.1  --  8.9   < > 9.4   < > 9.4   MAG  --   --   --  3.0*  --   --   --   --   --  2.1  --  2.0   PHOS  --  4.8*  --   --   --   --   --   --   --  3.4  --  3.3    < > = values in this interval not displayed.       CBC -   Recent Labs   Lab Test 08/22/23  0604 08/21/23  0820 08/17/23  0634   WBC 7.5 9.1 7.2   HGB 8.0* 7.7* 8.2*    238 286       LFTs -   Recent Labs   Lab Test 08/16/23  1119 08/14/23  0634 07/31/23  0650 07/25/23  0442   ALKPHOS 109 95  --  239*   BILITOTAL 0.8 0.8  --  1.5*   ALT 17 14  --  41   AST 28 24  --  45   PROTTOTAL 8.4* 8.4*  --  6.5   ALBUMIN 3.2* 3.2* 3.6 2.4*       Iron Panel -   Recent Labs   Lab Test 08/13/23  1113 07/31/23  0650   IRON 22* 38*   IRONSAT 13* 29   AFSHAN 667* 399           Current Medications:   amiodarone  200 mg Oral Daily    apixaban ANTICOAGULANT  2.5 mg Oral BID    atorvastatin  80 mg Oral QPM    chlorhexidine   Topical Weekly    [Held by provider] furosemide  40 mg Oral BID    insulin aspart   Subcutaneous TID w/meals    insulin aspart  1-7 Units Subcutaneous TID AC    insulin aspart  1-5 Units Subcutaneous At Bedtime    insulin glargine  15 Units Subcutaneous At Bedtime    metoprolol tartrate  12.5 mg Oral BID    miconazole    Topical BID    multivitamin, therapeutic  1 tablet Oral Daily    pantoprazole  40 mg Oral BID AC    selenium sulfide   Topical BID    sodium chloride (PF)  3 mL Intracatheter Q8H    ticagrelor  90 mg Oral BID    vitamin C  500 mg Oral Daily      - MEDICATION INSTRUCTIONS -

## 2023-08-22 NOTE — PROGRESS NOTES
[facility]    Medicine Progress Note - Hospitalist Service    Date of Admission:  8/11/2023    Assessment & Plan    Norman Aguilar is a 63 year old male with PMHx of CAD on Brilinta, chronic HFrEF (LVEF 30-35%), CKD stage III, DM2, Hx of left foot osteomyelitis s/p left BKA, who was admitted to [facility] 7/17/23 - 8/11/23 with septic shock 2/2 group A strep bacteremia from SSTI with hospital course c/b NSTEMI, JOHANA 2/2 ATN requiring temporary iHD, A-fib with RVR.  Transfer to acute rehab 8/11 for ongoing rehabilitation. Medicine consulted and following for medical co-management.     Today's plan:   - Decrease metoprolol with soft BP   - Decrease eliquis 2.5 mg BID renal dosing   - Recommend transfer back to the hospital for JOHANA, and likely uremia causing symptoms. Likely will need to have HD in coming days     # Physical deconditioning  PT/OT continue to work with him twice per day and this is managed per primary PMR team. He is currently able with assistance to mobilize to a bedside commode, is sitting in a wheelchair much of the day.      # Acute on chronic kidney disease stage III  # Severe ATN secondary to hypotension requiring multiple episodes of dialysis (last dialysis 8/4/23), resolved  He was initially on dialysis 7/21-7/24 and then this had to be resumed 7/29 sceondary to worsening anasarca and poor response to a lasix gtt. Last dialysis 8/4 and dialysis cath removed 8/10. Creatinine recent baseline 2.0-2.3 but has been steadily rising > 4.0 with elevated BUN 80s despite holding diuretics. Previous UA with microscopic hematuria and proteinuria. SPEP during recent hospital stay with small monoclonal peak of 0.1 g/dL but no immunofixation completed. Work up this admission with repeat UA showing moderate blood. normal renal US negative for hydronephrosis. Normal C3/C4. ANCA and TANNER negative. Creatinine continues to rise with 200 ml urine output  concerning for developing anuria. Poor appetite, nausea and fatigue with elevated BUN concerning for developing uremia. Will likely require HD in coming days. No urgent need currently.   - Nephrology consulted and following   - Follow up SPEP/UPEP. Serum and urine Immunofixation and cryoglobulins    - Discussed HD today, and patient agreeable with short term HD in hopes that his kidney function improves  - Will possibly need kidney biopsy   - Holding diuretics   - Trend BMP daily, Magnesium and phosphorus   - Continue strict I/O, daily weights, daily electrolyte monitoring, dose adjust medications accordingly and avoid nephrotoxins    # Hyponatremia secondary to chronic diuretics and recent anasarca  Sodium was last normal in March 2023. Upon admission, sodium was 119 and has now resolved to 129 but continues to fluctuate.   -Check strict I/O, daily electrolytes      # Abdominal pain, distention   # N/V   Patient reporting increasing abdominal distension with poor PO intake and N/V. Having bowel movements daily. Exam benign without any peritoneal signs. Per nursing patient has had darker stools, and on DAPT and AC. Hgb only down slightly from baseline at 7.7. Suspect gastritis/PUD vs gastroparesis with DM (though gastric emptying study normal in 2018) vs ileus. Low suspicion for SBO.  Denies any diarrhea or infectious symptoms. Patient could also be developing uremia with worsening kidney function.   - KUB without any signs of ileus of obstruction   - PPI BID  - Reglan PRN nausea      # NSTEMI w/ ICM  # Severe CAD s/p 2 JESSICA to RCA 8/2022  # HFrEF (LVEF 25-30%)  Hx of NSTEMI with last Angiogram 8/23/2022 with significant multivessel disease with JESSICA placed in RCA, continues on DAPT with ASA/brilliant. Patient was planned to have repeat PCI or CABG but not done 2/2 recent osteomyelitis and L BKA.  Also full anticoagulated on eliquis. C/b ischemic cardiomyopathy with HFrEF. ECHO EF 25-30% with decreased LV function.  Recent hospital stay with sepsis c/b CHF exacerbation and afib with RVR.  Cardiology followed while inpatient at [facility] and recommended follow up while at [facility]. [facility] saw him on 8/17/23 and per recommendations, no angiogram is needed at this time. Discussed with cardiology today, who recommended now that patient > 1 year from JESSICA placement to stop ASA and continue brilinta while on full AC.   - Stop ASA   - Continue Brilinta 90mg twice daily and atorvastatin 80 mg daily   - HOLD Furosemide 40mg twice daily with rising Cr,   - Not on ACE-I, aldosterone antagonist with GDMT per cardiology  - Close follow up with cardiology in outpatient     # Paroxsymal Afib w/ RVR, [id number] 4 for HTN, CHF, DM and vascular disease. Rate controlled with metoprolol XL 50 mg daily. AC with eliquis. Recent hospital stay with sepsis c/b CHF exacerbation and afib with RVR.  Converted from AF to NSR after amiodarone load. Cardiology seen as noted above.   - Decrease metoprolol 12.5 mg BID with holding parameters with soft BP   - Continue Amiodarone 200mg once daily  - Decrease Apixaban 2.5mg twice daily, renally dosed     # Anemia of chronic disease  Likely multifactorial with anemia of chronic disease (elevated ferritin) and CKD. Received venofer with low iron levels and 5 units PRBC while inpatient. Hemoglobin baseline 7.0-8.0s. Current at 8.0.   - Monitor closely for bleeding   - Continue to monitor and transfuse to maintain Hgb of 7 or greater. No active ischemia and worry about volume overload with worsening kidney function      # DM2 (Hb A1c 6.9 on 7/17/23)  # Episodes of hypoglycemia    PTA lantus 38 units daily with sliding scale insulin Aspart QID and carb coverage 1U per 10g CHO at meals  - Continue decreased lantus to 20 units daily with poor PO intake and multiple episodes of hypoglycemia   - Nursing to hold prandial insulin while not eating   - hypoglycemia protocol  - Declined DM consult, has managed his  "Diabetes well for 30 years      # S/p L BKA  -Prosthetic leg in room, pt uses alcohol spray to knee prior to placing. OK for family to bring from home.      # Home insecurity  Norman was very worried about his finances and finding a safe place for him to go to as he states he was forced to sell his trailer home due to the landlord selling off the property. He hopes to go to a facility in [place] which would be nearer to his family.   -Social work is working on finding a safe place to go for him once his safe for discharge    # Goals of care discussion  # Depression  Mr. Aguilar  has shown signs of depression throughout his stay at acute rehab but continues to be resistant to talking to a psychiatrist or taking any medication for depression.  patient reporting feeling \"'overwhelmed and not wanting any more aggressive medical care if this is how life is going to be.  I just want to sit by a lake and listen to the waves lap on the shore.\"  After discussion with medicine and palliative care, patient is agreeable to HD and restorative care. He does not want to live in a facility long term   - Palliative care consult, appreciate recommendations   - Restorative without limits, okay to start dialysis per patient   -Continue to encourage discussions with a therapist, health psychology or Psychiatry, but the patient is still resistant      ----- Resolved or Stable Hospital Conditions -----  # Septic shock secondary to Group A strep bacteremia (7/17/23) from R shoulder dermatitis, resolved  # Myositis to right shoulder with related pain, improving  Presented in septic shock requiring pressors found to have Group A strep bacteremia likely 2/2 complicated skin and soft tissue infection over the R shoulder with MRI showing likely inflammatory vs infectious myositis. CK significantly elevated and normalized on trend. Completed 2 week course of ceftriaxone and linezolid on 7/27/23 with clinical improvement. RUE  was " "negative for DVT.   - WOCN consult and per recommendations, he continues with a Hibiclens wash to the right shoulder weekly and selenium sulfide 2.5% lotion twice daily as needed for irritation    # Transaminitis secondary to shock liver and myositis, resolved- Liver enzymes were initially elevated in the 200's and have now resolved to normal and felt to be secondary to shock liver       Diet: Combination Diet 3 gm NA Diet  Snacks/Supplements Adult: Other; Please allow pt to order any snack/supplement PRN - do not send automatically; Between Meals    DVT Prophylaxis: DOAC  Garcia Catheter: Not present  Lines: None     Cardiac Monitoring: None  Code Status: Full Code      Clinically Significant Risk Factors         # Hyponatremia: Lowest Na = 128 mmol/L in last 2 days, will monitor as appropriate      # Hypoalbuminemia: Lowest albumin = 3.2 g/dL at 8/16/2023 11:19 AM, will monitor as appropriate       # Hypertension: Noted on problem list    # Chronic heart failure with reduced ejection fraction: last echo with EF <40%      # DMII: A1C = 6.9 % (Ref range: <5.7 %) within past 6 months   # Overweight: Estimated body mass index is 27.8 kg/m  as calculated from the following:    Height as of this encounter: 1.905 m (6' 3\").    Weight as of this encounter: 100.9 kg (222 lb 6.4 oz).     # Moderate Malnutrition: based on nutrition assessment      # Housing Instability: noted in nursing assessment         Disposition Plan    Per primary team      The patient's care was discussed with the Bedside Nurse, Patient, and Primary team. Medicine will continue to follow.     Jaci Flores PA-C  Hospitalist Service  [facility]  Securely message with "[employer],"aditya (more info)  Text page via [facility] Paging/Directory   ______________________________________________________________________    Interval History   Patient reports feeling unwell. States he was not able to sleep last night 2/2 upset " stomach and nausea with dry heaves. Feels very tired, and has no appetite. Reports minimal PO intake. Feels that his stomach is still distended, denies any abdominal pain. Has been having formed stools daily. Denies any black or bloody stools. Still having oozing from finger nails/cuticles. Patient feels like he is urinating normally, denies any dysuria, hematuria, or inability to empty bladder.     After discussion of his kidney function worsening, patient is agreeable to getting a dialysis line placed and starting dialysis in the short-term.  He is still not sure if he wants to proceed with dialysis in the long-term.  He is hopeful that his kidney function improves.  Patient states he is urinating well without difficulty.  Denies any dysuria or hematuria. States he felt SOB last night and anxious while lying down trying to sleep. Denies any SOB now or CP.     Physical Exam   Vital Signs: Temp: 98  F (36.7  C) Temp src: Oral BP: 96/43 Pulse: 68   Resp: 16 SpO2: 100 % O2 Device: None (Room air)    Weight: 222 lbs 6.4 oz  GENERAL: Alert and awake. Answering questions appropriately. NAD. Pleasant and conversational. Chronically ill appearing.   HEENT: Anicteric sclera. Mucous membranes moist   CARDIOVASCULAR: RRR. S1, S2. No murmurs, rubs, or gallops.   RESPIRATORY: Effort normal on RA. Clear to auscultation bilaterally, no rales, rhonchi or wheezes  GI: Abdomen moderately distended, soft, non-tender abdomen without rebound or guarding, Hypoactive bowel sounds present  MUSCULOSKELETAL: L BKA.   EXTREMITIES: +1 pitting edema in RLE and RUE. No overlying erythema.   NEUROLOGICAL:  CN II-XII grossly intact. Moving all extremities symmetrically. No asterixis. Mild resting tremor.   SKIN: Intact. Warm and dry. No jaundice. Mild oozing of blood around cuticles on R hand.     Medical Decision Making       60 MINUTES SPENT BY ME on the date of service doing chart review, history, exam, documentation & further activities per  the note.      Data     I have personally reviewed the following data over the past 24 hrs:    7.5  \   8.0 (L)   / 227     128 (L) 90 (L) 89.5 (H) /  88   4.4 23 4.35 (H) \     Imaging results reviewed over the past 24 hrs:   Recent Results (from the past 24 hour(s))   XR Abdomen Port 1 View    Narrative    EXAMINATION:  XR ABDOMEN PORT 1 VIEW 8/21/2023 2:28 PM     COMPARISON: CT 7/25/2023.    HISTORY: abdominal pain and distension with nausea and vomiting.    FINDINGS: Frontal view of the abdomen. No abnormally dilated loops of  bowel. Pelvic phleboliths. Degenerative changes of the visualized  spine and bilateral hips. Mild blunting of the right costophrenic  angle may represent trace effusion pleural thickening. Prominent  cardiac silhouette. Left costophrenic angle is not well seen. No  pneumatosis or portal venous gas.      Impression    IMPRESSION: No obstructive bowel gas pattern. Mild to moderate colonic  stool burden.    I have personally reviewed the examination and initial interpretation  and I agree with the findings.    JYOTHI LEMUS MD         SYSTEM ID:  [id number]

## 2023-08-22 NOTE — PROGRESS NOTES
[facility]   Acute Rehabilitation Unit  Daily progress note    INTERVAL HISTORY  Norman Aguilar seen in his room. Denies vomiting, fevers, sob, chest pain, heart palpitations, headaches, dysuria, Eating OK. Feels hopeless and fatigued from prolonged medical course.  Discussed with IM team patient open to restarting HD if needed as Cr continues to trend up. Nephro team following closely and may need HD run tomorrow.  Has family visiting from [place] and frustrated will miss visit for HD, but understands he needs to do it.     Functional  PT:  Bed Mobility: Mod I  Transfer: CGA-ModA w/ FWW depending on surface height   Gait: up to 2x ~40ft with walker and CGA, WC brought close behind  Stairs: NA  Balance: decreased stability w/o UE support in standing.     ROS: 10 point ROS neg other than the symptoms noted above in the HPI.    MEDICATIONS    amiodarone  200 mg Oral Daily     apixaban ANTICOAGULANT  5 mg Oral BID     atorvastatin  80 mg Oral QPM     chlorhexidine   Topical Weekly     [Held by provider] furosemide  40 mg Oral BID     insulin aspart   Subcutaneous TID w/meals     insulin aspart  1-7 Units Subcutaneous TID AC     insulin aspart  1-5 Units Subcutaneous At Bedtime     insulin glargine  20 Units Subcutaneous At Bedtime     metoprolol tartrate  25 mg Oral BID     miconazole   Topical BID     multivitamin, therapeutic  1 tablet Oral Daily     pantoprazole  40 mg Oral BID AC     selenium sulfide   Topical BID     sodium chloride (PF)  3 mL Intracatheter Q8H     ticagrelor  90 mg Oral BID     vitamin C  500 mg Oral Daily        sodium chloride 0.9%, acetaminophen, glucose **OR** dextrose **OR** glucagon, diphenhydrAMINE, EPINEPHrine, famotidine, insulin aspart, lidocaine 4%, lidocaine (buffered or not buffered), methylPREDNISolone, metoclopramide **OR** metoclopramide, - MEDICATION INSTRUCTIONS -, sodium chloride (PF), thrombin     PHYSICAL EXAM  BP 98/52 (BP  "Location: Left arm)   Pulse 69   Temp 98.3  F (36.8  C) (Oral)   Resp 20   Ht 1.905 m (6' 3\")   Wt 100.8 kg (222 lb 4.8 oz)   SpO2 100%   BMI 27.79 kg/m    Gen: nad, depressed, up in chair  HEENT: mmm, EOMI   Pulm: non labored clear diminished, symmetrical chest rise  CV: rrr  Abd: + BS, nontender  Ext:  LLE prosthesis, LUE without edema.  RLE with pitting edema without calf tenderness, right arm with swelling with tubigrip in place, hand swollen.   Neuro/MSK: alert speech clear, Bilateral HF 4/5.  Right KE 4/5, limited ROM by pain in right shoulder. Left Hip flexion 4/5, left upper 5/5.     Vitals:    08/18/23 2338 08/19/23 0500 08/21/23 0716   Weight: 99.5 kg (219 lb 4.8 oz) 98.6 kg (217 lb 6.4 oz) 100.8 kg (222 lb 4.8 oz)          LABS  CBC RESULTS:   Recent Labs   Lab Test 08/21/23  0820 08/17/23  0634 08/16/23  1119   WBC 9.1 7.2 7.9   RBC 2.68* 2.83* 2.74*   HGB 7.7* 8.2* 8.0*   HCT 24.0* 25.3* 24.7*   MCV 90 89 90   MCH 28.7 29.0 29.2   MCHC 32.1 32.4 32.4   RDW 17.2* 16.6* 16.7*    286 298     Last Basic Metabolic Panel:  Recent Labs   Lab Test 08/21/23  1636 08/21/23  1409 08/21/23  1306 08/21/23  0823 08/21/23  0820 08/20/23  1012 08/20/23  0649 08/19/23  0721 08/19/23  0623   NA  --   --   --   --  129*  --  129*  --  130*   POTASSIUM  --   --   --   --  4.1  --  3.5  3.5  --  3.5   CHLORIDE  --   --   --   --  92*  --  93*  --  94*   CO2  --   --   --   --  23  --  21*  --  23   ANIONGAP  --   --   --   --  14  --  15  --  13   * 197* 68*   < > 81   < > 105*   < > 158*   BUN  --   --   --   --  85.6*  --  81.3*  --  80.1*   CR  --   --   --   --  4.00*  --  3.27*  --  3.29*   GFRESTIMATED  --   --   --   --  16*  --  20*  --  20*   YOSVANY  --   --   --   --  9.1  --  8.9  --  9.1    < > = values in this interval not displayed.     Rehabilitation - continue comprehensive acute inpatient rehabilitation program with multidisciplinary approach including therapies, rehab nursing, and " physiatry following. See interval history for updates.      ASSESSMENT AND PLAN  Norman Aguilar is a 63-year-old man with history of CAD, chronic systolic CHF  EF of 30 to 35%, CKD 3, left foot osteomyelitis s/p left BKA, and DM 2 who presented 7/17/2023 with septic shock in setting of bacteremia secondary to right shoulder myositis, NSTEMI, JOHANA requiring dialysis and A-fib with RVR. Functionally noted to have impaired strength, impaired activity tolerance, impaired balance, admitted to rehab 8/11/23.      I appreciate hospitalist support for medial management     Debility   In setting of acute illness prolonged hospitalization  -continue PT/OT     Group A strep bacteremia  Right Shoulder Myositis  Originally admitted to ICU on pressors with complicated skin and soft tissue infection.  Positive for group A strep bacteremia.  Was having right shoulder pain, myositis seen on MRI.  Was during started 7/21 from pressure or swelling.  ID followed while in hospital.  Penicillin G changed to ceftriaxone 7/27, 2 weeks IV antibiotics complete.  Completed course of oral linezolid 7/27 if recurs-  ID would consider long term PCN suppression  -Hibiclens wash right shoulder weekly     NSTEMI w/ ICM  CAD with coronary stents-hx of  Afib w/ RVR  HFrEF  Hypotension-resolved   Cardiology followed while inpatient. Decompensated heart failure c/b hypotension, sepsis, fluid overload. Plan for coronary angiography after stabilization of infection and renal function. Converted from AF to NSR after amiodarone load. ECHO EF 25-30% with decreased LV function. -Continue PT ASA, Brilinta  -Continue Apixaban BID  -Continue amiodarone 200 mg daily daily  - hold lasix per hospitalist recs.   -consult cardiology see note by Dr. Colvin 8/17 for details.   -On metoprolol 25 mg bid     JOHANA on CKD3b  Baseline CKD 2/2 longstanding DM2, HTN. Cr range 2.06-2.26. severe ATN in setting of profound hypotension with sepsis, group A strep bacteremia  and hypovolemia, contrast nephropathy after CTA, and mild rhabdo. CTA on 7/17 with no hydronephrosis. dialysis 7/21-7/24 & 7/29-8/4. dialysis cath removed 8/10.  Cr 2.25 8/14/23--> 2.53 8/15/23--> 2.96 8/17-->3/13 8/20-4.0  diuresis held per hospitalist recs see note by OLYA Avelar PA-C for details  -consult nephrology: plan is for restarting HD and patient agrees additionally per note:  - strict I/Os  - ordered IV Venofer 100 mg daily x 5 treatments. First dose given 8/21/23.   - will continue to monitor for recovery.   - consider diuretics tomorrow    Hyponatremia  Initially 119 on admission to hospital. 8/15 130- appreciate ongoing support per hospitalist- diuresis per recs- see note by OLYA Avelar PA-C for details. Stable s/p 1 liter LR 8/16. Na 132 8/18  -Avoid excessive fluid intake  -trend Na    Anemia of chronic disease  2/2 critical illness, CKD, JOHANA, bacteremia. Received venofer and 5 units PRBC while inpatient. Epistaxis 8/16.  Hgb 8.2 8/17.   - trend CBC     DM2 (Hb A1c 6.9 on 7/17/23)  -Continue PTA lantus 35 units daily  -Carb coverage 1 unit per 10 g carbohydrates  -sliding scale insulin   -accu checks AC/HS/0200  -hypoglycemia protocol     Seborrheic Dermatitis  -continue Selenium      Urinary Retention  -remove thakur repeat trial of void 8/17 voiding with some retention, low uop since removal. .      Elevated lfts  (resolved)   2/2 infection improved. WNL 8/14.   -trend      1. Adjustment to disability:  continue to encourage psychology  2. FEN: 3 grams na  3. Bowel: continent- monitor  4. Bladder: trial of void.   5. DVT Prophylaxis: apixaban  6. GI Prophylaxis: none  7. Code: full   8. Disposition: goal for home   9. ELOS: 8/26/23.   10. Follow up Appointments on Discharge:  Cardiology, primary care, nephrology ([facility])        Sushant Orourke, DO        I spent a total of 35 minutes face to face and coordinating care of Norman Aguilar. Over 50% of my time on the unit was spent counseling  the patient and /or coordinating care regarding debility post prolonged medical course.

## 2023-08-22 NOTE — PROGRESS NOTES
"SPIRITUAL HEALTH SERVICES Progress Note  [facility] ([facility] [place] Acute Rehab    Saw pt Norman Aguilar per length of stay. I oriented pt to Spiritual Health Services, assessed spiritual needs, offered support.    Patient/Family Understanding of Illness and Goals of Care - Norman shared that he has \"perhaps some big decisions to make in the near future. Things aren't going as well as I had hoped for. I'm the kind of person who faces things pretty head on, and I'm realistic. I'm still hopeful, but things are pretty tough right now...\"     Distress and Loss - pt expressed disappointment that he is not making the progress he had hoped for; he shared that he has \"pretty strong feelings about what I would or wouldn't want - I'm a real outdoors type of margaret, so the thought of being in a nursing home is not something I would want...\"     Strengths, Coping, and Resources  - pt said he feels good about the way he has dealt with difficult health challenges up to this point, and that he has good support from family and friends. He appreciates having the chance to talk with family about goals of care.\"  I oriented pt to our Palliative Care service; that they could be of particular help with goals of care.    Meaning, Beliefs, and Spirituality - pt did not indicate any particular Restorationist tradition that is helpful for him; he mostly spoke of family support as being appreciated.     Plan of Care - no follow-up indicated at this time; pt knows he can request spiritual/emotional support from  at any time.     Ajay Marcus) Jigar Hull M.Div., [facility]  Staff   Pager [phone]    * [facility] remains available 24/7 for emergent requests/referrals, either by having the switchboard page the on-call  or by entering an ASAP/STAT consult in Epic (this will also page the on-call ). Routine Epic consults receive an initial response within 24 hours.*    "

## 2023-08-22 NOTE — PLAN OF CARE
Subjective:       Patient ID:  Franklin Perez is a 86 y.o. male who presents for   Chief Complaint   Patient presents with    Follow-up     scalp     History of Present Illness: The patient presents with chief complaint of spot.  Location: sclp  Duration: months  Signs/Symptoms: crusts    Prior treatments: removed  Dr Fernanedz  7/17  FINAL PATHOLOGIC DIAGNOSIS  1. Skin, mid scalp, shave biopsy:  - LICHENOID ACTINIC KERATOSIS WITH FOCAL TRANSITION TO SQUAMOUS CELL CARCINOMA IN SITU.  MICROSCOPIC DESCRIPTION: Sections show atypia and maturation disarray within the lowermost epidermal  layers and a band-like lymphocytic infiltrate associated with foci showing full-thickness atypia. The underlying  papillary dermis shows solar elastosis. Multiple levels were examined. The lesion is focally ulcerated with a brisk  associated mixed inflammatory infiltrate.  Diagnosed by: Vince Hartman M.D.  (Electronically Signed: 2017-07-25 15:53:35)          Review of Systems   Constitutional: Negative for fever.   Skin: Negative for itching and rash.   Hematologic/Lymphatic: Does not bruise/bleed easily.        Objective:    Physical Exam   Constitutional: He appears well-developed and well-nourished. No distress.   Neurological: He is alert and oriented to person, place, and time. He is not disoriented.   Psychiatric: He has a normal mood and affect.   Skin:   Areas Examined (abnormalities noted in diagram):   Scalp / Hair Palpated and Inspected  Head / Face Inspection Performed  Neck Inspection Performed  Chest / Axilla Inspection Performed  RUE Inspected  LUE Inspection Performed              Diagram Legend     Erythematous scaling macule/papule c/w actinic keratosis       Vascular papule c/w angioma      Pigmented verrucoid papule/plaque c/w seborrheic keratosis      Yellow umbilicated papule c/w sebaceous hyperplasia      Irregularly shaped tan macule c/w lentigo     1-2 mm smooth white papules consistent with Milia       Discharge Planner Post-Acute Rehab PT:      Discharge Plan: per chart, pt will be sent back to acute care for medical workup    Precautions: fall precautions, Lt BKA (has prosthesis), sacral wound, Rt shoulder compression sleeve/bandage near posterior axilla area     Current Status:  Bed Mobility: Mod I  Transfer: CGA-ModA w/ FWW depending on surface height  (today CGA from recliner, WC and NuStep with walker)  Gait: up to 2x ~40ft with walker and CGA, WC brought close behind (today ~4 ft with walker and CGA, limited by lightheadedness)  WC mobility: up to ~70 ft with SBA, including turning  Stairs: NA  Balance: decreased stability w/o UE support in standing.     Assessment: Per team plans for pt to return to acute care but OK to work with therapies as tolerated. Pt able to perform transfers with walker and CGA, participated in NuStep 3+4+3 min with 1.5-5.5 min rest between. WC mobility ~70 ft with SBA. Pt reports lightheadness during session, BP soft pre mobility, increased with NuStep. - 20 min during initial PM PT session & pt declined later PM session.      Other Barriers to Discharge (DME, Family Training, etc): unspecified prior residence, reports living with various friends or out of car   Movable subcutaneous cyst with punctum c/w epidermal inclusion cyst      Subcutaneous movable cyst c/w pilar cyst      Firm pink to brown papule c/w dermatofibroma      Pedunculated fleshy papule(s) c/w skin tag(s)      Evenly pigmented macule c/w junctional nevus     Mildly variegated pigmented, slightly irregular-bordered macule c/w mildly atypical nevus      Flesh colored to evenly pigmented papule c/w intradermal nevus       Pink pearly papule/plaque c/w basal cell carcinoma      Erythematous hyperkeratotic cursted plaque c/w SCC      Surgical scar with no sign of skin cancer recurrence      Open and closed comedones      Inflammatory papules and pustules      Verrucoid papule consistent consistent with wart     Erythematous eczematous patches and plaques     Dystrophic onycholytic nail with subungual debris c/w onychomycosis     Umbilicated papule    Erythematous-base heme-crusted tan verrucoid plaque consistent with inflamed seborrheic keratosis     Erythematous Silvery Scaling Plaque c/w Psoriasis     See annotation      Assessment / Plan:      Pathology Orders:      Normal Orders This Visit    Tissue Specimen To Pathology, Dermatology     Questions:    Directional Terms:  Other(comment)    Clinical information:  r/o scc    Specific Site:  scalp        Pyoderma  -     Aerobic culture    Neoplasm of uncertain behavior of skin  -     Tissue Specimen To Pathology, Dermatology  Shave biopsy performed after verbal consent including risk of infection, scar, recurrence, need for additional treatment of site. Area prepped with alcohol, anesthetized with approximately 1.0cc of 1% lidocaine with epinephrine. . Hemostasis achieved with  hyfrecation. No complications. Dressing applied. Wound care explained. Will need further rx if + ca      History of skin cancer  mnmsc             Return in about 3 months (around 3/5/2018).

## 2023-08-22 NOTE — PROGRESS NOTES
Additional referrals faxed this morning. Updated below. Will addend this note throughout the day with updated and update pt/IDT as needed.     ADDENDUM (12pm): Per PM&R PA, pt will be sent back to acute care for additional medical work-up. Rossr notified Admissions Supervisor that pt would benefit from TCU from acute care and not to return to ARU. SW called Erin Brannon Social Work Care Coordinator / [facility] PH: [phone], E: [email or web address] and provided discharge update. No additional SW needs at this time.   ---------------------------------------------------------------------------------------  PENDING:   -[facility]--Called and spoke with Summer in admissions. Referral received, not reviewed, will review and follow up, no beds available this week, and if accepted will add to wait list.    -[facility]--Called PH: [phone] opt 25 and left a vm with /admissions.   -[facility]--Sent today 08/22  -[facility]--Sent today 08/22  -[facility]--Sent today 08/22   -[facility]--Sent today 08/22    DECLINED:   [facility]-complex needs  [facility]-not taking pt's with MA for LTC   [facility]not [facility]  [facility]-bed not available   [facility]--Sent today 08/22 and declined due to no beds available  -[facility]--Sent today 08/22 and declined   -[facility]--Sent today 08/22 and declined due to no MA beds for TCU available     NAOMI Lopez   [facility]   Direct Phone: [phone]  I   Pager: [phone]  I  Fax: [phone]

## 2023-08-22 NOTE — PLAN OF CARE
"Discharge Planner Post-Acute Rehab OT:      Discharge Plan: TCU     Precautions: RUE pain and weakness s/p infection, hx of L BKA (uses prosthetic leg)     Current Status:  ADLs:  Mobility: CGA FWW with LLE prosthetic ambulation; wc longer distances. Recommend nsg wc hallway, CGA FWW ambulation with LLE prosthetic in room.   Grooming: Supervision with set up, seated  Dressing: FB SBA FWW including prosthetic, cues for safety  Bathing: Upon admission transfer Min A SPT wc <> ETB with grab bar. Mod A bathing A.  Toileting: CGA FWW to 24\" BS. Total A cares.  IADLs: Previously IND. TBD pending progress.  Vision/Cognition: Glasses. Cognition WNL, low mood and adjustment to disability.     Assessment: ADL modified to EOB due to pt not feeling well. Pt dizzy with standing to don prosthesis, BP 77/44. Nursing aware. -20    Other Barriers to Discharge (DME, Family Training, etc): Unstable housing situation at baseline. Targeting TCU placement where pt can utilize community resources for safe discharge plan.                                "

## 2023-08-22 NOTE — PROGRESS NOTES
A&O VSS on RA. BG 88/75. Pt called appropriately and cooperated with cares.     Urine sample not collected yet. Pt is on dialysis and does not void frequently.     Pt had visit from family in afternoon. He is ready to discharge when a bed opens up at [facility].

## 2023-08-22 NOTE — DISCHARGE SUMMARY
[facility]   Acute Rehabilitation Unit  Discharge summary     Date of Admission: 8/11/2023  Date of Discharge: 08/22/23  Disposition: [facility]  Primary Care Physician: Jaquan Dickerson  Attending physician: Sushant Orourke DO  Other significant physician provider(s): Melissa Dillon [facility] nephrology      DISCHARGE DIAGNOSIS  JOHANA on CKD vs progression on CKD  Hyponatremia  Anemia of chronic disease  DM type 2 with hypoglycemia  Debility  CAD  Afib   HF with reduced EF  S/p L BKA      BRIEF SUMMARY  Norman Aguilar is a 63-year-old man with history of CAD, chronic systolic CHF  EF of 30 to 35%, CKD 3, left foot osteomyelitis s/p left BKA, and DM 2 who presented 7/17/2023 with septic shock in setting of bacteremia secondary to right shoulder myositis, NSTEMI, JOHANA requiring dialysis and A-fib with RVR. Functionally noted to have impaired strength, impaired activity tolerance, impaired balance, admitted to rehab 8/11/23.       Limited ability to focus on participate in therapy due to medical concerns, planning for TCU placement for ongoing rehabilitation, prior to transfer to hospital.     Hospitalist and nephrology involved during rehab stay, medications titrated in setting of hypotension, hypoglycemia, nausea, fatigue,  poor oral intake,  ongoing hyponatremia and worsening renal function, discussed with hospitalist and nephrology with recommendation for further medical evaluation consideration for resumption of dialysis in hospital setting.       REHABILITATION COURSE  Physical Therapy: continue therapy while inpatient  Current Status:  Bed Mobility: Mod I  Transfer: CGA-ModA w/ FWW depending on surface height  (today CGA from recliner, WC and NuStep with walker)  Gait: up to 2x ~40ft with walker and CGA, WC brought close behind (today ~4 ft with walker and CGA, limited by lightheadedness)  WC mobility: up to ~70 ft with SBA,  "including turning  Stairs: NA  Balance: decreased stability w/o UE support in standing.      Occupational Therapy: continue therapy while inpatient  ADLs:  Mobility: CGA FWW with LLE prosthetic ambulation; wc longer distances. Recommend nsg wc hallway, CGA FWW ambulation with LLE prosthetic in room.   Grooming: Supervision with set up, seated  Dressing: FB SBA FWW including prosthetic, cues for safety  Bathing: Upon admission transfer Min A SPT wc <> ETB with grab bar. Mod A bathing A.  Toileting: CGA FWW to 24\" BS. Total A cares.  IADLs: Previously IND. TBD pending progress.  Vision/Cognition: Glasses. Cognition WNL, low mood and adjustment to disability.    Given ongoing therapy needs planning for discharge to TCU for ongoing rehab, was discharged back to hospital for medical management, recommend discharge to TCU when medically ready.     MEDICAL COURSE    JOHANA on CKD vs Progression of CKD  Baseline CKD 2/2 longstanding DM2, HTN. Cr range 2.06-2.26. with severe ATN in setting of profound hypotension with sepsis, group A strep bacteremia and hypovolemia, contrast nephropathy after CTA, and mild rhabdo. CTA on 7/17 with no hydronephrosis. dialysis 7/21-7/24 & 7/29-8/4. dialysis cath removed 8/10 admitted to rehab 8/11.   cr up trended during rehab stay nephrology consulted. See note by ATIF Dillon [facility] 8/22/23 for details.   -discharge to [facility] nephrology consult.   -repeat UA, UPC  -intake/output    Hyperphosphatemia  Phos 4.8   -management per neph    Hyponatremia  Initially 119 on admission to hospital. . Stable s/p 1 liter LR 8/16. Na 128  8/22. 3 Gm NA diet     Anemia of chronic disease  2/2 critical illness, CKD, JOHANA, bacteremia. Received venofer and 5 units  Epistaxis 8/16.  PRBC while inpatient and iv iron 8/21. Hgb 8.0 8/22.      DM2 (Hb A1c 6.9 on 7/17/23)  -Continue PTA lantus 15 units daily- recent hypoglycemia in setting of limited-no oral intake  -Carb coverage 1 unit per 10 g " carbohydrates  -sliding scale insulin   -accu checks /HS/0200  -hypoglycemia protocol    NSTEMI w/ ICM  CAD with coronary stents-hx of  Afib w/ RVR  HFrEF  Hypotension-resolved   Cardiology followed while inpatient. Decompensated heart failure c/b hypotension, sepsis, fluid overload. Plan for coronary angiography after stabilization of infection and renal function. Converted from AF to NSR after amiodarone load. ECHO EF 25-30% with decreased LV function. -Continue PT ASA, Brilinta  -Continue Apixaban BID- reduced 2.5 mg bid in setting of renal function.   -Continue amiodarone 200 mg daily daily.   -consult cardiology see note by Dr. Colvin 8/17 for details.. Metoprolol 12.5 mg bid    Debility   In setting of acute illness prolonged hospitalization  -continue PT/OT     Group A strep bacteremia  Right Shoulder Myositis  Originally admitted to ICU on pressors with complicated skin and soft tissue infection.  Positive for group A strep bacteremia.  Was having right shoulder pain, myositis seen on MRI.  Was during started 7/21 from pressure or swelling.  ID followed while in hospital.  Penicillin G changed to ceftriaxone 7/27, 2 weeks IV antibiotics complete.  Completed course of oral linezolid 7/27 if recurs-  ID would consider long term PCN suppression  -Hibiclens wash right shoulder weekly     Seborrheic Dermatitis  -continue Selenium      Urinary Retention  -remove thakur repeat trial of void 8/17 voiding without significant retention.     Elevated lfts  (resolved)   2/2 infection improved. WNL 8/14.     DISCHARGE MEDICATIONS  Current Discharge Medication List        CONTINUE these medications which have NOT CHANGED    Details   acetaminophen (TYLENOL) 325 MG tablet Take 2 tablets (650 mg) by mouth every 4 hours as needed for mild pain or fever    Associated Diagnoses: Pain      acetaminophen (TYLENOL) 650 MG suppository Place 1 suppository (650 mg) rectally every 4 hours as needed for mild pain    Associated  Diagnoses: Pain      amiodarone (PACERONE) 400 MG tablet Take 1 tablet (400 mg) by mouth daily    Associated Diagnoses: PAF (paroxysmal atrial fibrillation) (H)      apixaban ANTICOAGULANT (ELIQUIS) 5 MG tablet Take 1 tablet (5 mg) by mouth 2 times daily    Associated Diagnoses: PAF (paroxysmal atrial fibrillation) (H)      atorvastatin (LIPITOR) 80 MG tablet TAKE 1 TABLET(80 MG) BY MOUTH EVERY EVENING  Qty: 90 tablet, Refills: 1    Associated Diagnoses: NSTEMI (non-ST elevated myocardial infarction) (H)      blood glucose (NO BRAND SPECIFIED) test strip Use to test blood sugar 4 times daily or as directed.  Qty: 100 strip, Refills: 3    Comments: Accuchek Guide Strips  Associated Diagnoses: Type 2 diabetes mellitus with other specified complication, with long-term current use of insulin (H)      blood glucose monitoring (SOFTCLIX) lancets Use to test blood sugar 4 times daily.  Qty: 100 each, Refills: 6    Associated Diagnoses: Type 2 diabetes mellitus with other specified complication, with long-term current use of insulin (H)      docusate sodium (COLACE) 100 MG capsule Take 1 capsule (100 mg) by mouth 2 times daily    Associated Diagnoses: Constipation, unspecified constipation type      furosemide (LASIX) 40 MG tablet Take 1 tablet (40 mg) by mouth 2 times daily    Associated Diagnoses: JOHANA (acute kidney injury) (H)      !! insulin aspart (NOVOLOG FLEXPEN) 100 UNIT/ML pen For  - 164 give 1 unit. For  - 189 give 2 units. For  - 214 give 3 units. For  - 239 give 4 units. For  - 264 give 5 units. For  - 289 give 6 units. For  - 314 give 7 units. For  - 339 give 8 units For  - 364 give 9 units For  - 389 give 10 units For  - 414 give 11 units For BG greater than or equal to 415 give 12 units  Qty: 3 mL, Refills: 3    Associated Diagnoses: Type 2 diabetes mellitus with other specified complication, with long-term current use of insulin (H)      !!  insulin aspart (NOVOLOG PEN) 100 UNIT/ML pen Dose = 1 units per 10 grams of carbohydate.  If given at mealtime, administer within 30 minutes of start of meal.  Qty: 15 mL    Associated Diagnoses: Type 2 diabetes mellitus with other specified complication, with long-term current use of insulin (H)      insulin glargine (LANTUS SOLOSTAR) 100 UNIT/ML pen Inject 35 Units Subcutaneous every morning  Qty: 3 mL, Refills: 3    Comments: If Lantus is not covered by insurance, may substitute Basaglar or Semglee or other insulin glargine product per insurance preference at same dose and frequency.    Associated Diagnoses: Type 2 diabetes mellitus with other specified complication, with long-term current use of insulin (H)      miconazole (MICATIN) 2 % external powder Apply topically 2 times daily Apply to affected areas    Associated Diagnoses: Skin irritation      Multiple Vitamin (MULTI VITAMIN) TABS Take 1 tablet by mouth daily       OMEGA-3/DHA/EPA/FISH OIL (FISH OIL-OMEGA-3 FATTY ACIDS) 300-1,000 mg capsule Take 1 g by mouth daily       ticagrelor (BRILINTA) 90 MG tablet Take 1 tablet (90 mg) by mouth 2 times daily Dose to start tomorrow morning.  Qty: 180 tablet, Refills: 3    Associated Diagnoses: Coronary artery disease involving native coronary artery of native heart without angina pectoris      vitamin C (ASCORBIC ACID) 500 MG tablet Take 500 mg by mouth daily       !! - Potential duplicate medications found. Please discuss with provider.            DISCHARGE INSTRUCTIONS AND FOLLOW UP  No discharge procedures on file.       PHYSICAL EXAMINATION    Most recent Vital Signs:   Vitals:    08/21/23 1300 08/21/23 1621 08/21/23 2140 08/22/23 0609   BP: 115/64 98/52 103/57 96/43   BP Location: Left arm Left arm Left arm Left arm   Patient Position: Sitting      Cuff Size: Adult Regular      Pulse: 71 69  68   Resp: 16 20  16   Temp: 97.1  F (36.2  C) 98.3  F (36.8  C)  98  F (36.7  C)   TempSrc: Oral Oral  Oral   SpO2: 100%  100%  100%   Weight:    100.9 kg (222 lb 6.4 oz)   Height:       General: awake alert nad  Resp: non labored clear  CV: rrr  Ab: soft non tender  EXtremities: Left BK wearing prosthesis, right with pitting edema with compression in place  MSK/Neuro: alert moves all extremities.       60 minutes spent in discharge, including >50% in counseling and coordination of care, medication review and plan of care recommended on follow up.     Patient was evaluated on day of discharge by attending physician, Sushant Orourke DO, who agrees with plan of care.    Discharge summary was forwarded to Jaquan Dickerson (PCP) at the time of discharge, so as to bridge from hospital to outpatient care.     It was our pleasure to care for Norman Aguilar during this hospitalization. Please do not hesitate to contact me should there be questions regarding the hospital course or discharge plan.          Melissa Banuelos PA-C  Physical Medicine and Rehabilitation

## 2023-08-22 NOTE — PROGRESS NOTES
[facility] Nurse Inpatient Assessment     Consulted for: coccyx and right arm    Summary: Skin tear right upper arm. Previously seen by [facility] for Deep Tissue Pressure Injury to coccyx area. This has resolved.    Patient History (according to provider note(s):      Norman Aguilar is a 63-year-old man with history of CAD, chronic systolic CHF  EF of 30 to 35%, CKD 3, left foot osteomyelitis s/p left BKA, and DM 2 who presented 7/17/2023 with septic shock in setting of bacteremia secondary to right shoulder myositis, NSTEMI, JOHANA requiring dialysis and A-fib with RVR. Functionally noted to have impaired strength, impaired activity tolerance, impaired balance, admitted to rehab 8/11/23.      Assessment:      Areas visualized during today's visit: Focused:    Wound location: R UE    Last photo: 8/14  Wound due to: Skin Tear  Wound history/plan of care: Skin tear present on admit.   Wound base: 100 % epidermis       STATUS: healed  Supplies ordered: supplies stored on unit, discussed with RN, and discussed with patient      Wound location: buttocks    Last photo: 8/14  Wound due to: Friction  Wound history/plan of care: Pt had previous deep tissue pressure injury that has now resolved.   Wound base: 100 % blanchable  erythema very dry- no open areas      Odor: none  Pain: denies , none  Pain interventions prior to dressing change: no significant pain present   Treatment goal: Protection  STATUS: unchanged  Supplies ordered: supplies stored on unit, discussed with RN, and discussed with patient       Treatment Plan:       Pressure Injury Prevention (PIP) Plan:    Mattress: Follow bed algorithm, reassess daily and order specialty mattress, if indicated.  HOB: Maintain at or below 30 degrees, unless contraindicated  Repositioning in bed: Every 1-2 hours   Heels: Keep elevated off mattress  Protective Dressing: Sacral Mepilex for prevention (#[id number]),  especially for the  "agitated patient   Chair positioning: Chair cushion (#651153)  Do not use pillow  If patient has a buttock pressure injury, or high risk for PI use chair cushion or SPS.  Moisture Management: Perineal cleansing /protection: Follow Incontinence Protocol and Clean and dry skin folds with bathing   Under Devices: Inspect skin under all medical devices during skin inspection , Ensure tubes are stabilized without tension, and Ensure patient is not lying on medical devices or equipment when repositioned  Ask provider to discontinue device when no longer needed.  If patient is declining pressure injury prevention interventions:   Explore reason why and address patient's concerns, Educate on pressure injury risk and prevention intervention(s), If patient is still declining, document \"informed refusal\" , and Ensure Care team is aware ( provider, charge nurse, etc)      Orders: Reviewed and Updated    RECOMMEND PRIMARY TEAM ORDER: None, at this time  Education provided: plan of care and wound progress  Discussed plan of care with: Patient and Nurse  WOC nurse follow-up plan: signing off  Notify WOC if wound(s) deteriorate.  Nursing to notify the Provider(s) and re-consult the WOC Nurse if new skin concern.    DATA:     Current support surface: Standard  Standard gel/foam mattress (IsoFlex, Atmos air, etc)  Containment of urine/stool: Continent of bladder and Continent of bowel  BMI: Body mass index is 27.8 kg/m .   Active diet order: Orders Placed This Encounter      Combination Diet 3 gm NA Diet     Output: I/O last 3 completed shifts:  In: 220 [P.O.:220]  Out: 75 [Urine:75]     Labs:   Recent Labs   Lab 08/22/23  0604 08/17/23  0634 08/16/23  1119   ALBUMIN  --   --  3.2*   HGB 8.0*   < > 8.0*   WBC 7.5   < > 7.9    < > = values in this interval not displayed.       Pressure injury risk assessment:   Sensory Perception: 3-->slightly limited  Moisture: 4-->rarely moist  Activity: 2-->chairfast  Mobility: 3-->slightly " limited  Nutrition: 1-->very poor  Friction and Shear: 2-->potential problem  Robin Score: 15    Mona Kwon RN  CWOCN  Pager no longer is use, please contact through [employer] group: [facility] Nurse [facility]  Dept. Office Number: [phone]

## 2023-08-23 PROBLEM — I25.5 ISCHEMIC CARDIOMYOPATHY: Status: ACTIVE | Noted: 2021-11-29

## 2023-08-23 NOTE — H&P
[facility]    History and Physical - Hospitalist Service       Date of Admission:  8/22/2023    Assessment & Plan    Norman Aguilar is a 63 year old male admitted on 8/22/2023. He transferred from acute rehab for worsening renal function.  Past medical history significant for CAD on Brilinta, Warnicke HFrEF(EF 30 to 35%), CKD 5, type 2 diabetes mellitus, history of left osteomyelitis s/p left BKA.  Transferred to acute rehab after being treated for septic shock secondary to group A strep bacteremia.  Now back to [facility] for progression of kidney failure    Acute on chronic kidney disease stage III  -Creatinine baseline was around 1.4.  Now has increased to 4.3  -Underwent hemodialysis during previous admission due to severe ATN secondary to hypotension requiring multiple episodes of dialysis (last dialysis 8/4/23)  -Nephrology recommending to start patient on hemodialysis.  -Monitor input and output, daily weight  - Nephrology consulted      Hyponatremia , chronic   - possibly related to fluid overload.   - monitor     Hyperphosphatemia  -Management as per nephrology    Severe CAD s/p 2 JESSICA to RCA 8/2022  HFrEF (LVEF 25-30%)  Hx of NSTEMI with last Angiogram 8/23/2022 with significant multivessel disease with JESSICA placed in RCA  - Continue Brilinta 90mg twice daily and atorvastatin 80 mg daily      Paroxsymal Afib w/ RVR,   - [id number] 4 for HTN, CHF, DM and vascular disease.   - Rate controlled with metoprolol XL 50 mg daily. AC with eliquis.   - Decrease metoprolol 12.5 mg BID with holding parameters with soft BP   - Continue Amiodarone 200mg once daily     Anemia of chronic disease  Likely multifactorial with anemia of chronic disease (elevated ferritin) and CKD. Received venofer with low iron levels and 5 units PRBC while inpatient. Hemoglobin baseline 7.0-8.0s. Current at 8.0.   - Monitor closely for bleeding   - Continue to monitor and transfuse to maintain Hgb of 7 or  "greater. No active ischemia and worry about volume overload with worsening kidney function      DM2 (Hb A1c 6.9 on 7/17/23)  Lantus 15 units subcutaneous daily  -Medium intensity insulin sliding scale and mealtime carb coverage  -Accu-Cheks  -Per glycemia protocol     S/p L BKA        Diet:  Renal diet  DVT Prophylaxis: DOAC  Garcia Catheter: Not present  Lines: None     Cardiac Monitoring: None  Code Status: Full Code      Clinically Significant Risk Factors Present on Admission         # Hyponatremia: Lowest Na = 128 mmol/L in last 2 days, will monitor as appropriate       # Drug Induced Coagulation Defect: home medication list includes an anticoagulant medication  # Drug Induced Platelet Defect: home medication list includes an antiplatelet medication   # Hypertension: Noted on problem list  # Chronic heart failure with reduced ejection fraction: last echo with EF <40%    # DMII: A1C = 6.9 % (Ref range: <5.7 %) within past 6 months   # Overweight: Estimated body mass index is 27.8 kg/m  as calculated from the following:    Height as of 8/11/23: 1.905 m (6' 3\").    Weight as of an earlier encounter on 8/22/23: 100.9 kg (222 lb 6.4 oz).       # Housing Instability: noted in nursing assessment         Disposition Plan      Expected Discharge Date: 08/24/2023                  Jason Varela MD  Hospitalist Service  [facility]  Securely message with [employer] (more info)  Text page via [facility] Paging/Directory     ______________________________________________________________________    Chief Complaint   Worsening of kidney function    History is obtained from the patient and transferring doctor from the [facility]    History of Present Illness   Norman Aguilar is a 63 year old male who presented for the above complaint. Past medical history significant for CAD on Brilinta, Warnicke HFrEF(EF 30 to 35%), CKD 5, type 2 diabetes mellitus, history of left osteomyelitis s/p left " JEWEL.  Transferred to acute rehab after being treated for septic shock secondary to group A strep bacteremia.  Now back to [facility] for progression of kidney failure.  Patient states that he has been progressively feeling weaker with poor oral intake.  Denies having chest pain or palpitation.  Neurology recommended to initiate hemodialysis and therefore patient was transferred to [facility].      Past Medical History    Past Medical History:   Diagnosis Date    Anemia     Chronic systolic CHF (congestive heart failure) (H)     Coronary artery disease     Diabetic neuropathy (H)     DM type 2 w Neuropathy     GERD (gastroesophageal reflux disease)     Hyperlipidemia     Hypertension     Intermittent atrial fibrillation (H)     on Eliquis    Ischemic cardiomyopathy 11/16/2021    Echo with EF of 30-35%    NSTEMI (non-ST elevated myocardial infarction) (H) 11/15/2021    Osteomyelitis of left foot (H) 04/04/2022    Renal disease     Retinopathy     Sleep disturbance        Past Surgical History   Past Surgical History:   Procedure Laterality Date    AMPUTATE FOOT Left 3/21/2022    Procedure: Guillotine AMPUTATION, FOOT;  Surgeon: Ronald Bhatt MD;  Location: [facility] Main OR    AMPUTATE LEG BELOW KNEE Left 4/4/2022    Procedure: LEFT BELOW  KNEE AMPUTATION;  Surgeon: Ronald Bhatt MD;  Location: [facility] OR    AMPUTATE TOE(S) Left 11/16/2021    Procedure: first and second ray amputation;  Surgeon: Eddi Ram DPM;  Location: [facility] OR    APPENDECTOMY      CV CORONARY ANGIOGRAM N/A 11/16/2021    Procedure: CV CORONARY ANGIOGRAM;  Surgeon: Pam Tabares MD;  Location: [facility] CATH LAB CV    CV CORONARY ANGIOGRAM N/A 8/24/2022    Procedure: CV CORONARY ANGIOGRAM;  Surgeon: Cipriano Lucas MD;  Location: [facility] LAB CV    CV CORONARY ANGIOGRAM N/A 8/29/2022    Procedure: CV CORONARY ANGIOGRAM;  Surgeon: Cipriano Lucas MD;  Location: [facility] LAB CV    CV  INTRAVASULAR ULTRASOUND N/A 8/29/2022    Procedure: Intravascular Ultrasound;  Surgeon: Cipriano Lucas MD;  Location: [facility] CATH LAB CV    CV LEFT HEART CATH N/A 11/16/2021    Procedure: Left Heart Cath;  Surgeon: Pam Tabares MD;  Location: [facility] LAB CV    CV PCI N/A 8/29/2022    Procedure: Percutaneous Coronary Intervention stent;  Surgeon: Cipriano Lucas MD;  Location: [facility] LAB CV    CV PCI ANGIOPLASTY N/A 8/29/2022    Procedure: Percutaneous Transluminal Angioplasty;  Surgeon: Cipriano Lucas MD;  Location: [facility] CATH LAB CV    FOOT SURGERY Left     HERNIA REPAIR      INCISION AND DRAINAGE LOWER EXTREMITY, COMBINED Left 11/16/2021    Procedure: INCISION AND DRAINAGE, left foot;  Surgeon: Eddi Ram DPM;  Location: [facility] OR    INCISION AND DRAINAGE LOWER EXTREMITY, COMBINED Left 11/19/2021    Procedure: INCISION AND DRAINAGE, left foot;  Surgeon: Eddi Ram DPM;  Location: [facility] OR    INCISION AND DRAINAGE LOWER EXTREMITY, COMBINED Left 12/9/2021    Procedure: INCISION AND DRAINAGE, Left foot;  Surgeon: Eddi Ram DPM;  Location: [facility] OR    INCISION AND DRAINAGE LOWER EXTREMITY, COMBINED Left 1/10/2022    Procedure: INCISION AND DRAINAGE, left foot;  Surgeon: Eddi Ram DPM;  Location: [facility] OR    INCISION AND DRAINAGE LOWER EXTREMITY, COMBINED Left 1/27/2022    Procedure: INCISION AND DRAINAGE, Left foot;  Surgeon: Eddi Ram DPM;  Location: [facility] OR    IR CVC NON TUNNEL PLACEMENT > 5 YRS  7/20/2023    PICC TRIPLE LUMEN PLACEMENT  7/17/2023         PICC TRIPLE LUMEN PLACEMENT  7/18/2023            Prior to Admission Medications   Prior to Admission Medications   Prescriptions Last Dose Informant Patient Reported? Taking?   OMEGA-3/DHA/EPA/FISH OIL (FISH OIL-OMEGA-3 FATTY ACIDS) 300-1,000 mg capsule   Yes No   Sig: Take 1 g by mouth daily    acetaminophen (TYLENOL) 325 MG  tablet   No No   Sig: Take 2 tablets (650 mg) by mouth every 4 hours as needed for mild pain or fever   amiodarone (PACERONE) 200 MG tablet   No No   Sig: Take 1 tablet (200 mg) by mouth daily   apixaban ANTICOAGULANT (ELIQUIS) 2.5 MG tablet   No No   Sig: Take 1 tablet (2.5 mg) by mouth 2 times daily   atorvastatin (LIPITOR) 80 MG tablet   No No   Sig: TAKE 1 TABLET(80 MG) BY MOUTH EVERY EVENING   blood glucose (NO BRAND SPECIFIED) test strip   No No   Sig: Use to test blood sugar 4 times daily or as directed.   blood glucose monitoring (SOFTCLIX) lancets   No No   Sig: Use to test blood sugar 4 times daily.   chlorhexidine (HIBICLENS) 4 % liquid   No No   Sig: Apply topically once a week   insulin aspart (NOVOLOG PEN) 100 UNIT/ML pen   No No   Sig: Dose = 1 units per 10 grams of carbohydate.  If given at mealtime, administer within 30 minutes of start of meal.   insulin aspart (NOVOLOG PEN) 100 UNIT/ML pen   No No   Sig: One unit per 10 grams cho   insulin aspart (NOVOLOG PEN) 100 UNIT/ML pen   No No   Sig: Inject 1-7 Units Subcutaneous 3 times daily (before meals)   insulin aspart (NOVOLOG PEN) 100 UNIT/ML pen   No No   Sig: Inject 1-5 Units Subcutaneous At Bedtime   insulin glargine (LANTUS SOLOSTAR) 100 UNIT/ML pen   No No   Sig: Inject 15 Units Subcutaneous At Bedtime   metoclopramide (REGLAN) 5 MG tablet   No No   Sig: Take 1 tablet (5 mg) by mouth every 6 hours as needed (nausea)   metoprolol tartrate (LOPRESSOR) 25 MG tablet   No No   Sig: Take 0.5 tablets (12.5 mg) by mouth 2 times daily   miconazole (MICATIN) 2 % external powder   No No   Sig: Apply topically 2 times daily Apply to affected areas   pantoprazole (PROTONIX) 40 MG EC tablet   No No   Sig: Take 1 tablet (40 mg) by mouth 2 times daily (before meals)   selenium sulfide (SELSUN) 2.5 % external lotion   No No   Sig: Apply topically 2 times daily   ticagrelor (BRILINTA) 90 MG tablet   No No   Sig: Take 1 tablet (90 mg) by mouth 2 times daily Dose  to start tomorrow morning.      Facility-Administered Medications: None           Physical Exam   Vital Signs: Temp: 97.7  F (36.5  C) Temp src: Oral BP: 93/63 Pulse: 87   Resp: 16 SpO2: 100 % O2 Device: None (Room air)    Weight: 0 lbs 0 oz    General Appearance: No distress noted.  Respiratory: Good air entry bilaterally  Cardiovascular: S1 and S2 well heard, no murmur or gallop  GI: Abdomen, no tenderness, normoactive bowel sounds  Skin: Intact and warm  Other: Left BKA    Medical Decision Making       75 MINUTES SPENT BY ME on the date of service doing chart review, history, exam, documentation & further activities per the note.      Data

## 2023-08-23 NOTE — PROGRESS NOTES
Consult received. From chart review patient was seen by Dr. Medina from [facility].  Communicated with [facility].   Please call me with question.     Jazmin Cha MD  [facility].

## 2023-08-23 NOTE — CONSULTS
"Care Management Initial Consult    General Information  Assessment completed with: Patient,    Type of CM/SW Visit: Initial Assessment    Primary Care Provider verified and updated as needed: Yes   Readmission within the last 30 days: current reason for admission unrelated to previous admission   Return Category: New Diagnosis     Advance Care Planning: Advance Care Planning Reviewed:  (No HCD)          Communication Assessment  Patient's communication style: spoken language (English or Bilingual)    Hearing Difficulty or Deaf: no        Cognitive  Cognitive/Neuro/Behavioral: WDL                      Living Environment:   People in home:  (no home)     Current living Arrangements: homeless      Able to return to prior arrangements:  Patient is not able to return to his sister or niece's home.       Family/Social Support:  Care provided by: self  Provides care for: no one  Marital Status: Single  Sibling(s) (niece)          Description of Support System: Other (see comments) (limited support)         Current Resources:   Patient receiving home care services: No     Community Resources: None  Equipment currently used at home:    Supplies currently used at home: None    Employment/Financial:  Employment Status: unemployed        Financial Concerns:   States he receives \"about $200 a month for aide.          Lifestyle & Psychosocial Needs:  Social Determinants of Health     Tobacco Use: Medium Risk (8/11/2023)    Patient History     Smoking Tobacco Use: Former     Smokeless Tobacco Use: Never     Passive Exposure: Not on file   Alcohol Use: Not on file   Financial Resource Strain: Not on file   Food Insecurity: Not on file   Transportation Needs: Not on file   Physical Activity: Not on file   Stress: Not on file   Social Connections: Not on file   Intimate Partner Violence: Not on file   Depression: Not on file   Housing Stability: Not on file       Functional Status:  Prior to admission patient needed assistance: "   Dependent ADLs:: Bathing, Ambulation-walker  Dependent IADLs:: Cleaning, Cooking, Laundry, Shopping, Meal Preparation, Money Management, Transportation, Medication Management       Mental Health Status:  Mental Health Status: reports situational depression    Chemical Dependency Status:  Chemical Dependency Status: No Current Concerns             Values/Beliefs:  Spiritual, Cultural Beliefs, Nondenominational Practices, Values that affect care:                 Additional Information:  Patient was in [facility] 7/17/23- 8/11/23; [facility] 8/11/23-8/22/23.     Assessment completed with patient and chart reviewed. Per chart: patient has been homeless since Nov, 2021. He had an old trailer a friend gave him, but patient started declining, and needed an amputation. 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 Sold trailer home. Patient has lived in between sister Cindy's house, or niece's house the last couple years, and in the past has lived at his sister Cindy's house for 6 yrs at one time. Before that he was taking care of his parents in their home.  Cindy cannot take pt back into her home Patient has stayed with his niece in her town home but she was told she would be evicted if patient remains staying in her house.     8/18: MA renewal paperwork faxed to [facility] at [phone] and mailed to [facility] Economic [employer] Dept ([address], [address] [place] [address]).     Erin Brannon Social Work Care Coordinator w/ [facility] PH: [phone].     Confirmed above information with patient. Patient will not be returning to [facility] as they were in the process of securing TCU prior to readmission to [facility]. Patient states he will not go to LTC/NH setting. His goal would be to go to TCU and discharge to [facility] or independent living apartment pending functional status. Ideally patient would be accepted by a TCU that has [facility] facility that accepts MA payor source. He confirms no family will take him in at discharge.  Preference for TCU would be [facility] up toward [place].        Per Jg MORALES  note 8/22:  [facility]--Called and spoke with Summer in admissions. Referral received, not reviewed, will review and follow up, no beds available this week, and if accepted will add to wait list.    -[facility]--Called PH: [phone] opt 25 and left a  with /admissions.   -[facility]--Sent today 08/22  -[facility]--Sent today 08/22  -[facility]Sent today 08/22   -[facility]--Sent today 08/22     DECLINED:   [facility]-complex needs  [facility]-not taking pt's with MA for LTC   [facility]-not taking LTC patients  [facility]bed not available   [facility]--Sent today 08/22 and declined due to no beds available  [facility]--Sent today 08/22 and declined   [facility]--Sent today 08/22 and declined due to no [place] beds for TCU available       Re-faxed TCU referrals to above pending facilities. Nephrology initiating HD. Outpatient HD center TBD. [facility] at discharge.          Lillian Montalvo RN

## 2023-08-23 NOTE — PHARMACY-ADMISSION MEDICATION HISTORY
Admission medication history completed at [facility]. Please see Pharmacy - Admission Medication History note from 08/12/2023.

## 2023-08-23 NOTE — PLAN OF CARE
"  Problem: Plan of Care - These are the overarching goals to be used throughout the patient stay.    Goal: Absence of Hospital-Acquired Illness or Injury  Intervention: Identify and Manage Fall Risk  Recent Flowsheet Documentation  Taken 8/23/2023 0045 by Sigrid Woodson RN  Safety Promotion/Fall Prevention:   activity supervised   lighting adjusted  Intervention: Prevent Skin Injury  Recent Flowsheet Documentation  Taken 8/23/2023 0100 by Sigrid Woodson RN  Body Position:   log-rolled   left   position changed independently  Taken 8/23/2023 0045 by Sigrid Woodson RN  Body Position: position changed independently  Intervention: Prevent and Manage VTE (Venous Thromboembolism) Risk  Recent Flowsheet Documentation  Taken 8/23/2023 0045 by Sigrid Woodson RN  VTE Prevention/Management: (compression on RUE, and RLE) compression stockings on  Goal: Optimal Comfort and Wellbeing  Intervention: Monitor Pain and Promote Comfort  Recent Flowsheet Documentation  Taken 8/23/2023 0100 by Sigrid Woodson RN  Pain Management Interventions:   cold applied   distraction   essential oils   repositioned   Goal Outcome Evaluation: Patient  alert and oriented times four. Patient awake most of the night stating that he is \"uncomfortable\" in bed bu refused to get on the chair.                        "

## 2023-08-23 NOTE — PROGRESS NOTES
[facility]    Medicine Progress Note - Hospitalist Service    Date of Admission:  8/22/2023    Assessment & Plan     Norman Aguilar is a 63 year old male admitted on 8/22/2023. He was transferred from acute rehab for worsening renal function.  Past medical history significant for CAD on Brilinta, Warnicke HFrEF(EF 30 to 35%), CKD 5, type 2 diabetes mellitus, history of left lower extremity osteomyelitis s/p left BKA.  Transferred to acute rehab 8/11/23 after being treated for septic shock secondary to group A strep bacteremia.  Now back to [facility] for progression of kidney failure.      ESRD on hemodialysis:   History of CKD, stage III. Creatinine baseline was around 1.4.  Now has increased to 4.3 with hyponatremia, fluid overload and uremic symptoms.  Underwent hemodialysis during previous admission due to severe ATN secondary to hypotension requiring multiple episodes of dialysis (last dialysis 8/4/23)  - Nephrology recommending to start patient on hemodialysis today.  - Monitor input and output, daily weight     Hyponatremia , chronic: Related to fluid overload. Initiate hemodialysis as above.      Hyperphosphatemia: Management as per nephrology     Severe CAD s/p 2 JESSICA to RCA 8/2022  HFrEF (LVEF 25-30%)  Hx of NSTEMI with last Angiogram 8/23/2022 with significant multivessel disease with JESSICA placed in RCA  - Continue Brilinta 90mg twice daily and atorvastatin 80 mg daily      Paroxsymal Afib w/ RVR,   - [id number] for HTN, CHF, DM and vascular disease.   - Rate controlled with metoprolol XL 50 mg daily. AC with eliquis.   - Decrease metoprolol 12.5 mg BID with holding parameters with soft BP   - Continue Amiodarone 200mg once daily     Anemia of chronic disease  Likely multifactorial with anemia of chronic disease (elevated ferritin) and CKD. Received venofer with low iron levels and 5 units PRBC while inpatient. Hemoglobin baseline 7.0-8.0s. Current at 8.0.   - Monitor  "closely for bleeding   - Continue to monitor and transfuse to maintain Hgb of 7 or greater. No active ischemia and worry about volume overload with worsening kidney function      DM2 (Hb A1c 6.9 on 7/17/23)  Lantus 15 units subcutaneous daily  -Medium intensity insulin sliding scale and mealtime carb coverage  -Accu-Cheks  -Per glycemia protocol     S/p L BKA          Diet: Renal Diet (dialysis)  NPO per Anesthesia Guidelines for Procedure/Surgery Except for: Meds    DVT Prophylaxis: DOAC  Garcia Catheter: Not present  Lines: None     Cardiac Monitoring: None  Code Status: Full Code      Clinically Significant Risk Factors Present on Admission         # Hyponatremia: Lowest Na = 127 mmol/L in last 2 days, will monitor as appropriate      # Hypoalbuminemia: Lowest albumin = 3.4 g/dL at 8/23/2023  5:04 AM, will monitor as appropriate  # Drug Induced Coagulation Defect: home medication list includes an anticoagulant medication  # Drug Induced Platelet Defect: home medication list includes an antiplatelet medication   # Hypertension: Noted on problem list  # Chronic heart failure with reduced ejection fraction: last echo with EF <40%    # DMII: A1C = 6.9 % (Ref range: <5.7 %) within past 6 months   # Overweight: Estimated body mass index is 27.8 kg/m  as calculated from the following:    Height as of 8/11/23: 1.905 m (6' 3\").    Weight as of an earlier encounter on 8/22/23: 100.9 kg (222 lb 6.4 oz).       # Housing Instability: noted in nursing assessment         Disposition Plan      Expected Discharge Date: 08/24/2023    Discharge Delays: Placement - TCU              Alicia Paez MD  Hospitalist Service  [facility]  Securely message with [employer] (more info)  Text page via [facility] Paging/Directory   ______________________________________________________________________    Interval History   Patient reports not feeling well in general. He reports that he normally sleeps on a recliner. The hospital " bed makes him have body ache all over. He also reports that be used to live in a trailer home. Now that he needs hemodialysis, he may need to go to a nursing home and this is not the life he wants.      Physical Exam   Vital Signs: Temp: 97.9  F (36.6  C) Temp src: Oral BP: 113/55 Pulse: 88   Resp: 18 SpO2: 100 % O2 Device: None (Room air)    Weight: 0 lbs 0 oz    General appearance: not in acute distress  HEENT: PERRL, EOMI  Lungs: Clear breath sounds in bilateral lung fields  Cardiovascular: Regular rate and rhythm, normal S1-S2  Abdomen: Soft, non tender, no distension  Musculoskeletal: No joint swelling. Left leg BKA  Skin: No rash   Right upper extremity edema  Neurology: AAO ×3.  Cranial nerves II - XII normal.  Normal muscle strength in all extremities.     Medical Decision Making       47 MINUTES SPENT BY ME on the date of service doing chart review, history, exam, documentation & further activities per the note.      Data     I have personally reviewed the following data over the past 24 hrs:    7.9  \   7.6 (L)   / 227     127 (L) 90 (L) 90.6 (H) /  213 (H)   4.9 20 (L) 4.67 (H) \     ALT: 20 AST: 35 AP: 124 TBILI: 1.0   ALB: 3.4 (L) TOT PROTEIN: 8.1 LIPASE: N/A       Imaging results reviewed over the past 24 hrs:   No results found for this or any previous visit (from the past 24 hour(s)).

## 2023-08-23 NOTE — PLAN OF CARE
Occupational Therapy Discharge Summary    Reason for therapy discharge:    Return to hospital.    Progress towards therapy goal(s). See goals on Care Plan in [facility] electronic health record for goal details.  Goals not met.  Barriers to achieving goals:   discharge from facility.    Therapy recommendation(s):    Continued therapy is recommended.  Rationale/Recommendations:  recommend IP OT in acute care setting to continue to address ADL/IADL performance and discharge recommendations.

## 2023-08-23 NOTE — PROGRESS NOTES
Patient A&O x4, CGA of 1 with walker. Denies nausea, dizziness, lightheadedness, chest pain, SOB. Hypotension noted 88/50, 59; asymptomatic; encouraged fluids elevated 101/59, 79. Bolus admin at 1706 and completed prior to [facility]. Continent B/B; LBM 8/22 on BSC. Srict I&O. UA sent to lab per orders. No new orders per PA. Poor appetite, blood glu 90, carb coverage admin. Report given to Bettles, floor P4 nurse, Elma. Pt will be transferred to room #402. EMS transported patient out of unit at 1930. Personal belongings left with patient and EMS.

## 2023-08-23 NOTE — PROGRESS NOTES
RENAL PROGRESS NOTE     CC: Progressive chronic kidney disease.    ROS: Patient is a 63-year-old male with known history of progressive chronic kidney disease, he has had at least 2 episodes of acute kidney injury on top of chronic kidney disease earlier this year, on those 2 episodes he required temporary dialysis support.    This time patient was admitted to the hospital on 8/22/2023, he was at the transitional care unit recovering after an episode of septic shock following also an episode of a non-ST elevation myocardial infarction on top of ischemic cardiomyopathy, he had bacteremia with strep to coccus (group A) leading to right shoulder myositis.  He became very debilitated and eventually was transferred to TCU.  While at the TCU, it was noticed over the past few days that his renal function has been progressively declining.    His best serum creatinine recently has been around 2.5-3.1, over the past few days serum creatinine has been steadily going up, creatinine 4.67 on 8/23/2023.    Patient is also retaining fluid, he is now hyponatremic with serum sodium 127, his energy level is down, he has uremic symptoms.  Patient tells me that he was expecting to be initiated yesterday when he arrived to this hospital.  Patient is aware that his kidney function has been progressively declining over time and is willing to proceed with hemodialysis.    In addition to all the above described medical problems patient is known to have coronary artery disease, he has heart failure with reduced ejection fraction (LVEF 20-25%), diabetes mellitus type 2 with diabetic nephropathy, history of osteomyelitis leading to left below the knee amputation.  History of Streptococcus group a bacteremia, paroxysmal atrial fibrillation, chronic hyponatremia, volume overload, heart failure with reduced ejection fraction.    Assessment and Plan:    Chronic kidney disease stage V.  Diabetic nephropathy.  Dialysis initiation.  Volume overload  present.  A component of severe cardiorenal syndrome is also present.  Uremic symptoms present.  Repetitive episodes of acute kidney injury on top of chronic kidney disease leading to progression of CKD.  Recent episode of bacteremia nearly August 2023 also leading to significant worsening of his kidney function.  Patient now needs to be initiated on chronic hemodialysis, Dr. Waters discussed with him on 8/23 the need for dialysis, patient is agreeable to initiate dialysis, in fact the patient was suspecting to be initiated on dialysis at the time of transfer to this hospital.  Patient was made aware that he most likely needs permanent dialysis at this time is not going to be temporary dialysis.  Interventional radiology consulted for placement of tunneled dialysis catheter (8/23/2023)  Will initiate hemodialysis once dialysis access is in place.  Gentle fluid removal on dialysis to optimize volume status.  Heart failure with reduced ejection fraction.  LVEF 25-30%).  Patient with known coronary artery disease, previous angioplasties and stent deployments.  Severe ischemic cardiomyopathy.  A component of severe cardiorenal syndrome is also present and is also responsible for renal hypoperfusion leading to progressive renal function decline.  There is no significant role for diuretic at this time, as described above patient is to initiate dialysis to manage volume status.  Hyponatremia.  Secondary to volume overload.  Dialysis initiation.  Fluid removal on dialysis.  Coronary artery disease.  Known severe coronary artery disease, history status post JESSICA to RCA on 8/20/2022.  He is also status post NSTEMI recently.  He is known to have significant multiple issues coronary artery disease.  Continue management as per cardiology.  Paroxysmal atrial fibrillation.  Admitted with episode of rapid ventricular rate.  Remains tachycardic.  Volume excess likely contributing to decompensation of paroxysmal atrial fibrillation.   Gentle fluid removal on dialysis.  Diabetes mellitus type 2.  Multiple associated problems including diabetic nephropathy, peripheral vascular disease, coronary artery disease, osteomyelitis.  Most recent hemoglobin A1c 6.9% on 7/17/2023.  Continue management as per primary service.  Anemia chronic kidney disease.  Patient recently received Venofer for low iron stores, he was also recently transfused.  Continue to monitor hemoglobin, update iron stores, he will need initiation of RAMÍREZ agent once iron stores are adequate.  Peripheral vascular disease.  He is status post left below the knee amputation.  Continue supportive care.  Chronic pain syndrome.  Patient refers history of chronic neck pain, back pain following injury in the 1980s.  He tells me that he does not have a comfortable position to rest.        Terrance Waters MD MD  Nephrology    PHYSICAL EXAM  /63   Pulse 99   Temp 98.3  F (36.8  C) (Oral)   Resp 20   SpO2 99%   /63   Pulse 99   Temp 98.3  F (36.8  C) (Oral)   Resp 20   SpO2 99%       Intake/Output Summary (Last 24 hours) at 8/23/2023 1239  Last data filed at 8/23/2023 0830  Gross per 24 hour   Intake 240 ml   Output --   Net 240 ml     Resp: 20    Wt Readings from Last 3 Encounters:   08/22/23 100.9 kg (222 lb 6.4 oz)   08/11/23 95.3 kg (210 lb 1.6 oz)   11/17/22 100.2 kg (221 lb)       GENERAL: Chronically ill and debilitated.  HEAD: Normal  HEENT: Equal pupils. Normal hearing. No eyelid edema.  CARDIOVASCULAR: Unable to assess jugular vein pressure, atrial fibrillation with rapid ventricular rate is present, peripheral dependent edema present.  PULMONARY: No respiratory distress. No cyanosis  GASTROINTESTINAL: Distended abdomen, unclear if ascites is present or not.  MSK: Diffuse muscle wasting, left below the knee amputation present  NEURO: Alert, no gross focal findings  PSYCHIATRIC: Adequate mood and interaction  SKIN: Pale, no jaundice, no rash.    LABORATORIES  Recent Labs    Lab 08/23/23  0504 08/22/23  0604 08/21/23  0820   WBC 7.9 7.5 9.1   HGB 7.6* 8.0* 7.7*   HCT 24.4* 24.6* 24.0*    227 238     Recent Labs   Lab 08/23/23  0504 08/22/23  0604 08/21/23  0820   * 128* 129*   CO2 20* 23 23   BUN 90.6* 89.5* 85.6*   ALKPHOS 124  --   --    ALT 20  --   --    AST 35  --   --      No results for input(s): INR, PTT in the last 168 hours.    Invalid input(s): APTT  No results for input(s): ABORH in the last 168 hours.  Invalid input(s): MG    RADIOLOGY REPORTS    ECHOCARDIOGRAM    MEDICATIONS   amiodarone  200 mg Oral Daily    apixaban ANTICOAGULANT  2.5 mg Oral BID    atorvastatin  80 mg Oral QPM    insulin aspart   Subcutaneous TID w/meals    insulin aspart  1-7 Units Subcutaneous TID AC    insulin aspart  1-5 Units Subcutaneous At Bedtime    insulin glargine  15 Units Subcutaneous At Bedtime    metoprolol tartrate  12.5 mg Oral BID    pantoprazole  40 mg Oral BID AC    sodium chloride (PF)  3 mL Intracatheter Q8H    ticagrelor  90 mg Oral BID     acetaminophen, glucose **OR** dextrose **OR** glucagon, lidocaine 4%, lidocaine (buffered or not buffered), melatonin, ondansetron **OR** ondansetron, polyethylene glycol, prochlorperazine **OR** prochlorperazine **OR** prochlorperazine, sodium chloride (PF)      Above laboratories and medications were personally reviewed during evaluation today.

## 2023-08-23 NOTE — PROGRESS NOTES
Patient agreed to sit on the recliner for 30 minutes. He tolerated, and now is sleeping in bed.No distress or signs of pain noted. Call light within reach.

## 2023-08-23 NOTE — PLAN OF CARE
Physical Therapy Discharge Summary    Reason for therapy discharge:    Change in medical status.Pt to acute care.     Progress towards therapy goal(s). See goals on Care Plan in [facility] electronic health record for goal details.  Goals partially met.  Barriers to achieving goals:   Pt with kidney issues, low BP at times and lightheadedness at times.  Pt's mobility was variable. .Pt was ambulating form 4 ft to 40 ft with fww, w/cfollow, with prostheses and cga to Leo, but had lightheadedness recently.  Pt able to propel w/c with B UEs 70 ft.      Therapy recommendation(s):    Resume PT per orders at [facility].  Pt was not issued any equipment from [facility].

## 2023-08-23 NOTE — PLAN OF CARE
"  Problem: Plan of Care - These are the overarching goals to be used throughout the patient stay.    Goal: Optimal Comfort and Wellbeing  Outcome: Progressing     Problem: Acute Kidney Injury/Impairment  Goal: Optimal Nutrition Intake  Outcome: Progressing   Goal Outcome Evaluation:                    Pt c/o bloating, declined ordering food this evening. BG monitored and insulin administered as per order.   Pt reports the air pump mattress makes his bed \"a little better.\"   Conversation with patient regarding mepilex on coccyx. Pt reports he was told he no longer needs a mepilex on and would prefer to leave it off at this time. Area assessed, no open areas noted, mepilex left off at this time.     "

## 2023-08-23 NOTE — PLAN OF CARE
Problem: Plan of Care - These are the overarching goals to be used throughout the patient stay.    Goal: Absence of Hospital-Acquired Illness or Injury  Intervention: Identify and Manage Fall Risk  Recent Flowsheet Documentation  Taken 8/22/2023 2036 by Elma Waters RN  Safety Promotion/Fall Prevention:   activity supervised   lighting adjusted     Problem: Plan of Care - These are the overarching goals to be used throughout the patient stay.    Goal: Absence of Hospital-Acquired Illness or Injury  Intervention: Prevent Skin Injury  Recent Flowsheet Documentation  Taken 8/22/2023 2036 by Elma Waters RN  Body Position: position changed independently     Problem: Plan of Care - These are the overarching goals to be used throughout the patient stay.    Goal: Absence of Hospital-Acquired Illness or Injury  Intervention: Prevent and Manage VTE (Venous Thromboembolism) Risk  Recent Flowsheet Documentation  Taken 8/22/2023 2036 by Elma Waters RN  VTE Prevention/Management: (compression on RUE, and RLE) compression stockings on     Problem: Fall Injury Risk  Goal: Absence of Fall and Fall-Related Injury  Intervention: Promote Injury-Free Environment  Recent Flowsheet Documentation  Taken 8/22/2023 2036 by Elma Waters RN  Safety Promotion/Fall Prevention:   activity supervised   lighting adjusted     Problem: Mobility Impairment  Goal: Optimal Mobility  Intervention: Optimize Mobility  Recent Flowsheet Documentation  Taken 8/22/2023 2036 by Elma Waters RN  Activity Management: activity adjusted per tolerance   Goal Outcome Evaluation:       Patient alert and orientated x 4. Denies pain on arrival to the unit. Oriented to new environment. Call light appropriate.

## 2023-08-23 NOTE — PROGRESS NOTES
Interventional Radiology  8/23/2023      Aware of consult for tunneled dialysis catheter placement. Will plan to see patient tomorrow 08/24. NPO at midnight.    JUNI PISANO CNP  Interventional Radiology

## 2023-08-24 NOTE — PROGRESS NOTES
"Pt called writer into room.  Upon entering pt began a very lengthy discussion about his dissatisfaction with his care and frustration about his continual health problems.  Pt states \"I AM DONE\"...\"if I have one more thing go wrong, I will not call one person for help.\"  Pt spoke about all the things in his life that have gone wrong, some not related to current medical situation.  Writer asked if pt had anyone he could talk to or someone who offers him support and pt's response was laughter.  \"Do you know how many people offer words of encouragement?  Nothing ever changes.\"  Pt spoke for about 30 minutes with continued complaints about health problems, health care, living situation, lack of support, chronic pain, lack of independence and fear of losing his freedom.  Pt stated \"I am so full of rage.\"  When asked what he would like to see happen or change he stated \"What I would really like to see happen is to break that doctor's neck.\"  Pt did not reference which doctor.  Made multiple attempts to suggest interventions that could help the pt right now to ease his anger and anxiety but pt declined all efforts. \"Nothing will help, I guess I will just have to lay here and suffer as the clock ticks by.\"  "

## 2023-08-24 NOTE — CONSULTS
Interventional Radiology - Pre-Procedure Note:  Inpatient - [facility]  08/24/2023     Procedure Requested: Tunneled dialysis catheter placement  Requested by: Terrance Waters MD     Brief HPI: Norman Aguilar is a 63 year old male with a PMH of anemia, CHF, CAD s/p PCI, NSTEMI, DM II, HTN, HLD, intermittent atrial fibrillation, left osteomyelitis s/p left BKA, and CKD III who was admitted to [facility] on 08/22/2023 from acute rehab for worsening renal function. Baseline creatinine 1.4, increased to 4.3 on admission. Nephrology consulted, recommending dialysis initiation. Requesting tunneled HD catheter placement.     IMAGING:  CTA Chest Abdomen Pelvis Runoff w Contrast 07/17/2023  EXAM: CTA CHEST ABDOMEN PELVIS RUNOFF W CONTRAST  LOCATION: [facility]  DATE/TIME: 7/17/2023 6:12 PM CDT     INDICATION: Cold right foot, no palpable pulses, known peripheral arterial disease and new atrial fibrillation A. Fib  COMPARISON: 09/27/2022.  TECHNIQUE: CT angiogram through the chest, abdomen, pelvis, and bilateral lower extremities was performed during the arterial phase of contrast enhancement. Second phase arterial imaging was performed through the bilateral lower extremities. 2D and 3D   reconstructions performed by the CT technologist. Dose reduction techniques were used.  CONTRAST: isovue 370  75ml from a single use vial     FINDINGS:   CTA CHEST, ABDOMEN, PELVIS: Normal great vessel branch pattern with mild disease of the subclavian artery. Normal caliber thoracic aorta.     Abdominal aorta is widely patent without stenosis or dissection. Moderate calcified atherosclerotic disease without significant visceral arterial stenosis.     RIGHT LEG: Iliac arteries appear patent. Due to contrast bolus timing, difficult to evaluate the superficial femoral artery although this appears grossly patent. Severe infrapopliteal disease.     LEFT LEG: Postsurgical changes of left  below-knee amputation. Iliac arteries, common femoral artery, profunda femoral artery, superficial femoral artery all appear patent.     NONVASCULAR FINDINGS:  LUNG/PLEURA: Calcified granuloma. Scattered atelectasis. Spiculated appearing lung nodule in the right lower lobe measuring 1.0 x 0.9 cm on series 11 image 166. Punctate nodules in left lung base. No pleural effusion or pneumothorax.     MEDIASTINUM: Severe coronary artery disease with coronary artery stents. Questionable left atrial appendage thrombus. Inflammatory changes in the right axilla.     ABDOMEN: Suboptimal evaluation of the solid organs due to the arterial phase of contrast enhanced. Cholelithiasis. Spleen, adrenal glands, kidneys, and pancreas are grossly unremarkable. Hepatic steatosis.     PELVIS: No abnormally dilated loops of bowel. No free fluid or free air.     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 Postsurgical changes of left below-knee amputation.                                                                      IMPRESSION:  1.  Right lower extremity arteries appear largely patent with severe infrapopliteal arterial disease. Evaluation of the superficial femoral artery is difficult due to contrast bolus timing. Recommend Doppler arterial ultrasound if there is continued   clinical concern for embolic disease.  2.  Questionable left atrial appendage thrombus. Recommend echocardiogram for further evaluation.  3.  Spiculated pulmonary nodule in the right lower lobe measuring 1.0 x 0.9 cm. Recommend follow-up per Fleischner Society criteria as described below.  4.  Subcutaneous inflammation in the right axilla noted. Consider right upper extremity Doppler to rule out a DVT. May also represent an infectious process.  5.  Cholelithiasis.  6.  Hepatic steatosis.    NPO: Midnight  ANTICOAGULANTS: Eliquis BID, does not need to hold for procedure  ANTIBIOTICS: Ancef 2 g signed and  held    ALLERGIES  No Known Allergies      LABS:  INR   Date Value Ref Range Status   08/24/2022 1.19 (H) 0.85 - 1.15 Final      Hemoglobin   Date Value Ref Range Status   08/24/2023 7.9 (L) 13.3 - 17.7 g/dL Final     Platelet Count   Date Value Ref Range Status   08/24/2023 255 150 - 450 10e3/uL Final     Creatinine   Date Value Ref Range Status   08/24/2023 5.38 (H) 0.67 - 1.17 mg/dL Final     Potassium   Date Value Ref Range Status   08/24/2023 4.6 3.4 - 5.3 mmol/L Final   08/30/2022 3.8 3.5 - 5.0 mmol/L Final         EXAM:  /59 (BP Location: Left arm)   Pulse 91   Temp 97.9  F (36.6  C) (Oral)   Resp 16   SpO2 100%   General:  Stable.  In no acute distress.    Neuro:  A&O x 3. Moves all extremities equally.  Resp:  Lungs clear to auscultation bilaterally.  Cardio:  S1S2 and reg, without murmur, clicks or rubs.  Skin:  Without excoriations, ecchymosis, erythema, lesions or open sores on chest.    Pre-Sedation Assessment:  Mallampati Airway Classification:  I - Faucial pillars, soft palate, and uvula are visible  Previous reaction to anesthesia/sedation:  No  Sedation plan based on assessment: Moderate (conscious) sedation  ASA Classification: Class 3 - SEVERE SYSTEMIC DISEASE, DEFINITE FUNCTIONAL LIMITATIONS.   Code Status: FULL CODE      ASSESSMENT/PLAN:   Tunneled dialysis catheter placement with sedation, laterality per proceduralist. No contraindications to RIGHT sided placement.    Procedural education reviewed with patient in detail including, but not limited to risks, benefits and alternatives with understanding verbalized by patient.    Total time spent on the date of the encounter: 30 minutes.      JUNI PISANO CNP  Interventional Radiology

## 2023-08-24 NOTE — PROGRESS NOTES
[facility]    Medicine Progress Note - Hospitalist Service    Date of Admission:  8/22/2023    Assessment & Plan     Norman Aguilar is a 63 year old male admitted on 8/22/2023. He was transferred from acute rehab for worsening renal function.  Past medical history significant for CAD on Brilinta, Warnicke HFrEF(EF 30 to 35%), CKD 5, type 2 diabetes mellitus, history of left lower extremity osteomyelitis s/p left BKA.  Transferred to acute rehab 8/11/23 after being treated for septic shock secondary to group A strep bacteremia.  Now back to [facility] for progression of kidney failure.      ESRD on hemodialysis:   History of CKD, stage III. Creatinine baseline was around 1.4.  Now has increased to 4.3 with hyponatremia, fluid overload and uremic symptoms.  Underwent hemodialysis during previous admission due to severe ATN secondary to hypotension requiring multiple episodes of dialysis (last dialysis 8/4/23).  Received hemodialysis catheter placement today.   - Per Nephrology, start hemodialysis today.  - Midodrine for hypotension  - Monitor input and output, daily weight     Hyponatremia , chronic: Related to fluid overload. Initiate hemodialysis as above.      Hyperphosphatemia: Management as per nephrology     Severe CAD s/p 2 JESSICA to RCA 8/2022  HFrEF (LVEF 25-30%)  Hx of NSTEMI with last Angiogram 8/23/2022 with significant multivessel disease with JESSICA placed in RCA  - Continue Brilinta 90mg twice daily and atorvastatin 80 mg daily      Paroxsymal Afib w/ RVR,   - [id number] 4 for HTN, CHF, DM and vascular disease.   - Rate controlled. Continue PTA amiodarone. Hold metoprolol due to hypotension. Continue eliquis.      Anemia of chronic disease  Likely multifactorial with anemia of chronic disease (elevated ferritin) and CKD. Received venofer with low iron levels and 5 units PRBC while inpatient. Hemoglobin baseline 7.0-8.0s. Current at 8.0.   - Monitor closely for bleeding   -  "Continue to monitor and transfuse to maintain Hgb of 7 or greater. No active ischemia and worry about volume overload with worsening kidney function      DM2 (Hb A1c 6.9 on 7/17/23)  - Continue Lantus 15 units subcutaneous qhs  - Medium intensity insulin sliding scale and mealtime carb coverage 1:10  - Hypoglycemia protocol     S/p L BKA          Diet: NPO per Anesthesia Guidelines for Procedure/Surgery Except for: Meds    DVT Prophylaxis: DOAC  Garcia Catheter: Not present  Lines: PRESENT      CVC Double Lumen Right Internal jugular Tunneled-Site Assessment: WDL    Cardiac Monitoring: None  Code Status: Full Code      Clinically Significant Risk Factors         # Hyponatremia: Lowest Na = 124 mmol/L in last 2 days, will monitor as appropriate      # Hypoalbuminemia: Lowest albumin = 3.4 g/dL at 8/23/2023  5:04 AM, will monitor as appropriate           # Hypertension: Noted on problem list    # Chronic heart failure with reduced ejection fraction: last echo with EF <40%      # DMII: A1C = 6.9 % (Ref range: <5.7 %) within past 6 months   # Overweight: Estimated body mass index is 27.8 kg/m  as calculated from the following:    Height as of 8/11/23: 1.905 m (6' 3\").    Weight as of an earlier encounter on 8/22/23: 100.9 kg (222 lb 6.4 oz)., PRESENT ON ADMISSION       # Housing Instability: noted in nursing assessment         Disposition Plan      Expected Discharge Date: 08/28/2023    Discharge Delays: Placement - KINU              Alicia Paez MD  Hospitalist Service  [facility]  Securely message with [employer] (more info)  Text page via [employer] Paging/Directory   ______________________________________________________________________    Interval History   Patient reports feeling poorly. He does not like the hospital food. He wants his friend to bring him food, but that did not work out since he needed to be NPO for hemodialysis catheter placement. He states that \"I need freedom. This is not the way " "I want to live.\"    Physical Exam   Vital Signs: Temp: 97.5  F (36.4  C) Temp src: Oral BP: (!) 83/52 (when sitting up to drink/eat) Pulse: 72   Resp: 17 SpO2: 95 % O2 Device: None (Room air)    Weight: 0 lbs 0 oz    General appearance: not in acute distress  HEENT: PERRL, EOMI  Lungs: Clear breath sounds in bilateral lung fields  Cardiovascular: Regular rate and rhythm, normal S1-S2  Abdomen: Soft, non tender, no distension  Musculoskeletal: No joint swelling. Left leg BKA  Skin: No rash   Right upper extremity edema  Neurology: AAO ×3.  Cranial nerves II - XII normal.  Normal muscle strength in all extremities.     Medical Decision Making       45 MINUTES SPENT BY ME on the date of service doing chart review, history, exam, documentation & further activities per the note.      Data     I have personally reviewed the following data over the past 24 hrs:    8.5  \   7.9 (L)   / 255     124 (L) 88 (L) 98.4 (H) /  134 (H)   4.6 18 (L) 5.38 (H) \     Imaging results reviewed over the past 24 hrs:   Recent Results (from the past 24 hour(s))   IR CVC Tunnel Placement > 5 Yrs of Age    Narrative    LOCATION: [facility]  DATE: 8/24/2023    PROCEDURE: TUNNELED DIALYSIS CATHETER PLACEMENT  1.  Insertion of a tunneled central venous catheter.  2.  Ultrasound guidance for vascular access. A permanent image was stored.  3.  Fluoroscopic guidance for central venous access device placement.    INTERVENTIONAL RADIOLOGIST: Joseph Campos MD    INDICATION: Renal insufficiency requiring renal replacement therapy, plan for tunneled dialysis catheter placement..    CONSENT: The risks, benefits and alternatives of the procedure were discussed with the patient or representative in detail. All questions were answered. Informed consent was given to proceed with the procedure.    MODERATE SEDATION: Versed 1 mg IV; Fentanyl 25 mcg IV. During the time out, immediately prior to the administration of medications, the " patient was reassessed for adequacy to receive conscious sedation.  Under physician supervision, Versed and fentanyl   were administered for moderate sedation. Pulse oximetry, heart rate and blood pressure were continuously monitored by an independent trained observer. The physician spent 10 minutes of face-to-face sedation time with the patient.    FLUOROSCOPIC TIME: 0.5 minutes.  RADIATION DOSE: Air Kerma: 7 mGy.    COMPLICATIONS: No immediate complications.    UNIVERSAL PROTOCOL: The operative site was marked and any prior imaging was reviewed. Required items including blood products, implants, devices and special equipment was made available. Patient identity was confirmed either verbally, with demographic   information, hospital assigned identification or other identification markers. A timeout was performed immediately prior to the procedure.    STERILE BARRIER TECHNIQUE: Maximum sterile barrier technique was used. Cutaneous antisepsis was performed at the operative site with application of 2% chlorhexidine and large sterile drape. Prior to the procedure, the  and assistant performed   hand hygiene and wore hat, mask, sterile gown, and sterile gloves during the entire procedure.    PROCEDURE:    Using local anesthesia and real-time ultrasound guidance the right internal jugular vein was accessed. Ultrasound shows an anechoic and compressible vein. Permanent images were stored to the patient's medical record. A subcutaneous tunnel was created   requiring a second incision. Using this access, a 23 cm tip to cuff tunneled dialysis catheter was advanced under fluoroscopic guidance until the tip was in the proximal right atrium. Final position was confirmed with a spot radiograph from the   fluoroscopic unit. The catheter was tested and found to flush and aspirate appropriately.    FINDINGS:  At the completion of the study, a spot radiograph was performed utilizing the in room fluoroscopic unit. Imaging  demonstrates the tunneled dialysis catheter tip positioned in the proximal right atrium.      Impression    IMPRESSION:    1.  Successful tunneled dialysis catheter placement.  2.  The catheter is ready for use.

## 2023-08-24 NOTE — PROGRESS NOTES
'    RENAL (KS) progress note  CC: F/U JOHANA on CKD  S: Since last visit, patient underwent HD tunnel cath placement. Patient seen after tunnel cath placement. Denies acute issues. No current sob. BP soft but alert and talking.  Discussed with family.     A/P:   Principal Problem:    ESRD (end stage renal disease) (H)  Active Problems:    DM type 2 on insulin, w Neuro -- Hgb A1C 6.9 on 7/17/23    Benign essential hypertension    PAF (paroxysmal atrial fibrillation) -- on Eliquis     Ischemic cardiomyopathy    CAD -- diffuse calcified vessels 11/16/21 (no stents placed)    Osteomyelitis of left foot -- S/P Left BKA 4/4/22    Chronic systolic CHF -- EF 30-35% on Echo 11/15/21    JOHANA on CKD stage 4: JOHANA with cardiorenal syndrome with underlying diabetic nephropathy most likey. Patient recenlty admitted with sepsis/bacteremia in August of 2023 and required HD but had recover and was able to come off. Discharged to acute rehab with progressive volume overload and worsening renal function. Transferred back to [place] for dialysis. Renal function likely overestimated. With recurrent need for dialysis likely will be ESRD at this time and need ongoing dialysis. This has been discussed with patient.   Tunnel cath placed on 8/24  Daily dialysis.   Clinic to start HD unit placement process.     2. HFrEF: hypervolemic. LVEF of 25-30%. UF with HD.   3. Hyponatremia; Hypervolemic and lack of free water excretion.   4. CAD: hx of JESSICA in 2022. Per primary.   5. Anemia: monitor. Recently received venofer for low iron stores.   6. PVD: hx of BKA amputation on the left.   7. Chronic pain: per IM.   8. Hypotension: Hold metoprolol. Start midodrine. Sedation from procedure contributing? Patient reports bp had been running in the 80s to 90s sbp recently.       Refugio Reich,   [facility]  [phone] (off)     No interval changes to past medical history, social history or family history to report.    BP (!)  80/48 (BP Location: Left arm)   Pulse 72   Temp 97.4  F (36.3  C) (Oral)   Resp 16   SpO2 95%     I/O last 3 completed shifts:  In: 240 [P.O.:240]  Out: 400 [Urine:400]    Physical Exam:   GENERAL: NAD  EYES: pupils equal, sclerae not icteric.  ENT: Hearing normal,   RESP: Anterior clear.   CV: RRR,  ++ leg edema.    GI: NT/ND,  SKIN: No rash, warm/ dry  NEURO:Sensation grossly intact to LT  PSYCH: Appropriate mood and affect    Recent Labs   Lab 08/24/23  0500 08/23/23  0504 08/22/23  0604 08/21/23  0820 08/20/23  0649 08/19/23  0623 08/18/23  0522   * 127* 128* 129* 129* 130* 132*   POTASSIUM 4.6 4.9 4.4 4.1 3.5  3.5 3.5 3.8   CHLORIDE 88* 90* 90* 92* 93* 94* 95*   CO2 18* 20* 23 23 21* 23 26   BUN 98.4* 90.6* 89.5* 85.6* 81.3* 80.1* 75.6*   CR 5.38* 4.67* 4.35* 4.00* 3.27* 3.29* 3.12*   GFRESTIMATED 11* 13* 14* 16* 20* 20* 21*   YOSVANY 8.8 8.8 9.2 9.1 8.9 9.1 9.4   MAG  --   --  3.0*  --   --   --  2.1   ALBUMIN  --  3.4*  --   --   --   --   --        Recent Labs   Lab 08/24/23  0500 08/23/23  0504 08/22/23  0604 08/21/23  0820   WBC 8.5 7.9 7.5 9.1   HGB 7.9* 7.6* 8.0* 7.7*   HCT 24.2* 24.4* 24.6* 24.0*   MCV 89 90 89 90    227 227 238             Current Facility-Administered Medications:     acetaminophen (TYLENOL) tablet 650 mg, 650 mg, Oral, Q4H PRN, Jason Varela MD    amiodarone (PACERONE) tablet 200 mg, 200 mg, Oral, Daily, Jason Varela MD, 200 mg at 08/24/23 0800    apixaban ANTICOAGULANT (ELIQUIS) tablet 2.5 mg, 2.5 mg, Oral, BID, Jason Varela MD, 2.5 mg at 08/23/23 2059    atorvastatin (LIPITOR) tablet 80 mg, 80 mg, Oral, QPM, Jason Varela MD, 80 mg at 08/23/23 2059    glucose gel 15-30 g, 15-30 g, Oral, Q15 Min PRN **OR** dextrose 50 % injection 25-50 mL, 25-50 mL, Intravenous, Q15 Min PRN **OR** glucagon injection 1 mg, 1 mg, Subcutaneous, Q15 Min PRN, Jason Varela MD    insulin aspart (NovoLOG) injection (RAPID ACTING), ,  Subcutaneous, TID w/meals, Jason Varela MD, 1 Units at 08/23/23 0840    insulin aspart (NovoLOG) injection (RAPID ACTING), 1-7 Units, Subcutaneous, TID ACAvery Miheret, MD, 1 Units at 08/24/23 0755    insulin aspart (NovoLOG) injection (RAPID ACTING), 1-5 Units, Subcutaneous, At Bedtime, Jason Varela MD, 1 Units at 08/23/23 2104    insulin glargine (LANTUS PEN) injection 15 Units, 15 Units, Subcutaneous, At Bedtime, Jason Varela MD, 15 Units at 08/23/23 2104    lidocaine (LMX4) cream, , Topical, Q1H PRN, Jason Varela MD    lidocaine 1 % 0.1-1 mL, 0.1-1 mL, Other, Q1H PRN, Jason Varela MD    melatonin tablet 1 mg, 1 mg, Oral, At Bedtime PRN, Jason Varela MD    metoprolol tartrate (LOPRESSOR) half-tab 12.5 mg, 12.5 mg, Oral, BID, Jason Varela MD, 12.5 mg at 08/23/23 2059    ondansetron (ZOFRAN ODT) ODT tab 4 mg, 4 mg, Oral, Q6H PRN **OR** ondansetron (ZOFRAN) injection 4 mg, 4 mg, Intravenous, Q6H PRN, Jason Varela MD    pantoprazole (PROTONIX) EC tablet 40 mg, 40 mg, Oral, BID Avery VAZ Miheret, MD, 40 mg at 08/24/23 0631    polyethylene glycol (MIRALAX) Packet 17 g, 17 g, Oral, Daily PRN, Jason Varela MD    prochlorperazine (COMPAZINE) injection 10 mg, 10 mg, Intravenous, Q6H PRN **OR** prochlorperazine (COMPAZINE) tablet 10 mg, 10 mg, Oral, Q6H PRN **OR** prochlorperazine (COMPAZINE) suppository 25 mg, 25 mg, Rectal, Q12H PRN, Jason Varela MD    sodium chloride (PF) 0.9% PF flush 3 mL, 3 mL, Intracatheter, Q8H, Jason Varela MD, 3 mL at 08/24/23 0631    sodium chloride (PF) 0.9% PF flush 3 mL, 3 mL, Intracatheter, q1 min prn, Jason Varela MD    ticagrelor (BRILINTA) tablet 90 mg, 90 mg, Oral, BID, Jason Varela MD, 90 mg at 08/23/23 2054      Labs personally reviewed today during this evaluation at 1:26 PM

## 2023-08-24 NOTE — PLAN OF CARE
"  Problem: Plan of Care - These are the overarching goals to be used throughout the patient stay.    Goal: Plan of Care Review  Description: The Plan of Care Review/Shift note should be completed every shift.  The Outcome Evaluation is a brief statement about your assessment that the patient is improving, declining, or no change.  This information will be displayed automatically on your shift note.  Outcome: Progressing  Goal: Patient-Specific Goal (Individualized)  Description: You can add care plan individualizations to a care plan. Examples of Individualization might be:  \"Parent requests to be called daily at 9am for status\", \"I have a hard time hearing out of my right ear\", or \"Do not touch me to wake me up as it startles me\".  Outcome: Progressing  Goal: Absence of Hospital-Acquired Illness or Injury  Outcome: Progressing  Intervention: Identify and Manage Fall Risk  Recent Flowsheet Documentation  Taken 8/24/2023 0805 by Tonya Mast RN  Safety Promotion/Fall Prevention:   activity supervised   clutter free environment maintained   lighting adjusted   nonskid shoes/slippers when out of bed   room organization consistent   safety round/check completed  Intervention: Prevent Skin Injury  Recent Flowsheet Documentation  Taken 8/24/2023 0805 by Tonya Mast RN  Body Position: (dangle edge of bed) position changed independently  Goal: Optimal Comfort and Wellbeing  Outcome: Progressing  Goal: Readiness for Transition of Care  Outcome: Progressing     Problem: Mobility Impairment  Goal: Optimal Mobility  Outcome: Progressing  Intervention: Optimize Mobility  Recent Flowsheet Documentation  Taken 8/24/2023 0805 by Tonya Mast, RN  Activity Management: activity adjusted per tolerance     Problem: Acute Kidney Injury/Impairment  Goal: Fluid and Electrolyte Balance  Outcome: Progressing  Goal: Optimal Nutrition Intake  Outcome: Progressing  Goal: Effective Renal Function  Outcome: " Progressing  Intervention: Monitor and Support Renal Function  Recent Flowsheet Documentation  Taken 8/24/2023 0805 by Tonya Mast, RN  Medication Review/Management: medications reviewed   Goal Outcome Evaluation:  Nephrologist at bedside aware of lower BP's post procedure. Pt to have dialysis this pm. Pt has new dialysis cath placed, site intact.

## 2023-08-24 NOTE — PRE-PROCEDURE
GENERAL PRE-PROCEDURE:   Procedure:  Tunneled dialysis catheter placement  Date/Time:  8/24/2023 8:56 AM    Written consent obtained?: Yes    Risks and benefits: Risks, benefits and alternatives were discussed    Consent given by:  Patient  Patient states understanding of procedure being performed: Yes    Patient's understanding of procedure matches consent: Yes    Procedure consent matches procedure scheduled: Yes    Expected level of sedation:  Moderate  Appropriately NPO:  Yes  ASA Class:  3  Mallampati  :  Grade 1- soft palate, uvula, tonsillar pillars, and posterior pharyngeal wall visible  Lungs:  Lungs clear with good breath sounds bilaterally  Heart:  Normal heart sounds and rate  History & Physical reviewed:  History and physical reviewed and no updates needed  Statement of review:  I have reviewed the lab findings, diagnostic data, medications, and the plan for sedation

## 2023-08-24 NOTE — PROGRESS NOTES
Care Management Follow Up    Length of Stay (days): 2    Expected Discharge Date: 08/28/2023     Concerns to be Addressed:   medical progrssion, OP dialysis set up    Patient plan of care discussed at interdisciplinary rounds: Yes    Anticipated Discharge Disposition:  TCU     Anticipated Discharge Services:    Anticipated Discharge DME:      Patient/family educated on Medicare website which has current facility and service quality ratings:    Education Provided on the Discharge Plan:    Patient/Family in Agreement with the Plan:      Referrals Placed by CM/SW:  TCU  Private pay costs discussed: Not applicable    Additional Information:  CM received a call from [facility] and they are not willing to take pt back as they were just about to send him to TCU and per the ARU the pt has been accepted to [facility]. FERNANDO then Called Grady ([place] [phone]) and she informed CM that the pt has been clinically accepted for Monday 8/28 but they would like him to have an early OP dialysis chair time, and rides set up to and from OP dialysis.    Nephrology updated FERNANDO that pt had tunnel cath placed on 8/24 and they will have their team start the OP HD unit placement. Also that pt will be here through the weekend. FERNANDO did tell nephrology that the TCU in [place] would like an early morning chair time. CM went to pt room to discuss this but he asked CM to come back another time I asked If I could come back later today and he said no I can come back tomorrow. CM to come back to discuss placement to [facility], transportation to and from OP dialysis and the TCUs non-smoking policy.    RNCM to follow for medical progression, recommendations, and final discharge plan.      Mehreen Sanchez RN

## 2023-08-24 NOTE — PROVIDER NOTIFICATION
Page sent to house officer about BP 89/57.    0057-Pt asymptomatic and no new treatment at this time.

## 2023-08-25 NOTE — PLAN OF CARE
Problem: Plan of Care - These are the overarching goals to be used throughout the patient stay.    Goal: Optimal Comfort and Wellbeing  Intervention: Monitor Pain and Promote Comfort  Recent Flowsheet Documentation  Taken 8/24/2023 1609 by Carolyn Naranjo, RN  Pain Management Interventions: emotional support   Goal Outcome Evaluation:      Pt is alert and oriented, had dialysis this Pm and  tolerated . BP been low during the shift , had midodrine X2.  and 120.

## 2023-08-25 NOTE — PROGRESS NOTES
[facility]    Medicine Progress Note - Hospitalist Service    Date of Admission:  8/22/2023    Assessment & Plan     Norman Aguilar is a 63 year old male admitted on 8/22/2023. He was transferred from acute rehab for worsening renal function.  Past medical history significant for CAD on Brilinta, Warnicke HFrEF(EF 30 to 35%), CKD 5, type 2 diabetes mellitus, history of left lower extremity osteomyelitis s/p left BKA.  Transferred to acute rehab 8/11/23 after being treated for septic shock secondary to group A strep bacteremia.  Now back to [facility] for progression of kidney failure.      ESRD on hemodialysis:   History of CKD, stage III. Creatinine baseline was around 1.4.  Now has increased to 4.3 with hyponatremia, fluid overload and uremic symptoms.  Underwent hemodialysis during previous admission due to severe ATN secondary to hypotension requiring multiple episodes of dialysis (last dialysis 8/4/23).  Received hemodialysis catheter placement and started hemodialysis on 8/24.   - Per Nephrology, patient likely needs long term hemodialysis  - Although patient repetitively stating that he wants freedom and this is not the life he wants, he does want to continue hemodialysis for now.   - Midodrine for hypotension     Hyponatremia , chronic: Related to fluid overload. Initiate hemodialysis as above.      Hyperphosphatemia: Management per nephrology     Severe CAD s/p 2 JESSICA to RCA 8/2022  HFrEF (LVEF 25-30%)  Hx of NSTEMI with last Angiogram 8/23/2022 with significant multivessel disease with JESSICA placed in RCA  - Continue Brilinta 90mg twice daily and atorvastatin 80 mg daily      Paroxsymal Afib w/ RVR,   - [id number] 4 for HTN, CHF, DM and vascular disease.   - Rate controlled. Continue PTA amiodarone. Hold metoprolol due to hypotension. Continue eliquis.      Anemia of chronic disease  Likely multifactorial with anemia of chronic disease (elevated ferritin) and CKD. Received  "venofer with low iron levels and 5 units PRBC while inpatient. Hemoglobin baseline 7.0-8.0s. Current at 8.0.   - Monitor closely for bleeding   - Continue to monitor and transfuse to maintain Hgb of 7 or greater. No active ischemia and worry about volume overload with worsening kidney function      DM2 (Hb A1c 6.9 on 7/17/23)  - Continue Lantus 15 units subcutaneous qhs  - Medium intensity insulin sliding scale and mealtime carb coverage 1:10  - Hypoglycemia protocol    Depression: patient admitted having depression due to his medical conditions. No suicidal ideation. He declined psych consult and psycotherapy. He reports that he does not want to take any antidepressions.      S/p L BKA          Diet: Renal Diet (dialysis)    DVT Prophylaxis: DOAC  Garcia Catheter: Not present  Lines: PRESENT      CVC Double Lumen Right Internal jugular Tunneled-Site Assessment: WDL    Cardiac Monitoring: None  Code Status: Full Code      Clinically Significant Risk Factors         # Hyponatremia: Lowest Na = 124 mmol/L in last 2 days, will monitor as appropriate      # Hypoalbuminemia: Lowest albumin = 3.4 g/dL at 8/23/2023  5:04 AM, will monitor as appropriate           # Hypertension: Noted on problem list    # Chronic heart failure with reduced ejection fraction: last echo with EF <40%      # DMII: A1C = 6.9 % (Ref range: <5.7 %) within past 6 months   # Overweight: Estimated body mass index is 27.8 kg/m  as calculated from the following:    Height as of 8/11/23: 1.905 m (6' 3\").    Weight as of an earlier encounter on 8/22/23: 100.9 kg (222 lb 6.4 oz)., PRESENT ON ADMISSION       # Housing Instability: noted in nursing assessment         Disposition Plan      Expected Discharge Date: 08/28/2023    Discharge Delays: Placement - TCU    Discharge Comments: OP Diaylsis rides and scheduled          Alicia Paez MD  Hospitalist Service  [facility]  Securely message with Fab (more info)  Text page via [employer] " Talisha/Ochoa   ______________________________________________________________________    Interval History   Patient hd hypotension after hemodialysis. Patient continues to complain of body aches . He attributes his body ache to the mattress of the bed. His sister came visit him today.    Physical Exam   Vital Signs: Temp: 98.5  F (36.9  C) Temp src: Oral BP: 90/52 Pulse: 73   Resp: 18 SpO2: 99 % O2 Device: None (Room air)    Weight: 0 lbs 0 oz    General appearance: not in acute distress  HEENT: PERRL, EOMI  Lungs: Clear breath sounds in bilateral lung fields  Cardiovascular: Regular rate and rhythm, normal S1-S2  Abdomen: Soft, non tender, no distension  Musculoskeletal: No joint swelling. Left leg BKA  Skin: No rash   Right upper extremity edema  Neurology: AAO ×3.  Cranial nerves II - XII normal.  Normal muscle strength in all extremities.     Medical Decision Making       45 MINUTES SPENT BY ME on the date of service doing chart review, history, exam, documentation & further activities per the note.      Data     I have personally reviewed the following data over the past 24 hrs:    Procal: N/A CRP: N/A Lactic Acid: 2.0       Imaging results reviewed over the past 24 hrs:   Recent Results (from the past 24 hour(s))   Echocardiogram Complete   Result Value    LVEF  30-35%    Narrative    [id number]  [id number]  [id number]  [id number]^MONIQUE^[place] [address]     Name: JOSE YO  MRN: [id number]  : 1960  Study Date: 2023 11:48 AM  Age: 63 yrs  Gender: Male  Patient Location: [facility]  Reason For Study: Heart Failure  Ordering Physician: TROY AMAYA  Performed By: AT     BSA: 2.3 m2  Height: 75 in  Weight: 222 lb  HR: 71  ______________________________________________________________________________  Procedure  Complete Echo Adult. Definity (NDC #43293-118) given intravenously. Poor  quality two-dimensional was performed and interpreted. Poor  quality color and  spectral Doppler were performed and interpreted. Compared to the prior study  dated 7/18/2023, there are changes as noted.  ______________________________________________________________________________  Interpretation Summary     1. Left ventricular chamber size and wall thickness are normal. Systolic  function is moderately reduced with severe lateral wall hypokinesis. The  visually estimated left ventricular ejection fraction is 30-35%.  2. Right ventricle is not well visualized. Chamber size and systolic function  are grossly normal.  3. Mild left atrial enlargement.  4. No hemodynamically significant valvular abnormalities.  5. Small circumferential pericardial effusion. No echocardiographic evidence  of pericardial tamponade.  6. Elevated central venous pressure estimated at 15 mmHg.  7. Compared to the prior study dated 7/18/2023, the patient is no longer in  rapid atrial fibrillation and left ventricular systolic function is mildly  improved. There is now a small pericardial effusion.  ______________________________________________________________________________  I      WMSI = 2.38     % Normal = 0     X - Cannot   0 -                      (2) - Mildly 2 -          Segments  Size  Interpret    Hyperkinetic 1 - Normal  Hypokinetic  Hypokinetic  1-2     small                                                     7 -          3-5      moderate  3 - Akinetic 4 -          5 -         6 - Akinetic Dyskinetic   6-14    large               Dyskinetic   Aneurysmal  w/scar       w/scar       15-16   diffuse     Left Ventricle  The left ventricle is normal in size. There is normal left ventricular wall  thickness. The visual ejection fraction is 30-35%. Left ventricular diastolic  function is abnormal. Diastolic Doppler findings (E/E' ratio and/or other  parameters) suggest left ventricular filling pressures are indeterminate.  There is severe lateral wall hypokinesis. There is no thrombus seen in  the  left ventricle.     Right Ventricle  The right ventricle is not well visualized. Normal right ventricle size and  systolic function.     Atria  The left atrium is mildly dilated. Right atrial size is normal.     Mitral Valve  The mitral valve leaflets are mildly thickened. There is trace mitral  regurgitation. There is no mitral valve stenosis.     Tricuspid Valve  The tricuspid valve is not well visualized, but is grossly normal. There is  trace tricuspid regurgitation. Right ventricular systolic pressure could not  be approximated due to inadequate tricuspid regurgitation. There is no  tricuspid stenosis.     Aortic Valve  The aortic valve is not well visualized. No aortic regurgitation is present.  No aortic stenosis is present.     Pulmonic Valve  The pulmonic valve is not well visualized.     Vessels  The aorta root is normal. The thoracic aorta is normal. IVC diameter >2.1 cm  collapsing <50% with sniff suggests a high RA pressure estimated at 15 mmHg or  greater.     Pericardium  Small pericardial effusion. There are no echocardiographic indications of  cardiac tamponade.     Rhythm  Sinus rhythm was noted.     ______________________________________________________________________________  MMode/2D Measurements & Calculations  IVSd: 1.0 cm  LVIDd: 4.3 cm  LVIDs: 3.7 cm  LVPWd: 1.1 cm     FS: 14.9 %  LV mass(C)d: 151.5 grams  LV mass(C)dI: 66.1 grams/m2  Ao root diam: 3.3 cm  LA dimension: 4.0 cm  asc Aorta Diam: 3.2 cm  LA/Ao: 1.2  LVOT diam: 2.2 cm  LVOT area: 3.8 cm2  Ao root diam Index (cm/m2): 1.4  asc Aorta Diam Index (cm/m2): 1.4  LA Volume Index (BP): 35.0 ml/m2  RWT: 0.49     Doppler Measurements & Calculations  MV E max swathi: 69.2 cm/sec  MV A max swathi: 74.4 cm/sec  MV E/A: 0.93  MV max P.0 mmHg  MV mean P.0 mmHg  MV V2 VTI: 22.4 cm  MVA(VTI): 2.3 cm2  MV dec slope: 311.0 cm/sec2  MV dec time: 0.22 sec  Ao V2 max: 43.3 cm/sec  Ao max P.0 mmHg  Ao V2 mean: 55.4 cm/sec  Ao mean P.0  mmHg  Ao V2 VTI: 14.0 cm  JAEL(I,D): 3.7 cm2  JAEL(V,D): 7.1 cm2  LV V1 max P.6 mmHg  LV V1 max: 80.4 cm/sec  LV V1 VTI: 13.5 cm  SV(LVOT): 51.3 ml  SI(LVOT): 22.4 ml/m2  AV Juan Carlos Ratio (DI): 1.9  JAEL Index (cm2/m2): 1.6  E/E' avg: 10.9  Lateral E/e': 10.1  Medial E/e': 11.8     ______________________________________________________________________________  Report approved by: Eveline Bush 2023 01:51 PM

## 2023-08-25 NOTE — PROGRESS NOTES
Care Management Follow Up    Length of Stay (days): 3    Expected Discharge Date: 08/28/2023     Concerns to be Addressed:   medical progression    Patient plan of care discussed at interdisciplinary rounds: Yes    Anticipated Discharge Disposition:  TCU     Anticipated Discharge Services:    Anticipated Discharge DME:      Patient/family educated on Medicare website which has current facility and service quality ratings:    Education Provided on the Discharge Plan:    Patient/Family in Agreement with the Plan:      Referrals Placed by CM/SW:  TCU  Private pay costs discussed: Not applicable    Additional Information:  Patient was in [facility] 7/17/23- 8/11/23; [facility] 8/11/23-8/22/23.     When CM spoke to pt today he told CM he was unsure of where he is going to stay long term as he thinks he can stay with his sister but not sure. He told CM he is only thinking one day at a time and right now is only concerned with going to TCU to get better so he doesn't have to go to LTC. I told him that we have a TCU that is considering him for placement and we discussed that it was in [facility] and he was not happy about this as he would like to be closer to [place] so his family could visit but is willing to temporarily go and get therapy. I also told him that the facility is non-smoking and he told CM this is not a problem as he does not smoke cigarettes he only smokes cigars and he is accepting of the non-smoking rule. We also discussed that once [facility] has set up OP dialysis CM can help him set up transportation to and from dialysis. I asked if he had any friends or family that would be willing to take him to and from dialysis and he told me he would call around.    [facility] has clinically accepted this pt and would like CM to set up 3 rides to and from OP dialysis before the pt comes. CM will need to know more details about OP dialysis such as chair time and location before we can set up  rides.    Per provider and Nephrology pt not medically ready most likely early next week. CM also asked Nephrology if the clinic has a chair time set up and nephrology told CM not yet and they will put in their note once established.     RNCM to follow for medical progression, recommendations, and final discharge plan.             Mehreen Sanchez RN

## 2023-08-25 NOTE — PROGRESS NOTES
0.5L removed during 3Hr HD Tx    Hypotensive before, during, and after HD Tx. Otherwise, tolerated Tx well    See flowsheet for Tx data    Report given to RACHAEL Leon

## 2023-08-25 NOTE — PLAN OF CARE
Problem: Plan of Care - These are the overarching goals to be used throughout the patient stay.    Goal: Optimal Comfort and Wellbeing  Outcome: Progressing  Intervention: Monitor Pain and Promote Comfort  Recent Flowsheet Documentation  Taken 8/25/2023 0055 by Cynthia Landeros RN  Pain Management Interventions:   declines   repositioned   Goal Outcome Evaluation:  Pt slept a good portion of the shift. Endorses feeling weary.  Continues to c/o difficulty getting comfortable in bed.  Reports chronic neck pain though declines medication.  Rolled towel behind neck for support.  Pt's BP remains in the 80's as was yesterday.  Midodrine given this am before he left for dialysis.

## 2023-08-25 NOTE — PROGRESS NOTES
Hospitalist and nephrologist MD's notified of pt low bp's post dialysis. Midodrin admin'd as ordered and awaiting results. Pt unable to tolerate HOB lowered due to neck pain and intense headaches. Pt asyptomatic, A&O x4. Continue to monitor.

## 2023-08-25 NOTE — PROGRESS NOTES
'    RENAL ([facility]) progress note  CC: F/U JOHANA on CKD  S: Seen after HD. Tolerated alright able to get 2L off today. BP remains soft. Patient denies diziness currently. No sob. Discussed further eval of hypotension. Echo pending.     A/P:   Principal Problem:    ESRD (end stage renal disease) (H)  Active Problems:    DM type 2 on insulin, w Neuro -- Hgb A1C 6.9 on 7/17/23    Benign essential hypertension    PAF (paroxysmal atrial fibrillation) -- on Eliquis     Ischemic cardiomyopathy    CAD -- diffuse calcified vessels 11/16/21 (no stents placed)    Osteomyelitis of left foot -- S/P Left BKA 4/4/22    Chronic systolic CHF -- EF 30-35% on Echo 11/15/21    JOHANA on CKD stage 4: JOHANA with cardiorenal syndrome with underlying diabetic nephropathy most likey. Patient recenlty admitted with sepsis/bacteremia in August of 2023 and required HD but had recover and was able to come off. Discharged to acute rehab with progressive volume overload and worsening renal function. Transferred back to [facility] for dialysis. Renal function likely overestimated. With recurrent need for dialysis likely will be ESRD at this time and need ongoing dialysis. This has been discussed with patient.   Tunnel cath placed on 8/24  Daily dialysis - next tomorrow.   Clinic to start HD unit placement process.     2. HFrEF: hypervolemic. LVEF of 25-30%. UF with HD. Repeat echo. Consider cardiology consult.   3. Hyponatremia; Hypervolemic and lack of free water excretion.   4. CAD: hx of JESSICA in 2022. Per primary.   5. Anemia: monitor. Recently received venofer for low iron stores.   6. PVD: hx of BKA amputation on the left.   7. Chronic pain: per IM.   8. Hypotension: Hold metoprolol. Start midodrine increase to 10 mg TID. Patient reports bp had been running in the 80s to 90s sbp recently. Repeat echo. Cortisol - consider stim test based on results. Add on procalcitonin - ongoing infection?       Refugio Reich, DO  [facility]  [phone] (off)     No interval changes to past medical history, social history or family history to report.    BP 94/54   Pulse 72   Temp 97.5  F (36.4  C) (Temporal)   Resp 15   SpO2 98%     I/O last 3 completed shifts:  In: -   Out: 100 [Other:100]    Physical Exam:   GENERAL: NAD  EYES: pupils equal, sclerae not icteric.  ENT: Hearing normal,   RESP: Normal effort  CV:,  + leg edema.    GI: NT/ND,  SKIN: No rash, warm/ dry  NEURO:Sensation grossly intact to LT  PSYCH: Appropriate mood and affect    Recent Labs   Lab 08/24/23  0500 08/23/23  0504 08/22/23  0604 08/21/23  0820 08/20/23  0649 08/19/23  0623   * 127* 128* 129* 129* 130*   POTASSIUM 4.6 4.9 4.4 4.1 3.5  3.5 3.5   CHLORIDE 88* 90* 90* 92* 93* 94*   CO2 18* 20* 23 23 21* 23   BUN 98.4* 90.6* 89.5* 85.6* 81.3* 80.1*   CR 5.38* 4.67* 4.35* 4.00* 3.27* 3.29*   GFRESTIMATED 11* 13* 14* 16* 20* 20*   YOSVANY 8.8 8.8 9.2 9.1 8.9 9.1   MAG  --   --  3.0*  --   --   --    ALBUMIN  --  3.4*  --   --   --   --          Recent Labs   Lab 08/24/23  0500 08/23/23  0504 08/22/23  0604 08/21/23  0820   WBC 8.5 7.9 7.5 9.1   HGB 7.9* 7.6* 8.0* 7.7*   HCT 24.2* 24.4* 24.6* 24.0*   MCV 89 90 89 90    227 227 238               Current Facility-Administered Medications:     0.9% sodium chloride BOLUS, 100-150 mL, Intravenous, Q15 Min PRN, Terrance Waters MD    0.9% sodium chloride BOLUS, 100-150 mL, Intravenous, Q15 Min PRN, Refugio Reich DO    acetaminophen (TYLENOL) tablet 650 mg, 650 mg, Oral, Q4H PRN, Jason Varela MD    amiodarone (PACERONE) tablet 200 mg, 200 mg, Oral, Daily, Jason Varela MD, 200 mg at 08/24/23 0800    apixaban ANTICOAGULANT (ELIQUIS) tablet 2.5 mg, 2.5 mg, Oral, BID, Jason Varela MD, 2.5 mg at 08/24/23 2135    atorvastatin (LIPITOR) tablet 80 mg, 80 mg, Oral, QPM, Jason Varela MD, 80 mg at 08/24/23 2134    glucose gel 15-30 g, 15-30 g, Oral, Q15 Min PRN **OR** dextrose 50 % injection  25-50 mL, 25-50 mL, Intravenous, Q15 Min PRN **OR** glucagon injection 1 mg, 1 mg, Subcutaneous, Q15 Min PRN, Jason Varela MD    insulin aspart (NovoLOG) injection (RAPID ACTING), , Subcutaneous, TID w/meals, Jason Varela MD, 3 Units at 08/24/23 1706    insulin aspart (NovoLOG) injection (RAPID ACTING), 1-7 Units, Subcutaneous, TID AC, Jason Varela MD, 1 Units at 08/24/23 1706    insulin aspart (NovoLOG) injection (RAPID ACTING), 1-5 Units, Subcutaneous, At Bedtime, Jason Varela MD, 1 Units at 08/23/23 2104    insulin glargine (LANTUS PEN) injection 15 Units, 15 Units, Subcutaneous, At Bedtime, Jason Varela MD, 15 Units at 08/24/23 2142    lidocaine (LMX4) cream, , Topical, Q1H PRN, Jason Varela MD    lidocaine 1 % 0.1-1 mL, 0.1-1 mL, Other, Q1H PRN, Jason Varela MD    melatonin tablet 1 mg, 1 mg, Oral, At Bedtime PRN, Jason Varela MD    [Held by provider] metoprolol tartrate (LOPRESSOR) half-tab 12.5 mg, 12.5 mg, Oral, BID, Jason Varela MD, 12.5 mg at 08/23/23 2059    midodrine (PROAMATINE) tablet 10 mg, 10 mg, Oral, TID, Refugio Reich,     ondansetron (ZOFRAN ODT) ODT tab 4 mg, 4 mg, Oral, Q6H PRN **OR** ondansetron (ZOFRAN) injection 4 mg, 4 mg, Intravenous, Q6H PRN, Jason Varela MD    pantoprazole (PROTONIX) EC tablet 40 mg, 40 mg, Oral, BID ACAvery Miheret, MD, 40 mg at 08/24/23 1609    polyethylene glycol (MIRALAX) Packet 17 g, 17 g, Oral, Daily PRN, Jason Varela MD    prochlorperazine (COMPAZINE) injection 10 mg, 10 mg, Intravenous, Q6H PRN **OR** prochlorperazine (COMPAZINE) tablet 10 mg, 10 mg, Oral, Q6H PRN **OR** prochlorperazine (COMPAZINE) suppository 25 mg, 25 mg, Rectal, Q12H PRN, Jason Varela MD    sodium chloride (PF) 0.9% PF flush 3 mL, 3 mL, Intracatheter, Q8H, Jason Varela MD, 3 mL at 08/25/23 0052    sodium chloride (PF) 0.9% PF flush 3 mL, 3  mL, Intracatheter, q1 min prn, Jason Varela MD    ticagrelor (BRILINTA) tablet 90 mg, 90 mg, Oral, BID, Jason Varela MD, 90 mg at 08/24/23 4772      Labs personally reviewed today during this evaluation at 1:26 PM

## 2023-08-25 NOTE — PROGRESS NOTES
2L removed during 3Hr HD Tx.    Hypotensive before, during, and post Tx. Albumin given for BP support. Otherwise tolerated Tx well    See flowsheet for Tx data.    Report given to MARK Castaneda

## 2023-08-26 NOTE — PROGRESS NOTES
800 mL NET removed during 3Hr HD Tx.     Hypotensive before, during, and post Tx. Albumin given for BP support.      See flowsheet for Tx data.     Report given to Elma Waters RN

## 2023-08-26 NOTE — PROGRESS NOTES
[facility]    Medicine Progress Note - Hospitalist Service    Date of Admission:  8/22/2023    Assessment & Plan     Norman Aguilar is a 63 year old male admitted on 8/22/2023. He was transferred from acute rehab for worsening renal function.  Past medical history significant for CAD on Brilinta, Warnicke HFrEF(EF 30 to 35%), CKD 5, type 2 diabetes mellitus, history of left lower extremity osteomyelitis s/p left BKA.  Transferred to acute rehab 8/11/23 after being treated for septic shock secondary to group A strep bacteremia.  Now back to [facility] for progression of kidney failure.      ESRD on hemodialysis:   History of CKD, stage III. Creatinine baseline was around 1.4.  Now has increased to 4.3 with hyponatremia, fluid overload and uremic symptoms.  Underwent hemodialysis during previous admission due to severe ATN secondary to hypotension requiring multiple episodes of dialysis (last dialysis 8/4/23).  Received hemodialysis catheter placement and started hemodialysis on 8/24.   Hemodialysis has been complicated by hypotension, limiting ultrafiltration.   - Per Nephrology, patient likely needs long term hemodialysis  - Although patient repetitively stating that he wants freedom and this is not the life he wants, he does want to continue hemodialysis for now.   - Midodrine for hypotension     Hyponatremia , chronic: Related to fluid overload. Initiate hemodialysis as above. Sodium level improved.      Hyperphosphatemia: Management per nephrology     Severe CAD s/p 2 JESSICA to RCA 8/2022  HFrEF (LVEF 25-30%)  Hx of NSTEMI with last Angiogram 8/23/2022 with significant multivessel disease with JESSICA placed in RCA  - Continue Brilinta 90mg twice daily and atorvastatin 80 mg daily      Paroxsymal Afib w/ RVR,   - [id number] 4 for HTN, CHF, DM and vascular disease.   - Rate controlled. Continue PTA amiodarone. Hold metoprolol due to hypotension. Continue eliquis.      Anemia of chronic  "disease  Likely multifactorial with anemia of chronic disease (elevated ferritin) and CKD. Received venofer with low iron levels and 5 units PRBC while inpatient. Hemoglobin baseline 7.0-8.0s. Current at 8.0.   - Monitor closely for bleeding   - Continue to monitor and transfuse to maintain Hgb of 7 or greater. No active ischemia and worry about volume overload with worsening kidney function      DM2 (Hb A1c 6.9 on 7/17/23): has hypoglycemic episode due to poor oral intake.   - Decrease PTA Lantus to 10 units subcutaneous qhs  - Medium intensity insulin sliding scale, 1:15 carb coverage.  - Hypoglycemia protocol    Depression: patient admitted having depression due to his medical conditions. No suicidal ideation. He declined psych consult and psycotherapy. He reports that he does not want to take any antidepressions.      S/p L BKA          Diet: Renal Diet (dialysis)    DVT Prophylaxis: DOAC  Garcia Catheter: Not present  Lines: PRESENT      CVC Double Lumen Right Internal jugular Tunneled-Site Assessment: WDL    Cardiac Monitoring: None  Code Status: Full Code      Clinically Significant Risk Factors              # Hypoalbuminemia: Lowest albumin = 3.4 g/dL at 8/23/2023  5:04 AM, will monitor as appropriate           # Hypertension: Noted on problem list    # Chronic heart failure with reduced ejection fraction: last echo with EF <40%      # DMII: A1C = 6.9 % (Ref range: <5.7 %) within past 6 months   # Overweight: Estimated body mass index is 27.8 kg/m  as calculated from the following:    Height as of 8/11/23: 1.905 m (6' 3\").    Weight as of an earlier encounter on 8/22/23: 100.9 kg (222 lb 6.4 oz)., PRESENT ON ADMISSION       # Housing Instability: noted in nursing assessment         Disposition Plan     Expected Discharge Date: 08/28/2023    Discharge Delays: Placement - TCU    Discharge Comments: OP Diaylsis rides and scheduled          Alicia Paez MD  Hospitalist Service  [facility]  Securely message with Bharat (more info)  Text page via [facility] Paging/Directory   ______________________________________________________________________    Interval History   Patient had another hemodialysis this morning. He continued to have hypotension during and after hemodialysis. Patient reports feeling very tired after hemodialysis. He has poor appetite. He feels that the hemodialysis has changed his taste.     Physical Exam   Vital Signs: Temp: 98  F (36.7  C) Temp src: Oral BP: (!) 75/49 Pulse: 67   Resp: 16 SpO2: 96 % O2 Device: None (Room air) Oxygen Delivery: 2 LPM (For comfort. Pt. reports hat it helps with anxiety)  Weight: 0 lbs 0 oz    General appearance: not in acute distress  HEENT: PERRL, EOMI  Lungs: Clear breath sounds in bilateral lung fields  Cardiovascular: Regular rate and rhythm, normal S1-S2  Abdomen: Soft, non tender, no distension  Musculoskeletal: No joint swelling. Left leg BKA  Skin: No rash   Right upper extremity edema  Neurology: AAO ×3.  Cranial nerves II - XII normal.  Normal muscle strength in all extremities.     Medical Decision Making       45 MINUTES SPENT BY ME on the date of service doing chart review, history, exam, documentation & further activities per the note.      Data     I have personally reviewed the following data over the past 24 hrs:    N/A  \   N/A   / N/A     132 (L) 92 (L) 39.1 (H) /  133 (H)   4.3 23 3.92 (H) \     Imaging results reviewed over the past 24 hrs:   No results found for this or any previous visit (from the past 24 hour(s)).

## 2023-08-26 NOTE — PROVIDER NOTIFICATION
Text paged Hospitalist regarding low blood pressure 78/46; asymptomatic. No new orders per MD response. Monitor pt for now as systolic pressures have been in 70-90.

## 2023-08-26 NOTE — PLAN OF CARE
Problem: Mobility Impairment  Goal: Optimal Mobility  Outcome: Progressing     Problem: Acute Kidney Injury/Impairment  Goal: Fluid and Electrolyte Balance  Outcome: Progressing     Problem: Diabetes Comorbidity  Goal: Blood Glucose Level Within Targeted Range  Outcome: Progressing   Goal Outcome Evaluation:    Pt has daily dialysis. Right chest HD catheter. Taken down for dialysis today around 0630. Utilizes lower extremity prosthetic due to below the knee amputation.  Sacral mepilex intact to buttocks/gluteal fold for protection and prior wounds to area. Blood sugars = 93 at 0204 and 94 at 0633. Tele monitoring for dialysis; has been NSR.

## 2023-08-26 NOTE — PLAN OF CARE
Problem: Plan of Care - These are the overarching goals to be used throughout the patient stay.    Goal: Optimal Comfort and Wellbeing  8/25/2023 2253 by Tonya Mast RN  Outcome: Not Progressing  8/25/2023 2252 by Tonya Mast RN  Outcome: Progressing  8/25/2023 1923 by Tonya Mast RN  Outcome: Progressing  8/25/2023 1921 by Tonya Mast RN  Outcome: Progressing  Intervention: Monitor Pain and Promote Comfort  Recent Flowsheet Documentation  Taken 8/25/2023 1500 by Tonya Mast RN  Pain Management Interventions:   medication (see MAR)   repositioned     Problem: Plan of Care - These are the overarching goals to be used throughout the patient stay.    Goal: Readiness for Transition of Care  8/25/2023 2253 by Tonya Mast RN  Outcome: Not Progressing  8/25/2023 2252 by Tonya Mast RN  Outcome: Progressing  8/25/2023 1923 by Tonya Mast RN  Outcome: Progressing  8/25/2023 1921 by Tonya Mast RN  Outcome: Progressing     Problem: Acute Kidney Injury/Impairment  Goal: Fluid and Electrolyte Balance  8/25/2023 2253 by Tonay Mast RN  Outcome: Not Progressing  8/25/2023 2252 by Tonya Mast RN  Outcome: Not Progressing  8/25/2023 1923 by Tonya Mast RN  Outcome: Not Progressing  8/25/2023 1921 by Tonya Mast RN  Outcome: Progressing  Goal: Optimal Nutrition Intake  8/25/2023 2253 by Tonya Mast RN  Outcome: Not Progressing  8/25/2023 2252 by Tonya Mast RN  Outcome: Not Progressing  8/25/2023 1923 by Tonya Mast RN  Outcome: Progressing  8/25/2023 1921 by Tonya Mast RN  Outcome: Progressing  Goal: Effective Renal Function  8/25/2023 2253 by Tonya Mast RN  Outcome: Not Progressing  8/25/2023 2252 by Mathiesen, Tonya P, RN  Outcome: Not Progressing  8/25/2023 1923 by Tonya Mast RN  Outcome: Not Progressing  8/25/2023 1921 by Tonya Mast, RN  Outcome:  Progressing  Intervention: Monitor and Support Renal Function  Recent Flowsheet Documentation  Taken 8/25/2023 1700 by Tonya Mast, RN  Medication Review/Management: medications reviewed  Taken 8/25/2023 1230 by Tonya Mast, RN  Medication Review/Management: medications reviewed   Goal Outcome Evaluation:  BP's low most of shift. Midodrin admin'd. Pt in low spirits. Pt sister present to comfort and listen to pt. Pt states no appetite as he has lost his taste for food. Fluids encouraged as able, monitoring BG levels for hypoglycemia.

## 2023-08-26 NOTE — PROGRESS NOTES
'    [facility] ([facility]) progress note  CC: F/U JOHANA on CKD  S: had dialysis this morning, has ongoing hypotension, feels weak and reports anorexia and altered taste.    A/P:   JOHANA on CKD stage 4: JOHANA with cardiorenal syndrome with underlying diabetic nephropathy most likey. Patient recently admitted with sepsis/bacteremia in August of 2023 and required HD temporarily. Discharged to acute rehab with progressive volume overload and worsening renal function. Transferred back to [place] for dialysis. Renal function likely overestimated with low muscle mass. With recurrent need for dialysis likely will be ESRD at this time and need ongoing dialysis. This has been discussed with patient.   Tunneled cath placed on 8/24  S/p daily dialysis, will start MWF schedule Monday   Our clinic to start HD unit placement process.     2. HFrEF: hypervolemic but ongoing severe hypotension limiting UF. LVEF of 30-35%. .   3. Hyponatremia: due to hypervolemia, JOHANA/CKD  -improving with UF with dialysis  4. CAD: hx of JESSICA in 2022. Per primary.   5. Anemia: monitor. Recently received venofer for low iron stores.   6. PVD: hx of BKA amputation on the left.   7. Chronic pain: per IM.   8. Hypotension: ongoing, cont midodrine 10 mg TID. Echo unchanged, cortisol not low  -? Autonomic dysfunction due to DM2  -fairly asymptomatic now, will cont to monitor closely  9. Anemia - due to recent critical illness, CKD  -with low iron stores. Will replace IV  -will plan RAMÍREZ with outpt dialysis    Lyudmila Medina MD  [facility]  [phone]    No interval changes to past medical history, social history or family history to report.    BP (!) 71/43 (BP Location: Left arm, Cuff Size: Adult Regular)   Pulse 73   Temp 97.4  F (36.3  C) (Temporal)   Resp 16   SpO2 99%     I/O last 3 completed shifts:  In: 300 [P.O.:300]  Out: 2000 [Other:2000]    Physical Exam:   GENERAL: NAD  EYES: pupils equal, sclerae not icteric.  ENT: Hearing normal,    RESP: Normal effort  CV:rr, 1 + rt leg edema.     GI: NT/ND,  SKIN: No rash, warm/ dry  NEURO:Sensation grossly intact to LT  PSYCH: flat affect    Recent Labs   Lab 08/26/23  0537 08/24/23  0500 08/23/23  0504 08/22/23  0604 08/21/23  0820 08/20/23  0649   * 124* 127* 128* 129* 129*   POTASSIUM 4.3 4.6 4.9 4.4 4.1 3.5  3.5   CHLORIDE 92* 88* 90* 90* 92* 93*   CO2 23 18* 20* 23 23 21*   BUN 39.1* 98.4* 90.6* 89.5* 85.6* 81.3*   CR 3.92* 5.38* 4.67* 4.35* 4.00* 3.27*   GFRESTIMATED 16* 11* 13* 14* 16* 20*   YOSVANY 8.7* 8.8 8.8 9.2 9.1 8.9   MAG  --   --   --  3.0*  --   --    ALBUMIN  --   --  3.4*  --   --   --          Recent Labs   Lab 08/24/23  0500 08/23/23  0504 08/22/23  0604 08/21/23  0820   WBC 8.5 7.9 7.5 9.1   HGB 7.9* 7.6* 8.0* 7.7*   HCT 24.2* 24.4* 24.6* 24.0*   MCV 89 90 89 90    227 227 238               Current Facility-Administered Medications:     0.9% sodium chloride BOLUS, 250 mL, Intravenous, Once in dialysis/CRRT, Refugio Reich DO    0.9% sodium chloride BOLUS, 300 mL, Hemodialysis Machine, Once, Refugio Reich DO    0.9% sodium chloride BOLUS, 100-150 mL, Intravenous, Q15 Min PRN, Refugio Reich DO    0.9% sodium chloride BOLUS, 100-150 mL, Intravenous, Q15 Min PRN, Terrance Waters MD    0.9% sodium chloride BOLUS, 100-150 mL, Intravenous, Q15 Min PRN, Refugio Reich DO    acetaminophen (TYLENOL) tablet 650 mg, 650 mg, Oral, Q4H PRN, Jason Varela MD, 650 mg at 08/25/23 1456    amiodarone (PACERONE) tablet 200 mg, 200 mg, Oral, Daily, Jason Varela MD, 200 mg at 08/25/23 1246    apixaban ANTICOAGULANT (ELIQUIS) tablet 2.5 mg, 2.5 mg, Oral, BID, Jason Varela MD, 2.5 mg at 08/26/23 1211    atorvastatin (LIPITOR) tablet 80 mg, 80 mg, Oral, QPM, Jason Varela MD, 80 mg at 08/25/23 2207    glucose gel 15-30 g, 15-30 g, Oral, Q15 Min PRN **OR** dextrose 50 % injection 25-50 mL, 25-50 mL, Intravenous, Q15 Min PRN **OR** glucagon injection 1 mg, 1 mg,  Subcutaneous, Q15 Min PRN, Jason Varela MD    insulin aspart (NovoLOG) injection (RAPID ACTING), , Subcutaneous, TID w/meals, Jason Varela MD, 3 Units at 08/24/23 1706    insulin aspart (NovoLOG) injection (RAPID ACTING), 1-7 Units, Subcutaneous, TID AC, Jason Varela MD, 1 Units at 08/24/23 1706    insulin aspart (NovoLOG) injection (RAPID ACTING), 1-5 Units, Subcutaneous, At Bedtime, Jason Varela MD, 1 Units at 08/23/23 2104    insulin glargine (LANTUS PEN) injection 15 Units, 15 Units, Subcutaneous, At Bedtime, Jason Varela MD, 15 Units at 08/25/23 2213    lidocaine (LMX4) cream, , Topical, Q1H PRN, Jason Varela MD    lidocaine 1 % 0.1-1 mL, 0.1-1 mL, Other, Q1H PRN, Jason Varela MD    melatonin tablet 1 mg, 1 mg, Oral, At Bedtime PRN, Jason Varela MD    [Held by provider] metoprolol tartrate (LOPRESSOR) half-tab 12.5 mg, 12.5 mg, Oral, BID, Jason Varela MD, 12.5 mg at 08/23/23 2059    midodrine (PROAMATINE) tablet 10 mg, 10 mg, Oral, TID, Refugio Reich DO, 10 mg at 08/26/23 1210    No heparin via hemodialysis machine, , Does not apply, Once, Refugio Reich DO    ondansetron (ZOFRAN ODT) ODT tab 4 mg, 4 mg, Oral, Q6H PRN **OR** ondansetron (ZOFRAN) injection 4 mg, 4 mg, Intravenous, Q6H PRN, Jason Varela MD    pantoprazole (PROTONIX) EC tablet 40 mg, 40 mg, Oral, BID AC, Jason Varela MD, 40 mg at 08/26/23 0625    polyethylene glycol (MIRALAX) Packet 17 g, 17 g, Oral, Daily PRN, Jason Varela MD    prochlorperazine (COMPAZINE) injection 10 mg, 10 mg, Intravenous, Q6H PRN **OR** prochlorperazine (COMPAZINE) tablet 10 mg, 10 mg, Oral, Q6H PRN **OR** prochlorperazine (COMPAZINE) suppository 25 mg, 25 mg, Rectal, Q12H PRN, Jason Varela MD    sodium chloride (PF) 0.9% PF flush 3 mL, 3 mL, Intracatheter, Q8H, Jason Varela MD, 3 mL at 08/26/23 0626    sodium chloride (PF)  0.9% PF flush 3 mL, 3 mL, Intracatheter, q1 min prn, Jason Varela MD    ticagrelor (BRILINTA) tablet 90 mg, 90 mg, Oral, BID, Jason Varela MD, 90 mg at 08/26/23 1210      Labs personally reviewed today during this evaluation at 1:26 PM

## 2023-08-26 NOTE — PLAN OF CARE
Problem: Plan of Care - These are the overarching goals to be used throughout the patient stay.    Goal: Absence of Hospital-Acquired Illness or Injury  Intervention: Identify and Manage Fall Risk  Recent Flowsheet Documentation  Taken 8/26/2023 1100 by Elma Waters RN  Safety Promotion/Fall Prevention: activity supervised     Problem: Fall Injury Risk  Goal: Absence of Fall and Fall-Related Injury  Intervention: Promote Injury-Free Environment  Recent Flowsheet Documentation  Taken 8/26/2023 1100 by Elma Waters RN  Safety Promotion/Fall Prevention: activity supervised     Problem: Acute Kidney Injury/Impairment  Goal: Effective Renal Function  Intervention: Monitor and Support Renal Function  Recent Flowsheet Documentation  Taken 8/26/2023 1100 by Elma Waters, RN  Medication Review/Management: medications reviewed   Goal Outcome Evaluation:       Blood pressure low on return from dialysis, scheduled midodrine given, x1 dose of dextrose given for low blood sugar. He also received Zofran for nausea.

## 2023-08-26 NOTE — PLAN OF CARE
"  Problem: Plan of Care - These are the overarching goals to be used throughout the patient stay.    Goal: Plan of Care Review  Description: The Plan of Care Review/Shift note should be completed every shift.  The Outcome Evaluation is a brief statement about your assessment that the patient is improving, declining, or no change.  This information will be displayed automatically on your shift note.  8/25/2023 1923 by Tonya Mast RN  Outcome: Progressing  8/25/2023 1921 by Tonya Mast RN  Outcome: Progressing  Goal: Patient-Specific Goal (Individualized)  Description: You can add care plan individualizations to a care plan. Examples of Individualization might be:  \"Parent requests to be called daily at 9am for status\", \"I have a hard time hearing out of my right ear\", or \"Do not touch me to wake me up as it startles me\".  8/25/2023 1923 by Tonya Mast RN  Outcome: Progressing  8/25/2023 1921 by Tonya Mast RN  Outcome: Progressing  Goal: Absence of Hospital-Acquired Illness or Injury  8/25/2023 1923 by Tonya Mast RN  Outcome: Progressing  8/25/2023 1921 by Tonya Mast RN  Outcome: Progressing  Intervention: Identify and Manage Fall Risk  Recent Flowsheet Documentation  Taken 8/25/2023 1700 by Tonya Mast, RN  Safety Promotion/Fall Prevention:   activity supervised   clutter free environment maintained   lighting adjusted   nonskid shoes/slippers when out of bed   room organization consistent   safety round/check completed  Taken 8/25/2023 1230 by Tonya Mast, RN  Safety Promotion/Fall Prevention:   activity supervised   clutter free environment maintained   lighting adjusted   nonskid shoes/slippers when out of bed   room organization consistent   safety round/check completed  Intervention: Prevent Skin Injury  Recent Flowsheet Documentation  Taken 8/25/2023 1700 by Tonya Mast, RN  Body Position: (dangle edge of bed)   log-rolled   turned   " left  Taken 8/25/2023 1500 by Tonya Mast RN  Body Position:   right   turned  Taken 8/25/2023 1230 by Tonya Mast RN  Body Position: (dangle edge of bed)   log-rolled   turned   left  Goal: Optimal Comfort and Wellbeing  8/25/2023 1923 by Tonya Mast, RN  Outcome: Progressing  8/25/2023 1921 by Tonya Mast RN  Outcome: Progressing  Intervention: Monitor Pain and Promote Comfort  Recent Flowsheet Documentation  Taken 8/25/2023 1500 by Tonya Mast RN  Pain Management Interventions:   medication (see MAR)   repositioned  Goal: Readiness for Transition of Care  8/25/2023 1923 by Tonya Mast RN  Outcome: Progressing  8/25/2023 1921 by Tonya Mast RN  Outcome: Progressing     Problem: Acute Kidney Injury/Impairment  Goal: Fluid and Electrolyte Balance  8/25/2023 1923 by Tonya Mast RN  Outcome: Not Progressing  8/25/2023 1921 by Tonya Mast RN  Outcome: Progressing  Goal: Optimal Nutrition Intake  8/25/2023 1923 by Tonya Mast RN  Outcome: Progressing  8/25/2023 1921 by Tonya Mast RN  Outcome: Progressing  Goal: Effective Renal Function  8/25/2023 1923 by Tonya Mast RN  Outcome: Not Progressing  8/25/2023 1921 by Tonya Mast RN  Outcome: Progressing  Intervention: Monitor and Support Renal Function  Recent Flowsheet Documentation  Taken 8/25/2023 1700 by Tonya Mast, RN  Medication Review/Management: medications reviewed  Taken 8/25/2023 1230 by Tonya Mast, RN  Medication Review/Management: medications reviewed   Goal Outcome Evaluation:  Pt having low BP's post dialysis. MD's updated, monitoring post midodrin admin. Pt A&O x4. Verbalizing frustration with his health situation. States food does not taste good so no appetite. Oral fluids encouraged. T&R'd in bed due to buttocks pain. Cares explained.

## 2023-08-27 NOTE — PROGRESS NOTES
CRRT started 0945, patient tolerating, pulling zero.  Blood pressure stable with support of pressors infusing.  Body tempremain

## 2023-08-27 NOTE — SIGNIFICANT EVENT
Writer was called to pt's room for stroke code as he had lost his vision. BP was extremely low, 60's-70's/30's-40's and pt was easily arousable. Pt reported his vision returned not long after stroke code called. There was a delay in getting pt to CT d/t low BP and need for 18gauge IV. Pt med with midodrine. Pt had small amt coughing after taking 2nd tablet and coughed up yellow pflegm X1.   Pt brought to CT without incident but atrial flutter was observed on the monitor and resolved spontaneously.   Upon return to pt's rm, BP remained extremely low and pt was lethargic but still arousing to voice. Pt given 2 amps bicarb for acidotic vbg. Then, pt stopped breathing and had a PEA arrest. See code record.

## 2023-08-27 NOTE — SIGNIFICANT EVENT
At start of shift patient was very lethargic. Able to wake him with rubbing chest and speaking name. Pts blood pressures were extremely low and md paged to see patient. Code stroke was than called as patient was stating he could not see and that it was dark. Pt received midodrine and a bolus. When writer was out of room, pt coded and code blue was initiated and pt eventually was sent to icu. Belongings packed up and sent down with patient.

## 2023-08-27 NOTE — PROGRESS NOTES
Intensivist update/post-call addendum    Assumed care at 7am.  I did a bedside cardiac POCUS and found a large circumferential pericardiac effusion with evidence of tamponade (see POCUS images and report).    Emergent bedside perciardiocentesis was performed, with immediate removal of 350cc hemorrhagic pericardial fluid. There was immediate improvement in hemodynamics.  Dr. Becker then came in to assist with pericardial drain placement. An additional 500cc fluid was removed by gravity drainage.    CXR confirmed good position of the pericardial drain.    Unfortunately, despite this he continued to exhibit profound circulatory shock requiring high doses of 4 vasopressors (epi, NE, phenylephrine and vaso). Lactate elevated at 24 without change.  ABG somewhat improved with ventilator management and initiation of CRRT.    INR >13, suspect DIC and profound coagulopathy due to shock liver.    I met with his sister Cindy and another family member. Discuss his grave condition. They mentioned that Dustin would not want to undergo very heroic treatments if the changes of survival were slim.   Code status was changed to DNR.     Multiple family members are on their way to the hospital. We will reconvene and see if they want to continue supportive care or proceed with compassionate extubation and comfort care.    Fortino (Benjamin) MD [place]  [facility] ([facility])  Clinic [phone]  Fax [phone]     Critical care attestation: 45 minutes additional critical care itme spent managing the following issues: acute respiratory failure requiring intubation/IMV, circulatory shock requiring continuous vasopressor infusions, acute kidney injury requiring CRRT, cardiac tamponade, encephalopathy, profound metabolic acidosis and lactic acidosis, goals of care counseling/discussions. High risk for organ deterioration and death requiring ICU level care.

## 2023-08-27 NOTE — ANESTHESIA PROCEDURE NOTES
Airway       Patient location during procedure: Floor       Procedure Start/Stop Times: 8/27/2023 1:35 AM  Staff -        CRNA: Alba Sumner APRN CRNA       Performed By: CRNA  Consent for Airway        Urgency: emergent       Consent: The procedure was performed in an emergent situation.  Indications and Patient Condition       Indications for airway management: cardiovascular arrest, hemodynamic instability and respiratory insufficiency       Mallampati: I     Induction type:RSI       Mask difficulty assessment: 1 - vent by mask    Final Airway Details       Final airway type: endotracheal airway       Successful airway: Single subglottic suction  Endotracheal Airway Details        ETT size (mm): 7.5       Cuffed: yes       Successful intubation technique: video laryngoscopy       VL Blade Size: Glidescope 4       Grade View of Cords: 1       Adjucts: stylet       Position: Right       Measured from: lips       Secured at (cm): 22    Post intubation assessment        ETT secured, Vent settings by primary/ICU team, Primary/ICU team to review CXR, Sedation to be ordered by primary/ICU team and No apparent complications       Placement verified by: capnometry, equal breath sounds and chest rise        Number of attempts at approach: 1       Secured with: commercial tube pradhan       Ease of procedure: easy       Dentition: Intact and Unchanged    Medication(s) Administered   Medication Administration Time: 8/27/2023 1:35 AM

## 2023-08-27 NOTE — PLAN OF CARE
Problem: Plan of Care - These are the overarching goals to be used throughout the patient stay.    Goal: Absence of Hospital-Acquired Illness or Injury  Intervention: Identify and Manage Fall Risk  Recent Flowsheet Documentation  Taken 8/26/2023 1800 by Soco Suero RN  Safety Promotion/Fall Prevention: activity supervised  Goal: Optimal Comfort and Wellbeing  Outcome: Progressing     Problem: Fall Injury Risk  Goal: Absence of Fall and Fall-Related Injury  Intervention: Promote Injury-Free Environment  Recent Flowsheet Documentation  Taken 8/26/2023 1800 by Soco Suero RN  Safety Promotion/Fall Prevention: activity supervised     Problem: Acute Kidney Injury/Impairment  Goal: Optimal Nutrition Intake  Outcome: Not Progressing    Dr. Lopez notified of BG's. Order to hold tonight's Lantus.

## 2023-08-27 NOTE — PROCEDURES
Pericardiocentesis procedure note    Emergent procedure due to large circumferential pericardial effusion on bedside POCUS, with evidence of tamponade with significant RA and RV diastolic collapse (see images in EPIC)    Sterile precautions were done    No consent done as this was an emergent procedure    Subxiphoid approach was used to visualize the large circumferential pericardial effusion. A pericardiocentesis with 3 way stop cock attached was advanced into the pericardial space under real-time ultrasound guidance    After several attempts, there was return of NON-pulsatile bloody appearing pericardial fluid with immediate improvement in hemodynamics (SBP improved from 80s to 130s and pressor needs started to come down).    Post-pericardiocentesis ultrasound showed significant improvement in the effusion and improved RV diastolic and systolic function.     Dr. Becker from the cardiology service came to the bedside and assisted with pericardial drain placement. This was connected to gravity drainage.    A total of approximately 350cc of bloody appearing pericardial fluid was removed. The fluid will be sent for cell count/diff, cultures and cytology.    Formal TTE is pending.  CXR ordered to confirm drain placement.    Fortino (Benjamin) MD Sundeep  [facility] ([facility])  Clinic [phone]  Fax [phone]

## 2023-08-27 NOTE — SIGNIFICANT EVENT
Significant Event Note    Time of event: 12:23 AM August 27, 2023    Description of event:  Paged by nursing for hypotension.  Briefly, patient is admitted for worsening kidney failure now on dialysis.  He was also recently treated for optic shock secondary to strep bacteremia.  Patient has been hypotensive throughout the day but this is a significant decrease in blood pressures.    I entered the room patient had eyes closed, seemed confused.  Sitting up in bed.  He asked us to turn the lights on, the lights were on.  He reported he could not see anything.  Facial movement symmetric,  strength symmetric.  Able to move both lower extremities.    I called a stroke code and a rapid response.    Ordered CBC, BMP, troponin, VBG, blood cultures from his dialysis line.  EKG  I ordered his next dose of as needed midodrine    Stroke neurologist called and I spoke with him on the phone.  Patient is out of the window for tPA.  However still recommending urgent CT head and neck.  This order was placed by the stroke neurologist.    Dr. Vergara arrived and took over managing as I needed to go to a code green called on another part of the floor.    Plan:  -Albumin was ordered  -Stroke neurologist will evaluate the patient over telestroke after imaging is completed    Discussed with: bedside nurse    Robert Horan MD    1:06 AM   Spoke with [facility] radiology   Head CT negative

## 2023-08-27 NOTE — PROCEDURES
Procedure Note  8/27/2023     Procedure: arterial line placement  Indications: shock    Informed consent: procedure was performed emergently  Time out: performed prior to the procedure to verify correct patient, site, & procedure    Equipment: all necessary equipment immediately available at bedside  Precautions: maximal sterile & barrier precautions maintained throughout the procedure    Patient was prepped and draped in the usual sterile manner using chlorhexidine scrub. 1% lidocaine was used to numb the region; 1 ml of lidocaine were used for local anesthesia. The  left brachial artery was palpated, & visualized using the vascular probe of the ultrasound. The left brachial artery was successfully cannulated on the 2nd attempt. Pulsatile, arterial blood was visualized and the artery was then threaded using the Seldinger technique and a catheter was then sutured into place. Good wave-form was obtained. The patient tolerated the procedure well without any immediate complications. The area was cleaned and Tegaderm was applied.     Complications: no immediate complications  Estimated blood loss: 2 mL    Gabrielle Jessica MD  Pulmonary and Critical Care Medicine

## 2023-08-27 NOTE — PROGRESS NOTES
RT PROGRESS NOTE    VENT DAY# 1    CURRENT SETTINGS:   Vent Mode: CMV/AC  (Continuous Mandatory Ventilation/ Assist Control)  FiO2 (%): 100 %  Resp Rate (Set): 24 breaths/min  Tidal Volume (Set, mL): 580 mL  PEEP (cm H2O): 10 cmH2O  Resp: 29      PATIENT PARAMETERS:  PIP 25  Pplat:  12  Pmean:  24  Compliance: 100  SBT: no    Wean time:   RSBI    Failed wean due to:   Secretions:  none  02 Sats:  100%  BS: diminished    ETT SIZE 7.5 Secured at 23 cm at teeth/gums    Respiratory Medications: none     ABG: @0227 pH 7.26; pCO2 28; pO2 168; HCO3 13, %O2 Sat 100, BE -14.6 on 100%    NOTE / SHIFT SUMMARY:   pt was a code blue on P4, CPR preformed until Morgan was placed on pt and intubated transferred to ICU. Pt coded a couple more times in ICU. Pt taken off vent and bagged each time. Rt continue to monitor.    Jaquan Nunn, RT

## 2023-08-27 NOTE — PROGRESS NOTES
Vent changes made per MD and ABG result. RR decreased to 20 PEEP decreased to +5 Fi02 decreased to 50%. ETT advanced two cm from CXR and Dr. Jessica.  Rt continue to monitor    Jaquan Nunn  RT

## 2023-08-27 NOTE — PHARMACY-VANCOMYCIN DOSING SERVICE
"Pharmacy Vancomycin Initial Note  Date of Service 2023  Patient's  1960  63 year old, male    Indication: Sepsis    Current estimated CrCl = Estimated Creatinine Clearance: 31.4 mL/min (A) (based on SCr of 3.39 mg/dL (H)).    Creatinine for last 3 days  2023:  5:37 AM Creatinine 3.92 mg/dL  2023: 12:19 AM Creatinine 3.25 mg/dL;  2:12 AM Creatinine 3.16 mg/dL;  2:12 AM Creatinine 3.16 mg/dL;  7:00 AM Creatinine 3.37 mg/dL;  8:13 AM Creatinine 3.39 mg/dL    Recent Vancomycin Level(s) for last 3 days  No results found for requested labs within last 3 days.      Vancomycin IV Administrations (past 72 hours)                     vancomycin (VANCOCIN) 2,000 mg in sodium chloride 0.9 % 500 mL intermittent infusion (mg) 2,000 mg New Bag 23 0338                    Nephrotoxins and other renal medications (From now, onward)      Start     Dose/Rate Route Frequency Ordered Stop    23 1813  piperacillin-tazobactam (ZOSYN) 3.375 g vial to attach to  mL bag        Note to Pharmacy: For [facility], [facility] and [facility]: For Zosyn-naive patients, use the \"Zosyn initial dose + extended infusion\" order panel.    3.375 g  over 240 Minutes Intravenous EVERY 8 HOURS 23 1111      23 1600  vancomycin (VANCOCIN) 1000 mg in dextrose 5% 200 mL PREMIX         1,000 mg  200 mL/hr over 1 Hours Intravenous EVERY 12 HOURS 23 1136      23 1100  phenylephrine (GEORGE-SYNEPHRINE) 200 mg in sodium chloride 0.9 % 250 mL MAX CONC infusion         0.1-6 mcg/kg/min × 100.9 kg  0.8-45.4 mL/hr  Intravenous CONTINUOUS 23 1032      23 0530  norepinephrine (LEVOPHED) 16 mg in sodium chloride 0.9 % 250 mL infusion CENTRAL         0.01-0.6 mcg/kg/min × 100.9 kg  0.9-56.8 mL/hr  Intravenous CONTINUOUS 23 0516      23 0200  vasopressin (VASOSTRICT) 20 Units in sodium chloride 0.9 % 100 mL standard conc infusion         2.4 Units/hr  12 mL/hr  Intravenous CONTINUOUS 23 0145        "       Contrast Orders - past 72 hours (72h ago, onward)      Start     Dose/Rate Route Frequency Stop    08/27/23 0800  perflutren lipid microsphere (DEFINITY) injection SUSP 2 mL         2 mL Intravenous ONCE 08/27/23 0800    08/27/23 0100  iopamidol (ISOVUE-370) solution 80 mL         80 mL Intravenous ONCE 08/27/23 0054    08/25/23 1230  perflutren lipid microsphere (DEFINITY) injection SUSP 2 mL         2 mL Intravenous ONCE 08/25/23 1210                  Plan:  Start vancomycin  1000 mg (10mg/kg) IV q12h.   Vancomycin monitoring method: Renal Replacement Therapy  Vancomycin therapeutic monitoring goal: 10-15 mg/L  Pharmacy will check vancomycin levels as appropriate in 1-3 Days.    Serum creatinine levels will be ordered  per CRRT labs .      Josie Smith RPH

## 2023-08-27 NOTE — PROGRESS NOTES
Care Management Follow Up    Length of Stay (days): 5    Expected Discharge Date: 08/28/2023     Concerns to be Addressed:     Care Progression  Patient plan of care discussed at interdisciplinary rounds: Yes    Anticipated Discharge Disposition:  TBD     Anticipated Discharge Services:  NA  Anticipated Discharge DME:  NA    Patient/family educated on Medicare website which has current facility and service quality ratings:  NA  Education Provided on the Discharge Plan:  NA  Patient/Family in Agreement with the Plan:  NA    Referrals Placed by CM/SW:  NA  Private pay costs discussed: Not applicable    Additional Information:  Discussed patient in ICU rounds. Patient had a significant event and code over night. Patient was intubated and transferred to ICU.      Social HX: patient has been homeless since Nov, 2021. He had an old trailer a friend gave him, but patient started declining, and needed an amputation. 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 Sold trailer home. Patient has lived in between sister Cindy's house, or niece's house the last couple years, and in the past has lived at his sister Cindy's house for 6 yrs at one time. Before that he was taking care of his parents in their home.  Cindy cannot take pt back into her home Patient has stayed with his niece in her town home but she was told she would be evicted if patient remains staying in her house.      8/18: MA renewal paperwork faxed to [facility] at [phone] and mailed to [facility] [employer] ([address], [address] [place] [address]).      Erin Brannon Social Work Care Coordinator w/ [facility] PH: [phone].     Previously, per notes, Nephrology was working on dialysis placement for Outpatient. Patient was secured to go to, [facility] care and [facility]hab.     CM will continue to follow care progression and aide in discharge planning as needed.     Sarah Acosta RN

## 2023-08-27 NOTE — PROGRESS NOTES
RT PROGRESS NOTE    VENT DAY# Ventilation Day(s): 1    CURRENT SETTINGS:  Vent Mode: CMV/AC  (Continuous Mandatory Ventilation/ Assist Control)  FiO2 (%): (S) 50 %  Resp Rate (Set): (S) 28 breaths/min  Tidal Volume (Set, mL): 680 mL  PEEP (cm H2O): 5 cmH2O  Resp: 28      PATIENT PARAMETERS:  Ventilator - Patient   Patient Resp Rate : 28 breaths/min  Expiratory Vt (ml): 579  Minute Volume (ml): 15.2 L/min  Peak Inspiratory Pressure (cm H2O): 25 cmH2O  Mean Airway Pressure (cm H2O): 12 cmH2O  Plateau Pressure (cm H2O): 21 cmH2O  Dynamic Compliance (mL/cm H2O): 34.8 mL/cm H2O  Airway Resistance: 12.6  Auto/ Intrinsic PEEP (cm H2O):  (UTO)     SBT completed No   Breath Sounds: Clear/diminished    ETT Cuffed Single Subglottic Suction 7.5 mm-Secured at (cm): 25 cm at teeth/gums  Emergency Ambu bag, mask and peep valve at bedside.     ABG: @1348 pH 7.33; pCO2 15; pO2 101; HCO3 UTO, on 08/27/23    NOTE / SHIFT SUMMARY:     Today's Changes  Patient is on mechanical ventilator on above settings. Patient BS Clear/diminished, SPO2 100%. ETT reposition Q2H, shared between RN and RT for skin integrity. RT following.    Lenore Miller, RT

## 2023-08-27 NOTE — PROGRESS NOTES
'    [facility]NAL ([facility]) progress note  CC: F/U JOHANA on CKD  S: events noted, had PEA arrest x 3 last night and found to have tamponade, drained, now with severe lactic acidosis, shock liver, CRRT started this am.  Unresponsive on vent.    A/P:   JOHANA on CKD stage 4>>ESRD: JOHANA with cardiorenal syndrome with underlying diabetic nephropathy most likey. Patient recently admitted with sepsis/bacteremia in August of 2023 and required HD temporarily. Discharged to acute rehab with progressive volume overload and worsening renal function. Transferred back to [facility] [place] [facility] for dialysis. Renal function likely overestimated with low muscle mass. With recurrent need for dialysis, will be ESRD and need ongoing dialysis. This has been discussed with patient.   Tunneled cath placed on 8/24  S/p daily dialysis here    Started CRRT 8/27 after cardiac arrest x 3 with severe lactic acidosis and shock, will plan 0-50 ml net uf       2. HFrEF: hypervolemic  LVEF of 30-35%. Now with cardiac arrest x 3 and tamponade  -if hemodynamics stabilize, can attempt more aggressive UF with CRRT.     3. Tamponade -  rapid development of large pericardial effusion as echo on 8/25 with only small effusion, uremic effusion certainly possible but well-dialyzed on labs.  Was on Eliquis.  Now s/p drainage with improvement in hemodynamics.    4. Lactic acidosis - due to cardiac arrest x 3 and now severe shock liver  -cont CRRT, bicarb gtt    Discussed management of severe acidosis with Dr Urbano  Discussed with family at bedside    Lyudmila Medina MD  [facility]  [phone]    No interval changes to past medical history, social history or family history to report.    /44   Pulse 96   Temp (!) 93.4  F (34.1  C)   Resp 28   SpO2 100%     I/O last 3 completed shifts:  In: 2886.13 [I.V.:2421.13]  Out: 1225 [Urine:225; Other:1000]    Physical Exam:   GENERAL: unresponsive on vent  EYES:sclerae not icteric.  ENT: trachea intubated  RESP: Normal  effort, cta ant  CV:rr, 1 + rt leg edema.     GI: NT/ND  SKIN: legs mottled  NEURO:unresponsive  PSYCH: unable due to unresponsive    Recent Labs   Lab 08/27/23  1132 08/27/23  0813 08/27/23  0700 08/27/23  0212 08/27/23  0019 08/26/23  0537 08/24/23  0500 08/23/23  0504 08/22/23  0604   *  147* 144 145 137  137 134* 132* 124* 127* 128*   POTASSIUM 4.3  4.3 4.3 4.3 5.4*  5.4* 4.7 4.3 4.6 4.9 4.4   CHLORIDE 95*  95* 95* 95* 93*  93* 93* 92* 88* 90* 90*   CO2 8*  8* 6* 7* 14*  14* 13* 23 18* 20* 23   BUN 23.1*  23.1* 26.9* 26.7* 25.7*  25.7* 25.4* 39.1* 98.4* 90.6* 89.5*   CR 2.90*  2.90* 3.39* 3.37* 3.16*  3.16* 3.25* 3.92* 5.38* 4.67* 4.35*   GFRESTIMATED 23*  23* 19* 20* 21*  21* 20* 16* 11* 13* 14*   YOSVANY 9.0  9.0 9.5 9.7 10.3*  10.3* 8.1* 8.7* 8.8 8.8 9.2   MAG 2.4*  --   --  2.5*  --   --   --   --  3.0*   ALBUMIN 3.8  3.8  --   --  3.2*  --   --   --  3.4*  --          Recent Labs   Lab 08/27/23  1031 08/27/23  0813 08/27/23  0212 08/27/23  0019 08/24/23  0500 08/23/23  0504 08/22/23  0604   WBC 22.1* 27.8* 16.2* 19.0* 8.5 7.9 7.5   HGB 6.4* 8.0* 6.7* 7.9* 7.9* 7.6* 8.0*   HCT 21.7* 26.9* 23.0* 26.1* 24.2* 24.4* 24.6*   MCV 99 99 100 97 89 90 89    255 143* 276 255 227 227               Current Facility-Administered Medications:     0.9% sodium chloride BOLUS, 1,000 mL, CRRT, Q1H PRN, Lyudmila Medina MD    0.9% sodium chloride BOLUS, 250 mL, Intravenous, Once in dialysis/CRRT, Refugio Reich DO    0.9% sodium chloride BOLUS, 300 mL, Hemodialysis Machine, Once, Refugio Reich DO    0.9% sodium chloride BOLUS, 100-150 mL, Intravenous, Q15 Min PRN, Refugio Reich DO    0.9% sodium chloride BOLUS, 100-150 mL, Intravenous, Q15 Min PRN, Terrance Waters MD    0.9% sodium chloride BOLUS, 100-150 mL, Intravenous, Q15 Min PRN, Refugio Reich DO    acetaminophen (TYLENOL) tablet 650 mg, 650 mg, Oral, Q4H PRN, Jason Varela MD, 650 mg at 08/25/23 1456    [Held by provider]  amiodarone (PACERONE) tablet 200 mg, 200 mg, Oral, Daily, Jason Varela MD, 200 mg at 08/25/23 1246    [Held by provider] apixaban ANTICOAGULANT (ELIQUIS) tablet 2.5 mg, 2.5 mg, Oral, BID, Jason Varela MD, 2.5 mg at 08/26/23 2235    atorvastatin (LIPITOR) tablet 80 mg, 80 mg, Oral, QPM, Jason Varela MD, 80 mg at 08/25/23 2207    calcium gluconate 2 g in  mL intermittent infusion, 2 g, Intravenous, Q8H PRN, Lyudmila Medina MD    calcium gluconate 4 g in sodium chloride 0.9 % 100 mL intermittent infusion, 4 g, Intravenous, Q8H PRN, Lyudmila Medina MD    chlorhexidine (PERIDEX) 0.12 % solution 15 mL, 15 mL, Mouth/Throat, Q12H, Gabrielle Jessica MD, 15 mL at 08/27/23 1008    CRRT Pre-Filter replacement solution for CVVHD & CVVHDF (Phoxillum BK4/2.5), 12.5 mL/kg/hr, CRRT, Continuous, Lyudmila Medina MD    CRRT Replacement solution for CVVHD and CVVHDF premixed replacement solution (Phoxillum 4/2.5), 200 mL/hr, CRRT, Continuous, Lyudmila Medina MD    desmopressin (DDAVP) injection 2 mcg, 2 mcg, Intravenous, BID, Fortino Urbano MD, 2 mcg at 08/27/23 1009    [Held by provider] dextrose 20% infusion, , Intravenous, Continuous, Alicia Paez MD    glucose gel 15-30 g, 15-30 g, Oral, Q15 Min PRN **OR** dextrose 50 % injection 25-50 mL, 25-50 mL, Intravenous, Q15 Min PRN **OR** glucagon injection 1 mg, 1 mg, Subcutaneous, Q15 Min PRN, Gabrielle Jessica MD    glucose gel 15-30 g, 15-30 g, Oral, Q15 Min PRN **OR** dextrose 50 % injection 25-50 mL, 25-50 mL, Intravenous, Q15 Min PRN, 50 mL at 08/27/23 0539 **OR** glucagon injection 1 mg, 1 mg, Subcutaneous, Q15 Min PRN, Jason Varela MD    dialysate for CVVHD & CVVHDF premixed replacement solution (Phoxillum 4/2.5) - total potassium 4 mEq/L, 12.5 mL/kg/hr, CRRT, Continuous, Straight, Lyudmila JIM MD    EPINEPHrine (ADRENALIN) 16 mg in sodium chloride 0.9 % 250 mL infusion CENTRAL, 0.01-0.6  mcg/kg/min, Intravenous, Continuous, Pedro Jordan MD    fentaNYL (SUBLIMAZE) 50 mcg/mL bolus from pump,  mcg, Intravenous, Q1H PRN, Gabrielle Jessica MD, 100 mcg at 08/27/23 1018    fentaNYL (SUBLIMAZE) infusion,  mcg/hr, Intravenous, Continuous, Gabrielle Jessica MD, Last Rate: 2 mL/hr at 08/27/23 1018, 100 mcg/hr at 08/27/23 1018    sodium chloride 0.9% DIALYSIS Cath LOCK - RED Lumen, 10 mL, Intracatheter, Once in dialysis/CRRT **FOLLOWED BY** heparin 1000 unit/mL DIALYSIS Cath LOCK - RED Lumen, 1.3-2.6 mL, Intracatheter, Once in dialysis/CRRT, Lyudmila Medina MD    sodium chloride 0.9% DIALYSIS Cath LOCK - BLUE Lumen, 10 mL, Intracatheter, Once in dialysis/CRRT **FOLLOWED BY** heparin 1000 unit/mL DIALYSIS Cath LOCK -BLUE Lumen, 1.3-2.6 mL, Intracatheter, Once in dialysis/CRRT, Lyudmila Medina MD    hydrocortisone sodium succinate PF (solu-CORTEF) injection 50 mg, 50 mg, Intravenous, Q6H, Gabrielle Jessica MD, 50 mg at 08/27/23 1012    insulin aspart (NovoLOG) injection (RAPID ACTING), 1-4 Units, Subcutaneous, Q4H, Gabrielle Jessica MD    iron sucrose (VENOFER) 300 mg in sodium chloride 0.9 % 290 mL intermittent infusion, 300 mg, Intravenous, Every Other Day, yLudmila Medina MD, Last Rate: 193.3 mL/hr at 08/26/23 1346, 300 mg at 08/26/23 1346    lidocaine (LMX4) cream, , Topical, Q1H PRN, Jason Varela MD    lidocaine 1 % 0.1-1 mL, 0.1-1 mL, Other, Q1H PRN, Jason Varela MD    magnesium sulfate 2 g in 50 mL sterile water intermittent infusion, 2 g, Intravenous, Q8H PRN, Lyudmila Medina MD    melatonin tablet 1 mg, 1 mg, Oral, At Bedtime PRN, Jason Varela MD    [Held by provider] metoprolol tartrate (LOPRESSOR) half-tab 12.5 mg, 12.5 mg, Oral, BID, Jason Varela MD, 12.5 mg at 08/23/23 2059    midazolam (VERSED) bolus from infusion pump 1 mg, 1 mg, Intravenous, Q30 Min PRN, Gabrielle Jessica MD     midazolam (VERSED) drip - ADULT 100 mg/100 mL in NS (pre-mix), 1-8 mg/hr, Intravenous, Continuous, Gabrielle Jessica MD, Last Rate: 4 mL/hr at 08/27/23 0949, 4 mg/hr at 08/27/23 0949    midodrine (PROAMATINE) tablet 10 mg, 10 mg, Oral, TID, Refugio Reich DO, 10 mg at 08/26/23 1654    naloxone (NARCAN) injection 0.2 mg, 0.2 mg, Intravenous, Q2 Min PRN **OR** naloxone (NARCAN) injection 0.4 mg, 0.4 mg, Intravenous, Q2 Min PRN **OR** naloxone (NARCAN) injection 0.2 mg, 0.2 mg, Intramuscular, Q2 Min PRN **OR** naloxone (NARCAN) injection 0.4 mg, 0.4 mg, Intramuscular, Q2 Min PRN, Alicia Paez MD    No heparin required, , Does not apply, Continuous PRN, Lyudmila Medina MD    No heparin via hemodialysis machine, , Does not apply, Once, Refugio Reich DO    norepinephrine (LEVOPHED) 16 mg in sodium chloride 0.9 % 250 mL infusion CENTRAL, 0.01-0.6 mcg/kg/min, Intravenous, Continuous, Alicia Paez MD, Last Rate: 37.8 mL/hr at 08/27/23 0830, 0.4 mcg/kg/min at 08/27/23 0830    ondansetron (ZOFRAN ODT) ODT tab 4 mg, 4 mg, Oral, Q6H PRN, 4 mg at 08/26/23 1919 **OR** ondansetron (ZOFRAN) injection 4 mg, 4 mg, Intravenous, Q6H PRN, Jason Varela MD, 4 mg at 08/26/23 1342    pantoprazole (PROTONIX) 2 mg/mL suspension 40 mg, 40 mg, Per Feeding Tube, BID AC **OR** pantoprazole (PROTONIX) IV push injection 40 mg, 40 mg, Intravenous, BID AC, Gabrielle Jessica MD, 40 mg at 08/27/23 1007    phenylephrine (GEORGE-SYNEPHRINE) 200 mg in sodium chloride 0.9 % 250 mL MAX CONC infusion, 0.1-6 mcg/kg/min, Intravenous, Continuous, Fortino Urbano MD    piperacillin-tazobactam (ZOSYN) 3.375 g vial to attach to  mL bag, 3.375 g, Intravenous, Q8H, Fortino Urbano MD    polyethylene glycol (MIRALAX) Packet 17 g, 17 g, Oral, Daily PRN, Jason Varela MD    potassium chloride 20 mEq in 50 mL intermittent infusion, 20 mEq, Intravenous, Q8H PRN, Lyudmila Medina MD    prochlorperazine (COMPAZINE) injection  10 mg, 10 mg, Intravenous, Q6H PRN, 10 mg at 08/26/23 2219 **OR** prochlorperazine (COMPAZINE) tablet 10 mg, 10 mg, Oral, Q6H PRN **OR** prochlorperazine (COMPAZINE) suppository 25 mg, 25 mg, Rectal, Q12H PRN, Jason Varela MD    sodium bicarbonate (BICARB) 1 mEq/mL drip, 50 mL/hr, Intravenous, Continuous, Zeynep Nova APRN CNP, Last Rate: 50 mL/hr at 08/27/23 1224, 50 mL/hr at 08/27/23 1224    sodium chloride (PF) 0.9% PF flush 3 mL, 3 mL, Intracatheter, Q8H, Jason Varela MD, 3 mL at 08/26/23 2211    sodium chloride (PF) 0.9% PF flush 3 mL, 3 mL, Intracatheter, q1 min prn, Jason Varela MD    sodium phosphate 15 mmol in sodium chloride 0.9 % 250 mL intermittent infusion, 15 mmol, Intravenous, Q8H PRN, Lyudmila Medina MD    [Held by provider] ticagrelor (BRILINTA) tablet 90 mg, 90 mg, Oral, BID, Jason Varela MD, 90 mg at 08/26/23 1210    vancomycin (VANCOCIN) 1000 mg in dextrose 5% 200 mL PREMIX, 1,000 mg, Intravenous, Q12H, Fortino Urbano MD    vasopressin (VASOSTRICT) 20 Units in sodium chloride 0.9 % 100 mL standard conc infusion, 2.4 Units/hr, Intravenous, Continuous, Gabrielle Jessica MD, Last Rate: 12 mL/hr at 08/27/23 1037, 2.4 Units/hr at 08/27/23 1037      Labs personally reviewed today during this evaluation at 1:26 PM

## 2023-08-27 NOTE — PROGRESS NOTES
63 year old with ESRD admitted for worsening renal function. RRT called for hypotension. when the lights were turned on, he c/o inability to see. SBP in 90s initially. Recommended CTA. However SBP was in the 60s and having trouble stabilizing him. Not a TNK candidate 2/2 stroke mimic: hypotension and eliquis use. AT this time, we have a strong explanation for this symptoms I.e. hypotension.     CTA head and neck does not show any proximal LVO. Some hypoattenuation of b/l PCA branches is noted. No acute interventions possible. Can deescalate stroke code.

## 2023-08-27 NOTE — PROCEDURES
CENTRAL LINE INSERTION PROCEDURE NOTE  (NON-OR)    Procedure Date: 8/27/2023   Performing Physician: Gabrielle Jessica MD    Procedure:  Insertion of Central Venous Catheter  Left   INTERNAL  JUGULAR    Indications:  SEPTIC SHOCK, RESPIRATORY FAILURE, and DRUG ADMINISTRATION     Procedure was done as an emergency : Yes  A Time Out was held and pertinent information confirmed.    Procedure Details:   Strict sterile conditions were maintained throughout the procedure -- cap, mask, and sterile gloves were worn by all participants.  Under sterile conditions the skin over the Left INTERNAL  JUGULAR was prepped with Chlorhexidine and covered with a sterile drape. 1 ml of Lidocaine 1% local anesthetic was infiltrated into the skin and subcutaneous tissues.  A 22-gauge needle was used to identify the vein.  Using Sledinger technique an 18-gauge needle was then inserted into the vein.  A guide wire was then passed easily through the catheter.  There were no arrythmias.  The catheter was then withdrawn.  A 7.0 Chinese tripple lumen was then inserted into the vessel over the guide wire.  All ports were flushed with sterile solution and had good non-pulsatile blood return.  The catheter was sutured into place and an occlusive sterile dressing applied.  The patient tolerated the procedure well with no change in vital signs.     Number of attempts:  2    Post-procedure chest X-ray was ordered.     Estimated Blood Loss:  2  ml  Complications: NONE       Condition: unstable  ________________________________________________________________________  Gabrielle Jessica MD  8/27/20233:26 AM

## 2023-08-27 NOTE — PROGRESS NOTES
Intensivist update    Called to pronounce patient after asystole was noted on the monitor.    Patient examined.  Unresponsive.  No heart or lung sounds.  No pulse    TOD 4:01pm, 8/27/2023.    Fortino Urbano MD (Avi)  [facility] ([facility])  Clinic [phone]  Fax [phone]

## 2023-08-27 NOTE — SIGNIFICANT EVENT
Significant Event Note    Time of event: 1:41 AM August 27, 2023    Description of event:  Code Blue was called.     When I entered the room chest compressions were in progress.  Nursing staff was getting the pads on the patient.  Compressions had been ongoing for about 2 minutes.    Called for a pulse check.  No pulse.    Did show sinus bradycardia rhythm.  Patient was in PEA    Compressions restarted.  Epinephrine was given    2 more minutes of compressions.  When compressions were stopped to place the [place] patient did have a pulse.    Anesthesia arrived.  Patient was agonal breathing.  Decision was made to intubate.      Plan:  -Patient was intubated and transferred to the ICU    -I called and spoke with his sister Henna, explained the gravity of the situation that patient was intubated, sedated and being transferred to the ICU.  She will plan to come in the morning to see the patient.  If he further deteriorates overnight she would appreciate a call and could come in sooner.      Robert Horan MD    1:56 AM   Patient coded again. Called his sister back. She is on her way in now   Let her know he is in critical condition.   She affirmed that he would want all efforts to continue until his family can arrive is possible.

## 2023-08-27 NOTE — CONSULTS
Critical Care Medicine Consult  2023     Name: Norman Aguilar  : 1960  MRN: [id number]  PCP: Jaquan Dickerson         ASSESSMENT/PLAN:  63 year old man with CAD, systolic HF, CKD-5, T2DM, & L BKA, & recent hospitalization for GAS TSS, admitted on 2023 w/ progressively worsened renal failure for dialysis initiation. Patient experienced PEA arrest x3 in setting of hypotension & metabolic acidosis on , was intubated & transferred to the ICU w/ refractory shock & multiorgan failure syndrome.    Neuro/Psych:   #. Analgesia/Sedation for vent compliance.  #. Acute toxic metabolic encephalopathy.  #. Acute & transient vision loss, TIA vs hypotension-related process.  Goal RASS -3 for vent compliance in acute setting, continue w/ fentanyl & midazolam drips  Head CT negative for intracranial bleeding, currently too unstable for repeat imaging  With acid-base derangement, refractory shock, & PEA arrest we will forgo the therapeutic hypothermia protocol    Pulmonary:   #. Acute hypoxic respiratory failure s/p intubation  for airway protection.  #. Acute iatrogenic respiratory alkalosis to help stabilize acid/base status in setting of profound metabolic acidosis.  Goal SpO2 >/= 90%, wean down supplemental oxygen & avoid hyperoxia  Continue lung protective ventilation  Starting to scale back on hyperventilation while replacing bicarb    Cardiovascular:   #. PEA arrest, most likely result of metabolic acidosis & hypotension.   #. Shock, refractory. Has causes for multifactorial process w/ known underlying systolic heart failure, metabolic acidosis, & possible sepsis.  #. Systolic heart failure, EF 30-35% on TTE .  #. Atrial fibrillation on Eliquis for long-term anticoagulation.  Goal MAP >/= 65 AND SBP >/= 90  Continue w/ NE, vasopressin, & epinephrine  No longer responding to volume expansion  Start stress dose hydrocortisone in setting of refractory shock  Limited TTE to assess cardiac  function  Amiodarone PO on hold (should have adequate tissue concentration), will start amiodarone gtt if needed for rhythm control  Trend lactic acid    Infectious:  #. Suspected sepsis, possible aspiration event, high risk of HAP in setting of prolonged contact w/ healthcare institutions.   Blood culture pending, will check sputum culture  Started empiric vancomycin & zosyn after arrival to the ICU    Renal:   #. ESRD (recent JOHANA super-imposed on CKD). Started on HD 8/24, most recent treatment on 8/26 w/ UF for hypervolemia.  #. Hyponatremia, resolved.  #. Hyperkalemia 2/2 ESRD.  #. Hypocalcemia.   #. Metabolic acidosis.  #.  Lactic acidosis.  Continue w/ bicarb pushes for acidosis correction  Trend K  Electrolyte replacement as needed  Will require CRRT -- Nephrology following     GI:   #. Shock liver.  Trend transaminases    Heme:   #. Anemia 2/2 ESRD, acutely worsened 8/27 (likely some dilutional component w/ rapid volume expansion over the last several hours).  #. Thrombocytopenia, new 8/27.  #. Leukocytosis, new infection vs stress-demargination.  #. Iatrogenic coagulopathy 2/2 Afib-related anticoagulation  Transfuse for Hgb < 7.0 (family consented, paperwork in the chart)  Monitor PLT count  Continue Eliquis    Endo:   #. T2DM, q4H glucose checks w/ low-intensity ISS.    Access/Lines/Tubes: required and necessary for continued patient cares  ETT  OG tube  L internal jugular CVC  R subclavian tunneled HD catheter  L brachial arterial line  PIV  L tibial IO    ICU prophylaxis:   #. VAP ppx: HOB 30 degrees, chlorhexidine rinses  #. Stress ulcer: PPI  #. Diet: NPO  #. VTE: Eliquis  #. Restraints: required and necessary for continued patient cares    CODE: FULL    Billing: This patient is critically ill: Yes. Total critical care time today 71 min, excluding procedures.     Family came to the hospital & was updated at the bedside. I explained that Norman is critically ill w/ multiorgan failure super-imposed on his  "significant baseline medical issues. It seems that patient has been chronically ill but overall doing well until his hospitalization 6 weeks ago; he has been quite cognizant of his progressive clinical decline since his hospitalization w/ TSS. He has told his family that he would not want to endure prolonged dependence on machines for life support, but was willing to try dialysis. Discussed current MOFS w/ concern for > 90% mortality, as well as unknown neurologic prognosis w/ his cardiac arrests today. The next 12-48 hours may be crucial in helping us prognosticate. I did impress on the family that Norman is critically ill & requiring rapidly escalating life support measures. He has some siblings living in [place] & his sister is getting in contact w/ them. Patient actually met w/ the Palliative team on 8/21 -- in light of his acute deterioration, it will be helpful for Palliative to become involved for family support & future discussions regarding goals of care (consult placed).     Gabrielle Jessica MD  Pulmonary and Critical Care     Clinically Significant Risk Factors        # Hyperkalemia: Highest K = 5.4 mmol/L in last 2 days, will monitor as appropriate   # Hypocalcemia: Lowest Ca = 8.1 mg/dL in last 2 days, will monitor and replace as appropriate  # Hypercalcemia: Highest Ca = 10.3 mg/dL in last 2 days, will monitor as appropriate   # Anion Gap Metabolic Acidosis: Highest Anion Gap = 30 mmol/L in last 2 days, will monitor and treat as appropriate  # Hypoalbuminemia: Lowest albumin = 3.2 g/dL at 8/27/2023  2:12 AM, will monitor as appropriate     # Hypertension: Noted on problem list  # Chronic heart failure with reduced ejection fraction: last echo with EF <40%      # DMII: A1C = 6.9 % (Ref range: <5.7 %) within past 6 months   # Overweight: Estimated body mass index is 27.8 kg/m  as calculated from the following:    Height as of 8/11/23: 1.905 m (6' 3\").    Weight as of an earlier encounter on 8/22/23: 100.9 kg (222 " lb 6.4 oz).      # Housing Instability: noted in nursing assessment                 HISTORY OF PRESENTING ILLNESS:  Norman Aguilar is a 63 year old man with history of CAD, systolic HF, CKD-5, T2DM, & L BKA, admitted from his acute rehab facility on 8/22/2023 with progressively worsening renal failure.  Patient was hospitalized 7/17 - 8/11 with septic shock/TSS secondary to GBS right shoulder infection and bacteremia.  He then transferred to an acute rehab facility where he exhibited limited ability to participate in necessary therapies due to ongoing medical concerns.  At the rehab his clinical status steadily deteriorated in setting of progressive renal failure; patient was admitted to [facility] to initiate dialysis.    Patient had a tunneled right subclavian HD catheter placed on 8/24, and was dialyzed daily through 8/26.  On his most recent dialysis run he had 1 L removed with the plan to start MWF dialysis schedule.  Based on chart review patient has been hypotensive throughout the day (8/26) and received 25 g albumin and 3 times daily midodrine without much success.  He had a stroke code called shortly after midnight on 8/27 due to acute onset vision loss in setting of persistent hypotension.  He had a head CT negative for a bleed, was determined to be not a candidate for tPA, and his rapidly resolving vision problems were attributed to his significant hypotension.  He received another 25 g albumin bolus, 1 L normal saline, and an additional dose of midodrine; labs were drawn and were notable for significant metabolic acidosis.      Sometime after 1 AM he became acutely hypoxic and lost his pulse.  Patient was coded for a PEA arrest with ROSC within roughly 5 minutes.  Patient was then intubated in setting of agonal breathing and inability to protect his airway.  He then came to the ICU where he lost his pulse shortly after arrival to his room.  He was coded for a PEA with ROSC with then 10  minutes.  He has received multiple pushes of bicarb, calcium, magnesium.  He never had a shockable rhythm, but received epinephrine pushes at appropriate intervals.  We administered additional saline bolus and multiple albumin boluses via IO placed during the code.  He was started on norepinephrine and vasopressin for persistent hypotension.  He had a third PEA arrest with ROSC at approximately 2 minutes of CPR.    REVIEW OF SYSTEMS: unable to obtain d/t mental status    MEDICAL HISTORY:  has a past medical history of Anemia, Chronic systolic CHF (congestive heart failure) (H), Coronary artery disease, Diabetic neuropathy (H), DM type 2 w Neuropathy, GERD (gastroesophageal reflux disease), Hyperlipidemia, Hypertension, Intermittent atrial fibrillation (H), Ischemic cardiomyopathy (11/16/2021), NSTEMI (non-ST elevated myocardial infarction) (H) (11/15/2021), Osteomyelitis of left foot (H) (04/04/2022), Renal disease, Retinopathy, and Sleep disturbance.    He has no past medical history of Arthritis, Complication of anesthesia, Malignant hyperthermia, PONV (postoperative nausea and vomiting), or Sleep apnea.  SURGICAL HISTORY:  has a past surgical history that includes Coronary Angiogram (N/A, 11/16/2021); Left Heart Catheterization (N/A, 11/16/2021); Incision and drainage lower extremity, combined (Left, 11/16/2021); Amputate toe(s) (Left, 11/16/2021); Incision and drainage lower extremity, combined (Left, 11/19/2021); Incision and drainage lower extremity, combined (Left, 12/9/2021); appendectomy; hernia repair; Incision and drainage lower extremity, combined (Left, 1/10/2022); Foot surgery (Left); Incision and drainage lower extremity, combined (Left, 1/27/2022); Amputate foot (Left, 3/21/2022); Amputate leg below knee (Left, 4/4/2022); Coronary Angiogram (N/A, 8/24/2022); Coronary Angiogram (N/A, 8/29/2022); Percutaneous Coronary Intervention (N/A, 8/29/2022); Intravascular Ultrasound (N/A, 8/29/2022);  Percutaneous Transluminal Angioplasty (N/A, 8/29/2022); PICC/Midline Placement (7/17/2023); PICC/Midline Placement (7/18/2023); IR CVC Non Tunnel Placement > 5 Yrs (7/20/2023); and IR CVC Tunnel Placement > 5 Yrs of Age (8/24/2023).  SOCIAL HISTORY:  reports that he has quit smoking. His smoking use included cigars. He has never used smokeless tobacco. He reports current alcohol use. He reports that he does not use drugs.  FAMILY HISTORY: family history is not on file.  MEDICATIONS: personally reviewed, including EMR/Care Everywhere. Pertinent information noted & updated.     ALLERGIES:  No Known Allergies    PHYSICAL EXAM:  Temp:  [97.3  F (36.3  C)-98  F (36.7  C)] 97.3  F (36.3  C)  Pulse:  [] 92  Resp:  [10-29] 29  BP: ()/(33-87) 98/56  FiO2 (%):  [100 %] 100 %  SpO2:  [90 %-100 %] 99 %  Vent Mode: CMV/AC  (Continuous Mandatory Ventilation/ Assist Control)  FiO2 (%): 100 %  Resp Rate (Set): 24 breaths/min  Tidal Volume (Set, mL): 580 mL  PEEP (cm H2O): 10 cmH2O  Resp: 29      Physical Exam:   General: lying in bed, NAD  HEENT: orally intubated, ETT secured, pupils symmetric & minimally reactive to light, MMM  CV: RRR, no M/R/G; extremities poorly perfused  Pulm: mechanical breath sounds, no wheezing, no rhonchi, no crackles, no cough  Abd: soft, ND, NT, quiet bowel sounds  Msk: cool to touch distally, L BKA, no peripheral edema  Derm: no acute lesions/rashes on limited exam  Neuro: sedated; moved upper extremities w/ ROSC prior to sedation introduction  Psych: sedated    LABS: I personally reviewed all available & pertinent laboratory studies w/in the EMR.    IMAGING/STUDIES: I personally reviewed all available & pertinent imaging studies w/in the EMR.     This report was prepared using speech recognition software.  Any typographical errors are unintentional.  Please, contact me directly for any clarifications of my  report.    --------------------------------------------------------------------------------  PROVIDER RESTRAINT FOR NON-VIOLENT BEHAVIOR FACE TO FACE EVALUATION    Patient's Immediate Situation:  Patient demonstrated the following behaviors: pulling at/on lines/tubes/equipment    Patient's Reaction to the intervention:  Does patient understand the reason for restraint/seclusion? Unable to express    Medical Condition:  Is there any evidence of compromise of Skin integrity, Respiratory, Cardiovascular, Musculoskeletal, Hydration? No    Behavioral Condition:  In consultation with the RN, is there a need to continue this restraint or seclusion? Yes    See Restraint Flowsheet for complete restraint documentation and assessment.    Gabrielle Jessica MD

## 2023-08-27 NOTE — DISCHARGE SUMMARY
[facility] MEDICINE  DISCHARGE/DEATH SUMMARY     Primary Care Physician: Jaquan Dickerson  Admission Date: 2023   Discharge Provider: Fortino Urbano MD Discharge Date: 2023       Code Status: No CPR- Pre-arrest intubation OK           Condition at Discharge:     63 year old man with CAD, systolic HF, CKD-5, T2DM, & L BKA, & recent hospitalization for GAS TSS, admitted on 2023 w/ progressively worsened renal failure for dialysis initiation. Patient experienced PEA arrest x3 in setting of hypotension & metabolic acidosis on , was intubated & transferred to the ICU w/ refractory shock & multiorgan failure syndrome.     On the morning of  he underwent emergent bedside pericardiocentesis for pericardial effusion with cardiac tamponade. 350cc of hemorrhagic pericardial removed, and a drain was placed with removal of an additional 500cc. There was initially significant improvement in hemodynamics; unfortunately despite this he continued to deteriorate with worsening lactic acidosis, metabolic acidosis and worsening organ function with shock liver and coagulopathy. He appeared to be in DIC and was oozing continuously and required multiple blood products.     I met with his sister and decision maker Cindy and discussed his grave illness and prognosis despite maximal medical therapy. She noted that he had expressed that he would not want these types of heroic measures. Multiple family members came in and decided to transition to comfort measures with compassionate extubation.     He was eventually transitioned to comfort care and terminally extubated.  He  at 4:01pm on 2023.       CC:Jaquan Dickerson (Benjamin) MD [place]  [facility] Pulmonary & Critical Care ([facility])  Clinic [phone]  Fax [phone]

## 2023-08-27 NOTE — PROGRESS NOTES
Assisting primary RN. Pt reporting feeling weak and nauseated. Blood sugar rechecked and was 62. Pt too nauseated to take anything PO and pt reporting IV is now too painful to put anything through. IV pulled. PICC team paged. No glucagon injection given d/t order to only give for blood glucose <50. Pt agreeable to try ODT zofran. PICC RN arrived at bedside to place PIV. Updated primary RN and handed off care.     KELLY INGRAM RN

## 2023-08-28 LAB
ATRIAL RATE - MUSE: 63 BPM
ATRIAL RATE - MUSE: 92 BPM
CRYOGLOB SER QL: NEGATIVE
DIASTOLIC BLOOD PRESSURE - MUSE: NORMAL MMHG
DIASTOLIC BLOOD PRESSURE - MUSE: NORMAL MMHG
GLUCOSE BLDC GLUCOMTR-MCNC: 59 MG/DL (ref 70–99)
GLUCOSE BLDC GLUCOMTR-MCNC: 89 MG/DL (ref 70–99)
INTERPRETATION ECG - MUSE: NORMAL
INTERPRETATION ECG - MUSE: NORMAL
P AXIS - MUSE: 18 DEGREES
P AXIS - MUSE: 72 DEGREES
PR INTERVAL - MUSE: 192 MS
PR INTERVAL - MUSE: 194 MS
QRS DURATION - MUSE: 112 MS
QRS DURATION - MUSE: 118 MS
QT - MUSE: 418 MS
QT - MUSE: 532 MS
QTC - MUSE: 516 MS
QTC - MUSE: 544 MS
R AXIS - MUSE: 54 DEGREES
R AXIS - MUSE: 70 DEGREES
SYSTOLIC BLOOD PRESSURE - MUSE: NORMAL MMHG
SYSTOLIC BLOOD PRESSURE - MUSE: NORMAL MMHG
T AXIS - MUSE: 129 DEGREES
T AXIS - MUSE: 135 DEGREES
VENTRICULAR RATE- MUSE: 63 BPM
VENTRICULAR RATE- MUSE: 92 BPM

## 2023-08-30 LAB
BACTERIA BLD CULT: ABNORMAL
BACTERIA BLD CULT: ABNORMAL

## 2023-08-30 NOTE — PROGRESS NOTES
Clinic Care Coordination Contact  Care Team Conversations    JEFFREY CC received notice the pt has passed away. JEFFREY CC closed program and ended role for pt's care team.    Erin Brannon, [facility]  Social Work Care Coordinator - [facility]  Care Coordination  [email or web address]  Cell Phone: [phone]  Gender pronouns: she/her  Employed by [employer]

## 2023-11-27 NOTE — PLAN OF CARE
"Discharge Planner Post-Acute Rehab OT:      Discharge Plan: TCU     Precautions: RUE pain and weakness s/p infection, hx of L BKA (uses prosthetic leg)     Current Status:  ADLs:  Mobility: CGA FWW with LLE prosthetic ambulation; wc longer distances. Recommend nsg wc hallway, CGA FWW ambulation with LLE prosthetic in room.   Grooming: Supervision with set up, seated  Dressing: FB SBA FWW including prosthetic, cues for safety  Bathing: Upon admission transfer Min A SPT wc <> ETB with grab bar. Mod A bathing A.  Toileting: CGA FWW to 24\" BS. Total A cares.  IADLs: Previously IND. TBD pending progress.  Vision/Cognition: Glasses. Cognition WNL, low mood and adjustment to disability.     Assessment: Pt declined OT 2x d/t medical status concerns.    Other Barriers to Discharge (DME, Family Training, etc): Unstable housing situation at baseline. Targeting TCU placement where pt can utilize community resources for safe discharge plan.                                " Noted  Please have chart marked as

## 2024-06-25 NOTE — PLAN OF CARE
Problem: Plan of Care - These are the overarching goals to be used throughout the patient stay.    Goal: Optimal Comfort and Wellbeing  Outcome: Progressing   Goal Outcome Evaluation:  Pt was awake for the 1st half of the night.  Sat on the edge of the bed and did exercises.  Denied pain.  Garcia patent.  Right arm remains slightly swollen.  Uses call light appropriately.  Sleeping this am.                         What Type Of Note Output Would You Prefer (Optional)?: Standard Output How Severe Is Your Skin Lesion?: mild Has Your Skin Lesion Been Treated?: not been treated Is This A New Presentation, Or A Follow-Up?: Skin Lesion Which Family Member (Optional)?: Grandmother

## 2024-11-14 NOTE — INTERVAL H&P NOTE
I have reviewed the surgical (or preoperative) H&P that is linked to this encounter, and examined the patient. There are no significant changes   Universal Safety Interventions

## 2024-11-15 NOTE — ANESTHESIA POSTPROCEDURE EVALUATION
Patient: Norman Aguilar    Procedure: Procedure(s):  INCISION AND DRAINAGE, Left foot with first and second ray amputation,       Diagnosis:Cellulitis and abscess of foot, except toes [L03.119, L02.619]  Diagnosis Additional Information: No value filed.    Anesthesia Type:  MAC    Note:  Disposition: Inpatient   Postop Pain Control: Uneventful            Sign Out: Well controlled pain   PONV: No   Neuro/Psych: Uneventful            Sign Out: Acceptable/Baseline neuro status   Airway/Respiratory: Uneventful            Sign Out: Acceptable/Baseline resp. status   CV/Hemodynamics: Uneventful            Sign Out: Acceptable CV status; No obvious hypovolemia; No obvious fluid overload   Other NRE: NONE   DID A NON-ROUTINE EVENT OCCUR? No           Last vitals:  Vitals Value Taken Time   /59 11/16/21 2000   Temp 36.8  C (98.3  F) 11/16/21 1858   Pulse 80 11/16/21 2002   Resp 16 11/16/21 1858   SpO2 96 % 11/16/21 2002   Vitals shown include unvalidated device data.    Electronically Signed By: Jackson Busby MD  November 16, 2021  8:04 PM  
DISCHARGE

## (undated) DEVICE — GOWN LG DISP 9515

## (undated) DEVICE — GLOVE BIOGEL PI INDICATOR 8.0 LF 41680

## (undated) DEVICE — BONE CLEANING TIP INTERPULSE  0210-010-000

## (undated) DEVICE — SUCTION IRR SYSTEM W/O TIP INTERPULSE HANDPIECE 0210-100-000

## (undated) DEVICE — DRSG KERLIX 4 1/2"X4YDS ROLL 6715

## (undated) DEVICE — PLATE GROUNDING ADULT W/CORD 9165L

## (undated) DEVICE — EXCHANGE WIRE .035 260 STAR/JFC/035/260/ M001491681

## (undated) DEVICE — CAST PADDING 6IN COTTON WEBRIL STERILE 2554

## (undated) DEVICE — CUFF TOURN 18IN STRL DISP

## (undated) DEVICE — SONICVAC & TUBE SET MISONIX NEXUS 120-31-13X2

## (undated) DEVICE — BANDAGE ELASTIC VELCRO 4IN REB3014

## (undated) DEVICE — CATH LAUNCHER 6FR AL 1.0 LA6AL10

## (undated) DEVICE — GLOVE BIOGEL PI ORTHOPRO SZ 7.5 47675

## (undated) DEVICE — BANDAGE STRETCH GAUZE 4IN 2247

## (undated) DEVICE — SUCTION MANIFOLD NEPTUNE 2 SYS 1 PORT 702-025-000

## (undated) DEVICE — BLADE SAW OSCIL/SAG STRK MICRO 9X31X0.38MM 2296-003-225

## (undated) DEVICE — PREP POVIDONE-IODINE 7.5% SCRUB 4OZ BOTTLE MDS093945

## (undated) DEVICE — Device

## (undated) DEVICE — SPONGE LAP 18X18" X8435

## (undated) DEVICE — DRSG ABD TNDRSRB WET PRUF 8IN X 10IN STRL  9194A

## (undated) DEVICE — CATH IVUS OPTICROSS HD 3FR 1MM 135CML H74939352040

## (undated) DEVICE — PACKING IODOFORM STRIP 1/2" 7832

## (undated) DEVICE — BLADE SAGITTAL X-LONG WIDE 2296-3-108

## (undated) DEVICE — CUSTOM PACK TOTAL KNEE SOP5BTKHEC

## (undated) DEVICE — PREP CHLORAPREP 26ML TINTED HI-LITE ORANGE 930815

## (undated) DEVICE — GUIDEWIRE VASCULAR 0.035IN DIA 145CML 15CML/6CML STAINLESS

## (undated) DEVICE — GUIDEWIRE FORTE FLOPPY J TOP 34949-05J

## (undated) DEVICE — DRSG ADAPTIC 3X8" 6113

## (undated) DEVICE — CATH ANGIO INFINITI JL3.5 4FRX100CM 538418

## (undated) DEVICE — BLADE KNIFE SURG 10 371110

## (undated) DEVICE — DRSG GAUZE 4X4" TRAY 6939

## (undated) DEVICE — INTRO MICRO MINI STICK 4FR STD NITINOL

## (undated) DEVICE — WIRE GUIDE 0.035"X260CM SAFE-T-J EXCHANGE G00517

## (undated) DEVICE — CUSTOM PACK LOWER EXTREMITY SOP5BLEHEA

## (undated) DEVICE — ELECTRODE ADULT PACING MULTI P-211-M1

## (undated) DEVICE — CATH DIAG 4FR JR 5.0 538423

## (undated) DEVICE — SOL NACL 0.9% IRRIG 1000ML BOTTLE 2F7124

## (undated) DEVICE — SOL WATER IRRIG 1000ML BOTTLE 2F7114

## (undated) DEVICE — SOLUTION IRRIG 2B7127 .9NS 3000ML BAG

## (undated) DEVICE — SPLINT ORTH FBGLS ORTHO-GLASS 5INX30IN OG-5PC

## (undated) DEVICE — MANIFOLD KIT ANGIO AUTOMATED 014613

## (undated) DEVICE — BANDAGE ELASTIC VELCRO 6IN REB3016

## (undated) DEVICE — SLEEVE TR BAND RADIAL COMPRESSION DEVICE 24CM TRB24-REG

## (undated) DEVICE — DEVICE INFLATION SYR W/ HEMOSTASIS VALVE 12IN EXT IN4904

## (undated) DEVICE — SOL NACL 0.9% INJ 1000ML BAG 2B1324X

## (undated) DEVICE — PREP POVIDONE IODINE SOLUTION 10% 4OZ BOTTLE 29906-004

## (undated) DEVICE — CATH ANGIO SUPERTORQUE MPA1 4FRX100CM 532430

## (undated) DEVICE — CUSTOM PACK CORONARY SAN5BCRHEA

## (undated) DEVICE — BONE WAX 2.5GM W31G

## (undated) DEVICE — SU VICRYL+ 3-0 27IN SH UND VCP416H

## (undated) DEVICE — SUCTION TIP POOLE STERILE 35040

## (undated) DEVICE — GLOVE BIOGEL PI ULTRATOUCH G SZ 6.5 42165

## (undated) DEVICE — TRAY PREP DRY SKIN SCRUB 067

## (undated) DEVICE — SYR 10ML LL W/O NDL 302995

## (undated) DEVICE — KIT HAND CONTROL ACIST 016795

## (undated) DEVICE — SHEATH GLIDE RADIAL 4FR 25CM 0.021

## (undated) DEVICE — DRSG ABDOMINAL PAD UNSTERILE 5X9" 9190

## (undated) DEVICE — BLADE SAW SAGITTAL STRK 12.5X81.5X1.27MM 4/2000 2108-158-000

## (undated) DEVICE — KIT HAND CONTROL ACIST 014644 AR-P54

## (undated) DEVICE — GLOVE BIOGEL PI ULTRATOUCH G SZ 7.0 42170

## (undated) DEVICE — DRSG GAUZE 4X4" TRAY

## (undated) DEVICE — GLOVE BIOGEL PI ULTRATOUCH G SZ 7.5 42175

## (undated) DEVICE — GUIDEWIRE ROSEN CVD .035X180CM J-TIP G01264

## (undated) DEVICE — ESU PENCIL SMOKE EVAC W/ROCKER SWITCH 0703-047-000

## (undated) DEVICE — BNDG KLING 4" 2236

## (undated) DEVICE — SHEATH ULTIMUM 6FR 12CM 407845

## (undated) DEVICE — SHEATH GLIDE RADIAL 6FR 25CM 0.021

## (undated) DEVICE — GOWN IMPERVIOUS BREATHABLE SMART LG 89015

## (undated) DEVICE — CATH BALLOON NC EMERGE 3.50X12MM H7493926712350

## (undated) DEVICE — HOLDER LIMB VELCRO OR 0814-1533

## (undated) DEVICE — STPL SKIN 35W 6.9MM  PXW35

## (undated) DEVICE — SYR ANGIOGRAPHY MULTIUSE KIT ACIST 014612

## (undated) RX ORDER — BUPIVACAINE HYDROCHLORIDE 5 MG/ML
INJECTION, SOLUTION EPIDURAL; INTRACAUDAL
Status: DISPENSED
Start: 2021-12-09

## (undated) RX ORDER — LIDOCAINE HYDROCHLORIDE AND EPINEPHRINE 10; 10 MG/ML; UG/ML
INJECTION, SOLUTION INFILTRATION; PERINEURAL
Status: DISPENSED
Start: 2023-01-01

## (undated) RX ORDER — ALBUMIN (HUMAN) 12.5 G/50ML
SOLUTION INTRAVENOUS
Status: DISPENSED
Start: 2023-01-01

## (undated) RX ORDER — MIDODRINE HYDROCHLORIDE 5 MG/1
TABLET ORAL
Status: DISPENSED
Start: 2023-01-01

## (undated) RX ORDER — BUPIVACAINE HYDROCHLORIDE 5 MG/ML
INJECTION, SOLUTION EPIDURAL; INTRACAUDAL
Status: DISPENSED
Start: 2022-01-10

## (undated) RX ORDER — BUPIVACAINE HYDROCHLORIDE 2.5 MG/ML
INJECTION, SOLUTION EPIDURAL; INFILTRATION; INTRACAUDAL
Status: DISPENSED
Start: 2021-11-19

## (undated) RX ORDER — FENTANYL CITRATE 50 UG/ML
INJECTION, SOLUTION INTRAMUSCULAR; INTRAVENOUS
Status: DISPENSED
Start: 2022-03-21

## (undated) RX ORDER — BUPIVACAINE HYDROCHLORIDE 5 MG/ML
INJECTION, SOLUTION EPIDURAL; INTRACAUDAL
Status: DISPENSED
Start: 2021-11-16

## (undated) RX ORDER — HEPARIN SODIUM 1000 [USP'U]/ML
INJECTION, SOLUTION INTRAVENOUS; SUBCUTANEOUS
Status: DISPENSED
Start: 2023-01-01

## (undated) RX ORDER — FENTANYL CITRATE 50 UG/ML
INJECTION, SOLUTION INTRAMUSCULAR; INTRAVENOUS
Status: DISPENSED
Start: 2022-04-04

## (undated) RX ORDER — BACITRACIN ZINC 500 [USP'U]/G
OINTMENT TOPICAL
Status: DISPENSED
Start: 2022-04-04

## (undated) RX ORDER — CEFAZOLIN SODIUM/WATER 2 G/20 ML
SYRINGE (ML) INTRAVENOUS
Status: DISPENSED
Start: 2023-01-01

## (undated) RX ORDER — KETAMINE HYDROCHLORIDE 10 MG/ML
INJECTION INTRAMUSCULAR; INTRAVENOUS
Status: DISPENSED
Start: 2022-03-21

## (undated) RX ORDER — BUPIVACAINE HYDROCHLORIDE 2.5 MG/ML
INJECTION, SOLUTION EPIDURAL; INFILTRATION; INTRACAUDAL
Status: DISPENSED
Start: 2022-01-10

## (undated) RX ORDER — FENTANYL CITRATE 50 UG/ML
INJECTION, SOLUTION INTRAMUSCULAR; INTRAVENOUS
Status: DISPENSED
Start: 2021-11-16

## (undated) RX ORDER — DIAZEPAM 5 MG
TABLET ORAL
Status: DISPENSED
Start: 2021-11-16

## (undated) RX ORDER — NITROGLYCERIN 0.4 MG/1
TABLET SUBLINGUAL
Status: DISPENSED
Start: 2021-11-16

## (undated) RX ORDER — PROPOFOL 10 MG/ML
INJECTION, EMULSION INTRAVENOUS
Status: DISPENSED
Start: 2022-04-04

## (undated) RX ORDER — DIAZEPAM 10 MG
TABLET ORAL
Status: DISPENSED
Start: 2022-01-01

## (undated) RX ORDER — FENTANYL CITRATE 50 UG/ML
INJECTION, SOLUTION INTRAMUSCULAR; INTRAVENOUS
Status: DISPENSED
Start: 2022-08-24

## (undated) RX ORDER — FENTANYL CITRATE 50 UG/ML
INJECTION, SOLUTION INTRAMUSCULAR; INTRAVENOUS
Status: DISPENSED
Start: 2023-01-01

## (undated) RX ORDER — EPHEDRINE SULFATE 50 MG/ML
INJECTION, SOLUTION INTRAMUSCULAR; INTRAVENOUS; SUBCUTANEOUS
Status: DISPENSED
Start: 2022-04-04

## (undated) RX ORDER — FENTANYL CITRATE 50 UG/ML
INJECTION, SOLUTION INTRAMUSCULAR; INTRAVENOUS
Status: DISPENSED
Start: 2022-01-01

## (undated) RX ORDER — PROPOFOL 10 MG/ML
INJECTION, EMULSION INTRAVENOUS
Status: DISPENSED
Start: 2022-03-21

## (undated) RX ORDER — LIDOCAINE HYDROCHLORIDE 10 MG/ML
INJECTION, SOLUTION INFILTRATION; PERINEURAL
Status: DISPENSED
Start: 2023-01-01